# Patient Record
Sex: FEMALE | Race: WHITE | NOT HISPANIC OR LATINO | Employment: OTHER | URBAN - METROPOLITAN AREA
[De-identification: names, ages, dates, MRNs, and addresses within clinical notes are randomized per-mention and may not be internally consistent; named-entity substitution may affect disease eponyms.]

---

## 2017-01-02 ENCOUNTER — OFFICE VISIT (OUTPATIENT)
Dept: FAMILY MEDICINE | Facility: CLINIC | Age: 82
End: 2017-01-02
Payer: COMMERCIAL

## 2017-01-02 VITALS
HEIGHT: 64 IN | OXYGEN SATURATION: 97 % | DIASTOLIC BLOOD PRESSURE: 62 MMHG | WEIGHT: 183.8 LBS | HEART RATE: 85 BPM | TEMPERATURE: 98.1 F | SYSTOLIC BLOOD PRESSURE: 122 MMHG | RESPIRATION RATE: 16 BRPM | BODY MASS INDEX: 31.38 KG/M2

## 2017-01-02 DIAGNOSIS — J20.9 ACUTE BRONCHITIS, UNSPECIFIED ORGANISM: Primary | ICD-10-CM

## 2017-01-02 DIAGNOSIS — R01.1 HEART MURMUR: ICD-10-CM

## 2017-01-02 PROCEDURE — 99213 OFFICE O/P EST LOW 20 MIN: CPT | Performed by: FAMILY MEDICINE

## 2017-01-02 RX ORDER — AZITHROMYCIN 250 MG/1
TABLET, FILM COATED ORAL
Qty: 6 TABLET | Refills: 0 | Status: SHIPPED | OUTPATIENT
Start: 2017-01-02 | End: 2017-07-14

## 2017-01-02 NOTE — PATIENT INSTRUCTIONS
I do feel this is viral.   At this time, I do not think antibiotics would be helpful.    I would recommend if you take a turn for the worse after 7-10 days of illness or if you are not improving after 10-14 days, that you start the antibiotic        Bronchitis, Viral (Adult)    You have a viral bronchitis. Bronchitis is inflammation and swelling of the lining of the lungs. This is often caused by an infection. Symptoms include a dry, hacking cough that is worse at night. The cough may bring up yellow-green mucus. You may also feel short of breath or wheeze. Other symptoms may include tiredness, chest discomfort, and chills.  Bronchitis that is caused by a virus is not treated with antibiotics. Instead, medicines may be given to help relieve symptoms. Symptoms can last up to 2 weeks, although the cough may last much longer.  This illness is contagious during the first few days and is spread through the air by coughing and sneezing, or by direct contact (touching the sick person and then touching your own eyes, nose, or mouth).  Most viral illnesses resolve within 10 to 14 days with rest and simple home remedies, although they may sometimes last for several weeks.  Home care    If symptoms are severe, rest at home for the first 2 to 3 days. When you go back to your usual activities, don't let yourself get too tired.    Do not smoke. Also avoid being exposed to secondhand smoke.    You may use over-the-counter medicine to control fever or pain, unless another pain medicine was prescribed. (Note: If you have chronic liver or kidney disease or have ever had a stomach ulcer or gastrointestinal bleeding, talk with your healthcare provider before using these medicines. Also talk to your provider if you are taking medicine to prevent blood clots.) Aspirin should never be given to anyone younger than 18 years of age who is ill with a viral infection or fever. It may cause severe liver or brain damage.    Your appetite may  be poor, so a light diet is fine. Avoid dehydration by drinking 6 to 8 glasses of fluids per day (such as water, soft drinks, sports drinks, juices, tea, or soup). Extra fluids will help loosen secretions in the nose and lungs.    Over-the-counter cough, cold, and sore-throat medicines will not shorten the length of the illness, but they may help to reduce symptoms. (Note: Do not use decongestants if you have high blood pressure.)  Follow-up care  Follow up with your healthcare provider, or as advised.  If you had an X-ray or ECG (electrocardiogram), a specialist will review it. You will be notified of any new findings that may affect your care.  Note: If you are age 65 or older, or if you have a chronic lung disease or condition that affects your immune system, or you smoke, talk to your healthcare provider about having pneumococcal vaccinations and a yearly influenza vaccination (flu shot).  When to seek medical advice   Call your healthcare provider right away if any of these occur:    Fever of 100.4 F (38 C) or higher    Coughing up increased amounts of colored sputum    Weakness, drowsiness, headache, facial pain, ear pain, or a stiff neck  Call 911, or get immediate medical care  Contact emergency services right away if any of these occur:    Coughing up blood    Worsening weakness, drowsiness, headache, or stiff neck    Trouble breathing, wheezing, or pain with breathing    4389-6583 [employer]. [address] [place] [address], [place] [address]. All rights reserved. This information is not intended as a substitute for professional medical care. Always follow your healthcare professional's instructions.

## 2017-01-02 NOTE — NURSING NOTE
"Chief Complaint   Patient presents with     Cough       Initial /62 mmHg  Pulse 85  Temp(Src) 98.1  F (36.7  C) (Oral)  Resp 16  Ht 5' 4\" (1.626 m)  Wt 183 lb 12.8 oz (83.371 kg)  BMI 31.53 kg/m2  SpO2 97% Estimated body mass index is 31.53 kg/(m^2) as calculated from the following:    Height as of this encounter: 5' 4\" (1.626 m).    Weight as of this encounter: 183 lb 12.8 oz (83.371 kg).  BP completed using cuff size: brittny Cr CMA      "

## 2017-01-02 NOTE — MR AVS SNAPSHOT
After Visit Summary   1/2/2017    Ирина Yanez    MRN: [id number]           Patient Information     Date Of Birth          1935        Visit Information        Provider Department      1/2/2017 11:10 AM Yuridia Villarreal MD [facility]        Today's Diagnoses     Acute bronchitis, unspecified organism    -  1       Care Instructions      I do feel this is viral.   At this time, I do not think antibiotics would be helpful.    I would recommend if you take a turn for the worse after 7-10 days of illness or if you are not improving after 10-14 days, that you start the antibiotic        Bronchitis, Viral (Adult)    You have a viral bronchitis. Bronchitis is inflammation and swelling of the lining of the lungs. This is often caused by an infection. Symptoms include a dry, hacking cough that is worse at night. The cough may bring up yellow-green mucus. You may also feel short of breath or wheeze. Other symptoms may include tiredness, chest discomfort, and chills.  Bronchitis that is caused by a virus is not treated with antibiotics. Instead, medicines may be given to help relieve symptoms. Symptoms can last up to 2 weeks, although the cough may last much longer.  This illness is contagious during the first few days and is spread through the air by coughing and sneezing, or by direct contact (touching the sick person and then touching your own eyes, nose, or mouth).  Most viral illnesses resolve within 10 to 14 days with rest and simple home remedies, although they may sometimes last for several weeks.  Home care    If symptoms are severe, rest at home for the first 2 to 3 days. When you go back to your usual activities, don't let yourself get too tired.    Do not smoke. Also avoid being exposed to secondhand smoke.    You may use over-the-counter medicine to control fever or pain, unless another pain medicine was prescribed. (Note: If you have chronic liver or kidney disease or have ever  had a stomach ulcer or gastrointestinal bleeding, talk with your healthcare provider before using these medicines. Also talk to your provider if you are taking medicine to prevent blood clots.) Aspirin should never be given to anyone younger than 18 years of age who is ill with a viral infection or fever. It may cause severe liver or brain damage.    Your appetite may be poor, so a light diet is fine. Avoid dehydration by drinking 6 to 8 glasses of fluids per day (such as water, soft drinks, sports drinks, juices, tea, or soup). Extra fluids will help loosen secretions in the nose and lungs.    Over-the-counter cough, cold, and sore-throat medicines will not shorten the length of the illness, but they may help to reduce symptoms. (Note: Do not use decongestants if you have high blood pressure.)  Follow-up care  Follow up with your healthcare provider, or as advised.  If you had an X-ray or ECG (electrocardiogram), a specialist will review it. You will be notified of any new findings that may affect your care.  Note: If you are age 65 or older, or if you have a chronic lung disease or condition that affects your immune system, or you smoke, talk to your healthcare provider about having pneumococcal vaccinations and a yearly influenza vaccination (flu shot).  When to seek medical advice   Call your healthcare provider right away if any of these occur:    Fever of 100.4 F (38 C) or higher    Coughing up increased amounts of colored sputum    Weakness, drowsiness, headache, facial pain, ear pain, or a stiff neck  Call 911, or get immediate medical care  Contact emergency services right away if any of these occur:    Coughing up blood    Worsening weakness, drowsiness, headache, or stiff neck    Trouble breathing, wheezing, or pain with breathing    2248-3210 [employer]. [address] [place] [address], [place] [address]. All rights reserved. This information is not intended as a substitute for professional medical  "care. Always follow your healthcare professional's instructions.              Follow-ups after your visit        Who to contact     If you have questions or need follow up information about today's clinic visit or your schedule please contact [facility] directly at [phone].  Normal or non-critical lab and imaging results will be communicated to you by [employer], letter or phone within 4 business days after the clinic has received the results. If you do not hear from us within 7 days, please contact the clinic through [employer] or phone. If you have a critical or abnormal lab result, we will notify you by phone as soon as possible.  Submit refill requests through [employer] or call your pharmacy and they will forward the refill request to us. Please allow 3 business days for your refill to be completed.          Additional Information About Your Visit        [employer] Information     [employer] gives you secure access to your electronic health record. If you see a primary care provider, you can also send messages to your care team and make appointments. If you have questions, please call your primary care clinic.  If you do not have a primary care provider, please call [phone] and they will assist you.        Your Vitals Were     Pulse Temperature Respirations Height BMI (Body Mass Index) Pulse Oximetry    85 98.1  F (36.7  C) (Oral) 16 5' 4\" (1.626 m) 31.53 kg/m2 97%       Blood Pressure from Last 3 Encounters:   01/02/17 122/62   12/07/16 126/60   10/26/16 159/70    Weight from Last 3 Encounters:   01/02/17 183 lb 12.8 oz (83.371 kg)   12/07/16 196 lb (88.905 kg)   10/26/16 185 lb 12.8 oz (84.278 kg)              Today, you had the following     No orders found for display         Today's Medication Changes          These changes are accurate as of: 1/2/17 11:37 AM.  If you have any questions, ask your nurse or doctor.               Start taking these medicines.        Dose/Directions    azithromycin " 250 MG tablet   Commonly known as:  ZITHROMAX   Used for:  Acute bronchitis, unspecified organism   Started by:  Yuridia Villarreal MD        2 day one followed by 1 daily days 2-5   Quantity:  6 tablet   Refills:  0            Where to get your medicines      Some of these will need a paper prescription and others can be bought over the counter.  Ask your nurse if you have questions.     Bring a paper prescription for each of these medications    - azithromycin 250 MG tablet             Primary Care Provider Office Phone # Fax #    Yuridia Villarreal [phone]       [facility] [address]  [facility] [address]        Thank you!     Thank you for choosing [facility]  for your care. Our goal is always to provide you with excellent care. Hearing back from our patients is one way we can continue to improve our services. Please take a few minutes to complete the written survey that you may receive in the mail after your visit with us. Thank you!             Your Updated Medication List - Protect others around you: Learn how to safely use, store and throw away your medicines at www.disposemymeds.org.          This list is accurate as of: 1/2/17 11:37 AM.  Always use your most recent med list.                   Brand Name Dispense Instructions for use    AMARYL 4 MG tablet   Generic drug:  glimepiride     30 tablet    Take 2 tablets (8 mg) by mouth every morning (before breakfast)       aspirin 325 MG EC tablet      1 tablet daily       azithromycin 250 MG tablet    ZITHROMAX    6 tablet    2 day one followed by 1 daily days 2-5       cholecalciferol 2000 UNITS Caps      2 daily       cyclobenzaprine 5 MG tablet    FLEXERIL    90 tablet    Take 1 tablet (5 mg) by mouth 3 times daily as needed for muscle spasms       docusate sodium 100 MG tablet    COLACE    60 tablet    Take 100 mg by mouth daily       furosemide 20 MG tablet    LASIX    90 tablet    Take 1 tablet (20 mg) by  mouth every morning       lisinopril 20 MG tablet    PRINIVIL/ZESTRIL    90 tablet    Take 1 tablet (20 mg) by mouth daily       Multi-vitamin Tabs tablet   Generic drug:  multivitamin, therapeutic with minerals      1 TABLET DAILY       nystatin 845770 UNIT/GM Powd    MYCOSTATIN    60 g    Apply a small amount to affected area three times daily.       potassium chloride SA 10 MEQ CR tablet    K-DUR/KLOR-CON M    90 tablet    Take 1 tablet (10 mEq) by mouth daily       sertraline 50 MG tablet    ZOLOFT    90 tablet    Take 1 tablet (50 mg) by mouth daily       simvastatin 10 MG tablet    ZOCOR    90 tablet    Take 1 tablet (10 mg) by mouth At Bedtime       traMADol 50 MG tablet    ULTRAM    180 tablet    Take 1-2 tablets ( mg) by mouth every 8 hours as needed for pain .  Maximum 180 per month

## 2017-01-02 NOTE — PROGRESS NOTES
SUBJECTIVE:                                                    Ирина Yanez is a 81 year old female who presents to clinic today for the following health issues:      Acute Illness   Acute illness concerns: cough  Onset: x 5 days     Fever: YES    Chills/Sweats: no     Headache (location?): no     Sinus Pressure:YES- post-nasal drainage    Conjunctivitis:  no    Ear Pain: YES: both    Rhinorrhea: YES    Congestion: YES chest    Sore Throat: no     Cough: YES-non-productive    Wheeze: YES    Decreased Appetite: YES    Nausea: no     Vomiting: no     Diarrhea:  YES    Dysuria/Freq.: no     Fatigue/Achiness: YES    Sick/Strep Exposure: YES-      Therapies Tried and outcome: Nyquil, airborne, - nyquil effective          Patient Active Problem List   Diagnosis     Diabetic polyneuropathy (H)     Hyperlipidemia with target LDL less than 100     Hypertension goal BP (blood pressure) < 140/90     Arthritis     Anxiety     Type 2 diabetes mellitus with diabetic nephropathy (H)     Health Care Home     Malignant melanoma of skin     Vulvar dystrophy     Lichen sclerosus et atrophicus     Vitamin B12 deficiency (non anemic)     Controlled substance agreement signed 2/5/15     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 quadriceps     Lumbar stenosis     Right bundle branch block     S/P lumbar fusion     ACP (advance care planning)       Current Outpatient Prescriptions   Medication Sig Dispense Refill     traMADol (ULTRAM) 50 MG tablet Take 1-2 tablets ( mg) by mouth every 8 hours as needed for pain .  Maximum 180 per month 180 tablet 0     furosemide (LASIX) 20 MG tablet Take 1 tablet (20 mg) by mouth every morning 90 tablet 0     lisinopril (PRINIVIL,ZESTRIL) 20 MG tablet Take 1 tablet (20 mg) by mouth daily 90  "tablet 0     potassium chloride SA (K-DUR,KLOR-CON M) 10 MEQ tablet Take 1 tablet (10 mEq) by mouth daily 90 tablet 3     sertraline (ZOLOFT) 50 MG tablet Take 1 tablet (50 mg) by mouth daily 90 tablet 1     cyclobenzaprine (FLEXERIL) 5 MG tablet Take 1 tablet (5 mg) by mouth 3 times daily as needed for muscle spasms 90 tablet 1     docusate sodium (COLACE) 100 MG tablet Take 100 mg by mouth daily 60 tablet 1     simvastatin (ZOCOR) 10 MG tablet Take 1 tablet (10 mg) by mouth At Bedtime 90 tablet 2     glimepiride (AMARYL) 4 MG tablet Take 2 tablets (8 mg) by mouth every morning (before breakfast) 30 tablet 1     nystatin (MYCOSTATIN) 629218 UNIT/GM POWD Apply a small amount to affected area three times daily. 60 g 1     cholecalciferol 2000 UNITS CAPS 2 daily       ASPIRIN 325 MG OR TBEC 1 tablet daily       MULTI-VITAMIN OR TABS 1 TABLET DAILY             ROS:  CONSTITUTIONAL:fever up to 101 yesterday. Not today. Slept well all last night; did sleep in recliner  ENT/MOUTH: runny nose; no sinus pain  RESP:nonproductive cough. Believes some wheezing.  CHEST: pain with coughing.   PSYCHIATRIC:     Starting to improve today.     getting similar symptoms; starting today.      OBJECTIVE:                                                    /62 mmHg  Pulse 85  Temp(Src) 98.1  F (36.7  C) (Oral)  Resp 16  Ht 5' 4\" (1.626 m)  Wt 183 lb 12.8 oz (83.371 kg)  BMI 31.53 kg/m2  SpO2 97%  Body mass index is 31.53 kg/(m^2).        General appearance: alert and has no apparent distress  Skin color is pink  Hydration status appears adequate with normal skin turgor and moist mucous membranes.  Sinuses are nontender to palpation.   Left TM is normal: no effusions, no erythema, and normal landmarks.  Right TM is normal: no effusions, no erythema, and normal landmarks.  Nasal mucosa has some clear mucus.  Oropharyngeal exam is normal: no lesions, erythema, adenopathy or exudate.  Neck is supple without " adenopathy.  RESP: Normal - CTA without rales, rhonchi, or wheezing.  COR: Regular rate and rhythm with II/VI systolic murmur  MS: no ankle edema.    ECHO 9/15:  Interpretation Summary     Left ventricular systolic function is normal.  The visual ejection fraction is estimated at 55-60%.  There is severe aortic sclerosis of the non-coronary cusp.  No hemodynamically significant valvular aortic stenosis.  Consider fu echo in 2-3 years or earlier if clinically appropriate. The study  was technically difficult. Compared to the prior study dated 5/10, there have  been no changes.       ASSESSMENT/PLAN:                                                        Acute bronchitis, unspecified organism  Suspect viral at this time. Discussed symptomatic treatment.   Antibiotics if takes turn for the worse; Also see patient instructions.   - azithromycin (ZITHROMAX) 250 MG tablet; 2 day one followed by 1 daily days 2-5    Heart murmur  See below.    Aortic sclerosis (H)  Reviewed ECHO report; suggested repeat as noted.       Yuridia Villarreal MD, MD  [facility]

## 2017-01-13 DIAGNOSIS — E78.5 HYPERLIPIDEMIA WITH TARGET LDL LESS THAN 100: Primary | ICD-10-CM

## 2017-01-13 RX ORDER — SIMVASTATIN 10 MG
10 TABLET ORAL AT BEDTIME
Qty: 90 TABLET | Refills: 1 | Status: SHIPPED | OUTPATIENT
Start: 2017-01-13 | End: 2017-07-09

## 2017-01-13 NOTE — TELEPHONE ENCOUNTER
Simvastatin 10 mg  Prescription approved per [facility] Refill Protocol.    Thank You   Bony Vidal [facility]

## 2017-01-13 NOTE — TELEPHONE ENCOUNTER
Simvastatin 10mg     Last Written Prescription Date: 03/30/2016  Last Fill Quantity: 90, # refills: 2  Last Office Visit with G, [facility] or [facility] prescribing provider: 01/02/2017       CHOL      183   12/18/2015  HDL       43   12/18/2015  LDL       92   12/18/2015  TRIG      238   12/18/2015  CHOLHDLRATIO      3.4   12/17/2013    Thank you  Elidia Stacy Technician

## 2017-01-16 ENCOUNTER — OFFICE VISIT (OUTPATIENT)
Dept: SLEEP MEDICINE | Facility: CLINIC | Age: 82
End: 2017-01-16
Payer: COMMERCIAL

## 2017-01-16 ENCOUNTER — DOCUMENTATION ONLY (OUTPATIENT)
Dept: SLEEP MEDICINE | Facility: CLINIC | Age: 82
End: 2017-01-16

## 2017-01-16 VITALS
TEMPERATURE: 97.9 F | OXYGEN SATURATION: 95 % | BODY MASS INDEX: 31.89 KG/M2 | DIASTOLIC BLOOD PRESSURE: 57 MMHG | SYSTOLIC BLOOD PRESSURE: 125 MMHG | HEIGHT: 64 IN | HEART RATE: 80 BPM | WEIGHT: 186.8 LBS

## 2017-01-16 DIAGNOSIS — G47.39 COMPLEX SLEEP APNEA SYNDROME: Primary | ICD-10-CM

## 2017-01-16 PROCEDURE — 99213 OFFICE O/P EST LOW 20 MIN: CPT | Performed by: INTERNAL MEDICINE

## 2017-01-16 NOTE — NURSING NOTE
"Chief Complaint   Patient presents with     Sleep Problem     follow up       Initial /57 mmHg  Pulse 80  Temp(Src) 97.9  F (36.6  C) (Oral)  Ht 1.626 m (5' 4\")  Wt 84.732 kg (186 lb 12.8 oz)  BMI 32.05 kg/m2  SpO2 95% Estimated body mass index is 32.05 kg/(m^2) as calculated from the following:    Height as of this encounter: 1.626 m (5' 4\").    Weight as of this encounter: 84.732 kg (186 lb 12.8 oz).  BP completed using cuff size: regular left arm  Summer Lamb MA  [facility]      "

## 2017-01-16 NOTE — PROGRESS NOTES
Pressure changes made to PAP device remotely via [employer]. Left message with Pt to notify her of changes made.

## 2017-01-16 NOTE — PATIENT INSTRUCTIONS

## 2017-01-16 NOTE — PROGRESS NOTES
Name: Ирина Yanez MRN# [id number]   Age: 81 year old YOB: 1935     Date of visit: January 16, 2017  Primary care provider: Yuridia Villarreal           Chief Complaint:    Sleep apnea            History of Present Illness:     Ирина Yanez is a 81 year old female with severe obstructive sleep apnea.  Her sleep study from 08/23/2001 had shown an apnea-hypopnea index of 88 per hour. She was noted to have complex sleep apnea with high residual AHI and central apneas on her CPAP download. A titration polysomnogram on 01/03/2016 showed good response to adaptive servo ventilator therapy. She is currently on ASV therapy with min EPAP of 4 cm H2O, max EPAP of 10 cm H2O, min PS 0, max PS 15 and max pressure of 25 cm H2O.     Patient has been doing well on therapy. He had back surgery last year and she struggled with using her device post surgery. However in the last one month, she has meli able to use her device fairly regularly. She has resolution of snoring when using her device. She has adequate energy level during the day.    Download from her device shows regular compliance with 77% of last 30 days with use greater than 4 hours. Residual AHI is 17.1 per hour. Residual events break down to average OA index of 8.8 and central apnea index of 5.1 per hour. Average % of Night in PB: 8.2%           Medications:     Current Outpatient Prescriptions   Medication Sig     simvastatin (ZOCOR) 10 MG tablet Take 1 tablet (10 mg) by mouth At Bedtime     azithromycin (ZITHROMAX) 250 MG tablet 2 day one followed by 1 daily days 2-5     traMADol (ULTRAM) 50 MG tablet Take 1-2 tablets ( mg) by mouth every 8 hours as needed for pain .  Maximum 180 per month     furosemide (LASIX) 20 MG tablet Take 1 tablet (20 mg) by mouth every morning     lisinopril (PRINIVIL,ZESTRIL) 20 MG tablet Take 1 tablet (20 mg) by mouth daily     potassium chloride SA (K-DUR,KLOR-CON M) 10 MEQ tablet Take 1 tablet (10 mEq) by mouth daily      sertraline (ZOLOFT) 50 MG tablet Take 1 tablet (50 mg) by mouth daily     cyclobenzaprine (FLEXERIL) 5 MG tablet Take 1 tablet (5 mg) by mouth 3 times daily as needed for muscle spasms     docusate sodium (COLACE) 100 MG tablet Take 100 mg by mouth daily     glimepiride (AMARYL) 4 MG tablet Take 2 tablets (8 mg) by mouth every morning (before breakfast)     nystatin (MYCOSTATIN) 838002 UNIT/GM POWD Apply a small amount to affected area three times daily.     cholecalciferol 2000 UNITS CAPS 2 daily     ASPIRIN 325 MG OR TBEC 1 tablet daily     MULTI-VITAMIN OR TABS 1 TABLET DAILY     No current facility-administered medications for this visit.        Allergies   Allergen Reactions     Augmentin Diarrhea     Vomiting       Cleocin      Hives     Tegretol [Carbamazepine]      Irregular heart beat            Past Medical History:     Does not need 02 supplement at night   Past Medical History   Diagnosis Date     Diabetes mellitus (H)      sees Dr. Verde     HTN      Hyperlipidemia LDL goal < 100      Dr. Verde     Peripheral Neuropathy      hands, feet; used to see Dr. Tl Das     Skin cancer      face; basal and other. Melanoma. 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     Melanoma (H)      Lichen sclerosus et atrophicus 2/15/2013     Arthritis      Depression      Anxiety              Past Surgical History:    No h/o  upper airway surgery  Past Surgical History   Procedure Laterality Date     Surgical history of -   1997     bilateral knee replacement     Surgical history of -   1997     right knee revised; patella tendon ruptured     Surgical history of -        surgery for spinal stenosis     Surgical history of -        breast reduction surgery     Surgical history of -        D and C      Colonoscopy  early 2009     repeat 4-5 years (Had one prior to this with polyps)     Cholecystectomy  Nov. 1975     Repair hammer toe bilateral   "8/23/2012     Procedure: REPAIR HAMMER TOE BILATERAL;  Flexor Tenotomy 2,3,4,5 Toes both feet, Partial 2nd toe amputation left foot;  Surgeon: Mil Jolly DPM;  Location: [facility] OR     Amputate toe(s)  8/23/2012     left second toe Procedure: AMPUTATE TOE(S);;  Surgeon: Mil Jolly DPM;  Location: [facility] OR     Colonoscopy  Sept 2014     incomplete; recommend colography     Repair tendon quadriceps Right 10/27/2015     Procedure: REPAIR TENDON QUADRICEPS;  Surgeon: Antonio Yi MD;  Location: [facility] OR     Pet, rec of melanoma/met ca Left      arm     Optical tracking system fusion posterior lumbar one level N/A 9/16/2016     Procedure: OPTICAL TRACKING SYSTEM FUSION SPINE POSTERIOR LUMBAR ONE LEVEL;  Surgeon: Lennox Blue MD;  Location: [facility] OR              Physical Examination:   /57 mmHg  Pulse 80  Temp(Src) 97.9  F (36.6  C) (Oral)  Ht 1.626 m (5' 4\")  Wt 84.732 kg (186 lb 12.8 oz)  BMI 32.05 kg/m2  SpO2 95%            Assessment and Plan:        Complex sleep apnea syndrome    - Patient is currently on ASV therapy. Last echocardiogram is from 9/25/2015 with LV EF of 55-60%. There are residual obstructive events reported on device download. Increase in EPAP settings is recoomended to address obstructive events. Pressure settings on her device were changed to min EPAP of 6 cm h2O and max EPAP of 12 cm H2O.     - patient was counseled regarding download findings and benefits of treating her severe sleep apnea. She will continue to obtain supplies through her DME provider and will return for a clinic follow up in a year.      I spent a total of 15 minutes with this patient with more than 50% counseling regarding sleep apnea.     Mic Quijano MD, MD 1/16/2017         "

## 2017-01-18 DIAGNOSIS — G89.29 OTHER CHRONIC PAIN: ICD-10-CM

## 2017-01-18 DIAGNOSIS — E11.42 DIABETIC POLYNEUROPATHY ASSOCIATED WITH TYPE 2 DIABETES MELLITUS (H): Primary | ICD-10-CM

## 2017-01-18 RX ORDER — TRAMADOL HYDROCHLORIDE 50 MG/1
50-100 TABLET ORAL EVERY 8 HOURS PRN
Qty: 180 TABLET | Refills: 0 | Status: SHIPPED | OUTPATIENT
Start: 2017-01-18 | End: 2017-02-21

## 2017-01-18 NOTE — TELEPHONE ENCOUNTER
Tramadol 50mg      Last Written Prescription Date:  12/19/2016  Last Fill Quantity: 180,   # refills: zero  Last Office Visit with [facility], P or [facility] prescribing provider: [phone]  Future Office visit:    Routing refill request to provider for review/approval because    Thank you  Elidia Stacy Tech

## 2017-02-21 DIAGNOSIS — G89.29 OTHER CHRONIC PAIN: ICD-10-CM

## 2017-02-21 DIAGNOSIS — R60.9 EDEMA, UNSPECIFIED TYPE: ICD-10-CM

## 2017-02-21 DIAGNOSIS — E11.42 DIABETIC POLYNEUROPATHY ASSOCIATED WITH TYPE 2 DIABETES MELLITUS (H): ICD-10-CM

## 2017-02-21 DIAGNOSIS — I10 HYPERTENSION GOAL BP (BLOOD PRESSURE) < 130/80: ICD-10-CM

## 2017-02-21 RX ORDER — TRAMADOL HYDROCHLORIDE 50 MG/1
50-100 TABLET ORAL EVERY 8 HOURS PRN
Qty: 180 TABLET | Refills: 0 | Status: SHIPPED | OUTPATIENT
Start: 2017-02-21 | End: 2017-02-24

## 2017-02-21 RX ORDER — FUROSEMIDE 20 MG
20 TABLET ORAL EVERY MORNING
Qty: 90 TABLET | Refills: 1 | Status: SHIPPED | OUTPATIENT
Start: 2017-02-21 | End: 2017-08-18

## 2017-02-21 RX ORDER — LISINOPRIL 20 MG/1
20 TABLET ORAL DAILY
Qty: 90 TABLET | Refills: 1 | Status: SHIPPED | OUTPATIENT
Start: 2017-02-21 | End: 2017-08-18

## 2017-02-21 NOTE — TELEPHONE ENCOUNTER
Tramadol 50mg      Last Written Prescription Date: 1/18/17  Last Fill Quantity: 180,  # refills: 0   Last Office Visit with [facility], [facility] or [facility] prescribing provider: 01/02/2017                                             Shelby Sy CPhT  [facility]  [phone]

## 2017-02-21 NOTE — TELEPHONE ENCOUNTER
Lasix 20mg      Last Written Prescription Date: 11/16/16  Last Fill Quantity: 90, # refills: 0  Last Office Visit with [facility] or [facility] prescribing provider: 01/20/17       Potassium   Date Value Ref Range Status   12/22/2016 4.3 3.5 - 5.3 mmol/L Final     Creatinine   Date Value Ref Range Status   12/22/2016 46 (H) 0.60 - 0.93 mg/dL Final     BP Readings from Last 3 Encounters:   01/16/17 125/57   01/02/17 122/62   12/07/16 126/60     Lisinopril 20mg       Last Written Prescription Date: 11/16/2016  Last Fill Quantity: 90, # refills: 0  Last Office Visit with [facility], [facility] or [facility] prescribing provider: 01/20/17       Potassium   Date Value Ref Range Status   12/22/2016 4.3 3.5 - 5.3 mmol/L Final     Creatinine   Date Value Ref Range Status   12/22/2016 46 (H) 0.60 - 0.93 mg/dL Final     BP Readings from Last 3 Encounters:   01/16/17 125/57   01/02/17 122/62   12/07/16 126/60     Shelby Sy CPhT  [facility]  [phone]

## 2017-02-22 ENCOUNTER — TRANSFERRED RECORDS (OUTPATIENT)
Dept: HEALTH INFORMATION MANAGEMENT | Facility: CLINIC | Age: 82
End: 2017-02-22

## 2017-02-24 DIAGNOSIS — G89.29 OTHER CHRONIC PAIN: ICD-10-CM

## 2017-02-24 DIAGNOSIS — E11.42 DIABETIC POLYNEUROPATHY ASSOCIATED WITH TYPE 2 DIABETES MELLITUS (H): ICD-10-CM

## 2017-02-24 RX ORDER — TRAMADOL HYDROCHLORIDE 50 MG/1
50-100 TABLET ORAL EVERY 8 HOURS PRN
Qty: 180 TABLET | Refills: 0 | Status: SHIPPED | OUTPATIENT
Start: 2017-02-24 | End: 2017-03-23

## 2017-03-03 ENCOUNTER — TRANSFERRED RECORDS (OUTPATIENT)
Dept: HEALTH INFORMATION MANAGEMENT | Facility: CLINIC | Age: 82
End: 2017-03-03

## 2017-03-13 ENCOUNTER — OFFICE VISIT (OUTPATIENT)
Dept: NEUROSURGERY | Facility: CLINIC | Age: 82
End: 2017-03-13
Attending: NURSE PRACTITIONER
Payer: COMMERCIAL

## 2017-03-13 ENCOUNTER — RADIANT APPOINTMENT (OUTPATIENT)
Dept: GENERAL RADIOLOGY | Facility: CLINIC | Age: 82
End: 2017-03-13
Attending: NURSE PRACTITIONER
Payer: COMMERCIAL

## 2017-03-13 VITALS
BODY MASS INDEX: 31.76 KG/M2 | DIASTOLIC BLOOD PRESSURE: 61 MMHG | HEIGHT: 64 IN | SYSTOLIC BLOOD PRESSURE: 129 MMHG | WEIGHT: 186 LBS | HEART RATE: 73 BPM | OXYGEN SATURATION: 96 % | TEMPERATURE: 97 F

## 2017-03-13 DIAGNOSIS — Z98.1 STATUS POST LUMBAR SPINAL FUSION: ICD-10-CM

## 2017-03-13 DIAGNOSIS — Z98.1 STATUS POST LUMBAR SPINAL FUSION: Primary | ICD-10-CM

## 2017-03-13 PROCEDURE — 99213 OFFICE O/P EST LOW 20 MIN: CPT | Performed by: NURSE PRACTITIONER

## 2017-03-13 PROCEDURE — 72100 X-RAY EXAM L-S SPINE 2/3 VWS: CPT

## 2017-03-13 PROCEDURE — 99211 OFF/OP EST MAY X REQ PHY/QHP: CPT | Performed by: NURSE PRACTITIONER

## 2017-03-13 ASSESSMENT — PAIN SCALES - GENERAL: PAINLEVEL: MODERATE PAIN (4)

## 2017-03-13 NOTE — PROGRESS NOTES
[facility]  Neurosurgery followup:    HPI: Ms. Yanez is a 81 year old female that returns 6 months post L3-4 interbody fusion with Dr. Lennox Blue.  Pt reports that he is doing very well today.  She reports that she tries not to walk to straight due to pain in her back. It was explained that she needs to stand up straight and stretch her back muscles.         Exam:  Constitutional:  Alert, well nourished, NAD.  HEENT: Normocephalic, atraumatic.   Pulm:  Without shortness of breath   CV:  No pitting edema of BLE.      Neurological:  Awake  Alert  Oriented x 3  Motor exam:        IP Q DF PF EHL  R   5  5   5   5    5  L   5  5   5   5    5     Able to spontaneously move L/E bilaterally  Sensation intact throughout all L/E dermatomes       Imaging: lumbar xray shows fusion intact    A/P: Ms. Yanez is a 81 year old female that returns 6 months post L3-4 interbody fusion with Dr. Lennox Blue.  Pt reports that he is doing very well today.  She reports that she tries not to walk to straight due to pain in her back. It was explained that she needs to stand up straight and stretch her back muscles. The pt is open to trying physical therapy. The importance of proper body mechanics was discussed.     Plan:   - followup in 6 months with xray prior   - Call the clinic at [phone] for increased pain or any other questions and concerns.    - Schedule physical therapy    Elidia Aragon [facility]  [facility]  [address]  [address] [place] [address]    Tel [phone]  Pager [phone]

## 2017-03-13 NOTE — PATIENT INSTRUCTIONS
Plan:   - followup in 6 months with xray prior   - Call the clinic at [phone] for increased pain or any other questions and concerns.    - Schedule physical therapy

## 2017-03-13 NOTE — MR AVS SNAPSHOT
After Visit Summary   3/13/2017    Ирина Yanez    MRN: [id number]           Patient Information     Date Of Birth          1935        Visit Information        Provider Department      3/13/2017 9:40 AM Elidia Aragon APRN CNP [facility]        Today's Diagnoses     Status post lumbar spinal fusion    -  1      Care Instructions    Plan:   - followup in 6 months with xray prior   - Call the clinic at [phone] for increased pain or any other questions and concerns.    - Schedule physical therapy            Follow-ups after your visit        Additional Services     [facility] PT, HAND, AND CHIROPRACTIC REFERRAL       **This order will print in the [facility] Scheduling Office**    Physical Therapy, Hand Therapy and Chiropractic Care are available through:    *[facility]  *[facility]  *[facility]    Call one number to schedule at any of the above locations: [phone].    Your provider has referred you to: Physical Therapy at [facility] or [facility]    Indication/Reason for Referral:low back and posture  Onset of Illness: chronic  Therapy Orders: Evaluate and Treat  Special Programs: None  Special Request: None    Kristen Peterson      Additional Comments for the Therapist or Chiropractor:         Please be aware that coverage of these services is subject to the terms and limitations of your health insurance plan.  Call member services at your health plan with any benefit or coverage questions.      Please bring the following to your appointment:    *Your personal calendar for scheduling future appointments  *Comfortable clothing                  Your next 10 appointments already scheduled     Mar 13, 2017  9:40 AM CDT   Return Visit with SAMY Calloway CNP   [facility] ([facility])    [address] [address]   [phone]            Jan 16, 2018  "10:00 AM CST   Return Sleep Patient with Mic Quijano MD   [facility] ([facility])    [address] [address] [phone]              Future tests that were ordered for you today     Open Future Orders        Priority Expected Expires Ordered    XR Lumbar Spine 2/3 Views Routine 9/13/2017 3/13/2018 3/13/2017            Who to contact     If you have questions or need follow up information about today's clinic visit or your schedule please contact [facility] directly at [phone].  Normal or non-critical lab and imaging results will be communicated to you by I-Markethart, letter or phone within 4 business days after the clinic has received the results. If you do not hear from us within 7 days, please contact the clinic through [employer] or phone. If you have a critical or abnormal lab result, we will notify you by phone as soon as possible.  Submit refill requests through [email or web address] or call your pharmacy and they will forward the refill request to us. Please allow 3 business days for your refill to be completed.          Additional Information About Your Visit        I-Markethartriptap Information     [email or web address] gives you secure access to your electronic health record. If you see a primary care provider, you can also send messages to your care team and make appointments. If you have questions, please call your primary care clinic.  If you do not have a primary care provider, please call [phone] and they will assist you.        Care EveryWhere ID     This is your Care EveryWhere ID. This could be used by other organizations to access your [place] medical records  [id number]        Your Vitals Were     Pulse Temperature Height Pulse Oximetry BMI (Body Mass Index)       73 97  F (36.1  C) (Oral) 5' 4\" (1.626 m) 96% 31.93 kg/m2        Blood Pressure from Last 3 Encounters:   03/13/17 129/61   01/16/17 125/57   01/02/17 122/62    Weight " from Last 3 Encounters:   03/13/17 186 lb (84.4 kg)   01/16/17 186 lb 12.8 oz (84.7 kg)   01/02/17 183 lb 12.8 oz (83.4 kg)              We Performed the Following     [facility] PT, HAND, AND CHIROPRACTIC REFERRAL        Primary Care Provider Office Phone # Fax #    Yuridia Villarreal [phone]       [facility] [address] [address] [facility] [address]        Thank you!     Thank you for choosing [facility]  for your care. Our goal is always to provide you with excellent care. Hearing back from our patients is one way we can continue to improve our services. Please take a few minutes to complete the written survey that you may receive in the mail after your visit with us. Thank you!             Your Updated Medication List - Protect others around you: Learn how to safely use, store and throw away your medicines at www.disposemymeds.org.          This list is accurate as of: 3/13/17  9:32 AM.  Always use your most recent med list.                   Brand Name Dispense Instructions for use    AMARYL 4 MG tablet   Generic drug:  glimepiride     30 tablet    Take 2 tablets (8 mg) by mouth every morning (before breakfast)       aspirin 325 MG EC tablet      1 tablet daily       azithromycin 250 MG tablet    ZITHROMAX    6 tablet    2 day one followed by 1 daily days 2-5       cholecalciferol 2000 UNITS Caps      2 daily       cyclobenzaprine 5 MG tablet    FLEXERIL    90 tablet    Take 1 tablet (5 mg) by mouth 3 times daily as needed for muscle spasms       docusate sodium 100 MG tablet    COLACE    60 tablet    Take 100 mg by mouth daily       furosemide 20 MG tablet    LASIX    90 tablet    Take 1 tablet (20 mg) by mouth every morning       lisinopril 20 MG tablet    PRINIVIL/ZESTRIL    90 tablet    Take 1 tablet (20 mg) by mouth daily       Multi-vitamin Tabs tablet   Generic drug:  multivitamin, therapeutic with minerals      1 TABLET DAILY       nystatin 719345 UNIT/GM Powd     MYCOSTATIN    60 g    Apply a small amount to affected area three times daily.       potassium chloride SA 10 MEQ CR tablet    K-DUR/KLOR-CON M    90 tablet    Take 1 tablet (10 mEq) by mouth daily       sertraline 50 MG tablet    ZOLOFT    90 tablet    Take 1 tablet (50 mg) by mouth daily       simvastatin 10 MG tablet    ZOCOR    90 tablet    Take 1 tablet (10 mg) by mouth At Bedtime       traMADol 50 MG tablet    ULTRAM    180 tablet    Take 1-2 tablets ( mg) by mouth every 8 hours as needed for pain .  Maximum 180 per month

## 2017-03-13 NOTE — NURSING NOTE
"Ирина Yanez is a 82 year old female who presents for:  Chief Complaint   Patient presents with     Neurologic Problem     6 month follow up, status post lumbar fusion DOS 09/16/16, continues to have low back pain after walking for a while or long distance         Initial Vitals:  There were no vitals taken for this visit. Estimated body mass index is 32.06 kg/(m^2) as calculated from the following:    Height as of 1/16/17: 5' 4\" (1.626 m).    Weight as of 1/16/17: 186 lb 12.8 oz (84.7 kg).. There is no height or weight on file to calculate BSA. BP completed using cuff size: regular  Data Unavailable    Do you feel safe in your environment?  Yes  Do you need any refills today? No    Nursing Comments: 6 month follow up, status post lumbar fusion DOS 09/16/16, continues to have low back pain after walking for a while or long distance.  Patient rates her pain today as 4      5 min. nursing intake time  Stacey Ramírez MA       Discharge plan: - followup in 6 months with xray prior   - Call the clinic at [phone] for increased pain or any other questions and concerns.    - Schedule physical therapy  2 min. nursing discharge time  Stacey Ramírez MA        "

## 2017-03-15 ENCOUNTER — THERAPY VISIT (OUTPATIENT)
Dept: PHYSICAL THERAPY | Facility: CLINIC | Age: 82
End: 2017-03-15
Payer: COMMERCIAL

## 2017-03-15 DIAGNOSIS — M54.50 CHRONIC MIDLINE LOW BACK PAIN WITHOUT SCIATICA: ICD-10-CM

## 2017-03-15 DIAGNOSIS — Z47.89 AFTERCARE FOLLOWING SURGERY OF THE MUSCULOSKELETAL SYSTEM: ICD-10-CM

## 2017-03-15 DIAGNOSIS — G89.29 CHRONIC MIDLINE LOW BACK PAIN WITHOUT SCIATICA: ICD-10-CM

## 2017-03-15 DIAGNOSIS — M48.061 LUMBAR STENOSIS: Primary | ICD-10-CM

## 2017-03-15 PROCEDURE — 97162 PT EVAL MOD COMPLEX 30 MIN: CPT | Mod: GP | Performed by: PHYSICAL THERAPIST

## 2017-03-15 PROCEDURE — 97110 THERAPEUTIC EXERCISES: CPT | Mod: GP | Performed by: PHYSICAL THERAPIST

## 2017-03-15 NOTE — PROGRESS NOTES
Subjective:    Ирина Yanez is a 82 year old female with a lumbar condition.  Condition occurred with:  Degenerative joint disease.  Condition occurred: other.  This is a chronic condition  History of lumbar pain for years secondary to OA of the spine that did not respond to conservative treatment. 9-16-16 L3-L4  lumbar fusion surgery. Pt wore brace after surgery but did not have formal physical therapy. Pt has noted continued central lumbar pain. Pt referred by MD for therapy on 3-13-17.    Patient reports pain:  Central lumbar spine.    Pain is described as burning and is constant and reported as 3/10.  Associated symptoms:  Tingling and numbness. Pain is the same all the time.  Symptoms are exacerbated by twisting, lifting, carrying, walking and standing and relieved by rest.  Since onset symptoms are gradually improving.  Special tests:  X-ray (see report).  Previous treatment includes surgery (lumbar fusion 9-16).    General health as reported by patient is good.  Pertinent medical history includes:  High blood pressure, overweight, sleep disorder/apnea and diabetes.  Medical allergies: no.  Other surgeries include:  Cancer surgery and orthopedic surgery (skin cancer removal, bilateral knee and 2nd toe removal).  Current medications:  High blood pressure medication and anti-depressants.  Current occupation is retired.            Red flags:  None as reported by the patient.                      Objective:    Standing Alignment:        Lumbar deviations alignment: decreased lumbar lordosis.                Flexibility/Screens:       Lower Extremity:  Decreased left lower extremity flexibility:Hamstrings    Decreased right lower extremity flexibility:  Hamstrings               Lumbar/SI Evaluation  ROM:    AROM Lumbar:   Flexion:          60%  Ext:                    30%   Side Bend:        Left:  50%    Right:  50%  Rotation:           Left:     Right:   Side Glide:        Left:     Right:         Strength: weak  lower abdominals and pelvic stabilizers  Lumbar Myotomes:  normal            Lumbar DTR's:  normal        Lumbar Dermtomes:  normal                Neural Tension/Mobility:      Left side:SLR  negative.     Right side:   SLR  negative.   Lumbar Palpation:  Palpation (lumbar): point tenderness L3-L5 spinous processes.                                                         General     ROS    Assessment/Plan:      Patient is a 82 year old female with lumbar complaints.    Patient has the following significant findings with corresponding treatment plan.                Diagnosis 1:  Post op lumbar fusion  Pain -  hot/cold therapy, manual therapy, self management, education and home program  Decreased ROM/flexibility - manual therapy, therapeutic exercise, therapeutic activity and home program  Decreased strength - therapeutic exercise, therapeutic activities and home program    Therapy Evaluation Codes:   1) History comprised of:   Personal factors that impact the plan of care:      Age, Past/current experiences and Time since onset of symptoms.    Comorbidity factors that impact the plan of care are:      Diabetes, High blood pressure, Overweight, Sleep disorder/apnea and Weakness.     Medications impacting care: Anti-depressant and High blood pressure.  2) Examination of Body Systems comprised of:   Body structures and functions that impact the plan of care:      Hip, Lumbar spine and Pelvis.   Activity limitations that impact the plan of care are:      Bending, Dressing, Lifting and Squatting/kneeling.  3) Clinical presentation characteristics are:   Evolving/Changing.  4) Decision-Making    Moderate complexity using standardized patient assessment instrument and/or measureable assessment of functional outcome.  Cumulative Therapy Evaluation is: Moderate complexity.    Previous and current functional limitations:  (See Goal Flow Sheet for this information)    Short term and Long term goals: (See Goal Flow Sheet for  this information)     Communication ability:  Patient appears to be able to clearly communicate and understand verbal and written communication and follow directions correctly.  Treatment Explanation - The following has been discussed with the patient:   RX ordered/plan of care  Anticipated outcomes  Possible risks and side effects  This patient would benefit from PT intervention to resume normal activities.   Rehab potential is good.    Frequency:  1 X week, once daily  Duration:  for 8 weeks  Discharge Plan:  Achieve all LTG.  Independent in home treatment program.  Reach maximal therapeutic benefit.    Please refer to the daily flowsheet for treatment today, total treatment time and time spent performing 1:1 timed codes.

## 2017-03-15 NOTE — MR AVS SNAPSHOT
After Visit Summary   3/15/2017    Ирина Yanez    MRN: [id number]           Patient Information     Date Of Birth          1935        Visit Information        Provider Department      3/15/2017 10:50 AM Joe Mahoney, PT [facility]        Today's Diagnoses     Lumbar stenosis    -  1    Chronic midline low back pain without sciatica        Aftercare following surgery of the musculoskeletal system           Follow-ups after your visit        Your next 10 appointments already scheduled     Mar 22, 2017 11:00 AM CDT   [facility] Sp[facility] with Macy Hernandez, PTA   [facility] ([facility]  )    [address] [place] [address]   [phone]            Mar 29, 2017 10:50 AM CDT   [facility] Sp[facility] with Joe Mahoney, PT   [facility] ([facility]  )    [address] [place] [address]   [phone]            Apr 05, 2017 10:50 AM CDT   [facility] with Joe Mahoney, PT   [facility] ([facility]  )    [address] [place] [address]   [phone]            Apr 12, 2017 10:50 AM CDT   [facility] Sp[facility] with Joe Mahoney PT   [facility] ([facility] [place]  )    [address] [place] [address]   [phone]            Apr 19, 2017 10:50 AM CDT   [facility] Sp[facility] with Joe Mahoney, PT   [facility] ([facility]  )    [address] [place] [address]   [phone]            Apr 26, 2017 10:50 AM CDT   [facility] Sp[facility] with Joe Mahoney, PT   [facility] ([facility]  )    [address] [place] [address]   [phone]            Sep 13, 2017 10:00 AM CDT   Return Visit with SAMY Calloway [facility] ([facility])    [address] [place] [address]   [phone]            Jan 16, 2018 10:00 AM CST   Return Sleep Patient with Mic Quijano MD   [place]  [facility] [place] ([facility])    [address]  [place] [address] [phone]              Who to contact     If you have questions or need follow up information about today's clinic visit or your schedule please contact [facility] directly at [phone].  Normal or non-critical lab and imaging results will be communicated to you by MyChart, letter or phone within 4 business days after the clinic has received the results. If you do not hear from us within 7 days, please contact the clinic through MyChart or phone. If you have a critical or abnormal lab result, we will notify you by phone as soon as possible.  Submit refill requests through [employer] or call your pharmacy and they will forward the refill request to us. Please allow 3 business days for your refill to be completed.          Additional Information About Your Visit        [employer] Information     [employer] gives you secure access to your electronic health record. If you see a primary care provider, you can also send messages to your care team and make appointments. If you have questions, please call your primary care clinic.  If you do not have a primary care provider, please call [phone] and they will assist you.        Care EveryWhere ID     This is your Care EveryWhere ID. This could be used by other organizations to access your [place] medical records  [id number]         Blood Pressure from Last 3 Encounters:   03/13/17 129/61   01/16/17 125/57   01/02/17 122/62    Weight from Last 3 Encounters:   03/13/17 84.4 kg (186 lb)   01/16/17 84.7 kg (186 lb 12.8 oz)   01/02/17 83.4 kg (183 lb 12.8 oz)              We Performed the Following     [facility] Inital Eval Report     PT Eval, Moderate Complexity (19169)     Therapeutic Exercises        Primary Care Provider Office Phone # Fax #    Yuridia Villarreal [phone]       [facility] [address] [address]  [facility]  [address]        Thank you!     Thank you for choosing [facility]  for your care. Our goal is always to provide you with excellent care. Hearing back from our patients is one way we can continue to improve our services. Please take a few minutes to complete the written survey that you may receive in the mail after your visit with us. Thank you!             Your Updated Medication List - Protect others around you: Learn how to safely use, store and throw away your medicines at www.disposemymeds.org.          This list is accurate as of: 3/15/17 12:48 PM.  Always use your most recent med list.                   Brand Name Dispense Instructions for use    AMARYL 4 MG tablet   Generic drug:  glimepiride     30 tablet    Take 2 tablets (8 mg) by mouth every morning (before breakfast)       aspirin 325 MG EC tablet      1 tablet daily       azithromycin 250 MG tablet    ZITHROMAX    6 tablet    2 day one followed by 1 daily days 2-5       cholecalciferol 2000 UNITS Caps      2 daily       cyclobenzaprine 5 MG tablet    FLEXERIL    90 tablet    Take 1 tablet (5 mg) by mouth 3 times daily as needed for muscle spasms       docusate sodium 100 MG tablet    COLACE    60 tablet    Take 100 mg by mouth daily       furosemide 20 MG tablet    LASIX    90 tablet    Take 1 tablet (20 mg) by mouth every morning       lisinopril 20 MG tablet    PRINIVIL/ZESTRIL    90 tablet    Take 1 tablet (20 mg) by mouth daily       Multi-vitamin Tabs tablet   Generic drug:  multivitamin, therapeutic with minerals      1 TABLET DAILY       nystatin 857516 UNIT/GM Powd    MYCOSTATIN    60 g    Apply a small amount to affected area three times daily.       potassium chloride SA 10 MEQ CR tablet    K-DUR/KLOR-CON M    90 tablet    Take 1 tablet (10 mEq) by mouth daily       sertraline 50 MG tablet    ZOLOFT    90 tablet    Take 1 tablet (50 mg) by mouth daily       simvastatin 10 MG tablet    ZOCOR    90 tablet    Take 1 tablet (10 mg) by mouth  At Bedtime       traMADol 50 MG tablet    ULTRAM    180 tablet    Take 1-2 tablets ( mg) by mouth every 8 hours as needed for pain .  Maximum 180 per month

## 2017-03-22 ENCOUNTER — THERAPY VISIT (OUTPATIENT)
Dept: PHYSICAL THERAPY | Facility: CLINIC | Age: 82
End: 2017-03-22
Payer: COMMERCIAL

## 2017-03-22 DIAGNOSIS — Z47.89 AFTERCARE FOLLOWING SURGERY OF THE MUSCULOSKELETAL SYSTEM: ICD-10-CM

## 2017-03-22 DIAGNOSIS — G89.29 CHRONIC MIDLINE LOW BACK PAIN WITHOUT SCIATICA: ICD-10-CM

## 2017-03-22 DIAGNOSIS — M54.50 CHRONIC MIDLINE LOW BACK PAIN WITHOUT SCIATICA: ICD-10-CM

## 2017-03-22 DIAGNOSIS — M48.061 LUMBAR STENOSIS: ICD-10-CM

## 2017-03-22 PROCEDURE — 97110 THERAPEUTIC EXERCISES: CPT | Mod: GP | Performed by: PHYSICAL THERAPY ASSISTANT

## 2017-03-22 PROCEDURE — 97112 NEUROMUSCULAR REEDUCATION: CPT | Mod: GP | Performed by: PHYSICAL THERAPY ASSISTANT

## 2017-03-22 PROCEDURE — 97530 THERAPEUTIC ACTIVITIES: CPT | Mod: GP | Performed by: PHYSICAL THERAPY ASSISTANT

## 2017-03-23 DIAGNOSIS — E11.42 DIABETIC POLYNEUROPATHY ASSOCIATED WITH TYPE 2 DIABETES MELLITUS (H): ICD-10-CM

## 2017-03-23 DIAGNOSIS — G89.29 OTHER CHRONIC PAIN: ICD-10-CM

## 2017-03-23 RX ORDER — TRAMADOL HYDROCHLORIDE 50 MG/1
50-100 TABLET ORAL EVERY 8 HOURS PRN
Qty: 180 TABLET | Refills: 0 | Status: SHIPPED | OUTPATIENT
Start: 2017-03-23 | End: 2017-04-20

## 2017-03-23 NOTE — TELEPHONE ENCOUNTER
tramadol             Last Written Prescription Date: 02/24/17  Last Fill Quantity: 180, # refills: 0    Last Office Visit with [facility] or [facility] prescribing provider:  01/2/17   Future Office Visit:        BP Readings from Last 3 Encounters:   03/13/17 129/61   01/16/17 125/57   01/02/17 122/62              [facility]  [phone]

## 2017-03-29 ENCOUNTER — THERAPY VISIT (OUTPATIENT)
Dept: PHYSICAL THERAPY | Facility: CLINIC | Age: 82
End: 2017-03-29
Payer: COMMERCIAL

## 2017-03-29 DIAGNOSIS — G89.29 CHRONIC MIDLINE LOW BACK PAIN WITHOUT SCIATICA: ICD-10-CM

## 2017-03-29 DIAGNOSIS — M48.061 LUMBAR STENOSIS: ICD-10-CM

## 2017-03-29 DIAGNOSIS — M54.50 CHRONIC MIDLINE LOW BACK PAIN WITHOUT SCIATICA: ICD-10-CM

## 2017-03-29 DIAGNOSIS — Z47.89 AFTERCARE FOLLOWING SURGERY OF THE MUSCULOSKELETAL SYSTEM: ICD-10-CM

## 2017-03-29 PROCEDURE — 97112 NEUROMUSCULAR REEDUCATION: CPT | Mod: GP | Performed by: PHYSICAL THERAPY ASSISTANT

## 2017-03-29 PROCEDURE — 97530 THERAPEUTIC ACTIVITIES: CPT | Mod: GP | Performed by: PHYSICAL THERAPY ASSISTANT

## 2017-04-05 ENCOUNTER — THERAPY VISIT (OUTPATIENT)
Dept: PHYSICAL THERAPY | Facility: CLINIC | Age: 82
End: 2017-04-05
Payer: COMMERCIAL

## 2017-04-05 DIAGNOSIS — M48.061 LUMBAR STENOSIS: ICD-10-CM

## 2017-04-05 DIAGNOSIS — G89.29 CHRONIC MIDLINE LOW BACK PAIN WITHOUT SCIATICA: ICD-10-CM

## 2017-04-05 DIAGNOSIS — M54.50 CHRONIC MIDLINE LOW BACK PAIN WITHOUT SCIATICA: ICD-10-CM

## 2017-04-05 DIAGNOSIS — Z47.89 AFTERCARE FOLLOWING SURGERY OF THE MUSCULOSKELETAL SYSTEM: ICD-10-CM

## 2017-04-05 PROCEDURE — 97110 THERAPEUTIC EXERCISES: CPT | Mod: GP | Performed by: PHYSICAL THERAPY ASSISTANT

## 2017-04-05 PROCEDURE — 97530 THERAPEUTIC ACTIVITIES: CPT | Mod: GP | Performed by: PHYSICAL THERAPY ASSISTANT

## 2017-04-10 DIAGNOSIS — F41.9 ANXIETY: ICD-10-CM

## 2017-04-10 NOTE — TELEPHONE ENCOUNTER
sertraline (ZOLOFT) 50 MG     Last Written Prescription Date: 10/7/16  Last Fill Quantity: 90, # refills: 1  Last Office Visit with [facility] primary care provider:  1/2/17        Last PHQ-9 score on record=   PHQ-9 SCORE 9/13/2016   Total Score -   Total Score 1

## 2017-04-11 NOTE — TELEPHONE ENCOUNTER
Prescription approved per [facility] Refill Protocol.  Patient takes this for anxiety.  Unique Low, RN  Triage Nurse

## 2017-04-12 ENCOUNTER — THERAPY VISIT (OUTPATIENT)
Dept: PHYSICAL THERAPY | Facility: CLINIC | Age: 82
End: 2017-04-12
Payer: COMMERCIAL

## 2017-04-12 DIAGNOSIS — M48.061 LUMBAR STENOSIS: ICD-10-CM

## 2017-04-12 DIAGNOSIS — Z47.89 AFTERCARE FOLLOWING SURGERY OF THE MUSCULOSKELETAL SYSTEM: ICD-10-CM

## 2017-04-12 DIAGNOSIS — G89.29 CHRONIC MIDLINE LOW BACK PAIN WITHOUT SCIATICA: ICD-10-CM

## 2017-04-12 DIAGNOSIS — M54.50 CHRONIC MIDLINE LOW BACK PAIN WITHOUT SCIATICA: ICD-10-CM

## 2017-04-12 PROCEDURE — 97530 THERAPEUTIC ACTIVITIES: CPT | Mod: GP | Performed by: PHYSICAL THERAPIST

## 2017-04-12 PROCEDURE — 97110 THERAPEUTIC EXERCISES: CPT | Mod: GP | Performed by: PHYSICAL THERAPIST

## 2017-04-12 NOTE — MR AVS SNAPSHOT
After Visit Summary   4/12/2017    Ирина Yanez    MRN: [id number]           Patient Information     Date Of Birth          1935        Visit Information        Provider Department      4/12/2017 10:50 AM Joe Mahoney, PT [facility]        Today's Diagnoses     Chronic midline low back pain without sciatica        Aftercare following surgery of the musculoskeletal system        Lumbar stenosis           Follow-ups after your visit        Your next 10 appointments already scheduled     Apr 19, 2017 10:50 AM CDT   [facility] Spine with WILD [facility] ([facility]  )    [address] [address] [address]   [phone]            Apr 26, 2017 10:50 AM CDT   [facility] with Joe Mahoney PT   [facility] ([facility]  )    [address] [address] [address]   [phone]            Sep 13, 2017 10:00 AM CDT   Return Visit with SAMY Calloway [facility] ([facility])    [address] [place] [id number] [phone]            Jan 16, 2018 10:00 AM CST   Return Sleep Patient with Mic Quijano MD   [facility] ([facility])    [address] [place] [id number] [phone]              Who to contact     If you have questions or need follow up information about today's clinic visit or your schedule please contact [facility] directly at [phone].  Normal or non-critical lab and imaging results will be communicated to you by MyChart, letter or phone within 4 business days after the clinic has received the results. If you do not hear from us within 7 days, please contact the clinic through MyChart or phone. If you have a critical or abnormal lab result, we will notify you by phone as soon as possible.  Submit refill requests through [employer] or call your pharmacy and  they will forward the refill request to us. Please allow 3 business days for your refill to be completed.          Additional Information About Your Visit        Room 77hart Information     [employer] gives you secure access to your electronic health record. If you see a primary care provider, you can also send messages to your care team and make appointments. If you have questions, please call your primary care clinic.  If you do not have a primary care provider, please call [phone] and they will assist you.        Care EveryWhere ID     This is your Care EveryWhere ID. This could be used by other organizations to access your [place] medical records  [id number]         Blood Pressure from Last 3 Encounters:   03/13/17 129/61   01/16/17 125/57   01/02/17 122/62    Weight from Last 3 Encounters:   03/13/17 84.4 kg (186 lb)   01/16/17 84.7 kg (186 lb 12.8 oz)   01/02/17 83.4 kg (183 lb 12.8 oz)              We Performed the Following     Therapeutic Activities     Therapeutic Exercises        Primary Care Provider Office Phone # Fax #    Yuridia Villarreal [phone]       [facility] [address] [facility] [address]        Thank you!     Thank you for choosing [facility]  for your care. Our goal is always to provide you with excellent care. Hearing back from our patients is one way we can continue to improve our services. Please take a few minutes to complete the written survey that you may receive in the mail after your visit with us. Thank you!             Your Updated Medication List - Protect others around you: Learn how to safely use, store and throw away your medicines at www.disposemymeds.org.          This list is accurate as of: 4/12/17 12:30 PM.  Always use your most recent med list.                   Brand Name Dispense Instructions for use    AMARYL 4 MG tablet   Generic drug:  glimepiride     30 tablet    Take 2 tablets (8 mg) by mouth every morning (before  breakfast)       aspirin 325 MG EC tablet      1 tablet daily       azithromycin 250 MG tablet    ZITHROMAX    6 tablet    2 day one followed by 1 daily days 2-5       cholecalciferol 2000 UNITS Caps      2 daily       cyclobenzaprine 5 MG tablet    FLEXERIL    90 tablet    Take 1 tablet (5 mg) by mouth 3 times daily as needed for muscle spasms       docusate sodium 100 MG tablet    COLACE    60 tablet    Take 100 mg by mouth daily       furosemide 20 MG tablet    LASIX    90 tablet    Take 1 tablet (20 mg) by mouth every morning       lisinopril 20 MG tablet    PRINIVIL/ZESTRIL    90 tablet    Take 1 tablet (20 mg) by mouth daily       Multi-vitamin Tabs tablet   Generic drug:  multivitamin, therapeutic with minerals      1 TABLET DAILY       nystatin 207479 UNIT/GM Powd    MYCOSTATIN    60 g    Apply a small amount to affected area three times daily.       potassium chloride SA 10 MEQ CR tablet    K-DUR/KLOR-CON M    90 tablet    Take 1 tablet (10 mEq) by mouth daily       sertraline 50 MG tablet    ZOLOFT    90 tablet    TAKE ONE TABLET BY MOUTH EVERY DAY       simvastatin 10 MG tablet    ZOCOR    90 tablet    Take 1 tablet (10 mg) by mouth At Bedtime       traMADol 50 MG tablet    ULTRAM    180 tablet    Take 1-2 tablets ( mg) by mouth every 8 hours as needed for pain .  Maximum 180 per month

## 2017-04-12 NOTE — PROGRESS NOTES
Subjective:    HPI                    Objective:    System    Physical Exam    General     ROS    Assessment/Plan:      SUBJECTIVE  Subjective changes as noted by pt:  Pt notes decrease in frequency of pain and increased strength     Current pain level: 2/10     Changes in function:  Pt notes improvement in walking and standing endurance     Adverse reaction to treatment or activity:  None    OBJECTIVE  Changes in objective findings:  Pt demonstrates improved pelvic stability with ambulation        ASSESSMENT  Ирина continues to require intervention to meet STG and LTG's: PT  Patient's symptoms are resolving.  Response to therapy has shown an improvement in  strength and function  Progress made towards STG/LTG?  Yes,     PLAN  Current treatment program is being advanced to more complex exercises.    PTA/ATC plan:  N/A    Please refer to the daily flowsheet for treatment today, total treatment time and time spent performing 1:1 timed codes.

## 2017-04-19 ENCOUNTER — THERAPY VISIT (OUTPATIENT)
Dept: PHYSICAL THERAPY | Facility: CLINIC | Age: 82
End: 2017-04-19
Payer: COMMERCIAL

## 2017-04-19 DIAGNOSIS — Z47.89 AFTERCARE FOLLOWING SURGERY OF THE MUSCULOSKELETAL SYSTEM: ICD-10-CM

## 2017-04-19 DIAGNOSIS — M54.50 CHRONIC MIDLINE LOW BACK PAIN WITHOUT SCIATICA: ICD-10-CM

## 2017-04-19 DIAGNOSIS — G89.29 CHRONIC MIDLINE LOW BACK PAIN WITHOUT SCIATICA: ICD-10-CM

## 2017-04-19 DIAGNOSIS — M48.061 LUMBAR STENOSIS: ICD-10-CM

## 2017-04-19 PROCEDURE — 97110 THERAPEUTIC EXERCISES: CPT | Mod: GP | Performed by: PHYSICAL THERAPIST

## 2017-04-19 PROCEDURE — 97530 THERAPEUTIC ACTIVITIES: CPT | Mod: GP | Performed by: PHYSICAL THERAPIST

## 2017-04-19 NOTE — MR AVS SNAPSHOT
After Visit Summary   4/19/2017    Ирина Yanez    MRN: [id number]           Patient Information     Date Of Birth          1935        Visit Information        Provider Department      4/19/2017 10:50 AM Joe Mahoney, PT [facility]        Today's Diagnoses     Chronic midline low back pain without sciatica        Aftercare following surgery of the musculoskeletal system        Lumbar stenosis           Follow-ups after your visit        Your next 10 appointments already scheduled     Apr 26, 2017 10:50 AM CDT   [facility] Sp[facility] with Joe Mahoney PT   [facility] ([facility]  )    [address] [facility] [address]  [facility] [address]   [phone]            Sep 13, 2017 10:00 AM CDT   Return Visit with SAMY Calloway [facility] ([facility])    [address] [address] [address]   [phone]            Jan 16, 2018 10:00 AM CST   Return Sleep Patient with Mic Quijano MD   [facility] ([facility])    [address] [place] [id number] [phone]              Who to contact     If you have questions or need follow up information about today's clinic visit or your schedule please contact [facility] directly at [phone].  Normal or non-critical lab and imaging results will be communicated to you by MyChart, letter or phone within 4 business days after the clinic has received the results. If you do not hear from us within 7 days, please contact the clinic through MyChart or phone. If you have a critical or abnormal lab result, we will notify you by phone as soon as possible.  Submit refill requests through [employer] or call your pharmacy and they will forward the refill request to us. Please allow 3 business days for your refill to be completed.          Additional Information About Your Visit        MyChart Information      [employer] gives you secure access to your electronic health record. If you see a primary care provider, you can also send messages to your care team and make appointments. If you have questions, please call your primary care clinic.  If you do not have a primary care provider, please call [phone] and they will assist you.        Care EveryWhere ID     This is your Care EveryWhere ID. This could be used by other organizations to access your [place] medical records  [id number]         Blood Pressure from Last 3 Encounters:   03/13/17 129/61   01/16/17 125/57   01/02/17 122/62    Weight from Last 3 Encounters:   03/13/17 84.4 kg (186 lb)   01/16/17 84.7 kg (186 lb 12.8 oz)   01/02/17 83.4 kg (183 lb 12.8 oz)              We Performed the Following     Therapeutic Activities     Therapeutic Exercises        Primary Care Provider Office Phone # Fax #    Yuridia Villarreal [phone]       [facility] [address] [facility] [address]        Thank you!     Thank you for choosing [facility]  for your care. Our goal is always to provide you with excellent care. Hearing back from our patients is one way we can continue to improve our services. Please take a few minutes to complete the written survey that you may receive in the mail after your visit with us. Thank you!             Your Updated Medication List - Protect others around you: Learn how to safely use, store and throw away your medicines at www.disposemymeds.org.          This list is accurate as of: 4/19/17 12:38 PM.  Always use your most recent med list.                   Brand Name Dispense Instructions for use    AMARYL 4 MG tablet   Generic drug:  glimepiride     30 tablet    Take 2 tablets (8 mg) by mouth every morning (before breakfast)       aspirin 325 MG EC tablet      1 tablet daily       azithromycin 250 MG tablet    ZITHROMAX    6 tablet    2 day one followed by 1 daily days 2-5       cholecalciferol  2000 UNITS Caps      2 daily       cyclobenzaprine 5 MG tablet    FLEXERIL    90 tablet    Take 1 tablet (5 mg) by mouth 3 times daily as needed for muscle spasms       docusate sodium 100 MG tablet    COLACE    60 tablet    Take 100 mg by mouth daily       furosemide 20 MG tablet    LASIX    90 tablet    Take 1 tablet (20 mg) by mouth every morning       lisinopril 20 MG tablet    PRINIVIL/ZESTRIL    90 tablet    Take 1 tablet (20 mg) by mouth daily       Multi-vitamin Tabs tablet   Generic drug:  multivitamin, therapeutic with minerals      1 TABLET DAILY       nystatin 184995 UNIT/GM Powd    MYCOSTATIN    60 g    Apply a small amount to affected area three times daily.       potassium chloride SA 10 MEQ CR tablet    K-DUR/KLOR-CON M    90 tablet    Take 1 tablet (10 mEq) by mouth daily       sertraline 50 MG tablet    ZOLOFT    90 tablet    TAKE ONE TABLET BY MOUTH EVERY DAY       simvastatin 10 MG tablet    ZOCOR    90 tablet    Take 1 tablet (10 mg) by mouth At Bedtime       traMADol 50 MG tablet    ULTRAM    180 tablet    Take 1-2 tablets ( mg) by mouth every 8 hours as needed for pain .  Maximum 180 per month

## 2017-04-19 NOTE — PROGRESS NOTES
Subjective:    HPI                    Objective:    System    Physical Exam    General     ROS    Assessment/Plan:      SUBJECTIVE  Subjective changes as noted by pt:  Pt reports increased soreness secondary to hosting Easter celebration. Pt was also sore after last visit which she attributes to changing the weight bearing status in the Alter G     Current pain level: 3/10 Current Pain level: 3/10   Changes in function:  Pt notes increased ease with ambulation at home and in the community     Adverse reaction to treatment or activity:  None    OBJECTIVE  Changes in objective findings:  Changed weightbearing status in the Alter G to 65% which helped decrease pain with ambulation. Hamstring flexibility and abdominal strength continues to improve        ASSESSMENT  Ирина continues to require intervention to meet STG and LTG's: PT  Patient's symptoms are resolving.  Response to therapy has shown an improvement in  function  Progress made towards STG/LTG?  Yes,     PLAN  Current treatment program is being advanced to more complex exercises.    PTA/ATC plan:  N/A    Please refer to the daily flowsheet for treatment today, total treatment time and time spent performing 1:1 timed codes.

## 2017-04-20 DIAGNOSIS — G89.29 OTHER CHRONIC PAIN: ICD-10-CM

## 2017-04-20 DIAGNOSIS — E11.42 DIABETIC POLYNEUROPATHY ASSOCIATED WITH TYPE 2 DIABETES MELLITUS (H): ICD-10-CM

## 2017-04-20 RX ORDER — TRAMADOL HYDROCHLORIDE 50 MG/1
50-100 TABLET ORAL EVERY 8 HOURS PRN
Qty: 180 TABLET | Refills: 0 | Status: SHIPPED | OUTPATIENT
Start: 2017-04-20 | End: 2017-05-22

## 2017-04-20 NOTE — TELEPHONE ENCOUNTER
tramadol             Last Written Prescription Date: 03/23/17  Last Fill Quantity: 180, # refills: 0    Last Office Visit with [facility] or [facility] prescribing provider:  01/02/17   Future Office Visit:        BP Readings from Last 3 Encounters:   03/13/17 129/61   01/16/17 125/57   01/02/17 122/62         [facility]  [phone]

## 2017-04-26 ENCOUNTER — THERAPY VISIT (OUTPATIENT)
Dept: PHYSICAL THERAPY | Facility: CLINIC | Age: 82
End: 2017-04-26
Payer: COMMERCIAL

## 2017-04-26 DIAGNOSIS — M48.061 LUMBAR STENOSIS: ICD-10-CM

## 2017-04-26 DIAGNOSIS — G89.29 CHRONIC MIDLINE LOW BACK PAIN WITHOUT SCIATICA: ICD-10-CM

## 2017-04-26 DIAGNOSIS — M54.50 CHRONIC MIDLINE LOW BACK PAIN WITHOUT SCIATICA: ICD-10-CM

## 2017-04-26 DIAGNOSIS — Z47.89 AFTERCARE FOLLOWING SURGERY OF THE MUSCULOSKELETAL SYSTEM: ICD-10-CM

## 2017-04-26 PROCEDURE — 97110 THERAPEUTIC EXERCISES: CPT | Mod: GP | Performed by: PHYSICAL THERAPIST

## 2017-04-26 PROCEDURE — 97530 THERAPEUTIC ACTIVITIES: CPT | Mod: GP | Performed by: PHYSICAL THERAPIST

## 2017-04-26 NOTE — MR AVS SNAPSHOT
After Visit Summary   4/26/2017    Ирина Yanez    MRN: [id number]           Patient Information     Date Of Birth          1935        Visit Information        Provider Department      4/26/2017 10:50 AM Joe Mahoney, PT [facility]        Today's Diagnoses     Chronic midline low back pain without sciatica        Aftercare following surgery of the musculoskeletal system        Lumbar stenosis           Follow-ups after your visit        Your next 10 appointments already scheduled     Sep 13, 2017 10:00 AM CDT   Return Visit with SAMY Calloway CNP   [facility] ([facility])    [address]  [facility] [phone] [phone]            Jan 16, 2018 10:00 AM CST   Return Sleep Patient with Mic Quijano MD   [facility] ([facility])    [address]  [facility] [id number] [phone]              Who to contact     If you have questions or need follow up information about today's clinic visit or your schedule please contact [facility] directly at [phone].  Normal or non-critical lab and imaging results will be communicated to you by MyChart, letter or phone within 4 business days after the clinic has received the results. If you do not hear from us within 7 days, please contact the clinic through Constructhart or phone. If you have a critical or abnormal lab result, we will notify you by phone as soon as possible.  Submit refill requests through Gozent or call your pharmacy and they will forward the refill request to us. Please allow 3 business days for your refill to be completed.          Additional Information About Your Visit        MyChart Information     Gozent gives you secure access to your electronic health record. If you see a primary care provider, you can also send messages to your care team and make appointments. If you have  questions, please call your primary care clinic.  If you do not have a primary care provider, please call [phone] and they will assist you.        Care EveryWhere ID     This is your Care EveryWhere ID. This could be used by other organizations to access your [place] medical records  [id number]         Blood Pressure from Last 3 Encounters:   03/13/17 129/61   01/16/17 125/57   01/02/17 122/62    Weight from Last 3 Encounters:   03/13/17 84.4 kg (186 lb)   01/16/17 84.7 kg (186 lb 12.8 oz)   01/02/17 83.4 kg (183 lb 12.8 oz)              We Performed the Following     Therapeutic Activities     Therapeutic Exercises        Primary Care Provider Office Phone # Fax #    Yuridia Villarreal [phone]       [facility] [address]  [facility] [address]        Thank you!     Thank you for choosing [facility]  for your care. Our goal is always to provide you with excellent care. Hearing back from our patients is one way we can continue to improve our services. Please take a few minutes to complete the written survey that you may receive in the mail after your visit with us. Thank you!             Your Updated Medication List - Protect others around you: Learn how to safely use, store and throw away your medicines at www.disposemymeds.org.          This list is accurate as of: 4/26/17 11:24 AM.  Always use your most recent med list.                   Brand Name Dispense Instructions for use    AMARYL 4 MG tablet   Generic drug:  glimepiride     30 tablet    Take 2 tablets (8 mg) by mouth every morning (before breakfast)       aspirin 325 MG EC tablet      1 tablet daily       azithromycin 250 MG tablet    ZITHROMAX    6 tablet    2 day one followed by 1 daily days 2-5       cholecalciferol 2000 UNITS Caps      2 daily       cyclobenzaprine 5 MG tablet    FLEXERIL    90 tablet    Take 1 tablet (5 mg) by mouth 3 times daily as needed for muscle spasms       docusate sodium  100 MG tablet    COLACE    60 tablet    Take 100 mg by mouth daily       furosemide 20 MG tablet    LASIX    90 tablet    Take 1 tablet (20 mg) by mouth every morning       lisinopril 20 MG tablet    PRINIVIL/ZESTRIL    90 tablet    Take 1 tablet (20 mg) by mouth daily       Multi-vitamin Tabs tablet   Generic drug:  multivitamin, therapeutic with minerals      1 TABLET DAILY       nystatin 131222 UNIT/GM Powd    MYCOSTATIN    60 g    Apply a small amount to affected area three times daily.       potassium chloride SA 10 MEQ CR tablet    K-DUR/KLOR-CON M    90 tablet    Take 1 tablet (10 mEq) by mouth daily       sertraline 50 MG tablet    ZOLOFT    90 tablet    TAKE ONE TABLET BY MOUTH EVERY DAY       simvastatin 10 MG tablet    ZOCOR    90 tablet    Take 1 tablet (10 mg) by mouth At Bedtime       traMADol 50 MG tablet    ULTRAM    180 tablet    Take 1-2 tablets ( mg) by mouth every 8 hours as needed for pain .  Maximum 180 per month

## 2017-04-26 NOTE — PROGRESS NOTES
Subjective:    HPI                    Objective:    System    Physical Exam    General     ROS    Assessment/Plan:      SUBJECTIVE  Subjective changes as noted by pt:  Pt notes decreased pain and increased energy levels     Current pain level: 2/10 Current Pain level: 2/10   Changes in function:  Pt notes increased ease walking outdoors     Adverse reaction to treatment or activity:  None    OBJECTIVE  Changes in objective findings:  Pt demonstrates improved gait pattern in Alter G. Equal stance time, good heel/toe gait pattern. Pt also demonstrates improved strength pelvic stabilizers and hip abductors.         ASSESSMENT  Ирина continues to require intervention to meet STG and LTG's: PT  Patient's symptoms are resolving.  Response to therapy has shown an improvement in  strength and function  Progress made towards STG/LTG?  Yes,     PLAN  Current treatment program is being advanced to more complex exercises.    PTA/ATC plan:  N/A    Please refer to the daily flowsheet for treatment today, total treatment time and time spent performing 1:1 timed codes.

## 2017-05-10 ENCOUNTER — THERAPY VISIT (OUTPATIENT)
Dept: PHYSICAL THERAPY | Facility: CLINIC | Age: 82
End: 2017-05-10
Payer: COMMERCIAL

## 2017-05-10 DIAGNOSIS — G89.29 CHRONIC MIDLINE LOW BACK PAIN WITHOUT SCIATICA: ICD-10-CM

## 2017-05-10 DIAGNOSIS — Z47.89 AFTERCARE FOLLOWING SURGERY OF THE MUSCULOSKELETAL SYSTEM: ICD-10-CM

## 2017-05-10 DIAGNOSIS — M48.061 LUMBAR STENOSIS: ICD-10-CM

## 2017-05-10 DIAGNOSIS — M54.50 CHRONIC MIDLINE LOW BACK PAIN WITHOUT SCIATICA: ICD-10-CM

## 2017-05-10 PROCEDURE — 97530 THERAPEUTIC ACTIVITIES: CPT | Mod: GP | Performed by: PHYSICAL THERAPIST

## 2017-05-10 PROCEDURE — 97110 THERAPEUTIC EXERCISES: CPT | Mod: GP | Performed by: PHYSICAL THERAPIST

## 2017-05-10 NOTE — MR AVS SNAPSHOT
After Visit Summary   5/10/2017    Ирина Yanez    MRN: [id number]           Patient Information     Date Of Birth          1935        Visit Information        Provider Department      5/10/2017 10:10 AM Joe Mahoney, PT [facility]        Today's Diagnoses     Chronic midline low back pain without sciatica        Aftercare following surgery of the musculoskeletal system        Lumbar stenosis           Follow-ups after your visit        Your next 10 appointments already scheduled     Sep 13, 2017 10:00 AM CDT   Return Visit with SAMY Calloway CNP   [facility] ([facility])    [address]  [facility] [phone] [phone]            Jan 16, 2018 10:00 AM CST   Return Sleep Patient with Mic Quijano MD   [facility] ([facility])    [address]  [facility] [id number] [phone]              Who to contact     If you have questions or need follow up information about today's clinic visit or your schedule please contact [facility] directly at [phone].  Normal or non-critical lab and imaging results will be communicated to you by MyChart, letter or phone within 4 business days after the clinic has received the results. If you do not hear from us within 7 days, please contact the clinic through Convergent.io Technologieshart or phone. If you have a critical or abnormal lab result, we will notify you by phone as soon as possible.  Submit refill requests through ContinuumRx or call your pharmacy and they will forward the refill request to us. Please allow 3 business days for your refill to be completed.          Additional Information About Your Visit        MyChart Information     ContinuumRx gives you secure access to your electronic health record. If you see a primary care provider, you can also send messages to your care team and make appointments. If you have  questions, please call your primary care clinic.  If you do not have a primary care provider, please call [phone] and they will assist you.        Care EveryWhere ID     This is your Care EveryWhere ID. This could be used by other organizations to access your [place] medical records  [id number]         Blood Pressure from Last 3 Encounters:   03/13/17 129/61   01/16/17 125/57   01/02/17 122/62    Weight from Last 3 Encounters:   03/13/17 84.4 kg (186 lb)   01/16/17 84.7 kg (186 lb 12.8 oz)   01/02/17 83.4 kg (183 lb 12.8 oz)              We Performed the Following     Therapeutic Activities     Therapeutic Exercises        Primary Care Provider Office Phone # Fax #    Yuridia Villarreal [phone]       [facility] [address]  [facility] [address]        Thank you!     Thank you for choosing [facility]  for your care. Our goal is always to provide you with excellent care. Hearing back from our patients is one way we can continue to improve our services. Please take a few minutes to complete the written survey that you may receive in the mail after your visit with us. Thank you!             Your Updated Medication List - Protect others around you: Learn how to safely use, store and throw away your medicines at www.disposemymeds.org.          This list is accurate as of: 5/10/17 10:48 AM.  Always use your most recent med list.                   Brand Name Dispense Instructions for use    AMARYL 4 MG tablet   Generic drug:  glimepiride     30 tablet    Take 2 tablets (8 mg) by mouth every morning (before breakfast)       aspirin 325 MG EC tablet      1 tablet daily       azithromycin 250 MG tablet    ZITHROMAX    6 tablet    2 day one followed by 1 daily days 2-5       cholecalciferol 2000 UNITS Caps      2 daily       cyclobenzaprine 5 MG tablet    FLEXERIL    90 tablet    Take 1 tablet (5 mg) by mouth 3 times daily as needed for muscle spasms       docusate sodium  100 MG tablet    COLACE    60 tablet    Take 100 mg by mouth daily       furosemide 20 MG tablet    LASIX    90 tablet    Take 1 tablet (20 mg) by mouth every morning       lisinopril 20 MG tablet    PRINIVIL/ZESTRIL    90 tablet    Take 1 tablet (20 mg) by mouth daily       Multi-vitamin Tabs tablet   Generic drug:  multivitamin, therapeutic with minerals      1 TABLET DAILY       nystatin 418221 UNIT/GM Powd    MYCOSTATIN    60 g    Apply a small amount to affected area three times daily.       potassium chloride SA 10 MEQ CR tablet    K-DUR/KLOR-CON M    90 tablet    Take 1 tablet (10 mEq) by mouth daily       sertraline 50 MG tablet    ZOLOFT    90 tablet    TAKE ONE TABLET BY MOUTH EVERY DAY       simvastatin 10 MG tablet    ZOCOR    90 tablet    Take 1 tablet (10 mg) by mouth At Bedtime       traMADol 50 MG tablet    ULTRAM    180 tablet    Take 1-2 tablets ( mg) by mouth every 8 hours as needed for pain .  Maximum 180 per month

## 2017-05-10 NOTE — PROGRESS NOTES
Subjective:    HPI                    Objective:    System    Physical Exam    General     ROS    Assessment/Plan:      DISCHARGE REPORT    Progress reporting period is from 3-15-17 to 5-10-17.       SUBJECTIVE  Subjective changes noted by patient:  Pt notes decreased pain and increased strength/ROM.       Current pain level is 2/10  .     Previous pain level was  3/10  .   Changes in function:  Pt notes increased ease with ambulation and lifting.   Adverse reaction to treatment or activity: None    OBJECTIVE  Changes noted in objective findings:  Lumbar AROM flexion 80%, extension 50% lateral flexion 40% bilaterally. Pt demonstrates improved core strength and hamstring flexibility.         ASSESSMENT/PLAN  Updated problem list and treatment plan: Diagnosis 1:  Post op lumbar fusion   Pain -  hot/cold therapy, self management, education and home program  Decreased ROM/flexibility - therapeutic exercise, therapeutic activity and home program  Decreased strength - therapeutic exercise, therapeutic activities and home program  STG/LTGs have been met or progress has been made towards goals:  Yes,   Assessment of Progress: The patient's condition is improving.  Self Management Plans:  Patient has been instructed in a home treatment program.  I have re-evaluated this patient and find that the nature, scope, duration and intensity of the therapy is appropriate for the medical condition of the patient.  Ирина continues to require the following intervention to meet STG and LTG's:  PT intervention is no longer required to meet STG/LTG.    Recommendations:  This patient is ready to be discharged from therapy and continue their home treatment program.    Please refer to the daily flowsheet for treatment today, total treatment time and time spent performing 1:1 timed codes.

## 2017-05-22 ENCOUNTER — TELEPHONE (OUTPATIENT)
Dept: FAMILY MEDICINE | Facility: CLINIC | Age: 82
End: 2017-05-22

## 2017-05-22 DIAGNOSIS — E11.42 DIABETIC POLYNEUROPATHY ASSOCIATED WITH TYPE 2 DIABETES MELLITUS (H): ICD-10-CM

## 2017-05-22 DIAGNOSIS — G89.29 OTHER CHRONIC PAIN: ICD-10-CM

## 2017-05-22 RX ORDER — TRAMADOL HYDROCHLORIDE 50 MG/1
50-100 TABLET ORAL EVERY 8 HOURS PRN
Qty: 180 TABLET | Refills: 0 | Status: SHIPPED | OUTPATIENT
Start: 2017-05-22 | End: 2017-06-21

## 2017-05-22 NOTE — TELEPHONE ENCOUNTER
traMADol (ULTRAM) 50 MG       Last Written Prescription Date:  4/20/17  Last Fill Quantity: 180,   # refills: 0  Last Office Visit with [facility], P or  Health prescribing provider: 1/2/17  Future Office visit:       Routing refill request to provider for review/approval because:  Drug not on the [facility], [facility] or  Health refill protocol or controlled substance

## 2017-06-20 PROBLEM — M54.50 CHRONIC MIDLINE LOW BACK PAIN WITHOUT SCIATICA: Status: RESOLVED | Noted: 2017-03-15 | Resolved: 2017-06-20

## 2017-06-20 PROBLEM — Z47.89 AFTERCARE FOLLOWING SURGERY OF THE MUSCULOSKELETAL SYSTEM: Status: RESOLVED | Noted: 2017-03-15 | Resolved: 2017-06-20

## 2017-06-20 PROBLEM — G89.29 CHRONIC MIDLINE LOW BACK PAIN WITHOUT SCIATICA: Status: RESOLVED | Noted: 2017-03-15 | Resolved: 2017-06-20

## 2017-06-21 ENCOUNTER — TRANSFERRED RECORDS (OUTPATIENT)
Dept: HEALTH INFORMATION MANAGEMENT | Facility: CLINIC | Age: 82
End: 2017-06-21

## 2017-06-21 DIAGNOSIS — E11.42 DIABETIC POLYNEUROPATHY ASSOCIATED WITH TYPE 2 DIABETES MELLITUS (H): ICD-10-CM

## 2017-06-21 DIAGNOSIS — G89.29 OTHER CHRONIC PAIN: ICD-10-CM

## 2017-06-21 LAB
ALT SERPL-CCNC: 15 U/L (ref 6–29)
CREAT SERPL-MCNC: 1.9 MG/DL (ref 0.6–0.88)
GFR SERPL CREATININE-BSD FRML MDRD: 24 ML/MIN/1.73M2
GLUCOSE SERPL-MCNC: 227 MG/DL (ref 65–99)
HBA1C MFR BLD: 8 % (ref 4–6)
POTASSIUM SERPL-SCNC: 4.1 MMOL/L (ref 3.5–5.3)
TSH SERPL-ACNC: 1.67 UIU/ML (ref 0.3–5)

## 2017-06-21 NOTE — TELEPHONE ENCOUNTER
Tramadol 50mg      Last Written Prescription Date: 5/22/17  Last Fill Quantity: 180,  # refills: 0   Last Office Visit with G, P or [facility] prescribing provider: 01/02/2017                                         Next 5 appointments (look out 90 days)     Sep 13, 2017 10:00 AM CDT   Return Visit with SAMY Calloway CNP   [facility] ([facility])    [address] [address] [address] [phone]                    Shelby Sy CPhT  [facility]  [phone]

## 2017-06-22 RX ORDER — TRAMADOL HYDROCHLORIDE 50 MG/1
50-100 TABLET ORAL EVERY 8 HOURS PRN
Qty: 180 TABLET | Refills: 0 | Status: SHIPPED | OUTPATIENT
Start: 2017-06-22 | End: 2017-07-14

## 2017-06-27 ENCOUNTER — TRANSFERRED RECORDS (OUTPATIENT)
Dept: HEALTH INFORMATION MANAGEMENT | Facility: CLINIC | Age: 82
End: 2017-06-27

## 2017-07-09 DIAGNOSIS — E78.5 HYPERLIPIDEMIA WITH TARGET LDL LESS THAN 100: ICD-10-CM

## 2017-07-10 NOTE — TELEPHONE ENCOUNTER
simvastatin (ZOCOR) 10 MG     Last Written Prescription Date: 1/13/17  Last Fill Quantity: 90, # refills: 1  Last Office Visit with G, P or [facility] prescribing provider: 1/2/17  Next 5 appointments (look out 90 days)     Jul 14, 2017 11:50 AM CDT   SHORT with Yuridia Villarreal MD   [facility] ([facility])    [address] [place] [address]   [phone]            Sep 13, 2017 10:00 AM CDT   Return Visit with SAMY Calloway CNP   [facility] ([facility])    [address] [address] [phone]                   Lab Results   Component Value Date    CHOL 183 12/18/2015     Lab Results   Component Value Date    HDL 43 12/18/2015     Lab Results   Component Value Date    LDL 92 12/18/2015     Lab Results   Component Value Date    TRIG 238 12/18/2015     Lab Results   Component Value Date    CHOLHDLRATIO 3.4 12/17/2013

## 2017-07-12 RX ORDER — SIMVASTATIN 10 MG
10 TABLET ORAL AT BEDTIME
Qty: 90 TABLET | Refills: 0 | Status: SHIPPED | OUTPATIENT
Start: 2017-07-12 | End: 2017-10-01

## 2017-07-12 NOTE — TELEPHONE ENCOUNTER
Due for fasting cholesterol check. Appointment this week.   Will refill x 1 note sent to pharmacy to notify her.   Unique Low, NICOLE  Triage Nurse

## 2017-07-14 ENCOUNTER — OFFICE VISIT (OUTPATIENT)
Dept: FAMILY MEDICINE | Facility: CLINIC | Age: 82
End: 2017-07-14
Payer: COMMERCIAL

## 2017-07-14 VITALS
WEIGHT: 185.8 LBS | DIASTOLIC BLOOD PRESSURE: 58 MMHG | SYSTOLIC BLOOD PRESSURE: 118 MMHG | OXYGEN SATURATION: 98 % | BODY MASS INDEX: 31.72 KG/M2 | RESPIRATION RATE: 16 BRPM | TEMPERATURE: 97.8 F | HEART RATE: 70 BPM | HEIGHT: 64 IN

## 2017-07-14 DIAGNOSIS — N18.4 CKD (CHRONIC KIDNEY DISEASE) STAGE 4, GFR 15-29 ML/MIN (H): ICD-10-CM

## 2017-07-14 DIAGNOSIS — M54.2 NECK PAIN: ICD-10-CM

## 2017-07-14 DIAGNOSIS — E11.42 DIABETIC POLYNEUROPATHY ASSOCIATED WITH TYPE 2 DIABETES MELLITUS (H): Primary | ICD-10-CM

## 2017-07-14 DIAGNOSIS — F11.20 OPIOID DEPENDENCE, DAILY USE (H): ICD-10-CM

## 2017-07-14 DIAGNOSIS — E11.21 TYPE 2 DIABETES MELLITUS WITH DIABETIC NEPHROPATHY, WITHOUT LONG-TERM CURRENT USE OF INSULIN (H): ICD-10-CM

## 2017-07-14 DIAGNOSIS — I10 HYPERTENSION GOAL BP (BLOOD PRESSURE) < 140/90: ICD-10-CM

## 2017-07-14 DIAGNOSIS — M54.9 UPPER BACK PAIN ON LEFT SIDE: ICD-10-CM

## 2017-07-14 DIAGNOSIS — E78.5 HYPERLIPIDEMIA WITH TARGET LDL LESS THAN 100: ICD-10-CM

## 2017-07-14 DIAGNOSIS — G89.29 OTHER CHRONIC PAIN: ICD-10-CM

## 2017-07-14 PROCEDURE — 99214 OFFICE O/P EST MOD 30 MIN: CPT | Performed by: FAMILY MEDICINE

## 2017-07-14 PROCEDURE — 99207 C FOOT EXAM  NO CHARGE: CPT | Performed by: FAMILY MEDICINE

## 2017-07-14 RX ORDER — TRAMADOL HYDROCHLORIDE 50 MG/1
50-100 TABLET ORAL EVERY 8 HOURS PRN
Qty: 180 TABLET | Refills: 0 | Status: SHIPPED | OUTPATIENT
Start: 2017-07-14 | End: 2017-08-21

## 2017-07-14 RX ORDER — GLIMEPIRIDE 4 MG/1
TABLET ORAL
Refills: 0 | COMMUNITY
Start: 2017-07-14 | End: 2018-06-19

## 2017-07-14 ASSESSMENT — ANXIETY QUESTIONNAIRES
6. BECOMING EASILY ANNOYED OR IRRITABLE: SEVERAL DAYS
2. NOT BEING ABLE TO STOP OR CONTROL WORRYING: NOT AT ALL
3. WORRYING TOO MUCH ABOUT DIFFERENT THINGS: NOT AT ALL
IF YOU CHECKED OFF ANY PROBLEMS ON THIS QUESTIONNAIRE, HOW DIFFICULT HAVE THESE PROBLEMS MADE IT FOR YOU TO DO YOUR WORK, TAKE CARE OF THINGS AT HOME, OR GET ALONG WITH OTHER PEOPLE: NOT DIFFICULT AT ALL
7. FEELING AFRAID AS IF SOMETHING AWFUL MIGHT HAPPEN: NOT AT ALL
GAD7 TOTAL SCORE: 2
5. BEING SO RESTLESS THAT IT IS HARD TO SIT STILL: NOT AT ALL
1. FEELING NERVOUS, ANXIOUS, OR ON EDGE: NOT AT ALL

## 2017-07-14 ASSESSMENT — PATIENT HEALTH QUESTIONNAIRE - PHQ9: 5. POOR APPETITE OR OVEREATING: SEVERAL DAYS

## 2017-07-14 NOTE — PATIENT INSTRUCTIONS
If all is well, I would like to see you again in 6 months.    I do recommend not taking more than 2 tramadol at a time. It would be great if you could gradually wean to 2 twice per day; but otherwise work on taking three times daily.

## 2017-07-14 NOTE — MR AVS SNAPSHOT
After Visit Summary   7/14/2017    Ирина Yanez    MRN: [id number]           Patient Information     Date Of Birth          1935        Visit Information        Provider Department      7/14/2017 11:50 AM Yuridia Villarreal MD [facility]        Today's Diagnoses     Hyperlipidemia with target LDL less than 100    -  1    Diabetic polyneuropathy associated with type 2 diabetes mellitus (H)        Chronic pain - diabetic neuropathy        Hypertension goal BP (blood pressure) < 140/90        Type 2 diabetes mellitus with diabetic nephropathy, without long-term current use of insulin (H)        CKD (chronic kidney disease) stage 4, GFR 15-29 ml/min (H)          Care Instructions    If all is well, I would like to see you again in 6 months.    I do recommend not taking more than 2 tramadol at a time. It would be great if you could gradually wean to 2 twice per day; but otherwise work on taking three times daily.              Follow-ups after your visit        Your next 10 appointments already scheduled     Sep 13, 2017 10:00 AM CDT   Return Visit with SAMY Calloway CNP   [facility] ([facility])    [address] [address]   [phone]            Jan 16, 2018 10:00 AM CST   Return Sleep Patient with Mic Quijano MD   [facility] ([facility])    [address] [place] [id number] [phone]              Future tests that were ordered for you today     Open Future Orders        Priority Expected Expires Ordered    **Lipid panel reflex to direct LDL FUTURE anytime Routine 7/14/2017 7/14/2018 7/14/2017    **Albumin Random Urine Quant FUTURE anytime Routine 7/14/2017 7/14/2018 7/14/2017            Who to contact     If you have questions or need follow up information about today's clinic visit or your schedule please contact [facility]  "JAMES directly at [phone].  Normal or non-critical lab and imaging results will be communicated to you by MyChart, letter or phone within 4 business days after the clinic has received the results. If you do not hear from us within 7 days, please contact the clinic through Creator Uphart or phone. If you have a critical or abnormal lab result, we will notify you by phone as soon as possible.  Submit refill requests through BoomTown or call your pharmacy and they will forward the refill request to us. Please allow 3 business days for your refill to be completed.          Additional Information About Your Visit        Creator UpharScanNano Information     BoomTown gives you secure access to your electronic health record. If you see a primary care provider, you can also send messages to your care team and make appointments. If you have questions, please call your primary care clinic.  If you do not have a primary care provider, please call [phone] and they will assist you.        Care EveryWhere ID     This is your Care EveryWhere ID. This could be used by other organizations to access your [place] medical records  [id number]        Your Vitals Were     Pulse Temperature Respirations Height Pulse Oximetry BMI (Body Mass Index)    70 97.8  F (36.6  C) (Oral) 16 5' 4\" (1.626 m) 98% 31.89 kg/m2       Blood Pressure from Last 3 Encounters:   07/14/17 118/58   03/13/17 129/61   01/16/17 125/57    Weight from Last 3 Encounters:   07/14/17 185 lb 12.8 oz (84.3 kg)   03/13/17 186 lb (84.4 kg)   01/16/17 186 lb 12.8 oz (84.7 kg)              We Performed the Following     FOOT EXAM          Today's Medication Changes          These changes are accurate as of: 7/14/17 12:53 PM.  If you have any questions, ask your nurse or doctor.               These medicines have changed or have updated prescriptions.        Dose/Directions    AMARYL 4 MG tablet   This may have changed:    - how much to take  - how to take this  - when to take " this  - additional instructions   Used for:  Diabetic polyneuropathy associated with type 2 diabetes mellitus (H)   Generic drug:  glimepiride   Changed by:  Yuridia Villarreal MD        1.5 tabs daily   Refills:  0            Where to get your medicines      Some of these will need a paper prescription and others can be bought over the counter.  Ask your nurse if you have questions.     Bring a paper prescription for each of these medications     traMADol 50 MG tablet                Primary Care Provider Office Phone # Fax #    Yuridia Villarreal [phone]       [facility] [address] [facility] [address]        Equal Access to Services     [facility]: Hadii aad ku hadasho Soomaali, waaxda luqadaha, qaybta kaalmada adeegyada, waxay idiin hayaan adeeg devin mcdaniel . So [facility] [phone].    ATENCIÓN: Si habla español, tiene a guevara disposición servicios gratuitos de asistencia lingüística. Llame al [phone].    We comply with applicable federal civil rights laws and [place] laws. We do not discriminate on the basis of race, color, national origin, age, disability sex, sexual orientation or gender identity.            Thank you!     Thank you for choosing [facility]  for your care. Our goal is always to provide you with excellent care. Hearing back from our patients is one way we can continue to improve our services. Please take a few minutes to complete the written survey that you may receive in the mail after your visit with us. Thank you!             Your Updated Medication List - Protect others around you: Learn how to safely use, store and throw away your medicines at www.disposemymeds.org.          This list is accurate as of: 7/14/17 12:53 PM.  Always use your most recent med list.                   Brand Name Dispense Instructions for use Diagnosis    AMARYL 4 MG tablet   Generic drug:  glimepiride      1.5 tabs daily    Diabetic polyneuropathy  associated with type 2 diabetes mellitus (H)       aspirin 325 MG EC tablet      1 tablet daily        cholecalciferol 2000 UNITS Caps      2 daily        docusate sodium 100 MG tablet    COLACE    60 tablet    Take 100 mg by mouth daily        furosemide 20 MG tablet    LASIX    90 tablet    Take 1 tablet (20 mg) by mouth every morning    Edema, unspecified type       lisinopril 20 MG tablet    PRINIVIL/ZESTRIL    90 tablet    Take 1 tablet (20 mg) by mouth daily    Hypertension goal BP (blood pressure) < 130/80       Multi-vitamin Tabs tablet   Generic drug:  multivitamin, therapeutic with minerals      1 TABLET DAILY        nystatin 458850 UNIT/GM Powd    MYCOSTATIN    60 g    Apply a small amount to affected area three times daily.    Intertrigo       potassium chloride SA 10 MEQ CR tablet    K-DUR/KLOR-CON M    90 tablet    Take 1 tablet (10 mEq) by mouth daily    Edema       sertraline 50 MG tablet    ZOLOFT    90 tablet    TAKE ONE TABLET BY MOUTH EVERY DAY    Anxiety       simvastatin 10 MG tablet    ZOCOR    90 tablet    Take 1 tablet (10 mg) by mouth At Bedtime Due for fasting labs for further refills.    Hyperlipidemia with target LDL less than 100       traMADol 50 MG tablet    ULTRAM    180 tablet    Take 1-2 tablets ( mg) by mouth every 8 hours as needed for pain .  Maximum 180 per month    Diabetic polyneuropathy associated with type 2 diabetes mellitus (H), Other chronic pain

## 2017-07-14 NOTE — PROGRESS NOTES
SUBJECTIVE:                                                    Ирина Yanez is a 82 year old female who presents to clinic today for the following health issues:      Diabetes Follow-up      Patient is checking blood sugars: rarely.  Results range from 185     Diabetic concerns: None     Symptoms of hypoglycemia (low blood sugar): none     Paresthesias (numbness or burning in feet) or sores: Yes neuropathy     Date of last diabetic eye exam: has an appointment in January     Hyperlipidemia Follow-Up      Rate your low fat/cholesterol diet?: good    Taking statin?  Yes, leg cramping    Other lipid medications/supplements?:  none    Hypertension Follow-up      Outpatient blood pressures normal range    Low Salt Diet: seldom      Amount of exercise or physical activity: lots of daily activities- gardening    Problems taking medications regularly: No    Medication side effects: possible muscle cramping    Diet: regular (no restrictions)          Problem list and histories reviewed & adjusted, as indicated.  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 quadriceps     Right bundle branch block     S/P lumbar fusion     ACP (advance care planning)     Heart murmur     Aortic sclerosis       Current Outpatient Prescriptions    Medication Sig Dispense Refill     simvastatin (ZOCOR) 10 MG tablet Take 1 tablet (10 mg) by mouth At Bedtime Due for fasting labs for further refills. 90 tablet 0     traMADol (ULTRAM) 50 MG tablet Take 1-2 tablets ( mg) by mouth every 8 hours as needed for pain .  Maximum 180 per month 180 tablet 0     sertraline (ZOLOFT) 50 MG tablet TAKE ONE TABLET BY MOUTH EVERY DAY 90 tablet 1     furosemide (LASIX) 20 MG tablet Take 1 tablet (20 mg) by mouth every morning 90 tablet 1     lisinopril (PRINIVIL/ZESTRIL) 20 MG tablet Take 1 tablet (20 mg) by mouth daily 90 tablet 1     potassium chloride SA (K-DUR,KLOR-CON M) 10 MEQ tablet Take 1 tablet (10 mEq) by mouth daily 90 tablet 3     docusate sodium (COLACE) 100 MG tablet Take 100 mg by mouth daily 60 tablet 1     glimepiride (AMARYL) 4 MG tablet Take 2 tablets (8 mg) by mouth every morning (before breakfast) 30 tablet 1     nystatin (MYCOSTATIN) 972091 UNIT/GM POWD Apply a small amount to affected area three times daily. 60 g 1     cholecalciferol 2000 UNITS CAPS 2 daily       ASPIRIN 325 MG OR TBEC 1 tablet daily       MULTI-VITAMIN OR TABS 1 TABLET DAILY           Reviewed and updated as needed this visit by clinical staff  Tobacco  Allergies  Med Hx  Surg Hx  Fam Hx  Soc Hx      Reviewed and updated as needed this visit by Provider         ROS:  CONSTITUTIONAL:NEGATIVE for fever, chills, change in weight  RESP:NEGATIVE for significant cough or SOB  CV: NEGATIVE for chest pain, palpitations or peripheral edema  MUSCULOSKELETAL: ongoing discomfort related to her Neuropathy. See below regarding neck/shoulder.  NEURO: neuropathy. She notes it has been attributed to DM usually, but it was there prior to DM being diagnosed.  PSYCHIATRIC: NEGATIVE for changes in mood or affect    Notes she was told to discuss concurrent use of sertraline and tramadol.    Takes 3 tramadol twice per day.  Notes forgets to take tid.   Neuropathy.   Gets withdrawal otherwise.  "    Was recently in to see her Endocrinologist. Brings in labs.  Cut down on glimepride  Piherman(litazone) (?)    Had eye exam in January. No diabetic changes.    Will see Dr. Verde again in mid December.    Does see Dr. Whatley for renal follow up.    Pain left shoulder x 2 months.   Hurt neck when looking up. Dull pain.   Did see chiropracter.       OBJECTIVE:     /58 (BP Location: Right arm, Cuff Size: Adult Large)  Pulse 70  Temp 97.8  F (36.6  C) (Oral)  Resp 16  Ht 5' 4\" (1.626 m)  Wt 185 lb 12.8 oz (84.3 kg)  SpO2 98%  BMI 31.89 kg/m2  Body mass index is 31.89 kg/(m^2).  GENERAL APPEARANCE: alert and no distress  NECK: no adenopathy. Does feel tight; has pain left side of neck.   RESP: lungs clear to auscultation - no rales, rhonchi or wheezes  CV: regular rates and rhythm  MS: she feels very tight trapezeii area bilaterally.   PSYCH: mentation appears normal and affect normal/bright    Foot exam:  DP pulse present bilaterally.  No sores or ulcerations.  Little callous.  Monofilament exam abnormal; could not feel things midway up left leg.  Could feel at ankle on right leg.  Left second toe is a short stub. She notes staph infection to bone and had removed perhaps 4 years ago      Recent Labs   Lab Test 12/18/15 12/17/14  12/17/13   0859  06/20/12   1500   CHOL  183  160  165  153   HDL  43  47  49*  40*   LDL  92  75  76  61   TRIG  238  191  204*  263*   CHOLHDLRATIO   --    --   3.4  3.9     Reviewed labs from Endocrinology.  HgbA1C was 8.0        ASSESSMENT/PLAN:     Diabetic polyneuropathy associated with type 2 diabetes mellitus (H)  She has been stable with 6 daily tramadol. However, I would not recommend more than 100 mg per dose. (see below)  She does not want to consider lyrica due to cost. Mentioned gabapentin as an alternative as well.  - FOOT EXAM  - traMADol (ULTRAM) 50 MG tablet; Take 1-2 tablets ( mg) by mouth every 8 hours as needed for pain .  Maximum 180 per month  - " glimepiride (AMARYL) 4 MG tablet; 1.5 tabs daily  - **Lipid panel reflex to direct LDL FUTURE anytime; Future  - **Albumin Random Urine Quant FUTURE anytime; Future    Opioid dependence, daily use (H)  She has been getting 6 per day tramadol for a long time; though I thought she was taking 2 tid. She seemed to be tolerating and has remained effective by her report.  She notes taking it differently due to frequently missing the middle of the day dose. She described more discomfort related to possible withdrawal. Discussed she might be able to gradually wean to 2 bid by decreasing by 1/2 pill daily on either end of the day. Otherwise encourage and discussed some possible things to help remember tid.     Type 2 diabetes mellitus with diabetic nephropathy, without long-term current use of insulin (H)  She sees Endocrinology. She is uncertain of her new medication; I suspect pioglitazone. Did have her sign SAI for getting a copy of her report.  She anticipates follow up in another 6 months.   - FOOT EXAM  - **Albumin Random Urine Quant FUTURE anytime; Future    Toe amputation:  Important for good foot care. She is currently doing well.    CKD (chronic kidney disease) stage 4, GFR 15-29 ml/min (H)  She does follow with Renal.     Chronic pain - diabetic neuropathy  As above.   - traMADol (ULTRAM) 50 MG tablet; Take 1-2 tablets ( mg) by mouth every 8 hours as needed for pain .  Maximum 180 per month    Hyperlipidemia with target LDL less than 100  She is not fasting; will return when she is and do lab only. Reviewed other labs.  - **Lipid panel reflex to direct LDL FUTURE anytime; Future    Hypertension goal BP (blood pressure) < 140/90  Satisfactory control.    Neck pain  Suspect muscular; possible underlying arthritis. Offered physical therapy. Discussed heat.    Upper back pain on left side  As above.         Patient Instructions   If all is well, I would like to see you again in 6 months.    I do recommend not  taking more than 2 tramadol at a time. It would be great if you could gradually wean to 2 twice per day; but otherwise work on taking three times daily.          Yuridia Villarreal MD, MD  [facility]

## 2017-07-14 NOTE — NURSING NOTE
"No chief complaint on file.      Initial /58 (BP Location: Right arm, Cuff Size: Adult Large)  Pulse 70  Temp 97.8  F (36.6  C) (Oral)  Resp 16  Ht 5' 4\" (1.626 m)  Wt 185 lb 12.8 oz (84.3 kg)  SpO2 98%  BMI 31.89 kg/m2 Estimated body mass index is 31.89 kg/(m^2) as calculated from the following:    Height as of this encounter: 5' 4\" (1.626 m).    Weight as of this encounter: 185 lb 12.8 oz (84.3 kg).  Medication Reconciliation: complete   Zeny Cr, MAMI      "

## 2017-07-15 PROBLEM — F11.20 OPIOID DEPENDENCE, DAILY USE (H): Status: ACTIVE | Noted: 2017-07-15

## 2017-07-15 ASSESSMENT — PATIENT HEALTH QUESTIONNAIRE - PHQ9: SUM OF ALL RESPONSES TO PHQ QUESTIONS 1-9: 0

## 2017-07-15 ASSESSMENT — ANXIETY QUESTIONNAIRES: GAD7 TOTAL SCORE: 2

## 2017-07-17 ENCOUNTER — DOCUMENTATION ONLY (OUTPATIENT)
Dept: LAB | Facility: CLINIC | Age: 82
End: 2017-07-17

## 2017-07-17 DIAGNOSIS — E11.42 DIABETIC POLYNEUROPATHY ASSOCIATED WITH TYPE 2 DIABETES MELLITUS (H): ICD-10-CM

## 2017-07-17 DIAGNOSIS — E78.5 HYPERLIPIDEMIA WITH TARGET LDL LESS THAN 100: ICD-10-CM

## 2017-07-17 DIAGNOSIS — E11.21 TYPE 2 DIABETES MELLITUS WITH DIABETIC NEPHROPATHY, WITHOUT LONG-TERM CURRENT USE OF INSULIN (H): ICD-10-CM

## 2017-07-17 LAB
CHOLEST SERPL-MCNC: 175 MG/DL
CREAT UR-MCNC: 68 MG/DL
HDLC SERPL-MCNC: 44 MG/DL
LDLC SERPL CALC-MCNC: 88 MG/DL
MICROALBUMIN UR-MCNC: 16 MG/L
MICROALBUMIN/CREAT UR: 23.86 MG/G CR (ref 0–25)
NONHDLC SERPL-MCNC: 131 MG/DL
TRIGL SERPL-MCNC: 213 MG/DL

## 2017-07-17 PROCEDURE — 80061 LIPID PANEL: CPT | Performed by: FAMILY MEDICINE

## 2017-07-17 PROCEDURE — 82043 UR ALBUMIN QUANTITATIVE: CPT | Performed by: FAMILY MEDICINE

## 2017-07-17 PROCEDURE — 36415 COLL VENOUS BLD VENIPUNCTURE: CPT | Performed by: FAMILY MEDICINE

## 2017-07-17 NOTE — PROGRESS NOTES
Ирина had a lab only appointment today, 7/17/17. There were tests in this patient's health maintenance that are due.  I have ordered them as future and pended them.  Please associate a diagnosis and sign these orders if you want them.  We have obtained the sample and are holding it in the lab.  If you do not want these tests then please cancel them.  Thanks, Jeannette

## 2017-07-17 NOTE — PROGRESS NOTES
Thanks!  I cancelled them. She had them at her Endocrinologist office. I did see them. Hopefully they will be abstracted in.    Again, thanks for checking!!

## 2017-07-21 DIAGNOSIS — E11.42 DIABETIC POLYNEUROPATHY ASSOCIATED WITH TYPE 2 DIABETES MELLITUS (H): ICD-10-CM

## 2017-07-21 DIAGNOSIS — G89.29 OTHER CHRONIC PAIN: ICD-10-CM

## 2017-08-18 DIAGNOSIS — R60.9 EDEMA, UNSPECIFIED TYPE: ICD-10-CM

## 2017-08-18 DIAGNOSIS — I10 HYPERTENSION GOAL BP (BLOOD PRESSURE) < 130/80: ICD-10-CM

## 2017-08-18 NOTE — TELEPHONE ENCOUNTER
furosemide (LASIX) 20 MG      Last Written Prescription Date: 2/21/17  Last Fill Quantity: 90, # refills: 1  Last Office Visit with [facility] or  Health prescribing provider: 7/4/17  Next 5 appointments (look out 90 days)     Sep 13, 2017 10:00 AM CDT   Return Visit with SAMY Calloway CNP   [facility] ([facility])    [address]  [facility] [address] [phone]                   Potassium   Date Value Ref Range Status   06/21/2017 4.1 3.5 - 5.3 mmol/L Final     Creatinine   Date Value Ref Range Status   06/21/2017 1.90 (H) 0.60 - 0.88 mg/dL Final     BP Readings from Last 3 Encounters:   07/14/17 118/58   03/13/17 129/61   01/16/17 125/57       lisinopril (PRINIVIL/ZESTRIL) 20 MG tablet      Last Written Prescription Date: 2/21/17  Last Fill Quantity: 90, # refills: 1  Last Office Visit with [facility] or [facility] prescribing provider: 7/14/17  Next 5 appointments (look out 90 days)     Sep 13, 2017 10:00 AM CDT   Return Visit with SAMY Calloway CNP   [facility] ([facility])    [address]  [facility] [address] [phone]                   Potassium   Date Value Ref Range Status   06/21/2017 4.1 3.5 - 5.3 mmol/L Final     Creatinine   Date Value Ref Range Status   06/21/2017 1.90 (H) 0.60 - 0.88 mg/dL Final     BP Readings from Last 3 Encounters:   07/14/17 118/58   03/13/17 129/61   01/16/17 125/57

## 2017-08-21 DIAGNOSIS — E11.42 DIABETIC POLYNEUROPATHY ASSOCIATED WITH TYPE 2 DIABETES MELLITUS (H): ICD-10-CM

## 2017-08-21 DIAGNOSIS — G89.29 OTHER CHRONIC PAIN: ICD-10-CM

## 2017-08-21 RX ORDER — TRAMADOL HYDROCHLORIDE 50 MG/1
50-100 TABLET ORAL EVERY 8 HOURS PRN
Qty: 180 TABLET | Refills: 0 | Status: SHIPPED | OUTPATIENT
Start: 2017-08-21 | End: 2017-09-19

## 2017-08-21 NOTE — TELEPHONE ENCOUNTER
Tramadol 50mg      Last Written Prescription Date: 07/14/2017  Last Fill Quantity: 180,  # refills: 0   Last Office Visit with [facility], P or [facility] prescribing provider: 07/14/2017                                         Next 5 appointments (look out 90 days)     Sep 13, 2017 10:00 AM CDT   Return Visit with SAMY Calloway CNP   [facility] ([facility])    [address] [address] [address] [phone]                    Shelby Sy CPhT  [facility]  [phone]

## 2017-08-22 RX ORDER — LISINOPRIL 20 MG/1
TABLET ORAL
Qty: 90 TABLET | Refills: 1 | Status: SHIPPED | OUTPATIENT
Start: 2017-08-22 | End: 2018-01-05

## 2017-08-22 RX ORDER — FUROSEMIDE 20 MG
TABLET ORAL
Qty: 90 TABLET | Refills: 1 | Status: SHIPPED | OUTPATIENT
Start: 2017-08-22 | End: 2018-01-05

## 2017-08-22 NOTE — TELEPHONE ENCOUNTER
Prescription approved per [facility] Refill Protocol.  Advised to continue current medications in July.  Unique Low, NICOLE  Triage Nurse

## 2017-08-22 NOTE — TELEPHONE ENCOUNTER
Please see if someone will sign in my absence; I did approve it.  Otherwise, I can sign on Thursday.  Thanks!

## 2017-09-13 ENCOUNTER — RADIANT APPOINTMENT (OUTPATIENT)
Dept: GENERAL RADIOLOGY | Facility: CLINIC | Age: 82
End: 2017-09-13
Attending: NURSE PRACTITIONER
Payer: COMMERCIAL

## 2017-09-13 ENCOUNTER — OFFICE VISIT (OUTPATIENT)
Dept: NEUROSURGERY | Facility: CLINIC | Age: 82
End: 2017-09-13
Attending: NURSE PRACTITIONER
Payer: COMMERCIAL

## 2017-09-13 VITALS
WEIGHT: 190 LBS | BODY MASS INDEX: 32.61 KG/M2 | OXYGEN SATURATION: 98 % | HEART RATE: 81 BPM | DIASTOLIC BLOOD PRESSURE: 62 MMHG | SYSTOLIC BLOOD PRESSURE: 129 MMHG | TEMPERATURE: 97.9 F

## 2017-09-13 DIAGNOSIS — Z98.1 STATUS POST LUMBAR SPINAL FUSION: Primary | ICD-10-CM

## 2017-09-13 DIAGNOSIS — Z98.1 STATUS POST LUMBAR SPINAL FUSION: ICD-10-CM

## 2017-09-13 PROCEDURE — 72100 X-RAY EXAM L-S SPINE 2/3 VWS: CPT

## 2017-09-13 PROCEDURE — 99211 OFF/OP EST MAY X REQ PHY/QHP: CPT | Performed by: NURSE PRACTITIONER

## 2017-09-13 PROCEDURE — 99214 OFFICE O/P EST MOD 30 MIN: CPT | Performed by: NURSE PRACTITIONER

## 2017-09-13 NOTE — PATIENT INSTRUCTIONS
- followup in as needed.    - Recommend neurology for neuropathy   - Call the clinic at [phone] for increased pain or any other questions and concerns.

## 2017-09-13 NOTE — PROGRESS NOTES
[facility]  Neurosurgery followup:    HPI: Ms. Yanez is a 81 year old female that returns 12 months post L3-4 interbody fusion with Dr. Lennox Blue. Pt is doing well but having issues with neuropathy pain to her legs.  She is very happy with the results of her surgery.       Exam:  Constitutional:  Alert, well nourished, NAD.  HEENT: Normocephalic, atraumatic.   Pulm:  Without shortness of breath   CV:  No pitting edema of BLE.      Neurological:  Awake  Alert  Oriented x 3  Motor exam:        IP Q DF PF EHL  R   5  5   5   5    5  L   5  5   5   5    5     Able to spontaneously move L/E bilaterally  Sensation intact throughout all L/E dermatomes       Imaging: lumbar xray shows fusion intact    A/P: Ms. Yanez is a 81 year old female that returns 12 months post L3-4 interbody fusion with Dr. Lennox Blue. Pt is doing well but having issues with neuropathy pain to her legs.  She is very happy with the results of her surgery. We will have her return as needed.  She was recommended to see a neurologist for her neuropathy.       Patient Instructions   - followup in as needed.    - Recommend neurology for neuropathy   - Call the clinic at [phone] for increased pain or any other questions and concerns.           Elidia Aragon [facility]  [facility]  [address] [address]    Tel [phone]  Pager [phone]

## 2017-09-13 NOTE — MR AVS SNAPSHOT
After Visit Summary   9/13/2017    Ирина Yanez    MRN: [id number]           Patient Information     Date Of Birth          1935        Visit Information        Provider Department      9/13/2017 10:00 AM Elidia Aragon APRN CNP [facility]        Care Instructions    - followup in as needed.    - Recommend neurology for neuropathy   - Call the clinic at [phone] for increased pain or any other questions and concerns.          Follow-ups after your visit        Your next 10 appointments already scheduled     Jan 16, 2018 10:00 AM CST   Return Sleep Patient with Mic Quijano MD   [facility] ([facility])    [address] [address] [phone]              Who to contact     If you have questions or need follow up information about today's clinic visit or your schedule please contact [facility] directly at [phone].  Normal or non-critical lab and imaging results will be communicated to you by [employer], letter or phone within 4 business days after the clinic has received the results. If you do not hear from us within 7 days, please contact the clinic through [employer] or phone. If you have a critical or abnormal lab result, we will notify you by phone as soon as possible.  Submit refill requests through [employer] or call your pharmacy and they will forward the refill request to us. Please allow 3 business days for your refill to be completed.          Additional Information About Your Visit        MyChart Information     [employer] gives you secure access to your electronic health record. If you see a primary care provider, you can also send messages to your care team and make appointments. If you have questions, please call your primary care clinic.  If you do not have a primary care provider, please call [phone] and they will assist you.        Care EveryWhere ID     This  is your Care EveryWhere ID. This could be used by other organizations to access your [place] medical records  [id number]        Your Vitals Were     Pulse Temperature Pulse Oximetry BMI (Body Mass Index)          81 97.9  F (36.6  C) (Oral) 98% 32.61 kg/m2         Blood Pressure from Last 3 Encounters:   09/13/17 129/62   07/14/17 118/58   03/13/17 129/61    Weight from Last 3 Encounters:   09/13/17 190 lb (86.2 kg)   07/14/17 185 lb 12.8 oz (84.3 kg)   03/13/17 186 lb (84.4 kg)              Today, you had the following     No orders found for display       Primary Care Provider Office Phone # Fax #    Yuridia Villarreal [phone] [address]  [facility] [address]        Equal Access to Services     [facility]: Hadii rosalee armendariz hadasho Soberny, waaxda luqadaha, qaybta kaalmada adeegyada, flo castanon haytamiko mcdaniel . So [facility] [phone].    ATENCIÓN: Si habla español, tiene a guevara disposición servicios gratuitos de asistencia lingüística. Llame al [phone].    We comply with applicable federal civil rights laws and [place] laws. We do not discriminate on the basis of race, color, national origin, age, disability sex, sexual orientation or gender identity.            Thank you!     Thank you for choosing [facility]  for your care. Our goal is always to provide you with excellent care. Hearing back from our patients is one way we can continue to improve our services. Please take a few minutes to complete the written survey that you may receive in the mail after your visit with us. Thank you!             Your Updated Medication List - Protect others around you: Learn how to safely use, store and throw away your medicines at www.disposemymeds.org.          This list is accurate as of: 9/13/17 10:08 AM.  Always use your most recent med list.                   Brand Name Dispense Instructions for use Diagnosis    AMARYL 4 MG tablet   Generic drug:   glimepiride      1.5 tabs daily    Diabetic polyneuropathy associated with type 2 diabetes mellitus (H)       aspirin 325 MG EC tablet      1 tablet daily        cholecalciferol 2000 UNITS Caps      2 daily        docusate sodium 100 MG tablet    COLACE    60 tablet    Take 100 mg by mouth daily        furosemide 20 MG tablet    LASIX    90 tablet    TAKE ONE TABLET BY MOUTH EVERY MORNING.    Edema, unspecified type       lisinopril 20 MG tablet    PRINIVIL/ZESTRIL    90 tablet    TAKE ONE TABLET BY MOUTH EVERY DAY    Hypertension goal BP (blood pressure) < 130/80       Multi-vitamin Tabs tablet   Generic drug:  multivitamin, therapeutic with minerals      1 TABLET DAILY        nystatin 855239 UNIT/GM Powd    MYCOSTATIN    60 g    Apply a small amount to affected area three times daily.    Intertrigo       potassium chloride SA 10 MEQ CR tablet    K-DUR/KLOR-CON M    90 tablet    Take 1 tablet (10 mEq) by mouth daily    Edema       sertraline 50 MG tablet    ZOLOFT    90 tablet    TAKE ONE TABLET BY MOUTH EVERY DAY    Anxiety       simvastatin 10 MG tablet    ZOCOR    90 tablet    Take 1 tablet (10 mg) by mouth At Bedtime Due for fasting labs for further refills.    Hyperlipidemia with target LDL less than 100       traMADol 50 MG tablet    ULTRAM    180 tablet    Take 1-2 tablets ( mg) by mouth every 8 hours as needed for pain .  Maximum 180 per month    Diabetic polyneuropathy associated with type 2 diabetes mellitus (H), Other chronic pain

## 2017-09-13 NOTE — NURSING NOTE
"Ирина Ynaez is a 82 year old female who presents for:  Chief Complaint   Patient presents with     Neurologic Problem     1 year fu for spinal fusion DOS 9/16 w/x-rays prior        Initial Vitals:  /62 (BP Location: Right arm, Patient Position: Chair, Cuff Size: Adult Large)  Pulse 81  Temp 97.9  F (36.6  C) (Oral)  Wt 190 lb (86.2 kg)  SpO2 98%  BMI 32.61 kg/m2 Estimated body mass index is 32.61 kg/(m^2) as calculated from the following:    Height as of 7/14/17: 5' 4\" (1.626 m).    Weight as of this encounter: 190 lb (86.2 kg).. Body surface area is 1.97 meters squared. BP completed using cuff size: large  Data Unavailable    Do you feel safe in your environment?  Yes  Do you need any refills today? No    Nursing Comments: .1 year fu for spinal fusion DOS 9/16 w/x-rays prior  Patient rates 2 pain today as 9/13/17      5 min. nursing intake time  Shannon Cobb CMA      Discharge plan: See NP dictation  2 min. nursing discharge time  Shannon Cobb CMA         "

## 2017-09-19 DIAGNOSIS — G89.29 OTHER CHRONIC PAIN: ICD-10-CM

## 2017-09-19 DIAGNOSIS — E11.42 DIABETIC POLYNEUROPATHY ASSOCIATED WITH TYPE 2 DIABETES MELLITUS (H): ICD-10-CM

## 2017-09-19 RX ORDER — TRAMADOL HYDROCHLORIDE 50 MG/1
50-100 TABLET ORAL EVERY 8 HOURS PRN
Qty: 180 TABLET | Refills: 0 | Status: SHIPPED | OUTPATIENT
Start: 2017-09-19 | End: 2017-10-25

## 2017-09-19 NOTE — TELEPHONE ENCOUNTER
traMADol (ULTRAM) 50 MG      Last Written Prescription Date:  8/21/17  Last Fill Quantity: 180,   # refills: 0  Last Office Visit with [facility] or  Health prescribing provider: 7/14/17  Future Office visit:       Routing refill request to provider for review/approval because:  Drug not on the [facility], [facility] or  Health refill protocol or controlled substance

## 2017-10-01 DIAGNOSIS — E78.5 HYPERLIPIDEMIA WITH TARGET LDL LESS THAN 100: ICD-10-CM

## 2017-10-02 NOTE — TELEPHONE ENCOUNTER
simvastatin (ZOCOR) 10 MG     Last Written Prescription Date: 7/12/17  Last Fill Quantity: 90, # refills: 0  Last Office Visit with [facility] or [facility] prescribing provider: 7/14/17       Lab Results   Component Value Date    CHOL 175 07/17/2017     Lab Results   Component Value Date    HDL 44 07/17/2017     Lab Results   Component Value Date    LDL 88 07/17/2017     Lab Results   Component Value Date    TRIG 213 07/17/2017     Lab Results   Component Value Date    CHOLHDLRATIO 3.4 12/17/2013

## 2017-10-03 RX ORDER — SIMVASTATIN 10 MG
10 TABLET ORAL AT BEDTIME
Qty: 90 TABLET | Refills: 2 | Status: SHIPPED | OUTPATIENT
Start: 2017-10-03 | End: 2018-03-27

## 2017-10-03 NOTE — TELEPHONE ENCOUNTER
Prescription approved per [facility] Refill Protocol.  Unique Low, RN  Triage Nurse

## 2017-10-06 ENCOUNTER — TRANSFERRED RECORDS (OUTPATIENT)
Dept: HEALTH INFORMATION MANAGEMENT | Facility: CLINIC | Age: 82
End: 2017-10-06

## 2017-10-06 DIAGNOSIS — E11.42 DIABETIC POLYNEUROPATHY ASSOCIATED WITH TYPE 2 DIABETES MELLITUS (H): ICD-10-CM

## 2017-10-06 RX ORDER — GLIMEPIRIDE 4 MG/1
TABLET ORAL
Qty: 30 TABLET | Refills: 0
Start: 2017-10-06

## 2017-10-06 NOTE — TELEPHONE ENCOUNTER
Glimepiride 4mg (new directions since last time we filled medication) wondering if we could get a new script for the 1 and 1/2 tabs daily. Thank you.                                              Shelby Sy, [facility]  [address] [place] [address] [phone]

## 2017-10-11 DIAGNOSIS — E11.21 TYPE 2 DIABETES MELLITUS WITH DIABETIC NEPHROPATHY, UNSPECIFIED LONG TERM INSULIN USE STATUS: Primary | ICD-10-CM

## 2017-10-12 NOTE — TELEPHONE ENCOUNTER
blood glucose monitoring (ONE TOUCH DELICA) lancets      Last Written Prescription Date: na  Last Fill Quantity: na,  # refills: na   Last Office Visit with [facility], [facility] or [facility] prescribing provider: 7/14/17

## 2017-10-25 DIAGNOSIS — I10 HTN (HYPERTENSION): Primary | ICD-10-CM

## 2017-10-25 DIAGNOSIS — G89.29 OTHER CHRONIC PAIN: ICD-10-CM

## 2017-10-25 DIAGNOSIS — E11.42 DIABETIC POLYNEUROPATHY ASSOCIATED WITH TYPE 2 DIABETES MELLITUS (H): ICD-10-CM

## 2017-10-25 RX ORDER — TRAMADOL HYDROCHLORIDE 50 MG/1
50-100 TABLET ORAL EVERY 8 HOURS PRN
Qty: 180 TABLET | Refills: 0 | Status: SHIPPED | OUTPATIENT
Start: 2017-10-25 | End: 2017-11-22

## 2017-10-27 RX ORDER — POTASSIUM CHLORIDE 750 MG/1
TABLET, EXTENDED RELEASE ORAL
Qty: 90 TABLET | Refills: 0 | Status: SHIPPED | OUTPATIENT
Start: 2017-10-27 | End: 2018-01-05

## 2017-11-14 DIAGNOSIS — F41.9 ANXIETY: ICD-10-CM

## 2017-11-16 NOTE — TELEPHONE ENCOUNTER
Requested Prescriptions   Pending Prescriptions Disp Refills     sertraline (ZOLOFT) 50 MG tablet [Pharmacy Med Name: SERTRALINE HCL 50MG TABS] 90 tablet 1     Sig: TAKE ONE TABLET BY MOUTH EVERY DAY    SSRIs Protocol Failed    11/14/2017 11:18 AM       Failed - PHQ-9 score less than 5 in past 6 months    Please review PHQ-9 score.          Passed - Medication is NOT Bupropion    If the medication is Bupropion (Wellbutrin), and the patient is taking for smoking cessation; OK to refill.         Passed - Patient is age 18 or older       Passed - No active pregnancy on record       Passed - No positive pregnancy test in last 12 months       Passed - Recent (6 mo) or future visit with authorizing provider's specialty    Patient had office visit in the last 6 months or has a visit in the next 30 days with authorizing provider.  See chart review.             Patient takes this for anxiety. Sent PHQ 9/HUBERT 7 to her through my chart.  Depression is on her problem list.    Unique Low RN  Triage Nurse

## 2017-11-21 NOTE — TELEPHONE ENCOUNTER
Patient has not yet reviewed my chart message.  PHQ 9 is 0 in July.  This is refilled.  Unique Low, RN  Triage Nurse

## 2017-11-22 ENCOUNTER — TELEPHONE (OUTPATIENT)
Dept: FAMILY MEDICINE | Facility: CLINIC | Age: 82
End: 2017-11-22

## 2017-11-22 DIAGNOSIS — G89.29 OTHER CHRONIC PAIN: ICD-10-CM

## 2017-11-22 DIAGNOSIS — E11.42 DIABETIC POLYNEUROPATHY ASSOCIATED WITH TYPE 2 DIABETES MELLITUS (H): ICD-10-CM

## 2017-11-22 RX ORDER — TRAMADOL HYDROCHLORIDE 50 MG/1
50-100 TABLET ORAL EVERY 8 HOURS PRN
Qty: 180 TABLET | Refills: 0 | Status: SHIPPED | OUTPATIENT
Start: 2017-11-22 | End: 2017-12-19

## 2017-12-18 ENCOUNTER — TELEPHONE (OUTPATIENT)
Dept: FAMILY MEDICINE | Facility: CLINIC | Age: 82
End: 2017-12-18

## 2017-12-18 DIAGNOSIS — G89.29 OTHER CHRONIC PAIN: ICD-10-CM

## 2017-12-18 DIAGNOSIS — E11.42 DIABETIC POLYNEUROPATHY ASSOCIATED WITH TYPE 2 DIABETES MELLITUS (H): ICD-10-CM

## 2017-12-19 ENCOUNTER — TRANSFERRED RECORDS (OUTPATIENT)
Dept: HEALTH INFORMATION MANAGEMENT | Facility: CLINIC | Age: 82
End: 2017-12-19

## 2017-12-19 LAB
ALT SERPL-CCNC: 14 U/L (ref 6–29)
CREAT SERPL-MCNC: 2.08 MG/DL (ref 0.6–0.88)
GFR SERPL CREATININE-BSD FRML MDRD: 22 ML/MIN/1.73M2
GLUCOSE SERPL-MCNC: 125 MG/DL (ref 65–99)
HBA1C MFR BLD: 6.3 % (ref 4–6)
POTASSIUM SERPL-SCNC: 4.9 MMOL/L (ref 3.5–5.3)
TSH SERPL-ACNC: 2.06 UIU/ML (ref 0.3–5)

## 2017-12-19 RX ORDER — TRAMADOL HYDROCHLORIDE 50 MG/1
50-100 TABLET ORAL EVERY 8 HOURS PRN
Qty: 180 TABLET | Refills: 0 | Status: SHIPPED | OUTPATIENT
Start: 2017-12-19 | End: 2018-01-05

## 2017-12-19 NOTE — TELEPHONE ENCOUNTER
traMADol (ULTRAM) 50 MG tablet      Last Written Prescription Date:  11/22/17  Last Fill Quantity: 180,   # refills: 0  Last Office Visit: 7/14/17  Future Office visit:       Routing refill request to provider for review/approval because:  Drug not on the [facility], [facility] or [facility] refill protocol or controlled substance

## 2017-12-23 ENCOUNTER — TRANSFERRED RECORDS (OUTPATIENT)
Dept: HEALTH INFORMATION MANAGEMENT | Facility: CLINIC | Age: 82
End: 2017-12-23

## 2018-01-04 DIAGNOSIS — F41.9 ANXIETY: ICD-10-CM

## 2018-01-04 DIAGNOSIS — G89.29 OTHER CHRONIC PAIN: ICD-10-CM

## 2018-01-04 DIAGNOSIS — I10 HYPERTENSION GOAL BP (BLOOD PRESSURE) < 130/80: ICD-10-CM

## 2018-01-04 DIAGNOSIS — E11.42 DIABETIC POLYNEUROPATHY ASSOCIATED WITH TYPE 2 DIABETES MELLITUS (H): ICD-10-CM

## 2018-01-04 DIAGNOSIS — R60.9 EDEMA, UNSPECIFIED TYPE: ICD-10-CM

## 2018-01-05 NOTE — TELEPHONE ENCOUNTER
New mail order pharmacy requesting    traMADol (ULTRAM) 50 MG tablet      Last Written Prescription Date:  12/19/17  Last Fill Quantity: 180,   # refills: 0  Last Office Visit: 7/14/2017  Future Office visit:       Routing refill request to provider for review/approval because:  Drug not on the [facility], [facility] or [facility] refill protocol or controlled substance        Requested Prescriptions   Pending Prescriptions Disp Refills     sertraline (ZOLOFT) 50 MG tablet  Last Written Prescription Date:  11/21/17  Last Fill Quantity: 90,  # refills: 0   Last Office Visit with [facility], [facility] or [facility] prescribing provider:  7/14/2017   Future Office Visit:      90 tablet 0     Sig: Take 1 tablet (50 mg) by mouth daily    SSRIs Protocol Passed    1/5/2018 11:39 AM       Passed - PHQ-9 score less than 5 in past 6 months    The PHQ-9 criteria is meant to fail. It requires a PHQ-9 score review  PHQ-9 SCORE 3/30/2016 9/13/2016 7/14/2017   Total Score - - -   Total Score 1 1 0     HUBERT-7 SCORE 3/30/2016 9/13/2016 7/14/2017   Total Score - - -   Total Score 1 0 2          Passed - Recent or future visit with authorizing provider    Patient had office visit in the last year or has a visit in the next 30 days with authorizing provider.  See chart review.        Passed - Patient is age 18 or older       Passed - No active pregnancy on record       Passed - No positive pregnancy test in last 12 months       Passed - Recent (6 mo) or future visit with authorizing provider's specialty    Patient had office visit in the last 6 months or has a visit in the next 30 days with authorizing provider.  See chart review.               furosemide (LASIX) 20 MG tablet  Last Written Prescription Date:  8/22/17  Last Fill Quantity: 90,  # refills: 1   Last Office Visit with [facility], [facility] or [facility] prescribing provider:  7/14/2017     Future Office Visit:      90 tablet 1    Diuretics (Including Combos) Protocol Failed    1/5/2018 11:39 AM       Failed - Normal  serum creatinine on file in past 12 months    Recent Labs   Lab Test 06/21/17   CR  1.90*           Failed - Normal serum sodium on file in past 12 months    Recent Labs   Lab Test  09/18/16   0557   NA  140           Passed - Blood pressure under 140/90    BP Readings from Last 3 Encounters:   09/13/17 129/62   07/14/17 118/58   03/13/17 129/61          Passed - Recent or future visit with authorizing provider's specialty    Patient had office visit in the last year or has a visit in the next 30 days with authorizing provider.  See chart review.        Passed - Patient is age 18 or older       Passed - No active pregancy on record       Passed - Normal serum potassium on file in past 12 months    Recent Labs   Lab Test 06/21/17   POTASSIUM  4.1           Passed - No positive pregnancy test in past 12 months              lisinopril (PRINIVIL/ZESTRIL) 20 MG tablet  Last Written Prescription Date:  8/22/17  Last Fill Quantity: 90,  # refills: 1   Last Office Visit with [facility], [facility] or [facility] prescribing provider:  7/14/2017     Future Office Visit:      90 tablet 1     Sig: Take 1 tablet (20 mg) by mouth daily    ACE Inhibitors (Including Combos) Protocol Failed    1/5/2018 11:39 AM       Failed - Normal serum creatinine on file in past 12 months    Recent Labs   Lab Test 06/21/17   CR  1.90*          Passed - Blood pressure under 140/90    BP Readings from Last 3 Encounters:   09/13/17 129/62   07/14/17 118/58   03/13/17 129/61          Passed - Recent or future visit with authorizing provider's specialty    Patient had office visit in the last year or has a visit in the next 30 days with authorizing provider.  See chart review.        Passed - Patient is age 18 or older       Passed - No active pregnancy on record       Passed - Normal serum potassium on file in past 12 months    Recent Labs   Lab Test 06/21/17   POTASSIUM  4.1          Passed - No positive pregnancy test in past 12 months              potassium  chloride (K-TAB,KLOR-CON) 10 MEQ tablet  Last Written Prescription Date:  10/27/17  Last Fill Quantity: 90,  # refills: 0   Last Office Visit with [facility] or [facility] prescribing provider:  7/14/2017     Future Office Visit:      90 tablet 0    Potassium Supplements Protocol Passed    1/5/2018 11:39 AM       Passed - Recent or future visit with authorizing provider's specialty    Patient had office visit in the last year or has a visit in the next 30 days with authorizing provider.  See chart review.        Passed - Patient is age 18 or older       Passed - Normal serum potassium in past 12 months    Recent Labs   Lab Test 06/21/17   POTASSIUM  4.1

## 2018-01-08 RX ORDER — POTASSIUM CHLORIDE 750 MG/1
TABLET, EXTENDED RELEASE ORAL
Qty: 90 TABLET | Refills: 0 | Status: SHIPPED | OUTPATIENT
Start: 2018-01-08 | End: 2018-01-16

## 2018-01-08 RX ORDER — FUROSEMIDE 20 MG
TABLET ORAL
Qty: 90 TABLET | Refills: 0 | Status: SHIPPED | OUTPATIENT
Start: 2018-01-08 | End: 2018-03-20

## 2018-01-08 RX ORDER — LISINOPRIL 20 MG/1
20 TABLET ORAL DAILY
Qty: 90 TABLET | Refills: 0 | Status: SHIPPED | OUTPATIENT
Start: 2018-01-08 | End: 2018-03-20

## 2018-01-08 RX ORDER — TRAMADOL HYDROCHLORIDE 50 MG/1
50-100 TABLET ORAL EVERY 8 HOURS PRN
Qty: 180 TABLET | Refills: 0 | Status: SHIPPED | OUTPATIENT
Start: 2018-01-08 | End: 2018-01-18

## 2018-01-08 NOTE — TELEPHONE ENCOUNTER
Please help her schedule a visit.    The tramadol script will need a signature; please put on my desk to sign in am.

## 2018-01-08 NOTE — TELEPHONE ENCOUNTER
She is now using "[employer]  She is requesting these be filled asap.      Medication is being filled for 1 time refill only due to:  Patient needs to be seen because due for an appt - needs updated phq9 - giving 1 refill on zoloft and potassium.     Routing refill request to provider for review/approval because:  Tramadol is not on the protocol and creatinine elevated for lisinopril and lasix    Will forward to the station, pt due for a f/u visit per 7/14/17 ov

## 2018-01-16 ENCOUNTER — OFFICE VISIT (OUTPATIENT)
Dept: SLEEP MEDICINE | Facility: CLINIC | Age: 83
End: 2018-01-16
Payer: COMMERCIAL

## 2018-01-16 VITALS
HEART RATE: 79 BPM | OXYGEN SATURATION: 99 % | WEIGHT: 201 LBS | BODY MASS INDEX: 34.31 KG/M2 | SYSTOLIC BLOOD PRESSURE: 135 MMHG | DIASTOLIC BLOOD PRESSURE: 72 MMHG | RESPIRATION RATE: 16 BRPM | HEIGHT: 64 IN

## 2018-01-16 DIAGNOSIS — G47.33 OSA (OBSTRUCTIVE SLEEP APNEA): Primary | ICD-10-CM

## 2018-01-16 PROCEDURE — 99213 OFFICE O/P EST LOW 20 MIN: CPT | Performed by: INTERNAL MEDICINE

## 2018-01-16 RX ORDER — PIOGLITAZONEHYDROCHLORIDE 15 MG/1
TABLET ORAL DAILY
COMMUNITY
End: 2018-06-12

## 2018-01-16 NOTE — PATIENT INSTRUCTIONS
1. Severe sleep apnea  2. Insomnia     - Continue to use your machine every night     You have symptoms of insomnia and would likely benefit from non-medication approaches to treatment.  Cognitive behavioral treatment (CBT) for insomnia is a very effective technique that involves changing your behaviors and thoughts associated with sleep.  People that are motivated to make changes in their sleep pattern are likely to benefit from self-help strategies for treatment of insomnia.  Several treatment books for insomnia are listed on our patient treatment resources.      Suggested Resources  Insomnia Treatment Books     Overcoming Insomnia by Merlin Pool and Christianne Vigil (2008)    No More Sleepless Nights by Dino Dudley and Bridget Suárez (1996)    Say Jose Luis to Insomnia by Elfego Kennedy (2009)    The Insomnia Workbook by Yadira Zavala and Dion Randle (2009)    The Insomnia Answer by Joe March and James Vaca (2006)    Your BMI is Body mass index is 34.5 kg/(m^2).  Weight management is a personal decision.  If you are interested in exploring weight loss strategies, the following discussion covers the approaches that may be successful. Body mass index (BMI) is one way to tell whether you are at a healthy weight, overweight, or obese. It measures your weight in relation to your height.  A BMI of 18.5 to 24.9 is in the healthy range. A person with a BMI of 25 to 29.9 is considered overweight, and someone with a BMI of 30 or greater is considered obese. More than two-thirds of [place] adults are considered overweight or obese.  Being overweight or obese increases the risk for further weight gain. Excess weight may lead to heart disease and diabetes.  Creating and following plans for healthy eating and physical activity may help you improve your health.  Weight control is part of healthy lifestyle and includes exercise, emotional health, and healthy eating habits. Careful eating habits lifelong are  the mainstay of weight control. Though there are significant health benefits from weight loss, long-term weight loss with diet alone may be very difficult to achieve- studies show long-term success with dietary management in less than 10% of people. Attaining a healthy weight may be especially difficult to achieve in those with severe obesity. In some cases, medications, devices and surgical management might be considered.  What can you do?  If you are overweight or obese and are interested in methods for weight loss, you should discuss this with your provider.     Consider reducing daily calorie intake by 500 calories.     Keep a food journal.     Avoiding skipping meals, consider cutting portions instead.    Diet combined with exercise helps maintain muscle while optimizing fat loss. Strength training is particularly important for building and maintaining muscle mass. Exercise helps reduce stress, increase energy, and improves fitness. Increasing exercise without diet control, however, may not burn enough calories to loose weight.       Start walking three days a week 10-20 minutes at a time    Work towards walking thirty minutes five days a week     Eventually, increase the speed of your walking for 1-2 minutes at time    In addition, we recommend that you review healthy lifestyles and methods for weight loss available through the National Institutes of Health patient information sites:  http://win.niddk.nih.gov/publications/index.htm    And look into health and wellness programs that may be available through your health insurance provider, employer, local community center, or [employer].    Weight management plan: Patient was referred to their PCP to discuss a diet and exercise plan.        Your Body mass index is 34.5 kg/(m^2).  Weight management is a personal decision.  If you are interested in exploring weight loss strategies, the following discussion covers the approaches that may be successful. Body mass  index (BMI) is one way to tell whether you are at a healthy weight, overweight, or obese. It measures your weight in relation to your height.  A BMI of 18.5 to 24.9 is in the healthy range. A person with a BMI of 25 to 29.9 is considered overweight, and someone with a BMI of 30 or greater is considered obese. More than two-thirds of [place] adults are considered overweight or obese.  Being overweight or obese increases the risk for further weight gain. Excess weight may lead to heart disease and diabetes.  Creating and following plans for healthy eating and physical activity may help you improve your health.  Weight control is part of healthy lifestyle and includes exercise, emotional health, and healthy eating habits. Careful eating habits lifelong are the mainstay of weight control. Though there are significant health benefits from weight loss, long-term weight loss with diet alone may be very difficult to achieve- studies show long-term success with dietary management in less than 10% of people. Attaining a healthy weight may be especially difficult to achieve in those with severe obesity. In some cases, medications, devices and surgical management might be considered.  What can you do?  If you are overweight or obese and are interested in methods for weight loss, you should discuss this with your provider.     Consider reducing daily calorie intake by 500 calories.     Keep a food journal.     Avoiding skipping meals, consider cutting portions instead.    Diet combined with exercise helps maintain muscle while optimizing fat loss. Strength training is particularly important for building and maintaining muscle mass. Exercise helps reduce stress, increase energy, and improves fitness. Increasing exercise without diet control, however, may not burn enough calories to loose weight.       Start walking three days a week 10-20 minutes at a time    Work towards walking thirty minutes five days a week     Eventually,  increase the speed of your walking for 1-2 minutes at time    In addition, we recommend that you review healthy lifestyles and methods for weight loss available through the National Institutes of Health patient information sites:  http://win.niddk.nih.gov/publications/index.htm    And look into health and wellness programs that may be available through your health insurance provider, employer, local community center, or [employer].    Weight management plan: Patient was referred to their PCP to discuss a diet and exercise plan.

## 2018-01-16 NOTE — NURSING NOTE
"Chief Complaint   Patient presents with     CPAP Follow Up     Follow up verna       Initial /72  Pulse 79  Resp 16  Ht 1.626 m (5' 4\")  Wt 91.2 kg (201 lb)  SpO2 99%  BMI 34.5 kg/m2 Estimated body mass index is 34.5 kg/(m^2) as calculated from the following:    Height as of this encounter: 1.626 m (5' 4\").    Weight as of this encounter: 91.2 kg (201 lb).  Medication Reconciliation: complete   ESS 8  Shelley Jama MA      "

## 2018-01-16 NOTE — MR AVS SNAPSHOT
After Visit Summary   1/16/2018    Ирина Yanez    MRN: [id number]           Patient Information     Date Of Birth          1935        Visit Information        Provider Department      1/16/2018 10:00 AM Mic Quijano MD [facility] [place]        Today's Diagnoses     TERESSA (obstructive sleep apnea)    -  1      Care Instructions    1. Severe sleep apnea  2. Insomnia     - Continue to use your machine every night     You have symptoms of insomnia and would likely benefit from non-medication approaches to treatment.  Cognitive behavioral treatment (CBT) for insomnia is a very effective technique that involves changing your behaviors and thoughts associated with sleep.  People that are motivated to make changes in their sleep pattern are likely to benefit from self-help strategies for treatment of insomnia.  Several treatment books for insomnia are listed on our patient treatment resources.      Suggested Resources  Insomnia Treatment Books     Overcoming Insomnia by Merlin Pool and Christianne Vigil (2008)    No More Sleepless Nights by Dino Dudley and Bridget Suárez (1996)    Say Jose Luis to Insomnia by Elfego Kennedy (2009)    The Insomnia Workbook by Yadira Zavala and Dion Randle (2009)    The Insomnia Answer by Joe March and James Vaca (2006)    Your BMI is Body mass index is 34.5 kg/(m^2).  Weight management is a personal decision.  If you are interested in exploring weight loss strategies, the following discussion covers the approaches that may be successful. Body mass index (BMI) is one way to tell whether you are at a healthy weight, overweight, or obese. It measures your weight in relation to your height.  A BMI of 18.5 to 24.9 is in the healthy range. A person with a BMI of 25 to 29.9 is considered overweight, and someone with a BMI of 30 or greater is considered obese. More than two-thirds of American adults are considered overweight or obese.  Being  overweight or obese increases the risk for further weight gain. Excess weight may lead to heart disease and diabetes.  Creating and following plans for healthy eating and physical activity may help you improve your health.  Weight control is part of healthy lifestyle and includes exercise, emotional health, and healthy eating habits. Careful eating habits lifelong are the mainstay of weight control. Though there are significant health benefits from weight loss, long-term weight loss with diet alone may be very difficult to achieve- studies show long-term success with dietary management in less than 10% of people. Attaining a healthy weight may be especially difficult to achieve in those with severe obesity. In some cases, medications, devices and surgical management might be considered.  What can you do?  If you are overweight or obese and are interested in methods for weight loss, you should discuss this with your provider.     Consider reducing daily calorie intake by 500 calories.     Keep a food journal.     Avoiding skipping meals, consider cutting portions instead.    Diet combined with exercise helps maintain muscle while optimizing fat loss. Strength training is particularly important for building and maintaining muscle mass. Exercise helps reduce stress, increase energy, and improves fitness. Increasing exercise without diet control, however, may not burn enough calories to loose weight.       Start walking three days a week 10-20 minutes at a time    Work towards walking thirty minutes five days a week     Eventually, increase the speed of your walking for 1-2 minutes at time    In addition, we recommend that you review healthy lifestyles and methods for weight loss available through the National Institutes of Health patient information sites:  http://win.niddk.nih.gov/publications/index.htm    And look into health and wellness programs that may be available through your health insurance provider,  employer, local community center, or [employer].    Weight management plan: Patient was referred to their PCP to discuss a diet and exercise plan.        Your Body mass index is 34.5 kg/(m^2).  Weight management is a personal decision.  If you are interested in exploring weight loss strategies, the following discussion covers the approaches that may be successful. Body mass index (BMI) is one way to tell whether you are at a healthy weight, overweight, or obese. It measures your weight in relation to your height.  A BMI of 18.5 to 24.9 is in the healthy range. A person with a BMI of 25 to 29.9 is considered overweight, and someone with a BMI of 30 or greater is considered obese. More than two-thirds of American adults are considered overweight or obese.  Being overweight or obese increases the risk for further weight gain. Excess weight may lead to heart disease and diabetes.  Creating and following plans for healthy eating and physical activity may help you improve your health.  Weight control is part of healthy lifestyle and includes exercise, emotional health, and healthy eating habits. Careful eating habits lifelong are the mainstay of weight control. Though there are significant health benefits from weight loss, long-term weight loss with diet alone may be very difficult to achieve- studies show long-term success with dietary management in less than 10% of people. Attaining a healthy weight may be especially difficult to achieve in those with severe obesity. In some cases, medications, devices and surgical management might be considered.  What can you do?  If you are overweight or obese and are interested in methods for weight loss, you should discuss this with your provider.     Consider reducing daily calorie intake by 500 calories.     Keep a food journal.     Avoiding skipping meals, consider cutting portions instead.    Diet combined with exercise helps maintain muscle while optimizing fat loss. Strength  training is particularly important for building and maintaining muscle mass. Exercise helps reduce stress, increase energy, and improves fitness. Increasing exercise without diet control, however, may not burn enough calories to loose weight.       Start walking three days a week 10-20 minutes at a time    Work towards walking thirty minutes five days a week     Eventually, increase the speed of your walking for 1-2 minutes at time    In addition, we recommend that you review healthy lifestyles and methods for weight loss available through the National Institutes of Health patient information sites:  http://win.niddk.nih.gov/publications/index.htm    And look into health and wellness programs that may be available through your health insurance provider, employer, local community center, or [employer].    Weight management plan: Patient was referred to their PCP to discuss a diet and exercise plan.            Follow-ups after your visit        Your next 10 appointments already scheduled     Jan 18, 2018 11:30 AM CST   Office Visit [facility]   [facility]    [address] [address] [phone]           Bring a current list of meds and any records pertaining to this visit. For Physicals, please bring immunization records and any forms needing to be filled out. Please arrive 10 minutes early to complete paperwork.            Tushar 15, 2019 11:30 AM CST   Return Sleep Patient with Mic Quijano MD   [facility] ([facility])    [address] [place] [address] [phone]              Who to contact     If you have questions or need follow up information about today's clinic visit or your schedule please contact [facility] directly at [phone].  Normal or non-critical lab and imaging results will be communicated to you by MyChart, letter or phone within 4  "business days after the clinic has received the results. If you do not hear from us within 7 days, please contact the clinic through [employer] or phone. If you have a critical or abnormal lab result, we will notify you by phone as soon as possible.  Submit refill requests through [employer] or call your pharmacy and they will forward the refill request to us. Please allow 3 business days for your refill to be completed.          Additional Information About Your Visit        [employer] Information     [employer] gives you secure access to your electronic health record. If you see a primary care provider, you can also send messages to your care team and make appointments. If you have questions, please call your primary care clinic.  If you do not have a primary care provider, please call [phone] and they will assist you.        Care EveryWhere ID     This is your Care EveryWhere ID. This could be used by other organizations to access your [facility] medical records  [id number]        Your Vitals Were     Pulse Respirations Height Pulse Oximetry BMI (Body Mass Index)       79 16 1.626 m (5' 4\") 99% 34.5 kg/m2        Blood Pressure from Last 3 Encounters:   01/16/18 135/72   09/13/17 129/62   07/14/17 118/58    Weight from Last 3 Encounters:   01/16/18 91.2 kg (201 lb)   09/13/17 86.2 kg (190 lb)   07/14/17 84.3 kg (185 lb 12.8 oz)              We Performed the Following     Comprehensive DME          Today's Medication Changes          These changes are accurate as of: 1/16/18 10:27 AM.  If you have any questions, ask your nurse or doctor.               Stop taking these medicines if you haven't already. Please contact your care team if you have questions.     potassium chloride 10 MEQ tablet   Commonly known as:  K-TAB,KLOR-CON                    Primary Care Provider Office Phone # Fax #    Yuridia Villarreal [phone] [address] [place] [address]        Equal Access to Services     [facility] " GAAR : Hadii aad ku hadcandida [place], waaxda luqadaha, qaybta kaalmada adetheresa, flo kina thienmirtha fitzgerald ramuulcille mcdaniel . So [facility] [phone].    ATENCIÓN: Si habla español, tiene a guevara disposición servicios gratuitos de asistencia lingüística. Llame al [phone].    We comply with applicable federal civil rights laws and [place] laws. We do not discriminate on the basis of race, color, national origin, age, disability, sex, sexual orientation, or gender identity.            Thank you!     Thank you for choosing [facility]  for your care. Our goal is always to provide you with excellent care. Hearing back from our patients is one way we can continue to improve our services. Please take a few minutes to complete the written survey that you may receive in the mail after your visit with us. Thank you!             Your Updated Medication List - Protect others around you: Learn how to safely use, store and throw away your medicines at www.disposemymeds.org.          This list is accurate as of: 1/16/18 10:27 AM.  Always use your most recent med list.                   Brand Name Dispense Instructions for use Diagnosis    AMARYL 4 MG tablet   Generic drug:  glimepiride      1.5 tabs daily    Diabetic polyneuropathy associated with type 2 diabetes mellitus (H)       aspirin 325 MG EC tablet      1 tablet daily        blood glucose monitoring lancets     100 each    USE TO TEST ONCE DAILY    Type 2 diabetes mellitus with diabetic nephropathy, unspecified long term insulin use status (H)       cholecalciferol 2000 UNITS Caps      2 daily        docusate sodium 100 MG tablet    COLACE    60 tablet    Take 100 mg by mouth as needed        furosemide 20 MG tablet    LASIX    90 tablet    TAKE ONE TABLET BY MOUTH EVERY MORNING.    Edema, unspecified type       lisinopril 20 MG tablet    PRINIVIL/ZESTRIL    90 tablet    Take 1 tablet (20 mg) by mouth daily    Hypertension goal BP (blood pressure) <  130/80       Multi-vitamin Tabs tablet   Generic drug:  multivitamin, therapeutic with minerals      1 TABLET DAILY        nystatin 267416 UNIT/GM Powd    MYCOSTATIN    60 g    Apply a small amount to affected area three times daily.    Intertrigo       pioglitazone 15 MG tablet    ACTOS     Take by mouth daily        potassium chloride SA 10 MEQ CR tablet    K-DUR/KLOR-CON M    90 tablet    Take 1 tablet (10 mEq) by mouth daily    Edema       sertraline 50 MG tablet    ZOLOFT    90 tablet    Take 1 tablet (50 mg) by mouth daily    Anxiety       simvastatin 10 MG tablet    ZOCOR    90 tablet    Take 1 tablet (10 mg) by mouth At Bedtime    Hyperlipidemia with target LDL less than 100       traMADol 50 MG tablet    ULTRAM    180 tablet    Take 1-2 tablets ( mg) by mouth every 8 hours as needed for pain .  Maximum 180 per month    Diabetic polyneuropathy associated with type 2 diabetes mellitus (H), Other chronic pain

## 2018-01-16 NOTE — PROGRESS NOTES
Obstructive Sleep Apnea - PAP Follow-Up Visit:    Chief Complaint   Patient presents with     CPAP Follow Up     Follow up verna       Ирина Yanez comes in today for follow-up of their severe complex sleep apnea, managed with ASV.     Overall, she rates the experience with PAP as 9 (0 poor, 10 great). The mask is comfortable. The mask is not leaking.  She is not snoring with the mask on. She is not having gasp arousals.  She is not having significant oral/nasal dryness. The pressure settings are comfortable.     She uses full-face mask.     Bedtime is typically 10:30 pm. Usually it takes about 15 minutes to fall asleep with the mask on. Wake time is typically 7 am.  Patient is using PAP therapy 5-6 hours per night. The patient is usually getting 7 hours of sleep per night.    Patient tends to wake up by 2 am. She wakes up for uncertain reasons and has an active mind at this time. She will shift to the couch and watch TV and sometimes fall asleep on the couch.     She does feel rested in the morning.    Total score - [place]: 8 (1/16/2018 10:01 AM)      Respironics  ASV; min EPAP 6, max EPAP 12, min PS 0, max PS 13, max pressure 25 cmH2O download:  30/30 total days of use. 0 nonuse days.  Average use 5 hours 22 minutes per day.  73% days with >4 hours use.  EPAP 90% pressure 10.4 cm H2O, pressure support 90% 6.8 cm, Large leak 1 mins per day average.  AHI 10.2.        Reviewed by team: Allergies  Meds       Reviewed by provider:        Problem List:  Patient Active Problem List    Diagnosis Date Noted     Health Care Home 07/27/2011     Priority: High       Date Noted Care Team Member TO DO/Alerts to be completed   7/27/2011   MD Dr Mehreen Mills rechecking kidney function and prescribes lisinopril                Diagnosis Specialist Due to be seen Following   BR[facility] [place] June 2012    Melanoma, skin cancer [facility]; [facility] July 2012                    DX V65.8 REPLACED WITH [id number]ALTH CARE HOME  (04/08/2013)       Opioid dependence, daily use (H) 07/15/2017     Priority: Medium     Toe amputation status, left (H) 07/15/2017     Priority: Medium     Heart murmur 01/02/2017     Priority: Medium     Aortic sclerosis 01/02/2017     Priority: Medium     ACP (advance care planning) 11/21/2016     Priority: Medium     Advance Care Planning 11/21/2016: Receipt of ACP document:  Received: Health Care Directive which was witnessed or notarized on 9-16-16.  Document previously scanned on 9-20-16.  Validation form completed and sent to be scanned.  Code Status reflects choices in most recent ACP document.  Confirmed/documented designated decision maker(s).  Added by Yelitza Richter RN Advance Care Planning Liaison with [employer]             S/P lumbar fusion 09/16/2016     Priority: Medium     Right bundle branch block 09/13/2016     Priority: Medium     Tendon rupture, traumatic. quadriceps 10/27/2015     Priority: Medium     ETRESSA (obstructive sleep apnea) 10/12/2015     Priority: Medium      Chronic pain - diabetic neuropathy 07/05/2015     Priority: Medium     Patient is followed by [facility] for ongoing prescription of pain medication.  All refills should be approved by this provider, or covering partner.    Medication(s): tramadol.   Maximum quantity per month: 180  Clinic visit frequency required: Q 6  months     Controlled substance agreement on file: Yes       Date(s): 2/5/15    Pain Clinic evaluation in the past: No    DIRE Total Score(s):  No flowsheet data found.    Last [facility] website verification:  Checked on 02/23/16 and pt is compliant.Federica Singleton RN.     [email or web address]         Basal cell carcinoma of skin 05/11/2015     Priority: Medium     CKD (chronic kidney disease) stage 4, GFR 15-29 ml/min (H) 05/07/2015     Priority: Medium     Controlled substance agreement signed 2/5/15 02/05/2015     Priority: Medium     Patient is followed by RAOUL MCCOY for ongoing prescription  "of narcotic pain medicine.  Med: tramadol.   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 Please review for accuracy       Type 2 diabetes mellitus with diabetic nephropathy (H) 10/31/2010     Priority: Medium     Hypertension goal BP (blood pressure) < 140/90 06/30/2010     Priority: Medium     Arthritis 06/30/2010     Priority: Medium     Anxiety 06/30/2010     Priority: Medium     Hyperlipidemia with target LDL less than 100      Priority: Medium     Diagnosis updated by automated process. Provider to review and confirm.       Diabetic polyneuropathy (H) 12/20/2007     Priority: Medium     Problem list name updated by automated process. Provider to review            /72  Pulse 79  Resp 16  Ht 1.626 m (5' 4\")  Wt 91.2 kg (201 lb)  SpO2 99%  BMI 34.5 kg/m2     Impression/Plan:     Severe complex sleep apnea.     -  Tolerating ASV therapy well. Daytime symptoms are improved. Echocardiogram from 2015 showed EF 55-60%.     -  Patient has middle insomnia. We discussed optimal sleep hygiene and steps to optimize sleep schedules. She was given resources on books to help with insomnia.      Plan:     1. Continue ASV therapy     Ирина Yanez will follow up in about 1 year(s).     Fifteen minutes spent with patient, all of which were spent face-to-face counseling, consulting, coordinating plan of care.      Mic Quijano MD, MD    CC:  Yuridia Villarreal,   "

## 2018-01-18 ENCOUNTER — OFFICE VISIT (OUTPATIENT)
Dept: FAMILY MEDICINE | Facility: CLINIC | Age: 83
End: 2018-01-18
Payer: COMMERCIAL

## 2018-01-18 VITALS
WEIGHT: 201 LBS | HEART RATE: 72 BPM | TEMPERATURE: 98 F | RESPIRATION RATE: 16 BRPM | DIASTOLIC BLOOD PRESSURE: 60 MMHG | OXYGEN SATURATION: 98 % | BODY MASS INDEX: 34.31 KG/M2 | HEIGHT: 64 IN | SYSTOLIC BLOOD PRESSURE: 134 MMHG

## 2018-01-18 DIAGNOSIS — G89.29 OTHER CHRONIC PAIN: ICD-10-CM

## 2018-01-18 DIAGNOSIS — M25.571 CHRONIC PAIN OF BOTH ANKLES: ICD-10-CM

## 2018-01-18 DIAGNOSIS — T14.8XXA BRUISE: ICD-10-CM

## 2018-01-18 DIAGNOSIS — E11.21 TYPE 2 DIABETES MELLITUS WITH DIABETIC NEPHROPATHY, WITHOUT LONG-TERM CURRENT USE OF INSULIN (H): ICD-10-CM

## 2018-01-18 DIAGNOSIS — M25.572 CHRONIC PAIN OF BOTH ANKLES: ICD-10-CM

## 2018-01-18 DIAGNOSIS — G89.29 CHRONIC PAIN OF BOTH ANKLES: ICD-10-CM

## 2018-01-18 DIAGNOSIS — F41.9 ANXIETY: Primary | ICD-10-CM

## 2018-01-18 DIAGNOSIS — E11.42 DIABETIC POLYNEUROPATHY ASSOCIATED WITH TYPE 2 DIABETES MELLITUS (H): ICD-10-CM

## 2018-01-18 PROCEDURE — 99214 OFFICE O/P EST MOD 30 MIN: CPT | Performed by: FAMILY MEDICINE

## 2018-01-18 RX ORDER — TRAMADOL HYDROCHLORIDE 50 MG/1
50-100 TABLET ORAL EVERY 8 HOURS PRN
Qty: 180 TABLET | Refills: 2 | Status: SHIPPED | OUTPATIENT
Start: 2018-01-18 | End: 2018-01-30

## 2018-01-18 ASSESSMENT — PATIENT HEALTH QUESTIONNAIRE - PHQ9
SUM OF ALL RESPONSES TO PHQ QUESTIONS 1-9: 2
5. POOR APPETITE OR OVEREATING: NOT AT ALL

## 2018-01-18 ASSESSMENT — ANXIETY QUESTIONNAIRES
3. WORRYING TOO MUCH ABOUT DIFFERENT THINGS: NOT AT ALL
5. BEING SO RESTLESS THAT IT IS HARD TO SIT STILL: NOT AT ALL
GAD7 TOTAL SCORE: 0
7. FEELING AFRAID AS IF SOMETHING AWFUL MIGHT HAPPEN: NOT AT ALL
1. FEELING NERVOUS, ANXIOUS, OR ON EDGE: NOT AT ALL
6. BECOMING EASILY ANNOYED OR IRRITABLE: NOT AT ALL
2. NOT BEING ABLE TO STOP OR CONTROL WORRYING: NOT AT ALL

## 2018-01-18 NOTE — MR AVS SNAPSHOT
After Visit Summary   1/18/2018    Ирина Yanez    MRN: [id number]           Patient Information     Date Of Birth          1935        Visit Information        Provider Department      1/18/2018 11:30 AM Yuridia Villarreal MD [facility]        Today's Diagnoses     Diabetic polyneuropathy associated with type 2 diabetes mellitus (H)        Chronic pain - diabetic neuropathy          Care Instructions    If all is well, I would like to see you again in 6 months.    Let me know earlier if you do want to try to switch to gabapentin (neurontin).      What is Peripheral Neuropathy?     Peripheral neuropathy symptoms often start in the toes and move up the foot.   Peripheral neuropathy is a disease of the nerves. It most often starts in your feet and may also eventually affect the arms. Sensory, motor, or both functions may be affected.  It may cause pain or make you unable to sense pain. Sometimes, weakness occurs as well. Lack of pain and weakness makes you more likely to injure yourself without knowing it. But you can learn ways to protect your feet from injury.  When nerves are diseased  Nerves in your feet carry signals to your brain. Your brain reads those signals and interprets them as sensations. When nerves in your feet are diseased, signals may be disrupted or changed. The result may be a lack of feeling (numbness) in your feet or other symptoms (tingling or pain) of peripheral neuropathy.  Symptoms mask pain  Symptoms of peripheral neuropathy usually begin in your toes. The symptoms slowly spread up your feet and legs as more nerve is affected. These symptoms may decrease sensation in your feet or mask pain. Without pain, you may not notice a cut or even a bone fracture. Cuts may become infected. Fractures may heal poorly and lead to foot deformity.  Common causes of peripheral neuropathy  Some common causes of peripheral neuropathy include:    Diabetes or other endocrine  disorders    Toxins (such as alcohol)    Nutritional deficiencies (such as Vitamin B-12)    Kidney disease    Injury    Repetitive stress (such as carpal tunnel syndrome)    Autoimmune disease    Cancer and tumors    Infection  Diagnosis and treatment  Diagnosis of peripheral neuropathy includes a complete history and physical exam.  Lab tests including blood work and imaging often help determine the cause.  Special nerve tests are often helpful including nerve conduction velocity studies (NCV), and electromyelography (EMG).  Treatment focuses on teating the underlying disorder and treating the symptoms using medications, injections, TENS (transcutaneous electrical nerve stimulation), acupuncture, massage, and others.  Date Last Reviewed: 10/19/2015    3893-6131 [employer]. [address] [place] [address] 87268. All rights reserved. This information is not intended as a substitute for professional medical care. Always follow your healthcare professional's instructions.        Treating Peripheral Neuropathy  Peripheral neuropathy is a disease of the nerves. It most often starts in your feet and may also eventually affect the arms. It may cause pain or may make you unable to sense pain. Sometimes, weakness occurs as well. Lack of pain and weakness makes you more likely to injure yourself without knowing it.  Learn ways to protect your feet. Check your feet daily for wounds you may not have felt. Avoid burns by testing bath water with your elbow before stepping in. Also, always wear shoes to prevent injury.    Regular foot care  If you have foot numbness, you may not notice cutting yourself while trimming your nails. To prevent problems, your doctor may ask you to visit for nail and callus trimming. See your doctor for foot care as often as suggested.  Check your feet daily  Catch problems early by checking your feet every day for changes. Look at the top and bottom of your feet, your heels, and  between your toes. It may help to use a mirror. If this is hard, ask someone to check for you. Call your doctor if you notice a wound, ulceration, ingrown nail, or any changes in your feet. This includes increased heat, swelling, and redness.  Wear proper footwear  Always wear shoes and socks, even indoors. Ask your doctor how to choose the right shoe. After buying shoes, bring them to your doctor to be checked for fit. Take new shoes off every hour or so to check for red pressure areas on your feet. Each time you put on your shoes, use your fingers first to feel inside for foreign objects.  Common causes of peripheral neuropathy  Some common causes of peripheral neuropathy include:    Diabetes or other endocrine disorders    Toxins (such as alcohol)    Nutritional deficiencies (such as Vitamin B-12)    Kidney disease    Injury    Repetitive stress (such as carpal tunnel syndrome)    Autoimmune disease    Cancer and tumors    Infection  Diagnosis and treatment  Diagnosis of peripheral neuropathy includes a complete history and physical exam.  Lab tests including blood work and imaging often help determine the cause.  Special nerve tests are often helpful including nerve conduction velocity studies (NCV), and electromyelography (EMG).  Treatment focuses on treating the underlying disorder and treating symptoms through the use of medicines, injections, TENS (transcutaneous electrical nerve stimulation), acupuncture, massage, and other methods.  Date Last Reviewed: 10/19/2015    [id number] [employer]. [address] [place] [address] 86248. All rights reserved. This information is not intended as a substitute for professional medical care. Always follow your healthcare professional's instructions.                Follow-ups after your visit        Follow-up notes from your care team     Return in about 6 months (around 7/18/2018).      Your next 10 appointments already scheduled     Tushar 15, 2019 11:30  "AM CST   Return Sleep Patient with Mic Quijano MD   [facility] ([facility])    [address] [address] [phone]              Who to contact     If you have questions or need follow up information about today's clinic visit or your schedule please contact [facility] directly at [phone].  Normal or non-critical lab and imaging results will be communicated to you by [employer]user[employer], letter or phone within 4 business days after the clinic has received the results. If you do not hear from us within 7 days, please contact the clinic through [employer] or phone. If you have a critical or abnormal lab result, we will notify you by phone as soon as possible.  Submit refill requests through [employer] or call your pharmacy and they will forward the refill request to us. Please allow 3 business days for your refill to be completed.          Additional Information About Your Visit        [employer] Information     [employer] gives you secure access to your electronic health record. If you see a primary care provider, you can also send messages to your care team and make appointments. If you have questions, please call your primary care clinic.  If you do not have a primary care provider, please call [phone] and they will assist you.        Care EveryWhere ID     This is your Care EveryWhere ID. This could be used by other organizations to access your [facility] medical records  [id number]        Your Vitals Were     Pulse Temperature Respirations Height Pulse Oximetry BMI (Body Mass Index)    72 98  F (36.7  C) (Oral) 16 5' 4\" (1.626 m) 98% 34.5 kg/m2       Blood Pressure from Last 3 Encounters:   01/18/18 134/60   01/16/18 135/72   09/13/17 129/62    Weight from Last 3 Encounters:   01/18/18 201 lb (91.2 kg)   01/16/18 201 lb (91.2 kg)   09/13/17 190 lb (86.2 kg)              We Performed the Following     DEPRESSION ACTION PLAN (DAP)        "   Where to get your medicines      Some of these will need a paper prescription and others can be bought over the counter.  Ask your nurse if you have questions.     Bring a paper prescription for each of these medications     traMADol 50 MG tablet          Primary Care Provider Office Phone # Fax #    Yuridia Villarreal [phone] [address]  [facility] [address]        Equal Access to Services     [facility]: Hadii aad ku hadasho Soomaali, waaxda luqadaha, qaybta kaalmada adeegyada, waxay idiin hayaan adeeg kharash la'aan ah. So [facility] [phone].    ATENCIÓN: Si habla español, tiene a guevara disposición servicios gratuitos de asistencia lingüística. Llame al [phone].    We comply with applicable federal civil rights laws and [place] laws. We do not discriminate on the basis of race, color, national origin, age, disability, sex, sexual orientation, or gender identity.            Thank you!     Thank you for choosing [facility]  for your care. Our goal is always to provide you with excellent care. Hearing back from our patients is one way we can continue to improve our services. Please take a few minutes to complete the written survey that you may receive in the mail after your visit with us. Thank you!             Your Updated Medication List - Protect others around you: Learn how to safely use, store and throw away your medicines at www.disposemymeds.org.          This list is accurate as of: 1/18/18 12:36 PM.  Always use your most recent med list.                   Brand Name Dispense Instructions for use Diagnosis    AMARYL 4 MG tablet   Generic drug:  glimepiride      1.5 tabs daily    Diabetic polyneuropathy associated with type 2 diabetes mellitus (H)       aspirin 325 MG EC tablet      1 tablet daily        blood glucose monitoring lancets     100 each    USE TO TEST ONCE DAILY    Type 2 diabetes mellitus with diabetic nephropathy, unspecified long term insulin  use status (H)       cholecalciferol 2000 UNITS Caps      2 daily        docusate sodium 100 MG tablet    COLACE    60 tablet    Take 100 mg by mouth as needed        furosemide 20 MG tablet    LASIX    90 tablet    TAKE ONE TABLET BY MOUTH EVERY MORNING.    Edema, unspecified type       lisinopril 20 MG tablet    PRINIVIL/ZESTRIL    90 tablet    Take 1 tablet (20 mg) by mouth daily    Hypertension goal BP (blood pressure) < 130/80       Multi-vitamin Tabs tablet   Generic drug:  multivitamin, therapeutic with minerals      1 TABLET DAILY        nystatin 868672 UNIT/GM Powd    MYCOSTATIN    60 g    Apply a small amount to affected area three times daily.    Intertrigo       pioglitazone 15 MG tablet    ACTOS     Take by mouth daily        potassium chloride SA 10 MEQ CR tablet    K-DUR/KLOR-CON M    90 tablet    Take 1 tablet (10 mEq) by mouth daily    Edema       sertraline 50 MG tablet    ZOLOFT    90 tablet    Take 1 tablet (50 mg) by mouth daily    Anxiety       simvastatin 10 MG tablet    ZOCOR    90 tablet    Take 1 tablet (10 mg) by mouth At Bedtime    Hyperlipidemia with target LDL less than 100       traMADol 50 MG tablet    ULTRAM    180 tablet    Take 1-2 tablets ( mg) by mouth every 8 hours as needed for pain .  Maximum 180 per month    Diabetic polyneuropathy associated with type 2 diabetes mellitus (H), Other chronic pain

## 2018-01-18 NOTE — NURSING NOTE
"Chief Complaint   Patient presents with     Depression     medication recheck       Initial /60 (BP Location: Right arm, Cuff Size: Adult Large)  Pulse 72  Temp 98  F (36.7  C) (Oral)  Resp 16  Ht 5' 4\" (1.626 m)  Wt 201 lb (91.2 kg)  SpO2 98%  BMI 34.5 kg/m2 Estimated body mass index is 34.5 kg/(m^2) as calculated from the following:    Height as of this encounter: 5' 4\" (1.626 m).    Weight as of this encounter: 201 lb (91.2 kg).  Medication Reconciliation: complete   Zeny Cr, MAMI    "

## 2018-01-18 NOTE — LETTER
My Depression Action Plan  Name: Ирина Yanez   Date of Birth 1935  Date: 1/18/2018    My doctor: Yuridia Villarreal   My clinic: [facility]  [address] [facility] [address] [phone]          GREEN    ZONE   Good Control    What it looks like:     Things are going generally well. You have normal up s and down s. You may even feel depressed from time to time, but bad moods usually last less than a day.   What you need to do:  1. Continue to care for yourself (see self care plan)  2. Check your depression survival kit and update it as needed  3. Follow your physician s recommendations including any medication.  4. Do not stop taking medication unless you consult with your physician first.           YELLOW         ZONE Getting Worse    What it looks like:     Depression is starting to interfere with your life.     It may be hard to get out of bed; you may be starting to isolate yourself from others.    Symptoms of depression are starting to last most all day and this has happened for several days.     You may have suicidal thoughts but they are not constant.   What you need to do:     1. Call your care team, your response to treatment will improve if you keep your care team informed of your progress. Yellow periods are signs an adjustment may need to be made.     2. Continue your self-care, even if you have to fake it!    3. Talk to someone in your support network    4. Open up your depression survival kit           RED    ZONE Medical Alert - Get Help    What it looks like:     Depression is seriously interfering with your life.     You may experience these or other symptoms: You can t get out of bed most days, can t work or engage in other necessary activities, you have trouble taking care of basic hygiene, or basic responsibilities, thoughts of suicide or death that will not go away, self-injurious behavior.     What you need to do:  1. Call your care team and  request a same-day appointment. If they are not available (weekends or after hours) call your local crisis line, emergency room or 911.      Electronically signed by: Zeny rC, January 18, 2018    Depression Self Care Plan / Survival Kit    Self-Care for Depression  Here s the deal. Your body and mind are really not as separate as most people think.  What you do and think affects how you feel and how you feel influences what you do and think. This means if you do things that people who feel good do, it will help you feel better.  Sometimes this is all it takes.  There is also a place for medication and therapy depending on how severe your depression is, so be sure to consult with your medical provider and/ or Behavioral Health Consultant if your symptoms are worsening or not improving.     In order to better manage my stress, I will:    Exercise  Get some form of exercise, every day. This will help reduce pain and release endorphins, the  feel good  chemicals in your brain. This is almost as good as taking antidepressants!  This is not the same as joining a gym and then never going! (they count on that by the way ) It can be as simple as just going for a walk or doing some gardening, anything that will get you moving.      Hygiene   Maintain good hygiene (Get out of bed in the morning, Make your bed, Brush your teeth, Take a shower, and Get dressed like you were going to work, even if you are unemployed).  If your clothes don't fit try to get ones that do.    Diet  I will strive to eat foods that are good for me, drink plenty of water, and avoid excessive sugar, caffeine, alcohol, and other mood-altering substances.  Some foods that are helpful in depression are: complex carbohydrates, B vitamins, flaxseed, fish or fish oil, fresh fruits and vegetables.    Psychotherapy  I agree to participate in Individual Therapy (if recommended).    Medication  If prescribed medications, I agree to take them.  Missing  doses can result in serious side effects.  I understand that drinking alcohol, or other illicit drug use, may cause potential side effects.  I will not stop my medication abruptly without first discussing it with my provider.    Staying Connected With Others  I will stay in touch with my friends, family members, and my primary care provider/team.    Use your imagination  Be creative.  We all have a creative side; it doesn t matter if it s oil painting, sand castles, or mud pies! This will also kick up the endorphins.    Witness Beauty  (AKA stop and smell the roses) Take a look outside, even in mid-winter. Notice colors, textures. Watch the squirrels and birds.     Service to others  Be of service to others.  There is always someone else in need.  By helping others we can  get out of ourselves  and remember the really important things.  This also provides opportunities for practicing all the other parts of the program.    Humor  Laugh and be silly!  Adjust your TV habits for less news and crime-drama and more comedy.    Control your stress  Try breathing deep, massage therapy, biofeedback, and meditation. Find time to relax each day.     My support system    Clinic Contact:  Phone number:    Contact 1:  Phone number:    Contact 2:  Phone number:    Sikhism/:  Phone number:    Therapist:  Phone number:    Local crisis center:    Phone number:    Other community support:  Phone number:

## 2018-01-18 NOTE — PROGRESS NOTES
SUBJECTIVE:   Ирина Yanez is a 82 year old female who presents to clinic today for the following health issues:       Depression Followup    Status since last visit: Stable     See PHQ-9 for current symptoms.  Other associated symptoms: None    Complicating factors:   Significant life event:  No   Current substance abuse:  None  Anxiety or Panic symptoms:  No    PHQ-9 Score and MyChart F/U Questions 3/30/2016 9/13/2016 7/14/2017   Total Score 1 1 0   Q9: Suicide Ideation Not at all Not at all Not at all       PHQ-9  English  PHQ-9   Any Language  Suicide Assessment Five-step Evaluation and Treatment (SAFE-T)      Amount of exercise or physical activity: None    Problems taking medications regularly: No    Medication side effects: none    Diet: regular (no restrictions)            Problem list and histories reviewed & adjusted, as indicated.  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 quadriceps     Right bundle branch block     S/P lumbar fusion     ACP (advance care planning)     Heart murmur     Aortic sclerosis     Opioid dependence, daily use (H)     Toe amputation status, left (H)       Current Outpatient Prescriptions   Medication Sig Dispense Refill     pioglitazone (ACTOS) 15 MG tablet Take by mouth daily        sertraline (ZOLOFT) 50 MG tablet Take 1 tablet (50 mg) by mouth daily 90 tablet 0     furosemide (LASIX) 20 MG tablet TAKE ONE TABLET BY MOUTH EVERY MORNING. 90 tablet 0     lisinopril (PRINIVIL/ZESTRIL) 20 MG tablet Take 1 tablet (20 mg) by mouth daily 90 tablet 0     traMADol (ULTRAM) 50 MG tablet Take 1-2 tablets ( mg) by mouth every 8 hours as needed for pain .  Maximum 180 per month 180 tablet 0     blood glucose monitoring (ONE TOUCH DELICA) lancets USE TO TEST ONCE DAILY 100 each 1     simvastatin (ZOCOR) 10 MG tablet Take 1 tablet (10 mg) by mouth At Bedtime 90 tablet 2     glimepiride (AMARYL) 4 MG tablet 1.5 tabs daily  0     potassium chloride SA (K-DUR,KLOR-CON M) 10 MEQ tablet Take 1 tablet (10 mEq) by mouth daily 90 tablet 3     docusate sodium (COLACE) 100 MG tablet Take 100 mg by mouth as needed  60 tablet 1     nystatin (MYCOSTATIN) 237648 UNIT/GM POWD Apply a small amount to affected area three times daily. 60 g 1     cholecalciferol 2000 UNITS CAPS 2 daily       ASPIRIN 325 MG OR TBEC 1 tablet daily       MULTI-VITAMIN OR TABS 1 TABLET DAILY           Reviewed and updated as needed this visit by clinical staffTobacco  Allergies  Med Hx  Surg Hx  Fam Hx  Soc Hx      Reviewed and updated as needed this visit by Provider         ROS:  CONSTITUTIONAL:NEGATIVE for fever, chills, change in weight  RESP:NEGATIVE for significant cough or SOB  CV: NEGATIVE for chest pain, palpitations or peripheral edema  PSYCHIATRIC: NEGATIVE for changes in mood or affect    Doing well mood wise.    Wondering about alternative to tramadol.  This is primarily due to needing to come in monthly to get the prescription.  Has tried to cut back; does get pain.  A couple nights ago, forgot the night medication and was cold all night.   Wondering about something to replace it with.  She is using this for peripheral neuropathy.  Dr. Das is the one who started her on tramadol.  She recalls that she is tried other  "medications; gabapentin and Lyrica because sound familiar.    Notes ankles are starting to hurt.   She has noted some tenderness over some veins.    Getting marks/bleeding on arms. Wonders about going with a lower dose asprin.  She does see Dr. Verde for her diabetes.    OBJECTIVE:     /60 (BP Location: Right arm, Cuff Size: Adult Large)  Pulse 72  Temp 98  F (36.7  C) (Oral)  Resp 16  Ht 5' 4\" (1.626 m)  Wt 201 lb (91.2 kg)  SpO2 98%  BMI 34.5 kg/m2  Body mass index is 34.5 kg/(m^2).  GENERAL APPEARANCE: alert and no distress  CV: regular rates and rhythm  MS: She does have a few prominent varicose veins near her ankle.  There is some mild tenderness with palpation over this area.  SKIN: no suspicious lesions or rashes  PSYCH: mentation appears normal and affect normal/bright    PHQ-9 SCORE 9/13/2016 7/14/2017 1/18/2018   Total Score - - -   Total Score 1 0 2       HUBERT-7 SCORE 9/13/2016 7/14/2017 1/18/2018   Total Score - - -   Total Score 0 2 0       Lab Results   Component Value Date    A1C 8.0 06/21/2017    A1C 7.4 12/22/2016    A1C 7.3 09/17/2016    A1C 7.2 06/27/2016    A1C 7.2 06/22/2016         ASSESSMENT/PLAN:     Anxiety  Currently doing well.  Continue current medication.  - sertraline (ZOLOFT) 50 MG tablet; Take 1 tablet (50 mg) by mouth daily    Diabetic polyneuropathy associated with type 2 diabetes mellitus (H)  Discussed options.  Discussed gabapentin in some detail.  We would probably need to be in communication as we build up the dose if she were to go with this.  She is on a relatively high dose of tramadol.  However, she has been on this for years.  She does feel that it continues to be effective.  She has not felt the need to increase the dose.  She does tolerate this.  Discussed the current opioid crisis.  Reviewed some of the side effects with her again.  Reviewed formation of dependence.  Indeed, I wonder if some of that feeling cold might have even been some withdrawal.  If we " were to switch to another medication, we would need to do that gradually and wean her off the opioid as well.  At this time, she prefers to remain on the tramadol as is.  I can put a refill on this.  She does have a monthly limit noted.  Given patient education materials from computer database.  Also mentioned cymbalta.  - traMADol (ULTRAM) 50 MG tablet; Take 1-2 tablets ( mg) by mouth every 8 hours as needed for pain .  Maximum 180 per month    Type 2 diabetes mellitus with diabetic nephropathy, without long-term current use of insulin (H)  She sees endocrinology.  Of note, her endocrinologist will soon be on epic.  I do believe that she has been seen more recently than what we have in labs.  At this time, will have her decrease her aspirin to 81 mg.    Chronic pain - diabetic neuropathy  As above.  - traMADol (ULTRAM) 50 MG tablet; Take 1-2 tablets ( mg) by mouth every 8 hours as needed for pain .  Maximum 180 per month    Chronic pain of both ankles  Possibly related to veins.  At this time, recommend heat.  Continue to follow.    Bruise  We will have her decrease her aspirin dose as noted.      Follow-up in 6 months.    Yuridia Villarreal MD, MD  [facility]

## 2018-01-18 NOTE — PATIENT INSTRUCTIONS
If all is well, I would like to see you again in 6 months.    Let me know earlier if you do want to try to switch to gabapentin (neurontin).      What is Peripheral Neuropathy?     Peripheral neuropathy symptoms often start in the toes and move up the foot.   Peripheral neuropathy is a disease of the nerves. It most often starts in your feet and may also eventually affect the arms. Sensory, motor, or both functions may be affected.  It may cause pain or make you unable to sense pain. Sometimes, weakness occurs as well. Lack of pain and weakness makes you more likely to injure yourself without knowing it. But you can learn ways to protect your feet from injury.  When nerves are diseased  Nerves in your feet carry signals to your brain. Your brain reads those signals and interprets them as sensations. When nerves in your feet are diseased, signals may be disrupted or changed. The result may be a lack of feeling (numbness) in your feet or other symptoms (tingling or pain) of peripheral neuropathy.  Symptoms mask pain  Symptoms of peripheral neuropathy usually begin in your toes. The symptoms slowly spread up your feet and legs as more nerve is affected. These symptoms may decrease sensation in your feet or mask pain. Without pain, you may not notice a cut or even a bone fracture. Cuts may become infected. Fractures may heal poorly and lead to foot deformity.  Common causes of peripheral neuropathy  Some common causes of peripheral neuropathy include:    Diabetes or other endocrine disorders    Toxins (such as alcohol)    Nutritional deficiencies (such as Vitamin B-12)    Kidney disease    Injury    Repetitive stress (such as carpal tunnel syndrome)    Autoimmune disease    Cancer and tumors    Infection  Diagnosis and treatment  Diagnosis of peripheral neuropathy includes a complete history and physical exam.  Lab tests including blood work and imaging often help determine the cause.  Special nerve tests are often  helpful including nerve conduction velocity studies (NCV), and electromyelography (EMG).  Treatment focuses on teating the underlying disorder and treating the symptoms using medications, injections, TENS (transcutaneous electrical nerve stimulation), acupuncture, massage, and others.  Date Last Reviewed: 10/19/2015    [id number] [employer]. [address] [place] [address], [place] [address]. All rights reserved. This information is not intended as a substitute for professional medical care. Always follow your healthcare professional's instructions.        Treating Peripheral Neuropathy  Peripheral neuropathy is a disease of the nerves. It most often starts in your feet and may also eventually affect the arms. It may cause pain or may make you unable to sense pain. Sometimes, weakness occurs as well. Lack of pain and weakness makes you more likely to injure yourself without knowing it.  Learn ways to protect your feet. Check your feet daily for wounds you may not have felt. Avoid burns by testing bath water with your elbow before stepping in. Also, always wear shoes to prevent injury.    Regular foot care  If you have foot numbness, you may not notice cutting yourself while trimming your nails. To prevent problems, your doctor may ask you to visit for nail and callus trimming. See your doctor for foot care as often as suggested.  Check your feet daily  Catch problems early by checking your feet every day for changes. Look at the top and bottom of your feet, your heels, and between your toes. It may help to use a mirror. If this is hard, ask someone to check for you. Call your doctor if you notice a wound, ulceration, ingrown nail, or any changes in your feet. This includes increased heat, swelling, and redness.  Wear proper footwear  Always wear shoes and socks, even indoors. Ask your doctor how to choose the right shoe. After buying shoes, bring them to your doctor to be checked for fit. Take new shoes off  every hour or so to check for red pressure areas on your feet. Each time you put on your shoes, use your fingers first to feel inside for foreign objects.  Common causes of peripheral neuropathy  Some common causes of peripheral neuropathy include:    Diabetes or other endocrine disorders    Toxins (such as alcohol)    Nutritional deficiencies (such as Vitamin B-12)    Kidney disease    Injury    Repetitive stress (such as carpal tunnel syndrome)    Autoimmune disease    Cancer and tumors    Infection  Diagnosis and treatment  Diagnosis of peripheral neuropathy includes a complete history and physical exam.  Lab tests including blood work and imaging often help determine the cause.  Special nerve tests are often helpful including nerve conduction velocity studies (NCV), and electromyelography (EMG).  Treatment focuses on treating the underlying disorder and treating symptoms through the use of medicines, injections, TENS (transcutaneous electrical nerve stimulation), acupuncture, massage, and other methods.  Date Last Reviewed: 10/19/2015    [id number] [employer]. [address] [place] [address], [place] [address]. All rights reserved. This information is not intended as a substitute for professional medical care. Always follow your healthcare professional's instructions.

## 2018-01-19 ASSESSMENT — ANXIETY QUESTIONNAIRES: GAD7 TOTAL SCORE: 0

## 2018-01-30 DIAGNOSIS — G89.29 OTHER CHRONIC PAIN: ICD-10-CM

## 2018-01-30 DIAGNOSIS — E11.42 DIABETIC POLYNEUROPATHY ASSOCIATED WITH TYPE 2 DIABETES MELLITUS (H): ICD-10-CM

## 2018-01-30 NOTE — TELEPHONE ENCOUNTER
Pharmacy is requesting joao.    Disp Refills Start End BINDU    traMADol (ULTRAM) 50 MG tablet 180 tablet 2 1/18/2018  No   Sig: Take 1-2 tablets ( mg) by mouth every 8 hours as needed for pain .  Maximum 180 per month   Class: Local Print   Notes to Pharmacy: Not needing yet; please keep on file. She will call.

## 2018-02-01 ENCOUNTER — TELEPHONE (OUTPATIENT)
Dept: FAMILY MEDICINE | Facility: CLINIC | Age: 83
End: 2018-02-01

## 2018-02-01 RX ORDER — TRAMADOL HYDROCHLORIDE 50 MG/1
50-100 TABLET ORAL EVERY 8 HOURS PRN
Qty: 180 TABLET | Refills: 2 | Status: SHIPPED | OUTPATIENT
Start: 2018-02-01 | End: 2018-03-27

## 2018-02-01 NOTE — TELEPHONE ENCOUNTER
In my outbox.       I am hoping they are OK with doing it month by month; as t'd up.  I would rather not have a higher quantity go all at one time.

## 2018-02-01 NOTE — TELEPHONE ENCOUNTER
Patient called stating she is waiting for prescription to be sent to replace potassium. She stated potasium was discontinued at OV 1/18 and that a new prescription would be sent for a different med to replace this.    Unable to find in visit notes and potassium still on med list. Patient reports has been taking potassium until new prescription is sent.    Please advise.    Frieda Granado RN

## 2018-02-01 NOTE — TELEPHONE ENCOUNTER
Patient informed of provider message. Is not interested in lab at this time.    Frieda Granado RN

## 2018-02-01 NOTE — TELEPHONE ENCOUNTER
Last rx for tramadol went to [facility] in [place], but we have received the refill request from [facility]. Called pt at [phone] & LM to call us back with clarification. Need to know whether pt is switching to mail order pharmacy. Will await for her call back.    Kalyn, RN  Triage Nurse

## 2018-02-01 NOTE — TELEPHONE ENCOUNTER
Patient calls back and she did receive one refill on the Tramadol from [facility].  She is asking for future refills to be sent to [facility] today so they will mail out to her.  Please resign prescription and fax.    Unique Low, RN  Triage Nurse

## 2018-02-09 ENCOUNTER — TRANSFERRED RECORDS (OUTPATIENT)
Dept: HEALTH INFORMATION MANAGEMENT | Facility: CLINIC | Age: 83
End: 2018-02-09

## 2018-03-20 DIAGNOSIS — I10 HYPERTENSION GOAL BP (BLOOD PRESSURE) < 130/80: ICD-10-CM

## 2018-03-20 DIAGNOSIS — R60.9 EDEMA, UNSPECIFIED TYPE: ICD-10-CM

## 2018-03-20 NOTE — TELEPHONE ENCOUNTER
"Requested Prescriptions   Pending Prescriptions Disp Refills     furosemide (LASIX) 20 MG tablet [Pharmacy Med Name: FUROSEMIDE 20MG TABLETS]  Last Written Prescription Date:  1/8/18  Last Fill Quantity: 90,  # refills: 0   Last office visit: 1/18/2018 with prescribing provider:  1/18/2018     Future Office Visit:     90 tablet 0     Sig: TAKE 1 TABLET BY MOUTH EVERY MORNING    Diuretics (Including Combos) Protocol Failed    3/20/2018  2:27 PM       Failed - Normal serum creatinine on file in past 12 months    Recent Labs   Lab Test 12/19/17   CR  2.08*             Failed - Normal serum sodium on file in past 12 months    Recent Labs   Lab Test  09/18/16   0557   NA  140             Passed - Blood pressure under 140/90 in past 12 months    BP Readings from Last 3 Encounters:   01/18/18 134/60   01/16/18 135/72   09/13/17 129/62                Passed - Recent (12 mo) or future (30 days) visit within the authorizing provider's specialty    Patient had office visit in the last 12 months or has a visit in the next 30 days with authorizing provider or within the authorizing provider's specialty.  See \"Patient Info\" tab in inbasket, or \"Choose Columns\" in Meds & Orders section of the refill encounter.           Passed - Patient is age 18 or older       Passed - No active pregancy on record       Passed - Normal serum potassium on file in past 12 months    Recent Labs   Lab Test 12/19/17   POTASSIUM  4.9                   Passed - No positive pregnancy test in past 12 months            lisinopril (PRINIVIL/ZESTRIL) 20 MG tablet [Pharmacy Med Name: LISINOPRIL 20MG TABLETS]  Last Written Prescription Date:  1/8/18  Last Fill Quantity: 90,  # refills: 0   Last office visit: 1/18/2018 with prescribing provider:  1/18/2018     Future Office Visit:     90 tablet 0     Sig: TAKE 1 TABLET BY MOUTH DAILY    ACE Inhibitors (Including Combos) Protocol Failed    3/20/2018  2:27 PM       Failed - Normal serum creatinine on file in past " "12 months    Recent Labs   Lab Test 12/19/17   CR  2.08*            Passed - Blood pressure under 140/90 in past 12 months    BP Readings from Last 3 Encounters:   01/18/18 134/60   01/16/18 135/72   09/13/17 129/62                Passed - Recent (12 mo) or future (30 days) visit within the authorizing provider's specialty    Patient had office visit in the last 12 months or has a visit in the next 30 days with authorizing provider or within the authorizing provider's specialty.  See \"Patient Info\" tab in inbasket, or \"Choose Columns\" in Meds & Orders section of the refill encounter.           Passed - Patient is age 18 or older       Passed - No active pregnancy on record       Passed - Normal serum potassium on file in past 12 months    Recent Labs   Lab Test 12/19/17   POTASSIUM  4.9            Passed - No positive pregnancy test in past 12 months        potassium chloride (K-TAB,KLOR-CON) 10 MEQ tablet [Pharmacy Med Name: POTASSIUM CL 10MEQ ER TABLETS]  Last Written Prescription Date:  10/7/16  Last Fill Quantity: 90,  # refills: 3   Last office visit: 1/18/2018 with prescribing provider:  1/18/2018     Future Office Visit:     90 tablet 0     Sig: TAKE 1 TABLET BY MOUTH EVERY DAY. GENERIC EQUIVALENT FOR K-TAB. DUE FOR AN APPOINTMENT    Potassium Supplements Protocol Passed    3/20/2018  2:27 PM       Passed - Recent (12 mo) or future (30 days) visit within the authorizing provider's specialty    Patient had office visit in the last 12 months or has a visit in the next 30 days with authorizing provider or within the authorizing provider's specialty.  See \"Patient Info\" tab in inbasket, or \"Choose Columns\" in Meds & Orders section of the refill encounter.           Passed - Patient is age 18 or older       Passed - Normal serum potassium in past 12 months    Recent Labs   Lab Test 12/19/17   POTASSIUM  4.9                      "

## 2018-03-21 NOTE — TELEPHONE ENCOUNTER
Routing refill request to provider for review/approval because:  Labs out of range:  CR. Would you like to repeat? Please sign if ok.  Unique Low, RN  Triage Nurse

## 2018-03-22 RX ORDER — LISINOPRIL 20 MG/1
TABLET ORAL
Qty: 90 TABLET | Refills: 0 | Status: SHIPPED | OUTPATIENT
Start: 2018-03-22 | End: 2018-03-27

## 2018-03-22 RX ORDER — FUROSEMIDE 20 MG
TABLET ORAL
Qty: 90 TABLET | Refills: 0 | Status: SHIPPED | OUTPATIENT
Start: 2018-03-22 | End: 2018-03-27

## 2018-03-22 RX ORDER — POTASSIUM CHLORIDE 750 MG/1
TABLET, EXTENDED RELEASE ORAL
Qty: 90 TABLET | Refills: 0 | Status: SHIPPED | OUTPATIENT
Start: 2018-03-22 | End: 2018-04-09

## 2018-03-22 NOTE — TELEPHONE ENCOUNTER
Patient is notified, she has not seen Nephrology again, so she will call them and  Schedule an appointment. Advised also to have them send us the visit note.  Also let her know about the refill.  Unique Low RN  Triage Nurse

## 2018-03-22 NOTE — TELEPHONE ENCOUNTER
GFR has been near this number.  She has seen Nephrology...    The last note I am seeing is from March 2017.  They wanted to follow up with her in 6 months.    Can you check to see if she has gone?   If so, see if we can get the record?    I did OK the 90 day refills at this time.    Thanks!

## 2018-03-26 ENCOUNTER — TELEPHONE (OUTPATIENT)
Dept: FAMILY MEDICINE | Facility: CLINIC | Age: 83
End: 2018-03-26

## 2018-03-26 DIAGNOSIS — E11.42 DIABETIC POLYNEUROPATHY ASSOCIATED WITH TYPE 2 DIABETES MELLITUS (H): ICD-10-CM

## 2018-03-26 DIAGNOSIS — I10 HYPERTENSION GOAL BP (BLOOD PRESSURE) < 130/80: ICD-10-CM

## 2018-03-26 DIAGNOSIS — F41.9 ANXIETY: ICD-10-CM

## 2018-03-26 DIAGNOSIS — G89.29 OTHER CHRONIC PAIN: ICD-10-CM

## 2018-03-26 DIAGNOSIS — E78.5 HYPERLIPIDEMIA WITH TARGET LDL LESS THAN 100: ICD-10-CM

## 2018-03-26 DIAGNOSIS — R60.9 EDEMA: ICD-10-CM

## 2018-03-26 DIAGNOSIS — R60.9 EDEMA, UNSPECIFIED TYPE: ICD-10-CM

## 2018-03-26 NOTE — TELEPHONE ENCOUNTER
Reason for call:  Form   Our goal is to have forms completed within 72 hours, however some forms may require a visit or additional information.     Who is the form from? Patient  Where did the form come from? Patient or family brought in     What clinic location was the form placed at? [place]  Where was the form placed? [facility]  What number is listed as a contact on the form? [phone]    Phone call message - patient request for a letter, form or note:     Date needed: as soon as possible  Please fax to [phone]  Has the patient signed a consent form for release of information? Not Applicable    Additional comments:     Type of letter, form or note: medical    Phone number to reach patient:  Home number on file [phone] (home)    Best Time:  Anytime    Can we leave a detailed message on this number?  YES

## 2018-03-27 RX ORDER — FUROSEMIDE 20 MG
20 TABLET ORAL EVERY MORNING
Qty: 90 TABLET | Refills: 1 | Status: SHIPPED | OUTPATIENT
Start: 2018-03-27 | End: 2018-06-12

## 2018-03-27 RX ORDER — TRAMADOL HYDROCHLORIDE 50 MG/1
50-100 TABLET ORAL EVERY 8 HOURS PRN
Qty: 180 TABLET | Refills: 1 | Status: SHIPPED | OUTPATIENT
Start: 2018-03-27 | End: 2018-05-11

## 2018-03-27 RX ORDER — LISINOPRIL 20 MG/1
20 TABLET ORAL DAILY
Qty: 90 TABLET | Refills: 1 | Status: SHIPPED | OUTPATIENT
Start: 2018-03-27 | End: 2018-08-06

## 2018-03-27 RX ORDER — POTASSIUM CHLORIDE 750 MG/1
10 TABLET, EXTENDED RELEASE ORAL DAILY
Qty: 90 TABLET | Refills: 1 | Status: SHIPPED | OUTPATIENT
Start: 2018-03-27 | End: 2018-04-09

## 2018-03-27 RX ORDER — SIMVASTATIN 10 MG
10 TABLET ORAL AT BEDTIME
Qty: 90 TABLET | Refills: 1 | Status: SHIPPED | OUTPATIENT
Start: 2018-03-27 | End: 2018-11-13

## 2018-03-27 NOTE — TELEPHONE ENCOUNTER
Refilled medications, except Tramadol, please sign if ok.  Next visit/labs due in July.   Unique Low, RN  Triage Nurse

## 2018-03-27 NOTE — TELEPHONE ENCOUNTER
Called patient, left message for patient to call back regarding of they wanted to pick the Rx up or would like it mailed.    Sherri Richter, CMA

## 2018-03-30 ENCOUNTER — TELEPHONE (OUTPATIENT)
Dept: FAMILY MEDICINE | Facility: CLINIC | Age: 83
End: 2018-03-30

## 2018-03-30 DIAGNOSIS — E11.42 DIABETIC POLYNEUROPATHY ASSOCIATED WITH TYPE 2 DIABETES MELLITUS (H): ICD-10-CM

## 2018-03-30 RX ORDER — GLIMEPIRIDE 4 MG/1
TABLET ORAL
Qty: 135 TABLET | Refills: 0 | Status: CANCELLED | OUTPATIENT
Start: 2018-03-30

## 2018-03-30 NOTE — TELEPHONE ENCOUNTER
Dr Villarreal  Refill request received at [facility] for     Glimepiride 4mg, SIG take 1.5 tabs (6mg) daily    Patient used to see Dr Reji Verde at [facility].  Dr ROSEN now works at [facility] but patient has not been seen here and does not have future appointment set up.    Patient needs to either set up future OV with Dr ROSEN at [facility] (can call [phone] to schedule) or needs to establish care with new provider back at [facility].    90 days pended to allow patient time to get appointment set up.  Lab Results   Component Value Date    A1C 6.3 12/19/2017    A1C 8.0 06/21/2017    A1C 7.4 12/22/2016    A1C 7.3 09/17/2016    A1C 7.2 06/27/2016     Irene Joshua, RT (R)  [facility]

## 2018-03-30 NOTE — TELEPHONE ENCOUNTER
Please call the patient!    I believe she is planning to continue with Dr. Verde.   If she needs to have an appointment made prior to them refilling, please have her schedule an appointment.  She can call them to clarify.    Thanks!

## 2018-03-30 NOTE — TELEPHONE ENCOUNTER
Patient informed of provider message. Patient intends to see Dr. Verde in June. Will call clinic to schedule appt and discuss refill.    Frieda Granado RN

## 2018-04-09 ENCOUNTER — OFFICE VISIT (OUTPATIENT)
Dept: FAMILY MEDICINE | Facility: CLINIC | Age: 83
End: 2018-04-09
Payer: COMMERCIAL

## 2018-04-09 VITALS
DIASTOLIC BLOOD PRESSURE: 60 MMHG | RESPIRATION RATE: 16 BRPM | WEIGHT: 203.7 LBS | SYSTOLIC BLOOD PRESSURE: 126 MMHG | BODY MASS INDEX: 34.78 KG/M2 | HEART RATE: 74 BPM | OXYGEN SATURATION: 96 % | HEIGHT: 64 IN | TEMPERATURE: 97.7 F

## 2018-04-09 DIAGNOSIS — E11.42 DIABETIC POLYNEUROPATHY ASSOCIATED WITH TYPE 2 DIABETES MELLITUS (H): Primary | ICD-10-CM

## 2018-04-09 DIAGNOSIS — R60.9 EDEMA, UNSPECIFIED TYPE: ICD-10-CM

## 2018-04-09 PROCEDURE — 99214 OFFICE O/P EST MOD 30 MIN: CPT | Performed by: FAMILY MEDICINE

## 2018-04-09 RX ORDER — GABAPENTIN 100 MG/1
CAPSULE ORAL
Qty: 540 CAPSULE | Refills: 0 | Status: SHIPPED | OUTPATIENT
Start: 2018-04-09 | End: 2018-05-11

## 2018-04-09 RX ORDER — POTASSIUM CHLORIDE 750 MG/1
TABLET, EXTENDED RELEASE ORAL
Qty: 90 TABLET | Refills: 1 | Status: SHIPPED | OUTPATIENT
Start: 2018-04-09 | End: 2018-05-10

## 2018-04-09 RX ORDER — GABAPENTIN 100 MG/1
CAPSULE ORAL
Qty: 540 CAPSULE | Refills: 0 | Status: SHIPPED | OUTPATIENT
Start: 2018-04-09 | End: 2018-04-09

## 2018-04-09 RX ORDER — POTASSIUM CHLORIDE 750 MG/1
TABLET, EXTENDED RELEASE ORAL
Qty: 90 TABLET | Refills: 1 | Status: SHIPPED | OUTPATIENT
Start: 2018-04-09 | End: 2018-04-09

## 2018-04-09 NOTE — PROGRESS NOTES
SUBJECTIVE:   Ирина Yanez is a 83 year old female who presents to clinic today for the following health issues:      Musculoskeletal problem/pain      Duration: x 2 months    Description  Location: bilateral ankles and radiates to the mid shin    Intensity:  8/10    Accompanying signs and symptoms: numbness    History  Previous similar problem: no   Previous evaluation:  none    Precipitating or alleviating factors:  Trauma or overuse: no   Aggravating factors include: after getting out of chair and bed    Therapies tried and outcome: nothing          Problem list and histories reviewed & adjusted, as indicated.  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 quadriceps     Right bundle branch block     S/P lumbar fusion     ACP (advance care planning)     Heart murmur     Aortic sclerosis     Opioid dependence, daily use (H)     Toe amputation status, left (H)       Current Outpatient Prescriptions   Medication Sig Dispense Refill     potassium chloride SA (K-DUR/KLOR-CON M) 10 MEQ CR tablet Take 1 tablet (10 mEq) by mouth daily 90 tablet 1     simvastatin (ZOCOR) 10 MG tablet Take 1 tablet (10 mg) by mouth At Bedtime 90 tablet 1     lisinopril (PRINIVIL/ZESTRIL) 20 MG tablet Take 1 tablet (20 mg) by mouth daily 90 tablet 1      furosemide (LASIX) 20 MG tablet Take 1 tablet (20 mg) by mouth every morning 90 tablet 1     sertraline (ZOLOFT) 50 MG tablet Take 1 tablet (50 mg) by mouth daily 90 tablet 0     traMADol (ULTRAM) 50 MG tablet Take 1-2 tablets ( mg) by mouth every 8 hours as needed for pain .  Maximum 180 per month 180 tablet 1     potassium chloride (K-TAB,KLOR-CON) 10 MEQ tablet TAKE 1 TABLET BY MOUTH EVERY DAY. GENERIC EQUIVALENT FOR K-TAB. DUE FOR AN APPOINTMENT 90 tablet 0     aspirin 81 MG tablet Take by mouth daily 30 tablet      pioglitazone (ACTOS) 15 MG tablet Take by mouth daily       blood glucose monitoring (ONE TOUCH DELICA) lancets USE TO TEST ONCE DAILY 100 each 1     glimepiride (AMARYL) 4 MG tablet 1.5 tabs daily  0     docusate sodium (COLACE) 100 MG tablet Take 100 mg by mouth as needed  60 tablet 1     nystatin (MYCOSTATIN) 434557 UNIT/GM POWD Apply a small amount to affected area three times daily. 60 g 1     cholecalciferol 2000 UNITS CAPS 2 daily       MULTI-VITAMIN OR TABS 1 TABLET DAILY           Reviewed and updated as needed this visit by clinical staff  Tobacco  Allergies  Meds  Med Hx  Surg Hx  Fam Hx  Soc Hx      Reviewed and updated as needed this visit by Provider         ROS:  Here with her .    CONSTITUTIONAL:NEGATIVE for fever, chills, change in weight  CV: NEGATIVE for chest pain, palpitations or peripheral edema  MUSCULOSKELETAL: see below  PSYCHIATRIC: NEGATIVE for changes in mood or affect    Both sides of legs hurt, lateral to shins.  After getting up after a couple hours sitting in recliner.  Will get a little better, but not a lot better as she gets moving.  Will loosen up after about 10 steps.    New shoes did not help. They put in orthotics; some relief but not a miracle.     On tramadol; notes has been on for a long time. Wearing off earlier; does not work as well.    ?lyrica.  Or other alternative.   She has known peripheral neuropathy.  She was put on tramadol  "through pain provider years ago.        OBJECTIVE:     /60 (BP Location: Right arm, Cuff Size: Adult Large)  Pulse 74  Temp 97.7  F (36.5  C) (Oral)  Resp 16  Ht 5' 4\" (1.626 m)  Wt 203 lb 11.2 oz (92.4 kg)  SpO2 96%  BMI 34.97 kg/m2  Body mass index is 34.97 kg/(m^2).  GENERAL APPEARANCE: alert and no distress  CV: regular rates and rhythm  MS: trace swelling. No calf tenderness. Her pain is lateral aspect lower legs.   PSYCH: mentation appears normal and affect normal/bright        ASSESSMENT/PLAN:     Diabetic polyneuropathy associated with type 2 diabetes mellitus (H)  Discussed some options. I have not used as much Lyrica. Cost may be an issue for her as well. At this time, agreed to start gabapentin. Will start low as she believes she may have tried in the past. Will build up as follows, as tolerated. She should stop if either side effects, or if satisfactory beneficial effect. Can build beyond this as well; but would like to connect at that time.   Discussed potential side effects.   Discussed some heat as well.  - gabapentin (NEURONTIN) 100 MG capsule; 1 po qhs x 3 nights, then 1 po bid x 3 days, then 1 po tid x 3 days. Then 1 po in am, noon and 2 po qhs x 3 days. Then 2 po am and bedtime and 1 at noon x 3 days, then 2 po tid x three days.  Then 2 po in am, noon and 3 po qhs x 3 days. Then 3 po am and bedtime and 2 at noon x 3 days, then 3 po tid x three days.    Edema, unspecified type  Refilling. She notes the one she has been getting has been more expensive than previously; note written to pharmacist   - potassium chloride (K-TAB,KLOR-CON) 10 MEQ tablet; TAKE 1 TABLET BY MOUTH EVERY DAY. GENERIC EQUIVALENT FOR K-TAB.  If there is one at lower cost, let us know. Does not need to be slow release    Given phone number as requested to Dr. Verde's former office; she will be planning on seeing him at his new location.     Patient Instructions         This is the phone number where Dr. Verde " used to practice:    [facility] [phone]       ---------------------------------    With gabapentin, start with 1 at night.  You can increase as often as every three days.    If the above is not working, add 1 in the am.  Next I would add one at noon.  From there, I would recommend adding an additional one in the evening.   And so on.   Stop at 300 mg (or three pills) three times per day. Unless you are either having problems with side effects or it is working great.    Then let me know how things are going. We could continue to increase from there as well.  Many folks are up to 900 mg three times daily and some higher.        Yuridia Villarreal MD, MD  [facility]

## 2018-04-09 NOTE — MR AVS SNAPSHOT
After Visit Summary   4/9/2018    Ирина Yanez    MRN: [id number]           Patient Information     Date Of Birth          1935        Visit Information        Provider Department      4/9/2018 3:10 PM Yuridia Villarreal MD [facility]        Today's Diagnoses     Diabetic polyneuropathy associated with type 2 diabetes mellitus (H)    -  1    Edema, unspecified type          Care Instructions          This is the phone number where Dr. Verde used to practice:    [facility] [phone]       ---------------------------------    With gabapentin, start with 1 at night.  You can increase as often as every three days.    If the above is not working, add 1 in the am.  Next I would add one at noon.  From there, I would recommend adding an additional one in the evening.   And so on.   Stop at 300 mg (or three pills) three times per day. Unless you are either having problems with side effects or it is working great.    Then let me know how things are going. We could continue to increase from there as well.  Many folks are up to 900 mg three times daily and some higher.            Follow-ups after your visit        Your next 10 appointments already scheduled     Jun 12, 2018 11:00 AM CDT   [facility]    [address]  [facility] [id number] [phone]            Tushar 15, 2019 11:30 AM CST   Return Sleep Patient with Mic Quijano MD   [facility])    [address]  [facility] [id number] [phone]              Who to contact     If you have questions or need follow up information about today's clinic visit or your schedule please contact [facility] directly at [phone].  Normal or non-critical lab and imaging results will be communicated to you by MyChart, letter or phone  "within 4 business days after the clinic has received the results. If you do not hear from us within 7 days, please contact the clinic through [employer] or phone. If you have a critical or abnormal lab result, we will notify you by phone as soon as possible.  Submit refill requests through [employer] or call your pharmacy and they will forward the refill request to us. Please allow 3 business days for your refill to be completed.          Additional Information About Your Visit        [employer] Information     [employer] gives you secure access to your electronic health record. If you see a primary care provider, you can also send messages to your care team and make appointments. If you have questions, please call your primary care clinic.  If you do not have a primary care provider, please call [phone] and they will assist you.        Care EveryWhere ID     This is your Care EveryWhere ID. This could be used by other organizations to access your [facility] medical records  [id number]        Your Vitals Were     Pulse Temperature Respirations Height Pulse Oximetry BMI (Body Mass Index)    74 97.7  F (36.5  C) (Oral) 16 5' 4\" (1.626 m) 96% 34.97 kg/m2       Blood Pressure from Last 3 Encounters:   04/09/18 126/60   01/18/18 134/60   01/16/18 135/72    Weight from Last 3 Encounters:   04/09/18 203 lb 11.2 oz (92.4 kg)   01/18/18 201 lb (91.2 kg)   01/16/18 201 lb (91.2 kg)              Today, you had the following     No orders found for display         Today's Medication Changes          These changes are accurate as of 4/9/18  4:27 PM.  If you have any questions, ask your nurse or doctor.               Start taking these medicines.        Dose/Directions    gabapentin 100 MG capsule   Commonly known as:  NEURONTIN   Used for:  Diabetic polyneuropathy associated with type 2 diabetes mellitus (H)   Started by:  Yuridia Villarreal MD        1 po qhs x 3 nights, then 1 po bid x 3 days, then 1 po tid x 3 days. Then 1 po in am, " noon and 2 po qhs x 3 days. Then 2 po am and bedtime and 1 at noon x 3 days, then 2 po tid x three days.  Then 2 po in am, noon and 3 po qhs x 3 days. Then 3 po am and bedtime and 2 at noon x 3 days, then 3 po tid x three days.   Quantity:  540 capsule   Refills:  0         These medicines have changed or have updated prescriptions.        Dose/Directions    potassium chloride 10 MEQ tablet   Commonly known as:  K-TAB,KLOR-CON   This may have changed:  See the new instructions.   Used for:  Edema, unspecified type   Changed by:  Yuridia Villarreal MD        TAKE 1 TABLET BY MOUTH EVERY DAY. GENERIC EQUIVALENT FOR K-TAB. If there is one at lower cost, let us know. Does not need to be slow release   Quantity:  90 tablet   Refills:  1         Stop taking these medicines if you haven't already. Please contact your care team if you have questions.     potassium chloride SA 10 MEQ CR tablet   Commonly known as:  K-DUR/KLOR-CON M   Stopped by:  Yuridia Villarreal MD                Where to get your medicines      Some of these will need a paper prescription and others can be bought over the counter.  Ask your nurse if you have questions.     Bring a paper prescription for each of these medications     gabapentin 100 MG capsule    potassium chloride 10 MEQ tablet                Primary Care Provider Office Phone # Fax #    Yuridia Villarreal [phone] [address] [facility] [address]        Equal Access to Services     [facility]: Hadii aad ku hadasho Soomaali, waaxda luqadaha, qaybta kaalmada adeegyada, waxay kina mcdaniel . So [facility] [phone].    ATENCIÓN: Si habla español, tiene a guevara disposición servicios gratuitos de asistencia lingüística. Llame al [phone].    We comply with applicable federal civil rights laws and [place] laws. We do not discriminate on the basis of race, color, national origin, age, disability, sex, sexual orientation, or gender identity.             Thank you!     Thank you for choosing [facility]  for your [facility]. Our goal is always to provide you with excellent care. Hearing back from our patients is one way we can continue to improve our services. Please take a few minutes to complete the written survey that you may receive in the mail after your visit with us. Thank you!             Your Updated Medication List - Protect others around you: Learn how to safely use, store and throw away your medicines at www.disposemymeds.org.          This list is accurate as of 4/9/18  4:27 PM.  Always use your most recent med list.                   Brand Name Dispense Instructions for use Diagnosis    AMARYL 4 MG tablet   Generic drug:  glimepiride      1.5 tabs daily    Diabetic polyneuropathy associated with type 2 diabetes mellitus (H)       aspirin 81 MG tablet     30 tablet    Take by mouth daily        blood glucose monitoring lancets     100 each    USE TO TEST ONCE DAILY    Type 2 diabetes mellitus with diabetic nephropathy, unspecified long term insulin use status (H)       cholecalciferol 2000 UNITS Caps      2 daily        docusate sodium 100 MG tablet    COLACE    60 tablet    Take 100 mg by mouth as needed        furosemide 20 MG tablet    LASIX    90 tablet    Take 1 tablet (20 mg) by mouth every morning    Edema, unspecified type       gabapentin 100 MG capsule    NEURONTIN    540 capsule    1 po qhs x 3 nights, then 1 po bid x 3 days, then 1 po tid x 3 days. Then 1 po in am, noon and 2 po qhs x 3 days. Then 2 po am and bedtime and 1 at noon x 3 days, then 2 po tid x three days.  Then 2 po in am, noon and 3 po qhs x 3 days. Then 3 po am and bedtime and 2 at noon x 3 days, then 3 po tid x three days.    Diabetic polyneuropathy associated with type 2 diabetes mellitus (H)       lisinopril 20 MG tablet    PRINIVIL/ZESTRIL    90 tablet    Take 1 tablet (20 mg) by mouth daily    Hypertension goal BP (blood pressure) < 130/80       Multi-vitamin  Tabs tablet   Generic drug:  multivitamin, therapeutic with minerals      1 TABLET DAILY        nystatin 450666 UNIT/GM Powd    MYCOSTATIN    60 g    Apply a small amount to affected area three times daily.    Intertrigo       pioglitazone 15 MG tablet    ACTOS     Take by mouth daily        potassium chloride 10 MEQ tablet    K-TAB,KLOR-CON    90 tablet    TAKE 1 TABLET BY MOUTH EVERY DAY. GENERIC EQUIVALENT FOR K-TAB. If there is one at lower cost, let us know. Does not need to be slow release    Edema, unspecified type       sertraline 50 MG tablet    ZOLOFT    90 tablet    Take 1 tablet (50 mg) by mouth daily    Anxiety       simvastatin 10 MG tablet    ZOCOR    90 tablet    Take 1 tablet (10 mg) by mouth At Bedtime    Hyperlipidemia with target LDL less than 100       traMADol 50 MG tablet    ULTRAM    180 tablet    Take 1-2 tablets ( mg) by mouth every 8 hours as needed for pain .  Maximum 180 per month    Diabetic polyneuropathy associated with type 2 diabetes mellitus (H), Other chronic pain

## 2018-04-09 NOTE — PATIENT INSTRUCTIONS
This is the phone number where Dr. Verde used to practice:    [facility] [phone]       ---------------------------------    With gabapentin, start with 1 at night.  You can increase as often as every three days.    If the above is not working, add 1 in the am.  Next I would add one at noon.  From there, I would recommend adding an additional one in the evening.   And so on.   Stop at 300 mg (or three pills) three times per day. Unless you are either having problems with side effects or it is working great.    Then let me know how things are going. We could continue to increase from there as well.  Many folks are up to 900 mg three times daily and some higher.

## 2018-04-16 ENCOUNTER — TELEPHONE (OUTPATIENT)
Dept: FAMILY MEDICINE | Facility: CLINIC | Age: 83
End: 2018-04-16

## 2018-04-16 NOTE — TELEPHONE ENCOUNTER
Please read them the instructions...     Essentially, starting at 100 mg.    Increasing by 100 mg every three days until we get to 300 mg tid.     Starting with bedtime, then twice daily, then adding the noon with each incremental dose increase.     Thanks!

## 2018-04-16 NOTE — TELEPHONE ENCOUNTER
Received a call from Jhoan Robles in regards to the pts gabapentin.  They need the prescription verified.  They said it is a long sig and got smushed and they can't read it.      Spoke to the pharmacist.  Please clarify.  They are not sure what they are supposed to be doing as far as directions.      Please call pharmacy line - [phone] or refax.

## 2018-05-02 ENCOUNTER — TRANSFERRED RECORDS (OUTPATIENT)
Dept: HEALTH INFORMATION MANAGEMENT | Facility: CLINIC | Age: 83
End: 2018-05-02

## 2018-05-29 DIAGNOSIS — N18.4 CHRONIC KIDNEY DISEASE, STAGE IV (SEVERE) (H): Primary | ICD-10-CM

## 2018-06-01 DIAGNOSIS — N18.4 CHRONIC KIDNEY DISEASE, STAGE IV (SEVERE) (H): ICD-10-CM

## 2018-06-01 PROCEDURE — 36415 COLL VENOUS BLD VENIPUNCTURE: CPT | Performed by: INTERNAL MEDICINE

## 2018-06-01 PROCEDURE — 80048 BASIC METABOLIC PNL TOTAL CA: CPT | Performed by: INTERNAL MEDICINE

## 2018-06-02 LAB
ANION GAP SERPL CALCULATED.3IONS-SCNC: 10 MMOL/L (ref 3–14)
BUN SERPL-MCNC: 41 MG/DL (ref 7–30)
CALCIUM SERPL-MCNC: 8.8 MG/DL (ref 8.5–10.1)
CHLORIDE SERPL-SCNC: 113 MMOL/L (ref 94–109)
CO2 SERPL-SCNC: 21 MMOL/L (ref 20–32)
CREAT SERPL-MCNC: 1.75 MG/DL (ref 0.52–1.04)
GFR SERPL CREATININE-BSD FRML MDRD: 28 ML/MIN/1.7M2
GLUCOSE SERPL-MCNC: 161 MG/DL (ref 70–99)
POTASSIUM SERPL-SCNC: 5 MMOL/L (ref 3.4–5.3)
SODIUM SERPL-SCNC: 144 MMOL/L (ref 133–144)

## 2018-06-08 DIAGNOSIS — R60.9 EDEMA, UNSPECIFIED TYPE: ICD-10-CM

## 2018-06-12 ENCOUNTER — OFFICE VISIT (OUTPATIENT)
Dept: ENDOCRINOLOGY | Facility: CLINIC | Age: 83
End: 2018-06-12
Payer: COMMERCIAL

## 2018-06-12 VITALS
DIASTOLIC BLOOD PRESSURE: 70 MMHG | HEIGHT: 64 IN | TEMPERATURE: 73 F | BODY MASS INDEX: 36.19 KG/M2 | SYSTOLIC BLOOD PRESSURE: 152 MMHG | WEIGHT: 212 LBS

## 2018-06-12 DIAGNOSIS — E11.21 TYPE 2 DIABETES MELLITUS WITH DIABETIC NEPHROPATHY, UNSPECIFIED LONG TERM INSULIN USE STATUS: ICD-10-CM

## 2018-06-12 LAB
HBA1C MFR BLD: 6.2 % (ref 0–5.6)
VIT B12 SERPL-MCNC: 432 PG/ML (ref 193–986)

## 2018-06-12 PROCEDURE — 84443 ASSAY THYROID STIM HORMONE: CPT | Performed by: INTERNAL MEDICINE

## 2018-06-12 PROCEDURE — 80048 BASIC METABOLIC PNL TOTAL CA: CPT | Performed by: INTERNAL MEDICINE

## 2018-06-12 PROCEDURE — 83036 HEMOGLOBIN GLYCOSYLATED A1C: CPT | Performed by: INTERNAL MEDICINE

## 2018-06-12 PROCEDURE — 82607 VITAMIN B-12: CPT | Performed by: INTERNAL MEDICINE

## 2018-06-12 PROCEDURE — 99214 OFFICE O/P EST MOD 30 MIN: CPT | Performed by: INTERNAL MEDICINE

## 2018-06-12 PROCEDURE — 36415 COLL VENOUS BLD VENIPUNCTURE: CPT | Performed by: INTERNAL MEDICINE

## 2018-06-12 RX ORDER — FUROSEMIDE 20 MG
20 TABLET ORAL DAILY
Qty: 90 TABLET | Refills: 0 | OUTPATIENT
Start: 2018-06-12

## 2018-06-12 RX ORDER — FUROSEMIDE 20 MG
TABLET ORAL
Qty: 90 TABLET | Refills: 0 | OUTPATIENT
Start: 2018-06-12

## 2018-06-12 RX ORDER — PIOGLITAZONEHYDROCHLORIDE 15 MG/1
15 TABLET ORAL DAILY
Qty: 90 TABLET | Refills: 3 | Status: SHIPPED | OUTPATIENT
Start: 2018-06-12 | End: 2019-04-10

## 2018-06-12 RX ORDER — FUROSEMIDE 20 MG
20 TABLET ORAL EVERY MORNING
Qty: 90 TABLET | Refills: 0 | Status: SHIPPED | OUTPATIENT
Start: 2018-06-12 | End: 2018-10-22

## 2018-06-12 NOTE — PROGRESS NOTES
Name: Ирина Yanez is a 83 year old woman, self-referred for evaluation of diabetes mellitus.  Previously seen by me at my former clinic ([facility]), here to establish care with [facility].    HPI:  Recent issues:  Here for diabetes evaluation  No new health issues reported        Diagnosis of diabetes mellitus age 64  Has taken metformin, then discontinue 2/2015 due to rise in creatinine  12/2017. Dose reduction of glimeparide  Current DM meds:   Glimeparide 4 mg 1/2-tab po QAM   Pioglitazone 15 mg 1-tab po QAM  Using One Touch BG meter, tests QAM  Previous [facility] labs include:  Lab Results   Component Value Date    A1C 6.3 (H) 12/19/2017     06/01/2018    POTASSIUM 5.0 06/01/2018    CHLORIDE 113 (H) 06/01/2018    CO2 21 06/01/2018    ANIONGAP 10 06/01/2018     (H) 06/01/2018    BUN 41 (H) 06/01/2018    CR 1.75 (H) 06/01/2018    GFRESTIMATED 28 (L) 06/01/2018    GFRESTBLACK 34 (L) 06/01/2018    EDNA 8.8 06/01/2018    CHOL 175 07/17/2017    TRIG 213 (H) 07/17/2017    HDL 44 (L) 07/17/2017    LDL 88 07/17/2017    NHDL 131 (H) 07/17/2017    UCRR 68 07/17/2017    MICROL 16 07/17/2017    UMALCR 23.86 07/17/2017     Other health issues with previous melanoma left arm, hyperlipidemia, left 2nd toe infection then amputation, spinal stenosis, CKD  Hyperlipidemia:   Has taken fenofibrate, discontinued 2011   Current dose simvastatin 10 mg po QHS and fish oil supplement 1000 mg QD  DM Complications:   Neuropathy    Intermittent feet pains    Has tried Tramadol also gabapentin    Current gabapentin 100 mg 2-capsules BID   Nephropathy    CKD    Sees Dr. KEMI Whatley/[facility]    Last eye exam with Dr. DIPAK Gupta 2/2017, no DR  No prior history of vascular disease      ,  Gus, cat Flossy  Sees Dr. Yuridia Villarreal/[facility] for general medicine evaluations.    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     Depression      Diabetes mellitus (H)     sees Dr. Verde     HTN      Hyperlipidemia LDL goal < 100     Dr. Verde     Lichen sclerosus et atrophicus 2/15/2013     Melanoma (H)      Peripheral Neuropathy     hands, feet; used to see Dr. Tl Das     Skin cancer     face; basal and other. Melanoma. Dolan     Sleep apnea     CPAP     Past Surgical History:   Procedure Laterality Date     AMPUTATE TOE(S)  8/23/2012    left second toe Procedure: AMPUTATE TOE(S);;  Surgeon: Mil Jolly DPM;  Location: [facility] OR     CHOLECYSTECTOMY  Nov. 1975     COLONOSCOPY  early 2009    repeat 4-5 years (Had one prior to this with polyps)     COLONOSCOPY  Sept 2014    incomplete; recommend colography     OPTICAL TRACKING SYSTEM FUSION POSTERIOR SPINE LUMBAR N/A 9/16/2016    Procedure: OPTICAL TRACKING SYSTEM FUSION SPINE POSTERIOR LUMBAR ONE LEVEL;  Surgeon: Lennox Blue MD;  Location: [facility] OR     PET, REC OF MELANOMA/MET CA Left     arm     REPAIR HAMMER TOE BILATERAL  8/23/2012    Procedure: REPAIR HAMMER TOE BILATERAL;  Flexor Tenotomy 2,3,4,5 Toes both feet, Partial 2nd toe amputation left foot;  Surgeon: Mil Jolly DPM;  Location: [facility] OR     REPAIR TENDON QUADRICEPS Right 10/27/2015    Procedure: REPAIR TENDON QUADRICEPS;  Surgeon: Antonio Yi MD;  Location: [facility] OR     SURGICAL HISTORY OF -   1997    bilateral knee replacement     SURGICAL HISTORY OF -   1997    right knee revised; patella tendon ruptured     SURGICAL HISTORY OF -       surgery for spinal stenosis     SURGICAL HISTORY OF -       breast reduction surgery     SURGICAL HISTORY OF -       D and C        Family Hx:  Family History   Problem Relation Age of Onset     CEREBROVASCULAR DISEASE Mother      HEART DISEASE Father      Neurologic Disorder Sister      ALS     C.A.D. Sister      DIABETES Sister      C.A.D. Brother      Psychotic Disorder Brother   "    [place] war      GASTROINTESTINAL DISEASE Brother      diverticulitis         Social Hx:  Social History     Social History     Marital status:      Spouse name: N/A     Number of children: N/A     Years of education: N/A     Occupational History      from home Retired     Social History Main Topics     Smoking status: Never Smoker     Smokeless tobacco: Never Used     Alcohol use No     Drug use: No     Sexual activity: Yes     Partners: Male     Other Topics Concern     Special Diet No     getting fruits and veggies.      Exercise No     gets little; limited with back and feet/leg pain.     Parent/Sibling W/ Cabg, Mi Or Angioplasty Before 65f 55m? Yes     Social History Narrative    2 adopted children          MEDICATIONS:  has a current medication list which includes the following prescription(s): aspirin, blood glucose monitoring, blood glucose monitoring, cholecalciferol, docusate sodium, furosemide, gabapentin, glimepiride, lisinopril, multi-vitamin, nystatin, pioglitazone, sertraline, simvastatin, and tramadol.    ROS:     ROS: 10 point ROS neg other than the symptoms noted above in the HPI.    GENERAL: some fatigue, wt stable; denies fevers, chills, night sweats.   HEENT: no dysphagia, odonophagia, diplopia, neck pain  THYROID:  no apparent hyper or hypothyroid symptoms  CV: no chest pain, pressure, palpitations  LUNGS: no SOB, GARAY, cough, wheezing   ABDOMEN: no diarrhea, constipation, abdominal pain  EXTREMITIES: no rashes, ulcers, edema  NEUROLOGY: leg/foot pains, decreased feet sensation; no headaches, denies changes in vision, tingling  MSK: no muscle aches or pains, weakness  SKIN: no rashes or lesions  : no menses  PSYCH:  stable mood, no significant anxiety or depression  ENDOCRINE: no heat or cold intolerance    Physical Exam   VS: /70 (BP Location: Right arm, Cuff Size: Adult Regular)  Temp (!) 73  F (22.8  C)  Ht 1.626 m (5' 4\")  Wt 96.2 kg (212 lb)  BMI 36.39 " kg/m2  GENERAL: AXOX3, NAD, well dressed, answering questions appropriately, appears stated age.  THYROID:  normal gland, no apparent nodules or goiter  HEENT: neck non-tender, no exopthalmous, no proptosis, EOMI  CV: RRR, 3/6 systolic murmur LSB  LUNGS: CTAB, no wheezes, rales, or ronchi  ABDOMEN: soft, nontender, nondistended  EXTREMITIES: no edema, +pedal pulses, no lesions  NEUROLOGY: walks with cane; CN grossly intact, no tremors  MSK: absent left 2nd toe s/p ampuation, lateral deviation left great toe; grossly intact  SKIN: elongated right great toenail, bilateral knee scars; no rashes, no lesions    LABS:    All pertinent notes, labs, and images personally reviewed by me.     A/P:  Encounter Diagnosis   Name Primary?     Type 2 diabetes mellitus with diabetic nephropathy, unspecified long term insulin use status (H)        Comments:  Reviewed health history and diabetes issues. Recent BG trend and last hgbA1c levels good.    Plan:  Discussed general issues with the diabetes diagnosis and management  We discussed the hgbA1c test which reflects previous overall glucose levels or control  Discussed the importance of blood glucose (BG) testing to assess glucose trends  Provided general overview of the diabetes medication options and medication treatment plan.    Recommend:  Although several other diabetes med treatment options, I favor continuing the current med plan  Continue current pioglitazone and low dose glimeparide    Consider dose reducation of glimeparide if BG or hgbA1c lower than target range   Will update her med Rx's soon  Goal target BG  mg/dl  Discussed her treatment of neuropathy:   Would not take the NerveRenu supplement   Check vitamin B12   Continue gabapentin medication  Keep focus on diet and weight management.  Advise having fasting lipid panel testing and dilated eye examination, at least annually  Needs to restart the diuretic BG med ASAP.  Patient to follow up with Primary Care  provider regarding elevated blood pressure.    Addressed patient questions today    Labs ordered today:   Orders Placed This Encounter   Procedures     Basic metabolic panel     Hemoglobin A1c     TSH     Vitamin B12     Radiology/Consults ordered today: None    More than 50% of the time spent with Ms. Yanez on counseling / coordinating her care.  Total appointment time was 30 minutes.    Follow-up:  6 mo.    Reji Verde MD  Endocrinology  [facility]  CC: Yuridia Villarreal

## 2018-06-12 NOTE — TELEPHONE ENCOUNTER
Change of pharmacy.  Sent remaining refills to the  pharmacy..    Prescription approved per [facility] Refill Protocol.  Padmaja Devine RN  Message handled by Nurse Triage.

## 2018-06-12 NOTE — TELEPHONE ENCOUNTER
Patient received a notice from her mail order pharmacy that there are no refills left.  Can someone check on this and call her.  She is out and she would like them filled here at the [facility].  She can be reached at [phone], ok to leave a message.

## 2018-06-12 NOTE — MR AVS SNAPSHOT
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      Your next 10 appointments already scheduled     Tushar 15, 2019 11:30 AM CST   Return Sleep Patient with Mic Quijano MD   [facility]    [address] [address] [phone]              Who to contact     If you have questions or need follow up information about today's clinic visit or your schedule please contact [facility] directly at [phone].  Normal or non-critical lab and imaging results will be communicated to you by PeriGenhart, letter or phone within 4 business days after the clinic has received the results. If you do not hear from us within 7 days, please contact the clinic through Credit Karmat or phone. If you have a critical or abnormal lab result, we will notify you by phone as soon as possible.  Submit refill requests through SkyKick or call your pharmacy and they will forward the refill request to us. Please allow 3 business days for your refill to be completed.          Additional Information About Your Visit        MyChart Information     SkyKick gives you secure access to your electronic health record. If you see a primary care provider, you can also send messages to your care team and make appointments. If you have questions, please call your primary care clinic.  If you do not have a primary care provider, please call [phone] and they will assist you.        Care EveryWhere ID   "   This is your Care EveryWhere ID. This could be used by other organizations to access your [place] medical records  [id number]        Your Vitals Were     Temperature Height BMI (Body Mass Index)             73  F (22.8  C) 1.626 m (5' 4\") 36.39 kg/m2          Blood Pressure from Last 3 Encounters:   06/12/18 152/70   04/09/18 126/60   01/18/18 134/60    Weight from Last 3 Encounters:   06/12/18 96.2 kg (212 lb)   04/09/18 92.4 kg (203 lb 11.2 oz)   01/18/18 91.2 kg (201 lb)              We Performed the Following     Basic metabolic panel     Hemoglobin A1c     TSH     Vitamin B12          Today's Medication Changes          These changes are accurate as of 6/12/18 12:12 PM.  If you have any questions, ask your nurse or doctor.               Start taking these medicines.        Dose/Directions    blood glucose monitoring test strip   Commonly known as:  no brand specified   Used for:  Type 2 diabetes mellitus with diabetic nephropathy, unspecified long term insulin use status (H)   Started by:  Reji Verde MD        Use to test blood sugar 1 times daily or as directed, One Touch strips, E11.9   Quantity:  100 strip   Refills:  3         These medicines have changed or have updated prescriptions.        Dose/Directions    blood glucose monitoring lancets   This may have changed:  See the new instructions.   Used for:  Type 2 diabetes mellitus with diabetic nephropathy, unspecified long term insulin use status (H)   Changed by:  Reji Verde MD        Use to test blood sugar 1 times daily or as directed.   Quantity:  100 each   Refills:  3       pioglitazone 15 MG tablet   Commonly known as:  ACTOS   This may have changed:  how much to take   Used for:  Type 2 diabetes mellitus with diabetic nephropathy, unspecified long term insulin use status (H)   Changed by:  Reji Verde MD        Dose:  15 mg   Take 1 tablet (15 mg) by mouth daily   Quantity:  90 tablet   Refills:  3            Where to " get your medicines      These medications were sent to [facility] - [place], [place] - [address]  [address], [place] [address]     Phone:  [phone]     blood glucose monitoring lancets    blood glucose monitoring test strip    pioglitazone 15 MG tablet                Primary Care Provider Office Phone # Fax #    Yuridia Villarreal [phone] [address]  [facility] [address]        Equal Access to Services     : Hadii aad ku hadasho Soomaali, waaxda luqadaha, qaybta kaalmada adeegyada, waxay idiin hayaan adeeg kharash lako . So [facility] [phone].    ATENCIÓN: Si habla español, tiene a guevara disposición servicios gratuitos de asistencia lingüística. [facility] [phone].    We comply with applicable federal civil rights laws and [place] laws. We do not discriminate on the basis of race, color, national origin, age, disability, sex, sexual orientation, or gender identity.            Thank you!     Thank you for choosing [facility]  for your care. Our goal is always to provide you with excellent care. Hearing back from our patients is one way we can continue to improve our services. Please take a few minutes to complete the written survey that you may receive in the mail after your visit with us. Thank you!             Your Updated Medication List - Protect others around you: Learn how to safely use, store and throw away your medicines at www.disposemymeds.org.          This list is accurate as of 6/12/18 12:12 PM.  Always use your most recent med list.                   Brand Name Dispense Instructions for use Diagnosis    AMARYL 4 MG tablet   Generic drug:  glimepiride      1.5 tabs daily    Diabetic polyneuropathy associated with type 2 diabetes mellitus (H)       aspirin 81 MG tablet     30 tablet    Take by mouth daily        blood glucose monitoring lancets     100 each    Use to test blood sugar 1 times daily or  as directed.    Type 2 diabetes mellitus with diabetic nephropathy, unspecified long term insulin use status (H)       blood glucose monitoring test strip    no brand specified    100 strip    Use to test blood sugar 1 times daily or as directed, One Touch strips, E11.9    Type 2 diabetes mellitus with diabetic nephropathy, unspecified long term insulin use status (H)       cholecalciferol 2000 units Caps      2 daily        docusate sodium 100 MG tablet    COLACE    60 tablet    Take 100 mg by mouth as needed        furosemide 20 MG tablet    LASIX    90 tablet    Take 1 tablet (20 mg) by mouth every morning    Edema, unspecified type       gabapentin 100 MG capsule    NEURONTIN    540 capsule    Take 2 capsules (200 mg) by mouth 3 times daily    Diabetic polyneuropathy associated with type 2 diabetes mellitus (H)       lisinopril 20 MG tablet    PRINIVIL/ZESTRIL    90 tablet    Take 1 tablet (20 mg) by mouth daily    Hypertension goal BP (blood pressure) < 130/80       Multi-vitamin Tabs tablet   Generic drug:  multivitamin, therapeutic with minerals      1 TABLET DAILY        nystatin 519395 UNIT/GM Powd    MYCOSTATIN    60 g    Apply a small amount to affected area three times daily.    Intertrigo       pioglitazone 15 MG tablet    ACTOS    90 tablet    Take 1 tablet (15 mg) by mouth daily    Type 2 diabetes mellitus with diabetic nephropathy, unspecified long term insulin use status (H)       sertraline 50 MG tablet    ZOLOFT    90 tablet    Take 1 tablet (50 mg) by mouth daily    Anxiety       simvastatin 10 MG tablet    ZOCOR    90 tablet    Take 1 tablet (10 mg) by mouth At Bedtime    Hyperlipidemia with target LDL less than 100       traMADol 50 MG tablet    ULTRAM    180 tablet    Take 1-2 tablets ( mg) by mouth every 8 hours as needed for pain .  Maximum 180 per month. See note from May; weaning on this. Down to 2 daily. 5/11/2018    Diabetic polyneuropathy associated with type 2 diabetes mellitus  (H), Other chronic pain

## 2018-06-13 ENCOUNTER — TELEPHONE (OUTPATIENT)
Dept: ENDOCRINOLOGY | Facility: CLINIC | Age: 83
End: 2018-06-13

## 2018-06-13 LAB
ANION GAP SERPL CALCULATED.3IONS-SCNC: 10 MMOL/L (ref 3–14)
BUN SERPL-MCNC: 40 MG/DL (ref 7–30)
CALCIUM SERPL-MCNC: 9.1 MG/DL (ref 8.5–10.1)
CHLORIDE SERPL-SCNC: 110 MMOL/L (ref 94–109)
CO2 SERPL-SCNC: 23 MMOL/L (ref 20–32)
CREAT SERPL-MCNC: 1.64 MG/DL (ref 0.52–1.04)
GFR SERPL CREATININE-BSD FRML MDRD: 30 ML/MIN/1.7M2
GLUCOSE SERPL-MCNC: 124 MG/DL (ref 70–99)
POTASSIUM SERPL-SCNC: 5 MMOL/L (ref 3.4–5.3)
SODIUM SERPL-SCNC: 143 MMOL/L (ref 133–144)
TSH SERPL DL<=0.005 MIU/L-ACNC: 1.59 MU/L (ref 0.4–4)

## 2018-06-19 ENCOUNTER — TELEPHONE (OUTPATIENT)
Dept: ENDOCRINOLOGY | Facility: CLINIC | Age: 83
End: 2018-06-19

## 2018-06-19 DIAGNOSIS — E11.42 DIABETIC POLYNEUROPATHY ASSOCIATED WITH TYPE 2 DIABETES MELLITUS (H): ICD-10-CM

## 2018-06-19 RX ORDER — GLIMEPIRIDE 1 MG/1
TABLET ORAL
Qty: 90 TABLET | Refills: 3 | Status: SHIPPED | OUTPATIENT
Start: 2018-06-19 | End: 2019-04-18

## 2018-06-19 NOTE — TELEPHONE ENCOUNTER
Reason for Call:  Other prescription    Detailed comments: pt is unaware of the medications prescribed to her by dr. Verde.  She informed the pharmacy that she has never taken these medications.  Also the pharmacy says she reported a different address than is on file    Phone Number Patient can be reached at: please call the pharmacy [phone]     Best Time: any    Can we leave a detailed message on this number? YES    Call taken on 6/19/2018 at 12:44 PM by Flakita Min

## 2018-06-19 NOTE — TELEPHONE ENCOUNTER
Message noted, called patient.  She says she need the One Touch test strips and the Delica lancets... Which we have ordered for her.  Her  Gus (also my patient) needs the One Touch test strips and the UltraSoft lancets... So I will make sure he has these Rx's sent to his pharmacy.  She thanked me for my assistance.    ARSENIO Verde MD  Endocrinology  [facility]

## 2018-06-27 DIAGNOSIS — E11.42 DIABETIC POLYNEUROPATHY ASSOCIATED WITH TYPE 2 DIABETES MELLITUS (H): ICD-10-CM

## 2018-06-27 DIAGNOSIS — G89.29 OTHER CHRONIC PAIN: ICD-10-CM

## 2018-06-27 RX ORDER — TRAMADOL HYDROCHLORIDE 50 MG/1
50-100 TABLET ORAL EVERY 8 HOURS PRN
Qty: 180 TABLET | Refills: 1 | Status: CANCELLED | OUTPATIENT
Start: 2018-06-27

## 2018-06-27 NOTE — TELEPHONE ENCOUNTER
Not due for a refill.     traMADol (ULTRAM) 50 MG tablet was filled on 5/11/2018, qty 180 with 1 refills.

## 2018-06-28 NOTE — TELEPHONE ENCOUNTER
See what is up...    I have a notation that she was weaning down on it... But looks like she may be staying steady?    Was also obtained last on 6/5; this may be a little early (but also holiday coming up)

## 2018-06-28 NOTE — TELEPHONE ENCOUNTER
Looks like pt used up the extra refill from 5/11/18.    : Last 6 refills were 6/5, 5/18, 4/23, 3/21, 2/2 & 1/10 for 180# each.    Kalyn, RN  Triage Nurse

## 2018-06-29 RX ORDER — TRAMADOL HYDROCHLORIDE 50 MG/1
50-100 TABLET ORAL EVERY 8 HOURS PRN
Qty: 180 TABLET | Refills: 1 | Status: SHIPPED | OUTPATIENT
Start: 2018-06-29 | End: 2018-09-13

## 2018-06-29 NOTE — TELEPHONE ENCOUNTER
Patient states is weaning down. Currently taking 4 daily. Does ask for prescription early as it takes up to 2 weeks to get prescription from mail order.    Is taking gabapentin 200 mg TID. Sometimes forgets last dose.    Frieda Granado RN

## 2018-07-16 DIAGNOSIS — E11.42 DIABETIC POLYNEUROPATHY ASSOCIATED WITH TYPE 2 DIABETES MELLITUS (H): ICD-10-CM

## 2018-07-16 NOTE — TELEPHONE ENCOUNTER
Requested Prescriptions   Pending Prescriptions Disp Refills     gabapentin (NEURONTIN) 100 MG capsule 540 capsule 0     Sig: Take 2 capsules (200 mg) by mouth 3 times daily    There is no refill protocol information for this order        Last Written Prescription Date:  6/19/18  Last Fill Quantity: 90,  # refills: 3   Last office visit: 4/9/2018 with prescribing provider:  Yuridia Villarreal MD   Future Office Visit:

## 2018-07-19 RX ORDER — GABAPENTIN 100 MG/1
200 CAPSULE ORAL 3 TIMES DAILY
Qty: 540 CAPSULE | Refills: 2 | Status: SHIPPED | OUTPATIENT
Start: 2018-07-19 | End: 2018-09-13 | Stop reason: DRUGHIGH

## 2018-07-19 NOTE — TELEPHONE ENCOUNTER
Last refill listed as historical 5/11/2018. Spoke with patient. Takes 2 caps (200 mg) TID.     Frieda Granado RN

## 2018-07-25 ENCOUNTER — DOCUMENTATION ONLY (OUTPATIENT)
Dept: SLEEP MEDICINE | Facility: CLINIC | Age: 83
End: 2018-07-25
Payer: COMMERCIAL

## 2018-07-25 NOTE — PROGRESS NOTES
Patient came to [facility] mask fitting appointment on July 25, 2018. Patient requested to switch masks to see if  large size works better.  Patient tried on the followings masks, a large cushion for an Airfit F20 but the medium was a better fit   Patient selected  Resmed, type Airfit F20 Full Face mask For Her medium

## 2018-08-06 DIAGNOSIS — I10 HYPERTENSION GOAL BP (BLOOD PRESSURE) < 130/80: ICD-10-CM

## 2018-08-06 DIAGNOSIS — F41.9 ANXIETY: ICD-10-CM

## 2018-08-06 NOTE — TELEPHONE ENCOUNTER
"Requested Prescriptions   Pending Prescriptions Disp Refills     lisinopril (PRINIVIL/ZESTRIL) 20 MG tablet  Last Written Prescription Date:  3/27/18  Last Fill Quantity: 90,  # refills: 1   Last office visit: 4/9/2018 with prescribing provider:  Yuridia Villarreal MD    Future Office Visit:     90 tablet 1     Sig: Take 1 tablet (20 mg) by mouth daily    ACE Inhibitors (Including Combos) Protocol Failed    8/6/2018  1:43 PM       Failed - Blood pressure under 140/90 in past 12 months    BP Readings from Last 3 Encounters:   06/12/18 152/70   04/09/18 126/60   01/18/18 134/60                Failed - Normal serum creatinine on file in past 12 months    Recent Labs   Lab Test  06/12/18   1204   CR  1.64*            Passed - Recent (12 mo) or future (30 days) visit within the authorizing provider's specialty    Patient had office visit in the last 12 months or has a visit in the next 30 days with authorizing provider or within the authorizing provider's specialty.  See \"Patient Info\" tab in inbasket, or \"Choose Columns\" in Meds & Orders section of the refill encounter.           Passed - Patient is age 18 or older       Passed - No active pregnancy on record       Passed - Normal serum potassium on file in past 12 months    Recent Labs   Lab Test  06/12/18   1204   POTASSIUM  5.0            Passed - No positive pregnancy test in past 12 months        sertraline (ZOLOFT) 50 MG tablet  Last Written Prescription Date:  3/27/18  Last Fill Quantity: 90,  # refills: 0   Last office visit: 4/9/2018 with prescribing provider:  Yuridia Villarreal MD    Future Office Visit:     90 tablet 0     Sig: Take 1 tablet (50 mg) by mouth daily    SSRIs Protocol Passed    8/6/2018  1:43 PM       Passed - Recent (12 mo) or future (30 days) visit within the authorizing provider's specialty    Patient had office visit in the last 12 months or has a visit in the next 30 days with authorizing provider or within the authorizing provider's specialty.  " "See \"Patient Info\" tab in inbasket, or \"Choose Columns\" in Meds & Orders section of the refill encounter.           Passed - Patient is age 18 or older       Passed - No active pregnancy on record       Passed - No positive pregnancy test in last 12 months          "

## 2018-08-08 NOTE — TELEPHONE ENCOUNTER
Routing refill request to provider for review/approval because:  bp out of range  Roseann Sequeira,RN BSN  [facility]  [phone]

## 2018-08-09 RX ORDER — LISINOPRIL 20 MG/1
20 TABLET ORAL DAILY
Qty: 90 TABLET | Refills: 0 | Status: SHIPPED | OUTPATIENT
Start: 2018-08-09 | End: 2018-09-13 | Stop reason: DRUGHIGH

## 2018-08-09 ASSESSMENT — ANXIETY QUESTIONNAIRES
7. FEELING AFRAID AS IF SOMETHING AWFUL MIGHT HAPPEN: NOT AT ALL
3. WORRYING TOO MUCH ABOUT DIFFERENT THINGS: NOT AT ALL
2. NOT BEING ABLE TO STOP OR CONTROL WORRYING: NOT AT ALL
5. BEING SO RESTLESS THAT IT IS HARD TO SIT STILL: NOT AT ALL
1. FEELING NERVOUS, ANXIOUS, OR ON EDGE: NOT AT ALL
GAD7 TOTAL SCORE: 0
6. BECOMING EASILY ANNOYED OR IRRITABLE: NOT AT ALL

## 2018-08-09 ASSESSMENT — PATIENT HEALTH QUESTIONNAIRE - PHQ9: 5. POOR APPETITE OR OVEREATING: NOT AT ALL

## 2018-08-09 NOTE — TELEPHONE ENCOUNTER
Please have her come in for bp check; can be nurse only or at our pharmacy. (or any [facility] clinic or pharmacy that does this).    Also, please do PHQ9 and HUBERT 7.    Thanks!    PHQ-9 SCORE 9/13/2016 7/14/2017 1/18/2018   Total Score - - -   Total Score 1 0 2       HUBERT-7 SCORE 9/13/2016 7/14/2017 1/18/2018   Total Score - - -   Total Score 0 2 0

## 2018-08-09 NOTE — TELEPHONE ENCOUNTER
Questionnaires done over phone. Will stop by pharmacy next week and have BP done.    PHQ-9 SCORE 7/14/2017 1/18/2018 8/9/2018   Total Score - - -   Total Score 0 2 1     HUBERT-7 SCORE 7/14/2017 1/18/2018 8/9/2018   Total Score - - -   Total Score 2 0 0     Frieda Granado RN

## 2018-08-10 ASSESSMENT — PATIENT HEALTH QUESTIONNAIRE - PHQ9: SUM OF ALL RESPONSES TO PHQ QUESTIONS 1-9: 1

## 2018-08-10 ASSESSMENT — ANXIETY QUESTIONNAIRES: GAD7 TOTAL SCORE: 0

## 2018-08-21 ENCOUNTER — TELEPHONE (OUTPATIENT)
Dept: FAMILY MEDICINE | Facility: CLINIC | Age: 83
End: 2018-08-21

## 2018-08-21 ENCOUNTER — ALLIED HEALTH/NURSE VISIT (OUTPATIENT)
Dept: FAMILY MEDICINE | Facility: CLINIC | Age: 83
End: 2018-08-21
Payer: COMMERCIAL

## 2018-08-21 VITALS — DIASTOLIC BLOOD PRESSURE: 68 MMHG | SYSTOLIC BLOOD PRESSURE: 158 MMHG

## 2018-08-21 DIAGNOSIS — I10 HYPERTENSION GOAL BP (BLOOD PRESSURE) < 140/90: Primary | ICD-10-CM

## 2018-08-21 PROCEDURE — 99207 ZZC NO CHARGE NURSE ONLY: CPT | Performed by: FAMILY MEDICINE

## 2018-08-21 NOTE — Clinical Note
Note copied here:  It looks like her last 2 blood pressures have been elevated. I do think we should adjust medication.  Help her set up visit for this to discuss options. Or we can go ahead and increase lisinopril to 30 mg with a follow up visit in early October.  Thanks!

## 2018-08-21 NOTE — PROGRESS NOTES
Ирина Yanez is enrolled/participating in the retail pharmacy Blood Pressure Goals Achievement Program (BPGAP).  Ирина Yanez was evaluated at [facility] on August 21, 2018 at which time her blood pressure was:    BP Readings from Last 3 Encounters:   08/21/18 158/68   06/12/18 152/70   04/09/18 126/60     Reviewed lifestyle modifications for blood pressure control and reduction: including making healthy food choices, managing weight, getting regular exercise, smoking cessation, reducing alcohol consumption, monitoring blood pressure regularly.     Ирина Yanez is not experiencing symptoms.    Follow-Up: BP is not at goal of < 140/90mmHg (patient 18+ years of age with or without diabetes), Recommended follow-up with PCP.  Routing to PCP for further review.    Recommendation to Provider: adjust medications    Ирина Yanez was evaluated for enrollment into the PGEN study today.    Patient eligible for enrollment:  No  Patient interested in enrollment:  No    Completed by: Philomena Pino, PharmD  [facility]

## 2018-08-21 NOTE — PROGRESS NOTES
It looks like her last 2 blood pressures have been elevated.  I do think we should adjust medication.    Help her set up visit for this to discuss options.  Or we can go ahead and increase lisinopril to 30 mg with a follow up visit in early October.    Thanks!

## 2018-08-21 NOTE — MR AVS SNAPSHOT
After Visit Summary   8/21/2018    Ирина Yanez    MRN: [id number]           Patient Information     Date Of Birth          1935        Visit Information        Provider Department      8/21/2018 11:32 AM Yuridia Villarreal MD [facility]        Today's Diagnoses     Hypertension goal BP (blood pressure) < 140/90    -  1       Follow-ups after your visit        Your next 10 appointments already scheduled     Tushar 15, 2019 11:30 AM CST   Return Sleep Patient with Mic Quijano MD   [facility] ([facility])    [address] [place] [id number] [phone]              Who to contact     If you have questions or need follow up information about today's clinic visit or your schedule please contact [facility] directly at [phone].  Normal or non-critical lab and imaging results will be communicated to you by MyChart, letter or phone within 4 business days after the clinic has received the results. If you do not hear from us within 7 days, please contact the clinic through MyChart or phone. If you have a critical or abnormal lab result, we will notify you by phone as soon as possible.  Submit refill requests through [employer] or call your pharmacy and they will forward the refill request to us. Please allow 3 business days for your refill to be completed.          Additional Information About Your Visit        MyChart Information     [employer] gives you secure access to your electronic health record. If you see a primary care provider, you can also send messages to your care team and make appointments. If you have questions, please call your primary care clinic.  If you do not have a primary care provider, please call [phone] and they will assist you.        Care EveryWhere ID     This is your Care EveryWhere ID. This could be used by other organizations to access your [place] medical records  [id number]          Blood Pressure from Last 3 Encounters:   08/21/18 158/68   06/12/18 152/70   04/09/18 126/60    Weight from Last 3 Encounters:   06/12/18 212 lb (96.2 kg)   04/09/18 203 lb 11.2 oz (92.4 kg)   01/18/18 201 lb (91.2 kg)              Today, you had the following     No orders found for display       Primary Care Provider Office Phone # Fax #    Yuridia Villarreal [phone]       [address] [address] [facility] [address]        Equal Access to Services     [facility]: Hadii aad ku hadasho Soomaali, waaxda luqadaha, qaybta kaalmada adeegyada, flo mcdaniel . So [facility] [phone].    ATENCIÓN: Si habla español, tiene a guevara disposición servicios gratuitos de asistencia lingüística. Llame al [phone].    We comply with applicable federal civil rights laws and [place] laws. We do not discriminate on the basis of race, color, national origin, age, disability, sex, sexual orientation, or gender identity.            Thank you!     Thank you for choosing [facility]  for your care. Our goal is always to provide you with excellent care. Hearing back from our patients is one way we can continue to improve our services. Please take a few minutes to complete the written survey that you may receive in the mail after your visit with us. Thank you!             Your Updated Medication List - Protect others around you: Learn how to safely use, store and throw away your medicines at www.disposemymeds.org.          This list is accurate as of 8/21/18 11:59 PM.  Always use your most recent med list.                   Brand Name Dispense Instructions for use Diagnosis    aspirin 81 MG tablet     30 tablet    Take by mouth daily        blood glucose monitoring lancets     100 each    Use to test blood sugar 1 times daily or as directed.    Type 2 diabetes mellitus with diabetic nephropathy, unspecified long term insulin use status (H)       blood glucose monitoring test strip     no brand specified    100 strip    Use to test blood sugar 1 times daily or as directed, One Touch strips, E11.9    Type 2 diabetes mellitus with diabetic nephropathy, unspecified long term insulin use status (H)       cholecalciferol 2000 units Caps      2 daily        docusate sodium 100 MG tablet    COLACE    60 tablet    Take 100 mg by mouth as needed        furosemide 20 MG tablet    LASIX    90 tablet    Take 1 tablet (20 mg) by mouth every morning    Edema, unspecified type       gabapentin 100 MG capsule    NEURONTIN    540 capsule    Take 2 capsules (200 mg) by mouth 3 times daily    Diabetic polyneuropathy associated with type 2 diabetes mellitus (H)       glimepiride 1 MG tablet    AMARYL    90 tablet    Take 1-tablet by mouth daily    Diabetic polyneuropathy associated with type 2 diabetes mellitus (H)       lisinopril 20 MG tablet    PRINIVIL/ZESTRIL    90 tablet    Take 1 tablet (20 mg) by mouth daily    Hypertension goal BP (blood pressure) < 130/80       Multi-vitamin Tabs tablet   Generic drug:  multivitamin, therapeutic with minerals      1 TABLET DAILY        nystatin 032503 UNIT/GM Powd    MYCOSTATIN    60 g    Apply a small amount to affected area three times daily.    Intertrigo       pioglitazone 15 MG tablet    ACTOS    90 tablet    Take 1 tablet (15 mg) by mouth daily    Type 2 diabetes mellitus with diabetic nephropathy, unspecified long term insulin use status (H)       sertraline 50 MG tablet    ZOLOFT    90 tablet    Take 1 tablet (50 mg) by mouth daily    Anxiety       simvastatin 10 MG tablet    ZOCOR    90 tablet    Take 1 tablet (10 mg) by mouth At Bedtime    Hyperlipidemia with target LDL less than 100       traMADol 50 MG tablet    ULTRAM    180 tablet    Take 1-2 tablets ( mg) by mouth every 8 hours as needed for pain .  Maximum 180 per month. Weaning on this    Diabetic polyneuropathy associated with type 2 diabetes mellitus (H), Other chronic pain

## 2018-08-21 NOTE — TELEPHONE ENCOUNTER
[facility] - Dr. Villarreal -     Pt calls back.  Dr. Hernandez said not to increase it.  The last 2 weeks systolic bp has been at home 117-124, so he does not want it increased any more.    He wants her to keep it at the same dose.

## 2018-08-21 NOTE — TELEPHONE ENCOUNTER
Note below was in cc'd charts.    Note copied here:     It looks like her last 2 blood pressures have been elevated.   I do think we should adjust medication.     Help her set up visit for this to discuss options.   Or we can go ahead and increase lisinopril to 30 mg with a follow up visit in early October.     Thanks!     Called the pt.  She would like to increase to lisinopril 30 mg, but Dr. Hernandez's office is watching it also.  So, she is going to call his office and will let us know.

## 2018-09-11 ENCOUNTER — MYC MEDICAL ADVICE (OUTPATIENT)
Dept: FAMILY MEDICINE | Facility: CLINIC | Age: 83
End: 2018-09-11

## 2018-09-11 NOTE — TELEPHONE ENCOUNTER
Patient calling back. Soonest appt with Dr Villarreal is on 10/18/18. Patient does not want to do an e-visit and prefers to see her in an office visit. What do you advise? Should patient see a different provider sooner or can she get in with Dr Villarreal before she is out of the office?

## 2018-09-13 ENCOUNTER — OFFICE VISIT (OUTPATIENT)
Dept: FAMILY MEDICINE | Facility: CLINIC | Age: 83
End: 2018-09-13
Payer: COMMERCIAL

## 2018-09-13 VITALS
BODY MASS INDEX: 36.5 KG/M2 | RESPIRATION RATE: 16 BRPM | SYSTOLIC BLOOD PRESSURE: 162 MMHG | HEART RATE: 75 BPM | DIASTOLIC BLOOD PRESSURE: 64 MMHG | OXYGEN SATURATION: 96 % | TEMPERATURE: 98.1 F | HEIGHT: 64 IN | WEIGHT: 213.8 LBS

## 2018-09-13 DIAGNOSIS — E11.42 DIABETIC POLYNEUROPATHY ASSOCIATED WITH TYPE 2 DIABETES MELLITUS (H): Primary | ICD-10-CM

## 2018-09-13 DIAGNOSIS — I10 HYPERTENSION GOAL BP (BLOOD PRESSURE) < 140/90: ICD-10-CM

## 2018-09-13 DIAGNOSIS — E66.01 MORBID OBESITY (H): ICD-10-CM

## 2018-09-13 PROCEDURE — 99214 OFFICE O/P EST MOD 30 MIN: CPT | Performed by: FAMILY MEDICINE

## 2018-09-13 RX ORDER — GABAPENTIN 300 MG/1
300 CAPSULE ORAL 3 TIMES DAILY
Qty: 270 CAPSULE | Refills: 0 | Status: SHIPPED | OUTPATIENT
Start: 2018-09-13 | End: 2018-10-25

## 2018-09-13 RX ORDER — ASPIRIN 325 MG
325 TABLET ORAL DAILY
COMMUNITY
End: 2020-01-16

## 2018-09-13 RX ORDER — LISINOPRIL 30 MG/1
30 TABLET ORAL DAILY
Qty: 90 TABLET | Refills: 0 | Status: SHIPPED | OUTPATIENT
Start: 2018-09-13 | End: 2018-11-13

## 2018-09-13 NOTE — PATIENT INSTRUCTIONS
Increase the gabapentin to 300 mg at night (continuing with 200 mg morning and noon) for 3 days.    Then increase the gabapentin to 300 mg in the am and night (200 mg at noon) for the next 3 days.    Then increase the gabapentin to 300 mg three times per day.    We can go higher in the future if needed.    When you get the new pills, they will be 300 mg each.

## 2018-09-13 NOTE — MR AVS SNAPSHOT
After Visit Summary   9/13/2018    Ирина Yanez    MRN: [id number]           Patient Information     Date Of Birth          1935        Visit Information        Provider Department      9/13/2018 2:50 PM Yuridia Villarreal MD [facility]        Today's Diagnoses     Hypertension goal BP (blood pressure) < 140/90    -  1    Diabetic polyneuropathy associated with type 2 diabetes mellitus (H)          Care Instructions        Increase the gabapentin to 300 mg at night (continuing with 200 mg morning and noon) for 3 days.    Then increase the gabapentin to 300 mg in the am and night (200 mg at noon) for the next 3 days.    Then increase the gabapentin to 300 mg three times per day.    We can go higher in the future if needed.    When you get the new pills, they will be 300 mg each.                  Follow-ups after your visit        Follow-up notes from your care team     Return in about 2 months (around 11/13/2018) for Medication recheck.      Your next 10 appointments already scheduled     Tushar 15, 2019 11:30 AM CST   Return Sleep Patient with Mic Quijano MD   [facility] ([facility] [place])    [address] [place] [id number]   [phone]              Future tests that were ordered for you today     Open Future Orders        Priority Expected Expires Ordered    Basic metabolic panel Routine  9/13/2019 9/13/2018            Who to contact     If you have questions or need follow up information about today's clinic visit or your schedule please contact [facility] directly at [phone].  Normal or non-critical lab and imaging results will be communicated to you by MyChart, letter or phone within 4 business days after the clinic has received the results. If you do not hear from us within 7 days, please contact the clinic through MyChart or phone. If you have a critical or abnormal lab result, we will notify you by  "phone as soon as possible.  Submit refill requests through Tastemaker or call your pharmacy and they will forward the refill request to us. Please allow 3 business days for your refill to be completed.          Additional Information About Your Visit        Tastemaker Information     [employer] gives you secure access to your electronic health record. If you see a primary care provider, you can also send messages to your care team and make appointments. If you have questions, please call your primary care clinic.  If you do not have a primary care provider, please call [phone] and they will assist you.        Care EveryWhere ID     This is your Care EveryWhere ID. This could be used by other organizations to access your [place] medical records  [id number]        Your Vitals Were     Pulse Temperature Respirations Height Pulse Oximetry BMI (Body Mass Index)    75 98.1  F (36.7  C) (Oral) 16 5' 4\" (1.626 m) 96% 36.7 kg/m2       Blood Pressure from Last 3 Encounters:   09/13/18 162/64   08/21/18 158/68   06/12/18 152/70    Weight from Last 3 Encounters:   09/13/18 213 lb 12.8 oz (97 kg)   06/12/18 212 lb (96.2 kg)   04/09/18 203 lb 11.2 oz (92.4 kg)                 Today's Medication Changes          These changes are accurate as of 9/13/18  3:21 PM.  If you have any questions, ask your nurse or doctor.               These medicines have changed or have updated prescriptions.        Dose/Directions    aspirin 325 MG tablet   This may have changed:  Another medication with the same name was removed. Continue taking this medication, and follow the directions you see here.   Changed by:  Yuridia Villarreal MD        Dose:  325 mg   Take 325 mg by mouth daily   Refills:  0       gabapentin 300 MG capsule   Commonly known as:  NEURONTIN   This may have changed:    - medication strength  - how much to take   Used for:  Diabetic polyneuropathy associated with type 2 diabetes mellitus (H)   Changed by:  Yuridia Villarreal MD        " Dose:  300 mg   Take 1 capsule (300 mg) by mouth 3 times daily   Quantity:  270 capsule   Refills:  0       lisinopril 30 MG tablet   Commonly known as:  PRINIVIL,ZESTRIL   This may have changed:    - medication strength  - how much to take   Used for:  Hypertension goal BP (blood pressure) < 140/90   Changed by:  Yuridia Villarreal MD        Dose:  30 mg   Take 1 tablet (30 mg) by mouth daily   Quantity:  90 tablet   Refills:  0       nystatin 825092 UNIT/GM Powd   Commonly known as:  MYCOSTATIN   This may have changed:    - when to take this  - reasons to take this  - additional instructions   Used for:  Intertrigo        Apply a small amount to affected area three times daily.   Quantity:  60 g   Refills:  1            Where to get your medicines      These medications were sent to [facility] - [place], [place] - [address] AT [facility]  [address], [place] [address]     Phone:  [phone]     gabapentin 300 MG capsule    lisinopril 30 MG tablet                Primary Care Provider Office Phone # Fax #    Yuridia Villarreal -[phone]       [address] [facility] [address]        Equal Access to Services     [facility]: Hadii rosalee ku hadasho Soomaali, waaxda luqadaha, qaybta kaalmada adeegyada, flo maldonadoin hayjose rn amilcar patten. So [facility] [phone].    ATENCIÓN: Si habla español, tiene a guevara disposición servicios gratuitos de asistencia lingüística. Llame al [phone].    We comply with applicable federal civil rights laws and [place] laws. We do not discriminate on the basis of race, color, national origin, age, disability, sex, sexual orientation, or gender identity.            Thank you!     Thank you for choosing [facility]  for your care. Our goal is always to provide you with excellent care. Hearing back from our patients is one way we can continue to improve our services. Please take a few minutes to  complete the written survey that you may receive in the mail after your visit with us. Thank you!             Your Updated Medication List - Protect others around you: Learn how to safely use, store and throw away your medicines at www.disposemymeds.org.          This list is accurate as of 9/13/18  3:21 PM.  Always use your most recent med list.                   Brand Name Dispense Instructions for use Diagnosis    aspirin 325 MG tablet      Take 325 mg by mouth daily        blood glucose monitoring lancets     100 each    Use to test blood sugar 1 times daily or as directed.    Type 2 diabetes mellitus with diabetic nephropathy, unspecified long term insulin use status (H)       blood glucose monitoring test strip    no brand specified    100 strip    Use to test blood sugar 1 times daily or as directed, One Touch strips, E11.9    Type 2 diabetes mellitus with diabetic nephropathy, unspecified long term insulin use status (H)       cholecalciferol 2000 units Caps      2 daily        furosemide 20 MG tablet    LASIX    90 tablet    Take 1 tablet (20 mg) by mouth every morning    Edema, unspecified type       gabapentin 300 MG capsule    NEURONTIN    270 capsule    Take 1 capsule (300 mg) by mouth 3 times daily    Diabetic polyneuropathy associated with type 2 diabetes mellitus (H)       glimepiride 1 MG tablet    AMARYL    90 tablet    Take 1-tablet by mouth daily    Diabetic polyneuropathy associated with type 2 diabetes mellitus (H)       lisinopril 30 MG tablet    PRINIVIL,ZESTRIL    90 tablet    Take 1 tablet (30 mg) by mouth daily    Hypertension goal BP (blood pressure) < 140/90       Multi-vitamin Tabs tablet   Generic drug:  multivitamin, therapeutic with minerals      1 TABLET DAILY        nystatin 855497 UNIT/GM Powd    MYCOSTATIN    60 g    Apply a small amount to affected area three times daily.    Intertrigo       pioglitazone 15 MG tablet    ACTOS    90 tablet    Take 1 tablet (15 mg) by mouth daily     Type 2 diabetes mellitus with diabetic nephropathy, unspecified long term insulin use status (H)       sertraline 50 MG tablet    ZOLOFT    90 tablet    Take 1 tablet (50 mg) by mouth daily    Anxiety       simvastatin 10 MG tablet    ZOCOR    90 tablet    Take 1 tablet (10 mg) by mouth At Bedtime    Hyperlipidemia with target LDL less than 100

## 2018-09-13 NOTE — PROGRESS NOTES
SUBJECTIVE:   Ирина Yanez is a 83 year old female who presents to clinic today for the following health issues:      Chronic Pain Follow-Up       Type / Location of Pain: feet, back , fingers, ankle   Analgesia/pain control:       Recent changes:  worse      Overall control: Inadequate pain control  Activity level/function:      Daily activities:  Able to do moderate activities    Work:  not applicable  Adverse effects:  No  Adherance    Taking medication as directed?  No: not on tramadol any longer -gabapentin    Participating in other treatments: no -   Risk Factors:    Sleep:  Poor    Mood/anxiety:  controlled    Recent family or social stressors:  none noted    Other aggravating factors: walking, daily activities   PHQ-9 SCORE 7/14/2017 1/18/2018 8/9/2018   Total Score - - -   Total Score 0 2 1     HUBERT-7 SCORE 7/14/2017 1/18/2018 8/9/2018   Total Score - - -   Total Score 2 0 0     Encounter-Level CSA - 02/05/2015:          Controlled Substance Agreement - Scan on 2/11/2015 11:18 AM : [facility] Controlled Meds Agreement 2/5/2015 (below)                Amount of exercise or physical activity: none due to pain    Problems taking medications regularly: No    Medication side effects: none    Diet: regular (no restrictions)            Problem list and histories reviewed & adjusted, as indicated.  Additional history:     See under ROS     Patient Active Problem List   Diagnosis     Diabetic polyneuropathy (H)     Hyperlipidemia with target LDL less than 100     Hypertension goal BP (blood pressure) < 140/90     Arthritis     Anxiety     Type 2 diabetes mellitus with diabetic nephropathy (H)     Health Care Home     Malignant melanoma of skin     Vulvar dystrophy     Lichen sclerosus et atrophicus     Vitamin B12 deficiency (non anemic)     Controlled substance agreement signed 2/5/15     Major depression in partial remission (H)     CKD (chronic kidney disease) stage 4, GFR 15-29 ml/min (H)     Basal cell  carcinoma of skin      Chronic pain - diabetic neuropathy     TERESSA (obstructive sleep apnea)     Tendon rupture, traumatic. quadriceps     Right bundle branch block     S/P lumbar fusion     ACP (advance care planning)     Heart murmur     Aortic sclerosis     Opioid dependence, daily use (H)     Toe amputation status, left (H)       Current Outpatient Prescriptions   Medication Sig Dispense Refill     aspirin 325 MG tablet Take 325 mg by mouth daily       blood glucose monitoring (NO BRAND SPECIFIED) test strip Use to test blood sugar 1 times daily or as directed, One Touch strips, E11.9 100 strip 3     blood glucose monitoring (ONE TOUCH DELICA) lancets Use to test blood sugar 1 times daily or as directed. 100 each 3     cholecalciferol 2000 UNITS CAPS 2 daily       furosemide (LASIX) 20 MG tablet Take 1 tablet (20 mg) by mouth every morning 90 tablet 0     gabapentin (NEURONTIN) 100 MG capsule Take 2 capsules (200 mg) by mouth 3 times daily 540 capsule 2     glimepiride (AMARYL) 1 MG tablet Take 1-tablet by mouth daily 90 tablet 3     lisinopril (PRINIVIL/ZESTRIL) 20 MG tablet Take 1 tablet (20 mg) by mouth daily 90 tablet 0     MULTI-VITAMIN OR TABS 1 TABLET DAILY       pioglitazone (ACTOS) 15 MG tablet Take 1 tablet (15 mg) by mouth daily 90 tablet 3     sertraline (ZOLOFT) 50 MG tablet Take 1 tablet (50 mg) by mouth daily 90 tablet 0     simvastatin (ZOCOR) 10 MG tablet Take 1 tablet (10 mg) by mouth At Bedtime 90 tablet 1     nystatin (MYCOSTATIN) 553737 UNIT/GM POWD Apply a small amount to affected area three times daily. (Patient taking differently: as needed Apply a small amount to affected area three times daily.) 60 g 1       Reviewed and updated as needed this visit by clinical staff       Reviewed and updated as needed this visit by Provider         ROS:  CONSTITUTIONAL:NEGATIVE for fever, chills, change in weight  RESP:NEGATIVE for significant cough or SOB  CV: NEGATIVE for chest pain, palpitations or  "peripheral edema  MUSCULOSKELETAL: see below  PSYCHIATRIC: NEGATIVE for changes in mood or affect    bp OK in am.  Higher later in the day.    Foot pain.  Off tramadol now. Starting this past Monday.   Fingers are numb. Feet and left ankle painful with pressure.  Toes hurt.     Not constipated anymore since off tramadol    OBJECTIVE:     /64 (BP Location: Right arm, Cuff Size: Adult Large)  Pulse 75  Temp 98.1  F (36.7  C) (Oral)  Resp 16  Ht 5' 4\" (1.626 m)  Wt 213 lb 12.8 oz (97 kg)  SpO2 96%  BMI 36.7 kg/m2  Body mass index is 36.7 kg/(m^2).  GENERAL APPEARANCE: alert and no distress  RESP: lungs clear to auscultation - no rales, rhonchi or wheezes  CV: regular rates and rhythm with II/VI systolic murmur.  MS: no edema  PSYCH: mentation appears normal and affect normal/bright        ASSESSMENT/PLAN:     Diabetic polyneuropathy associated with type 2 diabetes mellitus (H)  At this time, will have her increase the gabapentin as outlined in the patient instructions.  Discussed potential side effects. Can increase more if needing and tolerating.  - gabapentin (NEURONTIN) 300 MG capsule; Take 1 capsule (300 mg) by mouth 3 times daily    Hypertension goal BP (blood pressure) < 140/90  Not optimally controlled. Increasing lisinopril.  - lisinopril (PRINIVIL,ZESTRIL) 30 MG tablet; Take 1 tablet (30 mg) by mouth daily  - Basic metabolic panel; Future    Elevated BMI with comorbidity (H)  Walking is somewhat limited with her neuropathy.  Encourage healthy nutrition.    Toe amputation status, left (H)  Partial second toe. Continue with good foot care.      Patient Instructions       Increase the gabapentin to 300 mg at night (continuing with 200 mg morning and noon) for 3 days.    Then increase the gabapentin to 300 mg in the am and night (200 mg at noon) for the next 3 days.    Then increase the gabapentin to 300 mg three times per day.    We can go higher in the future if needed.    When you get the new " pills, they will be 300 mg each.              Yuridia Villarreal MD, MD  [facility]

## 2018-09-17 ENCOUNTER — MEDICAL CORRESPONDENCE (OUTPATIENT)
Dept: HEALTH INFORMATION MANAGEMENT | Facility: CLINIC | Age: 83
End: 2018-09-17

## 2018-09-18 ENCOUNTER — DOCUMENTATION ONLY (OUTPATIENT)
Dept: FAMILY MEDICINE | Facility: CLINIC | Age: 83
End: 2018-09-18

## 2018-09-18 PROBLEM — F11.20 OPIOID DEPENDENCE, DAILY USE (H): Status: RESOLVED | Noted: 2017-07-15 | Resolved: 2018-09-18

## 2018-09-18 NOTE — PROGRESS NOTES
Rec'd note from [facility].  1.Will be increasing lisinopril to 30mg; Needs update BMP in 2 weeks; plan to complete through primary care provider   2. Gabapentin dose should NOT exceed 600mg daily with current renal status  Scanned document in.

## 2018-10-21 DIAGNOSIS — R60.9 EDEMA, UNSPECIFIED TYPE: ICD-10-CM

## 2018-10-22 NOTE — TELEPHONE ENCOUNTER
"Requested Prescriptions   Pending Prescriptions Disp Refills     furosemide (LASIX) 20 MG tablet  Last Written Prescription Date:  6/12/18  Last Fill Quantity: 90,  # refills: 0   Last office visit: 9/13/2018 with prescribing provider:  Yuridia Villarreal MD   Future Office Visit:     90 tablet 0     Sig: Take 1 tablet (20 mg) by mouth every morning    Diuretics (Including Combos) Protocol Failed    10/22/2018  9:40 AM       Failed - Blood pressure under 140/90 in past 12 months    BP Readings from Last 3 Encounters:   09/13/18 162/64   08/21/18 158/68   06/12/18 152/70                Failed - Normal serum creatinine on file in past 12 months    Recent Labs   Lab Test  06/12/18   1204   CR  1.64*             Passed - Recent (12 mo) or future (30 days) visit within the authorizing provider's specialty    Patient had office visit in the last 12 months or has a visit in the next 30 days with authorizing provider or within the authorizing provider's specialty.  See \"Patient Info\" tab in inbasket, or \"Choose Columns\" in Meds & Orders section of the refill encounter.             Passed - Patient is age 18 or older       Passed - No active pregancy on record       Passed - Normal serum potassium on file in past 12 months    Recent Labs   Lab Test  06/12/18   1204   POTASSIUM  5.0                   Passed - Normal serum sodium on file in past 12 months    Recent Labs   Lab Test  06/12/18   1204   NA  143             Passed - No positive pregnancy test in past 12 months          "

## 2018-10-24 ENCOUNTER — TELEPHONE (OUTPATIENT)
Dept: FAMILY MEDICINE | Facility: CLINIC | Age: 83
End: 2018-10-24

## 2018-10-24 RX ORDER — FUROSEMIDE 20 MG
20 TABLET ORAL EVERY MORNING
Qty: 90 TABLET | Refills: 0 | Status: SHIPPED | OUTPATIENT
Start: 2018-10-24 | End: 2018-11-27

## 2018-10-24 NOTE — TELEPHONE ENCOUNTER
Routing refill request to provider for review/approval because:  Labs out of range:  Creatinine, blood pressure

## 2018-10-24 NOTE — TELEPHONE ENCOUNTER
Patient calling: States switching from Tramadol to gabapentin and it is giving her diarrhea - has had it for the last month.  This morning she had a cup of tea and within 30 minutes she was in the bathroom - has had multiple instances of incontinence with this as well. When she was on Tramadol she was constipated, this is a new symptom with the increased gabapentin use.     Next 5 appointments (look out 90 days)     Oct 25, 2018  1:50 PM CDT   [facility]    [address] [facility] [address] [phone]                Claudia MENSAH, Triage RN

## 2018-10-24 NOTE — TELEPHONE ENCOUNTER
GFR Estimate   Date Value Ref Range Status   06/12/2018 30 (L) >60 mL/min/1.7m2 Final     Comment:     Non  GFR Calc   06/01/2018 28 (L) >60 mL/min/1.7m2 Final     Comment:     Non  GFR Calc   12/19/2017 22 >=60 ml/min/1.73m2 Final     GFR Estimate If Black   Date Value Ref Range Status   06/12/2018 36 (L) >60 mL/min/1.7m2 Final     Comment:      GFR Calc   06/01/2018 34 (L) >60 mL/min/1.7m2 Final     Comment:      GFR Calc   12/19/2017 25 >=60 ml/min/1.73m2 Final

## 2018-10-25 ENCOUNTER — OFFICE VISIT (OUTPATIENT)
Dept: FAMILY MEDICINE | Facility: CLINIC | Age: 83
End: 2018-10-25
Payer: COMMERCIAL

## 2018-10-25 VITALS
WEIGHT: 215.4 LBS | OXYGEN SATURATION: 98 % | BODY MASS INDEX: 36.77 KG/M2 | HEIGHT: 64 IN | TEMPERATURE: 97.6 F | DIASTOLIC BLOOD PRESSURE: 78 MMHG | RESPIRATION RATE: 16 BRPM | SYSTOLIC BLOOD PRESSURE: 158 MMHG | HEART RATE: 60 BPM

## 2018-10-25 DIAGNOSIS — E11.21 TYPE 2 DIABETES MELLITUS WITH DIABETIC NEPHROPATHY, WITHOUT LONG-TERM CURRENT USE OF INSULIN (H): ICD-10-CM

## 2018-10-25 DIAGNOSIS — R19.5 LOOSE STOOLS: ICD-10-CM

## 2018-10-25 DIAGNOSIS — M48.061 SPINAL STENOSIS OF LUMBAR REGION WITHOUT NEUROGENIC CLAUDICATION: ICD-10-CM

## 2018-10-25 DIAGNOSIS — N18.4 CKD (CHRONIC KIDNEY DISEASE) STAGE 4, GFR 15-29 ML/MIN (H): ICD-10-CM

## 2018-10-25 DIAGNOSIS — G47.00 INSOMNIA, UNSPECIFIED TYPE: ICD-10-CM

## 2018-10-25 DIAGNOSIS — F32.4 MAJOR DEPRESSIVE DISORDER IN PARTIAL REMISSION, UNSPECIFIED WHETHER RECURRENT (H): ICD-10-CM

## 2018-10-25 DIAGNOSIS — E11.42 DIABETIC POLYNEUROPATHY ASSOCIATED WITH TYPE 2 DIABETES MELLITUS (H): Primary | ICD-10-CM

## 2018-10-25 PROCEDURE — 99214 OFFICE O/P EST MOD 30 MIN: CPT | Performed by: FAMILY MEDICINE

## 2018-10-25 RX ORDER — GABAPENTIN 100 MG/1
CAPSULE ORAL
Qty: 90 CAPSULE | COMMUNITY
Start: 2018-10-25 | End: 2018-12-13 | Stop reason: DRUGHIGH

## 2018-10-25 NOTE — PROGRESS NOTES
SUBJECTIVE:   Ирина Yanez is a 83 year old female who presents to clinic today for the following health issues:      Diarrhea      Duration: x 1 month     Description:       Consistency of stool: watery and loose       Blood in stool: no        Number of loose stools past 24 hours: 2    Intensity:  Mild to severe    Accompanying signs and symptoms:       Fever: no        Nausea/vomitting: no        Abdominal pain: YES- cramping        Weight loss: no     History (recent antibiotics or travel/ill contacts/med changes/testing done): Gabapentin     Precipitating or alleviating factors: Had been on Tramadol which caused constipation and now gabapentin is causing diarrhea - is having incontinence due to the urge of diarrhea     Therapies tried and outcome: none          Problem list and histories reviewed & adjusted, as indicated.  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 quadriceps     Right bundle branch block     S/P lumbar fusion     ACP (advance care planning)     Heart murmur     Aortic sclerosis     Toe amputation status, left (H)     Elevated BMI with comorbidity (H)       Current Outpatient Prescriptions   Medication Sig Dispense Refill     aspirin 325 MG tablet Take 325 mg by mouth daily       blood glucose monitoring  (NO BRAND SPECIFIED) test strip Use to test blood sugar 1 times daily or as directed, One Touch strips, E11.9 100 strip 3     blood glucose monitoring (ONE TOUCH DELICA) lancets Use to test blood sugar 1 times daily or as directed. 100 each 3     cholecalciferol 2000 UNITS CAPS 2 daily       gabapentin (NEURONTIN) 100 MG capsule 2 po am and bedtime; 1 po noon 90 capsule      glimepiride (AMARYL) 1 MG tablet Take 1-tablet by mouth daily 90 tablet 3     lisinopril (PRINIVIL,ZESTRIL) 30 MG tablet Take 1 tablet (30 mg) by mouth daily 90 tablet 0     MULTI-VITAMIN OR TABS 1 TABLET DAILY       furosemide (LASIX) 20 MG tablet Take 1 tablet (20 mg) by mouth every morning (Patient not taking: Reported on 10/25/2018) 90 tablet 0     nystatin (MYCOSTATIN) 278614 UNIT/GM POWD Apply a small amount to affected area three times daily. (Patient taking differently: as needed Apply a small amount to affected area three times daily.) 60 g 1     pioglitazone (ACTOS) 15 MG tablet Take 1 tablet (15 mg) by mouth daily (Patient not taking: Reported on 10/25/2018) 90 tablet 3     sertraline (ZOLOFT) 50 MG tablet Take 1 tablet (50 mg) by mouth daily 90 tablet 0     simvastatin (ZOCOR) 10 MG tablet Take 1 tablet (10 mg) by mouth At Bedtime 90 tablet 1     [DISCONTINUED] gabapentin (NEURONTIN) 300 MG capsule Take 1 capsule (300 mg) by mouth 3 times daily 270 capsule 0         Reviewed and updated as needed this visit by clinical staff       Reviewed and updated as needed this visit by Provider         ROS:  CONSTITUTIONAL:NEGATIVE for fever, chills, change in weight  RESP:NEGATIVE for significant cough or SOB  CV: NEGATIVE for chest pain, palpitations or peripheral edema  GI: see below  Neuro: see below  MUSCULOSKELETAL: see below  PSYCHIATRIC: NEGATIVE for changes in mood or affect    After being off tramadol x 2 weeks, having diarrhea.  Thinking that it may be from gabapentin.    Taking 200, 100, 200 mg gabapentin at this time.   Had been on  "200 tid. No change when dropeped down.    Having a bm usually twice per day.  (none yet today).  Just like water.  No blood.    Gabapentin has helped the pain; maybe not as well.   Pain usually comes in ~ 4-5 o clock. Feet.   Once falls asleep does not feel pain.   Hands are numb. Not painful.  Neither medication seemed to do much for this.    Has a CPAP.  Sees sleep provider.     Lost some medications/  Has not had lasix this week.   Took an extra glimepiride today as glucose higher. actos was also ? Lost.    Seeing Nephrology; believes appointment soon.  Will see mid November.      Having some back pain.  Did have fusion.  Walks bent over; can't straighten up.  Walks with a walker better; if going any distance.   Would like her own walker.   Does not seem to be related to her legs.  Worse in the am and then loosens up.    physical therapy helped; but does not do at home.    Can't get up so difficult to lay on the floor to do the exercises.  Feels needs 4 wheels with brakes. So can really walk.   Would want a seat.  History of lumbar stenosis. No leg pain.    OBJECTIVE:     /78 (BP Location: Right arm, Cuff Size: Adult Large)  Pulse 60  Temp 97.6  F (36.4  C) (Oral)  Resp 16  Ht 5' 4\" (1.626 m)  Wt 215 lb 6.4 oz (97.7 kg)  SpO2 98%  BMI 36.97 kg/m2  Body mass index is 36.97 kg/(m^2).  GENERAL APPEARANCE: alert and no distress  RESP: lungs clear to auscultation - no rales, rhonchi or wheezes  CV: regular rates and rhythm  ABDOMEN: soft, nontender, without hepatosplenomegaly or masses  MS: no edema. She does move stiffly. No walker with her now.   PSYCH: mentation appears normal and affect normal/bright    GFR Estimate   Date Value Ref Range Status   06/12/2018 30 (L) >60 mL/min/1.7m2 Final     Comment:     Non  GFR Calc   06/01/2018 28 (L) >60 mL/min/1.7m2 Final     Comment:     Non  GFR Calc   12/19/2017 22 >=60 ml/min/1.73m2 Final     PHQ-9 SCORE 7/14/2017 1/18/2018 " 8/9/2018   Total Score - - -   Total Score 0 2 1       HUBERT-7 SCORE 7/14/2017 1/18/2018 8/9/2018   Total Score - - -   Total Score 2 0 0                 ASSESSMENT/PLAN:       Diabetic polyneuropathy associated with type 2 diabetes mellitus (H)  She feels stools looser with gabapentin.  Might also be lack of opioid.  She mostly needs at night; will have her try to take the gabapentin at night.     Loose stools  As above.   Lowering overall dose gabapentin.  Also discussed fiber as a bulking agent. She is not going frequently, but describes as loose.     Spinal stenosis of lumbar region without neurogenic claudication  Will start the process of ordering a walker for her.   Also discussed she may wish to see physical therapy. They may be able to adapt exercises so she is better able to do these at home.   - order for DME; Equipment being ordered: walker. 4 wheeled with brakes and a seat.    CKD (chronic kidney disease) stage 4, GFR 15-29 ml/min (H)  She will be seeing Nephrology.  Reviewed renal dosing of gabapentin as well.     Major depressive disorder in partial remission, unspecified whether recurrent (H)  Stable.     Type 2 diabetes mellitus with diabetic nephropathy, without long-term current use of insulin (H)      Insomnia, unspecified type  Reviewed some sleep hygiene measures.       Patient Instructions       Try using gabapentin at night only; 300 mg.    You may want to add some fiber in to bulk up your stool.        General recommendations for sleep problems (Insomnia)   (Allow 2-4 weeks to see results)   Establish a regular sleep schedule   Go to bed at same time each night   Get up at same time each day   Avoid sleeping-in on Sunday morning   Cut down time in bed (if not asleep, get up)   Avoid trying to force yourself to sleep   Use your bed only for sleep and sex   Do not read or watch Television in bed   Make the bedroom comfortable   Keep temperature in your bedroom comfortable   Keep bedroom quiet  when sleeping   Consider ear plugs (silicon)   Keep bedroom dark enough   Use dark blinds or wear an eye mask if needed   Relax at bedtime   Relax each muscle group individually   Begin with your feet   Work toward your head   Deal with your worries before bedtime   Set aside a worry time for 30 minutes earlier   Listen to relaxation tapes   Classical Music or Nature sounds   Imagine a tranquil scene (e.g. waterfall or beach)   Back Massage   Gentle 5-minute back rub prior to bedtime   Perform measures to make you tired at bedtime   Get regular Exercise each day (6 hours before bedtime)   Take medications only as directed   Eat a light bedtime snack or warm drink   Warm milk   Warm herbal tea (non-caffeinated)   Special Therapy Measures   Sleep Restriction Therapy   Limit time in bed for 15 minutes more than sleep   Do not set limit under 4 hours   Increase time by 15 minutes per effective sleep trial   Effective sleep suggests >90% of bedtime asleep   Stimulus Control   Do not lie awake for more than 30 minutes   Get out of bed and perform a quiet activity   Return to bed when sleepy   Repeat as many times per night as needed   No napping during day   Things to avoid   Do not Exercise just before bedtime   No overstimulating activities just before bed   No competitive games before bedtime   No exciting Television programs before bedtime   Avoid caffeine after lunchtime   Avoid chocolate   Avoid coffee, tea, or soda   Do not use alcohol to induce sleep (worsens Insomnia)   Do not take someone else's sleeping pills   Do not look at the clock when awakening   Do not turn on light when getting up to use bathroom   Read the book Say Good Night To Insomnia            Yuridia Villarreal MD, MD  [facility]

## 2018-10-25 NOTE — PATIENT INSTRUCTIONS
Try using gabapentin at night only; 300 mg.    You may want to add some fiber in to bulk up your stool.        General recommendations for sleep problems (Insomnia)   (Allow 2-4 weeks to see results)   Establish a regular sleep schedule   Go to bed at same time each night   Get up at same time each day   Avoid sleeping-in on Sunday morning   Cut down time in bed (if not asleep, get up)   Avoid trying to force yourself to sleep   Use your bed only for sleep and sex   Do not read or watch Television in bed   Make the bedroom comfortable   Keep temperature in your bedroom comfortable   Keep bedroom quiet when sleeping   Consider ear plugs (silicon)   Keep bedroom dark enough   Use dark blinds or wear an eye mask if needed   Relax at bedtime   Relax each muscle group individually   Begin with your feet   Work toward your head   Deal with your worries before bedtime   Set aside a worry time for 30 minutes earlier   Listen to relaxation tapes   Classical Music or Nature sounds   Imagine a tranquil scene (e.g. waterfall or beach)   Back Massage   Gentle 5-minute back rub prior to bedtime   Perform measures to make you tired at bedtime   Get regular Exercise each day (6 hours before bedtime)   Take medications only as directed   Eat a light bedtime snack or warm drink   Warm milk   Warm herbal tea (non-caffeinated)   Special Therapy Measures   Sleep Restriction Therapy   Limit time in bed for 15 minutes more than sleep   Do not set limit under 4 hours   Increase time by 15 minutes per effective sleep trial   Effective sleep suggests >90% of bedtime asleep   Stimulus Control   Do not lie awake for more than 30 minutes   Get out of bed and perform a quiet activity   Return to bed when sleepy   Repeat as many times per night as needed   No napping during day   Things to avoid   Do not Exercise just before bedtime   No overstimulating activities just before bed   No competitive games before bedtime   No exciting Television  programs before bedtime   Avoid caffeine after lunchtime   Avoid chocolate   Avoid coffee, tea, or soda   Do not use alcohol to induce sleep (worsens Insomnia)   Do not take someone else's sleeping pills   Do not look at the clock when awakening   Do not turn on light when getting up to use bathroom   Read the book Say Good Night To Insomnia

## 2018-10-25 NOTE — MR AVS SNAPSHOT
After Visit Summary   10/25/2018    Ирина Yanez    MRN: [id number]           Patient Information     Date Of Birth          1935        Visit Information        Provider Department      10/25/2018 1:50 PM Yuridia Villarreal MD [facility]        Today's Diagnoses     Type 2 diabetes mellitus with diabetic nephropathy, without long-term current use of insulin (H)    -  1    Major depressive disorder in partial remission, unspecified whether recurrent (H)        CKD (chronic kidney disease) stage 4, GFR 15-29 ml/min (H)        Diabetic polyneuropathy associated with type 2 diabetes mellitus (H)          Care Instructions        Try using gabapentin at night only; 300 mg.    You may want to add some fiber in to bulk up your stool.        General recommendations for sleep problems (Insomnia)   (Allow 2-4 weeks to see results)   Establish a regular sleep schedule   Go to bed at same time each night   Get up at same time each day   Avoid sleeping-in on Sunday morning   Cut down time in bed (if not asleep, get up)   Avoid trying to force yourself to sleep   Use your bed only for sleep and sex   Do not read or watch Television in bed   Make the bedroom comfortable   Keep temperature in your bedroom comfortable   Keep bedroom quiet when sleeping   Consider ear plugs (silicon)   Keep bedroom dark enough   Use dark blinds or wear an eye mask if needed   Relax at bedtime   Relax each muscle group individually   Begin with your feet   Work toward your head   Deal with your worries before bedtime   Set aside a worry time for 30 minutes earlier   Listen to relaxation tapes   Classical Music or Nature sounds   Imagine a tranquil scene (e.g. waterfall or beach)   Back Massage   Gentle 5-minute back rub prior to bedtime   Perform measures to make you tired at bedtime   Get regular Exercise each day (6 hours before bedtime)   Take medications only as directed   Eat a light bedtime snack or warm drink    Warm milk   Warm herbal tea (non-caffeinated)   Special Therapy Measures   Sleep Restriction Therapy   Limit time in bed for 15 minutes more than sleep   Do not set limit under 4 hours   Increase time by 15 minutes per effective sleep trial   Effective sleep suggests >90% of bedtime asleep   Stimulus Control   Do not lie awake for more than 30 minutes   Get out of bed and perform a quiet activity   Return to bed when sleepy   Repeat as many times per night as needed   No napping during day   Things to avoid   Do not Exercise just before bedtime   No overstimulating activities just before bed   No competitive games before bedtime   No exciting Television programs before bedtime   Avoid caffeine after lunchtime   Avoid chocolate   Avoid coffee, tea, or soda   Do not use alcohol to induce sleep (worsens Insomnia)   Do not take someone else's sleeping pills   Do not look at the clock when awakening   Do not turn on light when getting up to use bathroom   Read the book Say Good Night To Insomnia                Follow-ups after your visit        Your next 10 appointments already scheduled     Tushar 15, 2019 11:30 AM CST   Return Sleep Patient with Mic Quijano MD   [facility] ([facility])    [address] [place] [address] [phone]              Who to contact     If you have questions or need follow up information about today's clinic visit or your schedule please contact [facility] directly at [phone].  Normal or non-critical lab and imaging results will be communicated to you by MyChart, letter or phone within 4 business days after the clinic has received the results. If you do not hear from us within 7 days, please contact the clinic through MyChart or phone. If you have a critical or abnormal lab result, we will notify you by phone as soon as possible.  Submit refill requests through [employer] or call your pharmacy and they will  "forward the refill request to us. Please allow 3 business days for your refill to be completed.          Additional Information About Your Visit        [employer] Information     [employer] gives you secure access to your electronic health record. If you see a primary care provider, you can also send messages to your care team and make appointments. If you have questions, please call your primary care clinic.  If you do not have a primary care provider, please call [phone] and they will assist you.        Care EveryWhere ID     This is your Care EveryWhere ID. This could be used by other organizations to access your [facility] medical records  [id number]        Your Vitals Were     Pulse Temperature Respirations Height Pulse Oximetry BMI (Body Mass Index)    60 97.6  F (36.4  C) (Oral) 16 5' 4\" (1.626 m) 98% 36.97 kg/m2       Blood Pressure from Last 3 Encounters:   10/25/18 158/78   09/13/18 162/64   08/21/18 158/68    Weight from Last 3 Encounters:   10/25/18 215 lb 6.4 oz (97.7 kg)   09/13/18 213 lb 12.8 oz (97 kg)   06/12/18 212 lb (96.2 kg)              Today, you had the following     No orders found for display         Today's Medication Changes          These changes are accurate as of 10/25/18  2:31 PM.  If you have any questions, ask your nurse or doctor.               These medicines have changed or have updated prescriptions.        Dose/Directions    gabapentin 100 MG capsule   Commonly known as:  NEURONTIN   This may have changed:  Another medication with the same name was removed. Continue taking this medication, and follow the directions you see here.   Changed by:  Yuridia Villarreal MD        2 po am and bedtime; 1 po noon   Quantity:  90 capsule   Refills:  0       nystatin 290578 UNIT/GM Powd   Commonly known as:  MYCOSTATIN   This may have changed:    - when to take this  - reasons to take this  - additional instructions   Used for:  Intertrigo        Apply a small amount to affected area three times " daily.   Quantity:  60 g   Refills:  1                Primary Care Provider Office Phone # Fax #    Yuridia Villarreal [phone]       [id number]  [facility] [id number]        Equal Access to Services     FREDI DODGE : Hadii aad ku hadmonicao Soomaali, waaxda luqadaha, qaybta kaalmada adeegyada, waxmarilyn neumannn adesusy beltran lanayelimirtha patten. So [facility] [phone].    ATENCIÓN: Si habla español, tiene a guevara disposición servicios gratuitos de asistencia lingüística. Llame al [phone].    We comply with applicable federal civil rights laws and [place] laws. We do not discriminate on the basis of race, color, national origin, age, disability, sex, sexual orientation, or gender identity.            Thank you!     Thank you for choosing [facility]  for your care. Our goal is always to provide you with excellent care. Hearing back from our patients is one way we can continue to improve our services. Please take a few minutes to complete the written survey that you may receive in the mail after your visit with us. Thank you!             Your Updated Medication List - Protect others around you: Learn how to safely use, store and throw away your medicines at www.disposemymeds.org.          This list is accurate as of 10/25/18  2:31 PM.  Always use your most recent med list.                   Brand Name Dispense Instructions for use Diagnosis    aspirin 325 MG tablet      Take 325 mg by mouth daily        blood glucose monitoring lancets     100 each    Use to test blood sugar 1 times daily or as directed.    Type 2 diabetes mellitus with diabetic nephropathy, unspecified long term insulin use status       blood glucose monitoring test strip    no brand specified    100 strip    Use to test blood sugar 1 times daily or as directed, One Touch strips, E11.9    Type 2 diabetes mellitus with diabetic nephropathy, unspecified long term insulin use status       cholecalciferol 2000 units Caps      2  daily        furosemide 20 MG tablet    LASIX    90 tablet    Take 1 tablet (20 mg) by mouth every morning    Edema, unspecified type       gabapentin 100 MG capsule    NEURONTIN    90 capsule    2 po am and bedtime; 1 po noon        glimepiride 1 MG tablet    AMARYL    90 tablet    Take 1-tablet by mouth daily    Diabetic polyneuropathy associated with type 2 diabetes mellitus (H)       lisinopril 30 MG tablet    PRINIVIL,ZESTRIL    90 tablet    Take 1 tablet (30 mg) by mouth daily    Hypertension goal BP (blood pressure) < 140/90       Multi-vitamin Tabs tablet   Generic drug:  multivitamin, therapeutic with minerals      1 TABLET DAILY        nystatin [id number] UNIT/GM Powd    MYCOSTATIN    60 g    Apply a small amount to affected area three times daily.    Intertrigo       pioglitazone 15 MG tablet    ACTOS    90 tablet    Take 1 tablet (15 mg) by mouth daily    Type 2 diabetes mellitus with diabetic nephropathy, unspecified long term insulin use status       sertraline 50 MG tablet    ZOLOFT    90 tablet    Take 1 tablet (50 mg) by mouth daily    Anxiety       simvastatin 10 MG tablet    ZOCOR    90 tablet    Take 1 tablet (10 mg) by mouth At Bedtime    Hyperlipidemia with target LDL less than 100

## 2018-11-05 DIAGNOSIS — F41.9 ANXIETY: ICD-10-CM

## 2018-11-05 NOTE — TELEPHONE ENCOUNTER
Requested Prescriptions   Pending Prescriptions Disp Refills     sertraline (ZOLOFT) 50 MG tablet  Last Written Prescription Date:  08/9/2018  Last Fill Quantity: 90 tablet,  # refills: 0   Last Office Visit: 10/25/2018   Future Office Visit:      90 tablet 0     Sig: Take 1 tablet (50 mg) by mouth daily    There is no refill protocol information for this order

## 2018-11-13 DIAGNOSIS — E78.5 HYPERLIPIDEMIA WITH TARGET LDL LESS THAN 100: ICD-10-CM

## 2018-11-13 DIAGNOSIS — I10 HYPERTENSION GOAL BP (BLOOD PRESSURE) < 140/90: ICD-10-CM

## 2018-11-13 NOTE — TELEPHONE ENCOUNTER
"Requested Prescriptions   Pending Prescriptions Disp Refills     simvastatin (ZOCOR) 10 MG tablet  Last Written Prescription Date:  3/27/18  Last Fill Quantity: 90,  # refills: 1   Last office visit: 10/25/2018 with prescribing provider:  Yuridia Villarreal MD    Future Office Visit:     90 tablet 1     Sig: Take 1 tablet (10 mg) by mouth At Bedtime    Statins Protocol Failed    11/13/2018  7:43 AM       Failed - LDL on file in past 12 months    Recent Labs   Lab Test  07/17/17   0749   LDL  88            Passed - No abnormal creatine kinase in past 12 months    No lab results found.            Passed - Recent (12 mo) or future (30 days) visit within the authorizing provider's specialty    Patient had office visit in the last 12 months or has a visit in the next 30 days with authorizing provider or within the authorizing provider's specialty.  See \"Patient Info\" tab in inbasket, or \"Choose Columns\" in Meds & Orders section of the refill encounter.             Passed - Patient is age 18 or older       Passed - No active pregnancy on record       Passed - No positive pregnancy test in past 12 months        lisinopril (PRINIVIL,ZESTRIL) 30 MG tablet  Last Written Prescription Date:  9/13/18  Last Fill Quantity: 90,  # refills: 0   Last office visit: 10/25/2018 with prescribing provider:  Yuridia Villarreal MD    Future Office Visit:     90 tablet 0     Sig: Take 1 tablet (30 mg) by mouth daily    ACE Inhibitors (Including Combos) Protocol Failed    11/13/2018  7:43 AM       Failed - Blood pressure under 140/90 in past 12 months    BP Readings from Last 3 Encounters:   10/25/18 158/78   09/13/18 162/64   08/21/18 158/68                Failed - Normal serum creatinine on file in past 12 months    Recent Labs   Lab Test  06/12/18   1204   08/02/12   1354   CR  1.64*   < >   --    CREAT   --    --   1.6*    < > = values in this interval not displayed.            Passed - Recent (12 mo) or future (30 days) visit within the " "authorizing provider's specialty    Patient had office visit in the last 12 months or has a visit in the next 30 days with authorizing provider or within the authorizing provider's specialty.  See \"Patient Info\" tab in inbasket, or \"Choose Columns\" in Meds & Orders section of the refill encounter.             Passed - Patient is age 18 or older       Passed - No active pregnancy on record       Passed - Normal serum potassium on file in past 12 months    Recent Labs   Lab Test  06/12/18   1204   POTASSIUM  5.0            Passed - No positive pregnancy test in past 12 months          "

## 2018-11-14 RX ORDER — LISINOPRIL 30 MG/1
30 TABLET ORAL DAILY
Qty: 90 TABLET | Refills: 0 | Status: SHIPPED | OUTPATIENT
Start: 2018-11-14 | End: 2018-11-27

## 2018-11-14 RX ORDER — SIMVASTATIN 10 MG
10 TABLET ORAL AT BEDTIME
Qty: 90 TABLET | Refills: 0 | Status: SHIPPED | OUTPATIENT
Start: 2018-11-14 | End: 2019-02-27

## 2018-11-14 NOTE — TELEPHONE ENCOUNTER
Routing refill request to provider for review/approval because:  Due for fasting labs, bp elevated and creatinine elevated    Will forward to the station, please help the pt schedule an appt for fasting labs.  Thanks!

## 2018-11-14 NOTE — TELEPHONE ENCOUNTER
"Requested Prescriptions   Pending Prescriptions Disp Refills     sertraline (ZOLOFT) 50 MG tablet 90 tablet 0     Sig: Take 1 tablet (50 mg) by mouth daily    SSRIs Protocol Passed    11/6/2018  2:21 PM       Passed - Recent (12 mo) or future (30 days) visit within the authorizing provider's specialty    Patient had office visit in the last 12 months or has a visit in the next 30 days with authorizing provider or within the authorizing provider's specialty.  See \"Patient Info\" tab in inbasket, or \"Choose Columns\" in Meds & Orders section of the refill encounter.             Passed - Patient is age 18 or older       Passed - No active pregnancy on record       Passed - No positive pregnancy test in last 12 months        PHQ-9 SCORE 7/14/2017 1/18/2018 8/9/2018   Total Score - - -   Total Score 0 2 1   Prescription approved per [facility] Refill Protocol.  Ginna La RN       "

## 2018-11-15 ENCOUNTER — TRANSFERRED RECORDS (OUTPATIENT)
Dept: HEALTH INFORMATION MANAGEMENT | Facility: CLINIC | Age: 83
End: 2018-11-15

## 2018-11-15 ENCOUNTER — DOCUMENTATION ONLY (OUTPATIENT)
Dept: LAB | Facility: CLINIC | Age: 83
End: 2018-11-15

## 2018-11-15 NOTE — PROGRESS NOTES
This patient is coming to lab TOMORROW.  orders have been placed for you to associate and sign. Please future any further labs you may want, thanks [facility] lab staff.

## 2018-11-15 NOTE — PROGRESS NOTES
She sees Endocrinology for her DM; so I removed the HgbA1C  If she wants this, can do, but I suspect she will follow with Dr. Verde.

## 2018-11-16 DIAGNOSIS — I10 HYPERTENSION GOAL BP (BLOOD PRESSURE) < 140/90: ICD-10-CM

## 2018-11-16 DIAGNOSIS — E78.5 HYPERLIPIDEMIA WITH TARGET LDL LESS THAN 100: ICD-10-CM

## 2018-11-16 LAB
ALBUMIN SERPL-MCNC: 3.5 G/DL (ref 3.4–5)
ALP SERPL-CCNC: 78 U/L (ref 40–150)
ALT SERPL W P-5'-P-CCNC: 21 U/L (ref 0–50)
ANION GAP SERPL CALCULATED.3IONS-SCNC: 7 MMOL/L (ref 3–14)
AST SERPL W P-5'-P-CCNC: 18 U/L (ref 0–45)
BILIRUB SERPL-MCNC: 0.4 MG/DL (ref 0.2–1.3)
BUN SERPL-MCNC: 41 MG/DL (ref 7–30)
CALCIUM SERPL-MCNC: 9.4 MG/DL (ref 8.5–10.1)
CHLORIDE SERPL-SCNC: 105 MMOL/L (ref 94–109)
CHOLEST SERPL-MCNC: 196 MG/DL
CO2 SERPL-SCNC: 26 MMOL/L (ref 20–32)
CREAT SERPL-MCNC: 1.66 MG/DL (ref 0.52–1.04)
CREAT UR-MCNC: 147 MG/DL
GFR SERPL CREATININE-BSD FRML MDRD: 30 ML/MIN/1.7M2
GLUCOSE SERPL-MCNC: 167 MG/DL (ref 70–99)
HDLC SERPL-MCNC: 41 MG/DL
LDLC SERPL CALC-MCNC: 92 MG/DL
MICROALBUMIN UR-MCNC: 745 MG/L
MICROALBUMIN/CREAT UR: 506.8 MG/G CR (ref 0–25)
NONHDLC SERPL-MCNC: 155 MG/DL
POTASSIUM SERPL-SCNC: 4.3 MMOL/L (ref 3.4–5.3)
PROT SERPL-MCNC: 7.5 G/DL (ref 6.8–8.8)
SODIUM SERPL-SCNC: 138 MMOL/L (ref 133–144)
TRIGL SERPL-MCNC: 315 MG/DL

## 2018-11-16 PROCEDURE — 36415 COLL VENOUS BLD VENIPUNCTURE: CPT | Performed by: FAMILY MEDICINE

## 2018-11-16 PROCEDURE — 82043 UR ALBUMIN QUANTITATIVE: CPT | Performed by: FAMILY MEDICINE

## 2018-11-16 PROCEDURE — 80061 LIPID PANEL: CPT | Performed by: FAMILY MEDICINE

## 2018-11-16 PROCEDURE — 80053 COMPREHEN METABOLIC PANEL: CPT | Performed by: FAMILY MEDICINE

## 2018-11-27 DIAGNOSIS — N18.4 CKD (CHRONIC KIDNEY DISEASE) STAGE 4, GFR 15-29 ML/MIN (H): ICD-10-CM

## 2018-11-27 DIAGNOSIS — I10 HYPERTENSION GOAL BP (BLOOD PRESSURE) < 140/90: Primary | ICD-10-CM

## 2018-11-27 RX ORDER — GLUCOSAMINE HCL 500 MG
1 TABLET ORAL DAILY
COMMUNITY
Start: 2018-11-27 | End: 2019-09-27

## 2018-11-27 RX ORDER — TORSEMIDE 10 MG/1
10 TABLET ORAL DAILY
Qty: 30 TABLET | Refills: 1 | Status: ON HOLD | COMMUNITY
Start: 2018-11-27 | End: 2020-12-10

## 2018-11-27 RX ORDER — IRBESARTAN 150 MG/1
150 TABLET ORAL DAILY
Qty: 30 TABLET | Refills: 1 | COMMUNITY
Start: 2018-11-27 | End: 2019-09-27

## 2018-11-28 DIAGNOSIS — N18.4 CKD (CHRONIC KIDNEY DISEASE) STAGE 4, GFR 15-29 ML/MIN (H): Primary | ICD-10-CM

## 2018-12-13 ENCOUNTER — OFFICE VISIT (OUTPATIENT)
Dept: FAMILY MEDICINE | Facility: CLINIC | Age: 83
End: 2018-12-13
Payer: COMMERCIAL

## 2018-12-13 VITALS
OXYGEN SATURATION: 99 % | WEIGHT: 207.8 LBS | HEIGHT: 64 IN | DIASTOLIC BLOOD PRESSURE: 68 MMHG | BODY MASS INDEX: 35.48 KG/M2 | HEART RATE: 79 BPM | TEMPERATURE: 98.6 F | SYSTOLIC BLOOD PRESSURE: 152 MMHG | RESPIRATION RATE: 16 BRPM

## 2018-12-13 DIAGNOSIS — R19.5 LOOSE STOOLS: ICD-10-CM

## 2018-12-13 DIAGNOSIS — R25.2 LEG CRAMPS: ICD-10-CM

## 2018-12-13 DIAGNOSIS — I10 HYPERTENSION GOAL BP (BLOOD PRESSURE) < 140/90: ICD-10-CM

## 2018-12-13 DIAGNOSIS — R05.9 COUGH: Primary | ICD-10-CM

## 2018-12-13 DIAGNOSIS — J06.9 VIRAL UPPER RESPIRATORY TRACT INFECTION: ICD-10-CM

## 2018-12-13 DIAGNOSIS — N18.4 CKD (CHRONIC KIDNEY DISEASE) STAGE 4, GFR 15-29 ML/MIN (H): ICD-10-CM

## 2018-12-13 PROCEDURE — 83735 ASSAY OF MAGNESIUM: CPT | Performed by: INTERNAL MEDICINE

## 2018-12-13 PROCEDURE — 36415 COLL VENOUS BLD VENIPUNCTURE: CPT | Performed by: INTERNAL MEDICINE

## 2018-12-13 PROCEDURE — 80048 BASIC METABOLIC PNL TOTAL CA: CPT | Performed by: INTERNAL MEDICINE

## 2018-12-13 PROCEDURE — 99214 OFFICE O/P EST MOD 30 MIN: CPT | Performed by: FAMILY MEDICINE

## 2018-12-13 RX ORDER — AZITHROMYCIN 250 MG/1
TABLET, FILM COATED ORAL
Qty: 6 TABLET | Refills: 0 | Status: SHIPPED | OUTPATIENT
Start: 2018-12-13 | End: 2018-12-17

## 2018-12-13 RX ORDER — GABAPENTIN 300 MG/1
300 CAPSULE ORAL AT BEDTIME
COMMUNITY
End: 2019-12-03

## 2018-12-13 RX ORDER — ALBUTEROL SULFATE 90 UG/1
2 AEROSOL, METERED RESPIRATORY (INHALATION) EVERY 6 HOURS PRN
Qty: 1 INHALER | Refills: 0 | Status: SHIPPED | OUTPATIENT
Start: 2018-12-13 | End: 2019-03-22

## 2018-12-13 ASSESSMENT — MIFFLIN-ST. JEOR: SCORE: 1392.57

## 2018-12-13 NOTE — PROGRESS NOTES
SUBJECTIVE:   Ирина Yanez is a 83 year old female who presents to clinic today for the following health issues:      Acute Illness   Acute illness concerns: cough  Onset: x 2 months -sore and now the last 3 days more symptoms persisted    Fever: YES- low grade     Chills/Sweats: no     Headache (location?): no     Sinus Pressure:YES- post-nasal drainage    Conjunctivitis:  no    Ear Pain: YES: bilateral    Rhinorrhea: YES    Congestion: YES- chest    Sore Throat: YES     Cough: YES-productive of clear sputum    Wheeze: YES    Decreased Appetite: YES    Nausea: no     Vomiting: no     Diarrhea:  no     Dysuria/Freq.: no     Fatigue/Achiness: no     Sick/Strep Exposure: YES- sister-in-law had pneumonia about a month ago     Therapies Tried and outcome: mucinex- effectiveness, airborne           Problem list and histories reviewed & adjusted, as indicated.  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 quadriceps     Right bundle branch block     S/P lumbar fusion     ACP (advance care planning)     Heart murmur     Aortic sclerosis     Toe amputation status, left (H)     Elevated BMI with comorbidity (H)       Current Outpatient Medications   Medication Sig Dispense Refill     gabapentin (NEURONTIN) 300 MG capsule Take 300 mg by  mouth At Bedtime       glimepiride (AMARYL) 1 MG tablet Take 1-tablet by mouth daily 90 tablet 3     irbesartan (AVAPRO) 150 MG tablet Take 1 tablet (150 mg) by mouth daily . Through Nephrology 30 tablet 1     MULTI-VITAMIN OR TABS 1 TABLET DAILY       nystatin (MYCOSTATIN) 458383 UNIT/GM POWD Apply a small amount to affected area three times daily. (Patient taking differently: as needed Apply a small amount to affected area three times daily.) 60 g 1     order for DME Equipment being ordered: walker. 4 wheeled with brakes and a seat. 1 Device 0     pioglitazone (ACTOS) 15 MG tablet Take 1 tablet (15 mg) by mouth daily 90 tablet 3     sertraline (ZOLOFT) 50 MG tablet Take 1 tablet (50 mg) by mouth daily 90 tablet 0     simvastatin (ZOCOR) 10 MG tablet Take 1 tablet (10 mg) by mouth At Bedtime 90 tablet 0     torsemide (DEMADEX) 10 MG tablet Take 1 tablet (10 mg) by mouth daily . Through Nephrology 30 tablet 1     aspirin 325 MG tablet Take 325 mg by mouth daily       blood glucose monitoring (NO BRAND SPECIFIED) test strip Use to test blood sugar 1 times daily or as directed, One Touch strips, E11.9 100 strip 3     blood glucose monitoring (ONE TOUCH DELICA) lancets Use to test blood sugar 1 times daily or as directed. 100 each 3     Cholecalciferol (VITAMIN D3) 3000 units TABS Take 1 tablet by mouth daily . Through Nephrology           Reviewed and updated as needed this visit by clinical staff       Reviewed and updated as needed this visit by Provider         ROS:  CONSTITUTIONAL:NEGATIVE for fever, chills, change in weight  ENT/MOUTH: see below  RESP:see below  CV: NEGATIVE for chest pain, palpitations or peripheral edema  GI: improved diarrhea.  PSYCHIATRIC: NEGATIVE for changes in mood or affect    Mild sore throat about 2 months ago.  Gradually worsened.  Now has congestion, runny nose, sneezing, ear pain. Cough.  Better today than yesterday.   No facial pain.  No fever. Felt like might though.  Cough has  "clear phlegm.  Occasional wheeze.  Feels difficult to expand the lungs to get things out.   Cough three to four days.   Runny nose same.     Thinking might have allergy to wheat flour or celiac.   Diarrhea did improve when stopped eating it.     Dr. Hernandez wanted her to get a blood test.   At home, running low.   113-115/  Has not had her cuff checked.    Does ask if vitamin B would help with leg cramps.       OBJECTIVE:     /68 (BP Location: Right arm, Cuff Size: Adult Large)   Pulse 79   Temp 98.6  F (37  C) (Oral)   Resp 16   Ht 1.626 m (5' 4\")   Wt 94.3 kg (207 lb 12.8 oz)   SpO2 99%   BMI 35.67 kg/m    Body mass index is 35.67 kg/m .  GENERAL APPEARANCE: alert and no distress  HENT: ear canals and TM's normal and nose and mouth without ulcers or lesions  NECK: no adenopathy  RESP: lungs clear to auscultation - no rales, rhonchi or wheezes  CV: regular rates and rhythm  MS: no edema  PSYCH: mentation appears normal and affect normal/bright    Reviewed Nephrology note.     ASSESSMENT/PLAN:       1. Cough  At this time, suspect viral.   She has noted some improvement.   Suspect a reactive component; did give albuterol. Discussed potential side effects.   Hand written prescription for zithromax given in the event she takes a turn for the worse; Also see patient instructions.   - azithromycin (ZITHROMAX) 250 MG tablet; 2 day one followed by 1 daily days 2-5  Dispense: 6 tablet; Refill: 0  - albuterol (PROAIR HFA/PROVENTIL HFA/VENTOLIN HFA) 108 (90 Base) MCG/ACT inhaler; Inhale 2 puffs into the lungs every 6 hours as needed for shortness of breath / dyspnea or wheezing  Dispense: 1 Inhaler; Refill: 0    2. Viral upper respiratory tract infection  Also with URI.   Discussed eustacian tube disfunction; given education materials from our database and from UP TO DATE.    3. Hypertension goal BP (blood pressure) < 140/90  Not controlled here.  Recent med change as per renal.  She is to call him with her " values. Do recommend she check her cuff.     4. CKD (chronic kidney disease) stage 4, GFR 15-29 ml/min (H)  Through Renal.   - Basic metabolic panel    5. Leg cramps  Will add magnsesium. Bmp being obtained. Encourage stretching. hydration  - Magnesium    6. Loose stools  She notes better with avoiding gluten.  Discussed testing (blood, biopsy); but would want to be on higher gluten diet to do testing.         Yuridia Villarreal MD, MD  [facility]

## 2018-12-13 NOTE — PATIENT INSTRUCTIONS
Patient Education     Earache, No Infection (Adult)  Earaches can happen without an infection. This occurs when air and fluid build up behind the eardrum causing a feeling of fullness and discomfort and reduced hearing. This is called otitis media with effusion (OME) or serous otitis media. It means there is fluid in the middle ear. It is not the same as acute otitis media, which is typically from infection.  OME can happen when you have a cold if congestion blocks the passage that drains the middle ear. This passage is called the eustachian tube. OME may also occur with nasal allergies or after a bacterial middle ear infection.    The pain or discomfort may come and go. You may hear clicking or popping sounds when you chew or swallow. You may feel that your balance is off. Or you may hear ringing in the ear.  It often takes from several weeks up to 3 months for the fluid to clear on its own. Oral pain relievers and ear drops help if there is pain. Decongestants and antihistamines sometimes help. Antibiotics don't help since there is no infection. Your doctor may prescribe a nasal spray to help reduce swelling in the nose and eustachian tube. This can allow the ear to drain.  If your OME doesn't improve after 3 months, surgery may be used to drain the fluid and insert a small tube in the eardrum to allow continued drainage.  Because the middle ear fluid can become infected, it is important to watch for signs of an ear infection which may develop later. These signs include increased ear pain, fever, or drainage from the ear.  Home care  The following guidelines will help you care for yourself at home:    You may use over-the-counter medicine as directed to control pain, unless another medicine was prescribed. If you have chronic liver or kidney disease or ever had a stomach ulcer or GI bleeding, talk with your doctor before using these medicines. Aspirin should never be used in anyone under 18 years of age who is  ill with a fever. It may cause severe liver damage.    You may use over-the-counter decongestants such as phenylephrine or pseudoephedrine. But they are not always helpful. Don't use nasal spray decongestants more than 3 days. Longer use can make congestion worse. Prescription nasal sprays from your doctor don't typically have those restrictions.    Antihistamines may help if you are also having allergy symptoms.    You may use medicines such as guaifenesin to thin mucus and promote drainage.  Follow-up care  Follow up with your healthcare provider or as advised if you are not feeling better after 3 days.  When to seek medical advice  Call your healthcare provider right away if any of the following occur:    Your ear pain gets worse or does not start to improve     Fever of 100.4 F (38 C) or higher, or as directed by your healthcare provider    Fluid or blood draining from the ear    Headache or sinus pain    Stiff neck    Unusual drowsiness or confusion  Date Last Reviewed: 10/1/2016    7877-0566 [employer]. [address] [place] [address]. All rights reserved. This information is not intended as a substitute for professional medical care. Always follow your healthcare professional's instructions.             OK to use Mucinex or Mucinex DM.  Do not use the one with sudafed.  Fluids and humidity can help.    If things are not opening up in your head, flonase could help.    For the cough, try albuterol inhaler (prescription). Use as needed.  You can take cough medication with it.      I do feel this is viral.   At this time, I do not think antibiotics would be helpful.  I would recommend if you take a turn for the worse after 7-10 days of illness or if you are not improving after 10-14 days, that you start the antibiotic.  If you do not need this, you can rip up the prescription.

## 2018-12-14 ENCOUNTER — TELEPHONE (OUTPATIENT)
Dept: FAMILY MEDICINE | Facility: CLINIC | Age: 83
End: 2018-12-14

## 2018-12-14 DIAGNOSIS — R05.9 COUGH: Primary | ICD-10-CM

## 2018-12-14 LAB
ANION GAP SERPL CALCULATED.3IONS-SCNC: 6 MMOL/L (ref 3–14)
BUN SERPL-MCNC: 40 MG/DL (ref 7–30)
CALCIUM SERPL-MCNC: 9.8 MG/DL (ref 8.5–10.1)
CHLORIDE SERPL-SCNC: 105 MMOL/L (ref 94–109)
CO2 SERPL-SCNC: 29 MMOL/L (ref 20–32)
CREAT SERPL-MCNC: 1.99 MG/DL (ref 0.52–1.04)
GFR SERPL CREATININE-BSD FRML MDRD: 24 ML/MIN/1.7M2
GLUCOSE SERPL-MCNC: 130 MG/DL (ref 70–99)
MAGNESIUM SERPL-MCNC: 2.4 MG/DL (ref 1.6–2.3)
POTASSIUM SERPL-SCNC: 4.8 MMOL/L (ref 3.4–5.3)
SODIUM SERPL-SCNC: 140 MMOL/L (ref 133–144)

## 2018-12-14 NOTE — TELEPHONE ENCOUNTER
Patient called and stated the Zpack is too pricey. She is requesting another medication be sent to the pharmacy.

## 2018-12-17 RX ORDER — AZITHROMYCIN 250 MG/1
TABLET, FILM COATED ORAL
Qty: 6 TABLET | Refills: 0 | COMMUNITY
Start: 2018-12-17 | End: 2019-03-22

## 2018-12-17 RX ORDER — AMOXICILLIN 500 MG/1
500 CAPSULE ORAL 2 TIMES DAILY
Qty: 20 CAPSULE | Refills: 0 | Status: CANCELLED | OUTPATIENT
Start: 2018-12-17 | End: 2018-12-27

## 2018-12-17 NOTE — TELEPHONE ENCOUNTER
Let's go with amoxicillin.    I see allergies to augmentin; but the reaction was diarrhea (not a true allergy).  This is less likely with amox.    This is t'd up; see what pharmacy she would like it sent to and send.    Thanks!

## 2018-12-17 NOTE — TELEPHONE ENCOUNTER
Called patient to notify: Patient states there was a misunderstanding. She is no in need of a different medication. She just could not wait for the z-marylu to come from her mail order pharmacy. She ended up being able to take it to a local pharmacy. She is taking the z-marylu and feeling much better. She states her cough is still lingering however but she is using her albuterol inhaler (although a little dismayed by the $55 price tag on that). She would like Dr. Villarreal to know the abx are really making a difference.      Claudia MENSAH, Triage RN

## 2019-01-15 ENCOUNTER — OFFICE VISIT (OUTPATIENT)
Dept: SLEEP MEDICINE | Facility: CLINIC | Age: 84
End: 2019-01-15
Payer: COMMERCIAL

## 2019-01-15 VITALS
OXYGEN SATURATION: 98 % | BODY MASS INDEX: 35.68 KG/M2 | HEIGHT: 64 IN | DIASTOLIC BLOOD PRESSURE: 69 MMHG | SYSTOLIC BLOOD PRESSURE: 123 MMHG | HEART RATE: 81 BPM | WEIGHT: 209 LBS

## 2019-01-15 DIAGNOSIS — G47.33 OSA (OBSTRUCTIVE SLEEP APNEA): Primary | ICD-10-CM

## 2019-01-15 PROCEDURE — 99213 OFFICE O/P EST LOW 20 MIN: CPT | Performed by: INTERNAL MEDICINE

## 2019-01-15 ASSESSMENT — MIFFLIN-ST. JEOR: SCORE: 1398.27

## 2019-01-15 NOTE — PROGRESS NOTES
Obstructive Sleep Apnea - PAP Follow-Up Visit:    Chief Complaint   Patient presents with     CPAP Follow Up     Follow up verna     Sleep Problem     Difficulty sleeping       Ирина Yanez comes in today for follow-up of their severe complex sleep apnea, managed with ASV.     PSG from 8/23/2001 showed an AHI of 88 per hour. Treatment emergent central apneas were noted with CPAP. Titration PSG on 01/03/2016 showed good response to ASV.     Medical history is significant for HTN, DM-2, and chronic kidney disease.     Echocardiogram from 2015 showed ejection fraction 55-60%.     Overall, she rates the experience with PAP as 9 (0 poor, 10 great). The mask is comfortable. The mask is not leaking.  She is not snoring with the mask on. She is not having gasp arousals.  She is not having significant oral/nasal dryness. The pressure settings are comfortable.     She uses full-face mask.     Bedtime is typically 10- 10:30 pm. Usually it takes about 10 minutes to fall asleep with the mask on. Wake time is typically 6:30 am.  Patient is using PAP therapy 7 hours per night. The patient is usually getting 7-8 hours of sleep per night.    She does feel rested in the morning.    Total score - [place]: 8 (1/16/2018 10:01 AM)    Respironics    ASV min EPAP 6 cmH2O, max EPAP 12 cm, min PS 0, max PS 13 and max pressure 25 cm H2O download:  30/30 total days of use. 0 nonuse days.  Average use 7 hours 2 minutes per day.  90% days with >4 hours use.  Large leak 0 secs per day average. EPAP 90% pressure 11.6cm. AHI 8.8 (CA index: 3.2, OA index: 4.5, hypopnea index: 1.1) % PB 2.4%, average PS 4.6        Reviewed by team:      Reviewed by provider:        Problem List:  Patient Active Problem List    Diagnosis Date Noted     Health Care Home 07/27/2011     Priority: High       Date Noted Care Team Member TO DO/Alerts to be completed   7/27/2011   MD Dr Mehreen Mills rechecking kidney function and prescribes lisinopril                 Diagnosis Specialist Due to be seen Following   DM Mehreen June 2012    Melanoma, skin cancer [facility]; [facility] July 2012                    DX V65.8 REPLACED WITH 32270 [facility]ALTH CARE HOME (04/08/2013)       Elevated BMI with comorbidity (H) 09/13/2018     Priority: Medium     Toe amputation status, left (H) 07/15/2017     Priority: Medium     Heart murmur 01/02/2017     Priority: Medium     Aortic sclerosis 01/02/2017     Priority: Medium     ACP (advance care planning) 11/21/2016     Priority: Medium     Advance Care Planning 11/21/2016: Receipt of ACP document:  Received: Health Care Directive which was witnessed or notarized on 9-16-16.  Document previously scanned on 9-20-16.  Validation form completed and sent to be scanned.  Code Status reflects choices in most recent ACP document.  Confirmed/documented designated decision maker(s).  Added by Yelitza Richter RN Advance Care Planning Liaison with [employer]             S/P lumbar fusion 09/16/2016     Priority: Medium     Right bundle branch block 09/13/2016     Priority: Medium     Tendon rupture, traumatic. quadriceps 10/27/2015     Priority: Medium     TERESSA (obstructive sleep apnea) 10/12/2015     Priority: Medium      Chronic pain - diabetic neuropathy 07/05/2015     Priority: Medium     Patient is followed by Data Unavailable for ongoing prescription of pain medication.  All refills should be approved by this provider, or covering partner.    Medication(s): tramadol.   Maximum quantity per month: 180  Clinic visit frequency required: Q 6  months     Controlled substance agreement on file: Yes       Date(s): 2/5/15    Pain Clinic evaluation in the past: No    DIRE Total Score(s):  No flowsheet data found.    Last [facility] website verification:  Checked on 02/23/16 and pt is compliant.Federica Singleton RN.     [email or web address]         Basal cell carcinoma of skin 05/11/2015     Priority: Medium     CKD (chronic kidney disease) stage  "4, GFR 15-29 ml/min (H) 05/07/2015     Priority: Medium     Controlled substance agreement signed 2/5/15 02/05/2015     Priority: Medium     Patient is followed by RAOUL MCCOY for ongoing prescription of narcotic pain medicine.  Med: tramadol.   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 Please review for accuracy       Type 2 diabetes mellitus with diabetic nephropathy (H) 10/31/2010     Priority: Medium     Hypertension goal BP (blood pressure) < 140/90 06/30/2010     Priority: Medium     Arthritis 06/30/2010     Priority: Medium     Anxiety 06/30/2010     Priority: Medium     Hyperlipidemia with target LDL less than 100      Priority: Medium     Diagnosis updated by automated process. Provider to review and confirm.       Diabetic polyneuropathy (H) 12/20/2007     Priority: Medium     Problem list name updated by automated process. Provider to review            /68   Pulse 81   Ht 1.626 m (5' 4.02\")   Wt 94.8 kg (209 lb)   SpO2 98%   BMI 35.86 kg/m      Impression/Plan:     Severe complex sleep apnea.     - Tolerating ASV well. Daytime symptoms are stable. Regular compliance and satisfactory AHI is demonstrated on ASV download. AHI is slightly high at 8.8.     Plan:     1. Continue ASV therapy     Insomnia, intermittent     - There is elements of psychophysiologic hyperatopsiasl and inadequate sleep hygeine with TV in bed and daytime naps contributing to insomnia. Nights with sleep initiation insomnia average one night per week. She was counseled on " better sleep hygeine and stimulus control.       Ирина JOHNS Renata will follow up in about 1 year(s).     Fifteen minutes spent with patient, all of which were spent face-to-face counseling, consulting, coordinating plan of care.      Mic Quijano MD, MD    CC:  Yuridia Villarreal,

## 2019-01-15 NOTE — NURSING NOTE
"Chief Complaint   Patient presents with     CPAP Follow Up     Follow up verna     Sleep Problem     Difficulty sleeping       Initial /68   Pulse 81   Ht 1.626 m (5' 4.02\")   Wt 94.8 kg (209 lb)   SpO2 98%   BMI 35.86 kg/m   Estimated body mass index is 35.86 kg/m  as calculated from the following:    Height as of this encounter: 1.626 m (5' 4.02\").    Weight as of this encounter: 94.8 kg (209 lb).    Medication Reconciliation: complete  Shelley Jama MA      "

## 2019-01-15 NOTE — PATIENT INSTRUCTIONS
Your Body mass index is 35.86 kg/m .  Weight management is a personal decision.  If you are interested in exploring weight loss strategies, the following discussion covers the approaches that may be successful. Body mass index (BMI) is one way to tell whether you are at a healthy weight, overweight, or obese. It measures your weight in relation to your height.  A BMI of 18.5 to 24.9 is in the healthy range. A person with a BMI of 25 to 29.9 is considered overweight, and someone with a BMI of 30 or greater is considered obese. More than two-thirds of American adults are considered overweight or obese.  Being overweight or obese increases the risk for further weight gain. Excess weight may lead to heart disease and diabetes.  Creating and following plans for healthy eating and physical activity may help you improve your health.  Weight control is part of healthy lifestyle and includes exercise, emotional health, and healthy eating habits. Careful eating habits lifelong are the mainstay of weight control. Though there are significant health benefits from weight loss, long-term weight loss with diet alone may be very difficult to achieve- studies show long-term success with dietary management in less than 10% of people. Attaining a healthy weight may be especially difficult to achieve in those with severe obesity. In some cases, medications, devices and surgical management might be considered.  What can you do?  If you are overweight or obese and are interested in methods for weight loss, you should discuss this with your provider.     Consider reducing daily calorie intake by 500 calories.     Keep a food journal.     Avoiding skipping meals, consider cutting portions instead.    Diet combined with exercise helps maintain muscle while optimizing fat loss. Strength training is particularly important for building and maintaining muscle mass. Exercise helps reduce stress, increase energy, and improves fitness.  Increasing exercise without diet control, however, may not burn enough calories to loose weight.       Start walking three days a week 10-20 minutes at a time    Work towards walking thirty minutes five days a week     Eventually, increase the speed of your walking for 1-2 minutes at time    In addition, we recommend that you review healthy lifestyles and methods for weight loss available through the National Institutes of Health patient information sites:  http://win.niddk.nih.gov/publications/index.htm    And look into health and wellness programs that may be available through your health insurance provider, employer, local community center, or [employer]olph club.    Weight management plan: Patient was referred to their PCP to discuss a diet and exercise plan.

## 2019-02-22 DIAGNOSIS — E78.5 HYPERLIPIDEMIA WITH TARGET LDL LESS THAN 100: ICD-10-CM

## 2019-02-22 DIAGNOSIS — F41.9 ANXIETY: ICD-10-CM

## 2019-02-25 NOTE — TELEPHONE ENCOUNTER
"Requested Prescriptions   Pending Prescriptions Disp Refills     simvastatin (ZOCOR) 10 MG tablet  Last Written Prescription Date:  11/14/18  Last Fill Quantity: 90,  # refills: 0   Last office visit: 12/13/2018 with prescribing provider:  Yuridia Villarreal MD    Future Office Visit:   Next 5 appointments (look out 90 days)    Mar 22, 2019 10:30 AM CDT  Return Visit with Reji Verde MD  [address] [id number] [phone]          90 tablet 0     Sig: Take 1 tablet (10 mg) by mouth At Bedtime    Statins Protocol Passed - 2/25/2019  9:26 AM       Passed - LDL on file in past 12 months    Recent Labs   Lab Test 11/16/18  0830   LDL 92            Passed - No abnormal creatine kinase in past 12 months    No lab results found.            Passed - Recent (12 mo) or future (30 days) visit within the authorizing provider's specialty    Patient had office visit in the last 12 months or has a visit in the next 30 days with authorizing provider or within the authorizing provider's specialty.  See \"Patient Info\" tab in inbasket, or \"Choose Columns\" in Meds & Orders section of the refill encounter.             Passed - Medication is active on med list       Passed - Patient is age 18 or older       Passed - No active pregnancy on record       Passed - No positive pregnancy test in past 12 months        sertraline (ZOLOFT) 50 MG tablet  Last Written Prescription Date:  11/14/18  Last Fill Quantity: 90, # refills:0    Last office visit: 12/13/2018 with prescribing provider:  Yuridia Villarreal MD    Future Office Visit:   Next 5 appointments (look out 90 days)    Mar 22, 2019 10:30 AM CDT  [facility]  [facility] [address] [id number] [phone]          90 tablet 0     Sig: Take 1 tablet (50 mg) by mouth daily    SSRIs Protocol Passed - 2/25/2019  " "9:26 AM  PHQ-9 SCORE 7/14/2017 1/18/2018 8/9/2018   PHQ-9 Total Score - - -   PHQ-9 Total Score 0 2 1     HUBERT-7 SCORE 7/14/2017 1/18/2018 8/9/2018   Total Score - - -   Total Score 2 0 0              Passed - Recent (12 mo) or future (30 days) visit within the authorizing provider's specialty    Patient had office visit in the last 12 months or has a visit in the next 30 days with authorizing provider or within the authorizing provider's specialty.  See \"Patient Info\" tab in inbasket, or \"Choose Columns\" in Meds & Orders section of the refill encounter.             Passed - Medication is active on med list       Passed - Patient is age 18 or older       Passed - No active pregnancy on record       Passed - No positive pregnancy test in last 12 months          "

## 2019-02-26 ENCOUNTER — TRANSFERRED RECORDS (OUTPATIENT)
Dept: HEALTH INFORMATION MANAGEMENT | Facility: CLINIC | Age: 84
End: 2019-02-26

## 2019-02-26 LAB — RETINOPATHY: NEGATIVE

## 2019-02-27 RX ORDER — SIMVASTATIN 10 MG
10 TABLET ORAL AT BEDTIME
Qty: 90 TABLET | Refills: 1 | Status: SHIPPED | OUTPATIENT
Start: 2019-02-27 | End: 2019-08-11

## 2019-02-27 ASSESSMENT — PATIENT HEALTH QUESTIONNAIRE - PHQ9: SUM OF ALL RESPONSES TO PHQ QUESTIONS 1-9: 0

## 2019-02-27 NOTE — TELEPHONE ENCOUNTER
Prescription approved per [facility] Refill Protocol - need update phq9 for the zoloft    Will forward to the station, please call and update the pts phq9. Thanks!

## 2019-03-22 ENCOUNTER — OFFICE VISIT (OUTPATIENT)
Dept: ENDOCRINOLOGY | Facility: CLINIC | Age: 84
End: 2019-03-22
Payer: COMMERCIAL

## 2019-03-22 ENCOUNTER — TELEPHONE (OUTPATIENT)
Dept: ENDOCRINOLOGY | Facility: CLINIC | Age: 84
End: 2019-03-22

## 2019-03-22 VITALS
WEIGHT: 215 LBS | BODY MASS INDEX: 36.7 KG/M2 | HEART RATE: 62 BPM | SYSTOLIC BLOOD PRESSURE: 144 MMHG | DIASTOLIC BLOOD PRESSURE: 61 MMHG | HEIGHT: 64 IN

## 2019-03-22 DIAGNOSIS — E11.21 TYPE 2 DIABETES MELLITUS WITH DIABETIC NEPHROPATHY, WITHOUT LONG-TERM CURRENT USE OF INSULIN (H): Primary | ICD-10-CM

## 2019-03-22 LAB
ANION GAP SERPL CALCULATED.3IONS-SCNC: 8 MMOL/L (ref 3–14)
BUN SERPL-MCNC: 61 MG/DL (ref 7–30)
CALCIUM SERPL-MCNC: 9.5 MG/DL (ref 8.5–10.1)
CHLORIDE SERPL-SCNC: 109 MMOL/L (ref 94–109)
CO2 SERPL-SCNC: 27 MMOL/L (ref 20–32)
CREAT SERPL-MCNC: 1.97 MG/DL (ref 0.52–1.04)
CREAT UR-MCNC: 20 MG/DL
GFR SERPL CREATININE-BSD FRML MDRD: 23 ML/MIN/{1.73_M2}
GLUCOSE SERPL-MCNC: 128 MG/DL (ref 70–99)
HBA1C MFR BLD: 6.9 % (ref 0–5.6)
MICROALBUMIN UR-MCNC: 17 MG/L
MICROALBUMIN/CREAT UR: 85.22 MG/G CR (ref 0–25)
POTASSIUM SERPL-SCNC: 4.7 MMOL/L (ref 3.4–5.3)
SODIUM SERPL-SCNC: 144 MMOL/L (ref 133–144)

## 2019-03-22 PROCEDURE — 82043 UR ALBUMIN QUANTITATIVE: CPT | Performed by: INTERNAL MEDICINE

## 2019-03-22 PROCEDURE — 36415 COLL VENOUS BLD VENIPUNCTURE: CPT | Performed by: INTERNAL MEDICINE

## 2019-03-22 PROCEDURE — 80048 BASIC METABOLIC PNL TOTAL CA: CPT | Performed by: INTERNAL MEDICINE

## 2019-03-22 PROCEDURE — 83036 HEMOGLOBIN GLYCOSYLATED A1C: CPT | Performed by: INTERNAL MEDICINE

## 2019-03-22 PROCEDURE — 99214 OFFICE O/P EST MOD 30 MIN: CPT | Performed by: INTERNAL MEDICINE

## 2019-03-22 PROCEDURE — 99207 C FOOT EXAM  NO CHARGE: CPT | Performed by: INTERNAL MEDICINE

## 2019-03-22 ASSESSMENT — MIFFLIN-ST. JEOR: SCORE: 1420.23

## 2019-03-22 NOTE — PROGRESS NOTES
Name: Ирина Yanez      HPI:  Recent issues:  Here for diabetes evaluation  No new health issues reported        Diagnosis of diabetes mellitus age 64  Has taken metformin, then discontinue 2/2015 due to rise in creatinine  12/2017. Dose reduction of glimeparide  Current DM meds:   Glimeparide 1 mg 1-tab po QAM   Pioglitazone 15 mg 1-tab po QAM  Using One Touch BG meter   Tests infrequently, 197 mg/dl this morning  Meals BID typically  Previous [facility] labs include:  Lab Results   Component Value Date    A1C 6.9 (H) 03/22/2019     12/13/2018    POTASSIUM 4.8 12/13/2018    CHLORIDE 105 12/13/2018    CO2 29 12/13/2018    ANIONGAP 6 12/13/2018     (H) 12/13/2018    BUN 40 (H) 12/13/2018    CR 1.99 (H) 12/13/2018    GFRESTIMATED 24 (L) 12/13/2018    GFRESTBLACK 29 (L) 12/13/2018    EDNA 9.8 12/13/2018    CHOL 196 11/16/2018    TRIG 315 (H) 11/16/2018    HDL 41 (L) 11/16/2018    LDL 92 11/16/2018    NHDL 155 (H) 11/16/2018    UCRR 147 11/16/2018    MICROL 745 11/16/2018    UMALCR 506.80 (H) 11/16/2018     Hyperlipidemia:   Has taken fenofibrate, discontinued 2011   Current dose simvastatin 10 mg po QHS and fish oil supplement 1000 mg every day  Last eye exam with Dr. DIPAK Gupta 2/2017, no DR  DM Complications:   Neuropathy    Intermittent feet pains    Has tried Tramadol also gabapentin    Current gabapentin 300 mg 1-capsule at bedtime    Weares Docsox feet open-toe compression socks    Nephropathy    CKD    Sees Dr. KEMI Whatley/[facility]      ,  Gus, cat Flossy  Sees Dr. Yuridia Villarreal/[facility] for general medicine evaluations.    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     Depression      Diabetes mellitus (H)     sees Dr. Verde     Heart murmur      HTN      Hyperlipidemia LDL goal < 100     Dr. Verde     Lichen sclerosus et atrophicus 2/15/2013     Melanoma (H)       Peripheral Neuropathy     hands, feet; used to see Dr. Tl Das     Skin cancer     face; basal and other. Melanoma. Dolan     Sleep apnea     CPAP     Spinal stenosis      Past Surgical History:   Procedure Laterality Date     AMPUTATE TOE(S)  8/23/2012    left second toe Procedure: AMPUTATE TOE(S);;  Surgeon: Mil Jolly DPM;  Location: [facility] OR     CHOLECYSTECTOMY  Nov. 1975     COLONOSCOPY  early 2009    repeat 4-5 years (Had one prior to this with polyps)     COLONOSCOPY  Sept 2014    incomplete; recommend colography     OPTICAL TRACKING SYSTEM FUSION POSTERIOR SPINE LUMBAR N/A 9/16/2016    Procedure: OPTICAL TRACKING SYSTEM FUSION SPINE POSTERIOR LUMBAR ONE LEVEL;  Surgeon: Lennox Blue MD;  Location: [facility] OR     PET, REC OF MELANOMA/MET CA Left     arm     REPAIR HAMMER TOE BILATERAL  8/23/2012    Procedure: REPAIR HAMMER TOE BILATERAL;  Flexor Tenotomy 2,3,4,5 Toes both feet, Partial 2nd toe amputation left foot;  Surgeon: Mil Jolly DPM;  Location: [facility] OR     REPAIR TENDON QUADRICEPS Right 10/27/2015    Procedure: REPAIR TENDON QUADRICEPS;  Surgeon: Antonio Yi MD;  Location: [facility] OR     SURGICAL HISTORY OF -   1997    bilateral knee replacement     SURGICAL HISTORY OF -   1997    right knee revised; patella tendon ruptured     SURGICAL HISTORY OF -       surgery for spinal stenosis     SURGICAL HISTORY OF -       breast reduction surgery     SURGICAL HISTORY OF -       D and C        Family Hx:  Family History   Problem Relation Age of Onset     Cerebrovascular Disease Mother      Heart Disease Father      Neurologic Disorder Sister         ALS     C.A.D. Sister      Diabetes Sister      C.A.D. Brother      Psychotic Disorder Brother         [place] war      Gastrointestinal Disease Brother         diverticulitis         Social Hx:  Social History     Socioeconomic History     Marital status:      Spouse name: Not on file     Number of children: Not on file      Years of education: Not on file     Highest education level: Not on file   Occupational History     Occupation:  from home     Employer: RETIRED   Social Needs     Financial resource strain: Not on file     Food insecurity:     Worry: Not on file     Inability: Not on file     Transportation needs:     Medical: Not on file     Non-medical: Not on file   Tobacco Use     Smoking status: Never Smoker     Smokeless tobacco: Never Used   Substance and Sexual Activity     Alcohol use: No     Drug use: No     Sexual activity: Yes     Partners: Male   Lifestyle     Physical activity:     Days per week: Not on file     Minutes per session: Not on file     Stress: Not on file   Relationships     Social connections:     Talks on phone: Not on file     Gets together: Not on file     Attends Hindu service: Not on file     Active member of club or organization: Not on file     Attends meetings of clubs or organizations: Not on file     Relationship status: Not on file     Intimate partner violence:     Fear of current or ex partner: Not on file     Emotionally abused: Not on file     Physically abused: Not on file     Forced sexual activity: Not on file   Other Topics Concern      Service Not Asked     Blood Transfusions Not Asked     Caffeine Concern Not Asked     Occupational Exposure Not Asked     Hobby Hazards Not Asked     Sleep Concern Not Asked     Stress Concern Not Asked     Weight Concern Not Asked     Special Diet No     Comment: getting fruits and veggies.      Back Care Not Asked     Exercise No     Comment: gets little; limited with back and feet/leg pain.     Bike Helmet Not Asked     Seat Belt Not Asked     Self-Exams Not Asked     Parent/sibling w/ CABG, MI or angioplasty before 65F 55M? Yes   Social History Narrative    2 adopted children          MEDICATIONS:  has a current medication list which includes the following prescription(s): aspirin, blood glucose, blood glucose monitoring,  "vitamin d3, gabapentin, glimepiride, irbesartan, multi-vitamin, nystatin, pioglitazone, sertraline, simvastatin, torsemide, and order for dme.    ROS:     ROS: 10 point ROS neg other than the symptoms noted above in the HPI.    GENERAL: some fatigue, wt stable; denies fevers, chills, night sweats.   HEENT: no dysphagia, odonophagia, diplopia, neck pain  THYROID:  no apparent hyper or hypothyroid symptoms  CV: no chest pain, pressure, palpitations  LUNGS: no SOB, GARAY, cough, wheezing   ABDOMEN: no diarrhea, constipation, abdominal pain  EXTREMITIES: no rashes, ulcers, edema  NEUROLOGY: leg/foot pains, decreased feet sensation; no headaches, denies changes in vision, tingling  MSK: no muscle aches or pains, weakness  SKIN: no rashes or lesions  : no menses, denies hot flashes  PSYCH:  stable mood, no significant anxiety or depression  ENDOCRINE: no heat or cold intolerance    Physical Exam   VS: /61   Pulse 62   Ht 1.626 m (5' 4\")   Wt 97.5 kg (215 lb)   BMI 36.90 kg/m    GENERAL: AXOX3, NAD, well dressed, answering questions appropriately, appears stated age.  THYROID:  normal gland, no apparent nodules or goiter  ABDOMEN: soft, nontender, nondistended  EXTREMITIES: no edema, absent left 2nd toe s/p amputation; +pedal pulses, no lesions   Weares Docsox feet compression socks   NEUROLOGY: walks with cane; CN grossly intact, no tremors  SKIN: thickened right great toenail, bilateral knee scars; no rashes, no lesions    LABS:    All pertinent notes, labs, and images personally reviewed by me.     A/P:  Encounter Diagnosis   Name Primary?     Type 2 diabetes mellitus with diabetic nephropathy, without long-term current use of insulin (H) Yes       Comments:  Reviewed health history and diabetes issues.  Difficult to assess glycemic control without more frequent BG trend data.    Plan:  Discussed general issues with the diabetes diagnosis and management  We discussed the hgbA1c test which reflects previous " overall glucose levels or control  Discussed the importance of blood glucose (BG) testing to assess glucose trends  Provided general overview of the diabetes medication options and medication treatment plan.    Recommend:  Although several other diabetes med treatment options, I favor continuing the current med plan  Continue current pioglitazone and low dose glimeparide    Consider dose reducation of glimeparide if BG or hgbA1c lower than target range  Future treatment options would include GLP1RA or low dose basal insulin medication use  Goal target BG  mg/dl  Discussed her treatment plan for the p. neuropathy:  Keep focus on diet and weight management.  Advise having fasting lipid panel testing and dilated eye examination, at least annually    Ирина to follow up with Primary Care provider regarding elevated blood pressure.  Addressed patient questions today    Labs ordered today:   Orders Placed This Encounter   Procedures     C FOOT EXAM  NO CHARGE     Hemoglobin A1c     Basic metabolic panel     Albumin Random Urine Quantitative with Creat Ratio     Radiology/Consults ordered today: C FOOT EXAM  NO CHARGE    More than 50% of the time spent with Mrs. Yanez on counseling / coordinating her care.  Total appointment time was 30 minutes.    Follow-up:  6 mo.    Reji Verde MD  Endocrinology  [place] [facility]  CC: Yuridia Villarreal

## 2019-03-22 NOTE — TELEPHONE ENCOUNTER
Reason for Call:  Medication or medication refill:    Do you use a [facility]?  Name of the pharmacy and phone number for the current request:     [facility] [place] [place]  [id number] - [place], [place] - [address]    Name of the medication requested: glimepiride (AMARYL) 1 MG tablet  pioglitazone (ACTOS) 15 MG tablet    Other request: Pt returning call from Dr. Verde, prescription bottles at home confirm dosing at 1 mg for glimepiride and 15 mg for the pioglitazone    Can we leave a detailed message on this number? YES    Phone number patient can be reached at: Home number on file [phone] (home)    Best Time: any    Call taken on 3/22/2019 at 1:17 PM by Niurka Voss

## 2019-03-22 NOTE — TELEPHONE ENCOUNTER
Dr. Verde,    Pt calling you back. Please see message below. Please let us know if we can help.    Thank you,  Bernardo JOHNS RN

## 2019-03-24 NOTE — TELEPHONE ENCOUNTER
Medication dose clarification noted, appreciated.  She has been taking the glimeparide 1 mg tablet daily... Not the 4 mg tablet daily.    ARSENIO Verde MD  Endocrinology   [facility]

## 2019-03-27 DIAGNOSIS — R80.9 TYPE 2 DIABETES MELLITUS WITH MICROALBUMINURIA, WITHOUT LONG-TERM CURRENT USE OF INSULIN (H): Primary | ICD-10-CM

## 2019-03-27 DIAGNOSIS — E11.21 TYPE 2 DIABETES MELLITUS WITH DIABETIC NEPHROPATHY (H): ICD-10-CM

## 2019-03-27 DIAGNOSIS — E11.29 TYPE 2 DIABETES MELLITUS WITH MICROALBUMINURIA, WITHOUT LONG-TERM CURRENT USE OF INSULIN (H): Primary | ICD-10-CM

## 2019-03-27 DIAGNOSIS — Z79.4 TYPE 2 DIABETES MELLITUS WITH COMPLICATION, WITH LONG-TERM CURRENT USE OF INSULIN (H): ICD-10-CM

## 2019-03-27 DIAGNOSIS — E11.8 TYPE 2 DIABETES MELLITUS WITH COMPLICATION, WITH LONG-TERM CURRENT USE OF INSULIN (H): ICD-10-CM

## 2019-03-27 NOTE — TELEPHONE ENCOUNTER
Reason for Call:  Other prescription    Detailed comments: [facility] called and needs refill for PT.     pioglitazone (ACTOS) 15 MG tablet       [facility] [place] - [address] AT [facility]      Phone Number Patient can be reached at: Cell number on file:    Telephone Information:   Mobile [phone]       Best Time:any    Can we leave a detailed message on this number? YES    Call taken on 3/27/2019 at 1:33 PM by Dora Roberts

## 2019-03-27 NOTE — TELEPHONE ENCOUNTER
Pending Prescriptions:                       Disp   Refills    pioglitazone (ACTOS) 15 MG tablet         90 tab*3            Sig: Take 1 tablet (15 mg) by mouth daily    Last Written Prescription Date:  06/12/2018  Last Fill Quantity: 90,  # refills: 3   Last office visit: 3/22/2019 with prescribing provider:     Future Office Visit:    Requested Prescriptions   Pending Prescriptions Disp Refills     pioglitazone (ACTOS) 15 MG tablet 90 tablet 3     Sig: Take 1 tablet (15 mg) by mouth daily    Thiazolidinedione Agents (TZDs)  Failed - 3/27/2019  1:37 PM       Failed - Blood pressure less than 140/90 in past 6 months    BP Readings from Last 3 Encounters:   03/22/19 144/61   01/15/19 123/69   12/13/18 152/68                Failed - Patient has a normal serum Creatinine in the past 12 months    Recent Labs   Lab Test 03/22/19  1115  08/02/12  1354   CR 1.97*   < >  --    CREAT  --   --  1.6*    < > = values in this interval not displayed.            Passed - Patient has documented LDL within the past 12 mos.    Recent Labs   Lab Test 11/16/18  0830   LDL 92            Passed - Patient has a normal ALT within the past 12 mos.    Recent Labs   Lab Test 11/16/18  0830   ALT 21            Passed - Patient has a normal AST within the past 12 mos.     Recent Labs   Lab Test 11/16/18  0830   AST 18            Passed - Patient has had a Microalbumin in the past 12 mos.    Recent Labs   Lab Test 03/22/19  1114  12/21/11   MICROCR  --   --  7   MICROL 17   < >  --    UMALCR 85.22*   < >  --     < > = values in this interval not displayed.            Passed - Patient has documented A1c within the specified period of time.    If HgbA1C is 8 or greater, it needs to be on file within the past 3 months.  If less than 8, must be on file within the past 6 months.     Recent Labs   Lab Test 03/22/19  1115   A1C 6.9*            Passed - Diagnosis not CHF       Passed - Medication is active on med list       Passed - Patient is  "age 18 or older       Passed - Patient is not pregnant       Passed - Patient has not had a positive pregnancy test within the past 12 mos.       Passed - Recent (6 mo) or future (30 days) visit within the authorizing provider's specialty    Patient had office visit in the last 6 months or has a visit in the next 30 days with authorizing provider or within the authorizing provider's specialty.  See \"Patient Info\" tab in inbasket, or \"Choose Columns\" in Meds & Orders section of the refill encounter.              "

## 2019-03-28 RX ORDER — PIOGLITAZONEHYDROCHLORIDE 15 MG/1
15 TABLET ORAL DAILY
Refills: 0
Start: 2019-03-28

## 2019-04-02 ENCOUNTER — TELEPHONE (OUTPATIENT)
Dept: FAMILY MEDICINE | Facility: CLINIC | Age: 84
End: 2019-04-02

## 2019-04-02 DIAGNOSIS — Z86.0100 HISTORY OF COLONIC POLYPS: Primary | ICD-10-CM

## 2019-04-02 DIAGNOSIS — R19.5 LOOSE STOOLS: ICD-10-CM

## 2019-04-02 NOTE — TELEPHONE ENCOUNTER
Please call to clarify what she is requesting...    Received following as a [employer] referral request...    Tried to review the chart. She was in in December with loose stools; is this what she is talking about and would she like to see a GI specialist?    Is she asking for a colonoscopy? I did not see one from a year ago and can't find records from anything recent... Looking for something from [facility] as they are in [place].                  ----- Message from [employer] sent at 4/2/2019  2:50 PM CDT -----      Ирина Yanez would like to request a referral.   Reason: bowel problems still precist   Requested provider: Dr Villarreal   Comment:   Its almost a year since last colonoscopy. Please refer a gastroenterologist. Don't remember name of last clinic in [place].

## 2019-04-03 NOTE — TELEPHONE ENCOUNTER
Informed patient of provider message. Given contact info.    Patient stated did have CT colography the same day of the colonoscopy.    Frieda Granado RN

## 2019-04-03 NOTE — TELEPHONE ENCOUNTER
Called the pt to discuss.    She is still having loose stools.  It is very soft and small stools and every time she goes to the bathroom some comes out.      Yes, she would like to see a GI Specialist - she would like one from  or one that we refer to.  She would like a colonoscopy.  She said her last one was 9/2014.  She had some polyps they took out at that time.      Call pt on cell phone.  Ok to lm.

## 2019-04-03 NOTE — TELEPHONE ENCOUNTER
At this time, making referral to [facility] for the loose stools; they can order colonoscopy.    What I am seeing from 2014 was an incomplete colonoscopy with recommendation for CT colography... I am not finding that report...     [facility] would have the records of her last colonoscopy etc; this is where it was done.  Please give her the phone number to schedule.

## 2019-04-08 ENCOUNTER — TRANSFERRED RECORDS (OUTPATIENT)
Dept: HEALTH INFORMATION MANAGEMENT | Facility: CLINIC | Age: 84
End: 2019-04-08

## 2019-04-10 DIAGNOSIS — R80.9 TYPE 2 DIABETES MELLITUS WITH MICROALBUMINURIA, WITHOUT LONG-TERM CURRENT USE OF INSULIN (H): ICD-10-CM

## 2019-04-10 DIAGNOSIS — E11.29 TYPE 2 DIABETES MELLITUS WITH MICROALBUMINURIA, WITHOUT LONG-TERM CURRENT USE OF INSULIN (H): ICD-10-CM

## 2019-04-10 RX ORDER — PIOGLITAZONEHYDROCHLORIDE 15 MG/1
15 TABLET ORAL DAILY
Qty: 90 TABLET | Refills: 3 | OUTPATIENT
Start: 2019-04-10

## 2019-04-10 RX ORDER — PIOGLITAZONEHYDROCHLORIDE 15 MG/1
15 TABLET ORAL DAILY
Qty: 90 TABLET | Refills: 3 | Status: SHIPPED | OUTPATIENT
Start: 2019-04-10 | End: 2020-04-14

## 2019-04-10 NOTE — TELEPHONE ENCOUNTER
Routing refill request to provider for review/approval because:  Last BP high     Tracy BRENNER RN

## 2019-04-10 NOTE — TELEPHONE ENCOUNTER
[id number] - [place], [place] - [address]      Pharmacy called back regarding this request.  Patient is going to be out of medication by the end of April.  She will need a new prescription before June.    Pharmacy can be reached at:  [phone]  Fax #: [phone]

## 2019-04-18 DIAGNOSIS — E11.42 DIABETIC POLYNEUROPATHY ASSOCIATED WITH TYPE 2 DIABETES MELLITUS (H): ICD-10-CM

## 2019-04-18 RX ORDER — GLIMEPIRIDE 1 MG/1
TABLET ORAL
Qty: 90 TABLET | Refills: 3 | Status: SHIPPED | OUTPATIENT
Start: 2019-04-18 | End: 2020-02-27

## 2019-04-18 NOTE — TELEPHONE ENCOUNTER
Routing refill request to provider for review/approval because:  Last BP and creatinine high     Tracy BRENNER RN

## 2019-04-18 NOTE — TELEPHONE ENCOUNTER
"Last Written Prescription Date:  6/19/18  Last Fill Quantity: 90 tablet,  # refills: 3   Last office visit: 3/22/2019 with prescribing provider:  Mehreen   Future Office Visit:      Requested Prescriptions   Pending Prescriptions Disp Refills     glimepiride (AMARYL) 1 MG tablet 90 tablet 3     Sig: Take 1-tablet by mouth daily       Sulfonylurea Agents Failed - 4/18/2019  8:58 AM        Failed - Blood pressure less than 140/90 in past 6 months     BP Readings from Last 3 Encounters:   03/22/19 144/61   01/15/19 123/69   12/13/18 152/68                 Failed - Patient has a recent creatinine (normal) within the past 12 mos.     Recent Labs   Lab Test 03/22/19  1115  08/02/12  1354   CR 1.97*   < >  --    CREAT  --   --  1.6*    < > = values in this interval not displayed.             Passed - Patient has documented LDL within the past 12 mos.     Recent Labs   Lab Test 11/16/18  0830   LDL 92             Passed - Patient has had a Microalbumin in the past 12 mos.     Recent Labs   Lab Test 03/22/19  1114  12/21/11   MICROCR  --   --  7   MICROL 17   < >  --    UMALCR 85.22*   < >  --     < > = values in this interval not displayed.             Passed - Patient has documented A1c within the specified period of time.     If HgbA1C is 8 or greater, it needs to be on file within the past 3 months.  If less than 8, must be on file within the past 6 months.     Recent Labs   Lab Test 03/22/19  1115   A1C 6.9*             Passed - Medication is active on med list        Passed - Patient is age 18 or older        Passed - No active pregnancy on record        Passed - Patient has not had a positive pregnancy test within the past 12 mos.        Passed - Recent (6 mo) or future (30 days) visit within the authorizing provider's specialty     Patient had office visit in the last 6 months or has a visit in the next 30 days with authorizing provider or within the authorizing provider's specialty.  See \"Patient Info\" tab in " "inbasket, or \"Choose Columns\" in Meds & Orders section of the refill encounter.              "

## 2019-05-01 ENCOUNTER — TRANSFERRED RECORDS (OUTPATIENT)
Dept: HEALTH INFORMATION MANAGEMENT | Facility: CLINIC | Age: 84
End: 2019-05-01

## 2019-05-16 DIAGNOSIS — F41.9 ANXIETY: ICD-10-CM

## 2019-05-16 NOTE — TELEPHONE ENCOUNTER
"Requested Prescriptions   Pending Prescriptions Disp Refills     sertraline (ZOLOFT) 50 MG tablet  Last Written Prescription Date:  2/27/19  Last Fill Quantity: 90,  # refills: 0    Last office visit: 12/13/2018 with prescribing provider:  Yuridia Villarreal MD       Future Office Visit:     90 tablet 0     Sig: Take 1 tablet (50 mg) by mouth daily       SSRIs Protocol Passed - 5/16/2019 11:16 AM  PHQ-9 SCORE 1/18/2018 8/9/2018 2/27/2019   PHQ-9 Total Score - - -   PHQ-9 Total Score 2 1 0     HUBERT-7 SCORE 7/14/2017 1/18/2018 8/9/2018   Total Score - - -   Total Score 2 0 0               Passed - Recent (12 mo) or future (30 days) visit within the authorizing provider's specialty     Patient had office visit in the last 12 months or has a visit in the next 30 days with authorizing provider or within the authorizing provider's specialty.  See \"Patient Info\" tab in inbasket, or \"Choose Columns\" in Meds & Orders section of the refill encounter.              Passed - Medication is active on med list        Passed - Patient is age 18 or older        Passed - No active pregnancy on record        Passed - No positive pregnancy test in last 12 months          "

## 2019-05-17 NOTE — TELEPHONE ENCOUNTER
Pres[place] per [facility] [place].    Enedina Couch RN

## 2019-06-01 ENCOUNTER — TRANSFERRED RECORDS (OUTPATIENT)
Dept: MULTI SPECIALTY CLINIC | Facility: CLINIC | Age: 84
End: 2019-06-01

## 2019-06-01 LAB — RETINOPATHY: NEGATIVE

## 2019-06-11 DIAGNOSIS — N18.4 CHRONIC KIDNEY DISEASE, STAGE IV (SEVERE) (H): Primary | ICD-10-CM

## 2019-06-11 PROCEDURE — 80048 BASIC METABOLIC PNL TOTAL CA: CPT | Performed by: INTERNAL MEDICINE

## 2019-06-11 PROCEDURE — 36415 COLL VENOUS BLD VENIPUNCTURE: CPT | Performed by: INTERNAL MEDICINE

## 2019-06-12 LAB
ANION GAP SERPL CALCULATED.3IONS-SCNC: 9 MMOL/L (ref 3–14)
BUN SERPL-MCNC: 62 MG/DL (ref 7–30)
CALCIUM SERPL-MCNC: 9.3 MG/DL (ref 8.5–10.1)
CHLORIDE SERPL-SCNC: 110 MMOL/L (ref 94–109)
CO2 SERPL-SCNC: 26 MMOL/L (ref 20–32)
CREAT SERPL-MCNC: 2.04 MG/DL (ref 0.52–1.04)
GFR SERPL CREATININE-BSD FRML MDRD: 22 ML/MIN/{1.73_M2}
GLUCOSE SERPL-MCNC: 110 MG/DL (ref 70–99)
POTASSIUM SERPL-SCNC: 4.9 MMOL/L (ref 3.4–5.3)
SODIUM SERPL-SCNC: 145 MMOL/L (ref 133–144)

## 2019-07-09 ENCOUNTER — OFFICE VISIT (OUTPATIENT)
Dept: FAMILY MEDICINE | Facility: CLINIC | Age: 84
End: 2019-07-09
Payer: COMMERCIAL

## 2019-07-09 VITALS
HEART RATE: 83 BPM | OXYGEN SATURATION: 96 % | DIASTOLIC BLOOD PRESSURE: 82 MMHG | TEMPERATURE: 98.1 F | SYSTOLIC BLOOD PRESSURE: 130 MMHG | BODY MASS INDEX: 37.08 KG/M2 | WEIGHT: 216 LBS

## 2019-07-09 DIAGNOSIS — E11.42 DIABETIC POLYNEUROPATHY ASSOCIATED WITH TYPE 2 DIABETES MELLITUS (H): Primary | ICD-10-CM

## 2019-07-09 DIAGNOSIS — R29.6 FALLS FREQUENTLY: ICD-10-CM

## 2019-07-09 DIAGNOSIS — S40.811A ABRASION OF ARM, RIGHT, INITIAL ENCOUNTER: ICD-10-CM

## 2019-07-09 PROCEDURE — 99214 OFFICE O/P EST MOD 30 MIN: CPT | Performed by: FAMILY MEDICINE

## 2019-07-09 NOTE — PROGRESS NOTES
Subjective     Fell around 12pm sore and ache all over. Able to move arm. Bleeding on R arm hasn't stopped.  A neighbor wrapped it.     Diabetic polyneuropathy associated with type 2 diabetes mellitus (H)  (primary encounter diagnosis)  Falls frequently she reports that she falls frequently because of her neuropathy.  She cannot feel her feet.  She does use a walker regularly and was not doing so at this time.      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     Depression      Diabetes mellitus (H)     sees Dr. Verde     Falls frequently 7/10/2019     Heart murmur      HTN      Hyperlipidemia LDL goal < 100     Dr. Verde     Lichen sclerosus et atrophicus 2/15/2013     Melanoma (H)      Peripheral Neuropathy     hands, feet; used to see Dr. Tl Das     Skin cancer     face; basal and other. Melanoma. Dolan     Sleep apnea     CPAP     Spinal stenosis        Past Surgical History:   Procedure Laterality Date     AMPUTATE TOE(S)  8/23/2012    left second toe Procedure: AMPUTATE TOE(S);;  Surgeon: Mil Jolly DPM;  Location: [facility] OR     CHOLECYSTECTOMY  Nov. 1975     COLONOSCOPY  early 2009    repeat 4-5 years (Had one prior to this with polyps)     COLONOSCOPY  Sept 2014    incomplete; recommend colography     OPTICAL TRACKING SYSTEM FUSION POSTERIOR SPINE LUMBAR N/A 9/16/2016    Procedure: OPTICAL TRACKING SYSTEM FUSION SPINE POSTERIOR LUMBAR ONE LEVEL;  Surgeon: Lennox Blue MD;   Location: [facility] OR     PET, REC OF MELANOMA/MET CA Left     arm     REPAIR HAMMER TOE BILATERAL  8/23/2012    Procedure: REPAIR HAMMER TOE BILATERAL;  Flexor Tenotomy 2,3,4,5 Toes both feet, Partial 2nd toe amputation left foot;  Surgeon: Mil Jolly DPM;  Location: [facility] OR     REPAIR TENDON QUADRICEPS Right 10/27/2015    Procedure: REPAIR TENDON QUADRICEPS;  Surgeon: Antonio Yi MD;  Location: [facility] OR     SURGICAL HISTORY OF -   1997    bilateral knee replacement     SURGICAL HISTORY OF -   1997    right knee revised; patella tendon ruptured     SURGICAL HISTORY OF -       surgery for spinal stenosis     SURGICAL HISTORY OF -       breast reduction surgery     SURGICAL HISTORY OF -       D and C        Family History   Problem Relation Age of Onset     Cerebrovascular Disease Mother      Heart Disease Father      Neurologic Disorder Sister         ALS     C.A.D. Sister      Diabetes Sister      C.A.D. Brother      Psychotic Disorder Brother         [place] war      Gastrointestinal Disease Brother         diverticulitis       Social History     Tobacco Use     Smoking status: Never Smoker     Smokeless tobacco: Never Used   Substance Use Topics     Alcohol use: No       Review of Systems   No palpitations chest pain head trauma      Objective    There were no vitals taken for this visit.  There is no height or weight on file to calculate BMI.  Physical Exam   /82 (BP Location: Left arm, Patient Position: Sitting, Cuff Size: Adult Large)   Pulse 83   Temp 98.1  F (36.7  C) (Oral)   Wt 98 kg (216 lb)   SpO2 96%   BMI 37.08 kg/m    Right forearm with superficial abrasions slowly bleeding across the dorsal surface.  Arm proximal unremarkable hand unremarkable no bony deformities full range of motion no signs of infection no proximal streaking no purulence    Dressed with nonadherent dressing    ASSESSMENT / PLAN:  (E11.42) Diabetic polyneuropathy associated with type 2 diabetes mellitus (H)   (primary encounter diagnosis)  Comment: Discussed garden safety  Plan:     (R29.6) Falls frequently  Comment: Discussed walker use  Plan:     (S40.811A) Abrasion of arm, right, initial encounter  Comment: Discussed wound care, signs and symptoms to prompt recheck  Plan: Supplies provided          Jeremías Villafana MD

## 2019-07-10 PROBLEM — S40.811A: Status: ACTIVE | Noted: 2019-07-10

## 2019-07-10 PROBLEM — R29.6 FALLS FREQUENTLY: Status: ACTIVE | Noted: 2019-07-10

## 2019-08-07 ENCOUNTER — TRANSFERRED RECORDS (OUTPATIENT)
Dept: HEALTH INFORMATION MANAGEMENT | Facility: CLINIC | Age: 84
End: 2019-08-07

## 2019-08-11 DIAGNOSIS — E78.5 HYPERLIPIDEMIA WITH TARGET LDL LESS THAN 100: ICD-10-CM

## 2019-08-11 DIAGNOSIS — F41.9 ANXIETY: ICD-10-CM

## 2019-08-12 NOTE — TELEPHONE ENCOUNTER
"Requested Prescriptions   Pending Prescriptions Disp Refills     simvastatin (ZOCOR) 10 MG tablet [Pharmacy Med Name: SIMVASTATIN 10MG TABLETS] 90 tablet 1     Sig: TAKE 1 TABLET BY MOUTH AT BEDTIME GENERIC EQUIVALENT FOR ZOCOR   Last Written Prescription Date:  2/27/19  Last Fill Quantity: 90,  # refills: 1   Last office visit: 7/9/2019 with prescribing provider:  Jeremías Villafana MD   Future Office Visit:   Next 5 appointments (look out 90 days)    Sep 27, 2019 10:30 AM CDT  [facility] [address] [address]  [phone]             Statins Protocol Passed - 8/11/2019  5:19 PM        Passed - LDL on file in past 12 months     Recent Labs   Lab Test 11/16/18  0830   LDL 92             Passed - No abnormal creatine kinase in past 12 months     No lab results found.             Passed - Recent (12 mo) or future (30 days) visit within the authorizing provider's specialty     Patient had office visit in the last 12 months or has a visit in the next 30 days with authorizing provider or within the authorizing provider's specialty.  See \"Patient Info\" tab in inbasket, or \"Choose Columns\" in Meds & Orders section of the refill encounter.              Passed - Medication is active on med list        Passed - Patient is age 18 or older        Passed - No active pregnancy on record        Passed - No positive pregnancy test in past 12 months        sertraline (ZOLOFT) 50 MG tablet [Pharmacy Med Name: SERTRALINE 50MG TABLETS] 90 tablet 0     Sig: TAKE 1 TABLET BY MOUTH DAILY. PATIENT NEEDS OFFICE VISIT   Last Written Prescription Date:  5/17/19  Last Fill Quantity: 90,  # refills: 0   Last office visit: 7/9/2019 with prescribing provider:  Jeremías Villafana MD   Future Office Visit:   Next 5 appointments (look out 90 days)    Sep 27, 2019 10:30 AM CDT  Return Visit with Reji Verde MD  [facility] " "([facility]) [address] [facility]  [facility] [address]  [phone]             SSRIs Protocol Passed - 8/11/2019  5:19 PM   PHQ-9 SCORE 1/18/2018 8/9/2018 2/27/2019   PHQ-9 Total Score - - -   PHQ-9 Total Score 2 1 0     HUBERT-7 SCORE 7/14/2017 1/18/2018 8/9/2018   Total Score - - -   Total Score 2 0 0              Passed - Recent (12 mo) or future (30 days) visit within the authorizing provider's specialty     Patient had office visit in the last 12 months or has a visit in the next 30 days with authorizing provider or within the authorizing provider's specialty.  See \"Patient Info\" tab in inbasket, or \"Choose Columns\" in Meds & Orders section of the refill encounter.              Passed - Medication is active on med list        Passed - Patient is age 18 or older        Passed - No active pregnancy on record        Passed - No positive pregnancy test in last 12 months        gabapentin (NEURONTIN) 100 MG capsule [Pharmacy Med Name: GABAPENTIN 100MG CAPSULES]  Last Written Prescription Date:  na  Last Fill Quantity: 540,  # refills: 0   Last office visit: 7/9/2019 with prescribing provider:  Jeremías Villafana MD   Future Office Visit:   Next 5 appointments (look out 90 days)    Sep 27, 2019 10:30 AM CDT  Return Visit with Reji Verde MD  [facility] [address]  [place] [address]  [phone]          540 capsule      Sig: TAKE 2 CAPSULES BY MOUTH THREE TIMES DAILY, GENERIC EQUIVALENT FOR NEURONTIN       There is no refill protocol information for this order            "

## 2019-08-13 RX ORDER — SIMVASTATIN 10 MG
TABLET ORAL
Qty: 90 TABLET | Refills: 0 | Status: SHIPPED | OUTPATIENT
Start: 2019-08-13 | End: 2020-01-21

## 2019-08-13 RX ORDER — GABAPENTIN 100 MG/1
CAPSULE ORAL
Qty: 540 CAPSULE | OUTPATIENT
Start: 2019-08-13

## 2019-08-13 ASSESSMENT — PATIENT HEALTH QUESTIONNAIRE - PHQ9: SUM OF ALL RESPONSES TO PHQ QUESTIONS 1-9: 2

## 2019-08-13 NOTE — TELEPHONE ENCOUNTER
LOV 12/13/18 with Dr. Villarreal -     [facility] gabapentin 12/13/18    Medication is being filled for 1 time refill only due to:  will be due for fasting labs in November, due for an updated phq9 - filled sertaline and simvastatin    Tried to look up on  - system currently not working    Called the pt to see about the gabapentin dose - she says she is taking 4 - 100 mg a day.  Dr. Hernandez told her to cut it down as she was originally on 600 mg.  She said she was taking 1 - 100 mg capsule and a 300 mg tab.  She says she has plenty for now.  She says she has some for 3 weeks or so.   She will call when she is getting closer to running out.      Updated phq9.

## 2019-09-20 DIAGNOSIS — N18.4 CHRONIC KIDNEY DISEASE, STAGE IV (SEVERE) (H): Primary | ICD-10-CM

## 2019-09-27 ENCOUNTER — OFFICE VISIT (OUTPATIENT)
Dept: ENDOCRINOLOGY | Facility: CLINIC | Age: 84
End: 2019-09-27
Payer: COMMERCIAL

## 2019-09-27 VITALS
WEIGHT: 215.8 LBS | SYSTOLIC BLOOD PRESSURE: 165 MMHG | HEART RATE: 55 BPM | HEIGHT: 64 IN | DIASTOLIC BLOOD PRESSURE: 67 MMHG | BODY MASS INDEX: 36.84 KG/M2

## 2019-09-27 DIAGNOSIS — E11.21 TYPE 2 DIABETES MELLITUS WITH DIABETIC NEPHROPATHY, WITHOUT LONG-TERM CURRENT USE OF INSULIN (H): ICD-10-CM

## 2019-09-27 DIAGNOSIS — E11.40 TYPE 2 DIABETES MELLITUS WITH DIABETIC NEUROPATHY, WITHOUT LONG-TERM CURRENT USE OF INSULIN (H): Primary | ICD-10-CM

## 2019-09-27 LAB — HBA1C MFR BLD: 6.3 % (ref 0–5.6)

## 2019-09-27 PROCEDURE — 80048 BASIC METABOLIC PNL TOTAL CA: CPT | Performed by: INTERNAL MEDICINE

## 2019-09-27 PROCEDURE — 99214 OFFICE O/P EST MOD 30 MIN: CPT | Performed by: INTERNAL MEDICINE

## 2019-09-27 PROCEDURE — 83036 HEMOGLOBIN GLYCOSYLATED A1C: CPT | Performed by: INTERNAL MEDICINE

## 2019-09-27 PROCEDURE — 99207 C FOOT EXAM  NO CHARGE: CPT | Performed by: INTERNAL MEDICINE

## 2019-09-27 PROCEDURE — 36415 COLL VENOUS BLD VENIPUNCTURE: CPT | Performed by: INTERNAL MEDICINE

## 2019-09-27 PROCEDURE — 84443 ASSAY THYROID STIM HORMONE: CPT | Performed by: INTERNAL MEDICINE

## 2019-09-27 PROCEDURE — 84460 ALANINE AMINO (ALT) (SGPT): CPT | Performed by: INTERNAL MEDICINE

## 2019-09-27 RX ORDER — CANDESARTAN 32 MG/1
TABLET ORAL
Refills: 3 | COMMUNITY
Start: 2019-08-16 | End: 2020-06-25 | Stop reason: DRUGHIGH

## 2019-09-27 ASSESSMENT — MIFFLIN-ST. JEOR: SCORE: 1423.86

## 2019-09-27 NOTE — PROGRESS NOTES
Name: Ирина Yanez    HPI:  Recent issues:  Here for diabetes evaluation  Still having leg/feet problems with decreased sensation        Diagnosis of diabetes mellitus age 64  Has taken metformin, then discontinue 2/2015 due to rise in creatinine  12/2017. Dose reduction of glimeparide  Current DM meds:   Glimeparide 1 mg 1-tab po QAM   Pioglitazone 15 mg 1-tab po QAM    Using One Touch BG meter   Tests infrequently, previous trends 172-198 mg/dl   Meals BID typically  Previous [facility] labs include:  Lab Results   Component Value Date    A1C 6.9 (H) 03/22/2019     (H) 06/11/2019    POTASSIUM 4.9 06/11/2019    CHLORIDE 110 (H) 06/11/2019    CO2 26 06/11/2019    ANIONGAP 9 06/11/2019     (H) 06/11/2019    BUN 62 (H) 06/11/2019    CR 2.04 (H) 06/11/2019    GFRESTIMATED 22 (L) 06/11/2019    GFRESTBLACK 26 (L) 06/11/2019    EDNA 9.3 06/11/2019    CHOL 196 11/16/2018    TRIG 315 (H) 11/16/2018    HDL 41 (L) 11/16/2018    LDL 92 11/16/2018    NHDL 155 (H) 11/16/2018    UCRR 20 03/22/2019    MICROL 17 03/22/2019    UMALCR 85.22 (H) 03/22/2019     Hyperlipidemia:   Has taken fenofibrate, discontinued 2011   Current dose simvastatin 10 mg po QHS and fish oil supplement 1000 mg every day  Last eye exam with Dr. DIPAK Gupta 2/2017, no DR  DM Complications:   Neuropathy    Intermittent feet pains    Has tried Tramadol also gabapentin    Current gabapentin 300 mg 1-capsule at bedtime    Weares Docsox feet open-toe compression socks    Nephropathy    CKD and microalbuminuria    Sees Dr. KEMI Whatley/[facility]      , lives in [place] with  nicko Weber  Sees Dr. Yuridia Villarreal/[facility] for general medicine evaluations.    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     Depression      Diabetes mellitus (H)     sees Dr. Verde      Falls frequently 7/10/2019     Heart murmur      HTN      Hyperlipidemia LDL goal < 100     Dr. Verde     Lichen sclerosus et atrophicus 2/15/2013     Melanoma (H)      Peripheral Neuropathy     hands, feet; used to see Dr. Tl Das     Skin cancer     face; basal and other. Melanoma. Dolan     Sleep apnea     CPAP     Spinal stenosis      Past Surgical History:   Procedure Laterality Date     AMPUTATE TOE(S)  8/23/2012    left second toe Procedure: AMPUTATE TOE(S);;  Surgeon: Mil Jolly DPM;  Location: [facility] OR     CHOLECYSTECTOMY  Nov. 1975     COLONOSCOPY  early 2009    repeat 4-5 years (Had one prior to this with polyps)     COLONOSCOPY  Sept 2014    incomplete; recommend colography     OPTICAL TRACKING SYSTEM FUSION POSTERIOR SPINE LUMBAR N/A 9/16/2016    Procedure: OPTICAL TRACKING SYSTEM FUSION SPINE POSTERIOR LUMBAR ONE LEVEL;  Surgeon: Lennox Blue MD;  Location: [facility] OR     PET, REC OF MELANOMA/MET CA Left     arm     REPAIR HAMMER TOE BILATERAL  8/23/2012    Procedure: REPAIR HAMMER TOE BILATERAL;  Flexor Tenotomy 2,3,4,5 Toes both feet, Partial 2nd toe amputation left foot;  Surgeon: Mil Jolly DPM;  Location: [facility] OR     REPAIR TENDON QUADRICEPS Right 10/27/2015    Procedure: REPAIR TENDON QUADRICEPS;  Surgeon: Antonio Yi MD;  Location: [facility] OR     SURGICAL HISTORY OF -   1997    bilateral knee replacement     SURGICAL HISTORY OF -   1997    right knee revised; patella tendon ruptured     SURGICAL HISTORY OF -       surgery for spinal stenosis     SURGICAL HISTORY OF -       breast reduction surgery     SURGICAL HISTORY OF -       D and C        Family Hx:  Family History   Problem Relation Age of Onset     Cerebrovascular Disease Mother      Heart Disease Father      Neurologic Disorder Sister         ALS     C.A.D. Sister      Diabetes Sister      C.A.D. Brother      Psychotic Disorder Brother         [place] war      Gastrointestinal Disease Brother          diverticulitis         Social Hx:  Social History     Socioeconomic History     Marital status:      Spouse name: Not on file     Number of children: Not on file     Years of education: Not on file     Highest education level: Not on file   Occupational History     Occupation:  from home     Employer: RETIRED   Social Needs     Financial resource strain: Not on file     Food insecurity:     Worry: Not on file     Inability: Not on file     Transportation needs:     Medical: Not on file     Non-medical: Not on file   Tobacco Use     Smoking status: Never Smoker     Smokeless tobacco: Never Used   Substance and Sexual Activity     Alcohol use: No     Drug use: No     Sexual activity: Yes     Partners: Male   Lifestyle     Physical activity:     Days per week: Not on file     Minutes per session: Not on file     Stress: Not on file   Relationships     Social connections:     Talks on phone: Not on file     Gets together: Not on file     Attends Jewish service: Not on file     Active member of club or organization: Not on file     Attends meetings of clubs or organizations: Not on file     Relationship status: Not on file     Intimate partner violence:     Fear of current or ex partner: Not on file     Emotionally abused: Not on file     Physically abused: Not on file     Forced sexual activity: Not on file   Other Topics Concern      Service Not Asked     Blood Transfusions Not Asked     Caffeine Concern Not Asked     Occupational Exposure Not Asked     Hobby Hazards Not Asked     Sleep Concern Not Asked     Stress Concern Not Asked     Weight Concern Not Asked     Special Diet No     Comment: getting fruits and veggies.      Back Care Not Asked     Exercise No     Comment: gets little; limited with back and feet/leg pain.     Bike Helmet Not Asked     Seat Belt Not Asked     Self-Exams Not Asked     Parent/sibling w/ CABG, MI or angioplasty before 65F 55M? Yes   Social History Narrative  "   2 adopted children          MEDICATIONS:  has a current medication list which includes the following prescription(s): aspirin, blood glucose, blood glucose monitoring, candesartan, diltiazem er coated beads, gabapentin, glimepiride, multi-vitamin, nystatin, order for dme, pioglitazone, sertraline, simvastatin, and torsemide.    ROS:     ROS: 10 point ROS neg other than the symptoms noted above in the HPI.    GENERAL: some fatigue, wt stable; denies fevers, chills, night sweats.   HEENT: no dysphagia, odonophagia, diplopia, neck pain  THYROID:  no apparent hyper or hypothyroid symptoms  CV: no chest pain, pressure, palpitations  LUNGS: no SOB, GARAY, cough, wheezing   ABDOMEN: no diarrhea, constipation, abdominal pain  EXTREMITIES: no rashes, ulcers, edema  NEUROLOGY: decreased feet sensation, some leg/foot pains; no headaches, denies changes in vision, tingling  MSK: no muscle aches or pains, weakness  SKIN: no rashes or lesions  : no menses, denies hot flashes  PSYCH:  stable mood, no significant anxiety or depression  ENDOCRINE: no heat or cold intolerance    Physical Exam   VS: BP (!) 165/67   Pulse 55   Ht 1.626 m (5' 4\")   Wt 97.9 kg (215 lb 12.8 oz)   BMI 37.04 kg/m    GENERAL: AXOX3, NAD, well dressed, answering questions appropriately, appears stated age.  THYROID:  normal gland, no apparent nodules or goiter  ABDOMEN: soft, nontender, nondistended  EXTREMITIES: no edema, decreased sensation, absent left 2nd toe s/p amputation; +pedal pulses,    mild superficial v.veins, great toe bunion deformities  NEUROLOGY: walks with rolling walker gait aid; CN grossly intact, no tremors  SKIN: thickened right great toenail, bilateral knee scars; no rashes, no lesions    LABS:    All pertinent notes, labs, and images personally reviewed by me.     A/P:  Encounter Diagnoses   Name Primary?     Type 2 diabetes mellitus with diabetic neuropathy, without long-term current use of insulin (H) Yes     Type 2 diabetes " mellitus with diabetic nephropathy, without long-term current use of insulin (H)        Comments:  Reviewed health history and diabetes issues.  Difficult to assess glycemic control without more frequent BG trend data.    Plan:  Discussed general issues with the diabetes diagnosis and management  We discussed the hgbA1c test which reflects previous overall glucose levels or control  Discussed the importance of blood glucose (BG) testing to assess glucose trends  Provided general overview of the diabetes medication options and medication treatment plan.    Recommend:  Although several other diabetes med treatment options, I favor continuing the current med plan  Continue current pioglitazone and low dose glimeparide    Consider dose increase of glimeparide if BG or hgbA1c higher than target range  Future treatment options would include GLP1RA or low dose basal insulin medication use  Goal target BG  mg/dl, test more frequently  We have discussed her treatment plan for the p. neuropathy:  Keep focus on diet and weight management.  Continue current simvastatin daily dose at this time  Advise having fasting lipid panel testing and dilated eye examination, at least annually    Ирина to follow up with Primary Care provider regarding elevated blood pressure.  Addressed patient questions today    Labs ordered today:   Orders Placed This Encounter   Procedures     C FOOT EXAM  NO CHARGE     Hemoglobin A1c     Basic metabolic panel     ALT     TSH     Radiology/Consults ordered today: C FOOT EXAM  NO CHARGE    More than 50% of the time spent with Mrs. Yanez on counseling / coordinating her care.  Total appointment time was 30 minutes.    Follow-up:  6 mo.    ROMINA Verde MD, MS  [facility]

## 2019-09-28 LAB
ALT SERPL W P-5'-P-CCNC: 24 U/L (ref 0–50)
ANION GAP SERPL CALCULATED.3IONS-SCNC: 4 MMOL/L (ref 3–14)
BUN SERPL-MCNC: 75 MG/DL (ref 7–30)
CALCIUM SERPL-MCNC: 9.6 MG/DL (ref 8.5–10.1)
CHLORIDE SERPL-SCNC: 105 MMOL/L (ref 94–109)
CO2 SERPL-SCNC: 33 MMOL/L (ref 20–32)
CREAT SERPL-MCNC: 2.16 MG/DL (ref 0.52–1.04)
GFR SERPL CREATININE-BSD FRML MDRD: 21 ML/MIN/{1.73_M2}
GLUCOSE SERPL-MCNC: 77 MG/DL (ref 70–99)
POTASSIUM SERPL-SCNC: 4.4 MMOL/L (ref 3.4–5.3)
SODIUM SERPL-SCNC: 142 MMOL/L (ref 133–144)
TSH SERPL DL<=0.005 MIU/L-ACNC: 1.43 MU/L (ref 0.4–4)

## 2019-10-01 ENCOUNTER — HEALTH MAINTENANCE LETTER (OUTPATIENT)
Age: 84
End: 2019-10-01

## 2019-11-01 ENCOUNTER — TRANSFERRED RECORDS (OUTPATIENT)
Dept: HEALTH INFORMATION MANAGEMENT | Facility: CLINIC | Age: 84
End: 2019-11-01

## 2019-11-01 LAB — RETINOPATHY: NEGATIVE

## 2019-11-18 DIAGNOSIS — N18.4 CKD (CHRONIC KIDNEY DISEASE) STAGE 4, GFR 15-29 ML/MIN (H): Primary | ICD-10-CM

## 2019-11-22 ENCOUNTER — TELEPHONE (OUTPATIENT)
Dept: FAMILY MEDICINE | Facility: CLINIC | Age: 84
End: 2019-11-22

## 2019-11-22 DIAGNOSIS — E11.42 DIABETIC POLYNEUROPATHY ASSOCIATED WITH TYPE 2 DIABETES MELLITUS (H): Primary | ICD-10-CM

## 2019-11-22 RX ORDER — GABAPENTIN 300 MG/1
300 CAPSULE ORAL AT BEDTIME
Status: CANCELLED | OUTPATIENT
Start: 2019-11-22

## 2019-11-22 NOTE — TELEPHONE ENCOUNTER
Pending Prescriptions:                       Disp   Refills    gabapentin (NEURONTIN) 300 MG capsule                         Sig: Take 1 capsule (300 mg) by mouth At Bedtime    Please contact pt if there are further questions/concerns.    Olivia Cline on 11/22/2019 at 10:34 AM

## 2019-11-22 NOTE — TELEPHONE ENCOUNTER
Requested Prescriptions   Pending Prescriptions Disp Refills     gabapentin (NEURONTIN) 300 MG capsule       Sig: Take 1 capsule (300 mg) by mouth At Bedtime   Last Written Prescription Date:  (historical)  Last Fill Quantity: na,  # refills: na   Last office visit: 7/9/2019 with prescribing provider:  Jeremías Villafana MD    Future Office Visit:   Next 5 appointments (look out 90 days)    Jan 16, 2020  2:10 PM CST  PHYSICAL with Yuridia Villarreal MD  [facility] ([address] [address] [phone]             There is no refill protocol information for this order

## 2019-11-26 DIAGNOSIS — N18.4 CKD (CHRONIC KIDNEY DISEASE) STAGE 4, GFR 15-29 ML/MIN (H): ICD-10-CM

## 2019-11-26 LAB
ANION GAP SERPL CALCULATED.3IONS-SCNC: 7 MMOL/L (ref 3–14)
BUN SERPL-MCNC: 52 MG/DL (ref 7–30)
CALCIUM SERPL-MCNC: 9.1 MG/DL (ref 8.5–10.1)
CHLORIDE SERPL-SCNC: 110 MMOL/L (ref 94–109)
CO2 SERPL-SCNC: 25 MMOL/L (ref 20–32)
CREAT SERPL-MCNC: 1.95 MG/DL (ref 0.52–1.04)
GFR SERPL CREATININE-BSD FRML MDRD: 23 ML/MIN/{1.73_M2}
GLUCOSE SERPL-MCNC: 139 MG/DL (ref 70–99)
POTASSIUM SERPL-SCNC: 4.6 MMOL/L (ref 3.4–5.3)
SODIUM SERPL-SCNC: 142 MMOL/L (ref 133–144)

## 2019-11-26 PROCEDURE — 80048 BASIC METABOLIC PNL TOTAL CA: CPT | Performed by: PHYSICIAN ASSISTANT

## 2019-11-26 PROCEDURE — 36415 COLL VENOUS BLD VENIPUNCTURE: CPT | Performed by: PHYSICIAN ASSISTANT

## 2019-12-03 RX ORDER — GABAPENTIN 300 MG/1
300 CAPSULE ORAL AT BEDTIME
Qty: 90 CAPSULE | Refills: 0 | Status: SHIPPED | OUTPATIENT
Start: 2019-12-03 | End: 2019-12-04

## 2019-12-03 NOTE — TELEPHONE ENCOUNTER
Gabapentin 100 mg      Last Written Prescription Date:  10/25/18  Last Fill Quantity: 90,   # refills: 0  Last Office Visit: 12/18/18  Future Office visit:    Next 5 appointments (look out 90 days)    Jan 16, 2020  2:10 PM CST  PHYSICAL with Yuridia Villarreal MD  [facility] ([place] [id number] [facility] [address]  [phone]           Routing refill request to provider for review/approval because:  Drug not on the [facility], [facility] or [facility] refill protocol or controlled substance  Drug not active on patient's medication list    Olga SPARKS RN

## 2019-12-04 RX ORDER — GABAPENTIN 300 MG/1
300 CAPSULE ORAL 2 TIMES DAILY
Qty: 180 CAPSULE | Refills: 0 | Status: SHIPPED | OUTPATIENT
Start: 2019-12-04 | End: 2019-12-05

## 2019-12-04 NOTE — TELEPHONE ENCOUNTER
Pt calling in saying she takes     Gabapentin 200 mg BID     Pt does NOT take 300 mg.     Olga SPARKS RN       Next 5 appointments (look out 90 days)    Jan 16, 2020  2:10 PM CST  PHYSICAL with Yuridia Villarreal MD  [facility] [address] [place] [address] [phone]

## 2019-12-05 RX ORDER — GABAPENTIN 100 MG/1
200 CAPSULE ORAL 2 TIMES DAILY
Qty: 360 CAPSULE | Refills: 0 | Status: SHIPPED | OUTPATIENT
Start: 2019-12-05 | End: 2020-02-25

## 2019-12-05 NOTE — TELEPHONE ENCOUNTER
Patient stopped by clinic and states that the gabapentin 300 mg was sent to the pharmacy and she takes 200, as she indicated below. Please send 200 mg gabapentin to [facility] in [place].     Per Nephrology visit on 11/13/19:   Neuropathy:  - I see varying doses of gabapentin listed. With her level of kidney function, max dose is probably ~300-600 mg daily, total. We'll verify the dosing with her.

## 2019-12-15 ENCOUNTER — HEALTH MAINTENANCE LETTER (OUTPATIENT)
Age: 84
End: 2019-12-15

## 2020-01-16 ENCOUNTER — OFFICE VISIT (OUTPATIENT)
Dept: FAMILY MEDICINE | Facility: CLINIC | Age: 85
End: 2020-01-16
Payer: COMMERCIAL

## 2020-01-16 ENCOUNTER — OFFICE VISIT (OUTPATIENT)
Dept: SLEEP MEDICINE | Facility: CLINIC | Age: 85
End: 2020-01-16
Payer: COMMERCIAL

## 2020-01-16 VITALS
HEART RATE: 68 BPM | DIASTOLIC BLOOD PRESSURE: 64 MMHG | SYSTOLIC BLOOD PRESSURE: 148 MMHG | BODY MASS INDEX: 37.56 KG/M2 | HEIGHT: 64 IN | OXYGEN SATURATION: 96 % | WEIGHT: 220 LBS

## 2020-01-16 VITALS
RESPIRATION RATE: 16 BRPM | WEIGHT: 221.1 LBS | SYSTOLIC BLOOD PRESSURE: 142 MMHG | OXYGEN SATURATION: 97 % | DIASTOLIC BLOOD PRESSURE: 68 MMHG | TEMPERATURE: 97.6 F | HEART RATE: 66 BPM | BODY MASS INDEX: 37.75 KG/M2 | HEIGHT: 64 IN

## 2020-01-16 DIAGNOSIS — Z00.00 ENCOUNTER FOR MEDICARE ANNUAL WELLNESS EXAM: Primary | ICD-10-CM

## 2020-01-16 DIAGNOSIS — I35.8 AORTIC VALVE SCLEROSIS: ICD-10-CM

## 2020-01-16 DIAGNOSIS — F32.4 MAJOR DEPRESSIVE DISORDER IN PARTIAL REMISSION, UNSPECIFIED WHETHER RECURRENT (H): ICD-10-CM

## 2020-01-16 DIAGNOSIS — D64.9 ANEMIA, UNSPECIFIED TYPE: ICD-10-CM

## 2020-01-16 DIAGNOSIS — R15.9 INCONTINENCE OF FECES, UNSPECIFIED FECAL INCONTINENCE TYPE: ICD-10-CM

## 2020-01-16 DIAGNOSIS — S98.132A AMPUTATION OF TOE OF LEFT FOOT (H): ICD-10-CM

## 2020-01-16 DIAGNOSIS — G47.00 INSOMNIA, UNSPECIFIED TYPE: ICD-10-CM

## 2020-01-16 DIAGNOSIS — G47.39 COMPLEX SLEEP APNEA SYNDROME: Primary | ICD-10-CM

## 2020-01-16 DIAGNOSIS — E66.01 MORBID OBESITY (H): ICD-10-CM

## 2020-01-16 DIAGNOSIS — E11.21 TYPE 2 DIABETES MELLITUS WITH DIABETIC NEPHROPATHY, WITHOUT LONG-TERM CURRENT USE OF INSULIN (H): ICD-10-CM

## 2020-01-16 DIAGNOSIS — N18.4 CKD (CHRONIC KIDNEY DISEASE) STAGE 4, GFR 15-29 ML/MIN (H): ICD-10-CM

## 2020-01-16 DIAGNOSIS — R19.5 LOOSE STOOLS: ICD-10-CM

## 2020-01-16 DIAGNOSIS — E78.5 HYPERLIPIDEMIA WITH TARGET LDL LESS THAN 100: ICD-10-CM

## 2020-01-16 PROCEDURE — 99397 PER PM REEVAL EST PAT 65+ YR: CPT | Performed by: FAMILY MEDICINE

## 2020-01-16 PROCEDURE — 99214 OFFICE O/P EST MOD 30 MIN: CPT | Performed by: PHYSICIAN ASSISTANT

## 2020-01-16 PROCEDURE — 96127 BRIEF EMOTIONAL/BEHAV ASSMT: CPT | Performed by: FAMILY MEDICINE

## 2020-01-16 PROCEDURE — 99213 OFFICE O/P EST LOW 20 MIN: CPT | Mod: 25 | Performed by: FAMILY MEDICINE

## 2020-01-16 ASSESSMENT — ENCOUNTER SYMPTOMS
DIARRHEA: 1
DIZZINESS: 0
CHILLS: 0
FREQUENCY: 0
HEMATURIA: 0
CONSTIPATION: 0
FEVER: 0
COUGH: 0
HEMATOCHEZIA: 0
ABDOMINAL PAIN: 0
NERVOUS/ANXIOUS: 0
EYE PAIN: 0

## 2020-01-16 ASSESSMENT — PATIENT HEALTH QUESTIONNAIRE - PHQ9
SUM OF ALL RESPONSES TO PHQ QUESTIONS 1-9: 1
5. POOR APPETITE OR OVEREATING: SEVERAL DAYS

## 2020-01-16 ASSESSMENT — ANXIETY QUESTIONNAIRES
1. FEELING NERVOUS, ANXIOUS, OR ON EDGE: NOT AT ALL
GAD7 TOTAL SCORE: 1
3. WORRYING TOO MUCH ABOUT DIFFERENT THINGS: NOT AT ALL
IF YOU CHECKED OFF ANY PROBLEMS ON THIS QUESTIONNAIRE, HOW DIFFICULT HAVE THESE PROBLEMS MADE IT FOR YOU TO DO YOUR WORK, TAKE CARE OF THINGS AT HOME, OR GET ALONG WITH OTHER PEOPLE: NOT DIFFICULT AT ALL
7. FEELING AFRAID AS IF SOMETHING AWFUL MIGHT HAPPEN: NOT AT ALL
6. BECOMING EASILY ANNOYED OR IRRITABLE: NOT AT ALL
2. NOT BEING ABLE TO STOP OR CONTROL WORRYING: NOT AT ALL
5. BEING SO RESTLESS THAT IT IS HARD TO SIT STILL: NOT AT ALL

## 2020-01-16 ASSESSMENT — MIFFLIN-ST. JEOR: SCORE: 1447.9

## 2020-01-16 ASSESSMENT — ACTIVITIES OF DAILY LIVING (ADL): CURRENT_FUNCTION: NO ASSISTANCE NEEDED

## 2020-01-16 NOTE — PROGRESS NOTES
"Sleep Follow-Up Visit:    Date on this visit: 1/16/2020    Ирина Yanez comes in today for follow-up of her ASV use for complex sleep apnea.   Medical history is significant for HTN, DM-2, and chronic kidney disease.    PSG from 8/23/2001 showed an AHI of 88 per hour. Treatment emergent central apneas were noted with CPAP. Titration PSG on 01/03/2016 showed good response to ASV.     She is on ASV with pressures: EPAP 6-12 cm, PS 0-13 cm, max pressure 25, breath rate off (which should be set to auto). She uses a full face mask. She feels the machine is working fine. She does not know if she snores with it. She does get a dry mouth even though she uses the humidifier. She does not notice much mask leak.     She does not sleep well. It takes her a couple of hours to fall asleep. She may get up after an hour. She may sleep in the recliner some. Her sister-in-law passed away a few weeks ago and that made things worse. She is quite sedentary in the day and has gained weight (11 pounds in the last year). She also dozes in the day. She does not know if she snores as her  turns his hearing aid off.  Bedtime is about 10:30 PM. She does not always feel tired at that time. She is often awake at 2-3 AM briefly. She goes back to sleep until 5-7 AM. She often dozes in the recliner around 8 AM and is \"good for the day.\"  She has not tried any over-the-counter medication. She feels she takes enough medication the way it is. She has neuropathy in her lower legs and hands. She also has back pain, which complicates her sleep.  The compliance data shows that the patient used the ASV for 30/30 nights, 90% of nights for >4 hours.  The 90th% EPAP is 11.8 cm, avg 9.3 cm. The 90th% PS is 10.7 cm, avg 6.8 cm.  The average time in large leak is 52 sec.  The average nightly usage is 6:32.  The average AHI is 22.3/hr (11.2/hr centrals, 9.9/hr OAs, 1.2/hr hypops). She spends 14.8% of the night in periodic breathing on avg. Last night, " her download showed an AHI of 53.2/hr, 32.2/hr obstructive and 19.8/hr central. She felt she was not laying there awake. Some nights she has clusters of predominantly central apneas. She had 36.8% of the night in periodic breathing. A few nights earlier in the week looked very good, minimal AHI and no periodic breathing. She denies congestion. She falls asleep on her left and rolls to her back. Her HOB is slightly elevated as well.     She denies shortness of breath. She does not have swelling in her legs.     Past medical/surgical history, family history, social history, medications and allergies were reviewed.      Problem List:  Patient Active Problem List    Diagnosis Date Noted     Health Care Home 07/27/2011     Priority: High       Date Noted Care Team Member TO DO/Alerts to be completed   7/27/2011   MD Dr Mehreen Mills rechecking kidney function and prescribes lisinopril                Diagnosis Specialist Due to be seen Following   DM Mehreen June 2012    Melanoma, skin cancer [facility]; [facility] July 2012                    DX V65.8 REPLACED WITH [facility] (04/08/2013)       Falls frequently 07/10/2019     Priority: Medium     Abrasion of arm, right, initial encounter 07/10/2019     Priority: Medium     Elevated BMI with comorbidity (H) 09/13/2018     Priority: Medium     Toe amputation status, left 07/15/2017     Priority: Medium     Heart murmur 01/02/2017     Priority: Medium     Aortic sclerosis 01/02/2017     Priority: Medium     ACP (advance care planning) 11/21/2016     Priority: Medium     Advance Care Planning 11/21/2016: Receipt of ACP document:  Received: Health Care Directive which was witnessed or notarized on 9-16-16.  Document previously scanned on 9-20-16.  Validation form completed and sent to be scanned.  Code Status reflects choices in most recent ACP document.  Confirmed/documented designated decision maker(s).  Added by Yelitza Richter RN Advance Care Planning  Liaison with [employer] [employer]             S/P lumbar fusion 09/16/2016     Priority: Medium     Right bundle branch block 09/13/2016     Priority: Medium     Tendon rupture, traumatic. quadriceps 10/27/2015     Priority: Medium     TERESSA (obstructive sleep apnea) 10/12/2015     Priority: Medium      Chronic pain - diabetic neuropathy 07/05/2015     Priority: Medium     Patient is followed by Data Unavailable for ongoing prescription of pain medication.  All refills should be approved by this provider, or covering partner.    Medication(s): tramadol.   Maximum quantity per month: 180  Clinic visit frequency required: Q 6  months     Controlled substance agreement on file: Yes       Date(s): 2/5/15    Pain Clinic evaluation in the past: No    DIRE Total Score(s):  No flowsheet data found.    Last [facility] website verification:  Checked on 02/23/16 and pt is compliant.Federica Singleton RN.     [email or web address]         Basal cell carcinoma of skin 05/11/2015     Priority: Medium     CKD (chronic kidney disease) stage 4, GFR 15-29 ml/min (H) 05/07/2015     Priority: Medium     Controlled substance agreement signed 2/5/15 02/05/2015     Priority: Medium     Patient is followed by RAOUL MCCOY for ongoing prescription of narcotic pain medicine.  Med: tramadol.   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 Please review for accuracy       Type 2 diabetes mellitus with diabetic nephropathy (H) 10/31/2010     Priority: Medium     Hypertension goal BP (blood pressure) <  "140/90 06/30/2010     Priority: Medium     Arthritis 06/30/2010     Priority: Medium     Anxiety 06/30/2010     Priority: Medium     Hyperlipidemia with target LDL less than 100      Priority: Medium     Diagnosis updated by automated process. Provider to review and confirm.       Diabetic polyneuropathy (H) 12/20/2007     Priority: Medium     Problem list name updated by automated process. Provider to review          Impression/Plan:    (G47.31) Complex sleep apnea syndrome  (primary encounter diagnosis)  Comment: Ирина feels her ASV is working fine. However, her download shows a high AHI on some nights (22/hr on avg, up to 50+ last night), sometimes obstructive, sometimes centrals. Her periodic breathing is elevated as well. I think laying in bed awake with the CPAP may be contributing to the increased central apnea, but why her obstructive events are so variable is unclear. Sleeping position is the most likely reason.  Plan: Changed pressures to: min EPAP 6 cm, max EPAP 14 cm, min PS 0 cm, max PS 13 cm, max pressure 25 cm, rate auto. A prescription was written for new supplies. I turned the backup rate to auto as it was off but shouldn't have been.      (G47.00) Insomnia, unspecified type  Comment: Ирина feels it takes a couple of hours to fall asleep. She is quite sedentary in the daytime and naps in the recliner about an hour after getting up for the day. Her difficulty falling asleep was exacerbated by the recent death of her sister-in-law  Plan: We reviewed concepts of circadian and homeostatic sleep drives. We also discussed stimulus control and sleep compression. She was advised to start with a sleep schedule of 10:30 PM to 6 AM. She was advised to avoid sleeping in or napping. We discussed dealing with stress by setting aside a designated \"worry time\" in the early evening and s/he was given resources for guided imagery/relaxation. We talked about keeping a siesta sleep schedule, but she did not think she " could sleep in bed in the day. She was encouraged to try to stay active to avoid dozing.         She will follow up with me in about 2 month(s).     Twenty-five minutes spent with patient, all of which were spent face-to-face counseling, consulting, coordinating plan of care.      Bennett Goltz, PA-C    CC: No ref. provider found

## 2020-01-16 NOTE — PROGRESS NOTES
"SUBJECTIVE:   Ирина Yanez is a 84 year old female who presents for Preventive Visit.      Are you in the first 12 months of your Medicare coverage?  No    Healthy Habits:     In general, how would you rate your overall health?  Good    Frequency of exercise:  1 day/week    Duration of exercise:  N/A    Do you usually eat at least 4 servings of fruit and vegetables a day, include whole grains    & fiber and avoid regularly eating high fat or \"junk\" foods?  No    Taking medications regularly:  Yes    Medication side effects:  Not applicable    Ability to successfully perform activities of daily living:  No assistance needed    Home Safety:  No safety concerns identified    Hearing Impairment:  No hearing concerns    In the past 6 months, have you been bothered by leaking of urine?  No    In general, how would you rate your overall mental or emotional health?  Good      PHQ-2 Total Score: 0    Additional concerns today:  Yes    Do you feel safe in your environment? Yes    Have you ever done Advance Care Planning? (For example, a Health Directive, POLST, or a discussion with a medical provider or your loved ones about your wishes): Yes, advance care planning is on file.      Fall risk       Cognitive Screening   1) Repeat 3 items (Leader, Season, Table)    2) Clock draw: NORMAL  3) 3 item recall: Recalls 3 objects  Results: 3 items recalled: COGNITIVE IMPAIRMENT LESS LIKELY    Mini-CogTM Copyright STACI Vyas. Licensed by the author for use in [facility]; reprinted with permission ([email or web address]). All rights reserved.      Do you have sleep apnea, excessive snoring or daytime drowsiness?: yes- sleep apnea- bipap    Reviewed and updated as needed this visit by clinical staff         Reviewed and updated as needed this visit by Provider        Social History     Tobacco Use     Smoking status: Never Smoker     Smokeless tobacco: Never Used   Substance Use Topics     Alcohol use: No         Alcohol Use 5/6/2015 "   Prescreen: >3 drinks/day or >7 drinks/week? The patient does not drink >3 drinks per day nor >7 drinks per week.               Current providers sharing in care for this patient include:   Patient Care Team:  Yuridia Villarreal MD as PCP - General (Family Practice)  Jeremías Villafana MD as Assigned PCP    The following health maintenance items are reviewed in Epic and correct as of today:  Health Maintenance   Topic Date Due     URINE DRUG SCREEN  1935     ZOSTER IMMUNIZATION (2 of 3) 10/12/2012     MEDICARE ANNUAL WELLNESS VISIT  05/06/2016     FALL RISK ASSESSMENT  01/18/2019     INFLUENZA VACCINE (1) 09/01/2019     LIPID  11/16/2019     PHQ-9  02/11/2020     EYE EXAM  02/26/2020     MICROALBUMIN  03/22/2020     A1C  03/27/2020     ALT  09/27/2020     TSH W/FREE T4 REFLEX  09/27/2020     DIABETIC FOOT EXAM  09/27/2020     BMP  11/26/2020     ADVANCE CARE PLANNING  11/21/2021     DTAP/TDAP/TD IMMUNIZATION (4 - Td) 05/06/2025     DEXA  Completed     DEPRESSION ACTION PLAN  Completed     PNEUMOCOCCAL IMMUNIZATION 65+ LOW/MEDIUM RISK  Completed     IPV IMMUNIZATION  Aged Out     MENINGITIS IMMUNIZATION  Aged Out       Patient Active Problem List   Diagnosis     Diabetic polyneuropathy (H)     Hyperlipidemia with target LDL less than 100     Hypertension goal BP (blood pressure) < 140/90     Arthritis     Anxiety     Type 2 diabetes mellitus with diabetic nephropathy (H)     Health Care Home     Malignant melanoma of skin     Vulvar dystrophy     Lichen sclerosus et atrophicus     Vitamin B12 deficiency (non anemic)     Controlled substance agreement signed 2/5/15     Major depression in partial remission (H)     CKD (chronic kidney disease) stage 4, GFR 15-29 ml/min (H)     Basal cell carcinoma of skin      Chronic pain - diabetic neuropathy     Complex sleep apnea syndrome     Tendon rupture, traumatic. 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     Depression      Diabetes mellitus (H)     sees Dr. Verde     Falls frequently 7/10/2019     Heart murmur      HTN      Hyperlipidemia LDL goal < 100     Dr. Verde     Lichen sclerosus et atrophicus 2/15/2013     Melanoma (H)      Peripheral Neuropathy     hands, feet; used to see Dr. Tl Das     Skin cancer     face; basal and other. Melanoma. Dolan     Sleep apnea     CPAP     Spinal stenosis        Past Surgical History:   Procedure Laterality Date     AMPUTATE TOE(S)  8/23/2012    left second toe Procedure: AMPUTATE TOE(S);;  Surgeon: Mil Jolly DPM;  Location: [facility] OR     CHOLECYSTECTOMY  Nov. 1975     COLONOSCOPY  early 2009    repeat 4-5 years (Had one prior to this with polyps)     COLONOSCOPY  Sept 2014    incomplete; recommend colography     OPTICAL TRACKING SYSTEM FUSION POSTERIOR SPINE LUMBAR N/A 9/16/2016    Procedure: OPTICAL TRACKING SYSTEM FUSION SPINE POSTERIOR LUMBAR ONE LEVEL;  Surgeon: Lennox Blue MD;  Location: [facility] OR     PET, REC OF MELANOMA/MET CA Left     arm     REPAIR HAMMER TOE BILATERAL  8/23/2012    Procedure: REPAIR HAMMER TOE BILATERAL;  Flexor Tenotomy 2,3,4,5 Toes both feet, Partial 2nd toe amputation left foot;  Surgeon: Mil Jolly DPM;  Location: [facility] OR     REPAIR TENDON QUADRICEPS Right 10/27/2015    Procedure: REPAIR TENDON QUADRICEPS;  Surgeon: Antonio Yi MD;  Location: [facility] OR     SURGICAL HISTORY OF -   1997    bilateral knee replacement     SURGICAL HISTORY OF -   1997    right knee revised; patella tendon ruptured     SURGICAL HISTORY OF -       surgery for spinal  stenosis     SURGICAL HISTORY OF -       breast reduction surgery     SURGICAL HISTORY OF -       D and C        OB History    Para Term  AB Living   0 0 0 0 0 0   SAB TAB Ectopic Multiple Live Births   0 0 0 0 0       Current Outpatient Medications   Medication Sig Dispense Refill     aspirin 325 MG tablet Take 325 mg by mouth daily       blood glucose monitoring (NO BRAND SPECIFIED) test strip Use to test blood sugar 1 times daily or as directed, One Touch strips, E11.9 100 strip 3     blood glucose monitoring (ONE TOUCH DELICA) lancets Use to test blood sugar 1 times daily or as directed. 100 each 3     candesartan (ATACAND) 32 MG tablet TAKE 1 TABLET BY MOUTH ONE TIME DAILY  3     diltiazem ER COATED BEADS (CARDIAZEM LA) 180 MG 24 hr tablet Take one tablet by mouth once daily at bedtime  3     gabapentin (NEURONTIN) 100 MG capsule Take 2 capsules (200 mg) by mouth 2 times daily 360 capsule 0     glimepiride (AMARYL) 1 MG tablet Take 1-tablet by mouth daily 90 tablet 3     MULTI-VITAMIN OR TABS 1 TABLET DAILY       nystatin (MYCOSTATIN) 695210 UNIT/GM POWD Apply a small amount to affected area three times daily. (Patient taking differently: as needed Apply a small amount to affected area three times daily.) 60 g 1     order for DME Equipment being ordered: walker. 4 wheeled with brakes and a seat. 1 Device 0     pioglitazone (ACTOS) 15 MG tablet Take 1 tablet (15 mg) by mouth daily 90 tablet 3     sertraline (ZOLOFT) 50 MG tablet Take 1 tablet (50 mg) by mouth daily 90 tablet 0     simvastatin (ZOCOR) 10 MG tablet TAKE 1 TABLET BY MOUTH AT BEDTIME GENERIC EQUIVALENT FOR ZOCOR 90 tablet 0     torsemide (DEMADEX) 10 MG tablet Take 1 tablet (10 mg) by mouth daily . Through Nephrology 30 tablet 1       Family History   Problem Relation Age of Onset     Cerebrovascular Disease Mother      Heart Disease Father      Neurologic Disorder Sister         ALS     C.A.D. Sister      Diabetes Sister      C.A.D.  "Brother      Psychotic Disorder Brother         [place] [employer] war: PTSD. suicide 2019     Gastrointestinal Disease Brother         diverticulitis       Social History     Tobacco Use     Smoking status: Never Smoker     Smokeless tobacco: Never Used   Substance Use Topics     Alcohol use: No       Immunization History   Administered Date(s) Administered     Influenza (High Dose) 3 valent vaccine 10/06/2010, 08/17/2012, 10/01/2013, 09/10/2014, 09/30/2015, 09/06/2016, 09/19/2017, 09/19/2018, 10/01/2019     Influenza (IIV3) PF 10/27/1998, 10/26/1999, 10/17/2001, 10/29/2002, 10/28/2003, 11/26/2004, 11/07/2006, 09/16/2009, 09/23/2011     LYMERIX 04/22/1999, 05/24/1999, 04/24/2000     Pneumo Conj 13-V (2010&after) 09/03/2015     Pneumococcal 23 valent 10/29/2002     TD (ADULT, 7+) 01/01/1994     TDAP Vaccine (Adacel) 03/08/2004, 05/06/2015     Zoster vaccine, live 08/17/2012       Review of Systems   Constitutional: Negative for chills and fever.   HENT: Negative for congestion and ear pain.    Eyes: Negative for pain.   Respiratory: Negative for cough.    Cardiovascular: Negative for chest pain.   Gastrointestinal: Positive for diarrhea. Negative for abdominal pain, constipation and hematochezia.   Genitourinary: Negative for frequency and hematuria.   Neurological: Negative for dizziness.   Psychiatric/Behavioral: The patient is not nervous/anxious.      In the last year, has had very loose stools after eating. Not going out.   Will leak.   Did see [facility] in the past.   Started shortly after getting off tramadol. She went on gabapentin.   Has tried some fiber; staying away from wheat.     Quit asprin a couple weeks ago; (bruising on arms).    Flu shot early October.     OBJECTIVE:   BP (!) 142/68 (BP Location: Right arm, Cuff Size: Adult Large)   Pulse 66   Temp 97.6  F (36.4  C) (Oral)   Resp 16   Ht 1.626 m (5' 4\")   Wt 100.3 kg (221 lb 1.6 oz)   SpO2 97%   BMI 37.95 kg/m   Estimated body mass index is 37.95 kg/m  as " "calculated from the following:    Height as of this encounter: 1.626 m (5' 4\").    Weight as of this encounter: 100.3 kg (221 lb 1.6 oz).  Physical Exam  GENERAL APPEARANCE: alert and no distress  EYES: Eyes grossly normal to inspection, PERRL and conjunctivae and sclerae normal  HENT: ear canals and TM's normal, nose and mouth without ulcers or lesions, oropharynx clear and oral mucous membranes moist  NECK: no adenopathy, no asymmetry, masses, or scars and thyroid normal to palpation  RESP: lungs clear to auscultation - no rales, rhonchi or wheezes  BREAST: normal without masses, tenderness or nipple discharge and no palpable axillary masses or adenopathy  CV: regular rates and rhythm and no peripheral edema  ABDOMEN: soft, nontender, no hepatosplenomegaly, no masses and bowel sounds normal  MS: no musculoskeletal defects are noted and gait is age appropriate without ataxia  SKIN: no suspicious lesions or rashes  NEURO: Normal strength and tone, sensory exam grossly normal, mentation intact and speech normal  PSYCH: mentation appears normal and affect normal/bright    PHQ-9 SCORE 2/27/2019 8/13/2019 1/16/2020   PHQ-9 Total Score - - -   PHQ-9 Total Score 0 2 1       HUBERT-7 SCORE 1/18/2018 8/9/2018 1/16/2020   Total Score - - -   Total Score 0 0 1           GFR Estimate   Date Value Ref Range Status   11/26/2019 23 (L) >60 mL/min/[1.73_m2] Final     Comment:     Non  GFR Calc  Starting 12/18/2018, serum creatinine based estimated GFR (eGFR) will be   calculated using the Chronic Kidney Disease Epidemiology Collaboration   (CKD-EPI) equation.     09/27/2019 21 (L) >60 mL/min/[1.73_m2] Final     Comment:     Non  GFR Calc  Starting 12/18/2018, serum creatinine based estimated GFR (eGFR) will be   calculated using the Chronic Kidney Disease Epidemiology Collaboration   (CKD-EPI) equation.     06/11/2019 22 (L) >60 mL/min/[1.73_m2] Final     Comment:     Non  GFR " Calc  Starting 12/18/2018, serum creatinine based estimated GFR (eGFR) will be   calculated using the Chronic Kidney Disease Epidemiology Collaboration   (CKD-EPI) equation.       Hemoglobin   Date Value Ref Range Status   09/17/2016 9.8 (L) 11.7 - 15.7 g/dL Final   09/13/2016 11.8 11.7 - 15.7 g/dL Final         from Nephrology note 11/19:  Plan, in summary:  - decrease torsemide to 10 mg daily  - monitor weights, blood pressure, respiratory status  - recheck BMP in 1-2 weeks  - verify gabapentin dosing.  - FUV with Dr. Whatley in 3 months, sooner prn.       Reviewed GI note from 4/19.     Lab Results   Component Value Date    A1C 6.3 09/27/2019    A1C 6.9 03/22/2019    A1C 6.2 06/12/2018    A1C 6.3 12/19/2017    A1C 8.0 06/21/2017       Recent Labs   Lab Test 11/16/18  0830 07/17/17  0749  12/17/13  0859 06/20/12  1500   CHOL 196 175   < > 165 153   HDL 41* 44*   < > 49* 40*   LDL 92 88   < > 76 61   TRIG 315* 213*   < > 204* 263*   CHOLHDLRATIO  --   --   --  3.4 3.9    < > = values in this interval not displayed.     Interpretation Summary (ECHO 2015)     Left ventricular systolic function is normal.  The visual ejection fraction is estimated at 55-60%.  There is severe aortic sclerosis of the non-coronary cusp.  No hemodynamically significant valvular aortic stenosis.  Consider fu echo in 2-3 years or earlier if clinically appropriate. The study  was technically difficult. Compared to the prior study dated 5/10, there have  been no changes.      ASSESSMENT / PLAN:   1. Encounter for Medicare annual wellness exam  Aware of shingrix.    2. Type 2 diabetes mellitus with diabetic nephropathy, without long-term current use of insulin (H)  She does follow up with Endocrinology.   She reports an eye exam in June 2019: no diabetic changes     3. 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 Morbid obesity (H)  Encourage healthy lifestyle.     5. CKD (chronic kidney disease) stage 4, GFR 15-29  "ml/min (H)  She does see Nephrology.     6. Major depressive disorder in partial remission, unspecified whether recurrent (H)  Stable; doing well. Her scores reflect this as well.     7. Aortic valve sclerosis  Recommend recheck ECHO.   - Echocardiogram Complete; Future    8. Loose stools  She notes since stopping tramadol. Recommend GI  - GASTROENTEROLOGY ADULT REF CONSULT ONLY    9. Incontinence of feces, unspecified fecal incontinence type  As above. She is missing things and not going out due to this. I do think she should follow up again at this time.   - GASTROENTEROLOGY ADULT REF CONSULT ONLY    10. Hyperlipidemia with target LDL less than 100    - Lipid panel reflex to direct LDL Fasting; Future    11. Anemia, unspecified type  Hgb is being followed by Renal.              COUNSELING:  Reviewed preventive health counseling, as reflected in patient instructions       Regular exercise       Healthy diet/nutrition       Vision screening       Dental care    Estimated body mass index is 37.95 kg/m  as calculated from the following:    Height as of this encounter: 1.626 m (5' 4\").    Weight as of this encounter: 100.3 kg (221 lb 1.6 oz).    Weight management plan: Discussed healthy diet and exercise guidelines     reports that she has never smoked. She has never used smokeless tobacco.      Appropriate preventive services were discussed with this patient, including applicable screening as appropriate for cardiovascular disease, diabetes, osteopenia/osteoporosis, and glaucoma.  As appropriate for age/gender, discussed screening for colorectal cancer, prostate cancer, breast cancer, and cervical cancer. Checklist reviewing preventive services available has been given to the patient.    Reviewed patients plan of care and provided an AVS. The Basic Care Plan (routine screening as documented in Health Maintenance) for Ирина meets the Care Plan requirement. This Care Plan has been established and reviewed with the " Patient.    Counseling Resources:  ATP IV Guidelines  Pooled Cohorts Equation Calculator  Breast Cancer Risk Calculator  FRAX Risk Assessment  ICSI Preventive Guidelines  Dietary Guidelines for Americans, 2010  USDA's MyPlate  ASA Prophylaxis  Lung CA Screening    Yuridia Villarreal MD, MD  [facility]    Identified Health Risks:    She is at risk for lack of exercise and has been provided with information to increase physical activity for the benefit of her well-being.  The patient was counseled and encouraged to consider modifying their diet and eating habits. She was provided with information on recommended healthy diet options.  She is at risk for falling and has been provided with information to reduce the risk of falling at home.

## 2020-01-16 NOTE — PATIENT INSTRUCTIONS
A common problem for people with insomnia is spending too much time in bed  trying  to sleep.  You really should only be in bed for no more than 7-8 hours per night.  Spending too much time in bed can lead to being awake and having an  overactive  mind.  One effective way to address this problem is reducing how much time you spend in bed each night.  Be careful with driving or other dangerous activities when trying these strategeis however.  We recommend that you follow these steps to improve your insomnia:    Set a new sleep schedule where you reduce the time you spend in bed by 1-2 hours, e.g. Go to bed at 10:30 PM and get up at 6 AM. Set an alarm in the morning to make sure you do not oversleep. Try to avoid sleeping outside of this designated schedule.    Keep this sleep schedule every day of the week including the weekends for two weeks.    After two weeks you can add 15-30 minutes more time in bed if you have been sleeping more soundly.    If you still aren t sleeping well, reduce the time you spend in bed by another 15-30 but not less than 6.5 hours per night.     General recommendations for sleep problems (Insomnia)  Allow 2-4 weeks to see results     Establish a regular sleep schedule    Most people only need 7-8 hours of sleep.  Don't be in bed longer than you need     to sleep or you will end up spending more time awake in bed. This trains your    brain to think of the bed as a place to not sleep.  Go to bed at same time each night   Get up at same time each day - Set an alarm everyday (even weekends). This is one of    the most important tips. It prevents you from relying on your insomnia to get you    up on time for your day. That actually reinforces insomnia. It also will help your    body get into a pattern where you start feeling tired at a consistent time each    night.  The body functions best when you keep a consistent routine.  Avoid sleeping-in and napping. Anytime you sleep during the day, you  will be less tired at    night. You may be tired enough to fall asleep, but you will wake more in the    middle of the night because you will have met your sleep need before the night is    done.   Cut down time in bed (if not asleep, get up)- Use your bed only for sleep and sex    Anytime you spend time in bed doing activities other than sleep (reading,    watching TV, working, playing on the computer or phone, or even just laying in    bed trying to sleep), you are training  your brain to think of the bed as a place to    do activities other than sleep. If you are not falling asleep within 20-30 minutes,    get out of bed. While out of bed, avoid bright lights. Avoid work or chores. Being    productive in the middle of the night reinforces waking up at night. Find relaxing,    not particularly entertaining activities like reading, listening to music, or relaxation    exercises. Go back to bed if you start feeling groggy, or after about 30 minutes,    even if not feeling very tired. Sometimes, just getting out of bed stops the pattern    of getting frustrated about laying in bed not sleeping, and that can help you fall    asleep.   Avoid trying to force yourself to sleep- sleep is not like everything else. The harder you    work at most things, the more you can accomplish. The harder you work at    sleep, the less you will sleep.     Make the bedroom comfortable - quiet, dark and cool are better. Consider ear plugs    (silicon). Use dark blinds or wear an eye mask if needed     Make a relaxing routine prior to bedtime  Relaxation exercises:   Progressive muscle relaxation: Relax each muscle group individually    Begin with your feet, flex, then relax. Try to imagine your feet feeling heavy and sinking into the bed. Move to your calves, do the same thing. Work through each muscle group toward your head.    Relaxing Mental Imagery: Try to imagine a trip that you took and found relaxing, or imagine a day at the beach.  Try to walk yourself through the day in your mind as if you were dreaming it. Try to imagine sensing the different experiences, such as feeling sand between your toes, the heat of the sun on your skin, seeing the waves crashing the shore, the smell of the salt water, etc.     Deal with your worries before bedtime    Set aside a worry time around dinner time for 10-15 minutes. Write down the    things that are on your mind. Plan time in the coming days to address those    issues. Brainstorm ideas on how you will deal with them. Try to identify issues    that are out of your control, and try to let those issues go.  Listen to relaxation tapes   Classical Music or Nature sounds   Back Massage   Get regular exercise each day (at least 1-2 hours before bedtime)   Take medications only as directed   Eat a light bedtime snack or warm drink   Warm milk   Warm herbal tea (non-caffeinated)       Things to avoid   No overstimulating activities just before bed   No competitive games before bedtime   No exciting television programs before bedtime   Avoid caffeine after lunchtime   Avoid chocolate   Do not use alcohol to induce sleep (worsens Insomnia)   Do not take someone else's sleeping pills   Do not look at the clock when awakening   Do not turn on light when getting up to use bathroom, use a nightlight     Online Programs     www.SHUTi.me (pronounced shut eye). There is a fee for this program. Enter the code  Plato  if you decide to enroll in this program.      www.sleepIO.com (pronounced sleep ee oh). There is a fee for this program. Enter the code  Plato  if you decide to enroll in this program.     Suggested Resources  Insomnia Treatment Books     Overcoming Insomnia by Merlin Pool and Christianne Vigil (2008)    No More Sleepless Nights by Dino Dudley and Bridget Suárez (1996)    Say Jose Luis to Insomnia by Elfego Kennedy (2009)    The Insomnia Workbook by Yadira Zavala and Dion Randle (2009)    The  Insomnia Answer by Joe March and James Vaca (2006)      Stress Management and Relaxation Books    The Relaxation and Stress Reduction Workbook by Ирина Cifuentes, Olga Servin and Faizan Mancera (2008)    Stress Management Workbook: Techniques and Self-Assessment Procedures by Milli Loyola and Joni Mills (1997)    A Mindfulness-Based Stress Reduction Workbook by Gus Arellano and Tomasa Murphy (2010)    The Complete Stress Management Workbook by Geo Gunn, Fred Goddard and Nick Argueta (1996)    Assert Yourself by Elisa Leal and Samm Leal (1977)    Relaxation Resources for Computer Download   These websites offer resources to help you relax. This list is for information only. [facility] is not responsible for the quality of services or the actions of any person or organization.  Phone apps: Calm or Headspace  Progressive Muscle Relaxation (PMR):     http://www.[place]/progressive-muscle-relaxation-exercise.html     http://studentsupport.[facility]/counseling/resources/self-help/relaxation-and-stress-management/   Deep Breathing Exercises:    http://www.[place]/breathing-awareness.html     Meditation:     wwwAREVS    www.[facility] Mediameditation-site.com You may have to pay for some of these resources.    Guided Imagery:    http://www.[place]/guided-imagery-scripts.html     http://[employer]/library/yvtqctzmnc-dylufy-pfktcui/     Counseling / Behavioral Health  [facility]  Visit [email or web address] or call [phone] to find a clinic close to you.  Or call [phone] for [facility].      Your blood pressure was checked while you were in clinic today.  Please read the guidelines below about what these numbers mean and what you should do about them.  Your systolic blood pressure is the top number.  This is the pressure when the heart is pumping.  Your  diastolic blood pressure is the bottom number.  This is the pressure in between beats.  If your systolic blood pressure is less than 120 and your diastolic blood pressure is less than 80, then your blood pressure is normal. There is nothing more that you need to do about it  If your systolic blood pressure is 120-139 or your diastolic blood pressure is 80-89, your blood pressure may be higher than it should be.  You should have your blood pressure re-checked within a year by a primary care provider.  If your systolic blood pressure is 140 or greater or your diastolic blood pressure is 90 or greater, you may have high blood pressure.  High blood pressure is treatable, but if left untreated over time it can put you at risk for heart attack, stroke, or kidney failure.  You should have your blood pressure re-checked by a primary care provider within the next four weeks,   Your BMI is There is no height or weight on file to calculate BMI.  Weight management is a personal decision.  If you are interested in exploring weight loss strategies, the following discussion covers the approaches that may be successful. Body mass index (BMI) is one way to tell whether you are at a healthy weight, overweight, or obese. It measures your weight in relation to your height.  A BMI of 18.5 to 24.9 is in the healthy range. A person with a BMI of 25 to 29.9 is considered overweight, and someone with a BMI of 30 or greater is considered obese. More than two-thirds of American adults are considered overweight or obese.  Being overweight or obese increases the risk for further weight gain. Excess weight may lead to heart disease and diabetes.  Creating and following plans for healthy eating and physical activity may help you improve your health.  Weight control is part of healthy lifestyle and includes exercise, emotional health, and healthy eating habits. Careful eating habits lifelong are the mainstay of weight control. Though there are  significant health benefits from weight loss, long-term weight loss with diet alone may be very difficult to achieve- studies show long-term success with dietary management in less than 10% of people. Attaining a healthy weight may be especially difficult to achieve in those with severe obesity. In some cases, medications, devices and surgical management might be considered.  What can you do?  If you are overweight or obese and are interested in methods for weight loss, you should discuss this with your provider.     Consider reducing daily calorie intake by 500 calories.     Keep a food journal.     Avoiding skipping meals, consider cutting portions instead.    Diet combined with exercise helps maintain muscle while optimizing fat loss. Strength training is particularly important for building and maintaining muscle mass. Exercise helps reduce stress, increase energy, and improves fitness. Increasing exercise without diet control, however, may not burn enough calories to loose weight.       Start walking three days a week 10-20 minutes at a time    Work towards walking thirty minutes five days a week     Eventually, increase the speed of your walking for 1-2 minutes at time    In addition, we recommend that you review healthy lifestyles and methods for weight loss available through the National Institutes of Health patient information sites:  http://win.niddk.nih.gov/publications/index.htm    And look into health and wellness programs that may be available through your health insurance provider, employer, local community center, or [employer].

## 2020-01-16 NOTE — NURSING NOTE
"Chief Complaint   Patient presents with     RECHECK       Initial BP (!) 148/64   Pulse 68   Ht 1.626 m (5' 4.02\")   Wt 99.8 kg (220 lb)   SpO2 96%   BMI 37.74 kg/m   Estimated body mass index is 37.74 kg/m  as calculated from the following:    Height as of this encounter: 1.626 m (5' 4.02\").    Weight as of this encounter: 99.8 kg (220 lb).    Medication Reconciliation: complete        DME: yes [facility]     ESS 4  "

## 2020-01-16 NOTE — PATIENT INSTRUCTIONS
Patient Education   Personalized Prevention Plan  You are due for the preventive services outlined below.  Your care team is available to assist you in scheduling these services.  If you have already completed any of these items, please share that information with your care team to update in your medical record.  Health Maintenance Due   Topic Date Due     URINE DRUG SCREEN  1935     Zoster (Shingles) Vaccine (2 of 3) 10/12/2012     Annual Wellness Visit  05/06/2016     FALL RISK ASSESSMENT  01/18/2019     Flu Vaccine (1) 09/01/2019     Cholesterol Lab  11/16/2019     Depression Assessment  02/11/2020       Exercise for a Healthier Heart     Exercise with a friend. When activity is fun, you're more likely to stick with it.   You may wonder how you can improve the health of your heart. If you re thinking about exercise, you re on the right track. You don t need to become an athlete, but you do need a certain amount of brisk exercise to help strengthen your heart. If you have been diagnosed with a heart condition, your doctor may recommend exercise to help stabilize your condition. To help make exercise a habit, choose safe, fun activities.  Be sure to check with your healthcare provider before starting an exercise program.  Why exercise?  Exercising regularly offers many healthy rewards. It can help you do all of the following:    Improve your blood cholesterol level to help prevent further heart trouble    Lower your blood pressure to help prevent a stroke or heart attack    Control diabetes, or reduce your risk of getting this disease    Improve your heart and lung function    Reach and maintain a healthy weight    Make your muscles stronger and more limber so you can stay active    Prevent falls and fractures by slowing the loss of bone mass (osteoporosis)    Manage stress better    Reduce your blood pressure    Improve your sense of self and your body image  Exercise tips  Ease into your routine. Set  small goals. Then build on them.  Exercise on most days. Aim for a total of 150 or more minutes of moderate to  vigorous intensity activity each week. Consider 40 minutes, 3 to 4 times a week. For best results, activity should last for 40 minutes on average. It is OK to work up to the 40 minute period over time. Examples of moderate-intensity activity is walking 1 mile in 15 minutes or 30 to 45 minutes of yard work.  Step up your daily activity level. Along with your exercise program, try being more active throughout the day. Walk instead of drive. Do more household tasks or yard work.  Choose one or more activities you enjoy. Walking is one of the easiest things you can do. You can also try swimming, riding a bike, dancing, or taking an exercise class.  Stop exercising and call your doctor if you:    Have chest pain or feel dizzy or lightheaded    Feel burning, tightness, pressure, or heaviness in your chest, neck, shoulders, back, or arms    Have unusual shortness of breath    Have increased joint or muscle pain    Have palpitations or an irregular heartbeat  Date Last Reviewed: 5/1/2016 2000-2019 [employer]. [address] [place] [address]. All rights reserved. This information is not intended as a substitute for professional medical care. Always follow your healthcare professional's instructions.          Understanding USDA MyPlate  The USDA (U.S. Department of Agriculture) has guidelines to help you make healthy food choices. These are called MyPlate. MyPlate shows the food groups that make up healthy meals using the image of a place setting. Before you eat, think about the healthiest choices for what to put onto your plate or into your cup or bowl. To learn more about building a healthy plate, visit www.choosemyplate.gov.    The food groups    Fruits. Any fruit or 100% fruit juice counts as part of the Fruit Group. Fruits may be fresh, canned, frozen, or dried, and may be whole,  cut-up, or pureed. Make half your plate fruits and vegetables.    Vegetables. Any vegetable or 100% vegetable juice counts as a member of the Vegetable Group. Vegetables may be fresh, frozen, canned, or dried. They can be served raw or cooked and may be whole, cut-up, or mashed. Make half your plate fruits and vegetables.    Grains. All foods made from grains are part of the Grains Group. These include wheat, rice, oats, cornmeal, and barley such as bread, pasta, oatmeal, cereal, tortillas, and grits. Grains should be no more than a quarter of your plate. At least half of your grains should be whole grains.    Protein. This group includes meat, poultry, seafood, beans and peas, eggs, processed soy products (like tofu), nuts (including nut butters), and seeds. Make protein choices no more than a quarter of your plate. Meat and poultry choices should be lean or low fat.    Dairy. All fluid milk products and foods made from milk that contain calcium, like yogurt and cheese, are part of the Dairy Group. (Foods that have little calcium, such as cream, butter, and cream cheese, are not part of the group.) Most dairy choices should be low-fat or fat-free.    Oils. These are fats that are liquid at room temperature. They include canola, corn, olive, soybean, and sunflower oil. Foods that are mainly oil include mayonnaise, certain salad dressings, and soft margarines. You should have only 5 to 7 teaspoons of oils a day. You probably already get this much from the food you eat.  Date Last Reviewed: 8/1/2017 2000-2019 [employer]. [address] [place] [address]. All rights reserved. This information is not intended as a substitute for professional medical care. Always follow your healthcare professional's instructions.          Preventing Falls in the Home  An adult or child can fall for many reasons. If you are an older adult, you may fall because your reaction time slows down. Your muscles and  joints may get stiff, weak, or less flexible because of illness, medicines, or a physical condition. These things can also make a child more likely to fall or be injured in a fall.  Other health problems that make falls more likely include:    Arthritis    Dizziness or lightheadedness when you get out of bed (orthostatic hypotension)    History of a stroke    Dizziness    Anemia    Certain medicines taken for mental illness or to control blood pressure.    Problems with balance or gait    Bladder or urinary problems    History of falls with or without an injury    Changes in vision (vision impairment)    Changes in thinking skills and memory (cognitive impairment)  Injuries from a fall can include broken bones, dislocated joints, internal bleeding and cuts. When these injuries are serious enough, they can make it impossible for you or a child who is injured in a fall to live on his or her ownhome.  Prevention tips  To help prevent falls and fall-related injuries, follow the tips below.   Floors  Make floors safer by doing the following:     Put nonskid pads under area rugs.    Remove throw rugs.    Replace worn floor coverings.    Tack carpets firmly to each step on carpeted stairs. Put nonskid strips on the edges of uncarpeted stairs.    Keep floors and stairs free of clutter and cords.    Arrange furniture so there are clear pathways.    Clean up any spills right away.    Wear shoes that fit.  Bathrooms    Make bathrooms safer by doing the following:     Install grab bars in the tub or shower.    Apply nonskid strips or put a nonskid rubber mat in the tub or shower.    Sit on a bath chair to bathe.    Use bathmats with nonskid backing.  Lighting and the environment  Improve lighting in your home by doing the following:     Keep a flashlight in each room. Or put a lamp next to the bed within easy reach.    Put nightlights in the bedrooms, hallways, kitchen, and bathrooms.    Make sure all stairways have good  lighting.    Take your time when going up and down stairs.    Put handrails on both sides of stairs and in walkways for more support. To prevent injury to your wrist or arm, don t use handrails to pull yourself up.    Install grab bars to pull yourself up.    Move or rearrange items that you use often. This will make them easier to find or reach.    Look at your home to find any safety hazards. Especially look at doorways, walkways, and the driveway. Remove or repair any safety problems that you find.  Date Last Reviewed: 8/1/2016 2000-2019 [employer]. [address] [place] [address], [place] [address]. All rights reserved. This information is not intended as a substitute for professional medical care. Always follow your healthcare professional's instructions.

## 2020-01-17 ASSESSMENT — ANXIETY QUESTIONNAIRES: GAD7 TOTAL SCORE: 1

## 2020-01-21 DIAGNOSIS — E78.5 HYPERLIPIDEMIA WITH TARGET LDL LESS THAN 100: ICD-10-CM

## 2020-01-21 LAB
CHOLEST SERPL-MCNC: 207 MG/DL
HDLC SERPL-MCNC: 47 MG/DL
LDLC SERPL CALC-MCNC: 115 MG/DL
NONHDLC SERPL-MCNC: 160 MG/DL
TRIGL SERPL-MCNC: 223 MG/DL

## 2020-01-21 PROCEDURE — 80061 LIPID PANEL: CPT | Performed by: FAMILY MEDICINE

## 2020-01-21 PROCEDURE — 36415 COLL VENOUS BLD VENIPUNCTURE: CPT | Performed by: FAMILY MEDICINE

## 2020-01-21 RX ORDER — SIMVASTATIN 10 MG
TABLET ORAL
Qty: 90 TABLET | Refills: 0 | Status: SHIPPED | OUTPATIENT
Start: 2020-01-21 | End: 2020-03-25

## 2020-01-21 NOTE — TELEPHONE ENCOUNTER
Called to pt adv of below. She is in agreement. Scheduled fasting lab in 3 months. Tried to order medication and MEDICATION WARNING came up so sending on to PCP. Simvastatin sarah'd up.    Olga SPARKS RN

## 2020-01-21 NOTE — TELEPHONE ENCOUNTER
Let's recheck fasting lipids in about three months.     See if she will need a refill for this and then put in her pharmacy.   It was last refilled with only 90 in August.    There are some interactions with this medication, so I don't want to go up on the dose.   But we could consider a different medication if lipids still up...    Thanks!

## 2020-01-21 NOTE — TELEPHONE ENCOUNTER
Please call her....    Her lipids are up.    Is she taking the simvastatin?   It looks like she should have run out.  If she is not taking it, is it intentional, or what is it an accident?     (did send a [employer] result note...)    Thanks!

## 2020-01-21 NOTE — TELEPHONE ENCOUNTER
Called the pt to discuss.      She says she is taking her simvastatin.  She says she has been taking it every day and she says she has plenty there.      Will forward to Dr. Villarreal.

## 2020-01-29 ENCOUNTER — HOSPITAL ENCOUNTER (OUTPATIENT)
Dept: CARDIOLOGY | Facility: CLINIC | Age: 85
Discharge: HOME | End: 2020-01-29
Attending: FAMILY MEDICINE | Admitting: FAMILY MEDICINE
Payer: COMMERCIAL

## 2020-01-29 DIAGNOSIS — I35.8 AORTIC VALVE SCLEROSIS: ICD-10-CM

## 2020-01-29 PROCEDURE — 93306 TTE W/DOPPLER COMPLETE: CPT | Mod: 26 | Performed by: INTERNAL MEDICINE

## 2020-01-29 PROCEDURE — 93306 TTE W/DOPPLER COMPLETE: CPT

## 2020-01-29 PROCEDURE — 25500064 ZZH RX 255 OP 636: Performed by: FAMILY MEDICINE

## 2020-01-29 RX ADMIN — HUMAN ALBUMIN MICROSPHERES AND PERFLUTREN 3 ML: 10; .22 INJECTION, SOLUTION INTRAVENOUS at 10:20

## 2020-02-04 ENCOUNTER — TELEPHONE (OUTPATIENT)
Dept: FAMILY MEDICINE | Facility: CLINIC | Age: 85
End: 2020-02-04

## 2020-02-04 DIAGNOSIS — I77.810 ASCENDING AORTA DILATATION (H): ICD-10-CM

## 2020-02-04 DIAGNOSIS — I35.0 AORTIC VALVE STENOSIS, ETIOLOGY OF CARDIAC VALVE DISEASE UNSPECIFIED: Primary | ICD-10-CM

## 2020-02-05 ENCOUNTER — HOSPITAL ENCOUNTER (OUTPATIENT)
Dept: GENERAL RADIOLOGY | Facility: CLINIC | Age: 85
Discharge: HOME | End: 2020-02-05
Attending: INTERNAL MEDICINE | Admitting: INTERNAL MEDICINE
Payer: COMMERCIAL

## 2020-02-05 ENCOUNTER — TRANSFERRED RECORDS (OUTPATIENT)
Dept: HEALTH INFORMATION MANAGEMENT | Facility: CLINIC | Age: 85
End: 2020-02-05

## 2020-02-05 DIAGNOSIS — R19.7 DIARRHEA, UNSPECIFIED TYPE: ICD-10-CM

## 2020-02-05 LAB — TSH SERPL-ACNC: 2.07 UIU/ML (ref 0.45–4.5)

## 2020-02-05 PROCEDURE — 74019 RADEX ABDOMEN 2 VIEWS: CPT

## 2020-02-14 DIAGNOSIS — F41.9 ANXIETY: ICD-10-CM

## 2020-02-14 NOTE — TELEPHONE ENCOUNTER
Pending Prescriptions:                       Disp   Refills    sertraline (ZOLOFT) 50 MG tablet          90 tab*0            Sig: Take 1 tablet (50 mg) by mouth daily    Pt states they have been trying to fill this for 2 weeks, and now only have 1 left. Pt needs refill as soon as possible. Please contact if there are further questions/concerns.    Olivia Cline on 2/14/2020 at 1:42 PM

## 2020-02-14 NOTE — TELEPHONE ENCOUNTER
"Zoloft 50 mg    Last Written Prescription Date:  8/13/2019  Last Fill Quantity: 90,  # refills: 0   Last office visit: 1/16/2020 with prescribing provider:     Future Office Visit:   Next 5 appointments (look out 90 days)    Mar 27, 2020 10:30 AM CDT  Return Visit with Reji Verde MD  [facility] ([facility]) [address] [place] [id number] [phone]         Requested Prescriptions   Pending Prescriptions Disp Refills     sertraline (ZOLOFT) 50 MG tablet 90 tablet 0     Sig: Take 1 tablet (50 mg) by mouth daily       [facility]RIs Protocol Passed - 2/14/2020  1:42 PM        Passed - Recent (12 mo) or future (30 days) visit within the authorizing provider's specialty     Patient has had an office visit with the authorizing provider or a provider within the authorizing providers department within the previous 12 mos or has a future within next 30 days. See \"Patient Info\" tab in inbasket, or \"Choose Columns\" in Meds & Orders section of the refill encounter.              Passed - Medication is active on med list        Passed - Patient is age 18 or older        Passed - No active pregnancy on record        Passed - No positive pregnancy test in last 12 months        PHQ-9 SCORE 2/27/2019 8/13/2019 1/16/2020   PHQ-9 Total Score - - -   PHQ-9 Total Score 0 2 1     HUBERT-7 SCORE 1/18/2018 8/9/2018 1/16/2020   Total Score - - -   Total Score 0 0 1     Prescription approved per [facility] Refill Protocol.    Frieda Granado RN    "

## 2020-02-24 DIAGNOSIS — E11.42 DIABETIC POLYNEUROPATHY ASSOCIATED WITH TYPE 2 DIABETES MELLITUS (H): ICD-10-CM

## 2020-02-24 NOTE — TELEPHONE ENCOUNTER
Requested Prescriptions   Pending Prescriptions Disp Refills     gabapentin (NEURONTIN) 100 MG capsule 360 capsule 0     Sig: Take 2 capsules (200 mg) by mouth 2 times daily   Last Written Prescription Date:  12/5/19  Last Fill Quantity: 360,  # refills: 0   Last office visit: 1/16/2020 with prescribing provider:  Yuridia Villarreal MD    Future Office Visit:   Next 5 appointments (look out 90 days)    Mar 27, 2020 10:30 AM CDT  [facility] [address] [place] [address] [id number] [phone]             There is no refill protocol information for this order

## 2020-02-25 ENCOUNTER — HOSPITAL ENCOUNTER (OUTPATIENT)
Facility: CLINIC | Age: 85
Discharge: HOME | End: 2020-02-25
Attending: INTERNAL MEDICINE | Admitting: INTERNAL MEDICINE
Payer: COMMERCIAL

## 2020-02-25 VITALS
BODY MASS INDEX: 37.56 KG/M2 | OXYGEN SATURATION: 92 % | WEIGHT: 220 LBS | HEIGHT: 64 IN | SYSTOLIC BLOOD PRESSURE: 140 MMHG | RESPIRATION RATE: 14 BRPM | DIASTOLIC BLOOD PRESSURE: 63 MMHG | HEART RATE: 64 BPM

## 2020-02-25 LAB
COLONOSCOPY: NORMAL
GLUCOSE BLDC GLUCOMTR-MCNC: 128 MG/DL (ref 70–99)

## 2020-02-25 PROCEDURE — 82962 GLUCOSE BLOOD TEST: CPT

## 2020-02-25 PROCEDURE — 45380 COLONOSCOPY AND BIOPSY: CPT | Performed by: INTERNAL MEDICINE

## 2020-02-25 PROCEDURE — 88305 TISSUE EXAM BY PATHOLOGIST: CPT | Mod: 26 | Performed by: INTERNAL MEDICINE

## 2020-02-25 PROCEDURE — 88305 TISSUE EXAM BY PATHOLOGIST: CPT | Performed by: INTERNAL MEDICINE

## 2020-02-25 PROCEDURE — G0500 MOD SEDAT ENDO SERVICE >5YRS: HCPCS | Performed by: INTERNAL MEDICINE

## 2020-02-25 PROCEDURE — 99153 MOD SED SAME PHYS/QHP EA: CPT | Performed by: INTERNAL MEDICINE

## 2020-02-25 PROCEDURE — 25000128 H RX IP 250 OP 636: Performed by: INTERNAL MEDICINE

## 2020-02-25 RX ORDER — GABAPENTIN 100 MG/1
200 CAPSULE ORAL 2 TIMES DAILY
Qty: 360 CAPSULE | Refills: 1 | Status: SHIPPED | OUTPATIENT
Start: 2020-02-25 | End: 2020-05-15

## 2020-02-25 RX ORDER — ONDANSETRON 4 MG/1
4 TABLET, ORALLY DISINTEGRATING ORAL EVERY 6 HOURS PRN
Status: DISCONTINUED | OUTPATIENT
Start: 2020-02-25 | End: 2020-02-25 | Stop reason: HOSPADM

## 2020-02-25 RX ORDER — ONDANSETRON 2 MG/ML
4 INJECTION INTRAMUSCULAR; INTRAVENOUS EVERY 6 HOURS PRN
Status: DISCONTINUED | OUTPATIENT
Start: 2020-02-25 | End: 2020-02-25 | Stop reason: HOSPADM

## 2020-02-25 RX ORDER — NALOXONE HYDROCHLORIDE 0.4 MG/ML
.1-.4 INJECTION, SOLUTION INTRAMUSCULAR; INTRAVENOUS; SUBCUTANEOUS
Status: DISCONTINUED | OUTPATIENT
Start: 2020-02-25 | End: 2020-02-25 | Stop reason: HOSPADM

## 2020-02-25 RX ORDER — FLUMAZENIL 0.1 MG/ML
0.2 INJECTION, SOLUTION INTRAVENOUS
Status: DISCONTINUED | OUTPATIENT
Start: 2020-02-25 | End: 2020-02-25 | Stop reason: HOSPADM

## 2020-02-25 RX ORDER — LIDOCAINE 40 MG/G
CREAM TOPICAL
Status: DISCONTINUED | OUTPATIENT
Start: 2020-02-25 | End: 2020-02-25 | Stop reason: HOSPADM

## 2020-02-25 RX ORDER — ONDANSETRON 2 MG/ML
4 INJECTION INTRAMUSCULAR; INTRAVENOUS
Status: DISCONTINUED | OUTPATIENT
Start: 2020-02-25 | End: 2020-02-25 | Stop reason: HOSPADM

## 2020-02-25 RX ORDER — FENTANYL CITRATE 50 UG/ML
INJECTION, SOLUTION INTRAMUSCULAR; INTRAVENOUS PRN
Status: DISCONTINUED | OUTPATIENT
Start: 2020-02-25 | End: 2020-02-25 | Stop reason: HOSPADM

## 2020-02-25 ASSESSMENT — MIFFLIN-ST. JEOR: SCORE: 1437.91

## 2020-02-25 NOTE — H&P
Pre-Endoscopy History and Physical     Ирина Yanez MRN# [id number]   YOB: 1935 Age: 85 year old     Date of Procedure: 2/25/2020  Primary care provider: Yuridia Villarreal  Type of Endoscopy: colonoscopy  Reason for Procedure: diarrhea  Type of Anesthesia Anticipated: Conscious Sedation    HPI:    Ирина is a 85 year old female who will be undergoing the above procedure.      A history and physical has been performed. The patient's medications and allergies have been reviewed. The risks and benefits of the procedure and the sedation options and risks were discussed with the patient.  All questions were answered and informed consent was obtained.      She denies a personal or family history of anesthesia complications or bleeding disorders.     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     Depression      Diabetes mellitus (H)     sees Dr. Verde- type II     Falls frequently 7/10/2019     Heart murmur      HTN      Hyperlipidemia LDL goal < 100     Dr. Verde     Lichen sclerosus et atrophicus 2/15/2013     Melanoma (H)      Peripheral Neuropathy     hands, feet; used to see Dr. Tl Das     Skin cancer     face; basal and other. Melanoma. Dolan     Sleep apnea     CPAP     Spinal stenosis         Past Surgical History:   Procedure Laterality Date     AMPUTATE TOE(S)  8/23/2012    left second toe Procedure: AMPUTATE TOE(S);;  Surgeon: Mil Jolly DPM;  Location: [facility] OR     CHOLECYSTECTOMY  Nov. 1975     COLONOSCOPY  early 2009    repeat 4-5 years (Had one prior to this with polyps)     COLONOSCOPY  Sept 2014    incomplete; recommend colography     COLONOSCOPY  02/25/2020    Dr. Pathak [facility]     OPTICAL TRACKING SYSTEM FUSION POSTERIOR SPINE LUMBAR N/A 9/16/2016    Procedure: OPTICAL TRACKING SYSTEM FUSION SPINE POSTERIOR LUMBAR ONE LEVEL;  Surgeon: Lennox Blue MD;  Location: [facility] OR     PET, REC OF MELANOMA/MET CA Left     arm     REPAIR HAMMER TOE BILATERAL  8/23/2012    Procedure: REPAIR HAMMER TOE BILATERAL;  Flexor Tenotomy 2,3,4,5 Toes both feet, Partial 2nd toe amputation left foot;  Surgeon: Mil Jolly DPM;  Location: [facility] OR     REPAIR TENDON QUADRICEPS Right 10/27/2015    Procedure: REPAIR TENDON QUADRICEPS;  Surgeon: Antonio Yi MD;  Location: [facility] OR     SURGICAL HISTORY OF -   1997    bilateral knee replacement     SURGICAL HISTORY OF -   1997    right knee revised; patella tendon ruptured     SURGICAL HISTORY OF -       surgery for spinal stenosis     SURGICAL HISTORY OF -       breast reduction surgery     SURGICAL HISTORY OF -       D and C        Relevant Family History: NONE    Relevant Social History: NONE     Prior to Admission medications     Medication Sig Start Date End Date Taking? Authorizing Provider   blood glucose monitoring (NO BRAND SPECIFIED) test strip Use to test blood sugar 1 times daily or as directed, One Touch strips, E11.9 6/12/18  Yes Reji Verde MD   blood glucose monitoring (ONE TOUCH DELICA) lancets Use to test blood sugar 1 times daily or as directed. 6/12/18  Yes Reji Verde MD   candesartan (ATACAND) 32 MG tablet TAKE 1 TABLET BY MOUTH ONE TIME DAILY 8/16/19  Yes Reported, Patient   diltiazem ER COATED BEADS (CARDIAZEM LA) 180 MG 24 hr tablet Take one tablet by mouth once daily at bedtime 8/7/19  Yes Reported, Patient   gabapentin (NEURONTIN) 100 MG capsule Take 2 capsules (200 mg) by mouth 2 times daily 12/5/19  Yes Yuridia Villarreal MD   glimepiride (AMARYL) 1 MG tablet Take 1-tablet by mouth daily 4/18/19  Yes Reji Verde MD   MULTI-VITAMIN OR TABS 1 TABLET DAILY   Yes Reported, Patient   order for DME Equipment being ordered: walker. 4 wheeled with brakes and a seat. 10/25/18  Yes Yuridia Villarreal MD   pioglitazone (ACTOS) 15 MG tablet Take 1 tablet (15 mg) by mouth daily 4/10/19  Yes Reji Verde MD   sertraline (ZOLOFT) 50 MG tablet Take 1 tablet (50 mg) by mouth daily 2/14/20  Yes Yuridia Villarreal MD   simvastatin (ZOCOR) 10 MG tablet TAKE 1 TABLET BY MOUTH AT BEDTIME GENERIC EQUIVALENT FOR ZOCOR 1/21/20  Yes Yuridia Villarreal MD   torsemide (DEMADEX) 10 MG tablet Take 1 tablet (10 mg) by mouth daily . Through Nephrology 11/27/18  Yes Yuridia Villarreal MD   nystatin (MYCOSTATIN) 840543 UNIT/GM POWD Apply a small amount to affected area three times daily.  Patient taking differently: as needed Apply a small amount to affected area three times daily. 5/10/18   Yuridia Villarreal MD       Allergies   Allergen Reactions     Augmentin Diarrhea     Vomiting       Cleocin      Hives     Tegretol [Carbamazepine]      Irregular heart beat        REVIEW OF SYSTEMS:   A relevant review of systems was performed and was  "negative    PHYSICAL EXAM:   BP (!) 174/70   Pulse 68   Resp 11   Ht 1.626 m (5' 4\")   Wt 99.8 kg (220 lb)   SpO2 97%   BMI 37.76 kg/m   Estimated body mass index is 37.76 kg/m  as calculated from the following:    Height as of this encounter: 1.626 m (5' 4\").    Weight as of this encounter: 99.8 kg (220 lb).   GENERAL APPEARANCE: alert, and oriented  MENTAL STATUS: alert  AIRWAY EXAM: Normal  RESP: lungs clear to auscultation - no rales, rhonchi or wheezes  CV: regular rates and rhythm  DIAGNOSTICS:    Not indicated    IMPRESSION   ASA Class 2 - Mild systemic disease    PLAN:   Plan for colonoscopy. We discussed the risks, benefits and alternatives and the patient wished to proceed.      Signed Electronically by: Adebayo Pathak MD  February 25, 2020            "

## 2020-02-25 NOTE — DISCHARGE INSTRUCTIONS

## 2020-02-25 NOTE — TELEPHONE ENCOUNTER
Routing refill request to provider for review/approval because:  Drug not on the [facility] refill protocol   Tara Alonso RN

## 2020-02-26 DIAGNOSIS — E11.42 DIABETIC POLYNEUROPATHY ASSOCIATED WITH TYPE 2 DIABETES MELLITUS (H): ICD-10-CM

## 2020-02-26 LAB — COPATH REPORT: NORMAL

## 2020-02-27 RX ORDER — GLIMEPIRIDE 1 MG/1
TABLET ORAL
Qty: 90 TABLET | Refills: 3 | Status: SHIPPED | OUTPATIENT
Start: 2020-02-27 | End: 2020-05-15

## 2020-02-27 NOTE — TELEPHONE ENCOUNTER
Dr. Verde     Last Written Prescription Date:  glimepiride  Last Fill Quantity: 90,  # refills: 3   Last office visit: 9/27/2019 with prescribing provider:  Mehreen  Future Office Visit:   Next 5 appointments (look out 90 days)    Mar 27, 2020 10:30 AM CDT  Return Visit with Reji Verde MD  [facility] ([address] [phone]          Routing refill request to provider for review/approval because:  Labs out of range:    Creatinine   Date Value Ref Range Status   11/26/2019 1.95 (H) 0.52 - 1.04 mg/dL Final     Please approve if appropriate  Natty Crooks RN

## 2020-03-04 ENCOUNTER — TRANSFERRED RECORDS (OUTPATIENT)
Dept: HEALTH INFORMATION MANAGEMENT | Facility: CLINIC | Age: 85
End: 2020-03-04

## 2020-03-24 DIAGNOSIS — E78.5 HYPERLIPIDEMIA WITH TARGET LDL LESS THAN 100: ICD-10-CM

## 2020-03-24 NOTE — TELEPHONE ENCOUNTER
"Requested Prescriptions   Pending Prescriptions Disp Refills     simvastatin (ZOCOR) 10 MG tablet [Pharmacy Med Name: SIMVASTATIN 10MG TABLETS] 90 tablet 0     Sig: TAKE 1 TABLET BY MOUTH AT BEDTIME   Last Written Prescription Date:  1/21/20  Last Fill Quantity: 90,  # refills: 0   Last office visit: 1/16/2020 with prescribing provider:  Yuridia Villarreal MD   Future Office Visit:   Next 5 appointments (look out 90 days)    Mar 27, 2020 10:30 AM CDT  Telephone Visit with Reji Verde MD  [facility] ([address] [address]  [phone]             Statins Protocol Passed - 3/24/2020  2:59 PM        Passed - LDL on file in past 12 months     Recent Labs   Lab Test 01/21/20  0911   *             Passed - No abnormal creatine kinase in past 12 months     No lab results found.             Passed - Recent (12 mo) or future (30 days) visit within the authorizing provider's specialty     Patient has had an office visit with the authorizing provider or a provider within the authorizing providers department within the previous 12 mos or has a future within next 30 days. See \"Patient Info\" tab in inbasket, or \"Choose Columns\" in Meds & Orders section of the refill encounter.              Passed - Medication is active on med list        Passed - Patient is age 18 or older        Passed - No active pregnancy on record        Passed - No positive pregnancy test in past 12 months            "

## 2020-03-25 RX ORDER — SIMVASTATIN 10 MG
TABLET ORAL
Qty: 90 TABLET | Refills: 0 | Status: SHIPPED | OUTPATIENT
Start: 2020-03-25 | End: 2020-05-15

## 2020-03-27 ENCOUNTER — VIRTUAL VISIT (OUTPATIENT)
Dept: ENDOCRINOLOGY | Facility: CLINIC | Age: 85
End: 2020-03-27
Payer: COMMERCIAL

## 2020-03-27 DIAGNOSIS — E11.40 TYPE 2 DIABETES MELLITUS WITH DIABETIC NEUROPATHY, WITHOUT LONG-TERM CURRENT USE OF INSULIN (H): Primary | ICD-10-CM

## 2020-03-27 DIAGNOSIS — E11.21 TYPE 2 DIABETES MELLITUS WITH DIABETIC NEPHROPATHY, WITHOUT LONG-TERM CURRENT USE OF INSULIN (H): ICD-10-CM

## 2020-03-27 PROCEDURE — 99213 OFFICE O/P EST LOW 20 MIN: CPT | Mod: TEL | Performed by: INTERNAL MEDICINE

## 2020-03-27 NOTE — PROGRESS NOTES
"Ирина Yanez is a 85 year old female who is being evaluated via a telephone visit.      The patient has been notified of following (by M.A) Marlena Solis MA     \"We have found that certain health care needs can be provided without the need for a physical exam.  This service lets us provide the care you need with a short phone conversation.  If a prescription is necessary we can send it directly to your pharmacy.  If lab work is needed we can place an order for that and you can then stop by our lab to have the test done at a later time.    Since this is like an office visit,  will bill your insurance company for this service.  Please check with your medical insurance if this type of telephone/virtual is covered . You may be responsible for the cost of this service if insurance coverage is denied.  The typical cost is $30 (10min), $59(11-20min) and $85 (21-30min)     If during the course of the call the physician/provider feels a telephone visit is not appropriate, you will not be charged for this service\"    Consent has been obtained for this service by care team member: yes.  See the scanned image in the medical record.    Pertinent parts of the the patient's medical history reviewed and confirmed by the provider included    ===================================================    I have reviewed the note as documented above.  This accurately captures the substance of my conversation with the patient,      Recent issues:  Feeling pretty well, following virus precautions at home... with her   Admits to less exercise in recent months  No BG testing, says her OneTouch2 meter doesn't work           Diagnosis of diabetes mellitus age 64  Has taken metformin, then discontinue 2/2015 due to rise in creatinine  12/2017. Dose reduction of glimeparide  Current DM meds:  Glimeparide 1 mg  1-tab each morning  Pioglitazone 15 mg  1-tab each morning     Using One Touch BG meter              No recent BG testing in recent " month    Previous  labs include:  Lab Results   Component Value Date    A1C 6.3 (H) 09/27/2019     11/26/2019    POTASSIUM 4.6 11/26/2019    CHLORIDE 110 (H) 11/26/2019    CO2 25 11/26/2019    ANIONGAP 7 11/26/2019     (H) 11/26/2019    BUN 52 (H) 11/26/2019    CR 1.95 (H) 11/26/2019    GFRESTIMATED 23 (L) 11/26/2019    GFRESTBLACK 27 (L) 11/26/2019    EDNA 9.1 11/26/2019    CHOL 207 (H) 01/21/2020    TRIG 223 (H) 01/21/2020    HDL 47 (L) 01/21/2020     (H) 01/21/2020    NHDL 160 (H) 01/21/2020    UCRR 20 03/22/2019    MICROL 17 03/22/2019    UMALCR 85.22 (H) 03/22/2019     Hyperlipidemia:              Has taken fenofibrate, discontinued 2011              Current dose simvastatin 10 mg po QHS and fish oil supplement 1000 mg every day  Last eye exam with Dr. DIPAK Gupta 2/2017, no DR  DM Complications:              Neuropathy                          Intermittent feet pains                          Has tried Tramadol also gabapentin                          Current gabapentin 300 mg 1-capsule at bedtime                          Weares Docsox feet open-toe compression socks               Nephropathy                          CKD and microalbuminuria                          Sees Dr. KEMI Whatley/[facility]        , lives in [place] with  nicko Weber  Sees Dr. Yuridia Villarreal/[facility] for general medicine evaluations.      Assessment/Plan:    1. Type 2 diabetes mellitus with neuropathy  2. Type 2 diabetes mellitus with nephropathy      Continue current pioglitazone and low dose glimeparide    Consider dose increase of glimeparide if BG or hgbA1c higher than target range   Future treatment options would include GLP1RA or low dose basal insulin medication use  Goal target BG  mg/dl, test more frequently... 2-3x/week  I have now sent a new OneTouch2 glucose meter and test strip Rx to her mail order pharmacy  We have discussed her treatment plan for the p.  neuropathy:  Continue current simvastatin daily dose at this time  Since she has planned lab appt for 4/21/20, I will add diabetes lab tests for that fasting lab appt, discussed.  Contact our office if questions about management plan.      Total time of call between patient and provider was 22 minutes     This telephone visit chart note is the equivalent of a 98931 office visit billing code      ROMINA Verde MD, MS  Endocrinology  [facility]

## 2020-04-13 DIAGNOSIS — E11.29 TYPE 2 DIABETES MELLITUS WITH MICROALBUMINURIA, WITHOUT LONG-TERM CURRENT USE OF INSULIN (H): ICD-10-CM

## 2020-04-13 DIAGNOSIS — R80.9 TYPE 2 DIABETES MELLITUS WITH MICROALBUMINURIA, WITHOUT LONG-TERM CURRENT USE OF INSULIN (H): ICD-10-CM

## 2020-04-13 NOTE — TELEPHONE ENCOUNTER
Reason for Call:  Medication or medication refill:    Do you use a [facility]?  Name of the pharmacy and phone number for the current request:       [id number] - [place], [place] - [address] AT [facility] [facility]        Name of the medication requested: pioglitazone (ACTOS) 15 MG tablet       Other request:     Can we leave a detailed message on this number? YES    Phone number patient can be reached at: Home number on file [phone] (home)    Best Time: any    Call taken on 4/13/2020 at 10:46 AM by Flakita Min

## 2020-04-13 NOTE — TELEPHONE ENCOUNTER
"Last Written Prescription Date:  4/10/2019  Last Fill Quantity: 90,  # refills: 3   Last office visit: 3/27/2020 with prescribing provider:  SILAS   Future Office Visit:    Requested Prescriptions   Pending Prescriptions Disp Refills     pioglitazone (ACTOS) 15 MG tablet 90 tablet 3     Sig: Take 1 tablet (15 mg) by mouth daily       Thiazolidinedione Agents (TZDs)  Failed - 4/13/2020 10:52 AM        Failed - Patient has a normal AST within the past 12 mos.      Recent Labs   Lab Test 11/16/18  0830   AST 18             Failed - Patient has documented A1c within the specified period of time.     If HgbA1C is 8 or greater, it needs to be on file within the past 3 months.  If less than 8, must be on file within the past 6 months.     Recent Labs   Lab Test 09/27/19  1114   A1C 6.3*             Failed - Patient has a normal serum Creatinine in the past 12 months     Recent Labs   Lab Test 11/26/19  0902  08/02/12  1354   CR 1.95*   < >  --    CREAT  --   --  1.6*    < > = values in this interval not displayed.       Ok to refill medication if creatinine is low          Passed - Patient has a normal ALT within the past 12 mos.     Recent Labs   Lab Test 09/27/19  1114   ALT 24             Passed - Diagnosis not CHF        Passed - Medication is active on med list        Passed - Patient is age 18 or older        Passed - Patient is not pregnant        Passed - Patient has not had a positive pregnancy test within the past 12 mos.        Passed - Recent (6 mo) or future (30 days) visit within the authorizing provider's specialty     Patient had office visit in the last 6 months or has a visit in the next 30 days with authorizing provider or within the authorizing provider's specialty.  See \"Patient Info\" tab in inbasket, or \"Choose Columns\" in Meds & Orders section of the refill encounter.             Future refills by PCP Dr. Yuridia Villarreal MD with phone number [phone].  "

## 2020-04-14 RX ORDER — PIOGLITAZONEHYDROCHLORIDE 15 MG/1
15 TABLET ORAL DAILY
Qty: 90 TABLET | Refills: 3 | Status: ON HOLD | OUTPATIENT
Start: 2020-04-14 | End: 2021-01-13

## 2020-04-14 NOTE — TELEPHONE ENCOUNTER
Per Dr. Verde's note, patient to have lab on 4/21/2020, he recently did virtual visit with recommended follow-up in 6 months.  Approved per [facility] CPA.

## 2020-04-17 DIAGNOSIS — E11.40 TYPE 2 DIABETES MELLITUS WITH DIABETIC NEUROPATHY, WITHOUT LONG-TERM CURRENT USE OF INSULIN (H): ICD-10-CM

## 2020-04-17 NOTE — TELEPHONE ENCOUNTER
Reason for Call:  Medication or medication refill:    Do you use a [facility]?  Name of the pharmacy and phone number for the current request:     [facility] - [place], [place] - [address] AT [address]    Name of the medication requested: Contour test strips    Other request: Pt got a new meter today and needs new test strips    Can we leave a detailed message on this number? YES    Phone number patient can be reached at: Home number on file [phone] (home)    Best Time: any    Call taken on 4/17/2020 at 10:38 AM by Lidia Denis

## 2020-04-20 DIAGNOSIS — E11.40 TYPE 2 DIABETES MELLITUS WITH DIABETIC NEUROPATHY, WITHOUT LONG-TERM CURRENT USE OF INSULIN (H): ICD-10-CM

## 2020-04-23 ENCOUNTER — TELEPHONE (OUTPATIENT)
Dept: ENDOCRINOLOGY | Facility: CLINIC | Age: 85
End: 2020-04-23

## 2020-04-23 DIAGNOSIS — E11.40 TYPE 2 DIABETES MELLITUS WITH DIABETIC NEUROPATHY, WITHOUT LONG-TERM CURRENT USE OF INSULIN (H): Primary | ICD-10-CM

## 2020-04-23 NOTE — TELEPHONE ENCOUNTER
Requesting new RX for lancets, unable to select to be refilled    blood glucose monitoring (ONE TOUCH DELICA) lancets   Last Written Prescription Date:  6/12/18  Last Fill Quantity: 100 each,  # refills: 3   Last office visit: 3/27/2020 (virtual visit) with prescribing provider:  Mehreen   Future Office Visit:

## 2020-04-24 NOTE — TELEPHONE ENCOUNTER
Message noted, Lancet Rx sent to pharmacy as requested.    ROMINA Verde MD, MS  Endocrinology  [facility]

## 2020-04-27 DIAGNOSIS — E11.40 TYPE 2 DIABETES MELLITUS WITH DIABETIC NEUROPATHY, WITHOUT LONG-TERM CURRENT USE OF INSULIN (H): ICD-10-CM

## 2020-04-27 DIAGNOSIS — E78.5 HYPERLIPIDEMIA WITH TARGET LDL LESS THAN 100: ICD-10-CM

## 2020-04-27 LAB
ANION GAP SERPL CALCULATED.3IONS-SCNC: 7 MMOL/L (ref 3–14)
BUN SERPL-MCNC: 66 MG/DL (ref 7–30)
CALCIUM SERPL-MCNC: 9.3 MG/DL (ref 8.5–10.1)
CHLORIDE SERPL-SCNC: 104 MMOL/L (ref 94–109)
CHOLEST SERPL-MCNC: 181 MG/DL
CO2 SERPL-SCNC: 28 MMOL/L (ref 20–32)
CREAT SERPL-MCNC: 2.52 MG/DL (ref 0.52–1.04)
GFR SERPL CREATININE-BSD FRML MDRD: 17 ML/MIN/{1.73_M2}
GLUCOSE SERPL-MCNC: 163 MG/DL (ref 70–99)
HBA1C MFR BLD: 6.8 % (ref 0–5.6)
HDLC SERPL-MCNC: 44 MG/DL
LDLC SERPL CALC-MCNC: 89 MG/DL
NONHDLC SERPL-MCNC: 137 MG/DL
POTASSIUM SERPL-SCNC: 4.5 MMOL/L (ref 3.4–5.3)
SODIUM SERPL-SCNC: 139 MMOL/L (ref 133–144)
TRIGL SERPL-MCNC: 240 MG/DL
TSH SERPL DL<=0.005 MIU/L-ACNC: 1.78 MU/L (ref 0.4–4)

## 2020-04-27 PROCEDURE — 83036 HEMOGLOBIN GLYCOSYLATED A1C: CPT | Performed by: PHYSICIAN ASSISTANT

## 2020-04-27 PROCEDURE — 36415 COLL VENOUS BLD VENIPUNCTURE: CPT | Performed by: PHYSICIAN ASSISTANT

## 2020-04-27 PROCEDURE — 84443 ASSAY THYROID STIM HORMONE: CPT | Performed by: PHYSICIAN ASSISTANT

## 2020-04-27 PROCEDURE — 80061 LIPID PANEL: CPT | Performed by: PHYSICIAN ASSISTANT

## 2020-04-27 PROCEDURE — 80048 BASIC METABOLIC PNL TOTAL CA: CPT | Performed by: PHYSICIAN ASSISTANT

## 2020-05-01 DIAGNOSIS — E11.21 TYPE 2 DIABETES MELLITUS WITH DIABETIC NEPHROPATHY, WITHOUT LONG-TERM CURRENT USE OF INSULIN (H): ICD-10-CM

## 2020-05-01 DIAGNOSIS — E11.40 TYPE 2 DIABETES MELLITUS WITH DIABETIC NEUROPATHY, WITHOUT LONG-TERM CURRENT USE OF INSULIN (H): ICD-10-CM

## 2020-05-06 ENCOUNTER — TRANSFERRED RECORDS (OUTPATIENT)
Dept: HEALTH INFORMATION MANAGEMENT | Facility: CLINIC | Age: 85
End: 2020-05-06

## 2020-05-06 LAB — RETINOPATHY: NEGATIVE

## 2020-05-08 DIAGNOSIS — N18.4 CKD (CHRONIC KIDNEY DISEASE) STAGE 4, GFR 15-29 ML/MIN (H): Primary | ICD-10-CM

## 2020-05-14 DIAGNOSIS — E78.5 HYPERLIPIDEMIA WITH TARGET LDL LESS THAN 100: ICD-10-CM

## 2020-05-14 DIAGNOSIS — E11.42 DIABETIC POLYNEUROPATHY ASSOCIATED WITH TYPE 2 DIABETES MELLITUS (H): ICD-10-CM

## 2020-05-14 DIAGNOSIS — F41.9 ANXIETY: ICD-10-CM

## 2020-05-15 ENCOUNTER — TELEPHONE (OUTPATIENT)
Dept: ENDOCRINOLOGY | Facility: CLINIC | Age: 85
End: 2020-05-15

## 2020-05-15 DIAGNOSIS — E11.42 DIABETIC POLYNEUROPATHY ASSOCIATED WITH TYPE 2 DIABETES MELLITUS (H): ICD-10-CM

## 2020-05-15 RX ORDER — SIMVASTATIN 10 MG
TABLET ORAL
Qty: 90 TABLET | Refills: 3 | Status: SHIPPED | OUTPATIENT
Start: 2020-05-15 | End: 2021-02-24 | Stop reason: ALTCHOICE

## 2020-05-15 RX ORDER — GABAPENTIN 100 MG/1
CAPSULE ORAL
Qty: 360 CAPSULE | Refills: 1 | Status: ON HOLD | OUTPATIENT
Start: 2020-05-15 | End: 2021-01-15

## 2020-05-15 RX ORDER — GLIMEPIRIDE 1 MG/1
TABLET ORAL
Qty: 90 TABLET | Refills: 2 | Status: ON HOLD | OUTPATIENT
Start: 2020-05-15 | End: 2021-01-15

## 2020-05-15 NOTE — TELEPHONE ENCOUNTER
glimepiride (AMARYL) 1 MG tablet  90 tablet  3  2020     Last Written Prescription Date:  2020  Last Fill Quantity: 90,  # refills: 3   Last office visit: 2019 with prescribing provider:  Mehreen   Future Office Visit:  Unknown      Per pharmacy, Ирина Yanez's prescription for Glimepiride 1MG Tablets has . If you would like this patient to continue this medication, please submit new RX. Macy [phone]

## 2020-05-15 NOTE — TELEPHONE ENCOUNTER
Gabapentin  LRF 2/25/2020, disp 360 with 1 refill - should still have some - will deny to the pharmacy with that note.      Routing refill request to provider for review/approval because:  Drug interaction warning for the simvastatin - was going to deny gabapentin as the pt should still have some, was going to give 1 refill on sertraline as pt due for phq9 in July 2020 - had to go out of the encounter and now the system will not let me approve or deny the prescriptions.

## 2020-06-18 DIAGNOSIS — N18.4 CKD (CHRONIC KIDNEY DISEASE) STAGE 4, GFR 15-29 ML/MIN (H): ICD-10-CM

## 2020-06-18 LAB
ANION GAP SERPL CALCULATED.3IONS-SCNC: 6 MMOL/L (ref 3–14)
BUN SERPL-MCNC: 94 MG/DL (ref 7–30)
CALCIUM SERPL-MCNC: 9.3 MG/DL (ref 8.5–10.1)
CHLORIDE SERPL-SCNC: 105 MMOL/L (ref 94–109)
CO2 SERPL-SCNC: 25 MMOL/L (ref 20–32)
CREAT SERPL-MCNC: 2.66 MG/DL (ref 0.52–1.04)
GFR SERPL CREATININE-BSD FRML MDRD: 16 ML/MIN/{1.73_M2}
GLUCOSE SERPL-MCNC: 169 MG/DL (ref 70–99)
POTASSIUM SERPL-SCNC: 4.8 MMOL/L (ref 3.4–5.3)
SODIUM SERPL-SCNC: 136 MMOL/L (ref 133–144)

## 2020-06-18 PROCEDURE — 80048 BASIC METABOLIC PNL TOTAL CA: CPT | Performed by: INTERNAL MEDICINE

## 2020-06-18 PROCEDURE — 36415 COLL VENOUS BLD VENIPUNCTURE: CPT | Performed by: INTERNAL MEDICINE

## 2020-06-24 DIAGNOSIS — D63.1 ANEMIA OF CHRONIC RENAL FAILURE, STAGE 4 (SEVERE) (H): ICD-10-CM

## 2020-06-24 DIAGNOSIS — N18.4 CKD (CHRONIC KIDNEY DISEASE) STAGE 4, GFR 15-29 ML/MIN (H): Primary | ICD-10-CM

## 2020-06-24 DIAGNOSIS — N18.4 ANEMIA OF CHRONIC RENAL FAILURE, STAGE 4 (SEVERE) (H): ICD-10-CM

## 2020-06-25 RX ORDER — CANDESARTAN 16 MG/1
16 TABLET ORAL DAILY
COMMUNITY
Start: 2020-06-25 | End: 2020-11-16

## 2020-07-13 DIAGNOSIS — N18.4 ANEMIA OF CHRONIC RENAL FAILURE, STAGE 4 (SEVERE) (H): ICD-10-CM

## 2020-07-13 DIAGNOSIS — N18.4 CKD (CHRONIC KIDNEY DISEASE) STAGE 4, GFR 15-29 ML/MIN (H): ICD-10-CM

## 2020-07-13 DIAGNOSIS — D63.1 ANEMIA OF CHRONIC RENAL FAILURE, STAGE 4 (SEVERE) (H): ICD-10-CM

## 2020-07-13 LAB
ALBUMIN SERPL-MCNC: 3.3 G/DL (ref 3.4–5)
ANION GAP SERPL CALCULATED.3IONS-SCNC: 8 MMOL/L (ref 3–14)
BUN SERPL-MCNC: 34 MG/DL (ref 7–30)
CALCIUM SERPL-MCNC: 8.7 MG/DL (ref 8.5–10.1)
CHLORIDE SERPL-SCNC: 108 MMOL/L (ref 94–109)
CO2 SERPL-SCNC: 26 MMOL/L (ref 20–32)
CREAT SERPL-MCNC: 2.19 MG/DL (ref 0.52–1.04)
GFR SERPL CREATININE-BSD FRML MDRD: 20 ML/MIN/{1.73_M2}
GLUCOSE SERPL-MCNC: 183 MG/DL (ref 70–99)
HGB BLD-MCNC: 11.1 G/DL (ref 11.7–15.7)
PHOSPHATE SERPL-MCNC: 3.7 MG/DL (ref 2.5–4.5)
POTASSIUM SERPL-SCNC: 4.4 MMOL/L (ref 3.4–5.3)
SODIUM SERPL-SCNC: 142 MMOL/L (ref 133–144)

## 2020-07-13 PROCEDURE — 36415 COLL VENOUS BLD VENIPUNCTURE: CPT | Performed by: PHYSICIAN ASSISTANT

## 2020-07-13 PROCEDURE — 85018 HEMOGLOBIN: CPT | Performed by: PHYSICIAN ASSISTANT

## 2020-07-13 PROCEDURE — 80069 RENAL FUNCTION PANEL: CPT | Performed by: PHYSICIAN ASSISTANT

## 2020-07-23 ENCOUNTER — TELEPHONE (OUTPATIENT)
Dept: FAMILY MEDICINE | Facility: CLINIC | Age: 85
End: 2020-07-23

## 2020-07-23 ENCOUNTER — NURSE TRIAGE (OUTPATIENT)
Dept: FAMILY MEDICINE | Facility: CLINIC | Age: 85
End: 2020-07-23

## 2020-07-23 NOTE — TELEPHONE ENCOUNTER
Neck, right shoulder and right arm pain started all of a sudden. She first noticed this 9 days ago. She went to chiropractor, had an adjustment but it didn't help, she saw another chiropractor, but no help. It is keeping her from a good night's sleep. From ear level down her back about 6 inches and to the right to her shoulder and down the right about 4 inches below the shoulder. The pain never goes away. If she sits and uses heat, it helps, until she gets up and walks and the pain is right back again. Seems to be centered with her vertebrae just below ear level. She states it's worse pain than when she's had surgeries. It is a constant dull pain. She occasionally takes Tylenol for the pain, but not much relief.   No associated headache, dizziness or vision changes. When she looks to the right it is painful, but she has normal range of motion.   Huddled with PCP: Ok to see tomorrow morning.  Next 5 appointments (look out 90 days)    Jul 24, 2020  8:10 AM CDT  Office Visit with Yuridia Villarreal MD  [facility] ([facility]) [address] [address] [phone]        Additional Information    Negative: Shock suspected (e.g., cold/pale/clammy skin, too weak to stand, low BP, rapid pulse)    Negative: Similar pain previously and it was from 'heart attack'    Negative: Similar pain previously from 'angina' and not relieved by nitroglycerin    Negative: Difficult to awaken or acting confused (e.g., disoriented, slurred speech)    Negative: Sounds like a life-threatening emergency to the triager    Negative: Followed an injury to neck (e.g., MVA, sports, impact or collision)    Negative: Chest pain    Negative: Lymph node in the neck is swollen or painful to the touch    Negative: Sore throat is the main symptom    Negative: Difficulty breathing or unusual sweating (e.g., sweating without exertion)    Negative: Chest pain lasting longer than 5 minutes    Negative: Stiff  "neck (can't touch chin to chest) and has headache    Negative: Stiff neck (can't touch chin to chest) and fever    Negative: Weakness of an arm or hand    Negative: Problems with bowel or bladder control    Negative: Patient sounds very sick or weak to the triager    Patient wants to be seen    Pain shoots (radiates) into arm or hand    Answer Assessment - Initial Assessment Questions  1. ONSET: \"When did the pain begin?\"       9 days ago upon waking  2. LOCATION: \"Where does it hurt?\"       Vertebrae at ear level, down back 6 inches and to the right shoulder, then down arm about 4 inches  3. PATTERN \"Does the pain come and go, or has it been constant since it started?\"       Constant - slightly better with rest and heat  4. SEVERITY: \"How bad is the pain?\"  (Scale 1-10; or mild, moderate, severe)    - MILD (1-3): doesn't interfere with normal activities     - MODERATE (4-7): interferes with normal activities or awakens from sleep     - SEVERE (8-10):  excruciating pain, unable to do any normal activities       7  5. RADIATION: \"Does the pain go anywhere else, shoot into your arms?\"      See above  6. CORD SYMPTOMS: \"Any weakness or numbness of the arms or legs?\"      no  7. CAUSE: \"What do you think is causing the neck pain?\"      Sleeping on it wrong?  8. NECK OVERUSE: \"Any recent activities that involved turning or twisting the neck?\"      no  9. OTHER SYMPTOMS: \"Do you have any other symptoms?\" (e.g., headache, fever, chest pain, difficulty breathing, neck swelling)      no  10. PREGNANCY: \"Is there any chance you are pregnant?\" \"When was your last menstrual period?\"        No    Protocols used: NECK PAIN OR [id number]      "

## 2020-07-24 ENCOUNTER — OFFICE VISIT (OUTPATIENT)
Dept: FAMILY MEDICINE | Facility: CLINIC | Age: 85
End: 2020-07-24
Payer: COMMERCIAL

## 2020-07-24 VITALS
HEIGHT: 64 IN | OXYGEN SATURATION: 97 % | SYSTOLIC BLOOD PRESSURE: 138 MMHG | WEIGHT: 222.4 LBS | DIASTOLIC BLOOD PRESSURE: 68 MMHG | BODY MASS INDEX: 37.97 KG/M2 | HEART RATE: 72 BPM | RESPIRATION RATE: 16 BRPM | TEMPERATURE: 97.9 F

## 2020-07-24 DIAGNOSIS — F33.41 RECURRENT MAJOR DEPRESSIVE DISORDER, IN PARTIAL REMISSION (H): ICD-10-CM

## 2020-07-24 DIAGNOSIS — M25.511 ACUTE PAIN OF RIGHT SHOULDER: ICD-10-CM

## 2020-07-24 DIAGNOSIS — M54.2 NECK PAIN: Primary | ICD-10-CM

## 2020-07-24 DIAGNOSIS — R26.89 IMBALANCE: ICD-10-CM

## 2020-07-24 DIAGNOSIS — N18.4 CKD (CHRONIC KIDNEY DISEASE) STAGE 4, GFR 15-29 ML/MIN (H): ICD-10-CM

## 2020-07-24 PROCEDURE — 99214 OFFICE O/P EST MOD 30 MIN: CPT | Performed by: FAMILY MEDICINE

## 2020-07-24 PROCEDURE — 96127 BRIEF EMOTIONAL/BEHAV ASSMT: CPT | Performed by: FAMILY MEDICINE

## 2020-07-24 RX ORDER — BACLOFEN 10 MG/1
10 TABLET ORAL 3 TIMES DAILY PRN
Qty: 30 TABLET | Refills: 0 | Status: ON HOLD | OUTPATIENT
Start: 2020-07-24 | End: 2020-12-10

## 2020-07-24 ASSESSMENT — PATIENT HEALTH QUESTIONNAIRE - PHQ9
5. POOR APPETITE OR OVEREATING: NOT AT ALL
SUM OF ALL RESPONSES TO PHQ QUESTIONS 1-9: 2

## 2020-07-24 ASSESSMENT — ANXIETY QUESTIONNAIRES
5. BEING SO RESTLESS THAT IT IS HARD TO SIT STILL: NOT AT ALL
7. FEELING AFRAID AS IF SOMETHING AWFUL MIGHT HAPPEN: NOT AT ALL
GAD7 TOTAL SCORE: 0
2. NOT BEING ABLE TO STOP OR CONTROL WORRYING: NOT AT ALL
3. WORRYING TOO MUCH ABOUT DIFFERENT THINGS: NOT AT ALL
6. BECOMING EASILY ANNOYED OR IRRITABLE: NOT AT ALL
1. FEELING NERVOUS, ANXIOUS, OR ON EDGE: NOT AT ALL

## 2020-07-24 ASSESSMENT — MIFFLIN-ST. JEOR: SCORE: 1448.8

## 2020-07-24 NOTE — PROGRESS NOTES
Subjective     Ирина Yanez is a 85 year old female who presents to clinic today for the following health issues:    HPI       Neck Pain      Duration: Last Tuesday    Description:  Location: right neck   Radiation: into the right shoulder and right collar bone    Intensity:  7-8/10    Accompanying signs and symptoms: none    History (similar episodes/previous evaluation): None    Precipitating or alleviating factors: None    Therapies tried and outcome: heat, chiropractor, using vibrator on it.  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 quadriceps     Right bundle branch block     S/P lumbar fusion     ACP (advance care planning)     Heart murmur     Aortic sclerosis     Toe amputation status, left     Elevated BMI with comorbidity (H)     Falls frequently     Abrasion of arm, right, initial encounter     Aortic valve stenosis, etiology of cardiac valve disease unspecified     Ascending aorta dilatation (H)       Current Outpatient Medications   Medication Sig Dispense Refill     blood glucose (NO BRAND SPECIFIED) lancets standard Use to test blood sugar 1 times daily or as directed, Contour Next lancets 100 each 3     blood glucose (NO BRAND SPECIFIED) test strip Use to test blood sugar 1 times daily or as directed,  Contour Next strips 100 strip 1     blood glucose monitoring (NO BRAND SPECIFIED) meter device kit Use to test blood sugar 1 times daily or as directed. Ultra 2 1 kit 1     candesartan (ATACAND) 16 MG tablet Take 1 tablet (16 mg) by mouth daily       diltiazem ER COATED BEADS (CARDIAZEM LA) 180 MG 24 hr tablet Take one tablet by mouth once daily at bedtime  3     gabapentin (NEURONTIN) 100 MG capsule TAKE 2 CAPSULES BY MOUTH 2 TIMES DAILY GENERIC EQUIVALENT FOR NEURONTIN 360 capsule 1     glimepiride (AMARYL) 1 MG tablet Take 1-tablet by mouth daily 90 tablet 2     MULTI-VITAMIN OR TABS 1 TABLET DAILY       nystatin (MYCOSTATIN) 037487 UNIT/GM POWD Apply a small amount to affected area three times daily. (Patient taking differently: as needed Apply a small amount to affected area three times daily.) 60 g 1     order for DME Equipment being ordered: walker. 4 wheeled with brakes and a seat. 1 Device 0     pioglitazone (ACTOS) 15 MG tablet Take 1 tablet (15 mg) by mouth daily 90 tablet 3     sertraline (ZOLOFT) 50 MG tablet TAKE 1 TABLET BY MOUTH EVERY DAY 90 tablet 0     simvastatin (ZOCOR) 10 MG tablet TAKE 1 TABLET BY MOUTH AT BEDTIME. 90 tablet 3     torsemide (DEMADEX) 10 MG tablet Take 1 tablet (10 mg) by mouth daily . Through Nephrology 30 tablet 1         Reviewed and updated as needed this visit by Provider         Review of Systems   CONSTITUTIONAL:NEGATIVE for fever, chills, change in weight  RESP:NEGATIVE for significant cough or SOB  MUSCULOSKELETAL: see below  NEURO: NEGATIVE for weakness, dizziness or paresthesias. Does note her balance is not as good as it used to be.  PSYCHIATRIC: NEGATIVE for changes in mood or affect x some stress related to her 's fall.     Right side of neck hurts.  Near top of shoulder.    Ear to the upper shoulder and into the back.  Has been a little over a week.    Has been to 2 chiropractors. No relief.  Had slept with neck elevated a little more. Thought that was it;  "or bra.    Heat feels good, but then still hurts to do much with right arm.   No further radiation.  No numbness or tingling.  Putting arm down or working in the kitchen seems to give pain.     Sitting in chair with heat pad.  Improves with sitting quietly, but then returns with activity.   Not worse, but not better.    Takes tylenol occasionally; did not help.      Objective    /68 (BP Location: Right arm, Cuff Size: Adult Large)   Pulse 72   Temp 97.9  F (36.6  C) (Oral)   Resp 16   Ht 1.626 m (5' 4\")   Wt 100.9 kg (222 lb 6.4 oz)   SpO2 97%   BMI 38.17 kg/m    Body mass index is 38.17 kg/m .  Physical Exam     Has a walker.   GENERAL APPEARANCE: alert and no distress  MS: she has tenderness base of right neck/upper shoulder area. Tenderness does extend into upper back near scapula.  UE reflexes symmetric.   PSYCH: mentation appears normal and affect normal/bright      PHQ-9 SCORE 8/13/2019 1/16/2020 7/24/2020   PHQ-9 Total Score - - -   PHQ-9 Total Score 2 1 2       HUBERT-7 SCORE 8/9/2018 1/16/2020 7/24/2020   Total Score - - -   Total Score 0 1 0             Assessment & Plan     1. Neck pain  Suspect muscular.  At this time, discussed trying ice to possibly decrease some inflammation.  Will try muscle relaxer. Consider physical therapy.   Relieved to hear not fibromyalgia.  Also do not think this is PMR  - baclofen (LIORESAL) 10 MG tablet; Take 1 tablet (10 mg) by mouth 3 times daily as needed for muscle spasms  Dispense: 30 tablet; Refill: 0    2. Acute pain of right shoulder  As above.   - baclofen (LIORESAL) 10 MG tablet; Take 1 tablet (10 mg) by mouth 3 times daily as needed for muscle spasms  Dispense: 30 tablet; Refill: 0    3. CKD (chronic kidney disease) stage 4, GFR 15-29 ml/min (H)  Do not want to use NSAID as anti-inflammatory; recommending to try ice.     4. Recurrent major depressive disorder, in partial remission (H)  Reviewed her scores. She does have some stressors, but appears to be " coping.     5. Imbalance  Discussed some balance exercises; but also stress safety.            Patient Instructions       I would recommend trying icing the area for ~ 15 minutes at a time.              Return in about 1 week (around 7/31/2020) for if not improving; earlier if worsening..    Yuridia Villarreal MD, MD  [facility]

## 2020-07-25 ASSESSMENT — ANXIETY QUESTIONNAIRES: GAD7 TOTAL SCORE: 0

## 2020-08-05 ENCOUNTER — TRANSFERRED RECORDS (OUTPATIENT)
Dept: HEALTH INFORMATION MANAGEMENT | Facility: CLINIC | Age: 85
End: 2020-08-05

## 2020-09-17 DIAGNOSIS — F41.9 ANXIETY: ICD-10-CM

## 2020-09-17 NOTE — TELEPHONE ENCOUNTER
Pharmacy: Rx written with 0 refills. Is it ok to dispense 90 days the 3 refills?    sertraline (ZOLOFT) 50 MG tablet

## 2020-09-18 DIAGNOSIS — E87.5 HYPERKALEMIA: Primary | ICD-10-CM

## 2020-09-24 DIAGNOSIS — E87.5 HYPERKALEMIA: ICD-10-CM

## 2020-09-24 LAB
ANION GAP SERPL CALCULATED.3IONS-SCNC: 9 MMOL/L (ref 3–14)
BUN SERPL-MCNC: 56 MG/DL (ref 7–30)
CALCIUM SERPL-MCNC: 9.7 MG/DL (ref 8.5–10.1)
CHLORIDE SERPL-SCNC: 110 MMOL/L (ref 94–109)
CO2 SERPL-SCNC: 22 MMOL/L (ref 20–32)
CREAT SERPL-MCNC: 2.21 MG/DL (ref 0.52–1.04)
GFR SERPL CREATININE-BSD FRML MDRD: 20 ML/MIN/{1.73_M2}
GLUCOSE SERPL-MCNC: 126 MG/DL (ref 70–99)
POTASSIUM SERPL-SCNC: 4.9 MMOL/L (ref 3.4–5.3)
SODIUM SERPL-SCNC: 141 MMOL/L (ref 133–144)

## 2020-09-24 PROCEDURE — 80048 BASIC METABOLIC PNL TOTAL CA: CPT | Performed by: FAMILY MEDICINE

## 2020-09-24 PROCEDURE — 36415 COLL VENOUS BLD VENIPUNCTURE: CPT | Performed by: FAMILY MEDICINE

## 2020-11-16 ENCOUNTER — OFFICE VISIT (OUTPATIENT)
Dept: CARDIOLOGY | Facility: CLINIC | Age: 85
End: 2020-11-16
Payer: COMMERCIAL

## 2020-11-16 VITALS
SYSTOLIC BLOOD PRESSURE: 130 MMHG | WEIGHT: 218.5 LBS | HEIGHT: 64 IN | DIASTOLIC BLOOD PRESSURE: 63 MMHG | BODY MASS INDEX: 37.3 KG/M2 | HEART RATE: 87 BPM

## 2020-11-16 DIAGNOSIS — Z13.6 SCREENING FOR CARDIOVASCULAR CONDITION: Primary | ICD-10-CM

## 2020-11-16 PROCEDURE — 93000 ELECTROCARDIOGRAM COMPLETE: CPT | Performed by: INTERNAL MEDICINE

## 2020-11-16 PROCEDURE — 99203 OFFICE O/P NEW LOW 30 MIN: CPT | Performed by: INTERNAL MEDICINE

## 2020-11-16 RX ORDER — DOXAZOSIN 1 MG/1
1 TABLET ORAL AT BEDTIME
Status: ON HOLD | COMMUNITY
Start: 2020-10-22 | End: 2021-01-15

## 2020-11-16 RX ORDER — HYDRALAZINE HYDROCHLORIDE 50 MG/1
100 TABLET, FILM COATED ORAL 3 TIMES DAILY
Status: ON HOLD | COMMUNITY
Start: 2020-09-23 | End: 2021-01-15

## 2020-11-16 ASSESSMENT — MIFFLIN-ST. JEOR: SCORE: 1431.11

## 2020-11-16 NOTE — PROGRESS NOTES
HISTORY OF PRESENT ILLNESS:  Family history of stroke ( mom) brother had a  Cardiac  Procedure. One sister with ALS,on sister with diabetes/coronary artery disease, brother with stroke x 2  Has  Lost 4 sibilings.    2 adopted children. One   of brain  Tumor NO grandkids  data  Entry.  maintain. Sold Immediately.     Orders this Visit:  Orders Placed This Encounter   Procedures     EKG 12-lead complete w/read - Clinics (performed today)     Orders Placed This Encounter   Medications     doxazosin (CARDURA) 1 MG tablet     hydrALAZINE (APRESOLINE) 50 MG tablet     Si mg 3 times daily      Medications Discontinued During This Encounter   Medication Reason     candesartan (ATACAND) 16 MG tablet Medication Reconciliation Clean Up       Encounter Diagnosis   Name Primary?     Screening for cardiovascular condition Yes       CURRENT MEDICATIONS:  Current Outpatient Medications   Medication Sig Dispense Refill     baclofen (LIORESAL) 10 MG tablet Take 1 tablet (10 mg) by mouth 3 times daily as needed for muscle spasms 30 tablet 0     blood glucose (NO BRAND SPECIFIED) lancets standard Use to test blood sugar 1 times daily or as directed, Contour Next lancets 100 each 3     blood glucose (NO BRAND SPECIFIED) test strip Use to test blood sugar 1 times daily or as directed, Contour Next strips 100 strip 1     blood glucose monitoring (NO BRAND SPECIFIED) meter device kit Use to test blood sugar 1 times daily or as directed. Ultra 2 1 kit 1     diltiazem ER COATED BEADS (CARDIAZEM LA) 180 MG 24 hr tablet Take one tablet by mouth once daily at bedtime  3     doxazosin (CARDURA) 1 MG tablet        gabapentin (NEURONTIN) 100 MG capsule TAKE 2 CAPSULES BY MOUTH 2 TIMES DAILY GENERIC EQUIVALENT FOR NEURONTIN 360 capsule 1     glimepiride (AMARYL) 1 MG tablet Take 1-tablet by mouth daily 90 tablet 2     hydrALAZINE (APRESOLINE) 50 MG tablet 100 mg 3 times daily        MULTI-VITAMIN OR TABS 1 TABLET DAILY        "nystatin (MYCOSTATIN) 087671 UNIT/GM POWD Apply a small amount to affected area three times daily. (Patient taking differently: as needed Apply a small amount to affected area three times daily.) 60 g 1     pioglitazone (ACTOS) 15 MG tablet Take 1 tablet (15 mg) by mouth daily 90 tablet 3     sertraline (ZOLOFT) 50 MG tablet Take 1 tablet (50 mg) by mouth daily 90 tablet 2     simvastatin (ZOCOR) 10 MG tablet TAKE 1 TABLET BY MOUTH AT BEDTIME. 90 tablet 3     torsemide (DEMADEX) 10 MG tablet Take 1 tablet (10 mg) by mouth daily . Through Nephrology 30 tablet 1     order for DME Equipment being ordered: walker. 4 wheeled with brakes and a seat. 1 Device 0       ALLERGIES     Allergies   Allergen Reactions     Augmentin Diarrhea     Vomiting       Cleocin      Hives     Tegretol [Carbamazepine]      Irregular heart beat       PAST MEDICAL, SURGICAL, FAMILY, SOCIAL HISTORY:  History was reviewed and updated as needed, see medical record.    Review of Systems:  A 12-point review of systems was completed, see medical record for detailed review of systems information.    Physical Exam:  Vitals: /63 (BP Location: Right arm, Patient Position: Sitting, Cuff Size: Adult Large)   Pulse 87   Ht 1.626 m (5' 4\")   Wt 99.1 kg (218 lb 8 oz)   LMP  (LMP Unknown)   Breastfeeding No   BMI 37.51 kg/m      Constitutional:           Skin:           Head:           Eyes:           ENT:           Neck:           Chest:           Cardiac:                    Abdomen:           Vascular:                                        Extremities and Back:           Neurological:           ASSESSMENT:  Receiving excellent care from primary. Follow the aortic valve and aorta.        RECOMMENDATIONS:   Follow up PRN repeat TTE  In 2years  Can see prn      Recent Lab Results:  LIPID RESULTS:  Lab Results   Component Value Date    CHOL 181 04/27/2020    HDL 44 (L) 04/27/2020    LDL 89 04/27/2020    TRIG 240 (H) 04/27/2020    CHOLHDLRATIO 3.4 " 12/17/2013       LIVER ENZYME RESULTS:  Lab Results   Component Value Date    AST 18 11/16/2018    ALT 24 09/27/2019       CBC RESULTS:  Lab Results   Component Value Date    WBC 10.2 09/17/2016    RBC 3.31 (L) 09/17/2016    HGB 11.1 (L) 07/13/2020    HCT 31.3 (L) 09/17/2016    MCV 95 09/17/2016    MCH 29.6 09/17/2016    MCHC 31.3 (L) 09/17/2016    RDW 12.5 09/17/2016     09/17/2016       BMP RESULTS:  Lab Results   Component Value Date     09/24/2020    POTASSIUM 4.9 09/24/2020    CHLORIDE 110 (H) 09/24/2020    CO2 22 09/24/2020    ANIONGAP 9 09/24/2020     (H) 09/24/2020    BUN 56 (H) 09/24/2020    CR 2.21 (H) 09/24/2020    GFRESTIMATED 20 (L) 09/24/2020    GFRESTBLACK 23 (L) 09/24/2020    EDNA 9.7 09/24/2020        A1C RESULTS:  Lab Results   Component Value Date    A1C 6.8 (H) 04/27/2020       INR RESULTS:  No results found for: INR    We greatly appreciate the opportunity to be involved in the care of your patient, Ирина Yanez.    Sincerely,  Henrik Beasley MD      CC  Yuridia Villarreal [place]  [id number] [place] [address]

## 2020-11-16 NOTE — LETTER
2020    [place]  [address] [place] [address]    RE: Ирина Yanez       Dear Colleague,    I had the pleasure of seeing Ирина Yanez in the [facility].    HISTORY OF PRESENT ILLNESS:  Family history of stroke ( mom) brother had a  Cardiac  Procedure. One sister with ALS,on sister with diabetes/coronary artery disease, brother with stroke x 2  Has  Lost 4 sibilings.    2 adopted children. One   of brain  Tumor NO grandkids  data  Entry.  maintain. Sold camper.     Orders this Visit:  Orders Placed This Encounter   Procedures     EKG 12-lead complete w/read - Clinics (performed today)     Orders Placed This Encounter   Medications     doxazosin (CARDURA) 1 MG tablet     hydrALAZINE (APRESOLINE) 50 MG tablet     Si mg 3 times daily      Medications Discontinued During This Encounter   Medication Reason     candesartan (ATACAND) 16 MG tablet Medication Reconciliation Clean Up       Encounter Diagnosis   Name Primary?     Screening for cardiovascular condition Yes       CURRENT MEDICATIONS:  Current Outpatient Medications   Medication Sig Dispense Refill     baclofen (LIORESAL) 10 MG tablet Take 1 tablet (10 mg) by mouth 3 times daily as needed for muscle spasms 30 tablet 0     blood glucose (NO BRAND SPECIFIED) lancets standard Use to test blood sugar 1 times daily or as directed, Contour Next lancets 100 each 3     blood glucose (NO BRAND SPECIFIED) test strip Use to test blood sugar 1 times daily or as directed, Contour Next strips 100 strip 1     blood glucose monitoring (NO BRAND SPECIFIED) meter device kit Use to test blood sugar 1 times daily or as directed. Ultra 2 1 kit 1     diltiazem ER COATED BEADS (CARDIAZEM LA) 180 MG 24 hr tablet Take one tablet by mouth once daily at bedtime  3     doxazosin (CARDURA) 1 MG tablet        gabapentin (NEURONTIN) 100 MG capsule TAKE 2 CAPSULES BY MOUTH 2 TIMES DAILY GENERIC EQUIVALENT FOR  "NEURONTIN 360 capsule 1     glimepiride (AMARYL) 1 MG tablet Take 1-tablet by mouth daily 90 tablet 2     hydrALAZINE (APRESOLINE) 50 MG tablet 100 mg 3 times daily        MULTI-VITAMIN OR TABS 1 TABLET DAILY       nystatin (MYCOSTATIN) 990249 UNIT/GM POWD Apply a small amount to affected area three times daily. (Patient taking differently: as needed Apply a small amount to affected area three times daily.) 60 g 1     pioglitazone (ACTOS) 15 MG tablet Take 1 tablet (15 mg) by mouth daily 90 tablet 3     sertraline (ZOLOFT) 50 MG tablet Take 1 tablet (50 mg) by mouth daily 90 tablet 2     simvastatin (ZOCOR) 10 MG tablet TAKE 1 TABLET BY MOUTH AT BEDTIME. 90 tablet 3     torsemide (DEMADEX) 10 MG tablet Take 1 tablet (10 mg) by mouth daily . Through Nephrology 30 tablet 1     order for DME Equipment being ordered: walker. 4 wheeled with brakes and a seat. 1 Device 0       ALLERGIES     Allergies   Allergen Reactions     Augmentin Diarrhea     Vomiting       Cleocin      Hives     Tegretol [Carbamazepine]      Irregular heart beat       PAST MEDICAL, SURGICAL, FAMILY, SOCIAL HISTORY:  History was reviewed and updated as needed, see medical record.    Review of Systems:  A 12-point review of systems was completed, see medical record for detailed review of systems information.    Physical Exam:  Vitals: /63 (BP Location: Right arm, Patient Position: Sitting, Cuff Size: Adult Large)   Pulse 87   Ht 1.626 m (5' 4\")   Wt 99.1 kg (218 lb 8 oz)   LMP  (LMP Unknown)   Breastfeeding No   BMI 37.51 kg/m      Constitutional:           Skin:           Head:           Eyes:           ENT:           Neck:           Chest:           Cardiac:                    Abdomen:           Vascular:                                        Extremities and Back:           Neurological:           ASSESSMENT:  Receiving excellent care from primary. Follow the aortic valve and aorta.        RECOMMENDATIONS:   Follow up PRN repeat TTE  In " 2years  Can see prn      Recent Lab Results:  LIPID RESULTS:  Lab Results   Component Value Date    CHOL 181 04/27/2020    HDL 44 (L) 04/27/2020    LDL 89 04/27/2020    TRIG 240 (H) 04/27/2020    CHOLHDLRATIO 3.4 12/17/2013       LIVER ENZYME RESULTS:  Lab Results   Component Value Date    AST 18 11/16/2018    ALT 24 09/27/2019       CBC RESULTS:  Lab Results   Component Value Date    WBC 10.2 09/17/2016    RBC 3.31 (L) 09/17/2016    HGB 11.1 (L) 07/13/2020    HCT 31.3 (L) 09/17/2016    MCV 95 09/17/2016    MCH 29.6 09/17/2016    MCHC 31.3 (L) 09/17/2016    RDW 12.5 09/17/2016     09/17/2016       BMP RESULTS:  Lab Results   Component Value Date     09/24/2020    POTASSIUM 4.9 09/24/2020    CHLORIDE 110 (H) 09/24/2020    CO2 22 09/24/2020    ANIONGAP 9 09/24/2020     (H) 09/24/2020    BUN 56 (H) 09/24/2020    CR 2.21 (H) 09/24/2020    GFRESTIMATED 20 (L) 09/24/2020    GFRESTBLACK 23 (L) 09/24/2020    EDNA 9.7 09/24/2020        A1C RESULTS:  Lab Results   Component Value Date    A1C 6.8 (H) 04/27/2020       INR RESULTS:  No results found for: INR    We greatly appreciate the opportunity to be involved in the care of your patient, Ирина Yanez.    Sincerely,  Henrik Beasley MD      CC  [place] Villarreal [place]  [id number] [address]                                                                         Thank you for allowing me to participate in the care of your patient.      Sincerely,     Henrik Beasley MD     [facility]    cc:   Yuridia Villarreal MD  [id number] [address]

## 2020-11-16 NOTE — LETTER
11/16/2020      [place]  [address]  [facility] [address]      RE: Ирина Yanez       Dear Colleague,    I had the pleasure of seeing Ирина Yanez in the [facility].    Service Date: 11/16/2020      CARDIOLOGY CONSULTATION       HISTORY OF PRESENT ILLNESS:  Ирина Yanez, an 85-year-old woman with type 2 diabetes mellitus, stage IV chronic kidney disease, hypertension, obesity, dyslipidemia, peripheral neuropathy and osteoarthritis, was evaluated in consultation at request of Dr. Villarreal for an abnormal echocardiogram.      During a physical examination in 01/2020, Dr. Villarreal noted a cardiac murmur and referred Ms. Yanez for an echocardiogram.  The echo showed moderate dilatation of the ascending aorta (4.4 cm) and mild aortic stenosis with a mean systolic gradient of 16 mmHg  and a calculated valve area of 2.0 cm2.  As Ms. Yanez has a family history of cardiac disease, you requested formal cardiac evaluation.  Dr. Villarreal had scheduled the evaluation earlier this year but because of the COVID pandemic, this evaluation was delayed until today.      Ms. Yanez is entirely free of chest, arm, neck, jaw or back discomfort with exertion, syncope or new exertional dyspnea.  Her ability to exercise is limited by peripheral neuropathy and she uses a walker to ambulate safely.      The patient has been followed very closely by Dr. Whatley of Nephrology for stage IV chronic kidney disease.  Her blood pressure medications have been adjusted throughout the year and her current systolic pressure is 130 mmHg.      PAST MEDICAL HISTORY:   1.  Diabetes mellitus.   a.  Nephropathy.  Creatinine of 2.21 with a GFR of 20.   b.  Neuropathy.  Stocking and glove distribution, severe neuropathy.  Currently, on gabapentin.   c.  No history of retinopathy.   2.  Hypertension.  Presently, well controlled on diltiazem, doxazosin, hydralazine, torsemide.   3.  Dyslipidemia.  On simvastatin.   4.   History of diabetic foot ulcer status post toe amputation.   5.  Osteoarthritis.  History of back surgery.   6.  Chronic right bundle branch block noted on previous EKGs.   7.  Moderate dilatation of ascending aorta, noted on most recent echo.  Ascending aorta measures 4.4 cm.   8.  Mild aortic stenosis.  Mean systolic gradient of 16.  Aortic valve area of 2.0 cm2.      ALLERGIES:  Augmentin, Cleocin, Tegretol.      HABITS:  The patient does not use alcohol.  She does not smoke cigarettes.      SOCIAL HISTORY:  The patient is  with 2 adopted children and lost one adopted child to brain cancer.  There are no grandchildren.  She and her  have not been able to be as active due to both physical problems and from the COVID pandemic.      REVIEW OF SYSTEMS:  A 12-point review of systems was performed.  Outside the issues mentioned in the HPI, there are no complaints.      MEDICATIONS:   1.  Baclofen (Lioresal) 10 mg t.i.d.   2.  Diltiazem  mg daily.   3.  Doxazosin 1 mg daily.   4.  Gabapentin 200 mg twice a day.   5.  Glimepiride (Amaryl 1 mg daily).    6.  Hydralazine  100 mg t.i.d.   7.  Pioglitazone (Actos) 15 mg daily.   8.  Sertraline 50 mg daily.   9.  Simvastatin 10 mg daily.   10.  Torsemide 10 mg daily.      FAMILY HISTORY:  There is a family history of  Heart disease  2 brothers.  Another brother had strokes.  She had a sister with diabetes and 1 sister with ALS.  Her mom had a stroke.      PHYSICAL EXAMINATION:   GENERAL:  Today, demonstrates a very pleasant, cooperative and intelligent 85-year-old woman who is overweight.   VITAL SIGNS:  Her blood pressure is 130/62, her heart rate is 87.  Her height is 1.63 meters, her weight is 99.1 kg for a BMI of 37.5.   LUNGS:  Clear to percussion and auscultation.   CARDIOVASCULAR:  Shows a normal S1 with a grade 3/6 to 4/6 systolic ejection murmur at the aortic region.  The pulses are full and symmetrical in the carotid, radial, brachial, femoral  areas.  Her carotid pulses are 2+.   NEUROLOGIC:  Cranial nerves II-XII are intact.  Her strength equal and symmetrical.  She displays normal insight and judgment.   ABDOMEN:  She has abdominal obesity.      DIAGNOSTIC STUDIES:  Her most recent lipid profile showed a cholesterol of 181, HDL of 44, LDL of 89, triglycerides of 240.  Her most recent BUN was 56 with a creatinine of 2.21.  Her ECG shows a sinus rhythm with a right bundle branch block with no acute changes.  Hemoglobin is 11.1.  Hemoglobin A1c is 6.8.      ASSESSMENT:  Ms. Yanez has mild aortic stenosis with moderate dilatation of the ascending aorta.  At present, there is no indication for mechanical intervention for either lesion.  A repeat echo in 2 years would be reasonable to make certain there has been no change in the aortic valve or in the aortic dimensions.  The patient's systolic blood pressure is optimal on her current regimen and she is being followed very carefully by a  hypertension specialist.  She is on statin therapy at appropriate doses.      At this time I have nothing to add to the patient's excellent medical  care that she is receiving from her nephrologist and from her primary care physician.        The patient would benefit from weight loss.  She understands the value of a regular exercise program.  I reviewed the features of a Mediterranean-style weight loss diet.      RECOMMENDATIONS:   1.  Mediterranean-style weight loss diet.   2.  Continue physical activity as tolerated.   3.  Continue present medical therapy.  The patient's systolic blood pressure is optimal.   4.  I would advise that you schedule a follow up  TTE in 2 years to reassess aortic valve area and aortic dimensions, earlier if there are any new symptoms. We are available to see her again at any time in the future should you or her so request.       We greatly appreciate the opportunity to be involved in the care for your patient, Ирина Yanez.       Henrik Beasley,  MD       cc:   Yuridia Villarreal MD    [facility]    [address] [place] [address]      Jef Whatley MD    [facility]   [address]   [place] [address]         MABEL RASHEED MD             D: 2020   T: 2020   MT: DANIELA      Name:     BEATA TRIPLETT   MRN:      [id number]        Account:      [id number]   :      1935           Service Date: 2020      Document: [id number]        Outpatient Encounter Medications as of 2020   Medication Sig Dispense Refill     baclofen (LIORESAL) 10 MG tablet Take 1 tablet (10 mg) by mouth 3 times daily as needed for muscle spasms 30 tablet 0     blood glucose (NO BRAND SPECIFIED) lancets standard Use to test blood sugar 1 times daily or as directed, Contour Next lancets 100 each 3     blood glucose (NO BRAND SPECIFIED) test strip Use to test blood sugar 1 times daily or as directed, Contour Next strips 100 strip 1     blood glucose monitoring (NO BRAND SPECIFIED) meter device kit Use to test blood sugar 1 times daily or as directed. Ultra 2 1 kit 1     diltiazem ER COATED BEADS (CARDIAZEM LA) 180 MG 24 hr tablet Take one tablet by mouth once daily at bedtime  3     doxazosin (CARDURA) 1 MG tablet        gabapentin (NEURONTIN) 100 MG capsule TAKE 2 CAPSULES BY MOUTH 2 TIMES DAILY GENERIC EQUIVALENT FOR NEURONTIN 360 capsule 1     glimepiride (AMARYL) 1 MG tablet Take 1-tablet by mouth daily 90 tablet 2     hydrALAZINE (APRESOLINE) 50 MG tablet 100 mg 3 times daily        MULTI-VITAMIN OR TABS 1 TABLET DAILY       nystatin (MYCOSTATIN) 395678 UNIT/GM POWD Apply a small amount to affected area three times daily. (Patient taking differently: as needed Apply a small amount to affected area three times daily.) 60 g 1     pioglitazone (ACTOS) 15 MG tablet Take 1 tablet (15 mg) by mouth daily 90 tablet 3     sertraline (ZOLOFT) 50 MG tablet Take 1 tablet (50 mg) by mouth daily 90 tablet 2     simvastatin  (ZOCOR) 10 MG tablet TAKE 1 TABLET BY MOUTH AT BEDTIME. 90 tablet 3     torsemide (DEMADEX) 10 MG tablet Take 1 tablet (10 mg) by mouth daily . Through Nephrology 30 tablet 1     order for DME Equipment being ordered: walker. 4 wheeled with brakes and a seat. 1 Device 0     [DISCONTINUED] candesartan (ATACAND) 16 MG tablet Take 1 tablet (16 mg) by mouth daily       No facility-administered encounter medications on file as of 11/16/2020.        Again, thank you for allowing me to participate in the care of your patient.      Sincerely,    Henrik Beasley MD     [facility]

## 2020-11-16 NOTE — PROGRESS NOTES
Service Date: 11/16/2020      CARDIOLOGY CONSULTATION       HISTORY OF PRESENT ILLNESS:  Ирина Yanez, an 85-year-old woman with type 2 diabetes mellitus, stage IV chronic kidney disease, hypertension, obesity, dyslipidemia, peripheral neuropathy and osteoarthritis, was evaluated in consultation at request of Dr. Villarreal for an abnormal echocardiogram.      During a physical examination in 01/2020, Dr. Villarreal noted a cardiac murmur and referred Ms. Yanez for an echocardiogram.  The echo showed moderate dilatation of the ascending aorta (4.4 cm) and mild aortic stenosis with a mean systolic gradient of 16 mmHg  and a calculated valve area of 2.0 cm2.  As Ms. Yanez has a family history of cardiac disease, you requested formal cardiac evaluation.  Dr. Villarreal had scheduled the evaluation earlier this year but because of the COVID pandemic, this evaluation was delayed until today.      Ms. Yanez is entirely free of chest, arm, neck, jaw or back discomfort with exertion, syncope or new exertional dyspnea.  Her ability to exercise is limited by peripheral neuropathy and she uses a walker to ambulate safely.      The patient has been followed very closely by Dr. Whatley of Nephrology for stage IV chronic kidney disease.  Her blood pressure medications have been adjusted throughout the year and her current systolic pressure is 130 mmHg.      PAST MEDICAL HISTORY:   1.  Diabetes mellitus.   a.  Nephropathy.  Creatinine of 2.21 with a GFR of 20.   b.  Neuropathy.  Stocking and glove distribution, severe neuropathy.  Currently, on gabapentin.   c.  No history of retinopathy.   2.  Hypertension.  Presently, well controlled on diltiazem, doxazosin, hydralazine, torsemide.   3.  Dyslipidemia.  On simvastatin.   4.  History of diabetic foot ulcer status post toe amputation.   5.  Osteoarthritis.  History of back surgery.   6.  Chronic right bundle branch block noted on previous EKGs.   7.  Moderate dilatation of ascending aorta,  noted on most recent echo.  Ascending aorta measures 4.4 cm.   8.  Mild aortic stenosis.  Mean systolic gradient of 16.  Aortic valve area of 2.0 cm2.      ALLERGIES:  Augmentin, Cleocin, Tegretol.      HABITS:  The patient does not use alcohol.  She does not smoke cigarettes.      SOCIAL HISTORY:  The patient is  with 2 adopted children and lost one adopted child to brain cancer.  There are no grandchildren.  She and her  have not been able to be as active due to both physical problems and from the COVID pandemic.      REVIEW OF SYSTEMS:  A 12-point review of systems was performed.  Outside the issues mentioned in the HPI, there are no complaints.      MEDICATIONS:   1.  Baclofen (Lioresal) 10 mg t.i.d.   2.  Diltiazem  mg daily.   3.  Doxazosin 1 mg daily.   4.  Gabapentin 200 mg twice a day.   5.  Glimepiride (Amaryl 1 mg daily).    6.  Hydralazine  100 mg t.i.d.   7.  Pioglitazone (Actos) 15 mg daily.   8.  Sertraline 50 mg daily.   9.  Simvastatin 10 mg daily.   10.  Torsemide 10 mg daily.      FAMILY HISTORY:  There is a family history of  Heart disease  2 brothers.  Another brother had strokes.  She had a sister with diabetes and 1 sister with ALS.  Her mom had a stroke.      PHYSICAL EXAMINATION:   GENERAL:  Today, demonstrates a very pleasant, cooperative and intelligent 85-year-old woman who is overweight.   VITAL SIGNS:  Her blood pressure is 130/62, her heart rate is 87.  Her height is 1.63 meters, her weight is 99.1 kg for a BMI of 37.5.   LUNGS:  Clear to percussion and auscultation.   CARDIOVASCULAR:  Shows a normal S1 with a grade 3/6 to 4/6 systolic ejection murmur at the aortic region.  The pulses are full and symmetrical in the carotid, radial, brachial, femoral areas.  Her carotid pulses are 2+.   NEUROLOGIC:  Cranial nerves II-XII are intact.  Her strength equal and symmetrical.  She displays normal insight and judgment.   ABDOMEN:  She has abdominal obesity.      DIAGNOSTIC  STUDIES:  Her most recent lipid profile showed a cholesterol of 181, HDL of 44, LDL of 89, triglycerides of 240.  Her most recent BUN was 56 with a creatinine of 2.21.  Her ECG shows a sinus rhythm with a right bundle branch block with no acute changes.  Hemoglobin is 11.1.  Hemoglobin A1c is 6.8.      ASSESSMENT:  Ms. Yanez has mild aortic stenosis with moderate dilatation of the ascending aorta.  At present, there is no indication for mechanical intervention for either lesion.  A repeat echo in 2 years would be reasonable to make certain there has been no change in the aortic valve or in the aortic dimensions.  The patient's systolic blood pressure is optimal on her current regimen and she is being followed very carefully by a  hypertension specialist.  She is on statin therapy at appropriate doses.      At this time I have nothing to add to the patient's excellent medical  care that she is receiving from her nephrologist and from her primary care physician.        The patient would benefit from weight loss.  She understands the value of a regular exercise program.  I reviewed the features of a Mediterranean-style weight loss diet.      RECOMMENDATIONS:   1.  Mediterranean-style weight loss diet.   2.  Continue physical activity as tolerated.   3.  Continue present medical therapy.  The patient's systolic blood pressure is optimal.   4.  I would advise that you schedule a follow up  TTE in 2 years to reassess aortic valve area and aortic dimensions, earlier if there are any new symptoms. We are available to see her again at any time in the future should you or her so request.       We greatly appreciate the opportunity to be involved in the care for your patient, Ирина Yanez.       Henrik Beasley MD       cc:   Yuridia Villarreal MD    [facility]    [address] [place] [address]      Jef Whatley MD    [facility]   [address], [address] [address]         HENRIK  JULIUS RASHEED MD             D: 2020   T: 2020   MT: DANIELA      Name:     BEATA TRIPLETT   MRN:      [id number]        Account:      [id number]   :      1935           Service Date: 2020      Document: [id number]

## 2020-12-09 DIAGNOSIS — N18.4 CHRONIC KIDNEY DISEASE, STAGE IV (SEVERE) (H): Primary | ICD-10-CM

## 2020-12-09 DIAGNOSIS — N25.81 SECONDARY HYPERPARATHYROIDISM OF RENAL ORIGIN (H): ICD-10-CM

## 2020-12-10 ENCOUNTER — APPOINTMENT (OUTPATIENT)
Dept: ULTRASOUND IMAGING | Facility: CLINIC | Age: 85
DRG: 177 | End: 2020-12-10
Attending: PHYSICIAN ASSISTANT
Payer: COMMERCIAL

## 2020-12-10 ENCOUNTER — APPOINTMENT (OUTPATIENT)
Dept: CT IMAGING | Facility: CLINIC | Age: 85
DRG: 177 | End: 2020-12-10
Attending: PHYSICIAN ASSISTANT
Payer: COMMERCIAL

## 2020-12-10 ENCOUNTER — VIRTUAL VISIT (OUTPATIENT)
Dept: URGENT CARE | Facility: CLINIC | Age: 85
End: 2020-12-10
Payer: COMMERCIAL

## 2020-12-10 ENCOUNTER — HOSPITAL ENCOUNTER (INPATIENT)
Facility: CLINIC | Age: 85
LOS: 4 days | Discharge: HOME | DRG: 177 | End: 2020-12-14
Attending: PHYSICIAN ASSISTANT | Admitting: INTERNAL MEDICINE
Payer: COMMERCIAL

## 2020-12-10 DIAGNOSIS — R07.89 CHEST TIGHTNESS: Primary | ICD-10-CM

## 2020-12-10 DIAGNOSIS — J18.9 PNEUMONIA OF RIGHT UPPER LOBE DUE TO INFECTIOUS ORGANISM: Primary | ICD-10-CM

## 2020-12-10 DIAGNOSIS — R06.09 DYSPNEA ON EXERTION: ICD-10-CM

## 2020-12-10 DIAGNOSIS — R07.89 CHEST TIGHTNESS: ICD-10-CM

## 2020-12-10 DIAGNOSIS — R91.8 LUNG MASS: ICD-10-CM

## 2020-12-10 LAB
ALBUMIN SERPL-MCNC: 2.9 G/DL (ref 3.4–5)
ALP SERPL-CCNC: 79 U/L (ref 40–150)
ALT SERPL W P-5'-P-CCNC: 13 U/L (ref 0–50)
ANION GAP SERPL CALCULATED.3IONS-SCNC: 5 MMOL/L (ref 3–14)
AST SERPL W P-5'-P-CCNC: 13 U/L (ref 0–45)
BASE EXCESS BLDV CALC-SCNC: 5.5 MMOL/L
BASOPHILS # BLD AUTO: 0 10E9/L (ref 0–0.2)
BASOPHILS NFR BLD AUTO: 0.4 %
BILIRUB SERPL-MCNC: 0.3 MG/DL (ref 0.2–1.3)
BUN SERPL-MCNC: 52 MG/DL (ref 7–30)
CALCIUM SERPL-MCNC: 9.3 MG/DL (ref 8.5–10.1)
CHLORIDE SERPL-SCNC: 105 MMOL/L (ref 94–109)
CO2 SERPL-SCNC: 31 MMOL/L (ref 20–32)
CREAT SERPL-MCNC: 2.27 MG/DL (ref 0.52–1.04)
CRP SERPL-MCNC: 35.9 MG/L (ref 0–8)
D DIMER PPP FEU-MCNC: 1.3 UG/ML FEU (ref 0–0.5)
DIFFERENTIAL METHOD BLD: ABNORMAL
EOSINOPHIL # BLD AUTO: 0 10E9/L (ref 0–0.7)
EOSINOPHIL NFR BLD AUTO: 0 %
ERYTHROCYTE [DISTWIDTH] IN BLOOD BY AUTOMATED COUNT: 13.7 % (ref 10–15)
GFR SERPL CREATININE-BSD FRML MDRD: 19 ML/MIN/{1.73_M2}
GLUCOSE BLDC GLUCOMTR-MCNC: 101 MG/DL (ref 70–99)
GLUCOSE SERPL-MCNC: 68 MG/DL (ref 70–99)
HCO3 BLDV-SCNC: 31 MMOL/L (ref 21–28)
HCT VFR BLD AUTO: 31.6 % (ref 35–47)
HGB BLD-MCNC: 9.5 G/DL (ref 11.7–15.7)
IMM GRANULOCYTES # BLD: 0 10E9/L (ref 0–0.4)
IMM GRANULOCYTES NFR BLD: 0.5 %
LACTATE BLD-SCNC: 0.8 MMOL/L (ref 0.7–2)
LYMPHOCYTES # BLD AUTO: 0.9 10E9/L (ref 0.8–5.3)
LYMPHOCYTES NFR BLD AUTO: 10.5 %
MCH RBC QN AUTO: 28.7 PG (ref 26.5–33)
MCHC RBC AUTO-ENTMCNC: 30.1 G/DL (ref 31.5–36.5)
MCV RBC AUTO: 96 FL (ref 78–100)
MONOCYTES # BLD AUTO: 0.8 10E9/L (ref 0–1.3)
MONOCYTES NFR BLD AUTO: 9.3 %
NEUTROPHILS # BLD AUTO: 6.6 10E9/L (ref 1.6–8.3)
NEUTROPHILS NFR BLD AUTO: 79.3 %
NRBC # BLD AUTO: 0 10*3/UL
NRBC BLD AUTO-RTO: 0 /100
NT-PROBNP SERPL-MCNC: 1553 PG/ML (ref 0–1800)
O2/TOTAL GAS SETTING VFR VENT: 0 %
PCO2 BLDV: 49 MM HG (ref 40–50)
PH BLDV: 7.41 PH (ref 7.32–7.43)
PLATELET # BLD AUTO: 229 10E9/L (ref 150–450)
PO2 BLDV: 40 MM HG (ref 25–47)
POTASSIUM SERPL-SCNC: 3.6 MMOL/L (ref 3.4–5.3)
PROT SERPL-MCNC: 7.5 G/DL (ref 6.8–8.8)
RBC # BLD AUTO: 3.31 10E12/L (ref 3.8–5.2)
SARS-COV-2 RNA SPEC QL NAA+PROBE: NORMAL
SODIUM SERPL-SCNC: 141 MMOL/L (ref 133–144)
SPECIMEN SOURCE: NORMAL
TROPONIN I SERPL-MCNC: 0.01 UG/L (ref 0–0.04)
TROPONIN I SERPL-MCNC: <0.015 UG/L (ref 0–0.04)
WBC # BLD AUTO: 8.3 10E9/L (ref 4–11)

## 2020-12-10 PROCEDURE — 80053 COMPREHEN METABOLIC PANEL: CPT | Performed by: PHYSICIAN ASSISTANT

## 2020-12-10 PROCEDURE — 83880 ASSAY OF NATRIURETIC PEPTIDE: CPT | Performed by: PHYSICIAN ASSISTANT

## 2020-12-10 PROCEDURE — 96372 THER/PROPH/DIAG INJ SC/IM: CPT | Performed by: PHYSICIAN ASSISTANT

## 2020-12-10 PROCEDURE — C9803 HOPD COVID-19 SPEC COLLECT: HCPCS

## 2020-12-10 PROCEDURE — 85025 COMPLETE CBC W/AUTO DIFF WBC: CPT | Performed by: PHYSICIAN ASSISTANT

## 2020-12-10 PROCEDURE — 93005 ELECTROCARDIOGRAM TRACING: CPT

## 2020-12-10 PROCEDURE — 84484 ASSAY OF TROPONIN QUANT: CPT | Performed by: PHYSICIAN ASSISTANT

## 2020-12-10 PROCEDURE — 93970 EXTREMITY STUDY: CPT

## 2020-12-10 PROCEDURE — 86140 C-REACTIVE PROTEIN: CPT | Performed by: PHYSICIAN ASSISTANT

## 2020-12-10 PROCEDURE — 85379 FIBRIN DEGRADATION QUANT: CPT | Performed by: PHYSICIAN ASSISTANT

## 2020-12-10 PROCEDURE — U0003 INFECTIOUS AGENT DETECTION BY NUCLEIC ACID (DNA OR RNA); SEVERE ACUTE RESPIRATORY SYNDROME CORONAVIRUS 2 (SARS-COV-2) (CORONAVIRUS DISEASE [COVID-19]), AMPLIFIED PROBE TECHNIQUE, MAKING USE OF HIGH THROUGHPUT TECHNOLOGIES AS DESCRIBED BY CMS-2020-01-R: HCPCS | Performed by: PHYSICIAN ASSISTANT

## 2020-12-10 PROCEDURE — 250N000013 HC RX MED GY IP 250 OP 250 PS 637: Performed by: INTERNAL MEDICINE

## 2020-12-10 PROCEDURE — 250N000011 HC RX IP 250 OP 636: Performed by: PHYSICIAN ASSISTANT

## 2020-12-10 PROCEDURE — 83605 ASSAY OF LACTIC ACID: CPT | Performed by: PHYSICIAN ASSISTANT

## 2020-12-10 PROCEDURE — 96365 THER/PROPH/DIAG IV INF INIT: CPT

## 2020-12-10 PROCEDURE — 250N000011 HC RX IP 250 OP 636: Performed by: INTERNAL MEDICINE

## 2020-12-10 PROCEDURE — 99207 PR NO CHARGE LOS: CPT | Performed by: FAMILY MEDICINE

## 2020-12-10 PROCEDURE — 120N000001 HC R&B MED SURG/OB

## 2020-12-10 PROCEDURE — 999N001017 HC STATISTIC GLUCOSE BY METER IP

## 2020-12-10 PROCEDURE — 82803 BLOOD GASES ANY COMBINATION: CPT | Performed by: PHYSICIAN ASSISTANT

## 2020-12-10 PROCEDURE — 96375 TX/PRO/DX INJ NEW DRUG ADDON: CPT

## 2020-12-10 PROCEDURE — 71250 CT THORAX DX C-: CPT

## 2020-12-10 PROCEDURE — 99223 1ST HOSP IP/OBS HIGH 75: CPT | Mod: AI | Performed by: INTERNAL MEDICINE

## 2020-12-10 PROCEDURE — 258N000003 HC RX IP 258 OP 636: Performed by: PHYSICIAN ASSISTANT

## 2020-12-10 PROCEDURE — 99285 EMERGENCY DEPT VISIT HI MDM: CPT | Mod: 25

## 2020-12-10 RX ORDER — ONDANSETRON 4 MG/1
4 TABLET, ORALLY DISINTEGRATING ORAL EVERY 6 HOURS PRN
Status: DISCONTINUED | OUTPATIENT
Start: 2020-12-10 | End: 2020-12-14 | Stop reason: HOSPADM

## 2020-12-10 RX ORDER — POLYETHYLENE GLYCOL 3350 17 G/17G
17 POWDER, FOR SOLUTION ORAL DAILY PRN
Status: DISCONTINUED | OUTPATIENT
Start: 2020-12-10 | End: 2020-12-14 | Stop reason: HOSPADM

## 2020-12-10 RX ORDER — SIMVASTATIN 10 MG
10 TABLET ORAL AT BEDTIME
Status: DISCONTINUED | OUTPATIENT
Start: 2020-12-10 | End: 2020-12-14 | Stop reason: HOSPADM

## 2020-12-10 RX ORDER — TORSEMIDE 10 MG/1
20 TABLET ORAL DAILY
Status: ON HOLD | COMMUNITY
End: 2021-02-02

## 2020-12-10 RX ORDER — BACLOFEN 10 MG/1
10 TABLET ORAL 3 TIMES DAILY PRN
Status: DISCONTINUED | OUTPATIENT
Start: 2020-12-10 | End: 2020-12-10

## 2020-12-10 RX ORDER — CEFTRIAXONE 1 G/1
1 INJECTION, POWDER, FOR SOLUTION INTRAMUSCULAR; INTRAVENOUS ONCE
Status: COMPLETED | OUTPATIENT
Start: 2020-12-10 | End: 2020-12-10

## 2020-12-10 RX ORDER — LIDOCAINE 40 MG/G
CREAM TOPICAL
Status: DISCONTINUED | OUTPATIENT
Start: 2020-12-10 | End: 2020-12-14 | Stop reason: HOSPADM

## 2020-12-10 RX ORDER — ACETAMINOPHEN 325 MG/1
650 TABLET ORAL EVERY 4 HOURS PRN
Status: DISCONTINUED | OUTPATIENT
Start: 2020-12-10 | End: 2020-12-14 | Stop reason: HOSPADM

## 2020-12-10 RX ORDER — PROCHLORPERAZINE 25 MG
12.5 SUPPOSITORY, RECTAL RECTAL EVERY 12 HOURS PRN
Status: DISCONTINUED | OUTPATIENT
Start: 2020-12-10 | End: 2020-12-14 | Stop reason: HOSPADM

## 2020-12-10 RX ORDER — AZITHROMYCIN 500 MG/5ML
500 INJECTION, POWDER, LYOPHILIZED, FOR SOLUTION INTRAVENOUS ONCE
Status: COMPLETED | OUTPATIENT
Start: 2020-12-10 | End: 2020-12-10

## 2020-12-10 RX ORDER — PROCHLORPERAZINE MALEATE 5 MG
5 TABLET ORAL EVERY 6 HOURS PRN
Status: DISCONTINUED | OUTPATIENT
Start: 2020-12-10 | End: 2020-12-14 | Stop reason: HOSPADM

## 2020-12-10 RX ORDER — GABAPENTIN 100 MG/1
200 CAPSULE ORAL 2 TIMES DAILY
Status: DISCONTINUED | OUTPATIENT
Start: 2020-12-10 | End: 2020-12-14 | Stop reason: HOSPADM

## 2020-12-10 RX ORDER — AMPICILLIN AND SULBACTAM 2; 1 G/1; G/1
3 INJECTION, POWDER, FOR SOLUTION INTRAMUSCULAR; INTRAVENOUS EVERY 12 HOURS
Status: DISCONTINUED | OUTPATIENT
Start: 2020-12-10 | End: 2020-12-14 | Stop reason: HOSPADM

## 2020-12-10 RX ORDER — HYDRALAZINE HYDROCHLORIDE 50 MG/1
100 TABLET, FILM COATED ORAL 3 TIMES DAILY
Status: DISCONTINUED | OUTPATIENT
Start: 2020-12-10 | End: 2020-12-14 | Stop reason: HOSPADM

## 2020-12-10 RX ORDER — NICOTINE POLACRILEX 4 MG
15-30 LOZENGE BUCCAL
Status: DISCONTINUED | OUTPATIENT
Start: 2020-12-10 | End: 2020-12-14 | Stop reason: HOSPADM

## 2020-12-10 RX ORDER — ONDANSETRON 2 MG/ML
4 INJECTION INTRAMUSCULAR; INTRAVENOUS EVERY 6 HOURS PRN
Status: DISCONTINUED | OUTPATIENT
Start: 2020-12-10 | End: 2020-12-14 | Stop reason: HOSPADM

## 2020-12-10 RX ORDER — TORSEMIDE 20 MG/1
20 TABLET ORAL DAILY
Status: DISCONTINUED | OUTPATIENT
Start: 2020-12-11 | End: 2020-12-11

## 2020-12-10 RX ORDER — DEXTROSE MONOHYDRATE 25 G/50ML
25-50 INJECTION, SOLUTION INTRAVENOUS
Status: DISCONTINUED | OUTPATIENT
Start: 2020-12-10 | End: 2020-12-14 | Stop reason: HOSPADM

## 2020-12-10 RX ADMIN — AMPICILLIN SODIUM AND SULBACTAM SODIUM 3 G: 2; 1 INJECTION, POWDER, FOR SOLUTION INTRAMUSCULAR; INTRAVENOUS at 22:31

## 2020-12-10 RX ADMIN — HYDRALAZINE HYDROCHLORIDE 100 MG: 50 TABLET, FILM COATED ORAL at 22:26

## 2020-12-10 RX ADMIN — GABAPENTIN 200 MG: 100 CAPSULE ORAL at 22:26

## 2020-12-10 RX ADMIN — CEFTRIAXONE 1 G: 1 INJECTION, POWDER, FOR SOLUTION INTRAMUSCULAR; INTRAVENOUS at 17:49

## 2020-12-10 RX ADMIN — SIMVASTATIN 10 MG: 10 TABLET, FILM COATED ORAL at 22:26

## 2020-12-10 RX ADMIN — AZITHROMYCIN MONOHYDRATE 500 MG: 500 INJECTION, POWDER, LYOPHILIZED, FOR SOLUTION INTRAVENOUS at 18:46

## 2020-12-10 RX ADMIN — SERTRALINE HYDROCHLORIDE 50 MG: 50 TABLET, FILM COATED ORAL at 22:26

## 2020-12-10 RX ADMIN — ENOXAPARIN SODIUM 100 MG: 100 INJECTION SUBCUTANEOUS at 18:47

## 2020-12-10 ASSESSMENT — ENCOUNTER SYMPTOMS
APPETITE CHANGE: 0
CHEST TIGHTNESS: 1
VOMITING: 0
ABDOMINAL PAIN: 0
FEVER: 0
FATIGUE: 1
DIARRHEA: 0
ACTIVITY CHANGE: 1
SHORTNESS OF BREATH: 1
WHEEZING: 0
COUGH: 1
PALPITATIONS: 1

## 2020-12-10 ASSESSMENT — ACTIVITIES OF DAILY LIVING (ADL): DEPENDENT_IADLS:: INDEPENDENT

## 2020-12-10 NOTE — ED TRIAGE NOTES
Pt presents with cough, shortness of breath, and generalized weakness. Pt denies any contact with COVID, denies hx of COPD or CHF. Pt alert, oriented x3 ABCs intact

## 2020-12-10 NOTE — PROGRESS NOTES
"The patient has been notified of following:     \"This telephone or video visit will be conducted via a call between you and your physician/provider. We have found that certain health care needs can be provided without the need for a physical exam.  This service lets us provide the care you need with a short phone conversation.  If a prescription is necessary we can send it directly to your pharmacy.  If lab work is needed we can place an order for that and you can then stop by our lab to have the test done at a later time.    Telephone or video visits are billed at different rates depending on your insurance coverage. During this emergency period, for some insurers they may be billed the same as an in-person visit.  Please reach out to your insurance provider with any questions.    Patient has given verbal consent for Telephone or video visit?  Yes  Subjective   HPI    Concern for covid    2 weeks ago had a lot of chest  ?tightness  Thought her bra was too tight and she took off her bra    Chest tightness is constant - worse with exertion.     Patient also having some shortness of breath     Also been having problems with her BP     Just now went to the bathroom and felt very exhausted  /57no fever      Patient Active Problem List   Diagnosis     Diabetic polyneuropathy (H)     Hyperlipidemia with target LDL less than 100     Hypertension goal BP (blood pressure) < 140/90     Arthritis     Anxiety     Type 2 diabetes mellitus with diabetic nephropathy (H)     Health Care Home     Malignant melanoma of skin     Vulvar dystrophy     Lichen sclerosus et atrophicus     Vitamin B12 deficiency (non anemic)     Controlled substance agreement signed 2/5/15     Major depression in partial remission (H)     CKD (chronic kidney disease) stage 4, GFR 15-29 ml/min (H)     Basal cell carcinoma of skin      Chronic pain - diabetic neuropathy     Complex sleep apnea syndrome     Tendon rupture, traumatic. quadriceps     Right " bundle branch block     S/P lumbar fusion     ACP (advance care planning)     Heart murmur     Aortic sclerosis     Toe amputation status, left     Elevated BMI with comorbidity (H)     Falls frequently     Abrasion of arm, right, initial encounter     Aortic valve stenosis, etiology of cardiac valve disease unspecified     Ascending aorta dilatation (H)     Past Surgical History:   Procedure Laterality Date     AMPUTATE TOE(S)  8/23/2012    left second toe Procedure: AMPUTATE TOE(S);;  Surgeon: Mil Jolly DPM;  Location: [facility] OR     CHOLECYSTECTOMY  Nov. 1975     COLONOSCOPY  early 2009    repeat 4-5 years (Had one prior to this with polyps)     COLONOSCOPY  Sept 2014    incomplete; recommend colography     COLONOSCOPY  02/25/2020    Dr. Pathak [facility]     COLONOSCOPY N/A 2/25/2020    Procedure: COLONOSCOPY, WITH biopsies using forceps;  Surgeon: Adebayo Pathak MD;  Location:  GI     OPTICAL TRACKING SYSTEM FUSION POSTERIOR SPINE LUMBAR N/A 9/16/2016    Procedure: OPTICAL TRACKING SYSTEM FUSION SPINE POSTERIOR LUMBAR ONE LEVEL;  Surgeon: Lennox Blue MD;  Location:  OR     PET, REC OF MELANOMA/MET CA Left     arm     REPAIR HAMMER TOE BILATERAL  8/23/2012    Procedure: REPAIR HAMMER TOE BILATERAL;  Flexor Tenotomy 2,3,4,5 Toes both feet, Partial 2nd toe amputation left foot;  Surgeon: Mil Jolly DPM;  Location: [facility] OR     REPAIR TENDON QUADRICEPS Right 10/27/2015    Procedure: REPAIR TENDON QUADRICEPS;  Surgeon: Antonio Yi MD;  Location:  OR     SURGICAL HISTORY OF -   1997    bilateral knee replacement     SURGICAL HISTORY OF -   1997    right knee revised; patella tendon ruptured     SURGICAL HISTORY OF -       surgery for spinal stenosis     SURGICAL HISTORY OF -       breast reduction surgery     SURGICAL HISTORY OF -       D and C        Social History     Tobacco Use     Smoking status: Never Smoker     Smokeless tobacco: Never Used   Substance Use Topics     Alcohol  use: No     Family History   Problem Relation Age of Onset     Cerebrovascular Disease Mother      Heart Disease Father      Neurologic Disorder Sister         ALS     C.A.D. Sister      Diabetes Sister      C.A.D. Brother      Psychotic Disorder Brother         [place] war: PTSD. suicide 2019     Gastrointestinal Disease Brother         diverticulitis     Colon Cancer No family hx of            Reviewed and updated as needed this visit by Provider                 Review of Systems   Constitutional, HEENT, cardiovascular, pulmonary, GI, , musculoskeletal, neuro, skin, endocrine and psych systems are negative, except as otherwise noted.       Objective   Reported vitals:  There were no vitals taken for this visit.   healthy, alert and no distress  PSYCH: Alert and oriented times 3; coherent speech, normal   rate and volume, able to articulate logical thoughts, able   to abstract reason, no tangential thoughts, no hallucinations   or delusions  Her affect is normal  RESP: No cough, no audible wheezing, able to talk in full sentences  Additional exam:  none  Remainder of exam unable to be completed due to telephone visits    Diagnostic Test Results:  Labs reviewed in Epic  none         Assessment/Plan:      ICD-10-CM    1. Chest tightness  R07.89      Constant chest tightness and dyspnea on exertion x 2 weeks  Today very fatigued and unable to do muc at home  Recommend ASAP ER evaluation for complete acute cardiorespiratory rule out   I recommended ambulance transfer patient declined. Risks of transport reviewed.   will drive       call duration:  6 minutes  Video: no    Amie Corea MD

## 2020-12-10 NOTE — ED PROVIDER NOTES
History     Chief Complaint:  Shortness of Breath and Generalized Weakness     HPI   Ирина Yanez is a 85 year old female with history of type II diabetes, hypertension, hyperlipidemia, RBBB, CKD, sleep apnea, who presents for evaluation of shortness of breath and generalized weakness. She notes approximately 3 weeks ago having the onset of generalized fatigue, difficulty performing tasks of daily living, episodic chest pain, palpitations, and leg swelling. She is unsure of when the last episode of chest pain/tightness occurred, episodes are of variable duration, unable to specifically locate the tightness, denies this as a true pain. She has difficulty walking even short distances without becoming short of breath. Denies sick contacts, travel, hospitalizations/surgery. Denies fever, chills, sweats, congestion, rhinorrhea, wheezing, vomiting, diarrhea, change in urination.      Allergies:  Augmentin  Cleocin  Tegretol     Medications:   Baclofen  diltiazem ER   doxazosin   gabapentin   glimepiride   hydralazine   nystatin   pioglitazone   sertraline  simvastatin  torsemide     Past Medical History:     Anxiety   Arthritis   Chronic pain   Depression   Diabetes mellitus, type II   Falls frequently   Heart murmur   Hypertension   Hyperlipidemia   Lichen sclerosus et atrophicus   Melanoma   Peripheral Neuropathy   Skin cancer   Sleep apnea   Spinal stenosis   Polyneuropathy   Vulvar dystrophy   Vitamin B12 deficiency  CKD  Basal cell carcinoma   Tendon rupture   Right bundle branch block   Aortic scoliosis     Past Surgical History:    Amputate toes   Choleystectomy   Colonoscopy  Optical tracking system fusion posterior spine lumbar  Repair hammer toe, bilateral   Bilateral knee replacement   Repair tendon quadriceps   rec of melanoma   Patella tendon repair   Spinal stenosis surgery  Breast reduction   D&C     Family History:    Mother: Cerebrovascular Disease  Father: heart disease  Sister: ALS, Coronary Artery  Disease, diabetes  Brother: Coronary Artery Disease, PTSD, suicide (2019), diverticulitis    Social History:  The patient was unaccompanied to the ED.  Smoking Status: Never Smoker  Smokeless Tobacco: Never Used  Alcohol Use: Negative   Drug Use: Negative  PCP: Yuridia Villarreal     Review of Systems   Constitutional: Positive for activity change and fatigue. Negative for appetite change and fever.   Respiratory: Positive for cough, chest tightness and shortness of breath. Negative for wheezing.    Cardiovascular: Positive for chest pain, palpitations and leg swelling.   Gastrointestinal: Negative for abdominal pain, diarrhea and vomiting.   All other systems reviewed and are negative.    Physical Exam     Patient Vitals for the past 24 hrs:   BP Temp Temp src Pulse Resp SpO2   12/10/20 1715 (!) 146/58 -- -- 75 17 96 %   12/10/20 1630 (!) 161/63 -- -- 67 21 96 %   12/10/20 1615 (!) 163/64 -- -- 67 21 95 %   12/10/20 1600 (!) 162/65 -- -- 69 22 95 %   12/10/20 1545 (!) 161/64 -- -- 70 15 96 %   12/10/20 1530 (!) 159/63 -- -- 69 14 96 %   12/10/20 1515 (!) 162/66 -- -- 72 15 97 %   12/10/20 1500 (!) 159/63 -- -- 70 18 96 %   12/10/20 1445 -- -- -- 70 13 97 %   12/10/20 1430 -- -- -- 73 16 91 %   12/10/20 1422 (!) 147/86 -- -- 72 -- 92 %   12/10/20 1358 (!) 179/65 98.3  F (36.8  C) Oral 81 20 91 %     Physical Exam  Vitals signs and nursing note reviewed.   Constitutional:       General: She is not in acute distress.     Appearance: She is not diaphoretic.   HENT:      Head: Normocephalic and atraumatic.   Eyes:      General: No scleral icterus.     Extraocular Movements: Extraocular movements intact.   Cardiovascular:      Rate and Rhythm: Normal rate and regular rhythm.      Pulses: Normal pulses.      Heart sounds: Murmur present. Systolic murmur present with a grade of 3/6.   Pulmonary:      Effort: Pulmonary effort is normal. No respiratory distress.      Breath sounds: Normal breath sounds.   Musculoskeletal:          General: No tenderness.      Right lower leg: Edema present.      Left lower leg: Edema present.   Skin:     General: Skin is warm.      Findings: No rash.   Neurological:      Mental Status: She is alert.       Emergency Department Course     ECG:  ECG taken at 1430, ECG read at 1437  Normal sinus rhythm  Right bundle branch block   Abnormal ECG  No significant change compared to EKG dated 11/16/2020  Rate 74 bpm. KY interval 192 ms. QRS duration 146 ms. QT/QTc 430/477 ms. P-R-T axes 99 99 27.    Imaging:  Radiology findings were communicated with the patient who voiced understanding of the findings.    Chest CT w/o contrast  1.  Masslike area of consolidation in the right upper lobe. Findings may represent pneumonia, however, some lung cancers can have this appearance. Clinical correlation, short-term follow-up, and pulmonary consultation recommended.  2.  Small bilateral pleural effusions.  Reading per radiology      Laboratory:  Laboratory findings were communicated with the patient who voiced understanding of the findings.    Symptomatic COVID-19 Virus (Coronavirus) by PCR Nasopharyngeal swab: Pending     CBC: WBC 8.3, HGB 9.5 (L),   CMP: Glucose 68 (L), BUN 52 (H), GFR 19 (L), Albumin 2.9 (L), o/w WNL (Creatinine 2.27 (H))    Troponin (Collected 1442): <0.015    Nt probnp inpatient: 1,553    D Dimer: 1.3 (H)    Lactic Acid (Resulted: 1455): 0.8     CPR inflammation: 35.9 (H)    Blood Gas 1442: pH 7.41, PCO2 49, PO2 40, Bicarbonate 31 (H), Base Excess Venous 5.5, FIO2 0      Procedures:    Interventions:  1749 Rocephin 1 g IV      Emergency Department Course:    1420 Nursing notes and vitals reviewed. I performed an exam of the patient as documented above.     1442 IV was inserted and blood was drawn for laboratory testing, results above.     1650 The patient was sent for a CT while in the emergency department, results above.     1832 I spoke with Dr. Lopez of the hospitalist service from [facility] regarding patient's presentation, findings, and plan of care.      Prior to admission, I personally reviewed the results with the patient and all related questions were answered. The patient verbalized understanding and is amenable to plan.     Impression & Plan      Covid-19  Ирина Yanez was evaluated during a global COVID-19 pandemic, which necessitated consideration that the patient might be at risk for infection with the SARS-CoV-2 virus that causes COVID-19.   Applicable protocols for evaluation were followed during the patient's care.   COVID-19 was considered as part of the patient's evaluation. The plan for testing is:  a test was obtained during this visit.    Medical Decision Making:  Ирина Yanez is a 85 year old female who presents to the emergency department today for evaluation of dyspnea on exertion. She is noted to have hypoxemia prompting nasal cannula oxygen. She has no adventitious lung sounds when examined. She is noted to demonstrate a holosystolic murmur with concern this may be playing a role in her symptoms. She does not appear to warrant an emergent ECHO at this time. Her EKG is without acute ischemic changes or dysrhythmia. Her troponin is without elevation ruling down NSTEMI or recent missed MI. She demonstrates chronic renal insufficiency preventing ability to perform CT pulmonary embolism study due to an elevated D-dimer. Lovenox was administered pro phylactically and after discussion with hospitalist team, plan for US of her BLE.   CT imaging plain was obtained and demonstrates a mass like consolidation and bilateral pleural effusions. There is the possibility of infectious vs malignant etiologies of her symptoms. CAP treatment initiated, admitted to medical floor in good condition. COVID sent but unlikely.     Critical Care time was 0 minutes for this patient excluding procedures.     Diagnosis:    ICD-10-CM    1. Dyspnea on exertion  R06.00    2. Chest tightness  R07.89     3. Lung mass  R91.8      Disposition:   The patient is admitted into the care of Dr. Lopez.     Scribe Disclosure:  I, Orla Severson, am serving as a scribe at 2:10 PM on 12/10/2020 to document services personally performed by Edwar Solorio PA-C based on my observations and the provider's statements to me.     [facility] EMERGENCY DEPT         Edwar Solorio PA-C  12/10/20 1853

## 2020-12-10 NOTE — ED NOTES
Care Management Initial Consult    General Information  Assessment completed with: Patient, I spoke with Ирина on the phone  Type of CM/SW Visit: Initial Assessment  Primary Care Provider verified and updated as needed: Yes   Readmission within the last 30 days:   YOHANA        Advance Care Planning: Advance Care Planning Reviewed: verified with patient   She has a health care directive in the chart.       Communication Assessment  Patient's communication style: spoken language (English or Bilingual)             Cognitive  Cognitive/Neuro/Behavioral:                     Alert and oriented.    Living Environment:   People in home: spouse     Current living Arrangements:      She lives with her  in a mobile home.  Able to return to prior arrangements: yes  Living Arrangement Comments: Her  drove her to the hospital.    Family/Social Support:  Care provided by:  YOHANA  Provides care for:  YOHANA  Marital Status:   ;Children          Description of Support System: Supportive         Current Resources:   Skilled Home Care Services:  None  Community Resources:  NA  Equipment currently used at home: grab bar, toilet;raised toilet seat;walker, rolling;walker, standard  Supplies currently used at home:      Employment/Financial:  Employment Status:      Retired.     Financial Concerns: No concerns identified           Lifestyle & Psychosocial Needs:        Socioeconomic History     Marital status:      Spouse name: Not on file     Number of children: Not on file     Years of education: Not on file     Highest education level: Not on file   Occupational History     Occupation:  from home     Employer: RETIRED     Tobacco Use     Smoking status: Never Smoker     Smokeless tobacco: Never Used   Substance and Sexual Activity     Alcohol use: No     Drug use: No     Sexual activity: Yes     Partners: Male       Functional Status:  Prior to admission patient needed assistance:   Dependent ADLs::  Independent  Dependent IADLs:: Independent  Assesssment of Functional Status: At functional baseline    Mental Health Status:      Patient denies concerns.    Chemical Dependency Status:            Not addressed.    Values/Beliefs:  Spiritual, Cultural Beliefs, Yarsanism Practices, Values that affect care:                 Additional Information:  Patient is very pleasant and she states she is independent at home. She lives with her  in a mobile home and she drives and manages her own cares.  She does use a walker in the home and a CPAP. Follow progress for any needs at discharge.      Gala Garcia RN Care Coordinator  Inpatient Care Coordination - Emergency Department  [facility]  [phone]

## 2020-12-11 ENCOUNTER — APPOINTMENT (OUTPATIENT)
Dept: SPEECH THERAPY | Facility: CLINIC | Age: 85
DRG: 177 | End: 2020-12-11
Attending: INTERNAL MEDICINE
Payer: COMMERCIAL

## 2020-12-11 ENCOUNTER — APPOINTMENT (OUTPATIENT)
Dept: SPEECH THERAPY | Facility: CLINIC | Age: 85
DRG: 177 | End: 2020-12-11
Payer: COMMERCIAL

## 2020-12-11 ENCOUNTER — APPOINTMENT (OUTPATIENT)
Dept: GENERAL RADIOLOGY | Facility: CLINIC | Age: 85
DRG: 177 | End: 2020-12-11
Attending: INTERNAL MEDICINE
Payer: COMMERCIAL

## 2020-12-11 ENCOUNTER — APPOINTMENT (OUTPATIENT)
Dept: PHYSICAL THERAPY | Facility: CLINIC | Age: 85
DRG: 177 | End: 2020-12-11
Attending: INTERNAL MEDICINE
Payer: COMMERCIAL

## 2020-12-11 DIAGNOSIS — R91.8 LUNG MASS: Primary | ICD-10-CM

## 2020-12-11 LAB
ANION GAP SERPL CALCULATED.3IONS-SCNC: 2 MMOL/L (ref 3–14)
BUN SERPL-MCNC: 51 MG/DL (ref 7–30)
CALCIUM SERPL-MCNC: 8.7 MG/DL (ref 8.5–10.1)
CHLORIDE SERPL-SCNC: 107 MMOL/L (ref 94–109)
CO2 SERPL-SCNC: 33 MMOL/L (ref 20–32)
CREAT SERPL-MCNC: 2.15 MG/DL (ref 0.52–1.04)
ERYTHROCYTE [DISTWIDTH] IN BLOOD BY AUTOMATED COUNT: 13.6 % (ref 10–15)
GFR SERPL CREATININE-BSD FRML MDRD: 21 ML/MIN/{1.73_M2}
GLUCOSE BLDC GLUCOMTR-MCNC: 120 MG/DL (ref 70–99)
GLUCOSE BLDC GLUCOMTR-MCNC: 166 MG/DL (ref 70–99)
GLUCOSE BLDC GLUCOMTR-MCNC: 177 MG/DL (ref 70–99)
GLUCOSE BLDC GLUCOMTR-MCNC: 214 MG/DL (ref 70–99)
GLUCOSE SERPL-MCNC: 123 MG/DL (ref 70–99)
HCT VFR BLD AUTO: 29.4 % (ref 35–47)
HGB BLD-MCNC: 8.8 G/DL (ref 11.7–15.7)
INTERPRETATION ECG - MUSE: NORMAL
LABORATORY COMMENT REPORT: NORMAL
MCH RBC QN AUTO: 28.5 PG (ref 26.5–33)
MCHC RBC AUTO-ENTMCNC: 29.9 G/DL (ref 31.5–36.5)
MCV RBC AUTO: 95 FL (ref 78–100)
PLATELET # BLD AUTO: 225 10E9/L (ref 150–450)
POTASSIUM SERPL-SCNC: 3.8 MMOL/L (ref 3.4–5.3)
RBC # BLD AUTO: 3.09 10E12/L (ref 3.8–5.2)
SARS-COV-2 RNA SPEC QL NAA+PROBE: NEGATIVE
SODIUM SERPL-SCNC: 142 MMOL/L (ref 133–144)
SPECIMEN SOURCE: NORMAL
WBC # BLD AUTO: 8.3 10E9/L (ref 4–11)

## 2020-12-11 PROCEDURE — 250N000013 HC RX MED GY IP 250 OP 250 PS 637: Performed by: INTERNAL MEDICINE

## 2020-12-11 PROCEDURE — 999N001017 HC STATISTIC GLUCOSE BY METER IP

## 2020-12-11 PROCEDURE — 92610 EVALUATE SWALLOWING FUNCTION: CPT | Mod: GN | Performed by: SPEECH-LANGUAGE PATHOLOGIST

## 2020-12-11 PROCEDURE — 36415 COLL VENOUS BLD VENIPUNCTURE: CPT | Performed by: INTERNAL MEDICINE

## 2020-12-11 PROCEDURE — 87449 NOS EACH ORGANISM AG IA: CPT | Performed by: INTERNAL MEDICINE

## 2020-12-11 PROCEDURE — 87103 BLOOD FUNGUS CULTURE: CPT | Performed by: INTERNAL MEDICINE

## 2020-12-11 PROCEDURE — 87385 HISTOPLASMA CAPSUL AG IA: CPT | Performed by: INTERNAL MEDICINE

## 2020-12-11 PROCEDURE — 97162 PT EVAL MOD COMPLEX 30 MIN: CPT | Mod: GP

## 2020-12-11 PROCEDURE — 120N000001 HC R&B MED SURG/OB

## 2020-12-11 PROCEDURE — 250N000011 HC RX IP 250 OP 636: Performed by: INTERNAL MEDICINE

## 2020-12-11 PROCEDURE — 99223 1ST HOSP IP/OBS HIGH 75: CPT | Performed by: INTERNAL MEDICINE

## 2020-12-11 PROCEDURE — 250N000012 HC RX MED GY IP 250 OP 636 PS 637: Performed by: INTERNAL MEDICINE

## 2020-12-11 PROCEDURE — 74230 X-RAY XM SWLNG FUNCJ C+: CPT

## 2020-12-11 PROCEDURE — 97530 THERAPEUTIC ACTIVITIES: CPT | Mod: GP

## 2020-12-11 PROCEDURE — 92611 MOTION FLUOROSCOPY/SWALLOW: CPT | Mod: GN

## 2020-12-11 PROCEDURE — 80048 BASIC METABOLIC PNL TOTAL CA: CPT | Performed by: INTERNAL MEDICINE

## 2020-12-11 PROCEDURE — 85027 COMPLETE CBC AUTOMATED: CPT | Performed by: INTERNAL MEDICINE

## 2020-12-11 PROCEDURE — 97116 GAIT TRAINING THERAPY: CPT | Mod: GP

## 2020-12-11 PROCEDURE — 99233 SBSQ HOSP IP/OBS HIGH 50: CPT | Performed by: INTERNAL MEDICINE

## 2020-12-11 RX ORDER — HEPARIN SODIUM 5000 [USP'U]/.5ML
5000 INJECTION, SOLUTION INTRAVENOUS; SUBCUTANEOUS EVERY 12 HOURS
Status: DISCONTINUED | OUTPATIENT
Start: 2020-12-11 | End: 2020-12-14 | Stop reason: HOSPADM

## 2020-12-11 RX ADMIN — HYDRALAZINE HYDROCHLORIDE 100 MG: 50 TABLET, FILM COATED ORAL at 21:39

## 2020-12-11 RX ADMIN — INSULIN ASPART 1 UNITS: 100 INJECTION, SOLUTION INTRAVENOUS; SUBCUTANEOUS at 12:04

## 2020-12-11 RX ADMIN — HEPARIN SODIUM 5000 UNITS: 10000 INJECTION, SOLUTION INTRAVENOUS; SUBCUTANEOUS at 09:09

## 2020-12-11 RX ADMIN — AMPICILLIN SODIUM AND SULBACTAM SODIUM 3 G: 2; 1 INJECTION, POWDER, FOR SOLUTION INTRAMUSCULAR; INTRAVENOUS at 10:16

## 2020-12-11 RX ADMIN — SIMVASTATIN 10 MG: 10 TABLET, FILM COATED ORAL at 21:39

## 2020-12-11 RX ADMIN — HYDRALAZINE HYDROCHLORIDE 100 MG: 50 TABLET, FILM COATED ORAL at 09:10

## 2020-12-11 RX ADMIN — GABAPENTIN 200 MG: 100 CAPSULE ORAL at 21:39

## 2020-12-11 RX ADMIN — AMPICILLIN SODIUM AND SULBACTAM SODIUM 3 G: 2; 1 INJECTION, POWDER, FOR SOLUTION INTRAMUSCULAR; INTRAVENOUS at 21:39

## 2020-12-11 RX ADMIN — HEPARIN SODIUM 5000 UNITS: 10000 INJECTION, SOLUTION INTRAVENOUS; SUBCUTANEOUS at 21:39

## 2020-12-11 RX ADMIN — SERTRALINE HYDROCHLORIDE 50 MG: 50 TABLET, FILM COATED ORAL at 21:39

## 2020-12-11 RX ADMIN — HYDRALAZINE HYDROCHLORIDE 100 MG: 50 TABLET, FILM COATED ORAL at 16:32

## 2020-12-11 RX ADMIN — GABAPENTIN 200 MG: 100 CAPSULE ORAL at 09:10

## 2020-12-11 RX ADMIN — TORSEMIDE 30 MG: 20 TABLET ORAL at 09:10

## 2020-12-11 ASSESSMENT — ACTIVITIES OF DAILY LIVING (ADL)
ADLS_ACUITY_SCORE: 17
ADLS_ACUITY_SCORE: 17
ADLS_ACUITY_SCORE: 18
ADLS_ACUITY_SCORE: 17

## 2020-12-11 ASSESSMENT — MIFFLIN-ST. JEOR: SCORE: 1430.66

## 2020-12-11 NOTE — PHARMACY-ADMISSION MEDICATION HISTORY
"Admission medication history interview status for this patient is complete. See [facility] admission navigator for allergy information, prior to admission medications and immunization status.     Medication history interview done via telephone during Covid-19 pandemic, indicate source(s): Patient  Medication history resources (including written lists, pill bottles, clinic record): Care Everywhere and SureScript  Pharmacy: [facility] in [place]    Changes made to PTA medication list:  Added: None  Deleted: Baclofen  Changed: Torsemide 10 mg every day --> 20 mg every day     Actions taken by pharmacist (provider contacted, etc):None     Additional medication history information: Note: Patient was just seen by her PCP two days ago where her dose of torsemide was increased. Patient is supposed to be checking blood sugar regularly at home but is \"not diligent with this at home\"    Medication reconciliation/reorder completed by provider prior to medication history?  Yes   (Y/N)       Prior to Admission medications    Medication Sig Last Dose Taking? Auth Provider   diltiazem ER COATED BEADS (CARDIAZEM LA) 180 MG 24 hr tablet Take one tablet by mouth once daily at bedtime 12/9/2020 at Unknown time Yes Reported, Patient   doxazosin (CARDURA) 1 MG tablet  12/9/2020 at Unknown time Yes Reported, Patient   gabapentin (NEURONTIN) 100 MG capsule TAKE 2 CAPSULES BY MOUTH 2 TIMES DAILY GENERIC EQUIVALENT FOR NEURONTIN 12/9/2020 at Unknown time Yes Yuridia Villarreal MD   glimepiride (AMARYL) 1 MG tablet Take 1-tablet by mouth daily 12/10/2020 at AM Yes Reji Verde MD   hydrALAZINE (APRESOLINE) 50 MG tablet 100 mg 3 times daily  12/10/2020 at AM Yes Reported, Patient   MULTI-VITAMIN OR TABS 1 TABLET DAILY 12/10/2020 at Unknown time Yes Reported, Patient   pioglitazone (ACTOS) 15 MG tablet Take 1 tablet (15 mg) by mouth daily 12/10/2020 at AM Yes Reji Verde MD   sertraline (ZOLOFT) 50 MG tablet Take 1 tablet (50 mg) by " mouth daily 12/9/2020 at PM Yes Yuridia Villarreal MD   simvastatin (ZOCOR) 10 MG tablet TAKE 1 TABLET BY MOUTH AT BEDTIME. 12/9/2020 at PM Yes Yuridia Villarreal MD   torsemide (DEMADEX) 10 MG tablet Take 20 mg by mouth daily 12/10/2020 at AM Yes Unknown, Entered By History   blood glucose (NO BRAND SPECIFIED) lancets standard Use to test blood sugar 1 times daily or as directed, Contour Next lancets   Reji Verde MD   blood glucose (NO BRAND SPECIFIED) test strip Use to test blood sugar 1 times daily or as directed, Contour Next strips   Reji Verde MD   blood glucose monitoring (NO BRAND SPECIFIED) meter device kit Use to test blood sugar 1 times daily or as directed. Ultra 2   Reji Verde MD   nystatin (MYCOSTATIN) [id number] UNIT/GM POWD Apply a small amount to affected area three times daily.  Patient taking differently: as needed Apply a small amount to affected area three times daily.  at PRN  Yuridia Villarreal MD   order for DME Equipment being ordered: walker. 4 wheeled with brakes and a seat.   Yuridia Villarreal MD

## 2020-12-11 NOTE — H&P
[facility]    History and Physical - Hospitalist Service       Date of Admission:  12/10/2020     Assessment & Plan   Ирина Yanez is a 85 year old female with a history of CKD stage IV, non-insulin-dependent diabetes mellitus, HTN, mild aortic stenosis, moderately dilated ascending aorta, presented to the ED for shortness of breath and is found to have right lung infiltrate/consolidation.    Dyspnea on exertion  Acute hypoxic respiratory failure  Right middle lobe consolidation, pneumonia versus malignancy  - As outlined in the HPI, patient presented with approximately 3 weeks of primarily shortness of breath being her main symptom with occasional cough.  Some mild lower leg swelling.  Also mention that she has been having increasing aspiration, which is a new problem for her.  In the ED, found to be mildly hypoxic in the upper 80s and placed on supplemental O2 via nasal cannula.  - Lab work unremarkable with normal white blood cell count, negative troponin, normal proBNP, though this could be falsely low in the setting of elevated BMI.  - CT the chest showed masslike consolidation in the right upper lobe, possibly concerning for pneumonia versus malignancy.  She was given ceftriaxone and azithromycin as well as enoxaparin empirically for possible PE.  Duplex ultrasounds of the bilateral legs were negative.  - Overall my suspicion is that she has aspiration pneumonia which has been ongoing for the past few weeks making her feel debilitated and weak.  Will get SLP evaluation to see the extent and severity of her aspiration.  However with the concern for malignancy based on the appearance of the chest CT scan will ask pulmonary to see her to consider bronch.  Seems unlikely malignancy would have developed in the past few weeks.  - History of aortic stenosis is noted, mild. Known to have normal EF. Has small bilateral pleural effusions on CT chest. Will continue home torsemide as it seems the pneumonia  "is more the issue here.   - Await COVID PCR. Symptoms and chest imaging not c/w that, however.  - Switch antibiotics to Unasyn to cover for anaerobes.  She has a history of reported \"allergy\" to Augmentin which is diarrhea, anticipate tolerate the Unasyn fine.  - When age-adjusted, her D-dimer appears to be normal, will not pursue further work-up for PE.  - Wean supplemental oxygen as tolerated, she is saturating in the mid 90s on just 2 L.    Chronic medical conditions  DM2: Hold oral medications, use medium dose sliding scale.  CKD stage IV: her creatinine is at baseline 2.3, GFR in the 20 range.  Continue to dose medications as appropriate.  HTN: Continue her home medications once verified by pharmacy.  Neuropathy: Continue gabapentin.     Diet: Regular, NPO at midnight  DVT Prophylaxis: Pneumatic Compression Devices  Aguilera Catheter: No  Code Status: Full Code    Disposition Plan   Expected discharge:   Admitted as inpatient. If can be weaned from O2 and OK w/ SLP and pulmonary to discharge tomorrow potentially.  PT consult ordered due to appearance of debility.    Hal Lopez MD  [facility]    ______________________________________________________________________    Chief Complaint   Dyspnea on Exertion    History of Present Illness   Ирина Yanez is a 85 year old female with a history of CKD stage IV, non-insulin-dependent diabetes mellitus, HTN, mild aortic stenosis, moderately dilated ascending aorta who presented to the ED complaining of shortness of breath.    Patient reports that she had been feeling well up until a few weeks ago. I note that she saw cardiology on 11/16/2020 and was reported to be feeling at her baseline at that time. She says that over the past few weeks, she has been having progressive dyspnea on exertion. She has noticed some mild leg swelling associated with that and an occasional cough. She has also been feeling very fatigued. She reports that she has " recently noticed that she seems to have trouble swallowing and has been aspirating her food. She presented here to the ED, she says mostly to get a Covid test.  In the ED, she was noted to be borderline hypoxic in the high 80s and was placed on 2 L.  Other vital signs unremarkable.  Labs notable for negative troponin, normal white count and creatinine at baseline 2.3.  proBNP was within normal range.  D-dimer was mildly elevated at 1.3.  CT of the chest without contrast showed a masslike consolidation in the right upper lobe, concerning positive for pneumonia versus malignancy.  Also noted were small bilateral pleural effusions.  Patient was given ceftriaxone and azithromycin.  She was also given a dose of enoxaparin empirically for possible PE.    She emphasizes that other than shortness of breath, she has not really had any major new symptoms.  She says that she has just been having trouble getting up and around the house.  She normally ambulates with a walker.  Denies any contact with COVID-19 positive people.    Review of Systems    The 10 point Review of Systems is negative other than noted in the HPI or here.     Physical Exam   /59   Pulse 79   Temp 98.3  F (36.8  C) (Oral)   Resp 24   LMP  (LMP Unknown)   SpO2 96%        General: Weak but not in acute distress appearing, laying flat in the bed.    HEENT: No scleral icterus. Oropharynx moist.     Neck: Supple.    Pulmonary: Slight crackles in the right side, mildly diminished at the bases.    Cardiovascular: Regular rate and rhythm without murmur or extra heart sounds.    Abdomen: Soft and non-tender.    Extremities: Trace to 1+ bilateral lower extremity edema, left slightly greater than right.    Neurologic: Awake, alert, appropriate.    Skin: Warm and dry.    Psychiatric: Normal affect and mood.     Data   Data reviewed today: I have reviewed all labs and imaging results.    Recent Labs   Lab 12/10/20  1850 12/10/20  1442   WBC  --  8.3   HGB  --   9.5*   MCV  --  96   PLT  --  229   NA  --  141   POTASSIUM  --  3.6   CHLORIDE  --  105   CO2  --  31   BUN  --  52*   CR  --  2.27*   ANIONGAP  --  5   EDNA  --  9.3   GLC  --  68*   ALBUMIN  --  2.9*   PROTTOTAL  --  7.5   BILITOTAL  --  0.3   ALKPHOS  --  79   ALT  --  13   AST  --  13   TROPI 0.015 <0.015     Recent Results (from the past 24 hour(s))   Chest CT w/o contrast    Narrative    EXAM: CT CHEST W/O CONTRAST  LOCATION: [facility]  DATE/TIME: 12/10/2020 4:50 PM    INDICATION:  dyspnea, chest pain, CKD unable to perform PE study;  fatigue, difficulty performing tasks of daily living, episodic chest pain, palpitations, and leg swelling. She is unsure of when the last episode of chest pain/tightness occurred,   episodes are of variable duration, unable to specifically locate the  tightness, denies this as a true pain.  COMPARISON: None.  TECHNIQUE: CT chest without IV contrast. Multiplanar reformats were obtained. Dose reduction techniques were used.  CONTRAST: None.    FINDINGS:   LUNGS AND PLEURA: Masslike area of consolidation centrally in the right upper lobe measuring approximately 7 cm. Additional smaller foci of similar-appearing consolidation are noted in the adjacent superior segment of the right lower lobe as well as the   central aspect of the right middle lobe. There are small bilateral pleural effusions. There is partial atelectasis of the lower lobes bilaterally, left greater than right.    MEDIASTINUM/AXILLAE: Great vessels normal in caliber. Moderate aortic valve calcification. Mild coronary arterial calcification. No pericardial effusion. No abnormally enlarged lymph nodes.    UPPER ABDOMEN: No significant finding.    MUSCULOSKELETAL: No suspicious bone lesion. Severe multilevel degenerative change in the thoracic spine.      Impression    IMPRESSION:   1.  Masslike area of consolidation in the right upper lobe. Findings may represent pneumonia, however, some lung cancers can  have this appearance. Clinical correlation, short-term follow-up, and pulmonary consultation recommended.  2.  Small bilateral pleural effusions.         Past Medical History    I have reviewed this patient's medical history and updated it with pertinent information if needed.   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     Depression      Diabetes mellitus (H)     sees Dr. Verde- type II     Falls frequently 7/10/2019     Heart murmur      HTN      Hyperlipidemia LDL goal < 100     Dr. Verde     Lichen sclerosus et atrophicus 2/15/2013     Melanoma (H)      Peripheral Neuropathy     hands, feet; used to see Dr. Tl Das     Skin cancer     face; basal and other. Melanoma. Dolan     Sleep apnea     CPAP     Spinal stenosis         Past Surgical History    I have reviewed this patient's surgical history and updated it with pertinent information if needed.  Past Surgical History:   Procedure Laterality Date     AMPUTATE TOE(S)  8/23/2012    left second toe Procedure: AMPUTATE TOE(S);;  Surgeon: Mil Jolly DPM;  Location:  OR     CHOLECYSTECTOMY  Nov. 1975     COLONOSCOPY  early 2009    repeat 4-5 years (Had one prior to this with polyps)     COLONOSCOPY  Sept 2014    incomplete; recommend colography     COLONOSCOPY  02/25/2020    Dr. Pathak [facility]     COLONOSCOPY N/A 2/25/2020    Procedure: COLONOSCOPY, WITH biopsies using forceps;  Surgeon: Adebayo Pathak MD;  Location:  GI     OPTICAL TRACKING SYSTEM FUSION POSTERIOR SPINE LUMBAR N/A 9/16/2016    Procedure: OPTICAL TRACKING SYSTEM FUSION SPINE POSTERIOR LUMBAR ONE LEVEL;  Surgeon: Lennox Blue MD;  Location:  OR     PET, REC OF MELANOMA/MET CA Left     arm     REPAIR HAMMER TOE BILATERAL  8/23/2012    Procedure: REPAIR HAMMER TOE BILATERAL;  Flexor Tenotomy 2,3,4,5 Toes both feet,  Partial 2nd toe amputation left foot;  Surgeon: Mil Jolly DPM;  Location: [facility] OR     REPAIR TENDON QUADRICEPS Right 10/27/2015    Procedure: REPAIR TENDON QUADRICEPS;  Surgeon: Antonio Yi MD;  Location: [facility] OR     SURGICAL HISTORY OF -       bilateral knee replacement     SURGICAL HISTORY OF -       right knee revised; patella tendon ruptured     SURGICAL HISTORY OF -       surgery for spinal stenosis     SURGICAL HISTORY OF -       breast reduction surgery     SURGICAL HISTORY OF -       D and C         Social History    I have reviewed this patient's social history and updated it with pertinent information if needed.  Social History     Tobacco Use     Smoking status: Never Smoker     Smokeless tobacco: Never Used   Substance Use Topics     Alcohol use: No     Drug use: No          Family History    I have reviewed this patient's family history and updated it with pertinent information if needed.   Family History   Problem Relation Age of Onset     Cerebrovascular Disease Mother      Heart Disease Father      Neurologic Disorder Sister         ALS     C.A.D. Sister      Diabetes Sister      C.A.D. Brother      Psychotic Disorder Brother         [place] war: PTSD. suicide 2019     Gastrointestinal Disease Brother         diverticulitis     Colon Cancer No family hx of           Prior to Admission Medications    Prior to Admission Medications   Prescriptions Last Dose Informant Patient Reported? Taking?   MULTI-VITAMIN OR TABS   Yes No   Si TABLET DAILY   baclofen (LIORESAL) 10 MG tablet   No No   Sig: Take 1 tablet (10 mg) by mouth 3 times daily as needed for muscle spasms   blood glucose (NO BRAND SPECIFIED) lancets standard   No No   Sig: Use to test blood sugar 1 times daily or as directed, Contour Next lancets   blood glucose (NO BRAND SPECIFIED) test strip   No No   Sig: Use to test blood sugar 1 times daily or as directed, Contour Next strips   blood glucose monitoring (NO  BRAND SPECIFIED) meter device kit   No No   Sig: Use to test blood sugar 1 times daily or as directed. Ultra 2   diltiazem ER COATED BEADS (CARDIAZEM LA) 180 MG 24 hr tablet   Yes No   Sig: Take one tablet by mouth once daily at bedtime   doxazosin (CARDURA) 1 MG tablet   Yes No   gabapentin (NEURONTIN) 100 MG capsule   No No   Sig: TAKE 2 CAPSULES BY MOUTH 2 TIMES DAILY GENERIC EQUIVALENT FOR NEURONTIN   glimepiride (AMARYL) 1 MG tablet   No No   Sig: Take 1-tablet by mouth daily   hydrALAZINE (APRESOLINE) 50 MG tablet   Yes No   Si mg 3 times daily    nystatin (MYCOSTATIN) 177830 UNIT/GM POWD   No No   Sig: Apply a small amount to affected area three times daily.   Patient taking differently: as needed Apply a small amount to affected area three times daily.   order for DME   No No   Sig: Equipment being ordered: walker. 4 wheeled with brakes and a seat.   pioglitazone (ACTOS) 15 MG tablet   No No   Sig: Take 1 tablet (15 mg) by mouth daily   sertraline (ZOLOFT) 50 MG tablet   No No   Sig: Take 1 tablet (50 mg) by mouth daily   simvastatin (ZOCOR) 10 MG tablet   No No   Sig: TAKE 1 TABLET BY MOUTH AT BEDTIME.   torsemide (DEMADEX) 10 MG tablet   Yes No   Sig: Take 1 tablet (10 mg) by mouth daily . Through Nephrology      Facility-Administered Medications: None        Allergies    Allergies   Allergen Reactions     Augmentin Diarrhea     Vomiting       Cleocin      Hives     Tegretol [Carbamazepine]      Irregular heart beat          (4) walks frequently

## 2020-12-11 NOTE — PROGRESS NOTES
[facility]    Medicine Progress Note - Hospitalist Service       Date of Admission:  12/10/2020  Assessment & Plan          Ирина Yanez is a 85 year old female with a history of CKD stage IV, non-insulin-dependent diabetes mellitus, HTN, mild aortic stenosis, moderately dilated ascending aorta who presented to the ED complaining of shortness of breath.     Patient reports that she had been feeling well up until a few weeks ago. I note that she saw cardiology on 11/16/2020 and was reported to be feeling at her baseline at that time. She says that over the past few weeks, she has been having progressive dyspnea on exertion. She has noticed some mild leg swelling associated with that and an occasional cough. She has also been feeling very fatigued. She reports that she has recently noticed that she seems to have trouble swallowing and has been aspirating her food. She presented here to the ED, she says mostly to get a Covid test.  In the ED, she was noted to be borderline hypoxic in the high 80s and was placed on 2 L.  Other vital signs unremarkable.  Labs notable for negative troponin, normal white count and creatinine at baseline 2.3.  proBNP was within normal range.  D-dimer was mildly elevated at 1.3.  CT of the chest without contrast showed a masslike consolidation in the right upper lobe, concerning positive for pneumonia versus malignancy.  Also noted were small bilateral pleural effusions.  Patient was given ceftriaxone and azithromycin.  She was also given a dose of enoxaparin empirically for possible PE.     She emphasizes that other than shortness of breath, she has not really had any major new symptoms.  She says that she has just been having trouble getting up and around the house.  She normally ambulates with a walker.  Denies any contact with COVID-19 positive people.      Dyspnea on exertion  Acute hypoxic respiratory failure  Right middle lobe consolidation, pneumonia versus  "malignancy  - As outlined in the HPI, patient presented with approximately 3 weeks of primarily shortness of breath being her main symptom with occasional cough.  Some mild lower leg swelling.  Also mention that she has been having increasing aspiration, which is a new problem for her.  In the ED, found to be mildly hypoxic in the upper 80s and placed on supplemental O2 via nasal cannula.  - Lab work unremarkable with normal white blood cell count, negative troponin, normal proBNP, though this could be falsely low in the setting of elevated BMI.  - CT the chest showed masslike consolidation in the right upper lobe, possibly concerning for pneumonia versus malignancy.  She was given ceftriaxone and azithromycin as well as enoxaparin empirically for possible PE.  Duplex ultrasounds of the bilateral legs were negative.  - Overall my suspicion is that she has aspiration pneumonia which has been ongoing for the past few weeks making her feel debilitated and weak.  Will get SLP evaluation to see the extent and severity of her aspiration.  However, other differential diagnosis are possible such as malignancy.  - The most reasonable option here is likely Abx treatment and repeat imaging in a month with either PET-CT or CT scan and if lesion still present may be amenable to TBBx.  - History of aortic stenosis is noted, mild. Known to have normal EF. Has small bilateral pleural effusions on CT chest. Will increase  torsemide to 30 mg.  - Await COVID PCR. Symptoms and chest imaging not c/w that, however.  - Switch antibiotics to Unasyn to cover for anaerobes.  She has a history of reported \"allergy\" to Augmentin which is diarrhea, anticipate tolerate the Unasyn fine.  - Wean supplemental oxygen as tolerated, she is saturating in the mid 90s on just 2 L.     Chronic medical conditions  DM2: Hold oral medications, use medium dose sliding scale.  CKD stage IV: her creatinine is at baseline 2.3, GFR in the 20 range.  Continue to " dose medications as appropriate.  HTN: Continue her home medications once verified by pharmacy.  Neuropathy: Continue gabapentin.       Diet: NPO for Medical/Clinical Reasons Except for: Meds, Ice Chips    DVT Prophylaxis: Heparin SQ  Aguilera Catheter: not present  Code Status: Full Code    Rule Out COVID-19 Handoff:  Ирина is a LOW SUSPICION PUI.  Follow these instructions:    If COVID test positive -> continue isolation precautions    If COVID test negative -> discontinue COVID-specific isolation precautions       Disposition Plan   Expected discharge: 2 - 3 days, recommended to prior living arrangement once antibiotic plan established and O2 use less than 0 liters/minute.  Entered: Steff Fermin MD 12/11/2020, 8:41 AM       The patient's care was discussed with the Patient.    Steff Fermin MD  Hospitalist Service  [facility]  Contact information available via [facility] Paging/Directory    ______________________________________________________________________    Interval History     No fevers/chills. Mild weight gain. + SOB. No productive cough.    Data reviewed today: I reviewed all medications, new labs and imaging results over the last 24 hours. I personally reviewed no images or EKG's today.    Physical Exam   Vital Signs: Temp: 98.1  F (36.7  C) Temp src: Oral BP: (!) 146/52 Pulse: 70   Resp: 20 SpO2: 94 % O2 Device: Nasal cannula Oxygen Delivery: 3 LPM  Weight: 218 lbs 6.4 oz    Gen - AAO x 3 in NAD.  Lungs - CTA B with diminished in bases.  Heart - RR,S1+S2 nml, 3/6 systolic murmur.  Abd - soft, NT, ND, + BS.  Ext - no edema.    Data   Recent Labs   Lab 12/11/20  0650 12/10/20  1850 12/10/20  1442   WBC 8.3  --  8.3   HGB 8.8*  --  9.5*   MCV 95  --  96     --  229     --  141   POTASSIUM 3.8  --  3.6   CHLORIDE 107  --  105   CO2 33*  --  31   BUN 51*  --  52*   CR 2.15*  --  2.27*   ANIONGAP 2*  --  5   EDNA 8.7  --  9.3   *  --  68*   ALBUMIN  --   --  2.9*    PROTTOTAL  --   --  7.5   BILITOTAL  --   --  0.3   ALKPHOS  --   --  79   ALT  --   --  13   AST  --   --  13   TROPI  --  0.015 <0.015     Recent Results (from the past 24 hour(s))   Chest CT w/o contrast    Narrative    EXAM: CT CHEST W/O CONTRAST  LOCATION: [facility]  DATE/TIME: 12/10/2020 4:50 PM    INDICATION:  dyspnea, chest pain, CKD unable to perform PE study;  fatigue, difficulty performing tasks of daily living, episodic chest pain, palpitations, and leg swelling. She is unsure of when the last episode of chest pain/tightness occurred,   episodes are of variable duration, unable to specifically locate the  tightness, denies this as a true pain.  COMPARISON: None.  TECHNIQUE: CT chest without IV contrast. Multiplanar reformats were obtained. Dose reduction techniques were used.  CONTRAST: None.    FINDINGS:   LUNGS AND PLEURA: Masslike area of consolidation centrally in the right upper lobe measuring approximately 7 cm. Additional smaller foci of similar-appearing consolidation are noted in the adjacent superior segment of the right lower lobe as well as the   central aspect of the right middle lobe. There are small bilateral pleural effusions. There is partial atelectasis of the lower lobes bilaterally, left greater than right.    MEDIASTINUM/AXILLAE: Great vessels normal in caliber. Moderate aortic valve calcification. Mild coronary arterial calcification. No pericardial effusion. No abnormally enlarged lymph nodes.    UPPER ABDOMEN: No significant finding.    MUSCULOSKELETAL: No suspicious bone lesion. Severe multilevel degenerative change in the thoracic spine.      Impression    IMPRESSION:   1.  Masslike area of consolidation in the right upper lobe. Findings may represent pneumonia, however, some lung cancers can have this appearance. Clinical correlation, short-term follow-up, and pulmonary consultation recommended.  2.  Small bilateral pleural effusions.     US Lower Extremity  Venous Duplex Bilateral    Narrative    EXAM: US LOWER EXTREMITY VENOUS DUPLEX BILATERAL  LOCATION: [facility]  DATE/TIME: 12/10/2020 7:26 PM    INDICATION: Bilateral lower extremity swelling, elevated d-dimer  COMPARISON: None.  TECHNIQUE: Venous Duplex ultrasound of bilateral lower extremities with and without compression, augmentation and duplex. Color flow and spectral Doppler with waveform analysis performed.    FINDINGS: Exam includes the common femoral, femoral, popliteal veins as well as segmentally visualized deep calf veins and greater saphenous vein.     RIGHT: No deep vein thrombosis. No superficial thrombophlebitis. No popliteal cyst.    LEFT: No deep vein thrombosis. No superficial thrombophlebitis. No popliteal cyst.      Impression    IMPRESSION:  1.  No deep venous thrombosis in the bilateral lower extremities.     Medications       ampicillin-sulbactam (UNASYN) IV  3 g Intravenous Q12H     gabapentin  200 mg Oral BID     heparin ANTICOAGULANT  5,000 Units Subcutaneous Q12H     hydrALAZINE  100 mg Oral TID     insulin aspart  1-7 Units Subcutaneous TID AC     insulin aspart  1-5 Units Subcutaneous At Bedtime     sertraline  50 mg Oral QPM     simvastatin  10 mg Oral At Bedtime     sodium chloride (PF)  3 mL Intracatheter Q8H     torsemide  30 mg Oral Daily

## 2020-12-11 NOTE — PLAN OF CARE
A&Ox4. VSS. Nasal cannula 3 L. Mild pain reported in lower back. Pt declines intervention. Speech placed pt on a regular diet. Swallow eval to be performed this afternoon. /177. Abx unasyn. Pulmonology consulted. Possible discharge 2-3 days.

## 2020-12-11 NOTE — DISCHARGE INSTRUCTIONS
Your primary provider, Yuridia Villarreal, did not have any appointment availability. An appointment was made for you with another provider at the [facility] and is listed elsewhere.

## 2020-12-11 NOTE — CONSULTS
[facility]    Pulmonary, Allergy, Critical Care and Sleep Medicine    Initial Consultation  12/11/2020    Ирина Yanze    Age: 85 year old    MRN# [id number]  YOB: 1935    Date of Admission: 12/10/2020  Reason for Consultation: Right upper lobe infiltrate/mass  Requesting Team/Provider: John    Primary care provider: Yuridia Villarreal     Assessment and Recommendations:    Ирина Yanez is a 85-year-old female with history of CKD stage IV, diabetes type 2 and aortic stenosis admitted on 12/10 for worsening shortness of breath and subsequently found to have a right upper lobe mass/infiltrate on chest CT.  Pulmonary consulted for assessment and management.    Right upper lobe masslike consolidation  Noted on admission CT chest in the setting of worsening shortness of breath over the past few weeks.  Findings on CT are highly suggestive of infectious process either bacterial or fungal.  There is high suspicion for potential aspiration pneumonia she will be undergoing a speech eval including swallow study.  It is encouraging that she feels significantly better after initiation of antibiotics although interestingly her procalcitonin is negative x2.  Does mention that her son had histoplasmosis pneumonia although she did not report respiratory symptoms at that time and by her report was many years ago.  Given the appearance of the infiltrates it seems reasonable to at least check fungal serologies as well as a Fungitell.  Would empirically treat her with antibiotics for 14 days with repeat CT in 8 weeks to ensure resolution.    -Continue Unasyn x14 days  -Follow-up fungal serologies and Fungitell  -She will have a repeat CT chest in roughly 8 weeks (I have scheduled this for her to be done in [place]) with a follow-up virtual visit with me in clinic to ensure resolution    Pulmonary to follow along peripherally, please call with questions.  We are in house Monday, Wednesday and  Friday.  If questions arise on off days please contact the on-call pulmonologist via [facility].     Dioni Dunn MD  Pulmonary & Critical Care Medicine  [facility]   Pager: [phone]    Chief Complaint and History of Present Illness:    CC: Shortness of breath    HPI: 85-year-old female with history of CKD stage IV, diabetes type 2 and aortic stenosis admitted on 12/10 for worsening shortness of breath and subsequently found to have a right upper lobe mass/infiltrate on chest CT.  Pulmonary consulted for assessment and management.    Patient reports roughly 3 weeks of worsening shortness of breath and occasional dry cough.  Given her ongoing concerns, she presents to the ER for COVID-19 test.  She was noted to be slightly hypoxic and placed on supplemental O2.  CT chest showed a masslike consolidation noted in the right upper lobe concerning for infection versus possible malignancy.    Initially she was given ceftriaxone and azithromycin and transitioned to Unasyn today.  On my evaluation, she notes feeling significantly better with less cough and shortness of breath.  She remains on supplemental O2 (2-3 L).  COVID-19 test is pending.  Denies sick contacts.  Denies recent travel.  Lives at home with her  in a small home without steps.  Very little contact with the public other than with her son.  No mold or water damage to her knowledge in her home.    Review of Systems:  C: negative for fever, chills, change in weight, change in appetite  INTEGUMENTARY/SKIN: no rash or obvious new lesions  ENT/MOUTH: no sore throat, no sinus pain, no nasal drainage  RESP: see interval history  CV: no chest pain, no palpitations, no peripheral edema, no orthopnea  GI: no nausea, no vomiting, no reflux symptoms, no change in stools  : no dysuria  MUSCULOSKELETAL: no myalgias, no arthralgias  ENDOCRINE: blood sugars with adequate control  NEURO: no headache, no numbness or tingling  PSYCHIATRIC: mood  stable    Histories, Prior to Admission Medications, Allergies:    Past Medical History:  Past Medical History:   Diagnosis Date     Abrasion of arm, right, initial encounter 7/10/2019     Anxiety      Arthritis      Chronic pain     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     Depression      Diabetes mellitus (H)     sees Dr. Verde- type II     Falls frequently 7/10/2019     Heart murmur      HTN      Hyperlipidemia LDL goal < 100     Dr. Verde     Lichen sclerosus et atrophicus 2/15/2013     Melanoma (H)      Peripheral Neuropathy     hands, feet; used to see Dr. Tl Das     Skin cancer     face; basal and other. Melanoma. Dolan     Sleep apnea     CPAP     Spinal stenosis        Past Surgical History:  Past Surgical History:   Procedure Laterality Date     AMPUTATE TOE(S)  8/23/2012    left second toe Procedure: AMPUTATE TOE(S);;  Surgeon: Mil Jolly DPM;  Location:  OR     CHOLECYSTECTOMY  Nov. 1975     COLONOSCOPY  early 2009    repeat 4-5 years (Had one prior to this with polyps)     COLONOSCOPY  Sept 2014    incomplete; recommend colography     COLONOSCOPY  02/25/2020    Dr. Pathak [facility]     COLONOSCOPY N/A 2/25/2020    Procedure: COLONOSCOPY, WITH biopsies using forceps;  Surgeon: Adebayo Pathak MD;  Location:  GI     OPTICAL TRACKING SYSTEM FUSION POSTERIOR SPINE LUMBAR N/A 9/16/2016    Procedure: OPTICAL TRACKING SYSTEM FUSION SPINE POSTERIOR LUMBAR ONE LEVEL;  Surgeon: Lennox Blue MD;  Location:  OR     PET, REC OF MELANOMA/MET CA Left     arm     REPAIR HAMMER TOE BILATERAL  8/23/2012    Procedure: REPAIR HAMMER TOE BILATERAL;  Flexor Tenotomy 2,3,4,5 Toes both feet, Partial 2nd toe amputation left foot;  Surgeon: Mil Jolly DPM;  Location:  OR     REPAIR TENDON QUADRICEPS Right 10/27/2015    Procedure: REPAIR TENDON QUADRICEPS;  Surgeon: Antonio Yi MD;  Location:  OR     SURGICAL HISTORY OF -   1997    bilateral knee replacement      SURGICAL HISTORY OF -   1997    right knee revised; patella tendon ruptured     SURGICAL HISTORY OF -       surgery for spinal stenosis     SURGICAL HISTORY OF -       breast reduction surgery     SURGICAL HISTORY OF -       D and C        Past Social History:  No alcohol or tobacco use  No recent travel  Previously worked a desk job doing , has been retired for multiple years  Lives at home with her     Social History     Socioeconomic History     Marital status:      Spouse name: Not on file     Number of children: Not on file     Years of education: Not on file     Highest education level: Not on file   Occupational History     Occupation:  from home     Employer: RETIRED   Social Needs     Financial resource strain: Not on file     Food insecurity     Worry: Not on file     Inability: Not on file     Transportation needs     Medical: Not on file     Non-medical: Not on file   Tobacco Use     Smoking status: Never Smoker     Smokeless tobacco: Never Used   Substance and Sexual Activity     Alcohol use: No     Drug use: No     Sexual activity: Yes     Partners: Male   Lifestyle     Physical activity     Days per week: Not on file     Minutes per session: Not on file     Stress: Not on file   Relationships     Social connections     Talks on phone: Not on file     Gets together: Not on file     Attends Latter day service: Not on file     Active member of club or organization: Not on file     Attends meetings of clubs or organizations: Not on file     Relationship status: Not on file     Intimate partner violence     Fear of current or ex partner: Not on file     Emotionally abused: Not on file     Physically abused: Not on file     Forced sexual activity: Not on file   Other Topics Concern      Service Not Asked     Blood Transfusions Not Asked     Caffeine Concern Not Asked     Occupational Exposure Not Asked     Hobby Hazards Not Asked     Sleep Concern Not Asked      Stress Concern Not Asked     Weight Concern Not Asked     Special Diet No     Comment: getting fruits and veggies.      Back Care Not Asked     Exercise No     Comment: gets little; limited with back and feet/leg pain.     Bike Helmet Not Asked     Seat Belt Not Asked     Self-Exams Not Asked     Parent/sibling w/ CABG, MI or angioplasty before 65F 55M? Yes   Social History Narrative    2 adopted children       Family History:  Family History   Problem Relation Age of Onset     Cerebrovascular Disease Mother      Heart Disease Father      Neurologic Disorder Sister         ALS     C.A.D. Sister      Diabetes Sister      C.A.D. Brother      Psychotic Disorder Brother         [place] war: PTSD. suicide 2019     Gastrointestinal Disease Brother         diverticulitis     Colon Cancer No family hx of        Medications:    ampicillin-sulbactam (UNASYN) IV  3 g Intravenous Q12H     gabapentin  200 mg Oral BID     heparin ANTICOAGULANT  5,000 Units Subcutaneous Q12H     hydrALAZINE  100 mg Oral TID     insulin aspart  1-7 Units Subcutaneous TID AC     insulin aspart  1-5 Units Subcutaneous At Bedtime     sertraline  50 mg Oral QPM     simvastatin  10 mg Oral At Bedtime     sodium chloride (PF)  3 mL Intracatheter Q8H     torsemide  30 mg Oral Daily     acetaminophen, glucose **OR** dextrose **OR** glucagon, lidocaine 4%, lidocaine (buffered or not buffered), ondansetron **OR** ondansetron, polyethylene glycol, prochlorperazine **OR** prochlorperazine **OR** prochlorperazine, sodium chloride (PF)    Allergies:     Allergies   Allergen Reactions     Augmentin Diarrhea     Vomiting       Cleocin      Hives     Tegretol [Carbamazepine]      Irregular heart beat       Physical Exam:    Temp:  [98.1  F (36.7  C)-98.6  F (37  C)] 98.2  F (36.8  C)  Pulse:  [67-84] 82  Resp:  [16-28] 20  BP: (125-165)/(40-63) 165/54  SpO2:  [92 %-99 %] 95 %    Intake/Output Summary (Last 24 hours) at 12/11/2020 6730  Last data filed at  12/11/2020 1319  Gross per 24 hour   Intake 340 ml   Output --   Net 340 ml     General: laying in bed, in no acute distress, on 3 L nasal cannula  HEENT: anicteric, moist mucosa  Neck: no palpable lymphadenopathy, no JVD noted  Chest: CTAB, no wheezing, rhonchi or rales  Cardiac: RRR no murmurs  Abdomen: Soft, flat, non tender, active BS  Extremities: Trace lower extremity edema  Neuro: awake and oriented x3, no focal deficits   Skin: no rash noted    Laboratory, imaging, and microbiologic data:    All laboratory and imaging data reviewed.    Procalcitonin: Negative x2  WBC: 8.3  Histoplasmosis antigen: Pending  Blastomyces antigen pending  Fungitell pending    CT chest: Masslike area of consolidation with some associated air bronchograms highly suggestive of infectious process as opposed to malignancy.  Small bilateral pleural effusions.

## 2020-12-11 NOTE — PROGRESS NOTES
12/11/20 1451   Quick Adds   Type of Visit Initial PT Evaluation   Living Environment   People in home spouse   Current Living Arrangements mobile home   Home Accessibility stairs to enter home   Number of Stairs, Main Entrance 3   Stair Railings, Main Entrance railing on right side (ascending)   Transportation Anticipated family or friend will provide   Self-Care   Usual Activity Tolerance good   Current Activity Tolerance fair   Regular Exercise No   Equipment Currently Used at Home walker, rolling   General Information   Onset of Illness/Injury or Date of Surgery 12/10/20   Referring Physician Hal Lopez MD   Patient/Family Therapy Goals Statement (PT) return to home   Pertinent History of Current Problem (include personal factors and/or comorbidities that impact the POC) Ирина Yanez is a 85 year old female with a history of CKD stage IV, non-insulin-dependent diabetes mellitus, HTN, mild aortic stenosis, moderately dilated ascending aorta who presented to the ED complaining of shortness of breath.   Existing Precautions/Restrictions fall;oxygen therapy device and L/min  (3L NC)   Cognition   Orientation Status (Cognition) oriented x 4   Posture    Posture Forward head position;Protracted shoulders   Range of Motion (ROM)   ROM Comment WFL   Strength   Strength Comments Mild strength deficit; reliance on UE support at walker/bed during transfers   Bed Mobility   Comment (Bed Mobility) independent with HOB raised, use of bed rail   Transfers   Transfer Safety Comments Patient performed sit<>stand transfers with 4WW and SBA   Gait/Stairs (Locomotion)   Henry Level (Gait) supervision   Assistive Device (Gait) walker, 4-wheeled   Distance in Feet (Required for LE Total Joints) 75   Comment (Gait/Stairs) Patient amb 75 feet within room with SBA, therapist provided tube management.     Balance   Balance Comments no loss of balance noted   Clinical Impression   Criteria for Skilled  Therapeutic Intervention yes, treatment indicated   PT Diagnosis (PT) decreased independence with mobility   Influenced by the following impairments decreased activity tolerance, decreased activity tolerance   Functional limitations due to impairments difficulties with amb functional distances   Clinical Presentation Stable/Uncomplicated   Clinical Presentation Rationale complex pmh, SOB with activity, fair social support   Clinical Decision Making (Complexity) low complexity   Therapy Frequency (PT) Daily   Predicted Duration of Therapy Intervention (days/wks) 3 days   Planned Therapy Interventions (PT) balance training;bed mobility training;gait training;home exercise program;patient/family education;stair training;strengthening;transfer training   Risk & Benefits of therapy have been explained evaluation/treatment results reviewed;care plan/treatment goals reviewed;risks/benefits reviewed;current/potential barriers reviewed;participants voiced agreement with care plan;participants included;patient   PT Discharge Planning    PT Discharge Recommendation (DC Rec) home with assist   PT Rationale for DC Rec Patient presents below baseline for functional mobility.  Patient limited by decreased activity tolerance.  Patient would benefit from ongoing actue care PT in order to increase strength, activity tolerance and independence with mobility   PT Brief overview of current status  SBA with bed mobility, transfers and ambulation.  Patient amb 75 feet with 4WW.  Patient with SOB with activity; maintains O2 over 92% on 3L NC during session   Total Evaluation Time   Total Evaluation Time (Minutes) 5

## 2020-12-11 NOTE — ED NOTES
[facility]  ED Nurse Handoff Report    Ирина Yanez is a 85 year old female   ED Chief complaint: Shortness of Breath and Generalized Weakness  . ED Diagnosis:   Final diagnoses:   Dyspnea on exertion   Chest tightness   Lung mass     Allergies:   Allergies   Allergen Reactions     Augmentin Diarrhea     Vomiting       Cleocin      Hives     Tegretol [Carbamazepine]      Irregular heart beat       Code Status: Full Code  Activity level - Baseline/Home:  Independent. Activity Level - Current:   Stand by Assist. Lift room needed: No. Bariatric: No   Needed: No   Isolation: Yes. Infection: COVID r/o    Vital Signs:   Vitals:    12/10/20 1830 12/10/20 1845 12/10/20 1900 12/10/20 1915   BP: (!) 146/55 (!) 145/61 127/59    Pulse: 76 70 73 79   Resp: 20 26 21 24   Temp:       TempSrc:       SpO2: 99% 96% 95% 96%       Cardiac Rhythm:  ,      Pain level:    Patient confused: No. Patient Falls Risk: No.   Elimination Status: Has voided   Patient Report - Initial Complaint: SOB. Focused Assessment: cardiac, resp  Tests Performed: labs, CT, US. Abnormal Results:   Labs Ordered and Resulted from Time of ED Arrival Up to the Time of Departure from the ED   CBC WITH PLATELETS DIFFERENTIAL - Abnormal; Notable for the following components:       Result Value    RBC Count 3.31 (*)     Hemoglobin 9.5 (*)     Hematocrit 31.6 (*)     MCHC 30.1 (*)     All other components within normal limits   D DIMER QUANTITATIVE - Abnormal; Notable for the following components:    D Dimer 1.3 (*)     All other components within normal limits   COMPREHENSIVE METABOLIC PANEL - Abnormal; Notable for the following components:    Glucose 68 (*)     Urea Nitrogen 52 (*)     Creatinine 2.27 (*)     GFR Estimate 19 (*)     GFR Estimate If Black 22 (*)     Albumin 2.9 (*)     All other components within normal limits   CRP INFLAMMATION - Abnormal; Notable for the following components:    CRP Inflammation 35.9 (*)     All other  components within normal limits   BLOOD GAS VENOUS - Abnormal; Notable for the following components:    Bicarbonate Venous 31 (*)     All other components within normal limits   TROPONIN I   LACTIC ACID WHOLE BLOOD   NT PROBNP INPATIENT   COVID-19 VIRUS (CORONAVIRUS) BY PCR   TROPONIN I   SARS-COV-2 (COVID-19) VIRUS RT-PCR     Chest CT w/o contrast   Final Result   IMPRESSION:    1.  Masslike area of consolidation in the right upper lobe. Findings may represent pneumonia, however, some lung cancers can have this appearance. Clinical correlation, short-term follow-up, and pulmonary consultation recommended.   2.  Small bilateral pleural effusions.         US Lower Extremity Venous Duplex Bilateral    (Results Pending)       Treatments provided: IV antibiotics, subcutaneous lovenox  Family Comments:  updated per this RN and per pt  OBS brochure/video discussed/provided to patient:  No  ED Medications:   Medications   cefTRIAXone (ROCEPHIN) 1 g vial to attach to  mL bag for ADULTS or NS 50 mL bag for PEDS (0 g Intravenous Stopped 12/10/20 1842)   azithromycin 500 mg (ZITHROMAX) in 0.9% NaCl 250 mL intermittent infusion 500 mg (500 mg Intravenous Given 12/10/20 1846)   enoxaparin ANTICOAGULANT (LOVENOX) injection 100 mg (100 mg Subcutaneous Given 12/10/20 1847)     Drips infusing:  No  For the majority of the shift, the patient's behavior Green. Interventions performed were n/a.    Sepsis treatment initiated: No     Patient tested for COVID 19 prior to admission: YES    ED Nurse Name/Phone Number: Sen Jones RN,   8:13 PM    RECEIVING UNIT ED HANDOFF REVIEW    Above ED Nurse Handoff Report was reviewed: Yes  Reviewed by: Migel Clark RN on December 10, 2020 at 8:42 PM

## 2020-12-11 NOTE — PROGRESS NOTES
"Video Fluoroscopic Swallow Study:      12/11/20 1420   General Information   Referring Physician Steff Fermin MD, MD   Pertinent History of Current Problem Clinical swallow evaluation completed this AM (see that report for further hx). They stated  \"Swallow appears WNL. Given patient's report of choking and her possible pneumonia, a video swallow study is warranted to r/o silent aspiraiton and further assess the pharyngeal phase of the swallow.\"   Type of Evaluation   Type of Evaluation Swallow Evaluation   General Swallowing Observations   Current Diet/Method of Nutritional Intake (General Swallowing Observations, NIS) regular diet;thin liquids   Respiratory Support (General Swallowing Observations) nasal cannula  (3L)   Swallowing Evaluation Videofluoroscopic swallow study (VFSS)   VFSS Evaluation   Radiologist Dr. Hernandez   Views Taken left lateral   Physical Location of Procedure [facility]   VFSS Textures Trialed Thin Liquids;Purees;Solid   VFSS Eval: Thin Liquid Texture Trial   Mode of Presentation, Thin Liquid cup;straw;self-fed   Order of Presentation 1, 2, 3, 7   Preparatory Phase Poor bolus control   Oral Phase, Thin Liquid Premature pharyngeal entry   Pharyngeal Phase, Thin Liquid Delayed swallow reflex   Rosenbek's Penetration Aspiration Scale: Thin Liquid Trial Results 2 - contrast enters airway, remains above the vocal cords, no residue remains (penetration)   Diagnostic Statement Consistent flash penetration (which is functional) as a result of premature bolus spillage to the pyriform sinuses and mild-mod reduced epiglottic inversion/laryngeal closure   VFSS Evaluation: Puree Solid Texture Trial   Mode of Presentation, Puree spoon;self-fed   Order of Presentation 4   Preparatory Phase WFL   Oral Phase, Puree WFL   Pharyngeal Phase, Puree WFL   Rosenbek's Penetration Aspiration Scale: Puree Food Trial Results 1 - no aspiration, contrast does not enter airway   Diagnostic " Statement Functional: timely, no orophayngeal residuals, no pen/asp   VFSS Evaluation: Solid Food Texture Trial   Mode of Presentation, Solid self-fed   Order of Presentation 5, 6   Preparatory Phase WFL   Oral Phase, Solid WFL   Pharyngeal Phase, Solid WFL   Rosenbek's Penetration Aspiration Scale: Solid Food Trial Results 1 - no aspiration, contrast does not enter airway   Diagnostic Statement Functional: timely, no orophayngeal residuals, no pen/asp   Swallowing Recommendations   Diet Consistency Recommendations regular diet;thin liquids   Supervision Level for Intake patient independent   Recommended Feeding/Eating Techniques (Swallow Eval) patient is independent, no specific recommendations   Medication Administration Recommendations, Swallowing (SLP) whole as tolerated   General Therapy Interventions   Planned Therapy Interventions Dysphagia Treatment   Dysphagia treatment Oropharyngeal exercise training   SLP Therapy Assessment/Plan   Criteria for Skilled Therapeutic Interventions Met (SLP Eval) yes;treatment indicated   SLP Diagnosis mild oropharyngeal dysphagia   Rehab Potential (SLP Eval) good, to achieve stated therapy goals   Therapy Frequency (SLP Eval) 3 times/wk   Predicted Duration of Therapy Intervention (SLP Eval) 1 week   Comment, Therapy Assessment/Plan (SLP)   Video Fluoroscopic Swallow Study completed. Pt currently presents with mild oropharyngeal dysphagia, notable for the following deficits: reduced oral control with thin liquids resulting in spillage to the pyriform sinuses (though no significant delay), mildly reduced hyolaryngeal elevation/excursion, short/curved epiglottis with mild-moderate reduced inversion/incomplete laryngeal closure; pt also with a prominent cricopharyngeus, which did not appear to impede bolus flow into esophagus during assessment. No pharyngeal residuals noted. Given spillage/ reduced airway closure consistent flash penetration noted with thin liquid (though this  is functional in adults). No aspiration across textures.      Therapy Plan Review/Discharge Plan (SLP)   Therapy Plan Review (SLP) evaluation/treatment results reviewed;participants included;patient   SLP Discharge Planning    SLP Discharge Recommendation (DC Rec) home with outpatient speech therapy   SLP Rationale for DC Rec Mild dysphaiga and would benefit from exercise program to improve function   SLP Brief overview of current status  Mild dysphagia via video fluoroscopic swallow assessment. OK to continue regular solids/thin liquids - SLP to follow 3x/week for oropharyngeal exercise program introduction/completion.    Total Evaluation Time   Total Evaluation Time (Minutes) 17

## 2020-12-11 NOTE — PLAN OF CARE
Pt admitted 12/10 for increasing SOB, weakness, and aspiration. Awaiting results for COVID. History or CKD, DMII managed with diet, HTN, and aortic stenosis. NPO @ 0000 for possible broncoscopy, SBA, previously independent at home besides the last week or so. Lives with  and generally runs errands and drives w/o assistance. Uses CPAP at home. On 3L NC. NP cough, intermittent. Diminished LS. CT revealed RUL mass, thought to be either pneumonia or cancer. BP elevated, otherwise VS. See flow sheets. Continent of bowel and bladder. Skin assessment revealed scarring on knees from previous knee surgeries and a scar on her spine from a spinal fusion. Small scab noted on R shin. PIV SL. On IV Unasyn. Blood sugar checks, overnight BS was 214. D-dimer elevated at 1.3. Plan is to consult with pulmonary and speech in AM for lung mass and increased aspiration recently. If the patient can be weaned off of O2 it is possible the patient may discharge 12/11. Continue POC.

## 2020-12-11 NOTE — CONSULTS
Care Management Initial Consult    General Information  Assessment completed with: Patient, I spoke with Ирина on the phone  Type of CM/SW Visit: Offer D/C Planning  Primary Care Provider verified and updated as needed: Yes   Readmission within the last 30 days:        Reason for Consult: discharge planning;care coordination/care conference  Advance Care Planning: Advance Care Planning Reviewed: verified with patient          Communication Assessment  Patient's communication style: spoken language (English or Bilingual)    Hearing Difficulty or Deaf: yes   Wear Glasses or Blind: yes    Cognitive  Cognitive/Neuro/Behavioral: WDL                      Living Environment:   People in home: spouse  Armin  Current living Arrangements: mobile home      Able to return to prior arrangements: yes  Living Arrangement Comments: Her  drove her to the hospital.    Family/Social Support:  Care provided by: self  Provides care for:    Marital Status:             Description of Support System: Supportive         Current Resources:   Equipment currently used at home: walker, rolling       Employment/Financial:  Employment Status: retired        Financial Concerns: No concerns identified           Lifestyle & Psychosocial Needs:        Socioeconomic History     Marital status:      Spouse name: Not on file     Number of children: Not on file     Years of education: Not on file     Highest education level: Not on file   Occupational History     Occupation:  from home     Employer: RETIRED     Tobacco Use     Smoking status: Never Smoker     Smokeless tobacco: Never Used   Substance and Sexual Activity     Alcohol use: No     Drug use: No     Sexual activity: Yes     Partners: Male       Functional Status:  Prior to admission patient needed assistance:   Dependent ADLs:: Independent  Dependent IADLs:: Independent  Assesssment of Functional Status: At functional baseline             Values/Beliefs:  Spiritual, Cultural Beliefs, [employer] Practices, Values that affect care: no               Additional Information:  Spoke with patient over the phone. RN CTS following as patient on the CMS HF list with BNP 1553. No documentation of heart failure noted on H & P. Therapy recommends home with assist. Patient states no concern with discharging home when medically ready. Patient states will have support from  as needed.   Patient agreeable to scheduling post hospitalization appointment. Primary provider Yuridia Villarreal did not have any availability. [facility]. [facility]ment updated to AVS.   Office Visit with SAMY Abbasi CNP  Wednesday Dec 30, 2020 7:40 AM   [facility]  [phone]       Anticipate that family will transport patient upon discharge.     Carey Lopez, RN      Carey Lopez RN Case Manager  Inpatient Care Coordination  [facility]   [phone]

## 2020-12-11 NOTE — PROGRESS NOTES
Clinical Swallow Evaluation  [facility]  12/11/20 1016   General Information   Onset of Illness/Injury or Date of Surgery 12/10/20   Referring Physician Dr. Lopez   Patient/Family Therapy Goal Statement (SLP) Figure things out.   Pertinent History of Current Problem Per MD: Ирина Yanez is a 85 year old female with a history of CKD stage IV, non-insulin-dependent diabetes mellitus, HTN, mild aortic stenosis, moderately dilated ascending aorta, presented to the ED for shortness of breath and is found to have right lung infiltrate/consolidation.   General Observations Alert, cooperative, asking questions.   Past History of Dysphagia No previous SLP notes; patient reports coughing/choking on liquids and saliva ~3x/week   Type of Evaluation   Type of Evaluation Swallow Evaluation   Oral Motor   Oral Musculature generally intact   Structural Abnormalities none present   Mucosal Quality good   Dentition (Oral Motor)   Dentition (Oral Motor) natural dentition   Comment, Dentition (Oral Motor) WNL   Facial Symmetry (Oral Motor)   Facial Symmetry (Oral Motor) left side impairment   Right Side Facial Asymmetry minimal impairment   Lip Function (Oral Motor)   Lip Range of Motion (Oral Motor) WNL  (noted some tremors in cheeks with labial protrusion)   Lip Strength (Oral Motor) WNL   Lip Coordination (Oral Motor) minimal impairment  (somewhat slow movement between pucker/purse)   Tongue Function (Oral Motor)   Tongue ROM (Oral Motor) protrusion is impaired   Depression, Tongue ROM Impairment (Oral Motor) left side;minimal impairment   Elevation, Tongue ROM Impairment (Oral Motor)   (mod cues needed to elevate tongue)   Tongue Strength (Oral Motor) WNL   Tongue Coordination/Speed (Oral Motor) reduced rate   Jaw Function (Oral Motor)   Jaw Function (Oral Motor) WNL   Cough/Swallow/Gag Reflex (Oral Motor)   Volitional Throat Clear/Cough (Oral Motor) WNL   Volitional Swallow (Oral Motor) WNL   Vocal Quality/Secretion Management  (Oral Motor)   Vocal Quality (Oral Motor) WNL   Secretion Management (Oral Motor) WNL   General Swallowing Observations   Current Diet/Method of Nutritional Intake (General Swallowing Observations, NIS) NPO   Respiratory Support (General Swallowing Observations) nasal cannula  (3L)   Swallowing Evaluation Clinical swallow evaluation   Clinical Swallow Evaluation   Feeding Assistance no assistance needed   Clinical Swallow Evaluation Textures Trialed Thin Liquids;Puree Textures;Solid Foods   Clinical Swallow Eval: Thin Liquid Texture Trial   Mode of Presentation, Thin Liquids spoon;fed by clinician;cup;self-fed   Volume of Liquid or Food Presented 4 ounces water; 4 ounces apple juice   Oral Phase of Swallow WFL   Pharyngeal Phase of Swallow intact   Diagnostic Statement WNL; no overt s/sx of aspiration or dysphagia   Clinical Swallow Evaluation: Puree Solid Texture Trial   Mode of Presentation, Puree spoon;self-fed   Volume of Puree Presented 4 ounces apple sauce   Oral Phase, Puree WFL   Pharyngeal Phase, Puree intact   Diagnostic Statement WNL; no overt s/sx of aspiration or dysphagia   Clinical Swallow Evaluation: Solid Food Texture Trial   Mode of Presentation, Solid self-fed   Volume of Solid Food Presented kush crackers   Oral Phase, Solid WFL   Pharyngeal Phase, Solid intact   Diagnostic Statement WNL; no overt s/sx of aspiration or dysphagia   Esophageal Phase of Swallow   Patient reports or presents with symptoms of esophageal dysphagia No   Swallowing Recommendations   Diet Consistency Recommendations regular diet;thin liquids   Supervision Level for Intake patient independent   Recommended Feeding/Eating Techniques (Swallow Eval) patient is independent, no specific recommendations   Medication Administration Recommendations, Swallowing (SLP) whole with water   Instrumental Assessment Recommendations VFSS (videofluroscopic swallowing study)  (to r/o silent aspiration (suspected aspiration pneumonia))    General Therapy Interventions   Intervention Comments Will determine after video swallow study   SLP Therapy Assessment/Plan   Criteria for Skilled Therapeutic Interventions Met (SLP Eval)   (Will determine after video swallow study)   SLP Diagnosis Swallow appears WNL   Rehab Potential (SLP Eval) good, to achieve stated therapy goals   Therapy Frequency (SLP Eval)   (will determine after VFSS)   Comment, Therapy Assessment/Plan (SLP) Clinical swallow evaluation completed. Patient with possible aspiration pneumonia and reports coughing/choking on liquids and her saliva ~3x/week. Oral motor exam WNL with a slight facial asymmetry and slight lingual deviation to the left.  Otherwise, unremarkable with no reported changes with her speech or voice. Thin, pureed, and solids consistencies all tolerated with no overt s/sx of aspiration or dysphagia. Given patient's report of choking and her possible pneumonia, a video swallow study is recommended to assess for silent aspiration and to further assess the pharyngeal phase of the swallow. MD paged and agrees with plan. Anticipate getting COVID results back today so will wait to do video until results received.   Therapy Plan Review/Discharge Plan (SLP)   Therapy Plan Review (SLP) evaluation/treatment results reviewed;care plan/treatment goals reviewed;risks/benefits reviewed;participants voiced agreement with care plan;participants included;patient   SLP Discharge Planning    SLP Discharge Recommendation (DC Rec) home   SLP Rationale for DC Rec Patient appears to have a functional swallow on her regular baseline diet   SLP Brief overview of current status  Clinical swallow evaluation completed. Swallow appears WNL. Given patient's report of choking and her possible pneumonia, a video swallow study is warranted to r/o silent aspiraiton and further assess the pharyngeal phase of the swallow. MD paged and video ordered.    Total Evaluation Time   Total Evaluation Time  (Minutes) 21   Tushar Reyes MS CCC-SLP

## 2020-12-11 NOTE — PLAN OF CARE
Slightly elevated BP, received scheduled BP meds, otherwise VSS on 3L O2 NC.  A/Ox4. Denies pain. LS dim, GARAY.  Infrequent nonproductive cough.  No tele, denies CP.  BLE +1 edema.  Reg diet, ate box lunch. NPO at midnight.  B. Unasyn currently infusing in L PIV.  Up SBA w/ personal wheeled walker. Covid results pending, precautions maintained. Speech and pulmonary consulted.  Discharge tbd.  Continue POC.

## 2020-12-12 ENCOUNTER — APPOINTMENT (OUTPATIENT)
Dept: PHYSICAL THERAPY | Facility: CLINIC | Age: 85
DRG: 177 | End: 2020-12-12
Payer: COMMERCIAL

## 2020-12-12 ENCOUNTER — APPOINTMENT (OUTPATIENT)
Dept: SPEECH THERAPY | Facility: CLINIC | Age: 85
DRG: 177 | End: 2020-12-12
Payer: COMMERCIAL

## 2020-12-12 LAB
ANION GAP SERPL CALCULATED.3IONS-SCNC: 2 MMOL/L (ref 3–14)
BASOPHILS # BLD AUTO: 0 10E9/L (ref 0–0.2)
BASOPHILS NFR BLD AUTO: 0.3 %
BUN SERPL-MCNC: 43 MG/DL (ref 7–30)
CALCIUM SERPL-MCNC: 8.9 MG/DL (ref 8.5–10.1)
CHLORIDE SERPL-SCNC: 106 MMOL/L (ref 94–109)
CO2 SERPL-SCNC: 35 MMOL/L (ref 20–32)
CREAT SERPL-MCNC: 1.96 MG/DL (ref 0.52–1.04)
DIFFERENTIAL METHOD BLD: ABNORMAL
EOSINOPHIL # BLD AUTO: 0 10E9/L (ref 0–0.7)
EOSINOPHIL NFR BLD AUTO: 0 %
ERYTHROCYTE [DISTWIDTH] IN BLOOD BY AUTOMATED COUNT: 13.5 % (ref 10–15)
GFR SERPL CREATININE-BSD FRML MDRD: 23 ML/MIN/{1.73_M2}
GLUCOSE BLDC GLUCOMTR-MCNC: 110 MG/DL (ref 70–99)
GLUCOSE BLDC GLUCOMTR-MCNC: 112 MG/DL (ref 70–99)
GLUCOSE BLDC GLUCOMTR-MCNC: 128 MG/DL (ref 70–99)
GLUCOSE BLDC GLUCOMTR-MCNC: 91 MG/DL (ref 70–99)
GLUCOSE BLDC GLUCOMTR-MCNC: 94 MG/DL (ref 70–99)
GLUCOSE SERPL-MCNC: 115 MG/DL (ref 70–99)
HCT VFR BLD AUTO: 29.9 % (ref 35–47)
HGB BLD-MCNC: 8.9 G/DL (ref 11.7–15.7)
IMM GRANULOCYTES # BLD: 0 10E9/L (ref 0–0.4)
IMM GRANULOCYTES NFR BLD: 0.4 %
LYMPHOCYTES # BLD AUTO: 1 10E9/L (ref 0.8–5.3)
LYMPHOCYTES NFR BLD AUTO: 14 %
MCH RBC QN AUTO: 28.5 PG (ref 26.5–33)
MCHC RBC AUTO-ENTMCNC: 29.8 G/DL (ref 31.5–36.5)
MCV RBC AUTO: 96 FL (ref 78–100)
MONOCYTES # BLD AUTO: 0.7 10E9/L (ref 0–1.3)
MONOCYTES NFR BLD AUTO: 9.4 %
NEUTROPHILS # BLD AUTO: 5.3 10E9/L (ref 1.6–8.3)
NEUTROPHILS NFR BLD AUTO: 75.9 %
NRBC # BLD AUTO: 0 10*3/UL
NRBC BLD AUTO-RTO: 0 /100
PLATELET # BLD AUTO: 225 10E9/L (ref 150–450)
POTASSIUM SERPL-SCNC: 3.7 MMOL/L (ref 3.4–5.3)
RBC # BLD AUTO: 3.12 10E12/L (ref 3.8–5.2)
SODIUM SERPL-SCNC: 143 MMOL/L (ref 133–144)
WBC # BLD AUTO: 7 10E9/L (ref 4–11)

## 2020-12-12 PROCEDURE — 92526 ORAL FUNCTION THERAPY: CPT | Mod: GN | Performed by: SPEECH-LANGUAGE PATHOLOGIST

## 2020-12-12 PROCEDURE — 250N000011 HC RX IP 250 OP 636: Performed by: INTERNAL MEDICINE

## 2020-12-12 PROCEDURE — 97116 GAIT TRAINING THERAPY: CPT | Mod: GP

## 2020-12-12 PROCEDURE — 999N001017 HC STATISTIC GLUCOSE BY METER IP

## 2020-12-12 PROCEDURE — 250N000013 HC RX MED GY IP 250 OP 250 PS 637: Performed by: INTERNAL MEDICINE

## 2020-12-12 PROCEDURE — 120N000001 HC R&B MED SURG/OB

## 2020-12-12 PROCEDURE — 85025 COMPLETE CBC W/AUTO DIFF WBC: CPT | Performed by: INTERNAL MEDICINE

## 2020-12-12 PROCEDURE — 99233 SBSQ HOSP IP/OBS HIGH 50: CPT | Performed by: INTERNAL MEDICINE

## 2020-12-12 PROCEDURE — 80048 BASIC METABOLIC PNL TOTAL CA: CPT | Performed by: INTERNAL MEDICINE

## 2020-12-12 PROCEDURE — 36415 COLL VENOUS BLD VENIPUNCTURE: CPT | Performed by: INTERNAL MEDICINE

## 2020-12-12 PROCEDURE — 94660 CPAP INITIATION&MGMT: CPT

## 2020-12-12 PROCEDURE — 999N000157 HC STATISTIC RCP TIME EA 10 MIN

## 2020-12-12 PROCEDURE — 97530 THERAPEUTIC ACTIVITIES: CPT | Mod: GP

## 2020-12-12 RX ORDER — DOXAZOSIN 1 MG/1
1 TABLET ORAL DAILY
Status: DISCONTINUED | OUTPATIENT
Start: 2020-12-12 | End: 2020-12-14 | Stop reason: HOSPADM

## 2020-12-12 RX ADMIN — GABAPENTIN 200 MG: 100 CAPSULE ORAL at 08:10

## 2020-12-12 RX ADMIN — DOXAZOSIN 1 MG: 1 TABLET ORAL at 11:28

## 2020-12-12 RX ADMIN — HYDRALAZINE HYDROCHLORIDE 100 MG: 50 TABLET, FILM COATED ORAL at 16:45

## 2020-12-12 RX ADMIN — SERTRALINE HYDROCHLORIDE 50 MG: 50 TABLET, FILM COATED ORAL at 20:47

## 2020-12-12 RX ADMIN — AMPICILLIN SODIUM AND SULBACTAM SODIUM 3 G: 2; 1 INJECTION, POWDER, FOR SOLUTION INTRAMUSCULAR; INTRAVENOUS at 22:50

## 2020-12-12 RX ADMIN — SIMVASTATIN 10 MG: 10 TABLET, FILM COATED ORAL at 22:50

## 2020-12-12 RX ADMIN — AMPICILLIN SODIUM AND SULBACTAM SODIUM 3 G: 2; 1 INJECTION, POWDER, FOR SOLUTION INTRAMUSCULAR; INTRAVENOUS at 11:28

## 2020-12-12 RX ADMIN — HEPARIN SODIUM 5000 UNITS: 10000 INJECTION, SOLUTION INTRAVENOUS; SUBCUTANEOUS at 20:47

## 2020-12-12 RX ADMIN — TORSEMIDE 30 MG: 20 TABLET ORAL at 08:10

## 2020-12-12 RX ADMIN — HYDRALAZINE HYDROCHLORIDE 100 MG: 50 TABLET, FILM COATED ORAL at 08:10

## 2020-12-12 RX ADMIN — HEPARIN SODIUM 5000 UNITS: 10000 INJECTION, SOLUTION INTRAVENOUS; SUBCUTANEOUS at 08:10

## 2020-12-12 RX ADMIN — GABAPENTIN 200 MG: 100 CAPSULE ORAL at 20:47

## 2020-12-12 RX ADMIN — HYDRALAZINE HYDROCHLORIDE 100 MG: 50 TABLET, FILM COATED ORAL at 22:50

## 2020-12-12 ASSESSMENT — ACTIVITIES OF DAILY LIVING (ADL)
ADLS_ACUITY_SCORE: 18
ADLS_ACUITY_SCORE: 18
ADLS_ACUITY_SCORE: 17

## 2020-12-12 ASSESSMENT — MIFFLIN-ST. JEOR: SCORE: 1422.49

## 2020-12-12 NOTE — PLAN OF CARE
A&Ox4. VSS. 3L O2 via NC. C/o mild feet pain d/t neuropathy, scheduled gabapentin helps. IV unasyn. Good appetite. SBA with walker, steady. , 166, no insulin given. Will continue with POC, discharge tbd    Reilly Osei RN on 12/11/2020 at 10:32 PM

## 2020-12-12 NOTE — PLAN OF CARE
Pt is alert and oriented, up with assist of 1 and the walker. Pt denies pain. Pt remains on IV Unasyn every 12 hours, will need antibiotics X14 days total per MD note. Pt passed speech, on a regular diet. Pt slept well between cares.

## 2020-12-12 NOTE — PROGRESS NOTES
[facility]    Medicine Progress Note - Hospitalist Service       Date of Admission:  12/10/2020  Assessment & Plan                Ирина Yanez is a 85 year old female with a history of CKD stage IV, non-insulin-dependent diabetes mellitus, HTN, mild aortic stenosis, moderately dilated ascending aorta who presented to the ED complaining of shortness of breath.     Patient reports that she had been feeling well up until a few weeks ago. I note that she saw cardiology on 11/16/2020 and was reported to be feeling at her baseline at that time. She says that over the past few weeks, she has been having progressive dyspnea on exertion. She has noticed some mild leg swelling associated with that and an occasional cough. She has also been feeling very fatigued. She reports that she has recently noticed that she seems to have trouble swallowing and has been aspirating her food. She presented here to the ED, she says mostly to get a Covid test.  In the ED, she was noted to be borderline hypoxic in the high 80s and was placed on 2 L.  Other vital signs unremarkable.  Labs notable for negative troponin, normal white count and creatinine at baseline 2.3.  proBNP was within normal range.  D-dimer was mildly elevated at 1.3.  CT of the chest without contrast showed a masslike consolidation in the right upper lobe, concerning positive for pneumonia versus malignancy.  Also noted were small bilateral pleural effusions.  Patient was given ceftriaxone and azithromycin.  She was also given a dose of enoxaparin empirically for possible PE.     She emphasizes that other than shortness of breath, she has not really had any major new symptoms.  She says that she has just been having trouble getting up and around the house.  She normally ambulates with a walker.  Denies any contact with COVID-19 positive people.      Dyspnea on exertion  Acute hypoxic respiratory failure  Right middle lobe consolidation, pneumonia  "versus malignancy  - As outlined in the HPI, patient presented with approximately 3 weeks of primarily shortness of breath being her main symptom with occasional cough.  Some mild lower leg swelling.  Also mention that she has been having increasing aspiration, which is a new problem for her.  In the ED, found to be mildly hypoxic in the upper 80s and placed on supplemental O2 via nasal cannula.  - Lab work unremarkable with normal white blood cell count, negative troponin, normal proBNP, though this could be falsely low in the setting of elevated BMI.  - CT the chest showed masslike consolidation in the right upper lobe, possibly concerning for pneumonia versus malignancy.  She was given ceftriaxone and azithromycin as well as enoxaparin empirically for possible PE.  Duplex ultrasounds of the bilateral legs were negative.  - Overall my suspicion is that she has  pneumonia which has been ongoing for the past few weeks making her feel debilitated and weak.  Will get SLP evaluation to see the extent and severity of her aspiration.  However, other differential diagnosis are possible such as malignancy.  - The most reasonable option here is likely Abx treatment and repeat imaging in 2 months. Pulmonary following.  - History of aortic stenosis is noted, mild. Known to have normal EF. Has small bilateral pleural effusions on CT chest. Will increase  torsemide to 30 mg.  - Switch antibiotics to Unasyn to cover for anaerobes.  She has a history of reported \"allergy\" to Augmentin which is diarrhea, anticipate tolerate the Unasyn fine.  - Wean supplemental oxygen as tolerated, she is saturating in the mid 90s on just 2 L.     Chronic medical conditions  DM2: Hold oral medications, use medium dose sliding scale.  CKD stage IV: her creatinine is at baseline 2.3, GFR in the 20 range.  Continue to dose medications as appropriate.  HTN: Continue her home medications once verified by pharmacy.  Neuropathy: Continue " gabapentin.         Diet: Combination Diet Regular Diet Adult; Thin Liquids (water, ice chips, juice, milk, gelatin, ice cream, etc)    DVT Prophylaxis: Heparin SQ  Aguilera Catheter: not present  Code Status: Full Code           Disposition Plan   Expected discharge: 2 - 3 days, recommended to prior living arrangement once antibiotic plan established and O2 use less than 0 liters/minute.  Entered: Stfef Fermin MD 12/12/2020, 8:02 AM       The patient's care was discussed with the Patient.    Steff Fermin MD  Hospitalist Service  [facility]  Contact information available via [facility] Paging/Directory    ______________________________________________________________________    Interval History     No fevers/chills/productive cough. Feels breathing is slightly better.    Data reviewed today: I reviewed all medications, new labs and imaging results over the last 24 hours. I personally reviewed no images or EKG's today.    Physical Exam   Vital Signs: Temp: 98.2  F (36.8  C) Temp src: Oral BP: 124/47 Pulse: 84   Resp: 20 SpO2: 92 % O2 Device: Nasal cannula Oxygen Delivery: 3 LPM  Weight: 218 lbs 6.4 oz    Gen - AAO x 3 in NAD.  Lungs - CTA B with diminished in bases.  Heart - RR,S1+S2 nml, 3/6 systolic murmur.  Abd - soft, NT, ND, + BS.  Ext - no edema.    Data   Recent Labs   Lab 12/12/20  0632 12/11/20  0650 12/10/20  1850 12/10/20  1442   WBC 7.0 8.3  --  8.3   HGB 8.9* 8.8*  --  9.5*   MCV 96 95  --  96    225  --  229    142  --  141   POTASSIUM 3.7 3.8  --  3.6   CHLORIDE 106 107  --  105   CO2 35* 33*  --  31   BUN 43* 51*  --  52*   CR 1.96* 2.15*  --  2.27*   ANIONGAP 2* 2*  --  5   EDNA 8.9 8.7  --  9.3   * 123*  --  68*   ALBUMIN  --   --   --  2.9*   PROTTOTAL  --   --   --  7.5   BILITOTAL  --   --   --  0.3   ALKPHOS  --   --   --  79   ALT  --   --   --  13   AST  --   --   --  13   TROPI  --   --  0.015 <0.015     Recent Results (from the past 24 hour(s))   XR  Video Swallow with SLP or OT    Narrative    VIDEO SPEECH EVALUATION WITH OR WITHOUT ESOPHAGRAM  12/11/2020 2:15 PM      HISTORY: Pneumonia, dysphagia.    COMPARISON: None.    FLUOROSCOPY TIME: 0.7 minutes.    SPOT FILMS: 7    TECHNIQUE: A modified barium swallow is performed with speech  pathology. Note that only the cervical esophagus was evaluated in  lateral view.      Impression    IMPRESSION: Three episodes of flash penetration noted with thin  liquids. No aspiration. No penetration or aspiration with pudding or  solid consistency.    Please see the speech pathology report for further details.    GISELE MUHAMMAD MD     Medications       ampicillin-sulbactam (UNASYN) IV  3 g Intravenous Q12H     gabapentin  200 mg Oral BID     heparin ANTICOAGULANT  5,000 Units Subcutaneous Q12H     hydrALAZINE  100 mg Oral TID     insulin aspart  1-7 Units Subcutaneous TID AC     insulin aspart  1-5 Units Subcutaneous At Bedtime     sertraline  50 mg Oral QPM     simvastatin  10 mg Oral At Bedtime     sodium chloride (PF)  3 mL Intracatheter Q8H     torsemide  30 mg Oral Daily

## 2020-12-12 NOTE — PROGRESS NOTES
X cover    covid test negative.  Precautions removed after reviewed progress note      Joe Montenegro MD

## 2020-12-12 NOTE — PLAN OF CARE
A&Ox4. VSS. Nasal cannula 3 L. GARAY. Denies pain. Abx Unasyn BID. /91. Up SBA with a walker. Possible discharge 2-3 days.

## 2020-12-13 ENCOUNTER — APPOINTMENT (OUTPATIENT)
Dept: PHYSICAL THERAPY | Facility: CLINIC | Age: 85
DRG: 177 | End: 2020-12-13
Payer: COMMERCIAL

## 2020-12-13 LAB
1,3 BETA GLUCAN SER-MCNC: <31 PG/ML
ANION GAP SERPL CALCULATED.3IONS-SCNC: 5 MMOL/L (ref 3–14)
B-D GLUCAN INTERPRETATION (1,3): NEGATIVE
BUN SERPL-MCNC: 44 MG/DL (ref 7–30)
CALCIUM SERPL-MCNC: 9.3 MG/DL (ref 8.5–10.1)
CHLORIDE SERPL-SCNC: 102 MMOL/L (ref 94–109)
CO2 SERPL-SCNC: 34 MMOL/L (ref 20–32)
CREAT SERPL-MCNC: 1.94 MG/DL (ref 0.52–1.04)
GFR SERPL CREATININE-BSD FRML MDRD: 23 ML/MIN/{1.73_M2}
GLUCOSE BLDC GLUCOMTR-MCNC: 109 MG/DL (ref 70–99)
GLUCOSE BLDC GLUCOMTR-MCNC: 120 MG/DL (ref 70–99)
GLUCOSE BLDC GLUCOMTR-MCNC: 149 MG/DL (ref 70–99)
GLUCOSE BLDC GLUCOMTR-MCNC: 164 MG/DL (ref 70–99)
GLUCOSE BLDC GLUCOMTR-MCNC: 176 MG/DL (ref 70–99)
GLUCOSE SERPL-MCNC: 132 MG/DL (ref 70–99)
POTASSIUM SERPL-SCNC: 3.6 MMOL/L (ref 3.4–5.3)
SODIUM SERPL-SCNC: 141 MMOL/L (ref 133–144)

## 2020-12-13 PROCEDURE — 97116 GAIT TRAINING THERAPY: CPT | Mod: GP | Performed by: PHYSICAL THERAPIST

## 2020-12-13 PROCEDURE — 250N000013 HC RX MED GY IP 250 OP 250 PS 637: Performed by: INTERNAL MEDICINE

## 2020-12-13 PROCEDURE — 99233 SBSQ HOSP IP/OBS HIGH 50: CPT | Performed by: INTERNAL MEDICINE

## 2020-12-13 PROCEDURE — 250N000011 HC RX IP 250 OP 636: Performed by: INTERNAL MEDICINE

## 2020-12-13 PROCEDURE — 80048 BASIC METABOLIC PNL TOTAL CA: CPT | Performed by: INTERNAL MEDICINE

## 2020-12-13 PROCEDURE — 36415 COLL VENOUS BLD VENIPUNCTURE: CPT | Performed by: INTERNAL MEDICINE

## 2020-12-13 PROCEDURE — 999N001017 HC STATISTIC GLUCOSE BY METER IP

## 2020-12-13 PROCEDURE — 94660 CPAP INITIATION&MGMT: CPT

## 2020-12-13 PROCEDURE — 97530 THERAPEUTIC ACTIVITIES: CPT | Mod: GP | Performed by: PHYSICAL THERAPIST

## 2020-12-13 PROCEDURE — 999N000157 HC STATISTIC RCP TIME EA 10 MIN

## 2020-12-13 PROCEDURE — 120N000001 HC R&B MED SURG/OB

## 2020-12-13 RX ORDER — SACCHAROMYCES BOULARDII 250 MG
250 CAPSULE ORAL 2 TIMES DAILY
Status: DISCONTINUED | OUTPATIENT
Start: 2020-12-13 | End: 2020-12-14 | Stop reason: HOSPADM

## 2020-12-13 RX ADMIN — HYDRALAZINE HYDROCHLORIDE 100 MG: 50 TABLET, FILM COATED ORAL at 08:21

## 2020-12-13 RX ADMIN — HEPARIN SODIUM 5000 UNITS: 10000 INJECTION, SOLUTION INTRAVENOUS; SUBCUTANEOUS at 21:01

## 2020-12-13 RX ADMIN — HEPARIN SODIUM 5000 UNITS: 10000 INJECTION, SOLUTION INTRAVENOUS; SUBCUTANEOUS at 08:21

## 2020-12-13 RX ADMIN — HYDRALAZINE HYDROCHLORIDE 100 MG: 50 TABLET, FILM COATED ORAL at 15:42

## 2020-12-13 RX ADMIN — SERTRALINE HYDROCHLORIDE 50 MG: 50 TABLET, FILM COATED ORAL at 21:01

## 2020-12-13 RX ADMIN — INSULIN ASPART 1 UNITS: 100 INJECTION, SOLUTION INTRAVENOUS; SUBCUTANEOUS at 16:14

## 2020-12-13 RX ADMIN — HYDRALAZINE HYDROCHLORIDE 100 MG: 50 TABLET, FILM COATED ORAL at 22:31

## 2020-12-13 RX ADMIN — GABAPENTIN 200 MG: 100 CAPSULE ORAL at 21:01

## 2020-12-13 RX ADMIN — GABAPENTIN 200 MG: 100 CAPSULE ORAL at 08:21

## 2020-12-13 RX ADMIN — SIMVASTATIN 10 MG: 10 TABLET, FILM COATED ORAL at 22:31

## 2020-12-13 RX ADMIN — DOXAZOSIN 1 MG: 1 TABLET ORAL at 08:21

## 2020-12-13 RX ADMIN — AMPICILLIN SODIUM AND SULBACTAM SODIUM 3 G: 2; 1 INJECTION, POWDER, FOR SOLUTION INTRAMUSCULAR; INTRAVENOUS at 22:30

## 2020-12-13 RX ADMIN — INSULIN ASPART 1 UNITS: 100 INJECTION, SOLUTION INTRAVENOUS; SUBCUTANEOUS at 13:34

## 2020-12-13 RX ADMIN — Medication 250 MG: at 13:34

## 2020-12-13 RX ADMIN — TORSEMIDE 30 MG: 20 TABLET ORAL at 08:21

## 2020-12-13 RX ADMIN — AMPICILLIN SODIUM AND SULBACTAM SODIUM 3 G: 2; 1 INJECTION, POWDER, FOR SOLUTION INTRAMUSCULAR; INTRAVENOUS at 10:48

## 2020-12-13 RX ADMIN — Medication 250 MG: at 21:01

## 2020-12-13 ASSESSMENT — MIFFLIN-ST. JEOR: SCORE: 1403.44

## 2020-12-13 ASSESSMENT — ACTIVITIES OF DAILY LIVING (ADL)
ADLS_ACUITY_SCORE: 17
ADLS_ACUITY_SCORE: 17
ADLS_ACUITY_SCORE: 18
ADLS_ACUITY_SCORE: 18
ADLS_ACUITY_SCORE: 17
ADLS_ACUITY_SCORE: 17

## 2020-12-13 NOTE — PROGRESS NOTES
An Auto Cpap with a 3LPM bleed in was applied to the pt via the mask for sleep apnea. Skin integrity is good. Pt is tolerating it well. Will continue to monitor and assess the pt's current respiratory status and needs.    RT Bruna on 12/13/2020 at 2:30 AM

## 2020-12-13 NOTE — PROGRESS NOTES
[facility]    Medicine Progress Note - Hospitalist Service       Date of Admission:  12/10/2020  Assessment & Plan                      Ирина Yanez is a 85 year old female with a history of CKD stage IV, non-insulin-dependent diabetes mellitus, HTN, mild aortic stenosis, moderately dilated ascending aorta who presented to the ED complaining of shortness of breath.     Patient reports that she had been feeling well up until a few weeks ago. I note that she saw cardiology on 11/16/2020 and was reported to be feeling at her baseline at that time. She says that over the past few weeks, she has been having progressive dyspnea on exertion. She has noticed some mild leg swelling associated with that and an occasional cough. She has also been feeling very fatigued. She reports that she has recently noticed that she seems to have trouble swallowing and has been aspirating her food. She presented here to the ED, she says mostly to get a Covid test.  In the ED, she was noted to be borderline hypoxic in the high 80s and was placed on 2 L.  Other vital signs unremarkable.  Labs notable for negative troponin, normal white count and creatinine at baseline 2.3.  proBNP was within normal range.  D-dimer was mildly elevated at 1.3.  CT of the chest without contrast showed a masslike consolidation in the right upper lobe, concerning positive for pneumonia versus malignancy.  Also noted were small bilateral pleural effusions.  Patient was given ceftriaxone and azithromycin.  She was also given a dose of enoxaparin empirically for possible PE.     She emphasizes that other than shortness of breath, she has not really had any major new symptoms.  She says that she has just been having trouble getting up and around the house.  She normally ambulates with a walker.  Denies any contact with COVID-19 positive people.      Dyspnea on exertion  Acute hypoxic respiratory failure  Right middle lobe consolidation,  "pneumonia versus malignancy  - As outlined in the HPI, patient presented with approximately 3 weeks of primarily shortness of breath being her main symptom with occasional cough.  Some mild lower leg swelling.  Also mention that she has been having increasing aspiration, which is a new problem for her.  In the ED, found to be mildly hypoxic in the upper 80s and placed on supplemental O2 via nasal cannula.  - Lab work unremarkable with normal white blood cell count, negative troponin, normal proBNP, though this could be falsely low in the setting of elevated BMI.  - CT the chest showed masslike consolidation in the right upper lobe, possibly concerning for pneumonia versus malignancy.  She was given ceftriaxone and azithromycin as well as enoxaparin empirically for possible PE.  Duplex ultrasounds of the bilateral legs were negative.  - Overall this could be pneumonia. Her   However, swallowing is not impaired markedly as per SLP and can f/u with SLP in 3 weeks. Other differential diagnosis are possible such as malignancy or atypical infection possible.  - The most reasonable option here is likely Abx treatment and repeat imaging in 2 months. Pulmonary following see note.  - History of aortic stenosis is noted, mild. Known to have normal EF. Has small bilateral pleural effusions on CT chest. Will increase  torsemide to 30 mg.  - Switch antibiotics to Unasyn to cover for anaerobes.  She has a history of reported \"allergy\" to Augmentin which is diarrhea, anticipate tolerate the Unasyn fine.  - Wean supplemental oxygen as tolerated, she is saturating in the mid 90s on just 2 L.     Chronic medical conditions  DM2: Hold oral medications, use medium dose sliding scale.  CKD stage IV: her creatinine is at baseline 2.3, GFR in the 20 range.  Continue to dose medications as appropriate.  HTN: Continue her home medications once verified by pharmacy.  Neuropathy: Continue gabapentin.  Chronic anemia: f/u with PCP.         "   Diet: Combination Diet Regular Diet Adult; Thin Liquids (water, ice chips, juice, milk, gelatin, ice cream, etc)    DVT Prophylaxis: Heparin SQ  Aguilera Catheter: not present  Code Status: Full Code           Disposition Plan   Expected discharge: Tomorrow, recommended to prior living arrangement once O2 use less than 0 liters/minute.  Entered: Steff Fermin MD 12/13/2020, 7:55 AM       The patient's care was discussed with the Patient.    Steff Fermin MD  Hospitalist Service  [facility]  Contact information available via [facility] Paging/Directory    ______________________________________________________________________    Interval History     No fevers/chills/productive cough. Feels breathing is slightly better.    Data reviewed today: I reviewed all medications, new labs and imaging results over the last 24 hours. I personally reviewed no images or EKG's today.    Physical Exam   Vital Signs: Temp: 98  F (36.7  C) Temp src: Oral BP: (!) 164/57 Pulse: 89   Resp: 18 SpO2: 94 % O2 Device: Nasal cannula Oxygen Delivery: 3 LPM  Weight: 212 lbs 6.4 oz    Gen - AAO x 3 in NAD.  Lungs - CTA B with diminished in bases.  Heart - RR,S1+S2 nml, 3/6 systolic murmur.  Abd - soft, NT, ND, + BS.  Ext - no edema.    Data   Recent Labs   Lab 12/12/20  0632 12/11/20  0650 12/10/20  1850 12/10/20  1442   WBC 7.0 8.3  --  8.3   HGB 8.9* 8.8*  --  9.5*   MCV 96 95  --  96    225  --  229    142  --  141   POTASSIUM 3.7 3.8  --  3.6   CHLORIDE 106 107  --  105   CO2 35* 33*  --  31   BUN 43* 51*  --  52*   CR 1.96* 2.15*  --  2.27*   ANIONGAP 2* 2*  --  5   EDNA 8.9 8.7  --  9.3   * 123*  --  68*   ALBUMIN  --   --   --  2.9*   PROTTOTAL  --   --   --  7.5   BILITOTAL  --   --   --  0.3   ALKPHOS  --   --   --  79   ALT  --   --   --  13   AST  --   --   --  13   TROPI  --   --  0.015 <0.015     No results found for this or any previous visit (from the past 24 hour(s)).  Medications        ampicillin-sulbactam (UNASYN) IV  3 g Intravenous Q12H     doxazosin  1 mg Oral Daily     gabapentin  200 mg Oral BID     heparin ANTICOAGULANT  5,000 Units Subcutaneous Q12H     hydrALAZINE  100 mg Oral TID     insulin aspart  1-7 Units Subcutaneous TID AC     insulin aspart  1-5 Units Subcutaneous At Bedtime     sertraline  50 mg Oral QPM     simvastatin  10 mg Oral At Bedtime     sodium chloride (PF)  3 mL Intracatheter Q8H     torsemide  30 mg Oral Daily

## 2020-12-13 NOTE — PLAN OF CARE
A&Ox4. SBA with walker. VSS ex htn, PO hydralazine. O2 stable at 3L NC. C/o neuropathy, gabapentin scheduled and pt says this helps. IV unasyn, then saline locked. BG stable, no insulin given. Will continue with POC    Reilly Osei RN on 12/12/2020 at 11:00 PM

## 2020-12-13 NOTE — PLAN OF CARE
A&Ox4. VSS on 3 LPM. Denies pain. CPAP worn @ HS. Up w/ SBA and walker. Continues on IV unasyn. LS diminished bilaterally. . Will continue with plan of care.

## 2020-12-13 NOTE — PLAN OF CARE
A&Ox4. VSS. Pt weaned off of oxygen. Oxygen sats 92%. Sats dropped down to 90% when ambulating in the cardenas. Three episodes of diarrhea today. Md updates and placed new order for Florastor. Abx Unisyn BID. Up SBA with a walker. /164. Possible discharge home tomorrow.

## 2020-12-14 VITALS
RESPIRATION RATE: 18 BRPM | HEIGHT: 64 IN | WEIGHT: 213.1 LBS | SYSTOLIC BLOOD PRESSURE: 122 MMHG | HEART RATE: 77 BPM | BODY MASS INDEX: 36.38 KG/M2 | TEMPERATURE: 98.1 F | DIASTOLIC BLOOD PRESSURE: 55 MMHG | OXYGEN SATURATION: 94 %

## 2020-12-14 LAB
GLUCOSE BLDC GLUCOMTR-MCNC: 114 MG/DL (ref 70–99)
GLUCOSE BLDC GLUCOMTR-MCNC: 120 MG/DL (ref 70–99)
GLUCOSE BLDC GLUCOMTR-MCNC: 138 MG/DL (ref 70–99)

## 2020-12-14 PROCEDURE — 99239 HOSP IP/OBS DSCHRG MGMT >30: CPT | Performed by: INTERNAL MEDICINE

## 2020-12-14 PROCEDURE — 250N000011 HC RX IP 250 OP 636: Performed by: INTERNAL MEDICINE

## 2020-12-14 PROCEDURE — 250N000013 HC RX MED GY IP 250 OP 250 PS 637: Performed by: INTERNAL MEDICINE

## 2020-12-14 PROCEDURE — 999N001017 HC STATISTIC GLUCOSE BY METER IP

## 2020-12-14 RX ORDER — SACCHAROMYCES BOULARDII 250 MG
250 CAPSULE ORAL 2 TIMES DAILY
Qty: 20 CAPSULE | Refills: 0 | Status: SHIPPED | OUTPATIENT
Start: 2020-12-14 | End: 2020-12-24

## 2020-12-14 RX ADMIN — AMPICILLIN SODIUM AND SULBACTAM SODIUM 3 G: 2; 1 INJECTION, POWDER, FOR SOLUTION INTRAMUSCULAR; INTRAVENOUS at 09:33

## 2020-12-14 RX ADMIN — GABAPENTIN 200 MG: 100 CAPSULE ORAL at 08:17

## 2020-12-14 RX ADMIN — TORSEMIDE 30 MG: 20 TABLET ORAL at 08:17

## 2020-12-14 RX ADMIN — HYDRALAZINE HYDROCHLORIDE 100 MG: 50 TABLET, FILM COATED ORAL at 08:17

## 2020-12-14 RX ADMIN — DOXAZOSIN 1 MG: 1 TABLET ORAL at 08:17

## 2020-12-14 RX ADMIN — Medication 250 MG: at 08:17

## 2020-12-14 RX ADMIN — HEPARIN SODIUM 5000 UNITS: 10000 INJECTION, SOLUTION INTRAVENOUS; SUBCUTANEOUS at 08:16

## 2020-12-14 ASSESSMENT — MIFFLIN-ST. JEOR: SCORE: 1406.62

## 2020-12-14 ASSESSMENT — ACTIVITIES OF DAILY LIVING (ADL)
ADLS_ACUITY_SCORE: 17

## 2020-12-14 NOTE — PROGRESS NOTES
I certify that this patient, рИина Yanez has been under my care (or a nurse practitioner or physican's assistant working with me). This is the face-to-face encounter for oxygen medical necessity.      Ирина Yanez is now in a chronic stable state and continues to require supplemental oxygen. Patient has continued oxygen desaturation due to Chronic Respiratory Failure with Hypoxia J93.11.    Alternative treatment(s) tried or considered and deemed clinically infective for treatment of Chronic Respiratory Failure with Hypoxia J93.11 include nebulizers, inhalers and pulmonary rehab.  If portability is ordered, is the patient mobile within the home? yes    **Patients who qualify for home O2 coverage under the CMS guidelines require ABG tests or O2 sat readings obtained closest to, but no earlier than 2 days prior to the discharge, as evidence of the need for home oxygen therapy. Testing must be performed while patient is in the chronic stable state. See notes for O2 sats.**    Hal Lopez MD

## 2020-12-14 NOTE — PLAN OF CARE
15:20 Pt discharged home at this time. (Preparations/education for discharge completed by day shift RN.)  Discharge instructions, medications sent along. Pt acknowledges receipt of all belongings.

## 2020-12-14 NOTE — PLAN OF CARE
A&Ox4. Up SBA. VSS ex htn, scheduled hydralazine. 1L of O2 due to de-sating and for pt comfort. Florastor for loose stools. Unasyn BID, then saline locked. Pleasant and cooperative. See results for BG and MAR for novolog administration. Will continue with POC, discharge probable tomorrow    Reilly Osei RN on 12/13/2020 at 10:57 PM

## 2020-12-14 NOTE — PLAN OF CARE
A&Ox4. VSS on 1 LPM. Denies pain. Up w/ SBA and walker. Continues on IV unasyn. LS diminished bilaterally. Denies SOB while in bed but reported some GARAY. . Possible discharge home today. Will continue with plan of care.

## 2020-12-14 NOTE — DISCHARGE SUMMARY
[facility]  Hospitalist Discharge Summary       Date of Admission:  12/10/2020  Date of Discharge:  12/14/2020  Discharging Provider: Hal Lopez MD      Discharge Diagnoses   Acute respiratory failure with hypoxia  Right upper lobe consolidation, pneumonia versus malignancy    Follow-ups Needed After Discharge   Follow-up Appointments     Follow-up and recommended labs and tests       Follow up with primary care provider, Yuridia Villarreal MD, within 7 days for   hospital follow up.  Follow up with pulmonologist as directed.             Unresulted Labs Ordered in the Past 30 Days of this Admission     Date and Time Order Name Status Description    12/11/2020 0958 Fungus culture blood Preliminary     12/11/2020 0958 Blastomyces Agn Quant EIA Blood In process     12/11/2020 0958 Histoplasma capsulatum antigen In process       These results will be followed up by [facility]    Discharge Disposition   Discharged to home  Condition at discharge: Stable    History of Present Illness   Ирина Yanez is a 85 year old female with a history of CKD stage IV, non-insulin-dependent diabetes mellitus, HTN, mild aortic stenosis, moderately dilated ascending aorta who presented to the ED complaining of shortness of breath.     Patient reports that she had been feeling well up until a few weeks ago. I note that she saw cardiology on 11/16/2020 and was reported to be feeling at her baseline at that time. She says that over the past few weeks, she has been having progressive dyspnea on exertion. She has noticed some mild leg swelling associated with that and an occasional cough. She has also been feeling very fatigued. She reports that she has recently noticed that she seems to have trouble swallowing and has been aspirating her food. She presented here to the ED, she says mostly to get a Covid test.  In the ED, she was noted to be borderline hypoxic in the high 80s and was placed on 2 L.  Other vital signs  unremarkable.  Labs notable for negative troponin, normal white count and creatinine at baseline 2.3.  proBNP was within normal range.  D-dimer was mildly elevated at 1.3.  CT of the chest without contrast showed a masslike consolidation in the right upper lobe, concerning positive for pneumonia versus malignancy.  Also noted were small bilateral pleural effusions.  Patient was given ceftriaxone and azithromycin.  She was also given a dose of enoxaparin empirically for possible PE.     She emphasizes that other than shortness of breath, she has not really had any major new symptoms.  She says that she has just been having trouble getting up and around the house.  She normally ambulates with a walker.  Denies any contact with COVID-19 positive people.    Hospital Course     Dyspnea on exertion  Acute hypoxic respiratory failure  Right middle lobe consolidation, pneumonia versus malignancy  As outlined in the HPI, patient presented with approximately 3 weeks of primarily shortness of breath being her main symptom with occasional cough.  Some mild lower leg swelling.  Also mention that she has been having increasing aspiration, which is a new problem for her. In the ED, she was found to be mildly hypoxic in the upper 80s and placed on supplemental O2 via nasal cannula. Initial lab work was unremarkable with normal white blood cell count, negative troponin, normal proBNP. CT the chest showed masslike consolidation in the right upper lobe, possibly concerning for pneumonia versus malignancy.  She was given ceftriaxone and azithromycin as well as enoxaparin empirically for possible PE.  Duplex ultrasounds of the bilateral legs were negative.    Given the patient's report of worsening aspiration, initial concern was that this represented aspiration pneumonia.  She was switched to Unasyn.  Her D-dimer was normal adjusted for age, so there was no concern for PE.  However, per SLP evaluation, her swelling was not actually  significantly impaired.  Pulmonary was consulted.  Also included in differential are malignancy, atypical infection or fungal infection.  Pulmonary medicine recommended to complete 14 days of antibiotic therapy.  Patient will be discharged on 10 days of Augmentin.  She has a reported intolerance to this medicine, but has been tolerating the Unasyn fine while hospitalized, so that should not be an issue.  She will have a follow-up appointment and chest CT with pulmonology in about 8 weeks to evaluate for resolution of the right upper lobe masslike consolidation.  Also, complete fungal studies were pending at the time of discharge but notably Fungitell was negative.    Patient was not able to be weaned from oxygen during the hospitalization.  She continued to need least 1 L with activity and at rest.  Will be discharged home with 1 L submental O2.  This can be weaned by her PCP or pulmonology.    On the day of discharge, patient was breathing comfortably on 1 L.  She said she was feeling much better than admission.  She understood the follow-up plan and was stable for discharge to home.    Consultations This Hospital Stay   CARE MANAGEMENT / SOCIAL WORK IP CONSULT  PULMONARY IP CONSULT  SPEECH LANGUAGE PATH ADULT IP CONSULT  PHYSICAL THERAPY ADULT IP CONSULT      Code Status   Full Code    Time Spent on this Encounter   I, Hal Lopez MD, personally saw the patient today and spent greater than 30 minutes discharging this patient.       Hal Lopez MD  [facility]  ______________________________________________________________________    Physical Exam   Vital Signs: Temp: 98.1  F (36.7  C) Temp src: Oral BP: 122/55 Pulse: 77   Resp: 18 SpO2: 94 % O2 Device: Nasal cannula Oxygen Delivery: 1 LPM  Weight: 213 lbs 1.6 oz      General: Sitting up in the bed, looks well.     HEENT: No scleral icterus. Oropharynx moist.     Neck: Supple. Normal range of motion.    Pulmonary: Normal  work of breathing. Clear to auscultation bilaterally.    Cardiovascular: Regular rate and rhythm without murmur or extra heart sounds.    Abdomen: Soft and non-tender.    Extremities: No peripheral edema. No clubbing.    Neurologic: Awake, alert, appropriate.    Skin: Warm and dry.    Psychiatric: Normal affect and mood.       Primary Care Physician   Yuridia Villarreal MD    Discharge Orders      Reason for your hospital stay    You were admitted for weakness and found to have pneumonia.  We initially thought the pneumonia was related to aspiration, but when your swallowing was tested, it did not seem to be severely impaired.  You were seen by the pulmonologist while in the hospital.  Plan will be to discharge you on antibiotics and follow-up with another CT scan once you are out of the hospital to make sure that the pneumonia is getting better.     Follow-up and recommended labs and tests     Follow up with primary care provider, Yuridia Villarreal MD, within 7 days for hospital follow up.  Follow up with pulmonologist as directed.     Activity    Your activity upon discharge: As tolerated     Oxygen Adult/Peds    Oxygen Documentation:   I certify that this patient, Ирина Yanez has been under my care (or a nurse practitioner or physican's assistant working with me). This is the face-to-face encounter for oxygen medical necessity.      Ирина Yanez is now in a chronic stable state and continues to require supplemental oxygen. Patient has continued oxygen desaturation due to Chronic Respiratory Failure with Hypoxia J93.11.    Alternative treatment(s) tried or considered and deemed clinically infective for treatment of Chronic Respiratory Failure with Hypoxia J93.11 include nebulizers, inhalers and pulmonary rehab.  If portability is ordered, is the patient mobile within the home? yes    **Patients who qualify for home O2 coverage under the CMS guidelines require ABG tests or O2 sat readings obtained closest to, but no  earlier than 2 days prior to the discharge, as evidence of the need for home oxygen therapy. Testing must be performed while patient is in the chronic stable state. See notes for O2 sats.**     Diet    Follow this diet upon discharge: Regular       Significant Results and Procedures   Most Recent 3 CBC's:  Recent Labs   Lab Test 12/12/20  0632 12/11/20  0650 12/10/20  1442   WBC 7.0 8.3 8.3   HGB 8.9* 8.8* 9.5*   MCV 96 95 96    225 229     Most Recent 3 BMP's:  Recent Labs   Lab Test 12/13/20  0803 12/12/20  0632 12/11/20  0650    143 142   POTASSIUM 3.6 3.7 3.8   CHLORIDE 102 106 107   CO2 34* 35* 33*   BUN 44* 43* 51*   CR 1.94* 1.96* 2.15*   ANIONGAP 5 2* 2*   EDNA 9.3 8.9 8.7   * 115* 123*     Most Recent 2 LFT's:  Recent Labs   Lab Test 12/10/20  1442 09/27/19  1114 11/16/18  0830   AST 13  --  18   ALT 13 24 21   ALKPHOS 79  --  78   BILITOTAL 0.3  --  0.4   ,   Results for orders placed or performed during the hospital encounter of 12/10/20   Chest CT w/o contrast    Narrative    EXAM: CT CHEST W/O CONTRAST  LOCATION: [facility]  DATE/TIME: 12/10/2020 4:50 PM    INDICATION:  dyspnea, chest pain, CKD unable to perform PE study;  fatigue, difficulty performing tasks of daily living, episodic chest pain, palpitations, and leg swelling. She is unsure of when the last episode of chest pain/tightness occurred,   episodes are of variable duration, unable to specifically locate the  tightness, denies this as a true pain.  COMPARISON: None.  TECHNIQUE: CT chest without IV contrast. Multiplanar reformats were obtained. Dose reduction techniques were used.  CONTRAST: None.    FINDINGS:   LUNGS AND PLEURA: Masslike area of consolidation centrally in the right upper lobe measuring approximately 7 cm. Additional smaller foci of similar-appearing consolidation are noted in the adjacent superior segment of the right lower lobe as well as the   central aspect of the right middle lobe. There  are small bilateral pleural effusions. There is partial atelectasis of the lower lobes bilaterally, left greater than right.    MEDIASTINUM/AXILLAE: Great vessels normal in caliber. Moderate aortic valve calcification. Mild coronary arterial calcification. No pericardial effusion. No abnormally enlarged lymph nodes.    UPPER ABDOMEN: No significant finding.    MUSCULOSKELETAL: No suspicious bone lesion. Severe multilevel degenerative change in the thoracic spine.      Impression    IMPRESSION:   1.  Masslike area of consolidation in the right upper lobe. Findings may represent pneumonia, however, some lung cancers can have this appearance. Clinical correlation, short-term follow-up, and pulmonary consultation recommended.  2.  Small bilateral pleural effusions.     US Lower Extremity Venous Duplex Bilateral    Narrative    EXAM: US LOWER EXTREMITY VENOUS DUPLEX BILATERAL  LOCATION: [facility]  DATE/TIME: 12/10/2020 7:26 PM    INDICATION: Bilateral lower extremity swelling, elevated d-dimer  COMPARISON: None.  TECHNIQUE: Venous Duplex ultrasound of bilateral lower extremities with and without compression, augmentation and duplex. Color flow and spectral Doppler with waveform analysis performed.    FINDINGS: Exam includes the common femoral, femoral, popliteal veins as well as segmentally visualized deep calf veins and greater saphenous vein.     RIGHT: No deep vein thrombosis. No superficial thrombophlebitis. No popliteal cyst.    LEFT: No deep vein thrombosis. No superficial thrombophlebitis. No popliteal cyst.      Impression    IMPRESSION:  1.  No deep venous thrombosis in the bilateral lower extremities.   XR Video Swallow with SLP or OT    Narrative    VIDEO SPEECH EVALUATION WITH OR WITHOUT ESOPHAGRAM  12/11/2020 2:15 PM      HISTORY: Pneumonia, dysphagia.    COMPARISON: None.    FLUOROSCOPY TIME: 0.7 minutes.    SPOT FILMS: 7    TECHNIQUE: A modified barium swallow is performed with  speech  pathology. Note that only the cervical esophagus was evaluated in  lateral view.      Impression    IMPRESSION: Three episodes of flash penetration noted with thin  liquids. No aspiration. No penetration or aspiration with pudding or  solid consistency.    Please see the speech pathology report for further details.    GISELE MUHAMMAD MD       Discharge Medications   Current Discharge Medication List      START taking these medications    Details   amoxicillin-clavulanate (AUGMENTIN) 875-125 MG tablet Take 1 tablet by mouth 2 times daily for 10 days  Qty: 20 tablet, Refills: 0    Associated Diagnoses: Pneumonia of right upper lobe due to infectious organism      saccharomyces boulardii (FLORASTOR) 250 MG capsule Take 1 capsule (250 mg) by mouth 2 times daily for 10 days  Qty: 20 capsule, Refills: 0    Associated Diagnoses: Pneumonia of right upper lobe due to infectious organism         CONTINUE these medications which have NOT CHANGED    Details   diltiazem ER COATED BEADS (CARDIAZEM LA) 180 MG 24 hr tablet Take one tablet by mouth once daily at bedtime  Refills: 3      doxazosin (CARDURA) 1 MG tablet       gabapentin (NEURONTIN) 100 MG capsule TAKE 2 CAPSULES BY MOUTH 2 TIMES DAILY GENERIC EQUIVALENT FOR NEURONTIN  Qty: 360 capsule, Refills: 1    Associated Diagnoses: Diabetic polyneuropathy associated with type 2 diabetes mellitus (H)      glimepiride (AMARYL) 1 MG tablet Take 1-tablet by mouth daily  Qty: 90 tablet, Refills: 2    Associated Diagnoses: Diabetic polyneuropathy associated with type 2 diabetes mellitus (H)      hydrALAZINE (APRESOLINE) 50 MG tablet 100 mg 3 times daily       MULTI-VITAMIN OR TABS 1 TABLET DAILY      pioglitazone (ACTOS) 15 MG tablet Take 1 tablet (15 mg) by mouth daily  Qty: 90 tablet, Refills: 3    Associated Diagnoses: Type 2 diabetes mellitus with microalbuminuria, without long-term current use of insulin (H)      sertraline (ZOLOFT) 50 MG tablet Take 1 tablet (50 mg) by  mouth daily  Qty: 90 tablet, Refills: 2    Comments: Prescription sent 9/16. Pharmacy request 3 refills. Can have 2 refills.  Associated Diagnoses: Anxiety      simvastatin (ZOCOR) 10 MG tablet TAKE 1 TABLET BY MOUTH AT BEDTIME.  Qty: 90 tablet, Refills: 3    Associated Diagnoses: Hyperlipidemia with target LDL less than 100      torsemide (DEMADEX) 10 MG tablet Take 20 mg by mouth daily      blood glucose (NO BRAND SPECIFIED) lancets standard Use to test blood sugar 1 times daily or as directed, Contour Next lancets  Qty: 100 each, Refills: 3    Associated Diagnoses: Type 2 diabetes mellitus with diabetic neuropathy, without long-term current use of insulin (H); Type 2 diabetes mellitus with diabetic nephropathy, without long-term current use of insulin (H)      blood glucose (NO BRAND SPECIFIED) test strip Use to test blood sugar 1 times daily or as directed, Contour Next strips  Qty: 100 strip, Refills: 1    Associated Diagnoses: Type 2 diabetes mellitus with diabetic nephropathy, without long-term current use of insulin (H); Type 2 diabetes mellitus with diabetic neuropathy, without long-term current use of insulin (H)      blood glucose monitoring (NO BRAND SPECIFIED) meter device kit Use to test blood sugar 1 times daily or as directed. Ultra 2  Qty: 1 kit, Refills: 1    Associated Diagnoses: Type 2 diabetes mellitus with diabetic neuropathy, without long-term current use of insulin (H)      nystatin (MYCOSTATIN) 113846 UNIT/GM POWD Apply a small amount to affected area three times daily.  Qty: 60 g, Refills: 1    Associated Diagnoses: Intertrigo      order for DME Equipment being ordered: walker. 4 wheeled with brakes and a seat.  Qty: 1 Device, Refills: 0    Associated Diagnoses: Spinal stenosis of lumbar region without neurogenic claudication           Allergies   Allergies   Allergen Reactions     Augmentin Diarrhea     Vomiting       Cleocin      Hives     Tegretol [Carbamazepine]      Irregular heart  beat

## 2020-12-14 NOTE — PROVIDER NOTIFICATION
Dr. Lopez notified pt in [facility] MP has a allergy/sensitivity to Augmentin, can we get something else prescribed in place of that?

## 2020-12-14 NOTE — PROGRESS NOTES
Patient has been assessed for Home Oxygen needs.  Oxygen readings:   *   RA - at rest  Pulse oximetry SPO2 89 %  *   RA - during activity/with exercise SPO2 87 %  *   O2 at  1 liters/minute (at rest) ...SPO2 95 %  *   O2 at  1 liters/minute (during activity/with exercise) ...SPO2 90 %

## 2020-12-14 NOTE — PLAN OF CARE
"Presentation/Diagnosis:Pneumonia, SOB   History:CKD, DM, HTN  Labs/Protocols:  Vitals:BP (!) 141/51 (BP Location: Right arm)   Pulse 80   Temp 98.1  F (36.7  C) (Oral)   Resp 18   Ht 1.626 m (5' 4\")   Wt 96.7 kg (213 lb 1.6 oz)   LMP  (LMP Unknown)   SpO2 93%   BMI 36.58 kg/m     Respiratory:1L NC sating 94%, Walk test done for home oxygen, see that note for details  Neuro:A&O  GI/:WNL  Skin:intact  LDAs:PIV SL  Diet:Regular   Activity:SBA walker GB  Teaching:Pt educated on plan of care   Plan:cont IV unasyn Q 12 hours. Possible discharge later today.     "

## 2020-12-15 LAB
LAB SCANNED RESULT: NORMAL
LAB SCANNED RESULT: NORMAL

## 2020-12-16 ENCOUNTER — TELEPHONE (OUTPATIENT)
Dept: FAMILY MEDICINE | Facility: CLINIC | Age: 85
End: 2020-12-16

## 2020-12-16 NOTE — TELEPHONE ENCOUNTER
Please contact patient for In-patient follow up.  [phone] (home)     Visit date: 12/14/2020  Diagnosis listed:Dyspnea on Exertion, Pneumonia of Right Upper Lobe due to Infectious Organism  Number of visits in past 12 months:0/1

## 2020-12-17 NOTE — TELEPHONE ENCOUNTER
"ED / Discharge Outreach Protocol    Patient Contact    Attempt # 1    Was call answered?  Yes.  \"May I please speak with <patient name>\"  Is patient available?   Yes    Hospital/TCU/ED for chronic condition Discharge Protocol    \"Hi, my name is Samia Huff RN, a registered nurse, and I am calling from [facility].  I am calling to follow up and see how things are going for you after your recent emergency visit/hospital/TCU stay.\"    Tell me how you are doing now that you are home?\" I am surving - I feel a little weak and I am breathing ok. Feel I am getting better       Discharge Instructions    \"Let's review your discharge instructions.  What is/are the follow-up recommendations?  Pt. Response: I am oxygen and use a Bi Pap machine. I am on 1 L of oxygen. Patient asking how long she is supposed to be on 02. Adv based on Hospital is supposed to be on continuous 02. Adv patient to wear 02 for now until scheduled f/u with Dr. Cherelle Bishop. Patient verbalized understanding.     \"Has an appointment with your primary care provider been scheduled?\"   No (schedule appointment)    \"When you see the provider, I would recommend that you bring your medications with you.\"    Medications    \"Tell me what changed about your medicines when you discharged?\"    Changes to chronic meds?    0-1    \"What questions do you have about your medications?\"    None     New diagnoses of heart failure, COPD, diabetes, or MI?    No              Post Discharge Medication Reconciliation Status: discharge medications reconciled, continue medications without change.    Was MTM referral placed (*Make sure to put transitions as reason for referral)?   No    Call Summary    \"What questions or concerns do you have about your recent visit and your follow-up care?\"     none    \"If you have questions or things don't continue to improve, we encourage you contact us through the main clinic number (give number).  Even if the clinic is not open, triage " "nurses are available 24/7 to help you.     We would like you to know that our clinic has extended hours (provide information).  We also have urgent care (provide details on closest location and hours/contact info)\"      \"Thank you for your time and take care!\"         Samia Huff RN on 12/17/2020 at 8:44 AM      "

## 2020-12-21 NOTE — PROGRESS NOTES
Subjective     Ирина Yanez is a 85 year old female who presents to clinic today for the following health issues:    HPI           Hospital Follow-up Visit:    Hospital/Nursing Home/IP Rehab Facility: [facility]  Date of Admission: 12/10/2020  Date of Discharge: 12/14/2020  Reason(s) for Admission: SOB      Was your hospitalization related to COVID-19? No   Problems taking medications regularly:  None  Medication changes since discharge: amoxicillin and probiotic   Problems adhering to non-medication therapy:  None    Summary of hospitalization:  [facility] discharge summary reviewed  Diagnostic Tests/Treatments reviewed.  Follow up needed: CT and Pulmonary follow up in 8 weeks   Other Healthcare Providers Involved in Patient s Care:         None  Update since discharge: stable. Post Discharge Medication Reconciliation: discharge medications reconciled, continue medications without change.  Plan of care communicated with patient          See under ROS    Patient Active Problem List   Diagnosis     Diabetic polyneuropathy (H)     Hyperlipidemia with target LDL less than 100     Hypertension goal BP (blood pressure) < 140/90     Arthritis     Anxiety     Type 2 diabetes mellitus with diabetic nephropathy (H)     Health Care Home     Malignant melanoma of skin     Vulvar dystrophy     Lichen sclerosus et atrophicus     Vitamin B12 deficiency (non anemic)     Controlled substance agreement signed 2/5/15     Major depression in partial remission (H)     CKD (chronic kidney disease) stage 4, GFR 15-29 ml/min (H)     Basal cell carcinoma of skin      Chronic pain - diabetic neuropathy     Complex sleep apnea syndrome     Tendon rupture, traumatic. quadriceps     Right bundle branch block     S/P lumbar fusion     ACP (advance care planning)     Heart murmur     Aortic sclerosis     Toe amputation status, left     Elevated BMI with comorbidity (H)     Falls frequently     Abrasion of arm, right,  initial encounter     Aortic valve stenosis, etiology of cardiac valve disease unspecified     Ascending aorta dilatation (H)     Lung mass     Chest tightness     Dyspnea on exertion     Iron deficiency anemia       Current Outpatient Medications   Medication Sig Dispense Refill     amoxicillin-clavulanate (AUGMENTIN) 875-125 MG tablet Take 1 tablet by mouth 2 times daily for 10 days 20 tablet 0     blood glucose (NO BRAND SPECIFIED) lancets standard Use to test blood sugar 1 times daily or as directed, Contour Next lancets 100 each 3     blood glucose (NO BRAND SPECIFIED) test strip Use to test blood sugar 1 times daily or as directed, Contour Next strips 100 strip 1     blood glucose monitoring (NO BRAND SPECIFIED) meter device kit Use to test blood sugar 1 times daily or as directed. Ultra 2 1 kit 1     diltiazem ER COATED BEADS (CARDIAZEM LA) 180 MG 24 hr tablet Take one tablet by mouth once daily at bedtime  3     doxazosin (CARDURA) 1 MG tablet        gabapentin (NEURONTIN) 100 MG capsule TAKE 2 CAPSULES BY MOUTH 2 TIMES DAILY GENERIC EQUIVALENT FOR NEURONTIN 360 capsule 1     glimepiride (AMARYL) 1 MG tablet Take 1-tablet by mouth daily 90 tablet 2     hydrALAZINE (APRESOLINE) 50 MG tablet 100 mg 3 times daily        MULTI-VITAMIN OR TABS 1 TABLET DAILY       nystatin (MYCOSTATIN) 455372 UNIT/GM POWD Apply a small amount to affected area three times daily. (Patient taking differently: as needed Apply a small amount to affected area three times daily.) 60 g 1     order for DME Equipment being ordered: walker. 4 wheeled with brakes and a seat. 1 Device 0     pioglitazone (ACTOS) 15 MG tablet Take 1 tablet (15 mg) by mouth daily 90 tablet 3     saccharomyces boulardii (FLORASTOR) 250 MG capsule Take 1 capsule (250 mg) by mouth 2 times daily for 10 days 20 capsule 0     sertraline (ZOLOFT) 50 MG tablet Take 1 tablet (50 mg) by mouth daily 90 tablet 2     simvastatin (ZOCOR) 10 MG tablet TAKE 1 TABLET BY MOUTH AT  "BEDTIME. 90 tablet 3     torsemide (DEMADEX) 10 MG tablet Take 20 mg by mouth daily            Review of Systems   CONSTITUTIONAL:NEGATIVE for fever, chills, change in weight  ENT/MOUTH: NEGATIVE for ear, mouth and throat problems  RESP:no cough; can get winded easlily.  CV: NEGATIVE for chest pain, palpitations or peripheral edema  PSYCHIATRIC: NEGATIVE for changes in mood or affect    Is to follow up with Pulmonary.   Was discharged with O2 and antibioics  (aumentin)  She has an appointment for a CT in February; she was unaware that she was to follow up with Pulmonary.  She notes that she did not know she was to be sent home with oxygen until it came and then notes she had no instruction on how to use it. Did read what it said and notes it was to be continuous.    Does get winded easily.   Did not bring her oxygen in with her today as she notes it is too heavy to carry along.  She has an oximeter and notes it is 90 at home; sometimes lower.    Her dyspnea on exertion feels similar to when she left the hospital.  This is an improvement from when she went in.    Still has a couple more days of antibiotic.     She is not hungry.  does not really do much in the way of cooking. They have brought some food in and otherwise have had some sandwiches. She is drinking fluids.    Not checking sugars at home. She notes they did check it very regularly in the hospital and she did get some insulin in the hospital.     drove her here; they do live close.      Objective    /58 (BP Location: Right arm, Cuff Size: Adult Large)   Pulse 74   Temp 97.9  F (36.6  C) (Oral)   Resp 18   Ht 1.626 m (5' 4\")   Wt 101.1 kg (222 lb 14.4 oz)   LMP  (LMP Unknown)   SpO2 93%   BMI 38.26 kg/m    Body mass index is 38.26 kg/m .   O2 sat checked again towards the end of her visit and was 93%     Physical Exam   GENERAL APPEARANCE: alert and no distress. She appears very slightly winded.  EYES: Eyes grossly normal to " inspection, PERRL and conjunctivae and sclerae normal  RESP: lungs clear to auscultation - no rales, rhonchi or wheezes  CV: regular rates and rhythm. Grade 3 systolic murmur heard along the sternal border.  MS: trace edema Left > right ankle.  PSYCH: mentation appears normal and affect normal/fatigued.     Recent CT:  IMPRESSION:   1.  Masslike area of consolidation in the right upper lobe. Findings may represent pneumonia, however, some lung cancers can have this appearance. Clinical correlation, short-term follow-up, and pulmonary consultation recommended.  2.  Small bilateral pleural effusions    Component      Latest Ref Rng & Units 12/12/2020 12/13/2020   WBC      4.0 - 11.0 10e9/L 7.0    RBC Count      3.8 - 5.2 10e12/L 3.12 (L)    Hemoglobin      11.7 - 15.7 g/dL 8.9 (L)    Hematocrit      35.0 - 47.0 % 29.9 (L)    MCV      78 - 100 fl 96    MCH      26.5 - 33.0 pg 28.5    MCHC      31.5 - 36.5 g/dL 29.8 (L)    RDW      10.0 - 15.0 % 13.5    Platelet Count      150 - 450 10e9/L 225    Diff Method       Automated Method    % Neutrophils      % 75.9    % Lymphocytes      % 14.0    % Monocytes      % 9.4    % Eosinophils      % 0.0    % Basophils      % 0.3    % Immature Granulocytes      % 0.4    Nucleated RBCs      0 /100 0    Absolute Neutrophil      1.6 - 8.3 10e9/L 5.3    Absolute Lymphocytes      0.8 - 5.3 10e9/L 1.0    Absolute Monocytes      0.0 - 1.3 10e9/L 0.7    Absolute Eosinophils      0.0 - 0.7 10e9/L 0.0    Absolute Basophils      0.0 - 0.2 10e9/L 0.0    Abs Immature Granulocytes      0 - 0.4 10e9/L 0.0    Absolute Nucleated RBC       0.0    Sodium      133 - 144 mmol/L  141   Potassium      3.4 - 5.3 mmol/L  3.6   Chloride      94 - 109 mmol/L  102   Carbon Dioxide      20 - 32 mmol/L  34 (H)   Anion Gap      3 - 14 mmol/L  5   Glucose      70 - 99 mg/dL  132 (H)   Urea Nitrogen      7 - 30 mg/dL  44 (H)   Creatinine      0.52 - 1.04 mg/dL  1.94 (H)   GFR Estimate      >60 mL/min/1.73:m2  23 (L)    GFR Estimate If Black      >60 mL/min/1.73:m2  27 (L)   Calcium      8.5 - 10.1 mg/dL  9.3     Component      Latest Ref Rng & Units 7/13/2020 12/10/2020 12/11/2020 12/12/2020   Hemoglobin      11.7 - 15.7 g/dL 11.1 (L) 9.5 (L) 8.8 (L) 8.9 (L)     Reviewed Cardiology note from November.             Assessment & Plan     Hospital discharge follow-up      Pneumonia of right upper lobe due to infectious organism  Stable to improving since home.   She is on oxygen. She notes her O2 sats to be lower at home than we see now.  She has no home care involved; would like to have someone involved.  - CARE COORDINATION REFERRAL  - HOME CARE NURSING REFERRAL    Lung mass  Has follow up CT scheduled. Will ask for help to schedule with Pulmonary.   - HOME CARE NURSING REFERRAL  - CARE COORDINATION REFERRAL    Chronic kidney disease, stage IV (severe) (H)  She does see Nephrology. Has other labs ordered as well.   - Renal panel    Ascending aorta dilatation (H)  This is described as moderate. Recommended a follow p TTE in 2 years    Aortic valve stenosis, etiology of cardiac valve disease unspecified  Described as mild.     Anemia, unspecified type  Uncertain etiology. This has decreased from the summer. She was unaware. Will do evaluation.  Suspect some is related to her CKD, but again noted is the drop. Perhaps this is related to acute illness vs other.   - Vitamin B12  - Folate  - Protein electrophoresis  - Ferritin  - Iron and iron binding capacity  - Blood Morphology Pathologist Review  - CBC with platelets differential  - Reticulocyte Count    Dyspnea on exertion  Suspect her oxygenation and anemia contribute.   - HOME CARE NURSING REFERRAL  - CARE COORDINATION REFERRAL    Weakness  She may benefit from physical therapy/OT. Will have home Care evaluate.  - HOME CARE NURSING REFERRAL  - CARE COORDINATION REFERRAL    Impaired oxygenation  On home oxygen.   Again, asking Home Care to be involved. She reports lower O2 sats at  home. May need assistance/home follow up when ready to wean.   - HOME CARE NURSING REFERRAL  - CARE COORDINATION REFERRAL    Type 2 diabetes mellitus with diabetic nephropathy, without long-term current use of insulin (H)  She has not been checking her sugars at home. She is not eating the same as she has previously. Encouraged her to be checking; sugars can also go high or low with illness as well.   - Vitamin B12    Hypertension goal BP (blood pressure) < 140/90  Satisfactory control.      Secondary hyperparathyroidism of renal origin (H)  As per Nephrology   - Vitamin D Deficiency  - Parathyroid Hormone Intact          Return in about 2 weeks (around 1/5/2021), or if symptoms worsen or fail to improve, for Medication recheck.    Yuridia Villarreal MD, MD  [facility]

## 2020-12-22 ENCOUNTER — OFFICE VISIT (OUTPATIENT)
Dept: FAMILY MEDICINE | Facility: CLINIC | Age: 85
End: 2020-12-22
Payer: COMMERCIAL

## 2020-12-22 VITALS
DIASTOLIC BLOOD PRESSURE: 58 MMHG | BODY MASS INDEX: 38.06 KG/M2 | HEIGHT: 64 IN | OXYGEN SATURATION: 93 % | TEMPERATURE: 97.9 F | RESPIRATION RATE: 18 BRPM | HEART RATE: 74 BPM | WEIGHT: 222.9 LBS | SYSTOLIC BLOOD PRESSURE: 128 MMHG

## 2020-12-22 DIAGNOSIS — I35.0 AORTIC VALVE STENOSIS, ETIOLOGY OF CARDIAC VALVE DISEASE UNSPECIFIED: ICD-10-CM

## 2020-12-22 DIAGNOSIS — I77.810 ASCENDING AORTA DILATATION (H): ICD-10-CM

## 2020-12-22 DIAGNOSIS — R06.89 IMPAIRED OXYGENATION: ICD-10-CM

## 2020-12-22 DIAGNOSIS — Z09 HOSPITAL DISCHARGE FOLLOW-UP: Primary | ICD-10-CM

## 2020-12-22 DIAGNOSIS — E11.21 TYPE 2 DIABETES MELLITUS WITH DIABETIC NEPHROPATHY, WITHOUT LONG-TERM CURRENT USE OF INSULIN (H): ICD-10-CM

## 2020-12-22 DIAGNOSIS — R91.8 LUNG MASS: ICD-10-CM

## 2020-12-22 DIAGNOSIS — D64.9 ANEMIA, UNSPECIFIED TYPE: ICD-10-CM

## 2020-12-22 DIAGNOSIS — N18.4 CHRONIC KIDNEY DISEASE, STAGE IV (SEVERE) (H): ICD-10-CM

## 2020-12-22 DIAGNOSIS — J18.9 PNEUMONIA OF RIGHT UPPER LOBE DUE TO INFECTIOUS ORGANISM: ICD-10-CM

## 2020-12-22 DIAGNOSIS — R06.09 DYSPNEA ON EXERTION: ICD-10-CM

## 2020-12-22 DIAGNOSIS — R53.1 WEAKNESS: ICD-10-CM

## 2020-12-22 DIAGNOSIS — N25.81 SECONDARY HYPERPARATHYROIDISM OF RENAL ORIGIN (H): ICD-10-CM

## 2020-12-22 DIAGNOSIS — I10 HYPERTENSION GOAL BP (BLOOD PRESSURE) < 140/90: ICD-10-CM

## 2020-12-22 LAB
BASOPHILS # BLD AUTO: 0 10E9/L (ref 0–0.2)
BASOPHILS NFR BLD AUTO: 0.2 %
DIFFERENTIAL METHOD BLD: ABNORMAL
EOSINOPHIL # BLD AUTO: 0 10E9/L (ref 0–0.7)
EOSINOPHIL NFR BLD AUTO: 0 %
ERYTHROCYTE [DISTWIDTH] IN BLOOD BY AUTOMATED COUNT: 14 % (ref 10–15)
FOLATE SERPL-MCNC: >20 NG/ML
HCT VFR BLD AUTO: 30.3 % (ref 35–47)
HGB BLD-MCNC: 8.9 G/DL (ref 11.7–15.7)
LYMPHOCYTES # BLD AUTO: 1 10E9/L (ref 0.8–5.3)
LYMPHOCYTES NFR BLD AUTO: 11.1 %
MCH RBC QN AUTO: 27.4 PG (ref 26.5–33)
MCHC RBC AUTO-ENTMCNC: 29.4 G/DL (ref 31.5–36.5)
MCV RBC AUTO: 93 FL (ref 78–100)
MONOCYTES # BLD AUTO: 0.5 10E9/L (ref 0–1.3)
MONOCYTES NFR BLD AUTO: 5.6 %
NEUTROPHILS # BLD AUTO: 7.7 10E9/L (ref 1.6–8.3)
NEUTROPHILS NFR BLD AUTO: 83.1 %
PLATELET # BLD AUTO: 257 10E9/L (ref 150–450)
PTH-INTACT SERPL-MCNC: 175 PG/ML (ref 18–80)
RBC # BLD AUTO: 3.25 10E12/L (ref 3.8–5.2)
RETICS # AUTO: 58.1 10E9/L (ref 25–95)
RETICS/RBC NFR AUTO: 1.8 % (ref 0.5–2)
VIT B12 SERPL-MCNC: 702 PG/ML (ref 193–986)
WBC # BLD AUTO: 9.3 10E9/L (ref 4–11)

## 2020-12-22 PROCEDURE — 83540 ASSAY OF IRON: CPT | Performed by: INTERNAL MEDICINE

## 2020-12-22 PROCEDURE — 82728 ASSAY OF FERRITIN: CPT | Performed by: INTERNAL MEDICINE

## 2020-12-22 PROCEDURE — 83550 IRON BINDING TEST: CPT | Performed by: INTERNAL MEDICINE

## 2020-12-22 PROCEDURE — 36415 COLL VENOUS BLD VENIPUNCTURE: CPT | Performed by: INTERNAL MEDICINE

## 2020-12-22 PROCEDURE — 85025 COMPLETE CBC W/AUTO DIFF WBC: CPT | Performed by: INTERNAL MEDICINE

## 2020-12-22 PROCEDURE — 99N1036 PR STATISTIC TOTAL PROTEIN: Performed by: INTERNAL MEDICINE

## 2020-12-22 PROCEDURE — 85045 AUTOMATED RETICULOCYTE COUNT: CPT | Performed by: INTERNAL MEDICINE

## 2020-12-22 PROCEDURE — 82607 VITAMIN B-12: CPT | Performed by: INTERNAL MEDICINE

## 2020-12-22 PROCEDURE — 99495 TRANSJ CARE MGMT MOD F2F 14D: CPT | Performed by: FAMILY MEDICINE

## 2020-12-22 PROCEDURE — 82306 VITAMIN D 25 HYDROXY: CPT | Performed by: INTERNAL MEDICINE

## 2020-12-22 PROCEDURE — 84165 PROTEIN E-PHORESIS SERUM: CPT | Performed by: PATHOLOGY

## 2020-12-22 PROCEDURE — 82746 ASSAY OF FOLIC ACID SERUM: CPT | Performed by: FAMILY MEDICINE

## 2020-12-22 PROCEDURE — 80069 RENAL FUNCTION PANEL: CPT | Performed by: INTERNAL MEDICINE

## 2020-12-22 PROCEDURE — 83970 ASSAY OF PARATHORMONE: CPT | Performed by: INTERNAL MEDICINE

## 2020-12-22 ASSESSMENT — MIFFLIN-ST. JEOR: SCORE: 1451.07

## 2020-12-23 ENCOUNTER — TELEPHONE (OUTPATIENT)
Dept: FAMILY MEDICINE | Facility: CLINIC | Age: 85
End: 2020-12-23

## 2020-12-23 DIAGNOSIS — N18.4 CHRONIC KIDNEY DISEASE, STAGE IV (SEVERE) (H): Primary | ICD-10-CM

## 2020-12-23 PROBLEM — D50.9 IRON DEFICIENCY ANEMIA: Status: ACTIVE | Noted: 2020-12-23

## 2020-12-23 LAB
ALBUMIN SERPL ELPH-MCNC: 3.3 G/DL (ref 3.7–5.1)
ALBUMIN SERPL-MCNC: 2.9 G/DL (ref 3.4–5)
ALPHA1 GLOB SERPL ELPH-MCNC: 0.5 G/DL (ref 0.2–0.4)
ALPHA2 GLOB SERPL ELPH-MCNC: 1.1 G/DL (ref 0.5–0.9)
ANION GAP SERPL CALCULATED.3IONS-SCNC: 8 MMOL/L (ref 3–14)
B-GLOBULIN SERPL ELPH-MCNC: 0.9 G/DL (ref 0.6–1)
BUN SERPL-MCNC: 70 MG/DL (ref 7–30)
CALCIUM SERPL-MCNC: 9.1 MG/DL (ref 8.5–10.1)
CHLORIDE SERPL-SCNC: 105 MMOL/L (ref 94–109)
CO2 SERPL-SCNC: 28 MMOL/L (ref 20–32)
COPATH REPORT: NORMAL
CREAT SERPL-MCNC: 4.3 MG/DL (ref 0.52–1.04)
DEPRECATED CALCIDIOL+CALCIFEROL SERPL-MC: 60 UG/L (ref 20–75)
FERRITIN SERPL-MCNC: 54 NG/ML (ref 8–252)
GAMMA GLOB SERPL ELPH-MCNC: 1 G/DL (ref 0.7–1.6)
GFR SERPL CREATININE-BSD FRML MDRD: 9 ML/MIN/{1.73_M2}
GLUCOSE SERPL-MCNC: 203 MG/DL (ref 70–99)
IRON SATN MFR SERPL: 10 % (ref 15–46)
IRON SERPL-MCNC: 32 UG/DL (ref 35–180)
M PROTEIN SERPL ELPH-MCNC: 0 G/DL
PHOSPHATE SERPL-MCNC: 5.3 MG/DL (ref 2.5–4.5)
POTASSIUM SERPL-SCNC: 4.1 MMOL/L (ref 3.4–5.3)
PROT PATTERN SERPL ELPH-IMP: ABNORMAL
SODIUM SERPL-SCNC: 141 MMOL/L (ref 133–144)
TIBC SERPL-MCNC: 324 UG/DL (ref 240–430)

## 2020-12-23 NOTE — TELEPHONE ENCOUNTER
Patient informed of provider message. Advised to take with vit C rich foods such as orange juice. May want to take with evening meal.    Frieda Granado RN

## 2020-12-23 NOTE — TELEPHONE ENCOUNTER
Please call her and recommend she take some iron.  This is from my result note:      Your hemoglobin is still low.  It does look like you may be low in iron.  For now, I do recommend that you take an iron supplement daily (such as ferrous sulfate 325 mg, which is over the counter).  When you come back, we can recheck and discuss further.     Thanks!

## 2020-12-24 ENCOUNTER — PATIENT OUTREACH (OUTPATIENT)
Dept: NURSING | Facility: CLINIC | Age: 85
End: 2020-12-24
Payer: COMMERCIAL

## 2020-12-24 ENCOUNTER — MYC MEDICAL ADVICE (OUTPATIENT)
Dept: CARE COORDINATION | Facility: CLINIC | Age: 85
End: 2020-12-24

## 2020-12-24 NOTE — PROGRESS NOTES
Clinic Care Coordination Contact  Community Health Worker Initial Outreach  Spoke with patient     Chart Review: Referral from PCP, Currently also asking for home care.  Please help her make a follow up appointment with Pulmonary; she has CT ordered. Was seen by Pulmonary in the hospital; not sure what group and where she should go.     CHW Initial Information Gathering:  Referral Source: PCP  Preferred Hospital: [facility]  [phone]  Current living arrangement:: I live in a private home with spouse  Type of residence:: Other(mobile home)  Community Resources: None  Supplies used at home:: Oxygen Tubing/Supplies  Equipment Currently Used at Home: grab bar, toilet, grab bar, tub/shower, walker, rolling  Informal Support system:: Children, Spouse  No PCP office visit in Past Year: No  Transportation means:: Accessible car  CHW Additional Questions  If ED/Hospital discharge, follow-up appointment scheduled as recommended?: No  Patient agreeable to assistance with scheduling?: Yes  CHW assisted patient to schedule (specify): RNCC will assist  Medication changes made following ED/Hospital discharge?: Yes, patient to be scheduled with CC RN/SW within approx 1 business day  Edwardt active?: Yes  Patient sent Social Determinants of Health questionnaire?: Yes    CHW introduced self and role with CC  Patient states she is feeling very weak and tired.   Patient states that home care called her yesterday and they are coming later this morning.   Patient is confused with pulmonary follow up, unsure if appointment on 2/8/21 is with them, has repeat CT scan on 2/5/21.   Patient states that she is interested in Meals on Wheels because her  does not cook and she is too weak to.   CHW provided contact number, may have to wait until Monday to call because of the holiday. [phone]  CHW offered follow up with RNCC to assist with understanding of follow up instructions and community resources.      Patient accepts CC: Yes. Patient scheduled for assessment with RNCC on 12/28 at 2:00pm. Patient noted desire to discuss coordination with follow up care.     IVETH Ingram, B.S. [facility] Care Coordination  [facility]:  [place], [place] and [place]  Phone: [phone]

## 2020-12-25 NOTE — TELEPHONE ENCOUNTER
Luis Villarreal,  We had the pleasure of seeing Ирина Yanez for an initial check in visit today, and we are requesting a second nurse visit to follow up with the comprehensive assessment for all homecare needs.  Please reply to this message for us to have a verbal order for the nurse to go back to see her again.  1 Skilled Nurse visit over 5 days to complete the Comprehensive Assessment to initiate the Plan of Care.  Thank you,  Carmella Delgadillo RN, BSN   [facility]\ [phone]

## 2020-12-28 ENCOUNTER — PATIENT OUTREACH (OUTPATIENT)
Dept: NURSING | Facility: CLINIC | Age: 85
End: 2020-12-28
Attending: FAMILY MEDICINE
Payer: COMMERCIAL

## 2020-12-28 DIAGNOSIS — R06.00 DYSPNEA, UNSPECIFIED TYPE: Primary | ICD-10-CM

## 2020-12-28 RX ORDER — ALBUTEROL SULFATE 90 UG/1
2 AEROSOL, METERED RESPIRATORY (INHALATION) 4 TIMES DAILY
Qty: 1 INHALER | Refills: 0 | Status: SHIPPED | OUTPATIENT
Start: 2020-12-28 | End: 2021-03-19

## 2020-12-28 SDOH — ECONOMIC STABILITY: INCOME INSECURITY: IN THE LAST 12 MONTHS, WAS THERE A TIME WHEN YOU WERE NOT ABLE TO PAY THE MORTGAGE OR RENT ON TIME?: NO

## 2020-12-28 ASSESSMENT — ACTIVITIES OF DAILY LIVING (ADL): DEPENDENT_IADLS:: TRANSPORTATION;SHOPPING

## 2020-12-28 NOTE — TELEPHONE ENCOUNTER
See recent care coordination note.    We can try an inhaler. It might be good if someone could get this to her before the nurse comes out tomorrow so they could help her with how to use it.     I am t'ing it up. Please send it once figuring out where to send to

## 2020-12-28 NOTE — PROGRESS NOTES
Clinic Care Coordination Contact  OUTREACH    Referral Information:  Referral Source: PCP    Primary Diagnosis: Pneumonia    Chief Complaint   Patient presents with     Clinic Care Coordination - Follow-up     initial RN assessment.         Universal Utilization: Recently admitted to [facility] from 12/10 to 12/14 with a diagnosis of pneumonia amongst others. Completed follow up appointment with Primary Care Provider on 12/22.   Clinic Utilization  Difficulty keeping appointments:: No  Compliance Concerns: No  No-Show Concerns: No  No PCP office visit in Past Year: No  Utilization    Last refreshed: 12/28/2020  2:45 PM: Hospital Admissions 2           Last refreshed: 12/28/2020  2:45 PM: ED Visits 1           Last refreshed: 12/28/2020  2:45 PM: No Show Count (past year) 0              Current as of: 12/28/2020  2:45 PM            Clinical Concerns:  Current Medical Concerns:    Patient Active Problem List   Diagnosis     Diabetic polyneuropathy (H)     Hyperlipidemia with target LDL less than 100     Hypertension goal BP (blood pressure) < 140/90     Arthritis     Anxiety     Type 2 diabetes mellitus with diabetic nephropathy (H)     Health Care Home     Malignant melanoma of skin     Vulvar dystrophy     Lichen sclerosus et atrophicus     Vitamin B12 deficiency (non anemic)     Controlled substance agreement signed 2/5/15     Major depression in partial remission (H)     CKD (chronic kidney disease) stage 4, GFR 15-29 ml/min (H)     Basal cell carcinoma of skin      Chronic pain - diabetic neuropathy     Complex sleep apnea syndrome     Tendon rupture, traumatic. quadriceps     Right bundle branch block     S/P lumbar fusion     ACP (advance care planning)     Heart murmur     Aortic sclerosis     Toe amputation status, left     Elevated BMI with comorbidity (H)     Falls frequently     Abrasion of arm, right, initial encounter     Aortic valve stenosis, etiology of cardiac valve disease unspecified  "    Ascending aorta dilatation (H)     Lung mass     Chest tightness     Dyspnea on exertion     Iron deficiency anemia     Patient reports that she is using oxygen which is supplied through [employer]. 1 L all of the time, oxygen saturation is reading 90%. Patient reports that she continues to feel short of breath all of the time. She denies any cough or fever. She reports that home care nurse visited her at home to complete start of care visit on 12/24, however she has not heard anything regarding when their next visit would be completed or plan for visits. RNCC provided patient with contact information for home care, and patient reports that this is the \"general number\" and she contacted them and they connected her to writer. She reports that she received a letter in the mail stating that 15 home care visits were approved until the middle of February. She reports that she is supposed to have lab work completed, but feels too weak to get up and go anywhere. Patient reports that she checks her blood sugars on occasion, and does not recall when she last checked this or the reading.     RNCC contacted [facility]. Confirmed patient is supposed to have a nurse visit tomorrow and they are still working on schedules and assignments. RNCC requested home care nurse to draw labs at next home care visit. Future, orders placed by Dr. Whatley.        Current Behavioral Concerns: none noted.   Education Provided to patient: CC role. RNCC provided patient with contact information for meals on wheels, mom's meals resources. RNCC encouraged patient to use clinic as a resource with 24/7 after hours services available.   Pain  Pain (GOAL):: No  Health Maintenance Reviewed: Due/Overdue   Health Maintenance Topics with due status: Overdue       Topic Date Due    URINE DRUG SCREEN 1935    ZOSTER IMMUNIZATION 10/12/2012    MICROALBUMIN 03/22/2020    DIABETIC FOOT EXAM 09/27/2020    A1C 10/27/2020     Health Maintenance " Topics with due status: Due Soon       Topic Date Due    MEDICARE ANNUAL WELLNESS VISIT 01/16/2021    FALL RISK ASSESSMENT 01/16/2021    PHQ-9 01/24/2021     Clinical Pathway: Clinic Care Coordination Pneumonia Assessment  Oxygen/DME    Are you currently on oxygen? Yes     Is this new for you? Yes     Is it set up at home? Yes     What liter flow were you instructed to use and how often do you use it? 1 L continuous.     Who is your supply company? [employer]    Review with patient how to use/maintain nebulizers and inhalers: NA  Activity:    Have you had to reduce your activities because of dyspnea or other symptoms? yes    Were you instructed on how to cough and deep breathe? Yes, RNCC reviewed this with patient.   Emotions/Lifestyle:      Do you smoke? reports that she has never smoked. She has never used smokeless tobacco.  - Would you like to try to quit smoking? NA    Medication Management:  Unable to reconcile medications as RNCC attempted to review these with patient who reports she does not recall Diltiazem being a familiar medication that she is taking. Patient reports that she has a medication list, but this is not currently near her as she was currently sitting in the chair and it is difficult to get up due to her feeling weak. She reports the home care nurse checked her medications during recent visit.     Functional Status:  Dependent ADLs:: Independent  Dependent IADLs:: Transportation, Shopping  Bed or wheelchair confined:: No  Mobility Status: Independent w/Device  Fallen 2 or more times in the past year?: No  Any fall with injury in the past year?: No    Living Situation:  Current living arrangement:: I live in a private home with spouse  Type of residence:: Other(mobile home)    Lifestyle & Psychosocial Needs:        Diet:: Regular  Inadequate nutrition (GOAL):: No  Tube Feeding: No  Inadequate activity/exercise (GOAL):: No  Significant changes in sleep pattern (GOAL): No  Transportation means::  Regular car, Family  Confucianism or spiritual beliefs that impact treatment:: No  Mental health DX:: Yes  Mental health DX how managed:: Medication  Mental health management concern (GOAL):: No  Chemical Dependency Status: No Current Concerns  Informal Support system:: Children, Spouse   Socioeconomic History     Marital status:      Spouse name: Not on file     Number of children: Not on file     Years of education: Not on file     Highest education level: Not on file   Occupational History     Occupation:  from home     Employer: RETIRED     Tobacco Use     Smoking status: Never Smoker     Smokeless tobacco: Never Used   Substance and Sexual Activity     Alcohol use: No     Drug use: No     Sexual activity: Yes     Partners: Male      Resources and Interventions:  Current Resources:   List of home care services:: Skilled Nursing  Community Resources: DME, Home Care  Supplies used at home:: Oxygen Tubing/Supplies, Diabetic Supplies(has compression stockings and hearing aides, but does not use these)  Equipment Currently Used at Home: grab bar, toilet, grab bar, tub/shower, walker, rolling, glucometer  Employment Status: retired)  Advance Care Plan/Directive  Advanced Care Plans/Directives on file:: Yes  Type Advanced Care Plans/Directives: Advanced Directive - On File  Advanced Care Plan/Directive Status: Not Applicable     Goals:   Goals        General    1. Improve chronic symptoms (pt-stated)     Notes - Note created  12/28/2020  2:58 PM by Abeba Christensen RN    Goal Statement: I would like support in managing my chronic health conditions to maintain my health and wellness and prevent readmission to the hospital.   Date Goal set: 12/28/2020  Barriers: weak due to chronic health conditions.   Strengths: motivated, engaged in care coordination, home care therapies.   Date to Achieve By: 06/2021  Patient expressed understanding of goal: yes  Action steps to achieve this goal:  1. I will follow up  with my providers as scheduled/recommended. (Primary Care Provider 01/05, CT 02/05, Pulmonology 02/08, etc.)  2. I will take my medications as prescribed.   3. I will contact my care team to report questions, concerns, support needs. 24/7 after hours services available.   4. Care Coordinator will collaborate with care team as needed.             Patient/Caregiver understanding: Patient verbalized understanding and denies any additional questions or concerns at this time. RNCC engaged in [employer] communications during encounter.     Outreach Frequency: 2 weeks  Future Appointments              In 1 week Yuridia Villarreal MD [facility]    In 1 month [facility], [facility]    In 1 month Dioni Dunn MD [facility]        Plan: RNCC to send introduction letter, complex care plan to patient via [employer].  Patient was provided with writers contact information and encouraged to call with questions, concerns, support needs. RNCC will remain available as needed. RNCC will follow up with patient again in 2 weeks.    Abeba Christensen RN Care Coordinator  [facility], [place], [place]  Email: [email or web address]  Phone: [phone]

## 2020-12-28 NOTE — LETTER
[place] CARE COORDINATION  [id number]  [facility] [address]      December 28, 2020    Ирина Yanez  [address] [facility] [id number]      Dear Ирина,    I am a clinic care coordinator who works with Yuridia Villarreal MD, at [facility]. I wanted to thank you for spending the time to talk with me.  Below is a description of clinic care coordination and how I can further assist you.      The clinic care coordination team is made up of a registered nurse,  and community health worker who understand the health care system. The goal of clinic care coordination is to help you manage your health and improve access to the health care system in the most efficient manner. The team can assist you in meeting your health care goals by providing education, coordinating services, strengthening the communication among your providers and supporting you with any resource needs.    Please feel free to contact me at [phone] with any questions or concerns. We are focused on providing you with the highest-quality healthcare experience possible and that all starts with you.     Sincerely,     Abeba Christensen RN Care Coordinator  [facility]  Email: [email or web address]  Phone: [phone]      Enclosed: I have enclosed a copy of the Complex Care Plan. This has helpful information and goals that we have talked about. Please keep this in an easy to access place to use as needed.

## 2020-12-29 ENCOUNTER — HOSPITAL ENCOUNTER (INPATIENT)
Facility: CLINIC | Age: 85
LOS: 17 days | Discharge: NURSING, REHAB OR LONG-TERM CARE | DRG: 291 | End: 2021-01-15
Attending: PHYSICIAN ASSISTANT | Admitting: INTERNAL MEDICINE
Payer: COMMERCIAL

## 2020-12-29 ENCOUNTER — APPOINTMENT (OUTPATIENT)
Dept: CT IMAGING | Facility: CLINIC | Age: 85
DRG: 291 | End: 2020-12-29
Attending: PHYSICIAN ASSISTANT
Payer: COMMERCIAL

## 2020-12-29 ENCOUNTER — APPOINTMENT (OUTPATIENT)
Dept: GENERAL RADIOLOGY | Facility: CLINIC | Age: 85
DRG: 291 | End: 2020-12-29
Attending: PHYSICIAN ASSISTANT
Payer: COMMERCIAL

## 2020-12-29 DIAGNOSIS — I10 HYPERTENSION GOAL BP (BLOOD PRESSURE) < 140/90: ICD-10-CM

## 2020-12-29 DIAGNOSIS — I50.33 ACUTE ON CHRONIC DIASTOLIC CONGESTIVE HEART FAILURE (H): ICD-10-CM

## 2020-12-29 DIAGNOSIS — I48.91 ATRIAL FIBRILLATION, UNSPECIFIED TYPE (H): ICD-10-CM

## 2020-12-29 DIAGNOSIS — E11.42 DIABETIC POLYNEUROPATHY ASSOCIATED WITH TYPE 2 DIABETES MELLITUS (H): ICD-10-CM

## 2020-12-29 DIAGNOSIS — N18.4 CKD (CHRONIC KIDNEY DISEASE) STAGE 4, GFR 15-29 ML/MIN (H): ICD-10-CM

## 2020-12-29 DIAGNOSIS — R06.00 DYSPNEA: ICD-10-CM

## 2020-12-29 DIAGNOSIS — R52 GENERALIZED PAIN: ICD-10-CM

## 2020-12-29 DIAGNOSIS — N18.9 ACUTE ON CHRONIC KIDNEY FAILURE (H): ICD-10-CM

## 2020-12-29 DIAGNOSIS — R09.02 HYPOXEMIA: ICD-10-CM

## 2020-12-29 DIAGNOSIS — N17.9 ACUTE ON CHRONIC KIDNEY FAILURE (H): ICD-10-CM

## 2020-12-29 DIAGNOSIS — I50.9 CHF (CONGESTIVE HEART FAILURE) (H): ICD-10-CM

## 2020-12-29 DIAGNOSIS — K59.00 CONSTIPATION, UNSPECIFIED CONSTIPATION TYPE: Primary | ICD-10-CM

## 2020-12-29 DIAGNOSIS — E11.21 TYPE 2 DIABETES MELLITUS WITH DIABETIC NEPHROPATHY, UNSPECIFIED WHETHER LONG TERM INSULIN USE (H): ICD-10-CM

## 2020-12-29 LAB
ALBUMIN SERPL-MCNC: 3 G/DL (ref 3.4–5)
ALBUMIN UR-MCNC: 20 MG/DL
ALP SERPL-CCNC: 73 U/L (ref 40–150)
ALT SERPL W P-5'-P-CCNC: 22 U/L (ref 0–50)
ANION GAP SERPL CALCULATED.3IONS-SCNC: 5 MMOL/L (ref 3–14)
APPEARANCE UR: ABNORMAL
AST SERPL W P-5'-P-CCNC: 10 U/L (ref 0–45)
BACTERIA #/AREA URNS HPF: ABNORMAL /HPF
BASE EXCESS BLDV CALC-SCNC: 0.3 MMOL/L
BASOPHILS # BLD AUTO: 0 10E9/L (ref 0–0.2)
BASOPHILS NFR BLD AUTO: 0.2 %
BILIRUB SERPL-MCNC: 0.3 MG/DL (ref 0.2–1.3)
BILIRUB UR QL STRIP: NEGATIVE
BUN SERPL-MCNC: 84 MG/DL (ref 7–30)
CALCIUM SERPL-MCNC: 8.9 MG/DL (ref 8.5–10.1)
CHLORIDE SERPL-SCNC: 108 MMOL/L (ref 94–109)
CK SERPL-CCNC: 33 U/L (ref 30–225)
CO2 SERPL-SCNC: 28 MMOL/L (ref 20–32)
COLOR UR AUTO: YELLOW
CREAT SERPL-MCNC: 4.23 MG/DL (ref 0.52–1.04)
CREAT UR-MCNC: 107 MG/DL
D DIMER PPP FEU-MCNC: 1.1 UG/ML FEU (ref 0–0.5)
DIFFERENTIAL METHOD BLD: ABNORMAL
EOSINOPHIL # BLD AUTO: 0 10E9/L (ref 0–0.7)
EOSINOPHIL NFR BLD AUTO: 0 %
ERYTHROCYTE [DISTWIDTH] IN BLOOD BY AUTOMATED COUNT: 14.5 % (ref 10–15)
FLUABV+SARS-COV-2+RSV PNL RESP NAA+PROBE: NEGATIVE
FLUABV+SARS-COV-2+RSV PNL RESP NAA+PROBE: NEGATIVE
FRACT EXCRET NA UR+SERPL-RTO: 0.2 %
GFR SERPL CREATININE-BSD FRML MDRD: 9 ML/MIN/{1.73_M2}
GLUCOSE BLDC GLUCOMTR-MCNC: 153 MG/DL (ref 70–99)
GLUCOSE BLDC GLUCOMTR-MCNC: 218 MG/DL (ref 70–99)
GLUCOSE SERPL-MCNC: 136 MG/DL (ref 70–99)
GLUCOSE UR STRIP-MCNC: NEGATIVE MG/DL
HCO3 BLDV-SCNC: 27 MMOL/L (ref 21–28)
HCT VFR BLD AUTO: 29.8 % (ref 35–47)
HGB BLD-MCNC: 8.9 G/DL (ref 11.7–15.7)
HGB UR QL STRIP: NEGATIVE
IMM GRANULOCYTES # BLD: 0.1 10E9/L (ref 0–0.4)
IMM GRANULOCYTES NFR BLD: 0.8 %
KETONES UR STRIP-MCNC: NEGATIVE MG/DL
LABORATORY COMMENT REPORT: NORMAL
LACTATE BLD-SCNC: 0.7 MMOL/L (ref 0.7–2)
LEUKOCYTE ESTERASE UR QL STRIP: NEGATIVE
LYMPHOCYTES # BLD AUTO: 0.5 10E9/L (ref 0.8–5.3)
LYMPHOCYTES NFR BLD AUTO: 5.2 %
MCH RBC QN AUTO: 28.3 PG (ref 26.5–33)
MCHC RBC AUTO-ENTMCNC: 29.9 G/DL (ref 31.5–36.5)
MCV RBC AUTO: 95 FL (ref 78–100)
MONOCYTES # BLD AUTO: 0.6 10E9/L (ref 0–1.3)
MONOCYTES NFR BLD AUTO: 6.2 %
MUCOUS THREADS #/AREA URNS LPF: PRESENT /LPF
NEUTROPHILS # BLD AUTO: 8.7 10E9/L (ref 1.6–8.3)
NEUTROPHILS NFR BLD AUTO: 87.6 %
NITRATE UR QL: NEGATIVE
NRBC # BLD AUTO: 0 10*3/UL
NRBC BLD AUTO-RTO: 0 /100
NT-PROBNP SERPL-MCNC: 2319 PG/ML (ref 0–1800)
O2/TOTAL GAS SETTING VFR VENT: 2 %
PCO2 BLDV: 53 MM HG (ref 40–50)
PH BLDV: 7.31 PH (ref 7.32–7.43)
PH UR STRIP: 5 PH (ref 5–7)
PLATELET # BLD AUTO: 221 10E9/L (ref 150–450)
PO2 BLDV: 43 MM HG (ref 25–47)
POTASSIUM SERPL-SCNC: 4.2 MMOL/L (ref 3.4–5.3)
PROCALCITONIN SERPL-MCNC: 0.14 NG/ML
PROT SERPL-MCNC: 7.5 G/DL (ref 6.8–8.8)
RBC # BLD AUTO: 3.14 10E12/L (ref 3.8–5.2)
RBC #/AREA URNS AUTO: 8 /HPF (ref 0–2)
RSV RNA SPEC QL NAA+PROBE: NORMAL
SARS-COV-2 RNA SPEC QL NAA+PROBE: NEGATIVE
SODIUM SERPL-SCNC: 141 MMOL/L (ref 133–144)
SODIUM UR-SCNC: 8 MMOL/L
SOURCE: ABNORMAL
SP GR UR STRIP: 1.01 (ref 1–1.03)
SPECIMEN SOURCE: NORMAL
SQUAMOUS #/AREA URNS AUTO: <1 /HPF (ref 0–1)
TROPONIN I SERPL-MCNC: <0.015 UG/L (ref 0–0.04)
UROBILINOGEN UR STRIP-MCNC: NORMAL MG/DL (ref 0–2)
WBC # BLD AUTO: 10 10E9/L (ref 4–11)
WBC #/AREA URNS AUTO: 8 /HPF (ref 0–5)

## 2020-12-29 PROCEDURE — 999N001014 HC STATISTIC CYTO WRIGHT STAIN TC: Performed by: INTERNAL MEDICINE

## 2020-12-29 PROCEDURE — 250N000013 HC RX MED GY IP 250 OP 250 PS 637: Performed by: INTERNAL MEDICINE

## 2020-12-29 PROCEDURE — 85379 FIBRIN DEGRADATION QUANT: CPT | Performed by: PHYSICIAN ASSISTANT

## 2020-12-29 PROCEDURE — 250N000013 HC RX MED GY IP 250 OP 250 PS 637: Performed by: PHYSICIAN ASSISTANT

## 2020-12-29 PROCEDURE — 99223 1ST HOSP IP/OBS HIGH 75: CPT | Mod: AI | Performed by: INTERNAL MEDICINE

## 2020-12-29 PROCEDURE — 84484 ASSAY OF TROPONIN QUANT: CPT | Performed by: PHYSICIAN ASSISTANT

## 2020-12-29 PROCEDURE — 88112 CYTOPATH CELL ENHANCE TECH: CPT | Mod: TC | Performed by: INTERNAL MEDICINE

## 2020-12-29 PROCEDURE — 71045 X-RAY EXAM CHEST 1 VIEW: CPT

## 2020-12-29 PROCEDURE — 82803 BLOOD GASES ANY COMBINATION: CPT | Performed by: PHYSICIAN ASSISTANT

## 2020-12-29 PROCEDURE — 80053 COMPREHEN METABOLIC PANEL: CPT | Performed by: PHYSICIAN ASSISTANT

## 2020-12-29 PROCEDURE — 999N001018 HC STATISTIC H-CELL BLOCK W/STAIN: Performed by: INTERNAL MEDICINE

## 2020-12-29 PROCEDURE — 83880 ASSAY OF NATRIURETIC PEPTIDE: CPT | Performed by: PHYSICIAN ASSISTANT

## 2020-12-29 PROCEDURE — 250N000011 HC RX IP 250 OP 636: Performed by: INTERNAL MEDICINE

## 2020-12-29 PROCEDURE — 88305 TISSUE EXAM BY PATHOLOGIST: CPT | Mod: 26 | Performed by: PATHOLOGY

## 2020-12-29 PROCEDURE — 99285 EMERGENCY DEPT VISIT HI MDM: CPT | Mod: 25

## 2020-12-29 PROCEDURE — 71250 CT THORAX DX C-: CPT

## 2020-12-29 PROCEDURE — 82550 ASSAY OF CK (CPK): CPT | Performed by: PHYSICIAN ASSISTANT

## 2020-12-29 PROCEDURE — 84145 PROCALCITONIN (PCT): CPT | Performed by: INTERNAL MEDICINE

## 2020-12-29 PROCEDURE — 999N001017 HC STATISTIC GLUCOSE BY METER IP

## 2020-12-29 PROCEDURE — 83605 ASSAY OF LACTIC ACID: CPT | Performed by: PHYSICIAN ASSISTANT

## 2020-12-29 PROCEDURE — 94640 AIRWAY INHALATION TREATMENT: CPT

## 2020-12-29 PROCEDURE — 82570 ASSAY OF URINE CREATININE: CPT | Performed by: INTERNAL MEDICINE

## 2020-12-29 PROCEDURE — C9803 HOPD COVID-19 SPEC COLLECT: HCPCS

## 2020-12-29 PROCEDURE — 88305 TISSUE EXAM BY PATHOLOGIST: CPT | Mod: TC | Performed by: INTERNAL MEDICINE

## 2020-12-29 PROCEDURE — 88112 CYTOPATH CELL ENHANCE TECH: CPT | Mod: 26 | Performed by: PATHOLOGY

## 2020-12-29 PROCEDURE — 81001 URINALYSIS AUTO W/SCOPE: CPT | Performed by: PHYSICIAN ASSISTANT

## 2020-12-29 PROCEDURE — 84300 ASSAY OF URINE SODIUM: CPT | Performed by: INTERNAL MEDICINE

## 2020-12-29 PROCEDURE — 87636 SARSCOV2 & INF A&B AMP PRB: CPT | Performed by: PHYSICIAN ASSISTANT

## 2020-12-29 PROCEDURE — 250N000012 HC RX MED GY IP 250 OP 636 PS 637: Performed by: INTERNAL MEDICINE

## 2020-12-29 PROCEDURE — 85025 COMPLETE CBC W/AUTO DIFF WBC: CPT | Performed by: PHYSICIAN ASSISTANT

## 2020-12-29 PROCEDURE — 120N000001 HC R&B MED SURG/OB

## 2020-12-29 RX ORDER — DILTIAZEM HYDROCHLORIDE 180 MG/1
180 CAPSULE, COATED, EXTENDED RELEASE ORAL AT BEDTIME
Status: DISCONTINUED | OUTPATIENT
Start: 2020-12-29 | End: 2020-12-29

## 2020-12-29 RX ORDER — DEXTROSE MONOHYDRATE 25 G/50ML
25-50 INJECTION, SOLUTION INTRAVENOUS
Status: DISCONTINUED | OUTPATIENT
Start: 2020-12-29 | End: 2021-01-15 | Stop reason: HOSPADM

## 2020-12-29 RX ORDER — POLYETHYLENE GLYCOL 3350 17 G/17G
17 POWDER, FOR SOLUTION ORAL DAILY PRN
Status: DISCONTINUED | OUTPATIENT
Start: 2020-12-29 | End: 2021-01-15 | Stop reason: HOSPADM

## 2020-12-29 RX ORDER — NYSTATIN 100000 [USP'U]/G
POWDER TOPICAL 3 TIMES DAILY PRN
Status: ON HOLD | COMMUNITY
End: 2021-01-15

## 2020-12-29 RX ORDER — FUROSEMIDE 10 MG/ML
40 INJECTION INTRAMUSCULAR; INTRAVENOUS ONCE
Status: COMPLETED | OUTPATIENT
Start: 2020-12-29 | End: 2020-12-29

## 2020-12-29 RX ORDER — ALBUTEROL SULFATE 0.83 MG/ML
2.5 SOLUTION RESPIRATORY (INHALATION) EVERY 6 HOURS PRN
Status: DISCONTINUED | OUTPATIENT
Start: 2020-12-29 | End: 2021-01-15 | Stop reason: HOSPADM

## 2020-12-29 RX ORDER — HYDRALAZINE HYDROCHLORIDE 50 MG/1
100 TABLET, FILM COATED ORAL 3 TIMES DAILY
Status: DISCONTINUED | OUTPATIENT
Start: 2020-12-29 | End: 2021-01-04

## 2020-12-29 RX ORDER — AMOXICILLIN 250 MG
1 CAPSULE ORAL 2 TIMES DAILY PRN
Status: DISCONTINUED | OUTPATIENT
Start: 2020-12-29 | End: 2021-01-03

## 2020-12-29 RX ORDER — ONDANSETRON 2 MG/ML
4 INJECTION INTRAMUSCULAR; INTRAVENOUS EVERY 6 HOURS PRN
Status: DISCONTINUED | OUTPATIENT
Start: 2020-12-29 | End: 2021-01-15 | Stop reason: HOSPADM

## 2020-12-29 RX ORDER — DILTIAZEM HYDROCHLORIDE 180 MG/1
180 CAPSULE, COATED, EXTENDED RELEASE ORAL 2 TIMES DAILY
Status: DISCONTINUED | OUTPATIENT
Start: 2020-12-30 | End: 2021-01-07

## 2020-12-29 RX ORDER — ALBUTEROL SULFATE 90 UG/1
6 AEROSOL, METERED RESPIRATORY (INHALATION) ONCE
Status: COMPLETED | OUTPATIENT
Start: 2020-12-29 | End: 2020-12-29

## 2020-12-29 RX ORDER — NICOTINE POLACRILEX 4 MG
15-30 LOZENGE BUCCAL
Status: DISCONTINUED | OUTPATIENT
Start: 2020-12-29 | End: 2021-01-15 | Stop reason: HOSPADM

## 2020-12-29 RX ORDER — DOXAZOSIN 1 MG/1
1 TABLET ORAL AT BEDTIME
Status: DISCONTINUED | OUTPATIENT
Start: 2020-12-29 | End: 2021-01-08

## 2020-12-29 RX ORDER — LIDOCAINE 40 MG/G
CREAM TOPICAL
Status: DISCONTINUED | OUTPATIENT
Start: 2020-12-29 | End: 2021-01-15 | Stop reason: HOSPADM

## 2020-12-29 RX ORDER — SIMVASTATIN 10 MG
10 TABLET ORAL AT BEDTIME
Status: DISCONTINUED | OUTPATIENT
Start: 2020-12-29 | End: 2021-01-15 | Stop reason: HOSPADM

## 2020-12-29 RX ORDER — GABAPENTIN 100 MG/1
200 CAPSULE ORAL 2 TIMES DAILY
Status: DISCONTINUED | OUTPATIENT
Start: 2020-12-29 | End: 2021-01-04

## 2020-12-29 RX ORDER — ACETAMINOPHEN 325 MG/1
650 TABLET ORAL EVERY 4 HOURS PRN
Status: DISCONTINUED | OUTPATIENT
Start: 2020-12-29 | End: 2021-01-15 | Stop reason: HOSPADM

## 2020-12-29 RX ORDER — CHLORAL HYDRATE 500 MG
1 CAPSULE ORAL DAILY
COMMUNITY
End: 2021-02-09

## 2020-12-29 RX ORDER — AMOXICILLIN 250 MG
2 CAPSULE ORAL 2 TIMES DAILY PRN
Status: DISCONTINUED | OUTPATIENT
Start: 2020-12-29 | End: 2021-01-03

## 2020-12-29 RX ORDER — TORSEMIDE 20 MG/1
20 TABLET ORAL DAILY
Status: CANCELLED | OUTPATIENT
Start: 2020-12-29

## 2020-12-29 RX ORDER — ONDANSETRON 4 MG/1
4 TABLET, ORALLY DISINTEGRATING ORAL EVERY 6 HOURS PRN
Status: DISCONTINUED | OUTPATIENT
Start: 2020-12-29 | End: 2021-01-15 | Stop reason: HOSPADM

## 2020-12-29 RX ADMIN — HYDRALAZINE HYDROCHLORIDE 100 MG: 50 TABLET, FILM COATED ORAL at 21:01

## 2020-12-29 RX ADMIN — DILTIAZEM HYDROCHLORIDE 180 MG: 180 CAPSULE, COATED, EXTENDED RELEASE ORAL at 21:01

## 2020-12-29 RX ADMIN — DOXAZOSIN 1 MG: 1 TABLET ORAL at 22:15

## 2020-12-29 RX ADMIN — INSULIN ASPART 1 UNITS: 100 INJECTION, SOLUTION INTRAVENOUS; SUBCUTANEOUS at 21:58

## 2020-12-29 RX ADMIN — SIMVASTATIN 10 MG: 10 TABLET, FILM COATED ORAL at 21:57

## 2020-12-29 RX ADMIN — GABAPENTIN 200 MG: 100 CAPSULE ORAL at 21:01

## 2020-12-29 RX ADMIN — ALBUTEROL SULFATE 6 PUFF: 90 AEROSOL, METERED RESPIRATORY (INHALATION) at 16:11

## 2020-12-29 RX ADMIN — FUROSEMIDE 40 MG: 10 INJECTION, SOLUTION INTRAMUSCULAR; INTRAVENOUS at 21:01

## 2020-12-29 ASSESSMENT — ENCOUNTER SYMPTOMS
NAUSEA: 0
DIARRHEA: 1
ABDOMINAL PAIN: 0
VOMITING: 0
SHORTNESS OF BREATH: 1
CHILLS: 0
COUGH: 0
FEVER: 0
RHINORRHEA: 0

## 2020-12-29 ASSESSMENT — MIFFLIN-ST. JEOR: SCORE: 1485.09

## 2020-12-29 ASSESSMENT — ACTIVITIES OF DAILY LIVING (ADL): ADLS_ACUITY_SCORE: 17

## 2020-12-29 NOTE — ED TRIAGE NOTES
Patient presents with complaints of SOB for the past week. Patient was seen here and started on home oxygen but SOB has not improved, . ABC intact without need for intervention at this time.

## 2020-12-29 NOTE — ED PROVIDER NOTES
History   Chief Complaint:  Shortness of Breath       HPI   Ирина Yanez is a 85 year old female with history of hypertension, hyperlipidemia, and aortic sclerosis who presents with shortness of breath. Of note, the patient was admitted to the hospital from 12/10/2020 to 12/14/2020 after a few weeks of worsening dyspnea on exertion. Patient was found to have acute respiratory failure with hypoxia and right upper lobe consolidation (pneumonia versus malignancy), was not able to completely wean off oxygen, and was discharged to home with 1L of O2 via nasal canula and pulmonologist and PCP follow up. Patient was also started on Augmentin and Florastor. Today, the patient reports that for the past 2-3 days she has been experiencing worsening shortness of breath, prompting her return to the ED. She states that her shortness of breath did not improve much after discharge from the hospital, but then worsened greatly 2-3 days ago. She describes that her shortness of breath is worsened with exertion and that no position improves it. Patient does endorse loose stool with Augmentin use. The patient denies fever, chills, chest pain, nausea, vomiting, abdominal pain, worsened lower extremity swelling, cough, congestion, rhinorrhea, and other issues.      Imaging Results from 12/10/2020:  CT Chest w/o Contrast:   IMPRESSION:   1.  Masslike area of consolidation in the right upper lobe. Findings may represent pneumonia, however, some lung cancers can have this appearance. Clinical correlation, short-term follow-up, and pulmonary consultation recommended.  2.  Small bilateral pleural effusions.    Hospital Course (12/10 - 12/4):  Dyspnea on exertion, acute hypoxic respiratory failure, and right middle lobe consolidation, pneumonia versus malignancy.    As outlined in the HPI, patient presented with approximately 3 weeks of primarily shortness of breath being her main symptom with occasional cough.  Some mild lower leg swelling.   Also mention that she has been having increasing aspiration, which is a new problem for her. In the ED, she was found to be mildly hypoxic in the upper 80s and placed on supplemental O2 via nasal cannula. Initial lab work was unremarkable with normal white blood cell count, negative troponin, normal proBNP. CT the chest showed masslike consolidation in the right upper lobe, possibly concerning for pneumonia versus malignancy.  She was given ceftriaxone and azithromycin as well as enoxaparin empirically for possible PE.  Duplex ultrasounds of the bilateral legs were negative.     Given the patient's report of worsening aspiration, initial concern was that this represented aspiration pneumonia.  She was switched to Unasyn.  Her D-dimer was normal adjusted for age, so there was no concern for PE.  However, per SLP evaluation, her swelling was not actually significantly impaired.  Pulmonary was consulted.  Also included in differential are malignancy, atypical infection or fungal infection.  Pulmonary medicine recommended to complete 14 days of antibiotic therapy.  Patient will be discharged on 10 days of Augmentin.  She has a reported intolerance to this medicine, but has been tolerating the Unasyn fine while hospitalized, so that should not be an issue.  She will have a follow-up appointment and chest CT with pulmonology in about 8 weeks to evaluate for resolution of the right upper lobe masslike consolidation.  Also, complete fungal studies were pending at the time of discharge but notably Fungitell was negative.     Patient was not able to be weaned from oxygen during the hospitalization.  She continued to need least 1 L with activity and at rest.  Will be discharged home with 1 L submental O2.  This can be weaned by her PCP or pulmonology.     On the day of discharge, patient was breathing comfortably on 1 L.  She said she was feeling much better than admission. She understood the follow-up plan and was stable for  discharge to home.    ECHO 1/29/20  Interpretation Summary  Borderline aortic stenosis.  The visual ejection fraction is estimated at 60-65%.  Left ventricular systolic function is normal.  The ascending aorta is Moderately dilated.  Mild aortic root dilatation.  The study was technically difficult.    Review of Systems   Constitutional: Negative for chills and fever.   HENT: Negative for congestion and rhinorrhea.    Respiratory: Positive for shortness of breath. Negative for cough.    Cardiovascular: Negative for chest pain and leg swelling.   Gastrointestinal: Positive for diarrhea. Negative for abdominal pain, nausea and vomiting.   All other systems reviewed and are negative.      Allergies:  Augmentin  Cleocin  Tegretol     Medications:  Cardura  Neurontin  Amaryl  Apresoline  Actos  Zoloft  Zocor  Demadex     Past Medical History:    Anxiety  Arthritis  Depression  Diabetes  Heart murmur  Hypertension  Hyperlipidemia  Lichen sclerosus et atrophicus  Melanoma  Peripheral neuropathy  Spinal stenosis   Iron deficiency anemia  Aortic sclerosis  CKD stage IV   Patient denies history of COPD and heart failure.     Past Surgical History:    Amputate toe - 2012  Cholecystectomy   Surgery for spinal stenosis     Family History:    Cerebrovascular disease - mother  Heart disease - father  CAD - sister   Diverticulitis - brother    Social History:  Presents to the ED alone.   Denies smoking history.     Physical Exam     Patient Vitals for the past 24 hrs:   BP Temp Pulse Resp SpO2   12/29/20 1630 (!) 143/57 -- 75 -- (!) 89 %   12/29/20 1615 128/51 -- 75 -- 91 %   12/29/20 1600 -- -- 68 (!) 34 92 %   12/29/20 1545 -- -- 71 (!) 37 92 %   12/29/20 1530 (!) 145/60 -- -- (!) 51 94 %   12/29/20 1515 -- -- -- -- 94 %   12/29/20 1428 (!) 148/61 98  F (36.7  C) 76 20 94 %       Physical Exam  Constitutional: Pleasant. Cooperative.   Eyes: Pupils equally round and reactive  HENT: Head is normal in appearance. Oropharynx is  normal with moist mucus membranes.  Cardiovascular: Regular rate and rhythm and without murmurs.  Respiratory: Decreased breath sounds bilaterally. No wheezes.  GI: Abdomen is soft, non-tender, non-distended. No guarding, rebound, or rigidity.  Musculoskeletal: No asymmetry of the lower extremities. 1+ pitting edema to bilateral lower extremities.  Skin: Normal, without rash.  Neurologic: Cranial nerves grossly intact, normal cognition, no focal deficits. Alert and oriented x 3.   Psychiatric: Normal affect.  Nursing notes and vital signs reviewed.      Emergency Department Course     Imaging:  Chest CT w/o contrast   Final Result   IMPRESSION:    1.  Near complete resolution of previously seen masslike consolidation in the right upper lobe.   2.  Large bilateral pleural effusions and partial lower lobe atelectasis, slightly increased.         XR Chest Port 1 View   Final Result   FINDINGS: Persistent small bilateral pleural effusions with adjacent  atelectasis/infiltrates, left greater than right. Interval mild  improved in aeration within the right upper lobe favoring improving  pneumonia. Continued follow-up is recommended. Stable mild  cardiomegaly.       Laboratory:  Symptomatic Influenza A/B and SARS-CoV2 (COVID-19) Virus PCR Multiplex: Negative     CBC: HGB 8.9 (L), WNL (WBC 10.0, )  CMP: Creatinine 4.23 (H)  D dimer quantitative (1523): 1.1 (H)  Lactic acid whole blood (1538): WNL 0.7  Troponin (1523): <0.015   Blood gas venous: pH 7.31 (L), pCO2 53 (H)   Nt probnp inpatient (BNP): 2,319 (H)     UA: Protein Albumin 20, WBC 8 (H), RBC 8 (H), Bacteria Few, Mucous Present, o/w Negative  Urine culture: Pending       Emergency Department Course:    Reviewed:  I reviewed nursing notes, vitals and past medical history    Assessments:  1517: I initially evaluated the patient in ED room 34.    1630: I reassessed the patient.    1800: I reassessed the patient, discussed results.    Consults:   1750: I spoke  with Dr. Julio of the hospitalist service who agrees to accept the patient for admission.       Interventions:   1611, Proair, 6 Puffs, Inhalation     Disposition:  The patient was admitted to the hospital under the care of Dr. Julio.       Impression & Plan   Covid-19  Ирина Yanez was evaluated during a global COVID-19 pandemic, which necessitated consideration that the patient might be at risk for infection with the SARS-CoV-2 virus that causes COVID-19.   Applicable protocols for evaluation were followed during the patient's care. COVID-19 was considered as part of the patient's evaluation. The plan for testing is: a test was obtained during this visit.     Medical Decision Making:  Ирина Yanez is a 85 year old female who presents to the ED for evaluation of dyspnea.  Patient was recently hospitalized for acute respiratory failure with hypoxemia and right upper lobe consolidation 3 weeks ago.  Consolidation was concerning for pneumonia versus malignancy.  She was discharged on Augmentin and has finished this.  She is additionally discharged home on 1 L of O2 and has had to continue to use the O2.  Despite these interventions, patient notes that her dyspnea has worsened, significantly over the past 2-3 days.  This is in the setting of nephrology follow-up where she was found to have acute on chronic kidney dysfunction, with a creatinine elevating from 1.9 at discharge from the hospital up to 4.2 a few days ago.  She was asked to discontinue her torsemide at that time, as this was thought to be contributing to her worsening kidney function. She has not taken torsemide for the past 5 days. See HPI as above for additional details. Vitals and physical exam as above. DDx for dyspnea was broad and included CHF, ACS, COPD, pneumonia, PE, PTX, among others. Work up as above. In setting of recent discontinuation of diuretic, BNP elevation at 2319, and CT remarkable for large bilateral pleural effusions, findings  suggestive for likely volume overload as etiology for patient's dyspnea. Discussed with patient need for admission for diuresis. Patient is in agreement with plan for admission. Discussed the case with the hospitalist, who agreed to admit the patient. All questions answered. Patient admitted in stable condition.    Diagnosis:    ICD-10-CM    1. Dyspnea  R06.00 UA with Microscopic reflex to Culture   2. Hypoxemia  R09.02    3. CHF (congestive heart failure) (H)  I50.9    4. Acute on chronic kidney failure (H)  N17.9     N18.9        Scribe Disclosure:  I, Reji Mohan, am serving as a scribe at 2:56 PM on 12/29/2020 to document services personally performed by Mt Adkins PA-C based on my observations and the provider's statements to me.     [facility]  December 29, 2020     This record was created at least in part using electronic voice recognition software, so please excuse any typographical errors.       Mt Adkins PA-C  12/29/20 1849

## 2020-12-29 NOTE — ED NOTES
.  [facility]  ED Nurse Handoff Report    Ирина Yanez is a 85 year old female   ED Chief complaint: Shortness of Breath  . ED Diagnosis:   Final diagnoses:   None     Allergies:   Allergies   Allergen Reactions     Augmentin Diarrhea     Vomiting       Cleocin      Hives     Tegretol [Carbamazepine]      Irregular heart beat       Code Status: Full Code  Activity level - Baseline/Home:  Independent. Activity Level - Current:   Assist X 1. Lift room needed: Yes. Bariatric: No   Needed: Yes   Isolation: No. Infection: Not Applicable.     Vital Signs:   Vitals:    12/29/20 1545 12/29/20 1600 12/29/20 1615 12/29/20 1630   BP:   128/51 (!) 143/57   Pulse: 71 68 75 75   Resp: (!) 37 (!) 34     Temp:       SpO2: 92% 92% 91% (!) 89%       Cardiac Rhythm:  ,      Pain level:    Patient confused: No. Patient Falls Risk: Yes.   Elimination Status: Has voided   Patient Report - Initial Complaint:Ирина Yanez is a 85 year old female with history of hypertension, hyperlipidemia, and aortic sclerosis who presents with shortness of breath. Of note, the patient was admitted to the hospital from 12/10/2020 to 12/14/2020 after a few weeks of worsening dyspnea on exertion. Patient was found to have acute respiratory failure with hypoxia and right upper lobe consolidation (pneumonia versus malignancy), was not able to completely wean off oxygen, and was discharged to home with 1L of O2 via nasal canula and pulmonologist and PCP follow up. Patient was also started on Augmentin and Florastor. Today, the patient reports that for the past 2-3 days she has been experiencing worsening shortness of breath, prompting her return to the ED. She states that her shortness of breath did not improve much after discharge from the hospital, but then worsened greatly 2-3 days ago. She describes that her shortness of breath is worsened with exertion and that no position improves it. Patient does endorse loose stool with  Augmentin use. The patient denies fever, chills, chest pain, nausea, vomiting, abdominal pain, worsened lower extremity swelling, cough, congestion, rhinorrhea, and other issues.       Imaging Results from 12/10/2020:  CT Chest w/o Contrast:   IMPRESSION:   1.  Masslike area of consolidation in the right upper lobe. Findings may represent pneumonia, however, some lung cancers can have this appearance. Clinical correlation, short-term follow-up, and pulmonary consultation recommended.  2.  Small bilateral pleural effusions. . Focused Assessment: Pt is alert and oriented times four. Pleasant and cooperative. Increased work of breathing with oxygen saturations of 93% on two liters. Gets winded with talking and with any activities so careful consideration if out of bed.    Tests Performed: .  Labs Ordered and Resulted from Time of ED Arrival Up to the Time of Departure from the ED   CBC WITH PLATELETS DIFFERENTIAL - Abnormal; Notable for the following components:       Result Value    RBC Count 3.14 (*)     Hemoglobin 8.9 (*)     Hematocrit 29.8 (*)     MCHC 29.9 (*)     Absolute Neutrophil 8.7 (*)     Absolute Lymphocytes 0.5 (*)     All other components within normal limits   D DIMER QUANTITATIVE - Abnormal; Notable for the following components:    D Dimer 1.1 (*)     All other components within normal limits   COMPREHENSIVE METABOLIC PANEL - Abnormal; Notable for the following components:    Glucose 136 (*)     Urea Nitrogen 84 (*)     Creatinine 4.23 (*)     GFR Estimate 9 (*)     GFR Estimate If Black 10 (*)     Albumin 3.0 (*)     All other components within normal limits   BLOOD GAS VENOUS - Abnormal; Notable for the following components:    Ph Venous 7.31 (*)     PCO2 Venous 53 (*)     All other components within normal limits   NT PROBNP INPATIENT - Abnormal; Notable for the following components:    N-Terminal Pro BNP Inpatient 2,319 (*)     All other components within normal limits   LACTIC ACID WHOLE BLOOD    TROPONIN I   INFLUENZA A/B & SARS-COV2 PCR MULTIPLEX   ROUTINE UA WITH MICROSCOPIC REFLEX TO CULTURE   CARDIAC CONTINUOUS MONITORING   . Abnormal Results: .  XR Chest Port 1 View   Final Result      Chest CT w/o contrast    (Results Pending)   .   Treatments provided: Albuterol inhaler, Cath urine, vital sign monitoring, oxygen by nasal canula, Radiology  Family Comments:   OBS brochure/video discussed/provided to patient:  No  ED Medications:   Medications   albuterol (PROAIR HFA/PROVENTIL HFA/VENTOLIN HFA) 108 (90 Base) MCG/ACT inhaler 6 puff (6 puffs Inhalation Given 12/29/20 1611)     Drips infusing:  No  For the majority of the shift, the patient's behavior Green. Interventions performed were none.    Sepsis treatment initiated: No     Patient tested for COVID 19 prior to admission: YES    ED Nurse Name/Phone Number: Dorothea Arguelles RN,   4:43 PM      RECEIVING UNIT ED HANDOFF REVIEW    Above ED Nurse Handoff Report was reviewed: Yes  Reviewed by: BUSHRA SMITH, NICOLE on December 29, 2020 at 6:53 PM

## 2020-12-30 ENCOUNTER — PATIENT OUTREACH (OUTPATIENT)
Dept: CARE COORDINATION | Facility: CLINIC | Age: 85
End: 2020-12-30

## 2020-12-30 ENCOUNTER — APPOINTMENT (OUTPATIENT)
Dept: ULTRASOUND IMAGING | Facility: CLINIC | Age: 85
DRG: 291 | End: 2020-12-30
Attending: INTERNAL MEDICINE
Payer: COMMERCIAL

## 2020-12-30 ENCOUNTER — APPOINTMENT (OUTPATIENT)
Dept: GENERAL RADIOLOGY | Facility: CLINIC | Age: 85
DRG: 291 | End: 2020-12-30
Attending: RADIOLOGY
Payer: COMMERCIAL

## 2020-12-30 ENCOUNTER — APPOINTMENT (OUTPATIENT)
Dept: CARDIOLOGY | Facility: CLINIC | Age: 85
DRG: 291 | End: 2020-12-30
Attending: INTERNAL MEDICINE
Payer: COMMERCIAL

## 2020-12-30 LAB
ANION GAP SERPL CALCULATED.3IONS-SCNC: 3 MMOL/L (ref 3–14)
APPEARANCE FLD: NORMAL
BUN SERPL-MCNC: 82 MG/DL (ref 7–30)
CALCIUM SERPL-MCNC: 8.7 MG/DL (ref 8.5–10.1)
CHLORIDE SERPL-SCNC: 107 MMOL/L (ref 94–109)
CO2 SERPL-SCNC: 30 MMOL/L (ref 20–32)
COLOR FLD: YELLOW
CREAT SERPL-MCNC: 4.18 MG/DL (ref 0.52–1.04)
ERYTHROCYTE [DISTWIDTH] IN BLOOD BY AUTOMATED COUNT: 14.4 % (ref 10–15)
GFR SERPL CREATININE-BSD FRML MDRD: 9 ML/MIN/{1.73_M2}
GLUCOSE BLDC GLUCOMTR-MCNC: 166 MG/DL (ref 70–99)
GLUCOSE BLDC GLUCOMTR-MCNC: 166 MG/DL (ref 70–99)
GLUCOSE BLDC GLUCOMTR-MCNC: 196 MG/DL (ref 70–99)
GLUCOSE BLDC GLUCOMTR-MCNC: 209 MG/DL (ref 70–99)
GLUCOSE FLD-MCNC: 178 MG/DL
GLUCOSE SERPL-MCNC: 132 MG/DL (ref 70–99)
GRAM STN SPEC: NORMAL
HCT VFR BLD AUTO: 27 % (ref 35–47)
HGB BLD-MCNC: 8.1 G/DL (ref 11.7–15.7)
INR PPP: 1.23 (ref 0.86–1.14)
INTERPRETATION ECG - MUSE: NORMAL
LACTATE BLD-SCNC: 0.4 MMOL/L (ref 0.7–2)
LDH FLD L TO P-CCNC: 70 U/L
LDH SERPL L TO P-CCNC: 181 U/L (ref 81–234)
LYMPHOCYTES NFR FLD MANUAL: 46 %
MAGNESIUM SERPL-MCNC: 3.1 MG/DL (ref 1.6–2.3)
MCH RBC QN AUTO: 28.8 PG (ref 26.5–33)
MCHC RBC AUTO-ENTMCNC: 30 G/DL (ref 31.5–36.5)
MCV RBC AUTO: 96 FL (ref 78–100)
MONOS+MACROS NFR FLD MANUAL: 40 %
NEUTS BAND NFR FLD MANUAL: 9 %
OTHER CELLS FLD MANUAL: 5 %
PHOSPHATE SERPL-MCNC: 7 MG/DL (ref 2.5–4.5)
PLATELET # BLD AUTO: 192 10E9/L (ref 150–450)
POTASSIUM SERPL-SCNC: 4.4 MMOL/L (ref 3.4–5.3)
PROT FLD-MCNC: 2 G/DL
PROT SERPL-MCNC: 6.7 G/DL (ref 6.8–8.8)
RBC # BLD AUTO: 2.81 10E12/L (ref 3.8–5.2)
SODIUM SERPL-SCNC: 140 MMOL/L (ref 133–144)
SPECIMEN SOURCE FLD: NORMAL
SPECIMEN SOURCE: NORMAL
WBC # BLD AUTO: 7.9 10E9/L (ref 4–11)
WBC # FLD AUTO: 253 /UL

## 2020-12-30 PROCEDURE — 71045 X-RAY EXAM CHEST 1 VIEW: CPT

## 2020-12-30 PROCEDURE — 87205 SMEAR GRAM STAIN: CPT | Performed by: INTERNAL MEDICINE

## 2020-12-30 PROCEDURE — 250N000011 HC RX IP 250 OP 636: Performed by: INTERNAL MEDICINE

## 2020-12-30 PROCEDURE — 93306 TTE W/DOPPLER COMPLETE: CPT | Mod: 26 | Performed by: INTERNAL MEDICINE

## 2020-12-30 PROCEDURE — 0W993ZZ DRAINAGE OF RIGHT PLEURAL CAVITY, PERCUTANEOUS APPROACH: ICD-10-PCS | Performed by: RADIOLOGY

## 2020-12-30 PROCEDURE — 85027 COMPLETE CBC AUTOMATED: CPT | Performed by: INTERNAL MEDICINE

## 2020-12-30 PROCEDURE — 36415 COLL VENOUS BLD VENIPUNCTURE: CPT | Performed by: INTERNAL MEDICINE

## 2020-12-30 PROCEDURE — 250N000013 HC RX MED GY IP 250 OP 250 PS 637: Performed by: INTERNAL MEDICINE

## 2020-12-30 PROCEDURE — 84100 ASSAY OF PHOSPHORUS: CPT | Performed by: INTERNAL MEDICINE

## 2020-12-30 PROCEDURE — 94660 CPAP INITIATION&MGMT: CPT

## 2020-12-30 PROCEDURE — 83615 LACTATE (LD) (LDH) ENZYME: CPT | Performed by: INTERNAL MEDICINE

## 2020-12-30 PROCEDURE — 999N000157 HC STATISTIC RCP TIME EA 10 MIN

## 2020-12-30 PROCEDURE — 258N000003 HC RX IP 258 OP 636: Performed by: INTERNAL MEDICINE

## 2020-12-30 PROCEDURE — 84155 ASSAY OF PROTEIN SERUM: CPT | Performed by: INTERNAL MEDICINE

## 2020-12-30 PROCEDURE — 0W9B3ZZ DRAINAGE OF LEFT PLEURAL CAVITY, PERCUTANEOUS APPROACH: ICD-10-PCS | Performed by: RADIOLOGY

## 2020-12-30 PROCEDURE — 82945 GLUCOSE OTHER FLUID: CPT | Performed by: INTERNAL MEDICINE

## 2020-12-30 PROCEDURE — 99233 SBSQ HOSP IP/OBS HIGH 50: CPT | Performed by: INTERNAL MEDICINE

## 2020-12-30 PROCEDURE — 272N000042 US THORACENTESIS BILATERAL

## 2020-12-30 PROCEDURE — 83605 ASSAY OF LACTIC ACID: CPT | Performed by: INTERNAL MEDICINE

## 2020-12-30 PROCEDURE — 80048 BASIC METABOLIC PNL TOTAL CA: CPT | Performed by: INTERNAL MEDICINE

## 2020-12-30 PROCEDURE — 85610 PROTHROMBIN TIME: CPT | Performed by: INTERNAL MEDICINE

## 2020-12-30 PROCEDURE — 999N001017 HC STATISTIC GLUCOSE BY METER IP

## 2020-12-30 PROCEDURE — 89051 BODY FLUID CELL COUNT: CPT | Performed by: INTERNAL MEDICINE

## 2020-12-30 PROCEDURE — 93306 TTE W/DOPPLER COMPLETE: CPT

## 2020-12-30 PROCEDURE — 120N000001 HC R&B MED SURG/OB

## 2020-12-30 PROCEDURE — 87070 CULTURE OTHR SPECIMN AEROBIC: CPT | Performed by: INTERNAL MEDICINE

## 2020-12-30 PROCEDURE — 87015 SPECIMEN INFECT AGNT CONCNTJ: CPT | Performed by: INTERNAL MEDICINE

## 2020-12-30 PROCEDURE — 83735 ASSAY OF MAGNESIUM: CPT | Performed by: INTERNAL MEDICINE

## 2020-12-30 PROCEDURE — 250N000009 HC RX 250: Performed by: INTERNAL MEDICINE

## 2020-12-30 PROCEDURE — 84157 ASSAY OF PROTEIN OTHER: CPT | Performed by: INTERNAL MEDICINE

## 2020-12-30 RX ORDER — NICOTINE POLACRILEX 4 MG
15-30 LOZENGE BUCCAL
Status: DISCONTINUED | OUTPATIENT
Start: 2020-12-30 | End: 2020-12-30

## 2020-12-30 RX ORDER — CALCIUM ACETATE 667 MG/1
667 CAPSULE ORAL
Status: DISCONTINUED | OUTPATIENT
Start: 2020-12-30 | End: 2021-01-02

## 2020-12-30 RX ORDER — FUROSEMIDE 10 MG/ML
100 INJECTION INTRAMUSCULAR; INTRAVENOUS ONCE
Status: DISCONTINUED | OUTPATIENT
Start: 2020-12-30 | End: 2020-12-30 | Stop reason: CLARIF

## 2020-12-30 RX ORDER — FUROSEMIDE 10 MG/ML
60 INJECTION INTRAMUSCULAR; INTRAVENOUS EVERY 8 HOURS
Status: DISCONTINUED | OUTPATIENT
Start: 2020-12-30 | End: 2021-01-04

## 2020-12-30 RX ORDER — DEXTROSE MONOHYDRATE 25 G/50ML
25-50 INJECTION, SOLUTION INTRAVENOUS
Status: DISCONTINUED | OUTPATIENT
Start: 2020-12-30 | End: 2020-12-30

## 2020-12-30 RX ADMIN — LIDOCAINE HYDROCHLORIDE 20 ML: 10 INJECTION, SOLUTION EPIDURAL; INFILTRATION; INTRACAUDAL; PERINEURAL at 14:38

## 2020-12-30 RX ADMIN — HYDRALAZINE HYDROCHLORIDE 100 MG: 50 TABLET, FILM COATED ORAL at 22:39

## 2020-12-30 RX ADMIN — DILTIAZEM HYDROCHLORIDE 180 MG: 180 CAPSULE, COATED, EXTENDED RELEASE ORAL at 22:39

## 2020-12-30 RX ADMIN — FUROSEMIDE 100 MG: 10 INJECTION, SOLUTION INTRAMUSCULAR; INTRAVENOUS at 10:20

## 2020-12-30 RX ADMIN — DOXAZOSIN 1 MG: 1 TABLET ORAL at 22:39

## 2020-12-30 RX ADMIN — CALCIUM ACETATE 667 MG: 667 CAPSULE ORAL at 18:38

## 2020-12-30 RX ADMIN — FUROSEMIDE 60 MG: 10 INJECTION, SOLUTION INTRAMUSCULAR; INTRAVENOUS at 22:42

## 2020-12-30 RX ADMIN — GABAPENTIN 200 MG: 100 CAPSULE ORAL at 10:17

## 2020-12-30 RX ADMIN — HYDRALAZINE HYDROCHLORIDE 100 MG: 50 TABLET, FILM COATED ORAL at 18:38

## 2020-12-30 RX ADMIN — SIMVASTATIN 10 MG: 10 TABLET, FILM COATED ORAL at 22:39

## 2020-12-30 RX ADMIN — SERTRALINE HYDROCHLORIDE 50 MG: 50 TABLET ORAL at 10:18

## 2020-12-30 RX ADMIN — GABAPENTIN 200 MG: 100 CAPSULE ORAL at 22:39

## 2020-12-30 RX ADMIN — DILTIAZEM HYDROCHLORIDE 180 MG: 180 CAPSULE, COATED, EXTENDED RELEASE ORAL at 10:17

## 2020-12-30 RX ADMIN — FUROSEMIDE 60 MG: 10 INJECTION, SOLUTION INTRAMUSCULAR; INTRAVENOUS at 15:11

## 2020-12-30 RX ADMIN — HYDRALAZINE HYDROCHLORIDE 100 MG: 50 TABLET, FILM COATED ORAL at 10:17

## 2020-12-30 ASSESSMENT — ACTIVITIES OF DAILY LIVING (ADL)
ADLS_ACUITY_SCORE: 17
DEPENDENT_IADLS:: TRANSPORTATION

## 2020-12-30 ASSESSMENT — MIFFLIN-ST. JEOR: SCORE: 1483.73

## 2020-12-30 NOTE — PLAN OF CARE
To Do:  End of Shift Summary  For vital signs and complete assessments, please see documentation flowsheets.     Pertinent assessments: Patient is Aox4 this shift but very tired. Slept for most of shift. Denies having any pain. Complains of SOB with activity. On 10 liters O2 via Oxymask. +2 pitting edema. Poor appetite. 100 mg of Lasix given via IV infusion. Urinary retention, straight cathed x1.     Major Shift Events: Straight cath x1 this shift. Started on Phoslo for Phos of 7.0. Lasix 60 mg Q 8 hours. Thoracentesis today, 810 mls removed from right side and 800 from left side.     Treatment Plan: Thoracentesis today. Strict I/Os. Waiting for nephrology consult. IV Lasix. Aguilera cath needs to be placed when patient is back from \[facility] [place] [facility]\""     Bedside Nurse: Ariane العراقي RN

## 2020-12-30 NOTE — PLAN OF CARE
PT: Nursing requesting to hold therapy at this time for medical management, will continue to follow.

## 2020-12-30 NOTE — PROCEDURES
[facility]    Procedure: US biltaeral thoracentesis    Date/Time: 12/30/2020 2:34 PM  Performed by: Jeremías Yeung MD  Authorized by: Jeremías Yeung MD     UNIVERSAL PROTOCOL   Site Marked: NA  Prior Images Obtained and Reviewed:  Yes  Required items: Required blood products, implants, devices and special equipment available    Patient identity confirmed:  Verbally with patient, arm band, provided demographic data and hospital-assigned identification number  Patient was reevaluated immediately before administering moderate or deep sedation or anesthesia  Confirmation Checklist:  Patient's identity using two indicators, relevant allergies, procedure was appropriate and matched the consent or emergent situation and correct equipment/implants were available  Time out: Immediately prior to the procedure a time out was called    Universal Protocol: the Joint Commission Universal Protocol was followed    Preparation: Patient was prepped and draped in usual sterile fashion    ESBL (mL):  5         ANESTHESIA    Anesthesia: Local infiltration  Local Anesthetic:  Lidocaine 1% without epinephrine      SEDATION    Patient Sedated: No    See dictated procedure note for full details.  Findings: Bilateral US thoracentesis.  About 800 ml removed from each pleural space.  Sample sent from the right pleural space.  No complications.  Post CXR.  No residual.    Specimens: fluid and/or tissue for gram stain and culture, fluid and/or tissue for laboratory analysis and fluid and/or tissue for laboratory analysis and culture    Complications: None    Condition: Stable    PROCEDURE   Patient Tolerance:  Patient tolerated the procedure well with no immediate complications    Length of time physician/provider present for 1:1 monitoring during sedation: 0

## 2020-12-30 NOTE — PROGRESS NOTES
Clinic Care Coordination Contact  Ambulatory Care Coordination to Inpatient Care Management   Hand-In Communication    Date:  December 30, 2020  Name: Ирина Yanez is enrolled in Ambulatory Care Coordination program and I am the Lead Care Coordinator.  CC Contact Information: Epic InPolyhealsket + phone  Payor Source: Payor: [facility] / Plan: [facility] MEDICARE ADVANTAGE / Product Type: Medicare /   Current services in place:     Please see the CC Snaphot and Care Management Flowsheets for specific details of this Ирина Yanez care plan.   Additional details/specific concerns r/t this admission:    Home Safety Patient reports she has spouse, however unclear what level of support/assistance he provides or how involved he is in assisting with her cares/needs and Discharge Disposition patient reported feeling weak and appeared somewhat confused upon speaking with her recently. Did not have inhaler or nebulizer for dyspnea, did not recall medications she was taking.     I will follow this admission in Epic. Please feel free to contact me with questions or for further collaboration in discharge planning.    Abeba Christensen RN Care Coordinator  [facility] [place]  Email: [email or web address]  Phone: [phone]

## 2020-12-30 NOTE — PLAN OF CARE
Admitting Diagnosis: acute on subacute hypoxemic respiratory failure, large bilat. Pleural effusion  Pertinent PMH: chronic diastolic CHF, FABY on CKD Stage IV, HTN, T2DM, TERESSA, HLD, Anxiety, peripheral neuropathy  Mobility: SBA, walker at home  Diet: Orders Placed This Encounter   Renal Diet (non-dialysis)    Consults: PT, Social Work or Care Coordination, and Nephrology  LDAs: Peripheral  Alarms/Safety: Bed Alarm  Pertinent Labs/Imagin.9, CR 4.23, BNP 2,319, Procalcitonin 0.14  Isolation Status: No active isolations   Telemetry: Yes    Pertinent assessments: Arrived to floor at 1945, A&Ox4, denies pain. Lungs clear but diminished, cardiac WNL, tele on, small BM this shift, Up to bedside commode with SBA,  and 218.   Major Shift Events: Up to unit  Treatment Plan: Thoracentesis , renal diet and diabetic/renal education, monitor I/Os, monitor weight

## 2020-12-30 NOTE — CONSULTS
Care Management Initial Consult    General Information  Assessment completed with: Patient,    Type of CM/SW Visit: Initial Assessment    Primary Care Provider verified and updated as needed: Yes   Readmission within the last 30 days: current reason for admission unrelated to previous admission   Return Category: New Diagnosis  Reason for Consult: care coordination/care conference, discharge planning  Advance Care Planning:     No concerns       Communication Assessment  Patient's communication style: spoken language (English or Bilingual)    Hearing Difficulty or Deaf: yes   Wear Glasses or Blind: yes    Cognitive  Cognitive/Neuro/Behavioral: WDL                      Living Environment:   People in home: spouse     Current living Arrangements: mobile home      Able to return to prior arrangements: yes       Family/Social Support:  Care provided by:  self  Provides care for: no one  Marital Status:   , Children          Description of Support System: Supportive, Involved    Support Assessment: Adequate family and caregiver support    Current Resources:   Skilled Home Care Services: Skilled Nursing  Community Resources: Home Care  Equipment currently used at home: walker, rolling  Supplies currently used at home: Oxygen Tubing/Supplies    Employment/Financial:  Employment Status: retired        Financial Concerns: No concerns identified           Lifestyle & Psychosocial Needs:  Lifestyle     Physical activity     Days per week: 0 days     Minutes per session: 0 min     Stress: Only a little     Social Needs     Financial resource strain: Not hard at all     Food insecurity     Worry: Never true     Inability: Never true     Transportation needs     Medical: No     Non-medical: No     Socioeconomic History     Marital status:      Spouse name: Not on file     Number of children: Not on file     Years of education: Not on file     Highest education level: 12th grade   Occupational History      Occupation:  from home     Employer: RETIRED   Relationships     Social connections     Talks on phone: Three times a week     Gets together: Never     Attends Worship service: Never     Active member of club or organization: No     Attends meetings of clubs or organizations: Never     Relationship status:      Intimate partner violence     Fear of current or ex partner: Not on file     Emotionally abused: Not on file     Physically abused: Not on file     Forced sexual activity: Not on file     Tobacco Use     Smoking status: Never Smoker     Smokeless tobacco: Never Used   Substance and Sexual Activity     Alcohol use: No     Frequency: Never     Binge frequency: Never     Drug use: No     Sexual activity: Yes     Partners: Male       Functional Status:  Prior to admission patient needed assistance:   Dependent ADLs:: Independent  Dependent IADLs:: Transportation       Mental Health Status:  Mental Health Status: No Current Concerns       Chemical Dependency Status:      NA          Values/Beliefs:  Spiritual, Cultural Beliefs, Gnosticism Practices, Values that affect care:       No concerns identified          Additional Information:  Pt identified as high risk and on HF list.  BNP 2,319. CTS met with patient at bedside. Provided and educated pt on CHF action plan. Verified understanding using teach back techqnuie. Pt said she has a scale but is too lazy to weigh herself daily.  Reinforced importance of daily weights. Pt does follow a low sodium diet. She set up her own medication, she said she misses her afternoon dose about twice a week.  Brainstormed ways for her to remember afternoon dose.  She uses 1L O2 at baseline.  O2 provided the FVDME.  At home, she uses BiPAP at night. She said she is receiving RN HC through [facility].Family will transport home.  CC will continue to follow for addition discharge needs. CTS made doctor apt for January 11, 2021 at 9AM in [facility],  information on AVS  Peyton Iverson RN, BSN, PHN, CTS  Care Coordinator  [facility]  [phone]        Addendum:  [facility] had NOT yet began services.  New referral will need to be made if needed at discharge.

## 2020-12-30 NOTE — ED NOTES
Emergency Department Attending Supervision Note        I evaluated this patient with Jed.       Briefly, the patient presented with worsening shortness of breath and worsening oxygen requirement after recent hospitalization for presumed pneumonia with possible pulmonary mass.  Based on history and review of the record, the patient has had worsening renal failure since discharge, not improved after holding torsemide for several days, and exam shows dyspnea on exertion with trace lower extremity edema.  She is breathing comfortably at rest but requires 2.5 L nasal cannula versus 1 at home.  Covid negative.  Acute renal failure as noted without significant electrolyte disturbance.  Imaging shows large pleural effusions, overall consistent with volume overload status.  Plan admit for nephrology consultation and thoracentesis.  Hemodynamically stable and safe for admission at this time.    In summary, my impression is       ICD-10-CM   1. Dyspnea  R06.00   2. Hypoxemia  R09.02   3. CHF (congestive heart failure) (H)  I50.9   4. Acute on chronic kidney failure (H)  N17.9    N18.9       Samm Fink MD  [facility]  [facility] Emergency Department           Samm Fink MD  12/29/20 [id number]

## 2020-12-30 NOTE — H&P
[facility]  Hospitalist Admission Note  Name: Ирина Yanez    MRN: [id number]  YOB: 1935    Age: 85 year old  Date of admission: 12/29/2020  Primary care provider: Yuridia Villarreal    Chief Complaint: Shortness of breath    Assessment and Plan:   Acute on subacute hypoxemic respiratory failure, large bilateral pleural effusion: Multifactorial in origin, but primarily likely from her large bilateral pleural effusions.  Suspect diastolic CHF contributing to volume overload.  Recent hospitalization for pneumonia 2 weeks ago and discharged on 2 weeks of Augmentin with near resolution of the right upper lobe masslike consolidation on the repeat noncontrast chest CT.  No further cough or fevers and doubt acute infection at this time.  D-dimer slightly elevated at 1.1, however cannot get CTPA to rule out PE although has no chest pain and my suspicion at this point is low.  Discharged on 1 L O2 which she has been using continuous and frequently has saturations in the 80s now.  Symptomatic COVID-19 PCR and influenza A/B PCR are negative.  -Recommend bilateral diagnostic and therapeutic ultrasound guided thoracentesis by IR tomorrow which I have ordered, patient agrees.  Labs ordered and will check for cell count, culture, and cytology from the right pleural fluid given the right upper lobe masslike consolidation last month  -Diuresis trial as below  -Continuous pulse ox and supplemental oxygen as needed  -Check procalcitonin    Suspect acute on chronic diastolic CHF: Progressive shortness of breath with hypoxia as above with additional findings of large bilateral pleural effusions and 1-2+ bilateral lower extremity pitting edema.  Previous TTE from 1/2020 showed EF 60 to 65% and grade 1 diastolic dysfunction.  Torsemide held 4 days ago due to doubling of creatinine up to 4.  Her weight in clinic 1 week ago was 222 lb, up from 212-215 earlier in the month.  Urine sodium low which would be  consistent with CHF.  -Bilateral thoracentesis as above  -Give 40 mg IV Lasix now and reassess tomorrow  -Strict intake and output, daily weights - need to check on admission prior to diuretics  -If no urine output with Lasix either intravascularly dry or needs larger doses of Lasix due to significantly impaired renal function  -Trend renal function as below  -Obtain TTE  -Telemetry    FABY on CKD stage IV: Creatinine baseline of 2.  FABY now with creatinine of 4.2 similar to 4.3 checked 7 days ago in clinic.  Her torsemide was held at that point roughly for the last 4 days and she has not noted any significant change in urine output.  Denies any GI losses or decreased fluid intake.  FENA 0.2 and urine sodium is low at 8 , however she is hypervolemic on exam and I am suspicious for cardiorenal syndrome.  Urinalysis is not cellular or suspicious for glomerulonephritis.  No obstructive symptoms.  -Consult nephrology  -Give 40 mg IV Lasix and repeat BMP tomorrow  -Need to track urine output amount closely    HTN: PTA on diltiazem  mg twice daily doxazosin 1 mg at bedtime, hydralazine 100 mg 3 times daily, and torsemide 20 mg daily which is above was held for the past 4 days.  Blood pressure 145/100.  -Resume diltiazem, hydralazine, and doxazosin  -Diuretics as above    Type II DM: PTA on glimepiride 1 mg daily and Actos 15 mg daily.  A1c earlier this year 6.8.  -Hold glimepiride and Actos  -Medium dose sliding scale insulin    Obesity: BMI 39, some of this is fluid weight.    TERESSA: Continue CPAP at bedtime.    MDD, anxiety:   Continue sertraline 50 mg daily.    HLD: Resume simvastatin 10 mg at bedtime.  CK level checked and not elevated.    Chronic normocytic anemia: Hemoglobin at baseline 8 range.    Peripheral neuropathy: Resume PTA gabapentin 200 mg twice daily.    RBBB: Not new.    DVT Prophylaxis: Pneumatic Compression Devices  Code Status: Full Code -discussed with patient  FEN: Renal diet, no IV  fluids  Discharge Dispo: TBD.  Lives in a mobile home with her spouse who is unsteady on his feet.  She uses a walker at baseline and is fatigued.  PT and social work consult.  Estimated Disch Date / # of Days until Disch: Admit inpatient for large bilateral effusion, hypoxic respiratory failure, FABY, and suspected CHF.  Anticipate minimal 3-4 night hospitalization      History of Present Illness:  Ирина Yanez is a 85 year old female with PMH including TERESSA on CPAP, type II DM, CKD stage IV with baseline creatinine 2, neuropathy, MDD, anxiety, HTN, RBBB, anemia, mild aortic stenosis, obesity, and grade 1 diastolic dysfunction on TTE who presents with shortness of breath and hypoxia.  She was hospitalized here from 12/10 through 12/14 for pneumonia along with some bilateral pleural effusions.  She had a right upper lobe consolidative mass concerning for malignancy versus infection.  She received ceftriaxone and azithromycin then Unasyn and discharged on Augmentin to complete 14-day course.  Had plan to follow-up in 8 weeks with pulmonology with repeat chest CT.  She did require 1 L oxygen continuous on discharge which was new.  Since then she has never had improvement in her shortness of breath.  Infectious had progressive worsening of her shortness of breath and has been hypoxic to the 80s frequently despite 1 L continuous oxygen.  She is short of breath at rest, with any movement, and also endorses orthopnea.  She denies any cough, fevers, or chills.  Has not had any chest pain or pressure sensation.  Has not noticed any palpitations.  She had follow-up in clinic with labs checked on 12/22.  Her creatinine was above 4, double from baseline.  Dr. Whatley from nephrology called her to hold her torsemide which she has done for approximately past 4 days.  She had not noticed any change in urine output.  She has not had any decrease in oral fluid intake.  Denies any nausea, vomiting, or diarrhea.  Has not had any  dysuria or hematuria.  She is not sure if her leg swelling is worse than baseline or not.  She has not been checking her weights daily due to feeling unsteady on her feet.  She normally ambulates with a walker.    History obtained from patient, medical record, and from LUCIEN Kelly in the emergency department.  Blood pressure 145/60, heart rate 71, temperature 98.0  F, respiratory rate in the mid 30s, oxygen 89% on 2 L improved on 3 L.  CBC shows WBC 10.0, hemoglobin 8.9, platelet count 221.  Creatinine elevated at 4.2, BUN 84, sodium 141, potassium 4.2, chloride 108, bicarb 28.  Albumin is 3.0.  Rest of LFTs normal.  BNP elevated at 2319.  Troponin is undetectable.  Lactic acid 0.7.  VBG shows pH 7.31, PCO2 53, PO2 43, bicarbonate 27.  Blood glucose is 136.  FENA was checked and it is 0.2 with a low urine sodium at 8.  Urinalysis shows 8 WBCs and 8 RBCs.  COVID-19 PCR and influenza a and B PCR are negative.  D-dimer elevated at 1.1, but cannot get contrast due to FABY.  Noncontrast chest CT shows resolution of right upper lobe mass, but very large bilateral pleural effusions.  EKG shows NSR with RBBB and no ST elevation or depression.  Suspect CHF and possible cardiorenal syndrome.  Admitting for trial of IV Lasix, nephrology consultation, and bilateral thoracentesis tomorrow.     Past Medical History reviewed:  Past Medical History:   Diagnosis Date     Abrasion of arm, right, initial encounter 7/10/2019     Anxiety      Arthritis      Chronic pain     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     Depression      Diabetes mellitus (H)     sees Dr. Verde- type II     Falls frequently 7/10/2019     Heart murmur      HTN      Hyperlipidemia LDL goal < 100     Dr. Verde     Lichen sclerosus et atrophicus 2/15/2013     Melanoma (H)      Peripheral Neuropathy     hands, feet; used to see Dr. Tl Das     Skin cancer     face; basal and other. Melanoma. Dolan     Sleep apnea     CPAP     Spinal  stenosis      Past Surgical History reviewed:  Past Surgical History:   Procedure Laterality Date     AMPUTATE TOE(S)  8/23/2012    left second toe Procedure: AMPUTATE TOE(S);;  Surgeon: Mil Jolly DPM;  Location: [facility] OR     CHOLECYSTECTOMY  Nov. 1975     COLONOSCOPY  early 2009    repeat 4-5 years (Had one prior to this with polyps)     COLONOSCOPY  Sept 2014    incomplete; recommend colography     COLONOSCOPY  02/25/2020    Dr. Pathak Pending sale to [facility]     COLONOSCOPY N/A 2/25/2020    Procedure: COLONOSCOPY, WITH biopsies using forceps;  Surgeon: Adebayo Pathak MD;  Location:  GI     OPTICAL TRACKING SYSTEM FUSION POSTERIOR SPINE LUMBAR N/A 9/16/2016    Procedure: OPTICAL TRACKING SYSTEM FUSION SPINE POSTERIOR LUMBAR ONE LEVEL;  Surgeon: Lennox Blue MD;  Location: [facility] OR     PET, REC OF MELANOMA/MET CA Left     arm     REPAIR HAMMER TOE BILATERAL  8/23/2012    Procedure: REPAIR HAMMER TOE BILATERAL;  Flexor Tenotomy 2,3,4,5 Toes both feet, Partial 2nd toe amputation left foot;  Surgeon: Mil Jolly DPM;  Location:  OR     REPAIR TENDON QUADRICEPS Right 10/27/2015    Procedure: REPAIR TENDON QUADRICEPS;  Surgeon: Antonio Yi MD;  Location:  OR     SURGICAL HISTORY OF -   1997    bilateral knee replacement     SURGICAL HISTORY OF -   1997    right knee revised; patella tendon ruptured     SURGICAL HISTORY OF -       surgery for spinal stenosis     SURGICAL HISTORY OF -       breast reduction surgery     SURGICAL HISTORY OF -       D and C      Social History reviewed:  Social History     Tobacco Use     Smoking status: Never Smoker     Smokeless tobacco: Never Used   Substance Use Topics     Alcohol use: No     Frequency: Never     Binge frequency: Never     Social History     Social History Narrative    2 adopted children     Family History reviewed:  Family History   Problem Relation Age of Onset     Cerebrovascular Disease Mother      Heart Disease Father      Neurologic Disorder  Sister         ALS     C.A.D. Sister      Diabetes Sister      C.A.D. Brother      Psychotic Disorder Brother         [place] war: PTSD. suicide 2019     Gastrointestinal Disease Brother         diverticulitis     Colon Cancer No family hx of      Allergies:  Allergies   Allergen Reactions     Augmentin Diarrhea     Vomiting       Cleocin      Hives     Tegretol [Carbamazepine]      Irregular heart beat     Medications:  Prior to Admission medications    Medication Sig Last Dose Taking? Auth Provider   albuterol (PROAIR HFA/PROVENTIL HFA/VENTOLIN HFA) 108 (90 Base) MCG/ACT inhaler Inhale 2 puffs into the lungs 4 times daily  Yes Yuridia Villarreal MD   diltiazem ER COATED BEADS (CARDIAZEM LA) 180 MG 24 hr tablet Take 180 mg by mouth 2 times daily  12/29/2020 at x1 Yes Reported, Patient   doxazosin (CARDURA) 1 MG tablet Take 1 mg by mouth At Bedtime  12/28/2020 at Unknown time Yes Reported, Patient   fish oil-omega-3 fatty acids 1000 MG capsule Take 1 g by mouth daily 12/28/2020 at hs Yes Unknown, Entered By History   gabapentin (NEURONTIN) 100 MG capsule TAKE 2 CAPSULES BY MOUTH 2 TIMES DAILY GENERIC EQUIVALENT FOR NEURONTIN 12/29/2020 at x1 Yes Yuridia Villarreal MD   glimepiride (AMARYL) 1 MG tablet Take 1-tablet by mouth daily 12/29/2020 at Unknown time Yes Reji Verde MD   hydrALAZINE (APRESOLINE) 50 MG tablet Take 100 mg by mouth 3 times daily  12/29/2020 at x1 Yes Reported, Patient   MULTI-VITAMIN OR TABS Take 2 tablets by mouth daily  12/29/2020 at Unknown time Yes Reported, Patient   nystatin (MYCOSTATIN) 840679 UNIT/GM external powder Apply topically 3 times daily as needed  Yes Unknown, Entered By History   pioglitazone (ACTOS) 15 MG tablet Take 1 tablet (15 mg) by mouth daily 12/29/2020 at Unknown time Yes Reji Verde MD   sertraline (ZOLOFT) 50 MG tablet Take 1 tablet (50 mg) by mouth daily 12/28/2020 at hs t Yes Yuridia Villarreal MD   simvastatin (ZOCOR) 10 MG tablet TAKE 1 TABLET BY MOUTH AT  "BEDTIME. 12/28/2020 at Unknown time Yes Yuridia Villarreal MD   blood glucose (NO BRAND SPECIFIED) lancets standard Use to test blood sugar 1 times daily or as directed, Contour Next lancets   Reji Verde MD   blood glucose (NO BRAND SPECIFIED) test strip Use to test blood sugar 1 times daily or as directed, Contour Next strips   Reji Verde MD   blood glucose monitoring (NO BRAND SPECIFIED) meter device kit Use to test blood sugar 1 times daily or as directed. Ultra 2   Reji Verde MD   order for DME Equipment being ordered: walker. 4 wheeled with brakes and a seat.   Yuridia Villarreal MD   torsemide (DEMADEX) 10 MG tablet Take 20 mg by mouth daily ON HOLD  Unknown, Entered By History     Review of Systems:  A Comprehensive greater than 10 system review of systems was carried out.  Pertinent positives and negatives are noted above.  Otherwise negative.     Physical Exam:  Blood pressure (!) 148/46, pulse 82, temperature 97.8  F (36.6  C), temperature source Oral, resp. rate 18, height 1.626 m (5' 4\"), weight 104.5 kg (230 lb 6.4 oz), SpO2 92 %, not currently breastfeeding.  Wt Readings from Last 1 Encounters:   12/29/20 104.5 kg (230 lb 6.4 oz)     Exam:  Constitutional: Awake, appears short of breath  Eyes: sclera white  HEENT: atraumatic, MMM  Respiratory: Mild to moderate tachypnea, decreased breath sounds mid lung zone down bilaterally, few scattered crackles, no wheeze  Cardiovascular: RRR, no murmur  GI: non-tender, not distended, bowel sounds present  Skin: no rash or lesions, acyanotic  Musculoskeletal/extremities: atraumatic, no major deformities.  1-2+ bilateral lower extremity pitting edema  Neurologic: A&),, speech clear, follows commands, moves extremities equally  Psychiatric: calm, cooperative     Lab and imaging data personally reviewed:  Labs:  Recent Labs   Lab 12/29/20  1523   PHV 7.31*   PO2V 43   PCO2V 53*   HCO3V 27     Recent Labs   Lab 12/29/20  1523   WBC 10.0   HGB 8.9*   HCT " 29.8*   MCV 95        Recent Labs   Lab 12/29/20  1523      POTASSIUM 4.2   CHLORIDE 108   CO2 28   ANIONGAP 5   *   BUN 84*   CR 4.23*   GFRESTIMATED 9*   GFRESTBLACK 10*   EDNA 8.9   PROTTOTAL 7.5   ALBUMIN 3.0*   BILITOTAL 0.3   ALKPHOS 73   AST 10   ALT 22     Recent Labs   Lab 12/29/20  1523   NTBNPI 2,319*     Recent Labs   Lab 12/29/20  1523   DD 1.1*     Recent Labs   Lab 12/29/20  1523   LACT 0.7     Recent Labs   Lab 12/29/20  1523   TROPI <0.015     Recent Labs   Lab 12/29/20  1711   COLOR Yellow   APPEARANCE Slightly Cloudy   URINEGLC Negative   URINEBILI Negative   URINEKETONE Negative   SG 1.015   UBLD Negative   URINEPH 5.0   PROTEIN 20*   NITRITE Negative   LEUKEST Negative   RBCU 8*   WBCU 8*     COVID-19 and influenza A/B PCR negative  FENA 0.2, urine sodium 8    EKG: NSR, RBBB, no ST elevation or depression    Imaging:  Recent Results (from the past 24 hour(s))   XR Chest Port 1 View    Narrative    XR CHEST PORT 1 VW 12/29/2020 4:27 PM    HISTORY: dyspnea    COMPARISON: CT 12/10/2020    FINDINGS: Persistent small bilateral pleural effusions with adjacent  atelectasis/infiltrates, left greater than right. Interval mild  improved in aeration within the right upper lobe favoring improving  pneumonia. Continued follow-up is recommended. Stable mild  cardiomegaly.    TONY PÉREZ MD   Chest CT w/o contrast    Narrative    EXAM: CT CHEST W/O CONTRAST  LOC[facility]  DATE/TIME: 12/29/2020 5:19 PM    INDICATION: Dyspnea, recent pneumonia versus mass, 3 weeks ago  COMPARISON: 12/10/2020  TECHNIQUE: CT chest without IV contrast. Multiplanar reformats were obtained. Dose reduction techniques were used.  CONTRAST: None.    FINDINGS:   LUNGS AND PLEURA: Masslike consolidation in the right upper lobe has nearly completely resolved. Mild groundglass density and bandlike atelectasis or scarring are now seen. Large bilateral pleural effusions layer dependently which  have increased. No   pneumothorax.    MEDIASTINUM/AXILLAE: Great vessels normal in caliber. Moderate aortic valve calcification. Moderate coronary arterial calcification no pericardial effusion    UPPER ABDOMEN: No significant finding.    MUSCULOSKELETAL: No suspicious bone lesion. Diffuse osteopenia. Severe multilevel degenerative change in the thoracic spine.      Impression    IMPRESSION:   1.  Near complete resolution of previously seen masslike consolidation in the right upper lobe.  2.  Large bilateral pleural effusions and partial lower lobe atelectasis, slightly increased.           Frandy Julio MD  Hospitalist  [facility]

## 2020-12-30 NOTE — PROGRESS NOTES
[facility]    Medicine Progress Note - Hospitalist Service       Date of Admission:  12/29/2020  Length of stay: 1 days    Assessment & Plan   Ирина Yanez is an 85 year old female with a history of TERESSA on CPAP, type II DM, CKD stage IV with baseline creatinine 2, neuropathy, MDD, anxiety, HTN, RBBB, anemia, mild aortic stenosis, obesity, and grade 1 diastolic dysfunction on TTE who presented to the ED with shortness of breath and hypoxia.     Acute on subacute hypoxemic respiratory failure, large bilateral pleural effusions  - Multifactorial in origin, but primarily likely from her large bilateral pleural effusions.  Suspect diastolic CHF contributing to volume overload.  Recent hospitalization for pneumonia 2 weeks ago and discharged on 2 weeks of Augmentin with near resolution of the right upper lobe masslike consolidation on the repeat noncontrast chest CT.  No further cough or fevers. Discharged on 1 L O2 which she has been using continuous and frequently had sats in the 80s on admission.  - Symptomatic COVID-19 PCR and influenza A/B PCR are negative.  - Bilateral diagnostic and therapeutic ultrasound guided thoracentesis by IR.  Labs ordered and will check for cell count, culture, and cytology from the right pleural fluid given the right upper lobe masslike consolidation last month  - Diuresis trial as below  - Continuous pulse ox and supplemental oxygen as needed  - O2 needs increased on 12/30 up to 10L but patient seems to be breathing relatively comfortably     Suspect acute on chronic diastolic CHF   - Progressive shortness of breath with hypoxia as above with additional findings of large bilateral pleural effusions and 1-2+ bilateral lower extremity pitting edema.  Previous TTE from 1/2020 showed EF 60 to 65% and grade 1 diastolic dysfunction.  Torsemide held 4 days PTA due to doubling of creatinine up to 4.  Her weight in clinic 1 week ago was 222 lb, up from 212-215 earlier in the month.   Urine sodium low which would be consistent with CHF.  - Bilateral thoracentesis as above  - Strict intake and output, daily weights - need to check on admission prior to diuretics  - Patient had minimal response to 40 mg IV Lasix.  Redose with 100 mg IV on 12/30.  Probably needs a lot of diuretic given her renal function.  - Check TTE to see if there has been a change in her EF.     FABY on CKD stage IV  - Creatinine baseline of 2.  FABY on admission with creatinine of 4.2 similar to 4.3 checked 7 days ago in clinic.  Her torsemide was held roughly for the last 4 days and she denied any significant change in urine output.  Urinalysis is not cellular or suspicious for glomerulonephritis.  No obstructive symptoms.  - Consult nephrology  - Need to track urine output amount closely  - Overall consistent with cardiorenal syndrome, trend with diuresis     HTN  - PTA on diltiazem  mg twice daily doxazosin 1 mg at bedtime, hydralazine 100 mg 3 times daily, and torsemide 20 mg daily   - Resume diltiazem, hydralazine, and doxazosin  - Diuretics as above     Type II DM  - PTA on glimepiride 1 mg daily and Actos 15 mg daily.  A1c earlier this year 6.8.  - Hold glimepiride and Actos.  Glimepiride may need to be permanent discontinued if her kidney function does not improve.  Actos may be resumed on discharge.  - Medium dose sliding scale insulin     Obesity  - BMI 39, some of this is fluid weight.     TERESSA   - Continue CPAP at bedtime.     MDD, anxiety  - Continue sertraline 50 mg daily.     HLD  - Resume simvastatin 10 mg at bedtime.  CK level checked and not elevated.     Chronic normocytic anemia:  - Hemoglobin at baseline 8 range.     Peripheral neuropathy:  - Resume PTA gabapentin 200 mg twice daily.     Diet: Renal  DVT Prophylaxis: Pneumatic Compression Devices  Malnutrition: No  Aguilera Catheter: No  Code Status: Full Code     Disposition Plan   Expected discharge:   Anticipate 3-5 more days as inpatient to monitor  "renal function and respiratory status.    Hal Lopez MD  Hospitalist Service  [facility]  ______________________________________________________________________    Interval History   Oxygen needs increased this morning.  Patient says she feels okay though and is laying flat in the bed.  Denies being in any pain right now no cough, fever or chills.    Data reviewed today: I reviewed all medications, new labs and imaging results over the last 24 hours.     Physical Exam   BP (!) 141/45   Pulse 67   Temp 96.3  F (35.7  C) (Axillary)   Resp 20   Ht 1.626 m (5' 4\")   Wt 104.4 kg (230 lb 1.6 oz)   LMP  (LMP Unknown)   SpO2 93%   BMI 39.50 kg/m    230 lbs 1.6 oz       General: Laying flat in the bed, no acute distress.    HEENT: No scleral icterus. Oropharynx moist.     Neck: Supple. Normal range of motion.     Pulmonary: Normal work of breathing.  Diminished at the bases.    Cardiovascular: Regular rate and rhythm without murmur or extra heart sounds.    Abdomen: Soft and non-tender.    Extremities: Trace bilateral peripheral edema.    Neurologic: Awake, alert, appropriate.    Skin: Warm and dry.    Psychiatric: Normal affect and mood.     Data    Recent Labs   Lab 12/30/20  0823 12/29/20  1523   WBC 7.9 10.0   HGB 8.1* 8.9*   MCV 96 95    221   INR 1.23*  --     141   POTASSIUM 4.4 4.2   CHLORIDE 107 108   CO2 30 28   BUN 82* 84*   CR 4.18* 4.23*   ANIONGAP 3 5   EDNA 8.7 8.9   * 136*   ALBUMIN  --  3.0*   PROTTOTAL 6.7* 7.5   BILITOTAL  --  0.3   ALKPHOS  --  73   ALT  --  22   AST  --  10   TROPI  --  <0.015     Recent Results (from the past 24 hour(s))   XR Chest Port 1 View    Narrative    XR CHEST PORT 1 VW 12/29/2020 4:27 PM    HISTORY: dyspnea    COMPARISON: CT 12/10/2020    FINDINGS: Persistent small bilateral pleural effusions with adjacent  atelectasis/infiltrates, left greater than right. Interval mild  improved in aeration within the right upper lobe " favoring improving  pneumonia. Continued follow-up is recommended. Stable mild  cardiomegaly.    TONY PÉREZ MD   Chest CT w/o contrast    Narrative    EXAM: CT CHEST W/O CONTRAST  LOCATION: [facility]  DATE/TIME: 2020 5:19 PM    INDICATION: Dyspnea, recent pneumonia versus mass, 3 weeks ago  COMPARISON: 12/10/2020  TECHNIQUE: CT chest without IV contrast. Multiplanar reformats were obtained. Dose reduction techniques were used.  CONTRAST: None.    FINDINGS:   LUNGS AND PLEURA: Masslike consolidation in the right upper lobe has nearly completely resolved. Mild groundglass density and bandlike atelectasis or scarring are now seen. Large bilateral pleural effusions layer dependently which have increased. No   pneumothorax.    MEDIASTINUM/AXILLAE: Great vessels normal in caliber. Moderate aortic valve calcification. Moderate coronary arterial calcification no pericardial effusion    UPPER ABDOMEN: No significant finding.    MUSCULOSKELETAL: No suspicious bone lesion. Diffuse osteopenia. Severe multilevel degenerative change in the thoracic spine.      Impression    IMPRESSION:   1.  Near complete resolution of previously seen masslike consolidation in the right upper lobe.  2.  Large bilateral pleural effusions and partial lower lobe atelectasis, slightly increased.     Echocardiogram Complete    Narrative    [id number]  [id number]  [id number]  [id number]^CHRISTOPHER^EULALIA^[facility]  Echocardiography Laboratory  [address] [place] [address]        Name: BEATA TRIPLETT  MRN: [id number]  : 1935  Study Date: 2020 09:03 AM  Age: 85 yrs  Gender: Female  Patient Location: [facility]  Reason For Study: CHF, Dyspnea  Ordering Physician: EULALIA WHITE  Performed By: Mila Moser     BSA: 2.1 m2  Height: 64 in  Weight: 230 lb  HR: 71  BP: 130/54 mmHg  _____________________________________________________________________________  __         Procedure  Complete Portable Echo Adult.  _____________________________________________________________________________  __        Interpretation Summary     The ascending aorta is Moderately dilated.  Mild valvular aortic stenosis, BRAYDON 1.6 cm2  The visual ejection fraction is estimated at 65-70%.  The left atrium is mildly dilated.  Dilation of the inferior vena cava is present with abnormal respiratory  variation in diameter.  Right ventricular systolic pressure is elevated, consistent with mild  pulmonary hypertension.  Grade I or early diastolic dysfunction.  The study was technically difficult. Compared to prior study, there is no  significant change.  _____________________________________________________________________________  __        Left Ventricle  The left ventricle is normal in size. The visual ejection fraction is  estimated at 65-70%. Grade I or early diastolic dysfunction.     Right Ventricle  The right ventricle is normal in structure, function and size.     Atria  The left atrium is mildly dilated. Right atrial size is normal.     Mitral Valve  There is mild to moderate mitral annular calcification.        Tricuspid Valve  There is tricuspid annular calcification. The tricuspid valve is not well  visualized. Right ventricular systolic pressure is elevated, consistent with  mild pulmonary hypertension.     Aortic Valve  Mild valvular aortic stenosis.     Pulmonic Valve  The pulmonic valve is not well seen, but is grossly normal. There is trace  pulmonic valvular regurgitation.     Vessels  Borderline aortic root dilatation. The ascending aorta is Moderately dilated.  Dilation of the inferior vena cava is present with abnormal respiratory  variation in diameter.     Pericardium  There is no pericardial effusion.        Rhythm  Sinus rhythm was noted.  _____________________________________________________________________________  __  MMode/2D Measurements & Calculations     Ao root diam: 3.8  cm  asc Aorta Diam: 4.4 cm  LVOT diam: 2.1 cm  LVOT area: 3.6 cm2  LA Volume (BP): 82.0 ml  LA Volume Index (BP): 39.4 ml/m2        Doppler Measurements & Calculations  MV E max ferdinand: 118.5 cm/sec  MV A max ferdinand: 103.0 cm/sec  MV E/A: 1.1  MV max P.4 mmHg  MV mean PG: 3.6 mmHg  MV V2 VTI: 43.1 cm  MVA(VTI): 2.7 cm2  MV dec time: 0.22 sec     Ao V2 max: 254.1 cm/sec  Ao max P.0 mmHg  Ao V2 mean: 183.2 cm/sec  Ao mean PG: 15.0 mmHg  Ao V2 VTI: 67.1 cm  BRAYDON(I,D): 1.8 cm2  BRAYDON(V,D): 1.9 cm2  LV V1 max P.4 mmHg  LV V1 max: 135.7 cm/sec  LV V1 VTI: 32.8 cm  SV(LVOT): 117.9 ml  SI(LVOT): 56.8 ml/m2  PA acc time: 0.11 sec  TR max ferdinand: 245.4 cm/sec  TR max P.1 mmHg  AV Ferdinand Ratio (DI): 0.53  BRAYDON Index (cm2/m2): 0.85  E/E' av.0  Lateral E/e': 14.3  Medial E/e': 19.7              _____________________________________________________________________________  __        Report approved by: Balta Reardon 2020 10:31 AM          Medications      Current Facility-Administered Medications   Medication Dose Route Frequency     diltiazem ER COATED BEADS  180 mg Oral BID     doxazosin  1 mg Oral At Bedtime     gabapentin  200 mg Oral BID     hydrALAZINE  100 mg Oral TID     insulin aspart  1-7 Units Subcutaneous TID AC     insulin aspart  1-5 Units Subcutaneous At Bedtime     sertraline  50 mg Oral Daily     simvastatin  10 mg Oral At Bedtime     sodium chloride (PF)  3 mL Intracatheter Q8H

## 2020-12-30 NOTE — PROGRESS NOTES
An autoset CPAP of 5-10 with 9LPM bled in was applied to the pt via the mask for sleep apnea. The bridge of the nose skin integrity looks good with skin barrier in place. Pt is tolerating it well. Will continue to monitor and assess the pt's current respiratory status and needs.      Benton Malone, RT

## 2020-12-30 NOTE — PHARMACY-ADMISSION MEDICATION HISTORY
Admission medication history interview status for this patient is complete. See [facility] admission navigator for allergy information, prior to admission medications and immunization status.     Medication history interview done via telephone during Covid-19 pandemic, indicate source(s): Patient  Medication history resources (including written lists, pill bottles, clinic record):None  Pharmacy: -    Changes made to PTA medication list:  Added: fish oil  Deleted: -  Changed: proair, nystatin to prn, diltiazem to BID    Actions taken by pharmacist (provider contacted, etc):called pt for mr. Called spouse Armin to check diltiazem dosing instructions (180 mg BID_     Additional medication history information:None    Medication reconciliation/reorder completed by provider prior to medication history?  yes   (Y/N)     For patients on insulin therapy:   Do you use sliding scale insulin based on blood sugars?   What is your pre-meal insulin coverage?    Do you typically eat three meals a day?   How many times do you check your blood glucose per day?   How many episodes of hypoglycemia do you typically have per month?   Do you have a Continuous Glucose Monitor (CGM)?      Prior to Admission medications    Medication Sig Last Dose Taking? Auth Provider   albuterol (PROAIR HFA/PROVENTIL HFA/VENTOLIN HFA) 108 (90 Base) MCG/ACT inhaler Inhale 2 puffs into the lungs 4 times daily  Yes Yuridia Villarreal MD   diltiazem ER COATED BEADS (CARDIAZEM LA) 180 MG 24 hr tablet Take 180 mg by mouth 2 times daily  12/29/2020 at x1 Yes Reported, Patient   doxazosin (CARDURA) 1 MG tablet Take 1 mg by mouth At Bedtime  12/28/2020 at Unknown time Yes Reported, Patient   fish oil-omega-3 fatty acids 1000 MG capsule Take 1 g by mouth daily 12/28/2020 at hs Yes Unknown, Entered By History   gabapentin (NEURONTIN) 100 MG capsule TAKE 2 CAPSULES BY MOUTH 2 TIMES DAILY GENERIC EQUIVALENT FOR NEURONTIN 12/29/2020 at x1 Yes Yuridia Villarreal MD   glimepiride  (AMARYL) 1 MG tablet Take 1-tablet by mouth daily 12/29/2020 at Unknown time Yes Reji Verde MD   hydrALAZINE (APRESOLINE) 50 MG tablet Take 100 mg by mouth 3 times daily  12/29/2020 at x1 Yes Reported, Patient   MULTI-VITAMIN OR TABS Take 2 tablets by mouth daily  12/29/2020 at Unknown time Yes Reported, Patient   nystatin (MYCOSTATIN) 469373 UNIT/GM external powder Apply topically 3 times daily as needed  Yes Unknown, Entered By History   pioglitazone (ACTOS) 15 MG tablet Take 1 tablet (15 mg) by mouth daily 12/29/2020 at Unknown time Yes Reji Verde MD   sertraline (ZOLOFT) 50 MG tablet Take 1 tablet (50 mg) by mouth daily 12/28/2020 at hs t Yes Yuridia Villarreal MD   simvastatin (ZOCOR) 10 MG tablet TAKE 1 TABLET BY MOUTH AT BEDTIME. 12/28/2020 at Unknown time Yes Yuridia Villarreal MD   blood glucose (NO BRAND SPECIFIED) lancets standard Use to test blood sugar 1 times daily or as directed, Contour Next lancets   Reji Verde MD   blood glucose (NO BRAND SPECIFIED) test strip Use to test blood sugar 1 times daily or as directed, Contour Next strips   Reji Verde MD   blood glucose monitoring (NO BRAND SPECIFIED) meter device kit Use to test blood sugar 1 times daily or as directed. Ultra 2   Reji Verde MD   order for DME Equipment being ordered: walker. 4 wheeled with brakes and a seat.   Yuridia Villarreal MD   torsemide (DEMADEX) 10 MG tablet Take 20 mg by mouth daily ON HOLD  Unknown, Entered By History

## 2020-12-30 NOTE — PROGRESS NOTES
Bilateral thoracentesis completed per Dr. Yeung without difficulty, patient tolerated well. Right side removed 810 ml clear yellow fluid, left side removed 800 ml clear yellow fluid. See also post procedure orders. Patient to room via cart in stable condition.

## 2020-12-31 LAB
ALBUMIN SERPL-MCNC: 2.6 G/DL (ref 3.4–5)
ALP SERPL-CCNC: 68 U/L (ref 40–150)
ALT SERPL W P-5'-P-CCNC: 18 U/L (ref 0–50)
ANION GAP SERPL CALCULATED.3IONS-SCNC: 4 MMOL/L (ref 3–14)
AST SERPL W P-5'-P-CCNC: 18 U/L (ref 0–45)
BILIRUB DIRECT SERPL-MCNC: 0.1 MG/DL (ref 0–0.2)
BILIRUB SERPL-MCNC: 0.4 MG/DL (ref 0.2–1.3)
BUN SERPL-MCNC: 83 MG/DL (ref 7–30)
CALCIUM SERPL-MCNC: 8.7 MG/DL (ref 8.5–10.1)
CHLORIDE SERPL-SCNC: 105 MMOL/L (ref 94–109)
CO2 SERPL-SCNC: 29 MMOL/L (ref 20–32)
CREAT SERPL-MCNC: 4.11 MG/DL (ref 0.52–1.04)
GFR SERPL CREATININE-BSD FRML MDRD: 9 ML/MIN/{1.73_M2}
GLUCOSE BLDC GLUCOMTR-MCNC: 133 MG/DL (ref 70–99)
GLUCOSE BLDC GLUCOMTR-MCNC: 137 MG/DL (ref 70–99)
GLUCOSE BLDC GLUCOMTR-MCNC: 137 MG/DL (ref 70–99)
GLUCOSE BLDC GLUCOMTR-MCNC: 147 MG/DL (ref 70–99)
GLUCOSE BLDC GLUCOMTR-MCNC: 216 MG/DL (ref 70–99)
GLUCOSE SERPL-MCNC: 142 MG/DL (ref 70–99)
LDH SERPL L TO P-CCNC: 295 U/L (ref 81–234)
MAGNESIUM SERPL-MCNC: 2.9 MG/DL (ref 1.6–2.3)
PHOSPHATE SERPL-MCNC: 6.3 MG/DL (ref 2.5–4.5)
POTASSIUM SERPL-SCNC: 4 MMOL/L (ref 3.4–5.3)
PROT SERPL-MCNC: 7 G/DL (ref 6.8–8.8)
SODIUM SERPL-SCNC: 138 MMOL/L (ref 133–144)

## 2020-12-31 PROCEDURE — 36415 COLL VENOUS BLD VENIPUNCTURE: CPT | Performed by: INTERNAL MEDICINE

## 2020-12-31 PROCEDURE — 99233 SBSQ HOSP IP/OBS HIGH 50: CPT | Performed by: INTERNAL MEDICINE

## 2020-12-31 PROCEDURE — 84155 ASSAY OF PROTEIN SERUM: CPT | Performed by: INTERNAL MEDICINE

## 2020-12-31 PROCEDURE — 82247 BILIRUBIN TOTAL: CPT | Performed by: INTERNAL MEDICINE

## 2020-12-31 PROCEDURE — 999N001017 HC STATISTIC GLUCOSE BY METER IP

## 2020-12-31 PROCEDURE — 83735 ASSAY OF MAGNESIUM: CPT | Performed by: INTERNAL MEDICINE

## 2020-12-31 PROCEDURE — 250N000013 HC RX MED GY IP 250 OP 250 PS 637: Performed by: INTERNAL MEDICINE

## 2020-12-31 PROCEDURE — 120N000001 HC R&B MED SURG/OB

## 2020-12-31 PROCEDURE — 94660 CPAP INITIATION&MGMT: CPT

## 2020-12-31 PROCEDURE — 250N000011 HC RX IP 250 OP 636: Performed by: INTERNAL MEDICINE

## 2020-12-31 PROCEDURE — 83615 LACTATE (LD) (LDH) ENZYME: CPT | Performed by: INTERNAL MEDICINE

## 2020-12-31 PROCEDURE — 84450 TRANSFERASE (AST) (SGOT): CPT | Performed by: INTERNAL MEDICINE

## 2020-12-31 PROCEDURE — 84075 ASSAY ALKALINE PHOSPHATASE: CPT | Performed by: INTERNAL MEDICINE

## 2020-12-31 PROCEDURE — 84460 ALANINE AMINO (ALT) (SGPT): CPT | Performed by: INTERNAL MEDICINE

## 2020-12-31 PROCEDURE — 999N000157 HC STATISTIC RCP TIME EA 10 MIN

## 2020-12-31 PROCEDURE — 82248 BILIRUBIN DIRECT: CPT | Performed by: INTERNAL MEDICINE

## 2020-12-31 PROCEDURE — 80069 RENAL FUNCTION PANEL: CPT | Performed by: INTERNAL MEDICINE

## 2020-12-31 PROCEDURE — 250N000009 HC RX 250: Performed by: INTERNAL MEDICINE

## 2020-12-31 RX ADMIN — SERTRALINE HYDROCHLORIDE 50 MG: 50 TABLET ORAL at 08:05

## 2020-12-31 RX ADMIN — CALCIUM ACETATE 667 MG: 667 CAPSULE ORAL at 18:28

## 2020-12-31 RX ADMIN — DILTIAZEM HYDROCHLORIDE 180 MG: 180 CAPSULE, COATED, EXTENDED RELEASE ORAL at 08:06

## 2020-12-31 RX ADMIN — HYDRALAZINE HYDROCHLORIDE 100 MG: 50 TABLET, FILM COATED ORAL at 21:10

## 2020-12-31 RX ADMIN — SIMVASTATIN 10 MG: 10 TABLET, FILM COATED ORAL at 21:11

## 2020-12-31 RX ADMIN — GABAPENTIN 200 MG: 100 CAPSULE ORAL at 08:05

## 2020-12-31 RX ADMIN — CALCIUM ACETATE 667 MG: 667 CAPSULE ORAL at 08:06

## 2020-12-31 RX ADMIN — GABAPENTIN 200 MG: 100 CAPSULE ORAL at 21:10

## 2020-12-31 RX ADMIN — FUROSEMIDE 60 MG: 10 INJECTION, SOLUTION INTRAMUSCULAR; INTRAVENOUS at 14:20

## 2020-12-31 RX ADMIN — DILTIAZEM HYDROCHLORIDE 180 MG: 180 CAPSULE, COATED, EXTENDED RELEASE ORAL at 21:10

## 2020-12-31 RX ADMIN — ALBUTEROL SULFATE 2.5 MG: 2.5 SOLUTION RESPIRATORY (INHALATION) at 15:51

## 2020-12-31 RX ADMIN — FUROSEMIDE 60 MG: 10 INJECTION, SOLUTION INTRAMUSCULAR; INTRAVENOUS at 22:46

## 2020-12-31 RX ADMIN — DOXAZOSIN 1 MG: 1 TABLET ORAL at 21:11

## 2020-12-31 RX ADMIN — CALCIUM ACETATE 667 MG: 667 CAPSULE ORAL at 13:22

## 2020-12-31 RX ADMIN — FUROSEMIDE 60 MG: 10 INJECTION, SOLUTION INTRAMUSCULAR; INTRAVENOUS at 06:45

## 2020-12-31 RX ADMIN — HYDRALAZINE HYDROCHLORIDE 100 MG: 50 TABLET, FILM COATED ORAL at 08:05

## 2020-12-31 RX ADMIN — HYDRALAZINE HYDROCHLORIDE 100 MG: 50 TABLET, FILM COATED ORAL at 15:56

## 2020-12-31 ASSESSMENT — MIFFLIN-ST. JEOR: SCORE: 1459.69

## 2020-12-31 ASSESSMENT — ACTIVITIES OF DAILY LIVING (ADL)
ADLS_ACUITY_SCORE: 17

## 2020-12-31 NOTE — PLAN OF CARE
Pertinent assessments: VSS on 6L via oxymask. Afebrile. Thoracentesis site bandages are clean, dry, and intact. Lung sounds diminished but clear. Tolerating a renal diet. Up with 1 to bedside commode. Dyspnea on exertion.     Major Shift Events: none  Treatment Plan: Lasix, renal diet, O2, phillips, and tele.

## 2020-12-31 NOTE — PROGRESS NOTES
An Auto Cpap with 6LPM bleed in  was applied to the pt via the mask for an increase in WOB and/or SOB. Skin integrity is good. Pt is tolerating it well. Will continue to monitor and assess the pt's current respiratory status and needs.    Jason Macias RT on 12/31/2020 at 4:31 AM

## 2020-12-31 NOTE — PROGRESS NOTES
Heart Failure Care Map  GOALS TO BE MET BEFORE DISCHARGE:    1. Decrease congestion and/or edema with diuretic therapy to achieve near optimal volume status.     Dyspnea improved: Yes, satisfactory for discharge.   Edema improved: Yes, satisfactory for discharge.        Net I/O and Weights since admission:   12/01 1500 - 12/31 1459  In: 1430 [P.O.:1420; I.V.:10]  Out: 3370 [Urine:3370]  Net: -1940     Vitals:    12/29/20 1923 12/30/20 0627 12/31/20 0652   Weight: 104.5 kg (230 lb 6.4 oz) 104.4 kg (230 lb 1.6 oz) 102 kg (224 lb 12.8 oz)       2.  O2 sats > 90% on room air, or at prior home O2 therapy level.      Able to wean O2 this shift to keep sats above 90%?: No, further care required to meet this goal. Please explain On 4L O2 nasal cannula   Does patient use Home O2? No          Current oxygenation status:   SpO2: 93 %     O2 Device: Nasal cannula, Oxygen Delivery: 4 LPM    3.  Tolerates ambulation and mobility near baseline.     Ambulation: No, further care required to meet this goal. Please explain PT/OT consulted   Times patient ambulated with staff this shift: 0    Please review the Heart Failure Care Map for additional HF goal outcomes.    Tammy Mendoza RN  12/31/2020     VSS, weaned to 4L O2 nasal cannula. Denies pain. Breathing better today per patient, still some SOB. On IV lasix. Tolerating renal diet. +BM. Aguilera in place. Up to commode and chair with A1 walker. Tele - SR BBB. Nephrology, PT/OT consulted. Will continue to monitor.

## 2020-12-31 NOTE — PROGRESS NOTES
Nephrology Progress Note  12/31/2020       ASSESSMENT AND RECOMMENDATIONS:   1 CKD 4-baseline creatinine of 2.2-2.6, minimally proteinuric.  Presumed secondary to hypertension/diabetes mellitus/advanced age.     2 dyspnea-recent pneumonia with large bilateral pleural effusion.  Last echo from January with preserved ejection fraction and some suggestion of diastolic dysfunction.  -Improving with diuresis and thoracentesis.    Recommendations-  -continue Lasix 60 mg IV 3 times daily today.  -Low-salt diet.  1500 cc fluid restriction.  -Daily renal function panel.     3 acute kidney injury-creatinine was 1.9 on discharge on 12/14 and up to 4.3 on 12/22.  It has not improved despite stopping torsemide.  Possible that she may have sustained ischemic tubular injury out-of-hospital and creatinine has plateaued now.  Low urine sodium and fractional excretion of sodium suggest possible cardiorenal physiology.  Echocardiogram shows preserved ejection fraction, grade 1 diastolic dysfunction and pulmonary hypertension.    4 bilateral pleural effusions-transudate A.  Status post thoracentesis.     5 hypertension-continue PTA antihypertensives.     6 type 2 diabetes mellitus-management per primary team.     7 anemia chronic kidney disease-iron sat  Only 10% on 12/22.  Ferritin low at 54.  SPEP negative     8 hyperphosphatemia-with renal failure.  Start PhosLo.       Douglas Hinson MD  [facility] - Nephrology   [phone]      Interval History :   Seen / examined.   Status post bilateral thoracentesis with 800 cc removed from each lung.  Initial evaluation suggestive of transudate of effusion.  Tolerated IV diuretics.  Got total of 220 mg of IV Lasix.  2.5 L urine output.  Weight is down by 6 pounds.  Kidney function stable.  Reports significant improvement in shortness of breath.  Oxygen requirement is down.  Still quite lethargic.  Did not sleep well.    Review of Systems:   A 4 point review of systems was negative  except as noted above.  Notably: fair appetite. no nausea or vomiting or diarrhea.  no confusion,  no fever or chills    Physical Exam:   I/O last 3 completed shifts:  In: 540 [P.O.:540]  Out: 2470 [Urine:2470]    GENERAL APPEARANCE: Mild respiratory distress,  awake  Pulmonary:diminshed at bases   CV: regular rhythm, normal rate, no rub, ESM at AA   - JVP +   - Edema +   GI: soft, nontender,   MS: no evidence of inflammation in joints  : no phillips  SKIN: no rash, warm, dry, no cyanosis  NEURO: face symmetric, no asterixis        Labs:   All labs reviewed by me  Electrolytes/Renal -   Recent Labs   Lab Test 12/31/20  0726 12/30/20  0823 12/29/20  1523 12/22/20  1041 12/13/18  1538 12/13/18  1538    140 141 141   < > 140   POTASSIUM 4.0 4.4 4.2 4.1   < > 4.8   CHLORIDE 105 107 108 105   < > 105   CO2 29 30 28 28   < > 29   BUN 83* 82* 84* 70*   < > 40*   CR 4.11* 4.18* 4.23* 4.30*   < > 1.99*   * 132* 136* 203*   < > 130*   EDNA 8.7 8.7 8.9 9.1   < > 9.8   MAG 2.9* 3.1*  --   --   --  2.4*   PHOS 6.3* 7.0*  --  5.3*   < >  --     < > = values in this interval not displayed.       CBC -   Recent Labs   Lab Test 12/30/20  0823 12/29/20  1523 12/22/20  1041   WBC 7.9 10.0 9.3   HGB 8.1* 8.9* 8.9*    221 257       LFTs -   Recent Labs   Lab Test 12/31/20  0726 12/30/20  0823 12/29/20  1523 12/22/20  1041 12/10/20  1442   ALKPHOS 68  --  73  --  79   BILITOTAL 0.4  --  0.3  --  0.3   ALT 18  --  22  --  13   AST 18  --  10  --  13   PROTTOTAL 7.0 6.7* 7.5  --  7.5   ALBUMIN 2.6*  --  3.0* 2.9* 2.9*       Iron Panel -   Recent Labs   Lab Test 12/22/20  1041   IRON 32*   IRONSAT 10*   ELOISE 54           Current Medications:    calcium acetate  667 mg Oral TID w/meals     diltiazem ER COATED BEADS  180 mg Oral BID     doxazosin  1 mg Oral At Bedtime     furosemide  60 mg Intravenous Q8H     gabapentin  200 mg Oral BID     hydrALAZINE  100 mg Oral TID     insulin aspart  1-7 Units Subcutaneous TID AC      insulin aspart  1-5 Units Subcutaneous At Bedtime     sertraline  50 mg Oral Daily     simvastatin  10 mg Oral At Bedtime     sodium chloride (PF)  3 mL Intracatheter Q8H       Douglas Hinson MD

## 2020-12-31 NOTE — PROGRESS NOTES
End of Shift Summary  For vital signs and complete assessments, please see documentation flowsheets.     Pertinent assessments: Pt A&O, elevated BP, currently on cpap with 6L. Denies any pain. LS diminished, clear. +2 edema LE. Aguilera in place. BG check 137    Major Shift Events: none  Treatment Plan: IV Lasix, strict I&O, monitor electrolytes, standing wt  Bedside Nurse: [place] RN

## 2020-12-31 NOTE — PROGRESS NOTES
[facility]    Medicine Progress Note - Hospitalist Service       Date of Admission:  12/29/2020  Length of stay: 2 days    Assessment & Plan   Ирина Yanez is an 85 year old female with a history of TERESSA on CPAP, type II DM, CKD stage IV with baseline creatinine 2, neuropathy, MDD, anxiety, HTN, RBBB, anemia, mild aortic stenosis, obesity, and grade 1 diastolic dysfunction on TTE who presented to the ED with shortness of breath and hypoxia.     She was found to have bilateral large pleural effusions.  Previously seen right upper lobe lesion had resolved, making that likely to have been pneumonia.  Patient was requiring significant oxygen up to 10 L/min.  She underwent bilateral thoracentesis on 12/30 with about 800 mL of fluid removed from each lung.  She was also found to have FABY on CKD with creatinine above 4 from baseline 2. Nephrology was consulted.    Today, patient has a lower oxygen requirement but appears quite fatigued.  Still on 5 L.  She made 3 L of urine yesterday with a total of 220 IV mg Lasix but her creatinine remains about the same today.  Continuing diuretics as per nephrology and monitoring renal function.    Acute on subacute hypoxemic respiratory failure, large bilateral pleural effusions  - Multifactorial in origin, but primarily likely from her large bilateral pleural effusions.  Suspect diastolic CHF contributing to volume overload.  Recent hospitalization for pneumonia 2 weeks ago and discharged on 2 weeks of Augmentin with near resolution of the right upper lobe masslike consolidation on the repeat noncontrast chest CT.  No further cough or fevers. Discharged on 1 L O2 which she was using continuous and frequently had sats in the 80s on admission.  - Symptomatic COVID-19 PCR and influenza A/B PCR are negative.  - Bilateral diagnostic and therapeutic ultrasound guided thoracentesis by IR done on 12/30.  800 cc of fluid removed from each lung.  Per lights criteria, the fluid  appears to be transudate, therefore more likely from heart failure/volume overload.  Cytology done on the right effusion is pending (the lung that the patient had that pneumonia in)  - Continue supplemental O2 and wean as tolerated.  Still on 5 L, anticipate she will not be able to wean completely off of O2.     Suspect acute on chronic diastolic CHF   - Progressive shortness of breath with hypoxia as above with additional findings of large bilateral pleural effusions and 1-2+ bilateral lower extremity pitting edema.  Previous TTE from 1/2020 showed EF 60 to 65% and grade 1 diastolic dysfunction.  Torsemide held 4 days PTA due to doubling of creatinine up to 4.  Her weight in clinic 1 week ago was 222 lb, up from 212-215 earlier in the month.  Urine sodium low which would be consistent with CHF.  - Bilateral thoracentesis as above  - Strict intake and output, daily weights   - Diuresis per nephrology with 60 mg of Lasix IV 3 times daily   - Echocardiogram shows mild pulmonary hypertension and grade 1 dysfunction     FABY on CKD stage IV  - Creatinine baseline of 2.  FABY on admission with creatinine of 4.2 similar to 4.3 checked 7 days ago in clinic.  Her torsemide was held roughly for the last 4 days and she denied any significant change in urine output.  Urinalysis is not cellular or suspicious for glomerulonephritis.  No obstructive symptoms.  - Consult nephrology  - UOP improving with IV lasix but creatinine remaining elevated  - If still having signs of volume overload despite diuresis, patient may need dialysis during this hospitalization.     HTN  - PTA on diltiazem  mg twice daily doxazosin 1 mg at bedtime, hydralazine 100 mg 3 times daily, and torsemide 20 mg daily   - Resume diltiazem, hydralazine, and doxazosin  - Diuretics as above     Type II DM  - PTA on glimepiride 1 mg daily and Actos 15 mg daily.  A1c earlier this year 6.8.  - Hold glimepiride and Actos.  Glimepiride may need to be permanently  "discontinued if her kidney function does not improve.  Actos may be resumed on discharge.  - Medium dose sliding scale insulin     Obesity  - BMI 39     TERESSA   - Continue CPAP at bedtime.     MDD, anxiety  - Continue sertraline 50 mg daily.     HLD  - Resume simvastatin 10 mg at bedtime.  CK level checked and not elevated.     Chronic normocytic anemia:  - Hemoglobin at baseline 8 range. IV Venofer 200 mg x 5 doses per nephrology.     Peripheral neuropathy:  - Resume PTA gabapentin 200 mg twice daily.     Diet: Renal  DVT Prophylaxis: Pneumatic Compression Devices  Malnutrition: No  [place] Catheter: No  Code Status: Full Code     Disposition Plan   Expected discharge:   Anticipate 3-5 more days as inpatient to monitor renal function and respiratory status.  Patient appears weak and debilitated, PT/OT consulted.    Hal Lopez MD  Hospitalist Service  [facility]  ______________________________________________________________________    Interval History   Patient says she tolerated the thoracentesis well yesterday.  Russell that her dyspnea improved.  Still feels short of breath today, however.  Seems quite fatigued.  Not complaining of any pain.  No fevers or chills.    Data reviewed today: I reviewed all medications, new labs and imaging results over the last 24 hours.     Physical Exam   /48 (BP Location: Right arm)   Pulse 78   Temp 97.9  F (36.6  C) (Oral)   Resp 20   Ht 1.626 m (5' 4\")   Wt 102 kg (224 lb 12.8 oz)   LMP  (LMP Unknown)   SpO2 95%   BMI 38.59 kg/m    224 lbs 12.8 oz       General: Laying flat in the bed, no acute distress but appears fatigued    HEENT: No scleral icterus. Oropharynx moist.     Neck: Supple. Normal range of motion.     Pulmonary: Normal work of breathing.  Diminished at the bases.    Cardiovascular: Regular rate and rhythm without murmur or extra heart sounds.    Abdomen: Soft and non-tender.    Extremities: Trace bilateral peripheral " edema.    Neurologic: Awake, alert, appropriate.    Skin: Warm and dry.    Psychiatric: Normal affect and mood.     Data    Recent Labs   Lab 12/31/20  0726 12/30/20  0823 12/29/20  1523   WBC  --  7.9 10.0   HGB  --  8.1* 8.9*   MCV  --  96 95   PLT  --  192 221   INR  --  1.23*  --     140 141   POTASSIUM 4.0 4.4 4.2   CHLORIDE 105 107 108   CO2 29 30 28   BUN 83* 82* 84*   CR 4.11* 4.18* 4.23*   ANIONGAP 4 3 5   EDNA 8.7 8.7 8.9   * 132* 136*   ALBUMIN 2.6*  --  3.0*   PROTTOTAL 7.0 6.7* 7.5   BILITOTAL 0.4  --  0.3   ALKPHOS 68  --  73   ALT 18  --  22   AST 18  --  10   TROPI  --   --  <0.015     Recent Results (from the past 24 hour(s))   US Thoracentesis Bilateral    Narrative    EXAM: US THORACENTESIS BILATERAL       12/30/2020 2:39 PM       HISTORY: Bilateral pleural effusions, request made for diagnostic and  therapeutic thoracentesis.     PROCEDURE: Written informed consent was obtained from the patient  prior to the procedure. The risks and benefits including bleeding,  infection and pneumothorax were discussed and the patient wished to  continue. Initial ultrasound images demonstrated a mild bilateral  pleural fluid collections. A permanent image was saved. The skin  overlying this collection was marked, prepped, draped and anesthetized  in usual sterile fashion utilizing 5 mL of lidocaine 1%. Thoracentesis  catheter was then placed into the pleural fluid collection with return  of 800 mL of pleural fluid from each hemithorax. Estimated blood loss  during the procedure was less than 5 mL. No specimens collected.  Patient tolerated the procedure well. Followup chest x-ray was  ordered.      With continuous ultrasound guidance, a 5 Marshallese needle and catheter  advanced into each pleural space and ultrasound image stored for  documentation. No residual fluid at completion.      Impression    IMPRESSION: Ultrasound-guided bilateral thoracentesis. 800 mL of fluid  removed from each hemithorax.  Sample sent from the right hemithorax to  the lab.    HANK ELLIOTT MD   XR Chest 1 View    Narrative    CHEST ONE VIEW    12/30/2020 2:40 PM     HISTORY: Bilateral thoracentesis.    COMPARISON: Chest x-ray 12/29/2020.      Impression    IMPRESSION: Single view of the chest. No evidence of pneumothorax.  Lower lungs obscured by patient's pannus. Increased opacity right  upper lung is concerning for pulmonary edema. Correlate with patient's  clinical picture.    SHERRI REYES MD       Medications      Current Facility-Administered Medications   Medication Dose Route Frequency     calcium acetate  667 mg Oral TID w/meals     diltiazem ER COATED BEADS  180 mg Oral BID     doxazosin  1 mg Oral At Bedtime     furosemide  60 mg Intravenous Q8H     gabapentin  200 mg Oral BID     hydrALAZINE  100 mg Oral TID     insulin aspart  1-7 Units Subcutaneous TID AC     insulin aspart  1-5 Units Subcutaneous At Bedtime     sertraline  50 mg Oral Daily     simvastatin  10 mg Oral At Bedtime     sodium chloride (PF)  3 mL Intracatheter Q8H

## 2021-01-01 ENCOUNTER — APPOINTMENT (OUTPATIENT)
Dept: OCCUPATIONAL THERAPY | Facility: CLINIC | Age: 86
DRG: 291 | End: 2021-01-01
Attending: INTERNAL MEDICINE
Payer: COMMERCIAL

## 2021-01-01 LAB
ALBUMIN SERPL-MCNC: 2.4 G/DL (ref 3.4–5)
ANION GAP SERPL CALCULATED.3IONS-SCNC: 8 MMOL/L (ref 3–14)
BUN SERPL-MCNC: 84 MG/DL (ref 7–30)
CALCIUM SERPL-MCNC: 9 MG/DL (ref 8.5–10.1)
CHLORIDE SERPL-SCNC: 102 MMOL/L (ref 94–109)
CO2 SERPL-SCNC: 28 MMOL/L (ref 20–32)
CREAT SERPL-MCNC: 4.08 MG/DL (ref 0.52–1.04)
GFR SERPL CREATININE-BSD FRML MDRD: 9 ML/MIN/{1.73_M2}
GLUCOSE BLDC GLUCOMTR-MCNC: 146 MG/DL (ref 70–99)
GLUCOSE BLDC GLUCOMTR-MCNC: 158 MG/DL (ref 70–99)
GLUCOSE BLDC GLUCOMTR-MCNC: 159 MG/DL (ref 70–99)
GLUCOSE BLDC GLUCOMTR-MCNC: 203 MG/DL (ref 70–99)
GLUCOSE BLDC GLUCOMTR-MCNC: 248 MG/DL (ref 70–99)
GLUCOSE SERPL-MCNC: 167 MG/DL (ref 70–99)
MAGNESIUM SERPL-MCNC: 2.6 MG/DL (ref 1.6–2.3)
PHOSPHATE SERPL-MCNC: 6 MG/DL (ref 2.5–4.5)
POTASSIUM SERPL-SCNC: 3.4 MMOL/L (ref 3.4–5.3)
SODIUM SERPL-SCNC: 138 MMOL/L (ref 133–144)

## 2021-01-01 PROCEDURE — 250N000013 HC RX MED GY IP 250 OP 250 PS 637: Performed by: INTERNAL MEDICINE

## 2021-01-01 PROCEDURE — 258N000003 HC RX IP 258 OP 636: Performed by: INTERNAL MEDICINE

## 2021-01-01 PROCEDURE — 999N000157 HC STATISTIC RCP TIME EA 10 MIN

## 2021-01-01 PROCEDURE — 94660 CPAP INITIATION&MGMT: CPT

## 2021-01-01 PROCEDURE — 94640 AIRWAY INHALATION TREATMENT: CPT

## 2021-01-01 PROCEDURE — 120N000001 HC R&B MED SURG/OB

## 2021-01-01 PROCEDURE — 99207 PR CDG-MDM COMPONENT: MEETS MODERATE - UP CODED: CPT | Performed by: INTERNAL MEDICINE

## 2021-01-01 PROCEDURE — 99233 SBSQ HOSP IP/OBS HIGH 50: CPT | Performed by: INTERNAL MEDICINE

## 2021-01-01 PROCEDURE — 36415 COLL VENOUS BLD VENIPUNCTURE: CPT | Performed by: INTERNAL MEDICINE

## 2021-01-01 PROCEDURE — 80069 RENAL FUNCTION PANEL: CPT | Performed by: INTERNAL MEDICINE

## 2021-01-01 PROCEDURE — 250N000011 HC RX IP 250 OP 636: Performed by: INTERNAL MEDICINE

## 2021-01-01 PROCEDURE — 97165 OT EVAL LOW COMPLEX 30 MIN: CPT | Mod: GO

## 2021-01-01 PROCEDURE — 83735 ASSAY OF MAGNESIUM: CPT | Performed by: INTERNAL MEDICINE

## 2021-01-01 PROCEDURE — 999N001017 HC STATISTIC GLUCOSE BY METER IP

## 2021-01-01 PROCEDURE — 97535 SELF CARE MNGMENT TRAINING: CPT | Mod: GO

## 2021-01-01 PROCEDURE — 250N000009 HC RX 250: Performed by: INTERNAL MEDICINE

## 2021-01-01 RX ORDER — METHYLPREDNISOLONE SODIUM SUCCINATE 125 MG/2ML
125 INJECTION, POWDER, LYOPHILIZED, FOR SOLUTION INTRAMUSCULAR; INTRAVENOUS
Status: DISCONTINUED | OUTPATIENT
Start: 2021-01-01 | End: 2021-01-14 | Stop reason: CLARIF

## 2021-01-01 RX ORDER — DIPHENHYDRAMINE HYDROCHLORIDE 50 MG/ML
50 INJECTION INTRAMUSCULAR; INTRAVENOUS
Status: DISCONTINUED | OUTPATIENT
Start: 2021-01-01 | End: 2021-01-14 | Stop reason: CLARIF

## 2021-01-01 RX ADMIN — FUROSEMIDE 60 MG: 10 INJECTION, SOLUTION INTRAMUSCULAR; INTRAVENOUS at 23:43

## 2021-01-01 RX ADMIN — GABAPENTIN 200 MG: 100 CAPSULE ORAL at 08:20

## 2021-01-01 RX ADMIN — DILTIAZEM HYDROCHLORIDE 180 MG: 180 CAPSULE, COATED, EXTENDED RELEASE ORAL at 21:15

## 2021-01-01 RX ADMIN — HYDRALAZINE HYDROCHLORIDE 100 MG: 50 TABLET, FILM COATED ORAL at 08:20

## 2021-01-01 RX ADMIN — ALBUTEROL SULFATE 2.5 MG: 2.5 SOLUTION RESPIRATORY (INHALATION) at 04:45

## 2021-01-01 RX ADMIN — FUROSEMIDE 60 MG: 10 INJECTION, SOLUTION INTRAMUSCULAR; INTRAVENOUS at 15:34

## 2021-01-01 RX ADMIN — CALCIUM ACETATE 667 MG: 667 CAPSULE ORAL at 17:23

## 2021-01-01 RX ADMIN — DILTIAZEM HYDROCHLORIDE 180 MG: 180 CAPSULE, COATED, EXTENDED RELEASE ORAL at 08:20

## 2021-01-01 RX ADMIN — SERTRALINE HYDROCHLORIDE 50 MG: 50 TABLET ORAL at 08:20

## 2021-01-01 RX ADMIN — FUROSEMIDE 60 MG: 10 INJECTION, SOLUTION INTRAMUSCULAR; INTRAVENOUS at 06:41

## 2021-01-01 RX ADMIN — CALCIUM ACETATE 667 MG: 667 CAPSULE ORAL at 08:20

## 2021-01-01 RX ADMIN — SIMVASTATIN 10 MG: 10 TABLET, FILM COATED ORAL at 21:15

## 2021-01-01 RX ADMIN — CALCIUM ACETATE 667 MG: 667 CAPSULE ORAL at 12:57

## 2021-01-01 RX ADMIN — HYDRALAZINE HYDROCHLORIDE 100 MG: 50 TABLET, FILM COATED ORAL at 21:14

## 2021-01-01 RX ADMIN — INSULIN ASPART 1 UNITS: 100 INJECTION, SOLUTION INTRAVENOUS; SUBCUTANEOUS at 21:17

## 2021-01-01 RX ADMIN — HYDRALAZINE HYDROCHLORIDE 100 MG: 50 TABLET, FILM COATED ORAL at 16:09

## 2021-01-01 RX ADMIN — DOXAZOSIN 1 MG: 1 TABLET ORAL at 21:15

## 2021-01-01 RX ADMIN — IRON SUCROSE 200 MG: 20 INJECTION, SOLUTION INTRAVENOUS at 17:14

## 2021-01-01 RX ADMIN — GABAPENTIN 200 MG: 100 CAPSULE ORAL at 21:15

## 2021-01-01 ASSESSMENT — ACTIVITIES OF DAILY LIVING (ADL)
ADLS_ACUITY_SCORE: 17
PREVIOUS_RESPONSIBILITIES: MEAL PREP;LAUNDRY;HOUSEKEEPING;SHOPPING;MEDICATION MANAGEMENT;FINANCES
ADLS_ACUITY_SCORE: 17
ADLS_ACUITY_SCORE: 17

## 2021-01-01 ASSESSMENT — MIFFLIN-ST. JEOR: SCORE: 1439.27

## 2021-01-01 NOTE — PROGRESS NOTES
01/01/21 0744   Quick Adds   Type of Visit Initial Occupational Therapy Evaluation   Living Environment   People in home spouse   Current Living Arrangements mobile home   Home Accessibility stairs to enter home   Number of Stairs, Main Entrance 3   Stair Railings, Main Entrance railing on right side (ascending)   Transportation Anticipated family or friend will provide   Living Environment Comments Pt lives with spouse in mobile home, 3 stairs to enter w/ rail, all needs met on main level, walk in shower with shower chair and grab bars, comfort height toilet w/ grab bar.    Self-Care   Usual Activity Tolerance good   Current Activity Tolerance fair   Regular Exercise No   Equipment Currently Used at Home walker, rolling   Disability/Function   Hearing Difficulty or Deaf yes   Wear Glasses or Blind yes   Concentrating, Remembering or Making Decisions Difficulty no   Difficulty Communicating no   Difficulty Eating/Swallowing no   Walking or Climbing Stairs Difficulty yes   Walking or Climbing Stairs stair climbing difficulty, assistance 1 person   Dressing/Bathing Difficulty no   Toileting issues no   Doing Errands Independently Difficulty (such as shopping) no   Fall history within last six months no   Change in Functional Status Since Onset of Current Illness/Injury yes   General Information   Onset of Illness/Injury or Date of Surgery 12/29/20   Referring Physician Hal Lopez MD   Patient/Family Therapy Goal Statement (OT) Pt's goal is to get stronger and d/c home   Additional Occupational Profile Info/Pertinent History of Current Problem Per chart: Pt is an 85 year old female admitted with SOB, hypoxia, found to have Acute on subacute hypoxemic respiratory failure, large bilateral pleural effusions   Performance Patterns (Routines, Roles, Habits) Pt reports mod I in all ADLs, IADLs and mobility tasks with use of walker at baseline.    Existing Precautions/Restrictions fall;oxygen therapy  device and L/min   Sensory   Sensory Comments Baseline neuropathy   Pain Assessment   Patient Currently in Pain No   Strength Comprehensive (MMT)   Comment, General Manual Muscle Testing (MMT) Assessment Generalized weakness BUEs   Bed Mobility   Supine-Sit Ferry (Bed Mobility) contact guard   Sit-Supine [place]erry (Bed Mobility) contact guard   Transfers   Transfers sit-stand transfer   Sit-Stand Transfer   Sit-Stand [place]erry (Transfers) contact guard   Assistive Device (Sit-Stand Transfers) walker, front-wheeled   Balance   Balance Comments CGA/SBA provided while in stance FWW level    Activities of Daily Living   BADL Assessment/Intervention lower body dressing;grooming;toileting   Lower Body Dressing Assessment/Training   Ferry Level (Lower Body Dressing) minimum assist (75% patient effort)   Instrumental Activities of Daily Living (IADL)   Previous Responsibilities meal prep;laundry;housekeeping;shopping;medication management;finances   IADL Comments Has support from spouse for IADLs in home environment   Clinical Impression   Criteria for Skilled Therapeutic Interventions Met (OT) yes;meets criteria;skilled treatment is necessary   OT Diagnosis Impaired ADls, IADLs and mobility tasks   OT Problem List-Impairments impacting ADL problems related to;activity tolerance impaired;strength   ADL comments/analysis Pt presents to OT below baseline level of functioning with regards to ADLs   Assessment of Occupational Performance 5 or more Performance Deficits   Identified Performance Deficits Bathing, dressing, grooming, toileting, homemaking, transfers   Planned Therapy Interventions (OT) ADL retraining;IADL retraining;strengthening;transfer training   Clinical Decision Making Complexity (OT) low complexity   Therapy Frequency (OT) Daily   Predicted Duration of Therapy 5 days   Risks and Benefits of Treatment have been explained. Yes   Patient, Family & other staff in agreement with plan of care  Yes   OT Discharge Planning    OT Discharge Recommendation (DC Rec) Home with assist;home with home care occupational therapy   OT Rationale for DC Rec Pt is below baseline level of functioning with regards to ADLs, IADls and mobility tasks. Limited by SOB, O2 needs and fatigue. Recommend ongoing skilled OT while IP and in HH setting to improve strength, functional activity tolerance and safety needed for daily tasks. HH services indicated as leaving the home environment would require significant effort at this time.    Total Evaluation Time (Minutes)   Total Evaluation Time (Minutes) 8

## 2021-01-01 NOTE — PROGRESS NOTES
[facility]    Nephrology Progress Note     Assessment & Plan       1 CKD 4-baseline creatinine of 2.2-2.6, minimally proteinuric.  Presumed secondary to hypertension/diabetes mellitus/advanced age.     2 dyspnea-recent pneumonia with large bilateral pleural effusion.  Last echo from January with preserved ejection fraction and some suggestion of diastolic dysfunction.  -Improving with diuresis and thoracentesis.     3 acute kidney injury-creatinine was 1.9 on discharge on 12/14 and up to 4.3 on 12/22.  It did not improve despite stopping torsemide.  Possible that she may have sustained ischemic tubular injury out-of-hospital and creatinine has plateaued now.  Low urine sodium and fractional excretion of sodium suggest possible cardiorenal physiology.  Echocardiogram shows preserved ejection fraction, grade 1 diastolic dysfunction and pulmonary hypertension.    Now cr very slowly better.       4 bilateral pleural effusions-transudate A.  Status post thoracentesis.     5 hypertension-continue PTA antihypertensives.     6 type 2 diabetes mellitus-management per primary team.     7 anemia chronic kidney disease-iron sat  Only 10% on 12/22.  Ferritin low at 54.  SPEP negative.      8 hyperphosphatemia-with renal failure.  Start PhosLo.    Plan:    Continue furosemide 60 mg IV Q8H  1200 ml FR  Low sodium diet  IV iron    Jef Whatley MD  [facility] - Nephrology  [phone]    Interval History     Still feeling weak.  Breathing still feels short at times.  LE edema better.  Still on 6 LPM by NC    Physical Exam   Temp: (P) 98.1  F (36.7  C) Temp src: (P) Oral BP: (P) 138/48 Pulse: (P) 73   Resp: 20 SpO2: (P) 92 % O2 Device: (P) Nasal cannula Oxygen Delivery: (P) 6 LPM  Vitals:    12/30/20 0627 12/31/20 0652 01/01/21 0451   Weight: 104.4 kg (230 lb 1.6 oz) 102 kg (224 lb 12.8 oz) 99.9 kg (220 lb 4.8 oz)     Vital Signs with Ranges  Temp:  [98  F (36.7  C)-98.9  F (37.2  C)] (P) 98.1  F  (36.7  C)  Pulse:  [73-88] (P) 73  Resp:  [14-20] 20  BP: (116-150)/(34-50) (P) 138/48  SpO2:  [92 %-96 %] (P) 92 %  I/O last 3 completed shifts:  In: 1060 [P.O.:1060]  Out: 1900 [Urine:1900]    GENERAL APPEARANCE: pleasant, NAD, a & o  HEENT:  Still on oxygen 6 LPM by NC.    RESP: lungs fine insp crackles bases, mild exp wheezes  CV: RRR, nl S1/S2, no m/r/g   ABDOMEN: o/s/nt/nd, bs present  EXTREMITIES/SKIN: no rashes/lesions; no edema    Medications       calcium acetate  667 mg Oral TID w/meals     diltiazem ER COATED BEADS  180 mg Oral BID     doxazosin  1 mg Oral At Bedtime     furosemide  60 mg Intravenous Q8H     gabapentin  200 mg Oral BID     hydrALAZINE  100 mg Oral TID     insulin aspart  1-7 Units Subcutaneous TID AC     insulin aspart  1-5 Units Subcutaneous At Bedtime     iron sucrose (VENOFER) intermittent infusion (200 mg)  200 mg Intravenous Q24H     sertraline  50 mg Oral Daily     simvastatin  10 mg Oral At Bedtime     sodium chloride (PF)  3 mL Intracatheter Q8H       Data   BMP  Recent Labs   Lab 01/01/21  0805 12/31/20  0726 12/30/20  0823 12/29/20  1523    138 140 141   POTASSIUM 3.4 4.0 4.4 4.2   CHLORIDE 102 105 107 108   EDNA 9.0 8.7 8.7 8.9   CO2 28 29 30 28   BUN 84* 83* 82* 84*   CR 4.08* 4.11* 4.18* 4.23*   * 142* 132* 136*     Phos@[id number](phos:4)  CBC)  Recent Labs   Lab 12/30/20  0823 12/29/20  1523   WBC 7.9 10.0   HGB 8.1* 8.9*   HCT 27.0* 29.8*   MCV 96 95    221     Recent Labs   Lab 12/31/20  0726   AST 18   ALT 18   ALKPHOS 68   BILITOTAL 0.4     Recent Labs   Lab 12/30/20  0823   INR 1.23*       I have reviewed today's relevant vital signs, notes, medications, labs and imaging.

## 2021-01-01 NOTE — PROGRESS NOTES
[facility]    Medicine Progress Note - Hospitalist Service       Date of Admission:  12/29/2020  Length of stay: 3 days    Assessment & Plan   Ирина Yanez is an 85 year old female with a history of TERESSA on CPAP, type II DM, CKD stage IV with baseline creatinine 2, neuropathy, MDD, anxiety, HTN, RBBB, anemia, mild aortic stenosis, obesity, and grade 1 diastolic dysfunction on TTE who presented to the ED with shortness of breath and hypoxia.     She was found to have bilateral large pleural effusions.  Previously seen right upper lobe lesion had resolved, making that likely to have been pneumonia.  Patient was requiring significant oxygen up to 10 L/min.  She underwent bilateral thoracentesis on 12/30 with about 800 mL of fluid removed from each lung.  She was also found to have FABY on CKD with creatinine above 4 from baseline 2. Nephrology was consulted.    Today, patient remains on significant O2 @ 4lpm. Seen by OT and HH recommended. Continues on diuresis per nephrology.     Acute on subacute hypoxemic respiratory failure, large bilateral pleural effusions  - Multifactorial in origin, but primarily likely from her large bilateral pleural effusions.  Suspect diastolic CHF contributing to volume overload.  Recent hospitalization for pneumonia 2 weeks ago and discharged on 2 weeks of Augmentin with near resolution of the right upper lobe masslike consolidation on the repeat noncontrast chest CT.  No further cough or fevers. Discharged on 1 L O2 which she was using continuous and frequently had sats in the 80s on admission.  - Symptomatic COVID-19 PCR and influenza A/B PCR are negative.  - Bilateral diagnostic and therapeutic ultrasound guided thoracentesis by IR done on 12/30.  800 cc of fluid removed from each lung.  Per Lights criteria, the fluid appears to be transudate, therefore more likely from heart failure/volume overload.  Cytology done on the right effusion is pending (the lung that the  patient had that pneumonia in)  - Continue supplemental O2 and wean as tolerated.  Still on 4L, anticipate she will not be able to wean completely off of O2.     Acute on chronic diastolic CHF   - Progressive shortness of breath with hypoxia as above with additional findings of large bilateral pleural effusions and 1-2+ bilateral lower extremity pitting edema.  Previous TTE from 1/2020 showed EF 60 to 65% and grade 1 diastolic dysfunction.  Torsemide held 4 days PTA due to doubling of creatinine up to 4.  Her weight in clinic 1 week ago was 222 lb, up from 212-215 earlier in the month.  Urine sodium low which would be consistent with CHF.  - Bilateral thoracentesis as above  - Strict intake and output, daily weights   - Diuresis per nephrology with 60 mg of Lasix IV 3 times daily   - Echocardiogram shows mild pulmonary hypertension and grade 1 dysfunction     FABY on CKD stage IV  - Creatinine baseline of 2.  FABY on admission with creatinine of 4.2 similar to 4.3 checked 7 days ago in clinic.  Her torsemide was held roughly for the last 4 days and she denied any significant change in urine output.  Urinalysis is not cellular or suspicious for glomerulonephritis.  No obstructive symptoms.  - Consult nephrology.   - UOP improving with IV lasix but creatinine remaining elevated  - ATN out of hospital vs progression of intrinsic disease  - Low urine Na suggests cardiorenal but her Cr has not improved with diuresis  - creatinine has held steady at about 4.1 during the hospitalization  - Continue phoslo, fluid restriction and diuretics per nephrology     HTN  - PTA on diltiazem  mg twice daily doxazosin 1 mg at bedtime, hydralazine 100 mg 3 times daily, and torsemide 20 mg daily   - Resume diltiazem, hydralazine, and doxazosin  - Diuretics as above     Type II DM  - PTA on glimepiride 1 mg daily and Actos 15 mg daily.  A1c earlier this year 6.8.  - Hold glimepiride and Actos.  Glimepiride may need to be permanently  "discontinued if her kidney function does not improve.  Actos may be resumed on discharge.  - Medium dose sliding scale insulin     Obesity  - BMI 39     TERESSA   - Continue CPAP at bedtime.     MDD, anxiety  - Continue sertraline 50 mg daily.     HLD  - Resume simvastatin 10 mg at bedtime.  CK level checked and not elevated.     Chronic normocytic anemia:  - Hemoglobin at baseline 8 range. IV Venofer 200 mg x 5 doses per nephrology.     Peripheral neuropathy:  - Resume PTA gabapentin 200 mg twice daily.     Goals of care  - Patient not ready to \"give up\" and wants to try everything that we can do to get her better.    Diet: Renal  DVT Prophylaxis: Pneumatic Compression Devices  Malnutrition: No  Aguilera Catheter: No  Code Status: Full Code     Disposition Plan   Expected discharge:   Anticipate 2-4 more days as inpatient to monitor renal function and respiratory status.  Patient appears weak and debilitated, PT/OT consulted. HH recommended.     aHl Lopez MD  Hospitalist Service  [facility]  ______________________________________________________________________    Interval History   Feeling kind of weak and slightly short of breath today. Has not been walking much.     Data reviewed today: I reviewed all medications, new labs and imaging results over the last 24 hours.     Physical Exam   BP (!) 125/34 (BP Location: Right arm)   Pulse 76   Temp 98  F (36.7  C) (Oral)   Resp 20   Ht 1.626 m (5' 4\")   Wt 99.9 kg (220 lb 4.8 oz)   LMP  (LMP Unknown)   SpO2 92%   BMI 37.81 kg/m    220 lbs 4.8 oz       General: Laying flat in the bed, no acute distress but appears fatigued    HEENT: No scleral icterus. Oropharynx moist.     Neck: Supple. Normal range of motion.     Pulmonary: Normal work of breathing.  Diminished at the bases.    Cardiovascular: Regular rate and rhythm without murmur or extra heart sounds.    Abdomen: Soft and non-tender.    Extremities: Trace bilateral peripheral " edema.    Neurologic: Awake, alert, appropriate.    Skin: Warm and dry.    Psychiatric: Normal affect and mood.     Data    Recent Labs   Lab 01/01/21  0805 12/31/20  0726 12/30/20  0823 12/29/20  1523   WBC  --   --  7.9 10.0   HGB  --   --  8.1* 8.9*   MCV  --   --  96 95   PLT  --   --  192 221   INR  --   --  1.23*  --     138 140 141   POTASSIUM 3.4 4.0 4.4 4.2   CHLORIDE 102 105 107 108   CO2 28 29 30 28   BUN 84* 83* 82* 84*   CR 4.08* 4.11* 4.18* 4.23*   ANIONGAP 8 4 3 5   EDNA 9.0 8.7 8.7 8.9   * 142* 132* 136*   ALBUMIN 2.4* 2.6*  --  3.0*   PROTTOTAL  --  7.0 6.7* 7.5   BILITOTAL  --  0.4  --  0.3   ALKPHOS  --  68  --  73   ALT  --  18  --  22   AST  --  18  --  10   TROPI  --   --   --  <0.015     No results found for this or any previous visit (from the past 24 hour(s)).    Medications      Current Facility-Administered Medications   Medication Dose Route Frequency     calcium acetate  667 mg Oral TID w/meals     diltiazem ER COATED BEADS  180 mg Oral BID     doxazosin  1 mg Oral At Bedtime     furosemide  60 mg Intravenous Q8H     gabapentin  200 mg Oral BID     hydrALAZINE  100 mg Oral TID     insulin aspart  1-7 Units Subcutaneous TID AC     insulin aspart  1-5 Units Subcutaneous At Bedtime     sertraline  50 mg Oral Daily     simvastatin  10 mg Oral At Bedtime     sodium chloride (PF)  3 mL Intracatheter Q8H

## 2021-01-01 NOTE — PLAN OF CARE
End of Shift Summary  For vital signs and complete assessments, please see documentation flowsheets.     Pertinent assessments: VSS, O2 at 5 LPM at night, sats 91-93%. Started night using Cpap, but reported having dry mouth so switched back to nasal cannula. Denies any pain. LS diminished with exp wheezes. Trace edema BLE. Aguilera in place clear pale yellow urine. BG check 146 overnight. Bandaids intact to back at [facility] sites.     Major Shift Events: increased SOB and exp wheezes this am, improved after neb    Treatment Plan: IV Lasix, phoslo, daily weight, fluid restriction

## 2021-01-01 NOTE — PLAN OF CARE
End of Shift Summary  For vital signs and complete assessments, please see documentation flowsheets.     Pertinent assessments: Pt A&O, elevated BP, NC 4LPM. Denies any pain. LS diminished, clear. Scant edema LE. Aguilera in place clear pale yellow urine. BG check 147    Major Shift Events: lungs wheezy- as needed  albuterol neb with some relief    Treatment Plan: IV Lasix, phoslo    Bedside Nurse: Mary Ngo RN

## 2021-01-02 ENCOUNTER — APPOINTMENT (OUTPATIENT)
Dept: OCCUPATIONAL THERAPY | Facility: CLINIC | Age: 86
DRG: 291 | End: 2021-01-02
Payer: COMMERCIAL

## 2021-01-02 ENCOUNTER — APPOINTMENT (OUTPATIENT)
Dept: PHYSICAL THERAPY | Facility: CLINIC | Age: 86
DRG: 291 | End: 2021-01-02
Payer: COMMERCIAL

## 2021-01-02 LAB
ALBUMIN SERPL-MCNC: 2.4 G/DL (ref 3.4–5)
ANION GAP SERPL CALCULATED.3IONS-SCNC: 6 MMOL/L (ref 3–14)
BUN SERPL-MCNC: 88 MG/DL (ref 7–30)
CALCIUM SERPL-MCNC: 8.7 MG/DL (ref 8.5–10.1)
CHLORIDE SERPL-SCNC: 102 MMOL/L (ref 94–109)
CO2 SERPL-SCNC: 30 MMOL/L (ref 20–32)
CREAT SERPL-MCNC: 3.96 MG/DL (ref 0.52–1.04)
GFR SERPL CREATININE-BSD FRML MDRD: 10 ML/MIN/{1.73_M2}
GLUCOSE BLDC GLUCOMTR-MCNC: 153 MG/DL (ref 70–99)
GLUCOSE BLDC GLUCOMTR-MCNC: 159 MG/DL (ref 70–99)
GLUCOSE BLDC GLUCOMTR-MCNC: 170 MG/DL (ref 70–99)
GLUCOSE BLDC GLUCOMTR-MCNC: 179 MG/DL (ref 70–99)
GLUCOSE BLDC GLUCOMTR-MCNC: 209 MG/DL (ref 70–99)
GLUCOSE SERPL-MCNC: 161 MG/DL (ref 70–99)
HGB BLD-MCNC: 8.4 G/DL (ref 11.7–15.7)
MAGNESIUM SERPL-MCNC: 2.6 MG/DL (ref 1.6–2.3)
PHOSPHATE SERPL-MCNC: 6.4 MG/DL (ref 2.5–4.5)
POTASSIUM SERPL-SCNC: 3.5 MMOL/L (ref 3.4–5.3)
SODIUM SERPL-SCNC: 138 MMOL/L (ref 133–144)

## 2021-01-02 PROCEDURE — 36415 COLL VENOUS BLD VENIPUNCTURE: CPT | Performed by: INTERNAL MEDICINE

## 2021-01-02 PROCEDURE — 94660 CPAP INITIATION&MGMT: CPT

## 2021-01-02 PROCEDURE — 250N000011 HC RX IP 250 OP 636: Performed by: INTERNAL MEDICINE

## 2021-01-02 PROCEDURE — 97535 SELF CARE MNGMENT TRAINING: CPT | Mod: GO

## 2021-01-02 PROCEDURE — 258N000003 HC RX IP 258 OP 636: Performed by: INTERNAL MEDICINE

## 2021-01-02 PROCEDURE — 999N001017 HC STATISTIC GLUCOSE BY METER IP

## 2021-01-02 PROCEDURE — 83735 ASSAY OF MAGNESIUM: CPT | Performed by: INTERNAL MEDICINE

## 2021-01-02 PROCEDURE — 97116 GAIT TRAINING THERAPY: CPT | Mod: GP

## 2021-01-02 PROCEDURE — 85018 HEMOGLOBIN: CPT | Performed by: INTERNAL MEDICINE

## 2021-01-02 PROCEDURE — 250N000013 HC RX MED GY IP 250 OP 250 PS 637: Performed by: INTERNAL MEDICINE

## 2021-01-02 PROCEDURE — 97110 THERAPEUTIC EXERCISES: CPT | Mod: GP

## 2021-01-02 PROCEDURE — 99233 SBSQ HOSP IP/OBS HIGH 50: CPT | Performed by: INTERNAL MEDICINE

## 2021-01-02 PROCEDURE — 120N000001 HC R&B MED SURG/OB

## 2021-01-02 PROCEDURE — 999N000157 HC STATISTIC RCP TIME EA 10 MIN

## 2021-01-02 PROCEDURE — 80069 RENAL FUNCTION PANEL: CPT | Performed by: INTERNAL MEDICINE

## 2021-01-02 PROCEDURE — 97161 PT EVAL LOW COMPLEX 20 MIN: CPT | Mod: GP

## 2021-01-02 RX ORDER — CALCIUM ACETATE 667 MG/1
1334 CAPSULE ORAL
Status: DISCONTINUED | OUTPATIENT
Start: 2021-01-02 | End: 2021-01-15 | Stop reason: HOSPADM

## 2021-01-02 RX ADMIN — HYDRALAZINE HYDROCHLORIDE 100 MG: 50 TABLET, FILM COATED ORAL at 08:33

## 2021-01-02 RX ADMIN — CALCIUM ACETATE 1334 MG: 667 CAPSULE ORAL at 18:23

## 2021-01-02 RX ADMIN — DOXAZOSIN 1 MG: 1 TABLET ORAL at 21:00

## 2021-01-02 RX ADMIN — GABAPENTIN 200 MG: 100 CAPSULE ORAL at 08:32

## 2021-01-02 RX ADMIN — SERTRALINE HYDROCHLORIDE 50 MG: 50 TABLET ORAL at 08:33

## 2021-01-02 RX ADMIN — DILTIAZEM HYDROCHLORIDE 180 MG: 180 CAPSULE, COATED, EXTENDED RELEASE ORAL at 08:33

## 2021-01-02 RX ADMIN — CALCIUM ACETATE 667 MG: 667 CAPSULE ORAL at 08:32

## 2021-01-02 RX ADMIN — CALCIUM ACETATE 667 MG: 667 CAPSULE ORAL at 12:59

## 2021-01-02 RX ADMIN — GABAPENTIN 200 MG: 100 CAPSULE ORAL at 20:57

## 2021-01-02 RX ADMIN — FUROSEMIDE 60 MG: 10 INJECTION, SOLUTION INTRAMUSCULAR; INTRAVENOUS at 18:31

## 2021-01-02 RX ADMIN — DILTIAZEM HYDROCHLORIDE 180 MG: 180 CAPSULE, COATED, EXTENDED RELEASE ORAL at 20:57

## 2021-01-02 RX ADMIN — SIMVASTATIN 10 MG: 10 TABLET, FILM COATED ORAL at 21:00

## 2021-01-02 RX ADMIN — FUROSEMIDE 60 MG: 10 INJECTION, SOLUTION INTRAMUSCULAR; INTRAVENOUS at 06:21

## 2021-01-02 RX ADMIN — IRON SUCROSE 200 MG: 20 INJECTION, SOLUTION INTRAVENOUS at 17:12

## 2021-01-02 RX ADMIN — INSULIN ASPART 1 UNITS: 100 INJECTION, SOLUTION INTRAVENOUS; SUBCUTANEOUS at 22:10

## 2021-01-02 ASSESSMENT — MIFFLIN-ST. JEOR: SCORE: 1417.96

## 2021-01-02 ASSESSMENT — ACTIVITIES OF DAILY LIVING (ADL)
ADLS_ACUITY_SCORE: 17

## 2021-01-02 NOTE — PROGRESS NOTES
On 6 L O2 via nasal cannula. Denies pain. Lungs diminished with GARAY. On IV lasix 3x/day, phillips remains in place for strict I&O. Tolerating renal diet with 1200 mL fluid restriction. Ambulating in room with A1 walker. Tele - SR BBB PAC. Nephrology following. Will continue to monitor.

## 2021-01-02 NOTE — PROGRESS NOTES
[facility]  Hospitalist Progress Note  Timo Jones MD, MD 01/02/2021  (Text Page)  Reason for Stay (Diagnosis): FABY on top of CKD, bilateral pleural effusion         Assessment and Plan:      Summary of Stay: Ирина Yanez is an 85 year old female with a history of TERESSA on CPAP, type II DM, CKD stage IV with baseline creatinine 2, neuropathy, MDD, anxiety, HTN, RBBB, anemia, mild aortic stenosis, obesity, and grade 1 diastolic dysfunction on TTE who presented to the ED with shortness of breath and hypoxia.      She was found to have bilateral large pleural effusions.  Previously seen right upper lobe lesion had resolved, making that likely to have been pneumonia.  Patient was requiring significant oxygen up to 10 L/min.  She underwent bilateral thoracentesis on 12/30 with about 800 mL of fluid removed from each lung.  She was also found to have FABY on CKD with creatinine above 4 from baseline 2. Nephrology was consulted.     Today, still diuresing well, creatinine stable.  Feels little better with earlier shortness of breath..      Acute on subacute hypoxemic respiratory failure, large bilateral pleural effusions  - Multifactorial in origin, but primarily likely from her large bilateral pleural effusions.  Suspect diastolic CHF contributing to volume overload.  Recent hospitalization for pneumonia 2 weeks ago and discharged on 2 weeks of Augmentin with near resolution of the right upper lobe masslike consolidation on the repeat noncontrast chest CT.  No further cough or fevers. Discharged on 1 L O2 which she was using continuous and frequently had sats in the 80s on admission.  - Symptomatic COVID-19 PCR and influenza A/B PCR are negative.  - Bilateral diagnostic and therapeutic ultrasound guided thoracentesis by IR done on 12/30.  800 cc of fluid removed from each lung.  Per Lights criteria, the fluid appears to be transudate, therefore more likely from heart failure/volume overload.   Cytology done on the right effusion is pending (the lung that the patient had that pneumonia in)  - Continue supplemental O2 and wean as tolerated.  Still on oxygen support, anticipate she will not be able to wean completely off of O2.     Acute on chronic diastolic CHF   - Progressive shortness of breath with hypoxia as above with additional findings of large bilateral pleural effusions and 1-2+ bilateral lower extremity pitting edema.  Previous TTE from 1/2020 showed EF 60 to 65% and grade 1 diastolic dysfunction.  Torsemide held 4 days PTA due to doubling of creatinine up to 4.  Her weight in clinic 1 week ago was 222 lb, up from 212-215 earlier in the month.  Urine sodium low which would be consistent with CHF.  - Bilateral thoracentesis as above  - Strict intake and output, daily weights   - Diuresis per nephrology with 60 mg of Lasix IV 3 times daily   - Echocardiogram shows mild pulmonary hypertension and grade 1 dysfunction  -Weight trend decreasing now at 215 pounds with peak of 230 pounds last week     FABY on CKD stage IV  - Creatinine baseline of 2.  FABY on admission with creatinine of 4.2 similar to 4.3 checked 7 days ago in clinic.  Her torsemide was held roughly for the last 4 days and she denied any significant change in urine output.  Urinalysis is not cellular or suspicious for glomerulonephritis.  No obstructive symptoms.    - UOP improving with IV lasix but creatinine remaining elevated  - ATN out of hospital vs progression of intrinsic disease  - Low urine Na suggests cardiorenal but her Cr has not improved with diuresis  -Creatinine slowly improving, appreciate continuous input from nephrology service  -We will defer to nephrology regarding diuresis  -Continue with Aguilera catheter while on IV diuresis  - Continue phoslo, fluid restriction and diuretics per nephrology     HTN  - PTA on diltiazem  mg twice daily doxazosin 1 mg at bedtime, hydralazine 100 mg 3 times daily, and torsemide 20 mg  "daily   - Resume diltiazem, hydralazine, and doxazosin  - Diuretics as above     Type II DM  - PTA on glimepiride 1 mg daily and Actos 15 mg daily.  A1c earlier this year 6.8.  - Hold glimepiride and Actos.  Glimepiride may need to be permanently discontinued if her kidney function does not improve.  Actos may be resumed on discharge.  - Medium dose sliding scale insulin     Obesity  - BMI 39     TERESSA   - Continue CPAP at bedtime.     MDD, anxiety  - Continue sertraline 50 mg daily.     HLD  - Resume simvastatin 10 mg at bedtime.  CK level checked and not elevated.     Chronic normocytic anemia:  - Hemoglobin at baseline 8 range. IV Venofer 200 mg x 5 doses per nephrology.     Peripheral neuropathy:  - Resume PTA gabapentin 200 mg twice daily.          Diet: Renal  DVT Prophylaxis: Pneumatic Compression Devices  Malnutrition: No  Aguilera Catheter: No  Code Status: Full Code      Disposition Plan   Expected discharge:   Anticipating needing at least 2-3 more inpatient hospitalization days          Interval History (Subjective):      I assume service care today.  Seen and examined.  Chart reviewed.  Case discussed with nursing service.  I found Ms. Gifford sitting comfortably in the chair and appears to be in better spirits as she thinks she is improving with earlier leg swelling, shortness of breath and generalized weakness.  She thinks her appetite is also picking up.  She was able to tolerate her oral diet with no reported nausea or vomiting.  -No reported mental status changes  -remain on oxygen support      # Pain Assessment:  Current Pain Score 1/1/2021   Patient currently in pain? denies   Pain score (0-10) -   Pain location -   Pain descriptors -   Ирина s pain level was assessed and she currently denies pain.                  Physical Exam:      Last Vital Signs:  /42 (BP Location: Right arm)   Pulse 70   Temp 98  F (36.7  C) (Oral)   Resp 20   Ht 1.626 m (5' 4\")   Wt 97.8 kg (215 lb 9.6 oz)   LMP  " (LMP Unknown)   SpO2 94%   BMI 37.01 kg/m      I/O last 3 completed shifts:  In: 705 [P.O.:705]  Out: 1725 [Urine:1725]  Wt Readings from Last 1 Encounters:   01/02/21 97.8 kg (215 lb 9.6 oz)     Vitals:    12/29/20 1923 12/30/20 0627 12/31/20 0652 01/01/21 0451   Weight: 104.5 kg (230 lb 6.4 oz) 104.4 kg (230 lb 1.6 oz) 102 kg (224 lb 12.8 oz) 99.9 kg (220 lb 4.8 oz)    01/02/21 0435   Weight: 97.8 kg (215 lb 9.6 oz)       Constitutional: Awake, alert, cooperative, no apparent distress   Respiratory:  Fair air entry, minimal basal crackles, no wheezes   Cardiovascular: Regular rate and rhythm, normal S1 and S2, and no murmur noted   Abdomen: Normal bowel sounds, soft, non-distended, non-tender   Skin: No rashes, no cyanosis, dry to touch   Neuro: Alert and oriented x3, no weakness, spontaneous and coherent speech   Extremities: No edema, normal range of motion   Other(s): Euthymic mood, not agitated       All other systems: Negative          Medications:      All current medications were reviewed with changes reflected in problem list.         Data:      All new lab and imaging data was reviewed.   Labs:  Recent Labs   Lab 12/30/20  1430   CULT Culture negative monitoring continues     No results for input(s): PH, PHARTERIAL, PO2, [id number], SAT, PCO2, HCO3, BASEEXCESS, MAXIMINO, BEB in the last 168 hours.    Invalid input(s): [id number]  Recent Labs   Lab 01/02/21  0714 12/30/20  0823 12/29/20  1523   WBC  --  7.9 10.0   HGB 8.4* 8.1* 8.9*   HCT  --  27.0* 29.8*   MCV  --  96 95   PLT  --  192 221     Recent Labs   Lab 01/02/21  0714 01/01/21  0805 12/31/20  0726 12/30/20  0823 12/29/20  1523 12/29/20  1523    138 138 140  --  141   POTASSIUM 3.5 3.4 4.0 4.4  --  4.2   CHLORIDE 102 102 105 107  --  108   CO2 30 28 29 30  --  28   ANIONGAP 6 8 4 3  --  5   * 167* 142* 132*  --  136*   BUN 88* 84* 83* 82*  --  84*   CR 3.96* 4.08* 4.11* 4.18*  --  4.23*   GFRESTIMATED 10* 9* 9* 9*  --  9*   GFRESTBLACK  11* 11* 11* 11*  --  10*   EDNA 8.7 9.0 8.7 8.7  --  8.9   MAG 2.6* 2.6* 2.9* 3.1*   < >  --    PHOS 6.4* 6.0* 6.3* 7.0*   < >  --    PROTTOTAL  --   --  7.0 6.7*  --  7.5   ALBUMIN 2.4* 2.4* 2.6*  --   --  3.0*   BILITOTAL  --   --  0.4  --   --  0.3   ALKPHOS  --   --  68  --   --  73   AST  --   --  18  --   --  10   ALT  --   --  18  --   --  22    < > = values in this interval not displayed.     No results for input(s): SED, CRP in the last 168 hours.  Recent Labs   Lab 01/02/21  0818 01/02/21  0714 01/02/21  0209 01/01/21  2115 01/01/21  1903 01/01/21  1251 01/01/21  0805 01/01/21  0805 12/31/20  0726 12/31/20  0726 12/30/20  0823 12/30/20  0823 12/29/20  1523 12/29/20  1523   GLC  --  161*  --   --   --   --   --  167*  --  142*  --  132*  --  136*   *  --  170* 203* 158* 248*   < >  --    < >  --    < >  --    < >  --     < > = values in this interval not displayed.     Recent Labs   Lab 12/30/20  0823   INR 1.23*     Recent Labs   Lab 12/29/20  1523   TROPI <0.015     Recent Labs   Lab 12/29/20  1711   COLOR Yellow   APPEARANCE Slightly Cloudy   URINEGLC Negative   URINEBILI Negative   URINEKETONE Negative   SG 1.015   UBLD Negative   URINEPH 5.0   PROTEIN 20*   NITRITE Negative   LEUKEST Negative   RBCU 8*   WBCU 8*      Imaging:   Results for orders placed or performed during the hospital encounter of 12/29/20   XR Chest Port 1 View    Narrative    XR CHEST PORT 1 VW 12/29/2020 4:27 PM    HISTORY: dyspnea    COMPARISON: CT 12/10/2020    FINDINGS: Persistent small bilateral pleural effusions with adjacent  atelectasis/infiltrates, left greater than right. Interval mild  improved in aeration within the right upper lobe favoring improving  pneumonia. Continued follow-up is recommended. Stable mild  cardiomegaly.    TONY PÉREZ MD   Chest CT w/o contrast    Narrative    EXAM: CT CHEST W/O CONTRAST  LOCATION: [facility]  DATE/TIME: 12/29/2020 5:19 PM    INDICATION: Dyspnea, recent  pneumonia versus mass, 3 weeks ago  COMPARISON: 12/10/2020  TECHNIQUE: CT chest without IV contrast. Multiplanar reformats were obtained. Dose reduction techniques were used.  CONTRAST: None.    FINDINGS:   LUNGS AND PLEURA: Masslike consolidation in the right upper lobe has nearly completely resolved. Mild groundglass density and bandlike atelectasis or scarring are now seen. Large bilateral pleural effusions layer dependently which have increased. No   pneumothorax.    MEDIASTINUM/AXILLAE: Great vessels normal in caliber. Moderate aortic valve calcification. Moderate coronary arterial calcification no pericardial effusion    UPPER ABDOMEN: No significant finding.    MUSCULOSKELETAL: No suspicious bone lesion. Diffuse osteopenia. Severe multilevel degenerative change in the thoracic spine.      Impression    IMPRESSION:   1.  Near complete resolution of previously seen masslike consolidation in the right upper lobe.  2.  Large bilateral pleural effusions and partial lower lobe atelectasis, slightly increased.     XR Chest 1 View    Narrative    CHEST ONE VIEW    12/30/2020 2:40 PM     HISTORY: Bilateral thoracentesis.    COMPARISON: Chest x-ray 12/29/2020.      Impression    IMPRESSION: Single view of the chest. No evidence of pneumothorax.  Lower lungs obscured by patient's pannus. Increased opacity right  upper lung is concerning for pulmonary edema. Correlate with patient's  clinical picture.    SHERRI REYES MD   US Thoracentesis Bilateral    Narrative    EXAM: US THORACENTESIS BILATERAL       12/30/2020 2:39 PM       HISTORY: Bilateral pleural effusions, request made for diagnostic and  therapeutic thoracentesis.     PROCEDURE: Written informed consent was obtained from the patient  prior to the procedure. The risks and benefits including bleeding,  infection and pneumothorax were discussed and the patient wished to  continue. Initial ultrasound images demonstrated a mild bilateral  pleural fluid collections. A  permanent image was saved. The skin  overlying this collection was marked, prepped, draped and anesthetized  in usual sterile fashion utilizing 5 mL of lidocaine 1%. Thoracentesis  catheter was then placed into the pleural fluid collection with return  of 800 mL of pleural fluid from each hemithorax. Estimated blood loss  during the procedure was less than 5 mL. No specimens collected.  Patient tolerated the procedure well. Followup chest x-ray was  ordered.      With continuous ultrasound guidance, a 5 Telugu needle and catheter  advanced into each pleural space and ultrasound image stored for  documentation. No residual fluid at completion.      Impression    IMPRESSION: Ultrasound-guided bilateral thoracentesis. 800 mL of fluid  removed from each hemithorax. Sample sent from the right hemithorax to  the lab.    HANK ELLIOTT MD   Echocardiogram Complete    Narrative    [id number]  [id number]  [id number]  [id number]^CHRISTOPHER^EULALIA^[facility]  Echocardiography Laboratory  [address] [place] [address]        Name: BEATA TRIPLETT  MRN: [id number]  : 1935  Study Date: 2020 09:03 AM  Age: 85 yrs  Gender: Female  Patient Location: [facility]  Reason For Study: CHF, Dyspnea  Ordering Physician: EULALIA WHITE  Performed By: Mila Moser     BSA: 2.1 m2  Height: 64 in  Weight: 230 lb  HR: 71  BP: 130/54 mmHg  _____________________________________________________________________________  __        Procedure  Complete Portable Echo Adult.  _____________________________________________________________________________  __        Interpretation Summary     The ascending aorta is Moderately dilated.  Mild valvular aortic stenosis, BRAYDON 1.6 cm2  The visual ejection fraction is estimated at 65-70%.  The left atrium is mildly dilated.  Dilation of the inferior vena cava is present with abnormal respiratory  variation in diameter.  Right ventricular systolic pressure is elevated,  consistent with mild  pulmonary hypertension.  Grade I or early diastolic dysfunction.  The study was technically difficult. Compared to prior study, there is no  significant change.  _____________________________________________________________________________  __        Left Ventricle  The left ventricle is normal in size. The visual ejection fraction is  estimated at 65-70%. Grade I or early diastolic dysfunction.     Right Ventricle  The right ventricle is normal in structure, function and size.     Atria  The left atrium is mildly dilated. Right atrial size is normal.     Mitral Valve  There is mild to moderate mitral annular calcification.        Tricuspid Valve  There is tricuspid annular calcification. The tricuspid valve is not well  visualized. Right ventricular systolic pressure is elevated, consistent with  mild pulmonary hypertension.     Aortic Valve  Mild valvular aortic stenosis.     Pulmonic Valve  The pulmonic valve is not well seen, but is grossly normal. There is trace  pulmonic valvular regurgitation.     Vessels  Borderline aortic root dilatation. The ascending aorta is Moderately dilated.  Dilation of the inferior vena cava is present with abnormal respiratory  variation in diameter.     Pericardium  There is no pericardial effusion.        Rhythm  Sinus rhythm was noted.  _____________________________________________________________________________  __  MMode/2D Measurements & Calculations     Ao root diam: 3.8 cm  asc Aorta Diam: 4.4 cm  LVOT diam: 2.1 cm  LVOT area: 3.6 cm2  LA Volume (BP): 82.0 ml  LA Volume Index (BP): 39.4 ml/m2        Doppler Measurements & Calculations  MV E max ángel: 118.5 cm/sec  MV A max ángel: 103.0 cm/sec  MV E/A: 1.1  MV max P.4 mmHg  MV mean PG: 3.6 mmHg  MV V2 VTI: 43.1 cm  MVA(VTI): 2.7 cm2  MV dec time: 0.22 sec     Ao V2 max: 254.1 cm/sec  Ao max P.0 mmHg  Ao V2 mean: 183.2 cm/sec  Ao mean PG: 15.0 mmHg  Ao V2 VTI: 67.1 cm  BRAYDON(I,D): 1.8 cm2  BRAYDON(V,D):  1.9 cm2  LV V1 max P.4 mmHg  LV V1 max: 135.7 cm/sec  LV V1 VTI: 32.8 cm  SV(LVOT): 117.9 ml  SI(LVOT): 56.8 ml/m2  PA acc time: 0.11 sec  TR max ferdinand: 245.4 cm/sec  TR max P.1 mmHg  AV Ferdinand Ratio (DI): 0.53  BRAYDON Index (cm2/m2): 0.85  E/E' av.0  Lateral E/e': 14.3  Medial E/e': 19.7              _____________________________________________________________________________  __        Report approved by: Balta Reardon 2020 10:31 AM

## 2021-01-02 NOTE — PROGRESS NOTES
01/02/21 1548   Quick Adds   Type of Visit Initial PT Evaluation   Living Environment   People in home spouse   Current Living Arrangements mobile home   Home Accessibility stairs to enter home   Number of Stairs, Main Entrance 3   Stair Railings, Main Entrance railing on right side (ascending)   Transportation Anticipated family or friend will provide   Living Environment Comments Pt lives with spouse in mobile home, 3 stairs to enter w/ rail, all needs met on main level, walk in shower with shower chair and grab bars, comfort height toilet w/ grab bar. Reports  has a balance problem and isn't able to help much around the home. Patient reports getting groceries delivered otherwise she is doing all household chores.    Self-Care   Usual Activity Tolerance good   Current Activity Tolerance fair   Equipment Currently Used at Home walker, rolling   Activity/Exercise/Self-Care Comment Patient is MARILEE with functional mobility at baseline.   Disability/Function   Hearing Difficulty or Deaf yes   Wear Glasses or Blind yes   Concentrating, Remembering or Making Decisions Difficulty no   Difficulty Communicating no   Difficulty Eating/Swallowing no   Walking or Climbing Stairs Difficulty yes   Walking or Climbing Stairs stair climbing difficulty, assistance 1 person   Dressing/Bathing Difficulty no   Toileting issues no   Doing Errands Independently Difficulty (such as shopping) no   Fall history within last six months no   Change in Functional Status Since Onset of Current Illness/Injury yes   General Information   Onset of Illness/Injury or Date of Surgery 12/29/20   Referring Physician Frandy Julio MD   Patient/Family Therapy Goals Statement (PT) To return to home   Pertinent History of Current Problem (include personal factors and/or comorbidities that impact the POC) per chart: 85 year old female with a history of TERESSA on CPAP, type II DM, CKD stage IV with baseline creatinine 2, neuropathy, MDD,  anxiety, HTN, RBBB, anemia, mild aortic stenosis, obesity, and grade 1 diastolic dysfunction on TTE who presented to the ED with shortness of breath and hypoxia.    Existing Precautions/Restrictions fall;oxygen therapy device and L/min  (6L)   General Observations Greeted patient resting in recliner.   Cognition   Orientation Status (Cognition) oriented x 4   Affect/Mental Status (Cognition) WFL   Follows Commands (Cognition) WFL   Pain Assessment   Patient Currently in Pain No   Integumentary/Edema   Integumentary/Edema no deficits were identifed   Posture    Posture Forward head position;Protracted shoulders;Kyphosis   Range of Motion (ROM)   ROM Comment B LE WFL   Strength Comprehensive (MMT)   Comment, General Manual Muscle Testing (MMT) Assessment B LE functionally weak - requires UE support to stand   Bed Mobility   Comment (Bed Mobility) did not observe patient already out of bed   Transfers   Transfer Safety Comments sit <> stand with FWW at C[facility]   Gait/Stairs (Locomotion)   Comment (Gait/Stairs) 10ft with FWW at [facility], anterior trunk lean with kyphosis, decreased teri, stride    Balance   Balance Comments requires use of FWW for ambulation   Clinical Impression   Criteria for Skilled Therapeutic Intervention yes, treatment indicated   PT Diagnosis (PT) impaired functional mobility   Influenced by the following impairments LE weakness, decreased activity tolerance, fatigue, SOB & significant supplemental O2 needs, desating with activity   Functional limitations due to impairments tranfers, ambulation, stairs   Clinical Presentation Evolving/Changing   Clinical Presentation Rationale complex pmh, SOB with activity, fair social support   Clinical Decision Making (Complexity) low complexity   Therapy Frequency (PT) Daily   Planned Therapy Interventions (PT) balance training;bed mobility training;gait training;home exercise program;neuromuscular re-education;patient/family education;stair  training;strengthening;transfer training   Risk & Benefits of therapy have been explained evaluation/treatment results reviewed;care plan/treatment goals reviewed;patient   PT Discharge Planning    PT Discharge Recommendation (DC Rec) Transitional Care Facility;home with assist;home with home care physical therapy   PT Rationale for DC Rec PT - Currently patient is most limited by SOB and sats dropping with functional activity while on 6L of supplemental O2. Patient has decreased activity tolerance and reports  is minimally able to help at home with patient typically performing all household chores. Therapist is concerned that patient is currently unable to ambulate household distances & would have poor tolerance for household tasks. TCU is recommended to improve LE strength, progress activity tolerance & balance. If patient is refusing TCU - would requiring HH-PT & potentially increased home services to assist with household tasks.    PT Brief overview of current status  Patient is recommended to ambulation 3x/day with FWW & Ax1 during hospitalization.   Total Evaluation Time   Total Evaluation Time (Minutes) 8

## 2021-01-02 NOTE — PLAN OF CARE
End of Shift Summary  For vital signs and complete assessments, please see documentation flowsheets.     Pertinent assessments: continues to be SOB, LS dim. Desat last evening, increased to 8 LPM oxymask, Cpap overnight with 10 L bled in. A&O, but lethargic. Patient's face has a little swelling this am, especially around the eyes, but it is improving since sitting upright.     Major Shift Events: Increased O2 from 8L oxymask  to 10 L CPap when sleeping to maintain sats.

## 2021-01-02 NOTE — PROGRESS NOTES
[facility]    Nephrology Progress Note     Assessment & Plan     1 CKD 4-baseline creatinine of 2.2-2.6, minimally proteinuric.  Presumed secondary to hypertension/diabetes mellitus/advanced age.     2 dyspnea-recent pneumonia with large bilateral pleural effusion.  Last echo from January with preserved ejection fraction and some suggestion of diastolic dysfunction.  -Improving with diuresis and thoracentesis.     3 acute kidney injury-creatinine was 1.9 on discharge on 12/14 and up to 4.3 on 12/22.  It did not improve despite stopping torsemide.  Possible that she may have sustained ischemic tubular injury out-of-hospital and creatinine has plateaued now.  Low urine sodium and fractional excretion of sodium suggest possible cardiorenal physiology.  Echocardiogram shows preserved ejection fraction, grade 1 diastolic dysfunction and pulmonary hypertension.     Now cr very slowly better.  4.23 to 3.96.       4 bilateral pleural effusions-transudate A.  Status post thoracentesis.     5 hypertension-continue PTA antihypertensives as BP is controlled.       6 type 2 diabetes mellitus-management per primary team.     7 anemia chronic kidney disease-iron sat  Only 10% on 12/22.  Ferritin low at 54.  SPEP negative. IV iron ordered.       8 hyperphosphatemia-with renal failure.  Phos still > 6.  I will increase phoslo to 2 TID.       Plan:     Continue furosemide 60 mg IV Q8H  1200 ml FR  Low sodium diet  Increase phoslo.       Jef Whatley MD  [facility] - Nephrology  [phone]    Interval History     Still feeling weak and SOB.  On FIO2 of 6 LPM  Appetite is fair.    Makes note of small protein portions in renal diet.    Physical Exam   Temp: 98  F (36.7  C) Temp src: Oral BP: 128/42 Pulse: 70   Resp: 20 SpO2: 94 % O2 Device: Nasal cannula Oxygen Delivery: 6 LPM  Vitals:    12/31/20 0652 01/01/21 0451 01/02/21 0435   Weight: 102 kg (224 lb 12.8 oz) 99.9 kg (220 lb 4.8 oz) 97.8 kg  (215 lb 9.6 oz)     Vital Signs with Ranges  Temp:  [97.8  F (36.6  C)-98.6  F (37  C)] 98  F (36.7  C)  Pulse:  [70-79] 70  Resp:  [10-20] 20  BP: (128-146)/(41-54) 128/42  SpO2:  [88 %-94 %] 94 %  I/O last 3 completed shifts:  In: 705 [P.O.:705]  Out: 1725 [Urine:1725]    GENERAL APPEARANCE: pleasant, NAD, a & o  HEENT:  Eyes/ears/nose/neck grossly normal  RESP: lungs inspiratory crackles bilaterally  CV: RRR, nl S1/S2, no m/r/g   ABDOMEN: o/s/nt/nd, bs present  EXTREMITIES/SKIN: no rashes/lesions; no edema    Medications       calcium acetate  667 mg Oral TID w/meals     diltiazem ER COATED BEADS  180 mg Oral BID     doxazosin  1 mg Oral At Bedtime     furosemide  60 mg Intravenous Q8H     gabapentin  200 mg Oral BID     hydrALAZINE  100 mg Oral TID     insulin aspart  1-7 Units Subcutaneous TID AC     insulin aspart  1-5 Units Subcutaneous At Bedtime     iron sucrose (VENOFER) intermittent infusion (200 mg)  200 mg Intravenous Q24H     sertraline  50 mg Oral Daily     simvastatin  10 mg Oral At Bedtime     sodium chloride (PF)  3 mL Intracatheter Q8H       Data   BMP  Recent Labs   Lab 01/02/21  0714 01/01/21  0805 12/31/20  0726 12/30/20  0823    138 138 140   POTASSIUM 3.5 3.4 4.0 4.4   CHLORIDE 102 102 105 107   EDNA 8.7 9.0 8.7 8.7   CO2 30 28 29 30   BUN 88* 84* 83* 82*   CR 3.96* 4.08* 4.11* 4.18*   * 167* 142* 132*     Phos@LABRCNTIPR(phos:4)  CBC)  Recent Labs   Lab 01/02/21  0714 12/30/20  0823 12/29/20  1523   WBC  --  7.9 10.0   HGB 8.4* 8.1* 8.9*   HCT  --  27.0* 29.8*   MCV  --  96 95   PLT  --  192 221     Recent Labs   Lab 12/31/20  0726   AST 18   ALT 18   ALKPHOS 68   BILITOTAL 0.4     Recent Labs   Lab 12/30/20  0823   INR 1.23*     I have reviewed today's relevant vital signs, notes, medications, labs and imaging.

## 2021-01-02 NOTE — PLAN OF CARE
End of Shift Summary  For vital signs and complete assessments, please see documentation flowsheets.     Pertinent assessments: Lungs with intermittent wheezy, on 4 LPM - 6 LPM O2 via NC    Major Shift Events: Weaned O2 from 6 LPM to 4 LPM back up to 6 LPM with napping, worked with therapy, very sleepy, started IV iron therapy, restarted fluid restriction 1200 ml daily    Treatment Plan: IV Lasix, phoslo, daily weight    Bedside Nurse: Mary Ngo RN

## 2021-01-03 ENCOUNTER — APPOINTMENT (OUTPATIENT)
Dept: OCCUPATIONAL THERAPY | Facility: CLINIC | Age: 86
DRG: 291 | End: 2021-01-03
Payer: COMMERCIAL

## 2021-01-03 ENCOUNTER — APPOINTMENT (OUTPATIENT)
Dept: PHYSICAL THERAPY | Facility: CLINIC | Age: 86
DRG: 291 | End: 2021-01-03
Attending: INTERNAL MEDICINE
Payer: COMMERCIAL

## 2021-01-03 LAB
ALBUMIN SERPL-MCNC: 2.3 G/DL (ref 3.4–5)
ANION GAP SERPL CALCULATED.3IONS-SCNC: 2 MMOL/L (ref 3–14)
BUN SERPL-MCNC: 95 MG/DL (ref 7–30)
CALCIUM SERPL-MCNC: 8.9 MG/DL (ref 8.5–10.1)
CHLORIDE SERPL-SCNC: 102 MMOL/L (ref 94–109)
CO2 SERPL-SCNC: 34 MMOL/L (ref 20–32)
CREAT SERPL-MCNC: 3.9 MG/DL (ref 0.52–1.04)
GFR SERPL CREATININE-BSD FRML MDRD: 10 ML/MIN/{1.73_M2}
GLUCOSE BLDC GLUCOMTR-MCNC: 130 MG/DL (ref 70–99)
GLUCOSE BLDC GLUCOMTR-MCNC: 135 MG/DL (ref 70–99)
GLUCOSE BLDC GLUCOMTR-MCNC: 136 MG/DL (ref 70–99)
GLUCOSE BLDC GLUCOMTR-MCNC: 186 MG/DL (ref 70–99)
GLUCOSE BLDC GLUCOMTR-MCNC: 191 MG/DL (ref 70–99)
GLUCOSE SERPL-MCNC: 134 MG/DL (ref 70–99)
MAGNESIUM SERPL-MCNC: 2.6 MG/DL (ref 1.6–2.3)
PHOSPHATE SERPL-MCNC: 5.8 MG/DL (ref 2.5–4.5)
POTASSIUM SERPL-SCNC: 3.6 MMOL/L (ref 3.4–5.3)
SODIUM SERPL-SCNC: 138 MMOL/L (ref 133–144)

## 2021-01-03 PROCEDURE — 97530 THERAPEUTIC ACTIVITIES: CPT | Mod: GO

## 2021-01-03 PROCEDURE — 80048 BASIC METABOLIC PNL TOTAL CA: CPT | Performed by: INTERNAL MEDICINE

## 2021-01-03 PROCEDURE — 250N000013 HC RX MED GY IP 250 OP 250 PS 637: Performed by: INTERNAL MEDICINE

## 2021-01-03 PROCEDURE — 97116 GAIT TRAINING THERAPY: CPT | Mod: GP | Performed by: PHYSICAL THERAPIST

## 2021-01-03 PROCEDURE — 36415 COLL VENOUS BLD VENIPUNCTURE: CPT | Performed by: INTERNAL MEDICINE

## 2021-01-03 PROCEDURE — 94660 CPAP INITIATION&MGMT: CPT

## 2021-01-03 PROCEDURE — 97110 THERAPEUTIC EXERCISES: CPT | Mod: GP | Performed by: PHYSICAL THERAPIST

## 2021-01-03 PROCEDURE — 80069 RENAL FUNCTION PANEL: CPT | Performed by: INTERNAL MEDICINE

## 2021-01-03 PROCEDURE — 120N000001 HC R&B MED SURG/OB

## 2021-01-03 PROCEDURE — 250N000011 HC RX IP 250 OP 636: Performed by: INTERNAL MEDICINE

## 2021-01-03 PROCEDURE — 999N000157 HC STATISTIC RCP TIME EA 10 MIN

## 2021-01-03 PROCEDURE — 97530 THERAPEUTIC ACTIVITIES: CPT | Mod: GP | Performed by: PHYSICAL THERAPIST

## 2021-01-03 PROCEDURE — 99232 SBSQ HOSP IP/OBS MODERATE 35: CPT | Performed by: INTERNAL MEDICINE

## 2021-01-03 PROCEDURE — 83735 ASSAY OF MAGNESIUM: CPT | Performed by: INTERNAL MEDICINE

## 2021-01-03 PROCEDURE — 999N001017 HC STATISTIC GLUCOSE BY METER IP

## 2021-01-03 PROCEDURE — 258N000003 HC RX IP 258 OP 636: Performed by: INTERNAL MEDICINE

## 2021-01-03 PROCEDURE — 99233 SBSQ HOSP IP/OBS HIGH 50: CPT | Performed by: INTERNAL MEDICINE

## 2021-01-03 RX ORDER — AMOXICILLIN 250 MG
2 CAPSULE ORAL 2 TIMES DAILY
Status: DISCONTINUED | OUTPATIENT
Start: 2021-01-03 | End: 2021-01-15 | Stop reason: HOSPADM

## 2021-01-03 RX ORDER — AMOXICILLIN 250 MG
1 CAPSULE ORAL 2 TIMES DAILY
Status: DISCONTINUED | OUTPATIENT
Start: 2021-01-03 | End: 2021-01-15 | Stop reason: HOSPADM

## 2021-01-03 RX ORDER — POLYETHYLENE GLYCOL 3350 17 G/17G
17 POWDER, FOR SOLUTION ORAL ONCE
Status: COMPLETED | OUTPATIENT
Start: 2021-01-03 | End: 2021-01-03

## 2021-01-03 RX ADMIN — CALCIUM ACETATE 1334 MG: 667 CAPSULE ORAL at 08:47

## 2021-01-03 RX ADMIN — GABAPENTIN 200 MG: 100 CAPSULE ORAL at 21:28

## 2021-01-03 RX ADMIN — DILTIAZEM HYDROCHLORIDE 180 MG: 180 CAPSULE, COATED, EXTENDED RELEASE ORAL at 08:46

## 2021-01-03 RX ADMIN — FUROSEMIDE 60 MG: 10 INJECTION, SOLUTION INTRAMUSCULAR; INTRAVENOUS at 02:18

## 2021-01-03 RX ADMIN — POLYETHYLENE GLYCOL 3350 17 G: 17 POWDER, FOR SOLUTION ORAL at 11:51

## 2021-01-03 RX ADMIN — HYDRALAZINE HYDROCHLORIDE 100 MG: 50 TABLET, FILM COATED ORAL at 15:28

## 2021-01-03 RX ADMIN — IRON SUCROSE 200 MG: 20 INJECTION, SOLUTION INTRAVENOUS at 16:05

## 2021-01-03 RX ADMIN — CALCIUM ACETATE 1334 MG: 667 CAPSULE ORAL at 17:15

## 2021-01-03 RX ADMIN — SERTRALINE HYDROCHLORIDE 50 MG: 50 TABLET ORAL at 08:46

## 2021-01-03 RX ADMIN — CALCIUM ACETATE 1334 MG: 667 CAPSULE ORAL at 11:51

## 2021-01-03 RX ADMIN — HYDRALAZINE HYDROCHLORIDE 100 MG: 50 TABLET, FILM COATED ORAL at 21:28

## 2021-01-03 RX ADMIN — DOCUSATE SODIUM AND SENNOSIDES 1 TABLET: 8.6; 5 TABLET ORAL at 11:51

## 2021-01-03 RX ADMIN — GABAPENTIN 200 MG: 100 CAPSULE ORAL at 08:47

## 2021-01-03 RX ADMIN — FUROSEMIDE 60 MG: 10 INJECTION, SOLUTION INTRAMUSCULAR; INTRAVENOUS at 10:33

## 2021-01-03 RX ADMIN — DOXAZOSIN 1 MG: 1 TABLET ORAL at 21:28

## 2021-01-03 RX ADMIN — SIMVASTATIN 10 MG: 10 TABLET, FILM COATED ORAL at 21:29

## 2021-01-03 RX ADMIN — DILTIAZEM HYDROCHLORIDE 180 MG: 180 CAPSULE, COATED, EXTENDED RELEASE ORAL at 21:29

## 2021-01-03 RX ADMIN — FUROSEMIDE 60 MG: 10 INJECTION, SOLUTION INTRAMUSCULAR; INTRAVENOUS at 19:23

## 2021-01-03 RX ADMIN — HYDRALAZINE HYDROCHLORIDE 100 MG: 50 TABLET, FILM COATED ORAL at 08:46

## 2021-01-03 ASSESSMENT — ACTIVITIES OF DAILY LIVING (ADL)
ADLS_ACUITY_SCORE: 17

## 2021-01-03 ASSESSMENT — MIFFLIN-ST. JEOR: SCORE: 1433.83

## 2021-01-03 NOTE — PROGRESS NOTES
[facility]    Nephrology Progress Note     Assessment & Plan         1 CKD 4-baseline creatinine of 2.2-2.6, minimally proteinuric.  Presumed secondary to hypertension/diabetes mellitus/advanced age.     2 dyspnea-recent pneumonia with large bilateral pleural effusion.  Last echo from January with preserved ejection fraction and some suggestion of diastolic dysfunction.  -Improving with diuresis and thoracentesis.     3 acute kidney injury-creatinine was 1.9 on discharge on 12/14 and up to 4.3 on 12/22.  It did not improve despite stopping torsemide.  Possible that she may have sustained ischemic tubular injury out-of-hospital and creatinine has plateaued now.  Low urine sodium and fractional excretion of sodium suggest possible cardiorenal physiology.  Echocardiogram shows preserved ejection fraction, grade 1 diastolic dysfunction and pulmonary hypertension.     Now cr very slowly better.  4.23 to 3.90      4 bilateral pleural effusions-transudate A.  Status post thoracentesis.     5 hypertension-continue PTA antihypertensives as BP is controlled.       6 type 2 diabetes mellitus-management per primary team.     7 anemia chronic kidney disease-iron sat  Only 10% on 12/22.  Ferritin low at 54.  SPEP negative. IV iron ordered.       8 hyperphosphatemia-with renal failure.  Phos still > 6.  I increased phoslo to 2 TID.       Plan:     Continue furosemide 60 mg IV Q8H  1200 ml FR  Low sodium diet          Jef Whatley MD  [facility] Nephrology  [phone]    Interval History      She feels a little stronger.  She has walked across the room several times.  Now resting in her chair.  Cr down again a small amount.  O > I by a liter.        Physical Exam   Temp: 98.5  F (36.9  C) Temp src: Oral BP: 133/59 Pulse: 69   Resp: 18 SpO2: 93 % O2 Device: Nasal cannula Oxygen Delivery: 6 LPM  Vitals:    01/01/21 0451 01/02/21 0435 01/03/21 0600   Weight: 99.9 kg (220 lb 4.8 oz) 97.8 kg (215  lb 9.6 oz) 99.4 kg (219 lb 1.6 oz)     Vital Signs with Ranges  Temp:  [97.5  F (36.4  C)-98.6  F (37  C)] 98.5  F (36.9  C)  Pulse:  [59-79] 69  Resp:  [18-22] 18  BP: (113-148)/(35-59) 133/59  FiO2 (%):  [60 %] 60 %  SpO2:  [90 %-97 %] 93 %  I/O last 3 completed shifts:  In: 1195 [P.O.:1195]  Out: 2175 [Urine:2175]    GENERAL APPEARANCE: pleasant, NAD, a & o  HEENT:  Eyes/ears/nose/neck grossly normal  RESP: lungs fine crackles bases  CV: RRR, nl S1/S2, no m/r/g   ABDOMEN: o/s/nt/nd, bs present  EXTREMITIES/SKIN: no rashes/lesions; no edema    Medications       calcium acetate  1,334 mg Oral TID w/meals     diltiazem ER COATED BEADS  180 mg Oral BID     doxazosin  1 mg Oral At Bedtime     furosemide  60 mg Intravenous Q8H     gabapentin  200 mg Oral BID     hydrALAZINE  100 mg Oral TID     insulin aspart  1-7 Units Subcutaneous TID AC     insulin aspart  1-5 Units Subcutaneous At Bedtime     iron sucrose (VENOFER) intermittent infusion (200 mg)  200 mg Intravenous Q24H     senna-docusate  1 tablet Oral BID    Or     senna-docusate  2 tablet Oral BID     sertraline  50 mg Oral Daily     simvastatin  10 mg Oral At Bedtime     sodium chloride (PF)  3 mL Intracatheter Q8H       Data   BMP  Recent Labs   Lab 01/03/21  0719 01/02/21  0714 01/01/21  0805 12/31/20  0726    138 138 138   POTASSIUM 3.6 3.5 3.4 4.0   CHLORIDE 102 102 102 105   EDNA 8.9 8.7 9.0 8.7   CO2 34* 30 28 29   BUN 95* 88* 84* 83*   CR 3.90* 3.96* 4.08* 4.11*   * 161* 167* 142*     Phos@LABRCNTIPR(phos:4)  CBC)  Recent Labs   Lab 01/02/21  0714 12/30/20  0823 12/29/20  1523   WBC  --  7.9 10.0   HGB 8.4* 8.1* 8.9*   HCT  --  27.0* 29.8*   MCV  --  96 95   PLT  --  192 221     Recent Labs   Lab 12/31/20  0726   AST 18   ALT 18   ALKPHOS 68   BILITOTAL 0.4     Recent Labs   Lab 12/30/20  0823   INR 1.23*       Attestation:   I have reviewed today's relevant vital signs, notes, medications, labs and imaging.

## 2021-01-03 NOTE — PLAN OF CARE
To Do:  End of Shift Summary  For vital signs and complete assessments, please see documentation flowsheets.     Pertinent assessments: CPAP 8L sats 90-91%. LS diminished. SOB on exertion. Denies pain. Aguilera patent. Tele- A fib HR 56.   Major Shift Events: Paged RT. Desats 82-83%. CPAP machined can only use upto 6L of O2. CPAP machine changed   Treatment Plan: Lasix IV, stict I/O, daily wt and phoslo.   Bedside Nurse: Rae Matta RN

## 2021-01-03 NOTE — PROGRESS NOTES
Care Management Follow Up    Length of Stay (days): 5    Expected Discharge Date: 01/03/21     Concerns to be Addressed:     PT/OT recommendation for TCU   Patient plan of care discussed at interdisciplinary rounds: Yes    Anticipated Discharge Disposition:  TCU        Patient/family educated on Medicare website which has current facility and service quality ratings:yes    Education Provided on the Discharge Plan:  yes  Patient/Family in Agreement with the Plan:  yes    Referrals Placed by CM/SW:    Spoke with pt about TCU options  Referrals sent to [facility] and  [facility]   Private pay costs discussed: private room/amenity fees    Additional Information:  Pt agreeable to TCU at this time, although expressed concerns about exposure to COVID if she does not return home. SW spoke with pt about how facilities are trying to manage exposure . Pt will need Prior auth with her health plan for TCU. Will complete referral and sent to [employer]    Per note possible discontinue Mon/Tue       Corinne C. White, LSW     Addendumn    [employer] auth approved 10 days for TCU [id number]  Still waiting to hear where pt may have TCU bed at

## 2021-01-03 NOTE — PLAN OF CARE
End of Shift Summary  For vital signs and complete assessments, please see documentation flowsheets.     Pertinent assessments: Patient has remained between 5-6L during the day. Has sat up in the chair for all meals. IV lasix continued. Aguilera remains in place. Creatinine improving to 3.90.     Major Shift Events: None    Treatment Plan: Continue IV Lasix and 1200 ml fluid restriction. Monitor I&Os and weight. Move to oral Lasix per nephrology.     Bedside Nurse: Emmy Pearson RN

## 2021-01-03 NOTE — PROGRESS NOTES
[facility]  Hospitalist Progress Note  Timo Jones MD, MD 01/03/2021  (Text Page)  Reason for Stay (Diagnosis): FABY on top of CKD, bilateral pleural effusion         Assessment and Plan:      Summary of Stay: Ирина Yanez is an 85 year old female with a history of TERESSA on CPAP, type II DM, CKD stage IV with baseline creatinine 2, neuropathy, MDD, anxiety, HTN, RBBB, anemia, mild aortic stenosis, obesity, and grade 1 diastolic dysfunction on TTE who presented to the ED with shortness of breath and hypoxia.      She was found to have bilateral large pleural effusions.  Previously seen right upper lobe lesion had resolved, making that likely to have been pneumonia.  Patient was requiring significant oxygen up to 10 L/min.  She underwent bilateral thoracentesis on 12/30 with about 800 mL of fluid removed from each lung.  She was also found to have FABY on CKD with creatinine above 4 from baseline 2. Nephrology was consulted.     Today, still diuresing well, creatinine stable.  Feels little better with earlier shortness of breath..      Acute on subacute hypoxemic respiratory failure, large bilateral pleural effusions  - Multifactorial in origin, but primarily likely from her large bilateral pleural effusions.  Suspect diastolic CHF contributing to volume overload.  Recent hospitalization for pneumonia 2 weeks ago and discharged on 2 weeks of Augmentin with near resolution of the right upper lobe masslike consolidation on the repeat noncontrast chest CT.  No further cough or fevers. Discharged on 1 L O2 which she was using continuous and frequently had sats in the 80s on admission.  - Symptomatic COVID-19 PCR and influenza A/B PCR are negative.  - Bilateral diagnostic and therapeutic ultrasound guided thoracentesis by IR done on 12/30.  800 cc of fluid removed from each lung.  Per Lights criteria, the fluid appears to be transudate, therefore more likely from heart failure/volume overload.   Cytology done on the right effusion is pending (the lung that the patient had that pneumonia in)  - Continue supplemental O2 and wean as tolerated.  Still on oxygen support, anticipate she will not be able to wean completely off of O2.     Acute on chronic diastolic CHF   - Progressive shortness of breath with hypoxia as above with additional findings of large bilateral pleural effusions and 1-2+ bilateral lower extremity pitting edema.  Previous TTE from 1/2020 showed EF 60 to 65% and grade 1 diastolic dysfunction.  Torsemide held 4 days PTA due to doubling of creatinine up to 4.  Her weight in clinic 1 week ago was 222 lb, up from 212-215 earlier in the month.  Urine sodium low which would be consistent with CHF.  - Bilateral thoracentesis as above  - Strict intake and output, daily weights   - Diuresis per nephrology with 60 mg of Lasix IV 3 times daily   - Echocardiogram shows mild pulmonary hypertension and grade 1 dysfunction  -Weight trend monitoring,  No significant edema seen on extremities       FABY on CKD stage IV  - Creatinine baseline of 2.  FABY on admission with creatinine of 4.2 similar to 4.3 checked 7 days ago in clinic.  Her torsemide was held roughly for the last 4 days and she denied any significant change in urine output.  Urinalysis is not cellular or suspicious for glomerulonephritis.  No obstructive symptoms.    - UOP improving with IV lasix but creatinine remaining elevated  - ATN out of hospital vs progression of intrinsic disease  - Low urine Na suggests cardiorenal but her Cr has not improved with diuresis  -Creatinine slowly improving, appreciate continuous input from nephrology service  -We will defer to nephrology regarding diuresis currently receiving IV Lasix every 8 hours, creatinine is permitting.  -Continue with Aguilera catheter while on IV diuresis  - Continue phoslo, fluid restriction and diuretics per nephrology     HTN  - PTA on diltiazem  mg twice daily doxazosin 1 mg at  bedtime, hydralazine 100 mg 3 times daily, and torsemide 20 mg daily   - Resume diltiazem, hydralazine, and doxazosin  - Diuretics as above     Type II DM  - PTA on glimepiride 1 mg daily and Actos 15 mg daily.  A1c earlier this year 6.8.  - Hold glimepiride and Actos.  Glimepiride may need to be permanently discontinued if her kidney function does not improve.  Actos may be resumed on discharge.  - Medium dose sliding scale insulin     Obesity  - BMI 39     TERESSA   - Continue CPAP at bedtime.     MDD, anxiety  - Continue sertraline 50 mg daily.     HLD  - Resume simvastatin 10 mg at bedtime.  CK level checked and not elevated.     Chronic normocytic anemia:  - Hemoglobin at baseline 8 range. IV Venofer 200 mg x 5 doses per nephrology.     Peripheral neuropathy:  - Resume PTA gabapentin 200 mg twice daily.     Please provide MiraLAX and stool softeners as patient has no BM yet at least for the past 2 to 3 days     Diet: Renal  DVT Prophylaxis: Pneumatic Compression Devices  Malnutrition: No  Aguilera Catheter: No  Code Status: Full Code      Disposition Plan   Expected discharge:   Anticipating needing at least 2-3 more inpatient hospitalization days  Anticipating TCU placement upon discharge.  I updated patient's  over the phone          Interval History (Subjective):      Continuing service care today.  Seen and examined.  Chart reviewed.  Case discussed with nursing service.  I found Ms. Gifford sitting comfortably in the chair.  Still showing decent urine output.   Remains on oxygen support but feels better with her shortness of breath.  Tolerating oral diet.  Mental state remained at baseline as she is pleasant, interactive and following instructions.      # Pain Assessment:  Current Pain Score 1/3/2021   Patient currently in pain? denies   Pain score (0-10) -   Pain location -   Pain descriptors -   Ирина s pain level was assessed and she currently denies pain.                  Physical Exam:      Last Vital  "Signs:  /41 (BP Location: Right arm)   Pulse 79   Temp 98.6  F (37  C) (Oral)   Resp 20   Ht 1.626 m (5' 4\")   Wt 99.4 kg (219 lb 1.6 oz)   LMP  (LMP Unknown)   SpO2 94%   BMI 37.61 kg/m      I/O last 3 completed shifts:  In: 1195 [P.O.:1195]  Out: 2175 [Urine:2175]  Wt Readings from Last 1 Encounters:   01/03/21 99.4 kg (219 lb 1.6 oz)     Vitals:    12/30/20 0627 12/31/20 0652 01/01/21 0451 01/02/21 0435   Weight: 104.4 kg (230 lb 1.6 oz) 102 kg (224 lb 12.8 oz) 99.9 kg (220 lb 4.8 oz) 97.8 kg (215 lb 9.6 oz)    01/03/21 0600   Weight: 99.4 kg (219 lb 1.6 oz)       Constitutional: Awake, alert, cooperative, no apparent distress   Respiratory:  Fair air entry, minimal basal crackles, no wheezes   Cardiovascular: Regular rate and rhythm, normal S1 and S2, and no murmur noted   Abdomen: Normal bowel sounds, soft, non-distended, non-tender   Skin: No rashes, no cyanosis, dry to touch   Neuro: Alert and oriented x3, no weakness, spontaneous and coherent speech   Extremities: No edema, normal range of motion   Other(s): Euthymic mood, not agitated       All other systems: Negative          Medications:      All current medications were reviewed with changes reflected in problem list.         Data:      All new lab and imaging data was reviewed.   Labs:  Recent Labs   Lab 12/30/20  1430   CULT Culture negative monitoring continues     No results for input(s): PH, PHARTERIAL, PO2, [id number], SAT, PCO2, HCO3, BASEEXCESS, MAXIMINO, BEB in the last 168 hours.    Invalid input(s): [id number]  Recent Labs   Lab 01/02/21  0714 12/30/20  0823 12/29/20  1523   WBC  --  7.9 10.0   HGB 8.4* 8.1* 8.9*   HCT  --  27.0* 29.8*   MCV  --  96 95   PLT  --  192 221     Recent Labs   Lab 01/03/21  0719 01/02/21  0714 01/01/21  0805 12/31/20  0726 12/30/20  0823 12/29/20  1523 12/29/20  1523    138 138 138 140  --  141   POTASSIUM 3.6 3.5 3.4 4.0 4.4  --  4.2   CHLORIDE 102 102 102 105 107  --  108   CO2 34* 30 28 29 30  " --  28   ANIONGAP 2* 6 8 4 3  --  5   * 161* 167* 142* 132*  --  136*   BUN 95* 88* 84* 83* 82*  --  84*   CR 3.90* 3.96* 4.08* 4.11* 4.18*  --  4.23*   GFRESTIMATED 10* 10* 9* 9* 9*  --  9*   GFRESTBLACK 12* 11* 11* 11* 11*  --  10*   EDNA 8.9 8.7 9.0 8.7 8.7  --  8.9   MAG 2.6* 2.6* 2.6* 2.9* 3.1*   < >  --    PHOS 5.8* 6.4* 6.0* 6.3* 7.0*   < >  --    PROTTOTAL  --   --   --  7.0 6.7*  --  7.5   ALBUMIN 2.3* 2.4* 2.4* 2.6*  --   --  3.0*   BILITOTAL  --   --   --  0.4  --   --  0.3   ALKPHOS  --   --   --  68  --   --  73   AST  --   --   --  18  --   --  10   ALT  --   --   --  18  --   --  22    < > = values in this interval not displayed.     No results for input(s): SED, CRP in the last 168 hours.  Recent Labs   Lab 01/03/21  0821 01/03/21  0719 01/03/21  0202 01/02/21  2144 01/02/21  1824 01/02/21  1251 01/02/21  0714 01/02/21  0714 01/01/21  0805 01/01/21  0805 12/31/20  0726 12/31/20  0726 12/30/20  0823 12/30/20  0823   GLC  --  134*  --   --   --   --   --  161*  --  167*  --  142*  --  132*   *  --  135* 209* 159* 179*   < >  --    < >  --    < >  --    < >  --     < > = values in this interval not displayed.     Recent Labs   Lab 12/30/20  0823   INR 1.23*     Recent Labs   Lab 12/29/20  1523   TROPI <0.015     Recent Labs   Lab 12/29/20  1711   COLOR Yellow   APPEARANCE Slightly Cloudy   URINEGLC Negative   URINEBILI Negative   URINEKETONE Negative   SG 1.015   UBLD Negative   URINEPH 5.0   PROTEIN 20*   NITRITE Negative   LEUKEST Negative   RBCU 8*   WBCU 8*      Imaging:   Results for orders placed or performed during the hospital encounter of 12/29/20   XR Chest Port 1 View    Narrative    XR CHEST PORT 1 VW 12/29/2020 4:27 PM    HISTORY: dyspnea    COMPARISON: CT 12/10/2020    FINDINGS: Persistent small bilateral pleural effusions with adjacent  atelectasis/infiltrates, left greater than right. Interval mild  improved in aeration within the right upper lobe favoring  improving  pneumonia. Continued follow-up is recommended. Stable mild  cardiomegaly.    TONY PÉREZ MD   Chest CT w/o contrast    Narrative    EXAM: CT CHEST W/O CONTRAST  LOCATION: [facility]  DATE/TIME: 12/29/2020 5:19 PM    INDICATION: Dyspnea, recent pneumonia versus mass, 3 weeks ago  COMPARISON: 12/10/2020  TECHNIQUE: CT chest without IV contrast. Multiplanar reformats were obtained. Dose reduction techniques were used.  CONTRAST: None.    FINDINGS:   LUNGS AND PLEURA: Masslike consolidation in the right upper lobe has nearly completely resolved. Mild groundglass density and bandlike atelectasis or scarring are now seen. Large bilateral pleural effusions layer dependently which have increased. No   pneumothorax.    MEDIASTINUM/AXILLAE: Great vessels normal in caliber. Moderate aortic valve calcification. Moderate coronary arterial calcification no pericardial effusion    UPPER ABDOMEN: No significant finding.    MUSCULOSKELETAL: No suspicious bone lesion. Diffuse osteopenia. Severe multilevel degenerative change in the thoracic spine.      Impression    IMPRESSION:   1.  Near complete resolution of previously seen masslike consolidation in the right upper lobe.  2.  Large bilateral pleural effusions and partial lower lobe atelectasis, slightly increased.     XR Chest 1 View    Narrative    CHEST ONE VIEW    12/30/2020 2:40 PM     HISTORY: Bilateral thoracentesis.    COMPARISON: Chest x-ray 12/29/2020.      Impression    IMPRESSION: Single view of the chest. No evidence of pneumothorax.  Lower lungs obscured by patient's pannus. Increased opacity right  upper lung is concerning for pulmonary edema. Correlate with patient's  clinical picture.    SHERRI REYES MD   US Thoracentesis Bilateral    Narrative    EXAM: US THORACENTESIS BILATERAL       12/30/2020 2:39 PM       HISTORY: Bilateral pleural effusions, request made for diagnostic and  therapeutic thoracentesis.     PROCEDURE: Written  informed consent was obtained from the patient  prior to the procedure. The risks and benefits including bleeding,  infection and pneumothorax were discussed and the patient wished to  continue. Initial ultrasound images demonstrated a mild bilateral  pleural fluid collections. A permanent image was saved. The skin  overlying this collection was marked, prepped, draped and anesthetized  in usual sterile fashion utilizing 5 mL of lidocaine 1%. Thoracentesis  catheter was then placed into the pleural fluid collection with return  of 800 mL of pleural fluid from each hemithorax. Estimated blood loss  during the procedure was less than 5 mL. No specimens collected.  Patient tolerated the procedure well. Followup chest x-ray was  ordered.      With continuous ultrasound guidance, a 5 Tajik needle and catheter  advanced into each pleural space and ultrasound image stored for  documentation. No residual fluid at completion.      Impression    IMPRESSION: Ultrasound-guided bilateral thoracentesis. 800 mL of fluid  removed from each hemithorax. Sample sent from the right hemithorax to  the lab.    HANK ELLIOTT MD   Echocardiogram Complete    Narrative    [id number]  [id number]  [id number]  [id number]^CHRISTOPHER^EULALIA^[facility]  Echocardiography Laboratory  [address] [place] [address]        Name: BEATA TRIPLETT  MRN: [id number]  : 1935  Study Date: 2020 09:03 AM  Age: 85 yrs  Gender: Female  Patient Location: [facility]  Reason For Study: CHF, Dyspnea  Ordering Physician: EULALIA WHITE  Performed By: Mila Moser     BSA: 2.1 m2  Height: 64 in  Weight: 230 lb  HR: 71  BP: 130/54 mmHg  _____________________________________________________________________________  __        Procedure  Complete Portable Echo Adult.  _____________________________________________________________________________  __        Interpretation Summary     The ascending aorta is Moderately  dilated.  Mild valvular aortic stenosis, BRAYDON 1.6 cm2  The visual ejection fraction is estimated at 65-70%.  The left atrium is mildly dilated.  Dilation of the inferior vena cava is present with abnormal respiratory  variation in diameter.  Right ventricular systolic pressure is elevated, consistent with mild  pulmonary hypertension.  Grade I or early diastolic dysfunction.  The study was technically difficult. Compared to prior study, there is no  significant change.  _____________________________________________________________________________  __        Left Ventricle  The left ventricle is normal in size. The visual ejection fraction is  estimated at 65-70%. Grade I or early diastolic dysfunction.     Right Ventricle  The right ventricle is normal in structure, function and size.     Atria  The left atrium is mildly dilated. Right atrial size is normal.     Mitral Valve  There is mild to moderate mitral annular calcification.        Tricuspid Valve  There is tricuspid annular calcification. The tricuspid valve is not well  visualized. Right ventricular systolic pressure is elevated, consistent with  mild pulmonary hypertension.     Aortic Valve  Mild valvular aortic stenosis.     Pulmonic Valve  The pulmonic valve is not well seen, but is grossly normal. There is trace  pulmonic valvular regurgitation.     Vessels  Borderline aortic root dilatation. The ascending aorta is Moderately dilated.  Dilation of the inferior vena cava is present with abnormal respiratory  variation in diameter.     Pericardium  There is no pericardial effusion.        Rhythm  Sinus rhythm was noted.  _____________________________________________________________________________  __  MMode/2D Measurements & Calculations     Ao root diam: 3.8 cm  asc Aorta Diam: 4.4 cm  LVOT diam: 2.1 cm  LVOT area: 3.6 cm2  LA Volume (BP): 82.0 ml  LA Volume Index (BP): 39.4 ml/m2        Doppler Measurements & Calculations  MV E max ángel: 118.5 cm/sec  MV  A max ferdinand: 103.0 cm/sec  MV E/A: 1.1  MV max P.4 mmHg  MV mean PG: 3.6 mmHg  MV V2 VTI: 43.1 cm  MVA(VTI): 2.7 cm2  MV dec time: 0.22 sec     Ao V2 max: 254.1 cm/sec  Ao max P.0 mmHg  Ao V2 mean: 183.2 cm/sec  Ao mean PG: 15.0 mmHg  Ao V2 VTI: 67.1 cm  BRAYDON(I,D): 1.8 cm2  BRAYDON(V,D): 1.9 cm2  LV V1 max P.4 mmHg  LV V1 max: 135.7 cm/sec  LV V1 VTI: 32.8 cm  SV(LVOT): 117.9 ml  SI(LVOT): 56.8 ml/m2  PA acc time: 0.11 sec  TR max ferdinand: 245.4 cm/sec  TR max P.1 mmHg  AV Ferdinand Ratio (DI): 0.53  BRAYDON Index (cm2/m2): 0.85  E/E' av.0  Lateral E/e': 14.3  Medial E/e': 19.7              _____________________________________________________________________________  __        Report approved by: Balta Reardon 2020 10:31 AM

## 2021-01-03 NOTE — PROGRESS NOTES
Respiratory Therapy  Patient remains on a CPAP of +12 at 60 % via mask for increased WOB/TERESSA. The bridge of the nose is clean and dry. Pt tolerating well. BS diminished.     Temp: 98.5  F (36.9  C) Temp src: Oral BP: 118/46 Pulse: 64   Resp: 20 SpO2: 95 % O2 Device: BiPAP/CPAP Oxygen Delivery: 8 LPM    Will continue to monitor and assess the pt respiratory status and needs.    Jacobo Mclean, RT on 1/3/2021 at 3:13 AM

## 2021-01-04 LAB
ALBUMIN SERPL-MCNC: 2.2 G/DL (ref 3.4–5)
ANION GAP SERPL CALCULATED.3IONS-SCNC: 5 MMOL/L (ref 3–14)
BACTERIA SPEC CULT: NO GROWTH
BUN SERPL-MCNC: 101 MG/DL (ref 7–30)
CALCIUM SERPL-MCNC: 9.1 MG/DL (ref 8.5–10.1)
CHLORIDE SERPL-SCNC: 101 MMOL/L (ref 94–109)
CO2 SERPL-SCNC: 33 MMOL/L (ref 20–32)
COPATH REPORT: NORMAL
CREAT SERPL-MCNC: 4 MG/DL (ref 0.52–1.04)
GFR SERPL CREATININE-BSD FRML MDRD: 10 ML/MIN/{1.73_M2}
GLUCOSE BLDC GLUCOMTR-MCNC: 141 MG/DL (ref 70–99)
GLUCOSE BLDC GLUCOMTR-MCNC: 149 MG/DL (ref 70–99)
GLUCOSE BLDC GLUCOMTR-MCNC: 151 MG/DL (ref 70–99)
GLUCOSE BLDC GLUCOMTR-MCNC: 166 MG/DL (ref 70–99)
GLUCOSE BLDC GLUCOMTR-MCNC: 197 MG/DL (ref 70–99)
GLUCOSE SERPL-MCNC: 163 MG/DL (ref 70–99)
MAGNESIUM SERPL-MCNC: 2.5 MG/DL (ref 1.6–2.3)
PHOSPHATE SERPL-MCNC: 5.1 MG/DL (ref 2.5–4.5)
POTASSIUM SERPL-SCNC: 3.4 MMOL/L (ref 3.4–5.3)
SODIUM SERPL-SCNC: 139 MMOL/L (ref 133–144)
SPECIMEN SOURCE: NORMAL

## 2021-01-04 PROCEDURE — 258N000003 HC RX IP 258 OP 636: Performed by: INTERNAL MEDICINE

## 2021-01-04 PROCEDURE — 94660 CPAP INITIATION&MGMT: CPT

## 2021-01-04 PROCEDURE — 99232 SBSQ HOSP IP/OBS MODERATE 35: CPT | Performed by: INTERNAL MEDICINE

## 2021-01-04 PROCEDURE — 80069 RENAL FUNCTION PANEL: CPT | Performed by: INTERNAL MEDICINE

## 2021-01-04 PROCEDURE — 36415 COLL VENOUS BLD VENIPUNCTURE: CPT | Performed by: INTERNAL MEDICINE

## 2021-01-04 PROCEDURE — 999N000157 HC STATISTIC RCP TIME EA 10 MIN

## 2021-01-04 PROCEDURE — 999N001017 HC STATISTIC GLUCOSE BY METER IP

## 2021-01-04 PROCEDURE — 99233 SBSQ HOSP IP/OBS HIGH 50: CPT | Performed by: INTERNAL MEDICINE

## 2021-01-04 PROCEDURE — 250N000013 HC RX MED GY IP 250 OP 250 PS 637: Performed by: INTERNAL MEDICINE

## 2021-01-04 PROCEDURE — 250N000011 HC RX IP 250 OP 636: Performed by: INTERNAL MEDICINE

## 2021-01-04 PROCEDURE — 120N000001 HC R&B MED SURG/OB

## 2021-01-04 PROCEDURE — 83735 ASSAY OF MAGNESIUM: CPT | Performed by: INTERNAL MEDICINE

## 2021-01-04 RX ORDER — HYDRALAZINE HYDROCHLORIDE 50 MG/1
50 TABLET, FILM COATED ORAL 3 TIMES DAILY
Status: DISCONTINUED | OUTPATIENT
Start: 2021-01-04 | End: 2021-01-05

## 2021-01-04 RX ORDER — GABAPENTIN 100 MG/1
100 CAPSULE ORAL 2 TIMES DAILY
Status: DISCONTINUED | OUTPATIENT
Start: 2021-01-04 | End: 2021-01-05

## 2021-01-04 RX ADMIN — SERTRALINE HYDROCHLORIDE 50 MG: 50 TABLET ORAL at 08:19

## 2021-01-04 RX ADMIN — CALCIUM ACETATE 1334 MG: 667 CAPSULE ORAL at 18:36

## 2021-01-04 RX ADMIN — IRON SUCROSE 200 MG: 20 INJECTION, SOLUTION INTRAVENOUS at 16:14

## 2021-01-04 RX ADMIN — CALCIUM ACETATE 1334 MG: 667 CAPSULE ORAL at 08:19

## 2021-01-04 RX ADMIN — CALCIUM ACETATE 1334 MG: 667 CAPSULE ORAL at 13:19

## 2021-01-04 RX ADMIN — HYDRALAZINE HYDROCHLORIDE 50 MG: 50 TABLET, FILM COATED ORAL at 21:40

## 2021-01-04 RX ADMIN — FUROSEMIDE 60 MG: 10 INJECTION, SOLUTION INTRAMUSCULAR; INTRAVENOUS at 01:56

## 2021-01-04 RX ADMIN — GABAPENTIN 200 MG: 100 CAPSULE ORAL at 08:18

## 2021-01-04 RX ADMIN — DILTIAZEM HYDROCHLORIDE 180 MG: 180 CAPSULE, COATED, EXTENDED RELEASE ORAL at 21:39

## 2021-01-04 RX ADMIN — GABAPENTIN 100 MG: 100 CAPSULE ORAL at 21:39

## 2021-01-04 RX ADMIN — DOXAZOSIN 1 MG: 1 TABLET ORAL at 21:40

## 2021-01-04 RX ADMIN — HYDRALAZINE HYDROCHLORIDE 50 MG: 50 TABLET, FILM COATED ORAL at 16:07

## 2021-01-04 RX ADMIN — HYDRALAZINE HYDROCHLORIDE 100 MG: 50 TABLET, FILM COATED ORAL at 08:19

## 2021-01-04 RX ADMIN — DILTIAZEM HYDROCHLORIDE 180 MG: 180 CAPSULE, COATED, EXTENDED RELEASE ORAL at 08:18

## 2021-01-04 RX ADMIN — SIMVASTATIN 10 MG: 10 TABLET, FILM COATED ORAL at 21:40

## 2021-01-04 ASSESSMENT — ACTIVITIES OF DAILY LIVING (ADL)
ADLS_ACUITY_SCORE: 17

## 2021-01-04 ASSESSMENT — MIFFLIN-ST. JEOR: SCORE: 1441.54

## 2021-01-04 NOTE — PROGRESS NOTES
Respiratory Therapy  Patient remains on a CPAP of +12 at 60 % via mask for TERESSA. The bridge of the nose is clean and dry. Pt tolerating well. BS diminished.     Temp: 98.9  F (37.2  C) Temp src: Oral BP: 127/40 Pulse: 72   Resp: 20 SpO2: (P) 98 % O2 Device: (P) BiPAP/CPAP Oxygen Delivery: 6 LPM    Will continue to monitor and assess the pt respiratory status and needs.    Jacobo Mclean, RT on 1/4/2021 at 3:59 AM

## 2021-01-04 NOTE — PLAN OF CARE
End of Shift Summary  For vital signs and complete assessments, please see documentation flowsheets.     Pertinent assessments: LS diminished, dyspnea with exertion. Requiring 6L O2. Tele- 1st degree AV block.

## 2021-01-04 NOTE — PROGRESS NOTES
[facility]  Hospitalist Progress Note  Timo Jones MD, MD 01/04/2021  (Text Page)  Reason for Stay (Diagnosis): FABY on top of CKD, bilateral pleural effusion         Assessment and Plan:      Summary of Stay: Ирина aYnez is an 85 year old female with a history of TERESSA on CPAP, type II DM, CKD stage IV with baseline creatinine 2, neuropathy, MDD, anxiety, HTN, RBBB, anemia, mild aortic stenosis, obesity, and grade 1 diastolic dysfunction on TTE who presented to the ED with shortness of breath and hypoxia.      She was found to have bilateral large pleural effusions.  Previously seen right upper lobe lesion had resolved, making that likely to have been pneumonia.  Patient was requiring significant oxygen up to 10 L/min.  She underwent bilateral thoracentesis on 12/30 with about 800 mL of fluid removed from each lung.  She was also found to have FABY on CKD with creatinine above 4 from baseline 2. Nephrology was consulted.     Today, still diuresing well, creatinine stable.  Feels little better with earlier shortness of breath..      Acute on subacute hypoxemic respiratory failure, large bilateral pleural effusions  - Multifactorial in origin, but primarily likely from her large bilateral pleural effusions.  Suspect diastolic CHF contributing to volume overload.  Recent hospitalization for pneumonia 2 weeks ago and discharged on 2 weeks of Augmentin with near resolution of the right upper lobe masslike consolidation on the repeat noncontrast chest CT.  No further cough or fevers. Discharged on 1 L O2 which she was using continuous and frequently had sats in the 80s on admission.  - Symptomatic COVID-19 PCR and influenza A/B PCR are negative.  - Bilateral diagnostic and therapeutic ultrasound guided thoracentesis by IR done on 12/30.  800 cc of fluid removed from each lung.  Per Lights criteria, the fluid appears to be transudate, therefore more likely from heart failure/volume overload.   Cytology done on the right effusion is pending (the lung that the patient had that pneumonia in)  - Continue supplemental O2 and wean as tolerated.  Still on oxygen support, anticipate she will not be able to wean completely off of O2.     Acute on chronic diastolic CHF   - Progressive shortness of breath with hypoxia as above with additional findings of large bilateral pleural effusions and 1-2+ bilateral lower extremity pitting edema.  Previous TTE from 1/2020 showed EF 60 to 65% and grade 1 diastolic dysfunction.  Torsemide held 4 days PTA due to doubling of creatinine up to 4.  Her weight in clinic 1 week ago was 222 lb, up from 212-215 earlier in the month.  Urine sodium low which would be consistent with CHF.  - Bilateral thoracentesis as above  - Strict intake and output, daily weights   - Diuresis per nephrology with 60 mg of Lasix IV 3 times daily   - Echocardiogram shows mild pulmonary hypertension and grade 1 dysfunction  -Weight trend monitoring,  No significant edema seen on extremities       FABY on CKD stage IV  - Creatinine baseline of 2.  FABY on admission with creatinine of 4.2 similar to 4.3 checked 7 days ago in clinic.  Her torsemide was held roughly for the last 4 days and she denied any significant change in urine output.  Urinalysis is not cellular or suspicious for glomerulonephritis.  No obstructive symptoms.    - UOP improving with IV lasix but creatinine remaining elevated  - ATN out of hospital vs progression of intrinsic disease  - Low urine Na suggests cardiorenal but her Cr has not improved with diuresis  -Creatinine slowly improving, appreciate continuous input from nephrology service  -We will defer to nephrology regarding diuresis currently receiving IV Lasix every 8 hours, creatinine is permitting.  -Continue with Aguilera catheter while on IV diuresis  - Continue phoslo, fluid restriction and diuretics per nephrology  -Most recent metabolic panel showing increasing azotemia,  creatinine creeping up  -Weight not significantly trending down  -Suspecting she might be reaching her dry weight, will await further input from nephrology service if IV diuresis still needs to be continued or can be switched to oral route now.       HTN  - PTA on diltiazem  mg twice daily doxazosin 1 mg at bedtime, hydralazine 100 mg 3 times daily, and torsemide 20 mg daily   - Resume diltiazem, hydralazine, and doxazosin  - Diuretics as above     Type II DM  - PTA on glimepiride 1 mg daily and Actos 15 mg daily.  A1c earlier this year 6.8.  - Hold glimepiride and Actos.  Glimepiride may need to be permanently discontinued if her kidney function does not improve.  Actos may be resumed on discharge.  - Medium dose sliding scale insulin     Obesity  - BMI 39     TERESSA   - Continue CPAP at bedtime.     MDD, anxiety  - Continue sertraline 50 mg daily.     HLD  - Resume simvastatin 10 mg at bedtime.  CK level checked and not elevated.     Chronic normocytic anemia:  - Hemoglobin at baseline 8 range. IV Venofer 200 mg x 5 doses per nephrology.     Peripheral neuropathy:  - Resume PTA gabapentin 200 mg twice daily.     Please provide MiraLAX and stool softeners as patient has no BM yet at least for the past 2 to 3 days  -Had some decent bowel movement on the night of January 3, 2021     Diet: Renal  DVT Prophylaxis: Pneumatic Compression Devices  Malnutrition: No  Aguilera Catheter: No  Code Status: Full Code      Disposition Plan   Expected discharge:   Anticipating needing at least 2-3 more inpatient hospitalization days  Anticipating TCU placement upon discharge.            Interval History (Subjective):      Continuing service care today.  Seen and examined.  Chart reviewed.  Case discussed with nursing service.  I found Ms. Gifford sitting comfortably in the chair.  Tolerating oral diet.  Had some decent bowel movement overnight.  She still requiring oxygen supplementation but endorsing no worsening of shortness of  "breath, no reported nausea or vomiting.  No bleeding tendencies.  No mental status changes.     # Pain Assessment:  Current Pain Score 1/4/2021   Patient currently in pain? denies   Pain score (0-10) -   Pain location -   Pain descriptors -   Ирина hayes pain level was assessed and she currently denies pain.                  Physical Exam:      Last Vital Signs:  /49 (BP Location: Right arm)   Pulse 75   Temp 98.4  F (36.9  C) (Oral)   Resp 20   Ht 1.626 m (5' 4\")   Wt 100.2 kg (220 lb 12.8 oz)   LMP  (LMP Unknown)   SpO2 94%   BMI 37.90 kg/m      I/O last 3 completed shifts:  In: 870 [P.O.:870]  Out: 1150 [Urine:1150]  Wt Readings from Last 1 Encounters:   01/04/21 100.2 kg (220 lb 12.8 oz)     Vitals:    12/31/20 0652 01/01/21 0451 01/02/21 0435 01/03/21 0600   Weight: 102 kg (224 lb 12.8 oz) 99.9 kg (220 lb 4.8 oz) 97.8 kg (215 lb 9.6 oz) 99.4 kg (219 lb 1.6 oz)    01/04/21 0403   Weight: 100.2 kg (220 lb 12.8 oz)       Constitutional: Awake, alert, cooperative, no apparent distress   Respiratory:  Fair air entry, minimal basal crackles, no wheezes   Cardiovascular: Regular rate and rhythm, normal S1 and S2, and no murmur noted   Abdomen: Normal bowel sounds, soft, non-distended, non-tender   Skin: No rashes, no cyanosis, dry to touch   Neuro: Alert and oriented x3, no weakness, spontaneous and coherent speech   Extremities: No edema, normal range of motion   Other(s): Euthymic mood, not agitated       All other systems: Negative          Medications:      All current medications were reviewed with changes reflected in problem list.         Data:      All new lab and imaging data was reviewed.   Labs:  Recent Labs   Lab 12/30/20  1430   CULT Culture negative monitoring continues     No results for input(s): PH, PHARTERIAL, PO2, [id number], SAT, PCO2, HCO3, BASEEXCESS, MAXIMINO, BEB in the last 168 hours.    Invalid input(s): [id number]  Recent Labs   Lab 01/02/21  0714 12/30/20  0823 12/29/20  1523   WBC "  --  7.9 10.0   HGB 8.4* 8.1* 8.9*   HCT  --  27.0* 29.8*   MCV  --  96 95   PLT  --  192 221     Recent Labs   Lab 01/04/21  0657 01/03/21  0719 01/02/21  0714 12/31/20  0726 12/31/20  0726 12/30/20  0823 12/29/20  1523 12/29/20  1523    138 138   < > 138 140  --  141   POTASSIUM 3.4 3.6 3.5   < > 4.0 4.4  --  4.2   CHLORIDE 101 102 102   < > 105 107  --  108   CO2 33* 34* 30   < > 29 30  --  28   ANIONGAP 5 2* 6   < > 4 3  --  5   * 134* 161*   < > 142* 132*  --  136*   * 95* 88*   < > 83* 82*  --  84*   CR 4.00* 3.90* 3.96*   < > 4.11* 4.18*  --  4.23*   GFRESTIMATED 10* 10* 10*   < > 9* 9*  --  9*   GFRESTBLACK 11* 12* 11*   < > 11* 11*  --  10*   EDNA 9.1 8.9 8.7   < > 8.7 8.7  --  8.9   MAG 2.5* 2.6* 2.6*   < > 2.9* 3.1*   < >  --    PHOS 5.1* 5.8* 6.4*   < > 6.3* 7.0*   < >  --    PROTTOTAL  --   --   --   --  7.0 6.7*  --  7.5   ALBUMIN 2.2* 2.3* 2.4*   < > 2.6*  --   --  3.0*   BILITOTAL  --   --   --   --  0.4  --   --  0.3   ALKPHOS  --   --   --   --  68  --   --  73   AST  --   --   --   --  18  --   --  10   ALT  --   --   --   --  18  --   --  22    < > = values in this interval not displayed.     No results for input(s): SED, CRP in the last 168 hours.  Recent Labs   Lab 01/04/21  0823 01/04/21  0657 01/04/21  0156 01/03/21  2113 01/03/21  1819 01/03/21  1251 01/03/21  0719 01/03/21  0719 01/02/21  0714 01/02/21  0714 01/01/21  0805 01/01/21  0805 12/31/20  0726 12/31/20  0726   GLC  --  163*  --   --   --   --   --  134*  --  161*  --  167*  --  142*   *  --  149* 186* 136* 191*   < >  --    < >  --    < >  --    < >  --     < > = values in this interval not displayed.     Recent Labs   Lab 12/30/20  0823   INR 1.23*     Recent Labs   Lab 12/29/20  1523   TROPI <0.015     Recent Labs   Lab 12/29/20  1711   COLOR Yellow   APPEARANCE Slightly Cloudy   URINEGLC Negative   URINEBILI Negative   URINEKETONE Negative   SG 1.015   UBLD Negative   URINEPH 5.0   PROTEIN 20*   NITRITE  Negative   LEUKEST Negative   RBCU 8*   WBCU 8*      Imaging:   Results for orders placed or performed during the hospital encounter of 12/29/20   XR Chest Port 1 View    Narrative    XR CHEST PORT 1 VW 12/29/2020 4:27 PM    HISTORY: dyspnea    COMPARISON: CT 12/10/2020    FINDINGS: Persistent small bilateral pleural effusions with adjacent  atelectasis/infiltrates, left greater than right. Interval mild  improved in aeration within the right upper lobe favoring improving  pneumonia. Continued follow-up is recommended. Stable mild  cardiomegaly.    TONY PÉREZ MD   Chest CT w/o contrast    Narrative    EXAM: CT CHEST W/O CONTRAST  LOCATION: [facility]  DATE/TIME: 12/29/2020 5:19 PM    INDICATION: Dyspnea, recent pneumonia versus mass, 3 weeks ago  COMPARISON: 12/10/2020  TECHNIQUE: CT chest without IV contrast. Multiplanar reformats were obtained. Dose reduction techniques were used.  CONTRAST: None.    FINDINGS:   LUNGS AND PLEURA: Masslike consolidation in the right upper lobe has nearly completely resolved. Mild groundglass density and bandlike atelectasis or scarring are now seen. Large bilateral pleural effusions layer dependently which have increased. No   pneumothorax.    MEDIASTINUM/AXILLAE: Great vessels normal in caliber. Moderate aortic valve calcification. Moderate coronary arterial calcification no pericardial effusion    UPPER ABDOMEN: No significant finding.    MUSCULOSKELETAL: No suspicious bone lesion. Diffuse osteopenia. Severe multilevel degenerative change in the thoracic spine.      Impression    IMPRESSION:   1.  Near complete resolution of previously seen masslike consolidation in the right upper lobe.  2.  Large bilateral pleural effusions and partial lower lobe atelectasis, slightly increased.     XR Chest 1 View    Narrative    CHEST ONE VIEW    12/30/2020 2:40 PM     HISTORY: Bilateral thoracentesis.    COMPARISON: Chest x-ray 12/29/2020.      Impression    IMPRESSION:  Single view of the chest. No evidence of pneumothorax.  Lower lungs obscured by patient's pannus. Increased opacity right  upper lung is concerning for pulmonary edema. Correlate with patient's  clinical picture.    SHERRI REYES MD   US Thoracentesis Bilateral    Narrative    EXAM: US THORACENTESIS BILATERAL       2020 2:39 PM       HISTORY: Bilateral pleural effusions, request made for diagnostic and  therapeutic thoracentesis.     PROCEDURE: Written informed consent was obtained from the patient  prior to the procedure. The risks and benefits including bleeding,  infection and pneumothorax were discussed and the patient wished to  continue. Initial ultrasound images demonstrated a mild bilateral  pleural fluid collections. A permanent image was saved. The skin  overlying this collection was marked, prepped, draped and anesthetized  in usual sterile fashion utilizing 5 mL of lidocaine 1%. Thoracentesis  catheter was then placed into the pleural fluid collection with return  of 800 mL of pleural fluid from each hemithorax. Estimated blood loss  during the procedure was less than 5 mL. No specimens collected.  Patient tolerated the procedure well. Followup chest x-ray was  ordered.      With continuous ultrasound guidance, a 5 Mongolian needle and catheter  advanced into each pleural space and ultrasound image stored for  documentation. No residual fluid at completion.      Impression    IMPRESSION: Ultrasound-guided bilateral thoracentesis. 800 mL of fluid  removed from each hemithorax. Sample sent from the right hemithorax to  the lab.    HANK ELLIOTT MD   Echocardiogram Complete    Narrative    [id number]  [id number]  [id number]  [id number]^CHRISTOPHER^EULALIA^[facility]  Echocardiography Laboratory  [address] [place] [address]        Name: BEATA TRIPLETT  MRN: [id number]  : 1935  Study Date: 2020 09:03 AM  Age: 85 yrs  Gender: Female  Patient Location:  [facility]  Reason For Study: CHF, Dyspnea  Ordering Physician: EULALIA WHITE  Performed By: Mila Moser     BSA: 2.1 m2  Height: 64 in  Weight: 230 lb  HR: 71  BP: 130/54 mmHg  _____________________________________________________________________________  __        Procedure  Complete Portable Echo Adult.  _____________________________________________________________________________  __        Interpretation Summary     The ascending aorta is Moderately dilated.  Mild valvular aortic stenosis, BRAYDON 1.6 cm2  The visual ejection fraction is estimated at 65-70%.  The left atrium is mildly dilated.  Dilation of the inferior vena cava is present with abnormal respiratory  variation in diameter.  Right ventricular systolic pressure is elevated, consistent with mild  pulmonary hypertension.  Grade I or early diastolic dysfunction.  The study was technically difficult. Compared to prior study, there is no  significant change.  _____________________________________________________________________________  __        Left Ventricle  The left ventricle is normal in size. The visual ejection fraction is  estimated at 65-70%. Grade I or early diastolic dysfunction.     Right Ventricle  The right ventricle is normal in structure, function and size.     Atria  The left atrium is mildly dilated. Right atrial size is normal.     Mitral Valve  There is mild to moderate mitral annular calcification.        Tricuspid Valve  There is tricuspid annular calcification. The tricuspid valve is not well  visualized. Right ventricular systolic pressure is elevated, consistent with  mild pulmonary hypertension.     Aortic Valve  Mild valvular aortic stenosis.     Pulmonic Valve  The pulmonic valve is not well seen, but is grossly normal. There is trace  pulmonic valvular regurgitation.     Vessels  Borderline aortic root dilatation. The ascending aorta is Moderately dilated.  Dilation of the inferior vena cava is present with abnormal  respiratory  variation in diameter.     Pericardium  There is no pericardial effusion.        Rhythm  Sinus rhythm was noted.  _____________________________________________________________________________  __  MMode/2D Measurements & Calculations     Ao root diam: 3.8 cm  asc Aorta Diam: 4.4 cm  LVOT diam: 2.1 cm  LVOT area: 3.6 cm2  LA Volume (BP): 82.0 ml  LA Volume Index (BP): 39.4 ml/m2        Doppler Measurements & Calculations  MV E max ferdinand: 118.5 cm/sec  MV A max ferdinand: 103.0 cm/sec  MV E/A: 1.1  MV max P.4 mmHg  MV mean PG: 3.6 mmHg  MV V2 VTI: 43.1 cm  MVA(VTI): 2.7 cm2  MV dec time: 0.22 sec     Ao V2 max: 254.1 cm/sec  Ao max P.0 mmHg  Ao V2 mean: 183.2 cm/sec  Ao mean PG: 15.0 mmHg  Ao V2 VTI: 67.1 cm  BRAYDON(I,D): 1.8 cm2  BRAYDON(V,D): 1.9 cm2  LV V1 max P.4 mmHg  LV V1 max: 135.7 cm/sec  LV V1 VTI: 32.8 cm  SV(LVOT): 117.9 ml  SI(LVOT): 56.8 ml/m2  PA acc time: 0.11 sec  TR max ferdinand: 245.4 cm/sec  TR max P.1 mmHg  AV Ferdinand Ratio (DI): 0.53  BRAYDON Index (cm2/m2): 0.85  E/E' av.0  Lateral E/e': 14.3  Medial E/e': 19.7              _____________________________________________________________________________  __        Report approved by: Balta Reardon 2020 10:31 AM

## 2021-01-04 NOTE — PROGRESS NOTES
" Renal Medicine Progress Note            Assessment/Plan:     # CKD 4-baseline creatinine of 2.2-2.6, minimally proteinuric.  Presumed secondary to hypertension/diabetes mellitus/advanced age.     # Dyspnea-recent pneumonia with large bilateral pleural effusion.  Last echo from January with preserved ejection fraction and some suggestion of diastolic dysfunction.   -improved with diuresis and thoracentesis     # acute kidney injury-creatinine was 1.9 on discharge on 12/14 and up to 4.3 on 12/22.  It did not improve despite stopping torsemide.  Possible that she may have sustained ischemic tubular injury out-of-hospital and creatinine has plateaued now.  Low urine sodium and fractional excretion of sodium suggest possible cardiorenal physiology.  Echocardiogram shows preserved ejection fraction, grade 1 diastolic dysfunction and pulmonary hypertension.   -she seems to be on the dry side      # Bilateral pleural effusions-transudate A.  Status post thoracentesis.     # Hypertension: DBP is quite low.      # Type 2 diabetes mellitus-management per primary team.     # Anemia chronic kidney disease-iron sat  Only 10% on 12/22.  Ferritin low at 54.  SPEP negative. IV iron ordered.       # Hyperphosphatemia-with renal failure.  Phos still > 6.  Phoslo to 2 TID.       Plan:  # Agree with hold diuretics. She is 5 liters net negative and down 4 kg since admission. She seems to be on the dry side.  # Decrease hydralazine to 50 mg tid  # Decrease Neurontin  # Daily renal panel        Interval History:     Afebrile. Afebrile. SBP is okay. DBP is quite low. \"Why I am so tired,\" she says. Her breathing is about the same, and it is not getting worse. She uses 1 liter O2 at home.           Medications and Allergies:       calcium acetate  1,334 mg Oral TID w/meals     diltiazem ER COATED BEADS  180 mg Oral BID     doxazosin  1 mg Oral At Bedtime     [Held by provider] furosemide  60 mg Intravenous Q8H     gabapentin  200 mg Oral BID " "    hydrALAZINE  100 mg Oral TID     insulin aspart  1-7 Units Subcutaneous TID AC     insulin aspart  1-5 Units Subcutaneous At Bedtime     iron sucrose (VENOFER) intermittent infusion (200 mg)  200 mg Intravenous Q24H     senna-docusate  1 tablet Oral BID    Or     senna-docusate  2 tablet Oral BID     sertraline  50 mg Oral Daily     simvastatin  10 mg Oral At Bedtime     sodium chloride (PF)  3 mL Intracatheter Q8H        Allergies   Allergen Reactions     Augmentin Diarrhea     Vomiting       Cleocin      Hives     Tegretol [Carbamazepine]      Irregular heart beat            Physical Exam:   Vitals were reviewed   , Blood pressure 131/45, pulse 72, temperature 99  F (37.2  C), temperature source Oral, resp. rate 20, height 1.626 m (5' 4\"), weight 100.2 kg (220 lb 12.8 oz), SpO2 95 %, not currently breastfeeding.    Wt Readings from Last 3 Encounters:   01/04/21 100.2 kg (220 lb 12.8 oz)   12/22/20 101.1 kg (222 lb 14.4 oz)   12/14/20 96.7 kg (213 lb 1.6 oz)       Intake/Output Summary (Last 24 hours) at 1/4/2021 1410  Last data filed at 1/4/2021 1028  Gross per 24 hour   Intake 870 ml   Output 1150 ml   Net -280 ml       GENERAL APPEARANCE: pleasant, NAD, alert  HEENT:  Eyes/ears/nose/neck grossly normal  RESP: Diminish BS. No wheezes.   CV: RRR, nl S1/S2  ABDOMEN: obese, soft, NT  EXTREMITIES/SKIN: no rashes/lesions on observed skin; No edema  NEURO: a/o x 3. Grossly intact.          Data:     CBC RESULTS:     Recent Labs   Lab 01/02/21  0714 12/30/20  0823 12/29/20  1523   WBC  --  7.9 10.0   RBC  --  2.81* 3.14*   HGB 8.4* 8.1* 8.9*   HCT  --  27.0* 29.8*   PLT  --  192 221       Basic Metabolic Panel:  Recent Labs   Lab 01/04/21  0657 01/03/21  0719 01/02/21  0714 01/01/21  0805 12/31/20  0726 12/30/20  0823    138 138 138 138 140   POTASSIUM 3.4 3.6 3.5 3.4 4.0 4.4   CHLORIDE 101 102 102 102 105 107   CO2 33* 34* 30 28 29 30   * 95* 88* 84* 83* 82*   CR 4.00* 3.90* 3.96* 4.08* 4.11* 4.18* "   * 134* 161* 167* 142* 132*   EDNA 9.1 8.9 8.7 9.0 8.7 8.7       INR  Recent Labs   Lab 12/30/20  0823   INR 1.23*      Attestation:   I have reviewed today's relevant vital signs, notes, medications, labs and imaging.    Olvin Escoto MD  [facility] - Nephrology  Office phone :[phone]  Pager: [phone]

## 2021-01-04 NOTE — PLAN OF CARE
End of Shift Summary  For vital signs and complete assessments, please see documentation flowsheets.     Pertinent assessments: O2 at 6L via NC, some SoB with exertion, Lasix given, Intake 400ml, Output 400ml via Aguilera, had BM, per Tele - SR 72 with BBB    Major Shift Events: uneventful    Treatment Plan: Continue IV Lasix and 1200 ml fluid restriction. Monitor I&Os and weight.     Bedside Nurse: Arslan Baca RN

## 2021-01-04 NOTE — PLAN OF CARE
End of Shift Summary  For vital signs and complete assessments, please see documentation flowsheets.     Pertinent assessments: O2 high 90s on CPAP 8L. No c/o SOB. Endorses GARAY. No c/o pain or nausea. Lasix given, Intake 100 ml, Output 200 ml via Aguilera. 0200 B. Tele shows SR- 63 per tele tech.    Major Shift Events: uneventful    Treatment Plan: Continue IV Lasix and 1200 ml fluid restriction. Monitor I&Os and weight.

## 2021-01-05 ENCOUNTER — APPOINTMENT (OUTPATIENT)
Dept: OCCUPATIONAL THERAPY | Facility: CLINIC | Age: 86
DRG: 291 | End: 2021-01-05
Attending: INTERNAL MEDICINE
Payer: COMMERCIAL

## 2021-01-05 LAB
ALBUMIN SERPL-MCNC: 2.3 G/DL (ref 3.4–5)
ANION GAP SERPL CALCULATED.3IONS-SCNC: 6 MMOL/L (ref 3–14)
BUN SERPL-MCNC: 114 MG/DL (ref 7–30)
CALCIUM SERPL-MCNC: 9.1 MG/DL (ref 8.5–10.1)
CHLORIDE SERPL-SCNC: 100 MMOL/L (ref 94–109)
CO2 SERPL-SCNC: 33 MMOL/L (ref 20–32)
CREAT SERPL-MCNC: 4.2 MG/DL (ref 0.52–1.04)
GFR SERPL CREATININE-BSD FRML MDRD: 9 ML/MIN/{1.73_M2}
GLUCOSE BLDC GLUCOMTR-MCNC: 154 MG/DL (ref 70–99)
GLUCOSE BLDC GLUCOMTR-MCNC: 154 MG/DL (ref 70–99)
GLUCOSE BLDC GLUCOMTR-MCNC: 167 MG/DL (ref 70–99)
GLUCOSE BLDC GLUCOMTR-MCNC: 175 MG/DL (ref 70–99)
GLUCOSE BLDC GLUCOMTR-MCNC: 179 MG/DL (ref 70–99)
GLUCOSE SERPL-MCNC: 163 MG/DL (ref 70–99)
PHOSPHATE SERPL-MCNC: 5.1 MG/DL (ref 2.5–4.5)
POTASSIUM SERPL-SCNC: 3.8 MMOL/L (ref 3.4–5.3)
SODIUM SERPL-SCNC: 139 MMOL/L (ref 133–144)

## 2021-01-05 PROCEDURE — 250N000013 HC RX MED GY IP 250 OP 250 PS 637: Performed by: INTERNAL MEDICINE

## 2021-01-05 PROCEDURE — 99233 SBSQ HOSP IP/OBS HIGH 50: CPT | Performed by: INTERNAL MEDICINE

## 2021-01-05 PROCEDURE — 120N000001 HC R&B MED SURG/OB

## 2021-01-05 PROCEDURE — 80069 RENAL FUNCTION PANEL: CPT | Performed by: INTERNAL MEDICINE

## 2021-01-05 PROCEDURE — 97530 THERAPEUTIC ACTIVITIES: CPT | Mod: GO | Performed by: REHABILITATION PRACTITIONER

## 2021-01-05 PROCEDURE — 99232 SBSQ HOSP IP/OBS MODERATE 35: CPT | Performed by: INTERNAL MEDICINE

## 2021-01-05 PROCEDURE — 258N000003 HC RX IP 258 OP 636: Performed by: INTERNAL MEDICINE

## 2021-01-05 PROCEDURE — 250N000011 HC RX IP 250 OP 636: Performed by: INTERNAL MEDICINE

## 2021-01-05 PROCEDURE — 999N001017 HC STATISTIC GLUCOSE BY METER IP

## 2021-01-05 PROCEDURE — 999N000157 HC STATISTIC RCP TIME EA 10 MIN

## 2021-01-05 PROCEDURE — 94660 CPAP INITIATION&MGMT: CPT

## 2021-01-05 PROCEDURE — 36415 COLL VENOUS BLD VENIPUNCTURE: CPT | Performed by: INTERNAL MEDICINE

## 2021-01-05 RX ORDER — HYDRALAZINE HYDROCHLORIDE 25 MG/1
25 TABLET, FILM COATED ORAL 3 TIMES DAILY
Status: DISCONTINUED | OUTPATIENT
Start: 2021-01-05 | End: 2021-01-08

## 2021-01-05 RX ORDER — GABAPENTIN 100 MG/1
100 CAPSULE ORAL DAILY
Status: DISCONTINUED | OUTPATIENT
Start: 2021-01-06 | End: 2021-01-15 | Stop reason: HOSPADM

## 2021-01-05 RX ADMIN — CALCIUM ACETATE 1334 MG: 667 CAPSULE ORAL at 18:01

## 2021-01-05 RX ADMIN — DILTIAZEM HYDROCHLORIDE 180 MG: 180 CAPSULE, COATED, EXTENDED RELEASE ORAL at 22:04

## 2021-01-05 RX ADMIN — HYDRALAZINE HYDROCHLORIDE 25 MG: 25 TABLET, FILM COATED ORAL at 22:04

## 2021-01-05 RX ADMIN — GABAPENTIN 100 MG: 100 CAPSULE ORAL at 09:13

## 2021-01-05 RX ADMIN — HYDRALAZINE HYDROCHLORIDE 25 MG: 25 TABLET, FILM COATED ORAL at 16:00

## 2021-01-05 RX ADMIN — IRON SUCROSE 200 MG: 20 INJECTION, SOLUTION INTRAVENOUS at 16:02

## 2021-01-05 RX ADMIN — SERTRALINE HYDROCHLORIDE 50 MG: 50 TABLET ORAL at 09:13

## 2021-01-05 RX ADMIN — CALCIUM ACETATE 1334 MG: 667 CAPSULE ORAL at 09:13

## 2021-01-05 RX ADMIN — CALCIUM ACETATE 1334 MG: 667 CAPSULE ORAL at 12:28

## 2021-01-05 RX ADMIN — DILTIAZEM HYDROCHLORIDE 180 MG: 180 CAPSULE, COATED, EXTENDED RELEASE ORAL at 09:12

## 2021-01-05 RX ADMIN — HYDRALAZINE HYDROCHLORIDE 50 MG: 50 TABLET, FILM COATED ORAL at 09:12

## 2021-01-05 RX ADMIN — DOCUSATE SODIUM AND SENNOSIDES 2 TABLET: 8.6; 5 TABLET ORAL at 22:04

## 2021-01-05 RX ADMIN — SIMVASTATIN 10 MG: 10 TABLET, FILM COATED ORAL at 22:04

## 2021-01-05 RX ADMIN — DOXAZOSIN 1 MG: 1 TABLET ORAL at 22:04

## 2021-01-05 ASSESSMENT — MIFFLIN-ST. JEOR: SCORE: 1439.27

## 2021-01-05 ASSESSMENT — ACTIVITIES OF DAILY LIVING (ADL)
ADLS_ACUITY_SCORE: 17

## 2021-01-05 NOTE — PROGRESS NOTES
[facility]  Hospitalist Progress Note  Timo Jones MD, MD 01/05/2021  (Text Page)  Reason for Stay (Diagnosis): FABY on top of CKD, bilateral pleural effusion         Assessment and Plan:      Summary of Stay: Ирина Yanez is an 85 year old female with a history of TERESSA on CPAP, type II DM, CKD stage IV with baseline creatinine 2, neuropathy, MDD, anxiety, HTN, RBBB, anemia, mild aortic stenosis, obesity, and grade 1 diastolic dysfunction on TTE who presented to the ED with shortness of breath and hypoxia.      She was found to have bilateral large pleural effusions.  Previously seen right upper lobe lesion had resolved, making that likely to have been pneumonia.  Patient was requiring significant oxygen up to 10 L/min.  She underwent bilateral thoracentesis on 12/30 with about 800 mL of fluid removed from each lung.  She was also found to have FABY on CKD with creatinine above 4 from baseline 2. Nephrology was consulted.     -Her diuresis was discontinued last 1/4/2021 due to rising creatinine and azotemia  -Aguilera catheter remove  -Currently voiding freely  -Tolerating oral diet     Acute on subacute hypoxemic respiratory failure, large bilateral pleural effusions  - Multifactorial in origin, but primarily likely from her large bilateral pleural effusions.  Suspect diastolic CHF contributing to volume overload.  Recent hospitalization for pneumonia 2 weeks ago and discharged on 2 weeks of Augmentin with near resolution of the right upper lobe masslike consolidation on the repeat noncontrast chest CT.  No further cough or fevers. Discharged on 1 L O2 which she was using continuous and frequently had sats in the 80s on admission.  - Symptomatic COVID-19 PCR and influenza A/B PCR are negative.  - Bilateral diagnostic and therapeutic ultrasound guided thoracentesis by IR done on 12/30.  800 cc of fluid removed from each lung.  Per Lights criteria, the fluid appears to be transudate, therefore more  likely from heart failure/volume overload.  Cytology done on the right effusion is pending (the lung that the patient had that pneumonia in)  - Continue supplemental O2 and wean as tolerated.  Still on oxygen support, anticipate she will not be able to wean completely off of O2.     Acute on chronic diastolic CHF   - Progressive shortness of breath with hypoxia as above with additional findings of large bilateral pleural effusions and 1-2+ bilateral lower extremity pitting edema.  Previous TTE from 1/2020 showed EF 60 to 65% and grade 1 diastolic dysfunction.  Torsemide held 4 days PTA due to doubling of creatinine up to 4.  Her weight in clinic 1 week ago was 222 lb, up from 212-215 earlier in the month.  Urine sodium low which would be consistent with CHF.  - Bilateral thoracentesis as above  - Strict intake and output, daily weights   - Diuresis per nephrology with 60 mg of Lasix IV 3 times daily   - Echocardiogram shows mild pulmonary hypertension and grade 1 dysfunction  -Weight trend monitoring,  No significant edema seen on extremities       FABY on CKD stage IV  - Creatinine baseline of 2.  FABY on admission with creatinine of 4.2 similar to 4.3 checked 7 days ago in clinic.  Her torsemide was held roughly for the last 4 days and she denied any significant change in urine output.  Urinalysis is not cellular or suspicious for glomerulonephritis.  No obstructive symptoms.    - UOP improving with IV lasix but creatinine remaining elevated  - ATN out of hospital vs progression of intrinsic disease  - Low urine Na suggests cardiorenal but her Cr has not improved with diuresis  -Creatinine slowly improving, appreciate continuous input from nephrology service  -We will defer to nephrology regarding diuresis currently receiving IV Lasix every 8 hours, creatinine is permitting.  -Continue with Aguilera catheter while on IV diuresis  - Continue phoslo, fluid restriction and diuretics per nephrology  -Most recent metabolic  panel showing increasing azotemia, creatinine creeping up  -Weight not significantly trending down  -Suspecting she might be reaching her dry weight, will await further input from nephrology service if IV diuresis still needs to be continued or can be switched to oral route now.  -Worsening azotemia, increasing creatinine levels  -I change her fluid restriction from 1200 ml to 2 L/day  -We will await further instructions and recommendations from nephrology service  -Further decrease Neurontin dose 200 mg daily     HTN  - PTA on diltiazem  mg twice daily doxazosin 1 mg at bedtime, hydralazine 100 mg 3 times daily, and torsemide 20 mg daily   - Resume diltiazem, hydralazine, and doxazosin  - Diuretics as above     Type II DM  - PTA on glimepiride 1 mg daily and Actos 15 mg daily.  A1c earlier this year 6.8.  - Hold glimepiride and Actos.  Glimepiride may need to be permanently discontinued if her kidney function does not improve.  Actos may be resumed on discharge.  - Medium dose sliding scale insulin     Obesity  - BMI 39     TERESSA   - Continue CPAP at bedtime.     MDD, anxiety  - Continue sertraline 50 mg daily.     HLD  - Resume simvastatin 10 mg at bedtime.  CK level checked and not elevated.     Chronic normocytic anemia:  - Hemoglobin at baseline 8 range. IV Venofer 200 mg x 5 doses per nephrology.     Peripheral neuropathy:  - Resume PTA gabapentin 200 mg twice daily.     Please provide MiraLAX and stool softeners as patient has no BM yet at least for the past 2 to 3 days  -Had some decent bowel movement on the night of January 3, 2021     Diet: Renal  DVT Prophylaxis: Pneumatic Compression Devices  Malnutrition: No  Aguilera Catheter: No  Code Status: Full Code      Disposition Plan   Expected discharge:   Anticipating needing at least 2-3 more inpatient hospitalization days  Anticipating TCU placement upon discharge.     updated over the phone this morning          Interval History (Subjective):     "  Continuing service care today.  Seen and examined.  Chart reviewed.  Case discussed with nursing service.  Ms. Gifford feels okay and has no significant reported events.  She stated that she was able to void freely with no pain nor hematuria seen.  No nausea, no vomiting no diarrhea  Mental state remained at baseline     # Pain Assessment:  Current Pain Score 1/5/2021   Patient currently in pain? denies   Pain score (0-10) -   Pain location -   Pain descriptors -   Ирина s pain level was assessed and she currently denies pain.                  Physical Exam:      Last Vital Signs:  /43   Pulse 63   Temp 98.4  F (36.9  C) (Axillary)   Resp 20   Ht 1.626 m (5' 4\")   Wt 99.9 kg (220 lb 4.8 oz)   LMP  (LMP Unknown)   SpO2 97%   BMI 37.81 kg/m      I/O last 3 completed shifts:  In: 940 [P.O.:940]  Out: 425 [Urine:425]  Wt Readings from Last 1 Encounters:   01/05/21 99.9 kg (220 lb 4.8 oz)     Vitals:    01/01/21 0451 01/02/21 0435 01/03/21 0600 01/04/21 0403   Weight: 99.9 kg (220 lb 4.8 oz) 97.8 kg (215 lb 9.6 oz) 99.4 kg (219 lb 1.6 oz) 100.2 kg (220 lb 12.8 oz)    01/05/21 0536   Weight: 99.9 kg (220 lb 4.8 oz)       Constitutional: Awake, alert, cooperative, no apparent distress   Respiratory:  Fair air entry, minimal basal crackles, no wheezes   Cardiovascular: Regular rate and rhythm, normal S1 and S2, and no murmur noted   Abdomen: Normal bowel sounds, soft, non-distended, non-tender   Skin: No rashes, no cyanosis, dry to touch   Neuro: Alert and oriented x3, no weakness, spontaneous and coherent speech   Extremities: No edema, normal range of motion   Other(s): Euthymic mood, not agitated       All other systems: Negative          Medications:      All current medications were reviewed with changes reflected in problem list.         Data:      All new lab and imaging data was reviewed.   Labs:  Recent Labs   Lab 12/30/20  1430   CULT No growth     No results for input(s): PH, PHARTERIAL, PO2, " [id number], SAT, PCO2, HCO3, BASEEXCESS, MAXIMINO, BEB in the last 168 hours.    Invalid input(s): [id number]  Recent Labs   Lab 01/02/21  0714 12/30/20  0823 12/29/20  1523   WBC  --  7.9 10.0   HGB 8.4* 8.1* 8.9*   HCT  --  27.0* 29.8*   MCV  --  96 95   PLT  --  192 221     Recent Labs   Lab 01/05/21  0652 01/04/21  0657 01/03/21  0719 01/02/21  0714 12/31/20  0726 12/31/20  0726 12/30/20  0823 12/29/20  1523 12/29/20  1523    139 138 138   < > 138 140  --  141   POTASSIUM 3.8 3.4 3.6 3.5   < > 4.0 4.4  --  4.2   CHLORIDE 100 101 102 102   < > 105 107  --  108   CO2 33* 33* 34* 30   < > 29 30  --  28   ANIONGAP 6 5 2* 6   < > 4 3  --  5   * 163* 134* 161*   < > 142* 132*  --  136*   * 101* 95* 88*   < > 83* 82*  --  84*   CR 4.20* 4.00* 3.90* 3.96*   < > 4.11* 4.18*  --  4.23*   GFRESTIMATED 9* 10* 10* 10*   < > 9* 9*  --  9*   GFRESTBLACK 11* 11* 12* 11*   < > 11* 11*  --  10*   EDNA 9.1 9.1 8.9 8.7   < > 8.7 8.7  --  8.9   MAG  --  2.5* 2.6* 2.6*   < > 2.9* 3.1*   < >  --    PHOS 5.1* 5.1* 5.8* 6.4*   < > 6.3* 7.0*   < >  --    PROTTOTAL  --   --   --   --   --  7.0 6.7*  --  7.5   ALBUMIN 2.3* 2.2* 2.3* 2.4*   < > 2.6*  --   --  3.0*   BILITOTAL  --   --   --   --   --  0.4  --   --  0.3   ALKPHOS  --   --   --   --   --  68  --   --  73   AST  --   --   --   --   --  18  --   --  10   ALT  --   --   --   --   --  18  --   --  22    < > = values in this interval not displayed.     No results for input(s): SED, CRP in the last 168 hours.  Recent Labs   Lab 01/05/21  0834 01/05/21  0652 01/05/21  0117 01/04/21  2223 01/04/21  1752 01/04/21  1244 01/04/21  0657 01/04/21  0657 01/03/21  0719 01/03/21  0719 01/02/21  0714 01/02/21  0714 01/01/21  0805 01/01/21  0805   GLC  --  163*  --   --   --   --   --  163*  --  134*  --  161*  --  167*   *  --  167* 197* 141* 166*   < >  --    < >  --    < >  --    < >  --     < > = values in this interval not displayed.     Recent Labs   Lab  12/30/20  0823   INR 1.23*     Recent Labs   Lab 12/29/20  1523   TROPI <0.015     Recent Labs   Lab 12/29/20  1711   COLOR Yellow   APPEARANCE Slightly Cloudy   URINEGLC Negative   URINEBILI Negative   URINEKETONE Negative   SG 1.015   UBLD Negative   URINEPH 5.0   PROTEIN 20*   NITRITE Negative   LEUKEST Negative   RBCU 8*   WBCU 8*      Imaging:   Results for orders placed or performed during the hospital encounter of 12/29/20   XR Chest Port 1 View    Narrative    XR CHEST PORT 1 VW 12/29/2020 4:27 PM    HISTORY: dyspnea    COMPARISON: CT 12/10/2020    FINDINGS: Persistent small bilateral pleural effusions with adjacent  atelectasis/infiltrates, left greater than right. Interval mild  improved in aeration within the right upper lobe favoring improving  pneumonia. Continued follow-up is recommended. Stable mild  cardiomegaly.    TONY PÉREZ MD   Chest CT w/o contrast    Narrative    EXAM: CT CHEST W/O CONTRAST  LOCATION: [facility]  DATE/TIME: 12/29/2020 5:19 PM    INDICATION: Dyspnea, recent pneumonia versus mass, 3 weeks ago  COMPARISON: 12/10/2020  TECHNIQUE: CT chest without IV contrast. Multiplanar reformats were obtained. Dose reduction techniques were used.  CONTRAST: None.    FINDINGS:   LUNGS AND PLEURA: Masslike consolidation in the right upper lobe has nearly completely resolved. Mild groundglass density and bandlike atelectasis or scarring are now seen. Large bilateral pleural effusions layer dependently which have increased. No   pneumothorax.    MEDIASTINUM/AXILLAE: Great vessels normal in caliber. Moderate aortic valve calcification. Moderate coronary arterial calcification no pericardial effusion    UPPER ABDOMEN: No significant finding.    MUSCULOSKELETAL: No suspicious bone lesion. Diffuse osteopenia. Severe multilevel degenerative change in the thoracic spine.      Impression    IMPRESSION:   1.  Near complete resolution of previously seen masslike consolidation in the  right upper lobe.  2.  Large bilateral pleural effusions and partial lower lobe atelectasis, slightly increased.     XR Chest 1 View    Narrative    CHEST ONE VIEW    12/30/2020 2:40 PM     HISTORY: Bilateral thoracentesis.    COMPARISON: Chest x-ray 12/29/2020.      Impression    IMPRESSION: Single view of the chest. No evidence of pneumothorax.  Lower lungs obscured by patient's pannus. Increased opacity right  upper lung is concerning for pulmonary edema. Correlate with patient's  clinical picture.    SHERRI REYES MD   US Thoracentesis Bilateral    Narrative    EXAM: US THORACENTESIS BILATERAL       12/30/2020 2:39 PM       HISTORY: Bilateral pleural effusions, request made for diagnostic and  therapeutic thoracentesis.     PROCEDURE: Written informed consent was obtained from the patient  prior to the procedure. The risks and benefits including bleeding,  infection and pneumothorax were discussed and the patient wished to  continue. Initial ultrasound images demonstrated a mild bilateral  pleural fluid collections. A permanent image was saved. The skin  overlying this collection was marked, prepped, draped and anesthetized  in usual sterile fashion utilizing 5 mL of lidocaine 1%. Thoracentesis  catheter was then placed into the pleural fluid collection with return  of 800 mL of pleural fluid from each hemithorax. Estimated blood loss  during the procedure was less than 5 mL. No specimens collected.  Patient tolerated the procedure well. Followup chest x-ray was  ordered.      With continuous ultrasound guidance, a 5 Nigerian needle and catheter  advanced into each pleural space and ultrasound image stored for  documentation. No residual fluid at completion.      Impression    IMPRESSION: Ultrasound-guided bilateral thoracentesis. 800 mL of fluid  removed from each hemithorax. Sample sent from the right hemithorax to  the lab.    HANK ELLIOTT MD   Echocardiogram Complete    Narrative     [id number]  [id number]  [id number]  [id number]^CHRISTOPHER^EULALIA^PHILIPP           [facility]  Echocardiography Laboratory  [address]  [place] [address]        Name: BEATA TRIPLETT  MRN: [id number]  : 1935  Study Date: 2020 09:03 AM  Age: 85 yrs  Gender: Female  Patient Location: [facility]  Reason For Study: CHF, Dyspnea  Ordering Physician: EULALIA WHITE  Performed By: Mila Moser     BSA: 2.1 m2  Height: 64 in  Weight: 230 lb  HR: 71  BP: 130/54 mmHg  _____________________________________________________________________________  __        Procedure  Complete Portable Echo Adult.  _____________________________________________________________________________  __        Interpretation Summary     The ascending aorta is Moderately dilated.  Mild valvular aortic stenosis, BRAYDON 1.6 cm2  The visual ejection fraction is estimated at 65-70%.  The left atrium is mildly dilated.  Dilation of the inferior vena cava is present with abnormal respiratory  variation in diameter.  Right ventricular systolic pressure is elevated, consistent with mild  pulmonary hypertension.  Grade I or early diastolic dysfunction.  The study was technically difficult. Compared to prior study, there is no  significant change.  _____________________________________________________________________________  __        Left Ventricle  The left ventricle is normal in size. The visual ejection fraction is  estimated at 65-70%. Grade I or early diastolic dysfunction.     Right Ventricle  The right ventricle is normal in structure, function and size.     Atria  The left atrium is mildly dilated. Right atrial size is normal.     Mitral Valve  There is mild to moderate mitral annular calcification.        Tricuspid Valve  There is tricuspid annular calcification. The tricuspid valve is not well  visualized. Right ventricular systolic pressure is elevated, consistent with  mild pulmonary hypertension.     Aortic Valve  Mild  valvular aortic stenosis.     Pulmonic Valve  The pulmonic valve is not well seen, but is grossly normal. There is trace  pulmonic valvular regurgitation.     Vessels  Borderline aortic root dilatation. The ascending aorta is Moderately dilated.  Dilation of the inferior vena cava is present with abnormal respiratory  variation in diameter.     Pericardium  There is no pericardial effusion.        Rhythm  Sinus rhythm was noted.  _____________________________________________________________________________  __  MMode/2D Measurements & Calculations     Ao root diam: 3.8 cm  asc Aorta Diam: 4.4 cm  LVOT diam: 2.1 cm  LVOT area: 3.6 cm2  LA Volume (BP): 82.0 ml  LA Volume Index (BP): 39.4 ml/m2        Doppler Measurements & Calculations  MV E max ferdinand: 118.5 cm/sec  MV A max ferdinand: 103.0 cm/sec  MV E/A: 1.1  MV max P.4 mmHg  MV mean PG: 3.6 mmHg  MV V2 VTI: 43.1 cm  MVA(VTI): 2.7 cm2  MV dec time: 0.22 sec     Ao V2 max: 254.1 cm/sec  Ao max P.0 mmHg  Ao V2 mean: 183.2 cm/sec  Ao mean PG: 15.0 mmHg  Ao V2 VTI: 67.1 cm  BRAYDON(I,D): 1.8 cm2  BRAYDON(V,D): 1.9 cm2  LV V1 max P.4 mmHg  LV V1 max: 135.7 cm/sec  LV V1 VTI: 32.8 cm  SV(LVOT): 117.9 ml  SI(LVOT): 56.8 ml/m2  PA acc time: 0.11 sec  TR max ferdinand: 245.4 cm/sec  TR max P.1 mmHg  AV Ferdinand Ratio (DI): 0.53  BRAYDON Index (cm2/m2): 0.85  E/E' av.0  Lateral E/e': 14.3  Medial E/e': 19.7              _____________________________________________________________________________  __        Report approved by: Balta Reardon 2020 10:31 AM

## 2021-01-05 NOTE — PLAN OF CARE
End of Shift Summary  For vital signs and complete assessments, please see documentation flowsheets.     Pertinent assessments: LS diminished. Dyspnea with exertion. Requiring 6L O2. Tele- SR with 1st degree AV block.

## 2021-01-05 NOTE — PROGRESS NOTES
Renal Medicine Progress Note            Assessment/Plan:     # CKD 4-baseline creatinine of 2.2-2.6, minimally proteinuric.  Presumed secondary to hypertension/diabetes mellitus/advanced age.   -she is agreeable to dialysis if needed     # Dyspnea-recent pneumonia with large bilateral pleural effusion.  Last echo from January with preserved ejection fraction and some suggestion of diastolic dysfunction.               -improved with diuresis and thoracentesis     # Acute kidney injury-creatinine was 1.9 on discharge on 12/14 and up to 4.3 on 12/22.  It did not improve despite stopping torsemide.  Possible that she may have sustained ischemic tubular injury out-of-hospital and creatinine has plateaued now.  Low urine sodium and fractional excretion of sodium suggest possible cardiorenal physiology.  Echocardiogram shows preserved ejection fraction, grade 1 diastolic dysfunction and pulmonary hypertension.               -she seems to be on the dry side      # Bilateral pleural effusions-transudate A.  Status post thoracentesis.     # Hypertension: DBP is quite low.      # Type 2 diabetes mellitus-management per primary team.     # Anemia chronic kidney disease-iron sat  Only 10% on 12/22.  Ferritin low at 54.  SPEP negative. IV iron ordered.       # Hyperphosphatemia-with renal failure.  Phos still > 6.  Phoslo to 2 TID.       Plan:  # Cont to hold diuretics  # DBP is quite low. Hydralazine was decreased further to 25 mg tid  # Hold doxazosin if SBP is <120 and or DBP is <60  # Renal panel in AM        Interval History:     Afebrile. SBP and DBP are low. Still on 6 liters O2. Feels tired but denies worsening shortness of breath. No N/V.           Medications and Allergies:       calcium acetate  1,334 mg Oral TID w/meals     diltiazem ER COATED BEADS  180 mg Oral BID     doxazosin  1 mg Oral At Bedtime     [START ON 1/6/2021] gabapentin  100 mg Oral Daily     hydrALAZINE  25 mg Oral TID     insulin aspart  1-7 Units  "Subcutaneous TID AC     insulin aspart  1-5 Units Subcutaneous At Bedtime     iron sucrose (VENOFER) intermittent infusion (200 mg)  200 mg Intravenous Q24H     senna-docusate  1 tablet Oral BID    Or     senna-docusate  2 tablet Oral BID     sertraline  50 mg Oral Daily     simvastatin  10 mg Oral At Bedtime     sodium chloride (PF)  3 mL Intracatheter Q8H        Allergies   Allergen Reactions     Augmentin Diarrhea     Vomiting       Cleocin      Hives     Tegretol [Carbamazepine]      Irregular heart beat            Physical Exam:   Vitals were reviewed   , Blood pressure 132/43, pulse 63, temperature 98.4  F (36.9  C), temperature source Axillary, resp. rate 20, height 1.626 m (5' 4\"), weight 99.9 kg (220 lb 4.8 oz), SpO2 97 %, not currently breastfeeding.    Wt Readings from Last 3 Encounters:   01/05/21 99.9 kg (220 lb 4.8 oz)   12/22/20 101.1 kg (222 lb 14.4 oz)   12/14/20 96.7 kg (213 lb 1.6 oz)       Intake/Output Summary (Last 24 hours) at 1/5/2021 1402  Last data filed at 1/5/2021 1320  Gross per 24 hour   Intake 900 ml   Output --   Net 900 ml     GENERAL APPEARANCE: pleasant, NAD, alert  HEENT:  Eyes/ears/nose/neck grossly normal  RESP: Diminish BS. No wheezes.   CV: RRR, nl S1/S2  ABDOMEN: obese, soft, NT  EXTREMITIES/SKIN: no rashes/lesions on observed skin; No edema  NEURO: a/o x 3. Grossly intact.             Data:     CBC RESULTS:     Recent Labs   Lab 01/02/21  0714 12/30/20  0823 12/29/20  1523   WBC  --  7.9 10.0   RBC  --  2.81* 3.14*   HGB 8.4* 8.1* 8.9*   HCT  --  27.0* 29.8*   PLT  --  192 221       Basic Metabolic Panel:  Recent Labs   Lab 01/05/21  0652 01/04/21  0657 01/03/21  0719 01/02/21  0714 01/01/21  0805 12/31/20  0726    139 138 138 138 138   POTASSIUM 3.8 3.4 3.6 3.5 3.4 4.0   CHLORIDE 100 101 102 102 102 105   CO2 33* 33* 34* 30 28 29   * 101* 95* 88* 84* 83*   CR 4.20* 4.00* 3.90* 3.96* 4.08* 4.11*   * 163* 134* 161* 167* 142*   EDNA 9.1 9.1 8.9 8.7 9.0 8.7 "       INR  Recent Labs   Lab 12/30/20  0823   INR 1.23*      Attestation:   I have reviewed today's relevant vital signs, notes, medications, labs and imaging.    Olvin Escoto MD  [facility] - Nephrology  Office phone :[phone]  Pager: [phone]

## 2021-01-05 NOTE — PLAN OF CARE
OT- Patient on sleeping and on BiPAP, unable to find staff to A with removal it. OT will continue to follow.

## 2021-01-05 NOTE — PLAN OF CARE
End of Shift Summary  For vital signs and complete assessments, please see documentation flowsheets.     Pertinent assessments: A&Ox4. VSS on CPAP mask. Afebrile. No c/o pain, SOB, or nausea. Up Ax1. Did not void overnight, bladder scan showed 0ml, discussed with morning shift RN and will reassess. Pt slept well overnight.     Major Shift Events: none    Treatment Plan: daily renal panel, hold diuretics, decrease hydralazine

## 2021-01-05 NOTE — PROGRESS NOTES
Patient placed on CPAP +12, 60% via V60 with no complications noted. BS diminished, SpO2 mid 90's. Will continue to monitor pt's respiratory status closely.    Alyssa Danielle, RT, RT  1/5/2021 4:34 AM

## 2021-01-05 NOTE — PROGRESS NOTES
"NUTRITION ASSESSMENT      REASON FOR ASSESSMENT:  Ирина Yanez is a 85 year old female seen by Registered Dietitian for LOS    NUTRITION HISTORY:  Patient reports following a regular diet at baseline w/ a good appetite, \"I cook whatever I feel like\"  Likes tomato soup, ketchup and ice cream although states that she now knows she can't have those things   PMH includes TERESSA on CPAP, type 2 diabetes w/ neuropathy, CKD stage IV, HTN found to have acute on subacute hypoxemic respiratory failure w/ large bilateral pleural effusions (COVID-19 and influenza PCR negative)   No known food allergies      CURRENT DIET AND INTAKE:  Diet:  Renal, 1200 mL fluid restriction               Appetite has been adequate and patient has been ordering meals TID, consuming %. She feels she is getting plenty to eat and denies concerns at this time. She has questions on her renal diet and requests handouts/menus today     ANTHROPOMETRICS:  Height: 5' 4\"  Weight:  220 lbs 4.8 oz  Body mass index is 37.81 kg/m .   Weight Status: Obesity Grade II BMI 35-39.9  IBW:  54.5 kg  %IBW: 183%  Weight History: no true body wt lost within the past year   Wt Readings from Last 10 Encounters:   01/05/21 99.9 kg (220 lb 4.8 oz)   12/22/20 101.1 kg (222 lb 14.4 oz)   12/14/20 96.7 kg (213 lb 1.6 oz)   11/16/20 99.1 kg (218 lb 8 oz)   07/24/20 100.9 kg (222 lb 6.4 oz)   02/25/20 99.8 kg (220 lb)   01/16/20 100.3 kg (221 lb 1.6 oz)   01/16/20 99.8 kg (220 lb)   09/27/19 97.9 kg (215 lb 12.8 oz)   07/09/19 98 kg (216 lb)     Vitals:    01/01/21 0451 01/02/21 0435 01/03/21 0600 01/04/21 0403   Weight: 99.9 kg (220 lb 4.8 oz) 97.8 kg (215 lb 9.6 oz) 99.4 kg (219 lb 1.6 oz) 100.2 kg (220 lb 12.8 oz)    01/05/21 0536   Weight: 99.9 kg (220 lb 4.8 oz)       LABS:  Labs noted  Phos 5.1 (H)  Mg 2.5 (H)   (H), Cr 4.20 (H)    Lab Results   Component Value Date    A1C 6.8 04/27/2020    A1C 6.3 09/27/2019    A1C 6.9 03/22/2019    A1C 6.2 06/12/2018    A1C 6.3 " 12/19/2017     Recent Labs   Lab 01/05/21  0834 01/05/21  0652 01/05/21  0117 01/04/21  2223 01/04/21  1752 01/04/21  1244 01/04/21  0823 01/04/21  0657 01/03/21  0719 01/03/21  0719 01/02/21  0714 01/02/21  0714 01/01/21  0805 01/01/21  0805 12/31/20  0726 12/31/20  0726   GLC  --  163*  --   --   --   --   --  163*  --  134*  --  161*  --  167*  --  142*   *  --  167* 197* 141* 166* 151*  --    < >  --    < >  --    < >  --    < >  --     < > = values in this interval not displayed.       MALNUTRITION:  Visual Nutrition Focused Physical Assessment (NFPA) completed. Do not suspect acute muscle/fat losses.  Patient does not meet two of the following criteria necessary for diagnosing malnutrition.     % Weight Loss:  None noted  % Intake:  No decreased intake noted  Subcutaneous Fat Loss:  None observed (limited exam)  Muscle Loss:  None observed (limited exam)   Fluid Retention:  None noted    NUTRITION INTERVENTION:  Nutrition Diagnosis:  No nutrition diagnosis at this time.    Implementation:  Nutrition Education: Provided education on renal diet guidelines. Menus/handouts provided to patient today and all questions were answered     FOLLOW UP/MONITORING:   Will re-evaluate in 7 - 10 days, or sooner, if re-consulted.      Sarah Marvin RD, LD  Clinical Dietitian

## 2021-01-05 NOTE — PLAN OF CARE
End of Shift Summary  For vital signs and complete assessments, please see documentation flowsheets.     Pertinent assessments: Improved BP - last /52mmHg. On 6L of O2 via NC, SoB with activity(toileting). Voided once, PVR showed 0ml, no bladder distention/discomfort.     Major Shift Events: voided within shift.     Treatment Plan: daily renal panel, hold diuretics, decrease hydralazine    Bedside Nurse: Arslan Baca

## 2021-01-06 ENCOUNTER — APPOINTMENT (OUTPATIENT)
Dept: PHYSICAL THERAPY | Facility: CLINIC | Age: 86
DRG: 291 | End: 2021-01-06
Payer: COMMERCIAL

## 2021-01-06 LAB
ALBUMIN SERPL-MCNC: 2.3 G/DL (ref 3.4–5)
ANION GAP SERPL CALCULATED.3IONS-SCNC: 8 MMOL/L (ref 3–14)
BUN SERPL-MCNC: 120 MG/DL (ref 7–30)
CALCIUM SERPL-MCNC: 8.9 MG/DL (ref 8.5–10.1)
CHLORIDE SERPL-SCNC: 100 MMOL/L (ref 94–109)
CO2 SERPL-SCNC: 30 MMOL/L (ref 20–32)
CREAT SERPL-MCNC: 4.15 MG/DL (ref 0.52–1.04)
GFR SERPL CREATININE-BSD FRML MDRD: 9 ML/MIN/{1.73_M2}
GLUCOSE BLDC GLUCOMTR-MCNC: 126 MG/DL (ref 70–99)
GLUCOSE BLDC GLUCOMTR-MCNC: 141 MG/DL (ref 70–99)
GLUCOSE BLDC GLUCOMTR-MCNC: 162 MG/DL (ref 70–99)
GLUCOSE BLDC GLUCOMTR-MCNC: 216 MG/DL (ref 70–99)
GLUCOSE BLDC GLUCOMTR-MCNC: 221 MG/DL (ref 70–99)
GLUCOSE SERPL-MCNC: 165 MG/DL (ref 70–99)
NT-PROBNP SERPL-MCNC: 2770 PG/ML (ref 0–1800)
PHOSPHATE SERPL-MCNC: 5 MG/DL (ref 2.5–4.5)
POTASSIUM SERPL-SCNC: 3.4 MMOL/L (ref 3.4–5.3)
SODIUM SERPL-SCNC: 138 MMOL/L (ref 133–144)

## 2021-01-06 PROCEDURE — 97530 THERAPEUTIC ACTIVITIES: CPT | Mod: GP | Performed by: PHYSICAL THERAPIST

## 2021-01-06 PROCEDURE — 120N000001 HC R&B MED SURG/OB

## 2021-01-06 PROCEDURE — 250N000013 HC RX MED GY IP 250 OP 250 PS 637: Performed by: INTERNAL MEDICINE

## 2021-01-06 PROCEDURE — 999N000157 HC STATISTIC RCP TIME EA 10 MIN

## 2021-01-06 PROCEDURE — 80069 RENAL FUNCTION PANEL: CPT | Performed by: INTERNAL MEDICINE

## 2021-01-06 PROCEDURE — 99233 SBSQ HOSP IP/OBS HIGH 50: CPT | Performed by: INTERNAL MEDICINE

## 2021-01-06 PROCEDURE — 250N000011 HC RX IP 250 OP 636: Performed by: INTERNAL MEDICINE

## 2021-01-06 PROCEDURE — 94660 CPAP INITIATION&MGMT: CPT

## 2021-01-06 PROCEDURE — 99232 SBSQ HOSP IP/OBS MODERATE 35: CPT | Performed by: INTERNAL MEDICINE

## 2021-01-06 PROCEDURE — 36415 COLL VENOUS BLD VENIPUNCTURE: CPT | Performed by: INTERNAL MEDICINE

## 2021-01-06 PROCEDURE — P9041 ALBUMIN (HUMAN),5%, 50ML: HCPCS | Performed by: INTERNAL MEDICINE

## 2021-01-06 PROCEDURE — 999N001017 HC STATISTIC GLUCOSE BY METER IP

## 2021-01-06 PROCEDURE — 97110 THERAPEUTIC EXERCISES: CPT | Mod: GP | Performed by: PHYSICAL THERAPIST

## 2021-01-06 PROCEDURE — 83880 ASSAY OF NATRIURETIC PEPTIDE: CPT | Performed by: INTERNAL MEDICINE

## 2021-01-06 RX ORDER — ALBUMIN, HUMAN INJ 5% 5 %
250 SOLUTION INTRAVENOUS ONCE
Status: COMPLETED | OUTPATIENT
Start: 2021-01-06 | End: 2021-01-06

## 2021-01-06 RX ADMIN — CALCIUM ACETATE 1334 MG: 667 CAPSULE ORAL at 09:53

## 2021-01-06 RX ADMIN — DILTIAZEM HYDROCHLORIDE 180 MG: 180 CAPSULE, COATED, EXTENDED RELEASE ORAL at 21:11

## 2021-01-06 RX ADMIN — SIMVASTATIN 10 MG: 10 TABLET, FILM COATED ORAL at 21:11

## 2021-01-06 RX ADMIN — HYDRALAZINE HYDROCHLORIDE 25 MG: 25 TABLET, FILM COATED ORAL at 09:53

## 2021-01-06 RX ADMIN — INSULIN ASPART 1 UNITS: 100 INJECTION, SOLUTION INTRAVENOUS; SUBCUTANEOUS at 21:13

## 2021-01-06 RX ADMIN — ALBUMIN HUMAN 250 ML: 0.05 INJECTION, SOLUTION INTRAVENOUS at 15:19

## 2021-01-06 RX ADMIN — HYDRALAZINE HYDROCHLORIDE 25 MG: 25 TABLET, FILM COATED ORAL at 21:12

## 2021-01-06 RX ADMIN — GABAPENTIN 100 MG: 100 CAPSULE ORAL at 09:53

## 2021-01-06 RX ADMIN — HYDRALAZINE HYDROCHLORIDE 25 MG: 25 TABLET, FILM COATED ORAL at 15:20

## 2021-01-06 RX ADMIN — DILTIAZEM HYDROCHLORIDE 180 MG: 180 CAPSULE, COATED, EXTENDED RELEASE ORAL at 09:52

## 2021-01-06 RX ADMIN — CALCIUM ACETATE 1334 MG: 667 CAPSULE ORAL at 13:39

## 2021-01-06 RX ADMIN — SERTRALINE HYDROCHLORIDE 50 MG: 50 TABLET ORAL at 09:52

## 2021-01-06 RX ADMIN — DOCUSATE SODIUM AND SENNOSIDES 2 TABLET: 8.6; 5 TABLET ORAL at 09:52

## 2021-01-06 RX ADMIN — CALCIUM ACETATE 1334 MG: 667 CAPSULE ORAL at 18:17

## 2021-01-06 ASSESSMENT — MIFFLIN-ST. JEOR: SCORE: 1454.7

## 2021-01-06 ASSESSMENT — ACTIVITIES OF DAILY LIVING (ADL)
ADLS_ACUITY_SCORE: 17

## 2021-01-06 NOTE — PROGRESS NOTES
[facility]  Hospitalist Progress Note  Timo Jones MD, MD 01/06/2021  (Text Page)  Reason for Stay (Diagnosis): FABY on top of CKD, bilateral pleural effusion         Assessment and Plan:      Summary of Stay: Ирина Yanez is an 85 year old female with a history of TERESSA on CPAP, type II DM, CKD stage IV with baseline creatinine 2, neuropathy, MDD, anxiety, HTN, RBBB, anemia, mild aortic stenosis, obesity, and grade 1 diastolic dysfunction on TTE who presented to the ED with shortness of breath and hypoxia.      She was found to have bilateral large pleural effusions.  Previously seen right upper lobe lesion had resolved, making that likely to have been pneumonia.  Patient was requiring significant oxygen up to 10 L/min.  She underwent bilateral thoracentesis on 12/30 with about 800 mL of fluid removed from each lung.  She was also found to have FABY on CKD with creatinine above 4 from baseline 2. Nephrology was consulted.     -Her diuresis was discontinued last 1/4/2021 due to rising creatinine and azotemia  -Aguilera catheter remove  -Currently voiding freely  -Tolerating oral diet, food restrictions increased to 2 L/day from earlier 1200 mL  -Currently creatinine remains same but azotemia still worsening.  No worsening of her respiratory state and remained on oxygen supplementation     Acute on subacute hypoxemic respiratory failure, large bilateral pleural effusions  - Multifactorial in origin, but primarily likely from her large bilateral pleural effusions.  Suspect diastolic CHF contributing to volume overload.  Recent hospitalization for pneumonia 2 weeks ago and discharged on 2 weeks of Augmentin with near resolution of the right upper lobe masslike consolidation on the repeat noncontrast chest CT.  No further cough or fevers. Discharged on 1 L O2 which she was using continuous and frequently had sats in the 80s on admission.  - Symptomatic COVID-19 PCR and influenza A/B PCR are  negative.  - Bilateral diagnostic and therapeutic ultrasound guided thoracentesis by IR done on 12/30.  800 cc of fluid removed from each lung.  Per Lights criteria, the fluid appears to be transudate, therefore more likely from heart failure/volume overload.  Cytology done on the right effusion is pending (the lung that the patient had that pneumonia in)  - Continue supplemental O2 and wean as tolerated.  Still on oxygen support, anticipate she will not be able to wean completely off of O2.     Acute on chronic diastolic CHF   - Progressive shortness of breath with hypoxia as above with additional findings of large bilateral pleural effusions and 1-2+ bilateral lower extremity pitting edema.  Previous TTE from 1/2020 showed EF 60 to 65% and grade 1 diastolic dysfunction.  Torsemide held 4 days PTA due to doubling of creatinine up to 4.  Her weight in clinic 1 week ago was 222 lb, up from 212-215 earlier in the month.  Urine sodium low which would be consistent with CHF.  - Bilateral thoracentesis as above  - Strict intake and output, daily weights   - Diuresis per nephrology with 60 mg of Lasix IV 3 times daily -this was discontinued earlier  - Echocardiogram shows mild pulmonary hypertension and grade 1 dysfunction  -Weight trend monitoring, now trending up  No significant edema seen on extremities       FABY on CKD stage IV  - Creatinine baseline of 2.  FABY on admission with creatinine of 4.2 similar to 4.3 checked 7 days ago in clinic.  Her torsemide was held roughly for the last 4 days and she denied any significant change in urine output.  Urinalysis is not cellular or suspicious for glomerulonephritis.  No obstructive symptoms.    - UOP improving with IV lasix but creatinine remaining elevated  - ATN out of hospital vs progression of intrinsic disease  - Low urine Na suggests cardiorenal but her Cr has not improved with diuresis  -Creatinine slowly improving, appreciate continuous input from nephrology  service  -We will defer to nephrology regarding diuresis currently receiving IV Lasix every 8 hours, creatinine is permitting.  -Continue with Aguilera catheter while on IV diuresis  - Continue phoslo, fluid restriction and diuretics per nephrology  -Most recent metabolic panel showing increasing azotemia, creatinine creeping up  -Weight not significantly trending down  -Suspecting she might be reaching her dry weight, will await further input from nephrology service if IV diuresis still needs to be continued or can be switched to oral route now.  -Worsening azotemia, increasing creatinine levels  -I change her fluid restriction from 1200 ml to 2 L/day  -We will await further instructions and recommendations from nephrology service  -Further decrease Neurontin dose 100 mg daily     HTN  - PTA on diltiazem  mg twice daily doxazosin 1 mg at bedtime, hydralazine 100 mg 3 times daily, and torsemide 20 mg daily   - Resume diltiazem, hydralazine, and doxazosin  - Diuretics as above     Type II DM  - PTA on glimepiride 1 mg daily and Actos 15 mg daily.  A1c earlier this year 6.8.  - Hold glimepiride and Actos.  Glimepiride may need to be permanently discontinued if her kidney function does not improve.  Discontinue Actos upon discharge as well given history of CHF  - Medium dose sliding scale insulin     Obesity  - BMI 39     TERESSA   - Continue CPAP at bedtime.     MDD, anxiety  - Continue sertraline 50 mg daily.     HLD  - Resume simvastatin 10 mg at bedtime.  CK level checked and not elevated.     Chronic normocytic anemia:  - Hemoglobin at baseline 8 range. IV Venofer 200 mg x 5 doses per nephrology.     Peripheral neuropathy:  -Gabapentin decreased          Diet: Renal  DVT Prophylaxis: Pneumatic Compression Devices  Malnutrition: No  Aguilera Catheter: No  Code Status: Full Code      Disposition Plan   Expected discharge:   Anticipating needing at least 2-3 more inpatient hospitalization days  Anticipating TCU placement  "upon discharge.     updated over the phone this morning          Interval History (Subjective):      Continuing service care today.  Seen and examined.  Chart reviewed.  Case discussed with nursing service.  I found Ирина sitting comfortably in chair and in no significant reported events overnight.  She thinks that she is tolerating more of oral intake.  Voiding freely.  Endorsing no concerns for abdominal pain, nausea or vomiting.  Remain afebrile.  Still requiring oxygen supplementation currently at 6 L by nasal cannula no significant mental status changes reported.     # Pain Assessment:  Current Pain Score 1/6/2021   Patient currently in pain? denies   Pain score (0-10) -   Pain location -   Pain descriptors -   Ирина s pain level was assessed and she currently denies pain.                  Physical Exam:      Last Vital Signs:  BP (!) 127/32 (BP Location: Right arm)   Pulse 66   Temp 97.6  F (36.4  C) (Axillary)   Resp 20   Ht 1.626 m (5' 4\")   Wt 101.5 kg (223 lb 11.2 oz)   LMP  (LMP Unknown)   SpO2 100%   BMI 38.40 kg/m      I/O last 3 completed shifts:  In: 720 [P.O.:720]  Out: 350 [Urine:350]  Wt Readings from Last 1 Encounters:   01/06/21 101.5 kg (223 lb 11.2 oz)     Vitals:    01/02/21 0435 01/03/21 0600 01/04/21 0403 01/05/21 0536   Weight: 97.8 kg (215 lb 9.6 oz) 99.4 kg (219 lb 1.6 oz) 100.2 kg (220 lb 12.8 oz) 99.9 kg (220 lb 4.8 oz)    01/06/21 0703   Weight: 101.5 kg (223 lb 11.2 oz)       Constitutional: Awake, alert, cooperative, no apparent distress   Respiratory:  Fair air entry, minimal basal crackles, no wheezes   Cardiovascular: Regular rate and rhythm, normal S1 and S2, and no murmur noted   Abdomen: Normal bowel sounds, soft, non-distended, non-tender   Skin: No rashes, no cyanosis, dry to touch   Neuro: Alert and oriented x3, no weakness, spontaneous and coherent speech   Extremities: Non pitting bilateral lower extremity edema, normal range of motion   Other(s): Euthymic " mood, not agitated       All other systems: Negative          Medications:      All current medications were reviewed with changes reflected in problem list.         Data:      All new lab and imaging data was reviewed.   Labs:  Recent Labs   Lab 12/30/20  1430   CULT No growth     No results for input(s): PH, PHARTERIAL, PO2, [id number], SAT, PCO2, HCO3, BASEEXCESS, MAXIMINO, BEB in the last 168 hours.    Invalid input(s): [id number]  Recent Labs   Lab 01/02/21  0714   HGB 8.4*     Recent Labs   Lab 01/06/21  0658 01/05/21  0652 01/04/21  0657 01/03/21  0719 01/02/21  0714 12/31/20  0726 12/31/20  0726    139 139 138 138   < > 138   POTASSIUM 3.4 3.8 3.4 3.6 3.5   < > 4.0   CHLORIDE 100 100 101 102 102   < > 105   CO2 30 33* 33* 34* 30   < > 29   ANIONGAP 8 6 5 2* 6   < > 4   * 163* 163* 134* 161*   < > 142*   * 114* 101* 95* 88*   < > 83*   CR 4.15* 4.20* 4.00* 3.90* 3.96*   < > 4.11*   GFRESTIMATED 9* 9* 10* 10* 10*   < > 9*   GFRESTBLACK 11* 11* 11* 12* 11*   < > 11*   EDNA 8.9 9.1 9.1 8.9 8.7   < > 8.7   MAG  --   --  2.5* 2.6* 2.6*   < > 2.9*   PHOS 5.0* 5.1* 5.1* 5.8* 6.4*   < > 6.3*   PROTTOTAL  --   --   --   --   --   --  7.0   ALBUMIN 2.3* 2.3* 2.2* 2.3* 2.4*   < > 2.6*   BILITOTAL  --   --   --   --   --   --  0.4   ALKPHOS  --   --   --   --   --   --  68   AST  --   --   --   --   --   --  18   ALT  --   --   --   --   --   --  18    < > = values in this interval not displayed.     No results for input(s): SED, CRP in the last 168 hours.  Recent Labs   Lab 01/06/21  0850 01/06/21  0658 01/06/21  0215 01/05/21  2126 01/05/21  1739 01/05/21  1228 01/05/21  0652 01/05/21  0652 01/04/21  0657 01/04/21  0657 01/03/21  0719 01/03/21  0719 01/02/21  0714 01/02/21  0714   GLC  --  165*  --   --   --   --   --  163*  --  163*  --  134*  --  161*   *  --  141* 179* 154* 175*   < >  --    < >  --    < >  --    < >  --     < > = values in this interval not displayed.     No results for  input(s): INR in the last 168 hours.  No results for input(s): TROPONIN, TROPI, TROPR in the last 168 hours.    Invalid input(s): TROP, TROPONINIES  No results for input(s): COLOR, APPEARANCE, URINEGLC, URINEBILI, URINEKETONE, SG, UBLD, URINEPH, PROTEIN, UROBILINOGEN, NITRITE, LEUKEST, RBCU, WBCU in the last 168 hours.   Imaging:   Results for orders placed or performed during the hospital encounter of 12/29/20   XR Chest Port 1 View    Narrative    XR CHEST PORT 1 VW 12/29/2020 4:27 PM    HISTORY: dyspnea    COMPARISON: CT 12/10/2020    FINDINGS: Persistent small bilateral pleural effusions with adjacent  atelectasis/infiltrates, left greater than right. Interval mild  improved in aeration within the right upper lobe favoring improving  pneumonia. Continued follow-up is recommended. Stable mild  cardiomegaly.    TONY PÉREZ MD   Chest CT w/o contrast    Narrative    EXAM: CT CHEST W/O CONTRAST  LOCATION: [facility]  DATE/TIME: 12/29/2020 5:19 PM    INDICATION: Dyspnea, recent pneumonia versus mass, 3 weeks ago  COMPARISON: 12/10/2020  TECHNIQUE: CT chest without IV contrast. Multiplanar reformats were obtained. Dose reduction techniques were used.  CONTRAST: None.    FINDINGS:   LUNGS AND PLEURA: Masslike consolidation in the right upper lobe has nearly completely resolved. Mild groundglass density and bandlike atelectasis or scarring are now seen. Large bilateral pleural effusions layer dependently which have increased. No   pneumothorax.    MEDIASTINUM/AXILLAE: Great vessels normal in caliber. Moderate aortic valve calcification. Moderate coronary arterial calcification no pericardial effusion    UPPER ABDOMEN: No significant finding.    MUSCULOSKELETAL: No suspicious bone lesion. Diffuse osteopenia. Severe multilevel degenerative change in the thoracic spine.      Impression    IMPRESSION:   1.  Near complete resolution of previously seen masslike consolidation in the right upper lobe.  2.   Large bilateral pleural effusions and partial lower lobe atelectasis, slightly increased.     XR Chest 1 View    Narrative    CHEST ONE VIEW    12/30/2020 2:40 PM     HISTORY: Bilateral thoracentesis.    COMPARISON: Chest x-ray 12/29/2020.      Impression    IMPRESSION: Single view of the chest. No evidence of pneumothorax.  Lower lungs obscured by patient's pannus. Increased opacity right  upper lung is concerning for pulmonary edema. Correlate with patient's  clinical picture.    SHERRI REYES MD   US Thoracentesis Bilateral    Narrative    EXAM: US THORACENTESIS BILATERAL       12/30/2020 2:39 PM       HISTORY: Bilateral pleural effusions, request made for diagnostic and  therapeutic thoracentesis.     PROCEDURE: Written informed consent was obtained from the patient  prior to the procedure. The risks and benefits including bleeding,  infection and pneumothorax were discussed and the patient wished to  continue. Initial ultrasound images demonstrated a mild bilateral  pleural fluid collections. A permanent image was saved. The skin  overlying this collection was marked, prepped, draped and anesthetized  in usual sterile fashion utilizing 5 mL of lidocaine 1%. Thoracentesis  catheter was then placed into the pleural fluid collection with return  of 800 mL of pleural fluid from each hemithorax. Estimated blood loss  during the procedure was less than 5 mL. No specimens collected.  Patient tolerated the procedure well. Followup chest x-ray was  ordered.      With continuous ultrasound guidance, a 5 Albanian needle and catheter  advanced into each pleural space and ultrasound image stored for  documentation. No residual fluid at completion.      Impression    IMPRESSION: Ultrasound-guided bilateral thoracentesis. 800 mL of fluid  removed from each hemithorax. Sample sent from the right hemithorax to  the lab.    HANK ELLIOTT MD   Echocardiogram Complete    Narrative     [id number]  [id number]  [id number]  [id number]^CHRISTOPHER^EULALIA^PHILIPP           [facility]  Echocardiography Laboratory  [address]  [place] [address]        Name: BEATA TRIPLETT  MRN: [id number]  : 1935  Study Date: 2020 09:03 AM  Age: 85 yrs  Gender: Female  Patient Location: [facility]  Reason For Study: CHF, Dyspnea  Ordering Physician: EULALIA WHITE  Performed By: Mila Moser     BSA: 2.1 m2  Height: 64 in  Weight: 230 lb  HR: 71  BP: 130/54 mmHg  _____________________________________________________________________________  __        Procedure  Complete Portable Echo Adult.  _____________________________________________________________________________  __        Interpretation Summary     The ascending aorta is Moderately dilated.  Mild valvular aortic stenosis, BRAYDON 1.6 cm2  The visual ejection fraction is estimated at 65-70%.  The left atrium is mildly dilated.  Dilation of the inferior vena cava is present with abnormal respiratory  variation in diameter.  Right ventricular systolic pressure is elevated, consistent with mild  pulmonary hypertension.  Grade I or early diastolic dysfunction.  The study was technically difficult. Compared to prior study, there is no  significant change.  _____________________________________________________________________________  __        Left Ventricle  The left ventricle is normal in size. The visual ejection fraction is  estimated at 65-70%. Grade I or early diastolic dysfunction.     Right Ventricle  The right ventricle is normal in structure, function and size.     Atria  The left atrium is mildly dilated. Right atrial size is normal.     Mitral Valve  There is mild to moderate mitral annular calcification.        Tricuspid Valve  There is tricuspid annular calcification. The tricuspid valve is not well  visualized. Right ventricular systolic pressure is elevated, consistent with  mild pulmonary hypertension.     Aortic Valve  Mild  valvular aortic stenosis.     Pulmonic Valve  The pulmonic valve is not well seen, but is grossly normal. There is trace  pulmonic valvular regurgitation.     Vessels  Borderline aortic root dilatation. The ascending aorta is Moderately dilated.  Dilation of the inferior vena cava is present with abnormal respiratory  variation in diameter.     Pericardium  There is no pericardial effusion.        Rhythm  Sinus rhythm was noted.  _____________________________________________________________________________  __  MMode/2D Measurements & Calculations     Ao root diam: 3.8 cm  asc Aorta Diam: 4.4 cm  LVOT diam: 2.1 cm  LVOT area: 3.6 cm2  LA Volume (BP): 82.0 ml  LA Volume Index (BP): 39.4 ml/m2        Doppler Measurements & Calculations  MV E max ferdinand: 118.5 cm/sec  MV A max ferdinand: 103.0 cm/sec  MV E/A: 1.1  MV max P.4 mmHg  MV mean PG: 3.6 mmHg  MV V2 VTI: 43.1 cm  MVA(VTI): 2.7 cm2  MV dec time: 0.22 sec     Ao V2 max: 254.1 cm/sec  Ao max P.0 mmHg  Ao V2 mean: 183.2 cm/sec  Ao mean PG: 15.0 mmHg  Ao V2 VTI: 67.1 cm  BRAYDON(I,D): 1.8 cm2  BRAYDON(V,D): 1.9 cm2  LV V1 max P.4 mmHg  LV V1 max: 135.7 cm/sec  LV V1 VTI: 32.8 cm  SV(LVOT): 117.9 ml  SI(LVOT): 56.8 ml/m2  PA acc time: 0.11 sec  TR max ferdinand: 245.4 cm/sec  TR max P.1 mmHg  AV Ferdinand Ratio (DI): 0.53  BRAYDON Index (cm2/m2): 0.85  E/E' av.0  Lateral E/e': 14.3  Medial E/e': 19.7              _____________________________________________________________________________  __        Report approved by: Balta Reardon 2020 10:31 AM

## 2021-01-06 NOTE — PROGRESS NOTES
CM: Called [facility] to update that pt has not been able to discharge to TCU at this time due to oxygen needs and Medical stability.     SW will most likely need to obtain new AUth

## 2021-01-06 NOTE — PLAN OF CARE
End of Shift Summary  For vital signs and complete assessments, please see documentation flowsheets.     Pertinent assessments: VSS A&O.  Bipap for sleep.  Lung sounds diminished.  Denies nausea and pain.  Tolerating a renal diet. 2L fluid restrict. Alarms on for safety.     Major Shift Events: none  Treatment Plan: daily renal panel, hold diuretics, monitor O2 levels. BG monitoring. Venofer infusions q24h. Tele.Nephrology following.    Bedside Nurse:  Summer Masterson RN

## 2021-01-06 NOTE — PLAN OF CARE
Pertinent assessments: VSS on 6L oxymask. Tolerating a renal diet. 2L fluid restrict. Up to chair most of the early evening. CPAP in bed at HS. New piv given. Venofer infusion completed. Voiding spontaneously. Creat: 4.2.    Major Shift Events: none  Treatment Plan: daily renal panel, hold diuretics, and decreased hydralazine. Monitor O2 levels. BG monitoring. Venofer infusions q24h. Tele.Nephrology following.

## 2021-01-07 ENCOUNTER — APPOINTMENT (OUTPATIENT)
Dept: OCCUPATIONAL THERAPY | Facility: CLINIC | Age: 86
DRG: 291 | End: 2021-01-07
Payer: COMMERCIAL

## 2021-01-07 ENCOUNTER — APPOINTMENT (OUTPATIENT)
Dept: PHYSICAL THERAPY | Facility: CLINIC | Age: 86
DRG: 291 | End: 2021-01-07
Payer: COMMERCIAL

## 2021-01-07 ENCOUNTER — APPOINTMENT (OUTPATIENT)
Dept: GENERAL RADIOLOGY | Facility: CLINIC | Age: 86
DRG: 291 | End: 2021-01-07
Attending: INTERNAL MEDICINE
Payer: COMMERCIAL

## 2021-01-07 LAB
ANION GAP SERPL CALCULATED.3IONS-SCNC: 6 MMOL/L (ref 3–14)
BASE EXCESS BLDV CALC-SCNC: 2.7 MMOL/L
BUN SERPL-MCNC: 118 MG/DL (ref 7–30)
CALCIUM SERPL-MCNC: 9.4 MG/DL (ref 8.5–10.1)
CHLORIDE SERPL-SCNC: 100 MMOL/L (ref 94–109)
CO2 SERPL-SCNC: 32 MMOL/L (ref 20–32)
CREAT SERPL-MCNC: 4.1 MG/DL (ref 0.52–1.04)
GFR SERPL CREATININE-BSD FRML MDRD: 9 ML/MIN/{1.73_M2}
GLUCOSE BLDC GLUCOMTR-MCNC: 149 MG/DL (ref 70–99)
GLUCOSE BLDC GLUCOMTR-MCNC: 156 MG/DL (ref 70–99)
GLUCOSE BLDC GLUCOMTR-MCNC: 176 MG/DL (ref 70–99)
GLUCOSE BLDC GLUCOMTR-MCNC: 197 MG/DL (ref 70–99)
GLUCOSE BLDC GLUCOMTR-MCNC: 225 MG/DL (ref 70–99)
GLUCOSE SERPL-MCNC: 147 MG/DL (ref 70–99)
HCO3 BLDV-SCNC: 29 MMOL/L (ref 21–28)
NT-PROBNP SERPL-MCNC: 2282 PG/ML (ref 0–1800)
O2/TOTAL GAS SETTING VFR VENT: ABNORMAL %
OXYHGB MFR BLDV: 98 %
PCO2 BLDV: 55 MM HG (ref 40–50)
PH BLDV: 7.33 PH (ref 7.32–7.43)
PO2 BLDV: 106 MM HG (ref 25–47)
POTASSIUM SERPL-SCNC: 3.4 MMOL/L (ref 3.4–5.3)
SODIUM SERPL-SCNC: 138 MMOL/L (ref 133–144)
TROPONIN I SERPL-MCNC: <0.015 UG/L (ref 0–0.04)
TROPONIN I SERPL-MCNC: <0.015 UG/L (ref 0–0.04)

## 2021-01-07 PROCEDURE — 97535 SELF CARE MNGMENT TRAINING: CPT | Mod: GO | Performed by: OCCUPATIONAL THERAPIST

## 2021-01-07 PROCEDURE — 250N000013 HC RX MED GY IP 250 OP 250 PS 637: Performed by: INTERNAL MEDICINE

## 2021-01-07 PROCEDURE — P9041 ALBUMIN (HUMAN),5%, 50ML: HCPCS | Performed by: INTERNAL MEDICINE

## 2021-01-07 PROCEDURE — 120N000001 HC R&B MED SURG/OB

## 2021-01-07 PROCEDURE — 250N000011 HC RX IP 250 OP 636: Performed by: INTERNAL MEDICINE

## 2021-01-07 PROCEDURE — 84484 ASSAY OF TROPONIN QUANT: CPT | Performed by: INTERNAL MEDICINE

## 2021-01-07 PROCEDURE — C9113 INJ PANTOPRAZOLE SODIUM, VIA: HCPCS | Performed by: INTERNAL MEDICINE

## 2021-01-07 PROCEDURE — 999N001017 HC STATISTIC GLUCOSE BY METER IP

## 2021-01-07 PROCEDURE — 999N000157 HC STATISTIC RCP TIME EA 10 MIN

## 2021-01-07 PROCEDURE — 80048 BASIC METABOLIC PNL TOTAL CA: CPT | Performed by: INTERNAL MEDICINE

## 2021-01-07 PROCEDURE — 94660 CPAP INITIATION&MGMT: CPT

## 2021-01-07 PROCEDURE — 99233 SBSQ HOSP IP/OBS HIGH 50: CPT | Performed by: INTERNAL MEDICINE

## 2021-01-07 PROCEDURE — 97110 THERAPEUTIC EXERCISES: CPT | Mod: GP | Performed by: PHYSICAL THERAPIST

## 2021-01-07 PROCEDURE — 71045 X-RAY EXAM CHEST 1 VIEW: CPT

## 2021-01-07 PROCEDURE — 83880 ASSAY OF NATRIURETIC PEPTIDE: CPT | Performed by: INTERNAL MEDICINE

## 2021-01-07 PROCEDURE — 93005 ELECTROCARDIOGRAM TRACING: CPT

## 2021-01-07 PROCEDURE — 82805 BLOOD GASES W/O2 SATURATION: CPT | Performed by: INTERNAL MEDICINE

## 2021-01-07 PROCEDURE — 36415 COLL VENOUS BLD VENIPUNCTURE: CPT | Performed by: INTERNAL MEDICINE

## 2021-01-07 RX ORDER — ASPIRIN 81 MG/1
324 TABLET, CHEWABLE ORAL ONCE
Status: COMPLETED | OUTPATIENT
Start: 2021-01-07 | End: 2021-01-07

## 2021-01-07 RX ORDER — DILTIAZEM HYDROCHLORIDE 120 MG/1
120 CAPSULE, COATED, EXTENDED RELEASE ORAL DAILY
Status: DISCONTINUED | OUTPATIENT
Start: 2021-01-08 | End: 2021-01-11

## 2021-01-07 RX ORDER — ALBUMIN, HUMAN INJ 5% 5 %
500 SOLUTION INTRAVENOUS ONCE
Status: COMPLETED | OUTPATIENT
Start: 2021-01-07 | End: 2021-01-07

## 2021-01-07 RX ORDER — HYDROMORPHONE HYDROCHLORIDE 1 MG/ML
0.3 INJECTION, SOLUTION INTRAMUSCULAR; INTRAVENOUS; SUBCUTANEOUS ONCE
Status: COMPLETED | OUTPATIENT
Start: 2021-01-07 | End: 2021-01-07

## 2021-01-07 RX ORDER — NITROGLYCERIN 0.4 MG/1
0.4 TABLET SUBLINGUAL EVERY 5 MIN PRN
Status: DISCONTINUED | OUTPATIENT
Start: 2021-01-07 | End: 2021-01-15 | Stop reason: HOSPADM

## 2021-01-07 RX ORDER — FUROSEMIDE 10 MG/ML
60 INJECTION INTRAMUSCULAR; INTRAVENOUS ONCE
Status: COMPLETED | OUTPATIENT
Start: 2021-01-07 | End: 2021-01-07

## 2021-01-07 RX ORDER — DILTIAZEM HYDROCHLORIDE 120 MG/1
120 CAPSULE, COATED, EXTENDED RELEASE ORAL 2 TIMES DAILY
Status: DISCONTINUED | OUTPATIENT
Start: 2021-01-07 | End: 2021-01-07

## 2021-01-07 RX ADMIN — NITROGLYCERIN 0.4 MG: 0.4 TABLET SUBLINGUAL at 22:08

## 2021-01-07 RX ADMIN — ASPIRIN 324 MG: 81 TABLET, CHEWABLE ORAL at 16:44

## 2021-01-07 RX ADMIN — SIMVASTATIN 10 MG: 10 TABLET, FILM COATED ORAL at 22:09

## 2021-01-07 RX ADMIN — SERTRALINE HYDROCHLORIDE 50 MG: 50 TABLET ORAL at 08:37

## 2021-01-07 RX ADMIN — PANTOPRAZOLE SODIUM 40 MG: 40 INJECTION, POWDER, FOR SOLUTION INTRAVENOUS at 18:08

## 2021-01-07 RX ADMIN — ALBUMIN HUMAN 500 ML: 0.05 INJECTION, SOLUTION INTRAVENOUS at 14:18

## 2021-01-07 RX ADMIN — GABAPENTIN 100 MG: 100 CAPSULE ORAL at 08:37

## 2021-01-07 RX ADMIN — Medication 2.5 MG: at 22:09

## 2021-01-07 RX ADMIN — ONDANSETRON 4 MG: 2 INJECTION INTRAMUSCULAR; INTRAVENOUS at 16:52

## 2021-01-07 RX ADMIN — CALCIUM ACETATE 1334 MG: 667 CAPSULE ORAL at 14:19

## 2021-01-07 RX ADMIN — CALCIUM ACETATE 1334 MG: 667 CAPSULE ORAL at 08:37

## 2021-01-07 RX ADMIN — HYDRALAZINE HYDROCHLORIDE 25 MG: 25 TABLET, FILM COATED ORAL at 22:09

## 2021-01-07 RX ADMIN — HYDRALAZINE HYDROCHLORIDE 25 MG: 25 TABLET, FILM COATED ORAL at 08:37

## 2021-01-07 RX ADMIN — HYDROMORPHONE HYDROCHLORIDE 0.3 MG: 1 INJECTION, SOLUTION INTRAMUSCULAR; INTRAVENOUS; SUBCUTANEOUS at 16:43

## 2021-01-07 RX ADMIN — DILTIAZEM HYDROCHLORIDE 180 MG: 180 CAPSULE, COATED, EXTENDED RELEASE ORAL at 08:37

## 2021-01-07 RX ADMIN — FUROSEMIDE 60 MG: 10 INJECTION, SOLUTION INTRAMUSCULAR; INTRAVENOUS at 18:06

## 2021-01-07 RX ADMIN — HYDRALAZINE HYDROCHLORIDE 25 MG: 25 TABLET, FILM COATED ORAL at 16:44

## 2021-01-07 ASSESSMENT — ACTIVITIES OF DAILY LIVING (ADL)
ADLS_ACUITY_SCORE: 17

## 2021-01-07 ASSESSMENT — MIFFLIN-ST. JEOR: SCORE: 1456.51

## 2021-01-07 NOTE — PROGRESS NOTES
Care Management Follow Up    Length of Stay (days): 9    Expected Discharge Date: 01/11/21     Concerns to be Addressed: needs TCU facility that accepts CPAP       Patient plan of care discussed at interdisciplinary rounds: Yes    Anticipated Discharge Disposition:  TCU      Additional Information:  SW continues to follow for support. Called [facility] to assess again for possible weekend admisson.. Pt was able to stay on 4L o2 yesterday rather then her 6L  Will need to obtain new Prior auth from [facility]            Corinne C. White, LSW     addendum    [facility] has assessed. They have offered only bed out for the weekend and would not have bed til Tue. AVANI called around to find a facility that will accept CPAP as sw does not believe pt would manage well only on o2    [facility] is willing to assess for CPAP    SW will update pt

## 2021-01-07 NOTE — PLAN OF CARE
End of Shift Summary  For vital signs and complete assessments, please see documentation flowsheets.     Pertinent assessments: Patient is maintaining oxygen saturations above 93% on 4L. She has been experiencing diarrhea throughout the shift.     Major Shift Events: Albumin ordered and being administered to patient.    Treatment Plan: Discharge to TCU, continue to monitor creatinine, and wean down oxygen if possible    Bedside Nurse: Emmy Pearson RN

## 2021-01-07 NOTE — PROVIDER NOTIFICATION
Dr. Ulloa notified at 1627 of chest pain, 8/10, worse with deep breathing, and feels like a 'squeezing pain.' It has been going on all day but is worse this evening after working with PT.  /58, HR 67, per tele tech, SR with BBB.    Dr. Ulloa returned call, placing orders. RN to give hydralazine and hold off on 2nd bag of albumin.

## 2021-01-07 NOTE — PROGRESS NOTES
[facility]    Medicine Progress Note - Hospitalist Service       Date of Admission:  12/29/2020  Date of Service: 01/07/2021    Assessment & Plan     This is a pleasant 84-year-old female the past medical history significant for CKD stage IV, diabetes mellitus, hypertension, mild aortic stenosis, admitted to the hospital with concerns for shortness of breath and renal failure.    Patient was recently hospitalized about 3 weeks ago with pneumonia and found to have right lobe consolidation at the time.  She was seen by pulmonology at the time.  She was discharged home with oral Augmentin and required about 1 L of oxygen.  She returned to the ER about 2 weeks after discharge.  She had a CT scan done which showed resolution of the masslike consolidation in the right lobe, however, she was found to have large pleural effusions.  She underwent thoracentesis bilaterally.  Currently ongoing issues with shortness of breath requiring 4 to 6 L of oxygen and with renal failure.    #Acute hypoxemic respiratory failure.   #Acute on chronic HFpEF.  Previous echo shows preserved EF, grade 1 diastolic dysfunction. Patient is on chronic torsemide 20 mg daily.   Suspect secondary to volume overload and large bilateral pleural effusions.  Recent hospitalization for pneumonia about 2 weeks ago.  No cough, fevers or evidence of new infection.  COVID-19 testing was negative.  Underwent bilateral thoracentesis by IR on 12/30 with 800 cc of fluid removed from both sides.  Transudative effusions.  Suspect secondary to volume overload.    --Patient received diuretics although currently on hold given her significant renal failure.  Oxygen, improving and down to 4 L today.  -Fluid restriction of 2 L    Update; notified about pt having some chest discomfort while receiving albumin, has received half of it, concern for volume overload? Check EKG, troponin, CXR, hold off on further albumin    Acute renal failure on top of  CKD stage IV.  Baseline creatinine around 2.  It was 4.3 on admission this time in the hospital.  She had low urine sodium suggesting possible cardiorenal syndrome.  Received some IV diuretics initially.  Creatinine did not improve despite stopping her diuretics.  Possible that she might have suffered from ischemic tubular injury with a recent pneumonia and this is a new baseline  -Nephrology following  -Creatinine very slightly improved at 4.1.  Receiving some albumin today    #Hypertension.  On diltiazem, hydralazine, Cardura.    #Diabetes mellitus.  Glimepiride and Actos on hold.    #Anemia of chronic disease. received IV iron doses    #TERESSA.  Continue CPAP    #Depression, anxiety    #Peripheral neuropathy.  Gabapentin renal dosing    Diet: Renal Diet (non-dialysis)  Fluid restriction 2000 ML FLUID    DVT Prophylaxis: Pneumatic Compression Devices  Aguilera Catheter: not present  Code Status: Full Code      Disposition Plan   Expected discharge: 4 - 7 days, recommended to transitional care unit once O2 use less than 2-3 liters/minute and renal function improved.  Entered: Haylee Ulloa MD 01/07/2021, 4:09 PM       The patient's care was discussed with the Bedside Nurse and Patient.    Haylee Ulloa MD  Hospitalist Service  [facility]    ______________________________________________________________________    Interval History   Chart reviewed and patient seen.      Patient feels better today, denies any chest pain.  Feels like her breathing is better.  Oxygen is down to 4 L.  She was on 6 L previously.  Denies any abdominal pain.  Feels a little bit stronger.  Work with some therapies today.    Data reviewed today: I reviewed all medications, new labs and imaging results over the last 24 hours. I personally reviewed    Physical Exam   Vital Signs: Temp: 98.2  F (36.8  C) Temp src: Oral BP: (!) 165/55(following mobility) Pulse: 69   Resp: 20 SpO2: 93 % O2 Device: Nasal cannula Oxygen  Delivery: 4 LPM  Weight: 224 lbs 1.6 oz    GENERAL:  Awake and alert, No acute distress.  PSYCH: appropriate affect, no acute agitation   HEENT:  Neck is Supple, trachea is midline, EOMI, conjunctiva clear  CARDIOVASCULAR: Regular rate and rhythm, Normal S1, S2, early systolic murmur  PULMONARY:  Clear to auscultation bilaterally. Good air entry on both sides  GI: Abdomen is soft, non tender, non-distended, bowel sounds present. No rebound or guarding   SKIN:  No cyanosis or clubbing, no obvious exanthems on exposed areas   MSK: Extremities are warm and well perfused. No pitting edema   Neuro: Awake and oriented x 3. Moving all extremities with good strength     Data   Recent Labs   Lab 01/07/21  0601 01/06/21  0658 01/05/21  0652 01/02/21  0714 01/02/21  0714   HGB  --   --   --   --  8.4*    138 139   < > 138   POTASSIUM 3.4 3.4 3.8   < > 3.5   CHLORIDE 100 100 100   < > 102   CO2 32 30 33*   < > 30   * 120* 114*   < > 88*   CR 4.10* 4.15* 4.20*   < > 3.96*   ANIONGAP 6 8 6   < > 6   EDNA 9.4 8.9 9.1   < > 8.7   * 165* 163*   < > 161*   ALBUMIN  --  2.3* 2.3*   < > 2.4*    < > = values in this interval not displayed.       No results found for this or any previous visit (from the past 24 hour(s)).  Medications       calcium acetate  1,334 mg Oral TID w/meals     [START ON 1/8/2021] diltiazem ER COATED BEADS  120 mg Oral Daily     doxazosin  1 mg Oral At Bedtime     gabapentin  100 mg Oral Daily     hydrALAZINE  25 mg Oral TID     insulin aspart  1-7 Units Subcutaneous TID AC     insulin aspart  1-5 Units Subcutaneous At Bedtime     senna-docusate  1 tablet Oral BID    Or     senna-docusate  2 tablet Oral BID     sertraline  50 mg Oral Daily     simvastatin  10 mg Oral At Bedtime     sodium chloride (PF)  3 mL Intracatheter Q8H

## 2021-01-07 NOTE — PLAN OF CARE
Pertinent assessments: VSS. BiPap 60% at HS. 4L when awake. Aox4. LS diminished. GARAY, Labored breathing at times. Denies pain. Multiple stools today. , 156.      Major Shift Events: Cardura held per DBP parameters.    Treatment Plan: Daily renal panel, hold diuretics, monitor O2 levels. BG monitoring. Tele. Nephrology following.

## 2021-01-07 NOTE — PROGRESS NOTES
" Renal Medicine Progress Note            Assessment/Plan:     # CKD 4-baseline creatinine of 2.2-2.6, minimally proteinuric.  Presumed secondary to hypertension/diabetes mellitus/advanced age.               -she is agreeable to dialysis if needed     # Dyspnea-recent pneumonia with large bilateral pleural effusion.  Last echo from January with preserved ejection fraction and some suggestion of diastolic dysfunction.               -improved with diuresis and thoracentesis     # Acute kidney injury-creatinine was 1.9 on discharge on 12/14 and up to 4.3 on 12/22.  It did not improve despite stopping torsemide.  Possible that she may have sustained ischemic tubular injury out-of-hospital and creatinine has plateaued now.  Low urine sodium and fractional excretion of sodium suggest possible cardiorenal physiology.  Echocardiogram shows preserved ejection fraction, grade 1 diastolic dysfunction and pulmonary hypertension.               -she seems to be on the dry side                -not getting better yet.      # Bilateral pleural effusions-transudate A.  Status post thoracentesis.     # Hypertension: DBP is quite low?     # Type 2 diabetes mellitus-management per primary team.     # Anemia chronic kidney disease-iron sat  Only 10% on 12/22.  Ferritin low at 54. SPEP negative.                -had 5 doses of IV FE                 # Hyperphosphatemia-with renal failure.  Phos still > 6.  Phoslo to 2 TID.       Plan:  # 500 mg of 5% albumin x 1  # Decrease Ditl ER to 120 mg daily  # Renal panel in AM         Interval History:     Afebrile. VSS. She is sitting in the chair and playing with her phone. \"I feel beter,\" she says. Her breathing is better. She did physical therapy in her room this morning.           Medications and Allergies:       albumin human  500 mL Intravenous Once     calcium acetate  1,334 mg Oral TID w/meals     diltiazem ER COATED BEADS  180 mg Oral BID     doxazosin  1 mg Oral At Bedtime     gabapentin  " "100 mg Oral Daily     hydrALAZINE  25 mg Oral TID     insulin aspart  1-7 Units Subcutaneous TID AC     insulin aspart  1-5 Units Subcutaneous At Bedtime     senna-docusate  1 tablet Oral BID    Or     senna-docusate  2 tablet Oral BID     sertraline  50 mg Oral Daily     simvastatin  10 mg Oral At Bedtime     sodium chloride (PF)  3 mL Intracatheter Q8H        Allergies   Allergen Reactions     Augmentin Diarrhea     Vomiting       Cleocin      Hives     Tegretol [Carbamazepine]      Irregular heart beat            Physical Exam:   Vitals were reviewed   , Blood pressure 136/50, pulse 61, temperature 98.2  F (36.8  C), temperature source Axillary, resp. rate 20, height 1.626 m (5' 4\"), weight 101.7 kg (224 lb 1.6 oz), SpO2 93 %, not currently breastfeeding.    Wt Readings from Last 3 Encounters:   01/07/21 101.7 kg (224 lb 1.6 oz)   12/22/20 101.1 kg (222 lb 14.4 oz)   12/14/20 96.7 kg (213 lb 1.6 oz)       Intake/Output Summary (Last 24 hours) at 1/7/2021 1333  Last data filed at 1/7/2021 1054  Gross per 24 hour   Intake 490 ml   Output 500 ml   Net -10 ml     GENERAL APPEARANCE: pleasant, NAD, alert  HEENT:  Eyes/ears/nose/neck grossly normal  RESP: Diminish BS. No wheezes.   CV: RRR, nl S1/S2  ABDOMEN: obese, soft, NT  EXTREMITIES/SKIN: no rashes/lesions on observed skin; No edema  NEURO: a/o x 3. Grossly intact.          Data:     CBC RESULTS:     Recent Labs   Lab 01/02/21  0714   HGB 8.4*       Basic Metabolic Panel:  Recent Labs   Lab 01/07/21  0601 01/06/21  0658 01/05/21  0652 01/04/21  0657 01/03/21  0719 01/02/21  0714    138 139 139 138 138   POTASSIUM 3.4 3.4 3.8 3.4 3.6 3.5   CHLORIDE 100 100 100 101 102 102   CO2 32 30 33* 33* 34* 30   * 120* 114* 101* 95* 88*   CR 4.10* 4.15* 4.20* 4.00* 3.90* 3.96*   * 165* 163* 163* 134* 161*   EDNA 9.4 8.9 9.1 9.1 8.9 8.7       INRNo lab results found in last 7 days.   Attestation:   I have reviewed today's relevant vital signs, notes, " medications, labs and imaging.    Olvin Escoto MD  [facility] - Nephrology  Office phone :[phone]  Pager: [phone]

## 2021-01-07 NOTE — PROGRESS NOTES
Pt A&O, able to make needs known. VSS. Uses BiPAP at night and weaned from 6L to 4L NC today. Lung sounds diminished/clear. PIV saline locked recived Albumin x1 this afternoon. Pt on 2L fluid restriction Renal diet. Per Nephrology note- pt ok with receiving dialysis if that is needed.

## 2021-01-07 NOTE — PROGRESS NOTES
Pt placed on CPAP for TERESSA per home routine.    Pt placed on CPAP +12 on 60%.    Pt's BS have been diminished with sat's in the mid 90's.    Will continue to follow and assess and make adjustments as needed.    Renay Lindo, RT on 1/7/2021 at 5:36 AM

## 2021-01-08 LAB
ANION GAP SERPL CALCULATED.3IONS-SCNC: 3 MMOL/L (ref 3–14)
BACTERIA SPEC CULT: NORMAL
BUN SERPL-MCNC: 111 MG/DL (ref 7–30)
CALCIUM SERPL-MCNC: 8.9 MG/DL (ref 8.5–10.1)
CHLORIDE SERPL-SCNC: 102 MMOL/L (ref 94–109)
CO2 SERPL-SCNC: 33 MMOL/L (ref 20–32)
CREAT SERPL-MCNC: 4.01 MG/DL (ref 0.52–1.04)
GFR SERPL CREATININE-BSD FRML MDRD: 10 ML/MIN/{1.73_M2}
GLUCOSE BLDC GLUCOMTR-MCNC: 167 MG/DL (ref 70–99)
GLUCOSE BLDC GLUCOMTR-MCNC: 175 MG/DL (ref 70–99)
GLUCOSE BLDC GLUCOMTR-MCNC: 185 MG/DL (ref 70–99)
GLUCOSE BLDC GLUCOMTR-MCNC: 204 MG/DL (ref 70–99)
GLUCOSE BLDC GLUCOMTR-MCNC: 234 MG/DL (ref 70–99)
GLUCOSE SERPL-MCNC: 187 MG/DL (ref 70–99)
INTERPRETATION ECG - MUSE: NORMAL
INTERPRETATION ECG - MUSE: NORMAL
Lab: NORMAL
PLATELET # BLD AUTO: 212 10E9/L (ref 150–450)
POTASSIUM SERPL-SCNC: 3.8 MMOL/L (ref 3.4–5.3)
PROCALCITONIN SERPL-MCNC: 0.28 NG/ML
SODIUM SERPL-SCNC: 138 MMOL/L (ref 133–144)
SPECIMEN SOURCE: NORMAL
TROPONIN I SERPL-MCNC: <0.015 UG/L (ref 0–0.04)
TROPONIN I SERPL-MCNC: <0.015 UG/L (ref 0–0.04)

## 2021-01-08 PROCEDURE — 84145 PROCALCITONIN (PCT): CPT | Performed by: INTERNAL MEDICINE

## 2021-01-08 PROCEDURE — 250N000013 HC RX MED GY IP 250 OP 250 PS 637: Performed by: INTERNAL MEDICINE

## 2021-01-08 PROCEDURE — 84484 ASSAY OF TROPONIN QUANT: CPT | Performed by: INTERNAL MEDICINE

## 2021-01-08 PROCEDURE — 120N000001 HC R&B MED SURG/OB

## 2021-01-08 PROCEDURE — 999N001017 HC STATISTIC GLUCOSE BY METER IP

## 2021-01-08 PROCEDURE — 250N000011 HC RX IP 250 OP 636: Performed by: INTERNAL MEDICINE

## 2021-01-08 PROCEDURE — 94660 CPAP INITIATION&MGMT: CPT

## 2021-01-08 PROCEDURE — 99233 SBSQ HOSP IP/OBS HIGH 50: CPT | Performed by: INTERNAL MEDICINE

## 2021-01-08 PROCEDURE — 80048 BASIC METABOLIC PNL TOTAL CA: CPT | Performed by: INTERNAL MEDICINE

## 2021-01-08 PROCEDURE — 250N000009 HC RX 250: Performed by: INTERNAL MEDICINE

## 2021-01-08 PROCEDURE — 85049 AUTOMATED PLATELET COUNT: CPT | Performed by: INTERNAL MEDICINE

## 2021-01-08 PROCEDURE — 999N000157 HC STATISTIC RCP TIME EA 10 MIN

## 2021-01-08 PROCEDURE — 36415 COLL VENOUS BLD VENIPUNCTURE: CPT | Performed by: INTERNAL MEDICINE

## 2021-01-08 RX ORDER — FUROSEMIDE 10 MG/ML
80 INJECTION INTRAMUSCULAR; INTRAVENOUS EVERY 8 HOURS
Status: DISCONTINUED | OUTPATIENT
Start: 2021-01-08 | End: 2021-01-09

## 2021-01-08 RX ORDER — HEPARIN SODIUM 5000 [USP'U]/.5ML
5000 INJECTION, SOLUTION INTRAVENOUS; SUBCUTANEOUS EVERY 12 HOURS
Status: DISCONTINUED | OUTPATIENT
Start: 2021-01-08 | End: 2021-01-08

## 2021-01-08 RX ORDER — FUROSEMIDE 10 MG/ML
60 INJECTION INTRAMUSCULAR; INTRAVENOUS ONCE
Status: COMPLETED | OUTPATIENT
Start: 2021-01-08 | End: 2021-01-08

## 2021-01-08 RX ORDER — HEPARIN SODIUM 5000 [USP'U]/.5ML
5000 INJECTION, SOLUTION INTRAVENOUS; SUBCUTANEOUS EVERY 12 HOURS
Status: DISCONTINUED | OUTPATIENT
Start: 2021-01-08 | End: 2021-01-11

## 2021-01-08 RX ORDER — FUROSEMIDE 10 MG/ML
20 INJECTION INTRAMUSCULAR; INTRAVENOUS ONCE
Status: COMPLETED | OUTPATIENT
Start: 2021-01-08 | End: 2021-01-08

## 2021-01-08 RX ORDER — DOXYCYCLINE 100 MG/10ML
100 INJECTION, POWDER, LYOPHILIZED, FOR SOLUTION INTRAVENOUS EVERY 12 HOURS
Status: DISCONTINUED | OUTPATIENT
Start: 2021-01-08 | End: 2021-01-11

## 2021-01-08 RX ORDER — CEFTRIAXONE 1 G/1
1 INJECTION, POWDER, FOR SOLUTION INTRAMUSCULAR; INTRAVENOUS EVERY 24 HOURS
Status: DISCONTINUED | OUTPATIENT
Start: 2021-01-08 | End: 2021-01-11

## 2021-01-08 RX ADMIN — DOCUSATE SODIUM AND SENNOSIDES 1 TABLET: 8.6; 5 TABLET ORAL at 21:47

## 2021-01-08 RX ADMIN — HEPARIN SODIUM 5000 UNITS: 5000 INJECTION INTRAVENOUS; SUBCUTANEOUS at 21:47

## 2021-01-08 RX ADMIN — FUROSEMIDE 20 MG: 10 INJECTION, SOLUTION INTRAMUSCULAR; INTRAVENOUS at 14:05

## 2021-01-08 RX ADMIN — GABAPENTIN 100 MG: 100 CAPSULE ORAL at 09:32

## 2021-01-08 RX ADMIN — DOXYCYCLINE 100 MG: 100 INJECTION, POWDER, LYOPHILIZED, FOR SOLUTION INTRAVENOUS at 18:23

## 2021-01-08 RX ADMIN — DOCUSATE SODIUM AND SENNOSIDES 1 TABLET: 8.6; 5 TABLET ORAL at 09:31

## 2021-01-08 RX ADMIN — SERTRALINE HYDROCHLORIDE 50 MG: 50 TABLET ORAL at 09:32

## 2021-01-08 RX ADMIN — CEFTRIAXONE 1 G: 1 INJECTION, POWDER, FOR SOLUTION INTRAMUSCULAR; INTRAVENOUS at 17:40

## 2021-01-08 RX ADMIN — SIMVASTATIN 10 MG: 10 TABLET, FILM COATED ORAL at 21:47

## 2021-01-08 RX ADMIN — CHLOROTHIAZIDE SODIUM 250 MG: 500 INJECTION, POWDER, LYOPHILIZED, FOR SOLUTION INTRAVENOUS at 16:43

## 2021-01-08 RX ADMIN — NITROGLYCERIN 0.4 MG: 0.4 TABLET SUBLINGUAL at 05:20

## 2021-01-08 RX ADMIN — CALCIUM ACETATE 1334 MG: 667 CAPSULE ORAL at 11:10

## 2021-01-08 RX ADMIN — FUROSEMIDE 60 MG: 10 INJECTION, SOLUTION INTRAMUSCULAR; INTRAVENOUS at 12:15

## 2021-01-08 RX ADMIN — INSULIN ASPART 1 UNITS: 100 INJECTION, SOLUTION INTRAVENOUS; SUBCUTANEOUS at 21:48

## 2021-01-08 RX ADMIN — CALCIUM ACETATE 1334 MG: 667 CAPSULE ORAL at 18:24

## 2021-01-08 RX ADMIN — FUROSEMIDE 80 MG: 10 INJECTION, SOLUTION INTRAMUSCULAR; INTRAVENOUS at 21:45

## 2021-01-08 ASSESSMENT — MIFFLIN-ST. JEOR
SCORE: 1432.92
SCORE: 1458.33

## 2021-01-08 ASSESSMENT — ACTIVITIES OF DAILY LIVING (ADL)
ADLS_ACUITY_SCORE: 17
ADLS_ACUITY_SCORE: 19
ADLS_ACUITY_SCORE: 18

## 2021-01-08 NOTE — PROGRESS NOTES
Renal Medicine Progress Note            Assessment/Plan:     # CKD 4-baseline creatinine of 2.2-2.6, minimally proteinuric.  Presumed secondary to hypertension/diabetes mellitus/advanced age.               -she is agreeable to dialysis if needed     # Dyspnea-recent pneumonia with large bilateral pleural effusion.  Last echo from January with preserved ejection fraction and grade I diastolic dysfunction. Started to feel more short of breath yesterday. She jamil not have peripheral edema, but CXR continues to show vascular congestion.      # Acute kidney injury-creatinine was 1.9 on discharge on 12/14 and up to 4.3 on 12/22.  SCr has been around 4s since admission.      # Bilateral pleural effusions-transudate A.  Status post thoracentesis.      # Hypertension: DBP is quite low?     # Type 2 diabetes mellitus-management per primary team.     # Anemia chronic kidney disease-iron sat  Only 10% on 12/22.  Ferritin low at 54. SPEP negative.                -had 5 doses of IV FE                 # Hyperphosphatemia-with renal failure.  Phos still > 6.  Phoslo to 2 TID.       Plan:  # Will stop hydralazine and Cardura to let SBP be higher  # Start Lasix 80 mg q8hrs  # Diuril 250 mg x 1  # Lets get to get negative 1 liter fluid balance by tomorrow and see        Interval History:     She says she vomited after she ate dinner yesterday. Also more short of breath and did not sleep well. She had two doses of Lasix. Overall, she is net negative 5 liters since admission and wt is down from 104.5 to 99.3 kg. CXR shows vascular congestion with worse LLE consolidation.           Medications and Allergies:       calcium acetate  1,334 mg Oral TID w/meals     chlorothiazide  250 mg Intravenous Once     diltiazem ER COATED BEADS  120 mg Oral Daily     doxazosin  1 mg Oral At Bedtime     furosemide  80 mg Intravenous Q8H     gabapentin  100 mg Oral Daily     hydrALAZINE  25 mg Oral TID     insulin aspart  1-7 Units Subcutaneous TID AC      "insulin aspart  1-5 Units Subcutaneous At Bedtime     senna-docusate  1 tablet Oral BID    Or     senna-docusate  2 tablet Oral BID     sertraline  50 mg Oral Daily     simvastatin  10 mg Oral At Bedtime     sodium chloride (PF)  3 mL Intracatheter Q8H        Allergies   Allergen Reactions     Augmentin Diarrhea     Vomiting       Cleocin      Hives     Tegretol [Carbamazepine]      Irregular heart beat            Physical Exam:   Vitals were reviewed   , Blood pressure (!) 117/38, pulse 55, temperature 98.5  F (36.9  C), temperature source Axillary, resp. rate 22, height 1.626 m (5' 4\"), weight 99.3 kg (218 lb 14.4 oz), SpO2 93 %, not currently breastfeeding.    Wt Readings from Last 3 Encounters:   01/08/21 99.3 kg (218 lb 14.4 oz)   12/22/20 101.1 kg (222 lb 14.4 oz)   12/14/20 96.7 kg (213 lb 1.6 oz)       Intake/Output Summary (Last 24 hours) at 1/8/2021 1226  Last data filed at 1/8/2021 1055  Gross per 24 hour   Intake 470 ml   Output 1100 ml   Net -630 ml       GENERAL APPEARANCE: NAD. Looks tired today.  HEENT:  Eyes/ears/nose/neck grossly normal  RESP: Diminish BS. No wheezes.   CV: RRR, nl S1/S2  ABDOMEN: obese, soft, NT  EXTREMITIES/SKIN: no rashes/lesions on observed skin; No edema  NEURO: a/o x 3. Grossly intact.          Data:     CBC RESULTS:     Recent Labs   Lab 01/02/21  0714   HGB 8.4*       Basic Metabolic Panel:  Recent Labs   Lab 01/08/21  0748 01/07/21  0601 01/06/21  0658 01/05/21  0652 01/04/21  0657 01/03/21  0719    138 138 139 139 138   POTASSIUM 3.8 3.4 3.4 3.8 3.4 3.6   CHLORIDE 102 100 100 100 101 102   CO2 33* 32 30 33* 33* 34*   * 118* 120* 114* 101* 95*   CR 4.01* 4.10* 4.15* 4.20* 4.00* 3.90*   * 147* 165* 163* 163* 134*   EDNA 8.9 9.4 8.9 9.1 9.1 8.9       INRNo lab results found in last 7 days.   Attestation:   I have reviewed today's relevant vital signs, notes, medications, labs and imaging.    Olvin Escoto MD  [facility] - Nephrology  Office " phone :[phone]  Pager: [phone]

## 2021-01-08 NOTE — PROGRESS NOTES
Pt placed on CPAP for TERESSA per home routine.     Pt placed on CPAP +12 on 60%.     Pt's BS have been diminished with sat's in the low 90's.    No skin issues were noted.     Will continue to follow and assess and make adjustments as needed.    Renay Lindo, RT on 1/8/2021 at 12:38 AM

## 2021-01-08 NOTE — PROVIDER NOTIFICATION
Dr. Jones paged for continued chest pain despite interventions.     placed orders, RN will continue to monitor.

## 2021-01-08 NOTE — PROGRESS NOTES
CM: [facility] called. They are following for possible bed Monday     Referral was also sent to [facility]

## 2021-01-08 NOTE — PLAN OF CARE
End of Shift Summary  For vital signs and complete assessments, please see documentation flowsheets.     Pertinent assessments: HTN at times. With nausea, refused dinner.  and 197.   Major Shift Events: Chest pain and vomiting; stopped albumin, given lasix, pepcid, and dilaudid. Later in the evening, pt reported no improvement in chest pain, given nitroglycerine x1 and oxycodone. Pt now on CPAP, sleeping in the room. Will continue to monitor.  Treatment Plan: Monitoring chest pain, kidney function.

## 2021-01-08 NOTE — PROGRESS NOTES
Notified by nursing service regarding chest discomfort  No reported nausea, vomiting.  Stable hemodynamics.  O2 sats remained stable at 4 L of oxygen by nasal cannula  Chest x-ray earlier showing worsening CHF picture  Overall not improving with her kidney dysfunction  -Provided with IV Lasix  -Given with IV Protonix  -Monitor with telemetry  -Serial troponins  -Repeat EKG  -NTG sublingual  -Afebrile, not tachycardic  -VBG  -Trial of oral oxycodone  -

## 2021-01-08 NOTE — PROGRESS NOTES
[facility]    Medicine Progress Note - Hospitalist Service       Date of Admission:  12/29/2020  Date of Service: 01/08/2021    Assessment & Plan     This is a pleasant 84-year-old female the past medical history significant for CKD stage IV, diabetes mellitus, hypertension, mild aortic stenosis, admitted to the hospital with concerns for shortness of breath and renal failure.    Patient was recently hospitalized about 3 weeks ago with pneumonia and found to have right lobe consolidation at the time.  She was seen by pulmonology at the time.  She was discharged home with oral Augmentin and required about 1 L of oxygen.  She returned to the ER about 2 weeks after discharge.  She had a CT scan done which showed resolution of the masslike consolidation in the right lobe, however, she was found to have large pleural effusions.  She underwent thoracentesis bilaterally.  Currently ongoing issues with shortness of breath requiring high amount of oxygen and ongoing renal failure.    #Acute hypoxemic respiratory failure.   #Acute on chronic HFpEF.  Previous echo shows preserved EF, grade 1 diastolic dysfunction. Patient is on chronic torsemide 20 mg daily.   Suspect secondary to volume overload and large bilateral pleural effusions.  Recent hospitalization for pneumonia about 2 weeks ago.  No cough, fevers or evidence of new infection.  COVID-19 testing was negative.  Underwent bilateral thoracentesis by IR on 12/30 with 800 cc of fluid removed from both sides.  Transudative effusions.  Suspect secondary to volume overload.    --Patient received IV diuretics initially although they were held given her significant renal failure.  She had worsening shortness of breath on 1/7 requiring up to 8 to 10 L of oxygen.  Chest x-ray showed persistent vascular congestion, elevated BNP.  Started back on IV diuretics.  Nephrology assisting with diuretic dosage  -Fluid restriction of 2 L  -Chest x-ray showed  increased consolidation in the left lower lobe.  Procalcitonin is only 0.28.  No fevers.  However, given her tenuous status will start on IV ceftriaxone and doxycycline.      Acute renal failure on top of CKD stage IV.  Baseline creatinine around 2.  It was 4.3 on admission this time in the hospital.  She had low urine sodium suggesting possible cardiorenal syndrome.  Received some IV diuretics initially.  Creatinine did not improve despite stopping her diuretics.  Possible that she might have suffered from ischemic tubular injury with a recent pneumonia and this is a new baseline  -Nephrology following  -Creatinine very slightly improved at 4.0.  Diuresis as above     #Hypertension.  On diltiazem, hydralazine, Cardura.  -- Stop hydralazine and Cardura for now to avoid hypotension with diuretics     #Diabetes mellitus.  Glimepiride and Actos on hold.    #Anemia of chronic disease. received IV iron doses    #TERESSA.  Continue CPAP    #Depression, anxiety    #Peripheral neuropathy.  Gabapentin renal dosing    Diet: Renal Diet (non-dialysis)  Fluid restriction 2000 ML FLUID    DVT Prophylaxis: Pneumatic Compression Devices.  Start heparin subcu  Aguilera Catheter: not present  Code Status: Full Code      Disposition Plan   Expected discharge: 4 - 7 days, recommended to transitional care unit once O2 use less than 2-3 liters/minute and renal function improved.  Entered: Haylee Ulloa MD 01/08/2021, 4:03 PM       The patient's care was discussed with the Bedside Nurse and Patient.    Updated  Armin per pt permission     Haylee Ulloa MD  Hospitalist Service  [facility]    ______________________________________________________________________    Interval History   Chart reviewed and patient seen.      Patient feels okay this morning, she had trouble with chest pain yesterday afternoon which resolved  after receiving nitro and diuretics.  Currently she denies any chest pain.  Her serial  troponins were negative.  Requiring up to 8 L of oxygen, was on BiPAP/CPAP, overnight, required also straight cath, low urine output.  No other complaints    Data reviewed today: I reviewed all medications, new labs and imaging results over the last 24 hours. I personally reviewed    Physical Exam   Vital Signs: Temp: 98.8  F (37.1  C) Temp src: Axillary BP: 124/42 Pulse: 65   Resp: 20 SpO2: 93 % O2 Device: BiPAP/CPAP Oxygen Delivery: 10 LPM  Weight: 218 lbs 14.4 oz    GENERAL:  Awake and alert, in mild resp distress.  PSYCH: appropriate affect, no acute agitation   HEENT:  Neck is Supple, trachea is midline, EOMI, conjunctiva clear  CARDIOVASCULAR: Regular rate and rhythm, Normal S1, S2, early systolic murmur  PULMONARY:  Clear to auscultation bilaterally. Good air entry on both sides  GI: Abdomen is soft, non tender, non-distended, bowel sounds present. No rebound or guarding   SKIN:  No cyanosis or clubbing, no obvious exanthems on exposed areas   MSK: Extremities are warm and well perfused. No pitting edema   Neuro: Awake and oriented x 3. Moving all extremities with good strength     Data   Recent Labs   Lab 01/08/21  0748 01/07/21  2340 01/07/21  2223 01/07/21  0601 01/07/21  0601 01/06/21  0658 01/05/21  0652 01/02/21  0714 01/02/21  0714   HGB  --   --   --   --   --   --   --   --  8.4*     --   --   --  138 138 139   < > 138   POTASSIUM 3.8  --   --   --  3.4 3.4 3.8   < > 3.5   CHLORIDE 102  --   --   --  100 100 100   < > 102   CO2 33*  --   --   --  32 30 33*   < > 30   *  --   --   --  118* 120* 114*   < > 88*   CR 4.01*  --   --   --  4.10* 4.15* 4.20*   < > 3.96*   ANIONGAP 3  --   --   --  6 8 6   < > 6   EDNA 8.9  --   --   --  9.4 8.9 9.1   < > 8.7   *  --   --   --  147* 165* 163*   < > 161*   ALBUMIN  --   --   --   --   --  2.3* 2.3*   < > 2.4*   TROPI <0.015 <0.015 <0.015   < >  --   --   --   --   --     < > = values in this interval not displayed.       Recent Results  (from the past 24 hour(s))   XR Chest Port 1 View    Narrative    EXAM: XR CHEST PORT 1 VW  LOCATION: [facility]  DATE/TIME: 1/7/2021 4:50 PM    INDICATION: Chest pain.  COMPARISON: 12/30/2020      Impression    IMPRESSION: Cardiac enlargement with pulmonary venous congestion. Bilateral pulmonary infiltrates compatible with edema again seen slightly improved, although there is worsening consolidation in the left lower lobe. Pleural effusions.     Medications       calcium acetate  1,334 mg Oral TID w/meals     chlorothiazide  250 mg Intravenous Once     diltiazem ER COATED BEADS  120 mg Oral Daily     furosemide  80 mg Intravenous Q8H     gabapentin  100 mg Oral Daily     heparin ANTICOAGULANT  5,000 Units Subcutaneous Q12H     insulin aspart  1-7 Units Subcutaneous TID AC     insulin aspart  1-5 Units Subcutaneous At Bedtime     senna-docusate  1 tablet Oral BID    Or     senna-docusate  2 tablet Oral BID     sertraline  50 mg Oral Daily     simvastatin  10 mg Oral At Bedtime     sodium chloride (PF)  3 mL Intracatheter Q8H

## 2021-01-08 NOTE — PROVIDER NOTIFICATION
Paged MD in regards to Patient is c/o chest pain, which isn't new this hospital stay. Just received Nitro x1 with relief, however, Patient is now requiring 11L oxygen mask not going above 86%. Pt feels a little SOB. Did not void during the night. Please advise. Thanks

## 2021-01-08 NOTE — PLAN OF CARE
To Do:  End of Shift Summary  For vital signs and complete assessments, please see documentation flowsheets.     Pertinent assessments: Patient rested most of shift, on CPAP per orders. Tele present. Fasting blood sugar 204. Denied chest pain at start of shift, then c/o chest pain with activity. PRN nitroglycerin given 1x, pt stated chest  Pain relieved. Patient on cpap all night maintaining above 90%, when switched to oxygen ask requested by pt, pt could not maintain above 90% on 15 L via oxymask. Pt had to be switched back to cpap.    Treatment Plan: Monitoring chest pain, kidney function.   Bedside Nurse: Lizzy King RN

## 2021-01-08 NOTE — PLAN OF CARE
[id number] Text page to MD: Would you like to add parameters for diltiazem? /38, HR 55-60. Please advise. FYI No UO overnight, straight cath'd for 400ml just now. Pt has not eaten, on cpap sats 93-97%, , cover or wait for pt to eat? this morning was on oxymask on 15 LPM sats 86%, any new orders for this? Thank you

## 2021-01-09 LAB
ANION GAP SERPL CALCULATED.3IONS-SCNC: 5 MMOL/L (ref 3–14)
BUN SERPL-MCNC: 116 MG/DL (ref 7–30)
CALCIUM SERPL-MCNC: 9.7 MG/DL (ref 8.5–10.1)
CHLORIDE SERPL-SCNC: 100 MMOL/L (ref 94–109)
CO2 SERPL-SCNC: 32 MMOL/L (ref 20–32)
CREAT SERPL-MCNC: 4 MG/DL (ref 0.52–1.04)
ERYTHROCYTE [DISTWIDTH] IN BLOOD BY AUTOMATED COUNT: 14.2 % (ref 10–15)
GFR SERPL CREATININE-BSD FRML MDRD: 10 ML/MIN/{1.73_M2}
GLUCOSE BLDC GLUCOMTR-MCNC: 135 MG/DL (ref 70–99)
GLUCOSE BLDC GLUCOMTR-MCNC: 142 MG/DL (ref 70–99)
GLUCOSE BLDC GLUCOMTR-MCNC: 146 MG/DL (ref 70–99)
GLUCOSE BLDC GLUCOMTR-MCNC: 153 MG/DL (ref 70–99)
GLUCOSE BLDC GLUCOMTR-MCNC: 169 MG/DL (ref 70–99)
GLUCOSE SERPL-MCNC: 145 MG/DL (ref 70–99)
HCT VFR BLD AUTO: 27.2 % (ref 35–47)
HGB BLD-MCNC: 8.2 G/DL (ref 11.7–15.7)
MAGNESIUM SERPL-MCNC: 2.6 MG/DL (ref 1.6–2.3)
MCH RBC QN AUTO: 28.4 PG (ref 26.5–33)
MCHC RBC AUTO-ENTMCNC: 30.1 G/DL (ref 31.5–36.5)
MCV RBC AUTO: 94 FL (ref 78–100)
PHOSPHATE SERPL-MCNC: 4.9 MG/DL (ref 2.5–4.5)
PLATELET # BLD AUTO: 206 10E9/L (ref 150–450)
POTASSIUM SERPL-SCNC: 3.2 MMOL/L (ref 3.4–5.3)
RBC # BLD AUTO: 2.89 10E12/L (ref 3.8–5.2)
SODIUM SERPL-SCNC: 137 MMOL/L (ref 133–144)
WBC # BLD AUTO: 11.4 10E9/L (ref 4–11)

## 2021-01-09 PROCEDURE — 83735 ASSAY OF MAGNESIUM: CPT | Performed by: INTERNAL MEDICINE

## 2021-01-09 PROCEDURE — 99233 SBSQ HOSP IP/OBS HIGH 50: CPT | Performed by: INTERNAL MEDICINE

## 2021-01-09 PROCEDURE — 94660 CPAP INITIATION&MGMT: CPT

## 2021-01-09 PROCEDURE — 999N000157 HC STATISTIC RCP TIME EA 10 MIN

## 2021-01-09 PROCEDURE — 120N000001 HC R&B MED SURG/OB

## 2021-01-09 PROCEDURE — 250N000013 HC RX MED GY IP 250 OP 250 PS 637: Performed by: INTERNAL MEDICINE

## 2021-01-09 PROCEDURE — 36415 COLL VENOUS BLD VENIPUNCTURE: CPT | Performed by: INTERNAL MEDICINE

## 2021-01-09 PROCEDURE — 80048 BASIC METABOLIC PNL TOTAL CA: CPT | Performed by: INTERNAL MEDICINE

## 2021-01-09 PROCEDURE — 999N001017 HC STATISTIC GLUCOSE BY METER IP

## 2021-01-09 PROCEDURE — 84100 ASSAY OF PHOSPHORUS: CPT | Performed by: INTERNAL MEDICINE

## 2021-01-09 PROCEDURE — 250N000011 HC RX IP 250 OP 636: Performed by: INTERNAL MEDICINE

## 2021-01-09 PROCEDURE — 250N000009 HC RX 250: Performed by: INTERNAL MEDICINE

## 2021-01-09 PROCEDURE — 85027 COMPLETE CBC AUTOMATED: CPT | Performed by: INTERNAL MEDICINE

## 2021-01-09 PROCEDURE — 999N000105 HC STATISTIC NO DOCUMENTATION TO SUPPORT CHARGE

## 2021-01-09 RX ORDER — METOPROLOL TARTRATE 1 MG/ML
2.5 INJECTION, SOLUTION INTRAVENOUS EVERY 4 HOURS PRN
Status: DISCONTINUED | OUTPATIENT
Start: 2021-01-09 | End: 2021-01-10

## 2021-01-09 RX ORDER — POTASSIUM CHLORIDE 1500 MG/1
20 TABLET, EXTENDED RELEASE ORAL ONCE
Status: COMPLETED | OUTPATIENT
Start: 2021-01-09 | End: 2021-01-09

## 2021-01-09 RX ORDER — FUROSEMIDE 10 MG/ML
80 INJECTION INTRAMUSCULAR; INTRAVENOUS EVERY 6 HOURS
Status: DISCONTINUED | OUTPATIENT
Start: 2021-01-09 | End: 2021-01-11

## 2021-01-09 RX ADMIN — FUROSEMIDE 80 MG: 10 INJECTION, SOLUTION INTRAMUSCULAR; INTRAVENOUS at 06:06

## 2021-01-09 RX ADMIN — GABAPENTIN 100 MG: 100 CAPSULE ORAL at 08:30

## 2021-01-09 RX ADMIN — FUROSEMIDE 80 MG: 10 INJECTION, SOLUTION INTRAMUSCULAR; INTRAVENOUS at 18:53

## 2021-01-09 RX ADMIN — SIMVASTATIN 10 MG: 10 TABLET, FILM COATED ORAL at 22:08

## 2021-01-09 RX ADMIN — DOXYCYCLINE 100 MG: 100 INJECTION, POWDER, LYOPHILIZED, FOR SOLUTION INTRAVENOUS at 06:10

## 2021-01-09 RX ADMIN — HEPARIN SODIUM 5000 UNITS: 5000 INJECTION INTRAVENOUS; SUBCUTANEOUS at 22:06

## 2021-01-09 RX ADMIN — DOCUSATE SODIUM AND SENNOSIDES 2 TABLET: 8.6; 5 TABLET ORAL at 22:08

## 2021-01-09 RX ADMIN — DOCUSATE SODIUM AND SENNOSIDES 2 TABLET: 8.6; 5 TABLET ORAL at 08:30

## 2021-01-09 RX ADMIN — CEFTRIAXONE 1 G: 1 INJECTION, POWDER, FOR SOLUTION INTRAMUSCULAR; INTRAVENOUS at 17:51

## 2021-01-09 RX ADMIN — HEPARIN SODIUM 5000 UNITS: 5000 INJECTION INTRAVENOUS; SUBCUTANEOUS at 08:30

## 2021-01-09 RX ADMIN — CALCIUM ACETATE 1334 MG: 667 CAPSULE ORAL at 10:05

## 2021-01-09 RX ADMIN — POTASSIUM CHLORIDE 20 MEQ: 1500 TABLET, EXTENDED RELEASE ORAL at 10:06

## 2021-01-09 RX ADMIN — CHLOROTHIAZIDE SODIUM 250 MG: 500 INJECTION, POWDER, LYOPHILIZED, FOR SOLUTION INTRAVENOUS at 10:06

## 2021-01-09 RX ADMIN — FUROSEMIDE 80 MG: 10 INJECTION, SOLUTION INTRAMUSCULAR; INTRAVENOUS at 12:57

## 2021-01-09 RX ADMIN — CALCIUM ACETATE 1334 MG: 667 CAPSULE ORAL at 18:54

## 2021-01-09 RX ADMIN — SERTRALINE HYDROCHLORIDE 50 MG: 50 TABLET ORAL at 08:30

## 2021-01-09 RX ADMIN — DILTIAZEM HYDROCHLORIDE 120 MG: 120 CAPSULE, COATED, EXTENDED RELEASE ORAL at 08:31

## 2021-01-09 RX ADMIN — DOXYCYCLINE 100 MG: 100 INJECTION, POWDER, LYOPHILIZED, FOR SOLUTION INTRAVENOUS at 20:23

## 2021-01-09 ASSESSMENT — ACTIVITIES OF DAILY LIVING (ADL)
ADLS_ACUITY_SCORE: 20
ADLS_ACUITY_SCORE: 19

## 2021-01-09 NOTE — PROGRESS NOTES
[facility]    Medicine Progress Note - Hospitalist Service       Date of Admission:  12/29/2020  Date of Service: 01/09/2021    Assessment & Plan     This is a pleasant 84-year-old female the past medical history significant for CKD stage IV, diabetes mellitus, hypertension, mild aortic stenosis, admitted to the hospital with concerns for shortness of breath and renal failure.    Patient was recently hospitalized about 3 weeks ago with pneumonia and found to have right lobe consolidation at the time.  She was seen by pulmonology at the time.  She was discharged home with oral Augmentin and required about 1 L of oxygen.  She returned to the ER about 2 weeks after discharge.  She had a CT scan done which showed resolution of the masslike consolidation in the right lobe, however, she was found to have large pleural effusions.  She underwent thoracentesis bilaterally.  Currently ongoing issues with shortness of breath requiring high amount of oxygen and ongoing renal failure.    #Acute hypoxemic respiratory failure.   #Acute on chronic HFpEF.  Previous echo shows preserved EF, grade 1 diastolic dysfunction. Patient is on chronic torsemide 20 mg daily.   Suspect secondary to volume overload and large bilateral pleural effusions.  Recent hospitalization for pneumonia about 2 weeks ago.  No cough, fevers or evidence of new infection.  COVID-19 testing was negative.  Underwent bilateral thoracentesis by IR on 12/30 with 800 cc of fluid removed from both sides.  Transudative effusions.  Suspect secondary to volume overload.    --Patient received IV diuretics initially although they were held given her significant renal failure.  She had worsening shortness of breath on 1/7 requiring up to 8 to 10 L of oxygen.  Chest x-ray showed persistent vascular congestion, elevated BNP.  Started back on IV diuretics.  Nephrology assisting with diuretic dosage  -Fluid restriction of 2 L  -Chest x-ray showed  increased consolidation in the left lower lobe.  Procalcitonin is only 0.28.  No fevers.  However, given her tenuous status will start on IV ceftriaxone and doxycycline.   --Patient now requiring BiPAP for her respiratory distress, she is responding to diuretics and making good urine, creatinine stable.  However, if she continues to worsen then might need dialysis.  She might need another thoracentesis, although repeat chest x-ray does not show a large effusion, will request pulmonology to evaluate      Acute renal failure on top of CKD stage IV.  Baseline creatinine around 2.  It was 4.3 on admission this time in the hospital.  She had low urine sodium suggesting possible cardiorenal syndrome.  Received some IV diuretics initially.  Creatinine did not improve despite stopping her diuretics.  Possible that she might have suffered from ischemic tubular injury with a recent pneumonia and this is a new baseline  -Nephrology following  -Creatinine very slightly improved at 4.0.  On large dose of IV Lasix currently, tolerating that.  Follow creatinine closely, nephrology managing    #Hypertension.  On diltiazem, hydralazine, Cardura.  -- Stop hydralazine and Cardura for now to avoid hypotension with diuretics     #Diabetes mellitus.  Glimepiride and Actos on hold.    #Anemia of chronic disease. received IV iron doses    #TERESSA.  Continue CPAP    #Depression, anxiety    #Peripheral neuropathy.  Gabapentin renal dosing    Diet: Renal Diet (non-dialysis)  Fluid restriction 2000 ML FLUID    DVT Prophylaxis: Pneumatic Compression Devices.  Start heparin subcu  Aguilera Catheter: not present  Code Status: Full Code      Disposition Plan   Expected discharge: TBD recommended to transitional care unit once O2 use less than 2-3 liters/minute and renal function improved.  Entered: Haylee Ulloa MD 01/09/2021, 4:57 PM       The patient's care was discussed with the Bedside Nurse and Patient.    Haylee Ulloa MD  Hospitalist  [facility]    ______________________________________________________________________    Interval History   Chart reviewed and patient seen.      Patient feels very tired today, has been requiring BiPAP for most of the morning, previously she was on 8 to 10 L of oxygen, denies any recurrence of the chest pain this morning, mentating well and interacts appropriately,     Data reviewed today: I reviewed all medications, new labs and imaging results over the last 24 hours. I personally reviewed    Physical Exam   Vital Signs: Temp: 97.6  F (36.4  C) Temp src: Axillary BP: (!) 158/51 Pulse: 61   Resp: 22 SpO2: 97 % O2 Device: BiPAP/CPAP Oxygen Delivery: 10 LPM  Weight: 218 lbs 14.4 oz    GENERAL:  Awake and alert, in mild resp distress.  PSYCH: appropriate affect, no acute agitation   HEENT:  Neck is Supple, trachea is midline, EOMI, conjunctiva clear  CARDIOVASCULAR: Regular rate and rhythm, Normal S1, S2, early systolic murmur  PULMONARY:  Crackles at bases bilaterally. Good air entry on both sides  GI: Abdomen is soft, non tender, non-distended, bowel sounds present. No rebound or guarding   SKIN:  No cyanosis or clubbing, no obvious exanthems on exposed areas   MSK: Extremities are warm and well perfused. No pitting edema   Neuro: Awake and oriented x 3. Moving all extremities with good strength     Data   Recent Labs   Lab 01/09/21  0814 01/08/21  0748 01/07/21  2340 01/07/21  2223 01/07/21  0601 01/07/21  0601 01/06/21  0658 01/05/21  0652   WBC 11.4*  --   --   --   --   --   --   --    HGB 8.2*  --   --   --   --   --   --   --    MCV 94  --   --   --   --   --   --   --     212  --   --   --   --   --   --     138  --   --   --  138 138 139   POTASSIUM 3.2* 3.8  --   --   --  3.4 3.4 3.8   CHLORIDE 100 102  --   --   --  100 100 100   CO2 32 33*  --   --   --  32 30 33*   * 111*  --   --   --  118* 120* 114*   CR 4.00* 4.01*  --   --   --  4.10* 4.15* 4.20*    ANIONGAP 5 3  --   --   --  6 8 6   EDNA 9.7 8.9  --   --   --  9.4 8.9 9.1   * 187*  --   --   --  147* 165* 163*   ALBUMIN  --   --   --   --   --   --  2.3* 2.3*   TROPI  --  <0.015 <0.015 <0.015   < >  --   --   --     < > = values in this interval not displayed.       No results found for this or any previous visit (from the past 24 hour(s)).  Medications       calcium acetate  1,334 mg Oral TID w/meals     cefTRIAXone  1 g Intravenous Q24H     diltiazem ER COATED BEADS  120 mg Oral Daily     doxycycline (VIBRAMYCIN) IV  100 mg Intravenous Q12H     furosemide  80 mg Intravenous Q6H     gabapentin  100 mg Oral Daily     heparin ANTICOAGULANT  5,000 Units Subcutaneous Q12H     insulin aspart  1-7 Units Subcutaneous TID AC     insulin aspart  1-5 Units Subcutaneous At Bedtime     senna-docusate  1 tablet Oral BID    Or     senna-docusate  2 tablet Oral BID     sertraline  50 mg Oral Daily     simvastatin  10 mg Oral At Bedtime     sodium chloride (PF)  3 mL Intracatheter Q8H

## 2021-01-09 NOTE — PLAN OF CARE
OT: per PT pt. Requesting to continue resting, on CPAP, and feeling weak today.  Will follow per POC.

## 2021-01-09 NOTE — PLAN OF CARE
PT: Attempted to see pt for PT. Pt sleeping with CPAP on, roused to gentle touch but requesting time to rest.

## 2021-01-09 NOTE — PLAN OF CARE
End of Shift Summary  For vital signs and complete assessments, please see documentation flowsheets.     Pertinent assessments: Disoriented to time only. Denies pain. Able to transfer from chair back to bed with assist of 1, bi. Oxymask on while awake, CPAP on overnight. Desats quick without supplemental oxygen. Lung sounds clear/dim. Purewick in place. BS overnight were 237 and 162.    Treatment Plan: Diurese, possibly dialysis pending kidney function.

## 2021-01-09 NOTE — PLAN OF CARE
VSS, bp elevated at times. A/O. LS-dim, crackles in bases, pt on oxymask 8-10 LPM, requested  CPAP today, stating it's hard to breathe.  Pulmonology consulted due to persistent SOB.  Pt on Renal diet, 2L fluid restriction.  Nephrology following, increased  IV lasix 80mg  to q6h and  another 1x  dose of  chlorothiazide. CXR ordered for tomorrow.  Potssium x1,  K 3.2 today. Pt continues on IV rocephin and  doxycycline.  Purewick in place.  Discharge 4-7  Days likely to TCU once O2 needs  Decrease and Renal fx  Improved.

## 2021-01-09 NOTE — PROGRESS NOTES
" Renal Medicine Progress Note            Assessment/Plan:     # CKD 4-baseline creatinine of 2.2-2.6, minimally proteinuric.  Presumed secondary to hypertension/diabetes mellitus/advanced age.               -she is agreeable to dialysis if needed     # Dyspnea-recent pneumonia with large bilateral pleural effusion.  Last echo from January with preserved ejection fraction and grade I diastolic dysfunction. CXR continues to show vascular congestion. No peripheral edema. Net >5 liters since admission.      # Acute kidney injury-creatinine was 1.9 on discharge on 12/14 and up to 4.3 on 12/22.  SCr has been around 4s since admission. Not getting better. Decent urine output with diuretics.      # Bilateral pleural effusions-transudate A.  Status post thoracentesis. Persistent moderate left pleural effusion.      # Hypertension: DBP is quite low?     # Type 2 diabetes mellitus-management per primary team.     # Anemia chronic kidney disease-iron sat  Only 10% on 12/22.  Ferritin low at 54. SPEP negative.                -had 5 doses of IV FE                 # Hyperphosphatemia-with renal failure.  Phos still > 6.  Phoslo to 2 TID.       Plan:  # Increase Lasix to q6hrs  # Diuril 250 mg x 1  # Potassium 20 carine x 1   #No urgent indication for RRT now but may need to initiate it next week if she does not get better          Interval History:     Afebrile. She is still on BIPAP. She says she is a little more short of breath today. \"I am tired,\" She says.          Medications and Allergies:       calcium acetate  1,334 mg Oral TID w/meals     cefTRIAXone  1 g Intravenous Q24H     diltiazem ER COATED BEADS  120 mg Oral Daily     doxycycline (VIBRAMYCIN) IV  100 mg Intravenous Q12H     furosemide  80 mg Intravenous Q6H     gabapentin  100 mg Oral Daily     heparin ANTICOAGULANT  5,000 Units Subcutaneous Q12H     insulin aspart  1-7 Units Subcutaneous TID AC     insulin aspart  1-5 Units Subcutaneous At Bedtime     senna-docusate  " "1 tablet Oral BID    Or     senna-docusate  2 tablet Oral BID     sertraline  50 mg Oral Daily     simvastatin  10 mg Oral At Bedtime     sodium chloride (PF)  3 mL Intracatheter Q8H        Allergies   Allergen Reactions     Augmentin Diarrhea     Vomiting       Cleocin      Hives     Tegretol [Carbamazepine]      Irregular heart beat            Physical Exam:   Vitals were reviewed   , Blood pressure (!) 154/52, pulse 74, temperature 98.2  F (36.8  C), temperature source Axillary, resp. rate 20, height 1.626 m (5' 4\"), weight 99.3 kg (218 lb 14.4 oz), SpO2 93 %, not currently breastfeeding.    Wt Readings from Last 3 Encounters:   01/08/21 99.3 kg (218 lb 14.4 oz)   12/22/20 101.1 kg (222 lb 14.4 oz)   12/14/20 96.7 kg (213 lb 1.6 oz)       Intake/Output Summary (Last 24 hours) at 1/9/2021 1225  Last data filed at 1/9/2021 1000  Gross per 24 hour   Intake 550 ml   Output 900 ml   Net -350 ml     GENERAL APPEARANCE: NAD. Looks tired today.  HEENT:  Eyes/ears/nose/neck grossly normal. + BIPAP  RESP: Diminish BS. No wheezes.   CV: RRR, nl S1/S2  ABDOMEN: obese, soft, NT  EXTREMITIES/SKIN: no rashes/lesions on observed skin; No edema  NEURO: a/o x 3. Grossly intact.          Data:     CBC RESULTS:     Recent Labs   Lab 01/09/21  0814 01/08/21  0748   WBC 11.4*  --    RBC 2.89*  --    HGB 8.2*  --    HCT 27.2*  --     212       Basic Metabolic Panel:  Recent Labs   Lab 01/09/21  0814 01/08/21  0748 01/07/21  0601 01/06/21  0658 01/05/21  0652 01/04/21  0657    138 138 138 139 139   POTASSIUM 3.2* 3.8 3.4 3.4 3.8 3.4   CHLORIDE 100 102 100 100 100 101   CO2 32 33* 32 30 33* 33*   * 111* 118* 120* 114* 101*   CR 4.00* 4.01* 4.10* 4.15* 4.20* 4.00*   * 187* 147* 165* 163* 163*   EDNA 9.7 8.9 9.4 8.9 9.1 9.1       INRNo lab results found in last 7 days.   Attestation:   I have reviewed today's relevant vital signs, notes, medications, labs and imaging.    Olvin Escoto MD  [facility] - " Nephrology  Office phone :[phone]  Pager: [phone]

## 2021-01-10 ENCOUNTER — APPOINTMENT (OUTPATIENT)
Dept: GENERAL RADIOLOGY | Facility: CLINIC | Age: 86
DRG: 291 | End: 2021-01-10
Attending: INTERNAL MEDICINE
Payer: COMMERCIAL

## 2021-01-10 ENCOUNTER — APPOINTMENT (OUTPATIENT)
Dept: ULTRASOUND IMAGING | Facility: CLINIC | Age: 86
DRG: 291 | End: 2021-01-10
Attending: INTERNAL MEDICINE
Payer: COMMERCIAL

## 2021-01-10 LAB
ANION GAP SERPL CALCULATED.3IONS-SCNC: 7 MMOL/L (ref 3–14)
APPEARANCE FLD: NORMAL
BUN SERPL-MCNC: 118 MG/DL (ref 7–30)
CALCIUM SERPL-MCNC: 9.5 MG/DL (ref 8.5–10.1)
CHLORIDE SERPL-SCNC: 99 MMOL/L (ref 94–109)
CO2 SERPL-SCNC: 35 MMOL/L (ref 20–32)
COLOR FLD: YELLOW
CREAT SERPL-MCNC: 3.85 MG/DL (ref 0.52–1.04)
ERYTHROCYTE [DISTWIDTH] IN BLOOD BY AUTOMATED COUNT: 14.2 % (ref 10–15)
GFR SERPL CREATININE-BSD FRML MDRD: 10 ML/MIN/{1.73_M2}
GLUCOSE BLDC GLUCOMTR-MCNC: 150 MG/DL (ref 70–99)
GLUCOSE BLDC GLUCOMTR-MCNC: 152 MG/DL (ref 70–99)
GLUCOSE BLDC GLUCOMTR-MCNC: 154 MG/DL (ref 70–99)
GLUCOSE BLDC GLUCOMTR-MCNC: 156 MG/DL (ref 70–99)
GLUCOSE BLDC GLUCOMTR-MCNC: 158 MG/DL (ref 70–99)
GLUCOSE BLDC GLUCOMTR-MCNC: 223 MG/DL (ref 70–99)
GLUCOSE SERPL-MCNC: 150 MG/DL (ref 70–99)
GRAM STN SPEC: NORMAL
GRAM STN SPEC: NORMAL
HCT VFR BLD AUTO: 27.6 % (ref 35–47)
HGB BLD-MCNC: 8.2 G/DL (ref 11.7–15.7)
LYMPHOCYTES NFR FLD MANUAL: 6 %
Lab: NORMAL
MAGNESIUM SERPL-MCNC: 2.3 MG/DL (ref 1.6–2.3)
MCH RBC QN AUTO: 28.2 PG (ref 26.5–33)
MCHC RBC AUTO-ENTMCNC: 29.7 G/DL (ref 31.5–36.5)
MCV RBC AUTO: 95 FL (ref 78–100)
MONOS+MACROS NFR FLD MANUAL: 65 %
NEUTS BAND NFR FLD MANUAL: 26 %
OTHER CELLS FLD MANUAL: 3 %
PHOSPHATE SERPL-MCNC: 4.7 MG/DL (ref 2.5–4.5)
PLATELET # BLD AUTO: 205 10E9/L (ref 150–450)
POTASSIUM SERPL-SCNC: 3.1 MMOL/L (ref 3.4–5.3)
POTASSIUM SERPL-SCNC: 3.2 MMOL/L (ref 3.4–5.3)
POTASSIUM SERPL-SCNC: 3.3 MMOL/L (ref 3.4–5.3)
PROCALCITONIN SERPL-MCNC: 0.3 NG/ML
RBC # BLD AUTO: 2.91 10E12/L (ref 3.8–5.2)
SODIUM SERPL-SCNC: 141 MMOL/L (ref 133–144)
SPECIMEN SOURCE FLD: NORMAL
SPECIMEN SOURCE: NORMAL
WBC # BLD AUTO: 9.5 10E9/L (ref 4–11)
WBC # FLD AUTO: 751 /UL

## 2021-01-10 PROCEDURE — 32555 ASPIRATE PLEURA W/ IMAGING: CPT

## 2021-01-10 PROCEDURE — 71045 X-RAY EXAM CHEST 1 VIEW: CPT

## 2021-01-10 PROCEDURE — 80048 BASIC METABOLIC PNL TOTAL CA: CPT | Performed by: INTERNAL MEDICINE

## 2021-01-10 PROCEDURE — 36569 INSJ PICC 5 YR+ W/O IMAGING: CPT

## 2021-01-10 PROCEDURE — 250N000009 HC RX 250: Performed by: INTERNAL MEDICINE

## 2021-01-10 PROCEDURE — 120N000001 HC R&B MED SURG/OB

## 2021-01-10 PROCEDURE — 84100 ASSAY OF PHOSPHORUS: CPT | Performed by: INTERNAL MEDICINE

## 2021-01-10 PROCEDURE — 250N000011 HC RX IP 250 OP 636: Performed by: INTERNAL MEDICINE

## 2021-01-10 PROCEDURE — 84132 ASSAY OF SERUM POTASSIUM: CPT | Performed by: INTERNAL MEDICINE

## 2021-01-10 PROCEDURE — 36415 COLL VENOUS BLD VENIPUNCTURE: CPT | Performed by: INTERNAL MEDICINE

## 2021-01-10 PROCEDURE — 84145 PROCALCITONIN (PCT): CPT | Performed by: INTERNAL MEDICINE

## 2021-01-10 PROCEDURE — 87205 SMEAR GRAM STAIN: CPT | Performed by: INTERNAL MEDICINE

## 2021-01-10 PROCEDURE — 89051 BODY FLUID CELL COUNT: CPT | Performed by: INTERNAL MEDICINE

## 2021-01-10 PROCEDURE — 83735 ASSAY OF MAGNESIUM: CPT | Performed by: INTERNAL MEDICINE

## 2021-01-10 PROCEDURE — 250N000013 HC RX MED GY IP 250 OP 250 PS 637: Performed by: INTERNAL MEDICINE

## 2021-01-10 PROCEDURE — 94660 CPAP INITIATION&MGMT: CPT

## 2021-01-10 PROCEDURE — 999N000157 HC STATISTIC RCP TIME EA 10 MIN

## 2021-01-10 PROCEDURE — 272N000433 HC KIT CATH IV 18 OR 20G CM, POWERGLIDE W MAX BARRIER

## 2021-01-10 PROCEDURE — 99233 SBSQ HOSP IP/OBS HIGH 50: CPT | Performed by: INTERNAL MEDICINE

## 2021-01-10 PROCEDURE — 84157 ASSAY OF PROTEIN OTHER: CPT | Performed by: INTERNAL MEDICINE

## 2021-01-10 PROCEDURE — 999N000105 HC STATISTIC NO DOCUMENTATION TO SUPPORT CHARGE

## 2021-01-10 PROCEDURE — 87070 CULTURE OTHR SPECIMN AEROBIC: CPT | Performed by: INTERNAL MEDICINE

## 2021-01-10 PROCEDURE — 85027 COMPLETE CBC AUTOMATED: CPT | Performed by: INTERNAL MEDICINE

## 2021-01-10 PROCEDURE — 999N001017 HC STATISTIC GLUCOSE BY METER IP

## 2021-01-10 RX ORDER — HYDRALAZINE HYDROCHLORIDE 50 MG/1
50 TABLET, FILM COATED ORAL EVERY 6 HOURS PRN
Status: DISCONTINUED | OUTPATIENT
Start: 2021-01-10 | End: 2021-01-15 | Stop reason: HOSPADM

## 2021-01-10 RX ORDER — POTASSIUM CHLORIDE 1.5 G/1.58G
40 POWDER, FOR SOLUTION ORAL ONCE
Status: COMPLETED | OUTPATIENT
Start: 2021-01-10 | End: 2021-01-10

## 2021-01-10 RX ORDER — LIDOCAINE HYDROCHLORIDE 10 MG/ML
10 INJECTION, SOLUTION EPIDURAL; INFILTRATION; INTRACAUDAL; PERINEURAL ONCE
Status: COMPLETED | OUTPATIENT
Start: 2021-01-10 | End: 2021-01-10

## 2021-01-10 RX ADMIN — SERTRALINE HYDROCHLORIDE 50 MG: 50 TABLET ORAL at 09:18

## 2021-01-10 RX ADMIN — SIMVASTATIN 10 MG: 10 TABLET, FILM COATED ORAL at 21:02

## 2021-01-10 RX ADMIN — FUROSEMIDE 80 MG: 10 INJECTION, SOLUTION INTRAMUSCULAR; INTRAVENOUS at 18:53

## 2021-01-10 RX ADMIN — FUROSEMIDE 80 MG: 10 INJECTION, SOLUTION INTRAMUSCULAR; INTRAVENOUS at 01:01

## 2021-01-10 RX ADMIN — DOXYCYCLINE 100 MG: 100 INJECTION, POWDER, LYOPHILIZED, FOR SOLUTION INTRAVENOUS at 09:19

## 2021-01-10 RX ADMIN — DILTIAZEM HYDROCHLORIDE 120 MG: 120 CAPSULE, COATED, EXTENDED RELEASE ORAL at 09:18

## 2021-01-10 RX ADMIN — GABAPENTIN 100 MG: 100 CAPSULE ORAL at 09:18

## 2021-01-10 RX ADMIN — POTASSIUM CHLORIDE 40 MEQ: 1.5 POWDER, FOR SOLUTION ORAL at 09:16

## 2021-01-10 RX ADMIN — CHLOROTHIAZIDE SODIUM 250 MG: 500 INJECTION, POWDER, LYOPHILIZED, FOR SOLUTION INTRAVENOUS at 10:57

## 2021-01-10 RX ADMIN — CALCIUM ACETATE 1334 MG: 667 CAPSULE ORAL at 09:18

## 2021-01-10 RX ADMIN — CALCIUM ACETATE 1334 MG: 667 CAPSULE ORAL at 12:53

## 2021-01-10 RX ADMIN — DOCUSATE SODIUM AND SENNOSIDES 2 TABLET: 8.6; 5 TABLET ORAL at 09:18

## 2021-01-10 RX ADMIN — CALCIUM ACETATE 1334 MG: 667 CAPSULE ORAL at 18:10

## 2021-01-10 RX ADMIN — FUROSEMIDE 80 MG: 10 INJECTION, SOLUTION INTRAMUSCULAR; INTRAVENOUS at 13:57

## 2021-01-10 RX ADMIN — LIDOCAINE HYDROCHLORIDE 10 ML: 10 INJECTION, SOLUTION EPIDURAL; INFILTRATION; INTRACAUDAL; PERINEURAL at 12:18

## 2021-01-10 RX ADMIN — POTASSIUM CHLORIDE 40 MEQ: 1.5 POWDER, FOR SOLUTION ORAL at 18:10

## 2021-01-10 RX ADMIN — HYDRALAZINE HYDROCHLORIDE 50 MG: 50 TABLET, FILM COATED ORAL at 16:27

## 2021-01-10 RX ADMIN — HEPARIN SODIUM 5000 UNITS: 5000 INJECTION INTRAVENOUS; SUBCUTANEOUS at 20:56

## 2021-01-10 RX ADMIN — FUROSEMIDE 80 MG: 10 INJECTION, SOLUTION INTRAMUSCULAR; INTRAVENOUS at 06:33

## 2021-01-10 RX ADMIN — CHLOROTHIAZIDE SODIUM 250 MG: 500 INJECTION, POWDER, LYOPHILIZED, FOR SOLUTION INTRAVENOUS at 20:57

## 2021-01-10 RX ADMIN — CEFTRIAXONE 1 G: 1 INJECTION, POWDER, FOR SOLUTION INTRAMUSCULAR; INTRAVENOUS at 16:26

## 2021-01-10 RX ADMIN — DOXYCYCLINE 100 MG: 100 INJECTION, POWDER, LYOPHILIZED, FOR SOLUTION INTRAVENOUS at 19:40

## 2021-01-10 ASSESSMENT — MIFFLIN-ST. JEOR: SCORE: 1420.22

## 2021-01-10 ASSESSMENT — ACTIVITIES OF DAILY LIVING (ADL)
ADLS_ACUITY_SCORE: 17
ADLS_ACUITY_SCORE: 19
ADLS_ACUITY_SCORE: 17

## 2021-01-10 NOTE — PROGRESS NOTES
[facility]    Medicine Progress Note - Hospitalist Service       Date of Admission:  12/29/2020  Date of Service: 01/10/2021    Assessment & Plan     This is a pleasant 84-year-old female the past medical history significant for CKD stage IV, diabetes mellitus, hypertension, mild aortic stenosis, admitted to the hospital with concerns for shortness of breath and renal failure.    Patient was recently hospitalized about 3 weeks ago with pneumonia and found to have right lobe consolidation at the time.  She was seen by pulmonology at the time.  She was discharged home with oral Augmentin and required about 1 L of oxygen.  She returned to the ER about 2 weeks after discharge.  She had a CT scan done which showed resolution of the masslike consolidation in the right lobe, however, she was found to have large pleural effusions.  She underwent thoracentesis bilaterally.  Currently ongoing issues with shortness of breath requiring high amount of oxygen and ongoing renal failure.    #Acute hypoxemic respiratory failure.   #Acute on chronic HFpEF.  Previous echo shows preserved EF, grade 1 diastolic dysfunction. Patient is on chronic torsemide 20 mg daily.   Suspect secondary to volume overload and large bilateral pleural effusions.  Recent hospitalization for pneumonia about 2 weeks ago.  No cough, fevers or evidence of new infection.  COVID-19 testing was negative.  Underwent bilateral thoracentesis by IR on 12/30 with 800 cc of fluid removed from both sides.  Transudative effusions.  Suspect secondary to volume overload.    --> Patient received IV diuretics initially although they were held given her significant renal failure.  She had worsening shortness of breath on 1/7 requiring up to 8 to 10 L of oxygen along with intermittent BiPAP. Chest x-ray showed persistent vascular congestion, elevated BNP.  Started back on IV diuretics.  Nephrology assisting with diuretic dosage.  --> Repeat CXR shows  significant left sided pleural effusion. Repeat thoracentesis performed today   -> Chest x-ray showed increased consolidation in the left lower lobe.  Procalcitonin is only 0.28.  No fevers.  However, given her tenuous status will start on IV ceftriaxone and doxycycline. Day 3/5  --Patient requiring BiPAP for her respiratory distress, she is responding to diuretics and making good urine, creatinine stable.  However, if she continues to worsen then might need dialysis.  I suspect most of this is volume related, although given ongoing symptoms, will request pulmonology consult     # Acute renal failure on top of CKD stage IV.  Baseline creatinine around 2.  It was 4.3 on admission this time in the hospital.  She had low urine sodium suggesting possible cardiorenal syndrome.  Received some IV diuretics initially.  Creatinine did not improve despite stopping her diuretics.  Possible that she might have suffered from ischemic tubular injury with a recent pneumonia and this is a new baseline.  -Nephrology following  -Patient was started back on aggressive large doses of IV diuretics when her breathing worsened, good response with Cr actually improving to 3.8, I suspect certainly some component of cardio-renal syndrome. Follow creatinine closely, nephrology managing  -Fluid restriction of 2 L    #Hypertension.  On diltiazem, hydralazine, Cardura.  -- Stop hydralazine and Cardura for now to avoid hypotension with diuretics     #Diabetes mellitus.  Glimepiride and Actos on hold.    #Anemia of chronic disease. received IV iron doses    #TERESSA.  Continue CPAP    #Depression, anxiety    #Peripheral neuropathy.  Gabapentin renal dosing    Diet: Renal Diet (non-dialysis)  Fluid restriction 2000 ML FLUID    DVT Prophylaxis: Pneumatic Compression Devices.  Start heparin subcu  Aguilera Catheter: not present  Code Status: Full Code      Disposition Plan   Expected discharge: TBD recommended to transitional care unit once O2 use less than  2-3 liters/minute and renal function improved.  Entered: Haylee Ulloa MD 01/10/2021, 3:27 PM       The patient's care was discussed with the Bedside Nurse and Patient.    Haylee Ulloa MD  Hospitalist Service  [facility]    ______________________________________________________________________    Interval History   Chart reviewed and patient seen.      Patient feels better today, has been requiring BiPAP for most of the day yesterday, tried to wean off BiPAP this morning, no recurrence of chest pain, mentating well and interacts appropriately,     Data reviewed today: I reviewed all medications, new labs and imaging results over the last 24 hours. I personally reviewed    Physical Exam   Vital Signs: Temp: 97.5  F (36.4  C) Temp src: Axillary BP: (!) 165/67 Pulse: 79   Resp: 18 SpO2: 95 % O2 Device: Oxymizer cannula Oxygen Delivery: 5 LPM  Weight: 216 lbs 1.6 oz    GENERAL:  Awake and alert, in mild resp distress.  PSYCH: appropriate affect, no acute agitation   HEENT:  Neck is Supple, trachea is midline, EOMI, conjunctiva clear  CARDIOVASCULAR: Regular rate and rhythm, Normal S1, S2, early systolic murmur  PULMONARY:  Crackles at bases bilaterally. Good air entry on both sides  GI: Abdomen is soft, non tender, non-distended, bowel sounds present. No rebound or guarding   SKIN:  No cyanosis or clubbing, no obvious exanthems on exposed areas   MSK: Extremities are warm and well perfused. No pitting edema   Neuro: Awake and oriented x 3. Moving all extremities with good strength     Data   Recent Labs   Lab 01/10/21  0735 01/10/21  0008 01/09/21  0814 01/08/21  0748 01/07/21  2340 01/07/21  2223 01/06/21  0658 01/06/21  0658 01/05/21  0652   WBC 9.5  --  11.4*  --   --   --   --   --   --    HGB 8.2*  --  8.2*  --   --   --   --   --   --    MCV 95  --  94  --   --   --   --   --   --      --  206 212  --   --   --   --   --      --  137 138  --   --    < > 138 139    POTASSIUM 3.1* 3.3* 3.2* 3.8  --   --    < > 3.4 3.8   CHLORIDE 99  --  100 102  --   --    < > 100 100   CO2 35*  --  32 33*  --   --    < > 30 33*   *  --  116* 111*  --   --    < > 120* 114*   CR 3.85*  --  4.00* 4.01*  --   --    < > 4.15* 4.20*   ANIONGAP 7  --  5 3  --   --    < > 8 6   EDNA 9.5  --  9.7 8.9  --   --    < > 8.9 9.1   *  --  145* 187*  --   --    < > 165* 163*   ALBUMIN  --   --   --   --   --   --   --  2.3* 2.3*   TROPI  --   --   --  <0.015 <0.015 <0.015   < >  --   --     < > = values in this interval not displayed.       Recent Results (from the past 24 hour(s))   XR Chest Port 1 View    Narrative    CHEST ONE VIEW  1/10/2021 7:58 AM     HISTORY: Follow-up effusion    COMPARISON: January 7, 2021      Impression    IMPRESSION: Increased left pleural effusion and associated atelectasis  and/or infiltrate. Slight increase in groundglass infiltrates on the  right.    KAROL DUCKWORTH MD     Medications       calcium acetate  1,334 mg Oral TID w/meals     cefTRIAXone  1 g Intravenous Q24H     chlorothiazide  250 mg Intravenous BID     diltiazem ER COATED BEADS  120 mg Oral Daily     doxycycline (VIBRAMYCIN) IV  100 mg Intravenous Q12H     furosemide  80 mg Intravenous Q6H     gabapentin  100 mg Oral Daily     heparin ANTICOAGULANT  5,000 Units Subcutaneous Q12H     insulin aspart  1-7 Units Subcutaneous TID AC     insulin aspart  1-5 Units Subcutaneous At Bedtime     senna-docusate  1 tablet Oral BID    Or     senna-docusate  2 tablet Oral BID     sertraline  50 mg Oral Daily     simvastatin  10 mg Oral At Bedtime     sodium chloride (PF)  10 mL Intracatheter Q8H     sodium chloride (PF)  3 mL Intracatheter Q8H

## 2021-01-10 NOTE — PROGRESS NOTES
"Pt on Oxymask at 10lpm and placed on Cpap +12 60% for night per order. BS are decreased with fine crackles throughout.   Cpap for TERESSA.    Pt tolerates Cpap well with increased oxygen saturations.     /40   Pulse 64   Temp 98.2  F (36.8  C) (Axillary)   Resp 21   Ht 1.626 m (5' 4\")   Wt 99.3 kg (218 lb 14.4 oz)   LMP  (LMP Unknown)   SpO2 95%   BMI 37.57 kg/m      DANIELLETAN    "

## 2021-01-10 NOTE — PROGRESS NOTES
Renal Medicine Progress Note            Assessment/Plan:     # CKD 4-baseline creatinine of 2.2-2.6, minimally proteinuric.  Presumed secondary to hypertension/diabetes mellitus/advanced age.               -she is agreeable to dialysis if needed     # Dyspnea-recent pneumonia with large bilateral pleural effusion.  Last echo from January with preserved ejection fraction and grade I diastolic dysfunction. CXR continues to show vascular congestion. No peripheral edema. Net  Negative 7.5 liters since admission.      # Acute kidney injury-creatinine was 1.9 on discharge on 12/14 and up to 4.3 on 12/22.  SCr has been around 4s since admission. Not getting much better.      # Bilateral pleural effusions-transudate A.  Status post bilateral thoracentesis 12/30. Persistent moderate left pleural effusion s/l left thoracentesis on 1/10/21.      # Hypertension: DBP is better with adjustment of BP medications.      # Type 2 diabetes mellitus-management per primary team.     # Anemia chronic kidney disease-iron sat  Only 10% on 12/22.  Ferritin low at 54. SPEP negative.                -had 5 doses of IV FE                 # Hyperphosphatemia-with renal failure.  Phoslo to 2 TID.       Plan:  # Lasix to q6hrs  # Diuril 250 mg x 2 doses  # Potassium repletion   #No urgent indication for RRT this time. She agreed to dialysis if needed to.         Interval History:     Afebrile. BP is okay. Had left thoracentesis this morning. Excellent urine output with diuretics. Scr is slightly better. She vomited yesterday after dinner. She has dry heave today. Overall, she says she feels better. Breathing is better.           Medications and Allergies:       calcium acetate  1,334 mg Oral TID w/meals     cefTRIAXone  1 g Intravenous Q24H     chlorothiazide  250 mg Intravenous BID     diltiazem ER COATED BEADS  120 mg Oral Daily     doxycycline (VIBRAMYCIN) IV  100 mg Intravenous Q12H     furosemide  80 mg Intravenous Q6H     gabapentin  100 mg  "Oral Daily     heparin ANTICOAGULANT  5,000 Units Subcutaneous Q12H     insulin aspart  1-7 Units Subcutaneous TID AC     insulin aspart  1-5 Units Subcutaneous At Bedtime     senna-docusate  1 tablet Oral BID    Or     senna-docusate  2 tablet Oral BID     sertraline  50 mg Oral Daily     simvastatin  10 mg Oral At Bedtime     sodium chloride (PF)  3 mL Intracatheter Q8H        Allergies   Allergen Reactions     Augmentin Diarrhea     Vomiting       Cleocin      Hives     Tegretol [Carbamazepine]      Irregular heart beat            Physical Exam:   Vitals were reviewed   , Blood pressure (!) 165/67, pulse 79, temperature 97.5  F (36.4  C), temperature source Axillary, resp. rate 18, height 1.626 m (5' 4\"), weight 98 kg (216 lb 1.6 oz), SpO2 100 %, not currently breastfeeding.    Wt Readings from Last 3 Encounters:   01/10/21 98 kg (216 lb 1.6 oz)   12/22/20 101.1 kg (222 lb 14.4 oz)   12/14/20 96.7 kg (213 lb 1.6 oz)       Intake/Output Summary (Last 24 hours) at 1/10/2021 1333  Last data filed at 1/10/2021 1146  Gross per 24 hour   Intake 120 ml   Output 2325 ml   Net -2205 ml     GENERAL APPEARANCE: NAD. Looks tired today.  HEENT:  Eyes/ears/nose/neck grossly normal. + BIPAP  RESP: Diminish BS. No wheezes.   CV: RRR, nl S1/S2  ABDOMEN: obese, soft, NT  EXTREMITIES/SKIN: no rashes/lesions on observed skin; No peripheral edema.   NEURO: a/o x 3. Grossly intact.          Data:     CBC RESULTS:     Recent Labs   Lab 01/10/21  0735 01/09/21  0814 01/08/21  0748   WBC 9.5 11.4*  --    RBC 2.91* 2.89*  --    HGB 8.2* 8.2*  --    HCT 27.6* 27.2*  --     206 212       Basic Metabolic Panel:  Recent Labs   Lab 01/10/21  0735 01/10/21  0008 01/09/21  0814 01/08/21  0748 01/07/21  0601 01/06/21  0658 01/05/21  0652     --  137 138 138 138 139   POTASSIUM 3.1* 3.3* 3.2* 3.8 3.4 3.4 3.8   CHLORIDE 99  --  100 102 100 100 100   CO2 35*  --  32 33* 32 30 33*   *  --  116* 111* 118* 120* 114*   CR 3.85*  --  " 4.00* 4.01* 4.10* 4.15* 4.20*   *  --  145* 187* 147* 165* 163*   EDNA 9.5  --  9.7 8.9 9.4 8.9 9.1       INRNo lab results found in last 7 days.   Attestation:   I have reviewed today's relevant vital signs, notes, medications, labs and imaging.    Olvin Escoto MD  [facility] - Nephrology  Office phone :[phone]  Pager: [phone]

## 2021-01-10 NOTE — PROCEDURES
[facility]    Procedure: Left thoracentesis     Date/Time: 1/10/2021 12:00 PM  Performed by: Zarina Mccarty DO  Authorized by: Zarina Mccarty DO     UNIVERSAL PROTOCOL   Site Marked: Yes  Prior Images Obtained and Reviewed:  Yes  Required items: Required blood products, implants, devices and special equipment available    Patient identity confirmed:  Verbally with patient, arm band, provided demographic data and hospital-assigned identification number  Patient was reevaluated immediately before administering moderate or deep sedation or anesthesia  Confirmation Checklist:  Patient's identity using two indicators, relevant allergies, procedure was appropriate and matched the consent or emergent situation and correct equipment/implants were available  Time out: Immediately prior to the procedure a time out was called    Universal Protocol: the Joint Commission Universal Protocol was followed    Preparation: Patient was prepped and draped in usual sterile fashion           ANESTHESIA    Anesthesia: Local infiltration  Local Anesthetic:  Lidocaine 1% without epinephrine      SEDATION    Patient Sedated: No    See dictated procedure note for full details.  Findings: Left thoracentesis.     Specimens: none and fluid and/or tissue for laboratory analysis    Complications: None    Condition: Stable    PROCEDURE   Patient Tolerance:  Patient tolerated the procedure well with no immediate complications    Length of time physician/provider present for 1:1 monitoring during sedation: 0

## 2021-01-10 NOTE — PLAN OF CARE
End of Shift Summary  For vital signs and complete assessments, please see documentation flowsheets.     Pertinent assessments: LS diminished. Dyspnea with exertion. Weaned to 5L O2 via oxymask after thoracentesis today. Tele sinus rhythm/BBB. Patient having diarrhea.

## 2021-01-10 NOTE — PROCEDURES
[facility]    Double Lumen Midline Placement    Date/Time: 1/10/2021 1:59 PM  Performed by: Rosana Riley RN  Authorized by: Haylee Ulloa MD   Indications: vascular access    UNIVERSAL PROTOCOL   Site Marked: NA  Prior Images Obtained and Reviewed:  Yes  Required items: Required blood products, implants, devices and special equipment available    Patient identity confirmed:  Verbally with patient, arm band and hospital-assigned identification number  Patient was reevaluated immediately before administering moderate or deep sedation or anesthesia  Confirmation Checklist:  Patient's identity using two indicators, relevant allergies and correct equipment/implants were available  Universal Protocol: the Joint Commission Universal Protocol was followed    Preparation: Patient was prepped and draped in usual sterile fashion    ESBL (mL):  2         ANESTHESIA    Anesthesia: Local infiltration  Local Anesthetic:  Lidocaine 1% without epinephrine  Anesthetic Total (mL):  1      SEDATION    Patient Sedated: No        Preparation: skin prepped with ChloraPrep  Skin prep agent: skin prep agent completely dried prior to procedure  Sterile barriers: maximum sterile barriers were used: cap, mask, sterile gown, sterile gloves, and large sterile sheet  Hand hygiene: hand hygiene performed prior to central venous catheter insertion  Type of line used: Midline  Catheter type: double lumen  Lumen type: non-valved  Catheter size: 4 Fr  Brand: Bard (powermidline)  Lot number: [id number]  Placement method: venipuncture, MST and ultrasound  Number of attempts: 1  Successful placement: yes  Orientation: left  Location: basilic vein (vein diameter 0.41cm)  Arm circumference: adults 10 cm  Extremity circumference: 37  Visible catheter length: 0  Internal length: 14 cm  Total catheter length: 14  Dressing and securement: blood cleaned with CHG, site cleaned, statlock, sterile dressing applied and chlorhexidine  patch applied  Post procedure assessment: free fluid flow and blood return through all ports  PROCEDURE   Patient Tolerance:  Patient tolerated the procedure well with no immediate complications  Describe Procedure: Ok to use MIDLINE.     Discussed with Dr Escoto prior to placement, ok with nephrology for midline.

## 2021-01-10 NOTE — PROVIDER NOTIFICATION
Paged regarding report of afib, heart rate 69 per tele tech.     Paged again around 2105 stating that patient is going in and out of afib 60-120s, per tele tech.

## 2021-01-10 NOTE — PLAN OF CARE
End of Shift Summary  For vital signs and complete assessments, please see documentation flowsheets.     Pertinent assessments: Oxymask while awake, CPAP overnight. Lung sounds diminished. Purewick in place. BS overnight was 154. Last BM was 1/7, senokot given.   Major Shift Events: Pt was having a-fib, heart rate 60-120s per tele, MD notified. PRN metoprolol ordered, but parameters not met. Currently rhythm is SR, heart 62.   Treatment Plan: IV Lasix, possibly dialysis pending kidney function.

## 2021-01-11 LAB
ANION GAP SERPL CALCULATED.3IONS-SCNC: 3 MMOL/L (ref 3–14)
BUN SERPL-MCNC: 111 MG/DL (ref 7–30)
CALCIUM SERPL-MCNC: 10.2 MG/DL (ref 8.5–10.1)
CHLORIDE SERPL-SCNC: 97 MMOL/L (ref 94–109)
CO2 SERPL-SCNC: 42 MMOL/L (ref 20–32)
CREAT SERPL-MCNC: 3.62 MG/DL (ref 0.52–1.04)
ERYTHROCYTE [DISTWIDTH] IN BLOOD BY AUTOMATED COUNT: 14.4 % (ref 10–15)
GFR SERPL CREATININE-BSD FRML MDRD: 11 ML/MIN/{1.73_M2}
GLUCOSE BLDC GLUCOMTR-MCNC: 137 MG/DL (ref 70–99)
GLUCOSE BLDC GLUCOMTR-MCNC: 149 MG/DL (ref 70–99)
GLUCOSE BLDC GLUCOMTR-MCNC: 158 MG/DL (ref 70–99)
GLUCOSE BLDC GLUCOMTR-MCNC: 163 MG/DL (ref 70–99)
GLUCOSE BLDC GLUCOMTR-MCNC: 227 MG/DL (ref 70–99)
GLUCOSE SERPL-MCNC: 162 MG/DL (ref 70–99)
HCT VFR BLD AUTO: 27.9 % (ref 35–47)
HGB BLD-MCNC: 8.4 G/DL (ref 11.7–15.7)
LDH SERPL L TO P-CCNC: 189 U/L (ref 81–234)
MCH RBC QN AUTO: 28.3 PG (ref 26.5–33)
MCHC RBC AUTO-ENTMCNC: 30.1 G/DL (ref 31.5–36.5)
MCV RBC AUTO: 94 FL (ref 78–100)
PLATELET # BLD AUTO: 210 10E9/L (ref 150–450)
POTASSIUM SERPL-SCNC: 3.2 MMOL/L (ref 3.4–5.3)
POTASSIUM SERPL-SCNC: 4.1 MMOL/L (ref 3.4–5.3)
PROT FLD-MCNC: 3 G/DL
PROT SERPL-MCNC: 6.9 G/DL (ref 6.8–8.8)
RBC # BLD AUTO: 2.97 10E12/L (ref 3.8–5.2)
SODIUM SERPL-SCNC: 142 MMOL/L (ref 133–144)
SPECIMEN SOURCE FLD: NORMAL
WBC # BLD AUTO: 9.1 10E9/L (ref 4–11)

## 2021-01-11 PROCEDURE — 999N001017 HC STATISTIC GLUCOSE BY METER IP

## 2021-01-11 PROCEDURE — 93005 ELECTROCARDIOGRAM TRACING: CPT

## 2021-01-11 PROCEDURE — 83615 LACTATE (LD) (LDH) ENZYME: CPT | Performed by: INTERNAL MEDICINE

## 2021-01-11 PROCEDURE — 80048 BASIC METABOLIC PNL TOTAL CA: CPT | Performed by: INTERNAL MEDICINE

## 2021-01-11 PROCEDURE — 999N000040 HC STATISTIC CONSULT NO CHARGE VASC ACCESS

## 2021-01-11 PROCEDURE — 258N000003 HC RX IP 258 OP 636: Performed by: INTERNAL MEDICINE

## 2021-01-11 PROCEDURE — 250N000013 HC RX MED GY IP 250 OP 250 PS 637: Performed by: INTERNAL MEDICINE

## 2021-01-11 PROCEDURE — 99222 1ST HOSP IP/OBS MODERATE 55: CPT | Performed by: INTERNAL MEDICINE

## 2021-01-11 PROCEDURE — 999N000157 HC STATISTIC RCP TIME EA 10 MIN

## 2021-01-11 PROCEDURE — 250N000011 HC RX IP 250 OP 636: Performed by: INTERNAL MEDICINE

## 2021-01-11 PROCEDURE — 84155 ASSAY OF PROTEIN SERUM: CPT | Performed by: INTERNAL MEDICINE

## 2021-01-11 PROCEDURE — 120N000001 HC R&B MED SURG/OB

## 2021-01-11 PROCEDURE — 0W9B3ZZ DRAINAGE OF LEFT PLEURAL CAVITY, PERCUTANEOUS APPROACH: ICD-10-PCS | Performed by: RADIOLOGY

## 2021-01-11 PROCEDURE — 85027 COMPLETE CBC AUTOMATED: CPT | Performed by: INTERNAL MEDICINE

## 2021-01-11 PROCEDURE — 99233 SBSQ HOSP IP/OBS HIGH 50: CPT | Performed by: INTERNAL MEDICINE

## 2021-01-11 PROCEDURE — 94660 CPAP INITIATION&MGMT: CPT

## 2021-01-11 PROCEDURE — 36415 COLL VENOUS BLD VENIPUNCTURE: CPT | Performed by: INTERNAL MEDICINE

## 2021-01-11 PROCEDURE — 84132 ASSAY OF SERUM POTASSIUM: CPT | Performed by: INTERNAL MEDICINE

## 2021-01-11 PROCEDURE — 250N000009 HC RX 250: Performed by: INTERNAL MEDICINE

## 2021-01-11 RX ORDER — POTASSIUM CHLORIDE 1.5 G/1.58G
40 POWDER, FOR SOLUTION ORAL EVERY 8 HOURS
Status: COMPLETED | OUTPATIENT
Start: 2021-01-11 | End: 2021-01-11

## 2021-01-11 RX ORDER — MAGNESIUM SULFATE 1 G/100ML
1 INJECTION INTRAVENOUS EVERY 6 HOURS
Status: COMPLETED | OUTPATIENT
Start: 2021-01-11 | End: 2021-01-11

## 2021-01-11 RX ORDER — DILTIAZEM HYDROCHLORIDE 180 MG/1
180 CAPSULE, COATED, EXTENDED RELEASE ORAL 2 TIMES DAILY
Status: DISCONTINUED | OUTPATIENT
Start: 2021-01-11 | End: 2021-01-15 | Stop reason: HOSPADM

## 2021-01-11 RX ORDER — METOPROLOL TARTRATE 1 MG/ML
5 INJECTION, SOLUTION INTRAVENOUS ONCE
Status: DISCONTINUED | OUTPATIENT
Start: 2021-01-11 | End: 2021-01-13

## 2021-01-11 RX ORDER — POTASSIUM CHLORIDE 1.5 G/1.58G
20 POWDER, FOR SOLUTION ORAL 2 TIMES DAILY
Status: DISCONTINUED | OUTPATIENT
Start: 2021-01-11 | End: 2021-01-11

## 2021-01-11 RX ADMIN — SIMVASTATIN 10 MG: 10 TABLET, FILM COATED ORAL at 22:18

## 2021-01-11 RX ADMIN — FUROSEMIDE 80 MG: 10 INJECTION, SOLUTION INTRAMUSCULAR; INTRAVENOUS at 06:40

## 2021-01-11 RX ADMIN — POTASSIUM CHLORIDE 40 MEQ: 1.5 POWDER, FOR SOLUTION ORAL at 16:39

## 2021-01-11 RX ADMIN — APIXABAN 2.5 MG: 2.5 TABLET, FILM COATED ORAL at 20:28

## 2021-01-11 RX ADMIN — HEPARIN SODIUM 5000 UNITS: 5000 INJECTION INTRAVENOUS; SUBCUTANEOUS at 09:57

## 2021-01-11 RX ADMIN — DILTIAZEM HYDROCHLORIDE 180 MG: 180 CAPSULE, COATED, EXTENDED RELEASE ORAL at 20:28

## 2021-01-11 RX ADMIN — SERTRALINE HYDROCHLORIDE 50 MG: 50 TABLET ORAL at 09:47

## 2021-01-11 RX ADMIN — ACETAMINOPHEN 650 MG: 325 TABLET, FILM COATED ORAL at 08:58

## 2021-01-11 RX ADMIN — GABAPENTIN 100 MG: 100 CAPSULE ORAL at 09:47

## 2021-01-11 RX ADMIN — MAGNESIUM SULFATE HEPTAHYDRATE 1 G: 1 INJECTION, SOLUTION INTRAVENOUS at 14:35

## 2021-01-11 RX ADMIN — MAGNESIUM SULFATE HEPTAHYDRATE 1 G: 1 INJECTION, SOLUTION INTRAVENOUS at 20:28

## 2021-01-11 RX ADMIN — CALCIUM ACETATE 1334 MG: 667 CAPSULE ORAL at 09:02

## 2021-01-11 RX ADMIN — DOCUSATE SODIUM AND SENNOSIDES 2 TABLET: 8.6; 5 TABLET ORAL at 09:47

## 2021-01-11 RX ADMIN — POTASSIUM CHLORIDE 40 MEQ: 1.5 POWDER, FOR SOLUTION ORAL at 09:02

## 2021-01-11 RX ADMIN — CALCIUM ACETATE 1334 MG: 667 CAPSULE ORAL at 19:00

## 2021-01-11 RX ADMIN — DOXYCYCLINE 100 MG: 100 INJECTION, POWDER, LYOPHILIZED, FOR SOLUTION INTRAVENOUS at 09:02

## 2021-01-11 RX ADMIN — CALCIUM ACETATE 1334 MG: 667 CAPSULE ORAL at 13:10

## 2021-01-11 RX ADMIN — FUROSEMIDE 80 MG: 10 INJECTION, SOLUTION INTRAMUSCULAR; INTRAVENOUS at 01:13

## 2021-01-11 RX ADMIN — DILTIAZEM HYDROCHLORIDE 120 MG: 120 CAPSULE, COATED, EXTENDED RELEASE ORAL at 09:45

## 2021-01-11 RX ADMIN — SODIUM CHLORIDE 1000 ML: 9 INJECTION, SOLUTION INTRAVENOUS at 13:15

## 2021-01-11 ASSESSMENT — ACTIVITIES OF DAILY LIVING (ADL)
ADLS_ACUITY_SCORE: 19
ADLS_ACUITY_SCORE: 17
ADLS_ACUITY_SCORE: 19
ADLS_ACUITY_SCORE: 17
ADLS_ACUITY_SCORE: 17
ADLS_ACUITY_SCORE: 19

## 2021-01-11 NOTE — PROVIDER NOTIFICATION
MD paged regarding pt Tele at 0855 now showing A-fib with RVR. MD ordered her meds given.Pt was given her morning meds.   Tele tech called again at 0935,  A--fib sustaining 120's-140's. MD in pt room and will wait for med to take effect, 1 hour, then wants to be paged if HR does not improve or still in (A-fib RVR).

## 2021-01-11 NOTE — PLAN OF CARE
End of Shift Summary  For vital signs and complete assessments, please see documentation flowsheets.     Pertinent assessments: A&Ox4.  VSS,  Lungs diminished on cpap for sleep.  Denies pain, nausea and SOB.  Lasix given, purewick in place.   Tele in place, sinus mark.  Midline saline locked.    Major Shift Events: uneventful    Treatment Plan: IV Lasix, encourage out of bed activity, monitor respiratory status    Bedside Nurse: Summer Masterson RN

## 2021-01-11 NOTE — PROGRESS NOTES
Care Management Follow Up    Length of Stay (days): 13    Expected Discharge Date: 01/15/21     Concerns to be Addressed:    Needs TCU   Patient plan of care discussed at interdisciplinary rounds: Yes    Anticipated Discharge Disposition: needs Rehab with C-pap         Patient/family educated on Medicare website which has current facility and service quality ratings:  yes  Education Provided on the Discharge Plan:yes     Patient/Family in Agreement with the Plan: Yes      Referrals Placed by CM/SW:  Still looking for placement     Additional Information:  Called [facility] as they had referral but declined referral.. Pt was needing Bi-pap and facility can not accept.. they feel pt right now needs more care then they can manage  [facility] has been following but they now have a COVID case on TCU.. WIll speak with pt about her comfort level for this support    Pt has been denied at [facility] and [facility] as they do not accept Cpap at this time.       Corinne C. White, LSW

## 2021-01-11 NOTE — PROGRESS NOTES
"Pt on Cpap this shift +12 60% , adequate volume returns.   BS are decreased with fine crackles.   Maintaining adequate oxygen saturations. Wean fio2 as tolerated.     /43 (BP Location: Right arm)   Pulse 72   Temp 98.4  F (36.9  C) (Oral)   Resp 19   Ht 1.626 m (5' 4\")   Wt 98 kg (216 lb 1.6 oz)   LMP  (LMP Unknown)   SpO2 99%   BMI 37.09 kg/m        DANIELLETAN    "

## 2021-01-11 NOTE — PROGRESS NOTES
[facility]    Medicine Progress Note - Hospitalist Service       Date of Admission:  12/29/2020  Date of Service: 01/11/2021    Assessment & Plan     This is a pleasant 84-year-old female the past medical history significant for CKD stage IV, diabetes mellitus, hypertension, mild aortic stenosis, admitted to the hospital with concerns for shortness of breath and renal failure.    Patient was recently hospitalized about 3 weeks ago with pneumonia and found to have right lobe consolidation at the time.  She was seen by pulmonology at the time.  She was discharged home with oral Augmentin and required about 1 L of oxygen.  She returned to the ER about 2 weeks after discharge.  She had a CT scan done which showed resolution of the masslike consolidation in the right lobe, however, she was found to have large pleural effusions.  She underwent thoracentesis bilaterally.  Currently ongoing issues with shortness of breath requiring high amount of oxygen and ongoing renal failure.    #Acute hypoxemic respiratory failure.   #Acute on chronic HFpEF.  Previous echo shows preserved EF, grade 1 diastolic dysfunction. Patient is on chronic torsemide 20 mg daily.   Suspect secondary to volume overload and large bilateral pleural effusions.  Recent hospitalization for pneumonia about 2 weeks ago.  No cough, fevers or evidence of new infection.  COVID-19 testing was negative.  Underwent bilateral thoracentesis by IR on 12/30 with 800 cc of fluid removed from both sides.  Transudative effusions.  Suspect secondary to volume overload.    --> Patient received IV diuretics initially although they were held given her significant renal failure.  She had worsening shortness of breath on 1/7 requiring up to 8 to 10 L of oxygen along with intermittent BiPAP. Chest x-ray showed persistent vascular congestion, elevated BNP.  Started back on IV diuretics.  Nephrology assisting with diuretic dosage.  --> Repeat CXR shows  significant left sided pleural effusion. Repeat thoracentesis 1/10 with removal of 1L of fluid    -> Chest x-ray showed increased consolidation in the left lower lobe.  Procalcitonin is only 0.28.  No fevers.  Started on IV ceftriaxone and doxycycline. This was likely due to the pleural effusion, does not appear infected. Stop ABX  --> Appreciate pulmonary consultation    --> Patient had significant hypoxia and was on BiPAP, close to needing intubation/dialysis, however started on large dose of IV lasix and diuril on 1/8, she has responded and shown some sign of improvement.  Interestingly her creatinine is also improved with diuresis, suggesting cardiorenal syndrome.  She had further improvement after the second thoracentesis and weaned from BiPAP to 5 L of oxygen.  Now she is noted to having issues with atrial fibrillation, and suspect this is all contributing to her shortness of breath.    # Acute renal failure on top of CKD stage IV.  Baseline creatinine around 2.  It was 4.3 on admission this time in the hospital.  She had low urine sodium suggesting possible cardiorenal syndrome.  Received some IV diuretics initially.  Creatinine did not improve despite stopping her diuretics.  Possible that she might have suffered from ischemic tubular injury with a recent pneumonia and this is a new baseline.  -Nephrology following  -Patient was started back on aggressive large doses of IV diuretics when her breathing worsened around 1/8, good response with Cr actually improving to 3.6, I suspect certainly some component of cardio-renal syndrome. Follow creatinine closely, nephrology managing diuretics  -Fluid restriction of 2 L    #Atrial fibrillation with RVR.  This is a new diagnosis for the patient.  No known history of A. fib.  Unclear if this is all triggered by the stress of current illness versus more longer standing.  Echo already completed earlier this admission.  -Patient is on diltiazem at baseline.  Increase the  dose back to 180 mg twice daily which is her home dose  -Discussed risk and benefit of anticoagulation with the patient.  She has an elevated [id number] score with her age, gender, hypertension, CHF.  She is in agreement with using Eliquis.  We will start her on Eliquis 2.5 mg twice daily.    #Hypertension.  On diltiazem, hydralazine, Cardura.  -- Stop hydralazine and Cardura for now to avoid hypotension with diuretics   --Increase diltiazem, hydralazine p.o. available as needed    #Diabetes mellitus.  Glimepiride and Actos on hold.    #Anemia of chronic disease. received IV iron doses    #TERESSA.  Continue CPAP, appreciate pulmonary help with adjusting the settings     #Depression, anxiety    #Peripheral neuropathy.  Gabapentin renal dosing    Diet: Renal Diet (non-dialysis)  Fluid restriction 2000 ML FLUID    DVT Prophylaxis: Pneumatic Compression Devices.  Stop heparin subcu as she is now on apixaban   [place] Catheter: not present  Code Status: Full Code      Disposition Plan   Expected discharge: TBD recommended to transitional care unit once O2 use less than 2-3 liters/minute and renal function improved.  Entered: Haylee Ulloa MD 01/11/2021, 4:29 PM       The patient's care was discussed with the Bedside Nurse and Patient.    Haylee Ulloa MD  Hospitalist Service  [facility]    ______________________________________________________________________    Interval History   Chart reviewed and patient seen.      Patient feels gradual ongoing improvement, discussed the new dx of a.fib, no recurrence of chest pain, mentating well and interacts appropriately,     Data reviewed today: I reviewed all medications, new labs and imaging results over the last 24 hours. I personally reviewed    Physical Exam   Vital Signs: Temp: 98.3  F (36.8  C) Temp src: Oral BP: (!) 153/53 Pulse: 63   Resp: 20 SpO2: 98 % O2 Device: Nasal cannula Oxygen Delivery: 5 LPM  Weight: 216 lbs 1.6 oz    GENERAL:   Awake and alert, looks comfortable   PSYCH: appropriate affect, no acute agitation   HEENT:  Neck is Supple, trachea is midline, EOMI, conjunctiva clear  CARDIOVASCULAR: Regular rate and rhythm, Normal S1, S2, early systolic murmur  PULMONARY:  Crackles at bases bilaterally. Good air entry on both sides  GI: Abdomen is soft, non tender, non-distended, bowel sounds present. No rebound or guarding   SKIN:  No cyanosis or clubbing, no obvious exanthems on exposed areas   MSK: Extremities are warm and well perfused. No pitting edema   Neuro: Awake and oriented x 3. Moving all extremities with good strength     Data   Recent Labs   Lab 01/11/21  0645 01/10/21  1708 01/10/21  0735 01/09/21  0814 01/09/21  0814 01/08/21  0748 01/07/21  2340 01/07/21  2340 01/07/21  2223 01/06/21  0658 01/06/21  0658 01/05/21  0652   WBC 9.1  --  9.5  --  11.4*  --   --   --   --   --   --   --    HGB 8.4*  --  8.2*  --  8.2*  --   --   --   --   --   --   --    MCV 94  --  95  --  94  --   --   --   --   --   --   --      --  205  --  206 212   < >  --   --   --   --   --      --  141  --  137 138  --   --   --    < > 138 139   POTASSIUM 3.2* 3.2* 3.1*   < > 3.2* 3.8  --   --   --    < > 3.4 3.8   CHLORIDE 97  --  99  --  100 102  --   --   --    < > 100 100   CO2 42*  --  35*  --  32 33*  --   --   --    < > 30 33*   *  --  118*  --  116* 111*  --   --   --    < > 120* 114*   CR 3.62*  --  3.85*  --  4.00* 4.01*  --   --   --    < > 4.15* 4.20*   ANIONGAP 3  --  7  --  5 3  --   --   --    < > 8 6   EDNA 10.2*  --  9.5  --  9.7 8.9  --   --   --    < > 8.9 9.1   *  --  150*  --  145* 187*  --   --   --    < > 165* 163*   ALBUMIN  --   --   --   --   --   --   --   --   --   --  2.3* 2.3*   PROTTOTAL 6.9  --   --   --   --   --   --   --   --   --   --   --    TROPI  --   --   --   --   --  <0.015  --  <0.015 <0.015   < >  --   --     < > = values in this interval not displayed.       No results found for this or  any previous visit (from the past 24 hour(s)).  Medications     - MEDICATION INSTRUCTIONS -         sodium chloride 0.9%  1,000 mL Intravenous Once     apixaban ANTICOAGULANT  2.5 mg Oral BID     calcium acetate  1,334 mg Oral TID w/meals     diltiazem ER COATED BEADS  180 mg Oral BID     gabapentin  100 mg Oral Daily     insulin aspart  1-7 Units Subcutaneous TID AC     insulin aspart  1-5 Units Subcutaneous At Bedtime     magnesium sulfate  1 g Intravenous Q6H     metoprolol  5 mg Intravenous Once     potassium chloride  40 mEq Oral Q8H     senna-docusate  1 tablet Oral BID    Or     senna-docusate  2 tablet Oral BID     sertraline  50 mg Oral Daily     simvastatin  10 mg Oral At Bedtime     sodium chloride (PF)  10 mL Intracatheter Q8H     sodium chloride (PF)  3 mL Intracatheter Q8H

## 2021-01-11 NOTE — CONSULTS
[facility]    Pulmonary, Allergy, Critical Care and Sleep Medicine    Initial Consultation  01/11/2021    Ирина Yanez MRN# [id number]   Age: 85 year old YOB: 1935     Date of Admission: 12/29/2020  Reason for Consultation: persistent shortness of breath  Requesting Team:  medicine    Primary care provider: Yuridia Villarreal     Assessment and Recommendations:    Ирина Yanez is a 85 year old female with a history of chronic pain, anxiety, depression, TERESSA on CPAP, spinal stenosis, peripheral neuropathy, HTN, HLD, DMII, and CKD IV who presented on 12/29 with shortness of breath, found to have probable diastolic CHF exacerbation, but with course complicated by recurrent effusions requiring thoracentesis, in the setting of recent admission for pneumonia, now found to have paroxysmal atrial fibrillation.     Acute hypoxic respiratory failure, diastolic CHF exacerbation, fluid overload, recurrent pleural effusions, new paroxysmal atrial fibrillation: effusions are most likely secondary to fluid overload. Initial drainage on 12/30 was transudative. 1/10 pleural fluid was not sent for LDH or protein, unfortunately. She is afebrile and without signs of infection, and does not feel that empiric antibiotics have helped her. She went into a fib this morning with RVR, which may be the causative factor behind her fluid overload/CHF. It is unclear what her baseline pulmonary function was, as she endorsed significant dyspnea even before she ever was ill with pneumonia last month (could only walk across a room). She likely has baseline deconditioning contributing as well. Her poor nutrition is likely contributing to development of effusions.   -continue diuresis, appreciate nephrology assistance.   -incentive spirometry   -Needs control of atrial fibrillation  -will try to add on LDH and total protein to fluid studies from yesterday and check serum studies as well  -encourage good nutrition,  may need supplements   -may benefit from cardiology consultation.   -consider repeat CT if cannot be weaned off oxygen   -may benefit from acetazolamide 250 mg BID for 2 doses given elevating bicarbonate.     Recent RUL pneumonia: s/p course of Unasyn and Augmentin. Improved and nearly resolved on admission CT.   -No evidence of recurrance  -monitor    TERESSA, with complex sleep apnea: sleep study in 2016 reviewed. She was not on her home settings, and this may be contributing to her daytime hypoxia as well  -home settings seem to be ASV mode, EPAP 4-6, PS 0-15, max pressure 25  -I have ordered correct settings to be used nightly.      Pulmonary will continue to follow. We are in house at [facility] Monday, Wednesday, and Friday. For assistance on other days, please page the on-call pulmonologist through [facility] or the .      Vinayak Joshua MD  Pulmonary and Critical Care     Chief Complaint and History of Present Illness:    CC:  Shortness of breath  HPI:   Ms. Yanez is an 85 year old woman with a history of chronic pain, anxiety, depression, TERESSA on CPAP, spinal stenosis, peripheral neuropathy, HTN, HLD, DMII, and CKD IV who presented on 12/29 with shortness of breath.     She had recently been admitted for pneumonia, with right lung consolidation, and was treated with Augmentin. This presentation was about 2 weeks after discharge, and her right lung consolidation had improved, but she was found to have pleural effusions. She underwent thoracentesis. She was found to have acute on chronic heart failure with preserved EF causing hypoxic respiratory failure, and has been given diuresis. She has not had evidence of new infection. On 1/7, her oxygen requirements worsened, and she also required intermittent bipap. IV diuretics were restarted. A CXR showed recurrent effusion, and pulmonary was consulted. She did undergo repeat thoracentesis on the left on 1/10/21. She was also started on antibiotics for possible  infection on 1/8 as well, though was afebrile with normal WBC count.     Today, she is back on 5 LPM oxygen, but did use bipap last night.     Ms. Yanez states that her shortness of breath has been slowly improving since coming to the hospital, but she does not yet feel at baseline. Prior to her first admission, she denies significant respiratory problems, but notes she could only really walk across the room of her home before becoming short of breath (this has worsened since then). At baseline, she does avoid stairs. She notes that with her first admission for RUL pneumonia, she had shortness of breath, but denies cough, fever, or chills. This admission, she felt similar, but had profound weakness as well. She denies fevers or chills. Currently, she does have a dry cough, and sneezes, but denies sore throat, rhinorrhea, chest pain.     She feels her legs are wrinkly, and denies LE edema or fluid overload. She has no chest pain. She does get palpitations from time to time. No lightheadedness or dizziness. She continues to struggle with nausea, and had dry heaves this morning. No abdominal pain. She had diarrhea today as well. No trouble swallowing or pain with swallowing. Her appetite is poor, but she doesn't think she has lost weight. She denies lymphadenopathy, skin change, arthralgias, or myalgias.     She does use Bipap at baseline at home, but is unsure of her settings. She was on 1 LPM o2 in between hospitalizations, but had not been on oxygen prior to that. She is not on inhalers at baseline. She has noted no change in her symptoms with antibiotics, and still feels very weak.     Of note, this morning she went into atrial fibrillation with RVR, and spontaneously converted into sinus rhythm later. This seems to be a new problem for her.     Review of Systems:  Complete 12 point ROS negative unless mentioned in HPI  Histories, Prior to Admission Medications, Allergies:    Past Medical History:  Past Medical  History:   Diagnosis Date     Abrasion of arm, right, initial encounter 7/10/2019     Anxiety      Arthritis      Chronic pain     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     Depression      Diabetes mellitus (H)     sees Dr. Verde- type II     Falls frequently 7/10/2019     Heart murmur      HTN      Hyperlipidemia LDL goal < 100     Dr. Verde     Lichen sclerosus et atrophicus 2/15/2013     Melanoma (H)      Peripheral Neuropathy     hands, feet; used to see Dr. Tl Das     Skin cancer     face; basal and other. Melanoma. Dolan     Sleep apnea     CPAP     Spinal stenosis        Past Surgical History:  Past Surgical History:   Procedure Laterality Date     AMPUTATE TOE(S)  8/23/2012    left second toe Procedure: AMPUTATE TOE(S);;  Surgeon: Mil Jolly DPM;  Location:  OR     CHOLECYSTECTOMY  Nov. 1975     COLONOSCOPY  early 2009    repeat 4-5 years (Had one prior to this with polyps)     COLONOSCOPY  Sept 2014    incomplete; recommend colography     COLONOSCOPY  02/25/2020    Dr. Pathak [facility]     COLONOSCOPY N/A 2/25/2020    Procedure: COLONOSCOPY, WITH biopsies using forceps;  Surgeon: Adebayo Pathak MD;  Location:  GI     OPTICAL TRACKING SYSTEM FUSION POSTERIOR SPINE LUMBAR N/A 9/16/2016    Procedure: OPTICAL TRACKING SYSTEM FUSION SPINE POSTERIOR LUMBAR ONE LEVEL;  Surgeon: Lennox Blue MD;  Location:  OR     PET, REC OF MELANOMA/MET CA Left     arm     REPAIR HAMMER TOE BILATERAL  8/23/2012    Procedure: REPAIR HAMMER TOE BILATERAL;  Flexor Tenotomy 2,3,4,5 Toes both feet, Partial 2nd toe amputation left foot;  Surgeon: Mil Jolly DPM;  Location:  OR     REPAIR TENDON QUADRICEPS Right 10/27/2015    Procedure: REPAIR TENDON QUADRICEPS;  Surgeon: Antonio Yi MD;  Location:  OR     SURGICAL HISTORY OF -   1997    bilateral knee replacement     SURGICAL HISTORY OF -   1997    right knee revised; patella tendon ruptured     SURGICAL HISTORY OF -        surgery for spinal stenosis     SURGICAL HISTORY OF -       breast reduction surgery     SURGICAL HISTORY OF -       D and C        Past Social History:  Social History     Socioeconomic History     Marital status:      Spouse name: Not on file     Number of children: Not on file     Years of education: Not on file     Highest education level: 12th grade   Occupational History     Occupation:  from home     Employer: RETIRED   Social Needs     Financial resource strain: Not hard at all     Food insecurity     Worry: Never true     Inability: Never true     Transportation needs     Medical: No     Non-medical: No   Tobacco Use     Smoking status: Never Smoker     Smokeless tobacco: Never Used   Substance and Sexual Activity     Alcohol use: No     Frequency: Never     Binge frequency: Never     Drug use: No     Sexual activity: Yes     Partners: Male   Lifestyle     Physical activity     Days per week: 0 days     Minutes per session: 0 min     Stress: Only a little   Relationships     Social connections     Talks on phone: Three times a week     Gets together: Never     Attends Caodaism service: Never     Active member of club or organization: No     Attends meetings of clubs or organizations: Never     Relationship status:      Intimate partner violence     Fear of current or ex partner: Not on file     Emotionally abused: Not on file     Physically abused: Not on file     Forced sexual activity: Not on file   Other Topics Concern      Service Not Asked     Blood Transfusions Not Asked     Caffeine Concern Not Asked     Occupational Exposure Not Asked     Hobby Hazards Not Asked     Sleep Concern Not Asked     Stress Concern Not Asked     Weight Concern Not Asked     Special Diet No     Comment: getting fruits and veggies.      Back Care Not Asked     Exercise No     Comment: gets little; limited with back and feet/leg pain.     Bike Helmet Not Asked     Seat Belt Not Asked      Self-Exams Not Asked     Parent/sibling w/ CABG, MI or angioplasty before 65F 55M? Yes   Social History Narrative    2 adopted children   Lives at home with , previously worked a desk job with , retired. No recent travel.     ETOH:  No use  Tobacco: none  Significant inhalational exposures:  none  PPD/TB exposure status: none known    Family History:  Family History   Problem Relation Age of Onset     Cerebrovascular Disease Mother      Heart Disease Father      Neurologic Disorder Sister         ALS     C.A.D. Sister      Diabetes Sister      C.A.D. Brother      Psychotic Disorder Brother         [place] war: PTSD. suicide 2019     Gastrointestinal Disease Brother         diverticulitis     Colon Cancer No family hx of      Medications:    calcium acetate  1,334 mg Oral TID w/meals     cefTRIAXone  1 g Intravenous Q24H     diltiazem ER COATED BEADS  120 mg Oral Daily     doxycycline (VIBRAMYCIN) IV  100 mg Intravenous Q12H     furosemide  80 mg Intravenous Q6H     gabapentin  100 mg Oral Daily     heparin ANTICOAGULANT  5,000 Units Subcutaneous Q12H     insulin aspart  1-7 Units Subcutaneous TID AC     insulin aspart  1-5 Units Subcutaneous At Bedtime     potassium chloride  40 mEq Oral Q8H     senna-docusate  1 tablet Oral BID    Or     senna-docusate  2 tablet Oral BID     sertraline  50 mg Oral Daily     simvastatin  10 mg Oral At Bedtime     sodium chloride (PF)  10 mL Intracatheter Q8H     sodium chloride (PF)  3 mL Intracatheter Q8H     acetaminophen, albuterol, glucose **OR** dextrose **OR** glucagon, diphenhydrAMINE, EPINEPHrine, famotidine, hydrALAZINE, lidocaine 4%, lidocaine (buffered or not buffered), melatonin, methylPREDNISolone, nitroGLYcerin, ondansetron **OR** ondansetron, polyethylene glycol, sodium chloride (PF), sodium chloride (PF)    Allergies:     Allergies   Allergen Reactions     Augmentin Diarrhea     Vomiting       Cleocin      Hives     Tegretol [Carbamazepine]       Irregular heart beat       Physical Exam:    Temp:  [97.8  F (36.6  C)-98.7  F (37.1  C)] 98.3  F (36.8  C)  Pulse:  [] 122  Resp:  [16-20] 18  BP: (120-175)/(43-79) 120/79  FiO2 (%):  [60 %] 60 %  SpO2:  [92 %-100 %] 92 %    Intake/Output Summary (Last 24 hours) at 1/11/2021 1014  Last data filed at 1/11/2021 0647  Gross per 24 hour   Intake 120 ml   Output 1750 ml   Net -1630 ml      General: Sitting up in chair, fatigued appearing.   HEENT: anicteric, moist mucosa  Neck: no palpable lymphadenopathy  Chest: bibasilar crackles. Normal WOB, on 5 LPM. No wheeze.   Cardiac: RRR, 3/6 systolic murmur.   Abdomen: Soft, flat, non tender, active BS  Extremities: No LE Edema, WWP  Neuro: A&Ox3, no focal deficits   Skin: no rash noted    Laboratory, imaging, and microbiologic data:    All laboratory and imaging data reviewed.    Notable for:   K 3.2 Cr 3.63 (baseline 2.2 or so, but recently has been higher as well). Bicarb 42 (has been increasing).   WBC 9.1 Hgb 8.4 Plts 210.     Procal (most recent) 0.3.     1/10 Pleural fluid studies: 1000 mL fluid removed.   WBC count 751, 65% monocytes, 6% lymphs, 26% neutrophils.   Gram stain negative for organisms  Cultuer NGTD    12/30/20 Right pleural fluid studies:   WBC count 253, 46% lymphs, 30% mono, 9% neutrophils  LDH 70, T protein 2.0.     1/10/21 CXR: Increased left pleural effusion and associated atelectasis  and/or infiltrate. Slight increase in groundglass infiltrates on the right.  1/7/21 CXR: Cardiac enlargement with pulmonary venous congestion. Bilateral pulmonary infiltrates compatible with edema again seen slightly improved, although there is worsening consolidation in the left lower lobe. Pleural effusions.  12/29/20 CT chest: 1.  Near complete resolution of previously seen masslike consolidation in the right upper lobe. 2.  Large bilateral pleural effusions and partial lower lobe atelectasis, slightly increased  12/10/21 CT chest: 1.  Masslike area of  consolidation in the right upper lobe. Findings may represent pneumonia, however, some lung cancers can have this appearance. Clinical correlation, short-term follow-up, and pulmonary consultation recommended. 2.  Small bilateral pleural effusions.    1/3/2016 sleep study:

## 2021-01-11 NOTE — PLAN OF CARE
To Do:  End of Shift Summary  For vital signs and complete assessments, please see documentation flowsheets.     Pertinent assessments: A&Ox4.  VSS,  Lungs diminished on cpap for sleep.  Denies pain, nausea and SOB.  Lasix held, 1000 ml bolus over 10 hrs started, purewick in place.  Magnesium replaced per Nephro. Tele in place, sinus mark then flipped into A-fib with 's-160's. EKG completed. IV Metoprolol ordered but pt flipped into SR before given. Midline, double lumen, infusing.  & 227.    Major Shift Events: A-fib with RVR episode, bolus and electrolyte replacements.    Treatment Plan: IV Lasix, encourage out of bed activity, monitor respiratory status    Bedside Nurse: Kerry Su RN

## 2021-01-11 NOTE — PLAN OF CARE
Pertinent assessments: A&Ox4, lethargic post thoracentesis. VSS, BP elevated so PO Hydralazine given. Pt reports she doesn't have much energy. Lungs diminished, cardiac sinus rhythm. Has some intermittent shoulder pain. Abdomen soft, purewick draining with adequate output. Tele in place, K+ replaced with recheck replaced as well. Midline saline locked.  Major Shift Events: uneventful, pt lethargic.  Treatment Plan: IV Lasix, encourage out of bed activity, monitor respiratory status

## 2021-01-11 NOTE — PROVIDER NOTIFICATION
MD paged at [phone] regarding pt K+ level of 3.2.    Would you like to order protocol? Pt is on aggressive high dose Lasix regimen. Awaiting reply/orders

## 2021-01-11 NOTE — PROVIDER NOTIFICATION
MD paged regarding pt's A-fib at 1039 : Pt now sustaining 140-160's RVR.  Awaiting orders/reply. MD ordered Metoprolol 5mg IV.  At 10:53 Pt converted to SR 75 without the Metoprolol  Will continue to monitor pt

## 2021-01-11 NOTE — PROGRESS NOTES
Assessment and Plan:   CKD-4: assoc with HT and DM. Now FABY.   She is on phoslo. Treated with IV chlorothiazide last 3 days. On IV lasix q 6 h. Getting K replacement. She is on a renal diet and 2000 ml fluid restriction.  Labs show K 3.2, HCO3 42. Markedly elevated BUN at 111 and Cr 3.62. Cr has been improving for the last 7 days. Negative fluid balance. Wt adm 104.4, wt today 98.0.     Cr stable to improved despite high dose diuretics and thoracentesis. However labs show marked met alk and hypokalemia as side effect of diuresis.   Will stop IV diuretics and replace IVF, Mg and K. 1-2 doses of diamox to reduce bicarb.   Follow labs.               Interval History:   Resp distress: pneumonia, bilat pl eff.   S/P L thoracentesis. On rocephin and doxy.       HT: diltiazem, diuretics,   DM  Anemia: S/P IV venofer. Hgb 8.4.              Review of Systems:   Notes GARAY, no chest pain or SOB with rest. Taking po well. Not ambulating other than bed to chair.           Medications:       apixaban ANTICOAGULANT  2.5 mg Oral BID     calcium acetate  1,334 mg Oral TID w/meals     cefTRIAXone  1 g Intravenous Q24H     diltiazem ER COATED BEADS  180 mg Oral BID     doxycycline (VIBRAMYCIN) IV  100 mg Intravenous Q12H     furosemide  80 mg Intravenous Q6H     gabapentin  100 mg Oral Daily     insulin aspart  1-7 Units Subcutaneous TID AC     insulin aspart  1-5 Units Subcutaneous At Bedtime     metoprolol  5 mg Intravenous Once     potassium chloride  40 mEq Oral Q8H     senna-docusate  1 tablet Oral BID    Or     senna-docusate  2 tablet Oral BID     sertraline  50 mg Oral Daily     simvastatin  10 mg Oral At Bedtime     sodium chloride (PF)  10 mL Intracatheter Q8H     sodium chloride (PF)  3 mL Intracatheter Q8H       - MEDICATION INSTRUCTIONS -       Current active medications and PTA medications reviewed, see medication list for details.            Physical Exam:   Vitals were reviewed  Patient Vitals for the past 24  hrs:   BP Temp Temp src Pulse Resp SpO2   21 1148 121/54 98.2  F (36.8  C) Oral 72 20 93 %   21 0800 120/79 98.3  F (36.8  C) Oral 122 18 92 %   21 0735 (!) 162/56 98  F (36.7  C) Axillary 70 16 98 %   21 0452 (!) 145/49 97.8  F (36.6  C) Axillary 70 18 98 %   21 0300 -- -- -- 72 19 99 %   01/10/21 2353 -- -- -- -- -- 99 %   01/10/21 2209 138/43 98.4  F (36.9  C) Oral 71 18 98 %   01/10/21 1813 (!) 155/52 -- -- -- -- --   01/10/21 1627 (!) 172/58 -- -- -- -- --   01/10/21 1529 (!) 175/66 98.7  F (37.1  C) Oral 76 20 94 %   01/10/21 1450 -- -- -- -- -- 95 %       Temp:  [97.8  F (36.6  C)-98.7  F (37.1  C)] 98.2  F (36.8  C)  Pulse:  [] 72  Resp:  [16-20] 20  BP: (120-175)/(43-79) 121/54  FiO2 (%):  [60 %] 60 %  SpO2:  [92 %-99 %] 93 %    Temperatures:  Current - Temp: 98.2  F (36.8  C); Max - Temp  Av.2  F (36.8  C)  Min: 97.8  F (36.6  C)  Max: 98.7  F (37.1  C)  Respiration range: Resp  Av.4  Min: 16  Max: 20  Pulse range: Pulse  Av  Min: 70  Max: 122  Blood pressure range: Systolic (24hrs), Av , Min:120 , Max:175   ; Diastolic (24hrs), Av, Min:43, Max:79    Pulse oximetry range: SpO2  Av.2 %  Min: 92 %  Max: 99 %    I/O last 3 completed shifts:  In: 120 [P.O.:120]  Out: 1750 [Urine:1750]      Intake/Output Summary (Last 24 hours) at 2021 1214  Last data filed at 2021 1030  Gross per 24 hour   Intake --   Output 1900 ml   Net -1900 ml       Elderly and frail  Lungs with diminished BS in bases  Cor no JVD, nl S1 S2  sys M  LE no edema       Wt Readings from Last 4 Encounters:   01/10/21 98 kg (216 lb 1.6 oz)   20 101.1 kg (222 lb 14.4 oz)   20 96.7 kg (213 lb 1.6 oz)   20 99.1 kg (218 lb 8 oz)          Data:          Lab Results   Component Value Date     2021     01/10/2021     2021      Lab Results   Component Value Date    CHLORIDE 97 2021    CHLORIDE 99 01/10/2021    CHLORIDE 100  01/09/2021    Lab Results   Component Value Date     01/11/2021     01/10/2021     01/09/2021      Lab Results   Component Value Date    POTASSIUM 3.2 01/11/2021    POTASSIUM 3.2 01/10/2021    POTASSIUM 3.1 01/10/2021    Lab Results   Component Value Date    CO2 42 01/11/2021    CO2 35 01/10/2021    CO2 32 01/09/2021    Lab Results   Component Value Date    CR 3.62 01/11/2021    CR 3.85 01/10/2021    CR 4.00 01/09/2021        Recent Labs   Lab Test 01/11/21  0645 01/10/21  0735 01/09/21  0814   WBC 9.1 9.5 11.4*   HGB 8.4* 8.2* 8.2*   HCT 27.9* 27.6* 27.2*   MCV 94 95 94    205 206     Recent Labs   Lab Test 12/31/20  0726 12/29/20  1523 12/10/20  1442   AST 18 10 13   ALT 18 22 13   ALKPHOS 68 73 79   BILITOTAL 0.4 0.3 0.3       Recent Labs   Lab Test 01/10/21  0735 01/09/21  0814 01/04/21  0657   MAG 2.3 2.6* 2.5*     Recent Labs   Lab Test 01/10/21  0735 01/09/21  0814 01/06/21  0658   PHOS 4.7* 4.9* 5.0*     Recent Labs   Lab Test 01/11/21  0645 01/10/21  0735 01/09/21  0814   EDNA 10.2* 9.5 9.7     Lab Results   Component Value Date    EDNA 10.2 (H) 01/11/2021     Lab Results   Component Value Date    WBC 9.1 01/11/2021    HGB 8.4 (L) 01/11/2021    HCT 27.9 (L) 01/11/2021    MCV 94 01/11/2021     01/11/2021     Lab Results   Component Value Date     01/11/2021    POTASSIUM 3.2 (L) 01/11/2021    CHLORIDE 97 01/11/2021    CO2 42 (H) 01/11/2021     (H) 01/11/2021     Lab Results   Component Value Date     (H) 01/11/2021    CR 3.62 (H) 01/11/2021     Lab Results   Component Value Date    MAG 2.3 01/10/2021     Lab Results   Component Value Date    PHOS 4.7 (H) 01/10/2021       Creatinine   Date Value Ref Range Status   01/11/2021 3.62 (H) 0.52 - 1.04 mg/dL Final   01/10/2021 3.85 (H) 0.52 - 1.04 mg/dL Final   01/09/2021 4.00 (H) 0.52 - 1.04 mg/dL Final   01/08/2021 4.01 (H) 0.52 - 1.04 mg/dL Final   01/07/2021 4.10 (H) 0.52 - 1.04 mg/dL Final   01/06/2021 4.15  (H) 0.52 - 1.04 mg/dL Final       Attestation:  I have reviewed today's vital signs, notes, medications, labs and imaging.     Julián Perdomo MD

## 2021-01-11 NOTE — PHARMACY-RX INSURANCE COVERAGE
Anticoagulation coverage check.  Patient has Medicare D through [facility] with $300 (of $300) unmet deductible.    Xarelto/Eliquis   January: Upon receipt of RX, [facility] can dispense 1 month free.   February: $336 (fulfills $300 deductible)   Mar-Oct: $37/mo   Nov-Dec: $117/mo (coverage gap)     Jantoven (warfarin): $0/mo.      -JAVY Elizalde, Pharmacy Technician/Liaison, [facility] [phone]

## 2021-01-12 ENCOUNTER — APPOINTMENT (OUTPATIENT)
Dept: PHYSICAL THERAPY | Facility: CLINIC | Age: 86
DRG: 291 | End: 2021-01-12
Payer: COMMERCIAL

## 2021-01-12 ENCOUNTER — APPOINTMENT (OUTPATIENT)
Dept: OCCUPATIONAL THERAPY | Facility: CLINIC | Age: 86
DRG: 291 | End: 2021-01-12
Payer: COMMERCIAL

## 2021-01-12 LAB
ALBUMIN SERPL-MCNC: 2.5 G/DL (ref 3.4–5)
ALP SERPL-CCNC: 61 U/L (ref 40–150)
ALT SERPL W P-5'-P-CCNC: 21 U/L (ref 0–50)
ANION GAP SERPL CALCULATED.3IONS-SCNC: 3 MMOL/L (ref 3–14)
AST SERPL W P-5'-P-CCNC: 15 U/L (ref 0–45)
BILIRUB SERPL-MCNC: 0.3 MG/DL (ref 0.2–1.3)
BUN SERPL-MCNC: 100 MG/DL (ref 7–30)
CALCIUM SERPL-MCNC: 9.7 MG/DL (ref 8.5–10.1)
CHLORIDE SERPL-SCNC: 100 MMOL/L (ref 94–109)
CO2 SERPL-SCNC: 38 MMOL/L (ref 20–32)
CREAT SERPL-MCNC: 3.24 MG/DL (ref 0.52–1.04)
ERYTHROCYTE [DISTWIDTH] IN BLOOD BY AUTOMATED COUNT: 14.4 % (ref 10–15)
GFR SERPL CREATININE-BSD FRML MDRD: 12 ML/MIN/{1.73_M2}
GLUCOSE BLDC GLUCOMTR-MCNC: 141 MG/DL (ref 70–99)
GLUCOSE BLDC GLUCOMTR-MCNC: 155 MG/DL (ref 70–99)
GLUCOSE BLDC GLUCOMTR-MCNC: 158 MG/DL (ref 70–99)
GLUCOSE BLDC GLUCOMTR-MCNC: 176 MG/DL (ref 70–99)
GLUCOSE BLDC GLUCOMTR-MCNC: 208 MG/DL (ref 70–99)
GLUCOSE SERPL-MCNC: 152 MG/DL (ref 70–99)
HCT VFR BLD AUTO: 27.7 % (ref 35–47)
HGB BLD-MCNC: 8.3 G/DL (ref 11.7–15.7)
INTERPRETATION ECG - MUSE: NORMAL
MAGNESIUM SERPL-MCNC: 2.8 MG/DL (ref 1.6–2.3)
MCH RBC QN AUTO: 28.3 PG (ref 26.5–33)
MCHC RBC AUTO-ENTMCNC: 30 G/DL (ref 31.5–36.5)
MCV RBC AUTO: 95 FL (ref 78–100)
NT-PROBNP SERPL-MCNC: 5724 PG/ML (ref 0–1800)
PHOSPHATE SERPL-MCNC: 3.4 MG/DL (ref 2.5–4.5)
PLATELET # BLD AUTO: 211 10E9/L (ref 150–450)
POTASSIUM SERPL-SCNC: 3.8 MMOL/L (ref 3.4–5.3)
PROT SERPL-MCNC: 6.6 G/DL (ref 6.8–8.8)
RBC # BLD AUTO: 2.93 10E12/L (ref 3.8–5.2)
SODIUM SERPL-SCNC: 141 MMOL/L (ref 133–144)
WBC # BLD AUTO: 8.2 10E9/L (ref 4–11)

## 2021-01-12 PROCEDURE — 250N000011 HC RX IP 250 OP 636: Performed by: INTERNAL MEDICINE

## 2021-01-12 PROCEDURE — 36415 COLL VENOUS BLD VENIPUNCTURE: CPT | Performed by: INTERNAL MEDICINE

## 2021-01-12 PROCEDURE — 94660 CPAP INITIATION&MGMT: CPT

## 2021-01-12 PROCEDURE — 99233 SBSQ HOSP IP/OBS HIGH 50: CPT | Performed by: INTERNAL MEDICINE

## 2021-01-12 PROCEDURE — 80053 COMPREHEN METABOLIC PANEL: CPT | Performed by: INTERNAL MEDICINE

## 2021-01-12 PROCEDURE — 99232 SBSQ HOSP IP/OBS MODERATE 35: CPT | Performed by: INTERNAL MEDICINE

## 2021-01-12 PROCEDURE — 999N000157 HC STATISTIC RCP TIME EA 10 MIN

## 2021-01-12 PROCEDURE — 83880 ASSAY OF NATRIURETIC PEPTIDE: CPT | Performed by: INTERNAL MEDICINE

## 2021-01-12 PROCEDURE — 120N000001 HC R&B MED SURG/OB

## 2021-01-12 PROCEDURE — 85027 COMPLETE CBC AUTOMATED: CPT | Performed by: INTERNAL MEDICINE

## 2021-01-12 PROCEDURE — 97116 GAIT TRAINING THERAPY: CPT | Mod: GP | Performed by: PHYSICAL THERAPIST

## 2021-01-12 PROCEDURE — 999N001017 HC STATISTIC GLUCOSE BY METER IP

## 2021-01-12 PROCEDURE — 83735 ASSAY OF MAGNESIUM: CPT | Performed by: INTERNAL MEDICINE

## 2021-01-12 PROCEDURE — 250N000013 HC RX MED GY IP 250 OP 250 PS 637: Performed by: INTERNAL MEDICINE

## 2021-01-12 PROCEDURE — 97535 SELF CARE MNGMENT TRAINING: CPT | Mod: GO

## 2021-01-12 PROCEDURE — 84100 ASSAY OF PHOSPHORUS: CPT | Performed by: INTERNAL MEDICINE

## 2021-01-12 PROCEDURE — 97530 THERAPEUTIC ACTIVITIES: CPT | Mod: GP | Performed by: PHYSICAL THERAPIST

## 2021-01-12 RX ADMIN — APIXABAN 2.5 MG: 2.5 TABLET, FILM COATED ORAL at 21:01

## 2021-01-12 RX ADMIN — SIMVASTATIN 10 MG: 10 TABLET, FILM COATED ORAL at 21:01

## 2021-01-12 RX ADMIN — DILTIAZEM HYDROCHLORIDE 180 MG: 180 CAPSULE, COATED, EXTENDED RELEASE ORAL at 21:01

## 2021-01-12 RX ADMIN — CALCIUM ACETATE 1334 MG: 667 CAPSULE ORAL at 18:18

## 2021-01-12 RX ADMIN — APIXABAN 2.5 MG: 2.5 TABLET, FILM COATED ORAL at 09:01

## 2021-01-12 RX ADMIN — GABAPENTIN 100 MG: 100 CAPSULE ORAL at 09:00

## 2021-01-12 RX ADMIN — SERTRALINE HYDROCHLORIDE 50 MG: 50 TABLET ORAL at 09:00

## 2021-01-12 RX ADMIN — CALCIUM ACETATE 1334 MG: 667 CAPSULE ORAL at 12:20

## 2021-01-12 RX ADMIN — DILTIAZEM HYDROCHLORIDE 180 MG: 180 CAPSULE, COATED, EXTENDED RELEASE ORAL at 09:00

## 2021-01-12 RX ADMIN — DARBEPOETIN ALFA 100 MCG: 100 INJECTION, SOLUTION INTRAVENOUS; SUBCUTANEOUS at 14:42

## 2021-01-12 RX ADMIN — CALCIUM ACETATE 1334 MG: 667 CAPSULE ORAL at 09:00

## 2021-01-12 ASSESSMENT — ACTIVITIES OF DAILY LIVING (ADL)
ADLS_ACUITY_SCORE: 23
ADLS_ACUITY_SCORE: 19
ADLS_ACUITY_SCORE: 23
ADLS_ACUITY_SCORE: 19

## 2021-01-12 ASSESSMENT — MIFFLIN-ST. JEOR: SCORE: 1346.74

## 2021-01-12 NOTE — PROGRESS NOTES
Assessment and Plan:   CKD-4: FABY. She has been undergoing aggressive diuresis and also thoracentesis. Yesterday we stopped her diuretics and replaced electrolytes. We gave her back 1L NS.   UO yest 2475 ml. Wt cont to decrease. Cr improving: 3.85 > 3.62 > 3.24. Lytes show K 3.8, HCO3 38 and , all improved.     On renal diet and 2L FR. On phoslo.   Baseline Cr 2.2 - 2.6.     Ok for discharge per IM. She will need Nephrology F/U in 2-3 weeks. We can follow her in our office , Nurse practitioner, Trini, [phone].                Interval History:   Resp distress: pneumonia, bilat pl eff.   S/P L thoracentesis. On rocephin and doxy.       HT: diltiazem.  DM  Anemia: S/P IV venofer. Will dose with aranesp.                    Review of Systems:   Feels better. No SOB at rest. Ambulating more and maintaining sats. Taking po well.           Medications:       apixaban ANTICOAGULANT  2.5 mg Oral BID     calcium acetate  1,334 mg Oral TID w/meals     diltiazem ER COATED BEADS  180 mg Oral BID     gabapentin  100 mg Oral Daily     insulin aspart  1-7 Units Subcutaneous TID AC     insulin aspart  1-5 Units Subcutaneous At Bedtime     metoprolol  5 mg Intravenous Once     senna-docusate  1 tablet Oral BID    Or     senna-docusate  2 tablet Oral BID     sertraline  50 mg Oral Daily     simvastatin  10 mg Oral At Bedtime     sodium chloride (PF)  10 mL Intracatheter Q8H     sodium chloride (PF)  3 mL Intracatheter Q8H       - MEDICATION INSTRUCTIONS -       Current active medications and PTA medications reviewed, see medication list for details.            Physical Exam:   Vitals were reviewed  Patient Vitals for the past 24 hrs:   BP Temp Temp src Pulse Resp SpO2 Weight   01/12/21 0745 130/45 98.7  F (37.1  C) Oral 67 18 95 % --   01/12/21 0645 -- -- -- 69 -- 95 % --   01/12/21 0443 (!) 111/30 98.8  F (37.1  C) Axillary 65 16 98 % --   01/12/21 0254 -- -- -- -- -- 94 % --   01/12/21 0140 -- -- -- -- -- --  90.7 kg (199 lb 14.4 oz)   21 (!) 120/37 99  F (37.2  C) Oral 62 23 94 % --   21 -- -- -- -- -- 94 % --   21 192 (!) 142/53 98.4  F (36.9  C) Oral 70 20 95 % --   21 1519 (!) 153/53 98.3  F (36.8  C) Oral 63 20 98 % --       Temp:  [98.3  F (36.8  C)-99  F (37.2  C)] 98.7  F (37.1  C)  Pulse:  [62-70] 67  Resp:  [16-23] 18  BP: (111-153)/(30-53) 130/45  FiO2 (%):  [45 %-60 %] 45 %  SpO2:  [94 %-98 %] 95 %    Temperatures:  Current - Temp: 98.7  F (37.1  C); Max - Temp  Av.6  F (37  C)  Min: 98.3  F (36.8  C)  Max: 99  F (37.2  C)  Respiration range: Resp  Av.4  Min: 16  Max: 23  Pulse range: Pulse  Av  Min: 62  Max: 70  Blood pressure range: Systolic (24hrs), Av , Min:111 , Max:153   ; Diastolic (24hrs), Av, Min:30, Max:53    Pulse oximetry range: SpO2  Av.4 %  Min: 94 %  Max: 98 %    I/O last 3 completed shifts:  In: 400 [P.O.:400]  Out: 1885 [Urine:1885]      Intake/Output Summary (Last 24 hours) at 2021 1212  Last data filed at 2021 1000  Gross per 24 hour   Intake 640 ml   Output 1785 ml   Net -1145 ml       Alert and responsive  Lungs with clear ant BS  Cor RRR nl s1 s2 2/6 sys M no JVD  LE no edema       Wt Readings from Last 4 Encounters:   21 90.7 kg (199 lb 14.4 oz)   20 101.1 kg (222 lb 14.4 oz)   20 96.7 kg (213 lb 1.6 oz)   20 99.1 kg (218 lb 8 oz)          Data:          Lab Results   Component Value Date     2021     2021     01/10/2021        Lab Results   Component Value Date    CHLORIDE 100 2021    CHLORIDE 97 2021    CHLORIDE 99 01/10/2021      Lab Results   Component Value Date     2021     2021     01/10/2021      Lab Results   Component Value Date    POTASSIUM 3.8 2021    POTASSIUM 4.1 2021    POTASSIUM 3.2 2021    Lab Results   Component Value Date    CO2 38 2021    CO2 42 2021    CO2 35  01/10/2021    Lab Results   Component Value Date    CR 3.24 01/12/2021    CR 3.62 01/11/2021    CR 3.85 01/10/2021        Recent Labs   Lab Test 01/12/21  0806 01/11/21  0645 01/10/21  0735   WBC 8.2 9.1 9.5   HGB 8.3* 8.4* 8.2*   HCT 27.7* 27.9* 27.6*   MCV 95 94 95    210 205     Recent Labs   Lab Test 01/12/21  0806 12/31/20  0726 12/29/20  1523   AST 15 18 10   ALT 21 18 22   ALKPHOS 61 68 73   BILITOTAL 0.3 0.4 0.3       Recent Labs   Lab Test 01/12/21  0806 01/10/21  0735 01/09/21  0814   MAG 2.8* 2.3 2.6*     Recent Labs   Lab Test 01/12/21  0806 01/10/21  0735 01/09/21  0814   PHOS 3.4 4.7* 4.9*     Recent Labs   Lab Test 01/12/21  0806 01/11/21  0645 01/10/21  0735   EDNA 9.7 10.2* 9.5       Lab Results   Component Value Date    EDNA 9.7 01/12/2021     Lab Results   Component Value Date    WBC 8.2 01/12/2021    HGB 8.3 (L) 01/12/2021    HCT 27.7 (L) 01/12/2021    MCV 95 01/12/2021     01/12/2021     Lab Results   Component Value Date     01/12/2021    POTASSIUM 3.8 01/12/2021    CHLORIDE 100 01/12/2021    CO2 38 (H) 01/12/2021     (H) 01/12/2021     Lab Results   Component Value Date     (H) 01/12/2021    CR 3.24 (H) 01/12/2021     Lab Results   Component Value Date    MAG 2.8 (H) 01/12/2021     Lab Results   Component Value Date    PHOS 3.4 01/12/2021       Creatinine   Date Value Ref Range Status   01/12/2021 3.24 (H) 0.52 - 1.04 mg/dL Final   01/11/2021 3.62 (H) 0.52 - 1.04 mg/dL Final   01/10/2021 3.85 (H) 0.52 - 1.04 mg/dL Final   01/09/2021 4.00 (H) 0.52 - 1.04 mg/dL Final   01/08/2021 4.01 (H) 0.52 - 1.04 mg/dL Final   01/07/2021 4.10 (H) 0.52 - 1.04 mg/dL Final       Attestation:  I have reviewed today's vital signs, notes, medications, labs and imaging.     Julián Perdomo MD

## 2021-01-12 NOTE — PLAN OF CARE
To Do:  End of Shift Summary  For vital signs and complete assessments, please see documentation flowsheets.     Pertinent assessments: Patient started on new  orders for overnight BiPap instead of previous Cpap orders. Tele present. External cath in place. Denies chest pain. Fasting blood sugar 154. No blood return noted on double lumen midline. Patient has had two low b/p's.  DR notified.    Major Shift Events: Low blood pressure    Treatment Plan: Monitor intake and output, kidney function, and electrolytes. Attempt to wean down oxygen when able.    Bedside Nurse: Lizzy King RN

## 2021-01-12 NOTE — PLAN OF CARE
End of Shift Summary  For vital signs and complete assessments, please see documentation flowsheets.     Pertinent assessments: Patient has denied chest discomfort throughout the shift. Remains on 5L O2. Dyspnea with exertion. Sinus rhythm.    Major Shift Events: Antibiotics discontinued    Treatment Plan: Monitor intake and output, kidney function, and electrolytes. Attempt to wean down oxygen when able.    Bedside Nurse: Emmy Pearson RN

## 2021-01-12 NOTE — PROGRESS NOTES
CLINICAL NUTRITION SERVICES - REASSESSMENT NOTE      Recommendations Ordered by Registered Dietitian (RD):   - Nepro oral supplement (4oz) BID between meals for supplementation    Malnutrition:   % Weight Loss:  Weight loss does not meet criteria for malnutrition (diuresing)   % Intake:  Decreased intake does not meet criteria for malnutrition at this time   Subcutaneous Fat Loss:  None observed (limited exam)  Muscle Loss:  None observed (limited exam)   Fluid Retention:  None noted    Malnutrition Diagnosis: Patient does not meet two of the above criteria necessary for diagnosing malnutrition at this time, remains at risk pending further decline        EVALUATION OF PROGRESS TOWARD GOALS   Diet:  Renal + 2000 mL fluid restriction     Intake/Tolerance:  Pt continues to eat between % meals 2-3 times per day. Appetite had fidel slightly worse over the past couple days and she has been choosing smaller meals. She desires to eat willson, chips and ice cream but understands she is restricted on the renal diet. We discussed renal friendly protein supplements to utilize while appetite is slightly decreased and she is agreeable to try a 4oz supplements BID     Pt has received some lasix, resulting in recent fluctuating wt   Vitals:    01/07/21 0644 01/08/21 0514 01/08/21 0515 01/10/21 0600   Weight: 101.7 kg (224 lb 1.6 oz) 101.8 kg (224 lb 8 oz) 99.3 kg (218 lb 14.4 oz) 98 kg (216 lb 1.6 oz)    01/12/21 0140   Weight: 90.7 kg (199 lb 14.4 oz)       ASSESSED NUTRITION NEEDS:  Dosing Weight: 64 kg (adjusted)  Estimated Energy Needs: 8464-2338 kcal (25-30 kcal/kg)  Justification: Obese  Estimated Protein Needs: ~96 grams (~1.5 grams/kg)  Justification: Obese, preservation of lean body mass, CKD      NEW FINDINGS:   Discharge to TCU pending     Electrolytes  Potassium (mmol/L)   Date Value   01/12/2021 3.8   01/11/2021 4.1   01/11/2021 3.2 (L)     Phosphorus (mg/dL)   Date Value   01/12/2021 3.4   01/10/2021 4.7 (H)    01/09/2021 4.9 (H)   01/06/2021 5.0 (H)   01/05/2021 5.1 (H)    Blood Glucose  Glucose (mg/dL)   Date Value   01/12/2021 152 (H)   01/11/2021 162 (H)   01/10/2021 150 (H)   01/09/2021 145 (H)   01/08/2021 187 (H)     Hemoglobin A1C (%)   Date Value   04/27/2020 6.8 (H)   09/27/2019 6.3 (H)   03/22/2019 6.9 (H)   06/12/2018 6.2 (H)   12/19/2017 6.3 (H)    Inflammatory Markers  CRP Inflammation (mg/L)   Date Value   12/10/2020 35.9 (H)   08/12/2015 20.0 (H)   06/25/2012 24.4 (H)     WBC (10e9/L)   Date Value   01/12/2021 8.2   01/11/2021 9.1   01/10/2021 9.5     Albumin (g/dL)   Date Value   01/12/2021 2.5 (L)   01/06/2021 2.3 (L)   01/05/2021 2.3 (L)      Magnesium (mg/dL)   Date Value   01/12/2021 2.8 (H)   01/10/2021 2.3   01/09/2021 2.6 (H)     Sodium (mmol/L)   Date Value   01/12/2021 141   01/11/2021 142   01/10/2021 141    Renal  Urea Nitrogen (mg/dL)   Date Value   01/12/2021 100 (H)   01/11/2021 111 (H)   01/10/2021 118 (H)     Creatinine (mg/dL)   Date Value   01/12/2021 3.24 (H)   01/11/2021 3.62 (H)   01/10/2021 3.85 (H)     Additional  Triglycerides (mg/dL)   Date Value   04/27/2020 240 (H)   01/21/2020 223 (H)   11/16/2018 315 (H)     Ketones Urine (mg/dL)   Date Value   12/29/2020 Negative           Previous Goals:   None  Evaluation: Unable to evaluate    Previous Nutrition Diagnosis:   No nutrition diagnosis at this time.  Evaluation: Completed      MALNUTRITION  % Weight Loss:  Weight loss does not meet criteria for malnutrition (diuresing)   % Intake:  Decreased intake does not meet criteria for malnutrition at this time   Subcutaneous Fat Loss:  None observed (limited exam)  Muscle Loss:  None observed (limited exam)   Fluid Retention:  None noted    Malnutrition Diagnosis: Patient does not meet two of the above criteria necessary for diagnosing malnutrition at this time, remains at risk pending further decline     CURRENT NUTRITION DIAGNOSIS  Predicted inadequate nutrient intake  (protein/calorie) related to variable appetite with prolonged hospital admission     INTERVENTIONS  Recommendations / Nutrition Prescription  Continue renal diet  Nepro supplements BID (4oz)     Implementation  Medical Food Supplement: as above  Collaboration and Referral of Nutrition care: patient was discussed during interdisciplinary team rounds     Goals  Pt will consume >/=75% meals TID and at least 1 supplement/day       MONITORING AND EVALUATION:  Progress towards goals will be monitored and evaluated per protocol and Practice Guidelines      Sarah Marvin RD, LD  Clinical Dietitian

## 2021-01-12 NOTE — PROGRESS NOTES
A BiPAP on AVAPS settings Max P 20/ Min P 10/ EPAP 4/ 440 VT on 45% was applied to the pt via the full face mask for overnight oxygenation needs.  The bridge of the nose is intact with no signs of skin breakdown. Pt is tolerating it well. Will continue to monitor and assess the pt's current respiratory status and needs.

## 2021-01-12 NOTE — PROGRESS NOTES
[facility]    Medicine Progress Note - Hospitalist Service       Date of Admission:  12/29/2020  Date of Service: 01/12/2021    Assessment & Plan     This is a pleasant 84-year-old female the past medical history significant for CKD stage IV, diabetes mellitus, hypertension, mild aortic stenosis, admitted to the hospital with concerns for shortness of breath and renal failure.    Patient was recently hospitalized about 3 weeks ago with pneumonia and found to have right lobe consolidation at the time.  She was seen by pulmonology at the time.  She was discharged home with oral Augmentin and required about 1 L of oxygen.  She returned to the ER about 2 weeks after discharge.  She had a CT scan done which showed resolution of the masslike consolidation in the right lobe, however, she was found to have large pleural effusions.  She underwent thoracentesis bilaterally.  Currently ongoing issues with shortness of breath requiring high amount of oxygen and ongoing renal failure.    #Acute hypoxemic respiratory failure.   #Acute on chronic HFpEF.  Previous echo shows preserved EF, grade 1 diastolic dysfunction. Patient is on chronic torsemide 20 mg daily.   Suspect secondary to volume overload and large bilateral pleural effusions.  Recent hospitalization for pneumonia about 2 weeks ago.  No cough, fevers or evidence of new infection.  COVID-19 testing was negative.  Underwent bilateral thoracentesis by IR on 12/30 with 800 cc of fluid removed from both sides.  Transudative effusions.  Suspect secondary to volume overload.    --> Patient received IV diuretics initially although they were held given her significant renal failure.  She had worsening shortness of breath on 1/7 requiring up to 8 to 10 L of oxygen along with intermittent BiPAP. Chest x-ray showed persistent vascular congestion, elevated BNP.  Started back on IV diuretics.  Nephrology assisting with diuretic dosage.  --> Repeat CXR shows  significant left sided pleural effusion. Repeat thoracentesis 1/10 with removal of 1L of fluid    -> Chest x-ray showed increased consolidation in the left lower lobe.  Procalcitonin is only 0.28.  No fevers.  Started on IV ceftriaxone and doxycycline. This was likely due to the pleural effusion, does not appear infected. Stop ABX  --> Appreciate pulmonary consultation    --> Patient had significant hypoxia and was on BiPAP, close to needing intubation/dialysis, however started on large dose of IV lasix and diuril on 1/8, she has responded and shown some sign of improvement.  Interestingly her creatinine is also improved with diuresis, suggesting cardiorenal syndrome.  She had further improvement after the second thoracentesis and weaned from BiPAP to 5 L of oxygen.  Diuretics currently being held per nephrology     # Acute renal failure on top of CKD stage IV.  Baseline creatinine around 2-2.2.  It was 4.3 on admission this time in the hospital.  She had low urine sodium. Received some IV diuretics initially.  Creatinine did not improve despite stopping her diuretics.  Possible that she might have suffered from ischemic tubular injury with a recent pneumonia and this is a new baseline.  -Nephrology following  -Patient was started back on aggressive large doses of IV diuretics when her breathing worsened around 1/8, good response with Cr actually improving to 3.24, I suspect certainly some component of cardio-renal syndrome. Follow creatinine closely, nephrology managing diuretics  -Fluid restriction of 2 L    #Atrial fibrillation with RVR.  This is a new diagnosis for the patient.  Discovered during this stay. No known history of A. fib.  Unclear if this is all triggered by the stress of current illness versus more longer standing.  Echo already completed earlier this admission.   -Patient is on diltiazem at baseline.  Increase the dose back to 180 mg twice daily which is her home dose. HR better controlled  now.  -Discussed risk and benefit of anticoagulation with the patient.  She has an elevated [id number] score with her age, gender, hypertension, CHF.  She is in agreement with using Eliquis.  We will start her on Eliquis 2.5 mg twice daily.    #Hypertension.  On diltiazem, hydralazine, Cardura.  -- Stop hydralazine and Cardura for now to avoid hypotension with diuretics   --Increase diltiazem, hydralazine p.o. available as needed    #Diabetes mellitus.  Glimepiride and Actos on hold.    #Anemia of chronic disease. received IV iron doses    #TERESSA.  Continue CPAP, appreciate pulmonary help with adjusting the settings     #Depression, anxiety    #Peripheral neuropathy.  Gabapentin renal dosing    Diet: Renal Diet (non-dialysis)  Fluid restriction 2000 ML FLUID    DVT Prophylaxis: Pneumatic Compression Devices.  Stop heparin subcu as she is now on apixaban   [place] Catheter: not present  Code Status: Full Code      Disposition Plan   Expected discharge: 3 days - recommended to transitional care unit once O2 use less than 2-3 liters/minute and renal function improved.  Entered: Haylee Ulloa MD 01/12/2021, 12:36 PM       The patient's care was discussed with the Bedside Nurse and Patient.    Haylee Ulloa MD  Hospitalist Service  [facility]    ______________________________________________________________________    Interval History   Chart reviewed and patient seen.      Patient feels gradual ongoing improvement,  Breathing is better, no recurrence of chest pain, mentating well and interacts appropriately,     Data reviewed today: I reviewed all medications, new labs and imaging results over the last 24 hours. I personally reviewed    Physical Exam   Vital Signs: Temp: 98.7  F (37.1  C) Temp src: Oral BP: 130/45 Pulse: 67   Resp: 18 SpO2: 95 % O2 Device: Oxymizer cannula Oxygen Delivery: 5 LPM  Weight: 199 lbs 14.4 oz    GENERAL:  Awake and alert, looks comfortable   PSYCH: appropriate  affect, no acute agitation   HEENT:  Neck is Supple, trachea is midline, EOMI, conjunctiva clear  CARDIOVASCULAR: Regular rate and rhythm, Normal S1, S2, early systolic murmur  PULMONARY:  Occasional crackles at bases bilaterally. Good air entry on both sides  GI: Abdomen is soft, non tender, non-distended, bowel sounds present. No rebound or guarding   SKIN:  No cyanosis or clubbing, no obvious exanthems on exposed areas   MSK: Extremities are warm and well perfused. No pitting edema   Neuro: Awake and oriented x 3. Moving all extremities with good strength     Data   Recent Labs   Lab 01/12/21  0806 01/11/21  2035 01/11/21  0645 01/10/21  0735 01/10/21  0735 01/08/21  0748 01/08/21  0748 01/07/21  2340 01/07/21  2223 01/06/21  0658 01/06/21  0658   WBC 8.2  --  9.1  --  9.5   < >  --   --   --   --   --    HGB 8.3*  --  8.4*  --  8.2*   < >  --   --   --   --   --    MCV 95  --  94  --  95   < >  --   --   --   --   --      --  210  --  205   < > 212  --   --   --   --      --  142  --  141   < > 138  --   --    < > 138   POTASSIUM 3.8 4.1 3.2*   < > 3.1*   < > 3.8  --   --    < > 3.4   CHLORIDE 100  --  97  --  99   < > 102  --   --    < > 100   CO2 38*  --  42*  --  35*   < > 33*  --   --    < > 30   *  --  111*  --  118*   < > 111*  --   --    < > 120*   CR 3.24*  --  3.62*  --  3.85*   < > 4.01*  --   --    < > 4.15*   ANIONGAP 3  --  3  --  7   < > 3  --   --    < > 8   EDNA 9.7  --  10.2*  --  9.5   < > 8.9  --   --    < > 8.9   *  --  162*  --  150*   < > 187*  --   --    < > 165*   ALBUMIN 2.5*  --   --   --   --   --   --   --   --   --  2.3*   PROTTOTAL 6.6*  --  6.9  --   --   --   --   --   --   --   --    BILITOTAL 0.3  --   --   --   --   --   --   --   --   --   --    ALKPHOS 61  --   --   --   --   --   --   --   --   --   --    ALT 21  --   --   --   --   --   --   --   --   --   --    AST 15  --   --   --   --   --   --   --   --   --   --    TROPI  --   --   --   --    --   --  <0.015 <0.015 <0.015   < >  --     < > = values in this interval not displayed.       No results found for this or any previous visit (from the past 24 hour(s)).  Medications     - MEDICATION INSTRUCTIONS -         apixaban ANTICOAGULANT  2.5 mg Oral BID     calcium acetate  1,334 mg Oral TID w/meals     diltiazem ER COATED BEADS  180 mg Oral BID     gabapentin  100 mg Oral Daily     insulin aspart  1-7 Units Subcutaneous TID AC     insulin aspart  1-5 Units Subcutaneous At Bedtime     metoprolol  5 mg Intravenous Once     senna-docusate  1 tablet Oral BID    Or     senna-docusate  2 tablet Oral BID     sertraline  50 mg Oral Daily     simvastatin  10 mg Oral At Bedtime     sodium chloride (PF)  10 mL Intracatheter Q8H     sodium chloride (PF)  3 mL Intracatheter Q8H

## 2021-01-12 NOTE — PLAN OF CARE
To Do:  End of Shift Summary  For vital signs and complete assessments, please see documentation flowsheets.     Pertinent assessments: VSS.  Pt weaned to 4 L oxygen.  SOB with activity. Lungs diminished. Ambulated in cardenas assist of one.  Denies pain.  Incontinent of urine.  Continue FR  of 2000 ml.   Major Shift Events: Dropped oxygen to 4 L    Treatment Plan: Monitor intake and output, kidney function, and electrolytes. Attempt to wean down oxygen when able.    Bedside Nurse: Robert Moffett RN

## 2021-01-13 ENCOUNTER — APPOINTMENT (OUTPATIENT)
Dept: PHYSICAL THERAPY | Facility: CLINIC | Age: 86
DRG: 291 | End: 2021-01-13
Payer: COMMERCIAL

## 2021-01-13 LAB
ANION GAP SERPL CALCULATED.3IONS-SCNC: <1 MMOL/L (ref 3–14)
BUN SERPL-MCNC: 99 MG/DL (ref 7–30)
CALCIUM SERPL-MCNC: 9.1 MG/DL (ref 8.5–10.1)
CHLORIDE SERPL-SCNC: 101 MMOL/L (ref 94–109)
CO2 SERPL-SCNC: 40 MMOL/L (ref 20–32)
CREAT SERPL-MCNC: 2.93 MG/DL (ref 0.52–1.04)
ERYTHROCYTE [DISTWIDTH] IN BLOOD BY AUTOMATED COUNT: 14.5 % (ref 10–15)
GFR SERPL CREATININE-BSD FRML MDRD: 14 ML/MIN/{1.73_M2}
GLUCOSE BLDC GLUCOMTR-MCNC: 152 MG/DL (ref 70–99)
GLUCOSE BLDC GLUCOMTR-MCNC: 157 MG/DL (ref 70–99)
GLUCOSE BLDC GLUCOMTR-MCNC: 164 MG/DL (ref 70–99)
GLUCOSE BLDC GLUCOMTR-MCNC: 198 MG/DL (ref 70–99)
GLUCOSE BLDC GLUCOMTR-MCNC: 242 MG/DL (ref 70–99)
GLUCOSE SERPL-MCNC: 180 MG/DL (ref 70–99)
HCT VFR BLD AUTO: 28.6 % (ref 35–47)
HGB BLD-MCNC: 8.4 G/DL (ref 11.7–15.7)
MAGNESIUM SERPL-MCNC: 2.4 MG/DL (ref 1.6–2.3)
MCH RBC QN AUTO: 28 PG (ref 26.5–33)
MCHC RBC AUTO-ENTMCNC: 29.4 G/DL (ref 31.5–36.5)
MCV RBC AUTO: 95 FL (ref 78–100)
PLATELET # BLD AUTO: 211 10E9/L (ref 150–450)
POTASSIUM SERPL-SCNC: 3.4 MMOL/L (ref 3.4–5.3)
RBC # BLD AUTO: 3 10E12/L (ref 3.8–5.2)
SODIUM SERPL-SCNC: 141 MMOL/L (ref 133–144)
WBC # BLD AUTO: 8.7 10E9/L (ref 4–11)

## 2021-01-13 PROCEDURE — 250N000013 HC RX MED GY IP 250 OP 250 PS 637: Performed by: INTERNAL MEDICINE

## 2021-01-13 PROCEDURE — 120N000001 HC R&B MED SURG/OB

## 2021-01-13 PROCEDURE — 80048 BASIC METABOLIC PNL TOTAL CA: CPT | Performed by: INTERNAL MEDICINE

## 2021-01-13 PROCEDURE — 999N001017 HC STATISTIC GLUCOSE BY METER IP

## 2021-01-13 PROCEDURE — 36415 COLL VENOUS BLD VENIPUNCTURE: CPT | Performed by: INTERNAL MEDICINE

## 2021-01-13 PROCEDURE — 97530 THERAPEUTIC ACTIVITIES: CPT | Mod: GP | Performed by: PHYSICAL THERAPIST

## 2021-01-13 PROCEDURE — 97110 THERAPEUTIC EXERCISES: CPT | Mod: GP | Performed by: PHYSICAL THERAPIST

## 2021-01-13 PROCEDURE — 83735 ASSAY OF MAGNESIUM: CPT | Performed by: INTERNAL MEDICINE

## 2021-01-13 PROCEDURE — 99233 SBSQ HOSP IP/OBS HIGH 50: CPT | Performed by: INTERNAL MEDICINE

## 2021-01-13 PROCEDURE — 94660 CPAP INITIATION&MGMT: CPT

## 2021-01-13 PROCEDURE — 85027 COMPLETE CBC AUTOMATED: CPT | Performed by: INTERNAL MEDICINE

## 2021-01-13 PROCEDURE — 99232 SBSQ HOSP IP/OBS MODERATE 35: CPT | Performed by: INTERNAL MEDICINE

## 2021-01-13 PROCEDURE — 999N000105 HC STATISTIC NO DOCUMENTATION TO SUPPORT CHARGE

## 2021-01-13 RX ORDER — TORSEMIDE 10 MG/1
10 TABLET ORAL DAILY
Status: DISCONTINUED | OUTPATIENT
Start: 2021-01-13 | End: 2021-01-14

## 2021-01-13 RX ORDER — ACETAZOLAMIDE 125 MG/1
250 TABLET ORAL
Status: COMPLETED | OUTPATIENT
Start: 2021-01-13 | End: 2021-01-14

## 2021-01-13 RX ADMIN — CALCIUM ACETATE 1334 MG: 667 CAPSULE ORAL at 13:26

## 2021-01-13 RX ADMIN — CALCIUM ACETATE 1334 MG: 667 CAPSULE ORAL at 18:09

## 2021-01-13 RX ADMIN — SERTRALINE HYDROCHLORIDE 50 MG: 50 TABLET ORAL at 09:43

## 2021-01-13 RX ADMIN — DILTIAZEM HYDROCHLORIDE 180 MG: 180 CAPSULE, COATED, EXTENDED RELEASE ORAL at 09:42

## 2021-01-13 RX ADMIN — ACETAZOLAMIDE 250 MG: 125 TABLET ORAL at 13:26

## 2021-01-13 RX ADMIN — TORSEMIDE 10 MG: 10 TABLET ORAL at 13:26

## 2021-01-13 RX ADMIN — SIMVASTATIN 10 MG: 10 TABLET, FILM COATED ORAL at 21:51

## 2021-01-13 RX ADMIN — CALCIUM ACETATE 1334 MG: 667 CAPSULE ORAL at 09:43

## 2021-01-13 RX ADMIN — ACETAZOLAMIDE 250 MG: 125 TABLET ORAL at 21:51

## 2021-01-13 RX ADMIN — DILTIAZEM HYDROCHLORIDE 180 MG: 180 CAPSULE, COATED, EXTENDED RELEASE ORAL at 21:51

## 2021-01-13 RX ADMIN — APIXABAN 2.5 MG: 2.5 TABLET, FILM COATED ORAL at 21:51

## 2021-01-13 RX ADMIN — GABAPENTIN 100 MG: 100 CAPSULE ORAL at 09:43

## 2021-01-13 RX ADMIN — Medication 1 MG: at 01:21

## 2021-01-13 RX ADMIN — APIXABAN 2.5 MG: 2.5 TABLET, FILM COATED ORAL at 09:42

## 2021-01-13 ASSESSMENT — ACTIVITIES OF DAILY LIVING (ADL)
ADLS_ACUITY_SCORE: 19
ADLS_ACUITY_SCORE: 19
ADLS_ACUITY_SCORE: 17
ADLS_ACUITY_SCORE: 19

## 2021-01-13 ASSESSMENT — MIFFLIN-ST. JEOR: SCORE: 1356.72

## 2021-01-13 NOTE — PLAN OF CARE
Pertinent assessments: A&Ox4, VSS on 5L NC Oxymizer. Denies pain, Lungs diminished, Tele: A fib, HR 90's Tolerating diet, appetite good.  Major Shift Events: Tele: A fib  Treatment Plan: respiratory management, wean as able, fluid restriction.  Bedside Nurse: Geno Payan RN

## 2021-01-13 NOTE — PLAN OF CARE
Pertinent assessments: A&Ox4, VSS on 4L Oxymizer. Denies pain, Lungs diminished, tele in place, abdomen soft, up with Ax1/walker/gaitbelt. Tolerating diet, appetite good.  Major Shift Events: uneventful  Treatment Plan: respiratory management, wean as able, fluid restriction.

## 2021-01-13 NOTE — DISCHARGE INSTRUCTIONS
You need to call [facility] and schedule a post hospitalization visit with their NP:    Nephrology F/U in 2-3 weeks.  [address] [facility] [address], [place] [address] [phone].

## 2021-01-13 NOTE — PROGRESS NOTES
Care Management Follow Up    Length of Stay (days): 15    Expected Discharge Date: 01/15/21     Concerns to be Addressed:   TCU that will accept CPAP     Patient plan of care discussed at interdisciplinary rounds: Yes    Anticipated Discharge Disposition:  Rehab        Patient/family educated on Medicare website which has current facility and service quality ratings:  yes  Education Provided on the Discharge Plan:  Pt  Patient/Family in Agreement with the Plan:Yes    Referrals Placed by CM/SW:  yes  Private pay costs discussed: transportation costs    Additional Information:  Met with pt. Rehab still recommending TCU at this time. Pt is agreeable . Spoke with pt about TCU options that will accept CPAP again.. She did agree to allow referral to be sent to [facility] ( aware there is COVID on the unit) and [facility]          Corinne C. White, LSW     Addendum    SW started Prior auth again with [employer] for TCU placement    [facility] called and did offer bed. Hopes to have bed for Friday

## 2021-01-13 NOTE — PROGRESS NOTES
Assessment and Plan:   CKD-4  FABY: Cr continues to improve. Lytes today with Na 3.4, HCO3 40. Cr down to 2.93.   Agree with a few doses of diamox to reduce HCO3. Add low dose torsemide.     Ok for discharge per IM. She will need Nephrology F/U in 2-3 weeks. We can follow her in our office , Nurse practitioner, Trini, [phone].               Interval History:   Resp distress: pneumonia, bilat pl eff.   S/P L thoracentesis. On rocephin and doxy.       HT: diltiazem.  DM  Anemia: S/P IV venofer. s/p aranesp. Hgb 8.4.                    Review of Systems:   C/O fatigue, mild GARAY. No pain. Taking po well           Medications:       apixaban ANTICOAGULANT  2.5 mg Oral BID     calcium acetate  1,334 mg Oral TID w/meals     diltiazem ER COATED BEADS  180 mg Oral BID     gabapentin  100 mg Oral Daily     insulin aspart  1-7 Units Subcutaneous TID AC     insulin aspart  1-5 Units Subcutaneous At Bedtime     metoprolol  5 mg Intravenous Once     senna-docusate  1 tablet Oral BID    Or     senna-docusate  2 tablet Oral BID     sertraline  50 mg Oral Daily     simvastatin  10 mg Oral At Bedtime     sodium chloride (PF)  10 mL Intracatheter Q8H     sodium chloride (PF)  3 mL Intracatheter Q8H       - MEDICATION INSTRUCTIONS -       Current active medications and PTA medications reviewed, see medication list for details.            Physical Exam:   Vitals were reviewed  Patient Vitals for the past 24 hrs:   BP Temp Temp src Pulse Resp SpO2 Weight   01/13/21 0738 (!) 142/43 98.4  F (36.9  C) Oral 66 20 96 % --   01/13/21 0500 -- -- -- 95 -- 94 % --   01/13/21 0429 (!) 143/50 98  F (36.7  C) Oral 63 20 93 % --   01/13/21 0249 -- -- -- -- -- -- 91.7 kg (202 lb 1.6 oz)   01/13/21 0125 -- -- -- 62 16 95 % --   01/12/21 2323 -- -- -- -- -- 92 % --   01/12/21 2239 (!) 118/30 99.5  F (37.5  C) Axillary 55 15 95 % --   01/12/21 1541 -- -- -- -- -- 94 % --   01/12/21 1533 115/42 98.1  F (36.7  C) Oral 69 18 94 % --    21 1445 -- -- -- -- -- 98 % --       Temp:  [98  F (36.7  C)-99.5  F (37.5  C)] 98.4  F (36.9  C)  Pulse:  [55-95] 66  Resp:  [15-20] 20  BP: (115-143)/(30-50) 142/43  FiO2 (%):  [45 %-55 %] 55 %  SpO2:  [92 %-98 %] 96 %    Temperatures:  Current - Temp: 98.4  F (36.9  C); Max - Temp  Av.5  F (36.9  C)  Min: 98  F (36.7  C)  Max: 99.5  F (37.5  C)  Respiration range: Resp  Av.8  Min: 15  Max: 20  Pulse range: Pulse  Av.3  Min: 55  Max: 95  Blood pressure range: Systolic (24hrs), Av , Min:115 , Max:143   ; Diastolic (24hrs), Av, Min:30, Max:50    Pulse oximetry range: SpO2  Av.6 %  Min: 92 %  Max: 98 %    I/O last 3 completed shifts:  In: 1200 [P.O.:1200]  Out: 625 [Urine:625]      Intake/Output Summary (Last 24 hours) at 2021 1150  Last data filed at 2021 1100  Gross per 24 hour   Intake 1440 ml   Output 475 ml   Net 965 ml       Alert, up in chair, on NC O2  Lungs with clear ant BS  Cor RRR nl S1 S2 2/6 sys M no JVD  LE no edema       Wt Readings from Last 4 Encounters:   21 91.7 kg (202 lb 1.6 oz)   20 101.1 kg (222 lb 14.4 oz)   20 96.7 kg (213 lb 1.6 oz)   20 99.1 kg (218 lb 8 oz)          Data:          Lab Results   Component Value Date     2021     2021     2021        Lab Results   Component Value Date    CHLORIDE 101 2021    CHLORIDE 100 2021    CHLORIDE 97 2021      Lab Results   Component Value Date    BUN 99 2021     2021     2021      Lab Results   Component Value Date    POTASSIUM 3.4 2021    POTASSIUM 3.8 2021    POTASSIUM 4.1 2021    Lab Results   Component Value Date    CO2 40 2021    CO2 38 2021    CO2 42 2021    Lab Results   Component Value Date    CR 2.93 2021    CR 3.24 2021    CR 3.62 2021        Recent Labs   Lab Test 21  0738 21  0806 21  0645   WBC 8.7 8.2 9.1   HGB  8.4* 8.3* 8.4*   HCT 28.6* 27.7* 27.9*   MCV 95 95 94    211 210     Recent Labs   Lab Test 01/12/21  0806 12/31/20  0726 12/29/20  1523   AST 15 18 10   ALT 21 18 22   ALKPHOS 61 68 73   BILITOTAL 0.3 0.4 0.3       Recent Labs   Lab Test 01/13/21  0738 01/12/21  0806 01/10/21  0735   MAG 2.4* 2.8* 2.3     Recent Labs   Lab Test 01/12/21  0806 01/10/21  0735 01/09/21  0814   PHOS 3.4 4.7* 4.9*     Recent Labs   Lab Test 01/13/21  0738 01/12/21  0806 01/11/21  0645   EDNA 9.1 9.7 10.2*       Lab Results   Component Value Date    EDNA 9.1 01/13/2021     Lab Results   Component Value Date    WBC 8.7 01/13/2021    HGB 8.4 (L) 01/13/2021    HCT 28.6 (L) 01/13/2021    MCV 95 01/13/2021     01/13/2021     Lab Results   Component Value Date     01/13/2021    POTASSIUM 3.4 01/13/2021    CHLORIDE 101 01/13/2021    CO2 40 (H) 01/13/2021     (H) 01/13/2021     Lab Results   Component Value Date    BUN 99 (H) 01/13/2021    CR 2.93 (H) 01/13/2021     Lab Results   Component Value Date    MAG 2.4 (H) 01/13/2021     Lab Results   Component Value Date    PHOS 3.4 01/12/2021       Creatinine   Date Value Ref Range Status   01/13/2021 2.93 (H) 0.52 - 1.04 mg/dL Final   01/12/2021 3.24 (H) 0.52 - 1.04 mg/dL Final   01/11/2021 3.62 (H) 0.52 - 1.04 mg/dL Final   01/10/2021 3.85 (H) 0.52 - 1.04 mg/dL Final   01/09/2021 4.00 (H) 0.52 - 1.04 mg/dL Final   01/08/2021 4.01 (H) 0.52 - 1.04 mg/dL Final       Attestation:  I have reviewed today's vital signs, notes, medications, labs and imaging.     Julián Perdomo MD

## 2021-01-13 NOTE — PROVIDER NOTIFICATION
"MD notified of pt's vital signs per order: \"Just notifying you per order, pt's bp 118/30, temp 99.5 axillary, HR 54, 92% on BiPap, RR 20s.\"  "

## 2021-01-13 NOTE — PROGRESS NOTES
[facility]    Pulmonary, Allergy, Critical Care and Sleep Medicine    Follow-up Note  01/13/2021    Assessment and Recommendations:    Ирина Yanez is a 85 year old female with a history of chronic pain, anxiety, depression, TERESSA on CPAP, spinal stenosis, peripheral neuropathy, HTN, HLD, DMII, and CKD IV who presented on 12/29 with shortness of breath, found to have probable diastolic CHF exacerbation, but with course complicated by recurrent effusions requiring thoracentesis, in the setting of recent admission for pneumonia, with new paroxysmal atrial fibrillation. Now improving.      Acute hypoxic respiratory failure, diastolic CHF exacerbation, fluid overload, recurrent pleural effusions, new paroxysmal atrial fibrillation: effusions are most likely secondary to fluid overload, continue to appear to be a transudate based on protein and no growth on cultures. BNP yesterday markedly elevated, higher than earlier in admission. Remains afebrile without signs of infection. Antibiotics were stopped on 1/11. A fib with RVR may be contributing to CHF. It is unclear what her baseline pulmonary function was, as she endorsed significant dyspnea even before she ever was ill with pneumonia last month (could only walk across a room). She likely has baseline deconditioning contributing as well. However, she is improving overall, and oxygen use is decreasing.   -Would resume diuresis, appreciate nephrology assistance, can be low dose as not clinically overloaded on exam.   -incentive spirometry   -continue to control atrial fibrillation  -encourage good nutrition, may need supplements   -may benefit from cardiology consultation.   -Continue to wean oxygen as able, slowly improving, now on 4 LPM  -may benefit from acetazolamide 250 mg BID for 2 doses given elevating bicarbonate.      Recent RUL pneumonia: s/p course of Unasyn and Augmentin. Improved and nearly resolved on admission CT.   -No evidence of  recurrance  -monitor     TERESSA, with complex sleep apnea: sleep study in 2016 reviewed.   -home settings seem to be ASV mode, EPAP 4-6, PS 0-15, max pressure 25, continue nightly  -She can use her home machine (she now has at bedside) which should not need any setting adjustment.     Pulmonary will sign off, please call if additional questions arise. We are in house at [facility] on M/W/F. For assistance on other days, please page the on-call pulmonologist through [facility] or the .      Vinayak Joshua MD  Pulmonary and Critical Care       Subjective, Interval history:   Slowly improving oxygen. Cr improving. Feels she is doing better. No shortness of breath. No cough, congestion, chest pain, or palpitations. No fevers. Mild chills at times. No LE edema. Eating well. No nausea or vomiting, but still having some diarrhea. Walking around the halls yesterday got senior care down the cardenas before feeling fatigued and having to stop- unclear if this was just fatigue, or fatigue plus dyspnea. Brought in home CPAP machine.    Objective:   Medications:    apixaban ANTICOAGULANT  2.5 mg Oral BID     calcium acetate  1,334 mg Oral TID w/meals     diltiazem ER COATED BEADS  180 mg Oral BID     gabapentin  100 mg Oral Daily     insulin aspart  1-7 Units Subcutaneous TID AC     insulin aspart  1-5 Units Subcutaneous At Bedtime     metoprolol  5 mg Intravenous Once     senna-docusate  1 tablet Oral BID    Or     senna-docusate  2 tablet Oral BID     sertraline  50 mg Oral Daily     simvastatin  10 mg Oral At Bedtime     sodium chloride (PF)  10 mL Intracatheter Q8H     sodium chloride (PF)  3 mL Intracatheter Q8H     acetaminophen, albuterol, glucose **OR** dextrose **OR** glucagon, diphenhydrAMINE, EPINEPHrine, famotidine, hydrALAZINE, lidocaine 4%, lidocaine (buffered or not buffered), melatonin, methylPREDNISolone, nitroGLYcerin, ondansetron **OR** ondansetron, - MEDICATION INSTRUCTIONS -, polyethylene glycol, sodium chloride  (PF), sodium chloride (PF)    Physical Exam:  Temp:  [98  F (36.7  C)-99.5  F (37.5  C)] 98.4  F (36.9  C)  Pulse:  [55-95] 66  Resp:  [15-20] 20  BP: (115-143)/(30-50) 142/43  FiO2 (%):  [45 %-55 %] 55 %  SpO2:  [92 %-98 %] 96 %    Intake/Output Summary (Last 24 hours) at 1/13/2021 0950  Last data filed at 1/13/2021 0852  Gross per 24 hour   Intake 1200 ml   Output 475 ml   Net 725 ml     General: Sitting up in chair, NAD, appears improved.   HEENT: anicteric, moist mucosa  Neck: no palpable lymphadenopathy  Chest: bibasilar crackles continue. Normal WOB, on 4 LPM. No wheeze.   Cardiac: RRR, soft systolic murmur.   Abdomen: Soft, flat, non tender, active BS  Extremities: No LE Edema, WWP  Neuro: A&Ox3, no focal deficits   Skin: no rash noted    Labs and imaging: All laboratory and imaging data personally reviewed.    Notable for:    Serum , t protein 6.9 on 1/11  Pleural fluid protein 3.0    1/12 BNP 5724 up from 2282 on 1/7 1/13: K 3.4 Cr 2.93 (from 3.24, from 3.62). Mag 2.4.   WBC 8.7, Hgb 8.4, plts 211.

## 2021-01-13 NOTE — PLAN OF CARE
To Do:  End of Shift Summary  For vital signs and complete assessments, please see documentation flowsheets.     Pertinent assessments: A&Ox4, VSS on 4L NC Oxymizer. SOB with activity.  Denies pain, Lungs diminished, Tele: A.fib.  Started on some diuretics. Using IS.    Major Shift Events: Started  diuretics   Treatment Plan: respiratory management, wean as able, fluid restriction.    Bedside Nurse: Robert Moffett RN

## 2021-01-13 NOTE — PROGRESS NOTES
[facility]  Hospitalist Progress Note  Frandy Julio MD 01/13/21    Reason for Stay (Diagnosis): Pleural effusions, CHF exacerbation, FABY         Assessment and Plan:      Summary of Stay: Ирина Yanez is a 85 year old female with PMH including TERESSA on CPAP, type II DM, CKD stage IV with baseline creatinine 2, neuropathy, MDD, anxiety, HTN, RBBB, anemia, mild aortic stenosis, obesity, and grade 1 diastolic dysfunction on TTE who presented with shortness of breath and hypoxia.  She was hospitalized here from 12/10 through 12/14 for pneumonia along with some bilateral pleural effusions.  She had a right upper lobe consolidative mass concerning for malignancy versus infection that was treated with antibiotics and repeat CT chest here that showed significant improvement of the consolidation, but ongoing large bilateral pleural effusions.  Diuretics have been held for 5 days prior to admission due to rising creatinine.  She was discharged on 1 L of oxygen.  She was admitted on 12/29/2020 for CHF exacerbation.  Initial labs showed elevated BNP and FABY.  She underwent bilateral thoracentesis of 800 mL removed on 12/30.  She was started on IV diuretics.  Initial labs showed FABY.  Initial improvement, then worsening shortness of breath and she requiring BiPAP.  Received 4 days antibiotics in case of pneumonia based on repeat chest x-ray, however this seems unlikely and they were stopped.  Diuretics then held due to worsening FABY.  Nephrology and pulmonology consulted.  Underwent repeat left-sided thoracentesis on 1/10 with improvement in shortness of breath.  Creatinine then improved with high-dose diuretics so likely component of cardiorenal syndrome.  Overall slowly improving, but still requiring oxygen.  She did develop A. fib with RVR while here and was started on Eliquis for this, rates better controlled on diltiazem.  PT and OT consulted and recommend TCU.    Problem List/Assessment and Plan:   Acute  hypoxemic respiratory failure, acute on chronic diastolic CHF:  Previous echo shows preserved EF, grade 1 diastolic dysfunction. Patient is on chronic torsemide 20 mg daily.   Suspect secondary to volume overload and large bilateral pleural effusions.  Recent hospitalization for pneumonia about 2 weeks ago.  Previous masslike consolidation substantially better on CT here, but had bilateral large pleural effusions.  No cough, fevers or evidence of new infection.  COVID-19 testing was negative.  Underwent bilateral thoracentesis by IR on 12/30 with 800 cc of fluid removed from both sides.  Transudative effusions.  Suspect secondary to volume overload.  She received IV diuretics initially although they were held given her significant renal failure.  She had worsening shortness of breath on 1/7 requiring up to 8 to 10 L of oxygen along with intermittent BiPAP. Chest x-ray showed persistent vascular congestion, elevated BNP.  Started back on IV diuretics.    -Nephrology assisting with diuretic dosage.  -Repeat CXR shows significant left sided pleural effusion. Repeat thoracentesis 1/10 with removal of 1L of fluid    -Chest x-ray showed increased consolidation in the left lower lobe.  Procalcitonin is only 0.28.  No fevers.  Started on IV ceftriaxone and doxycycline. This was likely due to the pleural effusion, does not appear infected.  Antibiotics subsequently stopped after 4 days  -Pulmonology consulted  -Restart torsemide at 10 mg daily  -Twice daily acetazolamide for 2 days due to significant elevated bicarb    FABY on CKD stage IV: Baseline creatinine around 2-2.2.  It was 4.3 on admission this time in the hospital.  She had low urine sodium. Received some IV diuretics initially.  Creatinine did not improve despite stopping her diuretics.  Possible that she might have suffered from ischemic tubular injury with a recent pneumonia and this is a new baseline.  -Nephrology following  -Patient was started back on  aggressive large doses of IV diuretics when her breathing worsened around 1/8, good response with Cr actually improving to 3.24, suspect certainly some component of cardio-renal syndrome. Follow creatinine closely, nephrology managing diuretics  -Creatinine improved, BMP tomorrow     A. fib with RVR:  This is a new diagnosis for the patient.  Discovered during this stay. No known history of A. fib.  Unclear if this is all triggered by the stress of current illness versus more longer standing.  Echo already completed earlier this admission.   -Patient is on diltiazem at baseline which has been resumed.  HR better controlled now.  -Discussed risk and benefit of anticoagulation with the patient.  She has an elevated [id number] score with her age, gender, hypertension, CHF.  She is in agreement with using Eliquis.   -Eliquis 2.5 mg twice daily.     HTN:  PTA on diltiazem 180 mg twice daily, hydralazine 100 mg 3 times daily, and Cardura 1 mg at bedtime.  -Stopped hydralazine and Cardura for now to avoid hypotension with diuretics   -Resumed diltiazem     Type II DM: PTA on glimepiride 1 mg daily and Actos.  Actos discontinued given CHF.    -Continue medium dose sliding scale insulin, likely back on glimepiride and discharge     Anemia of chronic disease:  received IV iron here along with Aranesp.     TERESSA: Home CPAP now brought in, she will use this at night.      MDD, anxiety: Sertraline resumed.     Peripheral neuropathy: Gabapentin resumed, but dose lowered from 200 mg twice daily 200 mg daily based on renal dysfunction.    Physical deconditioning: PT and OT recommending TCU.    Obesity: BMI 39.  Some of this was fluid weight.    HLD: Resume simvastatin.    RBBB: Not new.    DVT Prophylaxis: Eliquis  Code Status: Full Code  FEN: Renal diet, 2 L fluid restriction  Discharge Dispo: TCU, referral sent.  PT/OT/SW consulted  Estimated Disch Date / # of Days until Disch: Likely okay for discharge by Friday        Interval  "History (Subjective):      Assumed care today.  Known to me from admission.  No acute events overnight.  Still on 4-5 L of oxygen via nasal cannula.  Slight shortness of breath with movement from the bed to the chair.  Occasional cough in the morning that is nonproductive.  Afebrile overnight.  Denies any chest pain.  No lower extremity swelling.                  Physical Exam:      Last Vital Signs:  BP (!) 142/43 (BP Location: Right arm)   Pulse 66   Temp 98.4  F (36.9  C) (Oral)   Resp 20   Ht 1.626 m (5' 4\")   Wt 91.7 kg (202 lb 1.6 oz)   LMP  (LMP Unknown)   SpO2 96%   BMI 34.69 kg/m        Intake/Output Summary (Last 24 hours) at 1/13/2021 1338  Last data filed at 1/13/2021 1327  Gross per 24 hour   Intake 1440 ml   Output 475 ml   Net 965 ml       Constitutional: Awake, NAD   Eyes: sclera white   HEENT:  MMM  Respiratory: Crackles throughout the left lung field, few right basilar crackles, no wheeze, decreased basilar breath sounds at the bases    Cardiovascular: RRR.  1/6 systolic murmur  GI: non-tender, not distended, bowel sounds present  Skin: no rash   Musculoskeletal/extremities: Only trace lower extremity edema with wrinkling of the skin  Neurologic: A&O   Psychiatric: calm, cooperative          Medications:      All current medications were reviewed with changes reflected in problem list.         Data:      All new lab and imaging data was reviewed.   Labs:  Recent Labs   Lab 01/13/21  0738 01/12/21  0806 01/11/21 2035 01/11/21  0645    141  --  142   POTASSIUM 3.4 3.8 4.1 3.2*   CHLORIDE 101 100  --  97   CO2 40* 38*  --  42*   ANIONGAP <1* 3  --  3   * 152*  --  162*   BUN 99* 100*  --  111*   CR 2.93* 3.24*  --  3.62*   GFRESTIMATED 14* 12*  --  11*   GFRESTBLACK 16* 14*  --  13*   EDNA 9.1 9.7  --  10.2*     Recent Labs   Lab 01/13/21  0738 01/12/21  0806 01/11/21  0645   WBC 8.7 8.2 9.1   HGB 8.4* 8.3* 8.4*   HCT 28.6* 27.7* 27.9*   MCV 95 95 94    211 210     Recent " Labs   Lab 01/13/21  0941 01/13/21  0738 01/13/21  0248 01/12/21  2130 01/12/21  1759 01/12/21  1150 01/12/21  0806 01/12/21  0806 01/11/21  0645 01/11/21  0645 01/10/21  0735 01/10/21  0735 01/09/21  0814 01/09/21  0814   GLC  --  180*  --   --   --   --   --  152*  --  162*  --  150*  --  145*   *  --  152* 141* 208* 176*   < >  --    < >  --    < >  --    < >  --     < > = values in this interval not displayed.      Imaging:   None today      Frandy Julio MD

## 2021-01-14 ENCOUNTER — APPOINTMENT (OUTPATIENT)
Dept: PHYSICAL THERAPY | Facility: CLINIC | Age: 86
DRG: 291 | End: 2021-01-14
Payer: COMMERCIAL

## 2021-01-14 ENCOUNTER — APPOINTMENT (OUTPATIENT)
Dept: OCCUPATIONAL THERAPY | Facility: CLINIC | Age: 86
DRG: 291 | End: 2021-01-14
Payer: COMMERCIAL

## 2021-01-14 LAB
ANION GAP SERPL CALCULATED.3IONS-SCNC: 1 MMOL/L (ref 3–14)
BUN SERPL-MCNC: 88 MG/DL (ref 7–30)
CALCIUM SERPL-MCNC: 9.2 MG/DL (ref 8.5–10.1)
CHLORIDE SERPL-SCNC: 102 MMOL/L (ref 94–109)
CO2 SERPL-SCNC: 40 MMOL/L (ref 20–32)
CREAT SERPL-MCNC: 2.8 MG/DL (ref 0.52–1.04)
GFR SERPL CREATININE-BSD FRML MDRD: 15 ML/MIN/{1.73_M2}
GLUCOSE BLDC GLUCOMTR-MCNC: 154 MG/DL (ref 70–99)
GLUCOSE BLDC GLUCOMTR-MCNC: 165 MG/DL (ref 70–99)
GLUCOSE BLDC GLUCOMTR-MCNC: 179 MG/DL (ref 70–99)
GLUCOSE BLDC GLUCOMTR-MCNC: 199 MG/DL (ref 70–99)
GLUCOSE BLDC GLUCOMTR-MCNC: 204 MG/DL (ref 70–99)
GLUCOSE BLDC GLUCOMTR-MCNC: 257 MG/DL (ref 70–99)
GLUCOSE SERPL-MCNC: 169 MG/DL (ref 70–99)
PLATELET # BLD AUTO: 204 10E9/L (ref 150–450)
POTASSIUM SERPL-SCNC: 3.5 MMOL/L (ref 3.4–5.3)
SODIUM SERPL-SCNC: 143 MMOL/L (ref 133–144)

## 2021-01-14 PROCEDURE — 250N000013 HC RX MED GY IP 250 OP 250 PS 637: Performed by: INTERNAL MEDICINE

## 2021-01-14 PROCEDURE — 999N001017 HC STATISTIC GLUCOSE BY METER IP

## 2021-01-14 PROCEDURE — 85049 AUTOMATED PLATELET COUNT: CPT | Performed by: INTERNAL MEDICINE

## 2021-01-14 PROCEDURE — 99233 SBSQ HOSP IP/OBS HIGH 50: CPT | Performed by: INTERNAL MEDICINE

## 2021-01-14 PROCEDURE — 250N000012 HC RX MED GY IP 250 OP 636 PS 637: Performed by: INTERNAL MEDICINE

## 2021-01-14 PROCEDURE — 97530 THERAPEUTIC ACTIVITIES: CPT | Mod: GP

## 2021-01-14 PROCEDURE — 120N000001 HC R&B MED SURG/OB

## 2021-01-14 PROCEDURE — 80048 BASIC METABOLIC PNL TOTAL CA: CPT | Performed by: INTERNAL MEDICINE

## 2021-01-14 PROCEDURE — 97535 SELF CARE MNGMENT TRAINING: CPT | Mod: GO | Performed by: OCCUPATIONAL THERAPIST

## 2021-01-14 PROCEDURE — 36415 COLL VENOUS BLD VENIPUNCTURE: CPT | Performed by: INTERNAL MEDICINE

## 2021-01-14 PROCEDURE — 97110 THERAPEUTIC EXERCISES: CPT | Mod: GP

## 2021-01-14 RX ORDER — TORSEMIDE 20 MG/1
20 TABLET ORAL DAILY
Status: DISCONTINUED | OUTPATIENT
Start: 2021-01-15 | End: 2021-01-15 | Stop reason: HOSPADM

## 2021-01-14 RX ORDER — HYDRALAZINE HYDROCHLORIDE 25 MG/1
25 TABLET, FILM COATED ORAL 3 TIMES DAILY
Status: DISCONTINUED | OUTPATIENT
Start: 2021-01-14 | End: 2021-01-15 | Stop reason: HOSPADM

## 2021-01-14 RX ADMIN — CALCIUM ACETATE 1334 MG: 667 CAPSULE ORAL at 13:37

## 2021-01-14 RX ADMIN — DILTIAZEM HYDROCHLORIDE 180 MG: 180 CAPSULE, COATED, EXTENDED RELEASE ORAL at 09:21

## 2021-01-14 RX ADMIN — SERTRALINE HYDROCHLORIDE 50 MG: 50 TABLET ORAL at 09:21

## 2021-01-14 RX ADMIN — ACETAZOLAMIDE 250 MG: 125 TABLET ORAL at 09:21

## 2021-01-14 RX ADMIN — CALCIUM ACETATE 1334 MG: 667 CAPSULE ORAL at 17:23

## 2021-01-14 RX ADMIN — APIXABAN 2.5 MG: 2.5 TABLET, FILM COATED ORAL at 09:21

## 2021-01-14 RX ADMIN — SIMVASTATIN 10 MG: 10 TABLET, FILM COATED ORAL at 22:12

## 2021-01-14 RX ADMIN — ACETAZOLAMIDE 250 MG: 125 TABLET ORAL at 15:45

## 2021-01-14 RX ADMIN — HYDRALAZINE HYDROCHLORIDE 25 MG: 25 TABLET, FILM COATED ORAL at 09:21

## 2021-01-14 RX ADMIN — INSULIN GLARGINE 5 UNITS: 100 INJECTION, SOLUTION SUBCUTANEOUS at 22:12

## 2021-01-14 RX ADMIN — TORSEMIDE 10 MG: 10 TABLET ORAL at 09:21

## 2021-01-14 RX ADMIN — APIXABAN 2.5 MG: 2.5 TABLET, FILM COATED ORAL at 22:12

## 2021-01-14 RX ADMIN — HYDRALAZINE HYDROCHLORIDE 25 MG: 25 TABLET, FILM COATED ORAL at 15:45

## 2021-01-14 RX ADMIN — HYDRALAZINE HYDROCHLORIDE 25 MG: 25 TABLET, FILM COATED ORAL at 22:12

## 2021-01-14 RX ADMIN — GABAPENTIN 100 MG: 100 CAPSULE ORAL at 09:21

## 2021-01-14 RX ADMIN — CALCIUM ACETATE 1334 MG: 667 CAPSULE ORAL at 09:21

## 2021-01-14 ASSESSMENT — ACTIVITIES OF DAILY LIVING (ADL)
ADLS_ACUITY_SCORE: 17
ADLS_ACUITY_SCORE: 19
ADLS_ACUITY_SCORE: 17
ADLS_ACUITY_SCORE: 19

## 2021-01-14 ASSESSMENT — MIFFLIN-ST. JEOR: SCORE: 1370.78

## 2021-01-14 NOTE — PLAN OF CARE
To Do:  End of Shift Summary  For vital signs and complete assessments, please see documentation flowsheets.     Pertinent assessments: VSS.  Denies pain, weaned down to 3 L oxygen.  Has some SOB with activity. Ambulates assist of one with walker. Using home CPAP at night.  Will discharge tomorrow.   Major Shift Events: uneventful.  Treatment Plan: respiratory management, I/Os, discharge to TCU  1/15  Bedside Nurse: Robert Moffett RN

## 2021-01-14 NOTE — PROGRESS NOTES
"SPIRITUAL [facility] SERVICES Progress Note  RH  Med. Surg. 5    Saw pt Ирина per her length of stay.  Offered reflective conversation to facilitate the processing of thoughts and feelings.  Ирина reported that she is discharging tomorrow to a TCU and expressed some concern about amisha COVID there but then addedd she will be quarantined.    She named her son and spouse as being core to her support network.  Ирина is Mu-ism but not affiliated with a Yazdanism.  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  Ирина reflected that she agapito by \"taking it one day at a time.\"      Ирина shared that her daughter  of complications related to a brain tumor about ten years ago and reflected that her \"strong constitution\" and family support helped her cope through the difficult years after her daughter's death.  She shared that [place] helped her face difficult situations.    Ирина shared aspects of her work history in .  At home, retired, she enjoys sewing and \"playing on the computer.\"    Provided emotional support through reflective listening and validation of feelings and experience.     Oriented her to [facility].  This author and other chaplains remain available per pt/family request.    Wilton Abreu M.Div., [facility]  Staff   Phone [phone]    "

## 2021-01-14 NOTE — PROGRESS NOTES
[facility]  Hospitalist Progress Note  Frandy Julio MD 01/14/21    Reason for Stay (Diagnosis): Pleural effusions, CHF exacerbation, FAYB         Assessment and Plan:      Summary of Stay: Ирина Yanez is a 85 year old female with PMH including TERESSA on CPAP, type II DM, CKD stage IV with baseline creatinine 2, neuropathy, MDD, anxiety, HTN, RBBB, anemia, mild aortic stenosis, obesity, and grade 1 diastolic dysfunction on TTE who presented with shortness of breath and hypoxia.  She was hospitalized here from 12/10 through 12/14 for pneumonia along with some bilateral pleural effusions.  She had a right upper lobe consolidative mass concerning for malignancy versus infection that was treated with antibiotics and repeat CT chest here that showed significant improvement of the consolidation, but ongoing large bilateral pleural effusions.  Diuretics have been held for 5 days prior to admission due to rising creatinine.  She was discharged on 1 L of oxygen.  She was admitted on 12/29/2020 for CHF exacerbation.  Initial labs showed elevated BNP and FABY.  She underwent bilateral thoracentesis of 800 mL removed on 12/30.  She was started on IV diuretics.  Initial labs showed FABY.  Initial improvement, then worsening shortness of breath and she requiring BiPAP.  Received 4 days antibiotics in case of pneumonia based on repeat chest x-ray, however this seems unlikely and they were stopped.  Diuretics then held due to worsening FABY.  Nephrology and pulmonology consulted.  Underwent repeat left-sided thoracentesis on 1/10 with improvement in shortness of breath.  Creatinine then improved with high-dose diuretics so likely component of cardiorenal syndrome.  Overall slowly improving, but still requiring oxygen.  She did develop A. fib with RVR while here and was started on Eliquis for this, rates better controlled on diltiazem.  PT and OT consulted and recommend TCU.    Problem List/Assessment and Plan:   Acute  hypoxemic respiratory failure, acute on chronic diastolic CHF:  Previous echo shows preserved EF, grade 1 diastolic dysfunction. Patient is on chronic torsemide 20 mg daily.   Suspect secondary to volume overload and large bilateral pleural effusions.  Recent hospitalization for pneumonia about 2 weeks ago.  Previous masslike consolidation substantially better on CT here, but had bilateral large pleural effusions.  No cough, fevers or evidence of new infection.  COVID-19 testing was negative.  Underwent bilateral thoracentesis by IR on 12/30 with 800 cc of fluid removed from both sides.  Transudative effusions.  Suspect secondary to volume overload.  She received IV diuretics initially although they were held given her significant renal failure.  She had worsening shortness of breath on 1/7 requiring up to 8 to 10 L of oxygen along with intermittent BiPAP. Chest x-ray showed persistent vascular congestion, elevated BNP.  Started back on IV diuretics.    -Nephrology assisting with diuretic dosage.  -Repeat CXR shows significant left sided pleural effusion. Repeat thoracentesis 1/10 with removal of 1L of fluid    -Chest x-ray showed increased consolidation in the left lower lobe.  Procalcitonin is only 0.28.  No fevers.  Started on IV ceftriaxone and doxycycline. This was likely due to the pleural effusion, does not appear infected.  Antibiotics subsequently stopped after 4 days  -Pulmonology consulted  -Continue torsemide at 10 mg daily, will consider increasing to previous 20 mg daily tomorrow pending labs  -Twice daily acetazolamide again today due to significant elevated bicarb    FABY on CKD stage IV: Baseline creatinine around 2-2.2.  It was 4.3 on admission this time in the hospital.  She had low urine sodium. Received some IV diuretics initially.  Creatinine did not improve despite stopping her diuretics.  Possible that she might have suffered from ischemic tubular injury with a recent pneumonia and this is a  new baseline.  -Nephrology following  -Patient was started back on aggressive large doses of IV diuretics when her breathing worsened around 1/8, good response with Cr actually improving to 2.8, suspect certainly some component of cardio-renal syndrome. Follow creatinine closely, nephrology managing diuretics  -Creatinine improved, BMP tomorrow     A. fib with RVR:  This is a new diagnosis for the patient.  Discovered during this stay. No known history of A. fib.  Unclear if this is all triggered by the stress of current illness versus more longer standing.  Echo already completed earlier this admission.   -Patient is on diltiazem at baseline which has been resumed.  HR better controlled now.  -Discussed risk and benefit of anticoagulation with the patient.  She has an elevated [id number] score with her age, gender, hypertension, CHF.  She is in agreement with using Eliquis.   -Eliquis 2.5 mg twice daily.     HTN:  PTA on diltiazem 180 mg twice daily, hydralazine 100 mg 3 times daily, and Cardura 1 mg at bedtime.  -Hold Cardura for now to avoid hypotension with diuretics   -Resumed diltiazem  -Restart hydralazine at decreased dose 25 mg 3 times daily today     Type II DM: PTA on glimepiride 1 mg daily and Actos.  Actos discontinued given CHF.    -Continue medium dose sliding scale insulin  -Given significantly impaired renal function add Lantus 5 units at bedtime instead of restarting glimepiride     Anemia of chronic disease:  received IV iron here along with Aranesp.     TERESSA: Home CPAP now brought in, she will use this at night.      MDD, anxiety: Sertraline resumed.     Peripheral neuropathy: Gabapentin resumed, but dose lowered from 200 mg twice daily 200 mg daily based on renal dysfunction.    Physical deconditioning: PT and OT recommending TCU.    Obesity: BMI 39.  Some of this was fluid weight.    HLD: Resume simvastatin.    RBBB: Not new.    DVT Prophylaxis: Eliquis  Code Status: Full Code  FEN: Renal  "diet, 2 L fluid restriction  Discharge Dispo: TCU, referrals sent.  PT/OT/SW consulted  Estimated Disch Date / # of Days until Disch: Discharged to TCU tomorrow    Patient requesting I update son Chaim, but she does not have his number and is now the demographics tab.  She will let the nurses know when she gets Chaim's number.        Interval History (Subjective):      Patient used her home CPAP last night and she slept much better.  Has continued on 4 L.  Denies shortness of breath this morning.  No cough.                  Physical Exam:      Last Vital Signs:  BP (!) (P) 113/31 (BP Location: Right arm)   Pulse (P) 62   Temp (P) 97.5  F (36.4  C) (Oral)   Resp (P) 20   Ht 1.626 m (5' 4\")   Wt 93.1 kg (205 lb 3.2 oz)   LMP  (LMP Unknown)   SpO2 (P) 97%   BMI 35.22 kg/m      Intake/Output Summary (Last 24 hours) at 1/14/2021 1245  Last data filed at 1/14/2021 0939  Gross per 24 hour   Intake 720 ml   Output 375 ml   Net 345 ml       Constitutional: Awake, NAD   Eyes: sclera white   HEENT:  MMM  Respiratory: Crackles left base, no wheeze,decreased basilar breath sounds at the bases    Cardiovascular: RRR.  1/6 systolic murmur  GI: non-tender, not distended, bowel sounds present  Skin: no rash   Musculoskeletal/extremities: Only trace lower extremity edema with wrinkling of the skin  Neurologic: A&O   Psychiatric: calm, cooperative          Medications:      All current medications were reviewed with changes reflected in problem list.         Data:      All new lab and imaging data was reviewed.   Labs:  Recent Labs   Lab 01/14/21  0548 01/13/21  0738 01/12/21  0806    141 141   POTASSIUM 3.5 3.4 3.8   CHLORIDE 102 101 100   CO2 40* 40* 38*   ANIONGAP 1* <1* 3   * 180* 152*   BUN 88* 99* 100*   CR 2.80* 2.93* 3.24*   GFRESTIMATED 15* 14* 12*   GFRESTBLACK 17* 16* 14*   EDNA 9.2 9.1 9.7       Recent Labs   Lab 01/14/21  1230 01/14/21  0851 01/14/21  0548 01/14/21  0157 01/13/21  2121 01/13/21  1802 " 01/13/21  0738 01/13/21  0738 01/12/21  0806 01/12/21  0806 01/11/21  0645 01/11/21  0645 01/10/21  0735 01/10/21  0735   GLC  --   --  169*  --   --   --   --  180*  --  152*  --  162*  --  150*   * 179*  --  154* 198* 157*   < >  --    < >  --    < >  --    < >  --     < > = values in this interval not displayed.      Imaging:   None today      Frandy Julio MD

## 2021-01-14 NOTE — PLAN OF CARE
End of Shift Summary  For vital signs and complete assessments, please see documentation flowsheets.     Pertinent assessments: A&Ox4, VSS.  Denies pain and nausea.  GARAY, CPap for sleep with 4L O2.   Lung sounds dimmished. Tolerating renal diet and 2L fluid restriction. Up with SBA and a walker.    Major Shift Events: uneventful.  Treatment Plan: respiratory management, I/Os, discharge to TCU 1/14 or 1/15  Bedside Nurse: Summer Masterson RN

## 2021-01-14 NOTE — PROGRESS NOTES
Your information has been submitted on January 14th, 2021 at 03:20:43 PM CST. The confirmation number is [id number]    Amanda Langley  Care Management Coordinator  [facility]  [phone]

## 2021-01-14 NOTE — PLAN OF CARE
Pertinent assessments: A&Ox4, denies pain during shift, VSS but diastolic pressures soft. MDs aware. Lung sounds dimished with some crackles. Cardiac sinus rhythm, bundle branch block per tele tech. Abdomen soft. Tolerating renal diet and 2L fluid restriction. Up with SBA and a walker.  Major Shift Events: per pulmonology, able to use home BiPap machine this evening.  Treatment Plan: respiratory management, I/Os, discharge to TCU 1/14 or 1/15

## 2021-01-14 NOTE — PROGRESS NOTES
Care Management Discharge Note    Discharge Date: 01/15/21       Discharge Disposition: Skilled Nursing Facilty    Discharge Services:  Rehab     Discharge DME:  Pt will bring CPAP     Discharge Transportation: family or friend will provide    Private pay costs discussed: transportation costs    Education Provided on the Discharge Plan:  Yes   Persons Notified of Discharge Plans: pt  Patient/Family in Agreement with the Plan:yes          Additional Information:  Pt aware of bed at [facility] for tomorrow. Sw did obtain new [employer] auth. Sent to [facility] for their admissoin for tomorrow  Pt will call spouse to ask him to provide transport for tomorrow @ 1600    PT will need o2 tank for transport            Corinne C. White, LSW

## 2021-01-15 ENCOUNTER — HEALTH MAINTENANCE LETTER (OUTPATIENT)
Age: 86
End: 2021-01-15

## 2021-01-15 VITALS
DIASTOLIC BLOOD PRESSURE: 46 MMHG | RESPIRATION RATE: 20 BRPM | HEIGHT: 64 IN | HEART RATE: 63 BPM | BODY MASS INDEX: 35.22 KG/M2 | SYSTOLIC BLOOD PRESSURE: 131 MMHG | OXYGEN SATURATION: 94 % | TEMPERATURE: 98.4 F | WEIGHT: 206.3 LBS

## 2021-01-15 PROBLEM — I48.0 PAROXYSMAL ATRIAL FIBRILLATION (H): Status: ACTIVE | Noted: 2021-01-15

## 2021-01-15 LAB
ANION GAP SERPL CALCULATED.3IONS-SCNC: 2 MMOL/L (ref 3–14)
BACTERIA SPEC CULT: NO GROWTH
BUN SERPL-MCNC: 85 MG/DL (ref 7–30)
CALCIUM SERPL-MCNC: 9 MG/DL (ref 8.5–10.1)
CHLORIDE SERPL-SCNC: 103 MMOL/L (ref 94–109)
CO2 SERPL-SCNC: 37 MMOL/L (ref 20–32)
CREAT SERPL-MCNC: 2.85 MG/DL (ref 0.52–1.04)
GFR SERPL CREATININE-BSD FRML MDRD: 15 ML/MIN/{1.73_M2}
GLUCOSE BLDC GLUCOMTR-MCNC: 170 MG/DL (ref 70–99)
GLUCOSE BLDC GLUCOMTR-MCNC: 178 MG/DL (ref 70–99)
GLUCOSE BLDC GLUCOMTR-MCNC: 281 MG/DL (ref 70–99)
GLUCOSE SERPL-MCNC: 171 MG/DL (ref 70–99)
Lab: NORMAL
POTASSIUM SERPL-SCNC: 3.4 MMOL/L (ref 3.4–5.3)
SODIUM SERPL-SCNC: 141 MMOL/L (ref 133–144)
SPECIMEN SOURCE: NORMAL

## 2021-01-15 PROCEDURE — 80048 BASIC METABOLIC PNL TOTAL CA: CPT | Performed by: INTERNAL MEDICINE

## 2021-01-15 PROCEDURE — 250N000013 HC RX MED GY IP 250 OP 250 PS 637: Performed by: INTERNAL MEDICINE

## 2021-01-15 PROCEDURE — 99239 HOSP IP/OBS DSCHRG MGMT >30: CPT | Performed by: INTERNAL MEDICINE

## 2021-01-15 PROCEDURE — 36415 COLL VENOUS BLD VENIPUNCTURE: CPT | Performed by: INTERNAL MEDICINE

## 2021-01-15 PROCEDURE — 999N001017 HC STATISTIC GLUCOSE BY METER IP

## 2021-01-15 RX ORDER — ACETAMINOPHEN 325 MG/1
650 TABLET ORAL EVERY 4 HOURS PRN
DISCHARGE
Start: 2021-01-15 | End: 2023-02-22

## 2021-01-15 RX ORDER — POLYETHYLENE GLYCOL 3350 17 G/17G
17 POWDER, FOR SOLUTION ORAL DAILY PRN
Qty: 510 G | DISCHARGE
Start: 2021-01-15 | End: 2021-10-29

## 2021-01-15 RX ORDER — HYDRALAZINE HYDROCHLORIDE 25 MG/1
25 TABLET, FILM COATED ORAL 3 TIMES DAILY PRN
Status: ON HOLD | DISCHARGE
Start: 2021-01-15 | End: 2021-02-02

## 2021-01-15 RX ORDER — GABAPENTIN 100 MG/1
100 CAPSULE ORAL 2 TIMES DAILY
Qty: 360 CAPSULE | Refills: 1 | DISCHARGE
Start: 2021-01-15 | End: 2021-05-14

## 2021-01-15 RX ORDER — CALCIUM ACETATE 667 MG/1
667 CAPSULE ORAL
DISCHARGE
Start: 2021-01-15 | End: 2021-02-03

## 2021-01-15 RX ADMIN — HYDRALAZINE HYDROCHLORIDE 25 MG: 25 TABLET, FILM COATED ORAL at 09:12

## 2021-01-15 RX ADMIN — CALCIUM ACETATE 1334 MG: 667 CAPSULE ORAL at 09:12

## 2021-01-15 RX ADMIN — GABAPENTIN 100 MG: 100 CAPSULE ORAL at 09:13

## 2021-01-15 RX ADMIN — SERTRALINE HYDROCHLORIDE 50 MG: 50 TABLET ORAL at 09:12

## 2021-01-15 RX ADMIN — CALCIUM ACETATE 1334 MG: 667 CAPSULE ORAL at 13:22

## 2021-01-15 RX ADMIN — TORSEMIDE 20 MG: 20 TABLET ORAL at 09:13

## 2021-01-15 RX ADMIN — DILTIAZEM HYDROCHLORIDE 180 MG: 180 CAPSULE, COATED, EXTENDED RELEASE ORAL at 09:12

## 2021-01-15 RX ADMIN — APIXABAN 2.5 MG: 2.5 TABLET, FILM COATED ORAL at 09:12

## 2021-01-15 ASSESSMENT — ACTIVITIES OF DAILY LIVING (ADL)
ADLS_ACUITY_SCORE: 19

## 2021-01-15 ASSESSMENT — MIFFLIN-ST. JEOR: SCORE: 1375.77

## 2021-01-15 NOTE — DISCHARGE SUMMARY
[facility]  Discharge Summary  Name: Ирина Yanez    MRN: [id number]  YOB: 1935    Age: 85 year old  Date of Discharge:  1/15/2021  Date of Admission: 12/29/2020  Primary Care Provider: Yuridia Villarreal  Discharge Physician:  Frandy Julio MD  Discharging Service:  Hospitalist      Discharge Diagnoses:  Acute hypoxemic respiratory failure  Acute on chronic diastolic CHF  Moderate bilateral pleural effusions  FABY on CKD stage IV  New A. fib with RVR  HTN  Type II DM  Anemia of chronic disease  TERESSA  MDD  Anxiety  Peripheral neuropathy  Physical deconditioning  Obesity  HLD  RBBB     Hospital Course:  Summary of Stay: Ирина Yanez is a 85 year old female with PMH including TERESSA on CPAP, type II DM, CKD stage IV with baseline creatinine 2, neuropathy, MDD, anxiety, HTN, RBBB, anemia, mild aortic stenosis, obesity, and grade 1 diastolic dysfunction on TTE who presented with shortness of breath and hypoxia.  She was hospitalized here from 12/10 through 12/14 for pneumonia along with some bilateral pleural effusions.  She had a right upper lobe consolidative mass concerning for malignancy versus infection that was treated with antibiotics and repeat CT chest here that showed significant improvement of the consolidation, but ongoing large bilateral pleural effusions.  Diuretics have been held for 5 days prior to admission due to rising creatinine.  She was discharged on 1 L of oxygen.  She was admitted on 12/29/2020 for CHF exacerbation.  Initial labs showed elevated BNP and FABY.  She underwent bilateral thoracentesis of 800 mL removed on 12/30.  She was started on IV diuretics.  Initial labs showed FABY.  Initial improvement, then worsening shortness of breath and she requiring BiPAP.  Received 4 days antibiotics in case of pneumonia based on repeat chest x-ray, however this seems unlikely and they were stopped.  Diuretics then held due to worsening FABY.  Nephrology and pulmonology consulted.   Underwent repeat left-sided thoracentesis on 1/10 with improvement in shortness of breath.  Creatinine then improved with high-dose diuretics so likely component of cardiorenal syndrome.  Overall slowly improving, but still requiring oxygen roughly 3-4 L via nasal cannula.  She did develop A. fib with RVR while here and was started on Eliquis for this, rates better controlled on diltiazem.  PT and OT consulted and recommend TCU.  Follow-up labs including hemoglobin and BMP in 3 days at TCU.  Follow-up with nephrology nurse practitioner in 2 to 3 weeks through [facility].     Problem List/Assessment and Plan:   Acute hypoxemic respiratory failure, acute on chronic diastolic CHF:  Previous echo shows preserved EF, grade 1 diastolic dysfunction. Patient is on chronic torsemide 20 mg daily.  Shortness of breath and hypoxia is suspect secondary to volume overload and large bilateral pleural effusions.  Recent hospitalization for pneumonia about 2 weeks ago.  Previous masslike consolidation substantially better on CT here, but had bilateral large pleural effusions.  No cough, fevers or evidence of new infection.  COVID-19 testing was negative.  Underwent bilateral thoracentesis by IR on 12/30 with 800 cc of fluid removed from both sides.  Transudative effusions.  Suspect secondary to volume overload.  She received IV diuretics initially although they were held given her significant renal failure.  She had worsening shortness of breath on 1/7 requiring up to 8 to 10 L of oxygen along with intermittent BiPAP. Chest x-ray showed persistent vascular congestion, elevated BNP.  Repeat CXR shows significant left sided pleural effusion. Repeat thoracentesis 1/10 with removal of 1L of fluid.  There was also increased consolidation in the left lower lobe so she received IV ceftriaxone and doxycycline for roughly 4 days, but procalcitonin low and no fevers so this was discontinued.  Pulmonology was consulted while here.   Agreed with diuresis.  Received 4 doses of acetazolamide for elevated bicarb in the setting of diuresis.  -Continue torsemide at 20 mg daily on discharge  -Needs daily weights at TCU  -Continue oxygen on discharge to TCU.  Currently needing 3-4 L, wean as able while there as she is not on oxygen prior to admission.  May need long-term oxygen.     FABY on CKD stage IV: Baseline creatinine around 2-2.2.  It was 4.3 on admission this time in the hospital.  She had low urine sodium. Received some IV diuretics initially.  Creatinine did not improve despite stopping her diuretics.  Possible that she might have suffered from ischemic tubular injury with a recent pneumonia and this is a new baseline.  Nephrology consulted while here.  -Patient was started back on aggressive large doses of IV diuretics when her breathing worsened around 1/8, good response with Cr actually improved to 2.8 but remains relatively stable at this perhaps new baseline, suspect certainly some component of cardio-renal syndrome.   -Recommend BMP check in 3 days at TCU  -Continue PhosLo 3 times daily  -Follow-up with nurse practitioner through [facility] nephrology clinic in 2 to 3 weeks     A. fib with RVR:  This is a new diagnosis for the patient.  Discovered during this stay. No known history of A. fib.  Unclear if this is all triggered by the stress of current illness versus more longer standing.  Echo already completed earlier this admission.   -Patient is on diltiazem at baseline which has been resumed.  HR better controlled now on home dose.  -Discussed risk and benefit of anticoagulation with the patient.  She has an elevated [id number] score with her age, gender, hypertension, CHF.  She is in agreement with using Eliquis.   -Eliquis 2.5 mg twice daily prescribed on discharge     HTN:  PTA on diltiazem 180 mg twice daily, hydralazine 100 mg 3 times daily, and Cardura 1 mg at bedtime.  -Resumed diltiazem  -Blood pressure  significantly softer while here especially diastolic down to the 30s at times.  Cardura has been discontinued.  Ordered hydralazine at 25 mg 3 times daily as needed for SBP above 140.  May need further titration while at TCU if blood pressures rising     Type II DM: PTA on glimepiride 1 mg daily and Actos.  Actos discontinued given CHF.  Renal function still significantly impaired and therefore have not restarted glimepiride.  Recommend starting with Lantus 8 units at bedtime along with a medium dose sliding scale insulin using aspart to begin with at TCU.  If renal function improved could consider restarting glimepiride, likely need higher dose given Actos has been discontinued.  Blood glucose has ranged mostly under 200 with current insulin regimen.     Anemia of chronic disease:  received IV iron here along with Aranesp.  Hemoglobin stable in the low to mid 8 range.     TERESSA: Home CPAP now brought in, she will use this at night at TCU with O2 bleed in at 3 L initially     MDD, anxiety: Sertraline resumed.     Peripheral neuropathy: Gabapentin resumed, but dose lowered from 200 mg twice daily to 100 mg twice daily based on renal dysfunction.     Physical deconditioning: PT and OT recommending TCU.     Obesity: BMI 39.  Some of this was fluid weight.     HLD: Resume simvastatin.     RBBB: Not new.    FEN: Renal diet, 2 L fluid restriction     Discharge Disposition:  Discharged to rehabilitation facility     Allergies:  Allergies   Allergen Reactions     Augmentin Diarrhea     Vomiting       Cleocin      Hives     Tegretol [Carbamazepine]      Irregular heart beat        Discharge Medications:   Current Discharge Medication List      START taking these medications    Details   acetaminophen (TYLENOL) 325 MG tablet Take 2 tablets (650 mg) by mouth every 4 hours as needed for mild pain  Qty:      Associated Diagnoses: Generalized pain      apixaban ANTICOAGULANT (ELIQUIS) 2.5 MG tablet Take 1 tablet (2.5 mg) by mouth 2  times daily  Qty:      Associated Diagnoses: Atrial fibrillation, unspecified type (H)      calcium acetate (PHOSLO) 667 MG CAPS capsule Take 1 capsule (667 mg) by mouth 3 times daily (with meals)    Associated Diagnoses: CKD (chronic kidney disease) stage 4, GFR 15-29 ml/min (H)      insulin aspart (NOVOLOG PEN) 100 UNIT/ML pen For Pre-Meal  - 189 give 1 unit. For Pre-Meal  - 239 give 2 units. For Pre-Meal  - 289 give 3 units. For Pre-Meal  - 339 give 4 units. For Pre-Meal - 399 give 5 units. For Pre-Meal -449 give 6 units For Pre-Meal BG greater than or equal to 450 give 7 units.    Associated Diagnoses: Type 2 diabetes mellitus with diabetic nephropathy, unspecified whether long term insulin use (H)      insulin glargine (LANTUS PEN) 100 UNIT/ML pen Inject 8 Units Subcutaneous At Bedtime    Comments: If Lantus is not covered by insurance, may substitute Basaglar at same dose and frequency.    Associated Diagnoses: Type 2 diabetes mellitus with diabetic nephropathy, unspecified whether long term insulin use (H)      polyethylene glycol (MIRALAX) 17 GM/Dose powder Take 17 g by mouth daily as needed for constipation  Qty: 510 g    Associated Diagnoses: Constipation, unspecified constipation type         CONTINUE these medications which have CHANGED    Details   gabapentin (NEURONTIN) 100 MG capsule Take 1 capsule (100 mg) by mouth 2 times daily  Qty: 360 capsule, Refills: 1    Associated Diagnoses: Diabetic polyneuropathy associated with type 2 diabetes mellitus (H)      hydrALAZINE (APRESOLINE) 25 MG tablet Take 1 tablet (25 mg) by mouth 3 times daily as needed (for SBP > 140)    Associated Diagnoses: Hypertension goal BP (blood pressure) < 140/90         CONTINUE these medications which have NOT CHANGED    Details   albuterol (PROAIR HFA/PROVENTIL HFA/VENTOLIN HFA) 108 (90 Base) MCG/ACT inhaler Inhale 2 puffs into the lungs 4 times daily  Qty: 1 Inhaler, Refills: 0    Comments:  Pharmacy may dispense brand covered by insurance (Proair, or proventil or ventolin or generic albuterol inhaler)  Associated Diagnoses: Dyspnea, unspecified type      diltiazem ER COATED BEADS (CARDIAZEM LA) 180 MG 24 hr tablet Take 180 mg by mouth 2 times daily   Refills: 3      fish oil-omega-3 fatty acids 1000 MG capsule Take 1 g by mouth daily      MULTI-VITAMIN OR TABS Take 2 tablets by mouth daily       sertraline (ZOLOFT) 50 MG tablet Take 1 tablet (50 mg) by mouth daily  Qty: 90 tablet, Refills: 2    Comments: Prescription sent 9/16. Pharmacy request 3 refills. Can have 2 refills.  Associated Diagnoses: Anxiety      simvastatin (ZOCOR) 10 MG tablet TAKE 1 TABLET BY MOUTH AT BEDTIME.  Qty: 90 tablet, Refills: 3    Associated Diagnoses: Hyperlipidemia with target LDL less than 100      blood glucose (NO BRAND SPECIFIED) lancets standard Use to test blood sugar 1 times daily or as directed, Contour Next lancets  Qty: 100 each, Refills: 3    Associated Diagnoses: Type 2 diabetes mellitus with diabetic neuropathy, without long-term current use of insulin (H); Type 2 diabetes mellitus with diabetic nephropathy, without long-term current use of insulin (H)      blood glucose (NO BRAND SPECIFIED) test strip Use to test blood sugar 1 times daily or as directed, Contour Next strips  Qty: 100 strip, Refills: 1    Associated Diagnoses: Type 2 diabetes mellitus with diabetic nephropathy, without long-term current use of insulin (H); Type 2 diabetes mellitus with diabetic neuropathy, without long-term current use of insulin (H)      blood glucose monitoring (NO BRAND SPECIFIED) meter device kit Use to test blood sugar 1 times daily or as directed. Ultra 2  Qty: 1 kit, Refills: 1    Associated Diagnoses: Type 2 diabetes mellitus with diabetic neuropathy, without long-term current use of insulin (H)      order for DME Equipment being ordered: walker. 4 wheeled with brakes and a seat.  Qty: 1 Device, Refills: 0    Associated  "Diagnoses: Spinal stenosis of lumbar region without neurogenic claudication      torsemide (DEMADEX) 10 MG tablet Take 20 mg by mouth daily         STOP taking these medications       doxazosin (CARDURA) 1 MG tablet Comments:   Reason for Stopping:         glimepiride (AMARYL) 1 MG tablet Comments:   Reason for Stopping:         nystatin (MYCOSTATIN) 680049 UNIT/GM external powder Comments:   Reason for Stopping:         pioglitazone (ACTOS) 15 MG tablet Comments:   Reason for Stopping:                Condition on Discharge:  Discharge condition: Stable   Discharge vitals: Blood pressure (!) 145/50, pulse 62, temperature 98.4  F (36.9  C), temperature source Oral, resp. rate 20, height 1.626 m (5' 4\"), weight 93.6 kg (206 lb 4.8 oz), SpO2 95 %, not currently breastfeeding.   Code status on discharge: Full Code     History of Illness:  See detailed admission note for full details.    Physical Exam:  Blood pressure (!) 145/50, pulse 62, temperature 98.4  F (36.9  C), temperature source Oral, resp. rate 20, height 1.626 m (5' 4\"), weight 93.6 kg (206 lb 4.8 oz), SpO2 95 %, not currently breastfeeding.  Wt Readings from Last 1 Encounters:   01/15/21 93.6 kg (206 lb 4.8 oz)     Constitutional: Awake, NAD   Eyes: sclera white   HEENT: MMM  Respiratory: Some crackles left base, no wheeze  Cardiovascular: RRR.  1/6 systolic murmur  GI: non-tender, not distended, bowel sounds present  Skin: no rash    Musculoskeletal/extremities: Trace bilateral lower extremity edema with some wrinkling of the skin  Neurologic: A&O, speech clear   Psychiatric: calm, cooperative     Procedures other than Imaging:  Bilateral thoracentesis  Left-sided thoracentesis     Imaging:  Results for orders placed or performed during the hospital encounter of 12/29/20   XR Chest Port 1 View    Narrative    XR CHEST PORT 1 VW 12/29/2020 4:27 PM    HISTORY: dyspnea    COMPARISON: CT 12/10/2020    FINDINGS: Persistent small bilateral pleural effusions with " adjacent  atelectasis/infiltrates, left greater than right. Interval mild  improved in aeration within the right upper lobe favoring improving  pneumonia. Continued follow-up is recommended. Stable mild  cardiomegaly.    TONY PÉREZ MD   Chest CT w/o contrast    Narrative    EXAM: CT CHEST W/O CONTRAST  LOCATION: [facility]  DATE/TIME: 12/29/2020 5:19 PM    INDICATION: Dyspnea, recent pneumonia versus mass, 3 weeks ago  COMPARISON: 12/10/2020  TECHNIQUE: CT chest without IV contrast. Multiplanar reformats were obtained. Dose reduction techniques were used.  CONTRAST: None.    FINDINGS:   LUNGS AND PLEURA: Masslike consolidation in the right upper lobe has nearly completely resolved. Mild groundglass density and bandlike atelectasis or scarring are now seen. Large bilateral pleural effusions layer dependently which have increased. No   pneumothorax.    MEDIASTINUM/AXILLAE: Great vessels normal in caliber. Moderate aortic valve calcification. Moderate coronary arterial calcification no pericardial effusion    UPPER ABDOMEN: No significant finding.    MUSCULOSKELETAL: No suspicious bone lesion. Diffuse osteopenia. Severe multilevel degenerative change in the thoracic spine.      Impression    IMPRESSION:   1.  Near complete resolution of previously seen masslike consolidation in the right upper lobe.  2.  Large bilateral pleural effusions and partial lower lobe atelectasis, slightly increased.     XR Chest 1 View    Narrative    CHEST ONE VIEW    12/30/2020 2:40 PM     HISTORY: Bilateral thoracentesis.    COMPARISON: Chest x-ray 12/29/2020.      Impression    IMPRESSION: Single view of the chest. No evidence of pneumothorax.  Lower lungs obscured by patient's pannus. Increased opacity right  upper lung is concerning for pulmonary edema. Correlate with patient's  clinical picture.    SHERRI REYES MD   US Thoracentesis Bilateral    Narrative    EXAM: US THORACENTESIS BILATERAL       12/30/2020 2:39 PM        HISTORY: Bilateral pleural effusions, request made for diagnostic and  therapeutic thoracentesis.     PROCEDURE: Written informed consent was obtained from the patient  prior to the procedure. The risks and benefits including bleeding,  infection and pneumothorax were discussed and the patient wished to  continue. Initial ultrasound images demonstrated a mild bilateral  pleural fluid collections. A permanent image was saved. The skin  overlying this collection was marked, prepped, draped and anesthetized  in usual sterile fashion utilizing 5 mL of lidocaine 1%. Thoracentesis  catheter was then placed into the pleural fluid collection with return  of 800 mL of pleural fluid from each hemithorax. Estimated blood loss  during the procedure was less than 5 mL. No specimens collected.  Patient tolerated the procedure well. Followup chest x-ray was  ordered.      With continuous ultrasound guidance, a 5 Czech needle and catheter  advanced into each pleural space and ultrasound image stored for  documentation. No residual fluid at completion.      Impression    IMPRESSION: Ultrasound-guided bilateral thoracentesis. 800 mL of fluid  removed from each hemithorax. Sample sent from the right hemithorax to  the lab.    HANK ELLIOTT MD   XR Chest Port 1 View    Narrative    EXAM: XR CHEST PORT 1 VW  LOCATION: [facility]  DATE/TIME: 1/7/2021 4:50 PM    INDICATION: Chest pain.  COMPARISON: 12/30/2020      Impression    IMPRESSION: Cardiac enlargement with pulmonary venous congestion. Bilateral pulmonary infiltrates compatible with edema again seen slightly improved, although there is worsening consolidation in the left lower lobe. Pleural effusions.   XR Chest Port 1 View    Narrative    CHEST ONE VIEW  1/10/2021 7:58 AM     HISTORY: Follow-up effusion    COMPARISON: January 7, 2021      Impression    IMPRESSION: Increased left pleural effusion and associated atelectasis  and/or infiltrate. Slight increase  in groundglass infiltrates on the  right.    KAROL DUCKWORTH MD   US Thoracentesis    Narrative    ULTRASOUND GUIDED THORACENTESIS  1/10/2021 12:18 PM     HISTORY: Large left pleural effusion    FINDINGS: Limited ultrasound was performed to evaluate for the  presence and best approach for drainage of a pleural effusion. An  image is archived. Written and oral informed consent was obtained. A  pause for the cause procedure to verify the correct patient and  correct procedure.     The skin overlying the left chest posteriorly was prepped and draped  in the usual sterile fashion. The subcutaneous tissues were  anesthetized with 10 mL 1% Lidocaine. Under direct ultrasound guidance  a catheter was advanced into the pleural space and 1000 mL of  miguelito  colored fluid was drained. The catheter was removed and a sterile  dressing was applied.     Patient was monitored by nurse under my direct supervision throughout  the exam. Ultrasound images were permanently stored.  There were no  immediate complications. Patient left the ultrasound suite in  satisfactory condition.      Impression    IMPRESSION: Technically successful thoracentesis without immediate  complications.    ANAYELI AVENDAÑO DO   Echocardiogram Complete    Narrative    [id number]  [id number]  [id number]  [id number]^CHRISTOPHER^EULALIA^[facility]  Echocardiography Laboratory  [address] [place] [address]        Name: BEATA TRIPLETT  MRN: [id number]  : 1935  Study Date: 2020 09:03 AM  Age: 85 yrs  Gender: Female  Patient Location: [facility]  Reason For Study: CHF, Dyspnea  Ordering Physician: EULALIA WHITE  Performed By: Mila Moser     BSA: 2.1 m2  Height: 64 in  Weight: 230 lb  HR: 71  BP: 130/54 mmHg  _____________________________________________________________________________  __        Procedure  Complete Portable Echo Adult.  _____________________________________________________________________________  __         Interpretation Summary     The ascending aorta is Moderately dilated.  Mild valvular aortic stenosis, BRAYDON 1.6 cm2  The visual ejection fraction is estimated at 65-70%.  The left atrium is mildly dilated.  Dilation of the inferior vena cava is present with abnormal respiratory  variation in diameter.  Right ventricular systolic pressure is elevated, consistent with mild  pulmonary hypertension.  Grade I or early diastolic dysfunction.  The study was technically difficult. Compared to prior study, there is no  significant change.  _____________________________________________________________________________  __        Left Ventricle  The left ventricle is normal in size. The visual ejection fraction is  estimated at 65-70%. Grade I or early diastolic dysfunction.     Right Ventricle  The right ventricle is normal in structure, function and size.     Atria  The left atrium is mildly dilated. Right atrial size is normal.     Mitral Valve  There is mild to moderate mitral annular calcification.        Tricuspid Valve  There is tricuspid annular calcification. The tricuspid valve is not well  visualized. Right ventricular systolic pressure is elevated, consistent with  mild pulmonary hypertension.     Aortic Valve  Mild valvular aortic stenosis.     Pulmonic Valve  The pulmonic valve is not well seen, but is grossly normal. There is trace  pulmonic valvular regurgitation.     Vessels  Borderline aortic root dilatation. The ascending aorta is Moderately dilated.  Dilation of the inferior vena cava is present with abnormal respiratory  variation in diameter.     Pericardium  There is no pericardial effusion.        Rhythm  Sinus rhythm was noted.  _____________________________________________________________________________  __  MMode/2D Measurements & Calculations     Ao root diam: 3.8 cm  asc Aorta Diam: 4.4 cm  LVOT diam: 2.1 cm  LVOT area: 3.6 cm2  LA Volume (BP): 82.0 ml  LA Volume Index (BP): 39.4 ml/m2         Doppler Measurements & Calculations  MV E max ferdinand: 118.5 cm/sec  MV A max ferdinand: 103.0 cm/sec  MV E/A: 1.1  MV max P.4 mmHg  MV mean PG: 3.6 mmHg  MV V2 VTI: 43.1 cm  MVA(VTI): 2.7 cm2  MV dec time: 0.22 sec     Ao V2 max: 254.1 cm/sec  Ao max P.0 mmHg  Ao V2 mean: 183.2 cm/sec  Ao mean PG: 15.0 mmHg  Ao V2 VTI: 67.1 cm  BRAYDON(I,D): 1.8 cm2  BRAYDON(V,D): 1.9 cm2  LV V1 max P.4 mmHg  LV V1 max: 135.7 cm/sec  LV V1 VTI: 32.8 cm  SV(LVOT): 117.9 ml  SI(LVOT): 56.8 ml/m2  PA acc time: 0.11 sec  TR max ferdinand: 245.4 cm/sec  TR max P.1 mmHg  AV Ferdinand Ratio (DI): 0.53  BRAYDON Index (cm2/m2): 0.85  E/E' av.0  Lateral E/e': 14.3  Medial E/e': 19.7              _____________________________________________________________________________  __        Report approved by: Balta Reardon 2020 10:31 AM             Consultations:  Consultation during this admission received from nephrology and pulmonary medicine.       Recent Lab Results:  Recent Labs   Lab 21  0548 21  0738 21  0806 21  0645   WBC  --  8.7 8.2 9.1   HGB  --  8.4* 8.3* 8.4*   HCT  --  28.6* 27.7* 27.9*   MCV  --  95 95 94    211 211 210     Recent Labs   Lab 01/15/21  0655 21  0548 21  0738    143 141   POTASSIUM 3.4 3.5 3.4   CHLORIDE 103 102 101   CO2 37* 40* 40*   ANIONGAP 2* 1* <1*   * 169* 180*   BUN 85* 88* 99*   CR 2.85* 2.80* 2.93*   GFRESTIMATED 15* 15* 14*   GFRESTBLACK 17* 17* 16*   EDNA 9.0 9.2 9.1     Recent Labs   Lab 01/10/21  1145   CULT No growth     Recent Labs   Lab 21  0806   NTBNPI 5,724*      pleural fluid:  253 WBCs, LDH 70, total protein 2.0    1/10 pleural fluid:  751 WBCs 26% neutrophils, protein 3.0    Parathyroid hormone 175       Pending Results:    Unresulted Labs Ordered in the Past 30 Days of this Admission     No orders found from 2020 to 2020.         These results will be followed up by patient's primary care provider.    Discharge  Instructions and Follow-Up:   Discharge Procedure Orders   General info for SNF   Order Comments: Length of Stay Estimate: Short Term Care: Estimated # of Days 31-90  Condition at Discharge: Stable  Level of care:skilled   Rehabilitation Potential: Fair  Admission H&P remains valid and up-to-date: Yes  Recent Chemotherapy: N/A  Use Nursing Home Standing Orders: Yes     Mantoux instructions   Order Comments: Give two-step Mantoux (PPD) Per Facility Policy Yes     Reason for your hospital stay   Order Comments: You were hospitalized for shortness of breath which was due to multiple issues, but primarily from congestive heart failure and pleural effusions.  You underwent thoracentesis to remove fluid around the lungs.  You also received IV diuretics.  Your kidney function remains worse than baseline although has improved some while here.  You will need close follow-up with this at the TCU and with nephrology in clinic.  Because of your decreased kidney function you have been started on insulin for your diabetes as the oral medications were taking cannot be taken with your current kidney function.  Actos has been discontinued as this is not recommended in CHF.  You were started on Eliquis a blood thinner to prevent stroke due to new diagnosis of A. fib.  You will need ongoing PT and OT at the rehab facility.  You require ongoing oxygen supplementation around 3-4 L for now, hopefully you can wean from the oxygen although  at this time it is unclear if you will be able to completely come off of oxygen.     Glucose monitor nursing POCT   Order Comments: Before meals and at bedtime     Daily weights   Order Comments: Call Provider for weight gain of more than 2 pounds per day or 5 pounds per week.     Follow Up and recommended labs and tests   Order Comments: Follow up with correction physician.  The following labs/tests are recommended: Hemoglobin and BMP in 3 days.    Follow-up with nephrology nurse practitioner through  [facility] clinic in 2-3 weeks. [facility], [phone].     Activity - Up with assistive device     Order Specific Question Answer Comments   Is discharge order? Yes      Patient care order   Order Comments: Patient will use her home CPAP device at TCU when sleeping.  Please connect oxygen bleed in at 3-4 liters initially, wean as able while at TCU.     Full Code     Order Specific Question Answer Comments   Code status determined by: Discussion with patient/ legal decision maker      Physical Therapy Adult Consult   Order Comments: Evaluate and treat as clinically indicated.    Reason: Physical deconditioning secondary to CHF and multiple hospitalizations     Occupational Therapy Adult Consult   Order Comments: Evaluate and treat as clinically indicated.    Reason: Physical deconditioning secondary to CHF and multiple hospitalizations     Oxygen Adult/Peds   Order Comments: Oxygen Documentation:   I certify that this patient, Ирина Yanez has been under my care (or a nurse practitioner or physican's assistant working with me). This is the face-to-face encounter for oxygen medical necessity.      Ирина Yanez is now in a chronic stable state and continues to require supplemental oxygen. Patient has continued oxygen desaturation due to Chronic Heart Failure I50.    Alternative treatment(s) tried or considered and deemed clinically infective for treatment of Chronic Heart Failure I50 include inhalers, pulmonary toileting and diuretics.  If portability is ordered, is the patient mobile within the home? yes    **Patients who qualify for home O2 coverage under the CMS guidelines require ABG tests or O2 sat readings obtained closest to, but no earlier than 2 days prior to the discharge, as evidence of the need for home oxygen therapy. Testing must be performed while patient is in the chronic stable state. See notes for O2 sats.**     Order Specific Question Answer Comments   DME Provider: [facility]    Type:  New/Recertification    Oxygen Consult Reqt: Call [facility] before patient leaves at [phone] (to ensure SATS testing is completed).    Did the patient have SpO2 (sat) testing (only needed for new oxgyen or liter flow changes)? Yes    Length of Need: Lifetime    Frequency of Use: Continuous    Mode of Delivery - Continuous Nasal Cannula    Liter Flow - Continuous (LPM): 3-4 liters, wean as able    Need for Portability: Yes    Evaluate for Conserving Device: Yes    Maintain Sats >= 90%    The face to face evaluation was performed on: 1/15/2021      Advance Diet as Tolerated   Order Comments: Follow this diet upon discharge: Orders Placed This Encounter      Fluid restriction 2000 ML FLUID      Snacks/Supplements Adult: Nepro Oral Supplement; Between Meals      Renal Diet (non-dialysis)     Order Specific Question Answer Comments   Is discharge order? Yes        I, Frandy Julio MD, personally saw the patient today and spent greater than 30 minutes discharging this patient.    Frandy Julio MD

## 2021-01-15 NOTE — PLAN OF CARE
End of Shift Summary  For vital signs and complete assessments, please see documentation flowsheets.     Pertinent assessments:  A&O.  VSS.  Denies nausea and pain.  Some GARAY.  LS diminished on 3L through her cpap tonight.  .   Midline IV saline locked.       Major Shift Events: uneventful     Treatment Plan: discharge to [facility] at 1600 1/15    Bedside Nurse: Summer Masterson RN

## 2021-01-15 NOTE — PLAN OF CARE
To Do:  End of Shift Summary  For vital signs and complete assessments, please see documentation flowsheets.     Pertinent assessments:  A&O.  VSS.  Denies nausea and pain.  Ambulated in cardenas, SOB with activity.  ON 3 L nasal cannula.  Lungs diminished. Will discharge this evening.      Major Shift Events: uneventful     Treatment Plan: discharge to [facility] at 1600 1/15    Bedside Nurse: Robert Moffett RN

## 2021-01-15 NOTE — PLAN OF CARE
Pertinent assessments: A&Ox4, denies pain, VSS, diastolic pressure soft but MD notified and unconcerned. Lung sounds diminished, cardiac sinus mark at end of shift which is consistent for her. Diltiazem held due to pt's HR. No BM this shift. Voiding appropriately.  Major Shift Events: Diltiazem held.  Treatment Plan: discharge to [facility] at 1600 1/15

## 2021-01-16 NOTE — PLAN OF CARE
Occupational Therapy Discharge Summary    Reason for therapy discharge:    Discharged to long term care facility.    Progress towards therapy goal(s). See goals on Care Plan in [facility] electronic health record for goal details.  Goals not met.  Barriers to achieving goals:   discharge from facility.    Therapy recommendation(s):    Continued therapy is recommended.  Rationale/Recommendations:  Pt would benefit from continued OT to maximize safety/independence in ADLs and functional transfers with improved strength/endurance..

## 2021-01-18 ENCOUNTER — NURSING HOME VISIT (OUTPATIENT)
Dept: GERIATRICS | Facility: CLINIC | Age: 86
End: 2021-01-18
Payer: COMMERCIAL

## 2021-01-18 ENCOUNTER — PATIENT OUTREACH (OUTPATIENT)
Dept: CARE COORDINATION | Facility: CLINIC | Age: 86
End: 2021-01-18

## 2021-01-18 VITALS
HEIGHT: 64 IN | HEART RATE: 76 BPM | BODY MASS INDEX: 35.55 KG/M2 | TEMPERATURE: 98 F | DIASTOLIC BLOOD PRESSURE: 78 MMHG | OXYGEN SATURATION: 93 % | RESPIRATION RATE: 16 BRPM | SYSTOLIC BLOOD PRESSURE: 125 MMHG | WEIGHT: 208.2 LBS

## 2021-01-18 DIAGNOSIS — J96.01 ACUTE HYPOXEMIC RESPIRATORY FAILURE (H): Primary | ICD-10-CM

## 2021-01-18 DIAGNOSIS — F41.9 ANXIETY: ICD-10-CM

## 2021-01-18 DIAGNOSIS — I10 ESSENTIAL HYPERTENSION: ICD-10-CM

## 2021-01-18 DIAGNOSIS — E11.51 TYPE II DIABETES MELLITUS WITH PERIPHERAL CIRCULATORY DISORDER (H): ICD-10-CM

## 2021-01-18 DIAGNOSIS — E66.812 CLASS 2 SEVERE OBESITY WITH SERIOUS COMORBIDITY AND BODY MASS INDEX (BMI) OF 35.0 TO 35.9 IN ADULT, UNSPECIFIED OBESITY TYPE (H): ICD-10-CM

## 2021-01-18 DIAGNOSIS — G47.33 OSA (OBSTRUCTIVE SLEEP APNEA): ICD-10-CM

## 2021-01-18 DIAGNOSIS — F32.9 MAJOR DEPRESSIVE DISORDER, REMISSION STATUS UNSPECIFIED, UNSPECIFIED WHETHER RECURRENT: ICD-10-CM

## 2021-01-18 DIAGNOSIS — R53.81 PHYSICAL DECONDITIONING: ICD-10-CM

## 2021-01-18 DIAGNOSIS — I50.33 ACUTE ON CHRONIC DIASTOLIC CONGESTIVE HEART FAILURE (H): ICD-10-CM

## 2021-01-18 DIAGNOSIS — E78.5 HYPERLIPIDEMIA, UNSPECIFIED HYPERLIPIDEMIA TYPE: ICD-10-CM

## 2021-01-18 DIAGNOSIS — J90 BILATERAL PLEURAL EFFUSION: ICD-10-CM

## 2021-01-18 DIAGNOSIS — N17.9 AKI (ACUTE KIDNEY INJURY) (H): ICD-10-CM

## 2021-01-18 DIAGNOSIS — N18.4 CHRONIC KIDNEY DISEASE (CKD), STAGE IV (SEVERE) (H): ICD-10-CM

## 2021-01-18 DIAGNOSIS — D63.8 ANEMIA OF CHRONIC DISEASE: ICD-10-CM

## 2021-01-18 DIAGNOSIS — I48.91 ATRIAL FIBRILLATION WITH RVR (H): ICD-10-CM

## 2021-01-18 DIAGNOSIS — E66.01 CLASS 2 SEVERE OBESITY WITH SERIOUS COMORBIDITY AND BODY MASS INDEX (BMI) OF 35.0 TO 35.9 IN ADULT, UNSPECIFIED OBESITY TYPE (H): ICD-10-CM

## 2021-01-18 DIAGNOSIS — G62.9 PERIPHERAL POLYNEUROPATHY: ICD-10-CM

## 2021-01-18 PROCEDURE — 99309 SBSQ NF CARE MODERATE MDM 30: CPT | Performed by: NURSE PRACTITIONER

## 2021-01-18 ASSESSMENT — MIFFLIN-ST. JEOR: SCORE: 1384.39

## 2021-01-18 NOTE — LETTER
[facility]   To:             Please give to facility    From:   Abeba Christensen RN  Care Coordinator   [facility]   P: [phone]  [email or web address]   Patient Name:  Ирина Yanez  YOB: 1935   Admit date: 01/15/2021      *Information Needed:  Please contact me when the patient will discharge (or if they will move to long term care)- include the discharge date, disposition, and main diagnosis   - If the patient is discharged with home care services, please provide the name of the agency    Also- Please inform me if a care conference is being held.   Phone, Fax or Email with information                   Thank you

## 2021-01-18 NOTE — PROGRESS NOTES
[facility]  PRIMARY CARE PROVIDER AND CLINIC:  [place], [address] / [place] [address]  Chief Complaint   Patient presents with     [facility] F/U     [place] Medical Record Number:  [id number]  Place of Service where encounter took place:  [facility]  ([facility]) [[id number]]    Ирина Yanez  is a 85 year old  (1935), admitted to the above facility from  [facility]. Hospital stay 12/29/20 through 1/15/21..  Admitted to this facility for  rehab, medical management and nursing care.    HPI:    HPI information obtained from: facility chart records, facility staff, patient report and [facility] chart review.   Brief Summary of Hospital Course:   85-year-old female with PMH hypertension, hyperlipidemia, grade 1 diastolic dysfunction, TERESSA, diabetes type 2, CKD stage IV, peripheral neuropathy, depression and anxiety, admitted to the hospital as above with shortness of breath and hypoxia.  Of note patient had been recently hospitalized from 12/10 through 12/14 for pneumonia and bilateral pleural effusions.  In addition patient had a right upper lobe consolidative mass concerning for malignancy versus infection that had been treated with antibiotics.  Repeat CT during this hospitalization showed improvement of consolidation but ongoing large bilateral pleural effusions.  Diuretics have been held 5 days prior to this hospital admission due to a rising creatinine.  Work-up during this hospitalization revealed CHF exacerbation with elevated BNP and associated FABY.  Patient underwent bilateral thoracentesis with 800 ml (from both sides) being removed 12/30 and an additional 1 L (from left side) on 1/8.  Patient initially required BiPAP and then later supplemental O2 up to 8-10 L and did receive 4-day course of antibiotics for concern of ongoing pneumonia. Breathing improved following second thoracentesis. Nephrology and pulmonology  consulted.  Patient was treated with high-dose diuretics and creatinine improved suggestive of likely cardiorenal syndrome.  While inpatient patient developed atrial fibrillation with RVR and started on Eliquis and continued on  Diltiazem.  Blood pressures were soft while inpatient so Cardura was stopped.  Patient's Actos was stopped second toes the CHF exacerbation and glimepiride was also held due to kidney function.  Patient started on Lantus and sliding scale insulin while inpatient.  Patient's hemoglobin found to be in the low eights so did receive IV iron and Aranesp.  Patient is admitted to TCU for acute rehab and medical management.    Updates on Status Since Skilled nursing Admission:     Today patient is found in her room sitting up in recliner she is alert oriented calm and in no acute distress.  Patient reports breathing is much better since was admitted to the hospital and most so after the second thoracentesis.  Does report some dyspnea on exertion that does resolve with rest Reports is not on oxygen at home. Discussed attempting to wean oxygen off while patient is in the transitional care unit.  Patient denies chest pain or dizziness.  Reports some intermittent low back pain secondary history of effusion and chronic problems there.  Reports it comes and goes quickly so does not feel at this time she needs to add any further medication.  Patient reports appetite has been poor states she eats but only because she knows she has to.  Denies nausea or vomiting.  Denies constipation or abdominal pain.  Denies issues with voiding.  Reports lower extremity swelling is resolved after diuretics inpatient.  Discussed using incentive spirometer which patient has on her bedside table patient verbalized understanding.  Discussed recommendations for following up with cardiology and nephrology.  Discussed rehab routines and goals and answered patient's questions.    CODE STATUS/ADVANCE DIRECTIVES DISCUSSION:    CPR/Full code   Patient's living condition: lives with spouse  ALLERGIES: Augmentin, Cleocin, and Tegretol [carbamazepine]  PAST MEDICAL HISTORY:  has a past medical history of Abrasion of arm, right, initial encounter (7/10/2019), Anxiety, Arthritis, Chronic pain, Depression, Diabetes mellitus (H), Falls frequently (7/10/2019), Heart murmur, HTN, Hyperlipidemia LDL goal < 100, Lichen sclerosus et atrophicus (2/15/2013), Melanoma (H), Peripheral Neuropathy, Skin cancer, Sleep apnea, and Spinal stenosis. She also has no past medical history of Chronic infection or Malignant hyperthermia.  PAST SURGICAL HISTORY:   has a past surgical history that includes surgical history of -  (1997); surgical history of -  (1997); surgical history of - ; surgical history of - ; surgical history of - ; Cholecystectomy (Nov. 1975); Repair hammer toe bilateral (8/23/2012); Amputate toe(s) (8/23/2012); Repair tendon quadriceps (Right, 10/27/2015); pet, rec of melanoma/met ca (Left); Optical tracking system fusion spine posterior lumbar one level (N/A, 9/16/2016); Colonoscopy (early 2009); colonoscopy (Sept 2014); colonoscopy (02/25/2020); and Colonoscopy (N/A, 2/25/2020).  FAMILY HISTORY: family history includes C.A.D. in her brother and sister; Cerebrovascular Disease in her mother; Diabetes in her sister; Gastrointestinal Disease in her brother; Heart Disease in her father; Neurologic Disorder in her sister; Psychotic Disorder in her brother.  SOCIAL HISTORY:   reports that she has never smoked. She has never used smokeless tobacco. She reports that she does not drink alcohol or use drugs.    Post Discharge Medication Reconciliation Status: discharge medications reconciled and changed, per note/orders    Current Outpatient Medications   Medication Sig Dispense Refill     acetaminophen (TYLENOL) 325 MG tablet Take 2 tablets (650 mg) by mouth every 4 hours as needed for mild pain       albuterol (PROAIR HFA/PROVENTIL HFA/VENTOLIN HFA)  108 (90 Base) MCG/ACT inhaler Inhale 2 puffs into the lungs 4 times daily 1 Inhaler 0     apixaban ANTICOAGULANT (ELIQUIS) 2.5 MG tablet Take 1 tablet (2.5 mg) by mouth 2 times daily       blood glucose (NO BRAND SPECIFIED) lancets standard Use to test blood sugar 1 times daily or as directed, Contour Next lancets 100 each 3     blood glucose (NO BRAND SPECIFIED) test strip Use to test blood sugar 1 times daily or as directed, Contour Next strips 100 strip 1     blood glucose monitoring (NO BRAND SPECIFIED) meter device kit Use to test blood sugar 1 times daily or as directed. Ultra 2 1 kit 1     calcium acetate (PHOSLO) 667 MG CAPS capsule Take 1 capsule (667 mg) by mouth 3 times daily (with meals)       diltiazem ER COATED BEADS (CARDIAZEM LA) 180 MG 24 hr tablet Take 180 mg by mouth 2 times daily   3     fish oil-omega-3 fatty acids 1000 MG capsule Take 1 g by mouth daily       gabapentin (NEURONTIN) 100 MG capsule Take 1 capsule (100 mg) by mouth 2 times daily 360 capsule 1     hydrALAZINE (APRESOLINE) 25 MG tablet Take 1 tablet (25 mg) by mouth 3 times daily as needed (for SBP > 140)       insulin aspart (NOVOLOG PEN) 100 UNIT/ML pen For Pre-Meal  - 189 give 1 unit. For Pre-Meal  - 239 give 2 units. For Pre-Meal  - 289 give 3 units. For Pre-Meal  - 339 give 4 units. For Pre-Meal - 399 give 5 units. For Pre-Meal -449 give 6 units For Pre-Meal BG greater than or equal to 450 give 7 units.       insulin glargine (LANTUS PEN) 100 UNIT/ML pen Inject 8 Units Subcutaneous At Bedtime       MULTI-VITAMIN OR TABS Take 2 tablets by mouth daily        order for DME Equipment being ordered: walker. 4 wheeled with brakes and a seat. 1 Device 0     polyethylene glycol (MIRALAX) 17 GM/Dose powder Take 17 g by mouth daily as needed for constipation 510 g      sertraline (ZOLOFT) 50 MG tablet Take 1 tablet (50 mg) by mouth daily 90 tablet 2     simvastatin (ZOCOR) 10 MG tablet TAKE 1 TABLET BY  "MOUTH AT BEDTIME. 90 tablet 3     torsemide (DEMADEX) 10 MG tablet Take 20 mg by mouth daily             ROS:  10 point ROS of systems including Constitutional, Eyes, Respiratory, Cardiovascular, Gastroenterology, Genitourinary, Integumentary, Musculoskeletal, Psychiatric were all negative except for pertinent positives noted in my HPI.    Vitals:  /78   Pulse 76   Temp 98  F (36.7  C)   Resp 16   Ht 1.626 m (5' 4\")   Wt 94.4 kg (208 lb 3.2 oz)   LMP  (LMP Unknown)   SpO2 93%   BMI 35.74 kg/m    Exam:  GENERAL APPEARANCE: Alert, in no distress   ENT: Mouth and posterior oropharynx normal, moist mucous membranes   EYES: EOM, conjunctivae, lids, pupils and irises normal   NECK: No adenopathy,masses or thyromegaly   RESP: respiratory effort and palpation of chest normal, Lung sounds decreased in left base- O2 3LNC  CV: Palpation and auscultation of heart done , regular rate and rhythm, no murmur, rub, or gallop, peripheral edema - none noted  ABDOMEN: normal bowel sounds, soft, nontender, no hepatosplenomegaly or other masses   : palpation of bladder WNL   M/S: Gait and station normal   Digits and nails normal - TORRES  SKIN: Inspection of skin and subcutaneous tissue baseline, Palpation of skin and subcutaneous tissue baseline,   NEURO: Cranial nerves 2-12 are normal tested and grossly at patient's baseline, Examination of sensation by touch normal   PSYCH: oriented X 3, affect and mood normal             Lab/Diagnostic data:  Recent labs in [facility] reviewed by me today.     ASSESSMENT/PLAN:  Acute hypoxemic respiratory failure (H)  Acute on chronic diastolic congestive heart failure (H)  12/30 Echo EF 65-70%, mild pulmonary hypertension, grade 1 diastolic dysfunction  - responded to IV diuretics  Bilateral pleural effusion  S/p thoracentesis bilaterally 800 ml 12/30 and then 1L from left 1/8.  - back on Torsemide 20 mg daily  - wean O2- to keep sats > 90%  - pulmonary toilet/IS  - PT/OT-to optimize " endurance  Daily weights with notification parameters  2 L fluid restriction  Follow with cards as directed      FABY (acute kidney injury) (H)  Chronic kidney disease (CKD), stage IV (severe) (H)  - Cr peak 4.3, baseline 2 - 2.2. Nephrology consulted inpatient.   -Avoid nephrotoxins  -Continue PhosLo  -BMP on 1/ 20  -Follow with nephrology in 2 to 3 weeks as directed    Atrial fibrillation with RVR (H)  New inpatient.  Echo as above.  Elevated [id number] score so was started on Eliquis while inpatient.  Rate is currently controlled  Continue on diltiazem  Vital signs per unit protocol    Essential hypertension  Chronic, limited data but currently controlled  Continues on prior to arrival diltiazem  Hydralazine changed to as needed versus scheduled  Cardura was stopped and patient  Follow blood pressures in TCU    Hyperlipidemia, unspecified hyperlipidemia type  By history  Continue on statin    Type II diabetes mellitus with peripheral circulatory disorder (H)  Lab Results   Component Value Date    A1C 6.8 04/27/2020    A1C 6.3 09/27/2019    A1C 6.9 03/22/2019    A1C 6.2 06/12/2018    A1C 6.3 12/19/2017     Glimepiride discontinued second to FABY  Actos discontinued second to CHF  Patient is new on Lantus and sliding scale insulin will require education on administration  Blood glucose levels before meals and at bedtime    Anemia of chronic disease  Hemoglobin in the 8s inpatient.  Patient received IV iron and Aranesp well in the hospital.  Observe for signs and symptoms of bleeding  Recheck CBC on 1/20    Obesity  Body mass index is 35.74 kg/m .  Aggravating factor comorbid conditions  Dietitian referral  Ongoing encouragement of healthy dietary choices and portion sizes  Follow weights and intake    TERESSA (obstructive sleep apnea)  Chronic  Continue CPAP nightly- home settings    MDD (major depressive disorder)  Anxiety  Chronic, situational stressors and play  Continue on prior to arrival Zoloft  Monitor mood and  behaviors  After ACP as needed      Peripheral neuropathy  -Chronic, stable  Continue on gabapentin    Physical deconditioning  Associated with above  Patient lives in a mobile home with her  they do not have stairs, 's health is fair, plans to return there  PT OT to optimize endurance, strength, function and safety  ongoing discharge planning, SW follow and care conferences per unit protocol          Electronically signed by:  SAMY Cormier CNP

## 2021-01-18 NOTE — LETTER
1/18/2021        RE: Ирина Yanez  [address]  [place] [address]        [facility]  PRIMARY CARE PROVIDER AND CLINIC:  [place], [id number] [address] [facility] [id number]  Chief Complaint   Patient presents with     [facility] F/U     [facility] Record Number:  [id number]  Place of Service where encounter took place:  [facility]  ([facility]) [[id number]]    Ирина Yanez  is a 85 year old  (1935), admitted to the above facility from  [facility]. Hospital stay 12/29/20 through 1/15/21..  Admitted to this facility for  rehab, medical management and nursing care.    HPI:    HPI information obtained from: facility chart records, facility staff, patient report and [facility] chart review.   Brief Summary of Hospital Course:   85-year-old female with PMH hypertension, hyperlipidemia, grade 1 diastolic dysfunction, TERESSA, diabetes type 2, CKD stage IV, peripheral neuropathy, depression and anxiety, admitted to the hospital as above with shortness of breath and hypoxia.  Of note patient had been recently hospitalized from 12/10 through 12/14 for pneumonia and bilateral pleural effusions.  In addition patient had a right upper lobe consolidative mass concerning for malignancy versus infection that had been treated with antibiotics.  Repeat CT during this hospitalization showed improvement of consolidation but ongoing large bilateral pleural effusions.  Diuretics have been held 5 days prior to this hospital admission due to a rising creatinine.  Work-up during this hospitalization revealed CHF exacerbation with elevated BNP and associated FABY.  Patient underwent bilateral thoracentesis with 800 ml (from both sides) being removed 12/30 and an additional 1 L (from left side) on 1/8.  Patient initially required BiPAP and then later supplemental O2 up to 8-10 L and did receive 4-day course of antibiotics for concern of ongoing  pneumonia. Breathing improved following second thoracentesis. Nephrology and pulmonology consulted.  Patient was treated with high-dose diuretics and creatinine improved suggestive of likely cardiorenal syndrome.  While inpatient patient developed atrial fibrillation with RVR and started on Eliquis and continued on  Diltiazem.  Blood pressures were soft while inpatient so Cardura was stopped.  Patient's Actos was stopped second toes the CHF exacerbation and glimepiride was also held due to kidney function.  Patient started on Lantus and sliding scale insulin while inpatient.  Patient's hemoglobin found to be in the low eights so did receive IV iron and Aranesp.  Patient is admitted to TCU for acute rehab and medical management.    Updates on Status Since Skilled nursing Admission:     Today patient is found in her room sitting up in recliner she is alert oriented calm and in no acute distress.  Patient reports breathing is much better since was admitted to the hospital and most so after the second thoracentesis.  Does report some dyspnea on exertion that does resolve with rest Reports is not on oxygen at home. Discussed attempting to wean oxygen off while patient is in the transitional care unit.  Patient denies chest pain or dizziness.  Reports some intermittent low back pain secondary history of effusion and chronic problems there.  Reports it comes and goes quickly so does not feel at this time she needs to add any further medication.  Patient reports appetite has been poor states she eats but only because she knows she has to.  Denies nausea or vomiting.  Denies constipation or abdominal pain.  Denies issues with voiding.  Reports lower extremity swelling is resolved after diuretics inpatient.  Discussed using incentive spirometer which patient has on her bedside table patient verbalized understanding.  Discussed recommendations for following up with cardiology and nephrology.  Discussed rehab routines and goals  and answered patient's questions.    CODE STATUS/ADVANCE DIRECTIVES DISCUSSION:   CPR/Full code   Patient's living condition: lives with spouse  ALLERGIES: Augmentin, Cleocin, and Tegretol [carbamazepine]  PAST MEDICAL HISTORY:  has a past medical history of Abrasion of arm, right, initial encounter (7/10/2019), Anxiety, Arthritis, Chronic pain, Depression, Diabetes mellitus (H), Falls frequently (7/10/2019), Heart murmur, HTN, Hyperlipidemia LDL goal < 100, Lichen sclerosus et atrophicus (2/15/2013), Melanoma (H), Peripheral Neuropathy, Skin cancer, Sleep apnea, and Spinal stenosis. She also has no past medical history of Chronic infection or Malignant hyperthermia.  PAST SURGICAL HISTORY:   has a past surgical history that includes surgical history of -  (1997); surgical history of -  (1997); surgical history of - ; surgical history of - ; surgical history of - ; Cholecystectomy (Nov. 1975); Repair hammer toe bilateral (8/23/2012); Amputate toe(s) (8/23/2012); Repair tendon quadriceps (Right, 10/27/2015); pet, rec of melanoma/met ca (Left); Optical tracking system fusion spine posterior lumbar one level (N/A, 9/16/2016); Colonoscopy (early 2009); colonoscopy (Sept 2014); colonoscopy (02/25/2020); and Colonoscopy (N/A, 2/25/2020).  FAMILY HISTORY: family history includes C.A.D. in her brother and sister; Cerebrovascular Disease in her mother; Diabetes in her sister; Gastrointestinal Disease in her brother; Heart Disease in her father; Neurologic Disorder in her sister; Psychotic Disorder in her brother.  SOCIAL HISTORY:   reports that she has never smoked. She has never used smokeless tobacco. She reports that she does not drink alcohol or use drugs.    Post Discharge Medication Reconciliation Status: discharge medications reconciled and changed, per note/orders    Current Outpatient Medications   Medication Sig Dispense Refill     acetaminophen (TYLENOL) 325 MG tablet Take 2 tablets (650 mg) by mouth every 4 hours  as needed for mild pain       albuterol (PROAIR HFA/PROVENTIL HFA/VENTOLIN HFA) 108 (90 Base) MCG/ACT inhaler Inhale 2 puffs into the lungs 4 times daily 1 Inhaler 0     apixaban ANTICOAGULANT (ELIQUIS) 2.5 MG tablet Take 1 tablet (2.5 mg) by mouth 2 times daily       blood glucose (NO BRAND SPECIFIED) lancets standard Use to test blood sugar 1 times daily or as directed, Contour Next lancets 100 each 3     blood glucose (NO BRAND SPECIFIED) test strip Use to test blood sugar 1 times daily or as directed, Contour Next strips 100 strip 1     blood glucose monitoring (NO BRAND SPECIFIED) meter device kit Use to test blood sugar 1 times daily or as directed. Ultra 2 1 kit 1     calcium acetate (PHOSLO) 667 MG CAPS capsule Take 1 capsule (667 mg) by mouth 3 times daily (with meals)       diltiazem ER COATED BEADS (CARDIAZEM LA) 180 MG 24 hr tablet Take 180 mg by mouth 2 times daily   3     fish oil-omega-3 fatty acids 1000 MG capsule Take 1 g by mouth daily       gabapentin (NEURONTIN) 100 MG capsule Take 1 capsule (100 mg) by mouth 2 times daily 360 capsule 1     hydrALAZINE (APRESOLINE) 25 MG tablet Take 1 tablet (25 mg) by mouth 3 times daily as needed (for SBP > 140)       insulin aspart (NOVOLOG PEN) 100 UNIT/ML pen For Pre-Meal  - 189 give 1 unit. For Pre-Meal  - 239 give 2 units. For Pre-Meal  - 289 give 3 units. For Pre-Meal  - 339 give 4 units. For Pre-Meal - 399 give 5 units. For Pre-Meal -449 give 6 units For Pre-Meal BG greater than or equal to 450 give 7 units.       insulin glargine (LANTUS PEN) 100 UNIT/ML pen Inject 8 Units Subcutaneous At Bedtime       MULTI-VITAMIN OR TABS Take 2 tablets by mouth daily        order for DME Equipment being ordered: walker. 4 wheeled with brakes and a seat. 1 Device 0     polyethylene glycol (MIRALAX) 17 GM/Dose powder Take 17 g by mouth daily as needed for constipation 510 g      sertraline (ZOLOFT) 50 MG tablet Take 1 tablet (50 mg)  "by mouth daily 90 tablet 2     simvastatin (ZOCOR) 10 MG tablet TAKE 1 TABLET BY MOUTH AT BEDTIME. 90 tablet 3     torsemide (DEMADEX) 10 MG tablet Take 20 mg by mouth daily             ROS:  10 point ROS of systems including Constitutional, Eyes, Respiratory, Cardiovascular, Gastroenterology, Genitourinary, Integumentary, Musculoskeletal, Psychiatric were all negative except for pertinent positives noted in my HPI.    Vitals:  /78   Pulse 76   Temp 98  F (36.7  C)   Resp 16   Ht 1.626 m (5' 4\")   Wt 94.4 kg (208 lb 3.2 oz)   LMP  (LMP Unknown)   SpO2 93%   BMI 35.74 kg/m    Exam:  GENERAL APPEARANCE: Alert, in no distress   ENT: Mouth and posterior oropharynx normal, moist mucous membranes   EYES: EOM, conjunctivae, lids, pupils and irises normal   NECK: No adenopathy,masses or thyromegaly   RESP: respiratory effort and palpation of chest normal, Lung sounds decreased in left base- O2 3LNC  CV: Palpation and auscultation of heart done , regular rate and rhythm, no murmur, rub, or gallop, peripheral edema - none noted  ABDOMEN: normal bowel sounds, soft, nontender, no hepatosplenomegaly or other masses   : palpation of bladder WNL   M/S: Gait and station normal   Digits and nails normal - TORRES  SKIN: Inspection of skin and subcutaneous tissue baseline, Palpation of skin and subcutaneous tissue baseline,   NEURO: Cranial nerves 2-12 are normal tested and grossly at patient's baseline, Examination of sensation by touch normal   PSYCH: oriented X 3, affect and mood normal             Lab/Diagnostic data:  Recent labs in [facility] reviewed by me today.     ASSESSMENT/PLAN:  Acute hypoxemic respiratory failure (H)  Acute on chronic diastolic congestive heart failure (H)  12/30 Echo EF 65-70%, mild pulmonary hypertension, grade 1 diastolic dysfunction  - responded to IV diuretics  Bilateral pleural effusion  S/p thoracentesis bilaterally 800 ml 12/30 and then 1L from left 1/8.  - back on Torsemide 20 mg daily  - " wean O2- to keep sats > 90%  - pulmonary toilet/IS  - PT/OT-to optimize endurance  Daily weights with notification parameters  2 L fluid restriction  Follow with cards as directed      FABY (acute kidney injury) (H)  Chronic kidney disease (CKD), stage IV (severe) (H)  - Cr peak 4.3, baseline 2 - 2.2. Nephrology consulted inpatient.   -Avoid nephrotoxins  -Continue PhosLo  -BMP on 1/ 20  -Follow with nephrology in 2 to 3 weeks as directed    Atrial fibrillation with RVR (H)  New inpatient.  Echo as above.  Elevated [id number] score so was started on Eliquis while inpatient.  Rate is currently controlled  Continue on diltiazem  Vital signs per unit protocol    Essential hypertension  Chronic, limited data but currently controlled  Continues on prior to arrival diltiazem  Hydralazine changed to as needed versus scheduled  Cardura was stopped and patient  Follow blood pressures in TCU    Hyperlipidemia, unspecified hyperlipidemia type  By history  Continue on statin    Type II diabetes mellitus with peripheral circulatory disorder (H)  Lab Results   Component Value Date    A1C 6.8 04/27/2020    A1C 6.3 09/27/2019    A1C 6.9 03/22/2019    A1C 6.2 06/12/2018    A1C 6.3 12/19/2017     Glimepiride discontinued second to FABY  Actos discontinued second to CHF  Patient is new on Lantus and sliding scale insulin will require education on administration  Blood glucose levels before meals and at bedtime    Anemia of chronic disease  Hemoglobin in the 8s inpatient.  Patient received IV iron and Aranesp well in the hospital.  Observe for signs and symptoms of bleeding  Recheck CBC on 1/20    Obesity  Body mass index is 35.74 kg/m .  Aggravating factor comorbid conditions  Dietitian referral  Ongoing encouragement of healthy dietary choices and portion sizes  Follow weights and intake    TERESSA (obstructive sleep apnea)  Chronic  Continue CPAP nightly- home settings    MDD (major depressive disorder)  Anxiety  Chronic, situational  stressors and play  Continue on prior to arrival Zoloft  Monitor mood and behaviors  After ACP as needed      Peripheral neuropathy  -Chronic, stable  Continue on gabapentin    Physical deconditioning  Associated with above  Patient lives in a mobile home with her  they do not have stairs, 's health is fair, plans to return there  PT OT to optimize endurance, strength, function and safety  ongoing discharge planning, SW follow and care conferences per unit protocol          Electronically signed by:  SAMY Cormier CNP                             Sincerely,        SAMY Cormier CNP

## 2021-01-18 NOTE — PROGRESS NOTES
Clinic Care Coordination Contact  Care Coordination Transition Communication    Clinical Data: Patient was hospitalized at [facility] from 12/29/2020 to 01/15/2021 with diagnosis of CHF, amongst others.     Transition to Facility:              Facility Name: [facility]              Contact name and phone number/fax: RNCC sent secure PHI fax via communication management tab. Requested notification of patients discharge and any outstanding care coordination needs.     Plan: RN/SW Care Coordinator will await notification from facility staff informing RN/SW Care Coordinator of patient's discharge plans/needs. RN/SW Care Coordinator will review chart and outreach to facility staff every 4 weeks and as needed.     Abeba Christensen RN Care Coordinator  [facility] [place], [place], [place]  Email: [email or web address]  Phone: [phone]

## 2021-01-19 ENCOUNTER — NURSING HOME VISIT (OUTPATIENT)
Dept: GERIATRICS | Facility: CLINIC | Age: 86
End: 2021-01-19
Payer: COMMERCIAL

## 2021-01-19 VITALS
HEART RATE: 86 BPM | DIASTOLIC BLOOD PRESSURE: 61 MMHG | SYSTOLIC BLOOD PRESSURE: 125 MMHG | RESPIRATION RATE: 18 BRPM | BODY MASS INDEX: 35.55 KG/M2 | HEIGHT: 64 IN | WEIGHT: 208.2 LBS | OXYGEN SATURATION: 96 % | TEMPERATURE: 98 F

## 2021-01-19 DIAGNOSIS — I48.91 ATRIAL FIBRILLATION WITH RVR (H): ICD-10-CM

## 2021-01-19 DIAGNOSIS — N18.4 CHRONIC KIDNEY DISEASE (CKD), STAGE IV (SEVERE) (H): ICD-10-CM

## 2021-01-19 DIAGNOSIS — J96.01 ACUTE HYPOXEMIC RESPIRATORY FAILURE (H): ICD-10-CM

## 2021-01-19 DIAGNOSIS — E66.812 CLASS 2 SEVERE OBESITY WITH SERIOUS COMORBIDITY AND BODY MASS INDEX (BMI) OF 35.0 TO 35.9 IN ADULT, UNSPECIFIED OBESITY TYPE (H): ICD-10-CM

## 2021-01-19 DIAGNOSIS — G89.29 CHRONIC BACK PAIN, UNSPECIFIED BACK LOCATION, UNSPECIFIED BACK PAIN LATERALITY: ICD-10-CM

## 2021-01-19 DIAGNOSIS — J90 BILATERAL PLEURAL EFFUSION: Primary | ICD-10-CM

## 2021-01-19 DIAGNOSIS — R53.81 PHYSICAL DECONDITIONING: ICD-10-CM

## 2021-01-19 DIAGNOSIS — N17.9 AKI (ACUTE KIDNEY INJURY) (H): ICD-10-CM

## 2021-01-19 DIAGNOSIS — M54.9 CHRONIC BACK PAIN, UNSPECIFIED BACK LOCATION, UNSPECIFIED BACK PAIN LATERALITY: ICD-10-CM

## 2021-01-19 DIAGNOSIS — E66.01 CLASS 2 SEVERE OBESITY WITH SERIOUS COMORBIDITY AND BODY MASS INDEX (BMI) OF 35.0 TO 35.9 IN ADULT, UNSPECIFIED OBESITY TYPE (H): ICD-10-CM

## 2021-01-19 DIAGNOSIS — I10 ESSENTIAL HYPERTENSION: ICD-10-CM

## 2021-01-19 DIAGNOSIS — I50.33 ACUTE ON CHRONIC DIASTOLIC CONGESTIVE HEART FAILURE (H): ICD-10-CM

## 2021-01-19 DIAGNOSIS — E11.51 TYPE II DIABETES MELLITUS WITH PERIPHERAL CIRCULATORY DISORDER (H): ICD-10-CM

## 2021-01-19 DIAGNOSIS — G47.33 OSA (OBSTRUCTIVE SLEEP APNEA): ICD-10-CM

## 2021-01-19 DIAGNOSIS — D63.8 ANEMIA OF CHRONIC DISEASE: ICD-10-CM

## 2021-01-19 DIAGNOSIS — G62.9 PERIPHERAL POLYNEUROPATHY: ICD-10-CM

## 2021-01-19 PROCEDURE — 99305 1ST NF CARE MODERATE MDM 35: CPT | Mod: AI | Performed by: INTERNAL MEDICINE

## 2021-01-19 ASSESSMENT — MIFFLIN-ST. JEOR: SCORE: 1384.39

## 2021-01-19 NOTE — LETTER
"    1/19/2021        RE: Ирина Yanez  [address]  [place] [address]        Ирина Yanez is a 85 year old female seen January 19, 2021 at [facility] TCU where she was admitted after [facility] hospitalization 12/29-1/15 for acute on chronic diastolic heart failure with bilateral pleural effusions.   Pt had also been hospitalized 12/10-14 for pneumonia and bilateral pleural effusions and RUL consolidative mass.  Improved on repeat CT in second hospitalization.   She had bilateral thoracentesis on 12/30 and again left side on 1/8    Pt initially required BiPAP, then high flow O2, followed by Pulmonology.   Also seen by Nephrology for Stage 4 CKD, improved after high-dose diuretics, suggestive of cardiorenal syndrome.   She received IV Fe and Aranesp for an associated anemia.      She developed atrial fib with RVR, started on apixaban and diltiazem.   Diabetic regimen changed to Lantus and sliding scale insulin   Today patient is seen in her room lying flat in bed without difficulty, tired after working with therapy.   She is currently on oxygen by NC at 3 L/min.    No palpitations, cough or orthopnea.     States \"my back is killing me\"   Has had prior surgeries, and now reports her back is \"riled up\" with prolonged bedrest and sitting.    Also some return of edema in her left foot.  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     Depression      Diabetes mellitus (H)     sees Dr. Verde- type II     Falls frequently 7/10/2019     Heart murmur      HTN      Hyperlipidemia LDL goal < 100     Dr. Verde     Lichen sclerosus et atrophicus 2/15/2013     Melanoma (H)      Peripheral Neuropathy     hands, feet; used to see Dr. Tl Das     Skin cancer     face; basal and other. Melanoma. Dolan     " Sleep apnea     CPAP     Spinal stenosis        Past Surgical History:   Procedure Laterality Date     AMPUTATE TOE(S)  8/23/2012    left second toe Procedure: AMPUTATE TOE(S);;  Surgeon: Mil Jolly DPM;  Location: [facility] OR     CHOLECYSTECTOMY  Nov. 1975     COLONOSCOPY  early 2009    repeat 4-5 years (Had one prior to this with polyps)     COLONOSCOPY  Sept 2014    incomplete; recommend colography     COLONOSCOPY  02/25/2020    Dr. Pathak [facility]     COLONOSCOPY N/A 2/25/2020    Procedure: COLONOSCOPY, WITH biopsies using forceps;  Surgeon: Adebayo Pathak MD;  Location:  GI     OPTICAL TRACKING SYSTEM FUSION POSTERIOR SPINE LUMBAR N/A 9/16/2016    Procedure: OPTICAL TRACKING SYSTEM FUSION SPINE POSTERIOR LUMBAR ONE LEVEL;  Surgeon: Lennox Blue MD;  Location: [facility] OR     PET, REC OF MELANOMA/MET CA Left     arm     REPAIR HAMMER TOE BILATERAL  8/23/2012    Procedure: REPAIR HAMMER TOE BILATERAL;  Flexor Tenotomy 2,3,4,5 Toes both feet, Partial 2nd toe amputation left foot;  Surgeon: Mil Jolly DPM;  Location:  OR     REPAIR TENDON QUADRICEPS Right 10/27/2015    Procedure: REPAIR TENDON QUADRICEPS;  Surgeon: Antonio Yi MD;  Location:  OR     SURGICAL HISTORY OF -   1997    bilateral knee replacement     SURGICAL HISTORY OF -   1997    right knee revised; patella tendon ruptured     SURGICAL HISTORY OF -       surgery for spinal stenosis     SURGICAL HISTORY OF -       breast reduction surgery     SURGICAL HISTORY OF -       D and C        Family History   Problem Relation Age of Onset     Cerebrovascular Disease Mother      Heart Disease Father      Neurologic Disorder Sister         ALS     C.A.D. Sister      Diabetes Sister      C.A.D. Brother      Psychotic Disorder Brother         [place] war: PTSD. suicide 2019     Gastrointestinal Disease Brother         diverticulitis     Colon Cancer No family hx of        Social History     Tobacco Use     Smoking status: Never Smoker  "    Smokeless tobacco: Never Used   Substance Use Topics     Alcohol use: No     Frequency: Never     Binge frequency: Never      SH: Lives with her , mobile home in [place].  3 steops to enter.     Review Of Systems  Skin: negative   Eyes: impaired vision, glasses  Ears/Nose/Throat: hearing loss  Respiratory: as above  Cardiovascular: irregular heart beat, dyspnea on exertion, lower extremity edema and exercise intolerance  Gastrointestinal: poor appetite  Genitourinary: negative  Musculoskeletal: ambulatory with FWW at baseline  Currently assist of one for all transfers and ADLs.      Neurologic: negative  Psychiatric: negative  Hematologic/Lymphatic/Immunologic: negative  Endocrine: diabetes      GENERAL APPEARANCE: alert and no distress  /61   Pulse 86   Temp 98  F (36.7  C)   Resp 18   Ht 1.626 m (5' 4\")   Wt 94.4 kg (208 lb 3.2 oz)   LMP  (LMP Unknown)   SpO2 96%   BMI 35.74 kg/m     HEENT: normocephalic, no lesion or abnormalities  NECK: no adenopathy, no asymmetry, masses, or scars and thyroid normal to palpation  RESP: lungs clear to auscultation - no rales, rhonchi or wheezes  CV: regular rate and rhythm, normal S1 S2, III/VI HSM across precordium  ABDOMEN:  soft, nontender, no HSM or masses and bowel sounds normal  MS: extremities with trace pedal edema L>R  SKIN: no suspicious lesions or rashes  NEURO: Normal strength and tone, sensory exam grossly normal, and speech normal  PSYCH: affect okay  LYMPHATICS: No cervical,  or supraclavicular nodes     Last Comprehensive Metabolic Panel:  Sodium   Date Value Ref Range Status   01/15/2021 141 133 - 144 mmol/L Final     Potassium   Date Value Ref Range Status   01/15/2021 3.4 3.4 - 5.3 mmol/L Final     Chloride   Date Value Ref Range Status   01/15/2021 103 94 - 109 mmol/L Final     Carbon Dioxide   Date Value Ref Range Status   01/15/2021 37 (H) 20 - 32 mmol/L Final     Anion Gap   Date Value Ref Range Status   01/15/2021 2 (L) 3 - 14 " mmol/L Final     Glucose   Date Value Ref Range Status   01/15/2021 171 (H) 70 - 99 mg/dL Final     Urea Nitrogen   Date Value Ref Range Status   01/15/2021 85 (H) 7 - 30 mg/dL Final     Creatinine   Date Value Ref Range Status   01/15/2021 2.85 (H) 0.52 - 1.04 mg/dL Final     GFR Estimate   Date Value Ref Range Status   01/15/2021 15 (L) >60 mL/min/[1.73_m2] Final     Comment:     Non  GFR Calc  Starting 12/18/2018, serum creatinine based estimated GFR (eGFR) will be   calculated using the Chronic Kidney Disease Epidemiology Collaboration   (CKD-EPI) equation.       Calcium   Date Value Ref Range Status   01/15/2021 9.0 8.5 - 10.1 mg/dL Final     Lab Results   Component Value Date    AST 15 01/12/2021     Lab Results   Component Value Date    ALT 21 01/12/2021      ALBUMIN 2.5 01/12/2021     Lab Results   Component Value Date    PROTTOTAL 6.6 01/12/2021      Lab Results   Component Value Date    ALKPHOS 61 01/12/2021     Lab Results   Component Value Date    WBC 8.7 01/13/2021      HGB 8.4 01/13/2021      MCV 95 01/13/2021       01/14/2021        TSH   Date Value Ref Range Status   04/27/2020 1.78 0.40 - 4.00 mU/L Final      ECHO 12/30/2020  Interpretation Summary  The ascending aorta is Moderately dilated.   Mild valvular aortic stenosis, BRAYDON 1.6 cm2  The visual ejection fraction is estimated at 65-70%.     The left atrium is mildly dilated.  Dilation of the inferior vena cava is present with abnormal respiratory variation in diameter.  Right ventricular systolic pressure is elevated, consistent with mild pulmonary hypertension.  Grade I or early diastolic dysfunction.       IMP/PLAN:   (J90) Bilateral pleural effusion  (primary encounter diagnosis)  (I50.33) Acute on chronic diastolic congestive heart failure (H)  Comment: s/p thoracentesis and aggressive diuresis  Plan: torsemide 20 mg/day    (J96.01) Acute hypoxemic respiratory failure (H)  Comment: prior consolidative pneumonia, high  oxygen requirements  Plan: now on 3 L/min, continue to wean of as tolerated     Albuterol MDI prn    (N17.9) FABY (acute kidney injury) (H)  (N18.4) Chronic kidney disease (CKD), stage IV (severe) (H)  Comment: cardiorenal as above, improved but still very low GFR  Plan: Phoslo tid    (I48.91) Atrial fibrillation with RVR (H)  Comment: CHADS-VASc 6  Pulse Readings from Last 4 Encounters:   01/19/21 86   01/18/21 76   01/15/21 63   12/22/20 74      Plan: diltiazem 180 mg bid for VR control  Apixaban 2.5 mg bid for stroke prophylaxis    (I10) Essential hypertension  Comment: Cardura discontinued secondary to lower bps  BP Readings from Last 3 Encounters:   01/19/21 125/61   01/18/21 125/78   01/15/21 131/46      Plan: hydralazine 25 mg tid, diltiazem 180 mg bid; follow bps    (E11.51) Type II diabetes mellitus with peripheral circulatory disorder (H)  Comment: previously on oral agents  Lab Results   Component Value Date    A1C 6.8 04/27/2020      Plan:  BGM   Lantus 8 units/day with aspart sliding scale insulin at meals.  This is a new regimen for patient, will need instruction for insulin administration.   Simvastatin 10 mg/day.  Not on ASA secondary to anticoagulation and anemia, not on ACEI secondary to RF.       (G62.9) Peripheral polyneuropathy  Comment: diabetic   Plan: gabapentin 100 mg bid    (G47.33) TERESSA (obstructive sleep apnea)  Comment: on CPAP  Plan: home settings    (D63.8) Anemia of chronic disease  Comment: stable, low hgb    Plan: received IV Fe and Aranesp in hospital.   Follow hgb     (E66.01,  Z68.35) Class 2 severe obesity with serious comorbidity and body mass index (BMI) of 35.0 to 35.9 in adult, unspecified obesity type (H)  Comment: multiple sequelae as above   Plan: dietician to follow, increase activity as tolerated     (M54.9,  G89.29) Chronic back pain, unspecified back location, unspecified back pain laterality  Comment: history lumbar surgery    Plan: consider scheduling acetaminophen vs  continuing pr.     Consider local measures with Voltaren gel or lidocaine patch     (R53.81) Physical deconditioning  Comment: after prolonged illness  Plan: PHYSICAL THERAPY / OCCUPATIONAL THERAPY for strengthening, endurance, transfers, ADLs.   Discharge goal is return home with her , prior level of independence.          Iris Butler MD         Sincerely,        Iris Butler MD

## 2021-01-20 ENCOUNTER — HOSPITAL LABORATORY (OUTPATIENT)
Dept: OTHER | Facility: CLINIC | Age: 86
End: 2021-01-20

## 2021-01-20 LAB
ANION GAP SERPL CALCULATED.3IONS-SCNC: 6 MMOL/L (ref 3–14)
BUN SERPL-MCNC: 86 MG/DL (ref 7–30)
CALCIUM SERPL-MCNC: 9.4 MG/DL (ref 8.5–10.1)
CHLORIDE SERPL-SCNC: 100 MMOL/L (ref 94–109)
CO2 SERPL-SCNC: 31 MMOL/L (ref 20–32)
CREAT SERPL-MCNC: 3.2 MG/DL (ref 0.52–1.04)
ERYTHROCYTE [DISTWIDTH] IN BLOOD BY AUTOMATED COUNT: 16.5 % (ref 10–15)
GFR SERPL CREATININE-BSD FRML MDRD: 13 ML/MIN/{1.73_M2}
GLUCOSE SERPL-MCNC: 300 MG/DL (ref 70–99)
HCT VFR BLD AUTO: 27.4 % (ref 35–47)
HGB BLD-MCNC: 8.2 G/DL (ref 11.7–15.7)
MCH RBC QN AUTO: 28.7 PG (ref 26.5–33)
MCHC RBC AUTO-ENTMCNC: 29.9 G/DL (ref 31.5–36.5)
MCV RBC AUTO: 96 FL (ref 78–100)
PLATELET # BLD AUTO: 269 10E9/L (ref 150–450)
POTASSIUM SERPL-SCNC: 3.5 MMOL/L (ref 3.4–5.3)
RBC # BLD AUTO: 2.86 10E12/L (ref 3.8–5.2)
SODIUM SERPL-SCNC: 137 MMOL/L (ref 133–144)
WBC # BLD AUTO: 11 10E9/L (ref 4–11)

## 2021-01-20 NOTE — PROGRESS NOTES
"Ирина Yanez is a 85 year old female seen January 19, 2021 at [facility] where she was admitted after [facility] hospitalization 12/29-1/15 for acute on chronic diastolic heart failure with bilateral pleural effusions.   Pt had also been hospitalized 12/10-14 for pneumonia and bilateral pleural effusions and RUL consolidative mass.  Improved on repeat CT in second hospitalization.   She had bilateral thoracentesis on 12/30 and again left side on 1/8    Pt initially required BiPAP, then high flow O2, followed by Pulmonology.   Also seen by Nephrology for Stage 4 CKD, improved after high-dose diuretics, suggestive of cardiorenal syndrome.   She received IV Fe and Aranesp for an associated anemia.      She developed atrial fib with RVR, started on apixaban and diltiazem.   Diabetic regimen changed to Lantus and sliding scale insulin   Today patient is seen in her room lying flat in bed without difficulty, tired after working with therapy.   She is currently on oxygen by NC at 3 L/min.    No palpitations, cough or orthopnea.     States \"my back is killing me\"   Has had prior surgeries, and now reports her back is \"riled up\" with prolonged bedrest and sitting.    Also some return of edema in her left foot.  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     Depression      Diabetes mellitus (H)     sees Dr. Verde- type II     Falls frequently 7/10/2019     Heart murmur      HTN      Hyperlipidemia LDL goal < 100     Dr. Verde     Lichen sclerosus et atrophicus 2/15/2013     Melanoma (H)      Peripheral Neuropathy     hands, feet; used to see Dr. Tl Das     Skin cancer     face; basal and other. Melanoma. Dolan     Sleep apnea     CPAP     Spinal stenosis        Past Surgical History:   Procedure Laterality " Date     AMPUTATE TOE(S)  8/23/2012    left second toe Procedure: AMPUTATE TOE(S);;  Surgeon: Mil Jolly DPM;  Location: [facility] OR     CHOLECYSTECTOMY  Nov. 1975     COLONOSCOPY  early 2009    repeat 4-5 years (Had one prior to this with polyps)     COLONOSCOPY  Sept 2014    incomplete; recommend colography     COLONOSCOPY  02/25/2020    Dr. Pathak [facility]     COLONOSCOPY N/A 2/25/2020    Procedure: COLONOSCOPY, WITH biopsies using forceps;  Surgeon: Adebayo Pathak MD;  Location:  GI     OPTICAL TRACKING SYSTEM FUSION POSTERIOR SPINE LUMBAR N/A 9/16/2016    Procedure: OPTICAL TRACKING SYSTEM FUSION SPINE POSTERIOR LUMBAR ONE LEVEL;  Surgeon: Lennox Blue MD;  Location: [facility] OR     PET, REC OF MELANOMA/MET CA Left     arm     REPAIR HAMMER TOE BILATERAL  8/23/2012    Procedure: REPAIR HAMMER TOE BILATERAL;  Flexor Tenotomy 2,3,4,5 Toes both feet, Partial 2nd toe amputation left foot;  Surgeon: Mil Jolly DPM;  Location: [facility] OR     REPAIR TENDON QUADRICEPS Right 10/27/2015    Procedure: REPAIR TENDON QUADRICEPS;  Surgeon: Antonio Yi MD;  Location: [facility] OR     SURGICAL HISTORY OF -   1997    bilateral knee replacement     SURGICAL HISTORY OF -   1997    right knee revised; patella tendon ruptured     SURGICAL HISTORY OF -       surgery for spinal stenosis     SURGICAL HISTORY OF -       breast reduction surgery     SURGICAL HISTORY OF -       D and C        Family History   Problem Relation Age of Onset     Cerebrovascular Disease Mother      Heart Disease Father      Neurologic Disorder Sister         ALS     C.A.D. Sister      Diabetes Sister      C.A.D. Brother      Psychotic Disorder Brother         [place] war: PTSD. suicide 2019     Gastrointestinal Disease Brother         diverticulitis     Colon Cancer No family hx of        Social History     Tobacco Use     Smoking status: Never Smoker     Smokeless tobacco: Never Used   Substance Use Topics     Alcohol use: No     Frequency:  "Never     Binge frequency: Never      SH: Lives with her , mobile home in [place].  3 steops to enter.     Review Of Systems  Skin: negative   Eyes: impaired vision, glasses  Ears/Nose/Throat: hearing loss  Respiratory: as above  Cardiovascular: irregular heart beat, dyspnea on exertion, lower extremity edema and exercise intolerance  Gastrointestinal: poor appetite  Genitourinary: negative  Musculoskeletal: ambulatory with FWW at baseline  Currently assist of one for all transfers and ADLs.      Neurologic: negative  Psychiatric: negative  Hematologic/Lymphatic/Immunologic: negative  Endocrine: diabetes      GENERAL APPEARANCE: alert and no distress  /61   Pulse 86   Temp 98  F (36.7  C)   Resp 18   Ht 1.626 m (5' 4\")   Wt 94.4 kg (208 lb 3.2 oz)   LMP  (LMP Unknown)   SpO2 96%   BMI 35.74 kg/m     HEENT: normocephalic, no lesion or abnormalities  NECK: no adenopathy, no asymmetry, masses, or scars and thyroid normal to palpation  RESP: lungs clear to auscultation - no rales, rhonchi or wheezes  CV: regular rate and rhythm, normal S1 S2, III/VI HSM across precordium  ABDOMEN:  soft, nontender, no HSM or masses and bowel sounds normal  MS: extremities with trace pedal edema L>R  SKIN: no suspicious lesions or rashes  NEURO: Normal strength and tone, sensory exam grossly normal, and speech normal  PSYCH: affect okay  LYMPHATICS: No cervical,  or supraclavicular nodes     Last Comprehensive Metabolic Panel:  Sodium   Date Value Ref Range Status   01/15/2021 141 133 - 144 mmol/L Final     Potassium   Date Value Ref Range Status   01/15/2021 3.4 3.4 - 5.3 mmol/L Final     Chloride   Date Value Ref Range Status   01/15/2021 103 94 - 109 mmol/L Final     Carbon Dioxide   Date Value Ref Range Status   01/15/2021 37 (H) 20 - 32 mmol/L Final     Anion Gap   Date Value Ref Range Status   01/15/2021 2 (L) 3 - 14 mmol/L Final     Glucose   Date Value Ref Range Status   01/15/2021 171 (H) 70 - 99 mg/dL " Final     Urea Nitrogen   Date Value Ref Range Status   01/15/2021 85 (H) 7 - 30 mg/dL Final     Creatinine   Date Value Ref Range Status   01/15/2021 2.85 (H) 0.52 - 1.04 mg/dL Final     GFR Estimate   Date Value Ref Range Status   01/15/2021 15 (L) >60 mL/min/[1.73_m2] Final     Comment:     Non  GFR Calc  Starting 12/18/2018, serum creatinine based estimated GFR (eGFR) will be   calculated using the Chronic Kidney Disease Epidemiology Collaboration   (CKD-EPI) equation.       Calcium   Date Value Ref Range Status   01/15/2021 9.0 8.5 - 10.1 mg/dL Final     Lab Results   Component Value Date    AST 15 01/12/2021     Lab Results   Component Value Date    ALT 21 01/12/2021      ALBUMIN 2.5 01/12/2021     Lab Results   Component Value Date    PROTTOTAL 6.6 01/12/2021      Lab Results   Component Value Date    ALKPHOS 61 01/12/2021     Lab Results   Component Value Date    WBC 8.7 01/13/2021      HGB 8.4 01/13/2021      MCV 95 01/13/2021       01/14/2021        TSH   Date Value Ref Range Status   04/27/2020 1.78 0.40 - 4.00 mU/L Final      ECHO 12/30/2020  Interpretation Summary  The ascending aorta is Moderately dilated.   Mild valvular aortic stenosis, BRAYDON 1.6 cm2  The visual ejection fraction is estimated at 65-70%.     The left atrium is mildly dilated.  Dilation of the inferior vena cava is present with abnormal respiratory variation in diameter.  Right ventricular systolic pressure is elevated, consistent with mild pulmonary hypertension.  Grade I or early diastolic dysfunction.       IMP/PLAN:   (J90) Bilateral pleural effusion  (primary encounter diagnosis)  (I50.33) Acute on chronic diastolic congestive heart failure (H)  Comment: s/p thoracentesis and aggressive diuresis  Plan: torsemide 20 mg/day    (J96.01) Acute hypoxemic respiratory failure (H)  Comment: prior consolidative pneumonia, high oxygen requirements  Plan: now on 3 L/min, continue to wean of as tolerated     Albuterol MDI  prn    (N17.9) FABY (acute kidney injury) (H)  (N18.4) Chronic kidney disease (CKD), stage IV (severe) (H)  Comment: cardiorenal as above, improved but still very low GFR  Plan: Phoslo tid    (I48.91) Atrial fibrillation with RVR (H)  Comment: CHADS-VASc 6  Pulse Readings from Last 4 Encounters:   01/19/21 86   01/18/21 76   01/15/21 63   12/22/20 74      Plan: diltiazem 180 mg bid for VR control  Apixaban 2.5 mg bid for stroke prophylaxis    (I10) Essential hypertension  Comment: Cardura discontinued secondary to lower bps  BP Readings from Last 3 Encounters:   01/19/21 125/61   01/18/21 125/78   01/15/21 131/46      Plan: hydralazine 25 mg tid, diltiazem 180 mg bid; follow bps    (E11.51) Type II diabetes mellitus with peripheral circulatory disorder (H)  Comment: previously on oral agents  Lab Results   Component Value Date    A1C 6.8 04/27/2020      Plan:  BGM   Lantus 8 units/day with aspart sliding scale insulin at meals.  This is a new regimen for patient, will need instruction for insulin administration.   Simvastatin 10 mg/day.  Not on ASA secondary to anticoagulation and anemia, not on ACEI secondary to RF.       (G62.9) Peripheral polyneuropathy  Comment: diabetic   Plan: gabapentin 100 mg bid    (G47.33) TERESSA (obstructive sleep apnea)  Comment: on CPAP  Plan: home settings    (D63.8) Anemia of chronic disease  Comment: stable, low hgb    Plan: received IV Fe and Aranesp in hospital.   Follow hgb     (E66.01,  Z68.35) Class 2 severe obesity with serious comorbidity and body mass index (BMI) of 35.0 to 35.9 in adult, unspecified obesity type (H)  Comment: multiple sequelae as above   Plan: dietician to follow, increase activity as tolerated     (M54.9,  G89.29) Chronic back pain, unspecified back location, unspecified back pain laterality  Comment: history lumbar surgery    Plan: consider scheduling acetaminophen vs continuing pr.     Consider local measures with Voltaren gel or lidocaine patch     (R53.81)  Physical deconditioning  Comment: after prolonged illness  Plan: PHYSICAL THERAPY / OCCUPATIONAL THERAPY for strengthening, endurance, transfers, ADLs.   Discharge goal is return home with her , prior level of independence.          Iris Butler MD

## 2021-01-22 ENCOUNTER — HOSPITAL LABORATORY (OUTPATIENT)
Dept: OTHER | Facility: CLINIC | Age: 86
End: 2021-01-22

## 2021-01-23 LAB
SARS-COV-2 RNA RESP QL NAA+PROBE: NOT DETECTED
SPECIMEN SOURCE: NORMAL

## 2021-01-25 ENCOUNTER — DISCHARGE SUMMARY NURSING HOME (OUTPATIENT)
Dept: GERIATRICS | Facility: CLINIC | Age: 86
End: 2021-01-25
Payer: COMMERCIAL

## 2021-01-25 VITALS
RESPIRATION RATE: 18 BRPM | HEART RATE: 74 BPM | HEIGHT: 64 IN | SYSTOLIC BLOOD PRESSURE: 133 MMHG | DIASTOLIC BLOOD PRESSURE: 69 MMHG | BODY MASS INDEX: 35.51 KG/M2 | WEIGHT: 208 LBS | OXYGEN SATURATION: 91 % | TEMPERATURE: 98.2 F

## 2021-01-25 DIAGNOSIS — E66.9 OBESITY: ICD-10-CM

## 2021-01-25 DIAGNOSIS — I50.33 ACUTE ON CHRONIC DIASTOLIC CONGESTIVE HEART FAILURE (H): ICD-10-CM

## 2021-01-25 DIAGNOSIS — D63.8 ANEMIA OF CHRONIC DISEASE: ICD-10-CM

## 2021-01-25 DIAGNOSIS — F41.9 ANXIETY: ICD-10-CM

## 2021-01-25 DIAGNOSIS — I10 ESSENTIAL HYPERTENSION: ICD-10-CM

## 2021-01-25 DIAGNOSIS — F32.9 MAJOR DEPRESSIVE DISORDER, REMISSION STATUS UNSPECIFIED, UNSPECIFIED WHETHER RECURRENT: ICD-10-CM

## 2021-01-25 DIAGNOSIS — R53.81 PHYSICAL DECONDITIONING: ICD-10-CM

## 2021-01-25 DIAGNOSIS — N17.9 AKI (ACUTE KIDNEY INJURY) (H): ICD-10-CM

## 2021-01-25 DIAGNOSIS — E11.51 TYPE II DIABETES MELLITUS WITH PERIPHERAL CIRCULATORY DISORDER (H): ICD-10-CM

## 2021-01-25 DIAGNOSIS — I48.91 ATRIAL FIBRILLATION WITH RVR (H): ICD-10-CM

## 2021-01-25 DIAGNOSIS — J90 BILATERAL PLEURAL EFFUSION: ICD-10-CM

## 2021-01-25 DIAGNOSIS — N18.4 CHRONIC KIDNEY DISEASE (CKD), STAGE IV (SEVERE) (H): ICD-10-CM

## 2021-01-25 DIAGNOSIS — G47.33 OSA (OBSTRUCTIVE SLEEP APNEA): ICD-10-CM

## 2021-01-25 DIAGNOSIS — G62.9 PERIPHERAL POLYNEUROPATHY: ICD-10-CM

## 2021-01-25 DIAGNOSIS — J96.01 ACUTE HYPOXEMIC RESPIRATORY FAILURE (H): Primary | ICD-10-CM

## 2021-01-25 DIAGNOSIS — E78.5 HYPERLIPIDEMIA, UNSPECIFIED HYPERLIPIDEMIA TYPE: ICD-10-CM

## 2021-01-25 PROCEDURE — 99316 NF DSCHRG MGMT 30 MIN+: CPT | Performed by: NURSE PRACTITIONER

## 2021-01-25 ASSESSMENT — MIFFLIN-ST. JEOR: SCORE: 1383.48

## 2021-01-25 NOTE — LETTER
1/25/2021        RE: Ирина Yanez  [address]  [place] [address]        [facility] DISCHARGE SUMMARY  PATIENT'S NAME: Ирина Yanez  YOB: 1935  MEDICAL RECORD NUMBER:  [id number]  Place of Service where encounter took place:  [facility]  (S) [[id number]]    PRIMARY CARE PROVIDER AND CLINIC RESPONSIBLE AFTER TRANSFER:   [place], [address] [facility] / [place] [address]    [facility]     Transferring providers: SAMY Cormier CNP, Iris Butler MD  Recent Hospitalization/ED:  Hospital  [facility] stay 12/29/20 to 1/15/21.  Date of SNF Admission: January / 15 / 2021  Date of SNF (anticipated) Discharge: January / 27 / 2021  Discharged to: previous independent home  Cognitive Scores: SLUMS 22/30  Physical Function: Independent with feeding, grooming and hygiene. Independent with upper body dressing. Supervision with lower body dressing. Supervision with transfers. Ambulating 180 feet with RW with supervision.       CODE STATUS/ADVANCE DIRECTIVES DISCUSSION:  Full Code     ALLERGIES: Augmentin, Cleocin, and Tegretol [carbamazepine]    DISCHARGE DIAGNOSIS/NURSING FACILITY COURSE:     85-year-old female with PMH hypertension, hyperlipidemia, grade 1 diastolic dysfunction, TERESSA, diabetes type 2, CKD stage IV, peripheral neuropathy, depression and anxiety, admitted to the hospital as above with shortness of breath and hypoxia.  Of note patient had been recently hospitalized from 12/10 through 12/14 for pneumonia and bilateral pleural effusions.  In addition patient had a right upper lobe consolidative mass concerning for malignancy versus infection that had been treated with antibiotics.  Repeat CT during this hospitalization showed improvement of consolidation but ongoing large bilateral pleural effusions.  Diuretics have been held 5 days prior to this hospital admission due to a rising creatinine.  Work-up during this  hospitalization revealed CHF exacerbation with elevated BNP and associated FABY.  Patient underwent bilateral thoracentesis with 800 ml (from both sides) being removed 12/30 and an additional 1 L (from left side) on 1/8.  Patient initially required BiPAP and then later supplemental O2 up to 8-10 L and did receive 4-day course of antibiotics for concern of ongoing pneumonia. Breathing improved following second thoracentesis. Nephrology and pulmonology consulted.  Patient was treated with high-dose diuretics and creatinine improved suggestive of likely cardiorenal syndrome.  While inpatient patient developed atrial fibrillation with RVR and started on Eliquis and continued on  Diltiazem.  Blood pressures were soft while inpatient so Cardura was stopped.  Patient's Actos was stopped second toes the CHF exacerbation and glimepiride was also held due to kidney function.  Patient started on Lantus and sliding scale insulin while inpatient.  Patient's hemoglobin found to be in the low eights so did receive IV iron and Aranesp.  Patient is admitted to TCU for acute rehab and medical management. Has been participating in therapy and making functional gains. Discharge functional status is as above.        Acute hypoxemic respiratory failure (H)  Acute on chronic diastolic congestive heart failure (H)  12/30 Echo EF 65-70%, mild pulmonary hypertension, grade 1 diastolic dysfunction  - responded to IV diuretics  Bilateral pleural effusion  S/p thoracentesis bilaterally 800 ml 12/30 and then 1L from left 1/8.  - back on Torsemide 20 mg daily  - wean O2- to keep sats > 90%  - pulmonary toilet/IS  - PT/OT-to optimize endurance  Daily weights with notification parameters  2 L fluid restriction  Follow with cards as directed        FABY (acute kidney injury) (H)  Chronic kidney disease (CKD), stage IV (severe) (H)  - Cr peak 4.3, baseline 2 - 2.2. Nephrology consulted inpatient.   -Avoid nephrotoxins  -Continue PhosLo  - periodic  BMP  -Follow with nephrology in 2 to 3 weeks as directed     Atrial fibrillation with RVR (H)  New inpatient.  Echo as above.  Elevated [id number] score so was started on Eliquis while inpatient.  Rate is currently controlled  Continue on diltiazem       Essential hypertension  Chronic, controlled  Continues on prior to arrival diltiazem  Hydralazine changed to as needed versus scheduled  Cardura was stopped and patient       Hyperlipidemia, unspecified hyperlipidemia type  By history  Continue on statin     Type II diabetes mellitus with peripheral circulatory disorder (H)        Lab Results   Component Value Date     A1C 6.8 04/27/2020     A1C 6.3 09/27/2019     A1C 6.9 03/22/2019     A1C 6.2 06/12/2018     A1C 6.3 12/19/2017      Glimepiride discontinued second to FABY  Actos discontinued second to CHF  Patient is new on Lantus and sliding scale and has been educated on administration  Blood glucose levels before meals and at bedtime     Anemia of chronic disease  Hemoglobin in the 8s inpatient.  Patient received IV iron and Aranesp while in the hospital.  Observe for signs and symptoms of bleeding     Obesity  Body mass index is 35.74 kg/m .  Aggravating factor comorbid conditions  Dietitian referral while in TCU  Ongoing encouragement of healthy dietary choices and portion sizes  Follow weights and intake     TERESSA (obstructive sleep apnea)  Chronic  Continue CPAP nightly- home settings     MDD (major depressive disorder)  Anxiety  Chronic, situational stressors in play- mood has been stable  Continues on prior to arrival Zoloft       Peripheral neuropathy  -Chronic, stable  Continue on gabapentin     Physical deconditioning  Associated with above  Patient lives in a mobile home with her  they do not have stairs, 's health is fair, is returning there  Home PT OT to optimize endurance, strength, function and safety                    Past Medical History:  has a past medical history of Abrasion of  arm, right, initial encounter (7/10/2019), Anxiety, Arthritis, Chronic pain, Depression, Diabetes mellitus (H), Falls frequently (7/10/2019), Heart murmur, HTN, Hyperlipidemia LDL goal < 100, Lichen sclerosus et atrophicus (2/15/2013), Melanoma (H), Peripheral Neuropathy, Skin cancer, Sleep apnea, and Spinal stenosis. She also has no past medical history of Chronic infection or Malignant hyperthermia.    Discharge Medications:      Current Outpatient Medications   Medication Sig Dispense Refill     acetaminophen (TYLENOL) 325 MG tablet Take 2 tablets (650 mg) by mouth every 4 hours as needed for mild pain       albuterol (PROAIR HFA/PROVENTIL HFA/VENTOLIN HFA) 108 (90 Base) MCG/ACT inhaler Inhale 2 puffs into the lungs 4 times daily 1 Inhaler 0     apixaban ANTICOAGULANT (ELIQUIS) 2.5 MG tablet Take 1 tablet (2.5 mg) by mouth 2 times daily       blood glucose (NO BRAND SPECIFIED) lancets standard Use to test blood sugar 1 times daily or as directed, Contour Next lancets 100 each 3     blood glucose (NO BRAND SPECIFIED) test strip Use to test blood sugar 1 times daily or as directed, Contour Next strips 100 strip 1     blood glucose monitoring (NO BRAND SPECIFIED) meter device kit Use to test blood sugar 1 times daily or as directed. Ultra 2 1 kit 1     calcium acetate (PHOSLO) 667 MG CAPS capsule Take 1 capsule (667 mg) by mouth 3 times daily (with meals)       diltiazem ER COATED BEADS (CARDIAZEM LA) 180 MG 24 hr tablet Take 180 mg by mouth 2 times daily   3     fish oil-omega-3 fatty acids 1000 MG capsule Take 1 g by mouth daily       gabapentin (NEURONTIN) 100 MG capsule Take 1 capsule (100 mg) by mouth 2 times daily 360 capsule 1     hydrALAZINE (APRESOLINE) 25 MG tablet Take 1 tablet (25 mg) by mouth 3 times daily as needed (for SBP > 140)       insulin aspart (NOVOLOG PEN) 100 UNIT/ML pen For Pre-Meal  - 189 give 1 unit. For Pre-Meal  - 239 give 2 units. For Pre-Meal  - 289 give 3 units. For  "Pre-Meal  - 339 give 4 units. For Pre-Meal - 399 give 5 units. For Pre-Meal -449 give 6 units For Pre-Meal BG greater than or equal to 450 give 7 units.       insulin glargine (LANTUS PEN) 100 UNIT/ML pen Inject 8 Units Subcutaneous At Bedtime       MULTI-VITAMIN OR TABS Take 2 tablets by mouth daily        order for DME Equipment being ordered: walker. 4 wheeled with brakes and a seat. 1 Device 0     polyethylene glycol (MIRALAX) 17 GM/Dose powder Take 17 g by mouth daily as needed for constipation 510 g      sertraline (ZOLOFT) 50 MG tablet Take 1 tablet (50 mg) by mouth daily 90 tablet 2     simvastatin (ZOCOR) 10 MG tablet TAKE 1 TABLET BY MOUTH AT BEDTIME. 90 tablet 3     torsemide (DEMADEX) 10 MG tablet Take 20 mg by mouth daily         Medication Changes/Rationale:     As above    Controlled medications sent with patient:   not applicable/none     ROS:   4 point ROS including Respiratory, CV, GI and , other than that noted in the HPI,  is negative    Physical Exam:   Vitals: /69   Pulse 74   Temp 98.2  F (36.8  C)   Resp 18   Ht 1.626 m (5' 4\")   Wt 94.3 kg (208 lb)   LMP  (LMP Unknown)   SpO2 91%   BMI 35.70 kg/m    BMI= Body mass index is 35.7 kg/m .    Resp: Effort WNL, LS decreased on the left  CV: VS as above, trace LE edema  Abd- soft, nontender, BS +  Musc- TORRES  Psych- alert, calm, pleasant      SNF labs: Recent labs in [facility] reviewed by me today.       DISCHARGE PLAN:    Follow up labs: No labs orders/due    Medical Follow Up:      Follow up with primary care provider in 1 week  Follow up with specialist cardiology     MTM referral needed and placed by this provider: No    Current [place] scheduled appointments:       Discharge Services: Home Care:  Occupational Therapy, Physical Therapy, Registered Nurse, Home Health Aide and From:  [facility]    Discharge Instructions Verbalized to Patient at Discharge:     As above      TOTAL DISCHARGE TIME:   Greater than " 30 minutes  Electronically signed by:  SAMY Cormier CNP     Home care Face to Face documentation done in EPIC attached to Home care orders for [facility].                         Sincerely,        SAMY Cormier CNP

## 2021-01-25 NOTE — PROGRESS NOTES
[place] [facility] DISCHARGE SUMMARY  PATIENT'S NAME: Ирина Yanez  YOB: 1935  MEDICAL RECORD NUMBER:  [id number]  Place of Service where encounter took place:  [facility]  (S) [[id number]]    PRIMARY CARE PROVIDER AND CLINIC RESPONSIBLE AFTER TRANSFER:   [place]ar[place], [place], [id number] / [place] [address]    [facility] [place]     Transferring providers: Marylu Yañez, SAMY OLEARY, Iris Butler MD  Recent Hospitalization/ED:  [facility] Hospital stay 12/29/20 to 1/15/21.  Date of SNF Admission: January / 15 / 2021  Date of SNF (anticipated) Discharge: January / 27 / 2021  Discharged to: previous independent home  Cognitive Scores: SLUMS 22/30  Physical Function: Independent with feeding, grooming and hygiene. Independent with upper body dressing. Supervision with lower body dressing. Supervision with transfers. Ambulating 180 feet with RW with supervision.       CODE STATUS/ADVANCE DIRECTIVES DISCUSSION:  Full Code     ALLERGIES: Augmentin, Cleocin, and Tegretol [carbamazepine]    DISCHARGE DIAGNOSIS/NURSING FACILITY COURSE:     85-year-old female with PMH hypertension, hyperlipidemia, grade 1 diastolic dysfunction, TERESSA, diabetes type 2, CKD stage IV, peripheral neuropathy, depression and anxiety, admitted to the hospital as above with shortness of breath and hypoxia.  Of note patient had been recently hospitalized from 12/10 through 12/14 for pneumonia and bilateral pleural effusions.  In addition patient had a right upper lobe consolidative mass concerning for malignancy versus infection that had been treated with antibiotics.  Repeat CT during this hospitalization showed improvement of consolidation but ongoing large bilateral pleural effusions.  Diuretics have been held 5 days prior to this hospital admission due to a rising creatinine.  Work-up during this hospitalization revealed CHF exacerbation with elevated BNP and associated FABY.  Patient  underwent bilateral thoracentesis with 800 ml (from both sides) being removed 12/30 and an additional 1 L (from left side) on 1/8.  Patient initially required BiPAP and then later supplemental O2 up to 8-10 L and did receive 4-day course of antibiotics for concern of ongoing pneumonia. Breathing improved following second thoracentesis. Nephrology and pulmonology consulted.  Patient was treated with high-dose diuretics and creatinine improved suggestive of likely cardiorenal syndrome.  While inpatient patient developed atrial fibrillation with RVR and started on Eliquis and continued on  Diltiazem.  Blood pressures were soft while inpatient so Cardura was stopped.  Patient's Actos was stopped second toes the CHF exacerbation and glimepiride was also held due to kidney function.  Patient started on Lantus and sliding scale insulin while inpatient.  Patient's hemoglobin found to be in the low eights so did receive IV iron and Aranesp.  Patient is admitted to TCU for acute rehab and medical management. Has been participating in therapy and making functional gains. Discharge functional status is as above.        Acute hypoxemic respiratory failure (H)  Acute on chronic diastolic congestive heart failure (H)  12/30 Echo EF 65-70%, mild pulmonary hypertension, grade 1 diastolic dysfunction  - responded to IV diuretics  Bilateral pleural effusion  S/p thoracentesis bilaterally 800 ml 12/30 and then 1L from left 1/8.  - back on Torsemide 20 mg daily  - wean O2- to keep sats > 90%  - pulmonary toilet/IS  - PT/OT-to optimize endurance  Daily weights with notification parameters  2 L fluid restriction  Follow with cards as directed        FABY (acute kidney injury) (H)  Chronic kidney disease (CKD), stage IV (severe) (H)  - Cr peak 4.3, baseline 2 - 2.2. Nephrology consulted inpatient.   -Avoid nephrotoxins  -Continue PhosLo  - periodic BMP  -Follow with nephrology in 2 to 3 weeks as directed     Atrial fibrillation with RVR  (H)  New inpatient.  Echo as above.  Elevated [id number] score so was started on Eliquis while inpatient.  Rate is currently controlled  Continue on diltiazem       Essential hypertension  Chronic, controlled  Continues on prior to arrival diltiazem  Hydralazine changed to as needed versus scheduled  Cardura was stopped and patient       Hyperlipidemia, unspecified hyperlipidemia type  By history  Continue on statin     Type II diabetes mellitus with peripheral circulatory disorder (H)        Lab Results   Component Value Date     A1C 6.8 04/27/2020     A1C 6.3 09/27/2019     A1C 6.9 03/22/2019     A1C 6.2 06/12/2018     A1C 6.3 12/19/2017      Glimepiride discontinued second to FABY  Actos discontinued second to CHF  Patient is new on Lantus and sliding scale and has been educated on administration  Blood glucose levels before meals and at bedtime     Anemia of chronic disease  Hemoglobin in the 8s inpatient.  Patient received IV iron and Aranesp while in the hospital.  Observe for signs and symptoms of bleeding     Obesity  Body mass index is 35.74 kg/m .  Aggravating factor comorbid conditions  Dietitian referral while in TCU  Ongoing encouragement of healthy dietary choices and portion sizes  Follow weights and intake     TERESSA (obstructive sleep apnea)  Chronic  Continue CPAP nightly- home settings     MDD (major depressive disorder)  Anxiety  Chronic, situational stressors in play- mood has been stable  Continues on prior to arrival Zoloft       Peripheral neuropathy  -Chronic, stable  Continue on gabapentin     Physical deconditioning  Associated with above  Patient lives in a mobile home with her  they do not have stairs, 's health is fair, is returning there  Home PT OT to optimize endurance, strength, function and safety                    Past Medical History:  has a past medical history of Abrasion of arm, right, initial encounter (7/10/2019), Anxiety, Arthritis, Chronic pain, Depression,  Diabetes mellitus (H), Falls frequently (7/10/2019), Heart murmur, HTN, Hyperlipidemia LDL goal < 100, Lichen sclerosus et atrophicus (2/15/2013), Melanoma (H), Peripheral Neuropathy, Skin cancer, Sleep apnea, and Spinal stenosis. She also has no past medical history of Chronic infection or Malignant hyperthermia.    Discharge Medications:      Current Outpatient Medications   Medication Sig Dispense Refill     acetaminophen (TYLENOL) 325 MG tablet Take 2 tablets (650 mg) by mouth every 4 hours as needed for mild pain       albuterol (PROAIR HFA/PROVENTIL HFA/VENTOLIN HFA) 108 (90 Base) MCG/ACT inhaler Inhale 2 puffs into the lungs 4 times daily 1 Inhaler 0     apixaban ANTICOAGULANT (ELIQUIS) 2.5 MG tablet Take 1 tablet (2.5 mg) by mouth 2 times daily       blood glucose (NO BRAND SPECIFIED) lancets standard Use to test blood sugar 1 times daily or as directed, Contour Next lancets 100 each 3     blood glucose (NO BRAND SPECIFIED) test strip Use to test blood sugar 1 times daily or as directed, Contour Next strips 100 strip 1     blood glucose monitoring (NO BRAND SPECIFIED) meter device kit Use to test blood sugar 1 times daily or as directed. Ultra 2 1 kit 1     calcium acetate (PHOSLO) 667 MG CAPS capsule Take 1 capsule (667 mg) by mouth 3 times daily (with meals)       diltiazem ER COATED BEADS (CARDIAZEM LA) 180 MG 24 hr tablet Take 180 mg by mouth 2 times daily   3     fish oil-omega-3 fatty acids 1000 MG capsule Take 1 g by mouth daily       gabapentin (NEURONTIN) 100 MG capsule Take 1 capsule (100 mg) by mouth 2 times daily 360 capsule 1     hydrALAZINE (APRESOLINE) 25 MG tablet Take 1 tablet (25 mg) by mouth 3 times daily as needed (for SBP > 140)       insulin aspart (NOVOLOG PEN) 100 UNIT/ML pen For Pre-Meal  - 189 give 1 unit. For Pre-Meal  - 239 give 2 units. For Pre-Meal  - 289 give 3 units. For Pre-Meal  - 339 give 4 units. For Pre-Meal - 399 give 5 units. For Pre-Meal  "-449 give 6 units For Pre-Meal BG greater than or equal to 450 give 7 units.       insulin glargine (LANTUS PEN) 100 UNIT/ML pen Inject 8 Units Subcutaneous At Bedtime       MULTI-VITAMIN OR TABS Take 2 tablets by mouth daily        order for DME Equipment being ordered: walker. 4 wheeled with brakes and a seat. 1 Device 0     polyethylene glycol (MIRALAX) 17 GM/Dose powder Take 17 g by mouth daily as needed for constipation 510 g      sertraline (ZOLOFT) 50 MG tablet Take 1 tablet (50 mg) by mouth daily 90 tablet 2     simvastatin (ZOCOR) 10 MG tablet TAKE 1 TABLET BY MOUTH AT BEDTIME. 90 tablet 3     torsemide (DEMADEX) 10 MG tablet Take 20 mg by mouth daily         Medication Changes/Rationale:     As above    Controlled medications sent with patient:   not applicable/none     ROS:   4 point ROS including Respiratory, CV, GI and , other than that noted in the HPI,  is negative    Physical Exam:   Vitals: /69   Pulse 74   Temp 98.2  F (36.8  C)   Resp 18   Ht 1.626 m (5' 4\")   Wt 94.3 kg (208 lb)   LMP  (LMP Unknown)   SpO2 91%   BMI 35.70 kg/m    BMI= Body mass index is 35.7 kg/m .    Resp: Effort WNL, LS decreased on the left  CV: VS as above, trace LE edema  Abd- soft, nontender, BS +  Musc- TORRES  Psych- alert, calm, pleasant      SNF labs: Recent labs in [facility] reviewed by me today.       DISCHARGE PLAN:    Follow up labs: No labs orders/due    Medical Follow Up:      Follow up with primary care provider in 1 week  Follow up with specialist cardiology     MTM referral needed and placed by this provider: No    Current [facility] scheduled appointments:       Discharge Services: Home Care:  Occupational Therapy, Physical Therapy, Registered Nurse, Home Health Aide and From:  [facility]    Discharge Instructions Verbalized to Patient at Discharge:     As above      TOTAL DISCHARGE TIME:   Greater than 30 minutes  Electronically signed by:  SAMY Cormier CNP     Home care " Face to Face documentation done in [facility] attached to Home care orders for [facility].

## 2021-01-26 ENCOUNTER — HOSPITAL ENCOUNTER (INPATIENT)
Facility: CLINIC | Age: 86
LOS: 7 days | Discharge: HOME HEALTH | DRG: 291 | End: 2021-02-02
Attending: EMERGENCY MEDICINE | Admitting: INTERNAL MEDICINE
Payer: COMMERCIAL

## 2021-01-26 ENCOUNTER — TELEPHONE (OUTPATIENT)
Dept: GERIATRICS | Facility: CLINIC | Age: 86
End: 2021-01-26

## 2021-01-26 ENCOUNTER — APPOINTMENT (OUTPATIENT)
Dept: GENERAL RADIOLOGY | Facility: CLINIC | Age: 86
DRG: 291 | End: 2021-01-26
Attending: EMERGENCY MEDICINE
Payer: COMMERCIAL

## 2021-01-26 ENCOUNTER — APPOINTMENT (OUTPATIENT)
Dept: ULTRASOUND IMAGING | Facility: CLINIC | Age: 86
DRG: 291 | End: 2021-01-26
Attending: EMERGENCY MEDICINE
Payer: COMMERCIAL

## 2021-01-26 DIAGNOSIS — I10 HYPERTENSION GOAL BP (BLOOD PRESSURE) < 140/90: ICD-10-CM

## 2021-01-26 DIAGNOSIS — Z79.01 CHRONIC ANTICOAGULATION: ICD-10-CM

## 2021-01-26 DIAGNOSIS — J90 PLEURAL EFFUSION ON LEFT: ICD-10-CM

## 2021-01-26 DIAGNOSIS — J96.01 ACUTE RESPIRATORY FAILURE WITH HYPOXIA (H): ICD-10-CM

## 2021-01-26 DIAGNOSIS — G47.39 COMPLEX SLEEP APNEA SYNDROME: ICD-10-CM

## 2021-01-26 DIAGNOSIS — N18.4 CKD (CHRONIC KIDNEY DISEASE) STAGE 4, GFR 15-29 ML/MIN (H): ICD-10-CM

## 2021-01-26 DIAGNOSIS — I48.91 ATRIAL FIBRILLATION, UNSPECIFIED TYPE (H): ICD-10-CM

## 2021-01-26 DIAGNOSIS — R06.02 SHORTNESS OF BREATH: ICD-10-CM

## 2021-01-26 DIAGNOSIS — M62.81 GENERALIZED MUSCLE WEAKNESS: ICD-10-CM

## 2021-01-26 DIAGNOSIS — G47.33 OSA (OBSTRUCTIVE SLEEP APNEA): Primary | ICD-10-CM

## 2021-01-26 DIAGNOSIS — E11.21 TYPE 2 DIABETES MELLITUS WITH DIABETIC NEPHROPATHY, UNSPECIFIED WHETHER LONG TERM INSULIN USE (H): ICD-10-CM

## 2021-01-26 DIAGNOSIS — N18.9 CHRONIC KIDNEY DISEASE, UNSPECIFIED CKD STAGE: ICD-10-CM

## 2021-01-26 LAB
ALBUMIN SERPL-MCNC: 2.5 G/DL (ref 3.4–5)
ALP SERPL-CCNC: 81 U/L (ref 40–150)
ALT SERPL W P-5'-P-CCNC: 40 U/L (ref 0–50)
ANION GAP SERPL CALCULATED.3IONS-SCNC: 4 MMOL/L (ref 3–14)
APPEARANCE FLD: NORMAL
AST SERPL W P-5'-P-CCNC: 27 U/L (ref 0–45)
BASE EXCESS BLDV CALC-SCNC: 4.9 MMOL/L
BASOPHILS # BLD AUTO: 0 10E9/L (ref 0–0.2)
BASOPHILS NFR BLD AUTO: 0.3 %
BILIRUB SERPL-MCNC: 0.5 MG/DL (ref 0.2–1.3)
BUN SERPL-MCNC: 77 MG/DL (ref 7–30)
CALCIUM SERPL-MCNC: 9.2 MG/DL (ref 8.5–10.1)
CHLORIDE SERPL-SCNC: 103 MMOL/L (ref 94–109)
CO2 SERPL-SCNC: 32 MMOL/L (ref 20–32)
COLOR FLD: NORMAL
CREAT SERPL-MCNC: 2.7 MG/DL (ref 0.52–1.04)
D DIMER PPP FEU-MCNC: 3.3 UG/ML FEU (ref 0–0.5)
DIFFERENTIAL METHOD BLD: ABNORMAL
EOSINOPHIL # BLD AUTO: 0 10E9/L (ref 0–0.7)
EOSINOPHIL NFR BLD AUTO: 0 %
ERYTHROCYTE [DISTWIDTH] IN BLOOD BY AUTOMATED COUNT: 17.3 % (ref 10–15)
FLUAV RNA RESP QL NAA+PROBE: NEGATIVE
FLUBV RNA RESP QL NAA+PROBE: NEGATIVE
GFR SERPL CREATININE-BSD FRML MDRD: 16 ML/MIN/{1.73_M2}
GLUCOSE BLDC GLUCOMTR-MCNC: 276 MG/DL (ref 70–99)
GLUCOSE FLD-MCNC: 300 MG/DL
GLUCOSE SERPL-MCNC: 310 MG/DL (ref 70–99)
GRAM STN SPEC: NORMAL
GRAM STN SPEC: NORMAL
HBA1C MFR BLD: 7.1 % (ref 0–5.6)
HCO3 BLDV-SCNC: 33 MMOL/L (ref 21–28)
HCT VFR BLD AUTO: 32.5 % (ref 35–47)
HGB BLD-MCNC: 9.4 G/DL (ref 11.7–15.7)
IMM GRANULOCYTES # BLD: 0.1 10E9/L (ref 0–0.4)
IMM GRANULOCYTES NFR BLD: 0.7 %
INR PPP: 1.64 (ref 0.86–1.14)
INTERPRETATION ECG - MUSE: NORMAL
LABORATORY COMMENT REPORT: NORMAL
LDH FLD L TO P-CCNC: 349 U/L
LDH SERPL L TO P-CCNC: 388 U/L (ref 81–234)
LYMPHOCYTES # BLD AUTO: 0.6 10E9/L (ref 0.8–5.3)
LYMPHOCYTES NFR BLD AUTO: 5.4 %
LYMPHOCYTES NFR FLD MANUAL: 22 %
MCH RBC QN AUTO: 28.4 PG (ref 26.5–33)
MCHC RBC AUTO-ENTMCNC: 28.9 G/DL (ref 31.5–36.5)
MCV RBC AUTO: 98 FL (ref 78–100)
MONOCYTES # BLD AUTO: 0.9 10E9/L (ref 0–1.3)
MONOCYTES NFR BLD AUTO: 7.4 %
MONOS+MACROS NFR FLD MANUAL: 37 %
NEUTROPHILS # BLD AUTO: 10 10E9/L (ref 1.6–8.3)
NEUTROPHILS NFR BLD AUTO: 86.2 %
NEUTS BAND NFR FLD MANUAL: 26 %
NRBC # BLD AUTO: 0 10*3/UL
NRBC BLD AUTO-RTO: 0 /100
NT-PROBNP SERPL-MCNC: 2466 PG/ML (ref 0–1800)
O2/TOTAL GAS SETTING VFR VENT: ABNORMAL %
OTHER CELLS FLD MANUAL: 15 %
OXYHGB MFR BLDV: 70 %
PCO2 BLDV: 66 MM HG (ref 40–50)
PH BLDV: 7.3 PH (ref 7.32–7.43)
PLATELET # BLD AUTO: 355 10E9/L (ref 150–450)
PO2 BLDV: 41 MM HG (ref 25–47)
POTASSIUM SERPL-SCNC: 4.3 MMOL/L (ref 3.4–5.3)
POTASSIUM SERPL-SCNC: 4.4 MMOL/L (ref 3.4–5.3)
PROCALCITONIN SERPL-MCNC: 0.09 NG/ML
PROT FLD-MCNC: 3.7 G/DL
PROT SERPL-MCNC: 7.3 G/DL (ref 6.8–8.8)
RBC # BLD AUTO: 3.31 10E12/L (ref 3.8–5.2)
RSV RNA SPEC QL NAA+PROBE: NORMAL
SARS-COV-2 RNA RESP QL NAA+PROBE: NEGATIVE
SODIUM SERPL-SCNC: 139 MMOL/L (ref 133–144)
SPECIMEN SOURCE FLD: NORMAL
SPECIMEN SOURCE: NORMAL
SPECIMEN SOURCE: NORMAL
TROPONIN I SERPL-MCNC: <0.015 UG/L (ref 0–0.04)
WBC # BLD AUTO: 11.6 10E9/L (ref 4–11)
WBC # FLD AUTO: 449 /UL

## 2021-01-26 PROCEDURE — 88342 IMHCHEM/IMCYTCHM 1ST ANTB: CPT | Mod: 26

## 2021-01-26 PROCEDURE — 82664 ELECTROPHORETIC TEST: CPT | Performed by: INTERNAL MEDICINE

## 2021-01-26 PROCEDURE — 80053 COMPREHEN METABOLIC PANEL: CPT | Performed by: EMERGENCY MEDICINE

## 2021-01-26 PROCEDURE — 84484 ASSAY OF TROPONIN QUANT: CPT | Performed by: EMERGENCY MEDICINE

## 2021-01-26 PROCEDURE — C9803 HOPD COVID-19 SPEC COLLECT: HCPCS

## 2021-01-26 PROCEDURE — 93005 ELECTROCARDIOGRAM TRACING: CPT

## 2021-01-26 PROCEDURE — 120N000001 HC R&B MED SURG/OB

## 2021-01-26 PROCEDURE — 87205 SMEAR GRAM STAIN: CPT | Performed by: INTERNAL MEDICINE

## 2021-01-26 PROCEDURE — 0W9B3ZZ DRAINAGE OF LEFT PLEURAL CAVITY, PERCUTANEOUS APPROACH: ICD-10-PCS | Performed by: RADIOLOGY

## 2021-01-26 PROCEDURE — 250N000012 HC RX MED GY IP 250 OP 636 PS 637: Performed by: INTERNAL MEDICINE

## 2021-01-26 PROCEDURE — 85379 FIBRIN DEGRADATION QUANT: CPT | Performed by: EMERGENCY MEDICINE

## 2021-01-26 PROCEDURE — 82945 GLUCOSE OTHER FLUID: CPT | Performed by: INTERNAL MEDICINE

## 2021-01-26 PROCEDURE — 82150 ASSAY OF AMYLASE: CPT | Performed by: INTERNAL MEDICINE

## 2021-01-26 PROCEDURE — 99291 CRITICAL CARE FIRST HOUR: CPT | Mod: 25

## 2021-01-26 PROCEDURE — 36415 COLL VENOUS BLD VENIPUNCTURE: CPT | Performed by: INTERNAL MEDICINE

## 2021-01-26 PROCEDURE — 250N000011 HC RX IP 250 OP 636: Performed by: EMERGENCY MEDICINE

## 2021-01-26 PROCEDURE — 82805 BLOOD GASES W/O2 SATURATION: CPT | Performed by: EMERGENCY MEDICINE

## 2021-01-26 PROCEDURE — 36415 COLL VENOUS BLD VENIPUNCTURE: CPT | Performed by: EMERGENCY MEDICINE

## 2021-01-26 PROCEDURE — 84157 ASSAY OF PROTEIN OTHER: CPT | Performed by: INTERNAL MEDICINE

## 2021-01-26 PROCEDURE — 32555 ASPIRATE PLEURA W/ IMAGING: CPT | Mod: 50

## 2021-01-26 PROCEDURE — 87070 CULTURE OTHR SPECIMN AEROBIC: CPT | Performed by: INTERNAL MEDICINE

## 2021-01-26 PROCEDURE — 99223 1ST HOSP IP/OBS HIGH 75: CPT | Mod: AI | Performed by: INTERNAL MEDICINE

## 2021-01-26 PROCEDURE — 83615 LACTATE (LD) (LDH) ENZYME: CPT | Performed by: INTERNAL MEDICINE

## 2021-01-26 PROCEDURE — 84132 ASSAY OF SERUM POTASSIUM: CPT | Performed by: INTERNAL MEDICINE

## 2021-01-26 PROCEDURE — 250N000009 HC RX 250: Performed by: RADIOLOGY

## 2021-01-26 PROCEDURE — 71045 X-RAY EXAM CHEST 1 VIEW: CPT

## 2021-01-26 PROCEDURE — 88342 IMHCHEM/IMCYTCHM 1ST ANTB: CPT | Mod: TC | Performed by: INTERNAL MEDICINE

## 2021-01-26 PROCEDURE — 82465 ASSAY BLD/SERUM CHOLESTEROL: CPT | Performed by: INTERNAL MEDICINE

## 2021-01-26 PROCEDURE — 87636 SARSCOV2 & INF A&B AMP PRB: CPT | Performed by: EMERGENCY MEDICINE

## 2021-01-26 PROCEDURE — 96374 THER/PROPH/DIAG INJ IV PUSH: CPT

## 2021-01-26 PROCEDURE — 999N001018 HC STATISTIC H-CELL BLOCK W/STAIN: Performed by: INTERNAL MEDICINE

## 2021-01-26 PROCEDURE — 88305 TISSUE EXAM BY PATHOLOGIST: CPT | Mod: 26

## 2021-01-26 PROCEDURE — 88341 IMHCHEM/IMCYTCHM EA ADD ANTB: CPT | Mod: 26

## 2021-01-26 PROCEDURE — 88112 CYTOPATH CELL ENHANCE TECH: CPT | Mod: TC | Performed by: INTERNAL MEDICINE

## 2021-01-26 PROCEDURE — 85025 COMPLETE CBC W/AUTO DIFF WBC: CPT | Performed by: EMERGENCY MEDICINE

## 2021-01-26 PROCEDURE — 99292 CRITICAL CARE ADDL 30 MIN: CPT

## 2021-01-26 PROCEDURE — 999N001014 HC STATISTIC CYTO WRIGHT STAIN TC: Performed by: INTERNAL MEDICINE

## 2021-01-26 PROCEDURE — 88341 IMHCHEM/IMCYTCHM EA ADD ANTB: CPT | Mod: TC | Performed by: INTERNAL MEDICINE

## 2021-01-26 PROCEDURE — 84478 ASSAY OF TRIGLYCERIDES: CPT | Performed by: INTERNAL MEDICINE

## 2021-01-26 PROCEDURE — 250N000013 HC RX MED GY IP 250 OP 250 PS 637: Performed by: INTERNAL MEDICINE

## 2021-01-26 PROCEDURE — 88112 CYTOPATH CELL ENHANCE TECH: CPT | Mod: 26

## 2021-01-26 PROCEDURE — 88305 TISSUE EXAM BY PATHOLOGIST: CPT | Mod: TC | Performed by: INTERNAL MEDICINE

## 2021-01-26 PROCEDURE — 999N001017 HC STATISTIC GLUCOSE BY METER IP

## 2021-01-26 PROCEDURE — 89051 BODY FLUID CELL COUNT: CPT | Performed by: INTERNAL MEDICINE

## 2021-01-26 PROCEDURE — 83880 ASSAY OF NATRIURETIC PEPTIDE: CPT | Performed by: EMERGENCY MEDICINE

## 2021-01-26 PROCEDURE — 84145 PROCALCITONIN (PCT): CPT | Performed by: EMERGENCY MEDICINE

## 2021-01-26 PROCEDURE — 83036 HEMOGLOBIN GLYCOSYLATED A1C: CPT | Performed by: EMERGENCY MEDICINE

## 2021-01-26 PROCEDURE — 5A09357 ASSISTANCE WITH RESPIRATORY VENTILATION, LESS THAN 24 CONSECUTIVE HOURS, CONTINUOUS POSITIVE AIRWAY PRESSURE: ICD-10-PCS | Performed by: INTERNAL MEDICINE

## 2021-01-26 PROCEDURE — 83615 LACTATE (LD) (LDH) ENZYME: CPT | Performed by: EMERGENCY MEDICINE

## 2021-01-26 PROCEDURE — 96375 TX/PRO/DX INJ NEW DRUG ADDON: CPT

## 2021-01-26 PROCEDURE — 0W993ZZ DRAINAGE OF RIGHT PLEURAL CAVITY, PERCUTANEOUS APPROACH: ICD-10-PCS | Performed by: RADIOLOGY

## 2021-01-26 PROCEDURE — 85610 PROTHROMBIN TIME: CPT | Performed by: EMERGENCY MEDICINE

## 2021-01-26 RX ORDER — POLYETHYLENE GLYCOL 3350 17 G/17G
17 POWDER, FOR SOLUTION ORAL DAILY PRN
Status: DISCONTINUED | OUTPATIENT
Start: 2021-01-26 | End: 2021-02-02 | Stop reason: HOSPADM

## 2021-01-26 RX ORDER — FUROSEMIDE 10 MG/ML
40 INJECTION INTRAMUSCULAR; INTRAVENOUS DAILY
Status: DISCONTINUED | OUTPATIENT
Start: 2021-01-27 | End: 2021-01-27

## 2021-01-26 RX ORDER — ONDANSETRON 4 MG/1
4 TABLET, ORALLY DISINTEGRATING ORAL EVERY 6 HOURS PRN
Status: DISCONTINUED | OUTPATIENT
Start: 2021-01-26 | End: 2021-02-02 | Stop reason: HOSPADM

## 2021-01-26 RX ORDER — CALCIUM ACETATE 667 MG/1
667 CAPSULE ORAL
Status: DISCONTINUED | OUTPATIENT
Start: 2021-01-26 | End: 2021-02-02 | Stop reason: HOSPADM

## 2021-01-26 RX ORDER — LABETALOL HYDROCHLORIDE 5 MG/ML
20 INJECTION, SOLUTION INTRAVENOUS ONCE
Status: COMPLETED | OUTPATIENT
Start: 2021-01-26 | End: 2021-01-26

## 2021-01-26 RX ORDER — LIDOCAINE HYDROCHLORIDE 10 MG/ML
10 INJECTION, SOLUTION EPIDURAL; INFILTRATION; INTRACAUDAL; PERINEURAL ONCE
Status: COMPLETED | OUTPATIENT
Start: 2021-01-26 | End: 2021-01-26

## 2021-01-26 RX ORDER — LIDOCAINE 40 MG/G
CREAM TOPICAL
Status: DISCONTINUED | OUTPATIENT
Start: 2021-01-26 | End: 2021-02-02 | Stop reason: HOSPADM

## 2021-01-26 RX ORDER — DILTIAZEM HYDROCHLORIDE 180 MG/1
180 CAPSULE, COATED, EXTENDED RELEASE ORAL 2 TIMES DAILY
Status: DISCONTINUED | OUTPATIENT
Start: 2021-01-26 | End: 2021-02-02 | Stop reason: HOSPADM

## 2021-01-26 RX ORDER — GABAPENTIN 100 MG/1
100 CAPSULE ORAL 2 TIMES DAILY
Status: DISCONTINUED | OUTPATIENT
Start: 2021-01-26 | End: 2021-02-02 | Stop reason: HOSPADM

## 2021-01-26 RX ORDER — SIMVASTATIN 10 MG
10 TABLET ORAL AT BEDTIME
Status: DISCONTINUED | OUTPATIENT
Start: 2021-01-26 | End: 2021-02-02 | Stop reason: HOSPADM

## 2021-01-26 RX ORDER — ONDANSETRON 2 MG/ML
4 INJECTION INTRAMUSCULAR; INTRAVENOUS EVERY 6 HOURS PRN
Status: DISCONTINUED | OUTPATIENT
Start: 2021-01-26 | End: 2021-02-02 | Stop reason: HOSPADM

## 2021-01-26 RX ORDER — FUROSEMIDE 10 MG/ML
60 INJECTION INTRAMUSCULAR; INTRAVENOUS ONCE
Status: COMPLETED | OUTPATIENT
Start: 2021-01-26 | End: 2021-01-26

## 2021-01-26 RX ORDER — NICOTINE POLACRILEX 4 MG
15-30 LOZENGE BUCCAL
Status: DISCONTINUED | OUTPATIENT
Start: 2021-01-26 | End: 2021-02-02 | Stop reason: HOSPADM

## 2021-01-26 RX ORDER — ACETAMINOPHEN 325 MG/1
650 TABLET ORAL EVERY 4 HOURS PRN
Status: DISCONTINUED | OUTPATIENT
Start: 2021-01-26 | End: 2021-02-02 | Stop reason: HOSPADM

## 2021-01-26 RX ORDER — DEXTROSE MONOHYDRATE 25 G/50ML
25-50 INJECTION, SOLUTION INTRAVENOUS
Status: DISCONTINUED | OUTPATIENT
Start: 2021-01-26 | End: 2021-02-02 | Stop reason: HOSPADM

## 2021-01-26 RX ADMIN — DILTIAZEM HYDROCHLORIDE 180 MG: 180 CAPSULE, COATED, EXTENDED RELEASE ORAL at 21:20

## 2021-01-26 RX ADMIN — APIXABAN 2.5 MG: 2.5 TABLET, FILM COATED ORAL at 21:20

## 2021-01-26 RX ADMIN — SIMVASTATIN 10 MG: 10 TABLET, FILM COATED ORAL at 21:20

## 2021-01-26 RX ADMIN — CALCIUM ACETATE 667 MG: 667 CAPSULE ORAL at 18:27

## 2021-01-26 RX ADMIN — INSULIN GLARGINE 8 UNITS: 100 INJECTION, SOLUTION SUBCUTANEOUS at 21:20

## 2021-01-26 RX ADMIN — INSULIN ASPART 2 UNITS: 100 INJECTION, SOLUTION INTRAVENOUS; SUBCUTANEOUS at 21:49

## 2021-01-26 RX ADMIN — ACETAMINOPHEN 650 MG: 325 TABLET, FILM COATED ORAL at 18:53

## 2021-01-26 RX ADMIN — LABETALOL HYDROCHLORIDE 20 MG: 5 INJECTION, SOLUTION INTRAVENOUS at 12:27

## 2021-01-26 RX ADMIN — LIDOCAINE HYDROCHLORIDE 20 ML: 10 INJECTION, SOLUTION EPIDURAL; INFILTRATION; INTRACAUDAL; PERINEURAL at 15:26

## 2021-01-26 RX ADMIN — GABAPENTIN 100 MG: 100 CAPSULE ORAL at 21:20

## 2021-01-26 RX ADMIN — FUROSEMIDE 60 MG: 10 INJECTION, SOLUTION INTRAMUSCULAR; INTRAVENOUS at 12:02

## 2021-01-26 ASSESSMENT — ENCOUNTER SYMPTOMS
SHORTNESS OF BREATH: 1
COUGH: 1
DIARRHEA: 0
MYALGIAS: 0
FEVER: 0

## 2021-01-26 ASSESSMENT — MIFFLIN-ST. JEOR: SCORE: 1364.43

## 2021-01-26 ASSESSMENT — ACTIVITIES OF DAILY LIVING (ADL)
ADLS_ACUITY_SCORE: 18
ADLS_ACUITY_SCORE: 18

## 2021-01-26 NOTE — ED PROVIDER NOTES
History   Chief Complaint:  Shortness of Breath     HPI   Ирина Yanez is a 85 year old female with history of CHF, type 2 DM, CKD, hypertension, lung mass, and anticoagulation on Eliquis for Afib who presents via EMS from [facility] with shortness of breath. The patient was admitted 12/29-1/15 for CHF and CKD, and was started on Eliquis for Afib.  She has also been recently treated for pneumonia in December.  Today she reports porgressively worsening shortness of breath since discharge 1/15. She is normally on 3L PNC but is currently on 4.5 L PNC. She additionally notes cough (although this has been present since her pneumonia diagnosis in December, no new changes), bilateral LE swelling and increased orthopnea. She denies any fever, chest pain, myalgias, or diarrhea.   Most recent imaging below.    1/10 Chest XR:  Increased left pleural effusion and associated atelectasis  and/or infiltrate. Slight increase in groundglass infiltrates on the  Right.    12/29 CT Chest w/o Contrast:  1.  Near complete resolution of previously seen masslike consolidation in the right upper lobe.  2.  Large bilateral pleural effusions and partial lower lobe atelectasis, slightly increased.    Review of Systems   Constitutional: Negative for fever.   Respiratory: Positive for cough and shortness of breath.    Cardiovascular: Positive for leg swelling. Negative for chest pain.   Gastrointestinal: Negative for diarrhea.   Musculoskeletal: Negative for myalgias.   All other systems reviewed and are negative.      Allergies:  Augmentin  Cleocin  Tegretol     Medications:  Albuterol  Eliquis  Phoslo  Cardiazem   Neurontin  Apresoline  Novolog and lantus insulin  Zoloft  Zocor  Demadex    Past Medical History:    Anxiety  Arthritis  Depression  Type 2 DM  Frequent falls  Heart murmur  Hypertension  Hyperlipidemia  Lichen sclerosis et atrophicus  Peripheral neuropathy  BCC of skin  Sleep apnea  Spinal stenosis  Lung mass  Afib  CHF  CKD  "stage 4  Aortic valve stenosis  Ascending aortic dilation  Right BBB  Hyperparathyroidism     Past Surgical History:    Amputate toes  Cholecystectomy  Colonoscopy x4  Fusion lumbar spine  Melanoma resection  Repair hammer toes bilateral  Repair tendon quadricepts   Bilateral knee replacement  Breast reduction surgery  D & C     Family History:    CVD  Heart disease    Social History:  Patient presents alone.  Lives at [facility].    Physical Exam     Patient Vitals for the past 24 hrs:   BP Temp Temp src Pulse Resp SpO2 Height Weight   01/26/21 1614 -- -- -- -- -- -- 1.626 m (5' 4\") 92.4 kg (203 lb 12.8 oz)   01/26/21 1554 (!) 165/67 98  F (36.7  C) Oral 80 22 100 % -- --   01/26/21 1539 (!) 146/55 -- -- 86 20 100 % -- --   01/26/21 1524 (!) 170/84 -- -- 87 22 96 % -- --   01/26/21 1445 (!) 174/83 -- -- 86 -- 94 % -- --   01/26/21 1430 (!) 159/74 -- -- 84 -- 92 % -- --   01/26/21 1415 (!) 175/81 -- -- 84 -- 90 % -- --   01/26/21 1400 (!) 157/83 -- -- 80 -- 95 % -- --   01/26/21 1345 (!) 156/75 -- -- 75 -- 94 % -- --   01/26/21 1330 127/68 -- -- 75 -- 93 % -- --   01/26/21 1315 (!) 155/77 -- -- 75 -- 91 % -- --   01/26/21 1300 -- -- -- 75 -- -- -- --   01/26/21 1245 (!) 143/72 -- -- 71 -- 94 % -- --   01/26/21 1230 (!) 148/67 -- -- 76 -- 94 % -- --   01/26/21 1215 (!) 211/105 -- -- 92 -- 95 % -- --   01/26/21 1200 (!) 197/86 -- -- 90 -- 98 % -- --   01/26/21 1145 (!) 184/81 -- -- 87 -- -- -- --   01/26/21 1130 (!) 177/69 -- -- 94 -- -- -- --   01/26/21 1126 -- -- -- -- -- -- -- 95.4 kg (210 lb 6.4 oz)   01/26/21 1115 (!) 174/79 -- -- 89 -- 92 % -- --   01/26/21 1100 (!) 160/75 -- -- 87 -- 94 % -- --   01/26/21 1045 (!) 165/72 -- -- 85 -- (!) 88 % -- --   01/26/21 1030 (!) 151/109 -- -- 85 -- (!) 87 % -- --   01/26/21 1020 (!) 155/69 97.6  F (36.4  C) -- 88 24 94 % -- --       Physical Exam  General: Alert, sitting upright on gurney on 4.5 L PNC; speaking 4-5 word sentences  Neuro:  PERRL.  EOMI.  No focal " deficits  HEENT:  Moist mucous membranes.  Conjunctiva normal.   CV:  Irregular, 2/6 systolic murmur noted, skin warm and well perfused  Pulm:  Tachypneic, shallow breaths.  Decreased breath sounds left lung base with faint bilateral upper anterior rales.  No apparent accessory muscle use or abdominal breathing.  GI:  Soft, nontender, nondistended.  No rebound or guarding.  Normal bowel sounds  MSK:  Moving all extremities.  No focal areas of edema, erythema, or tenderness  Skin:  WWP, no rashes, 1+ bilateral lower extremity edema, skin color normal, no diaphoresis    Emergency Department Course     ECG:  Indication: Shortness of breath  Time: 1035  Vent. Rate 92 bpm. MS interval *. QRS duration 134. QT/QTc 376/464. P-R-T axis * 108 14.   Atrial Fibrillation, Right BBB  No significant change from 1/11/21   Read time: 1044    Imaging:    XR Chest Port 1 View:  Continued at least moderate left pleural effusion and left   base compressive atelectasis and/or infiltrate. Slight improvement in   atelectasis and/or infiltrate at the right base, as per radiology.     US Thoracentesis:  Ordered and pending.    Laboratory:    CBC: WBC: 11.6 (H), HGB: 9.4 (L), PLT: 355  CMP: Glucose 310 (H), Urea Nitrogen: 77 (H), GFR: 16 (L), Albumin: 2.5 (L), o/w WNL (Creatinine: 2.70 (H))    Troponin (Collected 1017): <0.015  D-dimer: 3.3 (H)    BNP: 2,466 (H)  INR: 1.64 (H)  Procalcitonin: Pending   VBG and oxyhgb: pH 7.30 (L) / PCO2 66 (H) / Bicarb 33 (H)    Symptomatic Influenza A/B & COVID PCR: Negative    Emergency Department Course:    Reviewed:  I reviewed the patient's nursing notes, vitals, past medical records, Care Everywhere.     Assessments:  1050    I evaluated the patient and performed an exam as above.    1400  I updated the patient on results and discussed plan of care.    Consults:   1300    I spoke with LUCIEN Peters for Dr. Moore of the Hospitalist service regarding patient's presentation, findings, and plan of  care.    1340  I spoke again with LUCIEN Peters of the Hospitalist service regarding plan of care.    Interventions:  1202 Lasix, 60 mg, IV  1227 Normodyne, 20 mg, IV    Disposition:  The patient was admitted to the hospital under the care of Dr. Moore.    Impression & Plan     Covid-19  Ирина Yanez was evaluated during a global COVID-19 pandemic, which necessitated consideration that the patient might be at risk for infection with the SARS-CoV-2 virus that causes COVID-19.   Applicable protocols for evaluation were followed during the patient's care.   COVID-19 was considered as part of the patient's evaluation. The plan for testing is:  a test was obtained during this visit.    Medical Decision Making:  Ирина Yanez is a 85 year old female with history significant for CKD, CHF, atrial fibrillation on Eliquis who presents to the ER for evaluation of progressively worsening shortness of breath since 1/15.  Please see above for details of HPI and exam.  She was recently treated for pneumonia last December and has had bilateral thoracentesis for pleural effusions.  She is noted to be on initially 4.5 L PNC with O2 saturations fluctuating from 88% to 95%.  She was placed on 7 L oxymask.  She is otherwise speaking 4-5 word sentences although does not appear to have significantly increased work of breathing.  She is noted to be hypertensive, afebrile.  Broad differentials considered including ACS, pneumonia, pleural effusion, CHF exacerbation, COVID-19 amongst other things.  She denies any chest pain or pain with breathing.  EKG shows atrial fibrillation with known RBBB, otherwise no significant change from her previous EKG or acute ST changes concerning for ischemia or infarct.  Troponin is within normal limits.  Laboratory evaluation has been ordered prior to my evaluation of the patient which included D-dimer and is elevated.  I doubt this represents PE at this time given no pleuritic pain and she is  anticoagulated on Eliquis.  Her BNP is improved from previous and she is only 4 pounds up from her discharge weight.  The rest of her laboratory evaluation is remarkable for leukocytosis, CKD close to patient's baseline, hyperglycemia without evidence of acidosis, mild respiratory acidosis.  Chest x-ray shows continued moderate left pleural effusion.  At this time I doubt bacterial pneumonia and will hold off on antibiotics.  I added on a procalcitonin.  I suspect overall her shortness of breath is from CHF and left pleural effusion.  She is stable on 7 L oxymask in the ER.  Patient will require admission for hypoxic respiratory failure.  She was sent for ultrasound-guided thoracentesis while in the ER and remained stable during my care.  Discussed case with hospitalist team who accepted the patient for admission.      Diagnosis:    ICD-10-CM    1. Acute respiratory failure with hypoxia (H)  J96.01 Blood gas venous and oxyhgb     Symptomatic Influenza A/B & SARS-CoV2 (COVID-19) Virus PCR Multiplex     Procalcitonin     Procalcitonin     INR     INR     Cell count with differential fluid     Fluid Culture Aerobic Bacterial     Glucose fluid     Gram stain     Lactate dehydrogenase fluid     Protein fluid     Lactate Dehydrogenase     Lactate Dehydrogenase     Hemoglobin A1c     Hemoglobin A1c     Potassium     CANCELED: Procalcitonin     CANCELED: INR     CANCELED: Lactate Dehydrogenase     CANCELED: Protein total     CANCELED: Hemoglobin A1c   2. Pleural effusion on left  J90    3. Shortness of breath  R06.02    4. Chronic kidney disease, unspecified CKD stage  N18.9    5. Chronic anticoagulation  Z79.01        Scribe Disclosure:  Zulema DIRVER, am serving as a scribe at 10:35 AM on 1/26/2021 to document services personally performed by Kendall Fish MD based on my observations and the provider's statements to me.     Scribe Disclosure:  Olga DRIVER, am serving as a scribe at 2:34 PM on  1/26/2021 to document services personally performed by Kendall Fish MD based on my observations and the provider's statements to me.       Kendall Fish MD  01/26/21 [id number]

## 2021-01-26 NOTE — TELEPHONE ENCOUNTER
[place] [facility]IATRIC SERVICES TELEPHONE ENCOUNTER        Ирина Yanez is a 85 year old  (1935),Nurse called today to report: patient with SOB, and worsening hypoxia with O2 sats 80s on 3L and barely 90% 4L, and tachycardic with HR 130s. Improved some with IS and increase in O2.     CXR performed yesterday for some mild SOB and diminished LS left.     The mediastinal and hilar structures are within normal limits. The heart is enlarged. There is left lung consolidation with  possible pleural effusion.. The soft tissue structures and osseous structures are unremarkable.  Impression:  1. left lung consolidation consistent with pneumonia with possible cardiomegaly    Patient recently hospitalized for CHF exacerbation and bilateral pleural effusions -s/p bilateral thoracentesis    ASSESSMENT/PLAN  SOB, hypoxia and tachycardia  - concern for recurrent left pleural effusion vs PNA    Send to [facility] ED for eval/tx        Electronically signed by:   SAMY Cormier CNP

## 2021-01-26 NOTE — H&P (VIEW-ONLY)
[facility]    History and Physical  Hospitalist       Date of Admission:  1/26/2021    Assessment & Plan   Ирина Yanez is a 85 year old female who presents with shortness of breath and low oxygen saturation.    Ирина Yanez is a 85 year old female with PMH including TERESSA on CPAP, type II DM, CKD stage IV with baseline creatinine 2, neuropathy, MDD, anxiety, HTN, RBBB, anemia, mild aortic stenosis, obesity, and grade 1 diastolic dysfunction on TTE who presents with shortness of breath and hypoxia.    She was recently admitted to our facility from 12/29/2020 -1/5/2021 for this similar issue of hypoxia, decompensated diastolic CHF, bilateral pleural effusions, acute kidney injury in the setting of CKD, new onset A. fib with RVR.  During that admission she underwent thoracentesis which appeared to be transudative in nature with negative cytology.  It was thought to be related to her CHF.  She was seen by pulmonary and nephrology teams during that admission.  She was discharged to a nursing facility.    She was sent to the emergency room for shortness of breath and decreased oxygen saturation.  She is not sure about orthopnea or PND.  She denies any fevers/chest pain/cough.    Chest x-ray in the emergency room showed reaccumulation of left-sided pleural effusion.  The emergency room physician spoke to interventional radiology about getting an urgent thoracentesis done because of her increased oxygen requirement and tachypnea.  In the emergency room, she appears to be tachypneic.  Requiring between 8 to 10 L of oxygen by simple mask.    Problem List:    Acute on chronic hypoxic respiratory failure.  Secondary to pleural effusion.  -Admission to medicine service.  -Agree with ultrasound-guided thoracentesis as patient has significant shortness of breath and increased oxygen requirement.  She appears tachypneic in the emergency room.  -Send pleural fluid studies for further analysis again  including cytology.  -Oxygen support to keep oxygen saturation greater than 90%.    Recurrent pleural effusion  -Reaccumulation of left-sided pleural effusion.  -Thought to be related to her underlying CHF.  -Send pleural fluid studies again, including cytology.  -Repeat CT chest without contrast tomorrow.  She had a CT chest done at the  end of December.  This was done as a follow-up for a CT done on 12/10/2020 which showed a right upper lobe masslike consolidation.  The repeat CT scan Done on 12/29/2020 showed improvement on diet consolidation/mass.    Diastolic dysfunction  -Given the reaccumulation of pleural effusion, possibility of decompensation of diastolic heart failure.  -At baseline on torsemide 20 mg daily.  Increase diuretic, start on Lasix 40 mg daily.  Patient received 60 mg of IV Lasix in the ER.  -Last echo on 12/30/2020:  The ascending aorta is Moderately dilated.  Mild valvular aortic stenosis, BRAYDON 1.6 cm2  The visual ejection fraction is estimated at 65-70%.  The left atrium is mildly dilated.  Dilation of the inferior vena cava is present with abnormal respiratory  variation in diameter.  Right ventricular systolic pressure is elevated, consistent with mild  pulmonary hypertension.  Grade I or early diastolic dysfunction.  The study was technically difficult. Compared to prior study, there is no  significant change.    CKD  -Creatinine appears to be at baseline today at 2.70  -Monitor.    Diabetes  -Continue Lantus and sliding scale insulin as before.  Check hemoglobin A1c with a.m. labs.    Hypertension  -Continue diltiazem as before.  Also on hydralazine on as-needed basis.    Obstructive sleep apnea  -On CPAP    Dyslipidemia  -Continue statin    Atrial fibrillation  -On diltiazem for rate control.  EKG showed right bundle branch block with atrial fibrillation.  -On Eliquis 2.5 mg twice daily for stroke prevention.    Chronic anemia    DVT Prophylaxis: On Eliquis.  Code Status: Full  Code    Ignacio Moore MD    Primary Care Physician   Yuridia Villarreal MD    Chief Complaint   Shortness of breath      History of Present Illness   Ирина Yanez is a 85 year old female presented with shortness of breath      Past Medical History    I have reviewed this patient's medical history and updated it with pertinent information if needed.   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     Depression      Diabetes mellitus (H)     sees Dr. Verde- type II     Falls frequently 7/10/2019     Heart murmur      HTN      Hyperlipidemia LDL goal < 100     Dr. Verde     Lichen sclerosus et atrophicus 2/15/2013     Melanoma (H)      Peripheral Neuropathy     hands, feet; used to see Dr. Tl Das     Skin cancer     face; basal and other. Melanoma. Dolan     Sleep apnea     CPAP     Spinal stenosis        Past Surgical History   I have reviewed this patient's surgical history and updated it with pertinent information if needed.  Past Surgical History:   Procedure Laterality Date     AMPUTATE TOE(S)  8/23/2012    left second toe Procedure: AMPUTATE TOE(S);;  Surgeon: Mil Jolly DPM;  Location: [facility] OR     CHOLECYSTECTOMY  Nov. 1975     COLONOSCOPY  early 2009    repeat 4-5 years (Had one prior to this with polyps)     COLONOSCOPY  Sept 2014    incomplete; recommend colography     COLONOSCOPY  02/25/2020    Dr. Pathak [facility]     COLONOSCOPY N/A 2/25/2020    Procedure: COLONOSCOPY, WITH biopsies using forceps;  Surgeon: Adebayo Pathak MD;  Location:  GI     OPTICAL TRACKING SYSTEM FUSION POSTERIOR SPINE LUMBAR N/A 9/16/2016    Procedure: OPTICAL TRACKING SYSTEM FUSION SPINE POSTERIOR LUMBAR ONE LEVEL;  Surgeon: Lennox Blue MD;  Location:  OR     PET, REC OF MELANOMA/MET CA Left     arm     REPAIR HAMMER TOE BILATERAL  8/23/2012    Procedure:  REPAIR HAMMER TOE BILATERAL;  Flexor Tenotomy 2,3,4,5 Toes both feet, Partial 2nd toe amputation left foot;  Surgeon: Mil Jolly DPM;  Location: [facility] OR     REPAIR TENDON QUADRICEPS Right 10/27/2015    Procedure: REPAIR TENDON QUADRICEPS;  Surgeon: Antonio Yi MD;  Location: [facility] OR     SURGICAL HISTORY OF -   1997    bilateral knee replacement     SURGICAL HISTORY OF -   1997    right knee revised; patella tendon ruptured     SURGICAL HISTORY OF -       surgery for spinal stenosis     SURGICAL HISTORY OF -       breast reduction surgery     SURGICAL HISTORY OF -       D and C        Prior to Admission Medications   Prior to Admission Medications   Prescriptions Last Dose Informant Patient Reported? Taking?   MULTI-VITAMIN OR TABS 1/26/2021 at am  Yes Yes   Sig: Take 2 tablets by mouth daily    acetaminophen (TYLENOL) 325 MG tablet prn  No Yes   Sig: Take 2 tablets (650 mg) by mouth every 4 hours as needed for mild pain   albuterol (PROAIR HFA/PROVENTIL HFA/VENTOLIN HFA) 108 (90 Base) MCG/ACT inhaler unknown  No Yes   Sig: Inhale 2 puffs into the lungs 4 times daily   apixaban ANTICOAGULANT (ELIQUIS) 2.5 MG tablet 1/26/2021 at am  No Yes   Sig: Take 1 tablet (2.5 mg) by mouth 2 times daily   blood glucose (NO BRAND SPECIFIED) lancets standard   No No   Sig: Use to test blood sugar 1 times daily or as directed, Contour Next lancets   blood glucose (NO BRAND SPECIFIED) test strip   No No   Sig: Use to test blood sugar 1 times daily or as directed, Contour Next strips   blood glucose monitoring (NO BRAND SPECIFIED) meter device kit   No No   Sig: Use to test blood sugar 1 times daily or as directed. Ultra 2   calcium acetate (PHOSLO) 667 MG CAPS capsule 1/26/2021 at am  No Yes   Sig: Take 1 capsule (667 mg) by mouth 3 times daily (with meals)   diltiazem ER COATED BEADS (CARDIAZEM LA) 180 MG 24 hr tablet 1/26/2021 at am  Yes Yes   Sig: Take 180 mg by mouth 2 times daily    fish oil-omega-3 fatty acids  1000 MG capsule 1/26/2021 at am  Yes Yes   Sig: Take 1 g by mouth daily   gabapentin (NEURONTIN) 100 MG capsule 1/26/2021 at am  No Yes   Sig: Take 1 capsule (100 mg) by mouth 2 times daily   hydrALAZINE (APRESOLINE) 25 MG tablet prn  No Yes   Sig: Take 1 tablet (25 mg) by mouth 3 times daily as needed (for SBP > 140)   insulin aspart (NOVOLOG PEN) 100 UNIT/ML pen pt does not remember if took today  No Yes   Sig: For Pre-Meal  - 189 give 1 unit. For Pre-Meal  - 239 give 2 units. For Pre-Meal  - 289 give 3 units. For Pre-Meal  - 339 give 4 units. For Pre-Meal - 399 give 5 units. For Pre-Meal -449 give 6 units For Pre-Meal BG greater than or equal to 450 give 7 units.   insulin glargine (LANTUS PEN) 100 UNIT/ML pen unknown  No Yes   Sig: Inject 8 Units Subcutaneous At Bedtime   order for DME   No No   Sig: Equipment being ordered: walker. 4 wheeled with brakes and a seat.   polyethylene glycol (MIRALAX) 17 GM/Dose powder prn  No Yes   Sig: Take 17 g by mouth daily as needed for constipation   sertraline (ZOLOFT) 50 MG tablet 1/26/2021 at am  No Yes   Sig: Take 1 tablet (50 mg) by mouth daily   simvastatin (ZOCOR) 10 MG tablet 1/25/2021 at Unknown time  No Yes   Sig: TAKE 1 TABLET BY MOUTH AT BEDTIME.   torsemide (DEMADEX) 10 MG tablet 1/26/2021 at am  Yes Yes   Sig: Take 20 mg by mouth daily      Facility-Administered Medications: None     Allergies   Allergies   Allergen Reactions     Augmentin Diarrhea     Vomiting       Cleocin      Hives     Tegretol [Carbamazepine]      Irregular heart beat       Social History   I have reviewed this patient's social history and updated it with pertinent information if needed. Ирина JOHNS Renata  reports that she has never smoked. She has never used smokeless tobacco. She reports that she does not drink alcohol or use drugs.    Family History   I have reviewed this patient's family history and updated it with pertinent information if needed.    Family History   Problem Relation Age of Onset     Cerebrovascular Disease Mother      Heart Disease Father      Neurologic Disorder Sister         ALS     C.A.D. Sister      Diabetes Sister      C.A.D. Brother      Psychotic Disorder Brother         [place] war: PTSD. suicide 2019     Gastrointestinal Disease Brother         diverticulitis     Colon Cancer No family hx of        Review of Systems   The 10 point Review of Systems is negative other than noted in the HPI or here.     Physical Exam   Temp: 97.6  F (36.4  C)   BP: (!) 170/84 Pulse: 87   Resp: 22 SpO2: 96 % O2 Device: Non-rebreather mask Oxygen Delivery: 8 LPM  Vital Signs with Ranges  Temp:  [97.6  F (36.4  C)] 97.6  F (36.4  C)  Pulse:  [71-94] 87  Resp:  [22-24] 22  BP: (127-211)/() 170/84  SpO2:  [87 %-98 %] 96 %  210 lbs 6.4 oz    Constitutional: Awake, alert, cooperative, appears tachypneic.  Eyes: Conjunctiva and pupils examined and normal.  HEENT: Moist mucous membranes, normal dentition.  Respiratory: Decreased air entry on the left side.  No wheezing.  Tachypneic.  Cardiovascular: Irregular rate and rhythm, normal S1 and S2, and no murmur noted.  Peripheral edema present  GI: Soft, non-distended, non-tender, normal bowel sounds.  Skin: No rashes, no cyanosis, peripheral edema present  Musculoskeletal: No joint swelling, erythema or tenderness.  Neurologic: Cranial nerves 2-12 intact, normal strength and sensation.  Psychiatric: Alert, oriented to person, place and time, no obvious anxiety or depression.    Data     Recent Labs   Lab 01/26/21  1017 01/20/21  1120   WBC 11.6* 11.0   HGB 9.4* 8.2*   MCV 98 96    269   INR 1.64*  --     137   POTASSIUM 4.3 3.5   CHLORIDE 103 100   CO2 32 31   BUN 77* 86*   CR 2.70* 3.20*   ANIONGAP 4 6   EDNA 9.2 9.4   * 300*   ALBUMIN 2.5*  --    PROTTOTAL 7.3  --    BILITOTAL 0.5  --    ALKPHOS 81  --    ALT 40  --    AST 27  --    TROPI <0.015  --        Recent Results (from the past  24 hour(s))   XR Chest Port 1 View    Narrative    CHEST ONE VIEW  1/26/2021 12:08 PM     HISTORY: Shortness of breath.    COMPARISON: January 10, 2021      Impression    IMPRESSION: Continued at least moderate left pleural effusion and left  base compressive atelectasis and/or infiltrate. Slight improvement in  atelectasis and/or infiltrate at the right base.    KAROL DUCKWORTH MD

## 2021-01-26 NOTE — ED NOTES
[facility]  ED Nurse Handoff Report    Ирина Yanez is a 85 year old female   ED Chief complaint: Shortness of Breath  . ED Diagnosis:   Final diagnoses:   Acute respiratory failure with hypoxia (H)   Pleural effusion on left     Allergies:   Allergies   Allergen Reactions     Augmentin Diarrhea     Vomiting       Cleocin      Hives     Tegretol [Carbamazepine]      Irregular heart beat       Code Status: Full Code  Activity level - Baseline/Home:  Independent. Activity Level - Current:   Assist X 2. Lift room needed: No. Bariatric: No   Needed: No   Isolation: Yes. Infection: COVID r/o and special precautions.     Vital Signs:   Vitals:    01/26/21 1230 01/26/21 1245 01/26/21 1300 01/26/21 1315   BP: (!) 148/67 (!) 143/72  (!) 155/77   Pulse: 76 71 75 75   Resp:       Temp:       SpO2: 94% 94%  91%   Weight:           Cardiac Rhythm:  ,      Pain level:    Patient confused: Yes. Patient Falls Risk: Yes.   Elimination Status: Has voided   Patient Report - Initial Complaint: Shortness of Breath. Focused Assessment: Ирина Yanez is a 85 year old female with history of CHF, type 2 DM, CKD, hypertension, lung mass, and anticoagulation on Eliquis for Afib who presents via EMS from [facility] with shortness of breath. The patient was admitted 12/29-1/15 for CHF and CKD, and was started on Eliquis for Afib. Today she reports worsening shortness of breath since discharge 1/15. She is normally on 3L PNC but is currently on 4.5 L PNC. She additionally notes cough, bilateral LE swelling and positive orthopnea. Denies any fever, chest pain, myalgias, or diarrhea. Most recent imaging below.  Tests Performed: Labs, Imaging Abnormal Results:   Abnormal Labs Reviewed   CBC WITH PLATELETS DIFFERENTIAL - Abnormal; Notable for the following components:       Result Value    WBC 11.6 (*)     RBC Count 3.31 (*)     Hemoglobin 9.4 (*)     Hematocrit 32.5 (*)     MCHC 28.9 (*)     RDW 17.3 (*)     Absolute  Neutrophil 10.0 (*)     Absolute Lymphocytes 0.6 (*)     All other components within normal limits   D DIMER QUANTITATIVE - Abnormal; Notable for the following components:    D Dimer 3.3 (*)     All other components within normal limits   COMPREHENSIVE METABOLIC PANEL - Abnormal; Notable for the following components:    Glucose 310 (*)     Urea Nitrogen 77 (*)     Creatinine 2.70 (*)     GFR Estimate 16 (*)     GFR Estimate If Black 18 (*)     Albumin 2.5 (*)     All other components within normal limits   NT PROBNP INPATIENT - Abnormal; Notable for the following components:    N-Terminal Pro BNP Inpatient 2,466 (*)     All other components within normal limits   INR - Abnormal; Notable for the following components:    INR 1.64 (*)     All other components within normal limits     XR Chest Port 1 View   Final Result   IMPRESSION: Continued at least moderate left pleural effusion and left   base compressive atelectasis and/or infiltrate. Slight improvement in   atelectasis and/or infiltrate at the right base.      KAROL DUCKWORTH MD      US Thoracentesis    (Results Pending)      Treatments provided: see EMAR  Family Comments: none at bedside  OBS brochure/video discussed/provided to patient:  N/A  ED Medications:   Medications   furosemide (LASIX) injection 60 mg (60 mg Intravenous Given 1/26/21 1202)   labetalol (NORMODYNE/TRANDATE) injection 20 mg (20 mg Intravenous Given 1/26/21 1227)     Drips infusing:  No  For the majority of the shift, the patient's behavior Green. Interventions performed were NA.    Sepsis treatment initiated: No     Patient tested for COVID 19 prior to admission: YES    ED Nurse Name/Phone Number: Onelia Barbosa RN,   1:29 PM

## 2021-01-26 NOTE — PHARMACY-ADMISSION MEDICATION HISTORY
Admission medication history interview status for this patient is complete. See [facility] admission navigator for allergy information, prior to admission medications and immunization status.     Medication history interview done via telephone during Covid-19 pandemic, indicate source(s): Patient  Medication history resources (including written lists, pill bottles, clinic record): [facility] Discharge summary by Dr. Julio from 1/15/21.  Pharmacy:  in [place].    Changes made to PTA medication list:  Added: none  Deleted: none  Changed: none    Actions taken by pharmacist (provider contacted, etc):None     Additional medication history information: Patient was not able to name all meds, but stated that NO CHANGES since last discharge on 1/15/21. Patient took her AM meds, but was not able to give me names. Patient was not sure if she took her insulin today or not.    Medication reconciliation/reorder completed by provider prior to medication history?  no  (Y/N)     For patients on insulin therapy: yes  Do you use sliding scale insulin based on blood sugars? yes    Prior to Admission medications    Medication Sig Last Dose Taking? Auth Provider   acetaminophen (TYLENOL) 325 MG tablet Take 2 tablets (650 mg) by mouth every 4 hours as needed for mild pain prn Yes Frandy Julio MD   albuterol (PROAIR HFA/PROVENTIL HFA/VENTOLIN HFA) 108 (90 Base) MCG/ACT inhaler Inhale 2 puffs into the lungs 4 times daily unknown Yes Yuridia Villarreal MD   apixaban ANTICOAGULANT (ELIQUIS) 2.5 MG tablet Take 1 tablet (2.5 mg) by mouth 2 times daily 1/26/2021 at am Yes Frandy Julio MD   calcium acetate (PHOSLO) 667 MG CAPS capsule Take 1 capsule (667 mg) by mouth 3 times daily (with meals) 1/26/2021 at am Yes Frandy Julio MD   diltiazem ER COATED BEADS (CARDIAZEM LA) 180 MG 24 hr tablet Take 180 mg by mouth 2 times daily  1/26/2021 at am Yes Reported, Patient   fish oil-omega-3 fatty acids 1000 MG capsule Take 1 g by mouth  daily 1/26/2021 at am Yes Unknown, Entered By History   gabapentin (NEURONTIN) 100 MG capsule Take 1 capsule (100 mg) by mouth 2 times daily 1/26/2021 at am Yes Frandy Julio MD   hydrALAZINE (APRESOLINE) 25 MG tablet Take 1 tablet (25 mg) by mouth 3 times daily as needed (for SBP > 140) prn Yes Frandy Julio MD   insulin aspart (NOVOLOG PEN) 100 UNIT/ML pen For Pre-Meal  - 189 give 1 unit. For Pre-Meal  - 239 give 2 units. For Pre-Meal  - 289 give 3 units. For Pre-Meal  - 339 give 4 units. For Pre-Meal - 399 give 5 units. For Pre-Meal -449 give 6 units For Pre-Meal BG greater than or equal to 450 give 7 units. pt does not remember if took today Yes Frandy Julio MD   insulin glargine (LANTUS PEN) 100 UNIT/ML pen Inject 8 Units Subcutaneous At Bedtime unknown Yes Frandy Julio MD   MULTI-VITAMIN OR TABS Take 2 tablets by mouth daily  1/26/2021 at am Yes Reported, Patient   polyethylene glycol (MIRALAX) 17 GM/Dose powder Take 17 g by mouth daily as needed for constipation prn Yes Frandy Julio MD   sertraline (ZOLOFT) 50 MG tablet Take 1 tablet (50 mg) by mouth daily 1/26/2021 at am Yes Yuridia Villarreal MD   simvastatin (ZOCOR) 10 MG tablet TAKE 1 TABLET BY MOUTH AT BEDTIME. 1/25/2021 at Unknown time Yes Yuridia Villarreal MD   torsemide (DEMADEX) 10 MG tablet Take 20 mg by mouth daily 1/26/2021 at am Yes Unknown, Entered By History

## 2021-01-26 NOTE — H&P
[facility]    History and Physical  Hospitalist       Date of Admission:  1/26/2021    Assessment & Plan   Ирина Yanez is a 85 year old female who presents with shortness of breath and low oxygen saturation.    Ирина Yanez is a 85 year old female with PMH including TERESSA on CPAP, type II DM, CKD stage IV with baseline creatinine 2, neuropathy, MDD, anxiety, HTN, RBBB, anemia, mild aortic stenosis, obesity, and grade 1 diastolic dysfunction on TTE who presents with shortness of breath and hypoxia.    She was recently admitted to our facility from 12/29/2020 -1/5/2021 for this similar issue of hypoxia, decompensated diastolic CHF, bilateral pleural effusions, acute kidney injury in the setting of CKD, new onset A. fib with RVR.  During that admission she underwent thoracentesis which appeared to be transudative in nature with negative cytology.  It was thought to be related to her CHF.  She was seen by pulmonary and nephrology teams during that admission.  She was discharged to a nursing facility.    She was sent to the emergency room for shortness of breath and decreased oxygen saturation.  She is not sure about orthopnea or PND.  She denies any fevers/chest pain/cough.    Chest x-ray in the emergency room showed reaccumulation of left-sided pleural effusion.  The emergency room physician spoke to interventional radiology about getting an urgent thoracentesis done because of her increased oxygen requirement and tachypnea.  In the emergency room, she appears to be tachypneic.  Requiring between 8 to 10 L of oxygen by simple mask.    Problem List:    Acute on chronic hypoxic respiratory failure.  Secondary to pleural effusion.  -Admission to medicine service.  -Agree with ultrasound-guided thoracentesis as patient has significant shortness of breath and increased oxygen requirement.  She appears tachypneic in the emergency room.  -Send pleural fluid studies for further analysis again  including cytology.  -Oxygen support to keep oxygen saturation greater than 90%.    Recurrent pleural effusion  -Reaccumulation of left-sided pleural effusion.  -Thought to be related to her underlying CHF.  -Send pleural fluid studies again, including cytology.  -Repeat CT chest without contrast tomorrow.  She had a CT chest done at the  end of December.  This was done as a follow-up for a CT done on 12/10/2020 which showed a right upper lobe masslike consolidation.  The repeat CT scan Done on 12/29/2020 showed improvement on diet consolidation/mass.    Diastolic dysfunction  -Given the reaccumulation of pleural effusion, possibility of decompensation of diastolic heart failure.  -At baseline on torsemide 20 mg daily.  Increase diuretic, start on Lasix 40 mg daily.  Patient received 60 mg of IV Lasix in the ER.  -Last echo on 12/30/2020:  The ascending aorta is Moderately dilated.  Mild valvular aortic stenosis, BRAYDON 1.6 cm2  The visual ejection fraction is estimated at 65-70%.  The left atrium is mildly dilated.  Dilation of the inferior vena cava is present with abnormal respiratory  variation in diameter.  Right ventricular systolic pressure is elevated, consistent with mild  pulmonary hypertension.  Grade I or early diastolic dysfunction.  The study was technically difficult. Compared to prior study, there is no  significant change.    CKD  -Creatinine appears to be at baseline today at 2.70  -Monitor.    Diabetes  -Continue Lantus and sliding scale insulin as before.  Check hemoglobin A1c with a.m. labs.    Hypertension  -Continue diltiazem as before.  Also on hydralazine on as-needed basis.    Obstructive sleep apnea  -On CPAP    Dyslipidemia  -Continue statin    Atrial fibrillation  -On diltiazem for rate control.  EKG showed right bundle branch block with atrial fibrillation.  -On Eliquis 2.5 mg twice daily for stroke prevention.    Chronic anemia    DVT Prophylaxis: On Eliquis.  Code Status: Full  Code    Ignacio Moore MD    Primary Care Physician   Yuridia Villarreal MD    Chief Complaint   Shortness of breath      History of Present Illness   Ирина Yanez is a 85 year old female presented with shortness of breath      Past Medical History    I have reviewed this patient's medical history and updated it with pertinent information if needed.   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     Depression      Diabetes mellitus (H)     sees Dr. Verde- type II     Falls frequently 7/10/2019     Heart murmur      HTN      Hyperlipidemia LDL goal < 100     Dr. Verde     Lichen sclerosus et atrophicus 2/15/2013     Melanoma (H)      Peripheral Neuropathy     hands, feet; used to see Dr. Tl Das     Skin cancer     face; basal and other. Melanoma. Dolan     Sleep apnea     CPAP     Spinal stenosis        Past Surgical History   I have reviewed this patient's surgical history and updated it with pertinent information if needed.  Past Surgical History:   Procedure Laterality Date     AMPUTATE TOE(S)  8/23/2012    left second toe Procedure: AMPUTATE TOE(S);;  Surgeon: Mil Jolly DPM;  Location: [facility] OR     CHOLECYSTECTOMY  Nov. 1975     COLONOSCOPY  early 2009    repeat 4-5 years (Had one prior to this with polyps)     COLONOSCOPY  Sept 2014    incomplete; recommend colography     COLONOSCOPY  02/25/2020    [facility]     COLONOSCOPY N/A 2/25/2020    Procedure: COLONOSCOPY, WITH biopsies using forceps;  Surgeon: Adebayo Pathak MD;  Location:  GI     OPTICAL TRACKING SYSTEM FUSION POSTERIOR SPINE LUMBAR N/A 9/16/2016    Procedure: OPTICAL TRACKING SYSTEM FUSION SPINE POSTERIOR LUMBAR ONE LEVEL;  Surgeon: Lennox Blue MD;  Location:  OR     PET, REC OF MELANOMA/MET CA Left     arm     REPAIR HAMMER TOE BILATERAL  8/23/2012    Procedure:  REPAIR HAMMER TOE BILATERAL;  Flexor Tenotomy 2,3,4,5 Toes both feet, Partial 2nd toe amputation left foot;  Surgeon: Mil Jolly DPM;  Location: [facility] OR     REPAIR TENDON QUADRICEPS Right 10/27/2015    Procedure: REPAIR TENDON QUADRICEPS;  Surgeon: Antonio Yi MD;  Location: [facility] OR     SURGICAL HISTORY OF -   1997    bilateral knee replacement     SURGICAL HISTORY OF -   1997    right knee revised; patella tendon ruptured     SURGICAL HISTORY OF -       surgery for spinal stenosis     SURGICAL HISTORY OF -       breast reduction surgery     SURGICAL HISTORY OF -       D and C        Prior to Admission Medications   Prior to Admission Medications   Prescriptions Last Dose Informant Patient Reported? Taking?   MULTI-VITAMIN OR TABS 1/26/2021 at am  Yes Yes   Sig: Take 2 tablets by mouth daily    acetaminophen (TYLENOL) 325 MG tablet prn  No Yes   Sig: Take 2 tablets (650 mg) by mouth every 4 hours as needed for mild pain   albuterol (PROAIR HFA/PROVENTIL HFA/VENTOLIN HFA) 108 (90 Base) MCG/ACT inhaler unknown  No Yes   Sig: Inhale 2 puffs into the lungs 4 times daily   apixaban ANTICOAGULANT (ELIQUIS) 2.5 MG tablet 1/26/2021 at am  No Yes   Sig: Take 1 tablet (2.5 mg) by mouth 2 times daily   blood glucose (NO BRAND SPECIFIED) lancets standard   No No   Sig: Use to test blood sugar 1 times daily or as directed, Contour Next lancets   blood glucose (NO BRAND SPECIFIED) test strip   No No   Sig: Use to test blood sugar 1 times daily or as directed, Contour Next strips   blood glucose monitoring (NO BRAND SPECIFIED) meter device kit   No No   Sig: Use to test blood sugar 1 times daily or as directed. Ultra 2   calcium acetate (PHOSLO) 667 MG CAPS capsule 1/26/2021 at am  No Yes   Sig: Take 1 capsule (667 mg) by mouth 3 times daily (with meals)   diltiazem ER COATED BEADS (CARDIAZEM LA) 180 MG 24 hr tablet 1/26/2021 at am  Yes Yes   Sig: Take 180 mg by mouth 2 times daily    fish oil-omega-3 fatty acids  1000 MG capsule 1/26/2021 at am  Yes Yes   Sig: Take 1 g by mouth daily   gabapentin (NEURONTIN) 100 MG capsule 1/26/2021 at am  No Yes   Sig: Take 1 capsule (100 mg) by mouth 2 times daily   hydrALAZINE (APRESOLINE) 25 MG tablet prn  No Yes   Sig: Take 1 tablet (25 mg) by mouth 3 times daily as needed (for SBP > 140)   insulin aspart (NOVOLOG PEN) 100 UNIT/ML pen pt does not remember if took today  No Yes   Sig: For Pre-Meal  - 189 give 1 unit. For Pre-Meal  - 239 give 2 units. For Pre-Meal  - 289 give 3 units. For Pre-Meal  - 339 give 4 units. For Pre-Meal - 399 give 5 units. For Pre-Meal -449 give 6 units For Pre-Meal BG greater than or equal to 450 give 7 units.   insulin glargine (LANTUS PEN) 100 UNIT/ML pen unknown  No Yes   Sig: Inject 8 Units Subcutaneous At Bedtime   order for DME   No No   Sig: Equipment being ordered: walker. 4 wheeled with brakes and a seat.   polyethylene glycol (MIRALAX) 17 GM/Dose powder prn  No Yes   Sig: Take 17 g by mouth daily as needed for constipation   sertraline (ZOLOFT) 50 MG tablet 1/26/2021 at am  No Yes   Sig: Take 1 tablet (50 mg) by mouth daily   simvastatin (ZOCOR) 10 MG tablet 1/25/2021 at Unknown time  No Yes   Sig: TAKE 1 TABLET BY MOUTH AT BEDTIME.   torsemide (DEMADEX) 10 MG tablet 1/26/2021 at am  Yes Yes   Sig: Take 20 mg by mouth daily      Facility-Administered Medications: None     Allergies   Allergies   Allergen Reactions     Augmentin Diarrhea     Vomiting       Cleocin      Hives     Tegretol [Carbamazepine]      Irregular heart beat       Social History   I have reviewed this patient's social history and updated it with pertinent information if needed. Ирина JOHNS Renata  reports that she has never smoked. She has never used smokeless tobacco. She reports that she does not drink alcohol or use drugs.    Family History   I have reviewed this patient's family history and updated it with pertinent information if needed.    Family History   Problem Relation Age of Onset     Cerebrovascular Disease Mother      Heart Disease Father      Neurologic Disorder Sister         ALS     C.A.D. Sister      Diabetes Sister      C.A.D. Brother      Psychotic Disorder Brother         [place] war: PTSD. suicide 2019     Gastrointestinal Disease Brother         diverticulitis     Colon Cancer No family hx of        Review of Systems   The 10 point Review of Systems is negative other than noted in the HPI or here.     Physical Exam   Temp: 97.6  F (36.4  C)   BP: (!) 170/84 Pulse: 87   Resp: 22 SpO2: 96 % O2 Device: Non-rebreather mask Oxygen Delivery: 8 LPM  Vital Signs with Ranges  Temp:  [97.6  F (36.4  C)] 97.6  F (36.4  C)  Pulse:  [71-94] 87  Resp:  [22-24] 22  BP: (127-211)/() 170/84  SpO2:  [87 %-98 %] 96 %  210 lbs 6.4 oz    Constitutional: Awake, alert, cooperative, appears tachypneic.  Eyes: Conjunctiva and pupils examined and normal.  HEENT: Moist mucous membranes, normal dentition.  Respiratory: Decreased air entry on the left side.  No wheezing.  Tachypneic.  Cardiovascular: Irregular rate and rhythm, normal S1 and S2, and no murmur noted.  Peripheral edema present  GI: Soft, non-distended, non-tender, normal bowel sounds.  Skin: No rashes, no cyanosis, peripheral edema present  Musculoskeletal: No joint swelling, erythema or tenderness.  Neurologic: Cranial nerves 2-12 intact, normal strength and sensation.  Psychiatric: Alert, oriented to person, place and time, no obvious anxiety or depression.    Data     Recent Labs   Lab 01/26/21  1017 01/20/21  1120   WBC 11.6* 11.0   HGB 9.4* 8.2*   MCV 98 96    269   INR 1.64*  --     137   POTASSIUM 4.3 3.5   CHLORIDE 103 100   CO2 32 31   BUN 77* 86*   CR 2.70* 3.20*   ANIONGAP 4 6   EDNA 9.2 9.4   * 300*   ALBUMIN 2.5*  --    PROTTOTAL 7.3  --    BILITOTAL 0.5  --    ALKPHOS 81  --    ALT 40  --    AST 27  --    TROPI <0.015  --        Recent Results (from the past  24 hour(s))   XR Chest Port 1 View    Narrative    CHEST ONE VIEW  1/26/2021 12:08 PM     HISTORY: Shortness of breath.    COMPARISON: January 10, 2021      Impression    IMPRESSION: Continued at least moderate left pleural effusion and left  base compressive atelectasis and/or infiltrate. Slight improvement in  atelectasis and/or infiltrate at the right base.    KAROL DUCKWORTH MD

## 2021-01-26 NOTE — PROGRESS NOTES
Bilateral Thoracentesis consent and procedure completed by Dr. Mccarty.  Tolerated well.  No complications.  VSS.   1,100mLs drained from Left dark miguelito red color.  800mLs drained from Right pleural space dark miguelito red color.  Sites WDL.  Bandaids applied.  Specimen collected and sent to lab from Left pleural space.  Pt returned to Room ED 01 via cart.

## 2021-01-26 NOTE — PROCEDURES
[facility]    Procedure: Bilateral thoracentesis.     Date/Time: 1/26/2021 4:55 PM  Performed by: Zarina Mccarty DO  Authorized by: Zarina Mccarty DO     UNIVERSAL PROTOCOL   Site Marked: Yes  Prior Images Obtained and Reviewed:  Yes  Required items: Required blood products, implants, devices and special equipment available    Patient identity confirmed:  Verbally with patient, arm band, provided demographic data and hospital-assigned identification number  Patient was reevaluated immediately before administering moderate or deep sedation or anesthesia  Confirmation Checklist:  Patient's identity using two indicators, relevant allergies, procedure was appropriate and matched the consent or emergent situation and correct equipment/implants were available  Time out: Immediately prior to the procedure a time out was called    Universal Protocol: the Joint Commission Universal Protocol was followed    Preparation: Patient was prepped and draped in usual sterile fashion           ANESTHESIA    Anesthesia: Local infiltration  Local Anesthetic:  Lidocaine 1% without epinephrine      SEDATION    Patient Sedated: No    See dictated procedure note for full details.  Findings: Bilateral thoracentesis.     Specimens: none and fluid and/or tissue for laboratory analysis and culture    Complications: None    Condition: Stable    PROCEDURE   Patient Tolerance:  Patient tolerated the procedure well with no immediate complications    Length of time physician/provider present for 1:1 monitoring during sedation: 0

## 2021-01-27 ENCOUNTER — APPOINTMENT (OUTPATIENT)
Dept: CT IMAGING | Facility: CLINIC | Age: 86
DRG: 291 | End: 2021-01-27
Attending: INTERNAL MEDICINE
Payer: COMMERCIAL

## 2021-01-27 LAB
AMYLASE FLD-CCNC: 18 U/L
ANION GAP SERPL CALCULATED.3IONS-SCNC: <1 MMOL/L (ref 3–14)
BUN SERPL-MCNC: 74 MG/DL (ref 7–30)
CALCIUM SERPL-MCNC: 8.8 MG/DL (ref 8.5–10.1)
CHLORIDE SERPL-SCNC: 106 MMOL/L (ref 94–109)
CHOLEST FLD-MCNC: <50 MG/DL
CO2 SERPL-SCNC: 36 MMOL/L (ref 20–32)
CREAT SERPL-MCNC: 2.55 MG/DL (ref 0.52–1.04)
CRP SERPL-MCNC: 142 MG/L (ref 0–8)
ERYTHROCYTE [DISTWIDTH] IN BLOOD BY AUTOMATED COUNT: 17 % (ref 10–15)
ERYTHROCYTE [SEDIMENTATION RATE] IN BLOOD BY WESTERGREN METHOD: 89 MM/H (ref 0–30)
GFR SERPL CREATININE-BSD FRML MDRD: 17 ML/MIN/{1.73_M2}
GLUCOSE BLDC GLUCOMTR-MCNC: 177 MG/DL (ref 70–99)
GLUCOSE BLDC GLUCOMTR-MCNC: 180 MG/DL (ref 70–99)
GLUCOSE BLDC GLUCOMTR-MCNC: 188 MG/DL (ref 70–99)
GLUCOSE BLDC GLUCOMTR-MCNC: 212 MG/DL (ref 70–99)
GLUCOSE BLDC GLUCOMTR-MCNC: 282 MG/DL (ref 70–99)
GLUCOSE SERPL-MCNC: 199 MG/DL (ref 70–99)
HCT VFR BLD AUTO: 26.6 % (ref 35–47)
HGB BLD-MCNC: 7.9 G/DL (ref 11.7–15.7)
MCH RBC QN AUTO: 28.4 PG (ref 26.5–33)
MCHC RBC AUTO-ENTMCNC: 29.7 G/DL (ref 31.5–36.5)
MCV RBC AUTO: 96 FL (ref 78–100)
PLATELET # BLD AUTO: 288 10E9/L (ref 150–450)
POTASSIUM SERPL-SCNC: 4.2 MMOL/L (ref 3.4–5.3)
RBC # BLD AUTO: 2.78 10E12/L (ref 3.8–5.2)
SODIUM SERPL-SCNC: 142 MMOL/L (ref 133–144)
SPECIMEN SOURCE FLD: NORMAL
TRIGL FLD-MCNC: 18 MG/DL
WBC # BLD AUTO: 9 10E9/L (ref 4–11)

## 2021-01-27 PROCEDURE — 99223 1ST HOSP IP/OBS HIGH 75: CPT | Performed by: INTERNAL MEDICINE

## 2021-01-27 PROCEDURE — 86431 RHEUMATOID FACTOR QUANT: CPT | Performed by: INTERNAL MEDICINE

## 2021-01-27 PROCEDURE — 999N000157 HC STATISTIC RCP TIME EA 10 MIN

## 2021-01-27 PROCEDURE — 250N000012 HC RX MED GY IP 250 OP 636 PS 637: Performed by: INTERNAL MEDICINE

## 2021-01-27 PROCEDURE — 71250 CT THORAX DX C-: CPT

## 2021-01-27 PROCEDURE — 250N000011 HC RX IP 250 OP 636: Performed by: INTERNAL MEDICINE

## 2021-01-27 PROCEDURE — 86039 ANTINUCLEAR ANTIBODIES (ANA): CPT | Performed by: INTERNAL MEDICINE

## 2021-01-27 PROCEDURE — 250N000013 HC RX MED GY IP 250 OP 250 PS 637: Performed by: INTERNAL MEDICINE

## 2021-01-27 PROCEDURE — 85027 COMPLETE CBC AUTOMATED: CPT | Performed by: INTERNAL MEDICINE

## 2021-01-27 PROCEDURE — 999N001017 HC STATISTIC GLUCOSE BY METER IP

## 2021-01-27 PROCEDURE — 86140 C-REACTIVE PROTEIN: CPT | Performed by: INTERNAL MEDICINE

## 2021-01-27 PROCEDURE — 85652 RBC SED RATE AUTOMATED: CPT | Performed by: INTERNAL MEDICINE

## 2021-01-27 PROCEDURE — 99222 1ST HOSP IP/OBS MODERATE 55: CPT | Performed by: INTERNAL MEDICINE

## 2021-01-27 PROCEDURE — 36415 COLL VENOUS BLD VENIPUNCTURE: CPT | Performed by: INTERNAL MEDICINE

## 2021-01-27 PROCEDURE — 86038 ANTINUCLEAR ANTIBODIES: CPT | Performed by: INTERNAL MEDICINE

## 2021-01-27 PROCEDURE — 120N000001 HC R&B MED SURG/OB

## 2021-01-27 PROCEDURE — 99233 SBSQ HOSP IP/OBS HIGH 50: CPT | Performed by: INTERNAL MEDICINE

## 2021-01-27 PROCEDURE — 86200 CCP ANTIBODY: CPT | Performed by: INTERNAL MEDICINE

## 2021-01-27 PROCEDURE — 80048 BASIC METABOLIC PNL TOTAL CA: CPT | Performed by: INTERNAL MEDICINE

## 2021-01-27 RX ORDER — NICOTINE POLACRILEX 4 MG
15-30 LOZENGE BUCCAL
Status: DISCONTINUED | OUTPATIENT
Start: 2021-01-27 | End: 2021-01-27

## 2021-01-27 RX ORDER — DEXTROSE MONOHYDRATE 25 G/50ML
25-50 INJECTION, SOLUTION INTRAVENOUS
Status: DISCONTINUED | OUTPATIENT
Start: 2021-01-27 | End: 2021-01-27

## 2021-01-27 RX ORDER — TORSEMIDE 20 MG/1
40 TABLET ORAL DAILY
Status: DISCONTINUED | OUTPATIENT
Start: 2021-01-28 | End: 2021-01-27

## 2021-01-27 RX ORDER — HYDRALAZINE HYDROCHLORIDE 25 MG/1
25 TABLET, FILM COATED ORAL 3 TIMES DAILY
Status: DISCONTINUED | OUTPATIENT
Start: 2021-01-27 | End: 2021-02-02 | Stop reason: HOSPADM

## 2021-01-27 RX ORDER — FUROSEMIDE 10 MG/ML
40 INJECTION INTRAMUSCULAR; INTRAVENOUS DAILY
Status: DISCONTINUED | OUTPATIENT
Start: 2021-01-28 | End: 2021-01-28

## 2021-01-27 RX ORDER — LANOLIN ALCOHOL/MO/W.PET/CERES
3 CREAM (GRAM) TOPICAL
Status: DISCONTINUED | OUTPATIENT
Start: 2021-01-27 | End: 2021-02-02 | Stop reason: HOSPADM

## 2021-01-27 RX ORDER — HYDRALAZINE HYDROCHLORIDE 25 MG/1
25 TABLET, FILM COATED ORAL 4 TIMES DAILY
Status: DISCONTINUED | OUTPATIENT
Start: 2021-01-27 | End: 2021-01-27

## 2021-01-27 RX ORDER — LIDOCAINE 40 MG/G
CREAM TOPICAL
Status: DISCONTINUED | OUTPATIENT
Start: 2021-01-27 | End: 2021-01-27

## 2021-01-27 RX ADMIN — CALCIUM ACETATE 667 MG: 667 CAPSULE ORAL at 17:41

## 2021-01-27 RX ADMIN — APIXABAN 2.5 MG: 2.5 TABLET, FILM COATED ORAL at 09:01

## 2021-01-27 RX ADMIN — HYDRALAZINE HYDROCHLORIDE 25 MG: 25 TABLET, FILM COATED ORAL at 21:58

## 2021-01-27 RX ADMIN — SIMVASTATIN 10 MG: 10 TABLET, FILM COATED ORAL at 21:58

## 2021-01-27 RX ADMIN — HYDRALAZINE HYDROCHLORIDE 25 MG: 25 TABLET, FILM COATED ORAL at 17:41

## 2021-01-27 RX ADMIN — GABAPENTIN 100 MG: 100 CAPSULE ORAL at 09:01

## 2021-01-27 RX ADMIN — APIXABAN 2.5 MG: 2.5 TABLET, FILM COATED ORAL at 21:58

## 2021-01-27 RX ADMIN — SERTRALINE HYDROCHLORIDE 50 MG: 50 TABLET ORAL at 09:01

## 2021-01-27 RX ADMIN — ACETAMINOPHEN 650 MG: 325 TABLET, FILM COATED ORAL at 18:50

## 2021-01-27 RX ADMIN — CALCIUM ACETATE 667 MG: 667 CAPSULE ORAL at 09:01

## 2021-01-27 RX ADMIN — DILTIAZEM HYDROCHLORIDE 180 MG: 180 CAPSULE, COATED, EXTENDED RELEASE ORAL at 21:58

## 2021-01-27 RX ADMIN — GABAPENTIN 100 MG: 100 CAPSULE ORAL at 21:58

## 2021-01-27 RX ADMIN — CALCIUM ACETATE 667 MG: 667 CAPSULE ORAL at 13:32

## 2021-01-27 RX ADMIN — FUROSEMIDE 40 MG: 10 INJECTION, SOLUTION INTRAMUSCULAR; INTRAVENOUS at 09:01

## 2021-01-27 RX ADMIN — INSULIN GLARGINE 8 UNITS: 100 INJECTION, SOLUTION SUBCUTANEOUS at 22:00

## 2021-01-27 RX ADMIN — ACETAMINOPHEN 650 MG: 325 TABLET, FILM COATED ORAL at 03:52

## 2021-01-27 RX ADMIN — DILTIAZEM HYDROCHLORIDE 180 MG: 180 CAPSULE, COATED, EXTENDED RELEASE ORAL at 09:01

## 2021-01-27 ASSESSMENT — ACTIVITIES OF DAILY LIVING (ADL)
ADLS_ACUITY_SCORE: 18

## 2021-01-27 ASSESSMENT — MIFFLIN-ST. JEOR: SCORE: 1354.45

## 2021-01-27 NOTE — PROGRESS NOTES
[facility]  Hospitalist Progress Note    Assessment & Plan   Ирина Yanez is a 85 year old female with a PMHx of TERESSA on CPAP, type II DM, CKD stage IV, peripheral neuropathy, MDD/anxiety, HTN, RBBB, mild aortic stenosis, obesity, grade I diastolic dysfunction, atrial fibrillation, and chronic anemia  who was admitted on 1/26/2021 with acute hypoxic respiratory failure and shorntess of breath.     CXR on admission showed a moderate left pleural effusion and left base compressive atelectasis/infiltrate. Slightly improved in right base. IR consulted on 1/26/21 bilateral thoracentesis completed with 1,100 mL removed from the right and 800 mL from the left. IV lasix started. Monitored weights and urine output. Low suspicion for infection. Procal 0.09. Recently treated for CAP.     Recently hospitalized 12/29/20 - 1/5/21 with similar complaint. Hypoxia, decompensated diastolic CHF, bilateral pleural effusions, FABY with CKD, newonset A. Fib with RVR. Thoracentesis transudative with negative cytology. Evaluated by pulmonary and nephrology. Discharged to TCU with a plan to discharge 1/27/21 with home health services.     Acute hypoxic respiratory failure secondary to bilateral pleural effusions   CHFpEF with acute exacerbation  Moderate pericardial effusion   Mild aortic stenosis   -Cardiology consulted - appreciate assistance   -Pulmonary consulted - previously evaluated patient during hospitalization. Transudate effusion previously ->now exudative.   -Pending tests: CRP/ESR, TSH, RF, CCP, MARK, pleural fluid cytology, pleural fluid culture, pleural fluid triglyceride lvl/CHO/chylomicrons, pleural fluid amylase  -Titrate oxygen - goal >90%   -Lasix 40 mg BID - adjust dosing pending response; PTA torsemide held   -Daily weights; Strict I/Os   -Limited Echo pending. Initial troponin negative     Diabetes mellitus, controlled  -A1c 7.1 Jan 2021  -Insulin glargine 8 units at bedtime; medium resistance  sliding scale insulin; CHO ratio 1:15 insulin   -POCT glucose ACHS; Hypoglycemia protocol     CKD stage IV: Baseline Cr. 2.8-3.2. On admission Cr 2.70. Stable. Monitor with diuresis.     TERESSA: Compliant with CPAP     Chronic anemia: Stable. Baseline hemoglobin around 8.     Elevated d-dimer: Unclear significant. Suspect associated with underlying inflammatory process. On chronic oral anticoagulation. Monitor.     Atrial fibrillation with RBBB/Hypertension: Apixaban 2.5 mg BID, diltiazem 180 mg BID. PTA hydralazine held    Incidental finding: Small pulmonary nodules measuring 3 mm in the right lower lobe. CT scan follow-up in 1 year.     MDD/Anxiety: Sertraline 50 mg daily    HLD: Simvastatin 10 mg daily     Neuropathy: Gabapentin 100 mg BID     Obesity BMI 34: Complicates cares     FEN: Oral hydration; K+ replacement protocol; regular   Activity: PT  DVT Prophylaxis: Apixaban   Family update: Yes, called Armin () - voicemail left     Code Status: Full Code    Expected discharge: 2 - 3 days, recommended to TBD once pleural effusions stable and oxygen needs improve.    Lanie Bowen, DO    Text Page (7am - 6pm, M-F)    Interval History   Sleeping on my assessment.  Easily awakens.  Wearing oxygen.  No shortness of breath.  Reports feeling better.  No chest tightness or palpitations.  No abdominal discomfort.  Significant lower extremity edema.  No nausea or vomiting.  Tolerating diet.  Discussed with nursing.    -Data reviewed today: I reviewed all new labs and imaging results over the last 24 hours. I personally reviewed     Physical Exam   Temp: 98.2  F (36.8  C) Temp src: Oral BP: 138/53 Pulse: 82   Resp: 18 SpO2: 95 % O2 Device: Nasal cannula Oxygen Delivery: 4 LPM  Vitals:    01/26/21 1126 01/26/21 1614 01/27/21 0644   Weight: 95.4 kg (210 lb 6.4 oz) 92.4 kg (203 lb 12.8 oz) 91.4 kg (201 lb 9.6 oz)     Vital Signs with Ranges  Temp:  [97.8  F (36.6  C)-98.3  F (36.8  C)] 98.2  F (36.8  C)  Pulse:  [72-86]  82  Resp:  [16-22] 18  BP: (138-167)/(53-67) 138/53  SpO2:  [87 %-100 %] 95 %  I/O last 3 completed shifts:  In: 240 [P.O.:240]  Out: 225 [Urine:225]    Constitutional: Awake, alert, cooperative, no apparent distress. Non-toxic. Appears stated age. [place].  HEENT: Atraumatic. Normocephalic. Conjunctiva non-injected. Sclera anicteric. MMM.   Respiratory: Moves air bilaterally. Crackles in lower lung fields. No wheezing.   Cardiovascular: Irregularly irregular, rate controlled normal S1 and S2, and grade 3/6 systolic murmur.   GI: Normal bowel sounds, soft, non-distended, non-tender  Skin/Integumen: No rashes, no cyanosis, +1-2 edema    Medications       apixaban ANTICOAGULANT  2.5 mg Oral BID     calcium acetate  667 mg Oral TID w/meals     diltiazem ER COATED BEADS  180 mg Oral BID     furosemide  40 mg Intravenous Daily     gabapentin  100 mg Oral BID     insulin aspart  1-7 Units Subcutaneous TID AC     insulin aspart  1-5 Units Subcutaneous At Bedtime     insulin glargine  8 Units Subcutaneous At Bedtime     sertraline  50 mg Oral Daily     simvastatin  10 mg Oral At Bedtime     Data   Recent Labs   Lab 01/27/21  0647 01/26/21  1641 01/26/21  1017   WBC 9.0  --  11.6*   HGB 7.9*  --  9.4*   MCV 96  --  98     --  355   INR  --   --  1.64*     --  139   POTASSIUM 4.2 4.4 4.3   CHLORIDE 106  --  103   CO2 36*  --  32   BUN 74*  --  77*   CR 2.55*  --  2.70*   ANIONGAP <1*  --  4   EDNA 8.8  --  9.2   *  --  310*   ALBUMIN  --   --  2.5*   PROTTOTAL  --   --  7.3   BILITOTAL  --   --  0.5   ALKPHOS  --   --  81   ALT  --   --  40   AST  --   --  27   TROPI  --   --  <0.015     Recent Results (from the past 24 hour(s))   XR Chest Port 1 View    Narrative    CHEST ONE VIEW  1/26/2021 12:08 PM     HISTORY: Shortness of breath.    COMPARISON: January 10, 2021      Impression    IMPRESSION: Continued at least moderate left pleural effusion and left  base compressive atelectasis and/or infiltrate. Slight  improvement in  atelectasis and/or infiltrate at the right base.    KAROL DUCKWORTH MD   US Thoracentesis Bilateral    Narrative    ULTRASOUND GUIDED THORACENTESIS  1/26/2021 3:45 PM     HISTORY: Shortness of breath. Bilateral pleural effusions.    FINDINGS: Limited ultrasound was performed to evaluate for the  presence and best approach for drainage of a pleural effusion. An  image is archived. Written and oral informed consent was obtained. A  pause for the cause procedure to verify the correct patient and  correct procedure.     The skin overlying the right chest posteriorly was prepped and draped  in the usual sterile fashion. The subcutaneous tissues were  anesthetized with 10 mL 1% lidocaine. Under direct ultrasound guidance  a catheter was advanced into the pleural space and 1100 mL of  miguelito  colored fluid was drained. The catheter was removed and a sterile  dressing was applied.     The skin overlying the left chest posteriorly was prepped and draped  in the usual sterile fashion. The subcutaneous tissues were  anesthetized with 10 mL 1% lidocaine. Under direct ultrasound guidance  a catheter was advanced into the pleural space and 800 mL of  miguelito  colored fluid was drained. The catheter was removed and a sterile  dressing was applied.     Patient was monitored by nurse under my direct supervision throughout  the exam. Ultrasound images were permanently stored.  There were no  immediate complications. Patient left the ultrasound suite in  satisfactory condition.      Impression    IMPRESSION: Technically successful bilateral thoracentesis without  immediate complications.    ANAYELI AVENDAÑO DO

## 2021-01-27 NOTE — CONSULTS
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      Recurrent bilateral pleural effusions: initial thoracentesis on 12/30 (bilateral), then left on 1/8, and again bilateral on 1/25. Most likely etiology remains fluid overload due to diastolic CHF. Fluid is exudative in nature this admission, however this is relatively common in patients with CHF on diuretics, as well as in patients with recent thoracenteses (as most recent pleural fluid was noted to be red, may have had some bleeding with most recent procedure, which would cause fluid to appear exudative). Still, should monitor cultures and cytology to ensure no other process is ongoing. Prior RUL mass noted on 12/10/20 CT is resolved, and with no lymphadenopathy, malignancy is thought to be unlikely. Patient without specific infectious symptoms and procal of only 0.09 with normal WBC count that has decreased without treatment-infection also unlikely. Rarer causes of exudative effusions can also be evaluated.   -Follow pleural fluid culture   -follow up pleural fluid cytology. Typically 3 negative cytology testing for malignancy has a high negative predictive value for malignancy (90% sensitive after third sample)  -Will add on pleural fluid  triglyceride level, cholesterol, and chylomicrons to look for chylothorax  -add on pleural fluid amylase  -LFTs normal yesterday, could consider RUQ US to rule out cirrhosis causing effusion, but much less likely. Hold off.   -check RF, CCP, MARK to look for autoimmune causes of recurrent effusions (especially given new pericardial effusion noted on CT)  -check ESR and CRP  -check TSH to ensure not related to hypothyroid state  -continue aggressive diuresis to control fluid (first line option)  -if work up shows that CHF remains the most likely cause, and fluid cannot be controlled with diuresis alone, an indwelling pleural catheter to allow for repeated drainage vs pleurodesis could be considered. We can help to arrange this, if needed. I am not convinced she was maximized on medical therapy and low salt diet/fluid restriction at [facility], and I don't think it is quite time to jump to one of these procedures quite yet. She should follow up with pulmonary after discharge, Dr. Odonnell at [facility] would be a good choice (she can place indwelling pleural catheters if needed). I can help to arrange this at discharge.   -may benefit from cardiology consultation.   -needs control of a fib. Currently in sinus, but specifically mentions that she felt palpitations at rehab, and felt more short of breath when these occurred. If she is still going into A fib, she is likely backing up fluid during these episodes   -would repeat echocardiogram.     Acute hypoxic respiratory failure: likely due to CHF exacerbation, as above, with effusions contributing. Of note, it is unclear what her baseline pulmonary function was, as she has previously endorsed significant dyspnea even before she ever was ill with pneumonia last month (could only walk across a room). She likely has baseline deconditioning contributing as well.   -treatment and work up of effusions/cause as above  -wean oxygen as able  -incentive spirometry      TERESSA,  with complex sleep apnea:   -home settings are ASV mode, EPAP 4-6, PS 0-15, max pressure 25, ordered    Pulmonary will continue to follow. We are in house at [facility] Monday, Wednesday, and Friday. For assistance on other days, please page the on-call pulmonologist through [facility] or the .      Vinayak Joshua MD  Pulmonary and Critical Care     Chief Complaint and History of Present Illness:    CC:  Shortness of breath  HPI:   Ms. 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     She was recently admitted at [facility] from 12/29-1/5 for hypoxia, diastolic CHF exacerbation, bilateral pleural effusions, and FABY on CKD. During that admission, she was found to have atrial fibrillation with RVR as well. She underwent thoracentesis that admission, and was given diuresis. She was seen by both nephrology and pulmonary that admission. Her CPAP was also adjusted to more accurately reflect home settings.     Yesterday, she presented to the ED from her living facility ([facility]) due to shortness of breath and low O2 saturation. She denied any fevers, chills, or chest pain. In the ED, she was found to have recurrent left sided effusion, and required up to 10 LPM oxygen by mask to support her SpO2. IR was consulted, and preformed thoracentesis with removal of 1100 mL from left side which was miguelito red in color, and 800 mL from right side, also miguelito red in color. Specimens were sent from analysis for left side only. She was admitted to medicine. She has also been given increased diuretics. Pulmonary is consulted due to recurrent effusions.     Ms. Yanez notes she had progressive dyspnea since her last discharge on 1/15. She had noted increased orthopnea and LE  swelling as well. Of note, weight appears approximately stable from discharge last admission to admission yesterday (208 pounds at discharge, 210 at presentation, now 201 pounds).     Currently, she feels much better. Oxygen is down to 2 LPM. She states her legs have already become less swollen. She is not convinced that she was getting appropriate diuretics or diet at her nursing facility between admissions, and noted decreased urine output while there. She confirms she has had no infectious symptoms, apart from a very mild dry cough she has had since early December. Her weight over the last year has gone up, and she is up to date with cancer screenings. No arthralgias or myalgias, no skin rashes. She was working with PT and OT at SNF rehab, but had not been able to walk outside her room there. She notes specifically that she continued to experience episodes of palpitations and rapid heart beat at SNF rehab which were associated with more trouble breathing.     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     Depression      Diabetes mellitus (H)     sees Dr. Verde- type II     Falls frequently 7/10/2019     Heart murmur      HTN      Hyperlipidemia LDL goal < 100     Dr. Verde     Lichen sclerosus et atrophicus 2/15/2013     Melanoma (H)      Peripheral Neuropathy     hands, feet; used to see Dr. Tl Das     Skin cancer     face; basal and other. Melanoma. Dolan     Sleep apnea     CPAP     Spinal stenosis        Past Surgical History:  Past Surgical History:   Procedure Laterality Date     AMPUTATE TOE(S)  8/23/2012    left second toe Procedure: AMPUTATE TOE(S);;  Surgeon: Mil Jolly DPM;   Location: [facility] OR     CHOLECYSTECTOMY  Nov. 1975     COLONOSCOPY  early 2009    repeat 4-5 years (Had one prior to this with polyps)     COLONOSCOPY  Sept 2014    incomplete; recommend colography     COLONOSCOPY  02/25/2020    Dr. Pathak [facility]     COLONOSCOPY N/A 2/25/2020    Procedure: COLONOSCOPY, WITH biopsies using forceps;  Surgeon: Adebayo Pathak MD;  Location:  GI     OPTICAL TRACKING SYSTEM FUSION POSTERIOR SPINE LUMBAR N/A 9/16/2016    Procedure: OPTICAL TRACKING SYSTEM FUSION SPINE POSTERIOR LUMBAR ONE LEVEL;  Surgeon: Lennox Blue MD;  Location: [facility] OR     PET, REC OF MELANOMA/MET CA Left     arm     REPAIR HAMMER TOE BILATERAL  8/23/2012    Procedure: REPAIR HAMMER TOE BILATERAL;  Flexor Tenotomy 2,3,4,5 Toes both feet, Partial 2nd toe amputation left foot;  Surgeon: Mil Jolly DPM;  Location: [facility] OR     REPAIR TENDON QUADRICEPS Right 10/27/2015    Procedure: REPAIR TENDON QUADRICEPS;  Surgeon: Antonio Yi MD;  Location: [facility] OR     SURGICAL HISTORY OF -   1997    bilateral knee replacement     SURGICAL HISTORY OF -   1997    right knee revised; patella tendon ruptured     SURGICAL HISTORY OF -       surgery for spinal stenosis     SURGICAL HISTORY OF -       breast reduction surgery     SURGICAL HISTORY OF -       D and C        Past Social History:  Social History     Socioeconomic History     Marital status:      Spouse name: Not on file     Number of children: Not on file     Years of education: Not on file     Highest education level: 12th grade   Occupational History     Occupation:  from home     Employer: RETIRED   Social Needs     Financial resource strain: Not hard at all     Food insecurity     Worry: Never true     Inability: Never true     Transportation needs     Medical: No     Non-medical: No   Tobacco Use     Smoking status: Never Smoker     Smokeless tobacco: Never Used   Substance and Sexual Activity     Alcohol use: No     Frequency: Never      Binge frequency: Never     Drug use: No     Sexual activity: Yes     Partners: Male   Lifestyle     Physical activity     Days per week: 0 days     Minutes per session: 0 min     Stress: Only a little   Relationships     Social connections     Talks on phone: Three times a week     Gets together: Never     Attends Zoroastrian service: Never     Active member of club or organization: No     Attends meetings of clubs or organizations: Never     Relationship status:      Intimate partner violence     Fear of current or ex partner: Not on file     Emotionally abused: Not on file     Physically abused: Not on file     Forced sexual activity: Not on file   Other Topics Concern      Service Not Asked     Blood Transfusions Not Asked     Caffeine Concern Not Asked     Occupational Exposure Not Asked     Hobby Hazards Not Asked     Sleep Concern Not Asked     Stress Concern Not Asked     Weight Concern Not Asked     Special Diet No     Comment: getting fruits and veggies.      Back Care Not Asked     Exercise No     Comment: gets little; limited with back and feet/leg pain.     Bike Helmet Not Asked     Seat Belt Not Asked     Self-Exams Not Asked     Parent/sibling w/ CABG, MI or angioplasty before 65F 55M? Yes   Social History Narrative    2 adopted children     Typically lives at home with , previously worked a desk job with , retired. No recent travel.      ETOH:  No use  Tobacco: none  Significant inhalational exposures:  none  PPD/TB exposure status: none known    Family History:  Family History   Problem Relation Age of Onset     Cerebrovascular Disease Mother      Heart Disease Father      Neurologic Disorder Sister         ALS     C.A.D. Sister      Diabetes Sister      C.A.D. Brother      Psychotic Disorder Brother         [place] war: PTSD. suicide 2019     Gastrointestinal Disease Brother         diverticulitis     Colon Cancer No family hx of      Medications:    apixaban  ANTICOAGULANT  2.5 mg Oral BID     calcium acetate  667 mg Oral TID w/meals     diltiazem ER COATED BEADS  180 mg Oral BID     furosemide  40 mg Intravenous Daily     gabapentin  100 mg Oral BID     insulin aspart  1-7 Units Subcutaneous TID AC     insulin aspart  1-5 Units Subcutaneous At Bedtime     insulin glargine  8 Units Subcutaneous At Bedtime     sertraline  50 mg Oral Daily     simvastatin  10 mg Oral At Bedtime     acetaminophen, glucose **OR** dextrose **OR** glucagon, lidocaine 4%, lidocaine (buffered or not buffered), melatonin, ondansetron **OR** ondansetron, polyethylene glycol, sodium chloride (PF), sodium chloride (PF)    Allergies:     Allergies   Allergen Reactions     Augmentin Diarrhea     Vomiting       Cleocin      Hives     Tegretol [Carbamazepine]      Irregular heart beat       Physical Exam:    Temp:  [97.6  F (36.4  C)-98.3  F (36.8  C)] 98.3  F (36.8  C)  Pulse:  [71-94] 75  Resp:  [16-24] 16  BP: (127-211)/() 145/60  SpO2:  [87 %-100 %] 98 %    Intake/Output Summary (Last 24 hours) at 1/27/2021 1005  Last data filed at 1/27/2021 0850  Gross per 24 hour   Intake 890 ml   Output 375 ml   Net 515 ml      General: laying in bed in NAD  HEENT: anicteric, moist mucosa  Neck: no palpable lymphadenopathy, JVP at about 8-9 cm  Chest: Normal work of breathing, on 2 LPM. Mild bibasilar crackles. No cough.   Cardiac: RRR, 3/6 systolic murmur.   Abdomen: Soft, obese, non tender, active BS. No fluid wave noted.   Extremities: No LE Edema noted currently.   Neuro: A&Ox3, no focal deficits   Skin: no rash noted    Laboratory, imaging, and microbiologic data:    All laboratory and imaging data reviewed.    Notable for:   K 4.2 Cr 2.55 (baseline recently 2-2.2, last admission was in 4s), was 2.7 at admission.   A1c 7.1   serum  T protein serum 7.3   BNP 2466  Procal 0.09  Trop negative  BGs in 300s initially, now 188.  VBG 7.3/66/41/33  WBC 9 (from 11.6), Hgb 7.9 (From 9.4, baseline appears  to be about 8.2), plts 288  INR 1.64    1/26:   Influenza A and B negative  COVID-19 PCR negative    Thoracentesis (left) analysis:  Cell count: 449 WBC, 22% L, 27% M, 26% N, 15% other.  Glucose 300  LDH (fluid) 349  T protein Fluid 3.7  Cytology: in process  Pleural fluid gram stain: no organisms, rare WBCs  Pleural fluid culture: in process    1/27/21 CT chest: 1.  New moderate pericardial effusion. 2.  Small bilateral pleural effusions, residual or recurrent. Overall improvement without resolution in infiltrate and bibasilar atelectasis/consolidation since 12/29/2020. 3.   Small pulmonary nodules measuring up to 3 mm in the right lower lobe is stable. A follow-up CT in one year can be considered.   1/26/21 CXR: Continued at least moderate left pleural effusion and left   base compressive atelectasis and/or infiltrate. Slight improvement in   atelectasis and/or infiltrate at the right base.   1/10/21 CXR: Increased left pleural effusion and associated atelectasis and/or infiltrate. Slight increase in groundglass infiltrates on the right.  12/29/20 CT chest: 1.  Near complete resolution of previously seen masslike consolidation in the right upper lobe. 2.  Large bilateral pleural effusions and partial lower lobe atelectasis, slightly increased.

## 2021-01-27 NOTE — PLAN OF CARE
Alert & oriented x4, forgetful at times. Chest CT done today. Pulmonary consulted. Weaned to 2L oxygen. BG checks 188 and 282, coverage given as ordered. Continuing IV lasix.  at bedside this afternoon. Discharge pending.

## 2021-01-27 NOTE — PROGRESS NOTES
End of Shift Summary  For vital signs and complete assessments, please see documentation flowsheets.     Pertinent assessments: A&O but forgetful. VSS  now on 4L O2. NC. Transfers with Ax1.   Major Shift Events: Pt started eating prior to bg check md notified- bg 300's earlier in the day. No new orders to check or correct bg.   Treatment Plan: Monitor I/Os, dm management.

## 2021-01-27 NOTE — CONSULTS
Care Management Initial Consult    General Information  Assessment completed with: Patient, Spouse or significant other,    Type of CM/SW Visit: Initial Assessment    Primary Care Provider verified and updated as needed: Yes   Readmission within the last 30 days: previous discharge plan unsuccessful   Return Category: Exacerbation of disease  Reason for Consult: care coordination/care conference, discharge planning      Communication Assessment  Patient's communication style: spoken language (English or Bilingual)    Hearing Difficulty or Deaf: no   Wear Glasses or Blind: yes    Cognitive  Cognitive/Neuro/Behavioral: .WDL except  Level of Consciousness: alert  Arousal Level: opens eyes spontaneously  Orientation: oriented x 4  Mood/Behavior: calm, cooperative  Best Language: 0 - No aphasia  Speech: clear, spontaneous, logical    Living Environment:   People in home: spouse     Current living Arrangements: mobile home      Able to return to prior arrangements: yes       Family/Social Support:  Care provided by: spouse/significant other, homecare agency  Provides care for:    Marital Status:             Description of Support System: Supportive         Current Resources:   Skilled Home Care Services: Skilled Nursing, Physicial Therapy, Occupational Therapy  Community Resources: None  Equipment currently used at home: raised toilet seat, walker, standard  Supplies currently used at home: None           Lifestyle & Psychosocial Needs:  Lifestyle     Physical activity     Days per week: 0 days     Minutes per session: 0 min     Stress: Only a little     Social Needs     Financial resource strain: Not hard at all     Food insecurity     Worry: Never true     Inability: Never true     Transportation needs     Medical: No     Non-medical: No     Socioeconomic History     Marital status:      Spouse name: Not on file     Number of children: Not on file     Years of education: Not on file     Highest  education level: 12th grade   Occupational History     Occupation:  from home     Employer: RETIRED   Relationships     Social connections     Talks on phone: Three times a week     Gets together: Never     Attends Evangelical service: Never     Active member of club or organization: No     Attends meetings of clubs or organizations: Never     Relationship status:      Intimate partner violence     Fear of current or ex partner: Not on file     Emotionally abused: Not on file     Physically abused: Not on file     Forced sexual activity: Not on file     Tobacco Use     Smoking status: Never Smoker     Smokeless tobacco: Never Used   Substance and Sexual Activity     Alcohol use: No     Frequency: Never     Binge frequency: Never     Drug use: No     Sexual activity: Yes     Partners: Male       Functional Status:  Prior to admission patient needed assistance: yes she came from [facility], but she was supposed to discharge today home with  and [facility] RN/PT/OT     Additional Information:  Pt is readmitted after 2 weeks with recurrent pleural effusion.  Pt was at [facility] for rehab and planned to go home today with  and [facility] RN/PT/OT.  She does have a walker, raised toilet seat and grab bar at home.  She has a cpap with 1 L NC that she uses at bedtime as well. She has 3 steps into her mobile home. They request resources on MOW and house cleaning support.  Hand out will be given. We did discuss CHF.  She is mindful of her salt intake.  She doesn't use a salt shaker and is aware of high salt foods.  She does have a scale at home.  She can weigh herself every morning.  I did give her a CHF action care plan.  She has seen Dr Beasley with [facility].  She is agreeable to establish care with Dr Odonnell [phone] per recommendations of pulmonary . I called the  and she said Pt needs a Oncology/hematology referral as Dr Odonnell works in their clinic and the referral CODE IS [id number].   After referral is placed, RN navigator team will review her case and then contact Pt for an appointment.  Pulmonary said that they can arrange this at discharge.       Meghan Wyman RN

## 2021-01-27 NOTE — CONSULTS
[facility]    Cardiology Consultation     Date of Admission:  1/26/2021    Assessment & Plan   Ирина Yanez is a 85 year old female who was admitted on 1/26/2021.    A pleasant 85-year-old female with history of obstructive sleep apnea, chronic disease stage IV, recently diagnosed atrial fibrillation on rate control strategy on apixaban for stroke prophylaxis which has been adjusted for age and kidney function, recent admission for bilateral pleural effusion s/p thoracentesis who is now readmitted with shortness of breath was found to have reaccumulation of bilateral pleural effusion and underwent bilateral thoracentesis with about 800 cc drained from each side and remarkably it was found to be exudative in nature at this time although it was transudative last time.  Cardiology now consulted because of preserved ejection fraction congestive heart failure and contribution to pleural effusion.  I did look at the echocardiogram done recently that showed normal LV systolic function but elevated E to E prime ratio indicated of elevated filling pressure.  This certainly can be some contribution from CHF in terms of pleural effusion but I also feel that there is contribution from underlying chronic kidney disease cardiorenal syndrome and definitely underlying lung issues cannot be excluded given exudative nature.  Overall on examination she appears euvolemic now.  No evidence of pedal edema JVP appears normal with some positive hepatojugular reflux.  Lungs clear to auscultation.  She has lost conservative amount of weight.  I recommended we can continue IV Lasix for 1 more day and likely switch to p.o. torsemide subsequently.  Her blood pressure is elevated I recommend adding hydralazine for better control this can definitely help with improving filling pressures on the left side.  Overall ventricular rates appear well controlled in terms of atrial fibrillation and she is appropriately on apixaban  for stroke prophylaxis.  I agree that if patient has recurrence of pleural effusion consideration should be given to pleurodesis.  To be noted recent CT chest showed some moderate pericardial effusion which was not noted previously.  I recommend doing a limited echocardiogram to reevaluate for any pericardial effusion.    1.  Recurrent bilateral pleural effusions.  Initially was transudative but subsequently exudative.  Given the rapid buildup of these effusions without any other concomitant volume overload status it is somewhat perplexing that this could be entirely from underlying CHF.  Nevertheless it can definitely contribute to it.  2.  Preserved ejection vision congestive heart failure with evidence of elevated filling pressure on left side on echocardiogram done recently.  NT proBNP also elevated.  3.  Recently diagnosed atrial fibrillation.  On rate control strategy.  Ventricular rates appear adequately controlled at rest.  Chads 2 Vascor of 6.  On apixaban in renally and age adjusted doses for stroke prophylaxis.  On diltiazem for rate control.  4.  Moderate pericardial effusion detected on recent CT.  This is new from previous CT.  No pedicle effusion on echocardiogram done about 4 weeks ago.  CT tends to overestimate size of pericardial effusion.  Recommend repeating limited echocardiogram.  5.  Chronic kidney disease with baseline creatinine reported to be around 2 but of late creatinine as high as 3.6, presently 2.55.  6.  Anemia, likely due to chronic kidney disease.  No overt bleeding noted.    Recommendations  Agree with IV Lasix.  1 more day of IV Lasix and likely switch to p.o. subsequently.  One option would be to switch to torsemide 40 mg daily.  Add hydralazine for better blood pressure control, 25 mg 3 times daily.  Continue diltiazem and apixaban.  Agree that if despite diuresis if she has recurrence of pleural effusion in future pleurodesis should be considered.  Limited echocardiogram for  evaluation of pedicle effusion as noted above.    Thank you for allowing the care of Ms. Yanez. Cardiology will continue to follow    Joe Davis MD    Primary Care Physician   Yuridia Villarreal MD    Reason for Consult   Reason for consult: I was asked by Dr Bowen to evaluate this patient for recurrent pleural effusion, CHF.    History of Present Illness   Ирина Yanez is a 85 year old female who presents with shortness of breath.  To be noted about 4 weeks ago patient was admitted with shortness of breath and was diagnosed with bilateral pleural effusion underwent thoracentesis.  She was also diagnosed with new atrial fibrillation and rate control strategy was adopted and she was started on apixaban for stroke prophylaxis.  She is now readmitted with shortness of breath and was found to have bilateral pleural effusion underwent a 10 cc bilateral thoracentesis, to be noted prior pleural effusion was transudative in nature and latest one are exudative in nature.  Patient also has chronic kidney disease with worsening of creatinine recently her creatinine has increased more than 3.6 recently now down to 2.55.  She is feeling much better after thoracentesis.  Pulmonology has also been consulted.  Patient denies any chest discomfort.  Ventricular rates appear well controlled at rest.  She is on diltiazem 180 mg twice daily.  She recently had a CT chest done that showed moderate pericardial effusion new from previous CT scan.  Echocardiogram done about 4 weeks ago showed no peripheral effusion, normal LV function, elevated E to E prime ratio.    Patient Active Problem List   Diagnosis     Diabetic polyneuropathy (H)     Hyperlipidemia with target LDL less than 100     Hypertension goal BP (blood pressure) < 140/90     Arthritis     Anxiety     Type 2 diabetes mellitus with diabetic nephropathy (H)     Health Care Home     Malignant melanoma of skin     Vulvar dystrophy     Lichen sclerosus et atrophicus     Vitamin B12  deficiency (non anemic)     Controlled substance agreement signed 2/5/15     Major depression in partial remission (H)     CKD (chronic kidney disease) stage 4, GFR 15-29 ml/min (H)     Basal cell carcinoma of skin      Chronic pain - diabetic neuropathy     Complex sleep apnea syndrome     Tendon rupture, traumatic. quadriceps     Right bundle branch block     S/P lumbar fusion     ACP (advance care planning)     Heart murmur     Aortic sclerosis     Toe amputation status, left     Elevated BMI with comorbidity (H)     Falls frequently     Abrasion of arm, right, initial encounter     Aortic valve stenosis, etiology of cardiac valve disease unspecified     Ascending aorta dilatation (H)     Lung mass     Chest tightness     Dyspnea on exertion     Iron deficiency anemia     Hypoxemia     CHF (congestive heart failure) (H)     Dyspnea     Acute on chronic kidney failure (H)     Paroxysmal atrial fibrillation (H)     Pleural effusion on left     Acute respiratory failure with hypoxia (H)       Past Medical History   I have reviewed this patient's medical history and updated it with pertinent information if needed.   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     Depression      Diabetes mellitus (H)     sees Dr. Verde- type II     Falls frequently 7/10/2019     Heart murmur      HTN      Hyperlipidemia LDL goal < 100     Dr. Verde     Lichen sclerosus et atrophicus 2/15/2013     Melanoma (H)      Peripheral Neuropathy     hands, feet; used to see Dr. Tl Das     Skin cancer     face; basal and other. Melanoma. Dolan     Sleep apnea     CPAP     Spinal stenosis        Past Surgical History   I have reviewed this patient's surgical history and updated it with pertinent information if needed.  Past Surgical History:   Procedure Laterality Date     AMPUTATE  TOE(S)  8/23/2012    left second toe Procedure: AMPUTATE TOE(S);;  Surgeon: Mil Jolly DPM;  Location: [facility] OR     CHOLECYSTECTOMY  Nov. 1975     COLONOSCOPY  early 2009    repeat 4-5 years (Had one prior to this with polyps)     COLONOSCOPY  Sept 2014    incomplete; recommend colography     COLONOSCOPY  02/25/2020    Dr. Pathak [facility]     COLONOSCOPY N/A 2/25/2020    Procedure: COLONOSCOPY, WITH biopsies using forceps;  Surgeon: Adebayo Pathak MD;  Location: [facility] GI     OPTICAL TRACKING SYSTEM FUSION POSTERIOR SPINE LUMBAR N/A 9/16/2016    Procedure: OPTICAL TRACKING SYSTEM FUSION SPINE POSTERIOR LUMBAR ONE LEVEL;  Surgeon: Lennox Blue MD;  Location: [facility] OR     PET, REC OF MELANOMA/MET CA Left     arm     REPAIR HAMMER TOE BILATERAL  8/23/2012    Procedure: REPAIR HAMMER TOE BILATERAL;  Flexor Tenotomy 2,3,4,5 Toes both feet, Partial 2nd toe amputation left foot;  Surgeon: Mil Jolly DPM;  Location: [facility] OR     REPAIR TENDON QUADRICEPS Right 10/27/2015    Procedure: REPAIR TENDON QUADRICEPS;  Surgeon: Antonio Yi MD;  Location: [facility] OR     SURGICAL HISTORY OF -   1997    bilateral knee replacement     SURGICAL HISTORY OF -   1997    right knee revised; patella tendon ruptured     SURGICAL HISTORY OF -       surgery for spinal stenosis     SURGICAL HISTORY OF -       breast reduction surgery     SURGICAL HISTORY OF -       D and C        Prior to Admission Medications   Prior to Admission Medications   Prescriptions Last Dose Informant Patient Reported? Taking?   MULTI-VITAMIN OR TABS 1/26/2021 at am  Yes Yes   Sig: Take 2 tablets by mouth daily    acetaminophen (TYLENOL) 325 MG tablet prn  No Yes   Sig: Take 2 tablets (650 mg) by mouth every 4 hours as needed for mild pain   albuterol (PROAIR HFA/PROVENTIL HFA/VENTOLIN HFA) 108 (90 Base) MCG/ACT inhaler unknown  No Yes   Sig: Inhale 2 puffs into the lungs 4 times daily   apixaban ANTICOAGULANT (ELIQUIS) 2.5 MG tablet 1/26/2021 at am   No Yes   Sig: Take 1 tablet (2.5 mg) by mouth 2 times daily   blood glucose (NO BRAND SPECIFIED) lancets standard   No No   Sig: Use to test blood sugar 1 times daily or as directed, Contour Next lancets   blood glucose (NO BRAND SPECIFIED) test strip   No No   Sig: Use to test blood sugar 1 times daily or as directed, Contour Next strips   blood glucose monitoring (NO BRAND SPECIFIED) meter device kit   No No   Sig: Use to test blood sugar 1 times daily or as directed. Ultra 2   calcium acetate (PHOSLO) 667 MG CAPS capsule 1/26/2021 at am  No Yes   Sig: Take 1 capsule (667 mg) by mouth 3 times daily (with meals)   diltiazem ER COATED BEADS (CARDIAZEM LA) 180 MG 24 hr tablet 1/26/2021 at am  Yes Yes   Sig: Take 180 mg by mouth 2 times daily    fish oil-omega-3 fatty acids 1000 MG capsule 1/26/2021 at am  Yes Yes   Sig: Take 1 g by mouth daily   gabapentin (NEURONTIN) 100 MG capsule 1/26/2021 at am  No Yes   Sig: Take 1 capsule (100 mg) by mouth 2 times daily   hydrALAZINE (APRESOLINE) 25 MG tablet prn  No Yes   Sig: Take 1 tablet (25 mg) by mouth 3 times daily as needed (for SBP > 140)   insulin aspart (NOVOLOG PEN) 100 UNIT/ML pen pt does not remember if took today  No Yes   Sig: For Pre-Meal  - 189 give 1 unit. For Pre-Meal  - 239 give 2 units. For Pre-Meal  - 289 give 3 units. For Pre-Meal  - 339 give 4 units. For Pre-Meal - 399 give 5 units. For Pre-Meal -449 give 6 units For Pre-Meal BG greater than or equal to 450 give 7 units.   insulin glargine (LANTUS PEN) 100 UNIT/ML pen unknown  No Yes   Sig: Inject 8 Units Subcutaneous At Bedtime   order for DME   No No   Sig: Equipment being ordered: walker. 4 wheeled with brakes and a seat.   polyethylene glycol (MIRALAX) 17 GM/Dose powder prn  No Yes   Sig: Take 17 g by mouth daily as needed for constipation   sertraline (ZOLOFT) 50 MG tablet 1/26/2021 at am  No Yes   Sig: Take 1 tablet (50 mg) by mouth daily   simvastatin (ZOCOR)  10 MG tablet 1/25/2021 at Unknown time  No Yes   Sig: TAKE 1 TABLET BY MOUTH AT BEDTIME.   torsemide (DEMADEX) 10 MG tablet 1/26/2021 at am  Yes Yes   Sig: Take 20 mg by mouth daily      Facility-Administered Medications: None     Current Facility-Administered Medications   Medication Dose Route Frequency     apixaban ANTICOAGULANT  2.5 mg Oral BID     calcium acetate  667 mg Oral TID w/meals     diltiazem ER COATED BEADS  180 mg Oral BID     gabapentin  100 mg Oral BID     hydrALAZINE  25 mg Oral 4x Daily     insulin aspart   Subcutaneous TID AC     insulin aspart  1-7 Units Subcutaneous TID AC     insulin aspart  1-5 Units Subcutaneous At Bedtime     insulin glargine  8 Units Subcutaneous At Bedtime     sertraline  50 mg Oral Daily     simvastatin  10 mg Oral At Bedtime     [START ON 1/28/2021] torsemide  40 mg Oral Daily     Current Facility-Administered Medications   Medication Last Rate     - MEDICATION INSTRUCTIONS -       - MEDICATION INSTRUCTIONS -       Allergies   Allergies   Allergen Reactions     Augmentin Diarrhea     Vomiting       Cleocin      Hives     Tegretol [Carbamazepine]      Irregular heart beat       Social History    reports that she has never smoked. She has never used smokeless tobacco. She reports that she does not drink alcohol or use drugs.    Family History   Family History   Problem Relation Age of Onset     Cerebrovascular Disease Mother      Heart Disease Father      Neurologic Disorder Sister         ALS     C.A.D. Sister      Diabetes Sister      C.A.D. Brother      Psychotic Disorder Brother         [place] war: PTSD. suicide 2019     Gastrointestinal Disease Brother         diverticulitis     Colon Cancer No family hx of        Review of Systems   The comprehensive 10 point Review of Systems is negative other than noted in the HPI or here.     Physical Exam   Vital Signs with Ranges  Temp:  [97.8  F (36.6  C)-98.3  F (36.8  C)] 98.2  F (36.8  C)  Pulse:  [72-85] 80  Resp:   "[16-22] 16  BP: (138-167)/(53-63) 160/59  SpO2:  [87 %-100 %] 93 %  Wt Readings from Last 4 Encounters:   01/27/21 91.4 kg (201 lb 9.6 oz)   01/25/21 94.3 kg (208 lb)   01/19/21 94.4 kg (208 lb 3.2 oz)   01/18/21 94.4 kg (208 lb 3.2 oz)     I/O last 3 completed shifts:  In: 1230 [P.O.:1230]  Out: 725 [Urine:725]      Vitals: BP (!) 160/59 (BP Location: Right arm)   Pulse 80   Temp 98.2  F (36.8  C) (Oral)   Resp 16   Ht 1.626 m (5' 4\")   Wt 91.4 kg (201 lb 9.6 oz)   LMP  (LMP Unknown)   SpO2 93%   BMI 34.60 kg/m    General patient appears comfortable  Neck normal JVP, positive hepatojugular reflex  Cardiovascular system irregular, normal rate, no murmur rub or gallop  Respiratory some decreased air entry bilaterally at bases, no crackles or wheezing  Extremities no pitting pedal edema  Neurological alert, oriented  Psych normal affect  Skin no obvious rash    HEENT pallor noted    Recent Labs   Lab 01/26/21  1017   TROPI <0.015       Recent Labs   Lab 01/27/21  0647 01/26/21  1641 01/26/21  1017   WBC 9.0  --  11.6*   HGB 7.9*  --  9.4*   MCV 96  --  98     --  355   INR  --   --  1.64*     --  139   POTASSIUM 4.2 4.4 4.3   CHLORIDE 106  --  103   CO2 36*  --  32   BUN 74*  --  77*   CR 2.55*  --  2.70*   GFRESTIMATED 17*  --  16*   GFRESTBLACK 19*  --  18*   ANIONGAP <1*  --  4   EDNA 8.8  --  9.2   *  --  310*   ALBUMIN  --   --  2.5*   PROTTOTAL  --   --  7.3   BILITOTAL  --   --  0.5   ALKPHOS  --   --  81   ALT  --   --  40   AST  --   --  27   TROPI  --   --  <0.015     Recent Labs   Lab Test 04/27/20  0920 01/21/20  0911 12/17/13  0859 12/17/13  0859   CHOL 181 207*   < > 165   HDL 44* 47*   < > 49*   LDL 89 115*   < > 76   TRIG 240* 223*   < > 204*   CHOLHDLRATIO  --   --   --  3.4    < > = values in this interval not displayed.     Recent Labs   Lab 01/27/21  0647 01/26/21  1017   WBC 9.0 11.6*   HGB 7.9* 9.4*   HCT 26.6* 32.5*   MCV 96 98    355     Recent Labs   Lab " 01/26/21  1328   PHV 7.30*   PO2V 41   PCO2V 66*   HCO3V 33*     Recent Labs   Lab 01/26/21  1017   NTBNPI 2,466*     Recent Labs   Lab 01/26/21  1017   DD 3.3*     Recent Labs   Lab 01/27/21  1239   SED 89*   .0*     Recent Labs   Lab 01/27/21  0647 01/26/21  1017    355     No results for input(s): TSH in the last 168 hours.  No results for input(s): COLOR, APPEARANCE, URINEGLC, URINEBILI, URINEKETONE, SG, UBLD, URINEPH, PROTEIN, UROBILINOGEN, NITRITE, LEUKEST, RBCU, WBCU in the last 168 hours.    Imaging:  Recent Results (from the past 48 hour(s))   XR Chest Port 1 View    Narrative    CHEST ONE VIEW  1/26/2021 12:08 PM     HISTORY: Shortness of breath.    COMPARISON: January 10, 2021      Impression    IMPRESSION: Continued at least moderate left pleural effusion and left  base compressive atelectasis and/or infiltrate. Slight improvement in  atelectasis and/or infiltrate at the right base.    KAROL DUCKWORTH MD   US Thoracentesis Bilateral    Narrative    ULTRASOUND GUIDED THORACENTESIS  1/26/2021 3:45 PM     HISTORY: Shortness of breath. Bilateral pleural effusions.    FINDINGS: Limited ultrasound was performed to evaluate for the  presence and best approach for drainage of a pleural effusion. An  image is archived. Written and oral informed consent was obtained. A  pause for the cause procedure to verify the correct patient and  correct procedure.     The skin overlying the right chest posteriorly was prepped and draped  in the usual sterile fashion. The subcutaneous tissues were  anesthetized with 10 mL 1% lidocaine. Under direct ultrasound guidance  a catheter was advanced into the pleural space and 1100 mL of  miguelito  colored fluid was drained. The catheter was removed and a sterile  dressing was applied.     The skin overlying the left chest posteriorly was prepped and draped  in the usual sterile fashion. The subcutaneous tissues were  anesthetized with 10 mL 1% lidocaine. Under direct  ultrasound guidance  a catheter was advanced into the pleural space and 800 mL of  miguelito  colored fluid was drained. The catheter was removed and a sterile  dressing was applied.     Patient was monitored by nurse under my direct supervision throughout  the exam. Ultrasound images were permanently stored.  There were no  immediate complications. Patient left the ultrasound suite in  satisfactory condition.      Impression    IMPRESSION: Technically successful bilateral thoracentesis without  immediate complications.    ANAYELI AVENDAÑO, DO   CT Chest w/o Contrast    Narrative    CT CHEST W/O CONTRAST 1/27/2021 9:47 AM    CLINICAL HISTORY: Pneumonia, effusion or abscess suspected, xray done    TECHNIQUE: CT chest without IV contrast. Multiplanar reformats were  obtained. Dose reduction techniques were used.  CONTRAST: None.    COMPARISON: Chest CT 12/29/2020. Images from chest radiograph  1/26/2021 ultrasound-guided thoracentesis 1/26/2011    FINDINGS:   LUNGS AND PLEURA: Small bilateral pleural effusions either recurrent  or residual. The central tracheobronchial tree is clear. Few scattered  linear and patchy airspace opacities throughout the lungs particularly  in the upper lobes bilaterally have improved since 12/29/2020.  Bibasilar atelectasis/consolidation has also improved. Few small  pulmonary nodules, not significantly changed. For example, a 3 mm  right lower lobe nodule (series 5, image 175) is unchanged. No  pulmonary masses.    MEDIASTINUM/AXILLAE: New moderate pericardial effusion. No  lymphadenopathy. Severe coronary artery calcifications. No thoracic  aortic aneurysm.    UPPER ABDOMEN: Stable cyst in the left hepatic lobe.    MUSCULOSKELETAL: No suspicious lesions in the bones.      Impression    IMPRESSION:   1.  New moderate pericardial effusion.  2.  Small bilateral pleural effusions, residual or recurrent. Overall  improvement without resolution in infiltrate and  bibasilar  atelectasis/consolidation since 12/29/2020.  3.   Small pulmonary nodules measuring up to 3 mm in the right lower  lobe is stable. A follow-up CT in one year can be considered.    STEVEN GILMORE MD       Echo:  No results found for this or any previous visit (from the past 4320 hour(s)).

## 2021-01-27 NOTE — PLAN OF CARE
End of Shift Summary  For vital signs and complete assessments, please see documentation flowsheets.     Pertinent assessments: A&O but forgetful. VSS  on 4L bled into cpap. Lung sounds diminished, fine crackles in bases, dyspneic on exertion.  BS active.  Denies nausea.  Some discomfort at thoracentesis site, tylenol given, site CDI.  Blood sugar 212.  Up A1 with walker.       Major Shift Events: uneventful  Treatment Plan: Monitor I/Os, dm management.   Bedside Nurse: Summer Masterson RN

## 2021-01-28 ENCOUNTER — TELEPHONE (OUTPATIENT)
Dept: FAMILY MEDICINE | Facility: CLINIC | Age: 86
End: 2021-01-28

## 2021-01-28 ENCOUNTER — APPOINTMENT (OUTPATIENT)
Dept: CARDIOLOGY | Facility: CLINIC | Age: 86
DRG: 291 | End: 2021-01-28
Attending: INTERNAL MEDICINE
Payer: COMMERCIAL

## 2021-01-28 ENCOUNTER — APPOINTMENT (OUTPATIENT)
Dept: PHYSICAL THERAPY | Facility: CLINIC | Age: 86
DRG: 291 | End: 2021-01-28
Attending: INTERNAL MEDICINE
Payer: COMMERCIAL

## 2021-01-28 LAB
ANA PAT SER IF-IMP: ABNORMAL
ANA SER QL IF: POSITIVE
ANA TITR SER IF: ABNORMAL {TITER}
ANION GAP SERPL CALCULATED.3IONS-SCNC: 3 MMOL/L (ref 3–14)
BUN SERPL-MCNC: 72 MG/DL (ref 7–30)
CALCIUM SERPL-MCNC: 9 MG/DL (ref 8.5–10.1)
CCP AB SER IA-ACNC: 2 U/ML
CHLORIDE SERPL-SCNC: 104 MMOL/L (ref 94–109)
CO2 SERPL-SCNC: 34 MMOL/L (ref 20–32)
CREAT SERPL-MCNC: 2.33 MG/DL (ref 0.52–1.04)
ERYTHROCYTE [DISTWIDTH] IN BLOOD BY AUTOMATED COUNT: 17 % (ref 10–15)
GFR SERPL CREATININE-BSD FRML MDRD: 19 ML/MIN/{1.73_M2}
GLUCOSE BLDC GLUCOMTR-MCNC: 158 MG/DL (ref 70–99)
GLUCOSE BLDC GLUCOMTR-MCNC: 160 MG/DL (ref 70–99)
GLUCOSE BLDC GLUCOMTR-MCNC: 164 MG/DL (ref 70–99)
GLUCOSE BLDC GLUCOMTR-MCNC: 191 MG/DL (ref 70–99)
GLUCOSE BLDC GLUCOMTR-MCNC: 217 MG/DL (ref 70–99)
GLUCOSE SERPL-MCNC: 186 MG/DL (ref 70–99)
HCT VFR BLD AUTO: 32.8 % (ref 35–47)
HGB BLD-MCNC: 9.8 G/DL (ref 11.7–15.7)
MAGNESIUM SERPL-MCNC: 2.2 MG/DL (ref 1.6–2.3)
MCH RBC QN AUTO: 28.5 PG (ref 26.5–33)
MCHC RBC AUTO-ENTMCNC: 29.9 G/DL (ref 31.5–36.5)
MCV RBC AUTO: 95 FL (ref 78–100)
PHOSPHATE SERPL-MCNC: 3.1 MG/DL (ref 2.5–4.5)
PLATELET # BLD AUTO: 383 10E9/L (ref 150–450)
POTASSIUM SERPL-SCNC: 4 MMOL/L (ref 3.4–5.3)
RBC # BLD AUTO: 3.44 10E12/L (ref 3.8–5.2)
RHEUMATOID FACT SER NEPH-ACNC: 12 IU/ML (ref 0–20)
SODIUM SERPL-SCNC: 141 MMOL/L (ref 133–144)
TSH SERPL DL<=0.005 MIU/L-ACNC: 1.45 MU/L (ref 0.4–4)
WBC # BLD AUTO: 10.7 10E9/L (ref 4–11)

## 2021-01-28 PROCEDURE — 80048 BASIC METABOLIC PNL TOTAL CA: CPT | Performed by: INTERNAL MEDICINE

## 2021-01-28 PROCEDURE — 36415 COLL VENOUS BLD VENIPUNCTURE: CPT | Performed by: INTERNAL MEDICINE

## 2021-01-28 PROCEDURE — 83735 ASSAY OF MAGNESIUM: CPT | Performed by: INTERNAL MEDICINE

## 2021-01-28 PROCEDURE — 120N000001 HC R&B MED SURG/OB

## 2021-01-28 PROCEDURE — 97530 THERAPEUTIC ACTIVITIES: CPT | Mod: GP | Performed by: PHYSICAL THERAPIST

## 2021-01-28 PROCEDURE — 99233 SBSQ HOSP IP/OBS HIGH 50: CPT | Performed by: INTERNAL MEDICINE

## 2021-01-28 PROCEDURE — 250N000013 HC RX MED GY IP 250 OP 250 PS 637: Performed by: INTERNAL MEDICINE

## 2021-01-28 PROCEDURE — 93325 DOPPLER ECHO COLOR FLOW MAPG: CPT | Mod: 26 | Performed by: INTERNAL MEDICINE

## 2021-01-28 PROCEDURE — 255N000002 HC RX 255 OP 636: Performed by: INTERNAL MEDICINE

## 2021-01-28 PROCEDURE — 99232 SBSQ HOSP IP/OBS MODERATE 35: CPT | Mod: 25 | Performed by: INTERNAL MEDICINE

## 2021-01-28 PROCEDURE — 250N000012 HC RX MED GY IP 250 OP 636 PS 637: Performed by: INTERNAL MEDICINE

## 2021-01-28 PROCEDURE — 84100 ASSAY OF PHOSPHORUS: CPT | Performed by: INTERNAL MEDICINE

## 2021-01-28 PROCEDURE — 85027 COMPLETE CBC AUTOMATED: CPT | Performed by: INTERNAL MEDICINE

## 2021-01-28 PROCEDURE — 84443 ASSAY THYROID STIM HORMONE: CPT | Performed by: INTERNAL MEDICINE

## 2021-01-28 PROCEDURE — 999N000208 ECHOCARDIOGRAM LIMITED

## 2021-01-28 PROCEDURE — 93321 DOPPLER ECHO F-UP/LMTD STD: CPT | Mod: 26 | Performed by: INTERNAL MEDICINE

## 2021-01-28 PROCEDURE — 93308 TTE F-UP OR LMTD: CPT | Mod: 26 | Performed by: INTERNAL MEDICINE

## 2021-01-28 PROCEDURE — 97116 GAIT TRAINING THERAPY: CPT | Mod: GP | Performed by: PHYSICAL THERAPIST

## 2021-01-28 PROCEDURE — 999N001017 HC STATISTIC GLUCOSE BY METER IP

## 2021-01-28 PROCEDURE — 97161 PT EVAL LOW COMPLEX 20 MIN: CPT | Mod: GP | Performed by: PHYSICAL THERAPIST

## 2021-01-28 RX ORDER — TORSEMIDE 20 MG/1
40 TABLET ORAL DAILY
Status: DISCONTINUED | OUTPATIENT
Start: 2021-01-28 | End: 2021-01-28

## 2021-01-28 RX ORDER — METOPROLOL TARTRATE 1 MG/ML
2.5 INJECTION, SOLUTION INTRAVENOUS EVERY 4 HOURS PRN
Status: DISCONTINUED | OUTPATIENT
Start: 2021-01-28 | End: 2021-02-02 | Stop reason: HOSPADM

## 2021-01-28 RX ORDER — TORSEMIDE 20 MG/1
40 TABLET ORAL
Status: DISCONTINUED | OUTPATIENT
Start: 2021-01-29 | End: 2021-02-02 | Stop reason: HOSPADM

## 2021-01-28 RX ADMIN — HYDRALAZINE HYDROCHLORIDE 25 MG: 25 TABLET, FILM COATED ORAL at 16:32

## 2021-01-28 RX ADMIN — INSULIN GLARGINE 8 UNITS: 100 INJECTION, SOLUTION SUBCUTANEOUS at 21:23

## 2021-01-28 RX ADMIN — DILTIAZEM HYDROCHLORIDE 180 MG: 180 CAPSULE, COATED, EXTENDED RELEASE ORAL at 21:23

## 2021-01-28 RX ADMIN — APIXABAN 2.5 MG: 2.5 TABLET, FILM COATED ORAL at 21:23

## 2021-01-28 RX ADMIN — SIMVASTATIN 10 MG: 10 TABLET, FILM COATED ORAL at 21:23

## 2021-01-28 RX ADMIN — DILTIAZEM HYDROCHLORIDE 180 MG: 180 CAPSULE, COATED, EXTENDED RELEASE ORAL at 09:07

## 2021-01-28 RX ADMIN — HYDRALAZINE HYDROCHLORIDE 25 MG: 25 TABLET, FILM COATED ORAL at 21:24

## 2021-01-28 RX ADMIN — CALCIUM ACETATE 667 MG: 667 CAPSULE ORAL at 19:04

## 2021-01-28 RX ADMIN — TORSEMIDE 40 MG: 20 TABLET ORAL at 09:07

## 2021-01-28 RX ADMIN — CALCIUM ACETATE 667 MG: 667 CAPSULE ORAL at 13:11

## 2021-01-28 RX ADMIN — SERTRALINE HYDROCHLORIDE 50 MG: 50 TABLET ORAL at 09:07

## 2021-01-28 RX ADMIN — ACETAMINOPHEN 650 MG: 325 TABLET, FILM COATED ORAL at 21:23

## 2021-01-28 RX ADMIN — CALCIUM ACETATE 667 MG: 667 CAPSULE ORAL at 09:07

## 2021-01-28 RX ADMIN — GABAPENTIN 100 MG: 100 CAPSULE ORAL at 21:23

## 2021-01-28 RX ADMIN — GABAPENTIN 100 MG: 100 CAPSULE ORAL at 09:07

## 2021-01-28 RX ADMIN — APIXABAN 2.5 MG: 2.5 TABLET, FILM COATED ORAL at 09:07

## 2021-01-28 RX ADMIN — HUMAN ALBUMIN MICROSPHERES AND PERFLUTREN 2 ML: 10; .22 INJECTION, SOLUTION INTRAVENOUS at 09:26

## 2021-01-28 RX ADMIN — HYDRALAZINE HYDROCHLORIDE 25 MG: 25 TABLET, FILM COATED ORAL at 09:07

## 2021-01-28 ASSESSMENT — ACTIVITIES OF DAILY LIVING (ADL)
ADLS_ACUITY_SCORE: 17
ADLS_ACUITY_SCORE: 17
ADLS_ACUITY_SCORE: 18
ADLS_ACUITY_SCORE: 17

## 2021-01-28 ASSESSMENT — MIFFLIN-ST. JEOR: SCORE: 1355.36

## 2021-01-28 NOTE — PLAN OF CARE
Pertinent assessments: Soft BP after bedtime hydralazine but improved later, all other VSS on 2L. CPAP @ HS. Denies pain. Aox4, forgetful at times. LS diminished. GARAY. Productive cough.     Major Shift Events: Uneventful     Treatment Plan: IV Lasix, PO hydralazine. Monitor respiratory status. Pulmonology and cardiology consults.     Bedside Nurse: Karmen Dolan RN

## 2021-01-28 NOTE — TELEPHONE ENCOUNTER
ED / Discharge Outreach Protocol    Patient Contact    Attempt # 1    Was call answered?  No.  Unable to leave message.    Samia Huff RN on 1/28/2021 at 11:48 AM

## 2021-01-28 NOTE — PLAN OF CARE
End of Shift Summary  For vital signs and complete assessments, please see documentation flowsheets.     Pertinent assessments: A&O x4, forgetful. Elevated blood pressures. Remains on 2L NC. Lungs diminished. SOB with ambulation, productive cough. Up assist of 1. Regular diet, good appetite. Voids without difficulty. B & 180.  Major Shift Events: Uneventful   Treatment Plan: IV Lasix, added hydralazine. Monitor respiratory status. Supplemental oxygen. Cardiology, CC, Pulmonology following.

## 2021-01-28 NOTE — PROGRESS NOTES
[facility]  Hospitalist Progress Note    Assessment & Plan   Ирина Yanez is a 85 year old female with a PMHx of TERESSA on CPAP, type II DM, CKD stage IV, peripheral neuropathy, MDD/anxiety, HTN, RBBB, mild aortic stenosis, obesity, grade I diastolic dysfunction, atrial fibrillation, and chronic anemia  who was admitted on 1/26/2021 with acute hypoxic respiratory failure and shorntess of breath.     CXR on admission showed a moderate left pleural effusion and left base compressive atelectasis/infiltrate. Slightly improved in right base. IR consulted on 1/26/21 bilateral thoracentesis completed with 1,100 mL removed from the right and 800 mL from the left. Effusion was exudative. Elevated  and ESR 89. Pleural fluid culture was negative. Triglyceride level/CHO/chylomicrons were not consistent chylothorax. Diuresis with IV lasix improved symptoms and patient transitioned to oral torsemide. Monitored weights and urine output. Low suspicion for infection. Procal 0.09. Recently treated for CAP.     CT chest 1/27 showed new moderate pericardial effusion. Small bilateral pleural effusions, residual or recurrent. Overall improvement. Small pulmonary nodule. 1/28 limited echo showed small to trivial pericardial effusion.     Recently hospitalized 12/29/20 - 1/5/21 with similar complaint. Hypoxia, decompensated diastolic CHF, bilateral pleural effusions, FABY with CKD, newonset A. Fib with RVR. Thoracentesis transudative with negative cytology. Evaluated by pulmonary and nephrology. Discharged to TCU with a plan to discharge 1/27/21 with home health services.     Acute hypoxic respiratory failure secondary to bilateral pleural effusions   CHFpEF with acute exacerbation  Small pericardial effusion   Mild aortic stenosis   -Cardiology consulted - appreciate assistance   -Pulmonary consulted - previously evaluated patient during hospitalization. Transudate effusion previously ->now exudative.    -Pending tests: TSH, RF, CCP, MARK, pleural fluid cytology, pleural fluid culture  -Titrate oxygen - goal >90%   -Lasix 40 mg BID changed to torsemide 40 mg daily (PTA torsemide 20 mg daily) - monitor response - if inadequate will need to return to IV diuresis   -Daily weights; Strict I/Os     Diabetes mellitus, controlled  -A1c 7.1 Jan 2021  -Insulin glargine 8 units at bedtime; medium resistance sliding scale insulin; CHO ratio 1:15 insulin   -POCT glucose ACHS; Hypoglycemia protocol     CKD stage IV: Baseline Cr. 2.8-3.2. On admission Cr 2.70. Stable. Monitor with diuresis. Continues to improve.     TERESSA: Compliant with CPAP     Chronic anemia: Stable. Baseline hemoglobin around 8.     Elevated d-dimer: Unclear significant. Suspect associated with underlying inflammatory process. On chronic oral anticoagulation. Monitor.     Atrial fibrillation with RBBB/Hypertension: Apixaban 2.5 mg BID, diltiazem 180 mg BID. Hydralazine 25 mg TID.     Incidental finding: Small pulmonary nodules measuring 3 mm in the right lower lobe. CT scan follow-up in 1 year.     MDD/Anxiety: Sertraline 50 mg daily    HLD: Simvastatin 10 mg daily     Neuropathy: Gabapentin 100 mg BID     Obesity BMI 34: Complicates cares     FEN: Oral hydration; monitor; regular   Activity: PT - Home RN/PT/OT  DVT Prophylaxis: Apixaban   Family update: Yes, called Armin ()    Code Status: Full Code    Expected discharge: 2 - 3 days, recommended to prior living arrangement once pleural effusions stable and oxygen needs improve.    Lanie Bowen, DO    Text Page (7am - 6pm, M-F)    Interval History   Patient is doing ok today. Reports not feeling back to normal but improved from admission. No difficulty with breathing. Denies SOB. No cough or sputum production. Afebrile. No chest pain or palpitations. No abdominal pain. Eating/drinking ok. Discussed with nursing.     -Data reviewed today: I reviewed all new labs and imaging results over the last 24  hours. I personally reviewed     Physical Exam   Temp: 98.3  F (36.8  C) Temp src: Oral BP: 133/47 Pulse: 84   Resp: 20 SpO2: 92 % O2 Device: Nasal cannula Oxygen Delivery: 2 LPM  Vitals:    01/26/21 1614 01/27/21 0644 01/28/21 0415   Weight: 92.4 kg (203 lb 12.8 oz) 91.4 kg (201 lb 9.6 oz) 91.5 kg (201 lb 12.8 oz)     Vital Signs with Ranges  Temp:  [98.2  F (36.8  C)-99.7  F (37.6  C)] 98.4  F (36.9  C)  Pulse:  [] 84  Resp:  [16-20] 20  BP: (106-160)/(31-62) 133/47  SpO2:  [92 %-96 %] 92 %  I/O last 3 completed shifts:  In: 1230 [P.O.:1230]  Out: 600 [Urine:600]    Constitutional: Awake, alert, cooperative, no apparent distress. Non-toxic. Appears stated age. [place].  HEENT: Atraumatic. Normocephalic. Conjunctiva non-injected. Sclera anicteric. MMM.   Respiratory: Moves air bilaterally. Bibasilar crackles. No wheezing.   Cardiovascular: Irregularly irregular, rate controlled normal S1 and S2, and grade 3/6 systolic murmur.   GI: Normal bowel sounds, soft, non-distended, non-tender. +JVD  Skin/Integumen: No rashes, no cyanosis, +1 edema    Medications     - MEDICATION INSTRUCTIONS -       - MEDICATION INSTRUCTIONS -         apixaban ANTICOAGULANT  2.5 mg Oral BID     calcium acetate  667 mg Oral TID w/meals     diltiazem ER COATED BEADS  180 mg Oral BID     gabapentin  100 mg Oral BID     hydrALAZINE  25 mg Oral TID     insulin aspart   Subcutaneous TID AC     insulin aspart  1-7 Units Subcutaneous TID AC     insulin aspart  1-5 Units Subcutaneous At Bedtime     insulin glargine  8 Units Subcutaneous At Bedtime     sertraline  50 mg Oral Daily     simvastatin  10 mg Oral At Bedtime     torsemide  40 mg Oral Daily     Data   Recent Labs   Lab 01/28/21  0714 01/27/21  0647 01/26/21  1641 01/26/21  1017   WBC 10.7 9.0  --  11.6*   HGB 9.8* 7.9*  --  9.4*   MCV 95 96  --  98    288  --  355   INR  --   --   --  1.64*    142  --  139   POTASSIUM 4.0 4.2 4.4 4.3   CHLORIDE 104 106  --  103   CO2 34* 36*   --  32   BUN 72* 74*  --  77*   CR 2.33* 2.55*  --  2.70*   ANIONGAP 3 <1*  --  4   EDNA 9.0 8.8  --  9.2   * 199*  --  310*   ALBUMIN  --   --   --  2.5*   PROTTOTAL  --   --   --  7.3   BILITOTAL  --   --   --  0.5   ALKPHOS  --   --   --  81   ALT  --   --   --  40   AST  --   --   --  27   TROPI  --   --   --  <0.015     Recent Results (from the past 24 hour(s))   CT Chest w/o Contrast    Narrative    CT CHEST W/O CONTRAST 1/27/2021 9:47 AM    CLINICAL HISTORY: Pneumonia, effusion or abscess suspected, xray done    TECHNIQUE: CT chest without IV contrast. Multiplanar reformats were  obtained. Dose reduction techniques were used.  CONTRAST: None.    COMPARISON: Chest CT 12/29/2020. Images from chest radiograph  1/26/2021 ultrasound-guided thoracentesis 1/26/2011    FINDINGS:   LUNGS AND PLEURA: Small bilateral pleural effusions either recurrent  or residual. The central tracheobronchial tree is clear. Few scattered  linear and patchy airspace opacities throughout the lungs particularly  in the upper lobes bilaterally have improved since 12/29/2020.  Bibasilar atelectasis/consolidation has also improved. Few small  pulmonary nodules, not significantly changed. For example, a 3 mm  right lower lobe nodule (series 5, image 175) is unchanged. No  pulmonary masses.    MEDIASTINUM/AXILLAE: New moderate pericardial effusion. No  lymphadenopathy. Severe coronary artery calcifications. No thoracic  aortic aneurysm.    UPPER ABDOMEN: Stable cyst in the left hepatic lobe.    MUSCULOSKELETAL: No suspicious lesions in the bones.      Impression    IMPRESSION:   1.  New moderate pericardial effusion.  2.  Small bilateral pleural effusions, residual or recurrent. Overall  improvement without resolution in infiltrate and bibasilar  atelectasis/consolidation since 12/29/2020.  3.   Small pulmonary nodules measuring up to 3 mm in the right lower  lobe is stable. A follow-up CT in one year can be considered.    STEVEN GILMORE,  MD

## 2021-01-28 NOTE — PROGRESS NOTES
[facility]  Cardiology Progress Note    Date of Service (when I saw the patient): 01/28/2021    Summary: Ирина Yanez is a 85 year old female with history of obstructive sleep apnea on CPAP, chronic disease stage IV, DM, HTN, recently diagnosed atrial fibrillation on rate control strategy on apixaban for stroke prophylaxis which has been adjusted for age and kidney function, who was admitted on 1/26/2021 with SOB.     Recent admission for bilateral pleural effusion s/p thoracentesis who is now readmitted with shortness of breath.  Found to have reaccumulation of bilateral pleural effusions and underwent bilateral thoracentesis with about 800 cc drained from each side and remarkably it was found to be exudative in nature at this time although it was transudative last time.       Interval History   Breathing remains improved         Assessment & Plan   1.  Recurrent bilateral pleural effusions.    - Initially was transudative but subsequently exudative.    - This certainly can be some contribution from CHF in terms of pleural effusion but there is likely contribution from underlying chronic kidney disease and underlying lung issues cannot be excluded given exudative nature  IM and pulm have ordered many labs/tests that are pending  - Has received IV Lasix 60 mg and 40 mg.  Renal function improving with diuresis.  Weight down 203 lbs to 201 lbs. Still some fluid retention on exam with bibasilar rales and mildly elevated JVP.  She was switched to PO torsemide 40 mg today (PTA dose was 20 mg).  The increase in PO torsemide dose from her PTA dose may be sufficient, but follow closely as she may need an additional IV Lasix dose.   - Pulm consulted  - Agree that if despite diuresis if she has recurrence of pleural effusion in future indwelling catheter placement vs pleurodesis should be considered.    2.  Preserved ejection vision congestive heart failure.   - Echocardiogram 12/30/20 showed normal  LV systolic function but elevated E to E prime ratio indicated of elevated filling pressure  - NT proBNP also elevated at 2466  - BP control.  On dilt and now hydralazine added  - Diuresis as noted above    3.  Recently diagnosed atrial fibrillation.  On rate control strategy with dilt  mg BID.  Ventricular rates appear adequately controlled at rest.  Vormm0Qyre score of 6.  On apixaban 2.5 mg BID (renally and age adjusted for stroke prophylaxis).      4.  Moderate pericardial effusion detected on recent CT ( CT tends to overestimate size of pericardial effusion).  This is new from previous CT.  No pericardial effusion on echocardiogram done 12/30/20.   - Limited echo today shows small pericardial effusion    5.  Chronic kidney disease with baseline creatinine reported to be around 2 but of late creatinine as high as 3.6, presently 2.33.  - Improving with diuresis    6.  Anemia, likely due to chronic kidney disease.  No overt bleeding noted.        Placido Gomez PA-C      Patient Active Problem List   Diagnosis     Diabetic polyneuropathy (H)     Hyperlipidemia with target LDL less than 100     Hypertension goal BP (blood pressure) < 140/90     Arthritis     Anxiety     Type 2 diabetes mellitus with diabetic nephropathy (H)     Health Care Home     Malignant melanoma of skin     Vulvar dystrophy     Lichen sclerosus et atrophicus     Vitamin B12 deficiency (non anemic)     Controlled substance agreement signed 2/5/15     Major depression in partial remission (H)     CKD (chronic kidney disease) stage 4, GFR 15-29 ml/min (H)     Basal cell carcinoma of skin      Chronic pain - diabetic neuropathy     Complex sleep apnea syndrome     Tendon rupture, traumatic. quadriceps     Right bundle branch block     S/P lumbar fusion     ACP (advance care planning)     Heart murmur     Aortic sclerosis     Toe amputation status, left     Elevated BMI with comorbidity (H)     Falls frequently     Abrasion of arm, right,  initial encounter     Aortic valve stenosis, etiology of cardiac valve disease unspecified     Ascending aorta dilatation (H)     Lung mass     Chest tightness     Dyspnea on exertion     Iron deficiency anemia     Hypoxemia     CHF (congestive heart failure) (H)     Dyspnea     Acute on chronic kidney failure (H)     Paroxysmal atrial fibrillation (H)     Pleural effusion on left     Acute respiratory failure with hypoxia (H)       Physical Exam   Temp: 98.3  F (36.8  C) Temp src: Oral BP: 118/52 Pulse: 86   Resp: 20 SpO2: 95 % O2 Device: Nasal cannula Oxygen Delivery: 2 LPM  Vitals:    01/26/21 1614 01/27/21 0644 01/28/21 0415   Weight: 92.4 kg (203 lb 12.8 oz) 91.4 kg (201 lb 9.6 oz) 91.5 kg (201 lb 12.8 oz)     Vital Signs with Ranges  Temp:  [98.2  F (36.8  C)-99.7  F (37.6  C)] 98.3  F (36.8  C)  Pulse:  [] 86  Resp:  [16-20] 20  BP: (106-160)/(31-62) 118/52  SpO2:  [93 %-96 %] 95 %  I/O last 3 completed shifts:  In: 1230 [P.O.:1230]  Out: 600 [Urine:600]    Constitutional: NAD.   Respiratory: O2 on, bibasilar rales  Cardiovascular: RRR, s1s2, soft systolic murmur, + mildly elevated JVP, no pitting edema  GI: obese, soft, BS+  Skin: warm, no rashes  Musculoskeletal: Moving all extremities  Neurologic: Alert, oriented x 3  Neuropsychiatric: Normal affect       Data   Recent Labs   Lab 01/28/21  0714 01/27/21  0647 01/26/21  1641 01/26/21  1017   WBC 10.7 9.0  --  11.6*   HGB 9.8* 7.9*  --  9.4*   MCV 95 96  --  98    288  --  355   INR  --   --   --  1.64*    142  --  139   POTASSIUM 4.0 4.2 4.4 4.3   CHLORIDE 104 106  --  103   CO2 34* 36*  --  32   BUN 72* 74*  --  77*   CR 2.33* 2.55*  --  2.70*   ANIONGAP 3 <1*  --  4   EDNA 9.0 8.8  --  9.2   * 199*  --  310*   ALBUMIN  --   --   --  2.5*   PROTTOTAL  --   --   --  7.3   BILITOTAL  --   --   --  0.5   ALKPHOS  --   --   --  81   ALT  --   --   --  40   AST  --   --   --  27   TROPI  --   --   --  <0.015     Recent Results (from the  past 24 hour(s))   Echocardiogram Limited    Narrative    [id number]  [id number]  [id number]  [id number]^KARISHMA^ASHISH           [facility]  Echocardiography Laboratory  [address]  [place] [address]        Name: BEATA TRIPLETT  MRN: [id number]  : 1935  Study Date: 2021 08:52 AM  Age: 85 yrs  Gender: Female  Patient Location: [facility]  Reason For Study: Pericardial Effusion  Ordering Physician: ASHISH SCHWARZ  Referring Physician: Yuridia Villarreal  Performed By: Pam Corea RDCS     BSA: 2.0 m2  Height: 64 in  Weight: 201 lb  HR: 95  BP: 138/53 mmHg  _____________________________________________________________________________  __        Procedure  Limited Portable Echo Adult.  _____________________________________________________________________________  __        Interpretation Summary     Trivial to small pericardial effusion  There are no echocardiographic indications of cardiac tamponade.  The study was technically difficult. Contrast was used without apparent  complications. Compared to prior study, there is no significant change.  _____________________________________________________________________________  __        Left Ventricle  The left ventricle is normal in size. The visual ejection fraction is  estimated at 60-65%. Left ventricular diastolic function is normal.     Right Ventricle  The right ventricle is normal in structure, function and size.     Atria  Normal left atrial size. Right atrial size is normal.     Mitral Valve  The mitral valve leaflets appear normal. There is no evidence of stenosis,  fluttering, or prolapse. There is mild mitral annular calcification.        Tricuspid Valve  The tricuspid valve is not well visualized, but is grossly normal. There is  trace tricuspid regurgitation. Right ventricular systolic pressure is normal.     Aortic Valve  There is severe aortic sclerosis of the non-coronary cusp.     Pulmonic Valve  The pulmonic valve is not well  seen, but is grossly normal. There is mild (1+)  pulmonic valvular regurgitation.     Vessels  The aortic root is normal size. Dilation of the inferior vena cava is present  with normal respiratory variation in diameter.     Pericardium  Small pericardial effusion. There are no echocardiographic indications of  cardiac tamponade.        Rhythm  Sinus rhythm was noted.  _____________________________________________________________________________  __           Doppler Measurements & Calculations  TR max ángel: 179.4 cm/sec  TR max P.9 mmHg           _____________________________________________________________________________  __           Report approved by: Balta Reardon 2021 10:59 AM          Medications     - MEDICATION INSTRUCTIONS -       - MEDICATION INSTRUCTIONS -         apixaban ANTICOAGULANT  2.5 mg Oral BID     calcium acetate  667 mg Oral TID w/meals     diltiazem ER COATED BEADS  180 mg Oral BID     gabapentin  100 mg Oral BID     hydrALAZINE  25 mg Oral TID     insulin aspart   Subcutaneous TID AC     insulin aspart  1-7 Units Subcutaneous TID AC     insulin aspart  1-5 Units Subcutaneous At Bedtime     insulin glargine  8 Units Subcutaneous At Bedtime     sertraline  50 mg Oral Daily     simvastatin  10 mg Oral At Bedtime     torsemide  40 mg Oral Daily

## 2021-01-28 NOTE — CONSULTS
"CLINICAL NUTRITION SERVICES  -  ASSESSMENT NOTE      MALNUTRITION:  % Weight Loss:  Weight loss does not meet criteria for malnutrition --> suspect largely diuresis component  % Intake:  No decreased intake noted  Subcutaneous Fat Loss:  Upper arm region mild to moderate depletion --> not using as indicator with only 1 region present   Muscle Loss:  Temporal region mild depletion, Clavicle bone region mild depletion and Acromion bone region mild depletion  Fluid Retention: None currently documented or noted in LEs    Malnutrition Diagnosis: Patient does not meet two of the above criteria necessary for diagnosing malnutrition        REASON FOR ASSESSMENT  Ирина Yanez is a 85 year old female seen by Registered Dietitian for RN Consult - \"2 gram Na diet\".      PMH of: TERESSA on CPAP, DMII, CKD stage 4.    Admit 2/2: SOB (BL PEs, CHF exacerbation), hypoxia, recent admit for CHF.    NUTRITION HISTORY  - Information obtained from patient and chart.  - Recently admitted and seen by RD team for LOS.  Mostly % intakes throughout admit, was on a renal diet with fluid restriction, and briefly received Nepro.    - Diet at home: Verbalizes no added salt.  With questions on high sodium foods during session, see education below.   - Makes own meals at home.  Patient verbalizes that convenience is extremely important to her and she often uses an instapot.  Meals throughout the day vary and notes she likes eggs, rice, meat, etc. (vague when it comes to diet history, more interested in talking through hospital menu).  She orders groceries from [employer] or has her son do the grocery shopping.  She does not currently use the salt shaker.  - Denies a decrease in appetite/intake PTA.  - Chewing/swallowing issues: Denied.  - Allergies: NKFA.      CURRENT NUTRITION ORDERS  Diet Order:     2 gram Na    Current Intake/Tolerance:  Mostly 100% intakes based on flowsheet review since admission, overall limited timeframe.  Care Coordination " "team following and consulted nutrition.  Noted patient/ received resources for MOW and patient has a scale at home, does not use added salt based on documentation.        NUTRITION FOCUSED PHYSICAL ASSESSMENT FOR DIAGNOSING MALNUTRITION)  Yes         Observed:    Muscle wasting (refer to documentation in Malnutrition section) and Subcutaneous fat loss (refer to documentation in Malnutrition section)    Obtained from Chart/Interdisciplinary Team:  - Pulmonology following, thoracentesis completed 1/25  - No documentation of PI  - Stooling patterns reviewed    ANTHROPOMETRICS  Height: 5' 4\"  Weight: 201 lbs 12.8 oz  Body mass index is 34.64 kg/m .  Weight Status:  Obesity Grade I BMI 30-34.9  Weight History:  Wt Readings from Last 10 Encounters:   01/28/21 91.5 kg (201 lb 12.8 oz)   01/25/21 94.3 kg (208 lb)   01/19/21 94.4 kg (208 lb 3.2 oz)   01/18/21 94.4 kg (208 lb 3.2 oz)   01/15/21 93.6 kg (206 lb 4.8 oz)   12/22/20 101.1 kg (222 lb 14.4 oz)   12/14/20 96.7 kg (213 lb 1.6 oz)   11/16/20 99.1 kg (218 lb 8 oz)   07/24/20 100.9 kg (222 lb 6.4 oz)   02/25/20 99.8 kg (220 lb)     - Has been diuresed this admit and last, experiencing fluid shifts.  Suspect dry wt near 201-205#.  Patient reports her weight has been quite elevated lately and feels her UBW is ~215#.    LABS  Labs reviewed:  Electrolytes  Potassium (mmol/L)   Date Value   01/28/2021 4.0   01/27/2021 4.2   01/26/2021 4.4     Phosphorus (mg/dL)   Date Value   01/28/2021 3.1   01/12/2021 3.4   01/10/2021 4.7 (H)   01/09/2021 4.9 (H)   01/06/2021 5.0 (H)    Blood Glucose  Glucose (mg/dL)   Date Value   01/28/2021 186 (H)   01/27/2021 199 (H)   01/26/2021 310 (H)   01/20/2021 300 (H)   01/15/2021 171 (H)     Hemoglobin A1C (%)   Date Value   01/26/2021 7.1 (H)   04/27/2020 6.8 (H)   09/27/2019 6.3 (H)   03/22/2019 6.9 (H)   06/12/2018 6.2 (H)    Inflammatory Markers  CRP Inflammation (mg/L)   Date Value   01/27/2021 142.0 (H)   12/10/2020 35.9 (H) "   08/12/2015 20.0 (H)     WBC (10e9/L)   Date Value   01/28/2021 10.7   01/27/2021 9.0   01/26/2021 11.6 (H)     Albumin (g/dL)   Date Value   01/26/2021 2.5 (L)   01/12/2021 2.5 (L)   01/06/2021 2.3 (L)      Magnesium (mg/dL)   Date Value   01/28/2021 2.2   01/13/2021 2.4 (H)   01/12/2021 2.8 (H)     Sodium (mmol/L)   Date Value   01/28/2021 141   01/27/2021 142   01/26/2021 139    Renal  Urea Nitrogen (mg/dL)   Date Value   01/28/2021 72 (H)   01/27/2021 74 (H)   01/26/2021 77 (H)     Creatinine (mg/dL)   Date Value   01/28/2021 2.33 (H)   01/27/2021 2.55 (H)   01/26/2021 2.70 (H)     Additional  Triglycerides (mg/dL)   Date Value   04/27/2020 240 (H)   01/21/2020 223 (H)   11/16/2018 315 (H)     Ketones Urine (mg/dL)   Date Value   12/29/2020 Negative        B/P: 118/52, T: 98.3, P: 86, R: 20      MEDICATIONS  Medications reviewed:    apixaban ANTICOAGULANT  2.5 mg Oral BID     calcium acetate  667 mg Oral TID w/meals     diltiazem ER COATED BEADS  180 mg Oral BID     gabapentin  100 mg Oral BID     hydrALAZINE  25 mg Oral TID     insulin aspart   Subcutaneous TID AC     insulin aspart  1-7 Units Subcutaneous TID AC     insulin aspart  1-5 Units Subcutaneous At Bedtime     insulin glargine  8 Units Subcutaneous At Bedtime     sertraline  50 mg Oral Daily     simvastatin  10 mg Oral At Bedtime     torsemide  40 mg Oral Daily        - MEDICATION INSTRUCTIONS -       - MEDICATION INSTRUCTIONS -            ASSESSED NUTRITION NEEDS PER APPROVED PRACTICE GUIDELINES:    Dosing Weight 92 kg - dry wt?  Estimated Energy Needs: 20-25 Kcal/Kg  Justification: maintenance  Estimated Protein Needs: 1-1.2 g pro/Kg  Justification: preservation of lean body mass  Estimated Fluid Needs: per MD      NUTRITION DIAGNOSIS:  Food and nutrition-related knowledge deficit related to lack of exposure to formal nutrition education for only 2 gram Na as evidenced by many questions during session, request for handouts.    NUTRITION  INTERVENTIONS  Recommendations / Nutrition Prescription  Continue 2 gram Na diet.    Implementation  Nutrition education: Provided education on 2 gram Na diet:    Assessed learning needs, learning preferences, and willingness to learn    Nutrition Education (Content):  a) Provided handout on recipes and low sodium foods and drinks  b) Discussed common foods high in sodium to avoid, appropriate substitutes  c) Discussed recommendations for </=2000 mg sodium daily  d) Discussed how to read Nutrition Facts Label    Nutrition Education (Application):  a) Discussed eating habits and recommended alternative food choices  b) Encouraged monitoring of sodium intake for at least 1 week at d/c    Patient verbalizes understanding of diet by need to consider canned soup intake as sodium contribution    Anticipate good compliance    Diet Education - refer to Education Flowsheet    Collaboration and Referral of Nutrition care: Discussed POC with team during rounds and education session with RN Care Coordinator.    Nutrition Goals  Patient to verbalize 2 common foods high in sodium to avoid.      MONITORING AND EVALUATION:  Progress towards goals will be monitored and evaluated per protocol and Practice Guidelines          Dorothea Guzman RDN, LD  Clinical Dietitian  3rd floor/ICU: [phone]  All other floors: [phone]  Weekend/holiday: [phone]

## 2021-01-28 NOTE — PLAN OF CARE
To Do:  End of Shift Summary  For vital signs and complete assessments, please see documentation flowsheets.     Pertinent assessments:  VSS, satting well on 2L O2. CPAP @ HS. Denies pain and nausea. AOx4, forgetful at times. LS diminished throughout. GARAY. Sats dropped to 85-88% on 4L NC while walking in cardenas. Non-productive cough.  & 217. ECHO performed today, EF 60-65%. PT eval scheduled for 1445 today.    Major Shift Events: ECHO, walk in cardenas, PT eval    Treatment Plan: PO Torsemide, PO Hydralazine. Monitor respiratory status. Pulmonology and cardiology consults.     Bedside Nurse: Kerry Su RN

## 2021-01-28 NOTE — TELEPHONE ENCOUNTER
Please contact patient for In-patient follow up.  There are no phone numbers on file.    Visit date: 01/27/21    Diagnosis listed:Acute Hypoxemic Respiratory Failure (H)    Number of visits in past 12 months:0/0

## 2021-01-28 NOTE — PROGRESS NOTES
01/28/21 1457   Quick Adds   Type of Visit Initial PT Evaluation   Living Environment   Living Environment Comments Pt lives in trailer home, 3 RAUL with railing on L side. All needs on main level.    Self-Care   Usual Activity Tolerance good   Current Activity Tolerance moderate   Equipment Currently Used at Home raised toilet seat;walker, standard;grab bar, toilet;grab bar, tub/shower;shower chair   Disability/Function   Fall history within last six months no   General Information   Onset of Illness/Injury or Date of Surgery 01/26/21   Referring Physician Lanie Bowen, DO   Patient/Family Therapy Goals Statement (PT) To return home   Pertinent History of Current Problem (include personal factors and/or comorbidities that impact the POC) Pt is a 85 year old female with a PMHx of TERESSA on CPAP, type II DM, CKD stage IV, peripheral neuropathy, MDD/anxiety, HTN, RBBB, mild aortic stenosis, obesity, grade I diastolic dysfunction, atrial fibrillation, and chronic anemia  who was admitted on 1/26/2021 with acute hypoxic respiratory failure and shorntess of breath.   Existing Precautions/Restrictions fall;oxygen therapy device and L/min  (1L)   Weight-Bearing Status - LLE full weight-bearing   Weight-Bearing Status - RLE full weight-bearing   Cognition   Orientation Status (Cognition) oriented x 4   Affect/Mental Status (Cognition) WFL   Follows Commands (Cognition) WFL   Pain Assessment   Patient Currently in Pain No   Range of Motion (ROM)   ROM Comment B LE WFL   Strength   Strength Comments B LE Functionally demonstrated >3/5 strength   Bed Mobility   Comment (Bed Mobility) SBA supine to sit with HOB raised   Transfers   Transfer Safety Comments CGA sit to stand with FWW   Gait/Stairs (Locomotion)   Distance in Feet (Required for LE Total Joints) 180   Pattern (Gait) step-through   Deviations/Abnormal Patterns (Gait) gait speed decreased;stride length decreased  (UE support)   Maintains Weight-bearing Status (Gait)  able to maintain   Comment (Gait/Stairs) CGA with FWW   Balance   Balance Comments good unsupported sitting; fair standing with B UE support   Clinical Impression   Criteria for Skilled Therapeutic Intervention yes, treatment indicated   PT Diagnosis (PT) impaired functional mobility   Influenced by the following impairments impaired balance; decreased functional strength   Functional limitations due to impairments impaired transfers, ambulation, stairs   Clinical Presentation Stable/Uncomplicated   Clinical Presentation Rationale Pt is medically stable   Clinical Decision Making (Complexity) low complexity   Therapy Frequency (PT) Daily   Predicted Duration of Therapy Intervention (days/wks) 3 days   Planned Therapy Interventions (PT) balance training;bed mobility training;gait training;home exercise program;neuromuscular re-education;patient/family education;stair training;strengthening;transfer training   Anticipated Equipment Needs at Discharge (PT) walker, rolling   Risk & Benefits of therapy have been explained evaluation/treatment results reviewed;care plan/treatment goals reviewed;patient;risks/benefits reviewed;current/potential barriers reviewed;participants voiced agreement with care plan;participants included;spouse/significant other   PT Discharge Planning    PT Discharge Recommendation (DC Rec) home with home care physical therapy   PT Rationale for DC Rec Anticipate that with continued skilled IP PT services that patient will demonstrated safe mobility in order to return home. HHPT indicated to assess home safety as it would be an extraordinary effort for patient to return home.    PT Brief overview of current status  CGA with FWW   Total Evaluation Time   Total Evaluation Time (Minutes) 5

## 2021-01-28 NOTE — PROGRESS NOTES
Pts home Bipap set up at bedside with 2lpm oxygen bleed in .    Settings :  ASV    EPAP : 4-6  PS : 0-15   Max pressure : 25   Full face mask with 2 lpm oxygen

## 2021-01-29 ENCOUNTER — APPOINTMENT (OUTPATIENT)
Dept: GENERAL RADIOLOGY | Facility: CLINIC | Age: 86
DRG: 291 | End: 2021-01-29
Attending: INTERNAL MEDICINE
Payer: COMMERCIAL

## 2021-01-29 LAB
ANION GAP SERPL CALCULATED.3IONS-SCNC: 3 MMOL/L (ref 3–14)
BUN SERPL-MCNC: 70 MG/DL (ref 7–30)
CALCIUM SERPL-MCNC: 9.1 MG/DL (ref 8.5–10.1)
CHLORIDE SERPL-SCNC: 104 MMOL/L (ref 94–109)
CO2 SERPL-SCNC: 33 MMOL/L (ref 20–32)
COPATH REPORT: NORMAL
CREAT SERPL-MCNC: 2.42 MG/DL (ref 0.52–1.04)
GFR SERPL CREATININE-BSD FRML MDRD: 18 ML/MIN/{1.73_M2}
GLUCOSE BLDC GLUCOMTR-MCNC: 167 MG/DL (ref 70–99)
GLUCOSE BLDC GLUCOMTR-MCNC: 176 MG/DL (ref 70–99)
GLUCOSE BLDC GLUCOMTR-MCNC: 177 MG/DL (ref 70–99)
GLUCOSE BLDC GLUCOMTR-MCNC: 185 MG/DL (ref 70–99)
GLUCOSE BLDC GLUCOMTR-MCNC: 204 MG/DL (ref 70–99)
GLUCOSE BLDC GLUCOMTR-MCNC: 206 MG/DL (ref 70–99)
GLUCOSE BLDC GLUCOMTR-MCNC: 232 MG/DL (ref 70–99)
GLUCOSE SERPL-MCNC: 171 MG/DL (ref 70–99)
POTASSIUM SERPL-SCNC: 4 MMOL/L (ref 3.4–5.3)
SODIUM SERPL-SCNC: 140 MMOL/L (ref 133–144)

## 2021-01-29 PROCEDURE — 250N000013 HC RX MED GY IP 250 OP 250 PS 637: Performed by: INTERNAL MEDICINE

## 2021-01-29 PROCEDURE — 71046 X-RAY EXAM CHEST 2 VIEWS: CPT

## 2021-01-29 PROCEDURE — 86160 COMPLEMENT ANTIGEN: CPT | Performed by: INTERNAL MEDICINE

## 2021-01-29 PROCEDURE — 250N000012 HC RX MED GY IP 250 OP 636 PS 637: Performed by: INTERNAL MEDICINE

## 2021-01-29 PROCEDURE — 999N001017 HC STATISTIC GLUCOSE BY METER IP

## 2021-01-29 PROCEDURE — 36415 COLL VENOUS BLD VENIPUNCTURE: CPT | Performed by: INTERNAL MEDICINE

## 2021-01-29 PROCEDURE — 120N000001 HC R&B MED SURG/OB

## 2021-01-29 PROCEDURE — 99232 SBSQ HOSP IP/OBS MODERATE 35: CPT | Performed by: INTERNAL MEDICINE

## 2021-01-29 PROCEDURE — 99233 SBSQ HOSP IP/OBS HIGH 50: CPT | Performed by: INTERNAL MEDICINE

## 2021-01-29 PROCEDURE — 80048 BASIC METABOLIC PNL TOTAL CA: CPT | Performed by: INTERNAL MEDICINE

## 2021-01-29 RX ADMIN — CALCIUM ACETATE 667 MG: 667 CAPSULE ORAL at 09:37

## 2021-01-29 RX ADMIN — CALCIUM ACETATE 667 MG: 667 CAPSULE ORAL at 17:42

## 2021-01-29 RX ADMIN — SIMVASTATIN 10 MG: 10 TABLET, FILM COATED ORAL at 22:32

## 2021-01-29 RX ADMIN — INSULIN ASPART 1 UNITS: 100 INJECTION, SOLUTION INTRAVENOUS; SUBCUTANEOUS at 22:36

## 2021-01-29 RX ADMIN — APIXABAN 2.5 MG: 2.5 TABLET, FILM COATED ORAL at 20:34

## 2021-01-29 RX ADMIN — INSULIN GLARGINE 8 UNITS: 100 INJECTION, SOLUTION SUBCUTANEOUS at 22:36

## 2021-01-29 RX ADMIN — CALCIUM ACETATE 667 MG: 667 CAPSULE ORAL at 12:32

## 2021-01-29 RX ADMIN — HYDRALAZINE HYDROCHLORIDE 25 MG: 25 TABLET, FILM COATED ORAL at 22:32

## 2021-01-29 RX ADMIN — HYDRALAZINE HYDROCHLORIDE 25 MG: 25 TABLET, FILM COATED ORAL at 09:37

## 2021-01-29 RX ADMIN — TORSEMIDE 40 MG: 20 TABLET ORAL at 17:42

## 2021-01-29 RX ADMIN — DILTIAZEM HYDROCHLORIDE 180 MG: 180 CAPSULE, COATED, EXTENDED RELEASE ORAL at 09:37

## 2021-01-29 RX ADMIN — HYDRALAZINE HYDROCHLORIDE 25 MG: 25 TABLET, FILM COATED ORAL at 17:42

## 2021-01-29 RX ADMIN — GABAPENTIN 100 MG: 100 CAPSULE ORAL at 09:37

## 2021-01-29 RX ADMIN — SERTRALINE HYDROCHLORIDE 50 MG: 50 TABLET ORAL at 09:37

## 2021-01-29 RX ADMIN — TORSEMIDE 40 MG: 20 TABLET ORAL at 09:37

## 2021-01-29 RX ADMIN — APIXABAN 2.5 MG: 2.5 TABLET, FILM COATED ORAL at 09:37

## 2021-01-29 RX ADMIN — GABAPENTIN 100 MG: 100 CAPSULE ORAL at 20:34

## 2021-01-29 RX ADMIN — DILTIAZEM HYDROCHLORIDE 180 MG: 180 CAPSULE, COATED, EXTENDED RELEASE ORAL at 20:34

## 2021-01-29 ASSESSMENT — ACTIVITIES OF DAILY LIVING (ADL)
ADLS_ACUITY_SCORE: 17

## 2021-01-29 ASSESSMENT — MIFFLIN-ST. JEOR: SCORE: 1356.27

## 2021-01-29 NOTE — PROGRESS NOTES
[facility]  Hospitalist Progress Note    Assessment & Plan   Ирина Yanez is a 85 year old female with a PMHx of TERESSA on CPAP, type II DM, CKD stage IV, peripheral neuropathy, MDD/anxiety, HTN, RBBB, mild aortic stenosis, obesity, grade I diastolic dysfunction, atrial fibrillation, and chronic anemia  who was admitted on 1/26/2021 with acute hypoxic respiratory failure and shorntess of breath.     CXR on admission showed a moderate left pleural effusion and left base compressive atelectasis/infiltrate. Slightly improved in right base. IR consulted on 1/26/21 bilateral thoracentesis completed with 1,100 mL removed from the right and 800 mL from the left. Effusion was exudative. Elevated  and ESR 89. Pleural fluid culture was negative. MARK weakly positive. RF >12. CCP 2. Triglyceride level/CHO/chylomicrons were not consistent chylothorax. TSH 1.45. Diuresis with IV lasix improved symptoms and patient transitioned to oral torsemide. Monitored weights and urine output. Low suspicion for infection. Procal 0.09. Recently treated for CAP.     CT chest 1/27 showed new moderate pericardial effusion. Small bilateral pleural effusions, residual or recurrent. Overall improvement. Small pulmonary nodule. 1/28 limited echo showed small to trivial pericardial effusion.     Recently hospitalized 12/29/20 - 1/5/21 with similar complaint. Hypoxia, decompensated diastolic CHF, bilateral pleural effusions, FABY with CKD, newonset A. Fib with RVR. Thoracentesis transudative with negative cytology. Evaluated by pulmonary and nephrology. Discharged to TCU with a plan to discharge 1/27/21 with home health services.     Acute hypoxic respiratory failure secondary to bilateral pleural effusions   CHFpEF with acute exacerbation  Small pericardial effusion   Mild aortic stenosis   -Cardiology consulted - appreciate assistance   -Pulmonary consulted - previously evaluated patient during hospitalization. Transudate  effusion previously and now exudative, MARK weakly positive and RF >12. Defer to pulmonary if additional workup is indicated with rheumatology.   -Pending tests: pleural fluid cytology, pleural fluid culture  -Titrate oxygen - goal >90%. Requiring 2-3 L - increased hypoxia with activity.  -Lasix 40 mg BID changed to torsemide 40 mg BID (PTA torsemide 20 mg daily) - monitor response - adequate response to diuretics. Repeat CXR given persistent oxygen needs to assess for recurrent pleural fluid accumulation  -Daily weights; Strict I/Os     Diabetes mellitus, controlled  -A1c 7.1 Jan 2021  -Insulin glargine 8 units at bedtime; medium resistance sliding scale insulin; CHO ratio 1:15 insulin   -POCT glucose ACHS; Hypoglycemia protocol     CKD stage IV: Baseline Cr. 2.8-3.2. On admission Cr 2.70. Stable. Monitor with diuresis. Remains stable.    TERESSA: Compliant - AVS mode, EPAP 4-6, PS 0-15, max pressure 25    Chronic anemia: Stable. Baseline hemoglobin around 8.     Elevated d-dimer: Unclear significant. Suspect associated with underlying inflammatory process. On chronic oral anticoagulation. Monitor.     Paroxysmal atrial fibrillation with RBBB/Hypertension: Apixaban 2.5 mg BID, diltiazem 180 mg BID. Hydralazine 25 mg TID. Episode of RVR 1/28 overnight. Treated with IV metoprolol and rates controlled. Telemetry reviewed.     Incidental finding: Small pulmonary nodules measuring 3 mm in the right lower lobe. CT scan follow-up in 1 year.     MDD/Anxiety: Sertraline 50 mg daily    HLD: Simvastatin 10 mg daily     Neuropathy: Gabapentin 100 mg BID     Obesity BMI 34: Complicates cares     FEN: Oral hydration; monitor; regular   Activity: PT - Home RN/PT/OT  DVT Prophylaxis: Apixaban   Family update: Yes, called Armin ()    Code Status: Full Code    Expected discharge: 1-2 days, recommended to prior living arrangement once pleural effusions stable and oxygen needs improve.    Lanie Bowen, DO    Text Page (7am - 6pm,  M-F)    Interval History   Episode of a. Fib RVR overnight - rates improved with one dose of IV metoprolol. Cardiology following. No symptoms during event. Required increase oxygen needs - 2-3, worse with activity. No chest pain or palpitations. No worsening shortness of breath. Improved from admission. No cough or sputum production. Ambulating well despite hypoxia. Eating/drinking ok. Regular BM. No other concerns today.     -Data reviewed today: I reviewed all new labs and imaging results over the last 24 hours. I personally reviewed     Physical Exam   Temp: 99  F (37.2  C) Temp src: Oral BP: 132/51 Pulse: 84   Resp: 20 SpO2: 94 % O2 Device: Nasal cannula Oxygen Delivery: 3 LPM  Vitals:    01/27/21 0644 01/28/21 0415 01/29/21 0550   Weight: 91.4 kg (201 lb 9.6 oz) 91.5 kg (201 lb 12.8 oz) 91.6 kg (202 lb)     Vital Signs with Ranges  Temp:  [98.3  F (36.8  C)-99.1  F (37.3  C)] 99  F (37.2  C)  Pulse:  [] 84  Resp:  [20] 20  BP: (111-159)/(45-56) 132/51  SpO2:  [92 %-95 %] 94 %  I/O last 3 completed shifts:  In: 1020 [P.O.:1020]  Out: 775 [Urine:775]    Constitutional: Awake, alert, cooperative, no apparent distress. Non-toxic. Appears stated age. [place].  HEENT: Atraumatic. Normocephalic. Conjunctiva non-injected. Sclera anicteric. MMM.   Respiratory: Moves air bilaterally - diminished in bases. No wheezing. Occasional bibasilar crackles.   Cardiovascular: Irregularly irregular, rate controlled normal S1 and S2, and grade 3/6 systolic murmur.   GI: Normal bowel sounds, soft, non-distended, non-tender. +JVD  Skin/Integumen: No rashes, no cyanosis, +1 edema    Medications     - MEDICATION INSTRUCTIONS -       - MEDICATION INSTRUCTIONS -         apixaban ANTICOAGULANT  2.5 mg Oral BID     calcium acetate  667 mg Oral TID w/meals     diltiazem ER COATED BEADS  180 mg Oral BID     gabapentin  100 mg Oral BID     hydrALAZINE  25 mg Oral TID     insulin aspart   Subcutaneous TID AC     insulin aspart  1-7 Units  Subcutaneous TID AC     insulin aspart  1-5 Units Subcutaneous At Bedtime     insulin glargine  8 Units Subcutaneous At Bedtime     sertraline  50 mg Oral Daily     simvastatin  10 mg Oral At Bedtime     torsemide  40 mg Oral BID     Data   Recent Labs   Lab 01/29/21  0656 01/28/21  0714 01/27/21  0647 01/26/21  1017 01/26/21  1017   WBC  --  10.7 9.0  --  11.6*   HGB  --  9.8* 7.9*  --  9.4*   MCV  --  95 96  --  98   PLT  --  383 288  --  355   INR  --   --   --   --  1.64*    141 142  --  139   POTASSIUM 4.0 4.0 4.2   < > 4.3   CHLORIDE 104 104 106  --  103   CO2 33* 34* 36*  --  32   BUN 70* 72* 74*  --  77*   CR 2.42* 2.33* 2.55*  --  2.70*   ANIONGAP 3 3 <1*  --  4   EDNA 9.1 9.0 8.8  --  9.2   * 186* 199*  --  310*   ALBUMIN  --   --   --   --  2.5*   PROTTOTAL  --   --   --   --  7.3   BILITOTAL  --   --   --   --  0.5   ALKPHOS  --   --   --   --  81   ALT  --   --   --   --  40   AST  --   --   --   --  27   TROPI  --   --   --   --  <0.015    < > = values in this interval not displayed.     Recent Results (from the past 24 hour(s))   XR Chest 2 Views    Narrative    CHEST TWO VIEWS   1/29/2021 11:18 AM     HISTORY: Shortness of breath/pleural effusions.    COMPARISON: Chest x-ray 1/26/2021.      Impression    IMPRESSION: Two views of the chest. Atelectasis lateral right lung  base is noted. Right lung is otherwise clear. No right pleural fluid.  Small-to-moderate left pleural effusion appears slightly improved  compared to prior x-ray. No evidence of pneumothorax. Heart remains  enlarged.    SHERRI REYES MD

## 2021-01-29 NOTE — PLAN OF CARE
To Do:  End of Shift Summary  For vital signs and complete assessments, please see documentation flowsheets.      Pertinent assessments: currently 2L NC. CPAP @ HS. Denies pain. Aox4,  LS diminished in bases. GARAY. Infrequent cough.     Major Shift Events: Tele monitoring. In and out of a fib throughout nights. Chest xray today.      Treatment Plan: Torsemide, PO hydralazine. Monitor respiratory status. Pulmonology and cardiology consults.      Bedside Nurse: Missy Hernandez RN

## 2021-01-29 NOTE — PROGRESS NOTES
[facility]  Cardiology Progress Note    Date of Service (when I saw the patient): 01/29/2021    Summary: Ирина Yanez is a 85 year old female with history of obstructive sleep apnea on CPAP, chronic disease stage IV, DM, HTN, recently diagnosed atrial fibrillation on rate control strategy on apixaban for stroke prophylaxis which has been adjusted for age and kidney function, who was admitted on 1/26/2021 with SOB.     Recent admission for bilateral pleural effusion s/p thoracentesis who is now readmitted with shortness of breath.  Found to have reaccumulation of bilateral pleural effusions and underwent bilateral thoracentesis with about 800 cc drained from each side and remarkably it was found to be exudative in nature at this time although it was transudative last time.       Interval History   Tele: Afib last night for about 2 hours (rate 150s), this AM for about 1 hour from 9977-4902 (rate a bit better 120-130s).   Now in sinus, rate 80s      Breathing remains improved         Assessment & Plan   1.  Recurrent bilateral pleural effusions.    - Initially was transudative but subsequently exudative.    - This certainly can be some contribution from CHF in terms of pleural effusion but there is likely contribution from underlying chronic kidney disease and underlying lung issues cannot be excluded given exudative nature  Cytology has been negative but repeated  IM and pulm have ordered many labs/tests that are starting to result  - Received IV Lasix 60 mg and 40 mg.  Renal function improving with diuresis.  Weight down 203 lbs to 201 lbs.  She was switched to PO torsemide 40 mg 1/28/21(PTA dose was 20 mg).   Weight today is 202 lbs.  She still has some mild JVD, but lungs sound better today.   IM increased torsemide to 40 mg BID.  Follow volume status closely.  - BP control  - Pulm consulted  - Agree that if despite diuresis if she has recurrence of pleural effusion in future indwelling  catheter placement vs pleurodesis should be considered.    2.  Preserved ejection vision congestive heart failure.   - Echocardiogram 12/30/20 showed normal LV systolic function but elevated E to E prime ratio indicated of elevated filling pressure  - NT proBNP also elevated at 2466  - BP control.  On dilt and now hydralazine added.  Can titrate hydralazine as needed.  Additionally, with diuresis, BP may come down  - Diuresis as above    3.  Recently diagnosed atrial fibrillation.    - On rate control strategy with dilt  mg BID.  Had a couple short paroxysms of afib with RVR.  Continue current regimen for now.  - [id number] score of 6.  On apixaban 2.5 mg BID (renally and age adjusted for stroke prophylaxis).      4.  Moderate pericardial effusion detected on recent CT ( CT tends to overestimate size of pericardial effusion).  This is new from previous CT.  No pericardial effusion on echocardiogram done 12/30/20.   - Limited echo 1/28/21 shows small pericardial effusion    5.  Chronic kidney disease with baseline creatinine reported to be around 2 but of late creatinine as high as 3.6, presently 2.42.  - Improving with diuresis    6.  Anemia, likely due to chronic kidney disease.  No overt bleeding noted.          Placido Gomez PA-C      Patient Active Problem List   Diagnosis     Diabetic polyneuropathy (H)     Hyperlipidemia with target LDL less than 100     Hypertension goal BP (blood pressure) < 140/90     Arthritis     Anxiety     Type 2 diabetes mellitus with diabetic nephropathy (H)     Health Care Home     Malignant melanoma of skin     Vulvar dystrophy     Lichen sclerosus et atrophicus     Vitamin B12 deficiency (non anemic)     Controlled substance agreement signed 2/5/15     Major depression in partial remission (H)     CKD (chronic kidney disease) stage 4, GFR 15-29 ml/min (H)     Basal cell carcinoma of skin      Chronic pain - diabetic neuropathy     Complex sleep apnea syndrome     Tendon  rupture, traumatic. quadriceps     Right bundle branch block     S/P lumbar fusion     ACP (advance care planning)     Heart murmur     Aortic sclerosis     Toe amputation status, left     Elevated BMI with comorbidity (H)     Falls frequently     Abrasion of arm, right, initial encounter     Aortic valve stenosis, etiology of cardiac valve disease unspecified     Ascending aorta dilatation (H)     Lung mass     Chest tightness     Dyspnea on exertion     Iron deficiency anemia     Hypoxemia     CHF (congestive heart failure) (H)     Dyspnea     Acute on chronic kidney failure (H)     Paroxysmal atrial fibrillation (H)     Pleural effusion on left     Acute respiratory failure with hypoxia (H)       Physical Exam   Temp: 98.3  F (36.8  C) Temp src: Oral BP: (!) 152/56 Pulse: 91   Resp: 20 SpO2: 95 % O2 Device: Nasal cannula Oxygen Delivery: 3 LPM  Vitals:    01/27/21 0644 01/28/21 0415 01/29/21 0550   Weight: 91.4 kg (201 lb 9.6 oz) 91.5 kg (201 lb 12.8 oz) 91.6 kg (202 lb)     Vital Signs with Ranges  Temp:  [98.3  F (36.8  C)-99.1  F (37.3  C)] 98.3  F (36.8  C)  Pulse:  [] 91  Resp:  [20] 20  BP: (111-159)/(45-56) 152/56  SpO2:  [92 %-95 %] 95 %  I/O last 3 completed shifts:  In: 1020 [P.O.:1020]  Out: 775 [Urine:775]    Constitutional: NAD.   Respiratory: O2 on, few rales at bases, improved from yesterday, decreased BS  Cardiovascular: RRR, s1s2, soft systolic murmur, + mildly elevated JVP, no pitting edema  GI: obese, soft, BS+  Skin: warm, no rashes  Musculoskeletal: Moving all extremities  Neurologic: Alert, oriented x 3  Neuropsychiatric: Normal affect       Data   Recent Labs   Lab 01/29/21  0656 01/28/21  0714 01/27/21  0647 01/26/21  1017 01/26/21  1017   WBC  --  10.7 9.0  --  11.6*   HGB  --  9.8* 7.9*  --  9.4*   MCV  --  95 96  --  98   PLT  --  383 288  --  355   INR  --   --   --   --  1.64*    141 142  --  139   POTASSIUM 4.0 4.0 4.2   < > 4.3   CHLORIDE 104 104 106  --  103   CO2 33*  34* 36*  --  32   BUN 70* 72* 74*  --  77*   CR 2.42* 2.33* 2.55*  --  2.70*   ANIONGAP 3 3 <1*  --  4   EDNA 9.1 9.0 8.8  --  9.2   * 186* 199*  --  310*   ALBUMIN  --   --   --   --  2.5*   PROTTOTAL  --   --   --   --  7.3   BILITOTAL  --   --   --   --  0.5   ALKPHOS  --   --   --   --  81   ALT  --   --   --   --  40   AST  --   --   --   --  27   TROPI  --   --   --   --  <0.015    < > = values in this interval not displayed.     Recent Results (from the past 24 hour(s))   Echocardiogram Limited    Narrative    [id number]  [id number]  [id number]  [id number]^KARISHMA^ASHISH           [facility]  Echocardiography Laboratory  [address] [place] [address]        Name: BEATA TRIPLETT  MRN: [id number]  : 1935  Study Date: 2021 08:52 AM  Age: 85 yrs  Gender: Female  [facility]  Reason For Study: Pericardial Effusion  Ordering Physician: ASHISH SCHWARZ  Referring Physician: Yuridia Villarreal  Performed By: Pam Corea RDCS     BSA: 2.0 m2  Height: 64 in  Weight: 201 lb  HR: 95  BP: 138/53 mmHg  _____________________________________________________________________________  __        Procedure  Limited Portable Echo Adult.  _____________________________________________________________________________  __        Interpretation Summary     Trivial to small pericardial effusion  There are no echocardiographic indications of cardiac tamponade.  The study was technically difficult. Contrast was used without apparent  complications. Compared to prior study, there is no significant change.  _____________________________________________________________________________  __        Left Ventricle  The left ventricle is normal in size. The visual ejection fraction is  estimated at 60-65%. Left ventricular diastolic function is normal.     Right Ventricle  The right ventricle is normal in structure, function and size.     Atria  Normal left atrial size. Right atrial size is normal.      Mitral Valve  The mitral valve leaflets appear normal. There is no evidence of stenosis,  fluttering, or prolapse. There is mild mitral annular calcification.        Tricuspid Valve  The tricuspid valve is not well visualized, but is grossly normal. There is  trace tricuspid regurgitation. Right ventricular systolic pressure is normal.     Aortic Valve  There is severe aortic sclerosis of the non-coronary cusp.     Pulmonic Valve  The pulmonic valve is not well seen, but is grossly normal. There is mild (1+)  pulmonic valvular regurgitation.     Vessels  The aortic root is normal size. Dilation of the inferior vena cava is present  with normal respiratory variation in diameter.     Pericardium  Small pericardial effusion. There are no echocardiographic indications of  cardiac tamponade.        Rhythm  Sinus rhythm was noted.  _____________________________________________________________________________  __           Doppler Measurements & Calculations  TR max ángel: 179.4 cm/sec  TR max P.9 mmHg           _____________________________________________________________________________  __           Report approved by: Balta Reardon 2021 10:59 AM          Medications     - MEDICATION INSTRUCTIONS -       - MEDICATION INSTRUCTIONS -         apixaban ANTICOAGULANT  2.5 mg Oral BID     calcium acetate  667 mg Oral TID w/meals     diltiazem ER COATED BEADS  180 mg Oral BID     gabapentin  100 mg Oral BID     hydrALAZINE  25 mg Oral TID     insulin aspart   Subcutaneous TID AC     insulin aspart  1-7 Units Subcutaneous TID AC     insulin aspart  1-5 Units Subcutaneous At Bedtime     insulin glargine  8 Units Subcutaneous At Bedtime     sertraline  50 mg Oral Daily     simvastatin  10 mg Oral At Bedtime     torsemide  40 mg Oral BID

## 2021-01-29 NOTE — PROGRESS NOTES
[facility]    Pulmonary, Allergy, Critical Care and Sleep Medicine    Follow-up Note  01/29/2021    Assessment and Recommendations:    Ирина Yanez is a 85 year old female with a history of TERESSA/complex sleep apnea on ASV via BiPAP, atrial fibrillation, type II DM, CKD IV complicated by neuropathy, depression, anxiety, HTN, RBBB, anemia, aortic stenosis, chronic diastolic CHF, and obesity who presented on 1/26/2021 with hypoxia and shortness of breath, found to have recurrent pleural effusion, now s/p thoracentesis.       Recurrent bilateral pleural effusions: initial thoracentesis on 12/30 (bilateral), then left on 1/8, and again bilateral on 1/25. Most likely etiology remains fluid overload due to diastolic CHF. Fluid is exudative in nature this admission, however this is relatively common in patients with CHF on diuretics, as well as in patients with recent thoracenteses (as most recent pleural fluid was noted to be red, may have had some bleeding with most recent procedure, which would cause fluid to appear exudative). Still, should monitor cultures and cytology to ensure no other process is ongoing. Prior RUL mass noted on 12/10/20 CT is resolved, and with no lymphadenopathy, malignancy is thought to be unlikely. Pleural fluid amylase, TG, cholesterol all normal. RF mildly elevated, but CCP negative. MARK mildly positive, but ESR and CRP are markedly elevated  -Follow pleural fluid culture   -follow up pleural fluid cytology. Typically 3 negative cytology testing for malignancy has a high negative predictive value for malignancy (90% sensitive after third sample)  -follow up pleural fluid chylomicrons to look for chylothorax, much less likely with normal TG and cholesterol  -check C3 and C4 given elevated inflammatory markers (ordered)   -continue aggressive diuresis to control fluid  -if work up shows that CHF remains the most likely cause, and fluid cannot be controlled with diuresis  alone, an indwelling pleural catheter to allow for repeated drainage vs pleurodesis could be considered. Would have the patient follow up with Dr. Odonnell (interventional pulm, sees patients at [facility]) for this. She is aware of the patient and will help arrange follow up. She also takes over the consult service next week, if the patient remains admitted   -appreciate cardiology consult   -needs control of a fib.      Acute hypoxic respiratory failure: likely due to CHF exacerbation, as above, with effusions contributing. Of note, it is unclear what her baseline pulmonary function was, as she has previously endorsed significant dyspnea even before she ever was ill with pneumonia last month (could only walk across a room). She likely has baseline deconditioning contributing as well.   -treatment and work up of effusions/cause as above  -wean oxygen as able  -incentive spirometry      TERESSA, with complex sleep apnea:   -home settings are ASV mode, EPAP 4-6, PS 0-15, max pressure 25     Pulmonary will continue to follow. We are in house at [facility] Monday, Wednesday, and Friday. For assistance on other days, please page the on-call pulmonologist through [facility] or the .      Vinayak Joshua MD  Pulmonary and Critical Care       Subjective, Interval history:   Doing okay. Quite fatigued today, but didn't sleep well last night. Feels she is working harder to breath, but noticed this after she went on a walk down the hallway and back. LE edema improved. No fever, chills, chest pain, or palpitations. Mild dry cough continues. Good appetite.     Objective:   Medications:    apixaban ANTICOAGULANT  2.5 mg Oral BID     calcium acetate  667 mg Oral TID w/meals     diltiazem ER COATED BEADS  180 mg Oral BID     gabapentin  100 mg Oral BID     hydrALAZINE  25 mg Oral TID     insulin aspart   Subcutaneous TID AC     insulin aspart  1-7 Units Subcutaneous TID AC     insulin aspart  1-5 Units Subcutaneous At Bedtime      insulin glargine  8 Units Subcutaneous At Bedtime     sertraline  50 mg Oral Daily     simvastatin  10 mg Oral At Bedtime     torsemide  40 mg Oral BID     acetaminophen, glucose **OR** dextrose **OR** glucagon, lidocaine 4%, lidocaine (buffered or not buffered), melatonin, metoprolol, - MEDICATION INSTRUCTIONS -, ondansetron **OR** ondansetron, - MEDICATION INSTRUCTIONS -, polyethylene glycol, sodium chloride (PF), sodium chloride (PF)    Physical Exam:  Temp:  [98.3  F (36.8  C)-99.1  F (37.3  C)] 98.3  F (36.8  C)  Pulse:  [] 91  Resp:  [20] 20  BP: (111-159)/(45-56) 152/56  SpO2:  [92 %-95 %] 95 %    Intake/Output Summary (Last 24 hours) at 1/29/2021 0843  Last data filed at 1/29/2021 0636  Gross per 24 hour   Intake 1020 ml   Output 775 ml   Net 245 ml       General: laying in bed in NAD  HEENT: anicteric, moist mucosa  Neck: no palpable lymphadenopathy  Chest: Normal work of breathing, on 2 LPM.  No cough during my exam.   Cardiac: RRR, soft systolic murmur (improved)   Abdomen: Soft, obese, non tender, active BS.  Extremities: No LE Edema noted currently.   Neuro: A&Ox3, no focal deficits   Skin: no rash noted    Labs and imaging: All laboratory and imaging data personally reviewed.    Notable for:  K 4.0, Cr 2.42, bicarb 33    TSH 1.45.     1/27   MARK: 1:160 homogeneous   CCP 2  RF 12 (slightly positive)    ESR 89    1/26 pleural fluid add on studies:  Chylomicron screen in process  Amylase 18  Cholesterol <50  Triglycerides 18  Fluid culture remains negative  Cytology remains in process.     1/28 Echo: Trivial to small pericardial effusion. There are no echocardiographic indications of cardiac tamponade. The study was technically difficult. Contrast was used without apparent  complications. Compared to prior study, there is no significant change.    1/29 CXR: Two views of the chest. Atelectasis lateral right lung  base is noted. Right lung is otherwise clear. No right pleural  fluid.  Small-to-moderate left pleural effusion appears slightly improved  compared to prior x-ray. No evidence of pneumothorax. Heart remains enlarged.

## 2021-01-29 NOTE — PLAN OF CARE
Pertinent assessments: Soft BP after bedtime hydralazine but improved later, all other VSS on 3L. CPAP @ HS. Denies pain. Aox4, forgetful at times. LS diminished. GARAY. Productive cough. Tylenol given for headache.     Major Shift Events: Tele monitoring ordered per MD. In and out of a fib throughout shift.      Treatment Plan: Torsemide, PO hydralazine. Monitor respiratory status. Pulmonology and cardiology consults.      Bedside Nurse: Karmen Dolan RN

## 2021-01-29 NOTE — PROVIDER NOTIFICATION
Md paged- Pt. HR up to 150's at rest. Asymptomatic. Hx. of a-fib. Can you please order tele? Thanks.    Romy Lee RN on 1/28/2021 at 7:37 PM

## 2021-01-30 ENCOUNTER — APPOINTMENT (OUTPATIENT)
Dept: PHYSICAL THERAPY | Facility: CLINIC | Age: 86
DRG: 291 | End: 2021-01-30
Payer: COMMERCIAL

## 2021-01-30 LAB
ANION GAP SERPL CALCULATED.3IONS-SCNC: 4 MMOL/L (ref 3–14)
BUN SERPL-MCNC: 67 MG/DL (ref 7–30)
CALCIUM SERPL-MCNC: 9.1 MG/DL (ref 8.5–10.1)
CHLORIDE SERPL-SCNC: 102 MMOL/L (ref 94–109)
CO2 SERPL-SCNC: 34 MMOL/L (ref 20–32)
CREAT SERPL-MCNC: 2.49 MG/DL (ref 0.52–1.04)
GFR SERPL CREATININE-BSD FRML MDRD: 17 ML/MIN/{1.73_M2}
GLUCOSE BLDC GLUCOMTR-MCNC: 171 MG/DL (ref 70–99)
GLUCOSE BLDC GLUCOMTR-MCNC: 204 MG/DL (ref 70–99)
GLUCOSE BLDC GLUCOMTR-MCNC: 226 MG/DL (ref 70–99)
GLUCOSE BLDC GLUCOMTR-MCNC: 226 MG/DL (ref 70–99)
GLUCOSE BLDC GLUCOMTR-MCNC: 240 MG/DL (ref 70–99)
GLUCOSE SERPL-MCNC: 252 MG/DL (ref 70–99)
MAGNESIUM SERPL-MCNC: 2 MG/DL (ref 1.6–2.3)
POTASSIUM SERPL-SCNC: 3.8 MMOL/L (ref 3.4–5.3)
SODIUM SERPL-SCNC: 140 MMOL/L (ref 133–144)

## 2021-01-30 PROCEDURE — 120N000001 HC R&B MED SURG/OB

## 2021-01-30 PROCEDURE — 36415 COLL VENOUS BLD VENIPUNCTURE: CPT | Performed by: INTERNAL MEDICINE

## 2021-01-30 PROCEDURE — 99233 SBSQ HOSP IP/OBS HIGH 50: CPT | Performed by: INTERNAL MEDICINE

## 2021-01-30 PROCEDURE — 250N000013 HC RX MED GY IP 250 OP 250 PS 637: Performed by: INTERNAL MEDICINE

## 2021-01-30 PROCEDURE — 83735 ASSAY OF MAGNESIUM: CPT | Performed by: INTERNAL MEDICINE

## 2021-01-30 PROCEDURE — 999N001017 HC STATISTIC GLUCOSE BY METER IP

## 2021-01-30 PROCEDURE — 97110 THERAPEUTIC EXERCISES: CPT | Mod: GP | Performed by: PHYSICAL THERAPIST

## 2021-01-30 PROCEDURE — 80048 BASIC METABOLIC PNL TOTAL CA: CPT | Performed by: INTERNAL MEDICINE

## 2021-01-30 PROCEDURE — 250N000012 HC RX MED GY IP 250 OP 636 PS 637: Performed by: INTERNAL MEDICINE

## 2021-01-30 PROCEDURE — 97116 GAIT TRAINING THERAPY: CPT | Mod: GP | Performed by: PHYSICAL THERAPIST

## 2021-01-30 RX ADMIN — DILTIAZEM HYDROCHLORIDE 180 MG: 180 CAPSULE, COATED, EXTENDED RELEASE ORAL at 21:37

## 2021-01-30 RX ADMIN — SIMVASTATIN 10 MG: 10 TABLET, FILM COATED ORAL at 21:37

## 2021-01-30 RX ADMIN — HYDRALAZINE HYDROCHLORIDE 25 MG: 25 TABLET, FILM COATED ORAL at 15:48

## 2021-01-30 RX ADMIN — ACETAMINOPHEN 650 MG: 325 TABLET, FILM COATED ORAL at 15:48

## 2021-01-30 RX ADMIN — INSULIN GLARGINE 10 UNITS: 100 INJECTION, SOLUTION SUBCUTANEOUS at 22:18

## 2021-01-30 RX ADMIN — HYDRALAZINE HYDROCHLORIDE 25 MG: 25 TABLET, FILM COATED ORAL at 08:52

## 2021-01-30 RX ADMIN — INSULIN ASPART 1 UNITS: 100 INJECTION, SOLUTION INTRAVENOUS; SUBCUTANEOUS at 22:18

## 2021-01-30 RX ADMIN — GABAPENTIN 100 MG: 100 CAPSULE ORAL at 21:37

## 2021-01-30 RX ADMIN — GABAPENTIN 100 MG: 100 CAPSULE ORAL at 08:52

## 2021-01-30 RX ADMIN — CALCIUM ACETATE 667 MG: 667 CAPSULE ORAL at 08:52

## 2021-01-30 RX ADMIN — CALCIUM ACETATE 667 MG: 667 CAPSULE ORAL at 18:26

## 2021-01-30 RX ADMIN — APIXABAN 2.5 MG: 2.5 TABLET, FILM COATED ORAL at 21:37

## 2021-01-30 RX ADMIN — APIXABAN 2.5 MG: 2.5 TABLET, FILM COATED ORAL at 08:52

## 2021-01-30 RX ADMIN — SERTRALINE HYDROCHLORIDE 50 MG: 50 TABLET ORAL at 08:52

## 2021-01-30 RX ADMIN — TORSEMIDE 40 MG: 20 TABLET ORAL at 15:48

## 2021-01-30 RX ADMIN — DILTIAZEM HYDROCHLORIDE 180 MG: 180 CAPSULE, COATED, EXTENDED RELEASE ORAL at 08:52

## 2021-01-30 RX ADMIN — HYDRALAZINE HYDROCHLORIDE 25 MG: 25 TABLET, FILM COATED ORAL at 21:37

## 2021-01-30 RX ADMIN — CALCIUM ACETATE 667 MG: 667 CAPSULE ORAL at 14:09

## 2021-01-30 RX ADMIN — TORSEMIDE 40 MG: 20 TABLET ORAL at 08:52

## 2021-01-30 ASSESSMENT — ACTIVITIES OF DAILY LIVING (ADL)
ADLS_ACUITY_SCORE: 17
ADLS_ACUITY_SCORE: 15
ADLS_ACUITY_SCORE: 17
ADLS_ACUITY_SCORE: 17

## 2021-01-30 ASSESSMENT — MIFFLIN-ST. JEOR: SCORE: 1348.1

## 2021-01-30 NOTE — PROVIDER NOTIFICATION
Provider paged at 1331:  Patient HR fluctuating in 150s down to 80s and now sustaining in 140-150s. Thanks!    Awaiting response.    Missy Hernandez RN on 1/30/2021 at 1:31 PM      Provider response: Page again with BP and follow up page if sustaining in the 150s for 20-30 minutes.      Follow up page from nursing:  Patient /47. HR at the time went back to 89. Back in the 150s since. Thanks    Missy Hernandez RN on 1/30/2021 at 1:50 PM

## 2021-01-30 NOTE — PROGRESS NOTES
[facility]  Hospitalist Progress Note    Assessment & Plan   Ирина Yanez is a 85 year old female with a PMHx of TERESSA on CPAP, type II DM, CKD stage IV, peripheral neuropathy, MDD/anxiety, HTN, RBBB, mild aortic stenosis, obesity, grade I diastolic dysfunction, atrial fibrillation, and chronic anemia  who was admitted on 1/26/2021 with acute hypoxic respiratory failure and shorntess of breath.     CXR on admission showed a moderate left pleural effusion and left base compressive atelectasis/infiltrate. Slightly improved in right base. IR consulted on 1/26/21 bilateral thoracentesis completed with 1,100 mL removed from the right and 800 mL from the left. Effusion was exudative. Elevated  and ESR 89. Pleural fluid culture was negative. MARK weakly positive. RF >12. CCP 2. Triglyceride level/CHO/chylomicrons were not consistent chylothorax. TSH 1.45. Diuresis with IV lasix improved symptoms and patient transitioned to oral torsemide. Monitored weights and urine output. Low suspicion for infection. Procal 0.09. Recently treated for CAP.     CT chest 1/27 showed new moderate pericardial effusion. Small bilateral pleural effusions, residual or recurrent. Overall improvement. Small pulmonary nodule. 1/28 limited echo showed small to trivial pericardial effusion. 1/29 CXR showed atelectasis of lateral right lung base.  Small to moderate left pleural effusion slightly improved.    Recently hospitalized 12/29/20 - 1/5/21 with similar complaint. Hypoxia, decompensated diastolic CHF, bilateral pleural effusions, FABY with CKD, newonset A. Fib with RVR. Thoracentesis transudative with negative cytology. Evaluated by pulmonary and nephrology. Discharged to TCU with a plan to discharge 1/27/21 with home health services. Patient is doing well with therapy while hospitalized and will discharge with home health services.     Acute hypoxic respiratory failure secondary to bilateral pleural effusions    CHFpEF with acute exacerbation  Small pericardial effusion   Mild aortic stenosis   -Cardiology consulted - appreciate assistance   -Pulmonary consulted - previously evaluated patient during hospitalization. Transudate effusion previously and now exudative, MARK weakly positive and RF >12. Defer to pulmonary if additional workup is indicated with rheumatology.   -Pending tests: pleural fluid cytology, pleural fluid culture, C3/C4  -Titrate oxygen - goal >90%. Requiring 3-4 L CXR 1/29 did not show worsening pleural effusion. If oxygen needs continue to increase will request US evaluation for thoracentesis  -Lasix 40 mg BID (PTA torsemide 20 mg daily) - monitor response - adequate response to diuretics.   -Daily weights; Strict I/Os     Diabetes mellitus, controlled  -A1c 7.1 Jan 2021  -Insulin glargine 10 units at bedtime; medium resistance sliding scale insulin; CHO ratio 1:10 insulin   -POCT glucose ACHS; Hypoglycemia protocol     CKD stage IV: Baseline Cr. 2.8-3.2. On admission Cr 2.70. Stable. Monitor with diuresis. Remains stable.    TERESSA: Compliant - AVS mode, EPAP 4-6, PS 0-15, max pressure 25. Hypoxia at night with CPAP. Request respiratory evaluate settings if occurs again overnight/discuss with pulm on Monday.     Chronic anemia: Stable. Baseline hemoglobin around 8.     Elevated d-dimer: Unclear significant. Suspect associated with underlying inflammatory process. On chronic oral anticoagulation. Monitor.     Paroxysmal atrial fibrillation with RBBB/Hypertension: Apixaban 2.5 mg BID, diltiazem 180 mg BID. Hydralazine 25 mg TID. Episodes of RVR. Consider dose adjusting medications vs adding beta blocker. Monitor HR 1/30 and if sustained RVR will adjust medications. IV metoprolol available prn.  Telemetry reviewed.     Incidental finding: Small pulmonary nodules measuring 3 mm in the right lower lobe. CT scan follow-up in 1 year.     MDD/Anxiety: Sertraline 50 mg daily    HLD: Simvastatin 10 mg daily      Neuropathy: Gabapentin 100 mg BID     Obesity BMI 34: Complicates cares     FEN: Oral hydration; monitor-check mag; regular/2gm Na  Activity: PT - Home RN/PT/OT  DVT Prophylaxis: Apixaban   Family update: Not today.     Code Status: Full Code    Expected discharge: 1-2 days, recommended to prior living arrangement once pleural effusions stable and oxygen needs improve.    Lanie Bowen, DO    Text Page (7am - 6pm, M-F)    Interval History   Patient is doing ok today. She is having some mild increased chest heaviness but no chest pressure or palpitations. Episode of V-tach overnight (short 8 beats). No cough or sputum production. Tolerating diet. No abdominal pain. In the afternoon patient developed atrial fibrillation with rapid ventricular rate. Not sustained. HR been variable between 80 and 150. Discussed with nursing.     -Data reviewed today: I reviewed all new labs and imaging results over the last 24 hours. I personally reviewed     Physical Exam   Temp: 98.2  F (36.8  C) Temp src: Oral BP: 117/49 Pulse: 74   Resp: 18 SpO2: 91 % O2 Device: Nasal cannula Oxygen Delivery: 3 LPM  Vitals:    01/28/21 0415 01/29/21 0550 01/30/21 0518   Weight: 91.5 kg (201 lb 12.8 oz) 91.6 kg (202 lb) 90.8 kg (200 lb 3.2 oz)     Vital Signs with Ranges  Temp:  [98.2  F (36.8  C)-99.3  F (37.4  C)] 98.2  F (36.8  C)  Pulse:  [74-92] 74  Resp:  [16-22] 18  BP: (112-161)/(38-65) 117/49  SpO2:  [80 %-96 %] 91 %  I/O last 3 completed shifts:  In: 1610 [P.O.:1610]  Out: 900 [Urine:900]    Constitutional: Awake, alert, cooperative, no apparent distress. Non-toxic. Appears stated age. [place].  HEENT: Atraumatic. Normocephalic. Conjunctiva non-injected. Sclera anicteric. MMM.   Respiratory: Moves air bilaterally - diminished in bases left greater than right. No wheezing. Occasional bibasilar crackles.   Cardiovascular: Irregularly irregular, rate controlled normal S1 and S2, and grade 3/6 systolic murmur.   GI: Normal bowel sounds, soft,  non-distended, non-tender. +JVD  Skin/Integumen: No rashes, no cyanosis, trace edema    Medications     - MEDICATION INSTRUCTIONS -       - MEDICATION INSTRUCTIONS -         apixaban ANTICOAGULANT  2.5 mg Oral BID     calcium acetate  667 mg Oral TID w/meals     diltiazem ER COATED BEADS  180 mg Oral BID     gabapentin  100 mg Oral BID     hydrALAZINE  25 mg Oral TID     insulin aspart   Subcutaneous TID AC     insulin aspart  1-7 Units Subcutaneous TID AC     insulin aspart  1-5 Units Subcutaneous At Bedtime     insulin glargine  10 Units Subcutaneous At Bedtime     sertraline  50 mg Oral Daily     simvastatin  10 mg Oral At Bedtime     torsemide  40 mg Oral BID     Data   Recent Labs   Lab 01/30/21  0636 01/29/21  0656 01/28/21  0714 01/27/21  0647 01/26/21  1017 01/26/21  1017   WBC  --   --  10.7 9.0  --  11.6*   HGB  --   --  9.8* 7.9*  --  9.4*   MCV  --   --  95 96  --  98   PLT  --   --  383 288  --  355   INR  --   --   --   --   --  1.64*    140 141 142  --  139   POTASSIUM 3.8 4.0 4.0 4.2   < > 4.3   CHLORIDE 102 104 104 106  --  103   CO2 34* 33* 34* 36*  --  32   BUN 67* 70* 72* 74*  --  77*   CR 2.49* 2.42* 2.33* 2.55*  --  2.70*   ANIONGAP 4 3 3 <1*  --  4   EDNA 9.1 9.1 9.0 8.8  --  9.2   * 171* 186* 199*  --  310*   ALBUMIN  --   --   --   --   --  2.5*   PROTTOTAL  --   --   --   --   --  7.3   BILITOTAL  --   --   --   --   --  0.5   ALKPHOS  --   --   --   --   --  81   ALT  --   --   --   --   --  40   AST  --   --   --   --   --  27   TROPI  --   --   --   --   --  <0.015    < > = values in this interval not displayed.     Recent Results (from the past 24 hour(s))   XR Chest 2 Views    Narrative    CHEST TWO VIEWS   1/29/2021 11:18 AM     HISTORY: Shortness of breath/pleural effusions.    COMPARISON: Chest x-ray 1/26/2021.      Impression    IMPRESSION: Two views of the chest. Atelectasis lateral right lung  base is noted. Right lung is otherwise clear. No right pleural  fluid.  Small-to-moderate left pleural effusion appears slightly improved  compared to prior x-ray. No evidence of pneumothorax. Heart remains  enlarged.    SHERRI REYES MD

## 2021-01-30 NOTE — PLAN OF CARE
To Do:  End of Shift Summary  For vital signs and complete assessments, please see documentation flowsheets.      Pertinent assessments: A&O. CPAP at night. On 2-3L when awake, maintaining low-mid 90s. LS: diminished. No c/o SOB. No c/o pain. Up SBA. Slept well overnight. Dressing changed.     Major Shift Events: Notified around 1330 patients HR in the 150s, Patient not symptomatic and HR switched back and forth from 80s to 150s a couple times before sustaining, Md paged and asked to monitor and page again if continuing to sustain for 20-30 mins. Patients HR went down to 90s  on own. Continuing to monitor HR closely and frequently. Patient complained of slight headache after tachycardia subsided. Tele read with a fib in RVR, otherwise SR prior.     Treatment Plan: Remote Tele monitoring, Continuous pulse ox. Torsemide, PO hydralazine. Monitor respiratory status. Pulmonology and cardiology consults.      Bedside Nurse: Missy Hernandez RN

## 2021-01-30 NOTE — PROVIDER NOTIFICATION
MD notified:    FYI- pt had 8 beats Vtach. VSS on 3 L. No c/o chest pain. States slightly harder to take deep breath.

## 2021-01-30 NOTE — PLAN OF CARE
Pt A&O, VSS. On 2L O2 via NC, CPAP at night. Mild dyspnea with activity. Up with assist x1 with walker. Dressings on back changed. Mepilex in place on sacrum, has some blanchable redness. On tele. Tolerating diet. BS checks covered with insulin per orders. LS dim, denies pain.

## 2021-01-30 NOTE — PLAN OF CARE
End of Shift Summary  For vital signs and complete assessments, please see documentation flowsheets.      Pertinent assessments: A&O. VSS on CPAP on 4L. On 3L when awake, maintaining 93%. LS: diminished. No c/o SOB. No c/o pain. Up SBA. Slept well overnight. Notified at 0620 that pt had 8 beats of Vtach, checked on pt, MD notified. Checked vitals, stable. Pt stated no chest pain but slight difficulty taking a deep breath.    Major Shift Events: Uneventful     Treatment Plan: Torsemide, PO hydralazine. Monitor respiratory status. Pulmonology and cardiology consults.

## 2021-01-31 LAB
ANION GAP SERPL CALCULATED.3IONS-SCNC: 5 MMOL/L (ref 3–14)
BACTERIA SPEC CULT: NO GROWTH
BUN SERPL-MCNC: 70 MG/DL (ref 7–30)
CALCIUM SERPL-MCNC: 9.1 MG/DL (ref 8.5–10.1)
CHLORIDE SERPL-SCNC: 102 MMOL/L (ref 94–109)
CO2 SERPL-SCNC: 33 MMOL/L (ref 20–32)
CREAT SERPL-MCNC: 2.47 MG/DL (ref 0.52–1.04)
GFR SERPL CREATININE-BSD FRML MDRD: 17 ML/MIN/{1.73_M2}
GLUCOSE BLDC GLUCOMTR-MCNC: 179 MG/DL (ref 70–99)
GLUCOSE BLDC GLUCOMTR-MCNC: 182 MG/DL (ref 70–99)
GLUCOSE BLDC GLUCOMTR-MCNC: 187 MG/DL (ref 70–99)
GLUCOSE BLDC GLUCOMTR-MCNC: 201 MG/DL (ref 70–99)
GLUCOSE BLDC GLUCOMTR-MCNC: 267 MG/DL (ref 70–99)
GLUCOSE SERPL-MCNC: 198 MG/DL (ref 70–99)
LDH SERPL L TO P-CCNC: 288 U/L (ref 81–234)
POTASSIUM SERPL-SCNC: 3.6 MMOL/L (ref 3.4–5.3)
PROT SERPL-MCNC: 7.1 G/DL (ref 6.8–8.8)
SODIUM SERPL-SCNC: 140 MMOL/L (ref 133–144)
SPECIMEN SOURCE: NORMAL

## 2021-01-31 PROCEDURE — 250N000013 HC RX MED GY IP 250 OP 250 PS 637: Performed by: INTERNAL MEDICINE

## 2021-01-31 PROCEDURE — 120N000001 HC R&B MED SURG/OB

## 2021-01-31 PROCEDURE — 84155 ASSAY OF PROTEIN SERUM: CPT | Performed by: INTERNAL MEDICINE

## 2021-01-31 PROCEDURE — 83615 LACTATE (LD) (LDH) ENZYME: CPT | Performed by: INTERNAL MEDICINE

## 2021-01-31 PROCEDURE — 99232 SBSQ HOSP IP/OBS MODERATE 35: CPT | Performed by: INTERNAL MEDICINE

## 2021-01-31 PROCEDURE — 250N000012 HC RX MED GY IP 250 OP 636 PS 637: Performed by: INTERNAL MEDICINE

## 2021-01-31 PROCEDURE — 36415 COLL VENOUS BLD VENIPUNCTURE: CPT | Performed by: INTERNAL MEDICINE

## 2021-01-31 PROCEDURE — 999N001017 HC STATISTIC GLUCOSE BY METER IP

## 2021-01-31 PROCEDURE — 80048 BASIC METABOLIC PNL TOTAL CA: CPT | Performed by: INTERNAL MEDICINE

## 2021-01-31 RX ORDER — CARVEDILOL 3.12 MG/1
3.12 TABLET ORAL 2 TIMES DAILY WITH MEALS
Status: DISCONTINUED | OUTPATIENT
Start: 2021-01-31 | End: 2021-02-02 | Stop reason: HOSPADM

## 2021-01-31 RX ADMIN — GABAPENTIN 100 MG: 100 CAPSULE ORAL at 08:55

## 2021-01-31 RX ADMIN — INSULIN ASPART 2 UNITS: 100 INJECTION, SOLUTION INTRAVENOUS; SUBCUTANEOUS at 21:36

## 2021-01-31 RX ADMIN — APIXABAN 2.5 MG: 2.5 TABLET, FILM COATED ORAL at 08:55

## 2021-01-31 RX ADMIN — CARVEDILOL 3.12 MG: 3.12 TABLET, FILM COATED ORAL at 17:59

## 2021-01-31 RX ADMIN — TORSEMIDE 40 MG: 20 TABLET ORAL at 15:33

## 2021-01-31 RX ADMIN — DILTIAZEM HYDROCHLORIDE 180 MG: 180 CAPSULE, COATED, EXTENDED RELEASE ORAL at 08:55

## 2021-01-31 RX ADMIN — CALCIUM ACETATE 667 MG: 667 CAPSULE ORAL at 12:09

## 2021-01-31 RX ADMIN — INSULIN GLARGINE 12 UNITS: 100 INJECTION, SOLUTION SUBCUTANEOUS at 21:35

## 2021-01-31 RX ADMIN — TORSEMIDE 40 MG: 20 TABLET ORAL at 08:55

## 2021-01-31 RX ADMIN — APIXABAN 2.5 MG: 2.5 TABLET, FILM COATED ORAL at 20:06

## 2021-01-31 RX ADMIN — CALCIUM ACETATE 667 MG: 667 CAPSULE ORAL at 08:55

## 2021-01-31 RX ADMIN — GABAPENTIN 100 MG: 100 CAPSULE ORAL at 20:06

## 2021-01-31 RX ADMIN — HYDRALAZINE HYDROCHLORIDE 25 MG: 25 TABLET, FILM COATED ORAL at 21:38

## 2021-01-31 RX ADMIN — DILTIAZEM HYDROCHLORIDE 180 MG: 180 CAPSULE, COATED, EXTENDED RELEASE ORAL at 20:07

## 2021-01-31 RX ADMIN — HYDRALAZINE HYDROCHLORIDE 25 MG: 25 TABLET, FILM COATED ORAL at 08:55

## 2021-01-31 RX ADMIN — CALCIUM ACETATE 667 MG: 667 CAPSULE ORAL at 17:58

## 2021-01-31 RX ADMIN — HYDRALAZINE HYDROCHLORIDE 25 MG: 25 TABLET, FILM COATED ORAL at 15:33

## 2021-01-31 RX ADMIN — SIMVASTATIN 10 MG: 10 TABLET, FILM COATED ORAL at 21:38

## 2021-01-31 RX ADMIN — SERTRALINE HYDROCHLORIDE 50 MG: 50 TABLET ORAL at 08:55

## 2021-01-31 ASSESSMENT — ACTIVITIES OF DAILY LIVING (ADL)
ADLS_ACUITY_SCORE: 15
ADLS_ACUITY_SCORE: 17

## 2021-01-31 ASSESSMENT — MIFFLIN-ST. JEOR: SCORE: 1349.01

## 2021-01-31 NOTE — PLAN OF CARE
To Do:  End of Shift Summary  For vital signs and complete assessments, please see documentation flowsheets.    Pertinent assessments: Pt alert and oriented. Up with SBA.  and 179. Covered per orders. 4L O2 nasal canula, c pap with sleep. Tolerating diet well. VSS. Plans for pulmonology consult and thoracentesis tomorrow. Tele SR with R BBB and PVCs.     Major Shift Events: Uneventful     Treatment Plan: Thora Monday, pulm consult Monday. Tele, O2.        Bedside Nurse: NICOLE Sumner

## 2021-01-31 NOTE — PLAN OF CARE
VSS. Patient denies pain. Dressing on back CDI. Denies SOB. CPAP on for NOC. , insulin given per order. Pt c/o feeling tired. No other concerns.

## 2021-01-31 NOTE — PLAN OF CARE
End of Shift Summary  For vital signs and complete assessments, please see documentation flowsheets.    Pertinent assessments: A&O. VSS. On CPAP overnight on 5 L. Switched to 4 L NC in the morning. Lung sounds dim. No c/o SOB. GARAY. Apical pulse irregular, murmur detected. No c/o pain. Up SBA. On tele. 0200 . Voided once, ELAM.  Major Shift Events: Uneventful   Treatment Plan: Tele monitoring, Continuous pulse ox. Torsemide, PO hydralazine. Monitor respiratory status. Pulmonology and cardiology consults.

## 2021-01-31 NOTE — PLAN OF CARE
Pertinent assessments: A&O. 3L O2, continuous pulse ox in place. Lung sounds dim. GARAY. Apical pulse irregular, murmur detected. No c/o pain. Up SBA. Tele reading SA.   Major Shift Events: Uneventful   Treatment Plan: Tele monitoring, Continuous pulse ox. Torsemide, PO hydralazine. Monitor respiratory status. Pulmonology and cardiology consults.

## 2021-01-31 NOTE — PROGRESS NOTES
[facility]  Hospitalist Progress Note    Assessment & Plan   Ирина Yanez is a 85 year old female with a PMHx of TERESSA on CPAP, type II DM, CKD stage IV, peripheral neuropathy, MDD/anxiety, HTN, RBBB, mild aortic stenosis, obesity, grade I diastolic dysfunction, atrial fibrillation, and chronic anemia  who was admitted on 1/26/2021 with acute hypoxic respiratory failure and shorntess of breath.     CXR on admission showed a moderate left pleural effusion and left base compressive atelectasis/infiltrate. Slightly improved in right base. IR consulted on 1/26/21 bilateral thoracentesis completed with 1,100 mL removed from the right and 800 mL from the left. Effusion was exudative. Previously transudate. Elevated  and ESR 89. Pleural fluid culture was negative. MARK weakly positive. RF >12. CCP 2. Triglyceride level/CHO/chylomicrons were not consistent chylothorax. 12/29/20 and 1/26/21 cytology negative for malignancy. TSH 1.45. Diuresis with IV lasix improved symptoms and patient transitioned to oral torsemide. Monitored weights and urine output. Low suspicion for infection. Procal 0.09. Recently treated for CAP. 1/31 worsening oxygen needs concern for pleural fluid accumulation. Repeat thoracentesis 2/1/21.     CT chest 1/27 showed new moderate pericardial effusion. Small bilateral pleural effusions, residual or recurrent. Overall improvement. Small pulmonary nodule. 1/28 limited echo showed small to trivial pericardial effusion. 1/29 CXR showed atelectasis of lateral right lung base.  Small to moderate left pleural effusion slightly improved.    Recently hospitalized 12/29/20 - 1/5/21 with similar complaint. Hypoxia, decompensated diastolic CHF, bilateral pleural effusions, FABY with CKD, newonset A. Fib with RVR. Thoracentesis transudative with negative cytology. Evaluated by pulmonary and nephrology. Discharged to TCU with a plan to discharge 1/27/21 with home health services. Patient is  doing well with therapy while hospitalized and will discharge with home health services.     Acute hypoxic respiratory failure secondary to bilateral pleural effusions   CHFpEF with acute exacerbation  Small pericardial effusion   Mild aortic stenosis   -Cardiology consulted - appreciate assistance   -Pulmonary consulted - previously evaluated patient during hospitalization. Transudate effusion previously and now exudative, MARK weakly positive and RF >12. Defer to pulmonary if additional workup is indicated with rheumatology.   -Pending tests: pleural fluid culture, C3/C4  -Repeat thoracentesis 2/1 - cytology and fluid studies ordered. This will be the 3rd cytology exam for patient if negative low suspicion for malignancy.   -Titrate oxygen - goal >90%. Requiring 3-4 L   -Torsemide 40 mg BID (PTA torsemide 20 mg daily) - monitor response - adequate response to diuretics.   -Daily weights; Strict I/Os     Diabetes mellitus, controlled  -A1c 7.1 Jan 2021  -Insulin glargine 12 units at bedtime; medium resistance sliding scale insulin; CHO ratio 1:10 insulin - continue to adjust due to hyperglycemia  -POCT glucose ACHS; Hypoglycemia protocol     CKD stage IV: Baseline Cr. 2.8-3.2. On admission Cr 2.70. Stable. Monitor with diuresis. Remains stable.    TERESSA: Compliant - AVS mode, EPAP 4-6, PS 0-15, max pressure 25. Hypoxia at night with CPAP. Request respiratory evaluate settings if occurs again overnight/discuss with pulm on Monday.     Chronic anemia: Stable. Baseline hemoglobin around 8.     Elevated d-dimer: Unclear significant. Suspect associated with underlying inflammatory process. On chronic oral anticoagulation. Monitor.     Paroxysmal atrial fibrillation with RBBB/Hypertension: Apixaban 2.5 mg BID, diltiazem 180 mg BID. Hydralazine 25 mg TID. Carvedilol 3.125 mg BID started 1/31 to help prevent recurrent RVR episodes and BP control. Episodes of RVR for a few seconds but not sustained. Telemetry reviewed.      Incidental finding: Small pulmonary nodules measuring 3 mm in the right lower lobe. CT scan follow-up in 1 year.    MDD/Anxiety: Sertraline 50 mg daily    HLD: Simvastatin 10 mg daily     Neuropathy: Gabapentin 100 mg BID     Obesity BMI 34: Complicates cares     FEN: Oral hydration; monitor; regular/2gm Na  Activity: PT - Home RN/PT/OT  DVT Prophylaxis: Apixaban   Family update: Not today.     Code Status: Full Code    Expected discharge: 2 - 3 days, recommended to prior living arrangement once pleural effusions stable and oxygen needs improve.    Lanie Bowen, DO    Text Page (7am - 6pm, M-F)    Interval History   Patient is doing okay today.  She just awoken from a nap.  She is reporting some chest heaviness and shortness of breath.  No palpitations or chest pain.  No increasing sputum production.  Tolerating diet.  No GI symptoms.  Adequate diuresis.  Discussed with nursing.    -Data reviewed today: I reviewed all new labs and imaging results over the last 24 hours. I personally reviewed     Physical Exam   Temp: 99.6  F (37.6  C) Temp src: Axillary BP: 130/53 Pulse: 82   Resp: 16 SpO2: 92 % O2 Device: BiPAP/CPAP Oxygen Delivery: 4 LPM  Vitals:    01/29/21 0550 01/30/21 0518 01/31/21 0405   Weight: 91.6 kg (202 lb) 90.8 kg (200 lb 3.2 oz) 90.9 kg (200 lb 6.4 oz)     Vital Signs with Ranges  Temp:  [97.9  F (36.6  C)-99.6  F (37.6  C)] 99.6  F (37.6  C)  Pulse:  [71-85] 82  Resp:  [16-18] 18  BP: (107-142)/(37-62) 130/53  SpO2:  [88 %-96 %] 92 %  I/O last 3 completed shifts:  In: 887 [P.O.:887]  Out: 1000 [Urine:1000]    Constitutional: Awake, alert, cooperative, no apparent distress. Non-toxic. Appears stated age. [place].  HEENT: Atraumatic. Normocephalic. Conjunctiva non-injected. Sclera anicteric. MMM.   Respiratory: Moves air bilaterally but diminished. No wheezing. Occasional bibasilar crackles.   Cardiovascular: Irregularly irregular, rate controlled normal S1 and S2, and grade 3/6 systolic murmur.   GI:  Normal bowel sounds, soft, non-distended, non-tender. +JVD  Skin/Integumen: No rashes, no cyanosis, trace edema    Medications     - MEDICATION INSTRUCTIONS -       - MEDICATION INSTRUCTIONS -         apixaban ANTICOAGULANT  2.5 mg Oral BID     calcium acetate  667 mg Oral TID w/meals     diltiazem ER COATED BEADS  180 mg Oral BID     gabapentin  100 mg Oral BID     hydrALAZINE  25 mg Oral TID     insulin aspart   Subcutaneous TID AC     insulin aspart  1-7 Units Subcutaneous TID AC     insulin aspart  1-5 Units Subcutaneous At Bedtime     insulin glargine  12 Units Subcutaneous At Bedtime     sertraline  50 mg Oral Daily     simvastatin  10 mg Oral At Bedtime     torsemide  40 mg Oral BID     Data   Recent Labs   Lab 01/31/21  0700 01/30/21  0636 01/29/21  0656 01/28/21  0714 01/27/21  0647 01/26/21  1017 01/26/21  1017   WBC  --   --   --  10.7 9.0  --  11.6*   HGB  --   --   --  9.8* 7.9*  --  9.4*   MCV  --   --   --  95 96  --  98   PLT  --   --   --  383 288  --  355   INR  --   --   --   --   --   --  1.64*    140 140 141 142  --  139   POTASSIUM 3.6 3.8 4.0 4.0 4.2   < > 4.3   CHLORIDE 102 102 104 104 106  --  103   CO2 33* 34* 33* 34* 36*  --  32   BUN 70* 67* 70* 72* 74*  --  77*   CR 2.47* 2.49* 2.42* 2.33* 2.55*  --  2.70*   ANIONGAP 5 4 3 3 <1*  --  4   EDNA 9.1 9.1 9.1 9.0 8.8  --  9.2   * 252* 171* 186* 199*  --  310*   ALBUMIN  --   --   --   --   --   --  2.5*   PROTTOTAL 7.1  --   --   --   --   --  7.3   BILITOTAL  --   --   --   --   --   --  0.5   ALKPHOS  --   --   --   --   --   --  81   ALT  --   --   --   --   --   --  40   AST  --   --   --   --   --   --  27   TROPI  --   --   --   --   --   --  <0.015    < > = values in this interval not displayed.     No results found for this or any previous visit (from the past 24 hour(s)).

## 2021-02-01 ENCOUNTER — APPOINTMENT (OUTPATIENT)
Dept: PHYSICAL THERAPY | Facility: CLINIC | Age: 86
DRG: 291 | End: 2021-02-01
Payer: COMMERCIAL

## 2021-02-01 ENCOUNTER — APPOINTMENT (OUTPATIENT)
Dept: ULTRASOUND IMAGING | Facility: CLINIC | Age: 86
DRG: 291 | End: 2021-02-01
Attending: INTERNAL MEDICINE
Payer: COMMERCIAL

## 2021-02-01 DIAGNOSIS — Z11.59 ENCOUNTER FOR SCREENING FOR OTHER VIRAL DISEASES: Primary | ICD-10-CM

## 2021-02-01 LAB
ANION GAP SERPL CALCULATED.3IONS-SCNC: 7 MMOL/L (ref 3–14)
APPEARANCE FLD: NORMAL
BUN SERPL-MCNC: 73 MG/DL (ref 7–30)
C3 SERPL-MCNC: 135 MG/DL (ref 81–157)
C4 SERPL-MCNC: 31 MG/DL (ref 13–39)
CALCIUM SERPL-MCNC: 8.9 MG/DL (ref 8.5–10.1)
CHLORIDE SERPL-SCNC: 100 MMOL/L (ref 94–109)
CO2 SERPL-SCNC: 32 MMOL/L (ref 20–32)
COLOR FLD: NORMAL
CREAT SERPL-MCNC: 2.39 MG/DL (ref 0.52–1.04)
ERYTHROCYTE [DISTWIDTH] IN BLOOD BY AUTOMATED COUNT: 16.6 % (ref 10–15)
GFR SERPL CREATININE-BSD FRML MDRD: 18 ML/MIN/{1.73_M2}
GLUCOSE BLDC GLUCOMTR-MCNC: 156 MG/DL (ref 70–99)
GLUCOSE BLDC GLUCOMTR-MCNC: 176 MG/DL (ref 70–99)
GLUCOSE BLDC GLUCOMTR-MCNC: 197 MG/DL (ref 70–99)
GLUCOSE BLDC GLUCOMTR-MCNC: 217 MG/DL (ref 70–99)
GLUCOSE BLDC GLUCOMTR-MCNC: 224 MG/DL (ref 70–99)
GLUCOSE FLD-MCNC: 240 MG/DL
GLUCOSE SERPL-MCNC: 193 MG/DL (ref 70–99)
GRAM STN SPEC: NORMAL
HCT VFR BLD AUTO: 33.9 % (ref 35–47)
HGB BLD-MCNC: 10.1 G/DL (ref 11.7–15.7)
LDH FLD L TO P-CCNC: 410 U/L
LYMPHOCYTES NFR FLD MANUAL: 34 %
MCH RBC QN AUTO: 28.1 PG (ref 26.5–33)
MCHC RBC AUTO-ENTMCNC: 29.8 G/DL (ref 31.5–36.5)
MCV RBC AUTO: 94 FL (ref 78–100)
MONOS+MACROS NFR FLD MANUAL: 12 %
NEUTS BAND NFR FLD MANUAL: 53 %
OTHER CELLS FLD MANUAL: 1 %
PH FLD: 8.5 PH
PLATELET # BLD AUTO: 369 10E9/L (ref 150–450)
POTASSIUM SERPL-SCNC: 3.5 MMOL/L (ref 3.4–5.3)
PROT FLD-MCNC: 3.8 G/DL
RBC # BLD AUTO: 3.59 10E12/L (ref 3.8–5.2)
SODIUM SERPL-SCNC: 139 MMOL/L (ref 133–144)
SPECIMEN SOURCE FLD: NORMAL
SPECIMEN SOURCE: NORMAL
WBC # BLD AUTO: 11.6 10E9/L (ref 4–11)
WBC # FLD AUTO: 854 /UL

## 2021-02-01 PROCEDURE — 84157 ASSAY OF PROTEIN OTHER: CPT | Performed by: INTERNAL MEDICINE

## 2021-02-01 PROCEDURE — 250N000013 HC RX MED GY IP 250 OP 250 PS 637: Performed by: INTERNAL MEDICINE

## 2021-02-01 PROCEDURE — 120N000001 HC R&B MED SURG/OB

## 2021-02-01 PROCEDURE — 88112 CYTOPATH CELL ENHANCE TECH: CPT | Mod: TC | Performed by: INTERNAL MEDICINE

## 2021-02-01 PROCEDURE — 85027 COMPLETE CBC AUTOMATED: CPT | Performed by: INTERNAL MEDICINE

## 2021-02-01 PROCEDURE — 82945 GLUCOSE OTHER FLUID: CPT | Performed by: INTERNAL MEDICINE

## 2021-02-01 PROCEDURE — 80048 BASIC METABOLIC PNL TOTAL CA: CPT | Performed by: INTERNAL MEDICINE

## 2021-02-01 PROCEDURE — 99232 SBSQ HOSP IP/OBS MODERATE 35: CPT | Mod: 25 | Performed by: INTERNAL MEDICINE

## 2021-02-01 PROCEDURE — 0W993ZZ DRAINAGE OF RIGHT PLEURAL CAVITY, PERCUTANEOUS APPROACH: ICD-10-PCS | Performed by: RADIOLOGY

## 2021-02-01 PROCEDURE — 32555 ASPIRATE PLEURA W/ IMAGING: CPT | Mod: 50

## 2021-02-01 PROCEDURE — 89051 BODY FLUID CELL COUNT: CPT | Performed by: INTERNAL MEDICINE

## 2021-02-01 PROCEDURE — 999N001017 HC STATISTIC GLUCOSE BY METER IP

## 2021-02-01 PROCEDURE — 999N001014 HC STATISTIC CYTO WRIGHT STAIN TC: Performed by: INTERNAL MEDICINE

## 2021-02-01 PROCEDURE — 99232 SBSQ HOSP IP/OBS MODERATE 35: CPT | Performed by: INTERNAL MEDICINE

## 2021-02-01 PROCEDURE — 88305 TISSUE EXAM BY PATHOLOGIST: CPT | Mod: TC | Performed by: INTERNAL MEDICINE

## 2021-02-01 PROCEDURE — 83986 ASSAY PH BODY FLUID NOS: CPT | Performed by: INTERNAL MEDICINE

## 2021-02-01 PROCEDURE — 83615 LACTATE (LD) (LDH) ENZYME: CPT | Performed by: INTERNAL MEDICINE

## 2021-02-01 PROCEDURE — 97116 GAIT TRAINING THERAPY: CPT | Mod: GP | Performed by: PHYSICAL THERAPIST

## 2021-02-01 PROCEDURE — 87015 SPECIMEN INFECT AGNT CONCNTJ: CPT | Performed by: INTERNAL MEDICINE

## 2021-02-01 PROCEDURE — 250N000012 HC RX MED GY IP 250 OP 636 PS 637: Performed by: INTERNAL MEDICINE

## 2021-02-01 PROCEDURE — 999N001018 HC STATISTIC H-CELL BLOCK W/STAIN: Performed by: INTERNAL MEDICINE

## 2021-02-01 PROCEDURE — 0W9B3ZZ DRAINAGE OF LEFT PLEURAL CAVITY, PERCUTANEOUS APPROACH: ICD-10-PCS | Performed by: RADIOLOGY

## 2021-02-01 PROCEDURE — 250N000009 HC RX 250: Performed by: RADIOLOGY

## 2021-02-01 PROCEDURE — 36415 COLL VENOUS BLD VENIPUNCTURE: CPT | Performed by: INTERNAL MEDICINE

## 2021-02-01 PROCEDURE — 87205 SMEAR GRAM STAIN: CPT | Performed by: INTERNAL MEDICINE

## 2021-02-01 PROCEDURE — 87070 CULTURE OTHR SPECIMN AEROBIC: CPT | Performed by: INTERNAL MEDICINE

## 2021-02-01 RX ADMIN — ACETAMINOPHEN 650 MG: 325 TABLET, FILM COATED ORAL at 22:13

## 2021-02-01 RX ADMIN — CALCIUM ACETATE 667 MG: 667 CAPSULE ORAL at 13:21

## 2021-02-01 RX ADMIN — ACETAMINOPHEN 650 MG: 325 TABLET, FILM COATED ORAL at 11:02

## 2021-02-01 RX ADMIN — TORSEMIDE 40 MG: 20 TABLET ORAL at 16:52

## 2021-02-01 RX ADMIN — CALCIUM ACETATE 667 MG: 667 CAPSULE ORAL at 18:35

## 2021-02-01 RX ADMIN — DILTIAZEM HYDROCHLORIDE 180 MG: 180 CAPSULE, COATED, EXTENDED RELEASE ORAL at 22:13

## 2021-02-01 RX ADMIN — GABAPENTIN 100 MG: 100 CAPSULE ORAL at 08:29

## 2021-02-01 RX ADMIN — HYDRALAZINE HYDROCHLORIDE 25 MG: 25 TABLET, FILM COATED ORAL at 08:29

## 2021-02-01 RX ADMIN — INSULIN ASPART 1 UNITS: 100 INJECTION, SOLUTION INTRAVENOUS; SUBCUTANEOUS at 22:14

## 2021-02-01 RX ADMIN — CALCIUM ACETATE 667 MG: 667 CAPSULE ORAL at 08:28

## 2021-02-01 RX ADMIN — APIXABAN 2.5 MG: 2.5 TABLET, FILM COATED ORAL at 08:29

## 2021-02-01 RX ADMIN — APIXABAN 2.5 MG: 2.5 TABLET, FILM COATED ORAL at 22:13

## 2021-02-01 RX ADMIN — GABAPENTIN 100 MG: 100 CAPSULE ORAL at 22:13

## 2021-02-01 RX ADMIN — SIMVASTATIN 10 MG: 10 TABLET, FILM COATED ORAL at 22:14

## 2021-02-01 RX ADMIN — LIDOCAINE HYDROCHLORIDE 10 ML: 10 INJECTION, SOLUTION EPIDURAL; INFILTRATION; INTRACAUDAL; PERINEURAL at 09:11

## 2021-02-01 RX ADMIN — INSULIN GLARGINE 12 UNITS: 100 INJECTION, SOLUTION SUBCUTANEOUS at 22:14

## 2021-02-01 RX ADMIN — DILTIAZEM HYDROCHLORIDE 180 MG: 180 CAPSULE, COATED, EXTENDED RELEASE ORAL at 08:29

## 2021-02-01 RX ADMIN — HYDRALAZINE HYDROCHLORIDE 25 MG: 25 TABLET, FILM COATED ORAL at 16:52

## 2021-02-01 RX ADMIN — HYDRALAZINE HYDROCHLORIDE 25 MG: 25 TABLET, FILM COATED ORAL at 22:13

## 2021-02-01 RX ADMIN — SERTRALINE HYDROCHLORIDE 50 MG: 50 TABLET ORAL at 08:29

## 2021-02-01 RX ADMIN — CARVEDILOL 3.12 MG: 3.12 TABLET, FILM COATED ORAL at 08:28

## 2021-02-01 RX ADMIN — CARVEDILOL 3.12 MG: 3.12 TABLET, FILM COATED ORAL at 18:34

## 2021-02-01 RX ADMIN — TORSEMIDE 40 MG: 20 TABLET ORAL at 08:29

## 2021-02-01 ASSESSMENT — MIFFLIN-ST. JEOR: SCORE: 1348.1

## 2021-02-01 ASSESSMENT — ACTIVITIES OF DAILY LIVING (ADL)
ADLS_ACUITY_SCORE: 17
ADLS_ACUITY_SCORE: 17
ADLS_ACUITY_SCORE: 15
ADLS_ACUITY_SCORE: 15
ADLS_ACUITY_SCORE: 17
ADLS_ACUITY_SCORE: 17

## 2021-02-01 NOTE — PROGRESS NOTES
[facility]    Pulmonology Follow Up    Date of Admission:  1/26/2021  Date of Consult (When I saw the patient): 02/01/21    Assessment & Plan   Ирина Yanez is a 85 year old female who was admitted on 1/26/2021. I was asked to see the patient for recurrent pleural effusions. She has a history of TERESSA/complex sleep apnea on ASV via BiPAP, atrial fibrillation, type II DM, CKD IV complicated by neuropathy, depression, anxiety, HTN, RBBB, anemia, aortic stenosis, chronic diastolic CHF, and obesity who presented on 1/26/2021 with hypoxia and shortness of breath, found to have recurrent pleural effusion, now s/p thoracentesis.       Recurrent bilateral pleural effusions: initial thoracentesis on 12/30 (bilateral), then left on 1/8, and again bilateral on 1/25. Most likely etiology remains fluid overload due to diastolic CHF. Fluid is exudative in nature this admission, however this is relatively common in patients with CHF on diuretics, as well as in patients with recent thoracenteses (as most recent pleural fluid was noted to be red, may have had some bleeding with most recent procedure, which would cause fluid to appear exudative).     Bilateral PleurX catheter placement scheduled Feb 8 11:00  Hold anticoagulation prior to procedure (skip doses on Sunday as well as Monday)  Patient will benefit from home nursing care after the procedure for teaching and assist with drainage    Smitha Odonnell MD  Interventional Pulmonary  Text page    Code Status    Full Code    History of Present Illness   Ирина Yanez is a 85 year old female with bilateral recurrent pleural effusions. She underwent bilateral thoracentesis today with significant relief    Allergies     Review of Systems   The 5 point Review of Systems is negative other than noted in the HPI or here.     Physical Exam   Temp: 98  F (36.7  C) Temp src: Oral BP: 115/51 Pulse: 67   Resp: 20 SpO2: 94 % O2 Device: Nasal cannula Oxygen Delivery:  1.5 LPM  Vital Signs with Ranges  Temp:  [98  F (36.7  C)-99.6  F (37.6  C)] 98  F (36.7  C)  Pulse:  [67-87] 67  Resp:  [16-20] 20  BP: (110-141)/(43-59) 115/51  SpO2:  [92 %-99 %] 94 %  200 lbs 3.2 oz    Constitutional: Awake, alert, cooperative, no apparent distress, and appears stated age.  Eyes: Lids and lashes normal, pupils equal, round and reactive to light, extra ocular muscles intact, sclera clear, conjunctiva normal.  ENT: Normocephalic, without obvious abnormality, atraumatic, sinuses nontender on palpation, external ears without lesions, oral pharynx with moist mucus membranes, tonsils without erythema or exudates, gums normal and good dentition.  Respiratory: No increased work of breathing, good air exchange, clear to auscultation bilaterally, no crackles or wheezing.  Cardiovascular: Normal apical impulse, regular rate and rhythm, normal S1 and S2, no S3 or S4, and no murmur noted.  GI: No scars, normal bowel sounds, soft, non-distended, non-tender, no masses palpated, no hepatosplenomegaly.  Lymph/Hematologic: No cervical lymphadenopathy and no supraclavicular lymphadenopathy.  Skin: No bruising or bleeding, normal skin color, texture, turgor, no redness, warmth, or swelling, no rashes, no lesions, no abnormal moles, nails normal without discoloration or clubbing and no jaundice.  Musculoskeletal: There is no redness, warmth, or swelling of the joints.  Full range of motion noted.  Motor strength is 5 out of 5 all extremities bilaterally.  Tone is normal.  Neurologic: Awake, alert, oriented to name, place and time.   Neuropsychiatric: Calm, normal eye contact, alert, normal affect, oriented to self, place, time and situation, memory for past and recent events intact and thought process normal.  No flowsheet data found.    All cultures:  Recent Labs   Lab 01/26/21  1515   CULT No growth       Data   I personally reviewed the chest CT image(s) showing resolved RUL consolidation/opacity, small bilateral  effusions.    Smitha Odonnell MD  Text page     Pulmonary will continue to follow. We are in house at [facility] on Monday, Wednesday, and Friday. For follow up questions on previously seen patients, please use text page link or page the on-call pulmonologist through [facility] or the .      Dr Odonnell [facility] Information    [facility]  Interventional Pulmonology and [facility]  [address] [place], [place]  Fax ?[phone]?  Phone [phone]    ---------------------------------------

## 2021-02-01 NOTE — PROGRESS NOTES
Patient tolerated bilateral thoracentesis well by Dr Ellison.  Right side has 600 ml and left had 900 mls of blood tinged fluid.  Samples collected from left side brought to lab for processing.  Bandages clean dry and intact at time of transfer via cart to nursing unit.  Report called to bedside RN.  Concepcion Multani, RN, BSN

## 2021-02-01 NOTE — PROGRESS NOTES
[facility]  Hospitalist Progress Note  Mil Eckert MD 02/01/2021    Reason for Stay (Diagnosis): resp failure, pleural effusion         Assessment and Plan:      Summary of Stay: Ирина Yanez is a 85 year old female with a PMHx of TERESSA on CPAP, type II DM, CKD stage IV, peripheral neuropathy, MDD/anxiety, HTN, RBBB, mild aortic stenosis, obesity, grade I diastolic dysfunction, atrial fibrillation, and chronic anemia  who was admitted on 1/26/2021 with acute hypoxic respiratory failure and shorntess of breath.      CXR on admission showed a moderate left pleural effusion and left base compressive atelectasis/infiltrate. Slightly improved in right base. IR consulted on 1/26/21 bilateral thoracentesis completed with 1,100 mL removed from the right and 800 mL from the left. Effusion was exudative. Previously transudate. Elevated  and ESR 89. Pleural fluid culture was negative. MARK weakly positive. RF >12. CCP 2. Triglyceride level/CHO/chylomicrons were not consistent chylothorax. 12/29/20 and 1/26/21 cytology negative for malignancy. TSH 1.45. Diuresis with IV lasix improved symptoms and patient transitioned to oral torsemide. Monitored weights and urine output. Low suspicion for infection. Procal 0.09. Recently treated for CAP. 1/31 worsening oxygen needs concern for pleural fluid accumulation. Repeat thoracentesis 2/1/21.      CT chest 1/27 showed new moderate pericardial effusion. Small bilateral pleural effusions, residual or recurrent. Overall improvement. Small pulmonary nodule. 1/28 limited echo showed small to trivial pericardial effusion. 1/29 CXR showed atelectasis of lateral right lung base.  Small to moderate left pleural effusion slightly improved.     Recently hospitalized 12/29/20 - 1/5/21 with similar complaint. Hypoxia, decompensated diastolic CHF, bilateral pleural effusions, FABY with CKD, newonset A. Fib with RVR. Thoracentesis transudative with negative cytology.  Evaluated by pulmonary and nephrology. Discharged to TCU with a plan to discharge 1/27/21 with home health services. Patient is doing well with therapy while hospitalized and will discharge with home health services.     Pt underwent another thoracentesis 2/1.  Pulmonary following and is planning on performing a PleurX catheter on 2/8/21.  I spoke with Dr. Odonnell today  Anticipate discharge home on 2/2/21     Acute hypoxic respiratory failure secondary to bilateral pleural effusions   CHFpEF with acute exacerbation  Small pericardial effusion   Mild aortic stenosis   -Cardiology consulted - appreciate assistance   -Pulmonary consulted - previously evaluated patient during hospitalization. Transudate effusion previously and now exudative, MARK weakly positive and RF >12. Defer to pulmonary if additional workup is indicated with rheumatology.   -Pending tests: pleural fluid culture, C3/C4  -Repeat thoracentesis 2/1 - cytology and fluid studies ordered. This will be the 3rd cytology exam for patient if negative low suspicion for malignancy.   -Titrate oxygen - goal >90%. Requiring 3-4 L; hoping to wean after another thoracentesis today  -Torsemide 40 mg BID (PTA torsemide 20 mg daily) - monitor response - adequate response to diuretics.   -Daily weights; Strict I/Os      Diabetes mellitus, controlled  -A1c 7.1 Jan 2021  -Insulin glargine 12 units at bedtime; medium resistance sliding scale insulin; CHO ratio 1:10 insulin - continue to adjust due to hyperglycemia  -POCT glucose ACHS; Hypoglycemia protocol      CKD stage IV: Baseline Cr. 2.8-3.2. On admission Cr 2.70. Stable. Monitor with diuresis. Remains stable.     TERESSA: Compliant - AVS mode, EPAP 4-6, PS 0-15, max pressure 25. Hypoxia at night with CPAP. Request respiratory evaluate settings if occurs again overnight/discuss with pulm on Monday.      Chronic anemia: Stable. Baseline hemoglobin around 8.      Elevated d-dimer: Unclear significant. Suspect associated  "with underlying inflammatory process. On chronic oral anticoagulation. Monitor.      Paroxysmal atrial fibrillation with RBBB/Hypertension: Apixaban 2.5 mg BID, diltiazem 180 mg BID. Hydralazine 25 mg TID. Carvedilol 3.125 mg BID started 1/31 to help prevent recurrent RVR episodes and BP control. Episodes of RVR for a few seconds but not sustained. Telemetry reviewed.      Incidental finding: Small pulmonary nodules measuring 3 mm in the right lower lobe. CT scan follow-up in 1 year.     MDD/Anxiety: Sertraline 50 mg daily     HLD: Simvastatin 10 mg daily      Neuropathy: Gabapentin 100 mg BID      Obesity BMI 34: Complicates cares      FEN: Oral hydration; monitor; regular/2gm Na  Activity: PT - Home RN/PT/OT  DVT Prophylaxis: Apixaban   DISPO:  Anticipating discharge tomorrow assuming able to wean down oxygen.  Likely will require home O2 which she says she has at home from last hospitalization            Interval History (Subjective):      Underwent thoracentesis earlier this AM.  Interested in speaking with pulmonologist again today.  On diuretics.  Remains on 4 L oxygen this AM.  Has oxygen tank at home from recent hospitalization                  Physical Exam:      Last Vital Signs:  /51 (BP Location: Right arm)   Pulse 67   Temp 98  F (36.7  C) (Oral)   Resp 20   Ht 1.626 m (5' 4\")   Wt 90.8 kg (200 lb 3.2 oz)   LMP  (LMP Unknown)   SpO2 94%   BMI 34.36 kg/m        Intake/Output Summary (Last 24 hours) at 2/1/2021 1402  Last data filed at 2/1/2021 0840  Gross per 24 hour   Intake 1830 ml   Output 500 ml   Net 1330 ml       Constitutional: Awake, alert, cooperative, no apparent distress   Respiratory: Clear to auscultation bilaterally, no crackles or wheezing   Cardiovascular: Regular rate and rhythm, normal S1 and S2, and no murmur noted   Abdomen: Normal bowel sounds, soft, non-distended, non-tender   Skin: No rashes, no cyanosis, dry to touch   Neuro: Alert and oriented x3, no weakness, " numbness, memory loss   Extremities: No edema, normal range of motion   Other(s):        All other systems: Negative          Medications:      All current medications were reviewed with changes reflected in problem list.         Data:      All new lab and imaging data was reviewed.   Labs:  Recent Labs   Lab 02/01/21  0748   WBC 11.6*   HGB 10.1*   HCT 33.9*   MCV 94         Imaging:   No results found for this or any previous visit (from the past 24 hour(s)).

## 2021-02-01 NOTE — PLAN OF CARE
1500H-2300H    Pertinent assessments: A&O, on room air, up with SBA, SoB with exertion.  & 267mg/dl. 4L O2 nasal canula - CPAP used for sleep. Tele SR 70s.     Major Shift Events: Uneventful     Treatment Plan: Repeat thoracentesis tomorrow, Titrate O2,  Strict I/Os, Pulmo consult     Bedside Nurse: Arslan Baca RN

## 2021-02-01 NOTE — PROCEDURES
[facility]    Procedure: Bialteral thoracentesis    Date/Time: 2/1/2021 9:34 AM  Performed by: Laureen Ellison MD  Authorized by: Laureen Ellison MD     UNIVERSAL PROTOCOL   Site Marked: Yes  Prior Images Obtained and Reviewed:  Yes  Required items: Required blood products, implants, devices and special equipment available    Patient identity confirmed:  Verbally with patient  Patient was reevaluated immediately before administering moderate or deep sedation or anesthesia  Confirmation Checklist:  Patient's identity using two indicators, procedure was appropriate and matched the consent or emergent situation, correct equipment/implants were available and relevant allergies  Time out: Immediately prior to the procedure a time out was called    Universal Protocol: the Joint Commission Universal Protocol was followed    Preparation: Patient was prepped and draped in usual sterile fashion        PROCEDURE   Patient Tolerance:  Patient tolerated the procedure well with no immediate complications  Describe Procedure: Bilateral thoracentesis completed  Fluid sent to lab  Length of time physician/provider present for 1:1 monitoring during sedation: 0

## 2021-02-01 NOTE — PLAN OF CARE
Pertinent assessments: A&O. VSS on 4 L CPAP overnight, switched to nasal cannula during day. 0200 B. On tele. No c/o SOB, has GARAY. Up SBA with walker.     Major Shift Events: Uneventful     Treatment Plan: Repeat thoracentesis tomorrow, Titrate O2,  Strict I/Os, Pulmo consult

## 2021-02-01 NOTE — PLAN OF CARE
Up with standby assist to chair and to bathroom. Thoracentesis done today, site clean, dry, intact. Gave tylenol for site pain. Appetite good. States she feels much better. Pulmonology consult.

## 2021-02-02 ENCOUNTER — PATIENT OUTREACH (OUTPATIENT)
Dept: ONCOLOGY | Facility: CLINIC | Age: 86
End: 2021-02-02

## 2021-02-02 ENCOUNTER — DOCUMENTATION ONLY (OUTPATIENT)
Dept: MEDSURG UNIT | Facility: CLINIC | Age: 86
End: 2021-02-02

## 2021-02-02 VITALS
WEIGHT: 198.4 LBS | RESPIRATION RATE: 18 BRPM | SYSTOLIC BLOOD PRESSURE: 121 MMHG | OXYGEN SATURATION: 95 % | HEIGHT: 64 IN | TEMPERATURE: 97.7 F | HEART RATE: 76 BPM | BODY MASS INDEX: 33.87 KG/M2 | DIASTOLIC BLOOD PRESSURE: 48 MMHG

## 2021-02-02 LAB
ANION GAP SERPL CALCULATED.3IONS-SCNC: 5 MMOL/L (ref 3–14)
BUN SERPL-MCNC: 78 MG/DL (ref 7–30)
CALCIUM SERPL-MCNC: 8.5 MG/DL (ref 8.5–10.1)
CHLORIDE SERPL-SCNC: 102 MMOL/L (ref 94–109)
CO2 SERPL-SCNC: 33 MMOL/L (ref 20–32)
COPATH REPORT: NORMAL
CREAT SERPL-MCNC: 2.65 MG/DL (ref 0.52–1.04)
GFR SERPL CREATININE-BSD FRML MDRD: 16 ML/MIN/{1.73_M2}
GLUCOSE BLDC GLUCOMTR-MCNC: 138 MG/DL (ref 70–99)
GLUCOSE BLDC GLUCOMTR-MCNC: 175 MG/DL (ref 70–99)
GLUCOSE BLDC GLUCOMTR-MCNC: 225 MG/DL (ref 70–99)
GLUCOSE SERPL-MCNC: 154 MG/DL (ref 70–99)
POTASSIUM SERPL-SCNC: 3.9 MMOL/L (ref 3.4–5.3)
SODIUM SERPL-SCNC: 139 MMOL/L (ref 133–144)

## 2021-02-02 PROCEDURE — 999N001017 HC STATISTIC GLUCOSE BY METER IP

## 2021-02-02 PROCEDURE — 99239 HOSP IP/OBS DSCHRG MGMT >30: CPT | Performed by: INTERNAL MEDICINE

## 2021-02-02 PROCEDURE — 80048 BASIC METABOLIC PNL TOTAL CA: CPT | Performed by: INTERNAL MEDICINE

## 2021-02-02 PROCEDURE — 36415 COLL VENOUS BLD VENIPUNCTURE: CPT | Performed by: INTERNAL MEDICINE

## 2021-02-02 PROCEDURE — 250N000013 HC RX MED GY IP 250 OP 250 PS 637: Performed by: INTERNAL MEDICINE

## 2021-02-02 RX ORDER — HYDRALAZINE HYDROCHLORIDE 25 MG/1
25 TABLET, FILM COATED ORAL 3 TIMES DAILY
Qty: 90 TABLET | Refills: 1 | Status: SHIPPED | OUTPATIENT
Start: 2021-02-02 | End: 2021-03-25

## 2021-02-02 RX ORDER — CARVEDILOL 3.12 MG/1
3.12 TABLET ORAL 2 TIMES DAILY WITH MEALS
Qty: 60 TABLET | Refills: 1 | Status: SHIPPED | OUTPATIENT
Start: 2021-02-02 | End: 2021-03-25

## 2021-02-02 RX ORDER — TORSEMIDE 20 MG/1
40 TABLET ORAL DAILY
Qty: 60 TABLET | Refills: 1 | Status: SHIPPED | OUTPATIENT
Start: 2021-02-02 | End: 2021-11-29

## 2021-02-02 RX ADMIN — SERTRALINE HYDROCHLORIDE 50 MG: 50 TABLET ORAL at 08:51

## 2021-02-02 RX ADMIN — CALCIUM ACETATE 667 MG: 667 CAPSULE ORAL at 08:51

## 2021-02-02 RX ADMIN — CARVEDILOL 3.12 MG: 3.12 TABLET, FILM COATED ORAL at 08:51

## 2021-02-02 RX ADMIN — HYDRALAZINE HYDROCHLORIDE 25 MG: 25 TABLET, FILM COATED ORAL at 08:51

## 2021-02-02 RX ADMIN — TORSEMIDE 40 MG: 20 TABLET ORAL at 08:51

## 2021-02-02 RX ADMIN — DILTIAZEM HYDROCHLORIDE 180 MG: 180 CAPSULE, COATED, EXTENDED RELEASE ORAL at 08:51

## 2021-02-02 RX ADMIN — APIXABAN 2.5 MG: 2.5 TABLET, FILM COATED ORAL at 08:51

## 2021-02-02 RX ADMIN — GABAPENTIN 100 MG: 100 CAPSULE ORAL at 08:51

## 2021-02-02 ASSESSMENT — ACTIVITIES OF DAILY LIVING (ADL)
ADLS_ACUITY_SCORE: 15

## 2021-02-02 ASSESSMENT — MIFFLIN-ST. JEOR: SCORE: 1339.94

## 2021-02-02 NOTE — DISCHARGE SUMMARY
Admit Date:     01/26/2021   Discharge Date:     02/02/2021      PRINCIPAL FINAL DIAGNOSES:   1.  Acute hypoxic respiratory failure secondary to recurrent bilateral pleural effusions.   2.  Acute diastolic congestive heart failure exacerbation.   3.  Diabetes mellitus.   4.  Chronic kidney disease, with a creatinine near 3 at baseline.   5.  Obstructive sleep apnea, on nocturnal continuous positive airway pressure.   6.  Chronic anemia, baseline hemoglobin near 8.   7.  Paroxysmal atrial fibrillation, on Eliquis for anticoagulation.   8.  Anxiety.   9.  Hyperlipidemia.   10.  Neuropathy.   11.  Mild aortic stenosis.   12.  Peripheral neuropathy.   13.  Right bundle branch block.        PRINCIPAL PROCEDURES THIS ADMISSION:   1.  Thoracentesis.   2.  Echocardiogram.   3.  Chest CT scan.   4.  Pulmonary consultation.   5.  Diuresis.   6.  Cultures of pleural fluid that were negative for infection.      REASON FOR ADMISSION:  Please see dictated history and physical.        In brief, Ms. Yanez is a pleasant 85-year-old female with the above medical history who presented to the hospital with concerns about shortness of breath.  Please see dictated history and physical for details.      HOSPITAL COURSE:  Pleural effusions:  The patient's acute hypoxic respiratory failure appeared to be due to pleural effusions.  The patient underwent 2 ultrasound-guided thoracenteses this admission.  Given her history of recurrent pleural effusions, Pulmonary was consulted.  It is thought that diastolic congestive heart failure is likely contributing to her pleural effusions in the setting of underlying atrial fibrillation.  Pulmonary is recommending a PleurX catheter placement next week as an outpatient, given recurrent pleural effusions.  This is being arranged by Dr. Odonnell of Pulmonary, who evaluated the patient this admission.      Plan is for discharge to home today with outpatient pulmonary followup for placement of PleurX  catheter next week.      DISCHARGE MEDICATIONS:   1.  Acetaminophen as needed for pain.   2.  Albuterol as needed for shortness of breath.   3.  Eliquis 2.5 mg twice a day.   4.  Calcium acetate 667 mg 3 times a day.   5.  Carvedilol 3.125 mg twice a day.   6.  Diltiazem long-acting 180 mg twice a day.   7.  Fish oil.   8.  Gabapentin 100 mg twice a day.   9.  Hydralazine 25 mg 3 times a day.   10.  Multivitamin daily.   11.  MiraLax 17 grams daily as needed.   12.  Zoloft 50 mg daily.   13.  Simvastatin 10 mg at bedtime.   14.  Torsemide 40 mg daily, dose increased from 20 mg daily previously.      FOLLOWUP INSTRUCTIONS:   1.  Bilateral PleurX catheter placement scheduled for 02/08/2021 at 11:00 a.m.  She was seen by Dr. Odonnell of Pulmonology this admission, who is arranging this procedure.   2.  Follow-up with your primary MD in 5-7 days.  Given medication changes this admission, recommend recheck labs at that visit, including electrolytes, potassium, and kidney function.  Creatinine is 2.6 on discharge from the hospital.   3.  Do not take Eliquis on Sunday, 02/07/2021, and Monday, 02/08/2021, prior to catheter placement.   4.  Discuss diabetic management with primary MD at next clinic visit.  The patient was using insulin at the rehabilitation center recently as well as this hospitalization, but is declining insulin therapy on discharge from the hospital.  Can follow-up with primary MD regarding further diabetic management.  She does take glipizide at home.  She had previously been on metformin, but was advised to stop this medication given her renal disease.      On discharge from the hospital, patient is being prescribed oxygen 1 liter via nasal cannula with activity.      On discharge, home OT, home PT, home health nurse were ordered.      I examined the patient on day of discharge.  I spent greater than 30 minutes helping to coordinate this patient's discharge from the hospital today.         MINGO BARNES  MD JOSE             D: 2021   T: 2021   MT: TANAY      Name:     BEATA TRIPLETT   MRN:      [id number]        Account:        [id number]   :      1935           Admit Date:     2021                                  Discharge Date: 2021      Document: [id number]

## 2021-02-02 NOTE — PROGRESS NOTES
Care Management Discharge Note    Discharge Date: 02/02/21       Discharge Disposition:  Home w/    Discharge Services:  RN/PT/OT    Discharge DME:  Home 02 portability 1 L NC     Discharge Transportation:      Education Provided on the Discharge Plan:  yes  Persons Notified of Discharge Plans: Pt, [facility] and [facility]  Patient/Family in Agreement with the Plan:  yes      Additional Information:  Pt can discharge today.  [facility] notified.  Pt has 1 L NC at bedtime.  She doesn't have portability. Pt actually does have portability .I called [facility] and they will deliver a portable tank and give her education on portability.      Le Wyman RN BSN   Inpatient Care Coordination  [facility]  [phone]    Meghan Wyman, RN

## 2021-02-02 NOTE — PROGRESS NOTES
Discharge instructions given, iv removed, filled given for home use. Patient to contact primary provider for instructions on resuming oral diabetic medication. Patient wheeled down to vehicle by staff.

## 2021-02-02 NOTE — PROGRESS NOTES
Patient already has portable oxygen at home, that is appropriate for her LPM. No one is able to bring the unit to the hospital, so we will deliver a tank to the hospital for discharge. We will also do re-education with the patient at bedside so they know how to use our equipment once home.   Told Le we should have transport oxygen there within the hour.

## 2021-02-02 NOTE — PROGRESS NOTES
Pt seen and examined.  Feels well and ready for discharge today.      Discharge home today    Addendum:  Oxygen Documentation:   I certify that this patient, Ирина Yanez has been under my care (or a nurse practitioner or physican's assistant working with me). This is the face-to-face encounter for oxygen medical necessity.      Ирина Yanez is now in a chronic stable state and continues to require supplemental oxygen. Patient has continued oxygen desaturation due to pleural effusion.    Alternative treatment(s) tried or considered and deemed clinically infective for treatment of pleural effusion include diuretics.  If portability is ordered, is the patient mobile within the home? yes    **Patients who qualify for home O2 coverage under the CMS guidelines require ABG tests or O2 sat readings obtained closest to, but no earlier than 2 days prior to the discharge, as evidence of the need for home oxygen therapy. Testing must be performed while patient is in the chronic stable state. See notes for O2 sats.**

## 2021-02-02 NOTE — PLAN OF CARE
Physical Therapy Discharge Summary    Reason for therapy discharge:    Discharged to home with home therapy.    Progress towards therapy goal(s). See goals on Care Plan in [facility] electronic health record for goal details.  Goals partially met.  Barriers to achieving goals:   discharge from facility.  Pt met stair goal and is close to meeting bed mobility, transfer and gait goals.    Therapy recommendation(s):    Continued therapy is recommended.  Rationale/Recommendations:  Home PT/OT/RN .

## 2021-02-02 NOTE — PROGRESS NOTES
Genevieve, home care liaison, called from [facility] home care reporting pt is discharging home from the hospital today and getting set up with home care. She is scheduled for bilateral pleurX placement with Dr. Odonnell on 2/8/21.    Genevieve is wondering if coverage could be checked for supplies and where supplies would be coming from.     Writer will contact the PleurX Patient navigator at [phone] and will keep Genevieve updated.     Bisi Moffett, RN, BSN, MARIA TERESA  RN Care Coordinator  [facility]  [phone]

## 2021-02-02 NOTE — PROGRESS NOTES
[facility]  Spoke with pt to discuss plans for HC.  Pt to be discharged home today and has agreed to have [facility] follow with services of SN, PT and OT. Patient care support center processing referral.  Pt verbalized understanding that initial visit is scheduled for 2/3 or 2/4/2021.  Pt has 24 hour phone number for [facility] for any questions or concerns provided on AVS.   Pt has multiple pending appointments, requested CC clarify upcoming appointments with pt.

## 2021-02-02 NOTE — DISCHARGE INSTRUCTIONS
Your home care referral was sent to [facility]  If you haven't heard from them within the next 24-48 hours,  Please call them at [phone]    [address] [phone]

## 2021-02-02 NOTE — PLAN OF CARE
Pertinent assessments: A&Ox4. BiPAP on with 4 L of O2 infused. B. Tele: AFIB. Thoracentesis site dressings CDI.  Major Shift Events: Uneventful.  Treatment Plan: Wean O2 as able. Strict I & O's. Pulmonology & PT following. Bilateral PleurX cath placement scheduled for .

## 2021-02-02 NOTE — PROGRESS NOTES
Patient has been assessed for Home Oxygen needs. Oxygen readings:    *Pulse oximetry (SpO2) = 90% on room air at rest while awake.        *SpO2 = 87% on room air during activity/with exercise.    *SpO2 improved to *89% on  1liters/minute during activity/with exercise.

## 2021-02-03 ENCOUNTER — PATIENT OUTREACH (OUTPATIENT)
Dept: NURSING | Facility: CLINIC | Age: 86
End: 2021-02-03
Payer: COMMERCIAL

## 2021-02-03 LAB — CHYLO FLD QL: NORMAL

## 2021-02-03 RX ORDER — CALCIUM ACETATE 667 MG/1
667 CAPSULE ORAL
Qty: 90 CAPSULE | Refills: 3 | Status: SHIPPED | OUTPATIENT
Start: 2021-02-03 | End: 2021-05-19

## 2021-02-03 NOTE — LETTER
[place] CARE COORDINATION  [facility]  February 3, 2021        Ирина Yanez  [address] [place] [address]          Dear Ирина,     Attached is an updated Complex Care Plan for your continued enrollment in Care Coordination. Please let us know if you have additional questions, concerns, or goals that we can assist with.    Sincerely,    Sandra Savage, [facility]  Complex Care Plan  About Me:    Patient Name:  Ирина Yanez    YOB: 1935  Age:         85 year old   [place] MRN:    [id number] Telephone Information:  Home Phone [phone]   Mobile [phone]       Address:  [address] [place] [address] Email address:  [email or web address]      Emergency Contact(s)    Name Relationship Lgl Grd Work Phone Home Phone Mobile Phone   1. BETTYE YANEZ  Spouse   [phone]    2. BRAYDON Son    [phone]           Primary language:  English     needed? No   [place] [employer]:  [phone] op. 1  Other communication barriers: None  Preferred Method of Communication:  Dinorah  Current living arrangement: I live in a private home with spouse  Mobility Status/ Medical Equipment: Independent w/Device    Health Maintenance  Health Maintenance Reviewed: Due/Overdue   HF ACTION PLAN  URINE DRUG SCREEN  COVID-19 Vaccine (1 of 2)  ZOSTER IMMUNIZATION     My Access Plan  Medical Emergency 911   Primary Clinic Line [facility] - [phone]   24 Hour Appointment Line [phone] or  [phone]) (toll-free)   24 Hour Nurse Line [phone] (toll-free)   Preferred Urgent Care     Preferred Hospital     Preferred Pharmacy [facility] [place]     Behavioral Health Crisis Line The National Suicide Prevention Lifeline at 1-763.191.7335 or 911             My Care Team Members  Patient Care Team       Relationship Specialty Notifications Start End    Yuridia Villarreal,  MD PCP - General Family Practice  4/29/10     Phone: [phone] Fax: [phone]         [address] [place] [address]    Yuridia Villarreal MD Assigned PCP   1/26/20     Phone: [phone] Fax: [phone]         [address] [place] [address]    Reji Verde MD Assigned Endocrinology Provider   10/23/20     Phone: [phone] Fax: [phone]         [address] [place] [address]    Henrik Beasley MD Assigned Heart and Vascular Provider   11/22/20     Phone: [phone] Fax: [phone]         [address] [place] [address]    [facility] [place] [facility]  12/22/20     Phone: [phone]         Abeba Christensen RN Lead Care Coordinator  Admissions 12/28/20     Phone: [phone]         Bisi Moffett RN Specialty Care Coordinator Nurse Admissions 2/3/21     Sandra Savage LSW Clinic Care Coordinator Primary Care -   2/3/21     Phone: [phone]                 My Care Plans  Self Management and Treatment Plan  Goals and (Comments)  Goals        General    1. Improve chronic symptoms (pt-stated)     Notes - Note edited  2/3/2021  3:18 PM by Sandra Savage LSW    Goal Statement: I would like support in managing my chronic health conditions to maintain my health and wellness and prevent readmission to the hospital.   Date Goal set:  Updated on 2/3/21  Barriers: weak due to chronic health conditions.   Strengths: motivated, engaged in care coordination, home care therapies.   Date to Achieve By: 06/2021  Patient expressed understanding of goal: yes  Action steps to achieve this goal:  1. I will follow up with my providers as scheduled/recommended. (Primary Care Provider , CT 02/05, Pulmonology  etc.)  2. I will take my medications as prescribed.   3. I will contact my care team to report questions, concerns, support needs. 24/7 after hours services available.   4. Care Coordinator will collaborate with care team as  needed.                Action Plans on File:            Depression          Advance Care Plans/Directives Type:        My Medical and Care Information  Problem List   Patient Active Problem List   Diagnosis     Diabetic polyneuropathy (H)     Hyperlipidemia with target LDL less than 100     Hypertension goal BP (blood pressure) < 140/90     Arthritis     Anxiety     Type 2 diabetes mellitus with diabetic nephropathy (H)     Health Care Home     Malignant melanoma of skin     Vulvar dystrophy     Lichen sclerosus et atrophicus     Vitamin B12 deficiency (non anemic)     Controlled substance agreement signed 2/5/15     Major depression in partial remission (H)     CKD (chronic kidney disease) stage 4, GFR 15-29 ml/min (H)     Basal cell carcinoma of skin      Chronic pain - diabetic neuropathy     Complex sleep apnea syndrome     Tendon rupture, traumatic. quadriceps     Right bundle branch block     S/P lumbar fusion     ACP (advance care planning)     Heart murmur     Aortic sclerosis     Toe amputation status, left     Elevated BMI with comorbidity (H)     Falls frequently     Abrasion of arm, right, initial encounter     Aortic valve stenosis, etiology of cardiac valve disease unspecified     Ascending aorta dilatation (H)     Lung mass     Chest tightness     Dyspnea on exertion     Iron deficiency anemia     Hypoxemia     CHF (congestive heart failure) (H)     Dyspnea     Acute on chronic kidney failure (H)     Paroxysmal atrial fibrillation (H)     Pleural effusion on left     Acute respiratory failure with hypoxia (H)      Current Medications and Allergies:  See printed Medication Report.    Care Coordination Start Date: 12/28/2020   Frequency of Care Coordination: monthly   Form Last Updated: 02/03/2021

## 2021-02-03 NOTE — PROGRESS NOTES
Writer called [employer] patient navigator and spoke with Monisha. She reports normally once the PleurX catheters are placed they come with a detailed order form for the provider to complete. The order form is faxed to [employer] and they will run the insurance and do a benefit check. They will be able look up copay and set up delivery.    If the supplies are not covered through [employer] because of the patient's insurance then the patient's information is sent back to  so they can look up other suppliers.     Monisha gave lisseth the [employer] phone number [phone], option 1 for the PleurX team.

## 2021-02-03 NOTE — PROGRESS NOTES
Writer called [facility] and left a message for Carmella Delgadillo - patient coordinator to return call.     Writer called to see if patient education can be done in the home prior to PleurX placement and discuss insurance coverage of PleurX supplies.     Bisi Moffett, RN, BSN, MARIA TERESA  RN Care Coordinator  [facility]  [phone]

## 2021-02-04 ENCOUNTER — PATIENT OUTREACH (OUTPATIENT)
Dept: ONCOLOGY | Facility: CLINIC | Age: 86
End: 2021-02-04

## 2021-02-04 DIAGNOSIS — Z11.59 ENCOUNTER FOR SCREENING FOR OTHER VIRAL DISEASES: ICD-10-CM

## 2021-02-04 LAB
SARS-COV-2 RNA RESP QL NAA+PROBE: NORMAL
SPECIMEN SOURCE: NORMAL

## 2021-02-04 PROCEDURE — U0005 INFEC AGEN DETEC AMPLI PROBE: HCPCS | Performed by: INTERNAL MEDICINE

## 2021-02-04 PROCEDURE — U0003 INFECTIOUS AGENT DETECTION BY NUCLEIC ACID (DNA OR RNA); SEVERE ACUTE RESPIRATORY SYNDROME CORONAVIRUS 2 (SARS-COV-2) (CORONAVIRUS DISEASE [COVID-19]), AMPLIFIED PROBE TECHNIQUE, MAKING USE OF HIGH THROUGHPUT TECHNOLOGIES AS DESCRIBED BY CMS-2020-01-R: HCPCS | Performed by: INTERNAL MEDICINE

## 2021-02-04 NOTE — PROGRESS NOTES
Writer called pt to introduce myself and discuss her procedure scheduled for Monday, 2/8/21 with Dr. Odonnell.     Per Dr. Odonnell the time has changed to 7:30 am on Monday.     There was no answer. Left voicemail to return call.     Bisi Moffett, RN, BSN, MIRIAMM  RN Care Coordinator  [facility]  [phone]

## 2021-02-05 ENCOUNTER — TELEPHONE (OUTPATIENT)
Dept: FAMILY MEDICINE | Facility: CLINIC | Age: 86
End: 2021-02-05

## 2021-02-05 ENCOUNTER — HOSPITAL ENCOUNTER (OUTPATIENT)
Dept: CT IMAGING | Facility: CLINIC | Age: 86
Discharge: HOME | End: 2021-02-05
Attending: INTERNAL MEDICINE | Admitting: INTERNAL MEDICINE
Payer: COMMERCIAL

## 2021-02-05 DIAGNOSIS — R91.8 LUNG MASS: ICD-10-CM

## 2021-02-05 LAB
LABORATORY COMMENT REPORT: NORMAL
SARS-COV-2 RNA RESP QL NAA+PROBE: NEGATIVE
SPECIMEN SOURCE: NORMAL

## 2021-02-05 PROCEDURE — 71250 CT THORAX DX C-: CPT

## 2021-02-05 NOTE — PROGRESS NOTES
Genevieve called from [facility] following up to see if any coverage has been found for the PleurX supplies. Writer explained that I typically do not check insurance coverage. I was informed that coverage is checked after the initial prescription is sent. Genevieve verbalized understanding. Will connect with Genevieve again on Monday, 2/8.    Bisi Moffett, RN, BSN, MARIA TERESA  RN Care Coordinator  [facility]  [phone]

## 2021-02-05 NOTE — PROGRESS NOTES
Writer called again, attempt #2. The call went straight to voicemail on home phone number. Writer left a voicemail on home phone number and mobil phone number.     Writer explained her bronch time has been moved to 7:30 am on Monday and she needs to arrive to the hospital at approx 6 am.     Marcelar requested a call back at [phone].     Bisi Moffett, RN, BSN, MARIA TERESA  RN Care Coordinator  [facility]  [phone]

## 2021-02-05 NOTE — TELEPHONE ENCOUNTER
Call received from [facility]. States patient's O2 sats are 86% with activity. Patient recovers to 91% with rest. Left lung sounds are diminished, right lung sounds have scattered rhonchi. Blood pressure 120/50 P 76. R 16, 22 with activity. Patient has scheduled plurex on Monday. Patient was advised to call if increased distress or sob.    Requesting orders for skilled nursing 2x/week for 2 weeks, 1x/week for 2 weeks and 4 prn visits and physical therapy/OT to eval and treat.    Next 5 appointments (look out 90 days)    Feb 09, 2021  2:20 PM  Office Visit with Yuridia Villarreal MD  [facility] ([facility] ) [address] [facility] [address] [phone]        If order is approved please forward approval to Claudia Pascual as she is working all weekend. If orders are approved she would like to check on patient again this weekend.

## 2021-02-05 NOTE — PROGRESS NOTES
Per Dr. Odonnell, pt was notified of new OR start time and reminded to hold her anticoagulation.     Writer will attempt to follow-up with the patient after the bronch.     Bisi Moffett, RN, BSN, MARIA TERESA  RN Care Coordinator  [facility]  [phone]

## 2021-02-06 LAB
BACTERIA SPEC CULT: NO GROWTH
SPECIMEN SOURCE: NORMAL

## 2021-02-06 RX ORDER — CEFAZOLIN SODIUM 2 G/100ML
2 INJECTION, SOLUTION INTRAVENOUS
Status: CANCELLED | OUTPATIENT
Start: 2021-02-06

## 2021-02-06 RX ORDER — KETOROLAC TROMETHAMINE 15 MG/ML
15 INJECTION, SOLUTION INTRAMUSCULAR; INTRAVENOUS ONCE
Status: CANCELLED | OUTPATIENT
Start: 2021-02-06 | End: 2021-02-06

## 2021-02-06 RX ORDER — CEFAZOLIN SODIUM 1 G/3ML
1 INJECTION, POWDER, FOR SOLUTION INTRAMUSCULAR; INTRAVENOUS SEE ADMIN INSTRUCTIONS
Status: CANCELLED | OUTPATIENT
Start: 2021-02-06

## 2021-02-08 ENCOUNTER — ANESTHESIA EVENT (OUTPATIENT)
Dept: SURGERY | Facility: CLINIC | Age: 86
End: 2021-02-08
Payer: COMMERCIAL

## 2021-02-08 ENCOUNTER — HOSPITAL ENCOUNTER (OUTPATIENT)
Facility: CLINIC | Age: 86
Discharge: HOME | End: 2021-02-08
Attending: INTERNAL MEDICINE | Admitting: INTERNAL MEDICINE
Payer: COMMERCIAL

## 2021-02-08 ENCOUNTER — APPOINTMENT (OUTPATIENT)
Dept: GENERAL RADIOLOGY | Facility: CLINIC | Age: 86
End: 2021-02-08
Attending: INTERNAL MEDICINE
Payer: COMMERCIAL

## 2021-02-08 ENCOUNTER — APPOINTMENT (OUTPATIENT)
Dept: ULTRASOUND IMAGING | Facility: CLINIC | Age: 86
End: 2021-02-08
Attending: RADIOLOGY
Payer: COMMERCIAL

## 2021-02-08 ENCOUNTER — ANESTHESIA (OUTPATIENT)
Dept: SURGERY | Facility: CLINIC | Age: 86
End: 2021-02-08
Payer: COMMERCIAL

## 2021-02-08 VITALS
HEIGHT: 64 IN | RESPIRATION RATE: 16 BRPM | DIASTOLIC BLOOD PRESSURE: 60 MMHG | TEMPERATURE: 97.5 F | SYSTOLIC BLOOD PRESSURE: 131 MMHG | OXYGEN SATURATION: 94 % | BODY MASS INDEX: 34.31 KG/M2 | WEIGHT: 201 LBS | HEART RATE: 80 BPM

## 2021-02-08 DIAGNOSIS — J90 PLEURAL EFFUSION ON LEFT: ICD-10-CM

## 2021-02-08 LAB — GLUCOSE BLDC GLUCOMTR-MCNC: 154 MG/DL (ref 70–99)

## 2021-02-08 PROCEDURE — C1729 CATH, DRAINAGE: HCPCS

## 2021-02-08 PROCEDURE — 250N000011 HC RX IP 250 OP 636

## 2021-02-08 PROCEDURE — 999N000141 HC STATISTIC PRE-PROCEDURE NURSING ASSESSMENT: Performed by: INTERNAL MEDICINE

## 2021-02-08 PROCEDURE — 250N000011 HC RX IP 250 OP 636: Performed by: NURSE ANESTHETIST, CERTIFIED REGISTERED

## 2021-02-08 PROCEDURE — 250N000011 HC RX IP 250 OP 636: Performed by: INTERNAL MEDICINE

## 2021-02-08 PROCEDURE — 360N000077 HC SURGERY LEVEL 4, PER MIN: Performed by: INTERNAL MEDICINE

## 2021-02-08 PROCEDURE — 710N000012 HC RECOVERY PHASE 2, PER MINUTE: Performed by: INTERNAL MEDICINE

## 2021-02-08 PROCEDURE — 250N000009 HC RX 250: Performed by: NURSE ANESTHETIST, CERTIFIED REGISTERED

## 2021-02-08 PROCEDURE — C1729 CATH, DRAINAGE: HCPCS | Performed by: INTERNAL MEDICINE

## 2021-02-08 PROCEDURE — 258N000003 HC RX IP 258 OP 636: Performed by: ANESTHESIOLOGY

## 2021-02-08 PROCEDURE — 32550 INSERT PLEURAL CATH: CPT | Performed by: INTERNAL MEDICINE

## 2021-02-08 PROCEDURE — 258N000003 HC RX IP 258 OP 636: Performed by: NURSE ANESTHETIST, CERTIFIED REGISTERED

## 2021-02-08 PROCEDURE — 250N000009 HC RX 250: Performed by: INTERNAL MEDICINE

## 2021-02-08 PROCEDURE — 999N001017 HC STATISTIC GLUCOSE BY METER IP

## 2021-02-08 PROCEDURE — 999N000063 XR CHEST PORT 1 VW

## 2021-02-08 PROCEDURE — 250N000009 HC RX 250

## 2021-02-08 PROCEDURE — 370N000017 HC ANESTHESIA TECHNICAL FEE, PER MIN: Performed by: INTERNAL MEDICINE

## 2021-02-08 RX ORDER — KETOROLAC TROMETHAMINE 30 MG/ML
15 INJECTION, SOLUTION INTRAMUSCULAR; INTRAVENOUS ONCE
Status: DISCONTINUED | OUTPATIENT
Start: 2021-02-08 | End: 2021-02-08 | Stop reason: HOSPADM

## 2021-02-08 RX ORDER — CEFAZOLIN SODIUM 2 G/100ML
2 INJECTION, SOLUTION INTRAVENOUS
Status: COMPLETED | OUTPATIENT
Start: 2021-02-08 | End: 2021-02-08

## 2021-02-08 RX ORDER — NALOXONE HYDROCHLORIDE 0.4 MG/ML
0.2 INJECTION, SOLUTION INTRAMUSCULAR; INTRAVENOUS; SUBCUTANEOUS
Status: DISCONTINUED | OUTPATIENT
Start: 2021-02-08 | End: 2021-02-08 | Stop reason: HOSPADM

## 2021-02-08 RX ORDER — FENTANYL CITRATE 50 UG/ML
25-50 INJECTION, SOLUTION INTRAMUSCULAR; INTRAVENOUS
Status: DISCONTINUED | OUTPATIENT
Start: 2021-02-08 | End: 2021-02-08 | Stop reason: HOSPADM

## 2021-02-08 RX ORDER — ONDANSETRON 4 MG/1
4 TABLET, ORALLY DISINTEGRATING ORAL EVERY 30 MIN PRN
Status: DISCONTINUED | OUTPATIENT
Start: 2021-02-08 | End: 2021-02-08 | Stop reason: HOSPADM

## 2021-02-08 RX ORDER — GLYCOPYRROLATE 0.2 MG/ML
INJECTION, SOLUTION INTRAMUSCULAR; INTRAVENOUS PRN
Status: DISCONTINUED | OUTPATIENT
Start: 2021-02-08 | End: 2021-02-08

## 2021-02-08 RX ORDER — KETAMINE HYDROCHLORIDE 10 MG/ML
INJECTION, SOLUTION INTRAMUSCULAR; INTRAVENOUS PRN
Status: DISCONTINUED | OUTPATIENT
Start: 2021-02-08 | End: 2021-02-08

## 2021-02-08 RX ORDER — ONDANSETRON 2 MG/ML
INJECTION INTRAMUSCULAR; INTRAVENOUS PRN
Status: DISCONTINUED | OUTPATIENT
Start: 2021-02-08 | End: 2021-02-08

## 2021-02-08 RX ORDER — LABETALOL 20 MG/4 ML (5 MG/ML) INTRAVENOUS SYRINGE
10
Status: DISCONTINUED | OUTPATIENT
Start: 2021-02-08 | End: 2021-02-08 | Stop reason: HOSPADM

## 2021-02-08 RX ORDER — ONDANSETRON 2 MG/ML
4 INJECTION INTRAMUSCULAR; INTRAVENOUS EVERY 30 MIN PRN
Status: DISCONTINUED | OUTPATIENT
Start: 2021-02-08 | End: 2021-02-08 | Stop reason: HOSPADM

## 2021-02-08 RX ORDER — FLUMAZENIL 0.1 MG/ML
0.2 INJECTION, SOLUTION INTRAVENOUS
Status: DISCONTINUED | OUTPATIENT
Start: 2021-02-08 | End: 2021-02-08 | Stop reason: HOSPADM

## 2021-02-08 RX ORDER — MEPERIDINE HYDROCHLORIDE 25 MG/ML
12.5 INJECTION INTRAMUSCULAR; INTRAVENOUS; SUBCUTANEOUS
Status: DISCONTINUED | OUTPATIENT
Start: 2021-02-08 | End: 2021-02-08 | Stop reason: HOSPADM

## 2021-02-08 RX ORDER — SODIUM CHLORIDE, SODIUM LACTATE, POTASSIUM CHLORIDE, CALCIUM CHLORIDE 600; 310; 30; 20 MG/100ML; MG/100ML; MG/100ML; MG/100ML
INJECTION, SOLUTION INTRAVENOUS CONTINUOUS
Status: DISCONTINUED | OUTPATIENT
Start: 2021-02-08 | End: 2021-02-08 | Stop reason: HOSPADM

## 2021-02-08 RX ORDER — NALOXONE HYDROCHLORIDE 0.4 MG/ML
0.4 INJECTION, SOLUTION INTRAMUSCULAR; INTRAVENOUS; SUBCUTANEOUS
Status: DISCONTINUED | OUTPATIENT
Start: 2021-02-08 | End: 2021-02-08 | Stop reason: HOSPADM

## 2021-02-08 RX ORDER — FENTANYL CITRATE 50 UG/ML
INJECTION, SOLUTION INTRAMUSCULAR; INTRAVENOUS
Status: COMPLETED
Start: 2021-02-08 | End: 2021-02-08

## 2021-02-08 RX ORDER — CEFAZOLIN SODIUM 1 G/3ML
1 INJECTION, POWDER, FOR SOLUTION INTRAMUSCULAR; INTRAVENOUS SEE ADMIN INSTRUCTIONS
Status: DISCONTINUED | OUTPATIENT
Start: 2021-02-08 | End: 2021-02-08 | Stop reason: HOSPADM

## 2021-02-08 RX ORDER — LIDOCAINE HYDROCHLORIDE 10 MG/ML
INJECTION, SOLUTION EPIDURAL; INFILTRATION; INTRACAUDAL; PERINEURAL PRN
Status: DISCONTINUED | OUTPATIENT
Start: 2021-02-08 | End: 2021-02-08 | Stop reason: HOSPADM

## 2021-02-08 RX ORDER — PROPOFOL 10 MG/ML
INJECTION, EMULSION INTRAVENOUS PRN
Status: DISCONTINUED | OUTPATIENT
Start: 2021-02-08 | End: 2021-02-08

## 2021-02-08 RX ORDER — LIDOCAINE HYDROCHLORIDE 10 MG/ML
INJECTION, SOLUTION INFILTRATION; PERINEURAL
Status: COMPLETED
Start: 2021-02-08 | End: 2021-02-08

## 2021-02-08 RX ORDER — FENTANYL CITRATE 50 UG/ML
25-50 INJECTION, SOLUTION INTRAMUSCULAR; INTRAVENOUS EVERY 5 MIN PRN
Status: DISCONTINUED | OUTPATIENT
Start: 2021-02-08 | End: 2021-02-08 | Stop reason: HOSPADM

## 2021-02-08 RX ORDER — DEXAMETHASONE SODIUM PHOSPHATE 4 MG/ML
INJECTION, SOLUTION INTRA-ARTICULAR; INTRALESIONAL; INTRAMUSCULAR; INTRAVENOUS; SOFT TISSUE PRN
Status: DISCONTINUED | OUTPATIENT
Start: 2021-02-08 | End: 2021-02-08

## 2021-02-08 RX ORDER — LIDOCAINE 40 MG/G
CREAM TOPICAL
Status: DISCONTINUED | OUTPATIENT
Start: 2021-02-08 | End: 2021-02-08 | Stop reason: HOSPADM

## 2021-02-08 RX ADMIN — CEFAZOLIN SODIUM 2 G: 2 INJECTION, SOLUTION INTRAVENOUS at 07:38

## 2021-02-08 RX ADMIN — LIDOCAINE HYDROCHLORIDE 15 ML: 10 INJECTION, SOLUTION EPIDURAL; INFILTRATION; INTRACAUDAL; PERINEURAL at 15:05

## 2021-02-08 RX ADMIN — PROPOFOL 10 MG: 10 INJECTION, EMULSION INTRAVENOUS at 07:44

## 2021-02-08 RX ADMIN — DEXMEDETOMIDINE HYDROCHLORIDE 0.3 MCG/KG/HR: 100 INJECTION, SOLUTION INTRAVENOUS at 07:45

## 2021-02-08 RX ADMIN — LIDOCAINE HYDROCHLORIDE 15 ML: 10 INJECTION, SOLUTION INFILTRATION; PERINEURAL at 15:05

## 2021-02-08 RX ADMIN — DEXAMETHASONE SODIUM PHOSPHATE 4 MG: 4 INJECTION, SOLUTION INTRA-ARTICULAR; INTRALESIONAL; INTRAMUSCULAR; INTRAVENOUS; SOFT TISSUE at 07:53

## 2021-02-08 RX ADMIN — PROPOFOL 10 MG: 10 INJECTION, EMULSION INTRAVENOUS at 07:57

## 2021-02-08 RX ADMIN — DEXMEDETOMIDINE HYDROCHLORIDE 12 MCG: 100 INJECTION, SOLUTION INTRAVENOUS at 07:38

## 2021-02-08 RX ADMIN — KETAMINE HYDROCHLORIDE 20 MG: 10 INJECTION, SOLUTION INTRAMUSCULAR; INTRAVENOUS at 07:44

## 2021-02-08 RX ADMIN — PROPOFOL 20 MG: 10 INJECTION, EMULSION INTRAVENOUS at 08:00

## 2021-02-08 RX ADMIN — MIDAZOLAM 0.5 MG: 1 INJECTION INTRAMUSCULAR; INTRAVENOUS at 15:03

## 2021-02-08 RX ADMIN — SODIUM CHLORIDE, POTASSIUM CHLORIDE, SODIUM LACTATE AND CALCIUM CHLORIDE: 600; 310; 30; 20 INJECTION, SOLUTION INTRAVENOUS at 07:16

## 2021-02-08 RX ADMIN — FENTANYL CITRATE 25 MCG: 50 INJECTION, SOLUTION INTRAMUSCULAR; INTRAVENOUS at 15:03

## 2021-02-08 RX ADMIN — ONDANSETRON HYDROCHLORIDE 4 MG: 2 INJECTION, SOLUTION INTRAVENOUS at 07:42

## 2021-02-08 RX ADMIN — GLYCOPYRROLATE 0.1 MG: 0.2 INJECTION, SOLUTION INTRAMUSCULAR; INTRAVENOUS at 07:42

## 2021-02-08 RX ADMIN — PROPOFOL 10 MG: 10 INJECTION, EMULSION INTRAVENOUS at 08:11

## 2021-02-08 ASSESSMENT — COPD QUESTIONNAIRES: COPD: 0

## 2021-02-08 ASSESSMENT — MIFFLIN-ST. JEOR: SCORE: 1351.73

## 2021-02-08 ASSESSMENT — ENCOUNTER SYMPTOMS
DYSRHYTHMIAS: 1
SEIZURES: 0

## 2021-02-08 NOTE — OP NOTE
INTERVENTIONAL PULMONOLOGY       Procedure(s):    Tunnelled pleural catheter placement attempt (left chest)    Indication:  Recurrent pleural effusions    Attending of Record:  Smitha Odonnell MD     Interventional Pulmonary Fellow   None    Trainees Present:   None     Medications:    General Anesthesia - See anesthesia flowsheet for details    Sedation Time:   Per Anesthesia Care Provider    Time Out:  Performed    The patient's medical record has been reviewed.  The indication for the procedure was reviewed.  The necessary history and physical examination was performed and reviewed.  The risks, benefits and alternatives of the procedure were discussed with the the patient in detail and she had the opportunity to ask questions.  I discussed in particular the potential complications including risks of minor or life-threatening bleeding and/or infection, respiratory failure, vocal cord trauma / paralysis, pneumothorax, and discomfort. Sedation risks were also discussed including abnormal heart rhythms, low blood pressure, and respiratory failure. All questions were answered to the best of my ability.  Verbal and written informed consent was obtained.  The proposed procedure and the patient's identification were verified prior to the procedure by the physician and the nurse.      After clinical evaluation and reviewing the indication, risks, alternatives and benefits of the procedure the patient was deemed to be in satisfactory condition to undergo the procedure.           A Tuberculosis risk assessment was performed:  The patient has no known RISK of Tuberculosis    The procedure was performed in a negative airflow room: The patient could not be moved to a negative airflow room because of needed OR for the procedure    Maneuvers / Procedure:      Indwelling pleural catheter insertion: Once the site was marked using US, the pleurX catheter kit was used for the procedure. The patient's left chest was prepped in  sterile fashion. A total of 10ml 1%lidocaine used to anesthetize the area. A finder needle was used to aspirate fluid. After aspiration of 3-4 ml of miguelito pleural fluid, only air was aspirated. I repeated the same process with confirmation on ultrasound of placement with same outcome x 2. Procedure aborted and stat portable chest x-ray obtained  Any disposable equipment was visually inspected and deemed to be intact immediately post procedure.      Relevant Pictures      Recommendations:     -->  Discuss with IR to see if they can attempt  -->  CT chest      Smitha Odonnell MD   of Medicine  Interventional Pulmonary  Department of Pulmonary, Allergy, Critical Care and Sleep Medicine   [facility], [employer]  Pager: [phone]

## 2021-02-08 NOTE — DISCHARGE INSTRUCTIONS
Avoid standing under shower for 2 weeks (until sutures removed).   Drain as needed to improve breathing using instructions for catheter or with home health assistance

## 2021-02-08 NOTE — PRE-PROCEDURE
GENERAL PRE-PROCEDURE:   Procedure:  Left chest pleurex catheter.   Date/Time:  2/8/2021 3:18 PM    Verbal consent obtained?: Yes    Written consent obtained?: Yes    Risks and benefits: Risks, benefits and alternatives were discussed    Consent given by:  Patient  Patient states understanding of procedure being performed: Yes    Patient's understanding of procedure matches consent: Yes    Procedure consent matches procedure scheduled: Yes    Expected level of sedation:  Moderate  Appropriately NPO:  Yes  ASA Class:  Class 2- mild systemic disease, no acute problems, no functional limitations  Mallampati  :  Grade 2- soft palate, base of uvula, tonsillar pillars, and portion of posterior pharyngeal wall visible  Lungs:  Lungs clear with good breath sounds bilaterally  Heart:  Normal heart sounds and rate  History & Physical reviewed:  History and physical reviewed and no updates needed  Statement of review:  I have reviewed the lab findings, diagnostic data, medications, and the plan for sedation

## 2021-02-08 NOTE — ANESTHESIA PREPROCEDURE EVALUATION
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     Complication of anesthesia      Depression      Diabetes mellitus (H)     sees Dr. Verde- type II     Falls frequently 7/10/2019     Heart murmur      HTN      Hyperlipidemia LDL goal < 100     Dr. Verde     Lichen sclerosus et atrophicus 2/15/2013     Melanoma (H)      Oxygen dependent     1 liter continuously     Peripheral Neuropathy     hands, feet; used to see Dr. Tl ARRIAZA (postoperative nausea and vomiting)      Skin cancer     face; basal and other. Melanoma. Dolan     Sleep apnea     CPAP     Spinal stenosis       Past Surgical History:   Procedure Laterality Date     AMPUTATE TOE(S)  2012    left second toe Procedure: AMPUTATE TOE(S);;  Surgeon: Mil Jolly DPM;  Location:  OR     CHOLECYSTECTOMY  1975     COLONOSCOPY  early     repeat 4-5 years (Had one prior to this with polyps)     COLONOSCOPY  2014    incomplete; recommend colography     COLONOSCOPY  2020    Dr. Pathak [facility]     COLONOSCOPY N/A 2020    Procedure: COLONOSCOPY, WITH biopsies using forceps;  Surgeon: Adebayo Pathak MD;  Location:  GI     OPTICAL TRACKING SYSTEM FUSION POSTERIOR SPINE LUMBAR N/A 2016    Procedure: OPTICAL TRACKING SYSTEM FUSION SPINE POSTERIOR LUMBAR ONE LEVEL;  Surgeon: Lennox Blue MD;  Location:  OR     PET, REC OF MELANOMA/MET CA Left     arm     REPAIR HAMMER TOE BILATERAL  2012    Procedure: REPAIR HAMMER TOE BILATERAL;  Flexor Tenotomy 2,3,4,5 Toes both feet, Partial 2nd toe amputation left foot;   Surgeon: Mil Jolly DPM;  Location: [facility] OR     REPAIR TENDON QUADRICEPS Right 10/27/2015    Procedure: REPAIR TENDON QUADRICEPS;  Surgeon: Antonio Yi MD;  Location: [facility] OR     SURGICAL HISTORY OF -   1997    bilateral knee replacement     SURGICAL HISTORY OF -   1997    right knee revised; patella tendon ruptured     SURGICAL HISTORY OF -       surgery for spinal stenosis     SURGICAL HISTORY OF -       breast reduction surgery     SURGICAL HISTORY OF -       D and C       Allergies   Allergen Reactions     Augmentin Diarrhea     Vomiting       Cleocin      Hives     Tegretol [Carbamazepine]      Irregular heart beat      Social History     Tobacco Use     Smoking status: Never Smoker     Smokeless tobacco: Never Used   Substance Use Topics     Alcohol use: No     Frequency: Never     Binge frequency: Never      Wt Readings from Last 1 Encounters:   02/08/21 91.2 kg (201 lb)        Anesthesia Evaluation   Pt has had prior anesthetic. Type: General and MAC.    History of anesthetic complications  - PONV.      ROS/MED HX  ENT/Pulmonary:     (+) sleep apnea, uses CPAP,  (-) asthma, COPD and recent URI   Neurologic:  - neg neurologic ROS  (-) no seizures and no CVA   Cardiovascular:     (+) Dyslipidemia hypertension-----CHF GARAY. dysrhythmias, a-fib, valvular problems/murmurs type: AS Previous cardiac testing   Echo: Date: 1/26/21 Results:  Trivial to small pericardial effusion  There are no echocardiographic indications of cardiac tamponade.  The study was technically difficult. Contrast was used without apparent  complications. Compared to prior study, there is no significant change.  Stress Test: Date: Results:    ECG Reviewed: Date: 1/26/21 Results:  A-fib rate 92.  RBBB.  Cath: Date: Results:      METS/Exercise Tolerance:     Hematologic: Comments: Lab Test        02/01/21 01/28/21 01/27/21 01/26/21 12/30/20 12/30/20                       0748          0714          0668           1017          0823          0823          WBC          11.6*        10.7         9.0          11.6*          < >        7.9           HGB          10.1*        9.8*         7.9*         9.4*           < >        8.1*          MCV          94           95           96           98             < >        96            PLT          369          383          288          355            < >        192           INR           --           --           --          1.64*         --          1.23*          < > = values in this interval not displayed.                  Lab Test        02/02/21 02/01/21 01/31/21                       0633          0748          0700          NA           139          139          140           POTASSIUM    3.9          3.5          3.6           CHLORIDE     102          100          102           CO2          33*          32           33*           BUN          78*          73*          70*           CR           2.65*        2.39*        2.47*         ANIONGAP     5            7            5             EDNA          8.5          8.9          9.1           GLC          154*         193*         198*         - neg hematologic  ROS     Musculoskeletal:  - neg musculoskeletal ROS     GI/Hepatic:  - neg GI/hepatic ROS     Renal/Genitourinary:     (+) renal disease, type: CRI, Pt does not require dialysis,     Endo:     (+) type II DM, Obesity,  (-) Type I DM, thyroid disease and chronic steroid usage   Psychiatric/Substance Use:     (+) psychiatric history anxiety     Infectious Disease:  - neg infectious disease ROS     Malignancy:  - neg malignancy ROS     Other:  - neg other ROS          Physical Exam    Airway        Mallampati: III   TM distance: > 3 FB   Neck ROM: full   Mouth opening: > 3 cm    Respiratory Devices and Support         Dental  no notable dental history         Cardiovascular          Rhythm and rate: irregular   (+) murmur       Pulmonary       Comment: Distant  breath sounds with limited repiratory excursion    breath sounds clear to auscultation           OUTSIDE LABS:  CBC:   Lab Results   Component Value Date    WBC 11.6 (H) 02/01/2021    WBC 10.7 01/28/2021    HGB 10.1 (L) 02/01/2021    HGB 9.8 (L) 01/28/2021    HCT 33.9 (L) 02/01/2021    HCT 32.8 (L) 01/28/2021     02/01/2021     01/28/2021     BMP:   Lab Results   Component Value Date     02/02/2021     02/01/2021    POTASSIUM 3.9 02/02/2021    POTASSIUM 3.5 02/01/2021    CHLORIDE 102 02/02/2021    CHLORIDE 100 02/01/2021    CO2 33 (H) 02/02/2021    CO2 32 02/01/2021    BUN 78 (H) 02/02/2021    BUN 73 (H) 02/01/2021    CR 2.65 (H) 02/02/2021    CR 2.39 (H) 02/01/2021     (H) 02/02/2021     (H) 02/01/2021     COAGS:   Lab Results   Component Value Date    INR 1.64 (H) 01/26/2021     POC:   Lab Results   Component Value Date     (H) 02/08/2021     HEPATIC:   Lab Results   Component Value Date    ALBUMIN 2.5 (L) 01/26/2021    PROTTOTAL 7.1 01/31/2021    ALT 40 01/26/2021    AST 27 01/26/2021    ALKPHOS 81 01/26/2021    BILITOTAL 0.5 01/26/2021     OTHER:   Lab Results   Component Value Date    LACT 0.7 12/29/2020    A1C 7.1 (H) 01/26/2021    EDNA 8.5 02/02/2021    PHOS 3.1 01/28/2021    MAG 2.0 01/30/2021    TSH 1.45 01/28/2021    .0 (H) 01/27/2021    SED 89 (H) 01/27/2021       Anesthesia Plan    ASA Status:  3   NPO Status:  NPO Appropriate    Anesthesia Type: MAC     - Reason for MAC: Chronic cardiopulmonary disease (G9)              Consents    Anesthesia Plan(s) and associated risks, benefits, and realistic alternatives discussed. Questions answered and patient/representative(s) expressed understanding.     - Discussed with:  Patient         Postoperative Care    Pain management: IV analgesics, Oral pain medications.        Comments:                Terrell Francois MD

## 2021-02-08 NOTE — ANESTHESIA POSTPROCEDURE EVALUATION
Patient: Ирина Yanez    Procedure(s):  Attempted INSERTION, CATHETER, INTERCOSTAL, FOR DRAINAGE (Pleurx)    Diagnosis:Pleural effusion on left [J90]  Diagnosis Additional Information: No value filed.    Anesthesia Type:  MAC    Note:  Disposition: Outpatient   Postop Pain Control: Uneventful            Sign Out: Well controlled pain   PONV: No   Neuro/Psych: Uneventful            Sign Out: Acceptable/Baseline neuro status   Airway/Respiratory: Uneventful            Sign Out: Acceptable/Baseline resp. status   CV/Hemodynamics: Uneventful            Sign Out: Acceptable CV status   Other NRE:    DID A NON-ROUTINE EVENT OCCUR?          Last vitals:  Vitals:    02/08/21 0617 02/08/21 0843   BP: 120/51 112/50   Pulse: 81 76   Resp: 20 16   Temp: 98.9  F (37.2  C) 97.3  F (36.3  C)   SpO2: 95% 92%       Last vitals prior to Anesthesia Care Transfer:  CRNA VITALS  2/8/2021 0758 - 2/8/2021 0858      2/8/2021             SpO2:  98 %          Electronically Signed By: Terrell Francois MD  February 8, 2021  9:41 AM

## 2021-02-08 NOTE — ADDENDUM NOTE
Addendum  created 02/08/21 1614 by Marie Multani RN    Clinical Note Signed, Order list changed

## 2021-02-08 NOTE — PROCEDURES
[facility]    Procedure: Left chest pleurex catheter placement.     Date/Time: 2/8/2021 3:19 PM  Performed by: Zarina Mccarty DO  Authorized by: Zarina Mccarty DO     UNIVERSAL PROTOCOL   Site Marked: Yes  Prior Images Obtained and Reviewed:  Yes  Required items: Required blood products, implants, devices and special equipment available    Patient identity confirmed:  Verbally with patient, arm band, provided demographic data and hospital-assigned identification number  Patient was reevaluated immediately before administering moderate or deep sedation or anesthesia  Confirmation Checklist:  Patient's identity using two indicators, relevant allergies, procedure was appropriate and matched the consent or emergent situation and correct equipment/implants were available  Time out: Immediately prior to the procedure a time out was called    Universal Protocol: the Joint Commission Universal Protocol was followed    Preparation: Patient was prepped and draped in usual sterile fashion           ANESTHESIA    Anesthesia: Local infiltration  Local Anesthetic:  Lidocaine 1% without epinephrine      SEDATION    Patient Sedated: Yes    Sedation Type:  Moderate (conscious) sedation  Vital signs: Vital signs monitored during sedation    See dictated procedure note for full details.  Findings: Left chest pleurex catheter placement.     Specimens: none    Complications: None    Condition: Stable    Plan: May discharge when criteria is met.     PROCEDURE   Patient Tolerance:  Patient tolerated the procedure well with no immediate complications    Length of time physician/provider present for 1:1 monitoring during sedation: 20

## 2021-02-08 NOTE — ANESTHESIA CARE TRANSFER NOTE
Patient: Ирина Yanez    Procedure(s):  Attempted INSERTION, CATHETER, INTERCOSTAL, FOR DRAINAGE (Pleurx)    Diagnosis: Pleural effusion on left [J90]  Diagnosis Additional Information: No value filed.    Anesthesia Type:   MAC     Note:    Oropharynx: oropharynx clear of all foreign objects  Level of Consciousness: awake  Oxygen Supplementation: face mask    Independent Airway: airway patency satisfactory and stable  Dentition: dentition unchanged  Vital Signs Stable: post-procedure vital signs reviewed and stable  Report to RN Given: handoff report given  Patient transferred to: Phase II  Comments: Pt's VSS, A/O x3 resting comfortably post procedure, care continued by RN.   Handoff Report: Identifed the Patient, Identified the Reponsible Provider, Reviewed the pertinent medical history, Discussed the surgical course, Reviewed Intra-OP anesthesia mangement and issues during anesthesia, Set expectations for post-procedure period and Allowed opportunity for questions and acknowledgement of understanding      Vitals: (Last set prior to Anesthesia Care Transfer)  CRNA VITALS  2/8/2021 0758 - 2/8/2021 0844      2/8/2021             SpO2:  98 %        Electronically Signed By: SAMY Alfonso CRNA  February 8, 2021  8:44 AM

## 2021-02-08 NOTE — PROGRESS NOTES
Patient tolerated placement of left tunneled catheter for recurrent pleural effusions well by Dr Bhatia.  25 mcg of fentanyl and 0.5 mg of versed given for conscious sedation.  Dressing remained clean dry and intact at time of discharge to home via .  Personal oxygen tank, CPAP and clothing home with patient.  Concepcion Multani RN, BSN

## 2021-02-09 ENCOUNTER — OFFICE VISIT (OUTPATIENT)
Dept: FAMILY MEDICINE | Facility: CLINIC | Age: 86
End: 2021-02-09
Payer: COMMERCIAL

## 2021-02-09 ENCOUNTER — PATIENT OUTREACH (OUTPATIENT)
Dept: NURSING | Facility: CLINIC | Age: 86
End: 2021-02-09
Payer: COMMERCIAL

## 2021-02-09 ENCOUNTER — PATIENT OUTREACH (OUTPATIENT)
Dept: ONCOLOGY | Facility: CLINIC | Age: 86
End: 2021-02-09

## 2021-02-09 VITALS
WEIGHT: 208.7 LBS | DIASTOLIC BLOOD PRESSURE: 52 MMHG | SYSTOLIC BLOOD PRESSURE: 118 MMHG | RESPIRATION RATE: 16 BRPM | HEART RATE: 68 BPM | TEMPERATURE: 98.1 F | HEIGHT: 64 IN | BODY MASS INDEX: 35.63 KG/M2 | OXYGEN SATURATION: 97 %

## 2021-02-09 DIAGNOSIS — Z09 HOSPITAL DISCHARGE FOLLOW-UP: Primary | ICD-10-CM

## 2021-02-09 DIAGNOSIS — E11.21 TYPE 2 DIABETES MELLITUS WITH DIABETIC NEPHROPATHY, UNSPECIFIED WHETHER LONG TERM INSULIN USE (H): ICD-10-CM

## 2021-02-09 DIAGNOSIS — J90 PLEURAL EFFUSION ON LEFT: Primary | ICD-10-CM

## 2021-02-09 DIAGNOSIS — N18.4 CKD (CHRONIC KIDNEY DISEASE) STAGE 4, GFR 15-29 ML/MIN (H): ICD-10-CM

## 2021-02-09 DIAGNOSIS — R60.9 EDEMA, UNSPECIFIED TYPE: ICD-10-CM

## 2021-02-09 DIAGNOSIS — J90 BILATERAL PLEURAL EFFUSION: ICD-10-CM

## 2021-02-09 DIAGNOSIS — E66.01 MORBID OBESITY (H): ICD-10-CM

## 2021-02-09 DIAGNOSIS — I50.30 DIASTOLIC HEART FAILURE, UNSPECIFIED HF CHRONICITY (H): ICD-10-CM

## 2021-02-09 DIAGNOSIS — I48.0 PAROXYSMAL ATRIAL FIBRILLATION (H): ICD-10-CM

## 2021-02-09 LAB
ERYTHROCYTE [DISTWIDTH] IN BLOOD BY AUTOMATED COUNT: 15.6 % (ref 10–15)
HCT VFR BLD AUTO: 31.1 % (ref 35–47)
HGB BLD-MCNC: 9 G/DL (ref 11.7–15.7)
MCH RBC QN AUTO: 27.4 PG (ref 26.5–33)
MCHC RBC AUTO-ENTMCNC: 28.9 G/DL (ref 31.5–36.5)
MCV RBC AUTO: 95 FL (ref 78–100)
PLATELET # BLD AUTO: 387 10E9/L (ref 150–450)
RBC # BLD AUTO: 3.28 10E12/L (ref 3.8–5.2)
WBC # BLD AUTO: 14.5 10E9/L (ref 4–11)

## 2021-02-09 PROCEDURE — 85027 COMPLETE CBC AUTOMATED: CPT | Performed by: FAMILY MEDICINE

## 2021-02-09 PROCEDURE — 99495 TRANSJ CARE MGMT MOD F2F 14D: CPT | Performed by: FAMILY MEDICINE

## 2021-02-09 PROCEDURE — 80048 BASIC METABOLIC PNL TOTAL CA: CPT | Performed by: FAMILY MEDICINE

## 2021-02-09 PROCEDURE — 36415 COLL VENOUS BLD VENIPUNCTURE: CPT | Performed by: FAMILY MEDICINE

## 2021-02-09 ASSESSMENT — MIFFLIN-ST. JEOR: SCORE: 1386.66

## 2021-02-09 NOTE — PROGRESS NOTES
Writer called pt to follow-up on how she is doing with her PleurX drain. There was no answer. Left voicemail to return call to Dr. Odonnell's nurse at [phone].    Bisi Moffett, RN, BSN, MARIA TERESA  RN Care Coordinator  [facility]  [phone]

## 2021-02-09 NOTE — Clinical Note
Hello! Thanks for everything you have done with this patient! I do have a couple questions... one is should she be following up with you?   If not, how do we manage the catheter? Is this permanent? Anything I should be watching for? (I don't have any experience with these)  Thank you!  Yuridia

## 2021-02-09 NOTE — PROGRESS NOTES
Clinic Care Coordination Contact    Clinic Care Coordination Contact  OUTREACH    Referral Information:  Referral Source: IP Report    Primary Diagnosis: CHF    Chief Complaint   Patient presents with     Clinic Care Coordination - Post Hospital     Clinic Care Coordination RN         Universal Utilization: Hospital visit 2/8/2021  Procedure: Left chest pleurex catheter placement.   Clinic Utilization  Difficulty keeping appointments:: No  Compliance Concerns: No  No-Show Concerns: No  No PCP office visit in Past Year: No  Utilization    Last refreshed: 2/9/2021 11:43 AM: Hospital Admissions 5           Last refreshed: 2/9/2021 11:43 AM: ED Visits 3           Last refreshed: 2/9/2021 11:43 AM: No Show Count (past year) 5              Current as of: 2/9/2021 11:43 AM              Clinical Concerns:  Current Medical Concerns: Patient reports she tolerated the procedure well and no concerns of SOB episodes.  Patient continues to wear oxygen continuously at 1 liter.  Patient has a hospital follow up this afternoon with PCP   Current Behavioral Concerns: No  Education Provided to patient: CC RN available for questions or concerns   Patient just had a full assessment by Sandra VARMA on 2/3/2021  Pain  Pain (GOAL):: No  Health Maintenance Reviewed: Not assessed  Clinical Pathway: None    Medication Management:  Medication reconciliation status: Medications reviewed and reconciled.  Continue medications without change.    Functional Status:  Dependent ADLs:: Ambulation-walker  Bed or wheelchair confined:: No  Mobility Status: Independent w/Device    Living Situation:  Current living arrangement:: I live in a private home with spouse  Type of residence:: Private home - stairs    Lifestyle & Psychosocial Needs:  Lifestyle     Physical activity     Days per week: 0 days     Minutes per session: 0 min     Stress: Only a little     Social Needs     Financial resource strain: Not hard at all     Food insecurity     Worry:  Never true     Inability: Never true     Transportation needs     Medical: No     Non-medical: No     Inadequate nutrition (GOAL):: No  Tube Feeding: No  Inadequate activity/exercise (GOAL):: No  Significant changes in sleep pattern (GOAL): No  Transportation means:: Family     Congregation or spiritual beliefs that impact treatment:: No  Mental health DX:: No  Mental health DX how managed:: None  Mental health management concern (GOAL):: No  Chemical Dependency Status: No Current Concerns  Informal Support system:: Family, Neighbors, Children   Socioeconomic History     Marital status:      Spouse name: Not on file     Number of children: Not on file     Years of education: Not on file     Highest education level: 12th grade   Occupational History     Occupation:  from home     Employer: RETIRED   Relationships     Social connections     Talks on phone: Three times a week     Gets together: Never     Attends Alevism service: Never     Active member of club or organization: No     Attends meetings of clubs or organizations: Never     Relationship status:      Intimate partner violence     Fear of current or ex partner: Not on file     Emotionally abused: Not on file     Physically abused: Not on file     Forced sexual activity: Not on file     Tobacco Use     Smoking status: Never Smoker     Smokeless tobacco: Never Used   Substance and Sexual Activity     Alcohol use: No     Frequency: Never     Binge frequency: Never     Drug use: No     Sexual activity: Yes     Partners: Male        Resources and Interventions:  Current Resources:      Community Resources: None  Supplies used at home:: Oxygen Tubing/Supplies  Equipment Currently Used at Home: walker, rolling, grab bar, tub/shower, shower chair  Employment Status: retired)      Advance Care Plan/Directive  Advanced Care Plans/Directives on file:: Yes    Referrals Placed: None     Goals:   Goals        General    1. Improve chronic symptoms  (pt-stated)     Notes - Note edited  2/3/2021  3:18 PM by Sandra Savage LSW    Goal Statement: I would like support in managing my chronic health conditions to maintain my health and wellness and prevent readmission to the hospital.   Date Goal set:  Updated on 2/3/21  Barriers: weak due to chronic health conditions.   Strengths: motivated, engaged in care coordination, home care therapies.   Date to Achieve By: 06/2021  Patient expressed understanding of goal: yes  Action steps to achieve this goal:  1. I will follow up with my providers as scheduled/recommended. (Primary Care Provider , [place] 02/05, Pulmonology  etc.)  2. I will take my medications as prescribed.   3. I will contact my care team to report questions, concerns, support needs. 24/7 after hours services available.   4. Care Coordinator will collaborate with care team as needed.               Patient/Caregiver understanding: Patient expresses good understanding of discharge instructions     Outreach Frequency: monthly  Future Appointments              Today Rn, H[facility],     Today Yuridia Villarreal MD [facility] JAMES Perez CL    In 6 days Smitha Odonnell MD [facility] RID          Plan:   Clinic NICOLE Christensen will make a follow up call in a month   [facility]   Reyna Limon RN, Care Coordinator   [facility]   E-mail [email or web address]   [phone]

## 2021-02-09 NOTE — PROGRESS NOTES
"    Assessment & Plan     Hospital discharge follow-up      Bilateral pleural effusion  This has precipitated hypoxia; relieved with draining the fluid.   Now has a drain.   Thought to be related to heart failure.   At this time, will CC Pulmonary on this regarding follow up or how to manage.     Diastolic heart failure, unspecified HF chronicity (H)  Thought to be the reason for the effusions. Will CC Cardiology. Recommend she set up a follow up appointment with Cardiology with this and the a fib.   Recommend to discuss pioglitazone with Endocrinologist.    Paroxysmal atrial fibrillation (H)  As above.   This was newly found. She is on anticoagulation currently.     Elevated BMI with comorbidity (H)  She is working on nutrition.     Type 2 diabetes mellitus with diabetic nephropathy, unspecified whether long term insulin use (H)  Reviewed her HgbA1C. She is on oral medications at home.   Recommend to update Endocrinologist.  Recommend to discuss pioglitazone with Endocrinologist.     CKD (chronic kidney disease) stage 4, GFR 15-29 ml/min (H)  To also follow up with Nephrlogy.    Edema, unspecified type    - Basic metabolic panel  - CBC with platelets             BMI:   Estimated body mass index is 35.82 kg/m  as calculated from the following:    Height as of this encounter: 1.626 m (5' 4\").    Weight as of this encounter: 94.7 kg (208 lb 11.2 oz).   Weight management plan: Discussed healthy diet and exercise guidelines      Patient Instructions       Your To Do list:    1) schedule an appointment with Dr. Beasley (Cardiology)  2) schedule an appointment with Dr. Verde (Endocrinology).  (talk to him about the pioglitazone. I don't have a dose on that and sometimes it can be implicated in heart failure).  3) see Dr. Hernandez as recommended. You may want to contact them to see when.      I will forward each of them this note so they are aware.  I will contact Pulmonary to see if you need to follow up with them or to " see what we are to be following.          Return in about 6 weeks (around 3/23/2021), or if symptoms worsen or fail to improve, for Face to Face visit preferred.    CC Dr. Verde.  CC Dr. Beasley  CC Dr. Odonnell  CC Dr. Mary Villarreal MD, MD  [facility] JAMES Gifford is a 85 year old who presents for the following health issues     HPI         Hospital Follow-up Visit:    Hospital/Nursing Home/ Rehab Facility: [facility]   Date of Admission: 01/26/2021  Date of Discharge: 02/02/2021  Reason(s) for Admission: SOB      Was your hospitalization related to COVID-19? No   Problems taking medications regularly:  None  Medication changes since discharge: None  Problems adhering to non-medication therapy:  None    Summary of hospitalization:  [facility] discharge summary reviewed  Diagnostic Tests/Treatments reviewed.  Follow up needed: electrolytes; diabetic assessment.   Other Healthcare Providers Involved in Patient s Care:         recent placement of catheter.   Update since discharge: improved.       Post Discharge Medication Reconciliation: unable to reconcile discharge medications due to uncertain of dose of Actos.  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 quadriceps     Right bundle branch block     S/P lumbar fusion     ACP (advance care planning)     Heart murmur     Aortic sclerosis     Toe amputation status, left     Elevated BMI with comorbidity (H)     Falls frequently     Abrasion of arm, right, initial encounter     Aortic valve stenosis, etiology of cardiac valve disease unspecified     Ascending aorta dilatation (H)     Lung mass     Chest tightness     Dyspnea on exertion     Iron deficiency anemia     Hypoxemia     CHF (congestive heart failure) (H)     Dyspnea     Acute on chronic kidney failure (H)     Paroxysmal atrial fibrillation (H)     Pleural effusion on left     Acute respiratory failure with hypoxia (H)       Current Outpatient Medications   Medication Sig Dispense Refill     acetaminophen (TYLENOL) 325 MG tablet Take 2 tablets (650 mg) by mouth every 4 hours as needed for mild pain       albuterol (PROAIR HFA/PROVENTIL HFA/VENTOLIN HFA) 108 (90 Base) MCG/ACT inhaler Inhale 2 puffs into the lungs 4 times daily 1 Inhaler 0     apixaban ANTICOAGULANT (ELIQUIS) 2.5 MG tablet Take 1 tablet (2.5 mg) by mouth 2 times daily 60 tablet 3     blood glucose (NO BRAND SPECIFIED) lancets standard Use to test blood sugar 1 times daily or as directed, Contour Next lancets 100 each 3     blood glucose (NO BRAND SPECIFIED) test strip Use to test blood sugar 1 times daily or as directed, Contour Next strips 100 strip 1     blood glucose monitoring (NO BRAND SPECIFIED) meter device kit Use to test blood sugar 1 times daily or as directed. Ultra 2 1 kit 1     calcium acetate (PHOSLO) 667 MG CAPS capsule Take 1 capsule (667 mg) by mouth 3 times daily (with meals) 90 capsule 3     carvedilol (COREG) 3.125 MG tablet Take 1 tablet (3.125 mg) by mouth 2 times daily (with meals) 60 tablet 1     diltiazem ER COATED BEADS (CARDIAZEM LA) 180 MG 24 hr tablet Take 180 mg by mouth 2 times daily   3     gabapentin (NEURONTIN) 100 MG capsule Take 1 capsule  (100 mg) by mouth 2 times daily 360 capsule 1     hydrALAZINE (APRESOLINE) 25 MG tablet Take 1 tablet (25 mg) by mouth 3 times daily 90 tablet 1     MULTI-VITAMIN OR TABS Take 2 tablets by mouth daily        order for DME Equipment being ordered: walker. 4 wheeled with brakes and a seat. 1 Device 0     PIOGLITAZONE HCL PO Take by mouth daily       polyethylene glycol (MIRALAX) 17 GM/Dose powder Take 17 g by mouth daily as needed for constipation 510 g      sertraline (ZOLOFT) 50 MG tablet Take 1 tablet (50 mg) by mouth daily 90 tablet 2     simvastatin (ZOCOR) 10 MG tablet TAKE 1 TABLET BY MOUTH AT BEDTIME. 90 tablet 3     torsemide (DEMADEX) 20 MG tablet Take 2 tablets (40 mg) by mouth daily 60 tablet 1     fish oil-omega-3 fatty acids 1000 MG capsule Take 1 g by mouth daily           Review of Systems   CONSTITUTIONAL:NEGATIVE for fever, chills  ENT/MOUTH: NEGATIVE for ear, mouth and throat problems  RESP:NEGATIVE for significant cough or short of breath. Breathing is better since tube put in yesterday.   CV: NEGATIVE for chest pain, palpitations or peripheral edema  GI: NEGATIVE for nausea, abdominal pain, heartburn, or change in bowel habits  : no urinary complaints.   PSYCHIATRIC: NEGATIVE for changes in mood or affect      This am, her weight was 198; half pound lower than the day prior.   She has been hospitalized with pleural effusions, requiring removal.  No cancer diagnosed.  The thought as to etiology is heart failure.    Did get a tube in yesterday after a few tries.     Home health care will come out to help with teaching how to drain it.   She notes she was not told of follow up; she believes the tube will be in there forever.   Dr. Odonnell of Pulmonary had arranged this.   She is currently on 1 liter oxygen at home. Will go to 2 liter if active moving around.   Swelling is down.    Was put on a blood thinner due to newly discovered a fib.      Sees Dr. Verde. She was given insulin at the hospital.  "On oral medications at home. These were held during the hospitalization.    Meals are good.   She notes she has changed how she eats. She has decreased portions. No salt.   Has checked sugar the past couple mornings; over 140 but not too high.         Objective    /52 (BP Location: Right arm, Cuff Size: Adult Regular)   Pulse 68   Temp 98.1  F (36.7  C) (Oral)   Resp 16   Ht 1.626 m (5' 4\")   Wt 94.7 kg (208 lb 11.2 oz)   LMP  (LMP Unknown)   SpO2 97%   BMI 35.82 kg/m    Body mass index is 35.82 kg/m .  Physical Exam   GENERAL APPEARANCE: alert and no distress  RESP: lungs clear to auscultation - no rales, rhonchi or wheezes  CV: regular rates and rhythm; there is a dressing left anterior lower chest; with coiled tubing palpable.   MS: no edema.   PSYCH: mentation appears normal and affect normal      Lab Results   Component Value Date    A1C 7.1 01/26/2021    A1C 6.8 04/27/2020    A1C 6.3 09/27/2019    A1C 6.9 03/22/2019    A1C 6.2 06/12/2018     Recent Labs   Lab Test 04/27/20  0920 01/21/20  0911 12/17/13  0859 12/17/13  0859   CHOL 181 207*   < > 165   HDL 44* 47*   < > 49*   LDL 89 115*   < > 76   TRIG 240* 223*   < > 204*   CHOLHDLRATIO  --   --   --  3.4    < > = values in this interval not displayed.     GFR Estimate   Date Value Ref Range Status                   02/02/2021 16 (L) >60 mL/min/[1.73_m2] Final     Comment:     Non  GFR Calc  Starting 12/18/2018, serum creatinine based estimated GFR (eGFR) will be   calculated using the Chronic Kidney Disease Epidemiology Collaboration   (CKD-EPI) equation.     02/01/2021 18 (L) >60 mL/min/[1.73_m2] Final     Comment:     Non  GFR Calc  Starting 12/18/2018, serum creatinine based estimated GFR (eGFR) will be   calculated using the Chronic Kidney Disease Epidemiology Collaboration   (CKD-EPI) equation.             Reviewed Cardiology note from 11/2020.    Reviewed Discharge summary from January.              "

## 2021-02-09 NOTE — PATIENT INSTRUCTIONS
Your To Do list:    1) schedule an appointment with Dr. Beasley (Cardiology)  2) schedule an appointment with Dr. Verde (Endocrinology).  (talk to him about the pioglitazone. I don't have a dose on that and sometimes it can be implicated in heart failure).  3) see Dr. Hernandez as recommended. You may want to contact them to see when.      I will forward each of them this note so they are aware.  I will contact Pulmonary to see if you need to follow up with them or to see what we are to be following.

## 2021-02-09 NOTE — Clinical Note
MURRAY. She has recently been in the hospital with pleural effusions; they have thought related to diastolic heart failure. She was also found to have a fib. I have recommended a follow up appointment with you.   Thanks!  Yuridia

## 2021-02-09 NOTE — PROGRESS NOTES
Genevieve, from [facility] home care called to verify if PleurX drains had been placed yesterday and if the draining prescription had been faxed.     Writer spoke with NICOLE Pierce in IR. Per Kari, only the left pleurX was placed because there was not enough fluid to drain the right side. The patient was sent home with the start-up kit but no forms/prescriptions were faxed.     Writer will complete supply order form and fax to Dr. Odonnell for signature. Writer will also request order in Epic with drainage instructions for home care nurses.    Per Genevieve, pt might be having a home care visit as soon as tomorrow.     Bisi Moffett, RN, BSN, MARIA TERESA  RN Care Coordinator  [facility]  [phone]

## 2021-02-09 NOTE — Clinical Note
MURRAY regarding recent hospitalization. She is starting to work on lifestyle more. I did recommend she connect with you; not sure what you think about pioglitazone use currently.   Thank you!  Yuridia

## 2021-02-10 ENCOUNTER — DOCUMENTATION ONLY (OUTPATIENT)
Dept: ONCOLOGY | Facility: CLINIC | Age: 86
End: 2021-02-10

## 2021-02-10 LAB
ANION GAP SERPL CALCULATED.3IONS-SCNC: 6 MMOL/L (ref 3–14)
BUN SERPL-MCNC: 73 MG/DL (ref 7–30)
CALCIUM SERPL-MCNC: 9.2 MG/DL (ref 8.5–10.1)
CHLORIDE SERPL-SCNC: 102 MMOL/L (ref 94–109)
CO2 SERPL-SCNC: 31 MMOL/L (ref 20–32)
CREAT SERPL-MCNC: 2.97 MG/DL (ref 0.52–1.04)
GFR SERPL CREATININE-BSD FRML MDRD: 14 ML/MIN/{1.73_M2}
GLUCOSE SERPL-MCNC: 125 MG/DL (ref 70–99)
POTASSIUM SERPL-SCNC: 4.7 MMOL/L (ref 3.4–5.3)
SODIUM SERPL-SCNC: 139 MMOL/L (ref 133–144)

## 2021-02-10 NOTE — PROGRESS NOTES
Home care PleurX drainage instructions, per Dr. Odonnell:     Drain 1-2 times a week 500 mL or to patient comfort. Do not drain more than 1,000 mL at any one time. Stop draining if patient experiences pain or discomfort during draining. Notify provider if there is any bleeding, blocked drain, signs of infection, or difficulty breathing.       Bisi Moffett, RN, BSN, MARIA TERESA  RN Care Coordinator  [facility]  [phone]

## 2021-02-10 NOTE — PROGRESS NOTES
Ayala, RN faxed completed PleurX supply form and facesheet to Abdi [phone].    Fax transmission confirmed.     Copy sent to scanning.    Please see separate encounter for home care instructions for PleurX drainage.     Bisi Moffett RN, BSN, MARIA TERESA  RN Care Coordinator  [facility]  [phone]

## 2021-02-11 ENCOUNTER — TELEPHONE (OUTPATIENT)
Dept: FAMILY MEDICINE | Facility: CLINIC | Age: 86
End: 2021-02-11

## 2021-02-11 DIAGNOSIS — E11.40 TYPE 2 DIABETES MELLITUS WITH DIABETIC NEUROPATHY, WITHOUT LONG-TERM CURRENT USE OF INSULIN (H): Primary | ICD-10-CM

## 2021-02-11 DIAGNOSIS — E11.21 TYPE 2 DIABETES MELLITUS WITH DIABETIC NEPHROPATHY, WITHOUT LONG-TERM CURRENT USE OF INSULIN (H): ICD-10-CM

## 2021-02-11 DIAGNOSIS — E11.21 TYPE 2 DIABETES MELLITUS WITH DIABETIC NEPHROPATHY, WITHOUT LONG-TERM CURRENT USE OF INSULIN (H): Primary | ICD-10-CM

## 2021-02-11 NOTE — TELEPHONE ENCOUNTER
"Dr Verde:     Call from patient [phone]    In hospital with major problems     Wants to be seen by Dr Verde - visit scheduled     Is on O2 and difficulty leaving home     Can do blood work - at [facility] if needed prior to visit     In hospital - \"pumped me full of insulin\"     3/27/2020 - last endo visit     Per AVS \"talk to him [Dr Verde] about the pioglitazone. I don't have  a dose on that and sometimes it can be  implicated in heart failure).\"     Previously was on Pioglitazone 15mg - 1 tablet orally     Glimepiride 1mg daily - was stopped 1/15/21 at discharge (per ER notes stopped due to CHF and decreased kidney function)     Blood sugars:   141 - on 2/10/21 in AM before breakfast  217 - hot chocolate 2 hours prior     Sunday   155 7am     (unsure of date) 6am 109 -  2/6 172    Pt asking what to take for diabetes meds, if any before upcoming visit and are any labs needed at this time?     "

## 2021-02-11 NOTE — PROGRESS NOTES
Writer confirmed with Genevieve at  home care that they can see the PleurX orders from Dr. Odonnell. Per Ирина Vallejo has a nurse scheduled to go out to the home today and has the drainage instructions.     Pt has a 2 week follow-up appt with Dr. Odonnell 2/15.    Bisi Moffett, RN, BSN, MARIA TERESA  RN Care Coordinator  [facility]  [phone]

## 2021-02-12 ENCOUNTER — PATIENT OUTREACH (OUTPATIENT)
Dept: ONCOLOGY | Facility: CLINIC | Age: 86
End: 2021-02-12

## 2021-02-12 ENCOUNTER — TELEPHONE (OUTPATIENT)
Dept: ENDOCRINOLOGY | Facility: CLINIC | Age: 86
End: 2021-02-12

## 2021-02-12 NOTE — TELEPHONE ENCOUNTER
Diabetes Education Scheduling Outreach #1:    Call to patient to schedule. Left message with phone number to call to schedule.    Plan for 2nd outreach attempt within 1 week.    Tracy Barbosa  [place] [facility]  Diabetes and Nutrition Scheduling

## 2021-02-12 NOTE — TELEPHONE ENCOUNTER
Called patient. She was rescheduled to 3/10/21 (on [place] schedule) instead of 3/15.  Call sooner if needed.  She was called by Diab.Educator today; will schedule soon  Olivia Colon RN on 2/12/2021 at 4:27 PM

## 2021-02-12 NOTE — PROGRESS NOTES
Writer received a fax from [employer] requesting a new signed supply form. Writer faxed new signed supply order form back to [employer]. Copy sent to scanning.     Fax transmission confirmed via Right Fax.     Bisi Moffett RN, BSN, MARIA TERESA  RN Care Coordinator  [facility]  [phone]

## 2021-02-12 NOTE — TELEPHONE ENCOUNTER
Message noted.  Patient should continue the diabetes medication plan (or maybe no medication) per the hospital physicians... and have a follow-up evaluation with me soon.  She should also have an appointment with one of our [place] Diabetes Educators to wear a diagnostic Elizabeth sensor- order placed (and she can call [phone] to schedule).    Since I am doing virtual visits routinely, needs a virtual visit (telephone or video) with me in next 3 weeks.  OK to use a 10 am or 3pm workin slot.  Please relay message and assist with scheduling.    ROMINA Verde MD, MS  Endocrinology  [facility]

## 2021-02-14 PROBLEM — J96.01 ACUTE RESPIRATORY FAILURE WITH HYPOXIA (H): Status: RESOLVED | Noted: 2021-01-26 | Resolved: 2021-02-14

## 2021-02-14 PROBLEM — I50.30 DIASTOLIC HEART FAILURE, UNSPECIFIED HF CHRONICITY (H): Status: ACTIVE | Noted: 2021-02-14

## 2021-02-15 ENCOUNTER — TRANSFERRED RECORDS (OUTPATIENT)
Dept: HEALTH INFORMATION MANAGEMENT | Facility: CLINIC | Age: 86
End: 2021-02-15

## 2021-02-15 ENCOUNTER — VIRTUAL VISIT (OUTPATIENT)
Dept: ONCOLOGY | Facility: CLINIC | Age: 86
End: 2021-02-15
Attending: INTERNAL MEDICINE
Payer: COMMERCIAL

## 2021-02-15 DIAGNOSIS — J90 PLEURAL EFFUSION: Primary | ICD-10-CM

## 2021-02-15 PROCEDURE — 99213 OFFICE O/P EST LOW 20 MIN: CPT | Mod: 95 | Performed by: INTERNAL MEDICINE

## 2021-02-15 PROCEDURE — 999N001193 HC VIDEO/TELEPHONE VISIT; NO CHARGE

## 2021-02-15 NOTE — PROGRESS NOTES
Ирина is a 85 year old who is being evaluated via a billable telephone visit.      What phone number would you like to be contacted at? [phone]  How would you like to obtain your AVS? Dinorah Wood CMA on 2/15/2021 at 12:03 PM    Phone call duration: 15 minutes    [facility] Follow Up Visit    Reason for Visit  Ирина Yanez is a 85 year old female who is seen in follow up for pleural effusions   Pulmonary HPI    - Ирина underwent left PleurX placement one week ago for recurrent pleural effusions. She is getting home health for drainage as well as home PT and OT.  She had a drainage this morning. 450ml drained at the time of procedure, 350ml last Thursday (slight improvement in breathing after), 75ml out today. She had some left chest pain last week. She is using oxygen at home and breathing is comfortable.     Other active medical problems include:   - CHF with diastolic dysfunction  - FABY       ROS Pulmonary  Dyspnea: No, Cough: No, Chest pain: Yes, Wheezing: No, Sputum Production: No, Hemoptysis: No  A complete ROS was otherwise negative except as noted in the HPI.    The patient was seen and examined by Smitha Odonnell MD   Current Outpatient Medications   Medication     acetaminophen (TYLENOL) 325 MG tablet     apixaban ANTICOAGULANT (ELIQUIS) 2.5 MG tablet     blood glucose (NO BRAND SPECIFIED) lancets standard     blood glucose (NO BRAND SPECIFIED) test strip     blood glucose monitoring (NO BRAND SPECIFIED) meter device kit     calcium acetate (PHOSLO) 667 MG CAPS capsule     carvedilol (COREG) 3.125 MG tablet     diltiazem ER COATED BEADS (CARDIAZEM LA) 180 MG 24 hr tablet     gabapentin (NEURONTIN) 100 MG capsule     hydrALAZINE (APRESOLINE) 25 MG tablet     order for DME     PIOGLITAZONE HCL PO     polyethylene glycol (MIRALAX) 17 GM/Dose powder     sertraline (ZOLOFT) 50 MG tablet     simvastatin (ZOCOR) 10 MG tablet     torsemide  (DEMADEX) 20 MG tablet     albuterol (PROAIR HFA/PROVENTIL HFA/VENTOLIN HFA) 108 (90 Base) MCG/ACT inhaler     MULTI-VITAMIN OR TABS     No current facility-administered medications for this visit.      Allergies   Allergen Reactions     Augmentin Diarrhea     Vomiting       Cleocin      Hives     Tegretol [Carbamazepine]      Irregular heart beat     Social History     Socioeconomic History     Marital status:      Spouse name: Not on file     Number of children: Not on file     Years of education: Not on file     Highest education level: 12th grade   Occupational History     Occupation:  from home     Employer: RETIRED   Social Needs     Financial resource strain: Not hard at all     Food insecurity     Worry: Never true     Inability: Never true     Transportation needs     Medical: No     Non-medical: No   Tobacco Use     Smoking status: Never Smoker     Smokeless tobacco: Never Used   Substance and Sexual Activity     Alcohol use: No     Frequency: Never     Binge frequency: Never     Drug use: No     Sexual activity: Yes     Partners: Male   Lifestyle     Physical activity     Days per week: 0 days     Minutes per session: 0 min     Stress: Only a little   Relationships     Social connections     Talks on phone: Three times a week     Gets together: Never     Attends Hoahaoism service: Never     Active member of club or organization: No     Attends meetings of clubs or organizations: Never     Relationship status:      Intimate partner violence     Fear of current or ex partner: Not on file     Emotionally abused: Not on file     Physically abused: Not on file     Forced sexual activity: Not on file   Other Topics Concern      Service Not Asked     Blood Transfusions Not Asked     Caffeine Concern Not Asked     Occupational Exposure Not Asked     Hobby Hazards Not Asked     Sleep Concern Not Asked     Stress Concern Not Asked     Weight Concern Not Asked     Special Diet No      Comment: getting fruits and veggies.      Back Care Not Asked     Exercise No     Comment: gets little; limited with back and feet/leg pain.     Bike Helmet Not Asked     Seat Belt Not Asked     Self-Exams Not Asked     Parent/sibling w/ CABG, MI or angioplasty before 65F 55M? 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     Complication of anesthesia      Depression      Diabetes mellitus (H)     sees Dr. Verde- type II     Falls frequently 7/10/2019     Heart murmur      HTN      Hyperlipidemia LDL goal < 100     Dr. Verde     Lichen sclerosus et atrophicus 2/15/2013     Melanoma (H)      Oxygen dependent     1 liter continuously     Peripheral Neuropathy     hands, feet; used to see Dr. Tl ARRIAZA (postoperative nausea and vomiting)      Skin cancer     face; basal and other. Melanoma. Dolan     Sleep apnea     CPAP     Spinal stenosis      Past Surgical History:   Procedure Laterality Date     AMPUTATE TOE(S)  8/23/2012    left second toe Procedure: AMPUTATE TOE(S);;  Surgeon: Mil Jolly DPM;  Location:  OR     CHOLECYSTECTOMY  Nov. 1975     COLONOSCOPY  early 2009    repeat 4-5 years (Had one prior to this with polyps)     COLONOSCOPY  Sept 2014    incomplete; recommend colography     COLONOSCOPY  02/25/2020    Dr. Pathak [facility]     COLONOSCOPY N/A 2/25/2020    Procedure: COLONOSCOPY, WITH biopsies using forceps;  Surgeon: Adebayo Pathak MD;  Location:  GI     INSERT CHEST TUBE Bilateral 2/8/2021    Procedure: Attempted INSERTION, CATHETER, INTERCOSTAL, FOR DRAINAGE (Pleurx);  Surgeon: Smitha Odonnell MD;  Location:  OR     OPTICAL TRACKING SYSTEM FUSION POSTERIOR SPINE LUMBAR N/A 9/16/2016    Procedure: OPTICAL TRACKING SYSTEM FUSION SPINE POSTERIOR LUMBAR ONE LEVEL;   Surgeon: Lennox Blue MD;  Location: [facility] OR     PET, REC OF MELANOMA/MET CA Left     arm     REPAIR HAMMER TOE BILATERAL  8/23/2012    Procedure: REPAIR HAMMER TOE BILATERAL;  Flexor Tenotomy 2,3,4,5 Toes both feet, 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 toe amputation left foot;  Surgeon: Mil Jolly DPM;  Location: [facility] OR     REPAIR TENDON QUADRICEPS Right 10/27/2015    Procedure: REPAIR TENDON QUADRICEPS;  Surgeon: Antonio Yi MD;  Location: [facility] OR     SURGICAL HISTORY OF -   1997    bilateral knee replacement     SURGICAL HISTORY OF -   1997    right knee revised; patella tendon ruptured     SURGICAL HISTORY OF -       surgery for spinal stenosis     SURGICAL HISTORY OF -       breast reduction surgery     SURGICAL HISTORY OF -       D and C      Family History   Problem Relation Age of Onset     Cerebrovascular Disease Mother      Heart Disease Father      Neurologic Disorder Sister         ALS     C.A.D. Sister      Diabetes Sister      C.A.D. Brother      Psychotic Disorder Brother         [place] war: PTSD. suicide 2019     Gastrointestinal Disease Brother         diverticulitis     Colon Cancer No family hx of        Exam:   LMP  (LMP Unknown)   PSYCH: mentation appears normal. and affect normal/bright  Results:  - My interpretation of the images relevant for this visit includes: none   - My interpretation of the PFT's relevant for this visit includes: None       Assessment and plan: Ирина is an 84 yo female who is being seen in follow up for recurrent pleural effusions. She had bilateral effusions with multiple thoracentesis. Initially they were transudate but converted to exudate after a few taps. She had left PleurX placed 2/8.   Pleural effusion s/p PleurX - agree with drainage based on symptoms, once or twice a week. Next time Thursday or Monday. I advised her that if there are two consecutive drainage attempts with less than 100mL we should obtain CXR (Ordered)

## 2021-02-15 NOTE — LETTER
2/15/2021         RE: Ирина Yanez  [address] [facility] [id number]        Dear Colleague,    Thank you for referring your patient, Ирина Yanez, to the [facility]. Please see a copy of my visit note below.    Ирина is a 85 year old who is being evaluated via a billable telephone visit.      What phone number would you like to be contacted at? [phone]  How would you like to obtain your AVS? Dinorah Wood CMA on 2/15/2021 at 12:03 PM    Phone call duration: 15 minutes    [facility] Follow Up Visit    Reason for Visit  Ирина Yanez is a 85 year old female who is seen in follow up for pleural effusions   Pulmonary HPI    - Ирина underwent left PleurX placement one week ago for recurrent pleural effusions. She is getting home health for drainage as well as home PT and OT.  She had a drainage this morning. 450ml drained at the time of procedure, 350ml last Thursday (slight improvement in breathing after), 75ml out today. She had some left chest pain last week. She is using oxygen at home and breathing is comfortable.     Other active medical problems include:   - CHF with diastolic dysfunction  - FABY       ROS Pulmonary  Dyspnea: No, Cough: No, Chest pain: Yes, Wheezing: No, Sputum Production: No, Hemoptysis: No  A complete ROS was otherwise negative except as noted in the HPI.    The patient was seen and examined by Smitha Odonnell MD   Current Outpatient Medications   Medication     acetaminophen (TYLENOL) 325 MG tablet     apixaban ANTICOAGULANT (ELIQUIS) 2.5 MG tablet     blood glucose (NO BRAND SPECIFIED) lancets standard     blood glucose (NO BRAND SPECIFIED) test strip     blood glucose monitoring (NO BRAND SPECIFIED) meter device kit     calcium acetate (PHOSLO) 667 MG CAPS capsule     carvedilol (COREG) 3.125 MG tablet     diltiazem ER COATED BEADS (CARDIAZEM LA) 180 MG 24 hr tablet      gabapentin (NEURONTIN) 100 MG capsule     hydrALAZINE (APRESOLINE) 25 MG tablet     order for DME     PIOGLITAZONE HCL PO     polyethylene glycol (MIRALAX) 17 GM/Dose powder     sertraline (ZOLOFT) 50 MG tablet     simvastatin (ZOCOR) 10 MG tablet     torsemide (DEMADEX) 20 MG tablet     albuterol (PROAIR HFA/PROVENTIL HFA/VENTOLIN HFA) 108 (90 Base) MCG/ACT inhaler     MULTI-VITAMIN OR TABS     No current facility-administered medications for this visit.      Allergies   Allergen Reactions     Augmentin Diarrhea     Vomiting       Cleocin      Hives     Tegretol [Carbamazepine]      Irregular heart beat     Social History     Socioeconomic History     Marital status:      Spouse name: Not on file     Number of children: Not on file     Years of education: Not on file     Highest education level: 12th grade   Occupational History     Occupation:  from home     Employer: RETIRED   Social Needs     Financial resource strain: Not hard at all     Food insecurity     Worry: Never true     Inability: Never true     Transportation needs     Medical: No     Non-medical: No   Tobacco Use     Smoking status: Never Smoker     Smokeless tobacco: Never Used   Substance and Sexual Activity     Alcohol use: No     Frequency: Never     Binge frequency: Never     Drug use: No     Sexual activity: Yes     Partners: Male   Lifestyle     Physical activity     Days per week: 0 days     Minutes per session: 0 min     Stress: Only a little   Relationships     Social connections     Talks on phone: Three times a week     Gets together: Never     Attends Mosque service: Never     Active member of club or organization: No     Attends meetings of clubs or organizations: Never     Relationship status:      Intimate partner violence     Fear of current or ex partner: Not on file     Emotionally abused: Not on file     Physically abused: Not on file     Forced sexual activity: Not on file   Other Topics Concern       Service Not Asked     Blood Transfusions Not Asked     Caffeine Concern Not Asked     Occupational Exposure Not Asked     Hobby Hazards Not Asked     Sleep Concern Not Asked     Stress Concern Not Asked     Weight Concern Not Asked     Special Diet No     Comment: getting fruits and veggies.      Back Care Not Asked     Exercise No     Comment: gets little; limited with back and feet/leg pain.     Bike Helmet Not Asked     Seat Belt Not Asked     Self-Exams Not Asked     Parent/sibling w/ CABG, MI or angioplasty before 65F 55M? 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     Complication of anesthesia      Depression      Diabetes mellitus (H)     sees Dr. Verde- type II     Falls frequently 7/10/2019     Heart murmur      HTN      Hyperlipidemia LDL goal < 100     Dr. Verde     Lichen sclerosus et atrophicus 2/15/2013     Melanoma (H)      Oxygen dependent     1 liter continuously     Peripheral Neuropathy     hands, feet; used to see Dr. Tl ARRIAZA (postoperative nausea and vomiting)      Skin cancer     face; basal and other. Melanoma. Dolan     Sleep apnea     CPAP     Spinal stenosis      Past Surgical History:   Procedure Laterality Date     AMPUTATE TOE(S)  8/23/2012    left second toe Procedure: AMPUTATE TOE(S);;  Surgeon: Mil Jolly DPM;  Location: [facility] OR     CHOLECYSTECTOMY  Nov. 1975     COLONOSCOPY  early 2009    repeat 4-5 years (Had one prior to this with polyps)     COLONOSCOPY  Sept 2014    incomplete; recommend colography     COLONOSCOPY  02/25/2020    Dr. Pathak [facility]     COLONOSCOPY N/A 2/25/2020    Procedure: COLONOSCOPY, WITH biopsies using forceps;  Surgeon: Adebayo Pathak MD;  Location:  GI     INSERT CHEST TUBE Bilateral 2/8/2021    Procedure: Attempted  INSERTION, CATHETER, INTERCOSTAL, FOR DRAINAGE (Pleurx);  Surgeon: Smitha Odonnell MD;  Location: [facility] OR     OPTICAL TRACKING SYSTEM FUSION POSTERIOR SPINE LUMBAR N/A 9/16/2016    Procedure: OPTICAL TRACKING SYSTEM FUSION SPINE POSTERIOR LUMBAR ONE LEVEL;  Surgeon: Lennox Blue MD;  Location: [facility] OR     PET, REC OF MELANOMA/MET CA Left     arm     REPAIR HAMMER TOE BILATERAL  8/23/2012    Procedure: REPAIR HAMMER TOE BILATERAL;  Flexor Tenotomy 2,3,4,5 Toes both feet, 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 toe amputation left foot;  Surgeon: Mil Jolly DPM;  Location: [facility] OR     REPAIR TENDON QUADRICEPS Right 10/27/2015    Procedure: REPAIR TENDON QUADRICEPS;  Surgeon: Antonio Yi MD;  Location: [facility] OR     SURGICAL HISTORY OF -   1997    bilateral knee replacement     SURGICAL HISTORY OF -   1997    right knee revised; patella tendon ruptured     SURGICAL HISTORY OF -       surgery for spinal stenosis     SURGICAL HISTORY OF -       breast reduction surgery     SURGICAL HISTORY OF -       D and C      Family History   Problem Relation Age of Onset     Cerebrovascular Disease Mother      Heart Disease Father      Neurologic Disorder Sister         ALS     C.A.D. Sister      Diabetes Sister      C.A.D. Brother      Psychotic Disorder Brother         [place] war: PTSD. suicide 2019     Gastrointestinal Disease Brother         diverticulitis     Colon Cancer No family hx of        Exam:   LMP  (LMP Unknown)   PSYCH: mentation appears normal. and affect normal/bright  Results:  - My interpretation of the images relevant for this visit includes: none   - My interpretation of the PFT's relevant for this visit includes: None       Assessment and plan: Ирина is an 86 yo female who is being seen in follow up for recurrent pleural effusions. She had bilateral effusions with multiple thoracentesis. Initially they were transudate but converted to exudate after a few taps. She had left PleurX placed 2/8.   Pleural  effusion s/p PleurX - agree with drainage based on symptoms, once or twice a week. Next time Thursday or Monday. I advised her that if there are two consecutive drainage attempts with less than 100mL we should obtain CXR (Ordered)          Again, thank you for allowing me to participate in the care of your patient.        Sincerely,        Smitha Odonnell MD

## 2021-02-15 NOTE — PATIENT INSTRUCTIONS
agree with drainage based on symptoms, once or twice a week.   Next time Thursday or Monday.   If there are two consecutive drainage attempts with less than 100mL we should obtain Chest XRay (Ordered)    CXR and appt with Dr. Odonnell scheduled 4/5/21  Bisi Moffett RN on 2/24/2021 at 11:41 AM

## 2021-02-15 NOTE — LETTER
2/15/2021         RE: Ирина Yanez  [address] [facility] [address]        Dear Colleague,    Thank you for referring your patient, Ирина Yanez, to the [facility] [place]. Please see a copy of my visit note below.    Ирина is a 85 year old who is being evaluated via a billable telephone visit.      What phone number would you like to be contacted at? [phone]  How would you like to obtain your AVS? Dinorah Wood CMA on 2/15/2021 at 12:03 PM    Phone call duration: 15 minutes    [facility] Follow Up Visit    Reason for Visit  Ирина Yanez is a 85 year old female who is seen in follow up for pleural effusions   Pulmonary HPI    - Ирина underwent left PleurX placement one week ago for recurrent pleural effusions. She is getting home health for drainage as well as home PT and OT.  She had a drainage this morning. 450ml drained at the time of procedure, 350ml last Thursday (slight improvement in breathing after), 75ml out today. She had some left chest pain last week. She is using oxygen at home and breathing is comfortable.     Other active medical problems include:   - CHF with diastolic dysfunction  - FABY       ROS Pulmonary  Dyspnea: No, Cough: No, Chest pain: Yes, Wheezing: No, Sputum Production: No, Hemoptysis: No  A complete ROS was otherwise negative except as noted in the HPI.    The patient was seen and examined by Smitha Odonnell MD   Current Outpatient Medications   Medication     acetaminophen (TYLENOL) 325 MG tablet     apixaban ANTICOAGULANT (ELIQUIS) 2.5 MG tablet     blood glucose (NO BRAND SPECIFIED) lancets standard     blood glucose (NO BRAND SPECIFIED) test strip     blood glucose monitoring (NO BRAND SPECIFIED) meter device kit     calcium acetate (PHOSLO) 667 MG CAPS capsule     carvedilol (COREG) 3.125 MG tablet     diltiazem ER COATED BEADS (CARDIAZEM LA) 180 MG 24 hr tablet      gabapentin (NEURONTIN) 100 MG capsule     hydrALAZINE (APRESOLINE) 25 MG tablet     order for DME     PIOGLITAZONE HCL PO     polyethylene glycol (MIRALAX) 17 GM/Dose powder     sertraline (ZOLOFT) 50 MG tablet     simvastatin (ZOCOR) 10 MG tablet     torsemide (DEMADEX) 20 MG tablet     albuterol (PROAIR HFA/PROVENTIL HFA/VENTOLIN HFA) 108 (90 Base) MCG/ACT inhaler     MULTI-VITAMIN OR TABS     No current facility-administered medications for this visit.      Allergies   Allergen Reactions     Augmentin Diarrhea     Vomiting       Cleocin      Hives     Tegretol [Carbamazepine]      Irregular heart beat     Social History     Socioeconomic History     Marital status:      Spouse name: Not on file     Number of children: Not on file     Years of education: Not on file     Highest education level: 12th grade   Occupational History     Occupation:  from home     Employer: RETIRED   Social Needs     Financial resource strain: Not hard at all     Food insecurity     Worry: Never true     Inability: Never true     Transportation needs     Medical: No     Non-medical: No   Tobacco Use     Smoking status: Never Smoker     Smokeless tobacco: Never Used   Substance and Sexual Activity     Alcohol use: No     Frequency: Never     Binge frequency: Never     Drug use: No     Sexual activity: Yes     Partners: Male   Lifestyle     Physical activity     Days per week: 0 days     Minutes per session: 0 min     Stress: Only a little   Relationships     Social connections     Talks on phone: Three times a week     Gets together: Never     Attends Synagogue service: Never     Active member of club or organization: No     Attends meetings of clubs or organizations: Never     Relationship status:      Intimate partner violence     Fear of current or ex partner: Not on file     Emotionally abused: Not on file     Physically abused: Not on file     Forced sexual activity: Not on file   Other Topics Concern       Service Not Asked     Blood Transfusions Not Asked     Caffeine Concern Not Asked     Occupational Exposure Not Asked     Hobby Hazards Not Asked     Sleep Concern Not Asked     Stress Concern Not Asked     Weight Concern Not Asked     Special Diet No     Comment: getting fruits and veggies.      Back Care Not Asked     Exercise No     Comment: gets little; limited with back and feet/leg pain.     Bike Helmet Not Asked     Seat Belt Not Asked     Self-Exams Not Asked     Parent/sibling w/ CABG, MI or angioplasty before 65F 55M? 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     Complication of anesthesia      Depression      Diabetes mellitus (H)     sees Dr. Verde- type II     Falls frequently 7/10/2019     Heart murmur      HTN      Hyperlipidemia LDL goal < 100     Dr. Verde     Lichen sclerosus et atrophicus 2/15/2013     Melanoma (H)      Oxygen dependent     1 liter continuously     Peripheral Neuropathy     hands, feet; used to see Dr. Tl ARRIAZA (postoperative nausea and vomiting)      Skin cancer     face; basal and other. Melanoma. Dolan     Sleep apnea     CPAP     Spinal stenosis      Past Surgical History:   Procedure Laterality Date     AMPUTATE TOE(S)  8/23/2012    left second toe Procedure: AMPUTATE TOE(S);;  Surgeon: Mil Jolly DPM;  Location: [facility] OR     CHOLECYSTECTOMY  Nov. 1975     COLONOSCOPY  early 2009    repeat 4-5 years (Had one prior to this with polyps)     COLONOSCOPY  Sept 2014    incomplete; recommend colography     COLONOSCOPY  02/25/2020    Dr. Pathak [facility]     COLONOSCOPY N/A 2/25/2020    Procedure: COLONOSCOPY, WITH biopsies using forceps;  Surgeon: Adebayo Pathak MD;  Location:  GI     INSERT CHEST TUBE Bilateral 2/8/2021    Procedure: Attempted  INSERTION, CATHETER, INTERCOSTAL, FOR DRAINAGE (Pleurx);  Surgeon: Smitha Odonnell MD;  Location: [facility] OR     OPTICAL TRACKING SYSTEM FUSION POSTERIOR SPINE LUMBAR N/A 9/16/2016    Procedure: OPTICAL TRACKING SYSTEM FUSION SPINE POSTERIOR LUMBAR ONE LEVEL;  Surgeon: Lennox Blue MD;  Location: [facility] OR     PET, REC OF MELANOMA/MET CA Left     arm     REPAIR HAMMER TOE BILATERAL  8/23/2012    Procedure: REPAIR HAMMER TOE BILATERAL;  Flexor Tenotomy 2,3,4,5 Toes both feet, 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 toe amputation left foot;  Surgeon: Mil Jolly DPM;  Location: [facility] OR     REPAIR TENDON QUADRICEPS Right 10/27/2015    Procedure: REPAIR TENDON QUADRICEPS;  Surgeon: Antonio Yi MD;  Location: [facility] OR     SURGICAL HISTORY OF -   1997    bilateral knee replacement     SURGICAL HISTORY OF -   1997    right knee revised; patella tendon ruptured     SURGICAL HISTORY OF -       surgery for spinal stenosis     SURGICAL HISTORY OF -       breast reduction surgery     SURGICAL HISTORY OF -       D and C      Family History   Problem Relation Age of Onset     Cerebrovascular Disease Mother      Heart Disease Father      Neurologic Disorder Sister         ALS     C.A.D. Sister      Diabetes Sister      C.A.D. Brother      Psychotic Disorder Brother         [place] war: PTSD. suicide 2019     Gastrointestinal Disease Brother         diverticulitis     Colon Cancer No family hx of        Exam:   LMP  (LMP Unknown)   PSYCH: mentation appears normal. and affect normal/bright  Results:  - My interpretation of the images relevant for this visit includes: none   - My interpretation of the PFT's relevant for this visit includes: None       Assessment and plan: Ирина is an 84 yo female who is being seen in follow up for recurrent pleural effusions. She had bilateral effusions with multiple thoracentesis. Initially they were transudate but converted to exudate after a few taps. She had left PleurX placed 2/8.   Pleural  effusion s/p PleurX - agree with drainage based on symptoms, once or twice a week. Next time Thursday or Monday. I advised her that if there are two consecutive drainage attempts with less than 100mL we should obtain CXR (Ordered)          Again, thank you for allowing me to participate in the care of your patient.        Sincerely,        Smitha Odonnell MD

## 2021-02-16 ENCOUNTER — IMMUNIZATION (OUTPATIENT)
Dept: NURSING | Facility: CLINIC | Age: 86
End: 2021-02-16
Payer: COMMERCIAL

## 2021-02-16 PROCEDURE — 91300 PR COVID VAC PFIZER DIL RECON 30 MCG/0.3 ML IM: CPT

## 2021-02-16 PROCEDURE — 0001A PR COVID VAC PFIZER DIL RECON 30 MCG/0.3 ML IM: CPT

## 2021-02-19 DIAGNOSIS — D63.1 ANEMIA OF CHRONIC RENAL FAILURE: ICD-10-CM

## 2021-02-19 DIAGNOSIS — N18.4 CHRONIC KIDNEY DISEASE, STAGE IV (SEVERE) (H): Primary | ICD-10-CM

## 2021-02-19 DIAGNOSIS — E21.1 SECONDARY HYPERPARATHYROIDISM, NON-RENAL (H): ICD-10-CM

## 2021-02-19 DIAGNOSIS — N18.9 ANEMIA OF CHRONIC RENAL FAILURE: ICD-10-CM

## 2021-02-22 ENCOUNTER — VIRTUAL VISIT (OUTPATIENT)
Dept: PHARMACY | Facility: CLINIC | Age: 86
End: 2021-02-22
Payer: COMMERCIAL

## 2021-02-22 DIAGNOSIS — G62.9 NEUROPATHY: ICD-10-CM

## 2021-02-22 DIAGNOSIS — I48.0 PAROXYSMAL ATRIAL FIBRILLATION (H): ICD-10-CM

## 2021-02-22 DIAGNOSIS — I10 HYPERTENSION: ICD-10-CM

## 2021-02-22 DIAGNOSIS — E78.5 HYPERLIPIDEMIA WITH TARGET LDL LESS THAN 100: Primary | ICD-10-CM

## 2021-02-22 DIAGNOSIS — R60.9 FLUID RETENTION: ICD-10-CM

## 2021-02-22 DIAGNOSIS — Z78.9 TAKES DIETARY SUPPLEMENTS: ICD-10-CM

## 2021-02-22 DIAGNOSIS — E11.21 TYPE 2 DIABETES MELLITUS WITH DIABETIC NEPHROPATHY (H): ICD-10-CM

## 2021-02-22 DIAGNOSIS — N18.4 CKD (CHRONIC KIDNEY DISEASE) STAGE 4, GFR 15-29 ML/MIN (H): ICD-10-CM

## 2021-02-22 PROCEDURE — 99605 MTMS BY PHARM NP 15 MIN: CPT | Mod: TEL | Performed by: PHARMACIST

## 2021-02-22 PROCEDURE — 99607 MTMS BY PHARM ADDL 15 MIN: CPT | Mod: TEL | Performed by: PHARMACIST

## 2021-02-22 NOTE — Clinical Note
See note for all details. Once I figure out warfarin dosing I will message you to order the INR referral.      Lara

## 2021-02-22 NOTE — PATIENT INSTRUCTIONS
Recommendations from today's MTM visit:                                                    MTM (medication therapy management) is a service provided by a clinical pharmacist designed to help you get the most of out of your medicines.   Today we reviewed what your medicines are for, how to know if they are working, that your medicines are safe and how to make your medicine regimen as easy as possible.      1. Contact the [place] Prescription Assistance Program/Fund (Shonna Christiansonelizabethkanika and Yulia Vaughn) at [phone] to see if you qualify for assistance with the Eliquis.    2. Apply for low income subsidy through social security. Here is the website - https://www.ssa.gov/benefits/medicare/prescriptionhelp/     3. Write down your blood pressure readings so we can see if it is controlled or getting too low.  I will talk to Dr. Villarreal about your blood pressures and see if she wants to reduce one of your blood pressure medicines.    4. Because of the interaction between diltiazem and simvastatin we are switching you to pravastatin 20 mg once daily. I sent this to your mail order pharmacy.    5. Start checking your blood sugars, sometimes fasting and sometimes 2 hours after a meal. Review with Dr. Verde at your upcoming appointment.     7. Get labs done by the end of this week. You may want to message Dr. Whatley to find out if he wants any additional labs for his visit next week.     8. Call [facility] mail order pharmacy to transfer your prescriptions and ask them to call the pharmacy where your prescriptions are currently located. They will do the work in transferring them.     9. Talk to Dr. Whatley about how long you should use the torsemide and how that medicine effects effecting your kidneys.    10. Schedule follow up with Dr. Beasley the cardiologist as requested by Dr. Villarreal at your last visit with her.    It was great to speak with you today.  I value your experience and would be very thankful for your time with  providing feedback on our clinic survey. You may receive a survey via email or text message in the next few days.     Next MTM visit: 1 month    To schedule another MTM appointment, please call the clinic directly or you may call the MTM scheduling line at [phone] or toll-free at [phone].     My Clinical Pharmacist's contact information:                                                      It was a pleasure talking with you today!  Please feel free to contact me with any questions or concerns you have.      Lara Cain, PharmD  Medication Therapy Management Pharmacist  [facility] - Monday and Wednesday 7:30 - 4:00  Pager: [phone]

## 2021-02-22 NOTE — PROGRESS NOTES
Medication Therapy Management (MTM) Encounter    ASSESSMENT:                            Medication Adherence/Access: No issues identified    Hypertension/Afib: Patient is meeting blood pressure goal of < 140/90mmHg. Since it will be at least a couple of months before she would get Eliquis through the , we will look into switching her to warfarin.  I did discuss this with her PCP and she is in agreement.  I will look into how to make this switch and get that information to her PCP so that a referral can be placed for INR monitoring through home care.  Also discussed patient's lower diastolic reading with PCP and since patient is asymptomatic for hypotension, we feel it is okay for her to follow-up as scheduled with her nephrologist Dr. Whatley on March 2nd.  I asked the patient to write down her blood pressure readings so that she can provide this information at that visit which is scheduled to be a telephone visit.    Hyperlipidemia: With the drug interaction between simvastatin and diltiazem, recommended today that we switch from simvastatin 10 mg to pravastatin 20 mg which is an equivalent dose.    Type 2 Diabetes/CKD: Patient is meeting A1c goal of < 8%. Patient would benefit from following up with her endocrinologist as scheduled. Microalbumin is not at goal < 30 mg/g. Not taking an ACE-inhibitor or ARB; stopped by nephrologist. Due for annual foot exam. Aspirin therapy is not indicated in this patient due to anticoagulant/antiplatelet therapy.  Reviewed all her medications to make sure doses were appropriate based on most recent kidney function lab results.  All are appropriate except for the frequency of gabapentin which was changed today.    Neuropathy: Stable.  Changed gabapentin to 200 mg once daily at bedtime based on creatinine clearance between 15 and 29 mL/min.     Fluid retention: Recommended patient discuss duration of use of the torsemide with her nephrologist at her upcoming appointment.       Supplements: No changes recommended.  Will discuss restarting multivitamin at a future visit as I am not sure what is in the product. Would need to be mindful if/when warfarin is started.    PLAN:                            1. Contact the [place] Prescription Assistance Program/Fund (Shonna Myers and Yuliaperri Vaughn) at [phone] to see if you qualify for assistance with the Eliquis.    2. Apply for low income subsidy through social security. Here is the website - https://www.ssa.gov/benefits/medicare/prescriptionhelp/     3. Write down your blood pressure readings so we can see if it is controlled or getting too low.  I will talk to Dr. Villarreal about your blood pressures and see if she wants to reduce one of your blood pressure medicines.    4. Because of the interaction between diltiazem and simvastatin we are switching you to pravastatin 20 mg once daily. I sent this to your mail order pharmacy.    5. Start checking your blood sugars, sometimes fasting and sometimes 2 hours after a meal. Review with Dr. Verde at your upcoming appointment.     7. Get labs done by the end of this week. You may want to message Dr. Whatley to find out if he wants any additional labs for his visit next week.     8. Call [facility] mail order pharmacy to transfer your prescriptions and ask them to call the pharmacy where your prescriptions are currently located. They will do the work in transferring them.       Follow-up: 1 month    SUBJECTIVE/OBJECTIVE:                          Ирина Yanez is a 86 year old female called for an initial visit. She was referred to me from her insurance provider [facility].      Reason for visit: complete medication review.  I noticed she was recently in the hospital so will     - She cant afford the Eliquis - wondering about switching to warfarin while she looks into assistance?    - Feels pretty good since leaving out of hospital - new meds phoslo, carvedilol, diltiazem and O2 1l/min all the time  and with BPAP at night; Decreased gabapentin and hydralazine; stopped pioglitazone 15 mg and glimepiride 1 mg (waiting to hear back from endocrinologist)    Allergies/ADRs: Reviewed in chart  Tobacco: She reports that she has never smoked. She has never used smokeless tobacco.  Alcohol: not currently using  Past Medical History: Reviewed in chart - no personal history of MI or stroke    Endocrinologist: Reji Verde  Nephrologist: Dr. Whatley outside of [facility] - appt per patient scheduled 3/2/21    Medication Adherence/Access: no issues reported    Hypertension/Afib: Current medications include carvedilol 3.125 mg twice daily (new since hospital), diltiazem  mg twice daily (new since hospital), hydralazine 25 mg three times daily (decreased in hospital), Eliquis 2.5 mg twice daily (new since hospital).  Patient does self-monitor blood pressure. She has not been writing the readings down. Reports that OP/PT said her bottom number is down.   Took blood pressure today at home and reports 122/41, pulse 72. She confirmed her home cuff is accurate last fall. Patient reports no current medication side effects. No dizziness or lightheadedness, no fatigue, no shortness of breath.  She is using oxygen at 1 L/min during the day as well as at night.  This is new since being in the hospital last.  BP Readings from Last 3 Encounters:   02/09/21 118/52   02/08/21 131/60   02/02/21 121/48     Hyperlipidemia: Current therapy includes simvastatin 10mg daily.  Patient reports no significant myalgias or other side effects.      Recent Labs   Lab Test 04/27/20  0920 01/21/20  0911 12/17/13  0859 12/17/13  0859   CHOL 181 207*   < > 165   HDL 44* 47*   < > 49*   LDL 89 115*   < > 76   TRIG 240* 223*   < > 204*   CHOLHDLRATIO  --   --   --  3.4    < > = values in this interval not displayed.     Type 2 Diabetes/CKD:  Currently taking calcium acetate 667 mg three times daily for CKD (new since hospital visit). Both pioglitazone and  glimepiride were stopped in the hospital. Patient reports she was using insulin in the hospital because blood sugars were high.   Blood sugar monitoring: never.   Symptoms of low blood sugar? none  Symptoms of high blood sugar? none  Eye exam: up to date  Foot exam: due  Diet/Exercise: did not discuss today  Aspirin: Not taking due to age  Statin: Yes: simvastatin   ACEi/ARB: No. Stopped by nephrology per care everywhere note from 12/9/20.  Urine Albumin:   Lab Results   Component Value Date    UMALCR 85.22 (H) 03/22/2019      Lab Results   Component Value Date    A1C 7.1 01/26/2021    A1C 6.8 04/27/2020    A1C 6.3 09/27/2019    A1C 6.9 03/22/2019    A1C 6.2 06/12/2018     Last Comprehensive Metabolic Panel:  Sodium   Date Value Ref Range Status   02/09/2021 139 133 - 144 mmol/L Final     Potassium   Date Value Ref Range Status   02/09/2021 4.7 3.4 - 5.3 mmol/L Final     Chloride   Date Value Ref Range Status   02/09/2021 102 94 - 109 mmol/L Final     Carbon Dioxide   Date Value Ref Range Status   02/09/2021 31 20 - 32 mmol/L Final     Anion Gap   Date Value Ref Range Status   02/09/2021 6 3 - 14 mmol/L Final     Glucose   Date Value Ref Range Status   02/09/2021 125 (H) 70 - 99 mg/dL Final     Urea Nitrogen   Date Value Ref Range Status   02/09/2021 73 (H) 7 - 30 mg/dL Final     Creatinine   Date Value Ref Range Status   02/09/2021 2.97 (H) 0.52 - 1.04 mg/dL Final     GFR Estimate   Date Value Ref Range Status   02/09/2021 14 (L) >60 mL/min/[1.73_m2] Final     Comment:     Non  GFR Calc  Starting 12/18/2018, serum creatinine based estimated GFR (eGFR) will be   calculated using the Chronic Kidney Disease Epidemiology Collaboration   (CKD-EPI) equation.       Calcium   Date Value Ref Range Status   02/09/2021 9.2 8.5 - 10.1 mg/dL Final     CrCl cannot be calculated (Patient's most recent lab result is older than the maximum 10 days allowed.).     Manual CrCl calculation was done using adjusted body  weight: 16 ml/min    Neuropathy:  Current medications include: Gabapentin 100 mg twice daily (decreased during hospital stay).     Fluid retention:  Current medications include: Torsemide 40 mg once daily.  Patient reports this is managed by her nephrologist Dr. Whatley.  Patient has chest x-ray scheduled for tomorrow and then following up with pulmonology.    Supplements:  Current medications include: None.  Wondering if she can restart her Shaklee multivitamin.    Today's Vitals: LMP  (LMP Unknown)   ----------------  Post Discharge Medication Reconciliation Status: discharge medications reconciled and changed, per note/orders.    I spent 40 minutes with this patient today. All changes were made via verbal approval with Dr. Yuridia Villarreal. A copy of the visit note was provided to the patient's primary care provider.    The patient was sent via [employer] a summary of these recommendations.     Lara Cain, PharmD  Medication Therapy Management Pharmacist    Telemedicine Visit Details  Type of service:  Telephone visit  Start Time: 9:47 AM  End Time: 10:27 AM  Originating Location (patient location): Home  Distant Location (provider location):  [facility]        Medication Therapy Recommendations  Diabetic polyneuropathy (H)    Current Medication: gabapentin (NEURONTIN) 100 MG capsule   Rationale: Frequency inappropriate - Dosage too high - Safety   Recommendation: Decrease Frequency - Take 200 mg once daily   Status: Accepted per Provider   Note: based on most recent renal function         Hyperlipidemia with target LDL less than 100    Current Medication: simvastatin (ZOCOR) 10 MG tablet (Discontinued)   Rationale: Unsafe medication for the patient - Adverse medication event - Safety   Recommendation: Change Medication - pravastatin 20 MG tablet - 1 tablet daily   Status: Accepted per Provider         Paroxysmal atrial fibrillation (H)    Current Medication: apixaban ANTICOAGULANT (ELIQUIS) 2.5 MG  tablet   Rationale: Cannot afford medication product - Cost - Adherence   Recommendation: Change Medication - WARFARIN SODIUM   Status: Accepted per Provider

## 2021-02-22 NOTE — Clinical Note
Please see my updates regarding switching to warfarin. She only has about a week and a half left of Eliquis so in order to bridge her we should get this done asap. Thank you    Lara Cain, PharmD  Medication Therapy Management Pharmacist

## 2021-02-22 NOTE — LETTER
"        Date: 2021    Ирина Yanez  [address] [facility] [address]    Dear Ms. Yanez,    Thank you for talking with me on 21 about your health and medications. Medicare s MTM (Medication Therapy Management) program helps you understand your medications and use them safely.      This letter includes an action plan (Medication Action Plan) and medication list (Personal Medication List). The action plan has steps you should take to help you get the best results from your medications. The medication list will help you keep track of your medications and how to use them the right way.       Have your action plan and medication list with you when you talk with your doctors, pharmacists, and other healthcare providers in your care team.     Ask your doctors, pharmacists, and other healthcare providers to update the action plan and medication list at every visit.     Take your medication list with you if you go to the hospital or emergency room.     Give a copy of the action plan and medication list to your family or caregivers.     If you want to talk about this letter or any of the papers with it, please call   [phone].We look forward to working with you, your doctors, and other healthcare providers to help you stay healthy through the [employer] [place] MTM program.    Sincerely,  Zarina Cain [facility]    Enclosed: Medication Action Plan and Personal Medication List    MEDICATION ACTION PLAN FOR Ирина Yanez,  1935     This action plan will help you get the best results from your medications if you:   1. Read \"What we talked about.\"   2. Take the steps listed in the \"What I need to do\" boxes.   3. Fill in \"What I did and when I did it.\"   4. Fill in \"My follow-up plan\" and \"Questions I want to ask.\"     Have this action plan with you when you talk with your doctors, pharmacists, and other healthcare providers in your care team. Share this with your family or caregivers " too.  DATE PREPARED: 2021  What we talked about: Changing your cholesterol medicine simvastatin because it interacts with your new medicine diltiazem.                                                  What I need to do: Stop taking simvastatin and start taking pravastatin 20 mg once daily at night.       What I did and when I did it:                                              What we talked about: Changing gabapentin to once daily                                                  What I need to do: Start taking two 100 mg capsules of gabapentin once daily at night because your kidney function is reduced.       What I did and when I did it:                                               What we talked about: Switching to warfarin while you get assistance for Eliquis through the .                                                  What I need to do: I am working with Dr. Villarreal to figure out the correct dosing and monitoring plan. I will let you know when we have figured this out.       What I did and when I did it:                                                     My follow-up plan:                 Questions I want to ask:              If you have any questions about your action plan, call Zarina Cain [facility], Phone: [phone] , Monday-Friday 8-4:30pm.           PERSONAL MEDICATION LIST FOR [place]рина [place] 1935     This medication list was made for you after we talked. We also used information from your doctor's chart.      Use blank rows to add new medications. Then fill in the dates you started using them.    Cross out medications when you no longer use them. Then write the date and why you stopped using them.    Ask your doctors, pharmacists, and other healthcare providers to update this list at every visit. Keep this list up-to-date with:       Prescription medications    Over the counter drugs     Herbals    Vitamins    Minerals      If you go to the hospital or emergency room, take  this list with you. Share this with your family or caregivers too.     DATE PREPARED: 2/24/2021  Allergies or side effects: Augmentin, Cleocin, and Tegretol [carbamazepine]     Medication:  ACETAMINOPHEN 325 MG PO TABS      How I use it:  Take 2 tablets (650 mg) by mouth every 4 hours as needed for mild pain      Why I use it: Generalized pain    Prescriber:  Frandy Julio MD      Date I started using it:       Date I stopped using it:         Why I stopped using it:            Medication:  ALBUTEROL SULFATE  (90 BASE) MCG/ACT IN AERS      How I use it:  Inhale 2 puffs into the lungs 4 times daily      Why I use it: Dyspnea, unspecified type    Prescriber:  Yuridia Villarreal MD      Date I started using it:       Date I stopped using it:         Why I stopped using it:            Medication:  APIXABAN 2.5 MG PO TABS      How I use it:  Take 1 tablet (2.5 mg) by mouth 2 times daily      Why I use it: Atrial fibrillation, unspecified type (H)    Prescriber:  Mil Eckert MD      Date I started using it:       Date I stopped using it:         Why I stopped using it:            Medication:  BLOOD GLUCOSE MONITORING SUPPL KIT      How I use it:  Use to test blood sugar 1 times daily or as directed. Ultra 2      Why I use it: Type 2 diabetes mellitus with diabetic neuropathy, without long-term current use of insulin (H)    Prescriber:  Reji Verde MD      Date I started using it:       Date I stopped using it:         Why I stopped using it:            Medication:  CALCIUM ACETATE (PHOS BINDER) 667 MG PO CAPS      How I use it:  Take 1 capsule (667 mg) by mouth 3 times daily (with meals)      Why I use it: CKD (chronic kidney disease) stage 4, GFR 15-29 ml/min (H)    Prescriber:  Mil Eckert MD      Date I started using it:       Date I stopped using it:         Why I stopped using it:            Medication:  CARVEDILOL 3.125 MG PO TABS      How I use it:  Take 1 tablet (3.125 mg) by  mouth 2 times daily (with meals)      Why I use it: Hypertension goal BP (blood pressure) < 140/90    Prescriber:  Mil Eckert MD      Date I started using it:       Date I stopped using it:         Why I stopped using it:            Medication:  DILTIAZEM HCL ER COATED BEADS 180 MG OR TB24      How I use it:  Take 180 mg by mouth 2 times daily       Why I use it:      Prescriber:  Patient Reported      Date I started using it:       Date I stopped using it:         Why I stopped using it:            Medication:  GABAPENTIN 100 MG PO CAPS      How I use it:  Take 2 capsule (200 mg) by mouth once daily      Why I use it: Diabetic polyneuropathy associated with type 2 diabetes mellitus (H)    Prescriber:  Frandy Julio MD      Date I started using it:       Date I stopped using it:         Why I stopped using it:            Medication:  GLUCOSE BLOOD VI STRP      How I use it:  Use to test blood sugar 1 times daily or as directed, Contour Next strips      Why I use it: Type 2 diabetes mellitus with diabetic nephropathy, without long-term current use of insulin (H); Type 2 diabetes mellitus with diabetic neuropathy, without long-term current use of insulin (H)    Prescriber:  Reji Verde MD      Date I started using it:       Date I stopped using it:         Why I stopped using it:            Medication:  HYDRALAZINE HCL 25 MG PO TABS      How I use it:  Take 1 tablet (25 mg) by mouth 3 times daily      Why I use it: Hypertension goal BP (blood pressure) < 140/90    Prescriber:  Mil Eckert MD      Date I started using it:       Date I stopped using it:         Why I stopped using it:            Medication:  LANCETS STANDARD MISC      How I use it:  Use to test blood sugar 1 times daily or as directed, Contour Next lancets      Why I use it: Type 2 diabetes mellitus with diabetic neuropathy, without long-term current use of insulin (H); Type 2 diabetes mellitus with diabetic nephropathy,  without long-term current use of insulin (H)    Prescriber:  Reji Verde MD      Date I started using it:       Date I stopped using it:         Why I stopped using it:            Medication:  MULTI-VITAMIN OR TABS      How I use it:  Take 2 tablets by mouth daily       Why I use it:  General Health    Prescriber:  Patient Reported      Date I started using it:       Date I stopped using it:         Why I stopped using it:               Medication:  POLYETHYLENE GLYCOL 3350 17 GM/DOSE PO POWD      How I use it:  Take 17 g by mouth daily as needed for constipation      Why I use it: Constipation, unspecified constipation type    Prescriber:  Frandy Julio MD      Date I started using it:       Date I stopped using it:         Why I stopped using it:            Medication:  PRAVASTATIN SODIUM 20 MG PO TABS      How I use it:  Take 1 tablet (20 mg) by mouth daily      Why I use it: Hyperlipidemia with target LDL less than 100    Prescriber:  Yuridia Villarreal MD      Date I started using it:       Date I stopped using it:         Why I stopped using it:            Medication:  SERTRALINE HCL 50 MG PO TABS      How I use it:  Take 1 tablet (50 mg) by mouth daily      Why I use it: Anxiety    Prescriber:  Yuridia Villarreal MD      Date I started using it:       Date I stopped using it:         Why I stopped using it:            Medication:  TORSEMIDE 20 MG PO TABS      How I use it:  Take 2 tablets (40 mg) by mouth daily      Why I use it: Pleural effusion on left    Prescriber:  Mil Eckert MD      Date I started using it:       Date I stopped using it:         Why I stopped using it:            Medication:         How I use it:         Why I use it:      Prescriber:         Date I started using it:       Date I stopped using it:         Why I stopped using it:            Medication:         How I use it:         Why I use it:      Prescriber:         Date I started using it:       Date I stopped using  it:         Why I stopped using it:            Medication:         How I use it:         Why I use it:      Prescriber:         Date I started using it:       Date I stopped using it:         Why I stopped using it:              Other Information:     If you have any questions about your medication list, call Zarina Cain [facility], Phone: [phone] , Monday-Friday 8-4:30pm.    According to the Paperwork Reduction Act of 1995, no persons are required to respond to a collection of information unless it displays a valid OMB control number. The valid OMB number for this information collection is [id number]. The time required to complete this information collection is estimated to average 40 minutes per response, including the time to review instructions, searching existing data resources, gather the data needed, and complete and review the information collection. If you have any comments concerning the accuracy of the time estimate(s) or suggestions for improving this form, please write to: CMS, Attn: BAIRON Reports Clearance Officer, [address] [place] [phone].

## 2021-02-23 ENCOUNTER — TELEPHONE (OUTPATIENT)
Dept: FAMILY MEDICINE | Facility: CLINIC | Age: 86
End: 2021-02-23

## 2021-02-23 ENCOUNTER — TELEPHONE (OUTPATIENT)
Dept: PULMONOLOGY | Facility: CLINIC | Age: 86
End: 2021-02-23

## 2021-02-23 NOTE — TELEPHONE ENCOUNTER
[facility]    Phone Message    May a detailed message be left on voicemail: yes     Reason for Call: Other: Araceli calling to report that the amount of fluid being drained has dropped.  It started at 325 cc's on 2/11, then 75 cc's on 2/15, and 50 cc's on 2/22.  They are draining it weekly at this time and Ирина was informed to call her if she feels like she is filling up with fluid and they will come out to drain her.  Araceli states that Ирина feels fine at this time and is wondering if there is more she should be doing or if the amount of fluid is ok.  Please review and then call Araceli back to discuss     Action Taken: Message routed to:  [facility] UC Pulm    Travel Screening: Not Applicable

## 2021-02-23 NOTE — TELEPHONE ENCOUNTER
Received Home Health Certification and Plan of Care, placed in PCP's in basket.    Please review, sign and fax back to [phone].  -Carey Greco

## 2021-02-24 RX ORDER — PRAVASTATIN SODIUM 20 MG
20 TABLET ORAL DAILY
Qty: 90 TABLET | Refills: 1 | Status: SHIPPED | OUTPATIENT
Start: 2021-02-24 | End: 2021-07-28

## 2021-02-24 NOTE — PROGRESS NOTES
Here is what I found for making the switch from Eliquis to warfarin for a short time while the patient works on assistance for Eliquis.     We can bridge with Eliquis while getting INR up to therapeutic range. Once therapeutic, we stop Eliquis and recheck INR 24 hours later. PCP will need to place an Anticoagulation referral with approprate goal range. Anticoag manage INR results. If she is having home care check and do the monitoring it is my understanding that anticofan De Jesus will do the managing. PCP will need to order warfarin.  Starting dose is usually 5 mg warfarin daily, unless increased age or small body size, then recommend 2.5 mg daily. Will need to set up lab draws to check INR every 3-4 days. PCP would also need to place standing orders for INR POCT.     Dr. Villarreal I think if you order the referral and enter the prescription, then the antico team will handle the rest unless you know different.   I will call the patient and let her know the plan.  Please reach out to the patient if these plans change.  She only has about a week and a half left of Eliquis.     Routing to PCP for review.    Lara Cain, PharmD  Medication Therapy Management Pharmacist

## 2021-02-24 NOTE — TELEPHONE ENCOUNTER
In my outbox.         ------------------------------------------------------------------------------------    Home Care has doxaszosin 1 mg daily.   They do not have Miralax, tyelnol, MVI

## 2021-02-24 NOTE — TELEPHONE ENCOUNTER
Anyi called writer back. Writer updated Anyi that Ирина will be getting a CXR tomorrow for her low pleurX output. Anyi verbalized understanding and reported she will update Ирина Charles's .    Bisi Moffett, RN, BSN, MARIA TERESA  RN Care Coordinator  [facility]  [phone]

## 2021-02-24 NOTE — TELEPHONE ENCOUNTER
"Per Dr. Odonnell's last note from 2/15/21, she recommended pt get a CXR if drainage output is < 100 x 2 times.     Writer called Anyi Jj,  at [facility] and left a voicemail to return call.     Writer called Ирина and spoke with her. She reports she is \"feeling pretty good\" and denies any SOB. Writer explained per Dr. Odonnell's office visit notes from 2/15/21, she recommended a CXR if the output is < 100 x 2 times. Ирина verbalized understanding and is in agreement with the plan. Writer called Imaging scheduling with Ирина on the phone, and scheduled Ирина for CXR tomorrow, 2/25 at 11:00 am.     Ирина verbalized understanding and is in agreement with the plan.     Bisi Moffett, RN, BSN, MARIA TERESA  RN Care Coordinator  [facility]  [phone]          "

## 2021-02-24 NOTE — TELEPHONE ENCOUNTER
Writer called Araceli, there was no answer. Left voicemail to return call to [phone].    Bisi Moffett, RN, BSN, MARIA TERESA  RN Care Coordinator  [facility]  [phone]

## 2021-02-25 ENCOUNTER — TELEPHONE (OUTPATIENT)
Dept: FAMILY MEDICINE | Facility: CLINIC | Age: 86
End: 2021-02-25

## 2021-02-25 ENCOUNTER — HOSPITAL ENCOUNTER (OUTPATIENT)
Dept: GENERAL RADIOLOGY | Facility: CLINIC | Age: 86
Discharge: HOME | End: 2021-02-25
Attending: INTERNAL MEDICINE | Admitting: INTERNAL MEDICINE
Payer: COMMERCIAL

## 2021-02-25 DIAGNOSIS — I48.0 PAROXYSMAL ATRIAL FIBRILLATION (H): Primary | ICD-10-CM

## 2021-02-25 DIAGNOSIS — J90 PLEURAL EFFUSION: ICD-10-CM

## 2021-02-25 PROCEDURE — 71046 X-RAY EXAM CHEST 2 VIEWS: CPT

## 2021-02-25 NOTE — TELEPHONE ENCOUNTER
Per chart review, Ирина got her CXR done today. Writer reviewed the CXR with Dr. Odonnell and she recommended having the home care nurse flush the pleurX catheter with saline to help it drain. If the saline does not help then we can flush it with TPA.     Writer called Ирина and updated her. Ирина reports she is breathing well today. She had her oxygen off and took a shower and walked out to the living room and her sats here 90%. Ирина is in agreement with the plan.     Writer called Araceli at [phone] and left a voicemail to return call to Dr. Odonnell's nurse so we can coordinate getting Ирина's pleurx catheter flushed.     Bisi Moffett, RN, BSN, MARIA TERESA  RN Care Coordinator  [facility]  [phone]

## 2021-02-25 NOTE — TELEPHONE ENCOUNTER
Spoke with Genevieve who reports that next visit from home care is scheduled for 3/1/21.  Her  is Jessika,  home care, at [phone].      Gave VO to jessika after speaking with [facility], Dorothea Richter, and patient will have INR drawn on Monday.  She will resume eliquis over the weekend and once INR is drawn, we will have her start warfarin based on result.    Per Jessika, patient has a clogged pleurx catheter and it will be flushed on Monday and may need TPA for this. She would like [facility] to know just in case it effects INR.    Spoke with Ирина and advised her to continue her eliquis over the weekend and that INR will be drawn on Monday with home care. She verbalized understanding that warfarin therapy will start Monday 3/1 after home care draws INR.        Valerie Bustamante RN

## 2021-02-25 NOTE — TELEPHONE ENCOUNTER
Writer spoke with Araceli, . Per Araceli, she does not believe they flush PleurX catheters because they don't have the special connectors.     Writer confirmed they would be able to drain Ирина after the flush.     Writer called and updated Ирина. She reports she is able to come in to clinic on Monday, March 1st at 10 am to get her PleurX flushed. Writer called Araceli and left voicemail with appt information so home care can arrange for a visit about an hour later to drain the pt.     Bisi Moffett, RN, BSN, MARIA TERESA  RN Care Coordinator  [facility]  [phone]

## 2021-02-25 NOTE — TELEPHONE ENCOUNTER
She is planning to switch to warfarin.     Currently on Eliquis.    She does have Home care involved at this time; so suspect this will be managed through [facility] at this time.   I am not sure about after that; so will also send this to .    Let me know if I need to be doing anything else.     Thanks!!

## 2021-03-01 ENCOUNTER — ANTICOAGULATION THERAPY VISIT (OUTPATIENT)
Dept: FAMILY MEDICINE | Facility: CLINIC | Age: 86
End: 2021-03-01

## 2021-03-01 ENCOUNTER — ONCOLOGY VISIT (OUTPATIENT)
Dept: ONCOLOGY | Facility: CLINIC | Age: 86
End: 2021-03-01
Attending: FAMILY MEDICINE
Payer: COMMERCIAL

## 2021-03-01 ENCOUNTER — TELEPHONE (OUTPATIENT)
Dept: FAMILY MEDICINE | Facility: CLINIC | Age: 86
End: 2021-03-01

## 2021-03-01 DIAGNOSIS — I48.0 PAROXYSMAL ATRIAL FIBRILLATION (H): ICD-10-CM

## 2021-03-01 DIAGNOSIS — J90 PLEURAL EFFUSION: Primary | ICD-10-CM

## 2021-03-01 LAB — INR PPP: 1.3 (ref 0.9–1.1)

## 2021-03-01 PROCEDURE — 99207 PR NO CHARGE NURSE ONLY: CPT

## 2021-03-01 PROCEDURE — 96523 IRRIG DRUG DELIVERY DEVICE: CPT

## 2021-03-01 PROCEDURE — 258N000003 HC RX IP 258 OP 636: Performed by: INTERNAL MEDICINE

## 2021-03-01 RX ORDER — WARFARIN SODIUM 2.5 MG/1
2.5 TABLET ORAL DAILY
Qty: 30 TABLET | Refills: 1 | Status: SHIPPED | OUTPATIENT
Start: 2021-03-01 | End: 2021-09-08

## 2021-03-01 RX ADMIN — SODIUM CHLORIDE, PRESERVATIVE FREE 10 ML: 5 INJECTION INTRAVENOUS at 10:29

## 2021-03-01 NOTE — TELEPHONE ENCOUNTER
[facility] to handle.  We will advise Home Care to Call [facility] number for future INRs. See [facility] encounter for details on Results dated today. Patient is transitioning from Eliquis to Coumadin/Warfarin.  Genevieve Kign RN  Anticoagulation Nurse - [facility] [place]

## 2021-03-01 NOTE — TELEPHONE ENCOUNTER
Shelby from [facility] called and stated that She was able to pull about 20 CCs of a yellow/miguelito clear fluid. Please follow up at number listed for [facility] if there are any questions.

## 2021-03-01 NOTE — TELEPHONE ENCOUNTER
This should be going to INR clinic. Will forward; sending to  Triage also.    Usually this happens without coming through us; would be great to figure out how to make this happen

## 2021-03-01 NOTE — TELEPHONE ENCOUNTER
Reason for Call:  Other INR    Detailed comments: Shelby is calling with INR results today of 1.3   Please call with orders   Thank you   Best Time: any    Can we leave a detailed message on this number? YES    Call taken on 3/1/2021 at 1:08 PM by Jil Bolivar

## 2021-03-01 NOTE — TELEPHONE ENCOUNTER
Yuridia from [facility] is in the home currently and is wanting a clarification regarding INR draw. There is no order stating how to obtain INR.   Yuridia is wondering if she can do a finger poke to get the INR vs a blood draw.  Verbal order given to do finger stick as she is in the home now.  Will route to primary care provider.      See TE from 2/25/21

## 2021-03-01 NOTE — PROGRESS NOTES
Juan Manuel Bolivar-ClinicalSigned  1:08 PM    Addend             Reason for Call:  Other INR     Detailed comments: Shelby is calling with INR results today of 1.3   Please call with orders   Thank you   Best Time: any     Can we leave a detailed message on this number? YES     Call taken on 3/1/2021 at 1:08 PM by Jil Bolivar

## 2021-03-01 NOTE — PROGRESS NOTES
Patient transitioning from Apixaban to Warfarin.  Does not appear patient has warfarin on order as of yet.    See TE dated 2/25/21.    Please advise as INR is 1.3 today.    Genevieve King RN  Anticoagulation Nurse - [facility] [place]

## 2021-03-01 NOTE — PROGRESS NOTES
Ирина was seen in clinic today for PleurX line flush, on Dr. Odonnell's recommendations, because the PleurX output has been < 100 mL the past two drains.     Writer cleaned the catheter and accessed the catheter in a sterile manner. Catheter was easily flushed with 10 mL NS. Skin and catheter were cleaned with CHG and new sterile dressing was placed. Skin site showed no signs of infection.    Pt will get drained by home care nurse in approx 1 hour.     Of note: pt currently on 2 L NC on oxygen tank. Pt came in to clinic with E cylinder tank. Writer encouraged pt to discuss with her home care RN later today to get set up with a portable oxygen tank that is smaller/lighter.     Pt verbalized understanding and had no further questions or concerns. Pt left clinic ambulatory with a walker and in no distress.    Bisi Moffett, RN, BSN, MARIA TERESA  RN Care Coordinator  [facility]  [phone]

## 2021-03-01 NOTE — PROGRESS NOTES
OK to start warfarin 2.5mg daily and continue Eliquis.  Recheck INR end of week, keeping in mind Eliquis may interfere with INR.  Once INR >2, stop Eliquis and recheck INR 1-2 days after last dose to ensure INR stays in range.    Warfarin RX ent to preferred local [facility], [facility].    Dorothe[facility] Richter, PharmD BCACP  Anticoagulation Clinical Pharmacist

## 2021-03-02 ENCOUNTER — TELEPHONE (OUTPATIENT)
Dept: FAMILY MEDICINE | Facility: CLINIC | Age: 86
End: 2021-03-02

## 2021-03-02 NOTE — TELEPHONE ENCOUNTER
FYI~ March 2, 2021 I spoke with Ирина, She is in need of financial assistance for medication.     We reviewed the Pharmacy Assistance Fund $500, the Prescription Assistance Program for manfacturer brand name assistance programs, gross income, Rx insurance and Rx list.     She is over income for the Pharmacy Assistance Fund.  I will be completing  assistance enrollment applications for Eliquis.    Thank you,    Yulia Butler  Prescription Assistance

## 2021-03-02 NOTE — PROGRESS NOTES
[facility] Laureen called back stating Ирина took last dose of Eliquis this morning. If she needs to bridge, she will need heparin or adjusted lovenox dose.    Bridge assessment: CHADVASc=7 (HTN, DM2, age, female, Afib, cascular disease.    If bridge is needed:    Cr 02/09: 2.97  Wt 02/09: 94.7kg    Estimated CrCl: 21.1 mL/min    Suggested dose: 1mg/kg subcutaneous Q 24 hrs. Will order Lovenox 90mg Q24 hrs.      Forwarding to [facility] for formal bridge assessment/lovenox dosing.

## 2021-03-02 NOTE — PROGRESS NOTES
[facility] ANTICOAGULATION  MANAGEMENT    Assessment       A. Fib      Risk stratification for thromboembolism: high (2017 ACC periprocedure pathway for NVAF Expert Consensus)      [id number] = 6 (CHF, HTN, Age++, DM, female)     Elevated D-dimer 01/26/2021, increased from 1.3 12/10/2020    If high suspicion for VTE, +2 to [id number]    NVAF: 2017 ACC periprocedure pathway for NVAF advises likely NO bridge for moderate risk stratification ([id number] score 5-6)  without a hx of stroke, TIA or systemic embolism    NVAF: 2017 ACC periprocedure pathway for NVAF advises likely bridge for moderate/high risk stratification ([id number] score 5-6)  with a hx of stroke, TIA or systemic embolism or overall score 7+  ?   Due to poor kidney function would advise  Purchase smaller amount Eliquis, with free trial card if not already used, or contact [facility] Assistance Fund if not already utilized, or bridge with heparin     [facility]    Subjective/Objective:      Ирина Yanez, a 86 year old female    Reason for Anticoagulation: A. Fib    Goal INR Range: 2.0-3.0    Patient bridged in past: No    Pertinent History: calc CrCl with Adjusted body weight 16ml/min    BMI 35.7kg/m2    Wt Readings from Last 3 Encounters:   02/09/21 94.7 kg (208 lb 11.2 oz)   02/08/21 91.2 kg (201 lb)   02/02/21 90 kg (198 lb 6.4 oz)        Ideal body weight: 54.7 kg (120 lb 9.5 oz)  Adjusted ideal body weight: 70.7 kg (155 lb 13.4 oz)     Lab Results   Component Value Date    INR 1.3 (A) 03/01/2021    INR 1.64 (H) 01/26/2021    INR 1.23 (H) 12/30/2020     Lab Results   Component Value Date    HGB 9.0 02/09/2021    HCT 31.1 02/09/2021     02/09/2021     Lab Results   Component Value Date    CR 2.97 (H) 02/09/2021    CR 2.65 (H) 02/02/2021    CR 2.39 (H) 02/01/2021     CrCl cannot be calculated (Patient's most recent lab result is older than the maximum 10 days allowed.).

## 2021-03-02 NOTE — PROGRESS NOTES
HC nurse Laureen called ([phone]) to clarify that it is OK for patient to take eliquis and warfarin at the same time. Instructions from [facility] note below were relayed to Laureen.    Laureen will call the patient to make sure she has enough eliquis left to make the transition to warfarin. Advised Laureen that pt may need to start lovenox if she does not have enough eliquis to last through the weekend.     Laureen will call back ACC with updates.

## 2021-03-02 NOTE — PROGRESS NOTES
Per  Provider review:    Recommend Eliquis as safest option, as CrCl on edge for Lovenox use, and will be comparable cost to Eliquis or heparin.    Dorothea Richter, PharmD BCACP  Anticoagulation Clinical Pharmacist

## 2021-03-02 NOTE — PROGRESS NOTES
Called Ирина who is agreeable to  7 day supply of Eliquis from the pharmacy today in addition to her warfarin prescription. Ирина will start on 2.5mg of warfarin tonight and continue Eliquis until she is therapeutic on warfarin.    Spoke with HC nurse Laureen who will recheck INR on 03/05.

## 2021-03-02 NOTE — PROGRESS NOTES
Per discussion with PCP, preference is to try get more Eliquis, or assess if Xarelto would be safe and utilize a voucher.     Heparin would be $99/10 doses    Eliquis would be $84 for 10 doses.    Need to encourage patient to get warfarin picked up and started along with bridge of choice.    Last option would be Xarelto, if no voucher has been utilized before, would be no cost, but not a lot of data with renal patients:    From Up to Date for Afib   CrCl 15 to 50 mL/minute: Rivaroxaban 15 mg once daily with food (AHA/ACC/HRS [January 2014, 2019]). Patients with CrCl <30   mL/minute were excluded from clinical trials (Vasiliy 2011).    Up to Date for PE/VTE Rivaroxaban:  CrCl 15 to <30 mL/minute: Avoid use (Jeaneth 2018). Patients with CrCl <30 mL/minute were excluded from clinical trials ([employer] Investigators 2010; [facility] Investigators 2012; Ledy 2017); no other clinical data are available to support use in this population (Ha 2019).  labeling recommends no dose adjustment in patients with CrCl ?15 mL/minute but mentions that for CrCl <30 mL/minute, exposure and pharmacodynamic effects are increased compared to patients with normal kidney function. The  also acknowledges that there are limited clinical data in patients with CrCl 15 to <30 mL/minute.    Dorothea Richter, PharmD [facility]  Anticoagulation Clinical Pharmacist

## 2021-03-02 NOTE — TELEPHONE ENCOUNTER
I do tend to defer to Pulmonary around oxygen needs.   I would recommend starting there.   I am not familiar with the catheter she has in place and what the plans are with that (it is draining fluid that was causing her hypoxia).      As for coumadin and lovenox... I have also seen an encounter recently to see about getting some medication assistance for Eliquis. So not sure where things are at with that.   Yes, MTM and INR clinic have been involved otherwise.

## 2021-03-02 NOTE — TELEPHONE ENCOUNTER
Pt states she needs an order for portable oxygen. It is completely empty. She needs a different tank that is more portable too. Hers is too big and heavy.     Right now, She only has the tank that plugs in at home to electricity.     She doesn't know how long she will need to use Oxygen either.     1 Liter all the time.     Oxygen through [facility]. [facility].  This was given at the Hospital discharge.     Please advise. Or if needs to go through her Pulmonologist.     She is also asking about her Lovenox shots and Warfarin, but it looks like there is another encounter and RN and Pharmacist are working on this.   Advised her of this and she should be getting a call about this. She will wait for call.

## 2021-03-05 ENCOUNTER — ANTICOAGULATION THERAPY VISIT (OUTPATIENT)
Dept: FAMILY MEDICINE | Facility: CLINIC | Age: 86
End: 2021-03-05
Payer: COMMERCIAL

## 2021-03-05 ENCOUNTER — PATIENT OUTREACH (OUTPATIENT)
Dept: ONCOLOGY | Facility: CLINIC | Age: 86
End: 2021-03-05

## 2021-03-05 DIAGNOSIS — I48.0 PAROXYSMAL ATRIAL FIBRILLATION (H): ICD-10-CM

## 2021-03-05 LAB
ALBUMIN SERPL-MCNC: 2.5 G/DL (ref 3.4–5)
ANION GAP SERPL CALCULATED.3IONS-SCNC: 6 MMOL/L (ref 3–14)
BUN SERPL-MCNC: 47 MG/DL (ref 7–30)
CALCIUM SERPL-MCNC: 9.2 MG/DL (ref 8.5–10.1)
CHLORIDE SERPL-SCNC: 97 MMOL/L (ref 94–109)
CO2 SERPL-SCNC: 34 MMOL/L (ref 20–32)
CREAT SERPL-MCNC: 2.23 MG/DL (ref 0.52–1.04)
FERRITIN SERPL-MCNC: 412 NG/ML (ref 8–252)
GFR SERPL CREATININE-BSD FRML MDRD: 20 ML/MIN/{1.73_M2}
GLUCOSE SERPL-MCNC: 175 MG/DL (ref 70–99)
HGB BLD-MCNC: 9.6 G/DL (ref 11.7–15.7)
INR PPP: 1.2 (ref 0.9–1.1)
IRON SATN MFR SERPL: 23 % (ref 15–46)
IRON SERPL-MCNC: 58 UG/DL (ref 35–180)
PHOSPHATE SERPL-MCNC: 3.7 MG/DL (ref 2.5–4.5)
POTASSIUM SERPL-SCNC: 4 MMOL/L (ref 3.4–5.3)
PTH-INTACT SERPL-MCNC: 160 PG/ML (ref 18–80)
SODIUM SERPL-SCNC: 137 MMOL/L (ref 133–144)
TIBC SERPL-MCNC: 250 UG/DL (ref 240–430)

## 2021-03-05 PROCEDURE — 83550 IRON BINDING TEST: CPT | Performed by: PHYSICIAN ASSISTANT

## 2021-03-05 PROCEDURE — 83970 ASSAY OF PARATHORMONE: CPT | Performed by: PHYSICIAN ASSISTANT

## 2021-03-05 PROCEDURE — 99207 PR NO CHARGE NURSE ONLY: CPT | Performed by: FAMILY MEDICINE

## 2021-03-05 PROCEDURE — 80069 RENAL FUNCTION PANEL: CPT | Performed by: PHYSICIAN ASSISTANT

## 2021-03-05 PROCEDURE — 82728 ASSAY OF FERRITIN: CPT | Performed by: PHYSICIAN ASSISTANT

## 2021-03-05 PROCEDURE — 85018 HEMOGLOBIN: CPT | Performed by: PHYSICIAN ASSISTANT

## 2021-03-05 PROCEDURE — 82306 VITAMIN D 25 HYDROXY: CPT | Mod: GZ | Performed by: PHYSICIAN ASSISTANT

## 2021-03-05 PROCEDURE — 83540 ASSAY OF IRON: CPT | Performed by: PHYSICIAN ASSISTANT

## 2021-03-05 NOTE — PROGRESS NOTES
Araceli, RN from [facility] called in to clinic requesting verbal orders to continue PleurX drainings and teaching for once a week x 3 weeks plus 2 PRN visits.     Writer will route to Dr. Odonnell.    Araceli also reports Ирина's PleurX only drained about 20 mL on Monday, 3/1 after the NS flush. She is scheduled to get drained again next Monday, 3/8. Araceli will call after Monday's appt with the updated PleurX output.     Bisi Moffett, RN, BSN, MARIA TERESA  RN Care Coordinator  [facility]  [phone]

## 2021-03-05 NOTE — PROGRESS NOTES
Chart reviewed with ACC RN.  Has enough Eliquis through 03/08, advise boost today to 5mg today, and ~50% dose increase to 5mg Sat/Sun/Tues/Thurs, 2.5mg ROW.    Dorothea Richter, PharmD BCACP  Anticoagulation Clinical Pharmacist

## 2021-03-05 NOTE — PROGRESS NOTES
ANTICOAGULATION FOLLOW-UP CLINIC VISIT    Patient Name:  Crystal Yanez  Date:  3/5/2021  Contact Type:  Telephone    SUBJECTIVE:  Patient Findings     Comments:  Araceli, from [facility] called with crystal's subtherapeutic INR today. She has taken 2.5 mg warfarin x3 days and is continuing to bridge with Eliquis. She has enough Eliquis to last until 3/8/21. Patient has PleurX drain, went in on Monday to check status with saline injection, because it's not draining well, didn't get much return. They plan to check it again next week. No s/sx of infection noted. Otherwise, the patient was assessed for diet, medication, and activity level changes, missed or extra doses, bruising or bleeding, with no problem findings. Per new start protocol and discussion with Dorothea Richter RPH, patient advised to increase maintenance dose by a little over 50% and take a booster dose today of 5 mg since we would like to get her to therapeutic before her Eliquis runs out (see Pharm D note). Next INR in 3 days.          Clinical Outcomes     Negatives:  Major bleeding event, Thromboembolic event, Anticoagulation-related hospital admission, Anticoagulation-related ED visit, Anticoagulation-related fatality    Comments:  Araceli, from [facility] called with crystal's subtherapeutic INR today. She has taken 2.5 mg warfarin x3 days and is continuing to bridge with Eliquis. She has enough Eliquis to last until 3/8/21. Patient has PleurX drain, went in on Monday to check status with saline injection, because it's not draining well, didn't get much return. They plan to check it again next week. No s/sx of infection noted. Otherwise, the patient was assessed for diet, medication, and activity level changes, missed or extra doses, bruising or bleeding, with no problem findings. Per new start protocol and discussion with Dorothea Richter RPH, patient advised to increase maintenance dose by a little over 50% and take a booster dose today of 5 mg since we would  like to get her to therapeutic before her Eliquis runs out (see Pharm D note). Next INR in 3 days.             OBJECTIVE    Recent labs: (last 7 days)     21   INR 1.2*       ASSESSMENT / PLAN  INR assessment SUB    Recheck INR In: 3 DAYS    INR Location Home[facility] INR      Anticoagulation Summary  As of 3/5/2021    INR goal:  2.0-3.0   TTR:  --   INR used for dosin.2 (3/5/2021)   Warfarin maintenance plan:  2.5 mg (2.5 mg x 1) every Mon, Wed, Fri; 5 mg (2.5 mg x 2) all other days   Full warfarin instructions:  3/5: 5 mg; Otherwise 2.5 mg every Mon, Wed, Fri; 5 mg all other days   Weekly warfarin total:  27.5 mg   Plan last modified:  Claudia Dong RN (3/5/2021)   Next INR check:  3/8/2021   Priority:  High   Target end date:  2022    Indications    Paroxysmal atrial fibrillation (H) [I48.0]             Anticoagulation Episode Summary     INR check location:      Preferred lab:      Send INR reminders to:  KRIS ROGERS    Comments:        Anticoagulation Care Providers     Provider Role Specialty Phone number    Yuridia Villarreal MD Referring Family Medicine [phone]            See the Encounter Report to view Anticoagulation Flowsheet and Dosing Calendar (Go to Encounters tab in chart review, and find the Anticoagulation Therapy Visit)    Dosage adjustment made based on physician directed care plan.    Claudia Dong, RN

## 2021-03-08 ENCOUNTER — PATIENT OUTREACH (OUTPATIENT)
Dept: ONCOLOGY | Facility: CLINIC | Age: 86
End: 2021-03-08

## 2021-03-08 ENCOUNTER — ANTICOAGULATION THERAPY VISIT (OUTPATIENT)
Dept: FAMILY MEDICINE | Facility: CLINIC | Age: 86
End: 2021-03-08

## 2021-03-08 ENCOUNTER — VIRTUAL VISIT (OUTPATIENT)
Dept: PHARMACY | Facility: CLINIC | Age: 86
End: 2021-03-08
Payer: COMMERCIAL

## 2021-03-08 DIAGNOSIS — E11.21 TYPE 2 DIABETES MELLITUS WITH DIABETIC NEPHROPATHY, UNSPECIFIED WHETHER LONG TERM INSULIN USE (H): ICD-10-CM

## 2021-03-08 DIAGNOSIS — I48.0 PAROXYSMAL ATRIAL FIBRILLATION (H): ICD-10-CM

## 2021-03-08 DIAGNOSIS — I10 HYPERTENSION, UNSPECIFIED TYPE: ICD-10-CM

## 2021-03-08 DIAGNOSIS — R60.9 FLUID RETENTION: ICD-10-CM

## 2021-03-08 DIAGNOSIS — G62.9 NEUROPATHY: ICD-10-CM

## 2021-03-08 DIAGNOSIS — F33.41 RECURRENT MAJOR DEPRESSIVE DISORDER, IN PARTIAL REMISSION (H): ICD-10-CM

## 2021-03-08 DIAGNOSIS — N18.4 CKD (CHRONIC KIDNEY DISEASE) STAGE 4, GFR 15-29 ML/MIN (H): Primary | ICD-10-CM

## 2021-03-08 LAB
CREAT UR-MCNC: 105 MG/DL
DEPRECATED CALCIDIOL+CALCIFEROL SERPL-MC: 41 UG/L (ref 20–75)
INR PPP: 1.3 (ref 0.9–1.1)
PROT UR-MCNC: 0.1 G/L
PROT/CREAT 24H UR: 0.09 G/G CR (ref 0–0.2)

## 2021-03-08 PROCEDURE — 99606 MTMS BY PHARM EST 15 MIN: CPT | Mod: TEL | Performed by: PHARMACIST

## 2021-03-08 PROCEDURE — 84156 ASSAY OF PROTEIN URINE: CPT | Performed by: PHYSICIAN ASSISTANT

## 2021-03-08 PROCEDURE — 99607 MTMS BY PHARM ADDL 15 MIN: CPT | Mod: TEL | Performed by: PHARMACIST

## 2021-03-08 NOTE — Clinical Note
No changes, following up in 3-6 months. Made recommendations for Dr. Verde. She has appointment with him tomorrow.    Lara

## 2021-03-08 NOTE — PROGRESS NOTES
Per Dr. Odonnell, ok to schedule next available in-person appt with her in clinic for PleurX removal.     Writer called NICOLE Charles and left a voicemail, updating her that Ирина is requesting her PleurX be removed.     Writer sent a staff message to  schedulers requesting to call pt to schedule with Dr. Odonnell on 3/15 for an in-person visit for PleurX removal.     Writer will watch for appt to be made.    Bisi Moffett, RN, BSN, MARIA TERESA  RN Care Coordinator  [facility]  [phone]

## 2021-03-08 NOTE — PROGRESS NOTES
Araceli, home care RN, called reporting Ирина did not have any output from her PleurX today.     Per Araceli, Ирина is not symptomatic.    Writer will update Dr. Odonnell and call Araceli back with any recommendations.     Bisi Moffett, RN, BSN, MIRIAMM  RN Care Coordinator  [facility]  [phone]

## 2021-03-08 NOTE — PROGRESS NOTES
Chart reviewed, recommend dose increase as suggested, and boost today of 10mg due to renal issues influencing warfarin initiation, may need more aggressive boost to get INR moving, with lower maintenance dose once INR in range.    Dorothea Richter, PharmD BCACP  Anticoagulation Clinical Pharmacist

## 2021-03-08 NOTE — PATIENT INSTRUCTIONS
Recommendations from today's MTM visit:                                                       1. No changes today.    2.  I would recommend that you write down your meals and snacks for 3 days before you meet with the diabetes educator so that you can discuss any recommended changes at that time.  Below you will find some general information about your blood sugar goals and lifestyle changes to help lower blood sugars.    3.  At your visit with Dr. Verde tomorrow you could discuss alternative medications to help lower blood sugars other than the pioglitazone and glimepiride you were using before the hospital.  Your kidney function is too low to use Metformin and glimepiride has a higher chance of causing low blood sugars especially when your kidney function is reduced like yours is.  A newer medication called Ozempic which is a once a week injectable medicine, this is not insulin, could help reduce your fasting blood sugars as well as your after meal blood sugars intimate cause some weight loss as well.  It has also been shown to have heart protection to reduce chance of heart attack. This might be a good medicine to discuss with Dr. Verde.  This medicine does not have a generic so it can be expensive however you may qualify for assistance through the manufacture without having to pay a certain amount out-of-pocket.  You could check with the prescription assistance office.    4.  If you do not hear from Dr. Whatley or one of his staff regarding your lab work done last week by the end of this week, please contact his office to have them review the results.    Diabetes Goals:    Home Monitoring of Blood Sugars:Fasting  mg/dL and 2 hours after a meal less than 180 mg/dL.    Hemoglobin A1C: Less than 8%. Yours is   Lab Results   Component Value Date    A1C 7.1 01/26/2021     Blood Pressure: Less than 140/90mmHg. Yours is LMP  (LMP Unknown) .    Cholesterol: You are taking pravastatin to help decrease the risk  of heart disease.    Things you can do to help lower your blood sugars:    Diet: 3 meals and 1-2 snacks per day with 45-60 grams of carbohydrates per meal and 15-30 grams of carbohydrates per snack. Try to fill your plate at least half-full with vegetables, fill one-quarter full with lean meats or protein, and also make sure you get at least some carbohydrate with every meal.    Exercise: 30 minutes per day of anything that will increase your heart rate and make you break a sweat! Gardening, walking, cleaning the house, changing the oil in your car, etc. If you feel like 30 minutes per day is too much, start small. Even lifting canned foods or working your arms with a resistance band in front of the TV can help.      It was great to speak with you today.  I value your experience and would be very thankful for your time with providing feedback on our clinic survey. You may receive a survey via email or text message in the next few days.     Next MTM visit: 3-6 months, I will send you a [employer] reminder message and check in 3 months.    To schedule another MTM appointment, please call the clinic directly or you may call the MTM scheduling line at [phone] or toll-free at [phone].     My Clinical Pharmacist's contact information:                                                      It was a pleasure talking with you today!  Please feel free to contact me with any questions or concerns you have.      Lara Cain, PharmD  Medication Therapy Management Pharmacist  [facility] - Monday and Wednesday 7:30 - 4:00  Pager: [phone]

## 2021-03-08 NOTE — PROGRESS NOTES
Per Dr. Odonnell, ok to stop the scheduled drainings and just drain as needed.     If the patient would like to have the drain removed, we could also do that if she wants.     Writer called Araceli and left a detailed voicemail.     Writer called Ирина and updated her. She reports she would like to have the drain taken out, rather than keep it in longer and watching for need to get drained. She denies feeling symptomatic.     Writer will update Dr. Odonnell.     Bisi Moffett, RN, BSN, MAOM  RN Care Coordinator  [facility]  [phone]

## 2021-03-08 NOTE — PROGRESS NOTES
Fax received with home care orders for skilled nursing visits and pleurX drainage orders.     Orders were signed by Dr. Odonnell and faxed back to [facility] [phone] and sent to [facility].     Bisi Moffett, RN, BSN, MARIA TERESA  RN Care Coordinator  [facility]  [phone]

## 2021-03-08 NOTE — PROGRESS NOTES
Medication Therapy Management (MTM) Encounter    ASSESSMENT:                            Medication Adherence/Access: No issues identified    Hypertension/Afib: Stable, warfarin managed by anticoagulation nurse.  Encouraged patient to avoid starting any new medications without checking for drug interactions first.    Type 2 Diabetes/CKD: Patient is meeting A1c goal of < 8%. Self monitoring of blood glucose is not at goal of fasting  mg/dL. Patient would benefit from reviewing diet with diabetes educator as scheduled.  She would likely benefit from restarting a diabetes medication if she is unable to decrease her blood sugars with lifestyle changes.  Possible medication options to consider would be a GLP-1 agonist.  Due to CKD would need to avoid Metformin or an SGLT2 inhibitor.  Pioglitazone and glimepiride may not be the best choices either with her medical history (both stopped during her last hospital visit ). Microalbumin is not at goal < 30 mg/g. Not taking an ACE-inhibitor or ARB; stopped by nephrologist Dr. Whatley. Due for annual foot exam. Aspirin therapy is not indicated in this patient due to age and due to anticoagulant/antiplatelet therapy.     Neuropathy:  Stable    Fluid retention:  Stable    Depression: Stable    PLAN:                            1. No changes today.    2.  I would recommend that you write down your meals and snacks for 3 days before you meet with the diabetes educator so that you can discuss any recommended changes at that time.  Below you will find some general information about your blood sugar goals and lifestyle changes to help lower blood sugars.    3.  At your visit with Dr. Verde tomorrow you could discuss alternative medications to help lower blood sugars other than the pioglitazone and glimepiride you were using before the hospital.  Your kidney function is too low to use Metformin and glimepiride has a higher chance of causing low blood sugars especially when your  kidney function is reduced like yours is.  A newer medication called Ozempic which is a once a week injectable medicine, this is not insulin, could help reduce your fasting blood sugars as well as your after meal blood sugars intimate cause some weight loss as well.  It has also been shown to have heart protection to reduce chance of heart attack. This might be a good medicine to discuss with Dr. Verde.  This medicine does not have a generic so it can be expensive however you may qualify for assistance through the manufacture without having to pay a certain amount out-of-pocket.  You could check with the prescription assistance office.    Follow-up: 3-6 months or sooner if needed.    SUBJECTIVE/OBJECTIVE:                          Ирина Yanez is a 86 year old female called for a follow-up visit. She was referred to me from insurance provider [facility].  Today's visit is a follow-up MTM visit from 2/22/21     Reason for visit: follow-up afib, blood pressure and diabetes.    - current year she has to have $1,900 out of pocket for the year but she wont get that so will stay on warfarin.     Allergies/ADRs: Reviewed in chart  Tobacco: She reports that she has never smoked. She has never used smokeless tobacco.  Alcohol: not currently using  Caffeine: no caffeine  Activity:  Working around the house and doing some physical therapy in home. She might get outside today.   Past Medical History: Reviewed in chart - no personal history of MI or stroke    Endocrinologist: Reji Verde  Nephrologist: Dr. Whatley outside of  - appt per patient had last appt on 3/2/21    Medication Adherence/Access: no issues reported    Hypertension/Afib: Current medications include carvedilol 3.125 mg twice daily (2/2021), diltiazem  mg twice daily (2/2021), hydralazine 25 mg three times daily (decreased in hospital), and warfarin 5 mg daily for the last three days (started on 3/1/21 to replace Eliquis 2.5 mg twice daily since she could  no longer afford Eliquis.  She is still taking Eliquis to bridge. She has about 4 days left of the Eliquis.  Patient does self-monitor blood pressure. She has not been writing the readings down, home care nurse checks weekly.   She confirmed her home cuff is accurate last fall. Patient reports no current medication side effects. No dizziness or lightheadedness, no fatigue, no shortness of breath.  She is using oxygen at 1 L/min during the day as well as at night.  This is new since being in the hospital last.    INR   Date Value Ref Range Status   03/05/2021 1.2 (A) 0.90 - 1.10 Final     BP Readings from Last 3 Encounters:   02/09/21 118/52   02/08/21 131/60   02/02/21 121/48     Type 2 Diabetes/CKD:  Currently taking calcium acetate 667 mg three times daily for CKD (started 2/2021). Both pioglitazone and glimepiride were stopped in the hospital and she has not restarted yet. She has not heard back from her endocrinologist but has visit with him tomorrow. Patient reports she was using insulin in the hospital because blood sugars were high. Next appointment scheduled tomorrow 3/9/21.  Blood sugar monitoring:once daily fasting (per patient report): 201, 191 - did not write anymore down.  Symptoms of low blood sugar? none  Symptoms of high blood sugar? none  Eye exam: up to date  Foot exam: due  Diet/Exercise: Did not discuss today.  Aspirin: Not taking due to age and on anticoagulation  Statin: Yes: simvastatin   ACEi/ARB: No. Stopped by nephrology per care everywhere note from 12/9/20.  Urine Albumin:   Lab Results   Component Value Date    UMALCR 85.22 (H) 03/22/2019      Lab Results   Component Value Date    A1C 7.1 01/26/2021    A1C 6.8 04/27/2020    A1C 6.3 09/27/2019    A1C 6.9 03/22/2019    A1C 6.2 06/12/2018     Last Comprehensive Metabolic Panel:  Sodium   Date Value Ref Range Status   03/05/2021 137 133 - 144 mmol/L Final     Potassium   Date Value Ref Range Status   03/05/2021 4.0 3.4 - 5.3 mmol/L Final      Chloride   Date Value Ref Range Status   03/05/2021 97 94 - 109 mmol/L Final     Carbon Dioxide   Date Value Ref Range Status   03/05/2021 34 (H) 20 - 32 mmol/L Final     Anion Gap   Date Value Ref Range Status   03/05/2021 6 3 - 14 mmol/L Final     Glucose   Date Value Ref Range Status   03/05/2021 175 (H) 70 - 99 mg/dL Final     Urea Nitrogen   Date Value Ref Range Status   03/05/2021 47 (H) 7 - 30 mg/dL Final     Creatinine   Date Value Ref Range Status   03/05/2021 2.23 (H) 0.52 - 1.04 mg/dL Final     GFR Estimate   Date Value Ref Range Status   03/05/2021 20 (L) >60 mL/min/[1.73_m2] Final     Comment:     Non  GFR Calc  Starting 12/18/2018, serum creatinine based estimated GFR (eGFR) will be   calculated using the Chronic Kidney Disease Epidemiology Collaboration   (CKD-EPI) equation.       Calcium   Date Value Ref Range Status   03/05/2021 9.2 8.5 - 10.1 mg/dL Final     Estimated Creatinine Clearance: 21 mL/min (A) (based on SCr of 2.23 mg/dL (H)).    Neuropathy:  Current medications include: Gabapentin 200 mg once daily in the evening (changed to once daily at last MTM visit). Reports no change in pain. No side effects reported.    Fluid retention:  Current medications include: Torsemide 40 mg once daily.  Patient reports this is managed by her nephrologist Dr. Whatley.  Patient continues to use PleurX for chest drainage.    Depression:  Current medications include: Sertraline 50mg once daily. Patient reports that depression symptoms are unchanged.  PHQ-9 SCORE 8/13/2019 1/16/2020 7/24/2020   PHQ-9 Total Score - - -   PHQ-9 Total Score 2 1 2     Today's Vitals: LMP  (LMP Unknown)   ----------------  Post Discharge Medication Reconciliation Status: discharge medications reconciled, continue medications without change.    I spent 16 minutes with this patient today. A copy of the visit note was provided to the patient's primary care provider.    The patient was sent via [employer] a summary of  these recommendations.     Tish RandleD  Medication Therapy Management Pharmacist    Telemedicine Visit Details  Type of service:  Telephone visit  Start Time: 9:05 AM  End Time: 9:21 AM  Originating Location (patient location): Home  Distant Location (provider location):  [facility]        Medication Therapy Recommendations  Type 2 diabetes mellitus with diabetic nephropathy (H)    Rationale: Untreated condition - Needs additional medication therapy - Indication   Recommendation: Start Medication - OZEMPIC (0.25 OR 0.5 MG/DOSE) SC - 0.25 mg once weekly   Status: Contact Provider - Awaiting Response

## 2021-03-08 NOTE — Clinical Note
Hi Ирина Bain has appt with you this Wednesday. See assessment and plan for thoughts on meds.  Cost may be the barrier. Thanks!    Lara Cain, PharmD  Medication Therapy Management Pharmacist

## 2021-03-09 ENCOUNTER — MEDICAL CORRESPONDENCE (OUTPATIENT)
Dept: HEALTH INFORMATION MANAGEMENT | Facility: CLINIC | Age: 86
End: 2021-03-09

## 2021-03-09 ENCOUNTER — IMMUNIZATION (OUTPATIENT)
Dept: NURSING | Facility: CLINIC | Age: 86
End: 2021-03-09
Attending: INTERNAL MEDICINE
Payer: COMMERCIAL

## 2021-03-09 PROCEDURE — 0002A PR COVID VAC PFIZER DIL RECON 30 MCG/0.3 ML IM: CPT

## 2021-03-09 PROCEDURE — 91300 PR COVID VAC PFIZER DIL RECON 30 MCG/0.3 ML IM: CPT

## 2021-03-09 NOTE — PROGRESS NOTES
Per chart review, appt with Dr. Odonnell has been made for 3/15 at 11:00 am for PleurX removal.     Bisi Moffett, RN, BSN, MARIA TERESA  RN Care Coordinator  [facility]  [phone]

## 2021-03-10 ENCOUNTER — VIRTUAL VISIT (OUTPATIENT)
Dept: ENDOCRINOLOGY | Facility: CLINIC | Age: 86
End: 2021-03-10
Payer: COMMERCIAL

## 2021-03-10 DIAGNOSIS — E11.40 TYPE 2 DIABETES MELLITUS WITH DIABETIC NEUROPATHY, WITHOUT LONG-TERM CURRENT USE OF INSULIN (H): ICD-10-CM

## 2021-03-10 DIAGNOSIS — E11.21 TYPE 2 DIABETES MELLITUS WITH DIABETIC NEPHROPATHY, WITHOUT LONG-TERM CURRENT USE OF INSULIN (H): Primary | ICD-10-CM

## 2021-03-10 PROCEDURE — 99214 OFFICE O/P EST MOD 30 MIN: CPT | Mod: 95 | Performed by: INTERNAL MEDICINE

## 2021-03-10 NOTE — PROGRESS NOTES
Ирина is a 86 year old who is being evaluated via a billable telephone visit.      What phone number would you like to be contacted at? Cell phone  How would you like to obtain your AVS?  Reviewed verbally      Recent issues:  Had several hospitalizations recently for pneumonia, renal insufficiency  12/2020. Admitted with acute hypoxic respiratory failure and bilateral pleural effusions, acute on chronic diastolic CHF, new AFib  Adjustments with medications and change in diuretic med use, then left chest Pleurx catheter placement and using nasal canula oxygen  Hospital team stopped her glimeparide and pioglitazone medications          Diagnosis of diabetes mellitus age 64  Has taken metformin, then discontinue 2/2015 due to rise in creatinine  Had seen me for diabetes evaluations at my former clinic ([facility])  12/2017. Dose reduction of glimeparide  Had taken pioglitazone 15 mg daily and glimeparide 1 mg daily    12/2020. Admitted with acute hypoxic respiratory failure and bilateral pleural effusions, acute on chronic diastolic CHF, new AFib  Adjustments with medications and change in diuretic med use, then left chest Pleurx catheter placement and using nasal canula oxygen  Hospital team stopped her glimeparide and pioglitazone medications    Current DM meds:  none   Using One Touch BG meter              Recent -210 mg/dl     Previous [facility] labs include:  Lab Results   Component Value Date    A1C 7.1 (H) 01/26/2021     03/05/2021    POTASSIUM 4.0 03/05/2021    CHLORIDE 97 03/05/2021    CO2 34 (H) 03/05/2021    ANIONGAP 6 03/05/2021     (H) 03/05/2021    BUN 47 (H) 03/05/2021    CR 2.23 (H) 03/05/2021    GFRESTIMATED 20 (L) 03/05/2021    GFRESTBLACK 23 (L) 03/05/2021    EDNA 9.2 03/05/2021    CHOL 181 04/27/2020    TRIG 240 (H) 04/27/2020    HDL 44 (L) 04/27/2020    LDL 89 04/27/2020    NHDL 137 (H) 04/27/2020    UCRR 105 03/08/2021    MICROL 17 03/22/2019    UMALCR 85.22 (H) 03/22/2019      Hyperlipidemia:              Has taken fenofibrate, discontinued 2011              Took simvastatin 10 mg po QHS and fish oil supplement 1000 mg every day  Last eye exam with Dr. DIPAK Gupta 2/2017, no DR  DM Complications:              Neuropathy                          Intermittent feet pains                          Has tried Tramadol also gabapentin                          Current gabapentin 300 mg 1-capsule at bedtime                          Weares Docsox feet open-toe compression socks               Nephropathy                          CKD and microalbuminuria                          [facility]        , lives in [place] with  nicko Weber  Sees Dr. Yuridia Villarreal/[facility] for general medicine evaluations.    ROS: 10 point ROS neg other than the symptoms noted above in the HPI.     GENERAL: some fatigue, wt stable; denies fevers, chills, night sweats.   HEENT: no dysphagia, odonophagia, diplopia, neck pain  THYROID:  no apparent hyper or hypothyroid symptoms  CV: no chest pain, pressure, palpitations  LUNGS: dyspnea, GARAY and chest aching; denies SOB, cough, wheezing   ABDOMEN: no diarrhea, constipation, abdominal pain  EXTREMITIES: no rashes, ulcers, edema  NEUROLOGY: leg/foot pains, decreased feet sensation; no headaches, denies changes in vision, tingling  MSK: no muscle aches or pains, weakness  SKIN: no rashes or lesions  : no menses, denies hot flashes  PSYCH:  stable mood, no significant anxiety or depression  ENDOCRINE: no heat or cold intolerance     Physical Exam (visual exam)  VS:  no vital signs taken for video visit  CONSTITUTIONAL: healthy, alert and NAD, well dressed, answering questions appropriately  ENT: no nose swelling or nasal discharge, mouth redness or gum changes.  EYES: eyes grossly normal to inspection, conjunctivae and sclerae normal, no exophthalmos or proptosis  THYROID:  no apparent nodules or goiter  LUNGS: no audible  wheeze, cough or visible cyanosis, no visible retractions or increased work of breathing  ABDOMEN: abdomen not evaluated  EXTREMITIES: no hand tremors, limited exam  NEUROLOGY: CN grossly intact, mentation intact and speech normal   SKIN:  no apparent skin lesions, rash, or edema with visualized skin appearance  PSYCH: mentation appears normal, affect normal/bright, judgement and insight intact,   normal speech and appearance well groomed       A/P:       Encounter Diagnosis   Name      Type 2 diabetes mellitus with diabetic nephropathy, without long-term current use of insulin (H)  Type 2 diabetes mellitus with diabetic neuropathy, without long-term current use of insulin          Comments:  Reviewed health history and diabetes issues.  Difficult to assess glycemic control without more frequent BG trend data.  Reviewed and interpreted tests that I previously ordered.   Ordered appropriate tests for the endocrinology disease management.    Management options discussed and implemented after shared medical decision making with the patient.       Plan:  Need to restart diabetes medication treatment.  She prefers to not take injections... which limits or options  Avoid the metformin and pioglitazone medications  Restart glimeparide 1 mg as 1/2-tab daily  Consider starting SGLT2-I medication   Advised starting Jardiance 10 mg daily, if affordable   She will check with her pharmacy on cost of Jardiance, then message me  Other med options with basal insulin daily or GLP1RA daily vs weekly, if able to do pen dosing and affordable  Goal target BG  mg/dl, test more frequently... 2-3x/week  Continue current simvastatin daily dose at this time  Contact our office if questions about management plan.     Total time spent in with the patient evaluation:  25 min  Additional time spent reviewing pertinent lab tests and chart notes, and documentation:  5 min    Follow-up:  5/2021 or nae Verde MD, MS  Endocrinology  M  [facility]

## 2021-03-10 NOTE — TELEPHONE ENCOUNTER
Called the pt to advise.      She said the catheter is coming out on Monday.    She said she got a different oxygen tank and has something that she can refill it with and so she has what she needs.

## 2021-03-10 NOTE — LETTER
3/10/2021         RE: Ирина Yanez  [address] [place] [address]        Dear Colleague,    Thank you for referring your patient, Ирина Yanez, to the [facility]. Please see a copy of my visit note below.    Ирина is a 86 year old who is being evaluated via a billable telephone visit.      What phone number would you like to be contacted at? Cell phone  How would you like to obtain your AVS?  Reviewed verbally      Recent issues:  Had several hospitalizations recently for pneumonia, renal insufficiency  12/2020. Admitted with acute hypoxic respiratory failure and bilateral pleural effusions, acute on chronic diastolic CHF, new AFib  Adjustments with medications and change in diuretic med use, then left chest Pleurx catheter placement and using nasal canula oxygen  Hospital team stopped her glimeparide and pioglitazone medications          Diagnosis of diabetes mellitus age 64  Has taken metformin, then discontinue 2/2015 due to rise in creatinine  Had seen me for diabetes evaluations at my former clinic ([facility])  12/2017. Dose reduction of glimeparide  Had taken pioglitazone 15 mg daily and glimeparide 1 mg daily    12/2020. Admitted with acute hypoxic respiratory failure and bilateral pleural effusions, acute on chronic diastolic CHF, new AFib  Adjustments with medications and change in diuretic med use, then left chest Pleurx catheter placement and using nasal canula oxygen  Hospital team stopped her glimeparide and pioglitazone medications    Current DM meds:  none   Using One Touch BG meter              Recent -210 mg/dl     Previous [facility] labs include:  Lab Results   Component Value Date    A1C 7.1 (H) 01/26/2021     03/05/2021    POTASSIUM 4.0 03/05/2021    CHLORIDE 97 03/05/2021    CO2 34 (H) 03/05/2021    ANIONGAP 6 03/05/2021     (H) 03/05/2021    BUN 47 (H) 03/05/2021    CR 2.23 (H) 03/05/2021    GFRESTIMATED 20 (L) 03/05/2021     GFRESTBLACK 23 (L) 03/05/2021    EDNA 9.2 03/05/2021    CHOL 181 04/27/2020    TRIG 240 (H) 04/27/2020    HDL 44 (L) 04/27/2020    LDL 89 04/27/2020    NHDL 137 (H) 04/27/2020    UCRR 105 03/08/2021    MICROL 17 03/22/2019    UMALCR 85.22 (H) 03/22/2019     Hyperlipidemia:              Has taken fenofibrate, discontinued 2011              Took simvastatin 10 mg po QHS and fish oil supplement 1000 mg every day  Last eye exam with Dr. DIPAK Gupta 2/2017, no DR  DM Complications:              Neuropathy                          Intermittent feet pains                          Has tried Tramadol also gabapentin                          Current gabapentin 300 mg 1-capsule at bedtime                          Weares Docsox feet open-toe compression socks               Nephropathy                          CKD and microalbuminuria                          Sees Dr[facility] KEMI Whatley/[facility]        , lives in [place] with  Gus, cat Flobailey  Sees Dr. Yuridia Villarreal/[facility] for general medicine evaluations.    ROS: 10 point ROS neg other than the symptoms noted above in the HPI.     GENERAL: some fatigue, wt stable; denies fevers, chills, night sweats.   HEENT: no dysphagia, odonophagia, diplopia, neck pain  THYROID:  no apparent hyper or hypothyroid symptoms  CV: no chest pain, pressure, palpitations  LUNGS: dyspnea, GARAY and chest aching; denies SOB, cough, wheezing   ABDOMEN: no diarrhea, constipation, abdominal pain  EXTREMITIES: no rashes, ulcers, edema  NEUROLOGY: leg/foot pains, decreased feet sensation; no headaches, denies changes in vision, tingling  MSK: no muscle aches or pains, weakness  SKIN: no rashes or lesions  : no menses, denies hot flashes  PSYCH:  stable mood, no significant anxiety or depression  ENDOCRINE: no heat or cold intolerance     Physical Exam (visual exam)  VS:  no vital signs taken for video visit  CONSTITUTIONAL: healthy, alert and NAD, well dressed, answering  questions appropriately  ENT: no nose swelling or nasal discharge, mouth redness or gum changes.  EYES: eyes grossly normal to inspection, conjunctivae and sclerae normal, no exophthalmos or proptosis  THYROID:  no apparent nodules or goiter  LUNGS: no audible wheeze, cough or visible cyanosis, no visible retractions or increased work of breathing  ABDOMEN: abdomen not evaluated  EXTREMITIES: no hand tremors, limited exam  NEUROLOGY: CN grossly intact, mentation intact and speech normal   SKIN:  no apparent skin lesions, rash, or edema with visualized skin appearance  PSYCH: mentation appears normal, affect normal/bright, judgement and insight intact,   normal speech and appearance well groomed       A/P:       Encounter Diagnosis   Name      Type 2 diabetes mellitus with diabetic nephropathy, without long-term current use of insulin (H)  Type 2 diabetes mellitus with diabetic neuropathy, without long-term current use of insulin          Comments:  Reviewed health history and diabetes issues.  Difficult to assess glycemic control without more frequent BG trend data.  Reviewed and interpreted tests that I previously ordered.   Ordered appropriate tests for the endocrinology disease management.    Management options discussed and implemented after shared medical decision making with the patient.       Plan:  Need to restart diabetes medication treatment.  She prefers to not take injections... which limits or options  Avoid the metformin and pioglitazone medications  Restart glimeparide 1 mg as 1/2-tab daily  Consider starting SGLT2-I medication   Advised starting Jardiance 10 mg daily, if affordable   She will check with her pharmacy on cost of Jardiance, then message me  Other med options with basal insulin daily or GLP1RA daily vs weekly, if able to do pen dosing and affordable  Goal target BG  mg/dl, test more frequently... 2-3x/week  Continue current simvastatin daily dose at this time  Contact our office if  questions about management plan.     Total time spent in with the patient evaluation:  25 min  Additional time spent reviewing pertinent lab tests and chart notes, and documentation:  5 min    Follow-up:  5/2021 or prn    ROMINA Verde MD, MS  Endocrinology  [facility]                 Again, thank you for allowing me to participate in the care of your patient.        Sincerely,        Reji Verde MD

## 2021-03-11 ENCOUNTER — ANTICOAGULATION THERAPY VISIT (OUTPATIENT)
Dept: FAMILY MEDICINE | Facility: CLINIC | Age: 86
End: 2021-03-11

## 2021-03-11 DIAGNOSIS — I48.0 PAROXYSMAL ATRIAL FIBRILLATION (H): ICD-10-CM

## 2021-03-11 LAB — INR PPP: 2.1 (ref 0.9–1.1)

## 2021-03-11 NOTE — PROGRESS NOTES
ANTICOAGULATION MANAGEMENT     Patient Name:  Ирина Yanez  Date:  3/11/2021    ASSESSMENT /SUBJECTIVE:    Today's INR result of 2.1 is therapeutic. Goal INR of 2.0-3.0      Warfarin dose taken: Less warfarin taken than planned which may be affecting INR    Diet: No new diet changes affecting INR    Medication changes/ interactions: Eliquis Daily    Previous INR: Subtherapeutic     S/S of bleeding or thromboembolism: No    New injury or illness: No    Upcoming surgery, procedure or cardioversion: No    Additional findings: Patient transitioning from Eliquis to Warfarin. She's taking Eliquis daily and took last dose 9:00 am today. Eliquis may be interfering with INR result today and INR may be actually lower than 2.1.  Patient also took boost dose of warfarin on Monday (10 mg) but only took 2.5 mg Tue and Wed when she was to take 5 mg daily. Will continue 5 mg daily at this time as INR may be less than noted, and boost dose peak, and less warfarin last 2 days. Patient will stop Eliquis. [facility] reviewed.       PLAN:    Telephone call with home care nurse [place] regarding INR result and instructed:     Warfarin Dosing Instructions: Continue your current warfarin dose 5 mg daily; may stop Eliquis    Instructed patient to follow up no later than: 6 days  Orders given to  Homecare nurse/facility to recheck    Education provided: Monitoring for bleeding signs and symptoms and Monitoring for clotting signs and symptoms      [place] Nurse verbalizes understanding and agrees to warfarin dosing plan.    Instructed to call the Anticoagulation Clinic for any changes, questions or concerns. (#[phone])        Genevieve King RN      OBJECTIVE:  Recent labs: (last 7 days)     21   INR 1.2* 1.3* 2.1*         No question data found.  Anticoagulation Summary  As of 3/11/2021    INR goal:  2.0-3.0   TTR:  9.4 % (4 d)   INR used for dosin.1 (3/11/2021)   Warfarin maintenance plan:  5 mg (2.5 mg x 2) every day    Full warfarin instructions:  5 mg every day   Weekly warfarin total:  35 mg   Plan last modified:  Addy Eng RN (3/8/2021)   Next INR check:  3/17/2021   Priority:  High   Target end date:  2/25/2022    Indications    Paroxysmal atrial fibrillation (H) [I48.0]             Anticoagulation Episode Summary     INR check location:      Preferred lab:      Send INR reminders to:  KRIS ROGERS    Comments:        Anticoagulation Care Providers     Provider Role Specialty Phone number    Yuridia Villarreal MD Referring Family Medicine [phone]

## 2021-03-13 ENCOUNTER — PATIENT OUTREACH (OUTPATIENT)
Dept: ONCOLOGY | Facility: CLINIC | Age: 86
End: 2021-03-13

## 2021-03-13 NOTE — PROGRESS NOTES
Ирина was scheduled for PleurX removal on Monday, March 15th with Dr. Odonnell but it was discovered that pt is currently on Warfarin for A. Fib.     Per Dr. Odonnell, pt should hold warfarin for 5 days prior to PleurX removal.     Writer called Ирина and explained we will have to move her appt on Monday to Friday, March 19th at 10 am so that will give her enough time to hold her Warfarin.     Ирина verbalized understanding and is in agreement with the plan.     Bisi Moffett, RN, BSN, MARIA TERESA  RN Care Coordinator  [facility]  [phone]

## 2021-03-15 ENCOUNTER — TELEPHONE (OUTPATIENT)
Dept: FAMILY MEDICINE | Facility: CLINIC | Age: 86
End: 2021-03-15

## 2021-03-15 ENCOUNTER — PATIENT OUTREACH (OUTPATIENT)
Dept: CARE COORDINATION | Facility: CLINIC | Age: 86
End: 2021-03-15

## 2021-03-15 NOTE — TELEPHONE ENCOUNTER
Dr. Blas Mathews office called to advise the patient is having a procedure on 03/19/21 and was advised on 03/14/21 to stop taking her Warfarin.    Any questions, please reach out to them.

## 2021-03-15 NOTE — PROGRESS NOTES
Clinic Care Coordination Contact  Assessment:   Ирина reports today that she is doing well.  She will be having the Pleur drainage tube removed on Monday.  She continues to recive INR treatments.      Goals addressed this encounter:   Goals Addressed                 This Visit's Progress       Patient Stated      1. Improve chronic symptoms (pt-stated)   40%     Goal Statement: I would like support in managing my chronic health conditions to maintain my health and wellness and prevent readmission to the hospital.   Date Goal set:  Updated on 2/3/21  Barriers: weak due to chronic health conditions.   Strengths: motivated, engaged in care coordination, home care therapies.   Date to Achieve By: 06/2021  Patient expressed understanding of goal: yes  Action steps to achieve this goal:  1. I will follow up with my providers as scheduled/recommended. (Primary Care Provider , CT 02/05, Pulmonology  etc.)  2. I will take my medications as prescribed.   3. I will contact my care team to report questions, concerns, support needs. 24/7 after hours services available.   4. Care Coordinator will collaborate with care team as needed.                Intervention/Education provided during outreach: NA     Outreach Frequency: monthly    Plan:   Ирина will attend her upcoming medical appointments including one with her PCP on 3/15/21/   Care Coordinator will follow up in one month    JOSE FRANCISCO Dickey   Care Coordination Team  [phone]

## 2021-03-16 ENCOUNTER — ALLIED HEALTH/NURSE VISIT (OUTPATIENT)
Dept: EDUCATION SERVICES | Facility: CLINIC | Age: 86
End: 2021-03-16
Attending: INTERNAL MEDICINE
Payer: COMMERCIAL

## 2021-03-16 DIAGNOSIS — E11.21 TYPE 2 DIABETES MELLITUS WITH DIABETIC NEPHROPATHY, WITHOUT LONG-TERM CURRENT USE OF INSULIN (H): ICD-10-CM

## 2021-03-16 PROCEDURE — G0108 DIAB MANAGE TRN  PER INDIV: HCPCS

## 2021-03-16 PROCEDURE — 95250 CONT GLUC MNTR PHYS/QHP EQP: CPT

## 2021-03-16 NOTE — PROGRESS NOTES
"Diabetes Self-Management Education & Support    Presents for: Professional CGM Start    SUBJECTIVE/OBJECTIVE:  Presents for: Professional CGM Start  Accompanied by: Self, Spouse  Diabetes education in the past 24mo: No  Focus of Visit: CGM, Taking Medication  Type of CGM visit: Professional CGM  Diabetes type: Type 2  Disease course: Stable  Diabetes management related comments/concerns: dont want to take insulin  Transportation concerns: No  Difficulty affording diabetes medication?: Sometimes  Difficulty affording diabetes testing supplies?: No  Cultural Influences/Ethnic Background:  American    Diabetes Symptoms & Complications:  Fatigue: No  Neuropathy: No  Polydipsia: No  Polyphagia: No  Polyuria: No  Visual change: No  Slow healing wounds: No  Complications assessed today?: Yes  Autonomic neuropathy: No  CVA: No  Heart disease: Yes  Nephropathy: Yes  Peripheral neuropathy: Yes  Peripheral Vascular Disease: No  Retinopathy: No  Sexual dysfunction: No    Patient Problem List and Family Medical History reviewed for relevant medical history, current medical status, and diabetes risk factors.    Vitals:  LMP  (LMP Unknown)   Estimated body mass index is 35.82 kg/m  as calculated from the following:    Height as of 2/9/21: 1.626 m (5' 4\").    Weight as of 2/9/21: 94.7 kg (208 lb 11.2 oz).   Last 3 BP:   BP Readings from Last 3 Encounters:   02/09/21 118/52   02/08/21 131/60   02/02/21 121/48       History   Smoking Status     Never Smoker   Smokeless Tobacco     Never Used       Labs:  Lab Results   Component Value Date    A1C 7.1 01/26/2021     Lab Results   Component Value Date     03/05/2021     Lab Results   Component Value Date    LDL 89 04/27/2020     HDL Cholesterol   Date Value Ref Range Status   04/27/2020 44 (L) >49 mg/dL Final   ]  GFR Estimate   Date Value Ref Range Status   03/05/2021 20 (L) >60 mL/min/[1.73_m2] Final     Comment:     Non  GFR Calc  Starting 12/18/2018, serum " creatinine based estimated GFR (eGFR) will be   calculated using the Chronic Kidney Disease Epidemiology Collaboration   (CKD-EPI) equation.       GFR Estimate If Black   Date Value Ref Range Status   03/05/2021 23 (L) >60 mL/min/[1.73_m2] Final     Comment:      GFR Calc  Starting 12/18/2018, serum creatinine based estimated GFR (eGFR) will be   calculated using the Chronic Kidney Disease Epidemiology Collaboration   (CKD-EPI) equation.       Lab Results   Component Value Date    CR 2.23 03/05/2021     No results found for: MICROALBUMIN    Healthy Eating:  Healthy Eating Assessed Today: Yes  Cultural/Restoration diet restrictions?: No  Meal planning/habits: Low salt  How many times a week on average do you eat food made away from home (restaurant/take-out)?: 0  Meals include: Breakfast, Lunch, Dinner  Breakfast: cereal, milk, egg fruit and tea  Lunch: soup- wild rice with 4 crackers Or moms meals  Dinner: chicken, dressing, cranberry, green beans  Snacks: kush crackers  Beverages: Water, Tea, Milk, Juice  Has patient met with a dietitian in the past?: Yes    Being Active:  Being Active Assessed Today: Yes  Exercise:: Currently not exercising  Barrier to exercise: Safety, Physical limitation    Monitoring:  Monitoring Assessed Today: Yes  Did patient bring glucose meter to appointment? : No  Blood Glucose Meter: ContourNext  Times checking blood sugar at home (number): 3  Times checking blood sugar at home (per): Week  Blood glucose trend: No change    Blood sugars:  Date fasting    Before   3/6 201   3/7 197   3/9 200   3/11 225   3/14 199   3/15 203       Taking Medications:      Taking Medication Assessed Today: Yes  Current Treatments: Diet, Oral Medication (taken by mouth)  Problems taking diabetes medications regularly?: No  Diabetes medication side effects?: No    Problem Solving:  Problem Solving Assessed Today: Yes  Is the patient at risk for hypoglycemia?: Yes  Hypoglycemia Frequency:  Rarely    Hypoglycemia symptoms  Confusion: No  Dizziness or Light-Headedness: Yes  Headaches: No  Hunger: Yes  Nervousness/Anxiety: No  Sleepiness: No  Speech difficulty: No  Sweats: No  Feeling shaky: Yes(weak)    Hypoglycemia Complications  Blackouts: No  Hospitalization: No  Nocturnal hypoglycemia: No  Required assistance: No  Required glucagon injection: No  Seizures: No    Reducing Risks:  Reducing Risks Assessed Today: Yes  Diabetes Risks: Age over 45 years, Family History, Sedentary Lifestyle  CAD Risks: Diabetes Mellitus, Family history, Hypertension, Post-menopausal, Sedentary lifestyle  Has dilated eye exam at least once a year?: Yes  Sees dentist every 6 months?: Yes  Feet checked by healthcare provider in the last year?: Yes    Healthy Coping:  Healthy Coping Assessed Today: Yes  Emotional response to diabetes: Ready to learn  Informal Support system:: Children, Family, Friends, Neighbors, Spouse  Stage of change: PREPARATION (Decided to change - considering how)  Patient Activation Measure Survey Score:  LUIZ Score (Last Two) 6/20/2012   LUIZ Raw Score 52   Activation Score 100   LUIZ Level 4       Diabetes knowledge and skills assessment:   Patient is knowledgeable in diabetes management concepts related to: NA    Patient needs further education on the following diabetes management concepts: Healthy Eating, Being Active, Monitoring, Taking Medication, Problem Solving, Reducing Risks and Healthy Coping    Based on learning assessment above, most appropriate setting for further diabetes education would be: Individual setting.      INTERVENTIONS:    Sensor Type: LibrePro  Lot #: [id number]  Serial #: [id number]  Expiration Date: 03/31/21  Indication(s) for CGM Study: Difficult to manage hypoglycemia and/or hyperglycemia, Other    Education provided today on:  AADE Self-Care Behaviors:  Diabetes Pathophysiology  Healthy Eating: consistency in amount, composition, and timing of food intake, portion control and  plate planning method  Taking Medication: action of prescribed medication and side effects of prescribed medications  Problem Solving: high blood glucose - causes, signs/symptoms, treatment and prevention and low blood glucose - causes, signs/symptoms, treatment and prevention  Reducing Risks: A1C - goals, relating to blood glucose levels, how often to check  Healthy Coping: recognize feelings about diagnosis and benefits of making appropriate lifestyle changes    WRITTEN AND VERBAL INFORMATION GIVEN TO SUPPORT UNDERSTANDING OF: LibrePro CGM: Sensor insertion, intention of monitoring for 14 days. Keep records of BG, food intake, exercise, and medication dosing during wear.     Opportunities for ongoing education and support in diabetes-self management were discussed.    Pt verbalized understanding of concepts discussed and recommendations provided today.       Education Materials Provided:  My Plate Planner and healthy snack and breakfast ideas    ASSESSMENT:  Professional CGM placed today to evaluate glucose patterns and trends.  Her fasting blood sugars are above goal. She would benefit from additional medication. The Jardiance was too expensive and she is apprehensive about injectable mediations.    Instruction provided on possible future medications:  Insulin annd GLP1 action, side effects, risks and administration technique taught today.    She is willing to consider one of the injectable medications if CGM indicated the need for additional medication.  She would also like th work on her diet.     Sensor was inserted with no resistance or bleeding at insertion site.  Pt verbalized understanding of concepts discussed and recommendations provided today.        PLAN  See Patient Instructions for co-developed, patient-stated behavior change goals.  AVS printed and provided to patient today. See Follow-Up section for recommended follow-up.    Viri Mosquera RN, [facility]    Time spent in DSMT: 45 minutes   Time spent in  CGM insertion: 15 minutes, in addition to time spent in DSMT  Encounter Type: Individual    Any diabetes medication dose changes were made via the CDE Protocol and Collaborative Practice Agreement with the patient's referring provider. A copy of this encounter was shared with the provider.

## 2021-03-16 NOTE — LETTER
"    3/16/2021         RE: Ирина Yanez  [address] [facility] [address]        Dear Colleague,    Thank you for referring your patient, Ирина Yanez, to the [facility]. Please see a copy of my visit note below.    Diabetes Self-Management Education & Support    Presents for: Professional CGM Start    SUBJECTIVE/OBJECTIVE:  Presents for: Professional CGM Start  Accompanied by: Self, Spouse  Diabetes education in the past 24mo: No  Focus of Visit: CGM, Taking Medication  Type of CGM visit: Professional CGM  Diabetes type: Type 2  Disease course: Stable  Diabetes management related comments/concerns: dont want to take insulin  Transportation concerns: No  Difficulty affording diabetes medication?: Sometimes  Difficulty affording diabetes testing supplies?: No  Cultural Influences/Ethnic Background:  American    Diabetes Symptoms & Complications:  Fatigue: No  Neuropathy: No  Polydipsia: No  Polyphagia: No  Polyuria: No  Visual change: No  Slow healing wounds: No  Complications assessed today?: Yes  Autonomic neuropathy: No  CVA: No  Heart disease: Yes  Nephropathy: Yes  Peripheral neuropathy: Yes  Peripheral Vascular Disease: No  Retinopathy: No  Sexual dysfunction: No    Patient Problem List and Family Medical History reviewed for relevant medical history, current medical status, and diabetes risk factors.    Vitals:  LMP  (LMP Unknown)   Estimated body mass index is 35.82 kg/m  as calculated from the following:    Height as of 2/9/21: 1.626 m (5' 4\").    Weight as of 2/9/21: 94.7 kg (208 lb 11.2 oz).   Last 3 BP:   BP Readings from Last 3 Encounters:   02/09/21 118/52   02/08/21 131/60   02/02/21 121/48       History   Smoking Status     Never Smoker   Smokeless Tobacco     Never Used       Labs:  Lab Results   Component Value Date    A1C 7.1 01/26/2021     Lab Results   Component Value Date     03/05/2021     Lab Results   Component Value Date    LDL 89 04/27/2020 "     HDL Cholesterol   Date Value Ref Range Status   04/27/2020 44 (L) >49 mg/dL Final   ]  GFR Estimate   Date Value Ref Range Status   03/05/2021 20 (L) >60 mL/min/[1.73_m2] Final     Comment:     Non  GFR Calc  Starting 12/18/2018, serum creatinine based estimated GFR (eGFR) will be   calculated using the Chronic Kidney Disease Epidemiology Collaboration   (CKD-EPI) equation.       GFR Estimate If Black   Date Value Ref Range Status   03/05/2021 23 (L) >60 mL/min/[1.73_m2] Final     Comment:      GFR Calc  Starting 12/18/2018, serum creatinine based estimated GFR (eGFR) will be   calculated using the Chronic Kidney Disease Epidemiology Collaboration   (CKD-EPI) equation.       Lab Results   Component Value Date    CR 2.23 03/05/2021     No results found for: MICROALBUMIN    Healthy Eating:  Healthy Eating Assessed Today: Yes  Cultural/Mormonism diet restrictions?: No  Meal planning/habits: Low salt  How many times a week on average do you eat food made away from home (restaurant/take-out)?: 0  Meals include: Breakfast, Lunch, Dinner  Breakfast: cereal, milk, egg fruit and tea  Lunch: soup- wild rice with 4 crackers Or moms meals  Dinner: chicken, dressing, cranberry, green beans  Snacks: kush crackers  Beverages: Water, Tea, Milk, Juice  Has patient met with a dietitian in the past?: Yes    Being Active:  Being Active Assessed Today: Yes  Exercise:: Currently not exercising  Barrier to exercise: Safety, Physical limitation    Monitoring:  Monitoring Assessed Today: Yes  Did patient bring glucose meter to appointment? : No  Blood Glucose Meter: ContourNext  Times checking blood sugar at home (number): 3  Times checking blood sugar at home (per): Week  Blood glucose trend: No change    Blood sugars:  Date fasting    Before   3/6 201   3/7 197   3/9 200   3/11 225   3/14 199   3/15 203       Taking Medications:      Taking Medication Assessed Today: Yes  Current Treatments: Diet,  Oral Medication (taken by mouth)  Problems taking diabetes medications regularly?: No  Diabetes medication side effects?: No    Problem Solving:  Problem Solving Assessed Today: Yes  Is the patient at risk for hypoglycemia?: Yes  Hypoglycemia Frequency: Rarely    Hypoglycemia symptoms  Confusion: No  Dizziness or Light-Headedness: Yes  Headaches: No  Hunger: Yes  Nervousness/Anxiety: No  Sleepiness: No  Speech difficulty: No  Sweats: No  Feeling shaky: Yes(weak)    Hypoglycemia Complications  Blackouts: No  Hospitalization: No  Nocturnal hypoglycemia: No  Required assistance: No  Required glucagon injection: No  Seizures: No    Reducing Risks:  Reducing Risks Assessed Today: Yes  Diabetes Risks: Age over 45 years, Family History, Sedentary Lifestyle  CAD Risks: Diabetes Mellitus, Family history, Hypertension, Post-menopausal, Sedentary lifestyle  Has dilated eye exam at least once a year?: Yes  Sees dentist every 6 months?: Yes  Feet checked by healthcare provider in the last year?: Yes    Healthy Coping:  Healthy Coping Assessed Today: Yes  Emotional response to diabetes: Ready to learn  Informal Support system:: Children, Family, Friends, Neighbors, Spouse  Stage of change: PREPARATION (Decided to change - considering how)  Patient Activation Measure Survey Score:  LUIZ Score (Last Two) 6/20/2012   LUIZ Raw Score 52   Activation Score 100   LUIZ Level 4       Diabetes knowledge and skills assessment:   Patient is knowledgeable in diabetes management concepts related to: NA    Patient needs further education on the following diabetes management concepts: Healthy Eating, Being Active, Monitoring, Taking Medication, Problem Solving, Reducing Risks and Healthy Coping    Based on learning assessment above, most appropriate setting for further diabetes education would be: Individual setting.      INTERVENTIONS:    Sensor Type: LibrePro  Lot #: [id number]  Serial #: [id number]  Expiration Date: 03/31/21  Indication(s) for CGM  Study: Difficult to manage hypoglycemia and/or hyperglycemia, Other    Education provided today on:  AADE Self-Care Behaviors:  Diabetes Pathophysiology  Healthy Eating: consistency in amount, composition, and timing of food intake, portion control and plate planning method  Taking Medication: action of prescribed medication and side effects of prescribed medications  Problem Solving: high blood glucose - causes, signs/symptoms, treatment and prevention and low blood glucose - causes, signs/symptoms, treatment and prevention  Reducing Risks: A1C - goals, relating to blood glucose levels, how often to check  Healthy Coping: recognize feelings about diagnosis and benefits of making appropriate lifestyle changes    WRITTEN AND VERBAL INFORMATION GIVEN TO SUPPORT UNDERSTANDING OF: LibrePro CGM: Sensor insertion, intention of monitoring for 14 days. Keep records of BG, food intake, exercise, and medication dosing during wear.     Opportunities for ongoing education and support in diabetes-self management were discussed.    Pt verbalized understanding of concepts discussed and recommendations provided today.       Education Materials Provided:  My Plate Planner and healthy snack and breakfast ideas    ASSESSMENT:  Professional CGM placed today to evaluate glucose patterns and trends.  Her fasting blood sugars are above goal. She would benefit from additional medication. The Jardiance was too expensive and she is apprehensive about injectable mediations.    Instruction provided on possible future medications:  Insulin annd GLP1 action, side effects, risks and administration technique taught today.    She is willing to consider one of the injectable medications if CGM indicated the need for additional medication.  She would also like th work on her diet.     Sensor was inserted with no resistance or bleeding at insertion site.  Pt verbalized understanding of concepts discussed and recommendations provided  today.        PLAN  See Patient Instructions for co-developed, patient-stated behavior change goals.  AVS printed and provided to patient today. See Follow-Up section for recommended follow-up.    Viri Mosquera RN, [facility]    Time spent in DSMT: 45 minutes   Time spent in CGM insertion: 15 minutes, in addition to time spent in DSMT  Encounter Type: Individual    Any diabetes medication dose changes were made via the CDE Protocol and Collaborative Practice Agreement with the patient's referring provider. A copy of this encounter was shared with the provider.

## 2021-03-16 NOTE — PATIENT INSTRUCTIONS
Care Plan:  Meal Plan Recommendation: eat 3 meals a day, have small snacks between meals, if needed, use portion control, use plate planning method  -try an alternate breakfast to your cereal a few days- refer tot the breakfast list    Follow up:  Follow-up diabetes education appointment scheduled on 3/23/21 at 7:30am.      Bring blood glucose meter and logbook with you to all doctor and follow-up appointments.     1. Plan to wear the LibrePro sensor for 14 days. It is okay to shower, bathe, and swim (up to 3 feet deep for 30 minutes)    2. Continue with your usual diabetes care plan - check blood sugars and take medicines, as prescribed.    3. Keep a log of what you eat and drink, when you take your medications and how much you take, and exercise you do while you are wearing the sensor.    3. Do not cover the sensor with extra adhesive (the small hole in the center of the sensor must remain uncovered)    4. Use a little extra care, especially when getting dressed or exercising, to avoid accidentally loosening or removing the sensor.     5. Remove the sensor if you need to have an MRI or CT scan.     If the LibrePro sensor comes off early, place it in a plastic bag or envelope and call your diabetes educator or bring it with you to your follow-up visit.     [facility] for the [facility]:  For Your Diabetes Education and Nutrition Appointments Call:  [phone]   For Diabetes Education or Nutrition Related Questions:   Phone: [phone]  Send [place] Message   If you need a medication refill please contact your pharmacy. Please allow 3 business days for your refills to be completed.      .

## 2021-03-17 ENCOUNTER — TELEPHONE (OUTPATIENT)
Dept: FAMILY MEDICINE | Facility: CLINIC | Age: 86
End: 2021-03-17

## 2021-03-17 DIAGNOSIS — I48.0 PAROXYSMAL ATRIAL FIBRILLATION (H): ICD-10-CM

## 2021-03-17 NOTE — TELEPHONE ENCOUNTER
Per Dr. Odonnell's office patient was to hold her warfarin for 5 days prior to her pluerX drain being placed (see note from 3/15). Nuvia from [facility] called asking for warfarin orders for after her procedure.    Tentative plan: continue holding, resume warfarin at 7.5mg on Friday 3/19, then resume 5mg daily. Recheck the INR on 3/22. Will call homecare RN back with changes at [phone]. Routing to clinical pharmacist for review.

## 2021-03-17 NOTE — TELEPHONE ENCOUNTER
Confirmed warfarin plan with Maulik Luz [facility] clinical pharmacist. Will proceed with the plan for 7.5mg Friday, 3/19 then resume 5mg daily. Recheck the INR on 3/22.     Robert GARZA RN, CACP

## 2021-03-19 ENCOUNTER — ONCOLOGY VISIT (OUTPATIENT)
Dept: ONCOLOGY | Facility: CLINIC | Age: 86
End: 2021-03-19
Attending: INTERNAL MEDICINE
Payer: COMMERCIAL

## 2021-03-19 VITALS
DIASTOLIC BLOOD PRESSURE: 53 MMHG | OXYGEN SATURATION: 97 % | HEART RATE: 68 BPM | WEIGHT: 192.7 LBS | TEMPERATURE: 98.3 F | RESPIRATION RATE: 16 BRPM | SYSTOLIC BLOOD PRESSURE: 121 MMHG | HEIGHT: 64 IN | BODY MASS INDEX: 32.9 KG/M2

## 2021-03-19 DIAGNOSIS — G47.31 PRIMARY CENTRAL SLEEP APNEA: Primary | ICD-10-CM

## 2021-03-19 DIAGNOSIS — J90 PLEURAL EFFUSION: Primary | ICD-10-CM

## 2021-03-19 DIAGNOSIS — J96.11 CHRONIC RESPIRATORY FAILURE WITH HYPOXIA (H): ICD-10-CM

## 2021-03-19 PROCEDURE — 32552 REMOVE LUNG CATHETER: CPT | Performed by: INTERNAL MEDICINE

## 2021-03-19 PROCEDURE — G0463 HOSPITAL OUTPT CLINIC VISIT: HCPCS

## 2021-03-19 PROCEDURE — 99214 OFFICE O/P EST MOD 30 MIN: CPT | Mod: 24 | Performed by: INTERNAL MEDICINE

## 2021-03-19 ASSESSMENT — MIFFLIN-ST. JEOR: SCORE: 1309.08

## 2021-03-19 ASSESSMENT — PAIN SCALES - GENERAL: PAINLEVEL: NO PAIN (0)

## 2021-03-19 NOTE — NURSING NOTE
"Oncology Rooming Note    March 19, 2021 9:57 AM   Ирина Yanez is a 86 year old female who presents for:    Chief Complaint   Patient presents with     Oncology Clinic Visit     Pleural effusion - PleurX Removal     Initial Vitals: /53   Pulse 68   Temp 98.3  F (36.8  C) (Tympanic)   Resp 16   Ht 1.626 m (5' 4\")   Wt 87.4 kg (192 lb 11.2 oz)   LMP  (LMP Unknown)   SpO2 97%   BMI 33.08 kg/m   Estimated body mass index is 33.08 kg/m  as calculated from the following:    Height as of this encounter: 1.626 m (5' 4\").    Weight as of this encounter: 87.4 kg (192 lb 11.2 oz). Body surface area is 1.99 meters squared.  No Pain (0) Comment: Data Unavailable   No LMP recorded (lmp unknown). Patient is postmenopausal.  Allergies reviewed: Yes  Medications reviewed: Yes    Medications: Medication refills not needed today.  Pharmacy name entered into EPIC:    [facility]  [facility] - [place] - [id number]  [facility] [place] [facility] [id number] AT [place] & [place]    Clinical concerns: PleurX Removal      Bertha Mcneil CMA              "

## 2021-03-19 NOTE — LETTER
3/19/2021         RE: Ирина Yanez  [address]  [facility] [address]        Dear Colleague,    Thank you for referring your patient, Ирина Yanez, to the [facility]. Please see a copy of my visit note below.      [facility] Follow Up Visit    Reason for Visit  Ирина Yanez is a 86 year old female who is seen in follow up for pleural effusions   Pulmonary HPI    - Ирина underwent left PleurX placement 2/8/21 for recurrent pleural effusions. She has had no output for a few weeks, flushed with saline and no improvement in output. CXR shows some left pleural thickening but minimal effusion    Other active medical problems include:   - CHF with diastolic dysfunction  - FABY       ROS Pulmonary  Dyspnea: No, Cough: No, Chest pain: No, Wheezing: No, Sputum Production: No, Hemoptysis: No  A complete ROS was otherwise negative except as noted in the HPI.  The patient was seen and examined by Smitha Odonnell MD   Current Outpatient Medications   Medication     acetaminophen (TYLENOL) 325 MG tablet     blood glucose (NO BRAND SPECIFIED) lancets standard     blood glucose (NO BRAND SPECIFIED) test strip     blood glucose monitoring (NO BRAND SPECIFIED) meter device kit     calcium acetate (PHOSLO) 667 MG CAPS capsule     carvedilol (COREG) 3.125 MG tablet     diltiazem ER COATED BEADS (CARDIAZEM LA) 180 MG 24 hr tablet     gabapentin (NEURONTIN) 100 MG capsule     hydrALAZINE (APRESOLINE) 25 MG tablet     order for DME     polyethylene glycol (MIRALAX) 17 GM/Dose powder     pravastatin (PRAVACHOL) 20 MG tablet     sertraline (ZOLOFT) 50 MG tablet     torsemide (DEMADEX) 20 MG tablet     warfarin ANTICOAGULANT (COUMADIN) 2.5 MG tablet     MULTI-VITAMIN OR TABS     No current facility-administered medications for this visit.      Allergies   Allergen Reactions     Augmentin Diarrhea     Vomiting       Cleocin      Hives     Tegretol  [Carbamazepine]      Irregular heart beat     Social History     Socioeconomic History     Marital status:      Spouse name: Not on file     Number of children: Not on file     Years of education: Not on file     Highest education level: 12th grade   Occupational History     Occupation:  from home     Employer: RETIRED   Social Needs     Financial resource strain: Not hard at all     Food insecurity     Worry: Never true     Inability: Never true     Transportation needs     Medical: No     Non-medical: No   Tobacco Use     Smoking status: Never Smoker     Smokeless tobacco: Never Used   Substance and Sexual Activity     Alcohol use: No     Frequency: Never     Binge frequency: Never     Drug use: No     Sexual activity: Yes     Partners: Male   Lifestyle     Physical activity     Days per week: 0 days     Minutes per session: 0 min     Stress: Only a little   Relationships     Social connections     Talks on phone: Three times a week     Gets together: Never     Attends Restorationism service: Never     Active member of club or organization: No     Attends meetings of clubs or organizations: Never     Relationship status:      Intimate partner violence     Fear of current or ex partner: Not on file     Emotionally abused: Not on file     Physically abused: Not on file     Forced sexual activity: Not on file   Other Topics Concern      Service Not Asked     Blood Transfusions Not Asked     Caffeine Concern Not Asked     Occupational Exposure Not Asked     Hobby Hazards Not Asked     Sleep Concern Not Asked     Stress Concern Not Asked     Weight Concern Not Asked     Special Diet No     Comment: getting fruits and veggies.      Back Care Not Asked     Exercise No     Comment: gets little; limited with back and feet/leg pain.     Bike Helmet Not Asked     Seat Belt Not Asked     Self-Exams Not Asked     Parent/sibling w/ CABG, MI or angioplasty before 65F 55M? 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     Complication of anesthesia      Depression      Diabetes mellitus (H)     sees Dr. Verde- type II     Falls frequently 7/10/2019     Heart murmur      HTN      Hyperlipidemia LDL goal < 100     Dr. Verde     Lichen sclerosus et atrophicus 2/15/2013     Melanoma (H)      Oxygen dependent     1 liter continuously     Peripheral Neuropathy     hands, feet; used to see Dr. Tl ARRIAZA (postoperative nausea and vomiting)      Skin cancer     face; basal and other. Melanoma. Dolan     Sleep apnea     CPAP     Spinal stenosis      Past Surgical History:   Procedure Laterality Date     AMPUTATE TOE(S)  8/23/2012    left second toe Procedure: AMPUTATE TOE(S);;  Surgeon: Mil Jolly DPM;  Location:  OR     CHOLECYSTECTOMY  Nov. 1975     COLONOSCOPY  early 2009    repeat 4-5 years (Had one prior to this with polyps)     COLONOSCOPY  Sept 2014    incomplete; recommend colography     COLONOSCOPY  02/25/2020    Dr. Pathak [facility]     COLONOSCOPY N/A 2/25/2020    Procedure: COLONOSCOPY, WITH biopsies using forceps;  Surgeon: Adebayo Pathak MD;  Location:  GI     INSERT CHEST TUBE Bilateral 2/8/2021    Procedure: Attempted INSERTION, CATHETER, INTERCOSTAL, FOR DRAINAGE (Pleurx);  Surgeon: Smitha Odonnell MD;  Location:  OR     OPTICAL TRACKING SYSTEM FUSION POSTERIOR SPINE LUMBAR N/A 9/16/2016    Procedure: OPTICAL TRACKING SYSTEM FUSION SPINE POSTERIOR LUMBAR ONE LEVEL;  Surgeon: Lennox Blue MD;  Location:  OR     PET, REC OF MELANOMA/MET CA Left     arm     REPAIR HAMMER TOE BILATERAL  8/23/2012    Procedure: REPAIR HAMMER TOE BILATERAL;  Flexor Tenotomy 2,3,4,5 Toes both feet, Partial 2nd toe amputation left foot;  Surgeon: Mil Jolly DPM;  Location:   "OR     REPAIR TENDON QUADRICEPS Right 10/27/2015    Procedure: REPAIR TENDON QUADRICEPS;  Surgeon: Antonio Yi MD;  Location: [facility] OR     SURGICAL HISTORY OF -   1997    bilateral knee replacement     SURGICAL HISTORY OF -   1997    right knee revised; patella tendon ruptured     SURGICAL HISTORY OF -       surgery for spinal stenosis     SURGICAL HISTORY OF -       breast reduction surgery     SURGICAL HISTORY OF -       D and C      Family History   Problem Relation Age of Onset     Cerebrovascular Disease Mother      Heart Disease Father      Neurologic Disorder Sister         ALS     C.A.D. Sister      Diabetes Sister      C.A.D. Brother      Psychotic Disorder Brother         [place] war: PTSD. suicide 2019     Gastrointestinal Disease Brother         diverticulitis     Colon Cancer No family hx of        Exam:   /53   Pulse 68   Temp 98.3  F (36.8  C) (Tympanic)   Resp 16   Ht 1.626 m (5' 4\")   Wt 87.4 kg (192 lb 11.2 oz)   LMP  (LMP Unknown)   SpO2 97%   BMI 33.08 kg/m    PSYCH: mentation appears normal. and affect normal/bright  Results:  - My interpretation of the images relevant for this visit includes: none   - My interpretation of the PFT's relevant for this visit includes: None       Assessment and plan: Ирина is an 87 yo female who is being seen in follow up for recurrent pleural effusions. She had bilateral effusions with multiple thoracentesis. Initially they were transudate but converted to exudate after a few taps. She had left PleurX placed 2/8.   Pleural effusion s/p PleurX - agree with drainage based on symptoms, once or twice a week. Next time Thursday or Monday. I advised her that if there are two consecutive drainage attempts with less than 100mL we should obtain CXR (Ordered)    Hypoxic respiratory failure - we did a walk in clinic and without oxygen she dropped to 87%. Dragging the tanks around is bothersome.    Recommend continue nighttime use as is (1 lpm through " bipap), use as needed for dyspnea, low oxygen with exertion.     [facility]   Pulmonary, Allergy, Critical Care and Sleep Medicine    Procedure Date: 3/19/2021    Procedure: PleurX Indwelling Pleural Catheter Placement REMOVAL   Diagnosis: left pleural effusion    Post-operative Diagnosis: same    Procedure performed: An appropriate time-out confirming correct patient, correct procedure, and correct site was performed.left chest was prepared and draped in a sterile fashion, Lidocaine 1% was used for local analgesia in the area of the tunnel. Using hemostats and blunt dissection, PleurX catheter was removed from the tunnel. The skin was dressed with dermabond and steristrips    Findings: Successful removal of left PleurX indwelling pleural catheter    Surgeon(s)/Assistant(s): Smitha Odonnell MD    Type of Anesthesia: local,     EBL: 0  Complications: none    Disposition/condition: home        Nursing Note:  Ирина Yanez presents today for PleurX Removal.  Patient seen by provider today: Yes: .   present during visit today: Not Applicable.    Note: Medications used: .  MED: 5ml of Xylocaine - MPF 1% 50mg/5ml  LOT: [id number]  EXP: 10/2022  ND: 56590-911-38    MED: 1.0 ml of Exofin 1.0g (1.0ml)  LOT: [id number]  EXP: 11/13/2021  NDC: 05584099    Labs:  Not Applicable.     Procedure:  Pulmonary Procedure: Chest tube removal - PleurX    Verified:  Time out included verbal and active participation of all team members: Yes.      Post Procedure:  Patient tolerated the procedure without incident..  Dressing clean, dry and intact prior to discharge.      Post Pain Assessment: 0 out of 10.    Discharge Plan:   Departure Mode: Ambulatory.    Bertha Mcneil CMA          Again, thank you for allowing me to participate in the care of your patient.        Sincerely,        Smitha Odonnell MD

## 2021-03-19 NOTE — PROGRESS NOTES
[facility]odule Clinic Follow Up Visit    Reason for Visit  Ирина Yanez is a 86 year old female who is seen in follow up for pleural effusions   Pulmonary HPI    - Ирина underwent left PleurX placement 2/8/21 for recurrent pleural effusions. She has had no output for a few weeks, flushed with saline and no improvement in output. CXR shows some left pleural thickening but minimal effusion    Other active medical problems include:   - CHF with diastolic dysfunction  - FABY       ROS Pulmonary  Dyspnea: No, Cough: No, Chest pain: No, Wheezing: No, Sputum Production: No, Hemoptysis: No  A complete ROS was otherwise negative except as noted in the HPI.  The patient was seen and examined by Smitha Odonnell MD   Current Outpatient Medications   Medication     acetaminophen (TYLENOL) 325 MG tablet     blood glucose (NO BRAND SPECIFIED) lancets standard     blood glucose (NO BRAND SPECIFIED) test strip     blood glucose monitoring (NO BRAND SPECIFIED) meter device kit     calcium acetate (PHOSLO) 667 MG CAPS capsule     carvedilol (COREG) 3.125 MG tablet     diltiazem ER COATED BEADS (CARDIAZEM LA) 180 MG 24 hr tablet     gabapentin (NEURONTIN) 100 MG capsule     hydrALAZINE (APRESOLINE) 25 MG tablet     order for DME     polyethylene glycol (MIRALAX) 17 GM/Dose powder     pravastatin (PRAVACHOL) 20 MG tablet     sertraline (ZOLOFT) 50 MG tablet     torsemide (DEMADEX) 20 MG tablet     warfarin ANTICOAGULANT (COUMADIN) 2.5 MG tablet     MULTI-VITAMIN OR TABS     No current facility-administered medications for this visit.      Allergies   Allergen Reactions     Augmentin Diarrhea     Vomiting       Cleocin      Hives     Tegretol [Carbamazepine]      Irregular heart beat     Social History     Socioeconomic History     Marital status:      Spouse name: Not on file     Number of children: Not on file     Years of education: Not on file     Highest education level: 12th grade    Occupational History     Occupation:  from home     Employer: RETIRED   Social Needs     Financial resource strain: Not hard at all     Food insecurity     Worry: Never true     Inability: Never true     Transportation needs     Medical: No     Non-medical: No   Tobacco Use     Smoking status: Never Smoker     Smokeless tobacco: Never Used   Substance and Sexual Activity     Alcohol use: No     Frequency: Never     Binge frequency: Never     Drug use: No     Sexual activity: Yes     Partners: Male   Lifestyle     Physical activity     Days per week: 0 days     Minutes per session: 0 min     Stress: Only a little   Relationships     Social connections     Talks on phone: Three times a week     Gets together: Never     Attends Hinduism service: Never     Active member of club or organization: No     Attends meetings of clubs or organizations: Never     Relationship status:      Intimate partner violence     Fear of current or ex partner: Not on file     Emotionally abused: Not on file     Physically abused: Not on file     Forced sexual activity: Not on file   Other Topics Concern      Service Not Asked     Blood Transfusions Not Asked     Caffeine Concern Not Asked     Occupational Exposure Not Asked     Hobby Hazards Not Asked     Sleep Concern Not Asked     Stress Concern Not Asked     Weight Concern Not Asked     Special Diet No     Comment: getting fruits and veggies.      Back Care Not Asked     Exercise No     Comment: gets little; limited with back and feet/leg pain.     Bike Helmet Not Asked     Seat Belt Not Asked     Self-Exams Not Asked     Parent/sibling w/ CABG, MI or angioplasty before 65F 55M? 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     Complication of anesthesia       Depression      Diabetes mellitus (H)     sees Dr. Verde- type II     Falls frequently 7/10/2019     Heart murmur      HTN      Hyperlipidemia LDL goal < 100     Dr. Verde     Lichen sclerosus et atrophicus 2/15/2013     Melanoma (H)      Oxygen dependent     1 liter continuously     Peripheral Neuropathy     hands, feet; used to see Dr. Tl ARRIAZA (postoperative nausea and vomiting)      Skin cancer     face; basal and other. Melanoma. Dolan     Sleep apnea     CPAP     Spinal stenosis      Past Surgical History:   Procedure Laterality Date     AMPUTATE TOE(S)  8/23/2012    left second toe Procedure: AMPUTATE TOE(S);;  Surgeon: Mil Jolly DPM;  Location: [facility] OR     CHOLECYSTECTOMY  Nov. 1975     COLONOSCOPY  early 2009    repeat 4-5 years (Had one prior to this with polyps)     COLONOSCOPY  Sept 2014    incomplete; recommend colography     COLONOSCOPY  02/25/2020    Dr. Pathak [facility]     COLONOSCOPY N/A 2/25/2020    Procedure: COLONOSCOPY, WITH biopsies using forceps;  Surgeon: Adebayo Pathak MD;  Location:  GI     INSERT CHEST TUBE Bilateral 2/8/2021    Procedure: Attempted INSERTION, CATHETER, INTERCOSTAL, FOR DRAINAGE (Pleurx);  Surgeon: Smitha Odonnell MD;  Location:  OR     OPTICAL TRACKING SYSTEM FUSION POSTERIOR SPINE LUMBAR N/A 9/16/2016    Procedure: OPTICAL TRACKING SYSTEM FUSION SPINE POSTERIOR LUMBAR ONE LEVEL;  Surgeon: Lennox Blue MD;  Location: [facility] OR     PET, REC OF MELANOMA/MET CA Left     arm     REPAIR HAMMER TOE BILATERAL  8/23/2012    Procedure: REPAIR HAMMER TOE BILATERAL;  Flexor Tenotomy 2,3,4,5 Toes both feet, 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 toe amputation left foot;  Surgeon: Mil Jolly DPM;  Location:  OR     REPAIR TENDON QUADRICEPS Right 10/27/2015    Procedure: REPAIR TENDON QUADRICEPS;  Surgeon: Antonio Yi MD;  Location: [facility] OR     SURGICAL HISTORY OF -   1997    bilateral knee replacement     SURGICAL HISTORY OF -   1997    right knee  "revised; patella tendon ruptured     SURGICAL HISTORY OF -       surgery for spinal stenosis     SURGICAL HISTORY OF -       breast reduction surgery     SURGICAL HISTORY OF -       D and C      Family History   Problem Relation Age of Onset     Cerebrovascular Disease Mother      Heart Disease Father      Neurologic Disorder Sister         ALS     C.A.D. Sister      Diabetes Sister      C.A.D. Brother      Psychotic Disorder Brother         [place] war: PTSD. suicide 2019     Gastrointestinal Disease Brother         diverticulitis     Colon Cancer No family hx of        Exam:   /53   Pulse 68   Temp 98.3  F (36.8  C) (Tympanic)   Resp 16   Ht 1.626 m (5' 4\")   Wt 87.4 kg (192 lb 11.2 oz)   LMP  (LMP Unknown)   SpO2 97%   BMI 33.08 kg/m    PSYCH: mentation appears normal. and affect normal/bright  Results:  - My interpretation of the images relevant for this visit includes: none   - My interpretation of the PFT's relevant for this visit includes: None       Assessment and plan: Ирина is an 87 yo female who is being seen in follow up for recurrent pleural effusions. She had bilateral effusions with multiple thoracentesis. Initially they were transudate but converted to exudate after a few taps. She had left PleurX placed 2/8.   Pleural effusion s/p PleurX - agree with drainage based on symptoms, once or twice a week. Next time Thursday or Monday. I advised her that if there are two consecutive drainage attempts with less than 100mL we should obtain CXR (Ordered)    Hypoxic respiratory failure - we did a walk in clinic and without oxygen she dropped to 87%. Dragging the tanks around is bothersome.    Recommend continue nighttime use as is (1 lpm through bipap), use as needed for dyspnea, low oxygen with exertion.     [facility]   Pulmonary, Allergy, Critical Care and Sleep Medicine    Procedure Date: 3/19/2021    Procedure: PleurX Indwelling Pleural Catheter Placement REMOVAL "   Diagnosis: left pleural effusion    Post-operative Diagnosis: same    Procedure performed: An appropriate time-out confirming correct patient, correct procedure, and correct site was performed.left chest was prepared and draped in a sterile fashion, Lidocaine 1% was used for local analgesia in the area of the tunnel. Using hemostats and blunt dissection, PleurX catheter was removed from the tunnel. The skin was dressed with dermabond and steristrips    Findings: Successful removal of left PleurX indwelling pleural catheter    Surgeon(s)/Assistant(s): Smitha Odonnell MD    Type of Anesthesia: local,     EBL: 0  Complications: none    Disposition/condition: home

## 2021-03-19 NOTE — PATIENT INSTRUCTIONS
Continue using oxygen during the day as desired, if needed for shortness of breath or low oxygen.    At night, keep using it at you are.       Per Dr. Odonnell, no additional follow-up needed at this time. Pt welcome to follow-up as needed.  Bisi Moffett RN on 3/23/2021 at 3:44 PM

## 2021-03-19 NOTE — PROGRESS NOTES
Nursing Note:  Ирина Yanez presents today for PleurX Removal.  Patient seen by provider today: Yes: .   present during visit today: Not Applicable.    Note: Medications used: .  MED: 5ml of Xylocaine - MPF 1% 50mg/5ml  LOT: [id number]  EXP: 10/2022  NDC: 81901-941-12    MED: 1.0 ml of Exofin 1.0g (1.0ml)  LOT: [id number]  EXP: 11/13/2021  NDC: [id number]    Labs:  Not Applicable.     Procedure:  Pulmonary Procedure: Chest tube removal - PleurX    Verified:  Time out included verbal and active participation of all team members: Yes.      Post Procedure:  Patient tolerated the procedure without incident..  Dressing clean, dry and intact prior to discharge.      Post Pain Assessment: 0 out of 10.    Discharge Plan:   Departure Mode: Ambulatory.    Bertha Mcneil, CMA

## 2021-03-20 ENCOUNTER — HEALTH MAINTENANCE LETTER (OUTPATIENT)
Age: 86
End: 2021-03-20

## 2021-03-22 ENCOUNTER — ANTICOAGULATION THERAPY VISIT (OUTPATIENT)
Dept: FAMILY MEDICINE | Facility: CLINIC | Age: 86
End: 2021-03-22

## 2021-03-22 DIAGNOSIS — I48.0 PAROXYSMAL ATRIAL FIBRILLATION (H): ICD-10-CM

## 2021-03-22 LAB — INR PPP: 1.4 (ref 0.9–1.1)

## 2021-03-22 NOTE — PROGRESS NOTES
ANTICOAGULATION MANAGEMENT     Patient Name:  Ирина Yanez  Date:  3/22/2021    ASSESSMENT /SUBJECTIVE:    Today's INR result of 1.4 is subtherapeutic. Goal INR of 2.0-3.0      Warfarin dose taken: Warfarin recently held for plurex removal which may be affecting INR    Diet: No new diet changes affecting INR    Medication changes/ interactions: No new medications/supplements affecting INR    Previous INR: Therapeutic     S/S of bleeding or thromboembolism: No    New injury or illness: No    Upcoming surgery, procedure or cardioversion: No    Additional findings: None      PLAN:    Telephone call with Ирина regarding INR result and instructed:     Warfarin Dosing Instructions: Take 7.5mg today then continue your current warfarin dose of 5mg daily    Instructed patient to follow up no later than:   Orders given to  Homecare nurse/facility to recheck    Education provided: Target INR goal and significance of current INR result      Araceli verbalizes understanding and agrees to warfarin dosing plan.    Instructed to call the Anticoagulation Clinic for any changes, questions or concerns. (#[phone])        Addy Eng RN      OBJECTIVE:  Recent labs: (last 7 days)     21   INR 1.4*         No question data found.  Anticoagulation Summary  As of 3/22/2021    INR goal:  2.0-3.0   TTR:  13.0 % (2.1 wk)   INR used for dosin.4 (3/22/2021)   Warfarin maintenance plan:  5 mg (2.5 mg x 2) every day   Full warfarin instructions:  3/22: 7.5 mg; Otherwise 5 mg every day   Weekly warfarin total:  35 mg   Plan last modified:  Addy Eng RN (3/8/2021)   Next INR check:  3/26/2021   Priority:  High   Target end date:  2022    Indications    Paroxysmal atrial fibrillation (H) [I48.0]             Anticoagulation Episode Summary     INR check location:      Preferred lab:      Send INR reminders to:  KRIS ROGERS    Comments:        Anticoagulation Care Providers     Provider Role Specialty Phone  number    Yuridia Villarreal MD [facility] Medicine [phone]

## 2021-03-23 ENCOUNTER — TELEPHONE (OUTPATIENT)
Dept: FAMILY MEDICINE | Facility: CLINIC | Age: 86
End: 2021-03-23

## 2021-03-23 ENCOUNTER — PATIENT OUTREACH (OUTPATIENT)
Dept: ONCOLOGY | Facility: CLINIC | Age: 86
End: 2021-03-23

## 2021-03-23 ENCOUNTER — ALLIED HEALTH/NURSE VISIT (OUTPATIENT)
Dept: EDUCATION SERVICES | Facility: CLINIC | Age: 86
End: 2021-03-23
Payer: COMMERCIAL

## 2021-03-23 DIAGNOSIS — E11.21 TYPE 2 DIABETES MELLITUS WITH DIABETIC NEPHROPATHY, WITHOUT LONG-TERM CURRENT USE OF INSULIN (H): Primary | ICD-10-CM

## 2021-03-23 PROCEDURE — G0108 DIAB MANAGE TRN  PER INDIV: HCPCS

## 2021-03-23 NOTE — LETTER
"    3/23/2021         RE: Ирина Yanez  [address] [facility] [address]        Dear Colleague,    Thank you for referring your patient, Ирина Yanez, to the [facility]. Please see a copy of my visit note below.    Diabetes Self-Management Education & Support    Presents for: CGM Review    SUBJECTIVE/OBJECTIVE:  Presents for: CGM Review  Accompanied by: Self, Spouse  Diabetes education in the past 24mo: Yes  Focus of Visit: CGM, Taking Medication, Healthy Eating  Type of CGM visit: Professional CGM  Diabetes type: Type 2  Disease course: Stable  Diabetes management related comments/concerns: dont want to take insulin, working on making diet changes. Sensor fell off on Sunday.  Transportation concerns: No  Difficulty affording diabetes medication?: Sometimes  Difficulty affording diabetes testing supplies?: No  Cultural Influences/Ethnic Background:  American    Diabetes Symptoms & Complications:  Fatigue: No  Neuropathy: No  Polydipsia: No  Polyphagia: No  Polyuria: No  Visual change: No  Slow healing wounds: No  Complications assessed today?: Yes  Autonomic neuropathy: No  CVA: No  Heart disease: Yes  Nephropathy: Yes  Peripheral neuropathy: Yes  Peripheral Vascular Disease: No  Retinopathy: No  Sexual dysfunction: No    Patient Problem List and Family Medical History reviewed for relevant medical history, current medical status, and diabetes risk factors.    Vitals:  LMP  (LMP Unknown)   Estimated body mass index is 33.08 kg/m  as calculated from the following:    Height as of 3/19/21: 1.626 m (5' 4\").    Weight as of 3/19/21: 87.4 kg (192 lb 11.2 oz).   Declines weight check today    Last 3 BP:   BP Readings from Last 3 Encounters:   03/19/21 121/53   02/09/21 118/52   02/08/21 131/60       History   Smoking Status     Never Smoker   Smokeless Tobacco     Never Used       Labs:  Lab Results   Component Value Date    A1C 7.1 01/26/2021     Lab Results   Component Value Date "     03/05/2021     Lab Results   Component Value Date    LDL 89 04/27/2020     HDL Cholesterol   Date Value Ref Range Status   04/27/2020 44 (L) >49 mg/dL Final   ]  GFR Estimate   Date Value Ref Range Status   03/05/2021 20 (L) >60 mL/min/[1.73_m2] Final     Comment:     Non  GFR Calc  Starting 12/18/2018, serum creatinine based estimated GFR (eGFR) will be   calculated using the Chronic Kidney Disease Epidemiology Collaboration   (CKD-EPI) equation.       GFR Estimate If Black   Date Value Ref Range Status   03/05/2021 23 (L) >60 mL/min/[1.73_m2] Final     Comment:      GFR Calc  Starting 12/18/2018, serum creatinine based estimated GFR (eGFR) will be   calculated using the Chronic Kidney Disease Epidemiology Collaboration   (CKD-EPI) equation.       Lab Results   Component Value Date    CR 2.23 03/05/2021     No results found for: MICROALBUMIN    Healthy Eating:  Healthy Eating Assessed Today: Yes  Cultural/Taoism diet restrictions?: No  Meal planning/habits: Low salt, Smaller portions  How many times a week on average do you eat food made away from home (restaurant/take-out)?: 0  Meals include: Breakfast, Lunch, Dinner  Beverages: Water, Tea, Milk, Juice  Has patient met with a dietitian in the past?: Yes        Being Active:  Being Active Assessed Today: Yes  Exercise:: Currently not exercising  Barrier to exercise: Safety, Physical limitation    Monitoring:  Monitoring Assessed Today: Yes  Did patient bring glucose meter to appointment? : No  Blood Glucose Meter: ContourNext  Times checking blood sugar at home (number): 3  Times checking blood sugar at home (per): Week    Taking Medications:  Taking Medication Assessed Today: Yes  Current Treatments: Diet, Oral Medication (taken by mouth)  Problems taking diabetes medications regularly?: No  Diabetes medication side effects?: No    Problem Solving:  Problem Solving Assessed Today: Yes  Is the patient at risk for  hypoglycemia?: Yes  Hypoglycemia Frequency: Rarely  Hypoglycemia Treatment: Juice, Glucose (tablets or gel)    Hypoglycemia symptoms  Confusion: No  Dizziness or Light-Headedness: Yes  Headaches: No  Hunger: Yes  Nervousness/Anxiety: No  Sleepiness: No  Speech difficulty: No  Sweats: No  Feeling shaky: Yes(weak)    Hypoglycemia Complications  Blackouts: No  Hospitalization: No  Nocturnal hypoglycemia: No  Required assistance: No  Required glucagon injection: No  Seizures: No    Reducing Risks:  Reducing Risks Assessed Today: Yes  Diabetes Risks: Age over 45 years, Family History, Sedentary Lifestyle  CAD Risks: Diabetes Mellitus, Family history, Hypertension, Post-menopausal, Sedentary lifestyle  Has dilated eye exam at least once a year?: Yes  Sees dentist every 6 months?: Yes  Feet checked by healthcare provider in the last year?: Yes    Healthy Coping:  Healthy Coping Assessed Today: Yes  Emotional response to diabetes: Ready to learn  Informal Support system:: Children, Family, Friends, Neighbors, Spouse  Stage of change: PREPARATION (Decided to change - considering how)  Patient Activation Measure Survey Score:  LUIZ Score (Last Two) 6/20/2012   LUIZ Raw Score 52   Activation Score 100   LUIZ Level 4       Diabetes knowledge and skills assessment:   Patient is knowledgeable in diabetes management concepts related to: Monitoring, Taking Medication and Problem Solving    Patient needs further education on the following diabetes management concepts: Healthy Eating, Being Active, Reducing Risks     Based on learning assessment above, most appropriate setting for further diabetes education would be: Individual setting.      INTERVENTIONS:  Placed new sensor  Sensor Type: LibrePro  Lot #: [id number]  Serial #: [id number]  Expiration Date: 03/31/21    REPORTS:              Education provided today on:  AADE Self-Care Behaviors:  Diabetes Pathophysiology  Healthy Eating: consistency in amount, composition, and timing of  food intake and portion control  Being Active: relationship to blood glucose, describe appropriate activity program, precautions to take and follow physical therapy guidelines  Monitoring: individual blood glucose targets and frequency of monitoring  Taking Medication: action of prescribed medication and when to take medications    Pt verbalized understanding of concepts discussed and recommendations provided today.       Education Materials Provided:  My Plate Planner and 30-45 gram carb meal ideas    ASSESSMENT:    Review of Libreview report. Glucose overall average 167mg/dl,  in target 72%, above target 28% and below target 0% of the time. Pattern of post-meal hyperglycemia at breakfast and dinner.     Patient is motivated to make lifestyle changes. She  working on portions at meals to control blood sugars, she prefers not to add medication. She reports feeling better this last week. Fasting blood sugar improved from last week and she reports her weight is down, which she is checking daily at home.     Recommend: continue working on portions at meals-try saving fruit for snacks to decrease meal rise. Add in daily activity- she has physical therapy exercises but has not been doing the exercises but is willing to start.   She can also try taking the glimepiride a little earlier to capture the breakfast meal rise. Recommend she start with 30-60 minute before breakfast but may need to be 2-3 hours prior to breakfast to be effective.   Discussed the benefits of additional medication if glucose continues to be elevated. She is willing to consider at next follow up if needed. Cost is a concern to her,I would recommend a small dose of Novolin 70/30 to cover breakfast glucose rise and provide continued coverage throughout the day. It's inexpensive at [facility] and comes in a pen form.       PLAN  See Patient Instructions for co-developed, patient-stated behavior change goals.  AVS printed and provided to patient today. See  Follow-Up section for recommended follow-up.    Viri Mosquera RN, [facility]    Time Spent: 30 minutes  Encounter Type: Individual    Any diabetes medication dose changes were made via the CDE Protocol and Collaborative Practice Agreement with the patient's referring provider. A copy of this encounter was shared with the provider.

## 2021-03-23 NOTE — PROGRESS NOTES
"Diabetes Self-Management Education & Support    Presents for: CGM Review    SUBJECTIVE/OBJECTIVE:  Presents for: CGM Review  Accompanied by: Self, Spouse  Diabetes education in the past 24mo: Yes  Focus of Visit: CGM, Taking Medication, Healthy Eating  Type of CGM visit: Professional CGM  Diabetes type: Type 2  Disease course: Stable  Diabetes management related comments/concerns: dont want to take insulin, working on making diet changes. Sensor fell off on Sunday.  Transportation concerns: No  Difficulty affording diabetes medication?: Sometimes  Difficulty affording diabetes testing supplies?: No  Cultural Influences/Ethnic Background:  American    Diabetes Symptoms & Complications:  Fatigue: No  Neuropathy: No  Polydipsia: No  Polyphagia: No  Polyuria: No  Visual change: No  Slow healing wounds: No  Complications assessed today?: Yes  Autonomic neuropathy: No  CVA: No  Heart disease: Yes  Nephropathy: Yes  Peripheral neuropathy: Yes  Peripheral Vascular Disease: No  Retinopathy: No  Sexual dysfunction: No    Patient Problem List and Family Medical History reviewed for relevant medical history, current medical status, and diabetes risk factors.    Vitals:  LMP  (LMP Unknown)   Estimated body mass index is 33.08 kg/m  as calculated from the following:    Height as of 3/19/21: 1.626 m (5' 4\").    Weight as of 3/19/21: 87.4 kg (192 lb 11.2 oz).   Declines weight check today    Last 3 BP:   BP Readings from Last 3 Encounters:   03/19/21 121/53   02/09/21 118/52   02/08/21 131/60       History   Smoking Status     Never Smoker   Smokeless Tobacco     Never Used       Labs:  Lab Results   Component Value Date    A1C 7.1 01/26/2021     Lab Results   Component Value Date     03/05/2021     Lab Results   Component Value Date    LDL 89 04/27/2020     HDL Cholesterol   Date Value Ref Range Status   04/27/2020 44 (L) >49 mg/dL Final   ]  GFR Estimate   Date Value Ref Range Status   03/05/2021 20 (L) >60 " mL/min/[1.73_m2] Final     Comment:     Non  GFR Calc  Starting 12/18/2018, serum creatinine based estimated GFR (eGFR) will be   calculated using the Chronic Kidney Disease Epidemiology Collaboration   (CKD-EPI) equation.       GFR Estimate If Black   Date Value Ref Range Status   03/05/2021 23 (L) >60 mL/min/[1.73_m2] Final     Comment:      GFR Calc  Starting 12/18/2018, serum creatinine based estimated GFR (eGFR) will be   calculated using the Chronic Kidney Disease Epidemiology Collaboration   (CKD-EPI) equation.       Lab Results   Component Value Date    CR 2.23 03/05/2021     No results found for: MICROALBUMIN    Healthy Eating:  Healthy Eating Assessed Today: Yes  Cultural/Worship diet restrictions?: No  Meal planning/habits: Low salt, Smaller portions  How many times a week on average do you eat food made away from home (restaurant/take-out)?: 0  Meals include: Breakfast, Lunch, Dinner  Beverages: Water, Tea, Milk, Juice  Has patient met with a dietitian in the past?: Yes        Being Active:  Being Active Assessed Today: Yes  Exercise:: Currently not exercising  Barrier to exercise: Safety, Physical limitation    Monitoring:  Monitoring Assessed Today: Yes  Did patient bring glucose meter to appointment? : No  Blood Glucose Meter: ContourNext  Times checking blood sugar at home (number): 3  Times checking blood sugar at home (per): Week    Taking Medications:  Taking Medication Assessed Today: Yes  Current Treatments: Diet, Oral Medication (taken by mouth)  Problems taking diabetes medications regularly?: No  Diabetes medication side effects?: No    Problem Solving:  Problem Solving Assessed Today: Yes  Is the patient at risk for hypoglycemia?: Yes  Hypoglycemia Frequency: Rarely  Hypoglycemia Treatment: Juice, Glucose (tablets or gel)    Hypoglycemia symptoms  Confusion: No  Dizziness or Light-Headedness: Yes  Headaches: No  Hunger: Yes  Nervousness/Anxiety:  No  Sleepiness: No  Speech difficulty: No  Sweats: No  Feeling shaky: Yes(weak)    Hypoglycemia Complications  Blackouts: No  Hospitalization: No  Nocturnal hypoglycemia: No  Required assistance: No  Required glucagon injection: No  Seizures: No    Reducing Risks:  Reducing Risks Assessed Today: Yes  Diabetes Risks: Age over 45 years, Family History, Sedentary Lifestyle  CAD Risks: Diabetes Mellitus, Family history, Hypertension, Post-menopausal, Sedentary lifestyle  Has dilated eye exam at least once a year?: Yes  Sees dentist every 6 months?: Yes  Feet checked by healthcare provider in the last year?: Yes    Healthy Coping:  Healthy Coping Assessed Today: Yes  Emotional response to diabetes: Ready to learn  Informal Support system:: Children, Family, Friends, Neighbors, Spouse  Stage of change: PREPARATION (Decided to change - considering how)  Patient Activation Measure Survey Score:  LUIZ Score (Last Two) 6/20/2012   LUIZ Raw Score 52   Activation Score 100   LUIZ Level 4       Diabetes knowledge and skills assessment:   Patient is knowledgeable in diabetes management concepts related to: Monitoring, Taking Medication and Problem Solving    Patient needs further education on the following diabetes management concepts: Healthy Eating, Being Active, Reducing Risks     Based on learning assessment above, most appropriate setting for further diabetes education would be: Individual setting.      INTERVENTIONS:  Placed new sensor  Sensor Type: LibrePro  Lot #: [id number]  Serial #: [id number]  Expiration Date: 03/31/21    REPORTS:              Education provided today on:  AADE Self-Care Behaviors:  Diabetes Pathophysiology  Healthy Eating: consistency in amount, composition, and timing of food intake and portion control  Being Active: relationship to blood glucose, describe appropriate activity program, precautions to take and follow physical therapy guidelines  Monitoring: individual blood glucose targets and frequency  of monitoring  Taking Medication: action of prescribed medication and when to take medications    Pt verbalized understanding of concepts discussed and recommendations provided today.       Education Materials Provided:  My Plate Planner and 30-45 gram carb meal ideas    ASSESSMENT:    Review of Libreview report. Glucose overall average 167mg/dl,  in target 72%, above target 28% and below target 0% of the time. Pattern of post-meal hyperglycemia at breakfast and dinner.     Patient is motivated to make lifestyle changes. She  working on portions at meals to control blood sugars, she prefers not to add medication. She reports feeling better this last week. Fasting blood sugar improved from last week and she reports her weight is down, which she is checking daily at home.     Recommend: continue working on portions at meals-try saving fruit for snacks to decrease meal rise. Add in daily activity- she has physical therapy exercises but has not been doing the exercises but is willing to start.   She can also try taking the glimepiride a little earlier to capture the breakfast meal rise. Recommend she start with 30-60 minute before breakfast but may need to be 2-3 hours prior to breakfast to be effective.   Discussed the benefits of additional medication if glucose continues to be elevated. She is willing to consider at next follow up if needed. Cost is a concern to her,I would recommend a small dose of Novolin 70/30 to cover breakfast glucose rise and provide continued coverage throughout the day. It's inexpensive at [facility] and comes in a pen form.       PLAN  See Patient Instructions for co-developed, patient-stated behavior change goals.  AVS printed and provided to patient today. See Follow-Up section for recommended follow-up.    Viri Mosquera RN, [facility]    Time Spent: 30 minutes  Encounter Type: Individual    Any diabetes medication dose changes were made via the CDE Protocol and Collaborative Practice Agreement  with the patient's referring provider. A copy of this encounter was shared with the provider.

## 2021-03-23 NOTE — PROGRESS NOTES
Writer called Ирина for post-pleurx removal follow up.     Writer also was going to discuss canceling her 4/5 CXR. Per Dr. Odonnell's check out notes from 3/19/21, this can get canceled.    There was no answer. Left voicemail to return call.    Bisi Moffett, RN, BSN, MARIA TERESA  RN Care Coordinator  [facility]  [phone]

## 2021-03-23 NOTE — PATIENT INSTRUCTIONS
Care Plan:  Meal Plan Recommendation: eat 3 meals a day- continue to work on portions and follow the meal plan ideas.  - try saving your breakfast fruit for a morning snack  - try having a small snack around 4pm     Exercise / activity plan: follow your physical therapy exercises  Check blood sugars fasting    Recommend take your glimepiride 1/2 tablet 30 minutes before breakfast.     Bring your weight chart to the next appointment   Bring your blood sugar meter    Follow up:  Follow-up diabetes education appointment scheduled on 3/30/21 at 11:30am.       [place] [facility] for the [facility]:  For Your Diabetes or Nutrition Education Appointments Call:  [phone]   For Diabetes or Nutrition Related Questions:   [phone]  Send [employer] Message   If you need a medication refill please contact your pharmacy. Please allow 3 business days for your refills to be completed.

## 2021-03-25 ENCOUNTER — OFFICE VISIT (OUTPATIENT)
Dept: FAMILY MEDICINE | Facility: CLINIC | Age: 86
End: 2021-03-25
Payer: COMMERCIAL

## 2021-03-25 VITALS
RESPIRATION RATE: 16 BRPM | SYSTOLIC BLOOD PRESSURE: 130 MMHG | OXYGEN SATURATION: 96 % | WEIGHT: 189.9 LBS | HEART RATE: 67 BPM | HEIGHT: 64 IN | DIASTOLIC BLOOD PRESSURE: 50 MMHG | BODY MASS INDEX: 32.42 KG/M2 | TEMPERATURE: 97.8 F

## 2021-03-25 DIAGNOSIS — J90 BILATERAL PLEURAL EFFUSION: Primary | ICD-10-CM

## 2021-03-25 DIAGNOSIS — I35.0 AORTIC VALVE STENOSIS, ETIOLOGY OF CARDIAC VALVE DISEASE UNSPECIFIED: ICD-10-CM

## 2021-03-25 DIAGNOSIS — I50.30 DIASTOLIC HEART FAILURE, UNSPECIFIED HF CHRONICITY (H): ICD-10-CM

## 2021-03-25 DIAGNOSIS — Z89.422 ACQUIRED ABSENCE OF OTHER LEFT TOE(S) (H): ICD-10-CM

## 2021-03-25 DIAGNOSIS — M20.12 HALLUX VALGUS, ACQUIRED, LEFT: ICD-10-CM

## 2021-03-25 DIAGNOSIS — I10 HYPERTENSION GOAL BP (BLOOD PRESSURE) < 140/90: ICD-10-CM

## 2021-03-25 DIAGNOSIS — I48.0 PAROXYSMAL ATRIAL FIBRILLATION (H): ICD-10-CM

## 2021-03-25 DIAGNOSIS — E11.21 TYPE 2 DIABETES MELLITUS WITH DIABETIC NEPHROPATHY, WITHOUT LONG-TERM CURRENT USE OF INSULIN (H): ICD-10-CM

## 2021-03-25 DIAGNOSIS — N18.4 CKD (CHRONIC KIDNEY DISEASE) STAGE 4, GFR 15-29 ML/MIN (H): ICD-10-CM

## 2021-03-25 PROCEDURE — 99215 OFFICE O/P EST HI 40 MIN: CPT | Performed by: FAMILY MEDICINE

## 2021-03-25 RX ORDER — CARVEDILOL 3.12 MG/1
3.12 TABLET ORAL 2 TIMES DAILY WITH MEALS
Qty: 180 TABLET | Refills: 1 | Status: SHIPPED | OUTPATIENT
Start: 2021-03-25 | End: 2021-04-28

## 2021-03-25 RX ORDER — HYDRALAZINE HYDROCHLORIDE 25 MG/1
25 TABLET, FILM COATED ORAL 3 TIMES DAILY
Qty: 270 TABLET | Refills: 1 | Status: SHIPPED | OUTPATIENT
Start: 2021-03-25 | End: 2021-08-26

## 2021-03-25 ASSESSMENT — ANXIETY QUESTIONNAIRES
1. FEELING NERVOUS, ANXIOUS, OR ON EDGE: NOT AT ALL
5. BEING SO RESTLESS THAT IT IS HARD TO SIT STILL: NOT AT ALL
GAD7 TOTAL SCORE: 0
7. FEELING AFRAID AS IF SOMETHING AWFUL MIGHT HAPPEN: NOT AT ALL
1. FEELING NERVOUS, ANXIOUS, OR ON EDGE: NOT AT ALL
7. FEELING AFRAID AS IF SOMETHING AWFUL MIGHT HAPPEN: NOT AT ALL
3. WORRYING TOO MUCH ABOUT DIFFERENT THINGS: NOT AT ALL
IF YOU CHECKED OFF ANY PROBLEMS ON THIS QUESTIONNAIRE, HOW DIFFICULT HAVE THESE PROBLEMS MADE IT FOR YOU TO DO YOUR WORK, TAKE CARE OF THINGS AT HOME, OR GET ALONG WITH OTHER PEOPLE: NOT DIFFICULT AT ALL
3. WORRYING TOO MUCH ABOUT DIFFERENT THINGS: NOT AT ALL
6. BECOMING EASILY ANNOYED OR IRRITABLE: NOT AT ALL
2. NOT BEING ABLE TO STOP OR CONTROL WORRYING: NOT AT ALL
2. NOT BEING ABLE TO STOP OR CONTROL WORRYING: NOT AT ALL
GAD7 TOTAL SCORE: 0
6. BECOMING EASILY ANNOYED OR IRRITABLE: NOT AT ALL
5. BEING SO RESTLESS THAT IT IS HARD TO SIT STILL: NOT AT ALL

## 2021-03-25 ASSESSMENT — PATIENT HEALTH QUESTIONNAIRE - PHQ9
SUM OF ALL RESPONSES TO PHQ QUESTIONS 1-9: 0
5. POOR APPETITE OR OVEREATING: NOT AT ALL
5. POOR APPETITE OR OVEREATING: NOT AT ALL

## 2021-03-25 ASSESSMENT — MIFFLIN-ST. JEOR: SCORE: 1296.38

## 2021-03-25 NOTE — Clinical Note
Hello! It looks like she has done well with getting the tube out; but still needing oxygen. I am wondering if you have any insights or suggestions for following her and if you think she may be able to come off oxygen. I am used to having a specialist involved and that may be Cardiology since we are thinking the effusions were heart related.   Thank you!   Yuridia Villarreal

## 2021-03-25 NOTE — LETTER
"My Heart Failure Action Plan   Name: Ирина Yanez    YOB: 1935   Date: 3/25/2021    My doctor: Yuridia Villarreal [facility]     [address] [facility] [address] [phone]  My Diagnosis: {HF STAGES:931199}   My Exercise Goal: 30 minutes daily  .     My Weight Plan:   Wt Readings from Last 2 Encounters:   03/25/21 86.1 kg (189 lb 14.4 oz)   03/19/21 87.4 kg (192 lb 11.2 oz)     Weigh yourself daily using the same scale. If you gain more than 2 pounds in 24 hours or 5 pounds in a week {HF WEIGHT GOAL:660334}    My Diet Goal: { :433701::\"No added salt\"}    Emergency Room Visits:    Our goal is to improve your quality of life and help you avoid a visit to the emergency room or hospital.  If we work together, we can achieve this goal. But, if you feel you need to call 911 or go to the emergency room, please do so.  If you go to the emergency room, please bring your list of medicines and your daily weight chart with you.       GREEN ZONE     Doing well today    Weight gained is no more than 2 pounds a day or 5 pounds a week.    No swelling in feet, ankles, legs or stomach.    No more swelling than usual.    No more trouble breathing than usual.    No change in my sleep.    No other problems. Actions:    I am doing fine.  I will take my medicine, follow my diet, see my doctor, exercise, and watch for symptoms.           YELLOW ZONE         Having a bad day or flare up    Weight gain of more than 2 pounds in one day or 5 pounds in one week.    New swelling in ankle, leg, knee or thigh.    Bloating in belly, pants feel tighter.    Swelling in hands or face.    Coughing or trouble breathing while walking or talking.    Harder to breathe last night.    Have trouble sleeping, wake up short of breath.    Much more tired than usual.    Not eating.    Pain in my chest or bad leg cramps.    Feel weak or dizzy. Actions:    I need to take action and call my doctor or nurse " today.                 RED ZONE         Need medical care now    Weight gain of 5 pounds overnight.    Chest pain or pressure that does not go away.    Feel less alert.    Wheezing or have trouble breathing when at rest.    Cannot sleep lying down.    Cannot take my water pill.    Pass out or faint. Actions:    I need to call my doctor or nurse now!    Call 911 if I have chest pain or cannot breathe.

## 2021-03-25 NOTE — PROGRESS NOTES
Assessment & Plan     Bilateral pleural effusion  Had PleurX tube removed recently. Stable, but still requiring oxygen.   The effusions were thought due to heart failure. Would like her to follow up with Cardiology; have placed order.  - CARDIOLOGY EVAL ADULT REFERRAL; Future    Diastolic heart failure, unspecified HF chronicity (H)  As above.   - CARDIOLOGY EVAL ADULT REFERRAL; Future    Paroxysmal atrial fibrillation (H)  Stable. Needing refills.  She is on coumadin now. Had recently signed form for other anticoagulant; she notes she will be staying with coumadin. Needing refill in a month; did discuss the INR clinic will take care of this.   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  Would encourage Podiatry; she notes some concerns with cost and at this time not ready to pursue. Also discussed checking with [facility] or other as well.  (Looks like this would be a surgical procedure as I look up lapiplastic on internet)    CKD (chronic kidney disease) stage 4, GFR 15-29 ml/min (H)  She is being followed by Nephrology.   She will go to an educational appointment on dialysis.     Type 2 diabetes mellitus with diabetic nephropathy, without long-term current use of insulin (H)  She does see Endocrinology.  Currently paying a lot of attention to diet. Working with diabetic educator.  No lows.  She does have the [employer] [facility].    Aortic valve stenosis, etiology of cardiac valve disease unspecified  As above.   - CARDIOLOGY EVAL ADULT REFERRAL; Future    Hypertension goal BP (blood pressure) < 140/90  Controlled. Refilling current medication.  -  carvedilol (COREG) 3.125 MG tablet; Take 1 tablet (3.125 mg) by mouth 2 times daily (with meals)  - diltiazem ER COATED BEADS (CARDIAZEM LA) 180 MG 24 hr tablet; Take 1 tablet (180 mg) by mouth 2 times daily  - hydrALAZINE (APRESOLINE) 25 MG tablet; Take 1 tablet (25 mg) by mouth 3 times daily    Hallux valgus, acquired, left  As above; would recommend Podiatry.         54 minutes spent on the date of the encounter doing chart review, review of test results, patient visit and documentation            Return in about 2 months (around 5/25/2021), or if symptoms worsen or fail to improve, for Medication recheck.    Yuridia Villarreal MD, MD  [facility]    Will CC Dr. Odonnell regarding any future considerations/insights around her oxygen use.    Bárbara Gifford is a 86 year old who presents for the following health issues     HPI     Hypertension Follow-up      Do you check your blood pressure regularly outside of the clinic? Not regularly      Are you following a low salt diet? Yes    Are your blood pressures ever more than 140 on the top number (systolic) OR more   than 90 on the bottom number (diastolic), for example 140/90? Not taking       How many servings of fruits and vegetables do you eat daily?  2-3    On average, how many sweetened beverages do you drink each day (Examples: soda, juice, sweet tea, etc.  Do NOT count diet or artificially sweetened beverages)?   0    How many days per week do you exercise enough to make your heart beat faster? none    How many minutes a day do you exercise enough to make your heart beat faster? none    How many days per week do you miss taking your medication? 0        Review of Systems   CONSTITUTIONAL:NEGATIVE for fever, chills, change in weight x gradual weight loss.  RESP:see below.  CV: NEGATIVE for chest pain, palpitations or peripheral edema; does not feel palpitations.  MUSCULOSKELETAL: no swelling.  PSYCHIATRIC: NEGATIVE for changes in mood or  "affect    The tube has been out since last Friday. She notes it is healing well.    Will take her oxygen off if sitting around the house.  She notes her O2 sats will go down to 87-88 % if doing some of the house hold chores. She uses the O2 then or if out and about.      Eating well; she notes probably eating better than previously. Losing weight gradually. She has been to diabetic educator. Tracking what she eats.    Has not made appointment with Cardiology yet; forgot the name of the person she had seen.    Has been in touch with Nephrology.  Will go in for a class around dialysis education, just in case. At this time, she notes she does not want to do dialysis.    Got another 30 days of warfarin.  Will need a new prescription.  Sticking with the coumadin. Notes that even with assistance, it would be very expensive.    Asking about lapiplastic. This would be something to help straighten her toe. Notes medial MTP is prominent. Not painful now; but notes it is at risk for this.  Left foot second toe removed.     Objective    /50 (BP Location: Right arm, Cuff Size: Adult Regular)   Pulse 67   Temp 97.8  F (36.6  C) (Oral)   Resp 16   Ht 1.626 m (5' 4\")   Wt 86.1 kg (189 lb 14.4 oz)   LMP  (LMP Unknown)   SpO2 96%   BMI 32.60 kg/m    Body mass index is 32.6 kg/m .  Physical Exam   GENERAL APPEARANCE: alert and no distress  RESP: lungs clear to auscultation - no rales, rhonchi or wheezes  CV: regular rates and rhythm  MS: no edema.  Left foot: second toe is absent. She does have hallux valgus and a bunion.  PSYCH: mentation appears normal and affect normal/bright    Reviewed Diabetic educator note.  Reviewed Pulmonary (oncolog) note    From today:  Writer called Ирина and spoke with her. She reports she is doing very well after her PleurX has been removed and has had no issues.      Writer also confirmed she was aware we will be canceling her April 5th CXR because per Dr. Odonnell it is no longer needed. " Per Dr. Odonnell's office visit notes from 3/19/21, cancel the 4/5 follow up visit and CXR and pt can follow-up as needed.      Ирина verbalized understanding and is in agreement with the plan. She had no further questions or concerns at this time.     From visit 3/19/2021:     Hypoxic respiratory failure - we did a walk in clinic and without oxygen she dropped to 87%. Dragging the tanks around is bothersome.               Recommend continue nighttime use as is (1 lpm through bipap), use as needed for dyspnea, low oxygen with exertion.

## 2021-03-25 NOTE — PROGRESS NOTES
Writer called Ирина and spoke with her. She reports she is doing very well after her PleurX has been removed and has had no issues.     Writer also confirmed she was aware we will be canceling her April 5th CXR because per Dr. Odonnell it is no longer needed. Per Dr. Odonnell's office visit notes from 3/19/21, cancel the 4/5 follow up visit and CXR and pt can follow-up as needed.     Ирина verbalized understanding and is in agreement with the plan. She had no further questions or concerns at this time.     Bisi Moffett, RN, BSN, MARIA TERESA  RN Care Coordinator  [facility]  [phone]

## 2021-03-25 NOTE — PATIENT INSTRUCTIONS
I put in a referral for Cardiology follow up.  It has been Dr. Beasley who you have seen.

## 2021-03-26 ENCOUNTER — ANTICOAGULATION THERAPY VISIT (OUTPATIENT)
Dept: FAMILY MEDICINE | Facility: CLINIC | Age: 86
End: 2021-03-26

## 2021-03-26 DIAGNOSIS — I48.0 PAROXYSMAL ATRIAL FIBRILLATION (H): ICD-10-CM

## 2021-03-26 LAB — INR PPP: 1.7 (ref 0.9–1.1)

## 2021-03-26 ASSESSMENT — ANXIETY QUESTIONNAIRES: GAD7 TOTAL SCORE: 0

## 2021-03-26 NOTE — PROGRESS NOTES
ANTICOAGULATION MANAGEMENT     Patient Name:  Ирина Yanez  Date:  3/26/2021    ASSESSMENT /SUBJECTIVE:    Today's INR result of 1.7 is therapeutic. Goal INR of 2.0-3.0      Warfarin dose taken: Warfarin taken as instructed    Diet: Increased greens/vitamin K in diet; ongoing change    Medication changes/ interactions: No new medications/supplements affecting INR    Previous INR: Subtherapeutic     S/S of bleeding or thromboembolism: No    New injury or illness: No    Upcoming surgery, procedure or cardioversion: No    Additional findings: Patient transitioned from eliquis to warfarin this month, has only been in range x1, will dose per goal range protocol.      PLAN:    Telephone call with home care nurse Shelby regarding INR result and instructed:     Warfarin Dosing Instructions: Change your warfarin dose to 6.25mg MF & %mg AOD  . (7 % change)    Instructed patient to follow up no later than: 5 days  Orders given to  Homecare nurse/facility to recheck    Education provided: Contact [facility] Anticoagulation: [phone]  with any changes, questions or concerns. importance of being consistent with diet       Shelby RN verbalizes understanding and agrees to warfarin dosing plan.    Instructed to call the Anticoagulation Clinic for any changes, questions or concerns. (#[phone])        Naomi Liu RN      OBJECTIVE:  Recent labs: (last 7 days)     21   INR 1.4* 1.7*         No question data found.  Anticoagulation Summary  As of 3/26/2021    INR goal:  2.0-3.0   TTR:  10.2 % (2.7 wk)   INR used for dosin.7 (3/26/2021)   Warfarin maintenance plan:  5 mg (2.5 mg x 2) every day   Full warfarin instructions:  5 mg every day   Weekly warfarin total:  35 mg   Plan last modified:  Addy Eng, RN (3/8/2021)   Next INR check:     Priority:  High   Target end date:  2022    Indications    Paroxysmal atrial fibrillation (H) [I48.0]             Anticoagulation Episode Summary      INR check location:      Preferred lab:      Send INR reminders to:  KRIS ROGERS    Comments:        Anticoagulation Care Providers     Provider Role Specialty Phone number    Yuridia Villarreal MD Referring Family Medicine [phone]         Naomi Parsons RN, BSN, PHN

## 2021-03-30 ENCOUNTER — ALLIED HEALTH/NURSE VISIT (OUTPATIENT)
Dept: EDUCATION SERVICES | Facility: CLINIC | Age: 86
End: 2021-03-30
Payer: COMMERCIAL

## 2021-03-30 VITALS — WEIGHT: 188.8 LBS | BODY MASS INDEX: 32.41 KG/M2

## 2021-03-30 DIAGNOSIS — E11.21 TYPE 2 DIABETES MELLITUS WITH DIABETIC NEPHROPATHY, WITHOUT LONG-TERM CURRENT USE OF INSULIN (H): Primary | ICD-10-CM

## 2021-03-30 PROCEDURE — G0108 DIAB MANAGE TRN  PER INDIV: HCPCS

## 2021-03-30 NOTE — PROGRESS NOTES
"Diabetes Self-Management Education & Support    Presents for: CGM Review    SUBJECTIVE/OBJECTIVE:  Presents for: CGM Review  Accompanied by: Self, Spouse  Diabetes education in the past 24mo: Yes  Focus of Visit: CGM, Taking Medication, Healthy Eating  Type of CGM visit: Professional CGM  Diabetes type: Type 2  Disease course: Stable  Diabetes management related comments/concerns: no  Transportation concerns: No  Difficulty affording diabetes medication?: Sometimes  Difficulty affording diabetes testing supplies?: No  Cultural Influences/Ethnic Background:  American    Diabetes Symptoms & Complications:  Fatigue: No  Neuropathy: No  Polydipsia: No  Polyphagia: No  Polyuria: No  Visual change: No  Slow healing wounds: No  Complications assessed today?: No  Autonomic neuropathy: No  CVA: No  Heart disease: Yes  Nephropathy: Yes  Peripheral neuropathy: Yes  Peripheral Vascular Disease: No  Retinopathy: No  Sexual dysfunction: No    Patient Problem List and Family Medical History reviewed for relevant medical history, current medical status, and diabetes risk factors.    Vitals:  LMP  (LMP Unknown)   Estimated body mass index is 32.6 kg/m  as calculated from the following:    Height as of 3/25/21: 1.626 m (5' 4\").    Weight as of 3/30/21: 188.8 lb   Last 3 BP:   BP Readings from Last 3 Encounters:   03/25/21 130/50   03/19/21 121/53   02/09/21 118/52       History   Smoking Status     Never Smoker   Smokeless Tobacco     Never Used       Labs:  Lab Results   Component Value Date    A1C 7.1 01/26/2021     Lab Results   Component Value Date     03/05/2021     Lab Results   Component Value Date    LDL 89 04/27/2020     HDL Cholesterol   Date Value Ref Range Status   04/27/2020 44 (L) >49 mg/dL Final   ]  GFR Estimate   Date Value Ref Range Status   03/05/2021 20 (L) >60 mL/min/[1.73_m2] Final     Comment:     Non  GFR Calc  Starting 12/18/2018, serum creatinine based estimated GFR (eGFR) will be "   calculated using the Chronic Kidney Disease Epidemiology Collaboration   (CKD-EPI) equation.       GFR Estimate If Black   Date Value Ref Range Status   03/05/2021 23 (L) >60 mL/min/[1.73_m2] Final     Comment:      GFR Calc  Starting 12/18/2018, serum creatinine based estimated GFR (eGFR) will be   calculated using the Chronic Kidney Disease Epidemiology Collaboration   (CKD-EPI) equation.       Lab Results   Component Value Date    CR 2.23 03/05/2021     No results found for: MICROALBUMIN    Healthy Eating:  Healthy Eating Assessed Today: Yes  Cultural/Mormonism diet restrictions?: No  Meal planning/habits: Low salt, Smaller portions  How many times a week on average do you eat food made away from home (restaurant/take-out)?: 0  Meals include: Breakfast, Lunch, Dinner  Beverages: Water, Tea, Milk, Juice  Has patient met with a dietitian in the past?: Yes    Being Active:  Being Active Assessed Today: Yes  Exercise:: Currently not exercising  Barrier to exercise: Safety, Physical limitation    Monitoring:  Monitoring Assessed Today: Yes  Did patient bring glucose meter to appointment? : No  Blood Glucose Meter: ContourNext  Times checking blood sugar at home (number): 3  Times checking blood sugar at home (per): Week    Taking Medications:  Taking Medication Assessed Today: Yes  Current Treatments: Diet, Oral Medication (taken by mouth)  Problems taking diabetes medications regularly?: No  Diabetes medication side effects?: No    Problem Solving:  Problem Solving Assessed Today: Yes  Is the patient at risk for hypoglycemia?: Yes  Hypoglycemia Frequency: Rarely  Hypoglycemia Treatment: Juice, Glucose (tablets or gel)    Hypoglycemia symptoms  Confusion: No  Dizziness or Light-Headedness: Yes  Headaches: No  Hunger: Yes  Nervousness/Anxiety: No  Sleepiness: No  Speech difficulty: No  Sweats: No  Feeling shaky: Yes(weak)    Hypoglycemia Complications  Blackouts: No  Hospitalization: No  Nocturnal  hypoglycemia: No  Required assistance: No  Required glucagon injection: No  Seizures: No    Reducing Risks:  Reducing Risks Assessed Today: Yes  Diabetes Risks: Age over 45 years, Family History, Sedentary Lifestyle  CAD Risks: Diabetes Mellitus, Family history, Hypertension, Post-menopausal, Sedentary lifestyle  Has dilated eye exam at least once a year?: Yes  Sees dentist every 6 months?: Yes  Feet checked by healthcare provider in the last year?: Yes    Healthy Coping:  Healthy Coping Assessed Today: Yes  Emotional response to diabetes: Ready to learn  Informal Support system:: Children, Family, Friends, Neighbors, Spouse  Stage of change: ACTION (Actively working towards change)  Patient Activation Measure Survey Score:  LUIZ Score (Last Two) 6/20/2012   LUIZ Raw Score 52   Activation Score 100   LUIZ Level 4       Diabetes knowledge and skills assessment:   Patient is knowledgeable in diabetes management concepts related to: Taking Medication    Patient needs further education on the following diabetes management concepts: Healthy Eating, Monitoring, Taking Medication and Reducing Risks    Based on learning assessment above, most appropriate setting for further diabetes education would be: Individual setting.      INTERVENTIONS:    REPORTS:                Education provided today on:  AADE Self-Care Behaviors:  Healthy Eating: consistency in amount, composition, and timing of food intake, portion control and plate planning method  Monitoring: individual blood glucose targets and frequency of monitoring  Taking Medication: action of prescribed medication  Healthy Coping: benefits of making appropriate lifestyle changes    Pt verbalized understanding of concepts discussed and recommendations provided today.       Education Materials Provided:  No new materials provided today    ASSESSMENT:  Review of [employer] report. Glucose overall average 139mg/dl,  in target 87%, above target 13% and below target 0% of the time.  Pattern of post meal hyperglycemia with breakfast cereal.   Glucose average improved from last week with watching portions, following meal plans and change timing of glimepiride.   Recommend she continue with current plan, try changing to unsweetened cereal with stevia, she does have stevia packets but has not yet tried it. Add small snack in the afternoon to reduce risk of hypoglycemia before dinner.  She will continue to wear the sensor for 1 more week to evaluate current plan.          PLAN  See Patient Instructions for co-developed, patient-stated behavior change goals.  AVS printed and provided to patient today. See Follow-Up section for recommended follow-up.    Viir Mosquera RN, [facility]    Time Spent: 35 minutes  Encounter Type: Individual    Any diabetes medication dose changes were made via the CDE Protocol and Collaborative Practice Agreement with the patient's referring provider. A copy of this encounter was shared with the provider.

## 2021-03-30 NOTE — LETTER
"    3/30/2021         RE: Ирина Yanez  [address] [place] [address]        Dear Colleague,    Thank you for referring your patient, Ирина Yanez, to the [facility]. Please see a copy of my visit note below.    Diabetes Self-Management Education & Support    Presents for: CGM Review    SUBJECTIVE/OBJECTIVE:  Presents for: CGM Review  Accompanied by: Self, Spouse  Diabetes education in the past 24mo: Yes  Focus of Visit: CGM, Taking Medication, Healthy Eating  Type of CGM visit: Professional CGM  Diabetes type: Type 2  Disease course: Stable  Diabetes management related comments/concerns: no  Transportation concerns: No  Difficulty affording diabetes medication?: Sometimes  Difficulty affording diabetes testing supplies?: No  Cultural Influences/Ethnic Background:  American    Diabetes Symptoms & Complications:  Fatigue: No  Neuropathy: No  Polydipsia: No  Polyphagia: No  Polyuria: No  Visual change: No  Slow healing wounds: No  Complications assessed today?: No  Autonomic neuropathy: No  CVA: No  Heart disease: Yes  Nephropathy: Yes  Peripheral neuropathy: Yes  Peripheral Vascular Disease: No  Retinopathy: No  Sexual dysfunction: No    Patient Problem List and Family Medical History reviewed for relevant medical history, current medical status, and diabetes risk factors.    Vitals:  LMP  (LMP Unknown)   Estimated body mass index is 32.6 kg/m  as calculated from the following:    Height as of 3/25/21: 1.626 m (5' 4\").    Weight as of 3/30/21: 188.8 lb   Last 3 BP:   BP Readings from Last 3 Encounters:   03/25/21 130/50   03/19/21 121/53   02/09/21 118/52       History   Smoking Status     Never Smoker   Smokeless Tobacco     Never Used       Labs:  Lab Results   Component Value Date    A1C 7.1 01/26/2021     Lab Results   Component Value Date     03/05/2021     Lab Results   Component Value Date    LDL 89 04/27/2020     HDL Cholesterol   Date Value Ref Range Status "   04/27/2020 44 (L) >49 mg/dL Final   ]  GFR Estimate   Date Value Ref Range Status   03/05/2021 20 (L) >60 mL/min/[1.73_m2] Final     Comment:     Non  GFR Calc  Starting 12/18/2018, serum creatinine based estimated GFR (eGFR) will be   calculated using the Chronic Kidney Disease Epidemiology Collaboration   (CKD-EPI) equation.       GFR Estimate If Black   Date Value Ref Range Status   03/05/2021 23 (L) >60 mL/min/[1.73_m2] Final     Comment:      GFR Calc  Starting 12/18/2018, serum creatinine based estimated GFR (eGFR) will be   calculated using the Chronic Kidney Disease Epidemiology Collaboration   (CKD-EPI) equation.       Lab Results   Component Value Date    CR 2.23 03/05/2021     No results found for: MICROALBUMIN    Healthy Eating:  Healthy Eating Assessed Today: Yes  Cultural/Yazidi diet restrictions?: No  Meal planning/habits: Low salt, Smaller portions  How many times a week on average do you eat food made away from home (restaurant/take-out)?: 0  Meals include: Breakfast, Lunch, Dinner  Beverages: Water, Tea, Milk, Juice  Has patient met with a dietitian in the past?: Yes    Being Active:  Being Active Assessed Today: Yes  Exercise:: Currently not exercising  Barrier to exercise: Safety, Physical limitation    Monitoring:  Monitoring Assessed Today: Yes  Did patient bring glucose meter to appointment? : No  Blood Glucose Meter: ContourNext  Times checking blood sugar at home (number): 3  Times checking blood sugar at home (per): Week    Taking Medications:  Taking Medication Assessed Today: Yes  Current Treatments: Diet, Oral Medication (taken by mouth)  Problems taking diabetes medications regularly?: No  Diabetes medication side effects?: No    Problem Solving:  Problem Solving Assessed Today: Yes  Is the patient at risk for hypoglycemia?: Yes  Hypoglycemia Frequency: Rarely  Hypoglycemia Treatment: Juice, Glucose (tablets or gel)    Hypoglycemia  symptoms  Confusion: No  Dizziness or Light-Headedness: Yes  Headaches: No  Hunger: Yes  Nervousness/Anxiety: No  Sleepiness: No  Speech difficulty: No  Sweats: No  Feeling shaky: Yes(weak)    Hypoglycemia Complications  Blackouts: No  Hospitalization: No  Nocturnal hypoglycemia: No  Required assistance: No  Required glucagon injection: No  Seizures: No    Reducing Risks:  Reducing Risks Assessed Today: Yes  Diabetes Risks: Age over 45 years, Family History, Sedentary Lifestyle  CAD Risks: Diabetes Mellitus, Family history, Hypertension, Post-menopausal, Sedentary lifestyle  Has dilated eye exam at least once a year?: Yes  Sees dentist every 6 months?: Yes  Feet checked by healthcare provider in the last year?: Yes    Healthy Coping:  Healthy Coping Assessed Today: Yes  Emotional response to diabetes: Ready to learn  Informal Support system:: Children, Family, Friends, Neighbors, Spouse  Stage of change: ACTION (Actively working towards change)  Patient Activation Measure Survey Score:  LUIZ Score (Last Two) 6/20/2012   LUIZ Raw Score 52   Activation Score 100   LUIZ Level 4       Diabetes knowledge and skills assessment:   Patient is knowledgeable in diabetes management concepts related to: Taking Medication    Patient needs further education on the following diabetes management concepts: Healthy Eating, Monitoring, Taking Medication and Reducing Risks    Based on learning assessment above, most appropriate setting for further diabetes education would be: Individual setting.      INTERVENTIONS:    REPORTS:                Education provided today on:  AADE Self-Care Behaviors:  Healthy Eating: consistency in amount, composition, and timing of food intake, portion control and plate planning method  Monitoring: individual blood glucose targets and frequency of monitoring  Taking Medication: action of prescribed medication  Healthy Coping: benefits of making appropriate lifestyle changes    Pt verbalized understanding of  concepts discussed and recommendations provided today.       Education Materials Provided:  No new materials provided today    ASSESSMENT:  Review of Libreview report. Glucose overall average 139mg/dl,  in target 87%, above target 13% and below target 0% of the time. Pattern of post meal hyperglycemia with breakfast cereal.   Glucose average improved from last week with watching portions, following meal plans and change timing of glimepiride.   Recommend she continue with current plan, try changing to unsweetened cereal with stevia, she does have stevia packets but has not yet tried it. Add small snack in the afternoon to reduce risk of hypoglycemia before dinner.  She will continue to wear the sensor for 1 more week to evaluate current plan.          PLAN  See Patient Instructions for co-developed, patient-stated behavior change goals.  AVS printed and provided to patient today. See Follow-Up section for recommended follow-up.    Viri Mosquera RN, [facility]    Time Spent: 35 minutes  Encounter Type: Individual    Any diabetes medication dose changes were made via the CDE Protocol and Collaborative Practice Agreement with the patient's referring provider. A copy of this encounter was shared with the provider.

## 2021-03-30 NOTE — PATIENT INSTRUCTIONS
Care Plan:  Meal Plan Recommendation: eat 3 portion control meals a day, have small snacks between meals.  - continue to have your breakfast fruit for your morning snack  - make sure to have an afternoon snack around 3-4pm  - have your cereal every other day, try to switch to unfrosted cereal when your current supply runs out.     Check blood sugars once a day, either before breakfast OR before dinner.    Recommend change to oral medication regimen - take the glimepiride 1/2 tablet 30-60 minutes before breakfast    Follow up:  Drop off your sensor and food record Any time between 7am-6pm on Monday April 5th     Bring blood glucose meter and logbook with you to all doctor and follow-up appointments.     [facility] for the [facility]:  For Your Diabetes or Nutrition Education Appointments Call:  [phone]   For Diabetes or Nutrition Related Questions:   [phone]  Send SnapLogic Message   If you need a medication refill please contact your pharmacy. Please allow 3 business days for your refills to be completed.

## 2021-03-31 ENCOUNTER — ANTICOAGULATION THERAPY VISIT (OUTPATIENT)
Dept: FAMILY MEDICINE | Facility: CLINIC | Age: 86
End: 2021-03-31

## 2021-03-31 DIAGNOSIS — I48.0 PAROXYSMAL ATRIAL FIBRILLATION (H): ICD-10-CM

## 2021-03-31 LAB — INR PPP: 1.7 (ref 0.9–1.1)

## 2021-03-31 RX ORDER — WARFARIN SODIUM 2.5 MG/1
TABLET ORAL
Qty: 192 TABLET | Refills: 1 | Status: SHIPPED | OUTPATIENT
Start: 2021-03-31 | End: 2021-09-08

## 2021-03-31 NOTE — PROGRESS NOTES
ANTICOAGULATION MANAGEMENT     Patient Name:  Ирина Yanez  Date:  3/31/2021    ASSESSMENT /SUBJECTIVE:    Today's INR result of 1.7 is subtherapeutic. Goal INR of 2.0-3.0      Warfarin dose taken: Warfarin taken as instructed    Diet: No new diet changes affecting INR. Overall eating less sweets and eating more balanced meals. Ирина states she has not eaten more greens though.    Medication changes/ interactions: No new medications/supplements affecting INR    Previous INR: Subtherapeutic     S/S of bleeding or thromboembolism: No    New injury or illness: No    Upcoming surgery, procedure or cardioversion: No    Additional findings: None      PLAN:    Telephone call with home care nurse Araceli butts Ирина regarding INR result and instructed:     Warfarin Dosing Instructions: Change your warfarin dose to 5mg every Sun, Tue, u; 6.25mg all other days  . (6.7 % change)    Instructed patient to follow up no later than: 1 week  Lab visit scheduled    Education provided: Target INR goal and significance of current INR result      Ирина verbalizes understanding and agrees to warfarin dosing plan.    Instructed to call the Anticoagulation Clinic for any changes, questions or concerns. (#[phone])        Addy Eng RN      OBJECTIVE:  Recent labs: (last 7 days)     21   INR 1.7* 1.7*         No question data found.  Anticoagulation Summary  As of 3/31/2021    INR goal:  2.0-3.0   TTR:  8.1 % (3.4 wk)   INR used for dosin.7 (3/31/2021)   Warfarin maintenance plan:  6.25 mg (2.5 mg x 2.5) every Mon, Fri; 5 mg (2.5 mg x 2) all other days   Full warfarin instructions:  6.25 mg every Mon, Fri; 5 mg all other days   Weekly warfarin total:  37.5 mg   Plan last modified:  Naomi Liu RN (3/26/2021)   Next INR check:     Priority:  High   Target end date:  2022    Indications    Paroxysmal atrial fibrillation (H) [I48.0]             Anticoagulation Episode Summary     INR check location:       Preferred lab:      Send INR reminders to:  KRIS ROGERS    Comments:        Anticoagulation Care Providers     Provider Role Specialty Phone number    Yuridia Villarreal MD Referring Family Medicine [phone]

## 2021-03-31 NOTE — PROGRESS NOTES
Anticoagulation Management    Unable to reach Araceli today.    Today's INR result of 1.7 is subtherapeutic (goal INR of 2.0-3.0).  Result received from: Home Care    Follow up required to confirm warfarin dose taken and assess for changes    [facility]      Anticoagulation clinic to follow up    Addy Eng RN

## 2021-03-31 NOTE — PROGRESS NOTES
AKI from [facility] with home care, [phone], reporting INR of 1.7.    Please followup    Valerie Bustamante RN

## 2021-04-01 ENCOUNTER — MEDICAL CORRESPONDENCE (OUTPATIENT)
Dept: HEALTH INFORMATION MANAGEMENT | Facility: CLINIC | Age: 86
End: 2021-04-01

## 2021-04-02 DIAGNOSIS — Z79.01 LONG TERM CURRENT USE OF ANTICOAGULANTS WITH INR GOAL OF 2.0-3.0: Primary | ICD-10-CM

## 2021-04-02 DIAGNOSIS — I48.0 PAROXYSMAL ATRIAL FIBRILLATION (H): ICD-10-CM

## 2021-04-06 ENCOUNTER — ALLIED HEALTH/NURSE VISIT (OUTPATIENT)
Dept: EDUCATION SERVICES | Facility: CLINIC | Age: 86
End: 2021-04-06
Payer: COMMERCIAL

## 2021-04-06 DIAGNOSIS — E11.21 TYPE 2 DIABETES MELLITUS WITH DIABETIC NEPHROPATHY, WITHOUT LONG-TERM CURRENT USE OF INSULIN (H): Primary | ICD-10-CM

## 2021-04-06 RX ORDER — GLIMEPIRIDE 1 MG/1
TABLET ORAL
Qty: 30 TABLET | Refills: 0
Start: 2021-04-06 | End: 2021-11-16

## 2021-04-06 NOTE — LETTER
4/6/2021         RE: Ирина Yanez  [address] [facility] [address]        Dear Colleague,    Thank you for referring your patient, Ирина Yanez, to the [facility]. Please see a copy of my visit note below.    Diabetes Follow-up    Subjective/Objective:    Ирина Yanez dropped of Elizabeth sensor for downloading.    Diabetes is being managed with Diabetes Medications   Glimepiride 1mg (1/2 tablet) daily with breakfast     BG/Food log or report:                 Assessment/Plan:  Glucose continues to improve with diet changes and glimepiride  (1/2 of 1mg tablet daily with breakfast)    Call to patient to continue with current plan and follow up with Endocrinology.     Viri Mosquera RN, [facility]    Time spent:15 minutes    Any diabetes medication dose changes were made via the CDE Protocol and Collaborative Practice Agreement with the patient's referring provider. A copy of this encounter was shared with the provider.

## 2021-04-06 NOTE — PROGRESS NOTES
Diabetes Follow-up    Subjective/Objective:    Ирина Yanez dropped of Elizabeth sensor for downloading.    Diabetes is being managed with Diabetes Medications   Glimepiride 1mg (1/2 tablet) daily with breakfast     BG/Food log or report:                 Assessment/Plan:  Glucose continues to improve with diet changes and glimepiride  (1/2 of 1mg tablet daily with breakfast)    Call to patient to continue with current plan and follow up with Endocrinology.     Viri Mosquera RN, [facility]    Time spent:15 minutes    Any diabetes medication dose changes were made via the CDE Protocol and Collaborative Practice Agreement with the patient's referring provider. A copy of this encounter was shared with the provider.

## 2021-04-07 ENCOUNTER — ANTICOAGULATION THERAPY VISIT (OUTPATIENT)
Dept: FAMILY MEDICINE | Facility: CLINIC | Age: 86
End: 2021-04-07

## 2021-04-07 DIAGNOSIS — I48.0 PAROXYSMAL ATRIAL FIBRILLATION (H): ICD-10-CM

## 2021-04-07 DIAGNOSIS — Z79.01 LONG TERM CURRENT USE OF ANTICOAGULANTS WITH INR GOAL OF 2.0-3.0: ICD-10-CM

## 2021-04-07 LAB
CAPILLARY BLOOD COLLECTION: NORMAL
INR PPP: 2.6 (ref 0.86–1.14)

## 2021-04-07 PROCEDURE — 99207 PR NO CHARGE NURSE ONLY: CPT | Performed by: FAMILY MEDICINE

## 2021-04-07 PROCEDURE — 36416 COLLJ CAPILLARY BLOOD SPEC: CPT | Performed by: FAMILY MEDICINE

## 2021-04-07 PROCEDURE — 85610 PROTHROMBIN TIME: CPT | Performed by: FAMILY MEDICINE

## 2021-04-07 NOTE — PROGRESS NOTES
Anticoagulation Management    Unable to reach Ирина today.    Today's INR result of 2.6 is therapeutic (goal INR of 2.0-3.0).  Result received from: Clinic Lab    Follow up required to confirm warfarin dose taken and assess for changes. New dosing at last visit. If no changes or concerns, next INR in 2 weeks.    No answer. Left  to call [phone]. Can transfer to [facility] at [phone].    Anticoagulation clinic to follow up    Claudia Dong RN    ANTICOAGULATION FOLLOW-UP CLINIC VISIT    Patient Name:  Ирина Yanez  Date:  4/7/2021  Contact Type:  Telephone    SUBJECTIVE:  Patient Findings     Comments:  Called patient to discuss today's INR results: The patient was assessed for diet, medication, and activity level changes, missed or extra doses, bruising or bleeding, with no problem findings. New, higher, maintenance dosing is working well for Ирина. Reviewed maintenance warfarin dosing with patient. Patient will remain on the same dose until next INR check. No other questions or concerns. Scheduled next lab-only INR in 2 weeks.  Claudia GRIFFIN RN  Anticoagulation Team          Clinical Outcomes     Negatives:  Major bleeding event, Thromboembolic event, Anticoagulation-related hospital admission, Anticoagulation-related ED visit, Anticoagulation-related fatality    Comments:  Called patient to discuss today's INR results: The patient was assessed for diet, medication, and activity level changes, missed or extra doses, bruising or bleeding, with no problem findings. New, higher, maintenance dosing is working well for Ирина. Reviewed maintenance warfarin dosing with patient. Patient will remain on the same dose until next INR check. No other questions or concerns. Scheduled next lab-only INR in 2 weeks.  Claudia GRIFFIN RN  Anticoagulation Team             OBJECTIVE    Recent labs: (last 7 days)     04/07/21  0947   INR 2.60*       ASSESSMENT / PLAN  INR assessment THER    Recheck INR In: 2 WEEKS    INR  Location Clinic      Anticoagulation Summary  As of 2021    INR goal:  2.0-3.0   TTR:  21.9 % (1 mo)   INR used for dosin.60 (2021)   Warfarin maintenance plan:  5 mg (2.5 mg x 2) every Sun, Tue, Thu; 6.25 mg (2.5 mg x 2.5) all other days   Full warfarin instructions:  5 mg every Sun, Tue, Thu; 6.25 mg all other days   Weekly warfarin total:  40 mg   No change documented:  Claudia Dong RN   Plan last modified:  Addy Eng RN (3/31/2021)   Next INR check:  2021   Priority:  Maintenance   Target end date:  2022    Indications    Paroxysmal atrial fibrillation (H) [I48.0]             Anticoagulation Episode Summary     INR check location:      Preferred lab:      Send INR reminders to:  KRIS RAMIREZ    Comments:        Anticoagulation Care Providers     Provider Role Specialty Phone number    Yuridia Villarreal MD Referring Family Medicine [phone]            See the Encounter Report to view Anticoagulation Flowsheet and Dosing Calendar (Go to Encounters tab in chart review, and find the Anticoagulation Therapy Visit)    Claudia Dong, RN

## 2021-04-12 ENCOUNTER — PATIENT OUTREACH (OUTPATIENT)
Dept: NURSING | Facility: CLINIC | Age: 86
End: 2021-04-12
Payer: COMMERCIAL

## 2021-04-12 NOTE — PROGRESS NOTES
Clinic Care Coordination Contact    Follow Up Progress Note      Assessment: Patient states that she is doing well. Continues using oxygen only at night. Denies any chest pain, increased edema or any shortness of breath. Continues monitoring weights, blood pressures, blood sugars, and oxygen levels daily. Weight this morning was 186.6 pounds and has remained stable and steady for the past week. She reports that her oxygen this morning was 95%, blood sugar this morning was 151, last few blood pressure readings were 128/58 & 132/62. She has been discharged from home care services and completing her home exercises as instructed/recommended. Completed follow up with pulmonologist on 03/19 where she had her pleurix tubes removed.     Goals addressed this encounter:   Goals Addressed                 This Visit's Progress       Patient Stated      COMPLETED: 1. Improve chronic symptoms (pt-stated)   100%     Goal Statement: I would like support in managing my chronic health conditions to maintain my health and wellness and prevent readmission to the hospital.   Date Goal set:  Updated on 2/3/21  Barriers: weak due to chronic health conditions.   Strengths: motivated, engaged in care coordination, home care therapies.   Date to Achieve By: 06/2021  Patient expressed understanding of goal: yes  Action steps to achieve this goal:  1. I will follow up with my providers as scheduled/recommended. (Primary Care Provider 05/25, CT 02/05, Pulmonology - PRN, nephrology 04/13 & 06/09, cardiology 04/28,  etc.)  2. I will take my medications as prescribed.   3. I will contact my care team to report questions, concerns, support needs. 24/7 after hours services available.   4. Care Coordinator will collaborate with care team as needed.             Intervention/Education provided during outreach: CC role, clinic after hours, appointments, goal reviewed. Patient verbalizes that she is doing well and feels that goal can be completed.  Enrollment status changed to maintenance.      Outreach Frequency: 2 months    Plan:   Patient will call RNCC with questions, concerns, support needs. RNCC will be available as needed.    Care Coordinator will follow up in 2 months to identify any outstanding concerns, support needs.     Abeba Christensen RN Care Coordinator  [facility] [place]  Email: [email or web address]  Phone: [phone]

## 2021-04-14 DIAGNOSIS — N18.4 CHRONIC KIDNEY DISEASE, STAGE IV (SEVERE) (H): Primary | ICD-10-CM

## 2021-04-21 ENCOUNTER — ANTICOAGULATION THERAPY VISIT (OUTPATIENT)
Dept: FAMILY MEDICINE | Facility: CLINIC | Age: 86
End: 2021-04-21

## 2021-04-21 DIAGNOSIS — Z79.01 LONG TERM CURRENT USE OF ANTICOAGULANTS WITH INR GOAL OF 2.0-3.0: ICD-10-CM

## 2021-04-21 DIAGNOSIS — I48.0 PAROXYSMAL ATRIAL FIBRILLATION (H): ICD-10-CM

## 2021-04-21 DIAGNOSIS — N18.4 CHRONIC KIDNEY DISEASE, STAGE IV (SEVERE) (H): ICD-10-CM

## 2021-04-21 LAB
ANION GAP SERPL CALCULATED.3IONS-SCNC: 6 MMOL/L (ref 3–14)
BUN SERPL-MCNC: 84 MG/DL (ref 7–30)
CALCIUM SERPL-MCNC: 9.4 MG/DL (ref 8.5–10.1)
CHLORIDE SERPL-SCNC: 101 MMOL/L (ref 94–109)
CO2 SERPL-SCNC: 30 MMOL/L (ref 20–32)
CREAT SERPL-MCNC: 2.38 MG/DL (ref 0.52–1.04)
GFR SERPL CREATININE-BSD FRML MDRD: 18 ML/MIN/{1.73_M2}
GLUCOSE SERPL-MCNC: 230 MG/DL (ref 70–99)
INR PPP: 3 (ref 0.86–1.14)
POTASSIUM SERPL-SCNC: 3.8 MMOL/L (ref 3.4–5.3)
SODIUM SERPL-SCNC: 137 MMOL/L (ref 133–144)

## 2021-04-21 PROCEDURE — 36416 COLLJ CAPILLARY BLOOD SPEC: CPT | Performed by: PHYSICIAN ASSISTANT

## 2021-04-21 PROCEDURE — 85610 PROTHROMBIN TIME: CPT | Performed by: PHYSICIAN ASSISTANT

## 2021-04-21 PROCEDURE — 80048 BASIC METABOLIC PNL TOTAL CA: CPT | Performed by: PHYSICIAN ASSISTANT

## 2021-04-21 PROCEDURE — 99207 PR NO CHARGE NURSE ONLY: CPT | Performed by: FAMILY MEDICINE

## 2021-04-21 NOTE — PROGRESS NOTES
ANTICOAGULATION FOLLOW-UP CLINIC VISIT    Patient Name:  Ирина Yanez  Date:  4/21/2021  Contact Type:  Telephone/ Ирина    SUBJECTIVE:  Patient Findings     Comments:  The patient was assessed for diet, medication, and activity level changes, missed or extra doses, bruising or bleeding, with no problem findings.          Clinical Outcomes     Negatives:  Major bleeding event, Thromboembolic event, Anticoagulation-related hospital admission, Anticoagulation-related ED visit, Anticoagulation-related fatality    Comments:  The patient was assessed for diet, medication, and activity level changes, missed or extra doses, bruising or bleeding, with no problem findings.             OBJECTIVE    Recent labs: (last 7 days)     04/21/21  0931   INR 3.00*       ASSESSMENT / PLAN  INR assessment THER    Recheck INR In: 2 WEEKS    INR Location Clinic      Anticoagulation Summary  As of 4/21/2021    INR goal:  2.0-3.0   TTR:  46.0 % (1.5 mo)   INR used for dosing:  3.00 (4/21/2021)   Warfarin maintenance plan:  5 mg (2.5 mg x 2) every Sun, Tue, Thu; 6.25 mg (2.5 mg x 2.5) all other days   Full warfarin instructions:  5 mg every Sun, Tue, Thu; 6.25 mg all other days   Weekly warfarin total:  40 mg   Plan last modified:  Joann Ruby RN (4/21/2021)   Next INR check:  5/5/2021   Priority:  Maintenance   Target end date:  2/25/2022    Indications    Paroxysmal atrial fibrillation (H) [I48.0]             Anticoagulation Episode Summary     INR check location:      Preferred lab:      Send INR reminders to:  KRIS RAMIREZ    Comments:        Anticoagulation Care Providers     Provider Role Specialty Phone number    Yuridia Villarreal MD Referring Family Medicine [phone]            See the Encounter Report to view Anticoagulation Flowsheet and Dosing Calendar (Go to Encounters tab in chart review, and find the Anticoagulation Therapy Visit)    Patient INR is therapeutic.  Patient will continue to take 40 mg weekly  dosing and follow up in 2 weeks or sooner for any problems or concerns.        Joann Ruby RN

## 2021-04-21 NOTE — PROGRESS NOTES
Anticoagulation Management    Unable to reach Ирина today.    Today's INR result of 3.0 is therapeutic (goal INR of 2.0-3.0).  Result received from: Clinic Lab    Follow up required to confirm warfarin dose taken and assess for changes. Any green veggies? Inflammation or other med/health changes? If no changes or concerns, recheck INR in 2 weeks.    No answer. Left  to call [phone]. Can transfer to [facility] at [phone].    Anticoagulation clinic to follow up    Claudia Dong RN

## 2021-04-26 DIAGNOSIS — E21.1 SECONDARY HYPERPARATHYROIDISM, NON-RENAL (H): ICD-10-CM

## 2021-04-26 DIAGNOSIS — D63.1 ANEMIA OF CHRONIC RENAL FAILURE: ICD-10-CM

## 2021-04-26 DIAGNOSIS — N18.4 CHRONIC KIDNEY DISEASE, STAGE IV (SEVERE) (H): Primary | ICD-10-CM

## 2021-04-26 DIAGNOSIS — N18.9 ANEMIA OF CHRONIC RENAL FAILURE: ICD-10-CM

## 2021-04-28 ENCOUNTER — OFFICE VISIT (OUTPATIENT)
Dept: CARDIOLOGY | Facility: CLINIC | Age: 86
End: 2021-04-28
Attending: FAMILY MEDICINE
Payer: COMMERCIAL

## 2021-04-28 VITALS
OXYGEN SATURATION: 98 % | HEART RATE: 60 BPM | SYSTOLIC BLOOD PRESSURE: 124 MMHG | BODY MASS INDEX: 32.61 KG/M2 | DIASTOLIC BLOOD PRESSURE: 64 MMHG | WEIGHT: 191 LBS | HEIGHT: 64 IN

## 2021-04-28 DIAGNOSIS — I35.0 AORTIC VALVE STENOSIS, ETIOLOGY OF CARDIAC VALVE DISEASE UNSPECIFIED: ICD-10-CM

## 2021-04-28 DIAGNOSIS — J90 BILATERAL PLEURAL EFFUSION: ICD-10-CM

## 2021-04-28 DIAGNOSIS — I50.30 DIASTOLIC HEART FAILURE, UNSPECIFIED HF CHRONICITY (H): ICD-10-CM

## 2021-04-28 DIAGNOSIS — I48.0 PAROXYSMAL ATRIAL FIBRILLATION (H): ICD-10-CM

## 2021-04-28 PROCEDURE — 99214 OFFICE O/P EST MOD 30 MIN: CPT | Performed by: INTERNAL MEDICINE

## 2021-04-28 ASSESSMENT — MIFFLIN-ST. JEOR: SCORE: 1301.37

## 2021-04-28 NOTE — PROGRESS NOTES
HISTORY OF PRESENT ILLNESS:  Off oxygen therapy. No dyspnea or chest discomfort. Receiving instruction regarding dialysis.     Orders this Visit:  No orders of the defined types were placed in this encounter.    No orders of the defined types were placed in this encounter.    There are no discontinued medications.    Encounter Diagnoses   Name Primary?     Aortic valve stenosis, etiology of cardiac valve disease unspecified      Bilateral pleural effusion      Diastolic heart failure, unspecified HF chronicity (H)      Paroxysmal atrial fibrillation (H)        CURRENT MEDICATIONS:  Current Outpatient Medications   Medication Sig Dispense Refill     acetaminophen (TYLENOL) 325 MG tablet Take 2 tablets (650 mg) by mouth every 4 hours as needed for mild pain       blood glucose (NO BRAND SPECIFIED) lancets standard Use to test blood sugar 1 times daily or as directed, Contour Next lancets 100 each 3     blood glucose (NO BRAND SPECIFIED) test strip Use to test blood sugar 1 times daily or as directed, Contour Next strips 100 strip 1     blood glucose monitoring (NO BRAND SPECIFIED) meter device kit Use to test blood sugar 1 times daily or as directed. Ultra 2 1 kit 1     calcium acetate (PHOSLO) 667 MG CAPS capsule Take 1 capsule (667 mg) by mouth 3 times daily (with meals) 90 capsule 3     carvedilol (COREG) 3.125 MG tablet Take 1 tablet (3.125 mg) by mouth 2 times daily (with meals) 180 tablet 1     diltiazem ER COATED BEADS (CARDIAZEM LA) 180 MG 24 hr tablet Take 1 tablet (180 mg) by mouth 2 times daily 180 tablet 1     gabapentin (NEURONTIN) 100 MG capsule Take 1 capsule (100 mg) by mouth 2 times daily 360 capsule 1     glimepiride (AMARYL) 1 MG tablet Take 1/2 tablet daily before breakfast 30 tablet 0     hydrALAZINE (APRESOLINE) 25 MG tablet Take 1 tablet (25 mg) by mouth 3 times daily 270 tablet 1     MULTI-VITAMIN OR TABS Take 2 tablets by mouth daily        order for DME Equipment being ordered: walker. Marquis  "wheeled with brakes and a seat. 1 Device 0     polyethylene glycol (MIRALAX) 17 GM/Dose powder Take 17 g by mouth daily as needed for constipation 510 g      pravastatin (PRAVACHOL) 20 MG tablet Take 1 tablet (20 mg) by mouth daily 90 tablet 1     sertraline (ZOLOFT) 50 MG tablet Take 1 tablet (50 mg) by mouth daily 90 tablet 2     torsemide (DEMADEX) 20 MG tablet Take 2 tablets (40 mg) by mouth daily 60 tablet 1     warfarin ANTICOAGULANT (COUMADIN) 2.5 MG tablet 5mg every Sun, Tue, Thu; 6.25mg all other days or as directed by INR nurse 192 tablet 1     warfarin ANTICOAGULANT (COUMADIN) 2.5 MG tablet Take 1 tablet (2.5 mg) by mouth daily Or as directed by INR clinic 30 tablet 1       ALLERGIES     Allergies   Allergen Reactions     Augmentin Diarrhea     Vomiting       Cleocin      Hives     Tegretol [Carbamazepine]      Irregular heart beat       PAST MEDICAL, SURGICAL, FAMILY, SOCIAL HISTORY:  History was reviewed and updated as needed, see medical record.    Review of Systems:  A 12-point review of systems was completed, see medical record for detailed review of systems information.    Physical Exam:  Vitals: /64 (BP Location: Right arm, Patient Position: Sitting, Cuff Size: Adult Large)   Pulse 60   Ht 1.626 m (5' 4\")   Wt 86.6 kg (191 lb)   LMP  (LMP Unknown)   SpO2 98%   Breastfeeding No   BMI 32.79 kg/m      Constitutional:           Skin:           Head:           Eyes:           ENT:           Neck:           Chest:           Cardiac:                    Abdomen:           Vascular:                                        Extremities and Back:           Neurological:           ASSESSMENT: likely does not need carvedilol very low dose rate control.        RECOMMENDATIONS:   Stop carvedilol   Continue remainder of medications  Follow-up in one year  Follow-up with nephrologist      Recent Lab Results:  LIPID RESULTS:  Lab Results   Component Value Date    CHOL 181 04/27/2020    HDL 44 (L) " 04/27/2020    LDL 89 04/27/2020    TRIG 240 (H) 04/27/2020    CHOLHDLRATIO 3.4 12/17/2013       LIVER ENZYME RESULTS:  Lab Results   Component Value Date    AST 27 01/26/2021    ALT 40 01/26/2021       CBC RESULTS:  Lab Results   Component Value Date    WBC 14.5 (H) 02/09/2021    RBC 3.28 (L) 02/09/2021    HGB 9.6 (L) 03/05/2021    HCT 31.1 (L) 02/09/2021    MCV 95 02/09/2021    MCH 27.4 02/09/2021    MCHC 28.9 (L) 02/09/2021    RDW 15.6 (H) 02/09/2021     02/09/2021       BMP RESULTS:  Lab Results   Component Value Date     04/21/2021    POTASSIUM 3.8 04/21/2021    CHLORIDE 101 04/21/2021    CO2 30 04/21/2021    ANIONGAP 6 04/21/2021     (H) 04/21/2021    BUN 84 (H) 04/21/2021    CR 2.38 (H) 04/21/2021    GFRESTIMATED 18 (L) 04/21/2021    GFRESTBLACK 21 (L) 04/21/2021    EDNA 9.4 04/21/2021        A1C RESULTS:  Lab Results   Component Value Date    A1C 7.1 (H) 01/26/2021       INR RESULTS:  Lab Results   Component Value Date    INR 3.00 (H) 04/21/2021    INR 2.60 (H) 04/07/2021       We greatly appreciate the opportunity to be involved in the care of your patient, Ирина Yanez.    Sincerely,  Henrik Beasley MD      CC  Yuridia Villarreal MD  [address] [address]

## 2021-04-28 NOTE — PROGRESS NOTES
Service Date: 04/28/2021    CARDIOLOGY CLINIC VISIT NOTE    PRIMARY CARE PHYSICIAN:  Yuridia Villarreal MD     HISTORY OF PRESENT ILLNESS:  Ирина Yanez, an 86-year-old woman with chronic kidney disease, chronic atrial fibrillation, aortic stenosis, diabetes mellitus, hypertension, dyslipidemia and history of pleural effusions( status post drainage), was seen today at your request for recommendations regarding long-term management of diastolic heart failure.    Since I last saw the patient, she was hospitalized at [facility] late January 2021  for respiratory failure due to recurrent bilateral pleural effusions unresponsive to outpatient diuretic therapy . During that hospitalization, she was seen by my colleague Dr. Davis who noted  newly diagnosed chronic atrial fibrillation with a well-controlled ventricular response rate, and she was changed from furosemide to torsemide and placed on apixaban.  The patient eventually underwent placement of a PleurX catheter, which has now been removed.  Her weight remains  Stable at about  190 lbs (she had been up to 220 lbs)  and she  Is free of  exertional dyspnea, syncope or chest pain.  She is followed carefully by her nephrologist for chronic kidney disease and will likely require dialysis at some point in the future.    Ms. Yanez was changed from apixaban to warfarin because of her progressive renal dysfunction.  Her INRs have remained therapeutic.  An echocardiogram during that late January hospitalization showed no change in mild aortic stenosis with a well-preserved ejection fraction.    PAST MEDICAL HISTORY:    1.  Chronic kidney disease: Current creatinine/  GFR =  2.4/  18.  2.  Chronic atrial fibrillation: Well-controlled ventricular response rate, on combination of diltiazem and carvedilol.  On long-term anticoagulation due to CHADS-VASc score of 5.  3.  Chronic diastolic heart failure:  Echocardiogram showing well-preserved ejection fraction with no wall  motion abnormalities.  4.  Mild aortic stenosis:  Most recent echo 12/30/2020 demonstrating aortic valve area of 1.7 cm2 with mean systolic gradient of 15.  5.  Diabetes mellitus.    a.  Diabetic nephrosclerosis.  b.  Severe stocking glove distribution neuropathy.  6.  Chronic normocytic normochromic anemia:  Hemoglobin of 9.6.   7.  Chronic right bundle branch block.    PHYSICAL EXAMINATION:    GENERAL:  Exam today demonstrates a very pleasant, cooperative and intelligent, 86-year-old woman who appears comfortable at rest.  VITAL SIGNS:  Her blood pressure is 124/64.  Her heart rate is 60 and irregular.  Her height is 1.63 m.  Her weight is 86.6 kg (191 pounds).  Her BMI is 32.8.  LUNGS:  Clear to percussion and auscultation in the upper lobes.  There is some mild dullness bilaterally in both lobes.  CARDIOVASCULAR:  An irregular S1 and S2.  There is a grade 2-3/6 systolic ejection murmur unchanged from previous exams.  EXTREMITIES:  Her legs show no edema.    MEDICATIONS:    1.  Diltiazem  b.i.d.   2.  Gabapentin 200 mg b.i.d.   3.  Glimepiride 0.5 mg daily.   4.  Hydralazine 25 t.i.d.   5.  Pravastatin 20 mg daily.   6.  Sertraline 50 mg daily.   7.  Torsemide 20 mg daily.  8.  Warfarin to maintain INR between 2-3.    LABORATORY STUDIES:  I have personally reviewed her most recent echo from 12/2020 and her most recent ECGs.  Her echo shows mild aortic stenosis with a preserved ejection fraction and biatrial enlargement.  Her ECG shows atrial fibrillation with a well-controlled ventricular response rate and a chronic right bundle branch block.    ASSESSMENT:   The patient's weight is stable and she  Is free of heart failure symptoms on current therapy. Clearly the appropriate timing for dialysis will be the largest long term concern in the management of her volume status. In the meantime she has done very well on her current diuretic dose. Her aortic stenosis is not severe enough to produce heart failure or  require intervention,but will require long term follow up. Her  Ventricular response rate is well controlled and I am doubtful the current very low dose carvedilol is needed in addition to diltiazem. Her systolic blood pressure well controlled. She will require chronic anticoagulation.     RECOMMENDATIONS:    1.  Discontinue carvedilol.  2.  Maintain therapeutic INRs.  3.  Management of volume status per nephrologist.  She appears to be stable on current dose of diuretics.  4.  Follow up with me in about 1 year.    We have appreciated the opportunity to care for your patient Beata Yanez.     cc:  Yuridia Villarreal MD   [facility]  [address] [place], [address]    Hudgins Wicho Beasley MD        D: 2021   T: 2021   MT: liz    Name:     BEATA YANEZ  MRN:      [id number]        Account:      [id number]   :      1935           Service Date: 2021       Document: [id number]

## 2021-04-28 NOTE — LETTER
4/28/2021    [place]  [address] [address] [address]    RE: Ирина Yanez       Dear Colleague,    I had the pleasure of seeing Ирина Yanez in the [facility].    HISTORY OF PRESENT ILLNESS:  Off oxygen therapy. No dyspnea or chest discomfort. Receiving instruction regarding dialysis.     Orders this Visit:  No orders of the defined types were placed in this encounter.    No orders of the defined types were placed in this encounter.    There are no discontinued medications.    Encounter Diagnoses   Name Primary?     Aortic valve stenosis, etiology of cardiac valve disease unspecified      Bilateral pleural effusion      Diastolic heart failure, unspecified HF chronicity (H)      Paroxysmal atrial fibrillation (H)        CURRENT MEDICATIONS:  Current Outpatient Medications   Medication Sig Dispense Refill     acetaminophen (TYLENOL) 325 MG tablet Take 2 tablets (650 mg) by mouth every 4 hours as needed for mild pain       blood glucose (NO BRAND SPECIFIED) lancets standard Use to test blood sugar 1 times daily or as directed, Contour Next lancets 100 each 3     blood glucose (NO BRAND SPECIFIED) test strip Use to test blood sugar 1 times daily or as directed, Contour Next strips 100 strip 1     blood glucose monitoring (NO BRAND SPECIFIED) meter device kit Use to test blood sugar 1 times daily or as directed. Ultra 2 1 kit 1     calcium acetate (PHOSLO) 667 MG CAPS capsule Take 1 capsule (667 mg) by mouth 3 times daily (with meals) 90 capsule 3     carvedilol (COREG) 3.125 MG tablet Take 1 tablet (3.125 mg) by mouth 2 times daily (with meals) 180 tablet 1     diltiazem ER COATED BEADS (CARDIAZEM LA) 180 MG 24 hr tablet Take 1 tablet (180 mg) by mouth 2 times daily 180 tablet 1     gabapentin (NEURONTIN) 100 MG capsule Take 1 capsule (100 mg) by mouth 2 times daily 360 capsule 1     glimepiride (AMARYL) 1 MG tablet Take 1/2 tablet daily  "before breakfast 30 tablet 0     hydrALAZINE (APRESOLINE) 25 MG tablet Take 1 tablet (25 mg) by mouth 3 times daily 270 tablet 1     MULTI-VITAMIN OR TABS Take 2 tablets by mouth daily        order for DME Equipment being ordered: walker. 4 wheeled with brakes and a seat. 1 Device 0     polyethylene glycol (MIRALAX) 17 GM/Dose powder Take 17 g by mouth daily as needed for constipation 510 g      pravastatin (PRAVACHOL) 20 MG tablet Take 1 tablet (20 mg) by mouth daily 90 tablet 1     sertraline (ZOLOFT) 50 MG tablet Take 1 tablet (50 mg) by mouth daily 90 tablet 2     torsemide (DEMADEX) 20 MG tablet Take 2 tablets (40 mg) by mouth daily 60 tablet 1     warfarin ANTICOAGULANT (COUMADIN) 2.5 MG tablet 5mg every Sun, Tue, Thu; 6.25mg all other days or as directed by INR nurse 192 tablet 1     warfarin ANTICOAGULANT (COUMADIN) 2.5 MG tablet Take 1 tablet (2.5 mg) by mouth daily Or as directed by INR clinic 30 tablet 1       ALLERGIES     Allergies   Allergen Reactions     Augmentin Diarrhea     Vomiting       Cleocin      Hives     Tegretol [Carbamazepine]      Irregular heart beat       PAST MEDICAL, SURGICAL, FAMILY, SOCIAL HISTORY:  History was reviewed and updated as needed, see medical record.    Review of Systems:  A 12-point review of systems was completed, see medical record for detailed review of systems information.    Physical Exam:  Vitals: /64 (BP Location: Right arm, Patient Position: Sitting, Cuff Size: Adult Large)   Pulse 60   Ht 1.626 m (5' 4\")   Wt 86.6 kg (191 lb)   LMP  (LMP Unknown)   SpO2 98%   Breastfeeding No   BMI 32.79 kg/m      Constitutional:           Skin:           Head:           Eyes:           ENT:           Neck:           Chest:           Cardiac:                    Abdomen:           Vascular:                                        Extremities and Back:           Neurological:           ASSESSMENT: likely does not need carvedilol very low dose rate control.    "     RECOMMENDATIONS:   Stop carvedilol   Continue remainder of medications  Follow-up in one year  Follow-up with nephrologist      Recent Lab Results:  LIPID RESULTS:  Lab Results   Component Value Date    CHOL 181 04/27/2020    HDL 44 (L) 04/27/2020    LDL 89 04/27/2020    TRIG 240 (H) 04/27/2020    CHOLHDLRATIO 3.4 12/17/2013       LIVER ENZYME RESULTS:  Lab Results   Component Value Date    AST 27 01/26/2021    ALT 40 01/26/2021       CBC RESULTS:  Lab Results   Component Value Date    WBC 14.5 (H) 02/09/2021    RBC 3.28 (L) 02/09/2021    HGB 9.6 (L) 03/05/2021    HCT 31.1 (L) 02/09/2021    MCV 95 02/09/2021    MCH 27.4 02/09/2021    MCHC 28.9 (L) 02/09/2021    RDW 15.6 (H) 02/09/2021     02/09/2021       BMP RESULTS:  Lab Results   Component Value Date     04/21/2021    POTASSIUM 3.8 04/21/2021    CHLORIDE 101 04/21/2021    CO2 30 04/21/2021    ANIONGAP 6 04/21/2021     (H) 04/21/2021    BUN 84 (H) 04/21/2021    CR 2.38 (H) 04/21/2021    GFRESTIMATED 18 (L) 04/21/2021    GFRESTBLACK 21 (L) 04/21/2021    EDNA 9.4 04/21/2021        A1C RESULTS:  Lab Results   Component Value Date    A1C 7.1 (H) 01/26/2021       INR RESULTS:  Lab Results   Component Value Date    INR 3.00 (H) 04/21/2021    INR 2.60 (H) 04/07/2021       We greatly appreciate the opportunity to be involved in the care of your patient, Ирина Yanez.    Sincerely,  Henrik Beasley MD      CC  Yuridia Villarreal [place]  [address] [address]

## 2021-05-05 ENCOUNTER — ANTICOAGULATION THERAPY VISIT (OUTPATIENT)
Dept: FAMILY MEDICINE | Facility: CLINIC | Age: 86
End: 2021-05-05

## 2021-05-05 DIAGNOSIS — I48.0 PAROXYSMAL ATRIAL FIBRILLATION (H): ICD-10-CM

## 2021-05-05 DIAGNOSIS — Z79.01 LONG TERM CURRENT USE OF ANTICOAGULANTS WITH INR GOAL OF 2.0-3.0: ICD-10-CM

## 2021-05-05 LAB
CAPILLARY BLOOD COLLECTION: NORMAL
INR PPP: 2.7 (ref 0.86–1.14)

## 2021-05-05 PROCEDURE — 99207 PR NO CHARGE NURSE ONLY: CPT | Performed by: FAMILY MEDICINE

## 2021-05-05 PROCEDURE — 85610 PROTHROMBIN TIME: CPT | Performed by: FAMILY MEDICINE

## 2021-05-05 PROCEDURE — 36416 COLLJ CAPILLARY BLOOD SPEC: CPT | Performed by: FAMILY MEDICINE

## 2021-05-05 NOTE — PROGRESS NOTES
ANTICOAGULATION FOLLOW-UP CLINIC VISIT    Patient Name:  Ирина Yanez  Date:  2021  Contact Type:  Telephone    SUBJECTIVE:  Patient Findings     Comments:  Called patient to discuss today's INR results: The patient was assessed for diet, medication, and activity level changes, missed or extra doses, bruising or bleeding, with no problem findings. Reviewed maintenance warfarin dosing with patient. Patient will remain on the same dose until next INR check. No other questions or concerns. Scheduled next lab-only INR in 4 weeks.  Claudia GRIFFIN RN  Anticoagulation Team          Clinical Outcomes     Negatives:  Major bleeding event, Thromboembolic event, Anticoagulation-related hospital admission, Anticoagulation-related ED visit, Anticoagulation-related fatality    Comments:  Called patient to discuss today's INR results: The patient was assessed for diet, medication, and activity level changes, missed or extra doses, bruising or bleeding, with no problem findings. Reviewed maintenance warfarin dosing with patient. Patient will remain on the same dose until next INR check. No other questions or concerns. Scheduled next lab-only INR in 4 weeks.  Claudia GRIFFIN RN  Anticoagulation Team             OBJECTIVE    Recent labs: (last 7 days)     21  1051   INR 2.70*       ASSESSMENT / PLAN  INR assessment THER    Recheck INR In: 4 WEEKS    INR Location Clinic      Anticoagulation Summary  As of 2021    INR goal:  2.0-3.0   TTR:  58.8 % (2 mo)   INR used for dosin.70 (2021)   Warfarin maintenance plan:  5 mg (2.5 mg x 2) every Sun, Tue, Thu; 6.25 mg (2.5 mg x 2.5) all other days   Full warfarin instructions:  5 mg every Sun, Tue, Thu; 6.25 mg all other days   Weekly warfarin total:  40 mg   No change documented:  Claudia Dong RN   Plan last modified:  Joann Ruby RN (2021)   Next INR check:  2021   Priority:  Maintenance   Target end date:  2022    Indications    Paroxysmal  atrial fibrillation (H) [I48.0]             Anticoagulation Episode Summary     INR check location:      Preferred lab:      Send INR reminders to:  KRIS RAMIREZ    Comments:        Anticoagulation Care Providers     Provider Role Specialty Phone number    Yuridia Villarreal MD Referring Family Medicine [phone]            See the Encounter Report to view Anticoagulation Flowsheet and Dosing Calendar (Go to Encounters tab in chart review, and find the Anticoagulation Therapy Visit)    Claudia Dong RN

## 2021-05-18 ENCOUNTER — NURSE TRIAGE (OUTPATIENT)
Dept: NURSING | Facility: CLINIC | Age: 86
End: 2021-05-18

## 2021-05-18 DIAGNOSIS — N18.4 CKD (CHRONIC KIDNEY DISEASE) STAGE 4, GFR 15-29 ML/MIN (H): ICD-10-CM

## 2021-05-18 NOTE — TELEPHONE ENCOUNTER
[facility] located in [facility] is requesting a refill for Calcium Acetate 667 mg.  Please phone [facility] at [phone].  COVID 19 Nurse Triage Plan/Patient Instructions    Please be aware that novel coronavirus (COVID-19) may be circulating in the community. If you develop symptoms such as fever, cough, or SOB or if you have concerns about the presence of another infection including coronavirus (COVID-19), please contact your health care provider or visit [email or web address].     Disposition/Instructions    Home care recommended. Follow home care protocol based instructions.    Thank you for taking steps to prevent the spread of this virus.  o Limit your contact with others.  o Wear a simple mask to cover your cough.  o Wash your hands well and often.    Resources    [facility]: About COVID-19: [email or web address]    CDC: What to Do If You're Sick: www.cdc.gov/coronavirus/2019-ncov/about/steps-when-sick.html    CDC: Ending Home Isolation: www.cdc.gov/coronavirus/2019-ncov/hcp/disposition-in-home-patients.html     CDC: Caring for Someone: www.cdc.gov/coronavirus/2019-ncov/if-you-are-sick/care-for-someone.html     [facility]: Interim Guidance for Hospital Discharge to Home: www.health.[facility].mn.us/diseases/coronavirus/hcp/hospdischarge.pdf    [facility] clinical trials (COVID-19 research studies): [email or web address]     Below are the COVID-19 hotlines at the [facility] ([facility]). Interpreters are available.   o For health questions: Call [phone] or [phone] (7 a.m. to 7 p.m.)  o For questions about schools and childcare: Call [phone] or [phone] (7 a.m. to 7 p.m.)                       Reason for Disposition    Caller requesting a NON-URGENT new prescription or refill and triager unable to refill per department policy    Additional Information    Negative: MORE THAN A DOUBLE DOSE of a  prescription or over-the-counter (OTC) drug    Negative: DOUBLE DOSE (an extra dose or lesser amount) of over-the-counter (OTC) drug and any symptoms (e.g., dizziness, nausea, pain, sleepiness)    Negative: DOUBLE DOSE (an extra dose or lesser amount) of prescription drug and any symptoms (e.g., dizziness, nausea, pain, sleepiness)    Negative: Took another person's prescription drug    Negative: DOUBLE DOSE (an extra dose or lesser amount) of prescription drug and NO symptoms (Exception: a double dose of antibiotics)    Negative: Diabetes drug error or overdose (e.g., took wrong type of insulin or took extra dose)    Negative: Caller has medication question about med not prescribed by PCP and triager unable to answer question (e.g., compatibility with other med, storage)    Negative: Request for URGENT new prescription or refill of 'essential' medication (i.e., likelihood of harm to patient if not taken) and triager unable to fill per department policy    Negative: Prescription not at pharmacy and was prescribed today by PCP    Negative: Pharmacy calling with prescription questions and triager unable to answer question    Negative: Caller has urgent medication question about med that PCP prescribed and triager unable to answer question    Negative: Caller has NON-URGENT medication question about med that PCP prescribed and triager unable to answer question    Protocols used: MEDICATION QUESTION CALL-A-[place]

## 2021-05-18 NOTE — TELEPHONE ENCOUNTER
[facility]  ([place] located in [facility] is requesting a refill for Calcium Acetate 667 mg.  Please phone [facility] at [phone].  COVID 19 Nurse Triage Plan/Patient Instructions

## 2021-05-19 RX ORDER — CALCIUM ACETATE 667 MG/1
667 CAPSULE ORAL
Qty: 90 CAPSULE | Refills: 2 | Status: SHIPPED | OUTPATIENT
Start: 2021-05-19 | End: 2022-02-10

## 2021-05-19 NOTE — TELEPHONE ENCOUNTER
Approved Calcium acetate per prev written prescription to retail   Prev RX calcium acetate 90, 3 take 1 cap TID    Olga SPARKS RN

## 2021-05-25 ENCOUNTER — OFFICE VISIT (OUTPATIENT)
Dept: FAMILY MEDICINE | Facility: CLINIC | Age: 86
End: 2021-05-25
Payer: COMMERCIAL

## 2021-05-25 VITALS
OXYGEN SATURATION: 97 % | TEMPERATURE: 98 F | RESPIRATION RATE: 16 BRPM | WEIGHT: 196.2 LBS | HEART RATE: 70 BPM | BODY MASS INDEX: 33.49 KG/M2 | DIASTOLIC BLOOD PRESSURE: 58 MMHG | HEIGHT: 64 IN | SYSTOLIC BLOOD PRESSURE: 130 MMHG

## 2021-05-25 DIAGNOSIS — I10 HYPERTENSION GOAL BP (BLOOD PRESSURE) < 140/90: ICD-10-CM

## 2021-05-25 DIAGNOSIS — N18.4 CKD (CHRONIC KIDNEY DISEASE) STAGE 4, GFR 15-29 ML/MIN (H): ICD-10-CM

## 2021-05-25 DIAGNOSIS — F41.9 ANXIETY: ICD-10-CM

## 2021-05-25 DIAGNOSIS — N25.81 SECONDARY HYPERPARATHYROIDISM OF RENAL ORIGIN (H): ICD-10-CM

## 2021-05-25 DIAGNOSIS — E78.5 HYPERLIPIDEMIA WITH TARGET LDL LESS THAN 100: Primary | ICD-10-CM

## 2021-05-25 DIAGNOSIS — E11.21 TYPE 2 DIABETES MELLITUS WITH DIABETIC NEPHROPATHY, WITHOUT LONG-TERM CURRENT USE OF INSULIN (H): ICD-10-CM

## 2021-05-25 PROCEDURE — 99214 OFFICE O/P EST MOD 30 MIN: CPT | Performed by: FAMILY MEDICINE

## 2021-05-25 ASSESSMENT — PAIN SCALES - GENERAL: PAINLEVEL: SEVERE PAIN (6)

## 2021-05-25 ASSESSMENT — MIFFLIN-ST. JEOR: SCORE: 1324.96

## 2021-05-25 NOTE — PROGRESS NOTES
Assessment & Plan     Hyperlipidemia with target LDL less than 100  She will get lipid panel in the near future.  - Lipid panel reflex to direct LDL Non-fasting; Future    Anxiety  She is stable. Refilling medication.   - sertraline (ZOLOFT) 50 MG tablet; Take 1 tablet (50 mg) by mouth daily    Hypertension goal BP (blood pressure) < 140/90  bp satisfactory. Also followed by Renal.     Type 2 diabetes mellitus with diabetic nephropathy, without long-term current use of insulin (H)  Satisfactory control. She does follow with Endocrinology.     CKD (chronic kidney disease) stage 4, GFR 15-29 ml/min (H)  She follows with Renal.  Regarding the calcium acetate (phoslo); recommend she discuss with Renal    Secondary hyperparathyroidism of renal origin (H)  Continue with Renal.                    Return in about 6 months (around 11/25/2021).    Yuridia Villarreal MD, MD  [facility]    gettign blood test nextt week; Dr. Hernandez in a couple weeks.    Subjective   Ирина is a 86 year old who presents for the following health issues     HPI     Hypertension Follow-up      Do you check your blood pressure regularly outside of the clinic? Yes     Are you following a low salt diet? Yes    Are your blood pressures ever more than 140 on the top number (systolic) OR more   than 90 on the bottom number (diastolic), for example 140/90? Yes to 140.      How many servings of fruits and vegetables do you eat daily?  2-3    On average, how many sweetened beverages do you drink each day (Examples: soda, juice, sweet tea, etc.  Do NOT count diet or artificially sweetened beverages)?   1 glass every 3 days    How many days per week do you exercise enough to make your heart beat faster? 0    How many minutes a day do you exercise enough to make your heart beat faster? 0  How many days per week do you miss taking your medication? 1    What makes it hard for you to take your medications?  remembering to take    See under  ROS     Patient Active Problem List   Diagnosis     Diabetic polyneuropathy (H)     Hyperlipidemia with target LDL less than 100     Hypertension goal BP (blood pressure) < 140/90     Arthritis     Anxiety     Type 2 diabetes mellitus with diabetic nephropathy (H)     Health Care Home     Malignant melanoma of skin     Vulvar dystrophy     Lichen sclerosus et atrophicus     Vitamin B12 deficiency (non anemic)     Controlled substance agreement signed 2/5/15     Major depression in partial remission (H)     CKD (chronic kidney disease) stage 4, GFR 15-29 ml/min (H)     Basal cell carcinoma of skin      Chronic pain - diabetic neuropathy     Complex sleep apnea syndrome     Tendon rupture, traumatic. quadriceps     Right bundle branch block     S/P lumbar fusion     ACP (advance care planning)     Heart murmur     Aortic sclerosis     Toe amputation status, left     Elevated BMI with comorbidity (H)     Falls frequently     Abrasion of arm, right, initial encounter     Aortic valve stenosis, etiology of cardiac valve disease unspecified     Ascending aorta dilatation (H)     Lung mass     Chest tightness     Dyspnea on exertion     Iron deficiency anemia     Hypoxemia     CHF (congestive heart failure) (H)     Dyspnea     Acute on chronic kidney failure (H)     Paroxysmal atrial fibrillation (H)     Bilateral pleural effusion     Diastolic heart failure, unspecified HF chronicity (H)     Acquired absence of other left toe(s) (H)       Current Outpatient Medications   Medication Sig Dispense Refill     acetaminophen (TYLENOL) 325 MG tablet Take 2 tablets (650 mg) by mouth every 4 hours as needed for mild pain       blood glucose (NO BRAND SPECIFIED) lancets standard Use to test blood sugar 1 times daily or as directed, Contour Next lancets 100 each 3     blood glucose (NO BRAND SPECIFIED) test strip Use to test blood sugar 1 times daily or as directed, Contour Next strips 100 strip 1     blood glucose monitoring (NO  BRAND SPECIFIED) meter device kit Use to test blood sugar 1 times daily or as directed. Ultra 2 1 kit 1     calcium acetate (PHOSLO) 667 MG CAPS capsule Take 1 capsule (667 mg) by mouth 3 times daily (with meals) 90 capsule 2     diltiazem ER COATED BEADS (CARDIAZEM LA) 180 MG 24 hr tablet Take 1 tablet (180 mg) by mouth 2 times daily 180 tablet 1     gabapentin (NEURONTIN) 100 MG capsule TAKE 2 CAPSULES BY MOUTH TWICE DAILY GENERIC EQUIVALENT FOR NEURONTIN 360 capsule 1     glimepiride (AMARYL) 1 MG tablet Take 1/2 tablet daily before breakfast 30 tablet 0     hydrALAZINE (APRESOLINE) 25 MG tablet Take 1 tablet (25 mg) by mouth 3 times daily 270 tablet 1     MULTI-VITAMIN OR TABS Take 2 tablets by mouth daily        order for DME Equipment being ordered: walker. 4 wheeled with brakes and a seat. 1 Device 0     polyethylene glycol (MIRALAX) 17 GM/Dose powder Take 17 g by mouth daily as needed for constipation 510 g      pravastatin (PRAVACHOL) 20 MG tablet Take 1 tablet (20 mg) by mouth daily 90 tablet 1     sertraline (ZOLOFT) 50 MG tablet Take 1 tablet (50 mg) by mouth daily 90 tablet 2     torsemide (DEMADEX) 20 MG tablet Take 2 tablets (40 mg) by mouth daily 60 tablet 1     warfarin ANTICOAGULANT (COUMADIN) 2.5 MG tablet 5mg every Sun, Tue, Thu; 6.25mg all other days or as directed by INR nurse 192 tablet 1     warfarin ANTICOAGULANT (COUMADIN) 2.5 MG tablet Take 1 tablet (2.5 mg) by mouth daily Or as directed by INR clinic 30 tablet 1         Review of Systems   CONSTITUTIONAL:NEGATIVE for fever, chills, change in weight  ENT/MOUTH: NEGATIVE for ear, mouth and throat problems  RESP:NEGATIVE for significant cough or SOB  CV: NEGATIVE for chest pain, palpitations or peripheral edema  PSYCHIATRIC: NEGATIVE for changes in mood or affect    She does experience symptoms of arthritis.    She is requesting a prescription for calcium acetate. She notes she was unable to get a refill; the pharmacy did a brief one under  "my name for her.           Objective    /58 (BP Location: Right arm, Patient Position: Sitting, Cuff Size: Adult Large)   Pulse 70   Temp 98  F (36.7  C) (Oral)   Resp 16   Ht 1.626 m (5' 4\")   Wt 89 kg (196 lb 3.2 oz)   LMP  (LMP Unknown)   SpO2 97%   BMI 33.68 kg/m    Body mass index is 33.68 kg/m .  Physical Exam   GENERAL APPEARANCE: healthy, alert and no distress  CV: regular rates and rhythm  MS: no edema.   PSYCH: mentation appears normal and affect normal/bright    PHQ-9 SCORE 7/24/2020 3/25/2021 3/25/2021   PHQ-9 Total Score - - -   PHQ-9 Total Score 2 0 0       HUBERT-7 SCORE 7/24/2020 3/25/2021 3/25/2021   Total Score - - -   Total Score 0 0 0           GFR Estimate   Date Value Ref Range Status   04/21/2021 18 (L) >60 mL/min/[1.73_m2] Final     Comment:     Non  GFR Calc  Starting 12/18/2018, serum creatinine based estimated GFR (eGFR) will be   calculated using the Chronic Kidney Disease Epidemiology Collaboration   (CKD-EPI) equation.     03/05/2021 20 (L) >60 mL/min/[1.73_m2] Final     Comment:     Non  GFR Calc  Starting 12/18/2018, serum creatinine based estimated GFR (eGFR) will be   calculated using the Chronic Kidney Disease Epidemiology Collaboration   (CKD-EPI) equation.     02/09/2021 14 (L) >60 mL/min/[1.73_m2] Final     Comment:     Non  GFR Calc  Starting 12/18/2018, serum creatinine based estimated GFR (eGFR) will be   calculated using the Chronic Kidney Disease Epidemiology Collaboration   (CKD-EPI) equation.       Recent Labs   Lab Test 04/27/20  0920 01/21/20  0911 12/17/13  0859 12/17/13  0859   CHOL 181 207*   < > 165   HDL 44* 47*   < > 49*   LDL 89 115*   < > 76   TRIG 240* 223*   < > 204*   CHOLHDLRATIO  --   --   --  3.4    < > = values in this interval not displayed.                   "

## 2021-05-26 PROBLEM — N25.81 SECONDARY HYPERPARATHYROIDISM OF RENAL ORIGIN (H): Status: ACTIVE | Noted: 2021-05-26

## 2021-06-02 ENCOUNTER — ANTICOAGULATION THERAPY VISIT (OUTPATIENT)
Dept: FAMILY MEDICINE | Facility: CLINIC | Age: 86
End: 2021-06-02

## 2021-06-02 DIAGNOSIS — E78.5 HYPERLIPIDEMIA WITH TARGET LDL LESS THAN 100: ICD-10-CM

## 2021-06-02 DIAGNOSIS — N18.9 ANEMIA OF CHRONIC RENAL FAILURE: ICD-10-CM

## 2021-06-02 DIAGNOSIS — I48.0 PAROXYSMAL ATRIAL FIBRILLATION (H): ICD-10-CM

## 2021-06-02 DIAGNOSIS — E21.1 SECONDARY HYPERPARATHYROIDISM, NON-RENAL (H): ICD-10-CM

## 2021-06-02 DIAGNOSIS — Z79.01 LONG TERM CURRENT USE OF ANTICOAGULANTS WITH INR GOAL OF 2.0-3.0: ICD-10-CM

## 2021-06-02 DIAGNOSIS — N18.4 CHRONIC KIDNEY DISEASE, STAGE IV (SEVERE) (H): ICD-10-CM

## 2021-06-02 DIAGNOSIS — D63.1 ANEMIA OF CHRONIC RENAL FAILURE: ICD-10-CM

## 2021-06-02 LAB
ALBUMIN SERPL-MCNC: 3.3 G/DL (ref 3.4–5)
ANION GAP SERPL CALCULATED.3IONS-SCNC: 5 MMOL/L (ref 3–14)
BUN SERPL-MCNC: 66 MG/DL (ref 7–30)
CALCIUM SERPL-MCNC: 9.7 MG/DL (ref 8.5–10.1)
CAPILLARY BLOOD COLLECTION: NORMAL
CHLORIDE SERPL-SCNC: 103 MMOL/L (ref 94–109)
CHOLEST SERPL-MCNC: 195 MG/DL
CO2 SERPL-SCNC: 32 MMOL/L (ref 20–32)
CREAT SERPL-MCNC: 2.43 MG/DL (ref 0.52–1.04)
GFR SERPL CREATININE-BSD FRML MDRD: 18 ML/MIN/{1.73_M2}
GLUCOSE SERPL-MCNC: 187 MG/DL (ref 70–99)
HDLC SERPL-MCNC: 45 MG/DL
HGB BLD-MCNC: 11.1 G/DL (ref 11.7–15.7)
INR PPP: 1.9 (ref 0.86–1.14)
LDLC SERPL CALC-MCNC: 100 MG/DL
NONHDLC SERPL-MCNC: 150 MG/DL
PHOSPHATE SERPL-MCNC: 4.2 MG/DL (ref 2.5–4.5)
POTASSIUM SERPL-SCNC: 4.2 MMOL/L (ref 3.4–5.3)
PTH-INTACT SERPL-MCNC: 106 PG/ML (ref 18–80)
SODIUM SERPL-SCNC: 140 MMOL/L (ref 133–144)
TRIGL SERPL-MCNC: 249 MG/DL

## 2021-06-02 PROCEDURE — 80069 RENAL FUNCTION PANEL: CPT | Performed by: FAMILY MEDICINE

## 2021-06-02 PROCEDURE — 82306 VITAMIN D 25 HYDROXY: CPT | Performed by: FAMILY MEDICINE

## 2021-06-02 PROCEDURE — 80061 LIPID PANEL: CPT | Performed by: FAMILY MEDICINE

## 2021-06-02 PROCEDURE — 99207 PR NO CHARGE NURSE ONLY: CPT | Performed by: FAMILY MEDICINE

## 2021-06-02 PROCEDURE — 83970 ASSAY OF PARATHORMONE: CPT | Performed by: FAMILY MEDICINE

## 2021-06-02 PROCEDURE — 85018 HEMOGLOBIN: CPT | Performed by: FAMILY MEDICINE

## 2021-06-02 PROCEDURE — 85610 PROTHROMBIN TIME: CPT | Performed by: FAMILY MEDICINE

## 2021-06-02 PROCEDURE — 36416 COLLJ CAPILLARY BLOOD SPEC: CPT | Performed by: FAMILY MEDICINE

## 2021-06-02 NOTE — PROGRESS NOTES
ANTICOAGULATION FOLLOW-UP CLINIC VISIT    Patient Name:  Ирина Yanez  Date:  6/2/2021  Contact Type:  Telephone    SUBJECTIVE:  Patient Findings     Positives:  Change in health, Change in diet/appetite    Comments:  Called patient to discuss today's INR results: Patient denies any identifiable changes that caused the subtherapeutic INR. The patient was assessed for medication,alcohol and activity level changes, missed or extra doses, bruising or bleeding, with no problem findings. She does note she actually has had less green veggies this past week. Has also been more exhausted lately. Checking hgb today - is worried it's low. She plans to return to more green veggies, which can drop her INR even lower, just placed her grocery order. For ongoing factors, advised patient to increase maintenance dosing by about 6% and recheck INR in 2 weeks. Patient stated understanding and is in agreement with plan.  Claudia GRIFFIN RN  Anticoagulation Team            Clinical Outcomes     Negatives:  Major bleeding event, Thromboembolic event, Anticoagulation-related hospital admission, Anticoagulation-related ED visit, Anticoagulation-related fatality    Comments:  Called patient to discuss today's INR results: Patient denies any identifiable changes that caused the subtherapeutic INR. The patient was assessed for medication,alcohol and activity level changes, missed or extra doses, bruising or bleeding, with no problem findings. She does note she actually has had less green veggies this past week. Has also been more exhausted lately. Checking hgb today - is worried it's low. She plans to return to more green veggies, which can drop her INR even lower, just placed her grocery order. For ongoing factors, advised patient to increase maintenance dosing by about 6% and recheck INR in 2 weeks. Patient stated understanding and is in agreement with plan.  Claudia GRIFFIN RN  Anticoagulation Team               OBJECTIVE    Recent labs: (last 7  days)     21  0900   INR 1.90*       ASSESSMENT / PLAN  INR assessment SUB    Recheck INR In: 2 WEEKS    INR Location Clinic      Anticoagulation Summary  As of 2021    INR goal:  2.0-3.0   TTR:  67.9 % (2.9 mo)   INR used for dosin.90 (2021)   Warfarin maintenance plan:  5 mg (2.5 mg x 2) every Mon; 6.25 mg (2.5 mg x 2.5) all other days   Full warfarin instructions:  5 mg every Mon; 6.25 mg all other days   Weekly warfarin total:  42.5 mg   Plan last modified:  Claudia Dong RN (2021)   Next INR check:  2021   Priority:  Maintenance   Target end date:  2022    Indications    Paroxysmal atrial fibrillation (H) [I48.0]             Anticoagulation Episode Summary     INR check location:      Preferred lab:      Send INR reminders to:  KRIS RAMIREZ    Comments:        Anticoagulation Care Providers     Provider Role Specialty Phone number    Yuridia Villarreal MD Referring Family Medicine [phone]            See the Encounter Report to view Anticoagulation Flowsheet and Dosing Calendar (Go to Encounters tab in chart review, and find the Anticoagulation Therapy Visit)    Dosage adjustment made based on physician directed care plan.    Claudia Dong RN

## 2021-06-07 LAB
DEPRECATED CALCIDIOL+CALCIFEROL SERPL-MC: <65 UG/L (ref 20–75)
VITAMIN D2 SERPL-MCNC: <5 UG/L
VITAMIN D3 SERPL-MCNC: 60 UG/L

## 2021-06-11 ENCOUNTER — PATIENT OUTREACH (OUTPATIENT)
Dept: NURSING | Facility: CLINIC | Age: 86
End: 2021-06-11
Payer: COMMERCIAL

## 2021-06-11 NOTE — LETTER
[place] CARE COORDINATION  [address] [address] [facility] [address]      June 11, 2021    Ирина JOHNS Renata  [address] [facility] [address]    Dear Ирина,  Your Care Team congratulates you on your journey to maintain wellness. This document will help guide you on your journey to maintain a healthy lifestyle.  You can use this to help you overcome any barriers you may encounter.  If you should have any questions or concerns, you can contact the members of your Care Team or contact your Primary Care Clinic for assistance.     Health Maintenance  Health Maintenance Reviewed: Due/Overdue   Health Maintenance Topics with due status: Overdue       Topic Date Due    HF ACTION PLAN Never done    ZOSTER IMMUNIZATION 10/12/2012    DIABETIC FOOT EXAM 09/27/2020    MEDICARE ANNUAL WELLNESS VISIT 01/16/2021    EYE EXAM 05/06/2021     My Access Plan  Medical Emergency 911   Primary Clinic Line [facility] - [phone]   24 Hour Appointment Line [phone] or  [phone]) (toll-free)   24 Hour Nurse Line [phone] (toll-free)   Preferred Urgent Care [facility], [phone]   Preferred Hospital [facility]  [phone]   Preferred Pharmacy [facility]     Behavioral Health Crisis Line The National Suicide Prevention Lifeline at 1-977.338.5280 or 911     My Care Team Members  Patient Care Team       Relationship Specialty Notifications Start End    Yuridia Villarreal MD PCP - General Family Practice  4/29/10     Phone: [phone] Fax: [phone]         [address] [address] [facility] [address]    Yuridia Villarreal MD Assigned PCP   1/26/20     Phone: [phone] Fax: [phone]         [address] [address] [address]    Reji Verde MD Assigned Endocrinology Provider   10/23/20     Phone: [phone] Fax: [phone]         [address] [place] [address]    Henrik Beasley MD Assigned  Heart and Vascular Provider   11/22/20     Phone: [phone] Fax: [phone]         [address] [facility] [address]    Bisi Moffett, RN Specialty Care Coordinator Nurse Admissions 2/3/21     Smitha Odonnell MD Assigned Pulmonology Provider   2/21/21     Phone: [phone] Fax: [phone]         [address]  [facility] [address]    Zarina Cain [facility] Pharmacist Pharmacist  2/22/21     Phone: [phone] Fax: [phone]         [address] [facility] [address]    Goltz, Bennett Ezra, PA-C Assigned Sleep Provider   3/17/21     Phone: [phone] Fax: [phone]         [address] [place] [address]    Goltz, Bennett Ezra, PA-C Assigned Neuroscience Provider   3/17/21     Phone: [phone] Fax: [phone]         [address] [place] [address]              Goals        Patient Stated      COMPLETED: 1. Improve chronic symptoms (pt-stated)      Goal Statement: I would like support in managing my chronic health conditions to maintain my health and wellness and prevent readmission to the hospital.   Date Goal set:  Updated on 2/3/21  Barriers: weak due to chronic health conditions.   Strengths: motivated, engaged in care coordination, home care therapies.   Date to Achieve By: 06/2021  Patient expressed understanding of goal: yes  Action steps to achieve this goal:  1. I will follow up with my providers as scheduled/recommended. (Primary Care Provider 05/25, CT 02/05, Pulmonology - PRN, nephrology 04/13 & 06/09, cardiology 04/28,  etc.)  2. I will take my medications as prescribed.   3. I will contact my care team to report questions, concerns, support needs. 24/7 after hours services available.   4. Care Coordinator will collaborate with care team as needed.                Advance Care Plans/Directives Type:   Type Advanced Care Plans/Directives: Advanced Directive - On File  Thank you for providing us with your Advance Directive. We will keep  this on file and recommend that it be updated every 2 years, if your health situation changes, if there is a death of one of your health care agents, and/or if there are changes in your marital status.    It has been your Clinic Care Team's pleasure to work with you on your goals.    Regards,  Your Clinic Care Team

## 2021-06-11 NOTE — PROGRESS NOTES
Clinic Care Coordination Contact    Assessment: Care Coordinator contacted patient for 2 month follow up.  Patient has continued to follow the plan of care and assessment is negative for any new needs or concerns. RNCC reviewed clinic after hours, appointments and overdue health maintenance with patient. Patient reports that she continues to use her oxygen during the night and monitoring her vital signs regularly with readings that are consistent and at her baseline, within normal limits. She denies any signs/symptoms of exacerbation and verbalizes correct knowledge and understanding of how to identify these and action steps.     Enrollment status: Graduated.      Plan: No further outreaches at this time.  Patient will continue to follow the plan of care.  If new needs arise a new Care Coordination referral may be placed.  RNCC will send clinic care coordination graduation letter to patient via [email or web address] per standard work.     Abeba Christensen RN Care Coordinator  [facility]  Email: [email or web address]  Phone: [phone]

## 2021-06-16 ENCOUNTER — ANTICOAGULATION THERAPY VISIT (OUTPATIENT)
Dept: FAMILY MEDICINE | Facility: CLINIC | Age: 86
End: 2021-06-16

## 2021-06-16 DIAGNOSIS — I48.0 PAROXYSMAL ATRIAL FIBRILLATION (H): ICD-10-CM

## 2021-06-16 DIAGNOSIS — Z79.01 LONG TERM CURRENT USE OF ANTICOAGULANTS WITH INR GOAL OF 2.0-3.0: ICD-10-CM

## 2021-06-16 LAB
CAPILLARY BLOOD COLLECTION: NORMAL
INR PPP: 2.1 (ref 0.86–1.14)

## 2021-06-16 PROCEDURE — 36416 COLLJ CAPILLARY BLOOD SPEC: CPT | Performed by: FAMILY MEDICINE

## 2021-06-16 PROCEDURE — 85610 PROTHROMBIN TIME: CPT | Performed by: FAMILY MEDICINE

## 2021-06-16 PROCEDURE — 99207 PR NO CHARGE NURSE ONLY: CPT | Performed by: FAMILY MEDICINE

## 2021-06-16 NOTE — PROGRESS NOTES
Anticoagulation Management    Unable to reach Ирина today.    Today's INR result of 2.1 is therapeutic (goal INR of 2.0-3.0).  Result received from: Clinic Lab    Follow up required to confirm warfarin dose taken and assess for changes. On new higher warfarin dose. If no other changes/concerns, next INR in 3 weeks.    No answer. Left  to call [phone]. Can transfer to [facility] at [phone].    Anticoagulation clinic to follow up    Claudia Dong RN    ANTICOAGULATION FOLLOW-UP CLINIC VISIT    Patient Name:  Ирина Yanez  Date:  6/16/2021  Contact Type:  Telephone    SUBJECTIVE:  Patient Findings     Comments:  Called patient to discuss today's INR results: The patient was assessed for diet, medication, and activity level changes, missed or extra doses, bruising or bleeding, with no problem findings. Reviewed maintenance warfarin dosing with patient. Patient will remain on the same dose until next INR check. No other questions or concerns. Scheduled next lab-only INR in 3 weeks.  Claudia GRIFFIN RN  Anticoagulation Team            Clinical Outcomes     Negatives:  Major bleeding event, Thromboembolic event, Anticoagulation-related hospital admission, Anticoagulation-related ED visit, Anticoagulation-related fatality    Comments:  Called patient to discuss today's INR results: The patient was assessed for diet, medication, and activity level changes, missed or extra doses, bruising or bleeding, with no problem findings. Reviewed maintenance warfarin dosing with patient. Patient will remain on the same dose until next INR check. No other questions or concerns. Scheduled next lab-only INR in 3 weeks.  Claudia GRIFFIN RN  Anticoagulation Team               OBJECTIVE    Recent labs: (last 7 days)     06/16/21  0837   INR 2.10*       ASSESSMENT / PLAN  INR assessment THER    Recheck INR In: 3 WEEKS    INR Location Clinic      Anticoagulation Summary  As of 6/16/2021    INR goal:  2.0-3.0   TTR:  65.5 % (3.4 mo)   INR  used for dosin.10 (2021)   Warfarin maintenance plan:  5 mg (2.5 mg x 2) every Mon; 6.25 mg (2.5 mg x 2.5) all other days   Full warfarin instructions:  5 mg every Mon; 6.25 mg all other days   Weekly warfarin total:  42.5 mg   No change documented:  Claudia Dong RN   Plan last modified:  Claudia Dong RN (2021)   Next INR check:  2021   Priority:  Maintenance   Target end date:  2022    Indications    Paroxysmal atrial fibrillation (H) [I48.0]             Anticoagulation Episode Summary     INR check location:      Preferred lab:      Send INR reminders to:  KRIS RAMIREZ    Comments:        Anticoagulation Care Providers     Provider Role Specialty Phone number    Yuridia Villarreal MD Referring Family Medicine [phone]            See the Encounter Report to view Anticoagulation Flowsheet and Dosing Calendar (Go to Encounters tab in chart review, and find the Anticoagulation Therapy Visit)    Claudia Dong RN

## 2021-07-07 ENCOUNTER — ANTICOAGULATION THERAPY VISIT (OUTPATIENT)
Dept: FAMILY MEDICINE | Facility: CLINIC | Age: 86
End: 2021-07-07

## 2021-07-07 DIAGNOSIS — Z79.01 LONG TERM CURRENT USE OF ANTICOAGULANTS WITH INR GOAL OF 2.0-3.0: ICD-10-CM

## 2021-07-07 DIAGNOSIS — I48.0 PAROXYSMAL ATRIAL FIBRILLATION (H): ICD-10-CM

## 2021-07-07 DIAGNOSIS — Z79.01 LONG TERM CURRENT USE OF ANTICOAGULANTS WITH INR GOAL OF 2.0-3.0: Primary | ICD-10-CM

## 2021-07-07 LAB
CAPILLARY BLOOD COLLECTION: NORMAL
INR PPP: 2.1 (ref 0.86–1.14)

## 2021-07-07 PROCEDURE — 36416 COLLJ CAPILLARY BLOOD SPEC: CPT | Performed by: FAMILY MEDICINE

## 2021-07-07 PROCEDURE — 85610 PROTHROMBIN TIME: CPT | Performed by: FAMILY MEDICINE

## 2021-07-07 NOTE — PROGRESS NOTES
Attempted to reach [place] regarding today's INR result.  No answer at this time, left  requesting callback at earliest convenience.  Please transfer callback to [facility] direct line #[phone].   PHOEBE CastilloN, RN

## 2021-07-07 NOTE — PROGRESS NOTES
ANTICOAGULATION MANAGEMENT     Ирина Yanez 86 year old female is on warfarin with therapeutic INR result. (Goal INR 2.0-3.0)    Recent labs: (last 7 days)     07/07/21  0844   INR 2.10*       ASSESSMENT     Source(s): Patient/Caregiver Call       Warfarin doses taken: Warfarin taken as instructed    Diet: No new diet changes identified    New illness, injury, or hospitalization: Minor injury, patient hit her thumb with a hammer.    Medication/supplement changes: None noted    Signs or symptoms of bleeding or clotting: No    Previous INR: Therapeutic last visit; previously outside of goal range    Additional findings: None     PLAN     Recommended plan for no diet, medication or health factor changes affecting INR     Dosing Instructions: Continue your current warfarin dose with next INR in 3-4 weeks       Summary  As of 7/7/2021    Full warfarin instructions:  5 mg every Mon; 6.25 mg all other days   Next INR check:  7/28/2021             Telephone call with Ирина who verbalizes understanding and agrees to plan    Lab visit scheduled    Education provided: Please call back if any changes to your diet, medications or how you've been taking warfarin, Monitoring for bleeding signs and symptoms, Monitoring for clotting signs and symptoms and Importance of notifying clinic for changes in medications; a sooner lab recheck maybe needed.    Plan made per [facility] anticoagulation protocol    Mecca Hayden RN  Anticoagulation Clinic  7/7/2021    _______________________________________________________________________     Anticoagulation Episode Summary     Current INR goal:  2.0-3.0   TTR:  71.4 % (4.1 mo)   Target end date:  2/25/2022   Send INR reminders to:  [facility]    Indications    Paroxysmal atrial fibrillation (H) [I48.0]           Comments:           Anticoagulation Care Providers     Provider Role Specialty Phone number    Yuridia Villarreal MD Referring Family Medicine [phone]

## 2021-07-16 ENCOUNTER — TRANSFERRED RECORDS (OUTPATIENT)
Dept: HEALTH INFORMATION MANAGEMENT | Facility: CLINIC | Age: 86
End: 2021-07-16

## 2021-07-16 LAB — RETINOPATHY: NEGATIVE

## 2021-07-28 ENCOUNTER — LAB (OUTPATIENT)
Dept: LAB | Facility: CLINIC | Age: 86
End: 2021-07-28
Payer: COMMERCIAL

## 2021-07-28 ENCOUNTER — ANTICOAGULATION THERAPY VISIT (OUTPATIENT)
Dept: ANTICOAGULATION | Facility: CLINIC | Age: 86
End: 2021-07-28

## 2021-07-28 DIAGNOSIS — E78.5 HYPERLIPIDEMIA WITH TARGET LDL LESS THAN 100: ICD-10-CM

## 2021-07-28 DIAGNOSIS — I48.0 PAROXYSMAL ATRIAL FIBRILLATION (H): Primary | ICD-10-CM

## 2021-07-28 DIAGNOSIS — I48.0 PAROXYSMAL ATRIAL FIBRILLATION (H): ICD-10-CM

## 2021-07-28 DIAGNOSIS — Z79.01 LONG TERM CURRENT USE OF ANTICOAGULANTS WITH INR GOAL OF 2.0-3.0: ICD-10-CM

## 2021-07-28 DIAGNOSIS — F41.9 ANXIETY: ICD-10-CM

## 2021-07-28 LAB — INR BLD: 2.7 (ref 0.9–1.1)

## 2021-07-28 PROCEDURE — 36416 COLLJ CAPILLARY BLOOD SPEC: CPT

## 2021-07-28 PROCEDURE — 85610 PROTHROMBIN TIME: CPT

## 2021-07-28 RX ORDER — PRAVASTATIN SODIUM 20 MG
TABLET ORAL
Qty: 90 TABLET | Refills: 1 | Status: SHIPPED | OUTPATIENT
Start: 2021-07-28 | End: 2022-01-26

## 2021-07-28 NOTE — TELEPHONE ENCOUNTER
Prescription approved per [facility] Refill Protocol.  Lizzette Mejia RN

## 2021-07-28 NOTE — PROGRESS NOTES
ANTICOAGULATION MANAGEMENT     Ирина Yanez 86 year old female is on warfarin with therapeutic INR result. (Goal INR 2.0-3.0)    Recent labs: (last 7 days)     07/28/21  0857   INR 2.7*       ASSESSMENT     Source(s): Chart Review and Patient/Caregiver Call       Warfarin doses taken: Warfarin taken as instructed    Diet: No new diet changes identified    New illness, injury, or hospitalization: No    Medication/supplement changes: None noted    Signs or symptoms of bleeding or clotting: No    Previous INR: Therapeutic last 2(+) visits    Additional findings: None     PLAN     Recommended plan for no diet, medication or health factor changes affecting INR     Dosing Instructions: Continue your current warfarin dose with next INR in 4 weeks       Summary  As of 7/28/2021    Full warfarin instructions:  5 mg every Mon; 6.25 mg all other days   Next INR check:  8/25/2021             Telephone call with Ирина who verbalizes understanding and agrees to plan    Lab visit scheduled    Education provided: Please call back if any changes to your diet, medications or how you've been taking warfarin    Plan made per [facility] anticoagulation protocol    Claudia Dong RN  Anticoagulation Clinic  7/28/2021    _______________________________________________________________________     Anticoagulation Episode Summary     Current INR goal:  2.0-3.0   TTR:  75.6 % (4.8 mo)   Target end date:  2/25/2022   Send INR reminders to:  KRIS RAMIREZ    Indications    Paroxysmal atrial fibrillation (H) [I48.0]           Comments:           Anticoagulation Care Providers     Provider Role Specialty Phone number    Yuridia Villarreal MD Referring Family Medicine [phone]

## 2021-08-06 ENCOUNTER — PATIENT OUTREACH (OUTPATIENT)
Dept: ONCOLOGY | Facility: CLINIC | Age: 86
End: 2021-08-06

## 2021-08-06 NOTE — PROGRESS NOTES
Writer notified by NICOLE Garcia that pt had stopped by clinic and asked where she can go to donate her unused PleurX bottles, as she no longer needs them.     Writer called Ирина and explained she can bring them by our clinic and we will bring them to the hospital donation center where they accept unopened medical supplies to donate. Ирина verbalized understanding.    Ирина also asked if it is okay to discontinue her nighttime oxygen that has been going into her cpap at night. She reports she does not feel like she needs it anymore. Ирина no longer requires supplemental oxygen when she is up and active during the day. She reports she does not feel like she needs it at night.     Writer will update Dr. Odonnell to write order to discontinue home oxygen use, if appropriate, and notify [facility].    Bisi Moffett, RN, BSN, MARIA TERESA  RN Care Coordinator  [facility]  [phone]

## 2021-08-10 NOTE — PROGRESS NOTES
Write contacted [facility]. They requested that Dr. Odonnell have documentation in Epic to state ok to discontinue home oxygen then they will contact pt to arrange  of oxygen supplies.      Per Dr. Odonnell, ok to discontinue home oxygen use.       Writer will route to Dr. Odonnell for cosign.    Biis Moffett, RN, BSN, MARIA TERESA  RN Care Coordinator  [facility]  [phone]

## 2021-08-12 NOTE — PROGRESS NOTES
Writer called [facility] to update them. They will reach out to pt to arrange  of oxygen supplies.     Bisi Moffett, RN, BSN, MARIA TERESA  RN Care Coordinator  [facility]  [phone]

## 2021-08-25 ENCOUNTER — ANTICOAGULATION THERAPY VISIT (OUTPATIENT)
Dept: ANTICOAGULATION | Facility: CLINIC | Age: 86
End: 2021-08-25

## 2021-08-25 ENCOUNTER — LAB (OUTPATIENT)
Dept: LAB | Facility: CLINIC | Age: 86
End: 2021-08-25
Payer: COMMERCIAL

## 2021-08-25 DIAGNOSIS — Z79.01 LONG TERM CURRENT USE OF ANTICOAGULANTS WITH INR GOAL OF 2.0-3.0: ICD-10-CM

## 2021-08-25 DIAGNOSIS — I48.0 PAROXYSMAL ATRIAL FIBRILLATION (H): ICD-10-CM

## 2021-08-25 DIAGNOSIS — I48.0 PAROXYSMAL ATRIAL FIBRILLATION (H): Primary | ICD-10-CM

## 2021-08-25 LAB — INR BLD: 1.5 (ref 0.9–1.1)

## 2021-08-25 PROCEDURE — 85610 PROTHROMBIN TIME: CPT

## 2021-08-25 PROCEDURE — 36416 COLLJ CAPILLARY BLOOD SPEC: CPT

## 2021-08-25 NOTE — PROGRESS NOTES
ANTICOAGULATION MANAGEMENT     Ирина Yanez 86 year old female is on warfarin with subtherapeutic INR result. (Goal INR 2.0-3.0)    Recent labs: (last 7 days)     08/25/21  0855   INR 1.5*       ASSESSMENT     Source(s): Chart Review and Patient/Caregiver Call       Warfarin doses taken: Warfarin taken as instructed    Diet: Increased greens/vitamin K in diet; plans to resume previous intake    New illness, injury, or hospitalization: No    Medication/supplement changes: None noted    Signs or symptoms of bleeding or clotting: No    Previous INR: Therapeutic last 2(+) visits    Additional findings: None     PLAN     Recommended plan for temporary change(s) affecting INR     Dosing Instructions: Booster dose then continue your current warfarin dose with next INR in 10 days       Summary  As of 8/25/2021    Full warfarin instructions:  8/25: 12.5 mg; Otherwise 5 mg every Mon; 6.25 mg all other days   Next INR check:  9/2/2021             Telephone call with Ирина who verbalizes understanding and agrees to plan    Lab visit scheduled    Education provided: Please call back if any changes to your diet, medications or how you've been taking warfarin, Importance of consistent vitamin K intake, Impact of vitamin K foods on INR, Vitamin K content of foods, Goal range and significance of current result, Monitoring for bleeding signs and symptoms, Monitoring for clotting signs and symptoms, When to seek medical attention/emergency care, Contact [phone]  with any changes, questions or concerns.  and Discussed with patient if she wants to increase her greens/berries intake on a more ongoing basis she should discuss with [facility] RN and we can adjust maintenance warfarin dosing if the increase in veggies/berries causes INR to remain low.    Plan made per [facility] anticoagulation protocol    Claudia Dong, RN  Anticoagulation Clinic  8/25/2021    _______________________________________________________________________      Anticoagulation Episode Summary     Current INR goal:  2.0-3.0   TTR:  72.8 % (5.7 mo)   Target end date:  2/25/2022   Send INR reminders to:  KRIS RAMIREZ    Indications    Paroxysmal atrial fibrillation (H) [I48.0]           Comments:           Anticoagulation Care Providers     Provider Role Specialty Phone number    Yuridia Villarreal MD Referring Family Medicine [phone]

## 2021-09-01 DIAGNOSIS — I10 HYPERTENSION GOAL BP (BLOOD PRESSURE) < 140/90: ICD-10-CM

## 2021-09-01 DIAGNOSIS — I48.0 PAROXYSMAL ATRIAL FIBRILLATION (H): ICD-10-CM

## 2021-09-02 ENCOUNTER — LAB (OUTPATIENT)
Dept: LAB | Facility: CLINIC | Age: 86
End: 2021-09-02
Payer: COMMERCIAL

## 2021-09-02 ENCOUNTER — ANTICOAGULATION THERAPY VISIT (OUTPATIENT)
Dept: ANTICOAGULATION | Facility: CLINIC | Age: 86
End: 2021-09-02

## 2021-09-02 DIAGNOSIS — Z79.01 LONG TERM CURRENT USE OF ANTICOAGULANTS WITH INR GOAL OF 2.0-3.0: ICD-10-CM

## 2021-09-02 DIAGNOSIS — I48.0 PAROXYSMAL ATRIAL FIBRILLATION (H): ICD-10-CM

## 2021-09-02 DIAGNOSIS — I48.0 PAROXYSMAL ATRIAL FIBRILLATION (H): Primary | ICD-10-CM

## 2021-09-02 LAB — INR BLD: 3.3 (ref 0.9–1.1)

## 2021-09-02 PROCEDURE — 36416 COLLJ CAPILLARY BLOOD SPEC: CPT

## 2021-09-02 PROCEDURE — 85610 PROTHROMBIN TIME: CPT

## 2021-09-02 NOTE — PROGRESS NOTES
ANTICOAGULATION MANAGEMENT     Ирина Yanez 86 year old female is on warfarin with supratherapeutic INR result. (Goal INR 2.0-3.0)    Recent labs: (last 7 days)     09/02/21  1057   INR 3.3*       ASSESSMENT     Source(s): Chart Review and Patient/Caregiver Call       Warfarin doses taken: Warfarin taken as instructed    Diet: Decreased greens/vitamin K in diet; plans to resume previous intake. She cut WAY back on her green veggies after overeating them the prior week, despite conversation about keeping stable from week to week.     New illness, injury, or hospitalization: No    Medication/supplement changes: None noted    Signs or symptoms of bleeding or clotting: No    Previous INR: Subtherapeutic    Additional findings: None     PLAN     Recommended plan for temporary change(s) affecting INR     Dosing Instructions: Continue your current warfarin dose with next INR in 2 weeks       Summary  As of 9/2/2021    Full warfarin instructions:  5 mg every Mon; 6.25 mg all other days   Next INR check:  9/17/2021             Telephone call with Ирина who verbalizes understanding and agrees to plan    Lab visit scheduled    Education provided: Please call back if any changes to your diet, medications or how you've been taking warfarin, Importance of consistent vitamin K intake, Impact of vitamin K foods on INR, Vitamin K content of foods, Monitoring for bleeding signs and symptoms, Monitoring for clotting signs and symptoms, Contact [phone]  with any changes, questions or concerns.  and mailed written list of foods with vitamin K per serving.    Plan made per [facility] anticoagulation protocol    Claudia Dnog RN  Anticoagulation Clinic  9/2/2021    _______________________________________________________________________     Anticoagulation Episode Summary     Current INR goal:  2.0-3.0   TTR:  72.0 % (6 mo)   Target end date:  2/25/2022   Send INR reminders to:  [facility]    Indications    Paroxysmal atrial  fibrillation (H) [I48.0]           Comments:           Anticoagulation Care Providers     Provider Role Specialty Phone number    Yuridia Villarreal MD Referring Family Medicine [phone]

## 2021-09-02 NOTE — PROGRESS NOTES
Anticoagulation Management    Unable to reach Ирина today.    Today's INR result of 3.3 is supratherapeutic (goal INR of 2.0-3.0).  Result received from: Clinic Lab    Follow up required to discuss out of range INR . Last INR sub d/t green lulu changes, she planned to return to regular intake. Gave 1x dose boost last week.     LVM to return call to [facility] RN. Left message to continue current dose of warfarin 6.25 mg tonight. if unable to reach nurse before 5 p.m.    Anticoagulation clinic to follow up    Claudia Dong RN

## 2021-09-03 DIAGNOSIS — I48.0 PAROXYSMAL ATRIAL FIBRILLATION (H): ICD-10-CM

## 2021-09-03 RX ORDER — DILTIAZEM HYDROCHLORIDE 180 MG/1
TABLET, EXTENDED RELEASE ORAL
Qty: 180 TABLET | Refills: 0 | Status: SHIPPED | OUTPATIENT
Start: 2021-09-03 | End: 2021-11-18

## 2021-09-03 NOTE — TELEPHONE ENCOUNTER
Routing refill request to provider for review/approval because:  Labs out of range:  Creatinine elevated    Creatinine   Date Value Ref Range Status   06/02/2021 2.43 (H) 0.52 - 1.04 mg/dL Final     Lizzette Mejia RN

## 2021-09-08 RX ORDER — WARFARIN SODIUM 2.5 MG/1
TABLET ORAL
Qty: 215 TABLET | Refills: 1 | Status: SHIPPED | OUTPATIENT
Start: 2021-09-08 | End: 2022-03-23

## 2021-09-08 NOTE — TELEPHONE ENCOUNTER
"Requested Prescriptions   Pending Prescriptions Disp Refills     warfarin ANTICOAGULANT (JANTOVEN ANTICOAGULANT) 2.5 MG tablet [Pharmacy Med Name: JANTOVEN 2.5MG TABS]    Last Written Prescription Date:  03/31/2021  Last Fill Quantity: 192 tablet,  # refills: 1   Last office visit: 5/25/2021 with prescribing provider:  Cherelle   Future Office Visit:     Next 5 appointments (look out 90 days)    Nov 22, 2021  9:00 AM  Office Visit with Mil García MD  [facility] [address] [facility] [address] [phone]          192 tablet 1     Sig: TAKE TWO TABLETS (5 MG) BY MOUTH ON SUNDAY, TUESDAY, AND THURSDAY; TAKE  TWO AND ONE-HALF TABLETS (6.25 MG) BY MOUTH ON ALL OTHER DAY OR AS DIRECTED BY INR NURSE       Vitamin K Antagonists Failed - 9/3/2021  9:18 AM        Failed - INR is within goal in the past 6 weeks     Confirm INR is within goal in the past 6 weeks.     Recent Labs   Lab Test 09/02/21  1057   INR 3.3*                       Passed - Recent (12 mo) or future (30 days) visit within the authorizing provider's specialty     Patient has had an office visit with the authorizing provider or a provider within the authorizing providers department within the previous 12 mos or has a future within next 30 days. See \"Patient Info\" tab in inbasket, or \"Choose Columns\" in Meds & Orders section of the refill encounter.              Passed - Medication is active on med list        Passed - Patient is 18 years of age or older        Passed - Patient is not pregnant        Passed - No positive pregnancy on file in past 12 months              "

## 2021-09-08 NOTE — TELEPHONE ENCOUNTER
Warfarin refill approved per [facility] protocol. Previous INR was 3.3 on 9/2/21. Next appt scheduled for 9/17. Mecca Hayden, PHOEBEN, RN

## 2021-09-17 ENCOUNTER — ANTICOAGULATION THERAPY VISIT (OUTPATIENT)
Dept: ANTICOAGULATION | Facility: CLINIC | Age: 86
End: 2021-09-17

## 2021-09-17 ENCOUNTER — LAB (OUTPATIENT)
Dept: LAB | Facility: CLINIC | Age: 86
End: 2021-09-17
Payer: COMMERCIAL

## 2021-09-17 DIAGNOSIS — I48.0 PAROXYSMAL ATRIAL FIBRILLATION (H): ICD-10-CM

## 2021-09-17 DIAGNOSIS — I48.0 PAROXYSMAL ATRIAL FIBRILLATION (H): Primary | ICD-10-CM

## 2021-09-17 DIAGNOSIS — Z79.01 LONG TERM CURRENT USE OF ANTICOAGULANTS WITH INR GOAL OF 2.0-3.0: ICD-10-CM

## 2021-09-17 LAB — INR BLD: 2.7 (ref 0.9–1.1)

## 2021-09-17 PROCEDURE — 85610 PROTHROMBIN TIME: CPT

## 2021-09-17 PROCEDURE — 36416 COLLJ CAPILLARY BLOOD SPEC: CPT

## 2021-09-17 NOTE — PROGRESS NOTES
ANTICOAGULATION MANAGEMENT     Ирина Yanez 86 year old female is on warfarin with therapeutic INR result. (Goal INR 2.0-3.0)    Recent labs: (last 7 days)     09/17/21  0819   INR 2.7*       ASSESSMENT     Source(s): Chart Review and Patient/Caregiver Call       Warfarin doses taken: Warfarin taken as instructed    Diet: No new diet changes identified, eating something green every other day.    New illness, injury, or hospitalization: No    Medication/supplement changes: None noted    Signs or symptoms of bleeding or clotting: No    Previous INR: Supratherapeutic    Additional findings: None     PLAN     Recommended plan for no diet, medication or health factor changes affecting INR     Dosing Instructions: Continue your current warfarin dose with next INR in 3 weeks       Summary  As of 9/17/2021    Full warfarin instructions:  5 mg every Mon; 6.25 mg all other days   Next INR check:  10/8/2021             Telephone call with Ирина who verbalizes understanding and agrees to plan    Lab visit scheduled    Education provided: Please call back if any changes to your diet, medications or how you've been taking warfarin, Importance of consistent vitamin K intake, Monitoring for bleeding signs and symptoms, Monitoring for clotting signs and symptoms and Contact [phone]  with any changes, questions or concerns.     Plan made per [facility] anticoagulation protocol    Claudia Dong RN  Anticoagulation Clinic  9/17/2021    _______________________________________________________________________     Anticoagulation Episode Summary     Current INR goal:  2.0-3.0   TTR:  70.3 % (6.5 mo)   Target end date:  2/25/2022   Send INR reminders to:  [facility]    Indications    Paroxysmal atrial fibrillation (H) [I48.0]           Comments:           Anticoagulation Care Providers     Provider Role Specialty Phone number    Yuridia Villarreal MD Referring Family Medicine [phone]

## 2021-09-30 DIAGNOSIS — N18.4 CHRONIC KIDNEY DISEASE, STAGE IV (SEVERE) (H): Primary | ICD-10-CM

## 2021-10-08 ENCOUNTER — ANTICOAGULATION THERAPY VISIT (OUTPATIENT)
Dept: ANTICOAGULATION | Facility: CLINIC | Age: 86
End: 2021-10-08

## 2021-10-08 ENCOUNTER — LAB (OUTPATIENT)
Dept: LAB | Facility: CLINIC | Age: 86
End: 2021-10-08
Payer: COMMERCIAL

## 2021-10-08 DIAGNOSIS — Z79.01 LONG TERM CURRENT USE OF ANTICOAGULANTS WITH INR GOAL OF 2.0-3.0: ICD-10-CM

## 2021-10-08 DIAGNOSIS — I48.0 PAROXYSMAL ATRIAL FIBRILLATION (H): ICD-10-CM

## 2021-10-08 DIAGNOSIS — N18.4 CHRONIC KIDNEY DISEASE, STAGE IV (SEVERE) (H): ICD-10-CM

## 2021-10-08 DIAGNOSIS — I48.0 PAROXYSMAL ATRIAL FIBRILLATION (H): Primary | ICD-10-CM

## 2021-10-08 LAB
ANION GAP SERPL CALCULATED.3IONS-SCNC: 9 MMOL/L (ref 3–14)
BUN SERPL-MCNC: 69 MG/DL (ref 7–30)
CALCIUM SERPL-MCNC: 9.2 MG/DL (ref 8.5–10.1)
CHLORIDE BLD-SCNC: 105 MMOL/L (ref 94–109)
CO2 SERPL-SCNC: 25 MMOL/L (ref 20–32)
CREAT SERPL-MCNC: 2.33 MG/DL (ref 0.52–1.04)
GFR SERPL CREATININE-BSD FRML MDRD: 19 ML/MIN/1.73M2
GLUCOSE BLD-MCNC: 246 MG/DL (ref 70–99)
INR BLD: 5.1 (ref 0.9–1.1)
POTASSIUM BLD-SCNC: 3.7 MMOL/L (ref 3.4–5.3)
SODIUM SERPL-SCNC: 139 MMOL/L (ref 133–144)

## 2021-10-08 PROCEDURE — 36415 COLL VENOUS BLD VENIPUNCTURE: CPT

## 2021-10-08 PROCEDURE — 80048 BASIC METABOLIC PNL TOTAL CA: CPT

## 2021-10-08 PROCEDURE — 85610 PROTHROMBIN TIME: CPT

## 2021-10-08 NOTE — PROGRESS NOTES
ANTICOAGULATION MANAGEMENT     Ирина Yanez 86 year old female is on warfarin with supratherapeutic INR result. (Goal INR 2.0-3.0)    Recent labs: (last 7 days)     10/08/21  0819   INR 5.1*       ASSESSMENT     Source(s): Chart Review and Patient/Caregiver Call       Warfarin doses taken: Warfarin taken as instructed    Diet: Decreased greens/vitamin K in diet; plans to resume previous intake    New illness, injury, or hospitalization: Yes: chronic back pain and neuropathy - denies worsening sx.    Medication/supplement changes: None noted    Signs or symptoms of bleeding or clotting: No    Previous INR: Therapeutic last visit; previously outside of goal range    Additional findings: None     PLAN     Recommended plan for no diet, medication or health factor changes affecting INR     Dosing Instructions: Hold today and partial hold 10/9 then continue your current warfarin dose (plus resume normal green intake) with next INR in 3 days       Summary  As of 10/8/2021    Full warfarin instructions:  10/8: Hold; 10/9: 3.75 mg; Otherwise 5 mg every Mon; 6.25 mg all other days   Next INR check:  10/11/2021             Telephone call with Ирина who verbalizes understanding and agrees to plan    Lab visit scheduled    Education provided: Please call back if any changes to your diet, medications or how you've been taking warfarin, Monitoring for bleeding signs and symptoms, Monitoring for clotting signs and symptoms, When to seek medical attention/emergency care and Importance of notifying clinic for changes in medications; a sooner lab recheck maybe needed.    Plan made per [facility] anticoagulation protocol. Encounter routed to PCP/DOD as an FYI.    Mecca Hayden RN  Anticoagulation Clinic  10/8/2021    _______________________________________________________________________     Anticoagulation Episode Summary     Current INR goal:  2.0-3.0   TTR:  64.7 % (7.2 mo)   Target end date:  2/25/2022   Send INR reminders to:   [facility]    Indications    Paroxysmal atrial fibrillation (H) [I48.0]           Comments:           Anticoagulation Care Providers     Provider Role Specialty Phone number    Yuridia Villarreal MD Referring Family Medicine [phone]

## 2021-10-11 ENCOUNTER — LAB (OUTPATIENT)
Dept: LAB | Facility: CLINIC | Age: 86
End: 2021-10-11
Payer: COMMERCIAL

## 2021-10-11 ENCOUNTER — ANTICOAGULATION THERAPY VISIT (OUTPATIENT)
Dept: ANTICOAGULATION | Facility: CLINIC | Age: 86
End: 2021-10-11

## 2021-10-11 DIAGNOSIS — Z79.01 LONG TERM CURRENT USE OF ANTICOAGULANTS WITH INR GOAL OF 2.0-3.0: ICD-10-CM

## 2021-10-11 DIAGNOSIS — I48.0 PAROXYSMAL ATRIAL FIBRILLATION (H): ICD-10-CM

## 2021-10-11 DIAGNOSIS — I48.0 PAROXYSMAL ATRIAL FIBRILLATION (H): Primary | ICD-10-CM

## 2021-10-11 LAB — INR BLD: 2.7 (ref 0.9–1.1)

## 2021-10-11 PROCEDURE — 36415 COLL VENOUS BLD VENIPUNCTURE: CPT

## 2021-10-11 PROCEDURE — 85610 PROTHROMBIN TIME: CPT

## 2021-10-11 NOTE — PROGRESS NOTES
ANTICOAGULATION MANAGEMENT     Иирна Yanez 86 year old female is on warfarin with therapeutic INR result. (Goal INR 2.0-3.0)    Recent labs: (last 7 days)     10/11/21  1316   INR 2.7*       ASSESSMENT     Source(s): Chart Review and Patient/Caregiver Call       Warfarin doses taken: Warfarin taken as instructed    Diet: No new diet changes identified. Back to her normal intake of something green every other day.    New illness, injury, or hospitalization: No    Medication/supplement changes: None noted    Signs or symptoms of bleeding or clotting: No    Previous INR: Subtherapeutic    Additional findings: None     PLAN     Recommended plan for no diet, medication or health factor changes affecting INR     Dosing Instructions: Continue your current warfarin dose with next INR in 1 week       Summary  As of 10/11/2021    Full warfarin instructions:  5 mg every Mon; 6.25 mg all other days   Next INR check:  10/19/2021             Telephone call with Ирина who verbalizes understanding and agrees to plan    Lab visit scheduled    Education provided: Please call back if any changes to your diet, medications or how you've been taking warfarin, Importance of consistent vitamin K intake, Monitoring for bleeding signs and symptoms, Monitoring for clotting signs and symptoms and Contact [phone]  with any changes, questions or concerns.     Plan made per [facility] anticoagulation protocol    Claudia Dong RN  Anticoagulation Clinic  10/11/2021    _______________________________________________________________________     Anticoagulation Episode Summary     Current INR goal:  2.0-3.0   TTR:  63.9 % (7.3 mo)   Target end date:  2/25/2022   Send INR reminders to:  [facility]    Indications    Paroxysmal atrial fibrillation (H) [I48.0]           Comments:           Anticoagulation Care Providers     Provider Role Specialty Phone number    Yuridia Villarreal MD Referring Family Medicine [phone]

## 2021-10-19 ENCOUNTER — ANTICOAGULATION THERAPY VISIT (OUTPATIENT)
Dept: ANTICOAGULATION | Facility: CLINIC | Age: 86
End: 2021-10-19

## 2021-10-19 ENCOUNTER — LAB (OUTPATIENT)
Dept: LAB | Facility: CLINIC | Age: 86
End: 2021-10-19
Payer: COMMERCIAL

## 2021-10-19 DIAGNOSIS — I48.0 PAROXYSMAL ATRIAL FIBRILLATION (H): Primary | ICD-10-CM

## 2021-10-19 DIAGNOSIS — I48.0 PAROXYSMAL ATRIAL FIBRILLATION (H): ICD-10-CM

## 2021-10-19 DIAGNOSIS — Z79.01 LONG TERM CURRENT USE OF ANTICOAGULANTS WITH INR GOAL OF 2.0-3.0: ICD-10-CM

## 2021-10-19 LAB — INR BLD: 2.8 (ref 0.9–1.1)

## 2021-10-19 PROCEDURE — 36415 COLL VENOUS BLD VENIPUNCTURE: CPT

## 2021-10-19 PROCEDURE — 85610 PROTHROMBIN TIME: CPT

## 2021-10-19 NOTE — PROGRESS NOTES
ANTICOAGULATION MANAGEMENT     Ирина Yanez 86 year old female is on warfarin with therapeutic INR result. (Goal INR 2.0-3.0)    Recent labs: (last 7 days)     10/19/21  1254   INR 2.8*       ASSESSMENT     Source(s): Chart Review and Patient/Caregiver Call       Warfarin doses taken: Warfarin taken as instructed    Diet: No new diet changes identified    New illness, injury, or hospitalization: No    Medication/supplement changes: None noted    Signs or symptoms of bleeding or clotting: No    Previous INR: Therapeutic last visit; previously outside of goal range    Additional findings: None     PLAN     Recommended plan for no diet, medication or health factor changes affecting INR     Dosing Instructions: Continue your current warfarin dose with next INR in 2 weeks       Summary  As of 10/19/2021    Full warfarin instructions:  5 mg every Mon; 6.25 mg all other days   Next INR check:  11/2/2021             Telephone call with Ирина who verbalizes understanding and agrees to plan    Lab visit scheduled    Education provided: Please call back if any changes to your diet, medications or how you've been taking warfarin, Importance of consistent vitamin K intake, Impact of vitamin K foods on INR and Contact [phone]  with any changes, questions or concerns.     Plan made per [facility] anticoagulation protocol    Claudia Dong RN  Anticoagulation Clinic  10/19/2021    _______________________________________________________________________     Anticoagulation Episode Summary     Current INR goal:  2.0-3.0   TTR:  65.2 % (7.5 mo)   Target end date:  2/25/2022   Send INR reminders to:  [facility]    Indications    Paroxysmal atrial fibrillation (H) [I48.0]           Comments:           Anticoagulation Care Providers     Provider Role Specialty Phone number    Yuridia Villarreal MD Referring Family Medicine [phone]

## 2021-10-29 ENCOUNTER — OFFICE VISIT (OUTPATIENT)
Dept: SLEEP MEDICINE | Facility: CLINIC | Age: 86
End: 2021-10-29
Payer: COMMERCIAL

## 2021-10-29 VITALS
HEIGHT: 64 IN | SYSTOLIC BLOOD PRESSURE: 139 MMHG | WEIGHT: 195 LBS | BODY MASS INDEX: 33.29 KG/M2 | HEART RATE: 73 BPM | DIASTOLIC BLOOD PRESSURE: 57 MMHG | OXYGEN SATURATION: 97 %

## 2021-10-29 DIAGNOSIS — F51.04 PSYCHOPHYSIOLOGICAL INSOMNIA: ICD-10-CM

## 2021-10-29 DIAGNOSIS — G47.39 COMPLEX SLEEP APNEA SYNDROME: Primary | ICD-10-CM

## 2021-10-29 PROCEDURE — 99215 OFFICE O/P EST HI 40 MIN: CPT | Performed by: PHYSICIAN ASSISTANT

## 2021-10-29 ASSESSMENT — MIFFLIN-ST. JEOR: SCORE: 1319.51

## 2021-10-29 NOTE — NURSING NOTE
"Chief Complaint   Patient presents with     CPAP Follow Up       Initial /57   Pulse 73   Ht 1.626 m (5' 4\")   Wt 88.5 kg (195 lb)   LMP  (LMP Unknown)   SpO2 97%   BMI 33.47 kg/m   Estimated body mass index is 33.47 kg/m  as calculated from the following:    Height as of this encounter: 1.626 m (5' 4\").    Weight as of this encounter: 88.5 kg (195 lb).    Medication Reconciliation: complete  ESS 3  Ariane Tejeda CMA  "

## 2021-10-29 NOTE — PROGRESS NOTES
CPAP Follow-Up Visit:    Date on this visit: 10/29/2021    Ирина Yanez has a follow-up of her ASV use for complex sleep apnea.   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  ECHO 1/2021 showed aortic stenosis and LVEF 60-65%.     PSG from 8/23/2001 showed an AHI of 88 per hour. Treatment emergent central apneas were noted with CPAP. Titration PSG on 01/03/2016 showed good response to ASV.     She did register her machine for the recall about 6 weeks ago. They have a SoClean which they use occasionally.  She has not noticed any symptoms that would be associated with the recall.    PAP machine: [employer] (2/26/2016). ASV with pressures: EPAP 6-14 cm, PS 0-13 cm, max pressure 25, breath rate auto    The compliance data shows that the patient used the ASV for 30/30 nights, 90% of nights for >4 hours.  The 90th% pressure is 23.4/12 cm.  The average time in large leak is 20 sec.  The average nightly usage is 7:21.  The average AHI is 3.1/hr. The avg breath rate is 16.9 bpm, avg minute vent 5.4.       Interface:  Mask: full face mask  Chin strap: No  Leak: Only in the morning occasionally  Using Humidifier: Yes set at 1, hose temp at 4. She thought the hose temp was adjusting the humidifier.  Condensation in hose or mask: No     Difficulties with therapy:    [-] Snoring with CPAP:  [-] Difficulty tolerating the pressure:  [-] Epistaxis/dry nose:   [-] Nasal congestion:  [+] Dry mouth: always  [-] Mouth breathing:   [-] Pain/skin breakdown:      Improvements noted with CPAP:   [+] waking up more refreshed  [+] sleeping better with less arousals  [+] nocturia improved   [+] improved energy level during the day    Weight change since sleep study: 195 lbs, down from 220 in January    She had been having some trouble sleeping a couple of years ago after her sister in law passed away a couple of years ago.   Bedtime: 10:30-11  PM. She can be falling asleep in the living room but wakes up when she gets into bed.  Wake time: as late as 8 AM. She is sometimes up by 2-4 AM. Sometimes she can't get back to sleep. Sometimes she goes to the recliner and falls asleep there. Falls asleep in 60 minutes-listens to CDs.   Naps: inadvertently when sitting playing on the phone, especially around 4 PM.   She continues to feel more tired than usual since her admission last December/January for bilateral pleural effusion and A-Fib.       Past medical/surgical history, family history, social history, medications and allergies were reviewed.      Problem List:  Patient Active Problem List    Diagnosis Date Noted     Health Care Home 07/27/2011     Priority: High       Date Noted Care Team Member TO DO/Alerts to be completed   7/27/2011   MD Dr Mehreen Mills rechecking kidney function and prescribes lisinopril                Diagnosis Specialist Due to be seen Following   BRIGHT Verde June 2012    Melanoma, skin cancer [facility]; [facility] July 2012                    DX V65.8 REPLACED WITH [facility] (04/08/2013)       Secondary hyperparathyroidism of renal origin (H) 05/26/2021     Priority: Medium     Acquired absence of other left toe(s) (H) 03/25/2021     Priority: Medium     Diastolic heart failure, unspecified HF chronicity (H) 02/14/2021     Priority: Medium     Bilateral pleural effusion 01/26/2021     Priority: Medium     Paroxysmal atrial fibrillation (H) 01/15/2021     Priority: Medium     Noted during hospitalization January 2021       Hypoxemia 12/29/2020     Priority: Medium     CHF (congestive heart failure) (H) 12/29/2020     Priority: Medium     Dyspnea 12/29/2020     Priority: Medium     Acute on chronic kidney failure (H) 12/29/2020     Priority: Medium     Iron deficiency anemia 12/23/2020     Priority: Medium     Lung mass 12/10/2020     Priority: Medium     Chest tightness 12/10/2020     Priority: Medium     Dyspnea  on exertion 12/10/2020     Priority: Medium     Aortic valve stenosis, etiology of cardiac valve disease unspecified 02/04/2020     Priority: Medium     See Cardiology note 11/16/2020       Ascending aorta dilatation (H) 02/04/2020     Priority: Medium     See Cardiology note 11/16/2020. Recommend TTE in 2022       Falls frequently 07/10/2019     Priority: Medium     Abrasion of arm, right, initial encounter 07/10/2019     Priority: Medium     Elevated BMI with comorbidity (H) 09/13/2018     Priority: Medium     Toe amputation status, left 07/15/2017     Priority: Medium     Heart murmur 01/02/2017     Priority: Medium     Aortic sclerosis 01/02/2017     Priority: Medium     ACP (advance care planning) 11/21/2016     Priority: Medium     Advance Care Planning 11/21/2016: Receipt of ACP document:  Received: Health Care Directive which was witnessed or notarized on 9-16-16.  Document previously scanned on 9-20-16.  Validation form completed and sent to be scanned.  Code Status reflects choices in most recent ACP document.  Confirmed/documented designated decision maker(s).  Added by Yelitza Richter RN Advance Care Planning Liaison with [employer]             S/P lumbar fusion 09/16/2016     Priority: Medium     Right bundle branch block 09/13/2016     Priority: Medium     Tendon rupture, traumatic. quadriceps 10/27/2015     Priority: Medium     Complex sleep apnea syndrome 10/12/2015     Priority: Medium      Chronic pain - diabetic neuropathy 07/05/2015     Priority: Medium     Patient is followed by Data Unavailable for ongoing prescription of pain medication.  All refills should be approved by this provider, or covering partner.    Medication(s): tramadol.   Maximum quantity per month: 180  Clinic visit frequency required: Q 6  months     Controlled substance agreement on file: Yes       Date(s): 2/5/15    Pain Clinic evaluation in the past: No    DIRE Total Score(s):  No flowsheet data found.    [facility]:  Checked on 02/23/16 and pt is compliant.Federica Singleton RN.     [email or web address]         Basal cell carcinoma of skin 05/11/2015     Priority: Medium     CKD (chronic kidney disease) stage 4, GFR 15-29 ml/min (H) 05/07/2015     Priority: Medium     Controlled substance agreement signed 2/5/15 02/05/2015     Priority: Medium     Patient is followed by RAOUL MCCOY for ongoing prescription of narcotic pain medicine.  Med: tramadol.   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 Please review for accuracy       Type 2 diabetes mellitus with diabetic nephropathy (H) 10/31/2010     Priority: Medium     Hypertension goal BP (blood pressure) < 140/90 06/30/2010     Priority: Medium     Arthritis 06/30/2010     Priority: Medium     Anxiety 06/30/2010     Priority: Medium     Hyperlipidemia with target LDL less than 100      Priority: Medium     Diagnosis updated by automated process. Provider to review and confirm.       Diabetic polyneuropathy (H) 12/20/2007     Priority: Medium     Problem list name updated by automated process. Provider to review          Impression/Plan:    (G47.31) Complex sleep apnea syndrome  (primary encounter diagnosis)  Comment: Ирина is doing pretty well with ASV. She has concerns about the recall. She also gets a dry mouth. Her humidifier was set at 1. She thought she was increasing that setting, but was actually changing the hose temp. Her AHI is down to about 3/hr, down from 22/hr last year. Unclear if the  improvement was from turning the auto backup rate on or from improvement in cardiovascular function. She did register her machine with [employer] recently. She has not noted symptoms associated with the recall.  Plan: Comprehensive DME        Order placed for replacement ASV machine with the same settings: EPAP 6-14 cm, PS 0-13 cm, max pressure 25, breath rate auto. Order placed for supplies as well. She was advised to avoid using SoClean. She was advised she could get in line bacterial filters if desired.    (F51.04) Psychophysiological insomnia  Comment: It takes her an hour to fall asleep and she often wakes early and has difficulty getting back to sleep. She is in bed an average of 9 hours per night. She notes she sleeps better when she is more active, but feels she is not motivated to do some of the things she has to do. She has not fully recovered her energy since a hospital stay at the beginning of the year.  Plan: reduce time in bed to no more than 8 hours a day. Avoid dozing in the daytime. Try to get up and walk around the room every 10-15 minutes if doing something sedentary, to help avoid inadvertent dozing.     She will follow up with me in about 2 month(s) after getting a new machine.     44 minutes were spent on the date of the encounter doing chart review, history and exam, documentation and further activities as noted above.     Bennett Goltz, PA-C    CC: No ref. provider found

## 2021-10-29 NOTE — PATIENT INSTRUCTIONS
Your BMI is Body mass index is 33.47 kg/m .  Weight management is a personal decision.  If you are interested in exploring weight loss strategies, the following discussion covers the approaches that may be successful. Body mass index (BMI) is one way to tell whether you are at a healthy weight, overweight, or obese. It measures your weight in relation to your height.  A BMI of 18.5 to 24.9 is in the healthy range. A person with a BMI of 25 to 29.9 is considered overweight, and someone with a BMI of 30 or greater is considered obese. More than two-thirds of American adults are considered overweight or obese.  Being overweight or obese increases the risk for further weight gain. Excess weight may lead to heart disease and diabetes.  Creating and following plans for healthy eating and physical activity may help you improve your health.  Weight control is part of healthy lifestyle and includes exercise, emotional health, and healthy eating habits. Careful eating habits lifelong are the mainstay of weight control. Though there are significant health benefits from weight loss, long-term weight loss with diet alone may be very difficult to achieve- studies show long-term success with dietary management in less than 10% of people. Attaining a healthy weight may be especially difficult to achieve in those with severe obesity. In some cases, medications, devices and surgical management might be considered.  What can you do?  If you are overweight or obese and are interested in methods for weight loss, you should discuss this with your provider.     Consider reducing daily calorie intake by 500 calories.     Keep a food journal.     Avoiding skipping meals, consider cutting portions instead.    Diet combined with exercise helps maintain muscle while optimizing fat loss. Strength training is particularly important for building and maintaining muscle mass. Exercise helps reduce stress, increase energy, and improves fitness.  Increasing exercise without diet control, however, may not burn enough calories to loose weight.       Start walking three days a week 10-20 minutes at a time    Work towards walking thirty minutes five days a week     Eventually, increase the speed of your walking for 1-2 minutes at time    In addition, we recommend that you review healthy lifestyles and methods for weight loss available through the National Institutes of Health patient information sites:  http://win.niddk.nih.gov/publications/index.htm    And look into health and wellness programs that may be available through your health insurance provider, employer, local community center, or [employer].    Weight management plan: Patient was referred to their PCP to discuss a diet and exercise plan.

## 2021-11-02 ENCOUNTER — ANTICOAGULATION THERAPY VISIT (OUTPATIENT)
Dept: ANTICOAGULATION | Facility: CLINIC | Age: 86
End: 2021-11-02

## 2021-11-02 ENCOUNTER — LAB (OUTPATIENT)
Dept: LAB | Facility: CLINIC | Age: 86
End: 2021-11-02
Payer: COMMERCIAL

## 2021-11-02 DIAGNOSIS — Z79.01 LONG TERM CURRENT USE OF ANTICOAGULANTS WITH INR GOAL OF 2.0-3.0: ICD-10-CM

## 2021-11-02 DIAGNOSIS — I48.0 PAROXYSMAL ATRIAL FIBRILLATION (H): ICD-10-CM

## 2021-11-02 DIAGNOSIS — I48.0 PAROXYSMAL ATRIAL FIBRILLATION (H): Primary | ICD-10-CM

## 2021-11-02 LAB — INR BLD: 6 (ref 0.9–1.1)

## 2021-11-02 PROCEDURE — 85610 PROTHROMBIN TIME: CPT

## 2021-11-02 PROCEDURE — 36416 COLLJ CAPILLARY BLOOD SPEC: CPT

## 2021-11-02 NOTE — PROGRESS NOTES
ANTICOAGULATION MANAGEMENT     Ирина Yanez 86 year old female is on warfarin with supratherapeutic INR result. (Goal INR 2.0-3.0)    Recent labs: (last 7 days)     11/02/21  1124   INR 6.0*       ASSESSMENT     Source(s): Chart Review and Patient/Caregiver Call       Warfarin doses taken: Warfarin taken as instructed    Diet: Decreased greens/vitamin K in diet; plans to resume previous intake, thought she was getting enough but after detailed review of last few days she has been light.    New illness, injury, or hospitalization: Yes: Her right knee (which she has had several surgeries on) is very painful today, not really noticing any visible changes.    Medication/supplement changes: took prophylactic amoxicillin 7 days ago for detal cleaning.    Signs or symptoms of bleeding or clotting: No    Previous INR: Therapeutic last 2(+) visits    Additional findings: None     PLAN     Recommended plan for temporary change(s) affecting INR     Dosing Instructions: Hold warfarin x2 days with next INR in 2 days       Summary  As of 11/2/2021    Full warfarin instructions:  11/2: Hold; 11/3: Hold; Otherwise 5 mg every Mon; 6.25 mg all other days   Next INR check:  11/4/2021             Telephone call with Ирина who verbalizes understanding and agrees to plan    Lab visit scheduled    Education provided: Please call back if any changes to your diet, medications or how you've been taking warfarin, Importance of consistent vitamin K intake, Goal range and significance of current result, Potential interaction between warfarin and amoxicillin, Monitoring for bleeding signs and symptoms, When to seek medical attention/emergency care and Contact [phone]  with any changes, questions or concerns.     Plan made per [facility]agulation protocol    Claudia Dong, RN  Anticoagulation Clinic  11/2/2021    _______________________________________________________________________     Anticoagulation Episode Summary     Current INR  goal:  2.0-3.0   TTR:  61.8 % (8 mo)   Target end date:  2/25/2022   Send INR reminders to:  [facility]    Indications    Paroxysmal atrial fibrillation (H) [I48.0]           Comments:           Anticoagulation Care Providers     Provider Role Specialty Phone number    Yuridia Villarreal MD Referring Family Medicine [phone]

## 2021-11-04 ENCOUNTER — ANTICOAGULATION THERAPY VISIT (OUTPATIENT)
Dept: ANTICOAGULATION | Facility: CLINIC | Age: 86
End: 2021-11-04

## 2021-11-04 ENCOUNTER — LAB (OUTPATIENT)
Dept: LAB | Facility: CLINIC | Age: 86
End: 2021-11-04
Payer: COMMERCIAL

## 2021-11-04 ENCOUNTER — TRANSFERRED RECORDS (OUTPATIENT)
Dept: HEALTH INFORMATION MANAGEMENT | Facility: CLINIC | Age: 86
End: 2021-11-04

## 2021-11-04 DIAGNOSIS — Z79.01 LONG TERM CURRENT USE OF ANTICOAGULANTS WITH INR GOAL OF 2.0-3.0: ICD-10-CM

## 2021-11-04 DIAGNOSIS — I48.0 PAROXYSMAL ATRIAL FIBRILLATION (H): Primary | ICD-10-CM

## 2021-11-04 DIAGNOSIS — I48.0 PAROXYSMAL ATRIAL FIBRILLATION (H): ICD-10-CM

## 2021-11-04 LAB — INR BLD: 2.9 (ref 0.9–1.1)

## 2021-11-04 PROCEDURE — 36415 COLL VENOUS BLD VENIPUNCTURE: CPT

## 2021-11-04 PROCEDURE — 85610 PROTHROMBIN TIME: CPT

## 2021-11-04 NOTE — PROGRESS NOTES
Anticoagulation Management     Attempted to reach Ирина to discuss today's INR result, no answer at this time.      Left  requesting callback at patient's earliest convenience.      Anticoagulation clinic to follow up     Mecca Hayden RN

## 2021-11-04 NOTE — PROGRESS NOTES
ANTICOAGULATION MANAGEMENT     Ирина Yanez 86 year old female is on warfarin with therapeutic INR result. (Goal INR 2.0-3.0)    Recent labs: (last 7 days)     11/04/21  1120   INR 2.9*       ASSESSMENT     Source(s): Chart Review and Patient/Caregiver Call       Warfarin doses taken: Warfarin taken as instructed Held last 2 days.Diet: No new diet changes identified    New illness, injury, or hospitalization: Yes: re-injured knee. Went to [facility] today they did x-rays and extracted dark sanguinous fluid off the knee. She is scheduled for MRI for continued assessment.     Medication/supplement changes: None noted    Signs or symptoms of bleeding or clotting: No, except for the dark bloody fluid pulled off the knee today.    Previous INR: Supratherapeutic    Additional findings: None     PLAN     Recommended plan for temporary change(s) affecting INR     Dosing Instructions: Continue your current warfarin dose with next INR in 1 week. If elevated again may need to consider lowering maintenance dose.       Summary  As of 11/4/2021    Full warfarin instructions:  5 mg every Mon; 6.25 mg all other days   Next INR check:  11/11/2021             Telephone call with Ирина who agrees to plan and repeated back plan correctly    Lab visit scheduled    Education provided: Please call back if any changes to your diet, medications or how you've been taking warfarin, Goal range and significance of current result, Potential interaction between warfarin and pain and inflammation, Monitoring for bleeding signs and symptoms, When to seek medical attention/emergency care, Importance of notifying clinic of upcoming surgeries and procedures 2 weeks in advance and Contact [phone]  with any changes, questions or concerns.     Plan made per [facility]agulation protocol    Claudia Dong RN  Anticoagulation Clinic  11/4/2021    _______________________________________________________________________     Anticoagulation Episode Summary      Current INR goal:  2.0-3.0   TTR:  61.3 % (8.1 mo)   Target end date:  2/25/2022   Send INR reminders to:  [facility]    Indications    Paroxysmal atrial fibrillation (H) [I48.0]           Comments:           Anticoagulation Care Providers     Provider Role Specialty Phone number    Yuridia Villarreal MD Referring Family Medicine [phone]

## 2021-11-09 ENCOUNTER — TELEPHONE (OUTPATIENT)
Dept: CARE COORDINATION | Facility: CLINIC | Age: 86
End: 2021-11-09
Payer: COMMERCIAL

## 2021-11-09 DIAGNOSIS — I48.0 PAROXYSMAL ATRIAL FIBRILLATION (H): Primary | ICD-10-CM

## 2021-11-09 DIAGNOSIS — I35.0 AORTIC VALVE STENOSIS, ETIOLOGY OF CARDIAC VALVE DISEASE UNSPECIFIED: ICD-10-CM

## 2021-11-09 NOTE — TELEPHONE ENCOUNTER
[facility] checking with in home lab connection #[phone]   They do not accept Medicare, would be $75 OOP cost.

## 2021-11-09 NOTE — TELEPHONE ENCOUNTER
CHW spoke with patient regarding her  as he is enrolled in . Patient presents concerns of her own.     Patient states that she injured her knee again, saw [facility] on 11/4, they drained fluid and she has MRI next week. She expresses major difficulty ambulating and feels she cannot make it to the clinic on 11/11 for INR. She is requesting home care to come and administer the draw. Is this a possibility?     Pt will either need home care referral or Community Paramedic referral from provider.     Thank you    IVETH Ingram, B.S. [facility] Care Coordination  [facility]:  [place], [place] and [place]  [phone]  [email or web address]

## 2021-11-11 ENCOUNTER — LAB (OUTPATIENT)
Dept: LAB | Facility: CLINIC | Age: 86
End: 2021-11-11
Payer: COMMERCIAL

## 2021-11-11 ENCOUNTER — ANTICOAGULATION THERAPY VISIT (OUTPATIENT)
Dept: ANTICOAGULATION | Facility: CLINIC | Age: 86
End: 2021-11-11

## 2021-11-11 DIAGNOSIS — I48.0 PAROXYSMAL ATRIAL FIBRILLATION (H): Primary | ICD-10-CM

## 2021-11-11 DIAGNOSIS — Z79.01 LONG TERM CURRENT USE OF ANTICOAGULANTS WITH INR GOAL OF 2.0-3.0: ICD-10-CM

## 2021-11-11 DIAGNOSIS — N18.9 ACUTE ON CHRONIC KIDNEY FAILURE (H): Primary | ICD-10-CM

## 2021-11-11 DIAGNOSIS — I48.0 PAROXYSMAL ATRIAL FIBRILLATION (H): ICD-10-CM

## 2021-11-11 DIAGNOSIS — N17.9 ACUTE ON CHRONIC KIDNEY FAILURE (H): Primary | ICD-10-CM

## 2021-11-11 LAB — INR BLD: 3.2 (ref 0.9–1.1)

## 2021-11-11 PROCEDURE — 85610 PROTHROMBIN TIME: CPT

## 2021-11-11 PROCEDURE — 36416 COLLJ CAPILLARY BLOOD SPEC: CPT

## 2021-11-11 NOTE — TELEPHONE ENCOUNTER
It would be best if she can make it in, but I will go ahead with EMT referral for lab just in case.  (How do I make that referral?)    Mil García MD

## 2021-11-11 NOTE — PROGRESS NOTES
ANTICOAGULATION MANAGEMENT     Ирина Yanez 86 year old female is on warfarin with supratherapeutic INR result. (Goal INR 2.0-3.0)    Recent labs: (last 7 days)     11/11/21  1431   INR 3.2*       ASSESSMENT     Source(s): Chart Review and Patient/Caregiver Call       Warfarin doses taken: Warfarin taken as instructed    Diet: No new diet changes identified    New illness, injury, or hospitalization: No    Medication/supplement changes: None noted. Taking 1500 mg Tylenol once daily, usually before bedtime.    Signs or symptoms of bleeding or clotting: No    Previous INR: Therapeutic last visit; previously outside of goal range    Additional findings: right knee pain continues though she feels it may be slightly improved. Has an MRI scheduled on Tuesday.     PLAN     Recommended plan for ongoing change(s) affecting INR     Dosing Instructions:  Decrease your warfarin dose (6% change) with next INR in 2 weeks       Summary  As of 11/11/2021    Full warfarin instructions:  5 mg every Tue, Thu, Sat; 6.25 mg all other days   Next INR check:  11/29/2021             Telephone call with Ирина who agrees to plan and repeated back plan correctly    Check at provider office visit 11/29/21    Education provided: Please call back if any changes to your diet, medications or how you've been taking warfarin, Goal range and significance of current result, Monitoring for bleeding signs and symptoms, Monitoring for clotting signs and symptoms and Contact [phone]  with any changes, questions or concerns.     Plan made per [facility] anticoagulation protocol    Claudia Dong RN  Anticoagulation Clinic  11/11/2021    _______________________________________________________________________     Anticoagulation Episode Summary     Current INR goal:  2.0-3.0   TTR:  60.5 % (8.3 mo)   Target end date:  2/25/2022   Send INR reminders to:  [facility]    Indications    Paroxysmal atrial fibrillation (H) [I48.0]           Comments:            Anticoagulation Care Providers     Provider Role Specialty Phone number    Yuridia Villarreal MD Referring Family Medicine [phone]

## 2021-11-11 NOTE — TELEPHONE ENCOUNTER
Spoke with Milly. Yes, they do draw with paramedic referral and lab order.    Spoke vandana [facility] Paramedics are short so would probably not be able to do this until next week or so. Home care would also take weeks to establish.    Did leave message with patient to call clinic to see if could have someone drive her here for her lab only today.    Please advise if provider would still want to do referral or wait to see if patient can come to lab appointment.    Frieda Granado RN

## 2021-11-15 ENCOUNTER — PATIENT OUTREACH (OUTPATIENT)
Dept: OTHER | Facility: CLINIC | Age: 86
End: 2021-11-15
Payer: COMMERCIAL

## 2021-11-15 NOTE — PROGRESS NOTES
Community Paramedic Program Contact  UTC/Voicemail    CP Community Health Worker Outreach  Outreach attempted x 1.  Left message on patient's voicemail with call back information and requested return call.  Plan: Community Health Worker will try to reach patient again in 1-2 business days.    Note:  Available with CP2 on 11/17 in the pm?      Referral source: Pt's PCP & CC CHW  Referral reason:  Order Specific Question Answer Comment   Reason(s) for visit: Labs/Injections (please ensure orders have been placed in Epic)     Goals for visit(s): Medication Compliance     How often should patient be seen: Other once or twice   Preference on when patient should be seen: 1-2 Days     Comments   PCP: Mil García MD  Other info that would be helpful: On Coumadin

## 2021-11-16 DIAGNOSIS — E11.21 TYPE 2 DIABETES MELLITUS WITH DIABETIC NEPHROPATHY, WITHOUT LONG-TERM CURRENT USE OF INSULIN (H): ICD-10-CM

## 2021-11-16 DIAGNOSIS — I48.0 PAROXYSMAL ATRIAL FIBRILLATION (H): ICD-10-CM

## 2021-11-16 DIAGNOSIS — I10 HYPERTENSION GOAL BP (BLOOD PRESSURE) < 140/90: ICD-10-CM

## 2021-11-16 RX ORDER — GLIMEPIRIDE 1 MG/1
TABLET ORAL
Qty: 30 TABLET | Refills: 1 | Status: SHIPPED | OUTPATIENT
Start: 2021-11-16 | End: 2022-02-10

## 2021-11-16 NOTE — TELEPHONE ENCOUNTER
LOV 3- TL virtual, follow up 2 months NOT DONE  No future OV scheduled    Dose was changed to 1/2 tab daily, chart was updated by Diab Ed 4-6-2021 however Rx was not sent to pharmacy.  Pharmacy seeking Rx.      MyChart active, will send message - Endo appointment due.  Are any labs needed?        Irene Joshua, RT (R)  [facility] (internal med)

## 2021-11-17 NOTE — TELEPHONE ENCOUNTER
Routing refill request to provider for review/approval because:  Previous patient of Dr. Villarreal.  Has an upcoming appointment with Dr. García    Next 5 appointments (look out 90 days)      Nov 29, 2021 10:20 AM  Office Visit with iMl García MD  [facility] ([facility] ) [address] [address] [address] [phone]        Lizzette Mejia RN

## 2021-11-17 NOTE — PROGRESS NOTES
"Community Paramedic Program  Community Health Worker Initial Outreach    Referral source: Pt's PCP  Referral reason:  Reason(s) for visit: Labs/Injections (please ensure orders have been placed in Epic)     Goals for visit(s): Medication Compliance     How often should patient be seen: Other once or twice   Preference on when patient should be seen: 1-2 Days     Comments   PCP: Mil García MD  Other info that would be helpful: On Coumadin     Initial visit:  Scheduled for tomorrow, Thursday, November 18th at 12:00 pm at pt's mobile home in [place].     Additional information:  Called and spoke with pt. I explained my role and the reason for my call. Confirmed referral information with pt over the phone. Pt said \"I actually did make it into the clinic on November 11th and got the INR draw done then.\" She asked if \"this is needed again, and if so, I'm more than happy to have someone come over.\" Offered to schedule visit with CP Gaudencio, which pt agreed to, for tomorrow at 12 pm. Offered to give her CP's phone number, and she declined. Pt said \"I will be here and am not going anywhere.\" Confirmed pt's address and the name of the mobile home park she lives in, which is called [facility].     Pt shared that she is \"more mobile this week.\" She said that she reinjured one of her knees and \"I had an MRI appointment yesterday.\" Pt also shared that she has another appointment in clinic coming up on November 29th and \"they'll be checking my INR then too.\"     I thanked pt for the information and told her I plan to reach out to her doctor to see if the visit is still needed. Pt agreed. I told pt that if it's not, I will call her back to cancel. I told pt if she does not hear back from me, to expect that the CP will come tomorrow as scheduled. She agreed and said \"thank you for your help with this.\" Thanked her for her time and wished her a good day before ending the call.           "

## 2021-11-17 NOTE — PROGRESS NOTES
"Community Paramedic Program  Community Health Worker Follow Up    Intervention and Education during outreach:     Called pt to update her that I spoke with Dr. García. I let her know that an INR check is not needed since she made her November 11th appointment. She said \"I saw that you canceled it on MyChart.\" I let her know that in the future, if she has difficulty with getting into the clinic due to her knee, she could give me a call back to discuss setting up a CP visit for an in-home lab draw. She said \"well thank you, I sure appreciate that.\" Confirmed my phone number with pt. Thanked her for her time and wished her a good afternoon before ending the call.    CHW Plan:   1. Pt will call the CP CHW in the future if she would like to schedule a CP visit for an in-home lab draw.  2. CP CHW will do no further outreaches.  "

## 2021-11-17 NOTE — TELEPHONE ENCOUNTER
If she made it in to her INR appt then I guess this is not needed.  I'm assuming that she will be able to make her next one as well, but if she has diffiuclty with that then you could arrange to go out then insteadh.    Mil García MD

## 2021-11-18 RX ORDER — HYDRALAZINE HYDROCHLORIDE 25 MG/1
TABLET, FILM COATED ORAL
Qty: 270 TABLET | Refills: 0 | Status: SHIPPED | OUTPATIENT
Start: 2021-11-18 | End: 2022-02-10

## 2021-11-18 RX ORDER — DILTIAZEM HYDROCHLORIDE 180 MG/1
TABLET, EXTENDED RELEASE ORAL
Qty: 180 TABLET | Refills: 0 | Status: SHIPPED | OUTPATIENT
Start: 2021-11-18 | End: 2022-01-03

## 2021-11-29 ENCOUNTER — OFFICE VISIT (OUTPATIENT)
Dept: FAMILY MEDICINE | Facility: CLINIC | Age: 86
End: 2021-11-29
Payer: COMMERCIAL

## 2021-11-29 ENCOUNTER — ANTICOAGULATION THERAPY VISIT (OUTPATIENT)
Dept: ANTICOAGULATION | Facility: CLINIC | Age: 86
End: 2021-11-29

## 2021-11-29 VITALS
SYSTOLIC BLOOD PRESSURE: 128 MMHG | OXYGEN SATURATION: 97 % | TEMPERATURE: 97.7 F | DIASTOLIC BLOOD PRESSURE: 54 MMHG | HEART RATE: 66 BPM | BODY MASS INDEX: 33.13 KG/M2 | RESPIRATION RATE: 16 BRPM | WEIGHT: 193 LBS

## 2021-11-29 DIAGNOSIS — I48.0 PAROXYSMAL ATRIAL FIBRILLATION (H): Primary | ICD-10-CM

## 2021-11-29 DIAGNOSIS — I10 HYPERTENSION GOAL BP (BLOOD PRESSURE) < 140/90: ICD-10-CM

## 2021-11-29 DIAGNOSIS — I48.0 PAROXYSMAL ATRIAL FIBRILLATION (H): ICD-10-CM

## 2021-11-29 DIAGNOSIS — Z79.01 LONG TERM CURRENT USE OF ANTICOAGULANTS WITH INR GOAL OF 2.0-3.0: ICD-10-CM

## 2021-11-29 DIAGNOSIS — I35.0 AORTIC VALVE STENOSIS, ETIOLOGY OF CARDIAC VALVE DISEASE UNSPECIFIED: ICD-10-CM

## 2021-11-29 DIAGNOSIS — I50.32 CHRONIC DIASTOLIC CONGESTIVE HEART FAILURE (H): ICD-10-CM

## 2021-11-29 DIAGNOSIS — N18.4 CKD (CHRONIC KIDNEY DISEASE) STAGE 4, GFR 15-29 ML/MIN (H): Primary | ICD-10-CM

## 2021-11-29 LAB — INR BLD: 6.3 (ref 0.9–1.1)

## 2021-11-29 PROCEDURE — 36415 COLL VENOUS BLD VENIPUNCTURE: CPT | Performed by: FAMILY MEDICINE

## 2021-11-29 PROCEDURE — 99214 OFFICE O/P EST MOD 30 MIN: CPT | Performed by: FAMILY MEDICINE

## 2021-11-29 PROCEDURE — 85610 PROTHROMBIN TIME: CPT | Performed by: FAMILY MEDICINE

## 2021-11-29 RX ORDER — METOPROLOL TARTRATE 25 MG/1
25 TABLET, FILM COATED ORAL 2 TIMES DAILY
Qty: 120 TABLET | Refills: 1 | Status: SHIPPED | OUTPATIENT
Start: 2021-11-29 | End: 2022-03-23

## 2021-11-29 RX ORDER — TORSEMIDE 20 MG/1
40 TABLET ORAL DAILY
Qty: 180 TABLET | Refills: 1 | Status: SHIPPED | OUTPATIENT
Start: 2021-11-29 | End: 2022-08-11

## 2021-11-29 ASSESSMENT — ENCOUNTER SYMPTOMS
SHORTNESS OF BREATH: 0
HEADACHES: 0
CONSTITUTIONAL NEGATIVE: 1
PALPITATIONS: 0

## 2021-11-29 NOTE — PROGRESS NOTES
Assessment and Plan    (N18.4) CKD (chronic kidney disease) stage 4, GFR 15-29 ml/min (H)  (primary encounter diagnosis)  Comment: stable, labs reviewed  Plan:     (I50.32) Chronic diastolic congestive heart failure (H)  Comment: weight stable, no current sx  Plan: torsemide (DEMADEX) 20 MG tablet, metoprolol         tartrate (LOPRESSOR) 25 MG tablet            (I10) Hypertension goal BP (blood pressure) < 140/90  Comment: well controlled today  Plan: torsemide (DEMADEX) 20 MG tablet, metoprolol         tartrate (LOPRESSOR) 25 MG tablet            (I48.0) Paroxysmal atrial fibrillation (H)  Comment: NSR today  Plan:     (I35.0) Aortic valve stenosis, etiology of cardiac valve disease unspecified  Comment: noted on exam, does follow with cards  Plan:     (Z79.01) Long term current use of anticoagulants with INR goal of 2.0-3.0  Comment: due for routine lab today  Plan:       RTC in 2m for DM    Mil García MD      Bárbara Gifford is a 86 year old who presents for the following health issues  accompanied by her spouse.    HPI     Does follow with nephrology for CRF.  Does not routinely follow with cardiology.  Normal weight for her is 195#.      Feeling well otherwise.      Review of Systems   Constitutional: Negative.    Eyes: Negative for visual disturbance.   Respiratory: Negative for shortness of breath.    Cardiovascular: Negative for chest pain, palpitations and peripheral edema.   Neurological: Negative for headaches.            Objective    /54 (BP Location: Right arm, Patient Position: Sitting, Cuff Size: Adult Large)   Pulse 66   Temp 97.7  F (36.5  C) (Oral)   Resp 16   Wt 87.5 kg (193 lb)   LMP  (LMP Unknown)   SpO2 97%   BMI 33.13 kg/m    Body mass index is 33.13 kg/m .  Physical Exam  Vitals reviewed.   Eyes:      Conjunctiva/sclera: Conjunctivae normal.   Cardiovascular:      Rate and Rhythm: Normal rate and regular rhythm.      Heart sounds: Murmur heard.    Systolic murmur is  present with a grade of 2/6.      Pulmonary:      Effort: Pulmonary effort is normal.      Breath sounds: Normal breath sounds.   Skin:     General: Skin is warm and dry.   Neurological:      Mental Status: She is alert and oriented to person, place, and time.

## 2021-11-29 NOTE — PROGRESS NOTES
Anticoagulation Management    Unable to reach [place] today.    Today's INR result of 5.3 is supratherapeutic (goal INR of 2.0-3.0).  Result received from: Clinic Lab    Follow up required to discuss out of range INR . This happened about 4 weeks ago as well - she was eating less greens but the pain in her rt knee was starting up as well. Did a 2-day hold with therapeutic INR 2 days later. Last INR was 3.2 and her warfarin dose was reduced by 6%. Has had MRI of right knee since then - would like to hear results/tretment plan and if anything else is influencing today's INR. Will await callback from [place].    Left message to return call to [facility] RN, also Left message to hold warfarin tonight.   Anticoagulation clinic to follow up    Claudia Dong RN

## 2021-11-29 NOTE — PROGRESS NOTES
ANTICOAGULATION MANAGEMENT     Ирина Yanez 86 year old female is on warfarin with supratherapeutic INR result. (Goal INR 2.0-3.0)    Recent labs: (last 7 days)     11/29/21  1112   INR 6.3*       ASSESSMENT     Source(s): Chart Review and Patient/Caregiver Call       Warfarin doses taken: Warfarin taken as instructed    Diet: Decreased greens/vitamin K in diet; ongoing change.  doesn't like salads/veggies and they are generally eating less in general as they are pretty inactive.    New illness, injury, or hospitalization: No. Knee is feeling better, starting PT.    Medication/supplement changes: None noted    Signs or symptoms of bleeding or clotting: No    Previous INR: Supratherapeutic    Additional findings: dose reduced by 6% last INR     PLAN     Recommended plan for ongoing change(s) affecting INR     Dosing Instructions: hold today and tomorrow, also for reduced greens intake and ongoing supratherapeutic INR, plan to reduce maitnenance dose after this by about 12.5% (5 mg daily). with next INR in 2 days       Summary  As of 11/29/2021    Full warfarin instructions:  11/29: Hold; 11/30: Hold; Otherwise 5 mg every day   Next INR check:  12/1/2021             Telephone call with Ирина who agrees to plan and repeated back plan correctly. Discussed that to reduce INR we need to either continue with increased green veggie intake or reduce warfarin dosing and Ирина was in agreement with reducing the warfarin plan.    Lab visit scheduled    Education provided: Please call back if any changes to your diet, medications or how you've been taking warfarin, Importance of consistent vitamin K intake, Impact of vitamin K foods on INR, Vitamin K content of foods, Goal range and significance of current result, Monitoring for bleeding signs and symptoms, When to seek medical attention/emergency care and Contact [phone]  with any changes, questions or concerns.     Plan made per [facility] anticoagulation  protocol    Claudia Dong RN  Anticoagulation Clinic  11/29/2021    _______________________________________________________________________     Anticoagulation Episode Summary     Current INR goal:  2.0-3.0   TTR:  56.4 % (8.9 mo)   Target end date:  2/25/2022   Send INR reminders to:  [facility]    Indications    Paroxysmal atrial fibrillation (H) [I48.0]           Comments:           Anticoagulation Care Providers     Provider Role Specialty Phone number    Yuridia Villarreal MD Referring Family Medicine [phone]

## 2021-12-01 ENCOUNTER — LAB (OUTPATIENT)
Dept: LAB | Facility: CLINIC | Age: 86
End: 2021-12-01
Payer: COMMERCIAL

## 2021-12-01 ENCOUNTER — ANTICOAGULATION THERAPY VISIT (OUTPATIENT)
Dept: ANTICOAGULATION | Facility: CLINIC | Age: 86
End: 2021-12-01

## 2021-12-01 DIAGNOSIS — Z79.01 LONG TERM CURRENT USE OF ANTICOAGULANTS WITH INR GOAL OF 2.0-3.0: ICD-10-CM

## 2021-12-01 DIAGNOSIS — I48.0 PAROXYSMAL ATRIAL FIBRILLATION (H): ICD-10-CM

## 2021-12-01 DIAGNOSIS — I48.0 PAROXYSMAL ATRIAL FIBRILLATION (H): Primary | ICD-10-CM

## 2021-12-01 LAB — INR BLD: 3.8 (ref 0.9–1.1)

## 2021-12-01 PROCEDURE — 36415 COLL VENOUS BLD VENIPUNCTURE: CPT

## 2021-12-01 PROCEDURE — 85610 PROTHROMBIN TIME: CPT

## 2021-12-01 NOTE — PROGRESS NOTES
ANTICOAGULATION MANAGEMENT     Ирина Yanez 86 year old female is on warfarin with supratherapeutic INR result. (Goal INR 2.0-3.0)    Recent labs: (last 7 days)     12/01/21  1249   INR 3.8*       ASSESSMENT     Source(s): Chart Review and Patient/Caregiver Call       Warfarin doses taken: Warfarin taken as instructed    Diet: No new diet changes identified    New illness, injury, or hospitalization: No    Medication/supplement changes: None noted    Signs or symptoms of bleeding or clotting: No    Previous INR: Supratherapeutic    Additional findings: None     PLAN     Recommended plan for ongoing change(s) affecting INR     Dosing Instructions: Partial hold then continue your current warfarin dose with next INR in 10 days. New lower maintenance dose given this past Monday, still need to see what that looks like.       Summary  As of 12/1/2021    Full warfarin instructions:  12/1: 2.5 mg; Otherwise 5 mg every day   Next INR check:  12/10/2021             Telephone call with Ирина who verbalizes understanding and agrees to plan    Lab visit scheduled    Education provided: Please call back if any changes to your diet, medications or how you've been taking warfarin, Importance of consistent vitamin K intake, Goal range and significance of current result, Monitoring for bleeding signs and symptoms, Monitoring for clotting signs and symptoms, When to seek medical attention/emergency care and Contact [phone]  with any changes, questions or concerns.     Plan made per [facility] anticoagulation protocol    Claudia Dong RN  Anticoagulation Clinic  12/1/2021    _______________________________________________________________________     Anticoagulation Episode Summary     Current INR goal:  2.0-3.0   TTR:  56.0 % (9 mo)   Target end date:  2/25/2022   Send INR reminders to:  [facility]    Indications    Paroxysmal atrial fibrillation (H) [I48.0]           Comments:           Anticoagulation Care Providers      Provider Role Specialty Phone number    Yuridia Villarreal MD Referring Family Medicine [phone]

## 2021-12-01 NOTE — PROGRESS NOTES
Anticoagulation Management    Unable to reach Ирина today at home or on cell.    Today's INR result of 3.8 is supratherapeutic (goal INR of 2.0-3.0).  Result received from: Clinic Lab    Follow up required to discuss out of range INR . Needs RN assessment; however, on Monday INR of 6.3, less greens ongoing, knee feeling better slowly. Held dose x2 and reduced maintenance dose. With today's INR will advise a partial hold today then she should continue with new maintenance dosing and next INR 12/13/21 when she comes in for nurse only.     Left vm to return call to [facility] RN, but if unable to reach the nurse then Left message to take reduced dose of warfarin, 2.5 mg tonight.    Anticoagulation clinic to follow up    Claudia Dong RN

## 2021-12-06 ENCOUNTER — TELEPHONE (OUTPATIENT)
Dept: PODIATRY | Facility: CLINIC | Age: 86
End: 2021-12-06
Payer: COMMERCIAL

## 2021-12-06 NOTE — TELEPHONE ENCOUNTER
Phone call to patient.   She states she has neuropathy in her feet. This am she noticed blood on her sock that covered her first three toes and discovered the nail hanging of her great toe. She covered it with gauze and tape.   She is on warfarin so was concerned about the bleeding as well. Bleeding has seemed to have stopped as it has not soaked through the bandage.    She does not recall any injury to the toe or toenail before now. Nails are thick and brittle as she is diabetic.   There are no 30 minute appointments available this week. Will discuss with provider and get back with her.     Discussed with Dr. Dr. Cardona. Ok to schedule 30 minute appointment on 12/8/21.     Phone call to patient and offered appointment on 12/8/21 at 1pm. Asked that patient arrive 10 minutes early for paperwork. Patient to keep her toe clean and dry in the meantime. She verbalized understanding.     ARSENIO Rogers RN

## 2021-12-06 NOTE — TELEPHONE ENCOUNTER
Reason for call:  Message from Ирина, says her Big toenail came off last night in bed, and is just hanging on at the base.  Would like to get in ASAP so it doesn't get  Infected.  Please call back.    Phone number to reach patient:  Home number on file [phone] (home)    Best Time:  -    Can we leave a detailed message on this number?  NO

## 2021-12-08 ENCOUNTER — OFFICE VISIT (OUTPATIENT)
Dept: PODIATRY | Facility: CLINIC | Age: 86
End: 2021-12-08
Payer: COMMERCIAL

## 2021-12-08 VITALS
WEIGHT: 193 LBS | BODY MASS INDEX: 32.95 KG/M2 | SYSTOLIC BLOOD PRESSURE: 132 MMHG | HEIGHT: 64 IN | DIASTOLIC BLOOD PRESSURE: 64 MMHG

## 2021-12-08 DIAGNOSIS — S91.209A TRAUMATIC AVULSION OF NAIL PLATE OF TOE, INITIAL ENCOUNTER: Primary | ICD-10-CM

## 2021-12-08 PROCEDURE — 99203 OFFICE O/P NEW LOW 30 MIN: CPT | Performed by: PODIATRIST

## 2021-12-08 ASSESSMENT — MIFFLIN-ST. JEOR: SCORE: 1310.44

## 2021-12-08 NOTE — LETTER
"    12/8/2021         RE: Ирина Yanez  [address]  [facility] [address]        Dear Colleague,    Thank you for referring your patient, Ирина Yanez, to the [facility]. Please see a copy of my visit note below.    [facility] PODIATRY/FOOT & ANKLE SURGERY  CLINIC NOTE    CHIEF COMPLAINT:  left foot    PATIENT HISTORY:  Ирина Yanez is a 86 year old female  who presents for her left hallux. States that she thinks a few days ago, she ripped her hallux nail off with a sheet at that now it's \"barely hanging on.\" States that there's no pain because she has neuropathy. States she's been showering with it. States there hasn't been much drainage. States her blood sugar was 180 this morning, which is higher for her. Has a prior history of toe amputations, years ago.         Review of Systems:  A 10 point review of systems was performed and is positive for that noted above in the patient history.  All other areas are negative.     PAST MEDICAL HISTORY:   Past Medical History:   Diagnosis Date     Abrasion of arm, right, initial encounter 7/10/2019     Anxiety      Arthritis      Chronic pain     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     Complication of anesthesia      Depression      Diabetes mellitus (H)     sees Dr. Verde- type II     Falls frequently 7/10/2019     Heart murmur      HTN      Hyperlipidemia LDL goal < 100     Dr. Verde     Lichen sclerosus et atrophicus 2/15/2013     Melanoma (H)      Oxygen dependent     1 liter continuously     Peripheral Neuropathy     hands, feet; used to see Dr. Tl ARRIAZA (postoperative nausea and vomiting)      Skin cancer     face; basal and other. Melanoma. Dolan     Sleep apnea     CPAP     Spinal stenosis         PAST SURGICAL HISTORY:   Past Surgical History:   Procedure Laterality Date     AMPUTATE TOE(S)  8/23/2012    left second toe Procedure: AMPUTATE TOE(S);;  Surgeon: Mil Jolly DPM;  " Location: [facility] OR     CHOLECYSTECTOMY  Nov. 1975     COLONOSCOPY  early 2009    repeat 4-5 years (Had one prior to this with polyps)     COLONOSCOPY  Sept 2014    incomplete; recommend colography     COLONOSCOPY  02/25/2020    Dr. Pathak [facility]     COLONOSCOPY N/A 2/25/2020    Procedure: COLONOSCOPY, WITH biopsies using forceps;  Surgeon: Adebayo Pathak MD;  Location: [facility] GI     INSERT CHEST TUBE Bilateral 2/8/2021    Procedure: Attempted INSERTION, CATHETER, INTERCOSTAL, FOR DRAINAGE (Pleurx);  Surgeon: Smitha Odonnell MD;  Location: [facility] OR     OPTICAL TRACKING SYSTEM FUSION POSTERIOR SPINE LUMBAR N/A 9/16/2016    Procedure: OPTICAL TRACKING SYSTEM FUSION SPINE POSTERIOR LUMBAR ONE LEVEL;  Surgeon: Lennox Blue MD;  Location: [facility] OR     PET, REC OF MELANOMA/MET CA Left     arm     REPAIR HAMMER TOE BILATERAL  8/23/2012    Procedure: REPAIR HAMMER TOE BILATERAL;  Flexor Tenotomy 2,3,4,5 Toes both feet, Partial 2nd toe amputation left foot;  Surgeon: Mil Jolly DPM;  Location: [facility] OR     REPAIR TENDON QUADRICEPS Right 10/27/2015    Procedure: REPAIR TENDON QUADRICEPS;  Surgeon: Antonio Yi MD;  Location: [facility] OR     SURGICAL HISTORY OF -   1997    bilateral knee replacement     SURGICAL HISTORY OF -   1997    right knee revised; patella tendon ruptured     SURGICAL HISTORY OF -       surgery for spinal stenosis     SURGICAL HISTORY OF -       breast reduction surgery     SURGICAL HISTORY OF -       D and C         MEDICATIONS:  Reviewed in Epic.     ALLERGIES:    Allergies   Allergen Reactions     Augmentin Diarrhea     Vomiting       Cleocin      Hives     Tegretol [Carbamazepine]      Irregular heart beat        SOCIAL HISTORY:   Social History     Socioeconomic History     Marital status:      Spouse name: Not on file     Number of children: Not on file     Years of education: Not on file     Highest education level: 12th grade   Occupational History     Occupation:  from  home     Employer: RETIRED   Tobacco Use     Smoking status: Never Smoker     Smokeless tobacco: Never Used   Vaping Use     Vaping Use: Never used   Substance and Sexual Activity     Alcohol use: No     Drug use: No     Sexual activity: Not Currently     Partners: Male   Other Topics Concern      Service Not Asked     Blood Transfusions Not Asked     Caffeine Concern Not Asked     Occupational Exposure Not Asked     Hobby Hazards Not Asked     Sleep Concern Not Asked     Stress Concern Not Asked     Weight Concern Not Asked     Special Diet No     Comment: getting fruits and veggies.      Back Care Not Asked     Exercise No     Comment: gets little; limited with back and feet/leg pain.     Bike Helmet Not Asked     Seat Belt Not Asked     Self-Exams Not Asked     Parent/sibling w/ CABG, MI or angioplasty before 65F 55M? Yes   Social History Narrative    2 adopted children     Social Determinants of Health     Financial Resource Strain: Low Risk      Difficulty of Paying Living Expenses: Not hard at all   Food Insecurity: No Food Insecurity     Worried About Running Out of Food in the Last Year: Never true     Ran Out of Food in the Last Year: Never true   Transportation Needs: No Transportation Needs     Lack of Transportation (Medical): No     Lack of Transportation (Non-Medical): No   Physical Activity: Inactive     Days of Exercise per Week: 0 days     Minutes of Exercise per Session: 0 min   Stress: No Stress Concern Present     Feeling of Stress : Only a little   Social Connections: Moderately Isolated     Frequency of Communication with Friends and Family: Three times a week     Frequency of Social Gatherings with Friends and Family: Never     Attends [employer] Services: Never     Active Member of Clubs or Organizations: No     Attends Club or Organization Meetings: Never     Marital Status:    Intimate Partner Violence: Not on file   Housing Stability: Low Risk      Unable to Pay for Housing  "in the Last Year: No     Number of Places Lived in the Last Year: 1     Unstable Housing in the Last Year: No        FAMILY HISTORY:   Family History   Problem Relation Age of Onset     Cerebrovascular Disease Mother      Heart Disease Father      Neurologic Disorder Sister         ALS     C.A.D. Sister      Diabetes Sister      C.A.D. Brother      Psychotic Disorder Brother         [place] war: PTSD. suicide 2019     Gastrointestinal Disease Brother         diverticulitis     Colon Cancer No family hx of         EXAM:Vitals: /64 (BP Location: Right arm, Patient Position: Chair, Cuff Size: Adult Large)   Ht 1.626 m (5' 4\")   Wt 87.5 kg (193 lb)   LMP  (LMP Unknown)   BMI 33.13 kg/m    BMI= Body mass index is 33.13 kg/m .      General appearance: Patient is alert and fully cooperative with history & exam.  No sign of distress is noted during the visit.      Respiratory: Breathing is regular & unlabored while sitting.      HEENT: Hearing is intact to spoken word.  Speech is clear.  No gross evidence of visual impairment that would impact ambulation.      Dermatologic: Left hallux nail, loosely adhered. Distal 2/3s of nail loose, proximal edge in place. No cellulitis. Nail bed removed, granular nail bed with some open tissue in the dermis. No exposed deep structures. No cellulitis.      Vascular: Dorsalis pedis and posterior tibial pulses are intact & regular bilaterally.  CFT and skin temperature is normal to both lower extremities.       Neurologic: Lower extremity sensation is, bilateral foot, to light touch.  No evidence of neurological-based weakness or contracture in the lower extremities.       Musculoskeletal: Patient is ambulatory without an assistive device or brace.  S/p partial 2nd digit amputation   Psychiatric: Affect is pleasant & appropriate.      ASSESSMENT:  1. Left hallux ulcer s/p nail avulsion         MEDICAL DECISION MAKING:   -Discussed at length, recommendation was to remove the nail " in its entirety, given how loosely adhered it is. She agrees to this. Nail was excised without complication. Site was cleansed with alcohol and covered with bacitracin and a bandaid.   -Discussed keeping site clean and dry for one week, given hx of prior amputations and diabetes   -F/u in one week. Anticipate will be able to resume all regular activity at that time.          Shu Cardona DPM     [facility] Department of Podiatry/Foot & Ankle Surgery                  Again, thank you for allowing me to participate in the care of your patient.        Sincerely,        Shu Cardona DPM

## 2021-12-08 NOTE — PATIENT INSTRUCTIONS
Thank you for choosing [facility] Podiatry / Foot & Ankle Surgery!    DR. WAITE'S CLINIC:  [place] [facility] [place] SCHEDULE SURGERY: [phone]   [address] BILLING QUESTIONS: [phone]   [place] [address] APPOINTMENTS: [phone]   PH: [phone] CONSUMER SUGGS LINE:[phone]   FAX: [phone]      Follow up: 1 week     Next steps:   -Left great toe nail was removed today in clinic.   -Keep toe dry, and covered.   -Cover toe with dry dressing, band-aid, gauze and tape.   -Do not get toe wet in the shower.     SIGNS OF INFECTION    expanding redness around the wound     yellow or greenish-colored pus or cloudy wound drainage     red streaking spreading from the wound     increased swelling, tenderness, or pain around the wound     fever  *If you notice any of these signs of infection, call us right away!          Flu vaccines are now available at all [facility] clinics and retail pharmacies across the [facility]. Appointments are required for clinic locations. To schedule an appointment online, please log into [employer] or create an account if you are a new user. You can also call [phone], or simply walk in at one of the [facility] retail pharmacy locations.

## 2021-12-08 NOTE — PROGRESS NOTES
"[facility] PODIATRY/FOOT & ANKLE SURGERY  CLINIC NOTE    CHIEF COMPLAINT:  left foot    PATIENT HISTORY:  Ирина Yanez is a 86 year old female  who presents for her left hallux. States that she thinks a few days ago, she ripped her hallux nail off with a sheet at that now it's \"barely hanging on.\" States that there's no pain because she has neuropathy. States she's been showering with it. States there hasn't been much drainage. States her blood sugar was 180 this morning, which is higher for her. Has a prior history of toe amputations, years ago.         Review of Systems:  A 10 point review of systems was performed and is positive for that noted above in the patient history.  All other areas are negative.     PAST MEDICAL HISTORY:   Past Medical History:   Diagnosis Date     Abrasion of arm, right, initial encounter 7/10/2019     Anxiety      Arthritis      Chronic pain     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     Complication of anesthesia      Depression      Diabetes mellitus (H)     sees Dr. Verde- type II     Falls frequently 7/10/2019     Heart murmur      HTN      Hyperlipidemia LDL goal < 100     Dr. Verde     Lichen sclerosus et atrophicus 2/15/2013     Melanoma (H)      Oxygen dependent     1 liter continuously     Peripheral Neuropathy     hands, feet; used to see Dr. Tl ARRIAZA (postoperative nausea and vomiting)      Skin cancer     face; basal and other. Melanoma. Dolan     Sleep apnea     CPAP     Spinal stenosis         PAST SURGICAL HISTORY:   Past Surgical History:   Procedure Laterality Date     AMPUTATE TOE(S)  8/23/2012    left second toe Procedure: AMPUTATE TOE(S);;  Surgeon: Mil Jolly DPM;  Location: [facility] OR     CHOLECYSTECTOMY  Nov. 1975     COLONOSCOPY  early 2009    repeat 4-5 years (Had one prior to this with polyps)     COLONOSCOPY  Sept 2014    incomplete; recommend colography     COLONOSCOPY  02/25/2020    Dr. Pathak [facility]     COLONOSCOPY N/A " 2/25/2020    Procedure: COLONOSCOPY, WITH biopsies using forceps;  Surgeon: Adebayo Pathak MD;  Location:  GI     INSERT CHEST TUBE Bilateral 2/8/2021    Procedure: Attempted INSERTION, CATHETER, INTERCOSTAL, FOR DRAINAGE (Pleurx);  Surgeon: Smitha Odonnell MD;  Location: [facility] OR     OPTICAL TRACKING SYSTEM FUSION POSTERIOR SPINE LUMBAR N/A 9/16/2016    Procedure: OPTICAL TRACKING SYSTEM FUSION SPINE POSTERIOR LUMBAR ONE LEVEL;  Surgeon: Lennox Blue MD;  Location: [facility] OR     PET, REC OF MELANOMA/MET CA Left     arm     REPAIR HAMMER TOE BILATERAL  8/23/2012    Procedure: REPAIR HAMMER TOE BILATERAL;  Flexor Tenotomy 2,3,4,5 Toes both feet, Partial 2nd toe amputation left foot;  Surgeon: Mil Jolly DPM;  Location: [facility] OR     REPAIR TENDON QUADRICEPS Right 10/27/2015    Procedure: REPAIR TENDON QUADRICEPS;  Surgeon: Antonio Yi MD;  Location: [facility] OR     SURGICAL HISTORY OF -   1997    bilateral knee replacement     SURGICAL HISTORY OF -   1997    right knee revised; patella tendon ruptured     SURGICAL HISTORY OF -       surgery for spinal stenosis     SURGICAL HISTORY OF -       breast reduction surgery     SURGICAL HISTORY OF -       D and C         MEDICATIONS:  Reviewed in Epic.     ALLERGIES:    Allergies   Allergen Reactions     Augmentin Diarrhea     Vomiting       Cleocin      Hives     Tegretol [Carbamazepine]      Irregular heart beat        SOCIAL HISTORY:   Social History     Socioeconomic History     Marital status:      Spouse name: Not on file     Number of children: Not on file     Years of education: Not on file     Highest education level: 12th grade   Occupational History     Occupation:  from home     Employer: RETIRED   Tobacco Use     Smoking status: Never Smoker     Smokeless tobacco: Never Used   Vaping Use     Vaping Use: Never used   Substance and Sexual Activity     Alcohol use: No     Drug use: No     Sexual activity: Not Currently      Partners: Male   Other Topics Concern      Service Not Asked     Blood Transfusions Not Asked     Caffeine Concern Not Asked     Occupational Exposure Not Asked     Hobby Hazards Not Asked     Sleep Concern Not Asked     Stress Concern Not Asked     Weight Concern Not Asked     Special Diet No     Comment: getting fruits and veggies.      Back Care Not Asked     Exercise No     Comment: gets little; limited with back and feet/leg pain.     Bike Helmet Not Asked     Seat Belt Not Asked     Self-Exams Not Asked     Parent/sibling w/ CABG, MI or angioplasty before 65F 55M? Yes   Social History Narrative    2 adopted children     Social Determinants of Health     Financial Resource Strain: Low Risk      Difficulty of Paying Living Expenses: Not hard at all   Food Insecurity: No Food Insecurity     Worried About Running Out of Food in the Last Year: Never true     Ran Out of Food in the Last Year: Never true   Transportation Needs: No Transportation Needs     Lack of Transportation (Medical): No     Lack of Transportation (Non-Medical): No   Physical Activity: Inactive     Days of Exercise per Week: 0 days     Minutes of Exercise per Session: 0 min   Stress: No Stress Concern Present     Feeling of Stress : Only a little   Social Connections: Moderately Isolated     Frequency of Communication with Friends and Family: Three times a week     Frequency of Social Gatherings with Friends and Family: Never     Attends Nondenominational Services: Never     Active Member of Clubs or Organizations: No     Attends Club or Organization Meetings: Never     Marital Status:    Intimate Partner Violence: Not on file   Housing Stability: Low Risk      Unable to Pay for Housing in the Last Year: No     Number of Places Lived in the Last Year: 1     Unstable Housing in the Last Year: No        FAMILY HISTORY:   Family History   Problem Relation Age of Onset     Cerebrovascular Disease Mother      Heart Disease Father       "Neurologic Disorder Sister         ALS     C.A.D. Sister      Diabetes Sister      C.A.D. Brother      Psychotic Disorder Brother         [place] war: PTSD. suicide 2019     Gastrointestinal Disease Brother         diverticulitis     Colon Cancer No family hx of         EXAM:Vitals: /64 (BP Location: Right arm, Patient Position: Chair, Cuff Size: Adult Large)   Ht 1.626 m (5' 4\")   Wt 87.5 kg (193 lb)   LMP  (LMP Unknown)   BMI 33.13 kg/m    BMI= Body mass index is 33.13 kg/m .      General appearance: Patient is alert and fully cooperative with history & exam.  No sign of distress is noted during the visit.      Respiratory: Breathing is regular & unlabored while sitting.      HEENT: Hearing is intact to spoken word.  Speech is clear.  No gross evidence of visual impairment that would impact ambulation.      Dermatologic: Left hallux nail, loosely adhered. Distal 2/3s of nail loose, proximal edge in place. No cellulitis. Nail bed removed, granular nail bed with some open tissue in the dermis. No exposed deep structures. No cellulitis.      Vascular: Dorsalis pedis and posterior tibial pulses are intact & regular bilaterally.  CFT and skin temperature is normal to both lower extremities.       Neurologic: Lower extremity sensation is, bilateral foot, to light touch.  No evidence of neurological-based weakness or contracture in the lower extremities.       Musculoskeletal: Patient is ambulatory without an assistive device or brace.  S/p partial 2nd digit amputation   Psychiatric: Affect is pleasant & appropriate.      ASSESSMENT:  1. Left hallux ulcer s/p nail avulsion         MEDICAL DECISION MAKING:   -Discussed at length, recommendation was to remove the nail in its entirety, given how loosely adhered it is. She agrees to this. Nail was excised without complication. Site was cleansed with alcohol and covered with bacitracin and a bandaid.   -Discussed keeping site clean and dry for one week, given hx of " prior amputations and diabetes   -F/u in one week. Anticipate will be able to resume all regular activity at that time.          Shu Cardona DPM     [place] Department of Podiatry/Foot & Ankle Surgery

## 2021-12-10 ENCOUNTER — ANTICOAGULATION THERAPY VISIT (OUTPATIENT)
Dept: ANTICOAGULATION | Facility: CLINIC | Age: 86
End: 2021-12-10

## 2021-12-10 ENCOUNTER — LAB (OUTPATIENT)
Dept: LAB | Facility: CLINIC | Age: 86
End: 2021-12-10
Payer: COMMERCIAL

## 2021-12-10 DIAGNOSIS — I48.0 PAROXYSMAL ATRIAL FIBRILLATION (H): ICD-10-CM

## 2021-12-10 DIAGNOSIS — Z79.01 LONG TERM CURRENT USE OF ANTICOAGULANTS WITH INR GOAL OF 2.0-3.0: ICD-10-CM

## 2021-12-10 DIAGNOSIS — I48.0 PAROXYSMAL ATRIAL FIBRILLATION (H): Primary | ICD-10-CM

## 2021-12-10 LAB — INR BLD: 2.3 (ref 0.9–1.1)

## 2021-12-10 PROCEDURE — 36416 COLLJ CAPILLARY BLOOD SPEC: CPT

## 2021-12-10 PROCEDURE — 85610 PROTHROMBIN TIME: CPT

## 2021-12-10 NOTE — PROGRESS NOTES
ANTICOAGULATION MANAGEMENT     Ирина Yanez 86 year old female is on warfarin with therapeutic INR result. (Goal INR 2.0-3.0)    Recent labs: (last 7 days)     12/10/21  1455   INR 2.3*       ASSESSMENT     Source(s): Chart Review and Patient/Caregiver Call       Warfarin doses taken: Warfarin taken as instructed    Diet: No new diet changes identified    New illness, injury, or hospitalization: No    Medication/supplement changes: None noted    Signs or symptoms of bleeding or clotting: No    Previous INR: Supratherapeutic    Additional findings: None     PLAN     Recommended plan for no diet, medication or health factor changes affecting INR     Dosing Instructions: Continue your current warfarin dose with next INR in 2 weeks       Summary  As of 12/10/2021    Full warfarin instructions:  5 mg every day   Next INR check:  12/22/2021             Telephone call with Ирина who verbalizes understanding and agrees to plan    Lab visit scheduled    Education provided: Please call back if any changes to your diet, medications or how you've been taking warfarin, Monitoring for bleeding signs and symptoms, Monitoring for clotting signs and symptoms and Importance of notifying clinic for changes in medications; a sooner lab recheck maybe needed.    Plan made per [facility] anticoagulation protocol    Mecca Hayden RN  Anticoagulation Clinic  12/10/2021    _______________________________________________________________________     Anticoagulation Episode Summary     Current INR goal:  2.0-3.0   TTR:  55.7 % (9.3 mo)   Target end date:  2/25/2022   Send INR reminders to:  KRIS RAMIREZ    Indications    Paroxysmal atrial fibrillation (H) [I48.0]           Comments:           Anticoagulation Care Providers     Provider Role Specialty Phone number    Yuridia Villarreal MD Referring Family Medicine [phone]

## 2021-12-21 DIAGNOSIS — D63.1 ANEMIA OF CHRONIC RENAL FAILURE: ICD-10-CM

## 2021-12-21 DIAGNOSIS — E21.1 SECONDARY HYPERPARATHYROIDISM, NON-RENAL (H): ICD-10-CM

## 2021-12-21 DIAGNOSIS — N18.9 ANEMIA OF CHRONIC RENAL FAILURE: ICD-10-CM

## 2021-12-21 DIAGNOSIS — N18.4 CHRONIC KIDNEY DISEASE, STAGE IV (SEVERE) (H): Primary | ICD-10-CM

## 2021-12-22 ENCOUNTER — ALLIED HEALTH/NURSE VISIT (OUTPATIENT)
Dept: FAMILY MEDICINE | Facility: CLINIC | Age: 86
End: 2021-12-22
Payer: COMMERCIAL

## 2021-12-22 ENCOUNTER — ANTICOAGULATION THERAPY VISIT (OUTPATIENT)
Dept: ANTICOAGULATION | Facility: CLINIC | Age: 86
End: 2021-12-22

## 2021-12-22 ENCOUNTER — LAB (OUTPATIENT)
Dept: LAB | Facility: CLINIC | Age: 86
End: 2021-12-22
Payer: COMMERCIAL

## 2021-12-22 VITALS — SYSTOLIC BLOOD PRESSURE: 140 MMHG | HEART RATE: 55 BPM | DIASTOLIC BLOOD PRESSURE: 59 MMHG

## 2021-12-22 DIAGNOSIS — N18.9 ACUTE ON CHRONIC KIDNEY FAILURE (H): ICD-10-CM

## 2021-12-22 DIAGNOSIS — I10 HYPERTENSION GOAL BP (BLOOD PRESSURE) < 140/90: Primary | ICD-10-CM

## 2021-12-22 DIAGNOSIS — D63.1 ANEMIA OF CHRONIC RENAL FAILURE: ICD-10-CM

## 2021-12-22 DIAGNOSIS — I48.0 PAROXYSMAL ATRIAL FIBRILLATION (H): ICD-10-CM

## 2021-12-22 DIAGNOSIS — I48.0 PAROXYSMAL ATRIAL FIBRILLATION (H): Primary | ICD-10-CM

## 2021-12-22 DIAGNOSIS — N17.9 ACUTE ON CHRONIC KIDNEY FAILURE (H): ICD-10-CM

## 2021-12-22 DIAGNOSIS — N18.4 CHRONIC KIDNEY DISEASE, STAGE IV (SEVERE) (H): ICD-10-CM

## 2021-12-22 DIAGNOSIS — Z79.01 LONG TERM CURRENT USE OF ANTICOAGULANTS WITH INR GOAL OF 2.0-3.0: ICD-10-CM

## 2021-12-22 DIAGNOSIS — N18.9 ANEMIA OF CHRONIC RENAL FAILURE: ICD-10-CM

## 2021-12-22 DIAGNOSIS — E21.1 SECONDARY HYPERPARATHYROIDISM, NON-RENAL (H): ICD-10-CM

## 2021-12-22 LAB
BASOPHILS # BLD AUTO: 0 10E3/UL (ref 0–0.2)
BASOPHILS NFR BLD AUTO: 0 %
EOSINOPHIL # BLD AUTO: 0 10E3/UL (ref 0–0.7)
EOSINOPHIL NFR BLD AUTO: 0 %
ERYTHROCYTE [DISTWIDTH] IN BLOOD BY AUTOMATED COUNT: 13.4 % (ref 10–15)
HCT VFR BLD AUTO: 38.2 % (ref 35–47)
HGB BLD-MCNC: 12.2 G/DL (ref 11.7–15.7)
INR BLD: 3 (ref 0.9–1.1)
LYMPHOCYTES # BLD AUTO: 1.9 10E3/UL (ref 0.8–5.3)
LYMPHOCYTES NFR BLD AUTO: 19 %
MCH RBC QN AUTO: 30.5 PG (ref 26.5–33)
MCHC RBC AUTO-ENTMCNC: 31.9 G/DL (ref 31.5–36.5)
MCV RBC AUTO: 96 FL (ref 78–100)
MONOCYTES # BLD AUTO: 0.5 10E3/UL (ref 0–1.3)
MONOCYTES NFR BLD AUTO: 6 %
NEUTROPHILS # BLD AUTO: 7.4 10E3/UL (ref 1.6–8.3)
NEUTROPHILS NFR BLD AUTO: 75 %
PLATELET # BLD AUTO: 268 10E3/UL (ref 150–450)
PTH-INTACT SERPL-MCNC: 124 PG/ML (ref 18–80)
RBC # BLD AUTO: 4 10E6/UL (ref 3.8–5.2)
WBC # BLD AUTO: 9.9 10E3/UL (ref 4–11)

## 2021-12-22 PROCEDURE — 83970 ASSAY OF PARATHORMONE: CPT

## 2021-12-22 PROCEDURE — 36415 COLL VENOUS BLD VENIPUNCTURE: CPT

## 2021-12-22 PROCEDURE — 99207 PR NO CHARGE NURSE ONLY: CPT

## 2021-12-22 PROCEDURE — 85025 COMPLETE CBC W/AUTO DIFF WBC: CPT

## 2021-12-22 PROCEDURE — 82306 VITAMIN D 25 HYDROXY: CPT

## 2021-12-22 PROCEDURE — 85610 PROTHROMBIN TIME: CPT

## 2021-12-22 PROCEDURE — 80069 RENAL FUNCTION PANEL: CPT

## 2021-12-22 NOTE — PROGRESS NOTES
ANTICOAGULATION MANAGEMENT     Ирина Yanez 86 year old female is on warfarin with therapeutic INR result. (Goal INR 2.0-3.0)    Recent labs: (last 7 days)     12/22/21  1131   INR 3.0*       ASSESSMENT     Source(s): Chart Review and Patient/Caregiver Call       Warfarin doses taken: Warfarin taken as instructed    Diet: Decreased greens/vitamin K in diet; plans to resume previous intake    New illness, injury, or hospitalization: No    Medication/supplement changes: None noted    Signs or symptoms of bleeding or clotting: No    Previous INR: Therapeutic last visit; previously outside of goal range    Additional findings: None     PLAN     Recommended plan for no diet, medication or health factor changes affecting INR     Dosing Instructions: Continue your current warfarin dose with next INR in 3 weeks       Summary  As of 12/22/2021    Full warfarin instructions:  5 mg every day   Next INR check:  1/12/2022             Telephone call with Ирина who verbalizes understanding and agrees to plan    Lab visit scheduled    Education provided: Please call back if any changes to your diet, medications or how you've been taking warfarin, Importance of consistent vitamin K intake, Impact of vitamin K foods on INR, Goal range and significance of current result and Contact [phone]  with any changes, questions or concerns.     Plan made per [facility] anticoagulation protocol    Claudia Dong RN  Anticoagulation Clinic  12/22/2021    _______________________________________________________________________     Anticoagulation Episode Summary     Current INR goal:  2.0-3.0   TTR:  57.5 % (9.7 mo)   Target end date:  2/25/2022   Send INR reminders to:  [facility]    Indications    Paroxysmal atrial fibrillation (H) [I48.0]           Comments:           Anticoagulation Care Providers     Provider Role Specialty Phone number    Yuridia Villarreal MD Referring Family Medicine [phone]

## 2021-12-22 NOTE — PROGRESS NOTES
Ирина Yanez is a 86 year old patient who comes in today for a Blood Pressure check.  Initial BP:  BP (!) 140/59 (BP Location: Right arm, Patient Position: Sitting, Cuff Size: Adult Large)   Pulse 55   LMP  (LMP Unknown)      Data Unavailable  Disposition: results routed to provider

## 2021-12-23 LAB
ALBUMIN SERPL-MCNC: 3.5 G/DL (ref 3.4–5)
ANION GAP SERPL CALCULATED.3IONS-SCNC: 12 MMOL/L (ref 3–14)
BUN SERPL-MCNC: 71 MG/DL (ref 7–30)
CALCIUM SERPL-MCNC: 10 MG/DL (ref 8.5–10.1)
CHLORIDE BLD-SCNC: 104 MMOL/L (ref 94–109)
CO2 SERPL-SCNC: 28 MMOL/L (ref 20–32)
CREAT SERPL-MCNC: 2.43 MG/DL (ref 0.52–1.04)
GFR SERPL CREATININE-BSD FRML MDRD: 19 ML/MIN/1.73M2
GLUCOSE BLD-MCNC: 173 MG/DL (ref 70–99)
PHOSPHATE SERPL-MCNC: 4 MG/DL (ref 2.5–4.5)
POTASSIUM BLD-SCNC: 4.2 MMOL/L (ref 3.4–5.3)
SODIUM SERPL-SCNC: 144 MMOL/L (ref 133–144)

## 2021-12-24 LAB
DEPRECATED CALCIDIOL+CALCIFEROL SERPL-MC: <60 UG/L (ref 20–75)
VITAMIN D2 SERPL-MCNC: <5 UG/L
VITAMIN D3 SERPL-MCNC: 55 UG/L

## 2022-01-03 DIAGNOSIS — I10 HYPERTENSION GOAL BP (BLOOD PRESSURE) < 140/90: ICD-10-CM

## 2022-01-03 DIAGNOSIS — I48.0 PAROXYSMAL ATRIAL FIBRILLATION (H): ICD-10-CM

## 2022-01-05 NOTE — TELEPHONE ENCOUNTER
Routing refill request to provider for review/approval because:  Labs out of range:  Creatinine elevated  BP elevated    Creatinine   Date Value Ref Range Status   12/22/2021 2.43 (H) 0.52 - 1.04 mg/dL Final   06/02/2021 2.43 (H) 0.52 - 1.04 mg/dL Final     BP Readings from Last 3 Encounters:   12/22/21 (!) 140/59   12/08/21 132/64   11/29/21 128/54      Lizzette Mejia RN

## 2022-01-12 ENCOUNTER — ANTICOAGULATION THERAPY VISIT (OUTPATIENT)
Dept: ANTICOAGULATION | Facility: CLINIC | Age: 87
End: 2022-01-12

## 2022-01-12 ENCOUNTER — LAB (OUTPATIENT)
Dept: LAB | Facility: CLINIC | Age: 87
End: 2022-01-12
Payer: COMMERCIAL

## 2022-01-12 ENCOUNTER — DOCUMENTATION ONLY (OUTPATIENT)
Dept: ANTICOAGULATION | Facility: CLINIC | Age: 87
End: 2022-01-12

## 2022-01-12 DIAGNOSIS — I48.0 PAROXYSMAL ATRIAL FIBRILLATION (H): Primary | ICD-10-CM

## 2022-01-12 DIAGNOSIS — I48.0 PAROXYSMAL ATRIAL FIBRILLATION (H): ICD-10-CM

## 2022-01-12 DIAGNOSIS — Z79.01 LONG TERM CURRENT USE OF ANTICOAGULANTS WITH INR GOAL OF 2.0-3.0: ICD-10-CM

## 2022-01-12 DIAGNOSIS — Z79.01 LONG TERM CURRENT USE OF ANTICOAGULANTS WITH INR GOAL OF 2.0-3.0: Primary | ICD-10-CM

## 2022-01-12 LAB — INR BLD: 2.5 (ref 0.9–1.1)

## 2022-01-12 PROCEDURE — 36415 COLL VENOUS BLD VENIPUNCTURE: CPT

## 2022-01-12 PROCEDURE — 85610 PROTHROMBIN TIME: CPT

## 2022-01-12 NOTE — PROGRESS NOTES
Reason for Call:  Other      Detailed comments: Patient called to report INR. follow procedure. Since there was no answer I offer to send message . Patient advised she call at a later time    Phone Number Patient can be reached at: Cell number on file:    Telephone Information:   Mobile [phone]       Best Time: n/a    Can we leave a detailed message on this number? Not Applicable    Call taken on 1/12/2022 at 1:34 PM by Juliana Shi

## 2022-01-12 NOTE — PROGRESS NOTES
ANTICOAGULATION MANAGEMENT      Ирина Yanez due for annual renewal of referral to anticoagulation monitoring. Order pended for your review and signature.      ANTICOAGULATION SUMMARY      Warfarin indication(s)     Atrial fibrillation    Heart valve present?  NO       Current goal range   INR: 2.0-3.0     Goal appropriate for indication? Yes, INR 2-3 appropriate for hx of DVT, PE, hypercoagulable state, Afib, LVAD, or bileaflet AVR without risk factors     Current duration of therapy Indefinite/long term therapy   Time in Therapeutic Range (TTR)  (Goal > 60%) 60.4%       Office visit with referring provider's group within last year yes on 11/29/21       Alondra Richter RN

## 2022-01-12 NOTE — PROGRESS NOTES
Left message on home and cell to call [phone].   Alondra Richter RN, BSN  Anticoagulation Clinic

## 2022-01-14 ENCOUNTER — TELEPHONE (OUTPATIENT)
Dept: FAMILY MEDICINE | Facility: CLINIC | Age: 87
End: 2022-01-14
Payer: COMMERCIAL

## 2022-01-14 NOTE — TELEPHONE ENCOUNTER
Reason for Call:  Other     Detailed comments: pt would like to talk with an INR nurse     Phone Number Patient can be reached at: Home number on file [phone] (home)    Best Time: any    Can we leave a detailed message on this number? YES    Call taken on 1/14/2022 at 2:57 PM by Melinda Rangel

## 2022-01-14 NOTE — TELEPHONE ENCOUNTER
Spoke to pt. Pt advised to continue taking 5 mg of warfarin daily per 1/12/22 [facility] encounter. Pt scheduled to see her doctor on 2/10/22 and would like to cancel the INR scheduled on 2/9/22 and do an INR level after the appointment with  on 2/10/22. This is approved. Unable to schedule a lab appointment but pt instructed to go to lab after appointment. Pt thinks that  may have other labs that need to be drawn as well.

## 2022-01-24 DIAGNOSIS — F41.9 ANXIETY: ICD-10-CM

## 2022-01-24 DIAGNOSIS — E78.5 HYPERLIPIDEMIA WITH TARGET LDL LESS THAN 100: ICD-10-CM

## 2022-01-26 RX ORDER — PRAVASTATIN SODIUM 20 MG
TABLET ORAL
Qty: 90 TABLET | Refills: 1 | Status: SHIPPED | OUTPATIENT
Start: 2022-01-26 | End: 2022-06-08

## 2022-02-08 PROBLEM — Z79.01 LONG TERM CURRENT USE OF ANTICOAGULANTS WITH INR GOAL OF 2.0-3.0: Status: ACTIVE | Noted: 2022-02-08

## 2022-02-10 ENCOUNTER — ANTICOAGULATION THERAPY VISIT (OUTPATIENT)
Dept: ANTICOAGULATION | Facility: CLINIC | Age: 87
End: 2022-02-10

## 2022-02-10 ENCOUNTER — OFFICE VISIT (OUTPATIENT)
Dept: FAMILY MEDICINE | Facility: CLINIC | Age: 87
End: 2022-02-10
Payer: COMMERCIAL

## 2022-02-10 VITALS
WEIGHT: 198 LBS | DIASTOLIC BLOOD PRESSURE: 60 MMHG | SYSTOLIC BLOOD PRESSURE: 130 MMHG | TEMPERATURE: 97.6 F | BODY MASS INDEX: 33.99 KG/M2 | HEART RATE: 53 BPM | RESPIRATION RATE: 16 BRPM | OXYGEN SATURATION: 98 %

## 2022-02-10 DIAGNOSIS — I48.0 PAROXYSMAL ATRIAL FIBRILLATION (H): ICD-10-CM

## 2022-02-10 DIAGNOSIS — E78.5 HYPERLIPIDEMIA WITH TARGET LDL LESS THAN 100: ICD-10-CM

## 2022-02-10 DIAGNOSIS — E11.21 TYPE 2 DIABETES MELLITUS WITH DIABETIC NEPHROPATHY, WITHOUT LONG-TERM CURRENT USE OF INSULIN (H): Primary | ICD-10-CM

## 2022-02-10 DIAGNOSIS — N18.4 CKD (CHRONIC KIDNEY DISEASE) STAGE 4, GFR 15-29 ML/MIN (H): ICD-10-CM

## 2022-02-10 DIAGNOSIS — I48.0 PAROXYSMAL ATRIAL FIBRILLATION (H): Primary | ICD-10-CM

## 2022-02-10 DIAGNOSIS — I10 HYPERTENSION GOAL BP (BLOOD PRESSURE) < 140/90: ICD-10-CM

## 2022-02-10 DIAGNOSIS — E11.42 DIABETIC POLYNEUROPATHY ASSOCIATED WITH TYPE 2 DIABETES MELLITUS (H): ICD-10-CM

## 2022-02-10 DIAGNOSIS — Z79.01 LONG TERM CURRENT USE OF ANTICOAGULANTS WITH INR GOAL OF 2.0-3.0: ICD-10-CM

## 2022-02-10 LAB
ALBUMIN SERPL-MCNC: 3.1 G/DL (ref 3.4–5)
ALP SERPL-CCNC: 74 U/L (ref 40–150)
ALT SERPL W P-5'-P-CCNC: 25 U/L (ref 0–50)
AST SERPL W P-5'-P-CCNC: 13 U/L (ref 0–45)
BILIRUB DIRECT SERPL-MCNC: <0.1 MG/DL (ref 0–0.2)
BILIRUB SERPL-MCNC: 0.4 MG/DL (ref 0.2–1.3)
CREAT UR-MCNC: 51 MG/DL
HBA1C MFR BLD: 7.6 % (ref 0–5.6)
HOLD SPECIMEN: NORMAL
INR BLD: 4.3 (ref 0.9–1.1)
MICROALBUMIN UR-MCNC: 105 MG/L
MICROALBUMIN/CREAT UR: 205.88 MG/G CR (ref 0–25)
PROT SERPL-MCNC: 7.6 G/DL (ref 6.8–8.8)

## 2022-02-10 PROCEDURE — 80076 HEPATIC FUNCTION PANEL: CPT | Performed by: FAMILY MEDICINE

## 2022-02-10 PROCEDURE — 99214 OFFICE O/P EST MOD 30 MIN: CPT | Performed by: FAMILY MEDICINE

## 2022-02-10 PROCEDURE — 82043 UR ALBUMIN QUANTITATIVE: CPT | Performed by: FAMILY MEDICINE

## 2022-02-10 PROCEDURE — 83036 HEMOGLOBIN GLYCOSYLATED A1C: CPT | Performed by: FAMILY MEDICINE

## 2022-02-10 PROCEDURE — 36415 COLL VENOUS BLD VENIPUNCTURE: CPT | Performed by: FAMILY MEDICINE

## 2022-02-10 PROCEDURE — 85610 PROTHROMBIN TIME: CPT | Performed by: FAMILY MEDICINE

## 2022-02-10 RX ORDER — CALCIUM ACETATE 667 MG/1
667 CAPSULE ORAL
Qty: 270 CAPSULE | Refills: 1 | Status: SHIPPED | OUTPATIENT
Start: 2022-02-10 | End: 2023-06-14 | Stop reason: ALTCHOICE

## 2022-02-10 RX ORDER — GABAPENTIN 100 MG/1
CAPSULE ORAL
Qty: 180 CAPSULE | Refills: 1 | Status: SHIPPED | OUTPATIENT
Start: 2022-02-10 | End: 2022-08-11

## 2022-02-10 RX ORDER — METFORMIN HCL 500 MG
1000 TABLET, EXTENDED RELEASE 24 HR ORAL 2 TIMES DAILY WITH MEALS
Qty: 360 TABLET | Refills: 1 | Status: SHIPPED | OUTPATIENT
Start: 2022-02-10 | End: 2022-05-10

## 2022-02-10 RX ORDER — GLIMEPIRIDE 1 MG/1
0.5 TABLET ORAL
Qty: 45 TABLET | Refills: 1 | Status: SHIPPED | OUTPATIENT
Start: 2022-02-10 | End: 2022-03-23

## 2022-02-10 ASSESSMENT — PATIENT HEALTH QUESTIONNAIRE - PHQ9
5. POOR APPETITE OR OVEREATING: NOT AT ALL
SUM OF ALL RESPONSES TO PHQ QUESTIONS 1-9: 2

## 2022-02-10 ASSESSMENT — ENCOUNTER SYMPTOMS
PALPITATIONS: 0
HEADACHES: 0
CONSTITUTIONAL NEGATIVE: 1
SHORTNESS OF BREATH: 0
DIARRHEA: 1
NUMBNESS: 1
PARESTHESIAS: 1

## 2022-02-10 ASSESSMENT — PAIN SCALES - GENERAL: PAINLEVEL: MODERATE PAIN (5)

## 2022-02-10 ASSESSMENT — ANXIETY QUESTIONNAIRES
5. BEING SO RESTLESS THAT IT IS HARD TO SIT STILL: NOT AT ALL
6. BECOMING EASILY ANNOYED OR IRRITABLE: NOT AT ALL
GAD7 TOTAL SCORE: 0
7. FEELING AFRAID AS IF SOMETHING AWFUL MIGHT HAPPEN: NOT AT ALL
1. FEELING NERVOUS, ANXIOUS, OR ON EDGE: NOT AT ALL
IF YOU CHECKED OFF ANY PROBLEMS ON THIS QUESTIONNAIRE, HOW DIFFICULT HAVE THESE PROBLEMS MADE IT FOR YOU TO DO YOUR WORK, TAKE CARE OF THINGS AT HOME, OR GET ALONG WITH OTHER PEOPLE: NOT DIFFICULT AT ALL
2. NOT BEING ABLE TO STOP OR CONTROL WORRYING: NOT AT ALL
3. WORRYING TOO MUCH ABOUT DIFFERENT THINGS: NOT AT ALL

## 2022-02-10 NOTE — PROGRESS NOTES
Assessment and Plan    (E11.21) Type 2 diabetes mellitus with diabetic nephropathy, without long-term current use of insulin (H)  (primary encounter diagnosis)  Comment: A1c is high, never did get restarted on Metformin after past admission, will restart this  Plan: Hemoglobin A1c, Albumin Random Urine         Quantitative with Creat Ratio, metFORMIN         (GLUCOPHAGE-XR) 500 MG 24 hr tablet,         glimepiride (AMARYL) 1 MG tablet            (E78.5) Hyperlipidemia with target LDL less than 100  Comment: needs some catch-up labs  Plan: Hepatic panel (Albumin, ALT, AST, Bili, Alk         Phos, TP)            (I48.0) Paroxysmal atrial fibrillation (H)  Comment: refilling  Plan: diltiazem ER COATED BEADS (MATZIM LA) 180 MG 24        hr tablet            (Z79.01) Long term current use of anticoagulants with INR goal of 2.0-3.0  Comment:   Plan:     (E11.42) Diabetic polyneuropathy associated with type 2 diabetes mellitus (H)  Comment: uses nightly  Plan: gabapentin (NEURONTIN) 100 MG capsule            (N18.4) CKD (chronic kidney disease) stage 4, GFR 15-29 ml/min (H)  Comment: refill  Plan: calcium acetate (PHOSLO) 667 MG CAPS capsule            (I10) Hypertension goal BP (blood pressure) < 140/90  Comment: BP well controlled  Plan: diltiazem ER COATED BEADS (MATZIM LA) 180 MG 24        hr tablet              RTC in  for DM    Mil García MD      Bárbara Gifford is a 86 year old who presents for the following health issues     History of Present Illness       CKD: She is not using over the counter pain medicine.     Diabetes:   She presents for follow up of diabetes.  She is checking home blood glucose a few times a month. She checks blood glucose before meals.  Blood glucose is never over 200 and never under 70. She is aware of hypoglycemia symptoms including shakiness. She has no concerns regarding her diabetes at this time.  She is having numbness in feet and burning in feet.         Hyperlipidemia:   "She presents for follow up of hyperlipidemia.  She is taking medication to lower cholesterol. She is not having myalgia or other side effects to statin medications.    Hypertension: She presents for follow up of hypertension.  She does not check blood pressure  regularly outside of the clinic. Outside blood pressures have been over 140/90. She follows a low salt diet.     She eats 2-3 servings of fruits and vegetables daily.She consumes 1 sweetened beverage(s) daily.She exercises with enough effort to increase her heart rate 9 or less minutes per day.  She exercises with enough effort to increase her heart rate 3 or less days per week.   She is taking medications regularly.     Does note some occasional fecal stress incontinence, about once weekly when straining.  No urinary incontinence.  This stool is typically \"pudding\" consistency.  Otherwise stool is \"normal\", but will be every 2-3 days.    Otherwise feeling well, no acute concerns.  Review of Systems   Constitutional: Negative.    Eyes: Negative for visual disturbance.   Respiratory: Negative for shortness of breath.    Cardiovascular: Negative for chest pain, palpitations and peripheral edema.   Gastrointestinal: Positive for diarrhea.   Genitourinary: Negative.    Neurological: Positive for numbness and paresthesias. Negative for headaches.            Objective    /60 (BP Location: Right arm, Patient Position: Sitting, Cuff Size: Adult Large)   Pulse 53   Temp 97.6  F (36.4  C) (Oral)   Resp 16   Wt 89.8 kg (198 lb)   LMP  (LMP Unknown)   SpO2 98%   BMI 33.99 kg/m    Body mass index is 33.99 kg/m .  Physical Exam  Vitals reviewed.   Eyes:      Conjunctiva/sclera: Conjunctivae normal.   Cardiovascular:      Rate and Rhythm: Normal rate and regular rhythm.      Heart sounds: Normal heart sounds.   Pulmonary:      Effort: Pulmonary effort is normal.      Breath sounds: Normal breath sounds.   Skin:     General: Skin is warm and dry.   Neurological: "      Mental Status: She is alert and oriented to person, place, and time.

## 2022-02-10 NOTE — PROGRESS NOTES
ANTICOAGULATION MANAGEMENT     Ирина Yanez 86 year old female is on warfarin with supratherapeutic INR result. (Goal INR 2.0-3.0)    Recent labs: (last 7 days)     02/10/22  0806   INR 4.3*       ASSESSMENT     Source(s): Chart Review and Patient/Caregiver Call       Warfarin doses taken: Warfarin taken as instructed    Diet: Decreased greens/vitamin K in diet; plans to resume previous intake    New illness, injury, or hospitalization: No    Medication/supplement changes: Metformin added today.  Should not effect INR.    Signs or symptoms of bleeding or clotting: No    Previous INR: Therapeutic last 2(+) visits    Additional findings: None     PLAN     Recommended plan for temporary changes affecting INR     Dosing Instructions: Hold 1 dose today and partial hold tomorrow (2/10) then continue your current warfarin dose with next INR in 10 days       Summary  As of 2/10/2022    Full warfarin instructions:  2/10: Hold; 2/11: 2.5 mg; Otherwise 5 mg every day   Next INR check:  2/22/2022             Telephone call with Ирина who agrees to plan and repeated back plan correctly    Lab visit scheduled    Education provided: Please call back if any changes to your diet, medications or how you've been taking warfarin and Importance of consistent vitamin K intake    Plan made per [facility] anticoagulation protocol    Sherri Tipton RN  Anticoagulation Clinic  2/10/2022    _______________________________________________________________________     Anticoagulation Episode Summary     Current INR goal:  2.0-3.0   TTR:  57.6 % (11.3 mo)   Target end date:  Indefinite   Send INR reminders to:  [facility]    Indications    Paroxysmal atrial fibrillation (H) [I48.0]  Long term current use of anticoagulants with INR goal of 2.0-3.0 [Z79.01]           Comments:           Anticoagulation Care Providers     Provider Role Specialty Phone number    Yuridia Villarreal MD Referring Family Medicine [phone]    Mil García MD  [facility] [phone]

## 2022-02-11 ASSESSMENT — ANXIETY QUESTIONNAIRES: GAD7 TOTAL SCORE: 0

## 2022-02-21 ENCOUNTER — ANTICOAGULATION THERAPY VISIT (OUTPATIENT)
Dept: ANTICOAGULATION | Facility: CLINIC | Age: 87
End: 2022-02-21

## 2022-02-21 ENCOUNTER — LAB (OUTPATIENT)
Dept: LAB | Facility: CLINIC | Age: 87
End: 2022-02-21
Payer: COMMERCIAL

## 2022-02-21 DIAGNOSIS — Z79.01 LONG TERM CURRENT USE OF ANTICOAGULANTS WITH INR GOAL OF 2.0-3.0: ICD-10-CM

## 2022-02-21 DIAGNOSIS — I48.0 PAROXYSMAL ATRIAL FIBRILLATION (H): Primary | ICD-10-CM

## 2022-02-21 DIAGNOSIS — I48.0 PAROXYSMAL ATRIAL FIBRILLATION (H): ICD-10-CM

## 2022-02-21 LAB — INR BLD: 2.1 (ref 0.9–1.1)

## 2022-02-21 PROCEDURE — 85610 PROTHROMBIN TIME: CPT

## 2022-02-21 PROCEDURE — 36415 COLL VENOUS BLD VENIPUNCTURE: CPT

## 2022-02-21 NOTE — PROGRESS NOTES
ANTICOAGULATION MANAGEMENT     Ирина Yanez 86 year old female is on warfarin with therapeutic INR result. (Goal INR 2.0-3.0)    Recent labs: (last 7 days)     02/21/22  1350   INR 2.1*       ASSESSMENT     Source(s): Chart Review and Patient/Caregiver Call       Warfarin doses taken: Warfarin taken as instructed with intentional hold on 2/10 and partial hold on 2/11/22    Diet: No new diet changes identified --eating her greens every other day as is listed on her calendar    New illness, injury, or hospitalization: No    Medication/supplement changes: None noted    Signs or symptoms of bleeding or clotting: No    Previous INR: Supratherapeutic    Additional findings: None     PLAN     Recommended plan for no diet, medication or health factor changes affecting INR     Dosing Instructions: Continue your current warfarin dose with next INR in 3 weeks       Summary  As of 2/21/2022    Full warfarin instructions:  5 mg every day   Next INR check:  3/14/2022             Telephone call with Ирина who verbalizes understanding and agrees to plan    Lab visit scheduled    Education provided: Importance of consistent vitamin K intake and Contact [phone]  with any changes, questions or concerns.     Plan made per [facility] anticoagulation protocol    Summer Mina, RN  Anticoagulation Clinic  2/21/2022    _______________________________________________________________________     Anticoagulation Episode Summary     Current INR goal:  2.0-3.0   TTR:  57.1 % (11.7 mo)   Target end date:  Indefinite   Send INR reminders to:  [facility]    Indications    Paroxysmal atrial fibrillation (H) [I48.0]  Long term current use of anticoagulants with INR goal of 2.0-3.0 [Z79.01]           Comments:           Anticoagulation Care Providers     Provider Role Specialty Phone number    Yuridia Villarreal MD Referring Family Medicine [phone]    Mil García MD Referring Family Medicine [phone]          '

## 2022-03-14 ENCOUNTER — LAB (OUTPATIENT)
Dept: LAB | Facility: CLINIC | Age: 87
End: 2022-03-14
Payer: COMMERCIAL

## 2022-03-14 ENCOUNTER — ANTICOAGULATION THERAPY VISIT (OUTPATIENT)
Dept: ANTICOAGULATION | Facility: CLINIC | Age: 87
End: 2022-03-14

## 2022-03-14 DIAGNOSIS — Z79.01 LONG TERM CURRENT USE OF ANTICOAGULANTS WITH INR GOAL OF 2.0-3.0: ICD-10-CM

## 2022-03-14 DIAGNOSIS — I48.0 PAROXYSMAL ATRIAL FIBRILLATION (H): ICD-10-CM

## 2022-03-14 DIAGNOSIS — I48.0 PAROXYSMAL ATRIAL FIBRILLATION (H): Primary | ICD-10-CM

## 2022-03-14 LAB — INR BLD: 7 (ref 0.9–1.1)

## 2022-03-14 PROCEDURE — 85610 PROTHROMBIN TIME: CPT

## 2022-03-14 PROCEDURE — 36416 COLLJ CAPILLARY BLOOD SPEC: CPT

## 2022-03-14 NOTE — PROGRESS NOTES
ANTICOAGULATION MANAGEMENT     Ирина Yanez 87 year old female is on warfarin with supratherapeutic INR result. (Goal INR 2.0-3.0)    Recent labs: (last 7 days)     03/14/22  1319   INR 7.0*       ASSESSMENT       Source(s): Chart Review and Patient/Caregiver Call       Warfarin doses taken: More warfarin taken than planned which may be affecting INR . Patient states she has been taking 2.5 tablets daily. She doesn't know when she started this or why. She just forgot and guessed at some point. (of note, patient has been on 2 tablets daily since beginning of December)    Diet: No new diet changes identified    New illness, injury, or hospitalization: Yes, had an area of basal cell carcinoma removed from her nose a while back and the area did bleed more than expected. No unusual bleeding/bruising noted today.     Medication/supplement changes: None noted    Signs or symptoms of bleeding or clotting: No    Previous INR: Therapeutic last visit; previously outside of goal range    Additional findings: Upcoming surgery/procedure Is having another area of basal cell carcinoma removed 3/17/22.       PLAN     Recommended plan for temporary change(s) affecting INR     Dosing Instructions: Hold today and tomorrow  with next INR in 2 days, before next derm procedure.       Summary  As of 3/14/2022    Full warfarin instructions:  3/14: Hold; 3/15: Hold; Otherwise 5 mg every day   Next INR check:  3/16/2022             Telephone call with Ирина who agrees to plan and repeated back plan correctly    Lab visit scheduled    Education provided: Please call back if any changes to your diet, medications or how you've been taking warfarin, Goal range and significance of current result, Importance of taking warfarin as instructed, Monitoring for bleeding signs and symptoms, When to seek medical attention/emergency care and Contact [phone]  with any changes, questions or concerns.     Plan made per [facility] anticoagulation  protocol    Claudia Dong RN  Anticoagulation Clinic  3/14/2022    _______________________________________________________________________     Anticoagulation Episode Summary     Current INR goal:  2.0-3.0   TTR:  55.4 % (1 y)   Target end date:  Indefinite   Send INR reminders to:  [facility]    Indications    Paroxysmal atrial fibrillation (H) [I48.0]  Long term current use of anticoagulants with INR goal of 2.0-3.0 [Z79.01]           Comments:           Anticoagulation Care Providers     Provider Role Specialty Phone number    Yuridia Villarreal MD Referring Family Medicine [phone]    Mil García MD Referring Family Medicine [phone]

## 2022-03-16 ENCOUNTER — ANTICOAGULATION THERAPY VISIT (OUTPATIENT)
Dept: ANTICOAGULATION | Facility: CLINIC | Age: 87
End: 2022-03-16

## 2022-03-16 ENCOUNTER — LAB (OUTPATIENT)
Dept: LAB | Facility: CLINIC | Age: 87
End: 2022-03-16
Payer: COMMERCIAL

## 2022-03-16 DIAGNOSIS — N18.9 ACUTE ON CHRONIC KIDNEY FAILURE (H): Primary | ICD-10-CM

## 2022-03-16 DIAGNOSIS — I48.0 PAROXYSMAL ATRIAL FIBRILLATION (H): Primary | ICD-10-CM

## 2022-03-16 DIAGNOSIS — N17.9 ACUTE ON CHRONIC KIDNEY FAILURE (H): Primary | ICD-10-CM

## 2022-03-16 DIAGNOSIS — Z79.01 LONG TERM CURRENT USE OF ANTICOAGULANTS WITH INR GOAL OF 2.0-3.0: ICD-10-CM

## 2022-03-16 DIAGNOSIS — I48.0 PAROXYSMAL ATRIAL FIBRILLATION (H): ICD-10-CM

## 2022-03-16 LAB — INR BLD: 4.9 (ref 0.9–1.1)

## 2022-03-16 PROCEDURE — 36415 COLL VENOUS BLD VENIPUNCTURE: CPT

## 2022-03-16 PROCEDURE — 85610 PROTHROMBIN TIME: CPT

## 2022-03-16 NOTE — PROGRESS NOTES
ANTICOAGULATION MANAGEMENT     Ирина Yanez 87 year old female is on warfarin with supratherapeutic INR result. (Goal INR 2.0-3.0)    Recent labs: (last 7 days)     03/16/22  1037   INR 4.9*       ASSESSMENT       Source(s): Chart Review and Patient/Caregiver Call       Warfarin doses taken: warfarin has been held since Monday as instructed    Diet: No new diet changes identified    New illness, injury, or hospitalization: No    Medication/supplement changes: None noted    Signs or symptoms of bleeding or clotting: No    Previous INR: Supratherapeutic d/t patient mistakenly taking too high a dose of warfarin    Additional findings: Ирина is scheduled to have a basal cell removed tomorrow. Instructed her to call her dermatologist to update on INR today and see what they want to do. She notes that she did have another basal cell removed last week prior to knowing how high her INR was.       PLAN     Recommended plan for temporary change(s) affecting INR     Dosing Instructions: Hold 2 more doses then likely will plan to resume prior maintenance warfarin dose with next INR in 2 days       Summary  As of 3/16/2022    Full warfarin instructions:  3/16: Hold; 3/17: Hold; Otherwise 5 mg every day   Next INR check:  3/18/2022             Telephone call with Ирина who verbalizes understanding and agrees to plan    Lab visit scheduled    Education provided: Goal range and significance of current result, Importance of taking warfarin as instructed and Contact [phone]  with any changes, questions or concerns.     Plan made with [facility] Pharmacist Natty Herrera RN  Anticoagulation Clinic  3/16/2022    _______________________________________________________________________     Anticoagulation Episode Summary     Current INR goal:  2.0-3.0   TTR:  55.4 % (1 y)   Target end date:  Indefinite   Send INR reminders to:  [facility]    Indications    Paroxysmal atrial fibrillation (H) [I48.0]  Long term  current use of anticoagulants with INR goal of 2.0-3.0 [Z79.01]           Comments:           Anticoagulation Care Providers     Provider Role Specialty Phone number    Yuridia Villarreal MD Referring Family Medicine [phone]    Mil García MD Referring Family Medicine [phone]

## 2022-03-18 ENCOUNTER — LAB (OUTPATIENT)
Dept: LAB | Facility: CLINIC | Age: 87
End: 2022-03-18
Payer: COMMERCIAL

## 2022-03-18 ENCOUNTER — ANTICOAGULATION THERAPY VISIT (OUTPATIENT)
Dept: ANTICOAGULATION | Facility: CLINIC | Age: 87
End: 2022-03-18

## 2022-03-18 DIAGNOSIS — I48.0 PAROXYSMAL ATRIAL FIBRILLATION (H): ICD-10-CM

## 2022-03-18 DIAGNOSIS — Z79.01 LONG TERM CURRENT USE OF ANTICOAGULANTS WITH INR GOAL OF 2.0-3.0: ICD-10-CM

## 2022-03-18 DIAGNOSIS — I48.0 PAROXYSMAL ATRIAL FIBRILLATION (H): Primary | ICD-10-CM

## 2022-03-18 LAB — INR BLD: 3.2 (ref 0.9–1.1)

## 2022-03-18 PROCEDURE — 36416 COLLJ CAPILLARY BLOOD SPEC: CPT

## 2022-03-18 PROCEDURE — 85610 PROTHROMBIN TIME: CPT

## 2022-03-18 NOTE — PROGRESS NOTES
ANTICOAGULATION MANAGEMENT     Ириан Yanez 87 year old female is on warfarin with supratherapeutic INR result. (Goal INR 2.0-3.0)    Recent labs: (last 7 days)     03/18/22  0847   INR 3.2*       ASSESSMENT       Source(s): Chart Review and Patient/Caregiver Call       Warfarin doses taken: Warfarin recently held for 4 days which may be affecting INR    Diet: No new diet changes identified    New illness, injury, or hospitalization: No    Medication/supplement changes: None noted    Signs or symptoms of bleeding or clotting: No    Previous INR: Supratherapeutic    Additional findings: Mohs procedure was cancelled on 3/17/22 due to supra INR, rescheduled to 4/14/22       PLAN     Recommended plan for temporary change(s) affecting INR     Dosing Instructions: Partial hold then continue your current warfarin dose with next INR in 5 days       Summary  As of 3/18/2022    Full warfarin instructions:  3/18: 2.5 mg; Otherwise 5 mg every day   Next INR check:  3/23/2022             Telephone call with Ирина who agrees to plan and repeated back plan correctly    Lab visit scheduled    Education provided: Monitoring for bleeding signs and symptoms and Contact [phone]  with any changes, questions or concerns.     Plan made per [facility] anticoagulation protocol    Summer Mina, RN  Anticoagulation Clinic  3/18/2022    _______________________________________________________________________     Anticoagulation Episode Summary     Current INR goal:  2.0-3.0   TTR:  55.4 % (1 y)   Target end date:  Indefinite   Send INR reminders to:  [facility]    Indications    Paroxysmal atrial fibrillation (H) [I48.0]  Long term current use of anticoagulants with INR goal of 2.0-3.0 [Z79.01]           Comments:           Anticoagulation Care Providers     Provider Role Specialty Phone number    Yuridia Villarreal MD Referring Family Medicine [phone]    Mil García MD Referring [facility] Medicine [phone]

## 2022-03-22 DIAGNOSIS — I10 HYPERTENSION GOAL BP (BLOOD PRESSURE) < 140/90: ICD-10-CM

## 2022-03-22 DIAGNOSIS — I50.32 CHRONIC DIASTOLIC CONGESTIVE HEART FAILURE (H): ICD-10-CM

## 2022-03-23 ENCOUNTER — ANTICOAGULATION THERAPY VISIT (OUTPATIENT)
Dept: ANTICOAGULATION | Facility: CLINIC | Age: 87
End: 2022-03-23

## 2022-03-23 ENCOUNTER — LAB (OUTPATIENT)
Dept: LAB | Facility: CLINIC | Age: 87
End: 2022-03-23
Payer: COMMERCIAL

## 2022-03-23 DIAGNOSIS — Z79.01 LONG TERM CURRENT USE OF ANTICOAGULANTS WITH INR GOAL OF 2.0-3.0: ICD-10-CM

## 2022-03-23 DIAGNOSIS — E11.21 TYPE 2 DIABETES MELLITUS WITH DIABETIC NEPHROPATHY, WITHOUT LONG-TERM CURRENT USE OF INSULIN (H): ICD-10-CM

## 2022-03-23 DIAGNOSIS — I48.0 PAROXYSMAL ATRIAL FIBRILLATION (H): ICD-10-CM

## 2022-03-23 DIAGNOSIS — I48.0 PAROXYSMAL ATRIAL FIBRILLATION (H): Primary | ICD-10-CM

## 2022-03-23 LAB — INR BLD: 2.2 (ref 0.9–1.1)

## 2022-03-23 PROCEDURE — 36415 COLL VENOUS BLD VENIPUNCTURE: CPT

## 2022-03-23 PROCEDURE — 85610 PROTHROMBIN TIME: CPT

## 2022-03-23 RX ORDER — METOPROLOL TARTRATE 25 MG/1
TABLET, FILM COATED ORAL
Qty: 180 TABLET | Refills: 1 | Status: SHIPPED | OUTPATIENT
Start: 2022-03-23 | End: 2022-05-10

## 2022-03-23 NOTE — PROGRESS NOTES
ANTICOAGULATION MANAGEMENT     Ирина Yanez 87 year old female is on warfarin with therapeutic INR result. (Goal INR 2.0-3.0)    Recent labs: (last 7 days)     03/23/22  1159   INR 2.2*       ASSESSMENT       Source(s): Chart Review and Patient/Caregiver Call       Warfarin doses taken: Warfarin taken as instructed    Diet: No new diet changes identified    New illness, injury, or hospitalization: No    Medication/supplement changes: None noted    Signs or symptoms of bleeding or clotting: No    Previous INR: Supratherapeutic    Additional findings: Mohs procedure scheduled for 4/14/22       PLAN     Recommended plan for no diet, medication or health factor changes affecting INR     Dosing Instructions: Continue your current warfarin dose with next INR in 3 weeks       Summary  As of 3/23/2022    Full warfarin instructions:  5 mg every day   Next INR check:  4/13/2022             Telephone call with Ирина who agrees to plan and repeated back plan correctly    Lab visit scheduled    Education provided: Importance of taking warfarin as instructed and Contact [phone]  with any changes, questions or concerns.     Plan made per [facility] anticoagulation protocol    Shanell Adrian RN  Anticoagulation Clinic  3/23/2022    _______________________________________________________________________     Anticoagulation Episode Summary     Current INR goal:  2.0-3.0   TTR:  56.5 % (1 y)   Target end date:  Indefinite   Send INR reminders to:  [facility]    Indications    Paroxysmal atrial fibrillation (H) [I48.0]  Long term current use of anticoagulants with INR goal of 2.0-3.0 [Z79.01]           Comments:           Anticoagulation Care Providers     Provider Role Specialty Phone number    Yuridia Villarreal MD Referring Family Medicine [phone]    Mil García MD Referring Family Medicine [phone]

## 2022-03-23 NOTE — TELEPHONE ENCOUNTER
glimepiride (AMARYL) 1 MG tablet 45 tablet 1 2/10/2022  No   Sig - Route: Take 0.5 tablets (0.5 mg) by mouth every morning (before breakfast) Take 1/2 to full tablet daily before breakfast - Oral   Sent to pharmacy as: Glimepiride 1 MG Oral Tablet (AMARYL)     Change to mail order pharmacy, new Rx is needed    Two dosing instructions are given - please clarify correct dose, with correct quantity.    LOV 2- Raquel    Appointments in Next Year    Mar 23, 2022 12:00 PM  INR LAB with  LAB  [facility] ([facility] ) [phone]   Mar 29, 2022 11:00 AM  (Arrive by 10:40 AM)  Provider Visit with Mil García MD  [facility] ([facility] ) [phone]   Apr 13, 2022 10:30 AM  INR LAB with  LAB  [facility] [phone]   May 10, 2022  9:00 AM  (Arrive by 8:40 AM)  Provider Visit with Mil García MD  [facility] ([facility] ) [phone]          RT Quynh (R)     No

## 2022-03-24 RX ORDER — GLIMEPIRIDE 1 MG/1
0.5 TABLET ORAL
Qty: 45 TABLET | Refills: 1 | Status: SHIPPED | OUTPATIENT
Start: 2022-03-24 | End: 2022-05-10

## 2022-03-29 ENCOUNTER — VIRTUAL VISIT (OUTPATIENT)
Dept: FAMILY MEDICINE | Facility: CLINIC | Age: 87
End: 2022-03-29
Payer: COMMERCIAL

## 2022-03-29 DIAGNOSIS — E11.21 TYPE 2 DIABETES MELLITUS WITH DIABETIC NEPHROPATHY, WITHOUT LONG-TERM CURRENT USE OF INSULIN (H): Primary | ICD-10-CM

## 2022-03-29 DIAGNOSIS — R19.7 DIARRHEA, UNSPECIFIED TYPE: ICD-10-CM

## 2022-03-29 PROCEDURE — 99214 OFFICE O/P EST MOD 30 MIN: CPT | Mod: 95 | Performed by: FAMILY MEDICINE

## 2022-03-29 RX ORDER — SEMAGLUTIDE 1.34 MG/ML
INJECTION, SOLUTION SUBCUTANEOUS
Qty: 5 MG | Refills: 0 | Status: SHIPPED | OUTPATIENT
Start: 2022-03-29 | End: 2022-06-09

## 2022-03-29 ASSESSMENT — ENCOUNTER SYMPTOMS
PALPITATIONS: 0
SHORTNESS OF BREATH: 0
HEADACHES: 0
CONSTITUTIONAL NEGATIVE: 1

## 2022-03-29 NOTE — PROGRESS NOTES
Ирина is a 87 year old who is being evaluated via a billable video visit.      How would you like to obtain your AVS? MyChart  If the video visit is dropped, the invitation should be resent by: Text to cell phone: [phone]  Will anyone else be joining your video visit? No    Video Start Time: 11:11 AM    Assessment and Plan    (E11.21) Type 2 diabetes mellitus with diabetic nephropathy, without long-term current use of insulin (H)  (primary encounter diagnosis)  Comment: will trial weekly injectable.    Plan: semaglutide (OZEMPIC, 0.25 OR 0.5 MG/DOSE,) 2         MG/1.5ML SOPN pen, insulin pen needle (32G X 6         MM) 32G X 6 MM miscellaneous              (R19.7) Diarrhea, unspecified type  Comment:   Plan: probiotic, start fiber supplment, increase dose weekly if still having issues.        RTC in keep f/u appt in May.    Mil García MD      Subjective   Ирина is a 87 year old who presents for the following health issues     History of Present Illness       Back Pain:  She presents for follow up of back pain. Patient's back pain is a chronic problem.  Location of back pain:  Right lower back, left lower back and right middle of back  Description of back pain: fullness  Back pain spreads: nowhere    Since patient first noticed back pain, pain is: unchanged  Does back pain interfere with her job:  Not applicable      Diabetes:   She presents for follow up of diabetes.  She is checking home blood glucose a few times a week. She checks blood glucose before meals.  Blood glucose is sometimes over 200 and never under 70. She is aware of hypoglycemia symptoms including shakiness. She is concerned about blood sugar frequently over 200. She is having numbness in feet and burning in feet.         Hypertension: She presents for follow up of hypertension.  She does check blood pressure  regularly outside of the clinic. Outside blood pressures have been over 140/90. She follows a low salt diet.     She eats 2-3  servings of fruits and vegetables daily.She consumes 0 sweetened beverage(s) daily.She exercises with enough effort to increase her heart rate 9 or less minutes per day.  She exercises with enough effort to increase her heart rate 3 or less days per week.   She is taking medications regularly.       Stopped taking invokona due to getting yeast infections.  Yeast infection cleared up after stopping.  She also found the cost a bit high.  Also not using metformin as it gives her diarrhea.  Had had that initially several years ago, and when she restarted it happened again.      Have urgency diarrhea, typically triggered by eating breakfast.  Can have some trouble making it to he bathroom.  Did try fiber supplement, but only used for a few days.    Review of Systems   Constitutional: Negative.    Eyes: Negative for visual disturbance.   Respiratory: Negative for shortness of breath.    Cardiovascular: Negative for chest pain, palpitations and peripheral edema.   Neurological: Negative for headaches.            Objective           Vitals:  No vitals were obtained today due to virtual visit.    Physical Exam   GENERAL: Healthy, alert and no distress  EYES: Eyes grossly normal to inspection.  No discharge or erythema, or obvious scleral/conjunctival abnormalities.  RESP: No audible wheeze, cough, or visible cyanosis.  No visible retractions or increased work of breathing.    SKIN: Visible skin clear. No significant rash, abnormal pigmentation or lesions.  NEURO: Cranial nerves grossly intact.  Mentation and speech appropriate for age.  PSYCH: Mentation appears normal, affect normal/bright, judgement and insight intact, normal speech and appearance well-groomed.                Video-Visit Details    Type of service:  Video Visit    Video End Time:11:26 AM    Originating Location (pt. Location): Home    Distant Location (provider location):  [facility] [employer]     Platform used for Video Visit: [employer]

## 2022-04-10 ENCOUNTER — HEALTH MAINTENANCE LETTER (OUTPATIENT)
Age: 87
End: 2022-04-10

## 2022-04-13 ENCOUNTER — LAB (OUTPATIENT)
Dept: LAB | Facility: CLINIC | Age: 87
End: 2022-04-13
Payer: COMMERCIAL

## 2022-04-13 ENCOUNTER — ANTICOAGULATION THERAPY VISIT (OUTPATIENT)
Dept: ANTICOAGULATION | Facility: CLINIC | Age: 87
End: 2022-04-13

## 2022-04-13 DIAGNOSIS — Z79.01 LONG TERM CURRENT USE OF ANTICOAGULANTS WITH INR GOAL OF 2.0-3.0: ICD-10-CM

## 2022-04-13 DIAGNOSIS — I48.0 PAROXYSMAL ATRIAL FIBRILLATION (H): ICD-10-CM

## 2022-04-13 DIAGNOSIS — I48.0 PAROXYSMAL ATRIAL FIBRILLATION (H): Primary | ICD-10-CM

## 2022-04-13 LAB — INR BLD: 3.5 (ref 0.9–1.1)

## 2022-04-13 PROCEDURE — 36415 COLL VENOUS BLD VENIPUNCTURE: CPT

## 2022-04-13 PROCEDURE — 85610 PROTHROMBIN TIME: CPT

## 2022-04-13 NOTE — PROGRESS NOTES
ANTICOAGULATION MANAGEMENT     Ирина Yanez 87 year old female is on warfarin with supratherapeutic INR result. (Goal INR 2.0-3.0)    Recent labs: (last 7 days)     04/13/22  1021   INR 3.5*       ASSESSMENT       Source(s): Chart Review and Patient/Caregiver Call       Warfarin doses taken: Missed dose(s) may be affecting INR.  Missed one dose within the last week, but unsure which day she missed.    Diet: No new diet changes identified    New illness, injury, or hospitalization: No    Medication/supplement changes: None noted    Signs or symptoms of bleeding or clotting: No    Previous INR: Therapeutic last visit; previously outside of goal range (recently had critical value INRs)    Additional findings: Upcoming surgery/procedure MOHS procedure on 4/13/22       PLAN     Recommended plan for no diet, medication or health factor changes affecting INR     Dosing Instructions: hold dose then decrease your warfarin dose (10% change) with next INR in 10 days       Summary  As of 4/13/2022    Full warfarin instructions:  4/13: Hold; Otherwise 3.75 mg every Mon, Thu, Sat; 5 mg all other days   Next INR check:  4/25/2022             Telephone call with Ирина who agrees to plan and repeated back plan correctly.  Also sent Wandrian message with dosing instructions.    Lab visit scheduled    Education provided: Please call back if any changes to your diet, medications or how you've been taking warfarin and Monitoring for bleeding signs and symptoms    Plan made per [facility] anticoagulation protocol    Sherri Tipton RN  Anticoagulation Clinic  4/13/2022    _______________________________________________________________________     Anticoagulation Episode Summary     Current INR goal:  2.0-3.0   TTR:  57.2 % (1 y)   Target end date:  Indefinite   Send INR reminders to:  [facility]    Indications    Paroxysmal atrial fibrillation (H) [I48.0]  Long term current use of anticoagulants with INR goal of 2.0-3.0 [Z79.01]            Comments:           Anticoagulation Care Providers     Provider Role Specialty Phone number    Yuridia Villarreal MD Referring Family Medicine [phone]    Mil García MD Referring Family Medicine [phone]

## 2022-04-25 ENCOUNTER — ANTICOAGULATION THERAPY VISIT (OUTPATIENT)
Dept: ANTICOAGULATION | Facility: CLINIC | Age: 87
End: 2022-04-25

## 2022-04-25 ENCOUNTER — TRANSFERRED RECORDS (OUTPATIENT)
Dept: HEALTH INFORMATION MANAGEMENT | Facility: CLINIC | Age: 87
End: 2022-04-25

## 2022-04-25 ENCOUNTER — LAB (OUTPATIENT)
Dept: LAB | Facility: CLINIC | Age: 87
End: 2022-04-25
Payer: COMMERCIAL

## 2022-04-25 DIAGNOSIS — I48.0 PAROXYSMAL ATRIAL FIBRILLATION (H): Primary | ICD-10-CM

## 2022-04-25 DIAGNOSIS — Z79.01 LONG TERM CURRENT USE OF ANTICOAGULANTS WITH INR GOAL OF 2.0-3.0: ICD-10-CM

## 2022-04-25 DIAGNOSIS — I48.0 PAROXYSMAL ATRIAL FIBRILLATION (H): ICD-10-CM

## 2022-04-25 LAB — INR BLD: 1.9 (ref 0.9–1.1)

## 2022-04-25 PROCEDURE — 36416 COLLJ CAPILLARY BLOOD SPEC: CPT

## 2022-04-25 PROCEDURE — 85610 PROTHROMBIN TIME: CPT

## 2022-04-25 NOTE — PROGRESS NOTES
ANTICOAGULATION MANAGEMENT     Ирина Yanez 87 year old female is on warfarin with subtherapeutic INR result. (Goal INR 2.0-3.0)    Recent labs: (last 7 days)     04/25/22  1359   INR 1.9*       ASSESSMENT       Source(s): Chart Review and Patient/Caregiver Call       Warfarin doses taken: Warfarin taken as instructed    Diet: Decreased greens/vitamin K in diet; plans to resume previous intake    New illness, injury, or hospitalization: No - Patient reports she has not had any diarrhea since 4/19/22, per patient, advised by GI today to increase her fiber    Medication/supplement changes: None noted    Signs or symptoms of bleeding or clotting: No    Previous INR: Supratherapeutic    Additional findings: None       PLAN     Recommended plan for no diet, medication or health factor changes affecting INR     Dosing Instructions: Increase your warfarin dose (4% change) with next INR in 2 weeks       Summary  As of 4/25/2022    Full warfarin instructions:  3.75 mg every Mon, Fri; 5 mg all other days   Next INR check:  5/10/2022             Telephone call with Ирина who agrees to plan and repeated back plan correctly    Check at provider office visit    Education provided: Please call back if any changes to your diet, medications or how you've been taking warfarin and Contact [phone]  with any changes, questions or concerns.     Plan made per [facility] anticoagulation protocol    Alondra Richter RN  Anticoagulation Clinic  4/25/2022    _______________________________________________________________________     Anticoagulation Episode Summary     Current INR goal:  2.0-3.0   TTR:  56.0 % (1 y)   Target end date:  Indefinite   Send INR reminders to:  [facility]    Indications    Paroxysmal atrial fibrillation (H) [I48.0]  Long term current use of anticoagulants with INR goal of 2.0-3.0 [Z79.01]           Comments:           Anticoagulation Care Providers     Provider Role Specialty Phone number    Yuridia Villarreal,  MD Referring Family Medicine [phone]    Mil García MD Referring Family Medicine [phone]

## 2022-05-10 ENCOUNTER — ANTICOAGULATION THERAPY VISIT (OUTPATIENT)
Dept: ANTICOAGULATION | Facility: CLINIC | Age: 87
End: 2022-05-10

## 2022-05-10 ENCOUNTER — OFFICE VISIT (OUTPATIENT)
Dept: FAMILY MEDICINE | Facility: CLINIC | Age: 87
End: 2022-05-10
Payer: COMMERCIAL

## 2022-05-10 VITALS
RESPIRATION RATE: 16 BRPM | DIASTOLIC BLOOD PRESSURE: 62 MMHG | HEART RATE: 67 BPM | SYSTOLIC BLOOD PRESSURE: 153 MMHG | TEMPERATURE: 97 F | BODY MASS INDEX: 33.12 KG/M2 | OXYGEN SATURATION: 98 % | HEIGHT: 64 IN | WEIGHT: 194 LBS

## 2022-05-10 DIAGNOSIS — I50.32 CHRONIC DIASTOLIC CONGESTIVE HEART FAILURE (H): ICD-10-CM

## 2022-05-10 DIAGNOSIS — I10 HYPERTENSION GOAL BP (BLOOD PRESSURE) < 140/90: ICD-10-CM

## 2022-05-10 DIAGNOSIS — Z79.01 LONG TERM CURRENT USE OF ANTICOAGULANTS WITH INR GOAL OF 2.0-3.0: ICD-10-CM

## 2022-05-10 DIAGNOSIS — L30.4 INTERTRIGO: ICD-10-CM

## 2022-05-10 DIAGNOSIS — N18.4 CKD (CHRONIC KIDNEY DISEASE) STAGE 4, GFR 15-29 ML/MIN (H): ICD-10-CM

## 2022-05-10 DIAGNOSIS — I48.0 PAROXYSMAL ATRIAL FIBRILLATION (H): ICD-10-CM

## 2022-05-10 DIAGNOSIS — E11.21 TYPE 2 DIABETES MELLITUS WITH DIABETIC NEPHROPATHY, WITHOUT LONG-TERM CURRENT USE OF INSULIN (H): ICD-10-CM

## 2022-05-10 DIAGNOSIS — L90.0 LICHEN SCLEROSUS ET ATROPHICUS: ICD-10-CM

## 2022-05-10 DIAGNOSIS — E78.5 HYPERLIPIDEMIA WITH TARGET LDL LESS THAN 100: Primary | ICD-10-CM

## 2022-05-10 DIAGNOSIS — I48.0 PAROXYSMAL ATRIAL FIBRILLATION (H): Primary | ICD-10-CM

## 2022-05-10 LAB
ANION GAP SERPL CALCULATED.3IONS-SCNC: 4 MMOL/L (ref 3–14)
BUN SERPL-MCNC: 61 MG/DL (ref 7–30)
CALCIUM SERPL-MCNC: 10 MG/DL (ref 8.5–10.1)
CHLORIDE BLD-SCNC: 104 MMOL/L (ref 94–109)
CHOLEST SERPL-MCNC: 196 MG/DL
CO2 SERPL-SCNC: 31 MMOL/L (ref 20–32)
CREAT SERPL-MCNC: 2.1 MG/DL (ref 0.52–1.04)
CREAT UR-MCNC: 70 MG/DL
FASTING STATUS PATIENT QL REPORTED: YES
GFR SERPL CREATININE-BSD FRML MDRD: 23 ML/MIN/1.73M2
GLUCOSE BLD-MCNC: 148 MG/DL (ref 70–99)
HBA1C MFR BLD: 7.2 % (ref 0–5.6)
HDLC SERPL-MCNC: 38 MG/DL
HGB BLD-MCNC: 12.1 G/DL (ref 11.7–15.7)
HOLD SPECIMEN: NORMAL
INR BLD: 3.9 (ref 0.9–1.1)
LDLC SERPL CALC-MCNC: 88 MG/DL
LDLC SERPL CALC-MCNC: ABNORMAL MG/DL
MICROALBUMIN UR-MCNC: 64 MG/L
MICROALBUMIN/CREAT UR: 91.43 MG/G CR (ref 0–25)
NONHDLC SERPL-MCNC: 158 MG/DL
POTASSIUM BLD-SCNC: 4.3 MMOL/L (ref 3.4–5.3)
SODIUM SERPL-SCNC: 139 MMOL/L (ref 133–144)
TRIGL SERPL-MCNC: 417 MG/DL

## 2022-05-10 PROCEDURE — 80061 LIPID PANEL: CPT | Performed by: FAMILY MEDICINE

## 2022-05-10 PROCEDURE — 83721 ASSAY OF BLOOD LIPOPROTEIN: CPT | Mod: 59 | Performed by: FAMILY MEDICINE

## 2022-05-10 PROCEDURE — 83036 HEMOGLOBIN GLYCOSYLATED A1C: CPT | Performed by: FAMILY MEDICINE

## 2022-05-10 PROCEDURE — 85610 PROTHROMBIN TIME: CPT | Performed by: FAMILY MEDICINE

## 2022-05-10 PROCEDURE — 99214 OFFICE O/P EST MOD 30 MIN: CPT | Performed by: FAMILY MEDICINE

## 2022-05-10 PROCEDURE — 82043 UR ALBUMIN QUANTITATIVE: CPT | Performed by: FAMILY MEDICINE

## 2022-05-10 PROCEDURE — 99207 PR FOOT EXAM NO CHARGE: CPT | Mod: 25 | Performed by: FAMILY MEDICINE

## 2022-05-10 PROCEDURE — 85018 HEMOGLOBIN: CPT | Performed by: FAMILY MEDICINE

## 2022-05-10 PROCEDURE — 36415 COLL VENOUS BLD VENIPUNCTURE: CPT | Performed by: FAMILY MEDICINE

## 2022-05-10 PROCEDURE — 80048 BASIC METABOLIC PNL TOTAL CA: CPT | Performed by: FAMILY MEDICINE

## 2022-05-10 RX ORDER — SEMAGLUTIDE 1.34 MG/ML
0.5 INJECTION, SOLUTION SUBCUTANEOUS
Qty: 1.5 ML | Refills: 0 | Status: SHIPPED | OUTPATIENT
Start: 2022-05-10 | End: 2022-05-31

## 2022-05-10 RX ORDER — NYSTATIN 100000 [USP'U]/G
POWDER TOPICAL
Qty: 60 G | Refills: 1 | Status: SHIPPED | OUTPATIENT
Start: 2022-05-10 | End: 2024-06-14

## 2022-05-10 RX ORDER — METOPROLOL SUCCINATE 25 MG/1
25 TABLET, EXTENDED RELEASE ORAL DAILY
Qty: 90 TABLET | Refills: 1 | Status: SHIPPED | OUTPATIENT
Start: 2022-05-10 | End: 2022-10-13

## 2022-05-10 RX ORDER — BETAMETHASONE VALERATE 1.2 MG/G
CREAM TOPICAL DAILY
Qty: 45 G | Refills: 3 | Status: SHIPPED | OUTPATIENT
Start: 2022-05-10 | End: 2023-08-28

## 2022-05-10 ASSESSMENT — ENCOUNTER SYMPTOMS
PARESTHESIAS: 1
NUMBNESS: 0
PALPITATIONS: 0
HEADACHES: 0
SHORTNESS OF BREATH: 0
CONSTITUTIONAL NEGATIVE: 1

## 2022-05-10 ASSESSMENT — PAIN SCALES - GENERAL: PAINLEVEL: NO PAIN (0)

## 2022-05-10 NOTE — PROGRESS NOTES
Assessment and Plan    (E78.5) Hyperlipidemia with target LDL less than 100  (primary encounter diagnosis)  Comment:   Plan: Lipid panel reflex to direct LDL Fasting, LDL         cholesterol direct            (E11.21) Type 2 diabetes mellitus with diabetic nephropathy, without long-term current use of insulin (H)  Comment: stable, will try stopping Actos as she is on such a low dose.    Plan: HEMOGLOBIN A1C, Albumin Random Urine         Quantitative with Creat Ratio, blood glucose         (NO BRAND SPECIFIED) lancets standard, blood         glucose (NO BRAND SPECIFIED) test strip, FOOT         EXAM, semaglutide (OZEMPIC, 0.25 OR 0.5         MG/DOSE,) 2 MG/1.5ML SOPN pen            (N18.4) CKD (chronic kidney disease) stage 4, GFR 15-29 ml/min (H)  Comment:   Plan: Hemoglobin            (I48.0) Paroxysmal atrial fibrillation (H)  Comment: NSR today  Plan: metoprolol succinate ER (TOPROL XL) 25 MG 24 hr        tablet            (Z79.01) Long term current use of anticoagulants with INR goal of 2.0-3.0  Comment: stable, follows with INR  Plan:     (L30.4) Intertrigo  Comment:   Plan: nystatin (MYCOSTATIN) 429133 UNIT/GM external         powder            (L90.0) Lichen sclerosus et atrophicus  Comment:   Plan: betamethasone valerate (VALISONE) 0.1 %         external cream            (I50.32) Chronic diastolic congestive heart failure (H)  Comment: baseline, no concerns today  Plan:     (I10) Hypertension goal BP (blood pressure) < 140/90  Comment:   Plan: metoprolol succinate ER (TOPROL XL) 25 MG 24 hr        tablet              RTC in     Mil García MD      Bárbara Gifford is a 87 year old who presents for the following health issues     History of Present Illness       CKD: She uses over the counter pain medication, including Tylenol, a few times a month.    Diabetes:   She presents for follow up of diabetes.  She is checking home blood glucose a few times a week. She checks blood glucose before meals.   "Blood glucose is never over 200 and never under 70. She is aware of hypoglycemia symptoms including shakiness and weakness. She is concerned about other. She is having numbness in feet and burning in feet.         Hyperlipidemia:  She presents for follow up of hyperlipidemia.  She is taking medication to lower cholesterol. She is not having myalgia or other side effects to statin medications.    Hypertension: She presents for follow up of hypertension.  She does check blood pressure  regularly outside of the clinic. Outside blood pressures have been over 140/90. She follows a low salt diet.     She eats 4 or more servings of fruits and vegetables daily.She consumes 0 sweetened beverage(s) daily.She exercises with enough effort to increase her heart rate 9 or less minutes per day.  She exercises with enough effort to increase her heart rate 3 or less days per week.   She is taking medications regularly.     Feeling well.  Has not yet had morning meds.      Has had some vaginal itching, most botherwome at night.  Did start after tril of Invokana and has improved but not fully resolved since stopping it.    Notes that she is not supposed to take her Calcium Acetate near her metoprolol due to an interaction, but the timing for that is difficult.    Stopping glimepiride as Ozempic is working well.     Does have baseline numbness in hands and feet, some neuropathic pain in feet, most bothersome at bight.  Gabapentin does seem to be helpful.    Review of Systems   Constitutional: Negative.    Eyes: Negative for visual disturbance.   Respiratory: Negative for shortness of breath.    Cardiovascular: Negative for chest pain, palpitations and peripheral edema.   Neurological: Positive for paresthesias. Negative for numbness and headaches.            Objective    BP (!) 153/62   Pulse 67   Temp 97  F (36.1  C) (Oral)   Resp 16   Ht 1.626 m (5' 4\")   Wt 88 kg (194 lb)   LMP  (LMP Unknown)   SpO2 98%   BMI 33.30 kg/m  "   Body mass index is 33.3 kg/m .  Physical Exam  Vitals reviewed.   Eyes:      Conjunctiva/sclera: Conjunctivae normal.   Cardiovascular:      Rate and Rhythm: Normal rate and regular rhythm.      Pulses:           Dorsalis pedis pulses are 0 on the right side and 0 on the left side.      Heart sounds: Murmur heard.    Systolic murmur is present with a grade of 2/6.  Pulmonary:      Effort: Pulmonary effort is normal.      Breath sounds: Normal breath sounds.   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  R bunion.  Skin:     General: Skin is warm and dry.      Comments: JESSICA feet - no callus, onychomycosis   Neurological:      Mental Status: She is alert and oriented to person, place, and time.      Comments: Absent filament JESSICA feet, nl proprioception in R great toe, absent L great toe

## 2022-05-10 NOTE — PROGRESS NOTES
ANTICOAGULATION MANAGEMENT     Ирина Yanez 87 year old female is on warfarin with supratherapeutic INR result. (Goal INR 2.0-3.0)    Recent labs: (last 7 days)     05/10/22  0917   INR 3.9*       ASSESSMENT       Source(s): Chart Review and Patient/Caregiver Call       Warfarin doses taken: Warfarin taken as instructed    Diet: Decreased greens/vitamin K in diet; plans to resume previous intake - patient had a large salad yesterday, but intake had been low since last Thursday    New illness, injury, or hospitalization: Recent yeast infection - lingering vaginal itching sx    Medication/supplement changes: None noted    Signs or symptoms of bleeding or clotting: No    Previous INR: Subtherapeutic - previously elevated levels    Additional findings: None       PLAN     Recommended plan for temporary change(s) affecting INR     Dosing Instructions: partial hold then decrease your warfarin dose (3.8% change) with next INR in 2 weeks       Summary  As of 5/10/2022    Full warfarin instructions:  5/10: 1.25 mg; Otherwise 3.75 mg every Mon, Wed, Fri; 5 mg all other days   Next INR check:  5/24/2022             Telephone call with Ирина who verbalizes understanding and agrees to plan    Lab visit scheduled    Education provided: Please call back if any changes to your diet, medications or how you've been taking warfarin, Monitoring for bleeding signs and symptoms, Monitoring for clotting signs and symptoms and Importance of notifying clinic for changes in medications; a sooner lab recheck maybe needed.    Plan made per [facility] protocol    Mecca Hayden RN  Anticoagulation Clinic  5/10/2022    _______________________________________________________________________     Anticoagulation Episode Summary     Current INR goal:  2.0-3.0   TTR:  53.9 % (1 y)   Target end date:  Indefinite   Send INR reminders to:  [facility]    Indications    Paroxysmal atrial fibrillation (H) [I48.0]  Long term current  use of anticoagulants with INR goal of 2.0-3.0 [Z79.01]           Comments:           Anticoagulation Care Providers     Provider Role Specialty Phone number    Yuridia Villarreal MD Referring Family Medicine [phone]    Mil García MD Referring Family Medicine [phone]

## 2022-05-26 ENCOUNTER — LAB (OUTPATIENT)
Dept: LAB | Facility: CLINIC | Age: 87
End: 2022-05-26
Payer: COMMERCIAL

## 2022-05-26 ENCOUNTER — ANTICOAGULATION THERAPY VISIT (OUTPATIENT)
Dept: ANTICOAGULATION | Facility: CLINIC | Age: 87
End: 2022-05-26

## 2022-05-26 ENCOUNTER — ALLIED HEALTH/NURSE VISIT (OUTPATIENT)
Dept: FAMILY MEDICINE | Facility: CLINIC | Age: 87
End: 2022-05-26

## 2022-05-26 VITALS — HEART RATE: 64 BPM | DIASTOLIC BLOOD PRESSURE: 54 MMHG | SYSTOLIC BLOOD PRESSURE: 142 MMHG

## 2022-05-26 DIAGNOSIS — I48.0 PAROXYSMAL ATRIAL FIBRILLATION (H): ICD-10-CM

## 2022-05-26 DIAGNOSIS — I48.0 PAROXYSMAL ATRIAL FIBRILLATION (H): Primary | ICD-10-CM

## 2022-05-26 DIAGNOSIS — Z79.01 LONG TERM CURRENT USE OF ANTICOAGULANTS WITH INR GOAL OF 2.0-3.0: ICD-10-CM

## 2022-05-26 DIAGNOSIS — I10 HYPERTENSION GOAL BP (BLOOD PRESSURE) < 140/90: Primary | ICD-10-CM

## 2022-05-26 LAB — INR BLD: 2.4 (ref 0.9–1.1)

## 2022-05-26 PROCEDURE — 36416 COLLJ CAPILLARY BLOOD SPEC: CPT

## 2022-05-26 PROCEDURE — 85610 PROTHROMBIN TIME: CPT

## 2022-05-26 PROCEDURE — 99207 PR NO CHARGE NURSE ONLY: CPT

## 2022-05-26 NOTE — PROGRESS NOTES
ANTICOAGULATION MANAGEMENT     Ирина Yanez 87 year old female is on warfarin with therapeutic INR result. (Goal INR 2.0-3.0)    Recent labs: (last 7 days)     05/26/22  1351   INR 2.4*       ASSESSMENT       Source(s): Chart Review and Patient/Caregiver Call       Warfarin doses taken: Warfarin taken as instructed    Diet: No new diet changes identified    New illness, injury, or hospitalization: No    Medication/supplement changes: None noted    Signs or symptoms of bleeding or clotting: No    Previous INR: Supratherapeutic. Maintenance dose was lowered by 3.8% at last check.    Additional findings: None       PLAN     Recommended plan for no diet, medication or health factor changes affecting INR     Dosing Instructions: continue your current warfarin dose with next INR in 3 weeks       Summary  As of 5/26/2022    Full warfarin instructions:  3.75 mg every Mon, Wed, Fri; 5 mg all other days   Next INR check:  6/16/2022             Telephone call with Ирина who agrees to plan and repeated back plan correctly    Lab visit scheduled    Education provided: Please call back if any changes to your diet, medications or how you've been taking warfarin    Plan made per [facility]agulation protocol    Sherri Tipton RN  Anticoagulation Clinic  5/26/2022    _______________________________________________________________________     Anticoagulation Episode Summary     Current INR goal:  2.0-3.0   TTR:  51.3 % (1 y)   Target end date:  Indefinite   Send INR reminders to:  [facility]    Indications    Paroxysmal atrial fibrillation (H) [I48.0]  Long term current use of anticoagulants with INR goal of 2.0-3.0 [Z79.01]           Comments:           Anticoagulation Care Providers     Provider Role Specialty Phone number    Yuridia Villarreal MD Referring Family Medicine [phone]    Mil García MD Referring Family Medicine [phone]

## 2022-05-26 NOTE — PROGRESS NOTES
Ирина Yanez is a 87 year old patient who comes in today for a Blood Pressure check.  Initial BP:  BP (!) 142/54 (BP Location: Right arm, Patient Position: Sitting, Cuff Size: Adult Regular)   Pulse 64   LMP  (LMP Unknown)      Data Unavailable  Disposition: provider notified while patient in the clinic    Tamara Hughes MA    Per Dr. García, he is okay with bp reading today. Patient is to follow up at scheduled appointment in August.   This information was given to patient and she verbalized understanding of this.    Tamara Hughes MA

## 2022-06-06 DIAGNOSIS — E78.5 HYPERLIPIDEMIA WITH TARGET LDL LESS THAN 100: ICD-10-CM

## 2022-06-06 DIAGNOSIS — E11.21 TYPE 2 DIABETES MELLITUS WITH DIABETIC NEPHROPATHY, WITHOUT LONG-TERM CURRENT USE OF INSULIN (H): ICD-10-CM

## 2022-06-08 RX ORDER — PRAVASTATIN SODIUM 20 MG
TABLET ORAL
Qty: 90 TABLET | Refills: 0 | Status: SHIPPED | OUTPATIENT
Start: 2022-06-08 | End: 2022-08-11

## 2022-06-08 NOTE — TELEPHONE ENCOUNTER
Prescription approved per [facility] Refill Protocol.  Yuridia Peñaloza RN, BSN  [facility]

## 2022-06-08 NOTE — TELEPHONE ENCOUNTER
Routing refill request to provider for review/approval because:  Labs out of range:  Creatinine elevated.    Creatinine   Date Value Ref Range Status   05/10/2022 2.10 (H) 0.52 - 1.04 mg/dL Final   06/02/2021 2.43 (H) 0.52 - 1.04 mg/dL Final     Lizzette Mejia RN

## 2022-06-09 RX ORDER — SEMAGLUTIDE 1.34 MG/ML
0.5 INJECTION, SOLUTION SUBCUTANEOUS
Qty: 4.5 ML | Refills: 1 | Status: SHIPPED | OUTPATIENT
Start: 2022-06-09 | End: 2022-06-15

## 2022-06-15 ENCOUNTER — LAB (OUTPATIENT)
Dept: LAB | Facility: CLINIC | Age: 87
End: 2022-06-15
Payer: COMMERCIAL

## 2022-06-15 ENCOUNTER — ANTICOAGULATION THERAPY VISIT (OUTPATIENT)
Dept: ANTICOAGULATION | Facility: CLINIC | Age: 87
End: 2022-06-15

## 2022-06-15 ENCOUNTER — OFFICE VISIT (OUTPATIENT)
Dept: PHARMACY | Facility: CLINIC | Age: 87
End: 2022-06-15
Payer: COMMERCIAL

## 2022-06-15 VITALS
OXYGEN SATURATION: 99 % | BODY MASS INDEX: 32.29 KG/M2 | WEIGHT: 188.1 LBS | DIASTOLIC BLOOD PRESSURE: 61 MMHG | HEART RATE: 62 BPM | SYSTOLIC BLOOD PRESSURE: 137 MMHG

## 2022-06-15 DIAGNOSIS — Z79.01 LONG TERM CURRENT USE OF ANTICOAGULANTS WITH INR GOAL OF 2.0-3.0: ICD-10-CM

## 2022-06-15 DIAGNOSIS — G62.9 NEUROPATHY: ICD-10-CM

## 2022-06-15 DIAGNOSIS — N18.4 CKD (CHRONIC KIDNEY DISEASE) STAGE 4, GFR 15-29 ML/MIN (H): ICD-10-CM

## 2022-06-15 DIAGNOSIS — I10 HYPERTENSION, UNSPECIFIED TYPE: ICD-10-CM

## 2022-06-15 DIAGNOSIS — E11.21 TYPE 2 DIABETES MELLITUS WITH DIABETIC NEPHROPATHY, UNSPECIFIED WHETHER LONG TERM INSULIN USE (H): Primary | ICD-10-CM

## 2022-06-15 DIAGNOSIS — E78.5 HYPERLIPIDEMIA WITH TARGET LDL LESS THAN 100: ICD-10-CM

## 2022-06-15 DIAGNOSIS — I48.0 PAROXYSMAL ATRIAL FIBRILLATION (H): ICD-10-CM

## 2022-06-15 DIAGNOSIS — F33.41 RECURRENT MAJOR DEPRESSIVE DISORDER, IN PARTIAL REMISSION (H): ICD-10-CM

## 2022-06-15 DIAGNOSIS — I48.0 PAROXYSMAL ATRIAL FIBRILLATION (H): Primary | ICD-10-CM

## 2022-06-15 DIAGNOSIS — R60.9 FLUID RETENTION: ICD-10-CM

## 2022-06-15 LAB — INR BLD: 2.5 (ref 0.9–1.1)

## 2022-06-15 PROCEDURE — 99607 MTMS BY PHARM ADDL 15 MIN: CPT | Performed by: PHARMACIST

## 2022-06-15 PROCEDURE — 85610 PROTHROMBIN TIME: CPT

## 2022-06-15 PROCEDURE — 36415 COLL VENOUS BLD VENIPUNCTURE: CPT

## 2022-06-15 PROCEDURE — 99605 MTMS BY PHARM NP 15 MIN: CPT | Performed by: PHARMACIST

## 2022-06-15 RX ORDER — GLIPIZIDE 5 MG/1
2.5 TABLET ORAL
Qty: 15 TABLET | Refills: 1 | Status: SHIPPED | OUTPATIENT
Start: 2022-06-15 | End: 2022-07-13

## 2022-06-15 NOTE — PROGRESS NOTES
ANTICOAGULATION MANAGEMENT     Ирина Yanez 87 year old female is on warfarin with therapeutic INR result. (Goal INR 2.0-3.0)    Recent labs: (last 7 days)     06/15/22  0830   INR 2.5*       ASSESSMENT       Source(s): Chart Review    Previous INR was Therapeutic last visit; previously outside of goal range    Medication, diet, health changes since last INR chart reviewed; none identified     Saw pharmacist today.  Glipizide added (no interaction with warfarin)           PLAN     Recommended plan for no diet, medication or health factor changes affecting INR     Dosing Instructions: continue your current warfarin dose with next INR in 4 weeks       Summary  As of 6/15/2022    Full warfarin instructions:  3.75 mg every Mon, Wed, Fri; 5 mg all other days   Next INR check:  7/13/2022             Detailed voice message left for Ирина with dosing instructions and follow up date.     Contact [phone]  to schedule and with any changes, questions or concerns.     Education provided: Please call back if any changes to your diet, medications or how you've been taking warfarin and Contact [phone]  with any changes, questions or concerns.     Plan made per [facility] anticoagulation protocol    Sherri Tipton RN  Anticoagulation Clinic  6/15/2022    _______________________________________________________________________     Anticoagulation Episode Summary     Current INR goal:  2.0-3.0   TTR:  54.1 % (1 y)   Target end date:  Indefinite   Send INR reminders to:  [facility]    Indications    Paroxysmal atrial fibrillation (H) [I48.0]  Long term current use of anticoagulants with INR goal of 2.0-3.0 [Z79.01]           Comments:           Anticoagulation Care Providers     Provider Role Specialty Phone number    Yuridia Villarreal MD Referring Family Medicine [phone]    Mil García MD Referring Family Medicine [phone]

## 2022-06-15 NOTE — LETTER
_  Medication List        Prepared on: 6/15/2022     Bring your Medication List when you go to the doctor, hospital, or   emergency room. And, share it with your family or caregivers.     Note any changes to how you take your medications.  Cross out medications when you no longer use them.    Medication How I take it Why I use it Prescriber   acetaminophen (TYLENOL) 325 MG tablet Take 2 tablets (650 mg) by mouth every 4 hours as needed for mild pain Generalized Pain Frandy Julio MD   betamethasone valerate (VALISONE) 0.1 % external cream Apply topically daily Lichen Sclerosus et Atrophicus Mil García MD   blood glucose (NO BRAND SPECIFIED) lancets standard Use to test blood sugar 1 times daily or as directed, Contour Next lancets Type 2 diabetes mellitus with diabetic nephropathy, without long-term current use of insulin (H) iMl García MD   blood glucose (NO BRAND SPECIFIED) test strip Use to test blood sugar 1 times daily or as directed, Contour Next strips Type 2 diabetes mellitus with diabetic nephropathy, without long-term current use of insulin (H) Mil García MD   blood glucose monitoring (NO BRAND SPECIFIED) meter device kit Use to test blood sugar 1 times daily or as directed. Ultra 2 Type 2 diabetes mellitus with diabetic neuropathy, without long-term current use of insulin (H) Reji Verde MD   calcium acetate (PHOSLO) 667 MG CAPS capsule Take 1 capsule (667 mg) by mouth 3 times daily (with meals) CKD (Chronic Kidney Disease) Stage 4, GFR 15-29 ml/min (H) Mil García MD   diltiazem ER COATED BEADS (MATZIM LA) 180 MG 24 hr tablet Take 1 tablet (180 mg) by mouth 2 times daily Paroxysmal Atrial Fibrillation (H); Hypertension Goal BP (Blood Pressure) < 140/90 Mil García MD   gabapentin (NEURONTIN) 100 MG capsule Taking 2 once daily in evening. Diabetic polyneuropathy associated with type 2 diabetes mellitus (H) Mil García MD    glipiZIDE (GLUCOTROL) 5 MG tablet Take 0.5 tablets (2.5 mg) by mouth every morning (before breakfast) Type 2 diabetes mellitus with diabetic nephropathy, unspecified whether long term insulin use (H) Mil García MD   metoprolol succinate ER (TOPROL XL) 25 MG 24 hr tablet Take 1 tablet (25 mg) by mouth daily Paroxysmal Atrial Fibrillation (H); Hypertension Goal BP (Blood Pressure) < 140/90 Mil García MD   MULTI-VITAMIN OR TABS Take 2 tablets by mouth daily  General Health Self   nystatin (MYCOSTATIN) 715627 UNIT/GM external powder Apply a small amount to affected area three times daily. Intertrigo Mil García MD   pravastatin (PRAVACHOL) 20 MG tablet TAKE 1 TABLET BY MOUTH DAILY. GENERIC EQUIVALENT FOR PRAVACHOL REPLACES SIMVASTATIN Hyperlipidemia with Target LDL Less than 100 Yuridia Villarreal MD   sertraline (ZOLOFT) 50 MG tablet TAKE 1 TABLET BY MOUTH DAILY GENERIC EQUIVALENT FOR ZOLOFT Anxiety Yuridia Villarreal MD   torsemide (DEMADEX) 20 MG tablet Take 2 tablets (40 mg) by mouth daily Chronic diastolic congestive heart failure (H); Hypertension Goal BP (Blood Pressure) < 140/90 Mil García MD   warfarin ANTICOAGULANT (JANTOVEN ANTICOAGULANT) 2.5 MG tablet Take 5 mg daily or as directed by the INR clinic Paroxysmal Atrial Fibrillation (H) Yuridia Villarreal MD         Add new medications, over-the-counter drugs, herbals, vitamins, or  minerals in the blank rows below.    Medication How I take it Why I use it Prescriber                          Allergies:      augmentin; cleocin; tegretol [carbamazepine]        Side effects I have had:               Other Information:              My notes and questions:

## 2022-06-15 NOTE — PROGRESS NOTES
Medication Therapy Management (MTM) Encounter    ASSESSMENT:                            Medication Adherence/Access: No issues identified    Hypertension/Afib: Stable. Patient is meeting blood pressure goal of < 140/90mmHg. Warfarin managed by anticoagulation nurse.  Recommended she follow-up with her cardiologist as its been over a year.  She is taking a once a day formulation of diltiazem twice daily.  Will route encounter to cardiologist to verify this is appropriate.  I also asked her to discuss at next visit.    Type 2 Diabetes/CKD: Patient is meeting A1c goal of < 8%. Self monitoring of blood glucose is not at goal of fasting  mg/dL not off Ozempic.  Would prefer she stay on a GLP-1 agonist due to lower chance of hypoglycemia especially with her renal function.  Provided her with the prescription assistance office phone number for her to contact them to see if she qualifies to get Ozempic free through the manufacture.  In the meantime, we will start very low-dose glipizide immediate release once daily in the morning since patient cannot take metformin and other oral options are expensive.  We reviewed signs and symptoms of hypoglycemia and I asked her to contact myself or the clinic if she starts to have any blood sugars less than 90. Due to CKD would need to avoid Metformin or an SGLT2 inhibitor.  Microalbumin is not at goal < 30 mg/g. Not taking an ACE-inhibitor or ARB; stopped by nephrologist Dr. Whatley. Due for annual foot exam. Aspirin therapy is not indicated in this patient due to age and due to anticoagulant/antiplatelet therapy.  I did check all of her medications and they are appropriately dosed based on her current renal function.  Recommended following up with her nephrologist.    Fluid retention:  Stable    Depression: Stable    Neuropathy:  Stable    Hyperlipidemia: Stable. Triglycerides are not substantially elevated enough to warrant additional triglyceride-lowering therapy.  I did  provide her with some information on how to reduce triglycerides, see AVS.    PLAN:                            1.Call the prescription assistance office and see if you qualify to get Ozempic free through the . Contact the [place] Prescription Assistance Program/Fund at [phone].    2. Follow up with Dr. Whatley next month to recheck kidney function. Schedule an appointment if you have not done so already.    3. Schedule a diabetic eye exam.    4. Schedule a follow up with Dr. Beasley the cardiologist as it has been over a year. When you see him ask him if you should be taking the extended release diltiazem twice daily. Usually this is dosed just once daily.    5. Start glipizide 2.5 mg once daily with breakfast. If you are not feeling well and not eating don't take the glipizide. Watch for signs of low blood sugar and if you have any blood sugars less than 90 please let me know.    6. Start checking blood sugars more often and get some 2 hour after meal readings if you can. Bring your meter to next visit.    7. Lifestyle interventions to lower triglycerides    Aerobic exercise (150 minutes/week of moderate exercise or 75 minutes/week of vigorous exercise)     Weight loss of 5% to 10%. Most patients can reduce triglycerides by 10% to 20% with weight loss. Dietary changes plus exercise can lead to a 20% to 50% reduction, or even >70% depending on baseline triglycerides and adherence.    Reduced consumption of alcohol (one [females] or two drinks/day). Complete avoidance is recommended if triglycerides are ?500 mg/dL.    Glucose control (diabetes). Fasting blood sugars goal is  and 2 hour after meal goal is less than 150.    Reduced consumption of simple sugars (e.g., table sugar, jams, jellies, honey, sugar-sweetened beverages, desserts). Complete avoidance is recommended if triglycerides are ?500 mg/dL.    A healthy diet that emphasizes legumes, vegetables, poultry and other lean meats,  fiber-rich whole grains, and unsaturated fat.    If triglycerides are ?500 mg/dL, avoid high glycemic index vegetables (e.g., potatoes, carrots)    Follow-up: Return in 4 weeks (on 7/13/2022) for MTM office visit with Lara Cain.    SUBJECTIVE/OBJECTIVE:                          Ирина Yanez is a 87 year old female seen for a follow-up visit.  Today's visit is a follow-up MTM visit from 3/8/21. It has been over a year since last visit.     Reason for visit: comprehensive medication review.    - she needs a different prescription for a diabetes med, Ozempic is $200 a month now that she is in the donut hole so wants to go back on a cheap medicine. She cant afford this.     Allergies/ADRs: Reviewed in chart  Past Medical History: Reviewed in chart  - no personal history of MI or stroke  Tobacco: She reports that she has never smoked. She has never used smokeless tobacco.  Alcohol: not currently using    Endocrinologist: Reji Verde - no longer seeing him, Dr. García took it over  Nephrologist: Dr. Whatley outside of  - follow up every three months, next visit in July sometime.  Cardiologist: Dr. Beasley    Medication Adherence/Access: Once in a great while she will for get a night dose. Takes medicines twice daily and has a couple that she takes in between.    Hypertension/Afib: Current medications include metoprolol ER 25 mg once daily (recently switched from metoprolol tartrate 25 mg twice daily), diltiazem  mg twice daily, hydralazine 25 mg three times daily (decreased in hospital), and warfarin 5 mg daily or as directed by the INR clinic. She has been on Eliquis 2.5 mg twice daily in the past but switched to warfarin due to cost.  She has also been on carvedilol and hydralazine in the past.  Carvedilol stopped by cardiologist see 4/28/21 encounter. Hydralazine stopped by nephrology, see 1/8/21 encounter. Patient does self-monitor blood pressure. Reports right and left arm are different. Right arm reads  130's/60's and left arm higher can recall number but not extremely high. Patient reports no current medication side effects. No dizziness or lightheadedness, no fatigue, no shortness of breath.  She is no longer using oxygen.    INR   Date Value Ref Range Status   06/15/2022 2.5 (H) 0.9 - 1.1 Final   2021 2.10 (H) 0.86 - 1.14 Final     Comment:     This test is intended for monitoring Coumadin therapy.  Results are not   accurate in patients with prolonged INR due to factor deficiency.       BP Readings from Last 3 Encounters:   06/15/22 137/61   22 (!) 142/54   05/10/22 (!) 153/62     Type 2 Diabetes/CKD:  Currently taking calcium acetate 667 mg three times daily for CKD (started 2021) and not taking any medicines for her diabetes currently. She was on Ozempic but has been off for 2.5 weeks due to cost. She did tolerate it well. She has not reached out to the prescription assistance office yet to see if she qualifies for the  assistance program. She was on Invokana in the past but stopped due to vaginal itching.     Blood sugar monitorin-3 times weekly (per patient report): 174 is the only one she can recall but they have been higher. It was lower on Ozempic.    From last [facility] visit:  Blood sugar monitoring:once daily fasting (per patient report): 201, 191 - did not write anymore down.    Symptoms of low blood sugar? none  Symptoms of high blood sugar? none  Eye exam: up to date  Foot exam: up to date  Diet/Exercise: Reports she is doing normal house work and some yard work but no other physical activity. She tries to have a green vegetable every other day. She is having fruit every day but not having vegetables every day. If he eats meat it is chicken. Also has some eggs. She does watch carbs. Reports portion sizes are small. Salads are usually bigger. She is eating three meals a day most of the time. She does not snack between meals or after dinner. She doesn't feel that she has that  much of an appetite and this is true before starting Ozempic.    Aspirin: Not taking due to age and on anticoagulation  Statin: Yes: pravastatin   ACEi/ARB: No. Stopped by nephrology per care everywhere note from 12/9/20.  Urine Albumin:   Lab Results   Component Value Date    UMALCR 91.43 (H) 05/10/2022     Lab Results   Component Value Date    A1C 7.2 05/10/2022    A1C 7.6 02/10/2022    A1C 7.1 01/26/2021    A1C 6.8 04/27/2020    A1C 6.3 09/27/2019    A1C 6.9 03/22/2019    A1C 6.2 06/12/2018     Last Comprehensive Metabolic Panel:  Sodium   Date Value Ref Range Status   05/10/2022 139 133 - 144 mmol/L Final   06/02/2021 140 133 - 144 mmol/L Final     Potassium   Date Value Ref Range Status   05/10/2022 4.3 3.4 - 5.3 mmol/L Final   06/02/2021 4.2 3.4 - 5.3 mmol/L Final     Chloride   Date Value Ref Range Status   05/10/2022 104 94 - 109 mmol/L Final   06/02/2021 103 94 - 109 mmol/L Final     Carbon Dioxide   Date Value Ref Range Status   06/02/2021 32 20 - 32 mmol/L Final     Carbon Dioxide (CO2)   Date Value Ref Range Status   05/10/2022 31 20 - 32 mmol/L Final     Anion Gap   Date Value Ref Range Status   05/10/2022 4 3 - 14 mmol/L Final   06/02/2021 5 3 - 14 mmol/L Final     Glucose   Date Value Ref Range Status   05/10/2022 148 (H) 70 - 99 mg/dL Final   06/02/2021 187 (H) 70 - 99 mg/dL Final     Urea Nitrogen   Date Value Ref Range Status   05/10/2022 61 (H) 7 - 30 mg/dL Final   06/02/2021 66 (H) 7 - 30 mg/dL Final     Creatinine   Date Value Ref Range Status   05/10/2022 2.10 (H) 0.52 - 1.04 mg/dL Final   06/02/2021 2.43 (H) 0.52 - 1.04 mg/dL Final     GFR Estimate   Date Value Ref Range Status   05/10/2022 23 (L) >60 mL/min/1.73m2 Final     Comment:     Effective December 21, 2021 eGFRcr in adults is calculated using the 2021 CKD-EPI creatinine equation which includes age and gender (Balta et al., NEJM, DOI: 10.1056/IOABlz1220308)   06/02/2021 18 (L) >60 mL/min/[1.73_m2] Final     Comment:     Non   American GFR Calc  Starting 12/18/2018, serum creatinine based estimated GFR (eGFR) will be   calculated using the Chronic Kidney Disease Epidemiology Collaboration   (CKD-EPI) equation.       Calcium   Date Value Ref Range Status   05/10/2022 10.0 8.5 - 10.1 mg/dL Final   06/02/2021 9.7 8.5 - 10.1 mg/dL Final     Calculated CrCl courtney on adjusted body weight: 21 ml/min    Fluid retention:  Current medications include: Torsemide 40 mg once daily.  Patient reports this is managed by her nephrologist Dr. Whatley.      Depression:  Current medications include: Sertraline 50mg once daily. Patient reports that depression symptoms are unchanged.  PHQ 3/25/2021 3/25/2021 2/10/2022   PHQ-9 Total Score 0 0 2   Q9: Thoughts of better off dead/self-harm past 2 weeks Not at all Not at all Not at all     Neuropathy:  Current medications include: gabapentin 200 mg in the evening. She has pain in her left foot about 5 pm it starts. Feels that gabapentin helps.       Hyperlipidemia: Current therapy includes pravastatin 20mg daily.  Patient reports no significant myalgias or other side effects.    Recent Labs   Lab Test 05/10/22  0917 06/02/21  0903   CHOL 196 195   HDL 38* 45*   LDL 88 100*   TRIG 417* 249*       Today's Vitals: /61   Pulse 62   Wt 188 lb 1.6 oz (85.3 kg)   LMP  (LMP Unknown)   SpO2 99%   BMI 32.29 kg/m    ----------------    I spent 60 minutes with this patient today. All changes were made via collaborative practice agreement with Mil García MD. A copy of the visit note was provided to the patient's provider(s).    The patient was given a summary of these recommendations.     Lara Cain, PharmD  Medication Therapy Management Pharmacist     Medication Therapy Recommendations  Type 2 diabetes mellitus with diabetic nephropathy (H)    Current Medication: glipiZIDE (GLUCOTROL) 5 MG tablet   Rationale: Untreated condition - Needs additional medication therapy - Indication   Recommendation: Start  Medication   Status: Accepted per CPA

## 2022-06-15 NOTE — Clinical Note
Dr. Beasley,  I had an annual medication review visit with Ирина.  I noticed she is taking diltiazem ER (once daily formulation) twice daily.  I usually just do this dosed once daily but wanted to get your input.  I did ask her to schedule a follow up with you as it has been over a year.  Lara Cain, PharmD Medication Therapy Management Pharmacist

## 2022-06-15 NOTE — LETTER
"Recommended To-Do List      Prepared on: 6/15/2022     You can get the best results from your medications by completing the items on this \"To-Do List.\"      Bring your To-Do List when you go to your doctor. And, share it with your family or caregivers.    My To-Do List:  What we talked about: What I should do:   Starting a low dose glipizide once daily to get your blood sugars down while you check with the prescription assistance office to see if you qualify for Ozempic from the .    Start taking glipizide 2.5 mg (half tablet) by mouth once daily with breakfast.          What we talked about: What I should do:                 What we talked about: What I should do:                       "

## 2022-06-15 NOTE — LETTER
Mayte 15, 2022  Ирина M Renata  [address] [place] [address]    Dear Ms. Yanez, [facility]        Thank you for talking with me on Federico 15, 2022 about your health and medications. As a follow-up to our conversation, I have included two documents:      1. Your Recommended To-Do List has steps you should take to get the best results from your medications.  2. Your Medication List will help you keep track of your medications and how to take them.    If you want to talk about these documents, please call Zarina Cain RPH at phone: [phone], Monday-Friday 8-4:30pm.    I look forward to working with you and your doctors to make sure your medications work well for you.    Sincerely,    Zarina Cain RPH  [facility] Pharmacist, [facility]

## 2022-06-15 NOTE — PATIENT INSTRUCTIONS
Recommendations from today's MTM visit:                                                    MTM (medication therapy management) is a service provided by a clinical pharmacist designed to help you get the most of out of your medicines.   Today we reviewed what your medicines are for, how to know if they are working, that your medicines are safe and how to make your medicine regimen as easy as possible.      1.Call the prescription assistance office and see if you qualify to get Ozempic free through the . Contact the [place] Prescription Assistance Program/[facility] at [phone].    2. Follow up with Dr. Whatley next month to recheck kidney function. Schedule an appoitment if you have not done so already.    3. Schedule a diabetic eye exam.    4. Schedule a follow up with Dr. Beasley the cardiologist as it has been over a year. When you see him ask him if you should be taking the extended release diltiazem twice daily. Usually this is dosed just once daily.    5. Start glipizide 2.5 mg once daily with breakfast. If you are not feeling well and not eating don't take the glipizide. Watch for signs of low blood sugar and if you have any blood sugars less than 90 please let me know.    6. Start checking blood sugars more often and get some 2 hour after meal readings if you can. Bring your meter to next visit.    7. Lifestyle interventions to lower triglycerides    Aerobic exercise (150 minutes/week of moderate exercise or 75 minutes/week of vigorous exercise)     Weight loss of 5% to 10%. Most patients can reduce triglycerides by 10% to 20% with weight loss. Dietary changes plus exercise can lead to a 20% to 50% reduction, or even >70% depending on baseline triglycerides and adherence.    Reduced consumption of alcohol (one [females] or two drinks/day). Complete avoidance is recommended if triglycerides are ?500 mg/dL.    Glucose control (diabetes). Fasting blood sugars goal is  and 2 hour after meal goal  "is less than 150.    Reduced consumption of simple sugars (e.g., table sugar, jams, jellies, honey, sugar-sweetened beverages, desserts). Complete avoidance is recommended if triglycerides are ?500 mg/dL.    A healthy diet that emphasizes legumes, vegetables, poultry and other lean meats, fiber-rich whole grains, and unsaturated fat.    If triglycerides are ?500 mg/dL, avoid high glycemic index vegetables (e.g., potatoes, carrots)    Follow-up: Return in 4 weeks (on 7/13/2022) for MTM office visit with Lara Cain.    It was great speaking with you today.  I value your experience and would be very thankful for your time in providing feedback in our clinic survey. In the next few days, you may receive an email or text message from [employer] with a link to a survey related to your  clinical pharmacist.\"     To schedule another MTM appointment, please call the clinic directly or you may call the MTM scheduling line at [phone] or toll-free at [phone].     My Clinical Pharmacist's contact information:                                                      Please feel free to contact me with any questions or concerns you have.      Lara Cain, PharmD  Medication Therapy Management Pharmacist  [facility] - Monday and Wednesday 7:30 - 4:00  Pager: [phone]    "

## 2022-06-22 NOTE — PROGRESS NOTES
Received message back from Dr. Beasley and he agrees that the diltiazem can be dosed once daily. He is not the one who prescribed the medication initially. He feels that patient does not need to follow up with him unless her PCP feels it is needed.    Routing to PCP for review. I will plan to discuss with patient at follow up visit.    Lara Cain, PharmD  Medication Therapy Management Pharmacist

## 2022-07-13 ENCOUNTER — OFFICE VISIT (OUTPATIENT)
Dept: PHARMACY | Facility: CLINIC | Age: 87
End: 2022-07-13
Payer: COMMERCIAL

## 2022-07-13 ENCOUNTER — ANTICOAGULATION THERAPY VISIT (OUTPATIENT)
Dept: ANTICOAGULATION | Facility: CLINIC | Age: 87
End: 2022-07-13

## 2022-07-13 ENCOUNTER — LAB (OUTPATIENT)
Dept: LAB | Facility: CLINIC | Age: 87
End: 2022-07-13
Payer: COMMERCIAL

## 2022-07-13 VITALS
WEIGHT: 188.7 LBS | HEART RATE: 60 BPM | SYSTOLIC BLOOD PRESSURE: 130 MMHG | DIASTOLIC BLOOD PRESSURE: 62 MMHG | BODY MASS INDEX: 32.39 KG/M2

## 2022-07-13 DIAGNOSIS — Z79.01 LONG TERM CURRENT USE OF ANTICOAGULANTS WITH INR GOAL OF 2.0-3.0: ICD-10-CM

## 2022-07-13 DIAGNOSIS — I48.0 PAROXYSMAL ATRIAL FIBRILLATION (H): ICD-10-CM

## 2022-07-13 DIAGNOSIS — I10 HYPERTENSION, UNSPECIFIED TYPE: ICD-10-CM

## 2022-07-13 DIAGNOSIS — I48.0 PAROXYSMAL ATRIAL FIBRILLATION (H): Primary | ICD-10-CM

## 2022-07-13 DIAGNOSIS — E11.21 TYPE 2 DIABETES MELLITUS WITH DIABETIC NEPHROPATHY, UNSPECIFIED WHETHER LONG TERM INSULIN USE (H): Primary | ICD-10-CM

## 2022-07-13 DIAGNOSIS — G62.9 NEUROPATHY: ICD-10-CM

## 2022-07-13 DIAGNOSIS — N18.4 CKD (CHRONIC KIDNEY DISEASE) STAGE 4, GFR 15-29 ML/MIN (H): ICD-10-CM

## 2022-07-13 LAB — INR BLD: 2.5 (ref 0.9–1.1)

## 2022-07-13 PROCEDURE — 85610 PROTHROMBIN TIME: CPT

## 2022-07-13 PROCEDURE — 99606 MTMS BY PHARM EST 15 MIN: CPT | Performed by: PHARMACIST

## 2022-07-13 PROCEDURE — 99607 MTMS BY PHARM ADDL 15 MIN: CPT | Performed by: PHARMACIST

## 2022-07-13 PROCEDURE — 36416 COLLJ CAPILLARY BLOOD SPEC: CPT

## 2022-07-13 RX ORDER — GLIPIZIDE 5 MG/1
5 TABLET ORAL
Qty: 15 TABLET | Refills: 1 | Status: SHIPPED | OUTPATIENT
Start: 2022-07-13 | End: 2022-08-11

## 2022-07-13 NOTE — PATIENT INSTRUCTIONS
"Recommendations from today's MTM visit:                                                       1. You could talk to Dr. García about switching gabapentin to pregabalin when you see him next month and see if that works better.    2. Increase glipizide to 5 mg (whole tablet) with breakfast.     3. Follow up with the prescription assistance office to see if qualify for Ozempic through the .  [phone].    4. Follow up with Dr. Beasley as planned and you can talk to him about switching diltiazem to once daily.    Follow-up: Return in 2 months (on 9/12/2022) for MTM office visit with Lara Cain.    It was great speaking with you today.  I value your experience and would be very thankful for your time in providing feedback in our clinic survey. In the next few days, you may receive an email or text message from [employer] with a link to a survey related to your  clinical pharmacist.\"     To schedule another MTM appointment, please call the clinic directly or you may call the MTM scheduling line at [phone] or toll-free at [phone].     My Clinical Pharmacist's contact information:                                                      Please feel free to contact me with any questions or concerns you have.      Lara Cain, PharmD  Medication Therapy Management Pharmacist  [facility] - Monday and Wednesday 7:30 - 4:00  Pager: [phone]    "

## 2022-07-13 NOTE — PROGRESS NOTES
ANTICOAGULATION MANAGEMENT     Ирина Yanez 87 year old female is on warfarin with therapeutic INR result. (Goal INR 2.0-3.0)    Recent labs: (last 7 days)     07/13/22  0954   INR 2.5*       ASSESSMENT       Source(s): Chart Review and Patient/Caregiver Call       Warfarin doses taken: Warfarin taken as instructed    Diet: No new diet changes identified    New illness, injury, or hospitalization: No    Medication/supplement changes: Glipizide dose increased on 7/13/22 Per [employer]: Concurrent use of GLIPIZIDE and WARFARIN may result in an increased risk of hypoglycemia.    Signs or symptoms of bleeding or clotting: No    Previous INR: Therapeutic last 2(+) visits    Additional findings: None       PLAN     Recommended plan for no diet, medication or health factor changes affecting INR     Dosing Instructions: continue your current warfarin dose with next INR in 4 weeks       Summary  As of 7/13/2022    Full warfarin instructions:  3.75 mg every Mon, Wed, Fri; 5 mg all other days   Next INR check:  8/11/2022             Telephone call with Ирина who agrees to plan and repeated back plan correctly    Check at provider office visit 8/11/22    Education provided: Please call back if any changes to your diet, medications or how you've been taking warfarin and Contact [phone]  with any changes, questions or concerns.     Plan made per [facility] anticoagulation protocol    Alondra Richter RN  Anticoagulation Clinic  7/13/2022    _______________________________________________________________________     Anticoagulation Episode Summary     Current INR goal:  2.0-3.0   TTR:  54.1 % (1 y)   Target end date:  Indefinite   Send INR reminders to:  [facility]    Indications    Paroxysmal atrial fibrillation (H) [I48.0]  Long term current use of anticoagulants with INR goal of 2.0-3.0 [Z79.01]           Comments:           Anticoagulation Care Providers     Provider Role Specialty Phone number    Yuridia Villarreal MD  Referring Family Medicine [phone]    Mil García MD Referring Family Medicine [phone]

## 2022-07-13 NOTE — PROGRESS NOTES
Medication Therapy Management (MTM) Encounter    ASSESSMENT:                            Medication Adherence/Access: No issues identified    Hypertension/Afib: Stable. Patient is meeting blood pressure goal of < 140/90mmHg. Warfarin managed by anticoagulation nurse.  We discussed that diltiazem ER can be dosed once daily.  Would recommend switching to diltiazem 180 mg or 240 mg once daily depending on blood pressure.  She has follow-up with cardiologist and would like to discuss with him at that time.     Also diltiazem generally potentiates the pharmacologic effects of beta-adrenergic blocking agents, which may be useful in patients with normal heart function. However, hypotension, left ventricular failure, and AV conduction disturbances have been reported when diltiazem has been added to beta blocker therapy. This is more likely to occur in the elderly and in patients with left ventricular dysfunction, aortic stenosis, or those patients using large doses of either drug. Diltiazem may also increase plasma concentrations of some hepatically metabolized beta blockers by 30% to 40%. Caution should be used when taking both agents.    Type 2 Diabetes/CKD: Patient is meeting A1c goal of < 8%. Self monitoring of blood glucose is not at goal of fasting  mg/dL.  Recommend increasing glipizide today.  We reviewed signs and symptoms of hypoglycemia and I asked her to contact myself or the clinic if she starts to have blood sugars in the 80's. Due to CKD would need to avoid Metformin or an SGLT2 inhibitor.  Microalbumin is not at goal < 30 mg/g. Not taking an ACE-inhibitor or ARB; stopped by nephrologist Dr. Whatley. Due for annual foot exam. Aspirin therapy is not indicated in this patient due to age and due to anticoagulant/antiplatelet therapy.  Recommend she follow-up with nephrologist as planned next month.  Asked that she follow-up with the prescription assistance office to see if she qualifies for Ozempic to the  manufacture.    Neuropathy: Suggested she discuss switching to pregabalin from gabapentin with her PCP at upcoming visit to see if that is more beneficial.  Would need to dose adjust based on renal function.    PLAN:                            1. You could talk to Dr. García about switching gabapentin to pregabalin when you see him next month and see if that works better.    2. Increase glipizide to 5 mg (whole tablet) with breakfast.     3. Follow up with the prescription assistance office to see if qualify for Ozempic through the .  [phone].    4. Follow up with Dr. Beasley as planned and you can talk to him about switching diltiazem to once daily.    5. Start checking some 2 hour after dinner blood sugar readings.    Follow-up: Return in 2 months (on 9/12/2022) for MTM office visit with Lara Cain.    SUBJECTIVE/OBJECTIVE:                          Ирина Yanez is a 87 year old female coming in for a follow-up visit.  Today's visit is a follow-up MTM visit from 6/15/22.     Reason for visit: diabetes follow-up.    Allergies/ADRs: Reviewed in chart  Past Medical History: Reviewed in chart  - no personal history of MI or stroke  Tobacco: She reports that she has never smoked. She has never used smokeless tobacco.  Alcohol: not currently using    Endocrinologist: Reji Verde - no longer seeing him, Dr. García took it over  Nephrologist: Dr. Whatley outside of  - follow up every three months - next visit early August  Cardiologist: Dr. Beasley    Medication Adherence/Access: Once in a great while she will forget a night dose. Takes medicines twice daily and has a couple that she takes in between.    Hypertension/Afib: Current medications include metoprolol ER 25 mg once daily (recently switched from metoprolol tartrate 25 mg twice daily), diltiazem  mg twice daily (has always used twice daily), and warfarin 5 mg daily or as directed by the INR clinic. I did reach out to her cardiologist   Gale to see if we could change the ER diltiazem to once daily and he agreed.     Medication history: She has been on Eliquis 2.5 mg twice daily in the past but switched to warfarin due to cost.  She has also been on carvedilol and hydralazine in the past.  Carvedilol stopped by cardiologist see 21 encounter. Hydralazine stopped by nephrology, see 21 encounter.     Patient does self-monitor blood pressure. Reports right and left arm are different. Right arm reads 130's/60's and left arm higher cant recall number but not extremely high. Patient reports no current medication side effects. No dizziness or lightheadedness, no fatigue, no shortness of breath.      INR   Date Value Ref Range Status   2022 2.5 (H) 0.9 - 1.1 Final   2021 2.10 (H) 0.86 - 1.14 Final     Comment:     This test is intended for monitoring Coumadin therapy.  Results are not   accurate in patients with prolonged INR due to factor deficiency.       BP Readings from Last 3 Encounters:   22 130/62   06/15/22 137/61   22 (!) 142/54     Type 2 Diabetes/CKD:  Currently taking calcium acetate 667 mg three times daily for CKD (started 2021) and glipizide 2.5 mg once daily with breakfast (started at last MTM visit).     Medication history: She was on Ozempic in the past but stopped due to cost. She did tolerate it well and felt it worked well. She did reached out to the prescription assistance office yet to see if she qualifies for the  assistance program for Ozempic but she has not heard back. She was on Invokana in the past but stopped due to vaginal itching. Metformin discontinued 2015 due to reduced renal function.    Blood sugar monitoring:3-4 times weekly (per meter): see below            From last MTM visit:  Blood sugar monitorin-3 times weekly (per patient report): 174 is the only one she can recall but they have been higher. It was lower on Ozempic.    Symptoms of low blood sugar? None    Symptoms of high blood sugar? none  Eye exam: up to date  Foot exam: up to date  Diet/Exercise: Report no change since last visit. Copied forward from last note - Reports she is doing normal house work and some yard work but no other physical activity. She tries to have a green vegetable every other day. She is having fruit every day but not having vegetables every day. If he eats meat it is chicken. Also has some eggs. She does watch carbs. Reports portion sizes are small. Salads are usually bigger. She is eating 3 meals a day most of the time. She does not snack between meals or after dinner.     Aspirin: Not taking due to age and on anticoagulation  Statin: Yes: pravastatin   ACEi/ARB: No. Stopped by nephrology per care everywhere note from 12/9/20.  Urine Albumin:   Lab Results   Component Value Date    UMALCR 91.43 (H) 05/10/2022     Lab Results   Component Value Date    A1C 7.2 05/10/2022    A1C 7.6 02/10/2022    A1C 7.1 01/26/2021    A1C 6.8 04/27/2020    A1C 6.3 09/27/2019    A1C 6.9 03/22/2019    A1C 6.2 06/12/2018     Last Comprehensive Metabolic Panel:  Sodium   Date Value Ref Range Status   05/10/2022 139 133 - 144 mmol/L Final   06/02/2021 140 133 - 144 mmol/L Final     Potassium   Date Value Ref Range Status   05/10/2022 4.3 3.4 - 5.3 mmol/L Final   06/02/2021 4.2 3.4 - 5.3 mmol/L Final     Chloride   Date Value Ref Range Status   05/10/2022 104 94 - 109 mmol/L Final   06/02/2021 103 94 - 109 mmol/L Final     Carbon Dioxide   Date Value Ref Range Status   06/02/2021 32 20 - 32 mmol/L Final     Carbon Dioxide (CO2)   Date Value Ref Range Status   05/10/2022 31 20 - 32 mmol/L Final     Anion Gap   Date Value Ref Range Status   05/10/2022 4 3 - 14 mmol/L Final   06/02/2021 5 3 - 14 mmol/L Final     Glucose   Date Value Ref Range Status   05/10/2022 148 (H) 70 - 99 mg/dL Final   06/02/2021 187 (H) 70 - 99 mg/dL Final     Urea Nitrogen   Date Value Ref Range Status   05/10/2022 61 (H) 7 - 30 mg/dL Final    06/02/2021 66 (H) 7 - 30 mg/dL Final     Creatinine   Date Value Ref Range Status   05/10/2022 2.10 (H) 0.52 - 1.04 mg/dL Final   06/02/2021 2.43 (H) 0.52 - 1.04 mg/dL Final     GFR Estimate   Date Value Ref Range Status   05/10/2022 23 (L) >60 mL/min/1.73m2 Final     Comment:     Effective December 21, 2021 eGFRcr in adults is calculated using the 2021 CKD-EPI creatinine equation which includes age and gender (Balta et al., NEJ, DOI: 10.1056/CKDDvr6728816)   06/02/2021 18 (L) >60 mL/min/[1.73_m2] Final     Comment:     Non  GFR Calc  Starting 12/18/2018, serum creatinine based estimated GFR (eGFR) will be   calculated using the Chronic Kidney Disease Epidemiology Collaboration   (CKD-EPI) equation.       Calcium   Date Value Ref Range Status   05/10/2022 10.0 8.5 - 10.1 mg/dL Final   06/02/2021 9.7 8.5 - 10.1 mg/dL Final     Calculated CrCl courtney on adjusted body weight: 21 ml/min    Neuropathy:  Current medications include: Gabapentin 100 mg once daily.  Current prescription is for 200 mg once daily however she decreased on her own and has not noticed any worsening in symptoms.  It does help some for the pain.    Today's Vitals: /62   Pulse 60   Wt 188 lb 11.2 oz (85.6 kg)   LMP  (LMP Unknown)   BMI 32.39 kg/m    ----------------    I spent 30 minutes with this patient today. All changes were made via collaborative practice agreement with Mil García MD. A copy of the visit note was provided to the patient's provider(s).    The patient was given a summary of these recommendations.     Lara Cain, PharmD  Medication Therapy Management Pharmacist     Medication Therapy Recommendations  Hypertension goal BP (blood pressure) < 140/90    Current Medication: diltiazem ER COATED BEADS (MATZIM LA) 180 MG 24 hr tablet   Rationale: Frequency inappropriate - Dosage too high - Safety   Recommendation: Decrease Frequency - dilTIAZem  MG Cp24   Status: Contact Provider - Awaiting  Response         Type 2 diabetes mellitus with diabetic nephropathy (H)    Current Medication: glipiZIDE (GLUCOTROL) 5 MG tablet (Discontinued)   Rationale: Dose too low - Dosage too low - Effectiveness   Recommendation: Increase Dose - glipiZIDE 5 MG tablet   Status: Accepted per CPA

## 2022-08-08 DIAGNOSIS — N18.4 CHRONIC RENAL DISEASE, STAGE IV (H): Primary | ICD-10-CM

## 2022-08-08 DIAGNOSIS — N25.81 SECONDARY HYPERPARATHYROIDISM (H): ICD-10-CM

## 2022-08-11 ENCOUNTER — OFFICE VISIT (OUTPATIENT)
Dept: FAMILY MEDICINE | Facility: CLINIC | Age: 87
End: 2022-08-11
Payer: COMMERCIAL

## 2022-08-11 ENCOUNTER — ANTICOAGULATION THERAPY VISIT (OUTPATIENT)
Dept: ANTICOAGULATION | Facility: CLINIC | Age: 87
End: 2022-08-11

## 2022-08-11 VITALS
WEIGHT: 190 LBS | BODY MASS INDEX: 32.44 KG/M2 | OXYGEN SATURATION: 98 % | TEMPERATURE: 97 F | RESPIRATION RATE: 16 BRPM | HEART RATE: 57 BPM | DIASTOLIC BLOOD PRESSURE: 62 MMHG | HEIGHT: 64 IN | SYSTOLIC BLOOD PRESSURE: 130 MMHG

## 2022-08-11 DIAGNOSIS — N25.81 SECONDARY HYPERPARATHYROIDISM (H): ICD-10-CM

## 2022-08-11 DIAGNOSIS — I48.0 PAROXYSMAL ATRIAL FIBRILLATION (H): ICD-10-CM

## 2022-08-11 DIAGNOSIS — I50.32 CHRONIC DIASTOLIC CONGESTIVE HEART FAILURE (H): ICD-10-CM

## 2022-08-11 DIAGNOSIS — N18.4 CHRONIC RENAL DISEASE, STAGE IV (H): ICD-10-CM

## 2022-08-11 DIAGNOSIS — E11.42 DIABETIC POLYNEUROPATHY ASSOCIATED WITH TYPE 2 DIABETES MELLITUS (H): ICD-10-CM

## 2022-08-11 DIAGNOSIS — Z79.01 LONG TERM CURRENT USE OF ANTICOAGULANTS WITH INR GOAL OF 2.0-3.0: ICD-10-CM

## 2022-08-11 DIAGNOSIS — E78.5 HYPERLIPIDEMIA WITH TARGET LDL LESS THAN 100: ICD-10-CM

## 2022-08-11 DIAGNOSIS — I48.0 PAROXYSMAL ATRIAL FIBRILLATION (H): Primary | ICD-10-CM

## 2022-08-11 DIAGNOSIS — I10 HYPERTENSION GOAL BP (BLOOD PRESSURE) < 140/90: ICD-10-CM

## 2022-08-11 DIAGNOSIS — E11.21 TYPE 2 DIABETES MELLITUS WITH DIABETIC NEPHROPATHY, WITHOUT LONG-TERM CURRENT USE OF INSULIN (H): Primary | ICD-10-CM

## 2022-08-11 PROBLEM — I77.810 ASCENDING AORTA DILATATION (H): Status: RESOLVED | Noted: 2020-02-04 | Resolved: 2022-08-11

## 2022-08-11 PROBLEM — J96.11 CHRONIC RESPIRATORY FAILURE WITH HYPOXIA (H): Status: ACTIVE | Noted: 2022-08-11

## 2022-08-11 PROBLEM — J96.11 CHRONIC RESPIRATORY FAILURE WITH HYPOXIA (H): Status: RESOLVED | Noted: 2022-08-11 | Resolved: 2022-08-11

## 2022-08-11 PROBLEM — R06.00 DYSPNEA: Status: RESOLVED | Noted: 2020-12-29 | Resolved: 2022-08-11

## 2022-08-11 PROBLEM — Z89.422 ACQUIRED ABSENCE OF OTHER LEFT TOE(S) (H): Status: RESOLVED | Noted: 2021-03-25 | Resolved: 2022-08-11

## 2022-08-11 LAB
CREAT UR-MCNC: 23 MG/DL
HBA1C MFR BLD: 7.7 % (ref 0–5.6)
HGB BLD-MCNC: 12.2 G/DL (ref 11.7–15.7)
INR BLD: 3.3 (ref 0.9–1.1)
MICROALBUMIN UR-MCNC: 88 MG/L
MICROALBUMIN/CREAT UR: 382.61 MG/G CR (ref 0–25)
PTH-INTACT SERPL-MCNC: 133 PG/ML (ref 15–65)

## 2022-08-11 PROCEDURE — 36415 COLL VENOUS BLD VENIPUNCTURE: CPT | Performed by: FAMILY MEDICINE

## 2022-08-11 PROCEDURE — 82043 UR ALBUMIN QUANTITATIVE: CPT | Performed by: FAMILY MEDICINE

## 2022-08-11 PROCEDURE — 83036 HEMOGLOBIN GLYCOSYLATED A1C: CPT | Performed by: FAMILY MEDICINE

## 2022-08-11 PROCEDURE — 83970 ASSAY OF PARATHORMONE: CPT | Performed by: FAMILY MEDICINE

## 2022-08-11 PROCEDURE — 85018 HEMOGLOBIN: CPT | Performed by: FAMILY MEDICINE

## 2022-08-11 PROCEDURE — G0439 PPPS, SUBSEQ VISIT: HCPCS | Performed by: FAMILY MEDICINE

## 2022-08-11 PROCEDURE — 99214 OFFICE O/P EST MOD 30 MIN: CPT | Mod: 25 | Performed by: FAMILY MEDICINE

## 2022-08-11 PROCEDURE — 85610 PROTHROMBIN TIME: CPT | Performed by: FAMILY MEDICINE

## 2022-08-11 RX ORDER — GABAPENTIN 100 MG/1
CAPSULE ORAL
Qty: 180 CAPSULE | Refills: 1 | Status: SHIPPED | OUTPATIENT
Start: 2022-08-11 | End: 2023-02-22

## 2022-08-11 RX ORDER — GLIPIZIDE 10 MG/1
10 TABLET ORAL
Qty: 90 TABLET | Refills: 1 | Status: SHIPPED | OUTPATIENT
Start: 2022-08-11 | End: 2022-09-12

## 2022-08-11 RX ORDER — PRAVASTATIN SODIUM 20 MG
20 TABLET ORAL DAILY
Qty: 90 TABLET | Refills: 3 | Status: SHIPPED | OUTPATIENT
Start: 2022-08-11 | End: 2023-11-27

## 2022-08-11 RX ORDER — TORSEMIDE 20 MG/1
20 TABLET ORAL DAILY
Qty: 90 TABLET | Refills: 1 | COMMUNITY
Start: 2022-08-11 | End: 2022-10-13

## 2022-08-11 RX ORDER — CALCITRIOL 0.25 UG/1
0.25 CAPSULE, LIQUID FILLED ORAL
COMMUNITY
Start: 2022-08-08 | End: 2024-06-10

## 2022-08-11 ASSESSMENT — PATIENT HEALTH QUESTIONNAIRE - PHQ9
SUM OF ALL RESPONSES TO PHQ QUESTIONS 1-9: 0
10. IF YOU CHECKED OFF ANY PROBLEMS, HOW DIFFICULT HAVE THESE PROBLEMS MADE IT FOR YOU TO DO YOUR WORK, TAKE CARE OF THINGS AT HOME, OR GET ALONG WITH OTHER PEOPLE: NOT DIFFICULT AT ALL
SUM OF ALL RESPONSES TO PHQ QUESTIONS 1-9: 0

## 2022-08-11 ASSESSMENT — ENCOUNTER SYMPTOMS
HEADACHES: 0
SHORTNESS OF BREATH: 0
PALPITATIONS: 0
CONSTITUTIONAL NEGATIVE: 1
PARESTHESIAS: 1

## 2022-08-11 ASSESSMENT — PAIN SCALES - GENERAL: PAINLEVEL: MILD PAIN (2)

## 2022-08-11 NOTE — PROGRESS NOTES
ANTICOAGULATION MANAGEMENT     Ирина Yanez 87 year old female is on warfarin with supratherapeutic INR result. (Goal INR 2.0-3.0)    Recent labs: (last 7 days)     08/11/22  0824   INR 3.3*       ASSESSMENT       Source(s): Chart Review and Patient/Caregiver Call       Warfarin doses taken: Warfarin taken as instructed    Diet: Decreased greens/vitamin K in diet; plans to resume previous intake    New illness, injury, or hospitalization: No; however, patient saw PCP today for multiple issues but chronic back pain was addressed. Patient states the pain intensity varies.    Medication/supplement changes: Glipizide and Torsemide reduced at office visit today, 8/11/22. Patient also reports that nephrologist recently prescribed Calcitrol. Patient also reports that she takes 2 extra strength Tylenol at bedtime most nights.    Signs or symptoms of bleeding or clotting: No    Previous INR: Therapeutic last 2(+) visits    Additional findings:This is 3rd supra INR on current maintenance dose, patient preferred to increase her greens back up versus warfarin dose change at this time. Also, Torsemide dose decreased so we may see INR trend down with that change.       PLAN     Recommended plan for temporary change(s) and ongoing change(s) affecting INR     Dosing Instructions: Continue your current warfarin dose with next INR in 2 weeks       Summary  As of 8/11/2022    Full warfarin instructions:  3.75 mg every Mon, Wed, Fri; 5 mg all other days   Next INR check:  8/25/2022             Telephone call with Ирина who verbalizes understanding and agrees to plan    Lab visit scheduled    Education provided: Importance of consistent vitamin K intake and Vitamin K content of foods    Plan made per [facility] anticoagulation protocol    Summer Mina, RN  Anticoagulation Clinic  8/11/2022    _______________________________________________________________________     Anticoagulation Episode Summary     Current INR goal:  2.0-3.0   TTR:   51.2 % (1 y)   Target end date:  Indefinite   Send INR reminders to:  KRIS RAMIREZ    Indications    Paroxysmal atrial fibrillation (H) [I48.0]  Long term current use of anticoagulants with INR goal of 2.0-3.0 [Z79.01]           Comments:           Anticoagulation Care Providers     Provider Role Specialty Phone number    Yuridia Villarreal MD Referring Family Medicine [phone]    Mil García MD Referring [facility] Medicine [phone]

## 2022-08-11 NOTE — PROGRESS NOTES
Assessment and Plan    (E11.21) Type 2 diabetes mellitus with diabetic nephropathy, without long-term current use of insulin (H)  (primary encounter diagnosis)  Comment: A1c slighlty up, increasing glipizide  Plan: HEMOGLOBIN A1C, Albumin Random Urine         Quantitative with Creat Ratio, Hemoglobin, glipiZIDE (GLUCOTROL) 10 MG tablet            (N25.81) Secondary hyperparathyroidism (H)  Comment: labs from renal  Plan:     (I48.0) Paroxysmal atrial fibrillation (H)  Comment: stable, on coumadin  Plan:     (Z79.01) Long term current use of anticoagulants with INR goal of 2.0-3.0  Comment: follows with INR clinic  Plan:     (N18.4) Chronic renal disease, stage IV (H)  Comment: labs from renal  Plan:     (I10) Hypertension goal BP (blood pressure) < 140/90  Comment: refilling, BP looks good  Plan: torsemide (DEMADEX) 20 MG tablet            (E11.42) Diabetic polyneuropathy associated with type 2 diabetes mellitus (H)  Comment: refilling  Plan: gabapentin (NEURONTIN) 100 MG capsule            (E78.5) Hyperlipidemia with target LDL less than 100  Comment: refilling  Plan: pravastatin (PRAVACHOL) 20 MG tablet            (I50.32) Chronic diastolic congestive heart failure (H)  Comment:   Plan: torsemide (DEMADEX) 20 MG tablet              RTC in  for DM    Mil García MD      Bárbara Gifford is a 87 year old, presenting for the following health issues:  No chief complaint on file.      History of Present Illness       Back Pain:  She presents for follow up of back pain. Patient's back pain is a chronic problem.  Location of back pain:  Right middle of back  Description of back pain: dull ache  Back pain spreads: nowhere    Since patient first noticed back pain, pain is: always present, but gets better and worse  Does back pain interfere with her job:  Not applicable      CKD: She uses over the counter pain medication, including Tylenol, one time daily.    Diabetes:   She presents for follow up of diabetes.  She  "is checking home blood glucose a few times a week. She checks blood glucose before meals and after meals.  Blood glucose is never over 200 and never under 70. When her blood glucose is low, the patient is asymptomatic for confusion, blurred vision, lethargy and reports not feeling dizzy, shaky, or weak.  She is concerned about other. She is having numbness in feet and burning in feet. The patient has had a diabetic eye exam in the last 12 months. Eye exam performed on July 2021. Location of last eye exam [facility] in [place].        Hypertension: She presents for follow up of hypertension.  She does check blood pressure  regularly outside of the clinic. Outpatient blood pressures have not been over 140/90. She follows a low salt diet.      Today's PHQ-9         PHQ-9 Total Score: 0    PHQ-9 Q9 Thoughts of better off dead/self-harm past 2 weeks :   Not at all    How difficult have these problems made it for you to do your work, take care of things at home, or get along with other people: Not difficult at all       Annual Wellness Visit    Patient has been advised of split billing requirements and indicates understanding: Yes     Are you in the first 12 months of your Medicare Part B coverage?  No    Physical Health:    In general, how would you rate your overall physical health? good    Outside of work, how many days during the week do you exercise?none    Outside of work, approximately how many minutes a day do you exercise?not applicable    If you drink alcohol do you typically have >3 drinks per day or >7 drinks per week? No    Do you usually eat at least 4 servings of fruit and vegetables a day, include whole grains & fiber and avoid regularly eating high fat or \"junk\" foods? Yes    Do you have any problems taking medications regularly? No    Do you have any side effects from medications? not applicable    Needs assistance for the following daily activities: no assistance needed    Which of the following " safety concerns are present in your home?  none identified     Hearing impairment: No    In the past 6 months, have you been bothered by leaking of urine? yes    Mental Health:    In general, how would you rate your overall mental or emotional health? good  PHQ-2 Score:      Do you feel safe in your environment? Yes    Have you ever done Advance Care Planning? (For example, a Health Directive, POLST, or a discussion with a medical provider or your loved ones about your wishes)? Yes, advance care planning is on file.    Fall risk:  Fallen 2 or more times in the past year?: No  Any fall with injury in the past year?: No  click delete button to remove this line now  Cognitive Screenin) Repeat 3 items (Leader, Season, Table)    2) Clock draw: ABNORMAL reviewed  3) 3 item recall: Recalls 3 objects  Results: ABNORMAL clock, 1-2 items recalled: PROBABLE COGNITIVE IMPAIRMENT, **INFORM PROVIDER**    Mini-CogTM Copyright S Lilliam. Licensed by the author for use in [facility]; reprinted with permission (soob@[facility]). All rights reserved.      Do you have sleep apnea, excessive snoring or daytime drowsiness?: no    Current providers sharing in care for this patient include:   Patient Care Team:  Mil García MD as PCP - General (Family Medicine)  Reji Verde MD as Assigned Endocrinology Provider  Henrik Beasley MD as Assigned Heart and Vascular Provider  Bisi Moffett, RN as Specialty Care Coordinator (Nurse)  Smitha Odonnell MD as Assigned Pulmonology Provider  Zarina Cain RPH as Pharmacist (Pharmacist)  Goltz, Bennett Ezra, PA-C as Assigned Neuroscience Provider  Shu Cardona DPM as Assigned Musculoskeletal Provider  Mil García MD as Assigned PCP  Zarina Cain RPH as Assigned MTM Pharmacist  Jef Whatley MD as MD (Nephrology)    Patient has been advised of split billing requirements and indicates understanding: Yes    Feeling well, no  acute concerns.  Can not tolerate metformin, ozempic was too expensive, Inokana cause issues with yeast infection.      Has an appt for eye exam in about 1m    Denies CP, palpitations, edema, dyspnea, HA, vision changes.  Some bothersome tingling in her feet, tyelnol usually works well for this.    Renal has reduced toresemide to 20mg daily.  Also reports that they reduced her diltiazem, although she is less clear on what that change was.    Review of Systems   Constitutional: Negative.    HENT: Negative.    Eyes: Negative for visual disturbance.   Respiratory: Negative for shortness of breath.    Cardiovascular: Negative for chest pain, palpitations and peripheral edema.   Neurological: Positive for paresthesias. Negative for headaches.            Objective    LMP  (LMP Unknown)   There is no height or weight on file to calculate BMI.  Physical Exam  Vitals reviewed.   Eyes:      Conjunctiva/sclera: Conjunctivae normal.   Cardiovascular:      Rate and Rhythm: Normal rate and regular rhythm.      Heart sounds: Murmur heard.    Systolic murmur is present with a grade of 2/6.  Pulmonary:      Effort: Pulmonary effort is normal.      Breath sounds: Normal breath sounds.   Skin:     General: Skin is warm and dry.   Neurological:      Mental Status: She is alert and oriented to person, place, and time.            .  ..

## 2022-08-11 NOTE — PROGRESS NOTES
Patient declined warfarin refill today, she states she has plenty of pills and will let Anti-coagulation Clinic know when she needs warfarin refilled.    Summer Mina RN  Anticoagulation Clinic

## 2022-08-12 PROBLEM — E66.01 MORBID OBESITY (H): Status: RESOLVED | Noted: 2018-09-13 | Resolved: 2022-08-12

## 2022-08-16 DIAGNOSIS — N18.4 CHRONIC KIDNEY DISEASE, STAGE IV (SEVERE) (H): Primary | ICD-10-CM

## 2022-08-16 DIAGNOSIS — N25.81 SECONDARY HYPERPARATHYROIDISM OF RENAL ORIGIN (H): ICD-10-CM

## 2022-08-16 LAB
ALBUMIN SERPL-MCNC: NORMAL G/DL
ANION GAP SERPL CALCULATED.3IONS-SCNC: NORMAL MMOL/L
BUN SERPL-MCNC: NORMAL MG/DL
CALCIUM SERPL-MCNC: NORMAL MG/DL
CHLORIDE BLD-SCNC: NORMAL MMOL/L
CO2 SERPL-SCNC: NORMAL MMOL/L
CREAT SERPL-MCNC: NORMAL MG/DL
GFR SERPL CREATININE-BSD FRML MDRD: NORMAL ML/MIN/{1.73_M2}
GLUCOSE BLD-MCNC: NORMAL MG/DL
PHOSPHATE SERPL-MCNC: NORMAL MG/DL
POTASSIUM BLD-SCNC: NORMAL MMOL/L
SODIUM SERPL-SCNC: NORMAL MMOL/L

## 2022-08-25 ENCOUNTER — TELEPHONE (OUTPATIENT)
Dept: FAMILY MEDICINE | Facility: CLINIC | Age: 87
End: 2022-08-25

## 2022-08-25 ENCOUNTER — LAB (OUTPATIENT)
Dept: LAB | Facility: CLINIC | Age: 87
End: 2022-08-25
Payer: COMMERCIAL

## 2022-08-25 ENCOUNTER — ANTICOAGULATION THERAPY VISIT (OUTPATIENT)
Dept: ANTICOAGULATION | Facility: CLINIC | Age: 87
End: 2022-08-25

## 2022-08-25 DIAGNOSIS — Z79.01 LONG TERM CURRENT USE OF ANTICOAGULANTS WITH INR GOAL OF 2.0-3.0: ICD-10-CM

## 2022-08-25 DIAGNOSIS — N25.81 SECONDARY HYPERPARATHYROIDISM OF RENAL ORIGIN (H): Primary | ICD-10-CM

## 2022-08-25 DIAGNOSIS — I48.0 PAROXYSMAL ATRIAL FIBRILLATION (H): Primary | ICD-10-CM

## 2022-08-25 DIAGNOSIS — I48.0 PAROXYSMAL ATRIAL FIBRILLATION (H): ICD-10-CM

## 2022-08-25 LAB — INR BLD: 3.8 (ref 0.9–1.1)

## 2022-08-25 PROCEDURE — 36415 COLL VENOUS BLD VENIPUNCTURE: CPT

## 2022-08-25 PROCEDURE — 85610 PROTHROMBIN TIME: CPT

## 2022-08-25 NOTE — TELEPHONE ENCOUNTER
Reason for Call:  INR    Who is calling?  PATIENT    Phone number:  [phone]    Fax number:  N/A    Name of caller: BEATA TRIPLETT    INR Value:  UNKNOWN    Are there any other concerns:  Yes: PLEASE CALL PATIENT    Can we leave a detailed message on this number? YES    Phone number patient can be reached at: Home number on file [phone] (home)      Call taken on 8/25/2022 at 2:04 PM by Summer Li

## 2022-08-25 NOTE — PROGRESS NOTES
ANTICOAGULATION MANAGEMENT     Ирина Yanez 87 year old female is on warfarin with supratherapeutic INR result. (Goal INR 2.0-3.0)    Recent labs: (last 7 days)     08/25/22  0938   INR 3.8*       ASSESSMENT       Source(s): Chart Review    Previous INR was Supratherapeutic    Medication, diet, health changes since last INR chart reviewed; none identified     Plan to reduce warfarin maintenance dose 8% since this is 2nd supra INR.           PLAN     Unable to reach Ирина today.    Left message to take reduced dose of warfarin, 2.5 mg tonight. Request call back for assessment.    Follow up required to confirm warfarin dose taken and assess for changes    Summer Mina, RN  Anticoagulation Clinic  8/25/2022

## 2022-08-25 NOTE — PROGRESS NOTES
ANTICOAGULATION MANAGEMENT     Ирина Yanez 87 year old female is on warfarin with supratherapeutic INR result. (Goal INR 2.0-3.0)    Recent labs: (last 7 days)     08/25/22  0938   INR 3.8*       ASSESSMENT       Source(s): Chart Review and Patient/Caregiver Call       Warfarin doses taken: Warfarin taken as instructed    Diet: Decreased greens/vitamin K in diet; ongoing change    New illness, injury, or hospitalization: No    Medication/supplement changes: patient reports that she recently started iron and calcium.    Signs or symptoms of bleeding or clotting: No    Previous INR: Supratherapeutic    Additional findings: None       PLAN     Recommended plan for ongoing change(s) affecting INR     Dosing Instructions: partial hold then decrease your warfarin dose (8% change) with next INR in 12 days       Summary  As of 8/25/2022    Full warfarin instructions:  8/25: 2.5 mg; Otherwise 5 mg every Tue, Sat; 3.75 mg all other days   Next INR check:  9/6/2022             Telephone call with Ирина who agrees to plan and repeated back plan correctly and patient wrote down instructions, as well.    Lab visit scheduled    Education provided: Importance of consistent vitamin K intake, Impact of vitamin K foods on INR and Vitamin K content of foods    Plan made per [facility] anticoagulation protocol    Summer Mina, RN  Anticoagulation Clinic  8/25/2022    _______________________________________________________________________     Anticoagulation Episode Summary     Current INR goal:  2.0-3.0   TTR:  50.5 % (1 y)   Target end date:  Indefinite   Send INR reminders to:  [facility]    Indications    Paroxysmal atrial fibrillation (H) [I48.0]  Long term current use of anticoagulants with INR goal of 2.0-3.0 [Z79.01]           Comments:           Anticoagulation Care Providers     Provider Role Specialty Phone number    Yuridia Villarreal MD Referring Family Medicine [phone]    Mil García MD Referring Family  Medicine [phone]

## 2022-08-30 ENCOUNTER — OFFICE VISIT (OUTPATIENT)
Dept: CARDIOLOGY | Facility: CLINIC | Age: 87
End: 2022-08-30
Payer: COMMERCIAL

## 2022-08-30 VITALS
HEART RATE: 56 BPM | SYSTOLIC BLOOD PRESSURE: 138 MMHG | WEIGHT: 188.7 LBS | HEIGHT: 64 IN | DIASTOLIC BLOOD PRESSURE: 60 MMHG | BODY MASS INDEX: 32.21 KG/M2

## 2022-08-30 DIAGNOSIS — I48.0 PAROXYSMAL ATRIAL FIBRILLATION (H): ICD-10-CM

## 2022-08-30 DIAGNOSIS — I50.30 DIASTOLIC HEART FAILURE, UNSPECIFIED HF CHRONICITY (H): Primary | ICD-10-CM

## 2022-08-30 PROCEDURE — 99214 OFFICE O/P EST MOD 30 MIN: CPT | Performed by: INTERNAL MEDICINE

## 2022-08-30 NOTE — PROGRESS NOTES
HISTORY OF PRESENT ILLNESS:  Living independently. Walks in garden uses walker drives.     Orders this Visit:  No orders of the defined types were placed in this encounter.    No orders of the defined types were placed in this encounter.    There are no discontinued medications.    No diagnosis found.    CURRENT MEDICATIONS:  Current Outpatient Medications   Medication Sig Dispense Refill     acetaminophen (TYLENOL) 325 MG tablet Take 2 tablets (650 mg) by mouth every 4 hours as needed for mild pain       betamethasone valerate (VALISONE) 0.1 % external cream Apply topically daily 45 g 3     blood glucose (NO BRAND SPECIFIED) lancets standard Use to test blood sugar 1 times daily or as directed, Contour Next lancets 100 each 3     blood glucose (NO BRAND SPECIFIED) test strip Use to test blood sugar 1 times daily or as directed, Contour Next strips 100 strip 1     blood glucose monitoring (NO BRAND SPECIFIED) meter device kit Use to test blood sugar 1 times daily or as directed. Ultra 2 1 kit 1     calcitRIOL (ROCALTROL) 0.25 MCG capsule Take 0.25 mcg by mouth daily       calcium acetate (PHOSLO) 667 MG CAPS capsule Take 1 capsule (667 mg) by mouth 3 times daily (with meals) 270 capsule 1     diltiazem ER COATED BEADS (MATZIM LA) 180 MG 24 hr tablet Take 1 tablet (180 mg) by mouth 2 times daily 180 tablet 0     gabapentin (NEURONTIN) 100 MG capsule Taking 2 once daily in evening. (Patient taking differently: Take 100 mg by mouth daily) 180 capsule 1     glipiZIDE (GLUCOTROL) 10 MG tablet Take 1 tablet (10 mg) by mouth 2 times daily (before meals) (Patient taking differently: Take 10 mg by mouth daily) 90 tablet 1     metoprolol succinate ER (TOPROL XL) 25 MG 24 hr tablet Take 1 tablet (25 mg) by mouth daily 90 tablet 1     MULTI-VITAMIN OR TABS Take 2 tablets by mouth daily        nystatin (MYCOSTATIN) 627706 UNIT/GM external powder Apply a small amount to affected area three times daily. (Patient taking  "differently: Apply a small amount to affected area three times daily PRN) 60 g 1     pravastatin (PRAVACHOL) 20 MG tablet Take 1 tablet (20 mg) by mouth daily 90 tablet 3     sertraline (ZOLOFT) 50 MG tablet TAKE 1 TABLET BY MOUTH DAILY GENERIC EQUIVALENT FOR ZOLOFT 90 tablet 2     torsemide (DEMADEX) 20 MG tablet Take 1 tablet (20 mg) by mouth daily 90 tablet 1     warfarin ANTICOAGULANT (JANTOVEN ANTICOAGULANT) 2.5 MG tablet Take 5 mg daily or as directed by the INR clinic 190 tablet 1       ALLERGIES     Allergies   Allergen Reactions     Augmentin Diarrhea     Vomiting       Cleocin      Hives     Metformin Diarrhea     Tegretol [Carbamazepine]      Irregular heart beat       PAST MEDICAL, SURGICAL, FAMILY, SOCIAL HISTORY:  History was reviewed and updated as needed, see medical record.    Review of Systems:  A 12-point review of systems was completed, see medical record for detailed review of systems information.    Physical Exam:  Vitals: BP (!) 150/66   Pulse 56   Ht 1.626 m (5' 4\")   Wt 85.6 kg (188 lb 11.2 oz)   LMP  (LMP Unknown)   BMI 32.39 kg/m      Constitutional:           Skin:           Head:           Eyes:           ENT:           Neck:           Chest:           Cardiac:                    Abdomen:           Vascular:                                        Extremities and Back:           Neurological:           ASSESSMENT: stable        RECOMMENDATIONS:   Continue present meds       Recent Lab Results:  LIPID RESULTS:  Lab Results   Component Value Date    CHOL 196 05/10/2022    CHOL 195 06/02/2021    HDL 38 (L) 05/10/2022    HDL 45 (L) 06/02/2021    LDL  05/10/2022      Comment:      Cannot estimate LDL when triglyceride exceeds 400 mg/dL    LDL 88 05/10/2022     (H) 06/02/2021    TRIG 417 (H) 05/10/2022    TRIG 249 (H) 06/02/2021    CHOLHDLRATIO 3.4 12/17/2013       LIVER ENZYME RESULTS:  Lab Results   Component Value Date    AST 13 02/10/2022    AST 27 01/26/2021    ALT 25 " 02/10/2022    ALT 40 01/26/2021       CBC RESULTS:  Lab Results   Component Value Date    WBC 9.9 12/22/2021    WBC 14.5 (H) 02/09/2021    RBC 4.00 12/22/2021    RBC 3.28 (L) 02/09/2021    HGB 12.2 08/11/2022    HGB 11.1 (L) 06/02/2021    HCT 38.2 12/22/2021    HCT 31.1 (L) 02/09/2021    MCV 96 12/22/2021    MCV 95 02/09/2021    MCH 30.5 12/22/2021    MCH 27.4 02/09/2021    MCHC 31.9 12/22/2021    MCHC 28.9 (L) 02/09/2021    RDW 13.4 12/22/2021    RDW 15.6 (H) 02/09/2021     12/22/2021     02/09/2021       BMP RESULTS:  Lab Results   Component Value Date    NA  08/11/2022      Comment:      Disregard result  This is a corrected result. Previous result was 142 mmol/L on 8/11/2022 at  8:27 PM CDT     06/02/2021    POTASSIUM  08/11/2022      Comment:      Disregard result  This is a corrected result. Previous result was 4.1 mmol/L on 8/11/2022 at  8:27 PM CDT    POTASSIUM 4.2 06/02/2021    CHLORIDE  08/11/2022      Comment:      Disregard result  This is a corrected result. Previous result was 108 mmol/L on 8/11/2022 at  8:27 PM CDT    CHLORIDE 103 06/02/2021    CO2  08/11/2022      Comment:      Disregard result  This is a corrected result. Previous result was 28 mmol/L on 8/11/2022 at  8:27 PM CDT    CO2 32 06/02/2021    ANIONGAP  08/11/2022      Comment:      Disregard result  This is a corrected result. Previous result was 6 mmol/L on 8/11/2022 at  8:27 PM CDT    ANIONGAP 5 06/02/2021    GLC  08/11/2022      Comment:      Disregard result  This is a corrected result. Previous result was 182 mg/dL on 8/11/2022 at  8:27 PM CDT     (H) 06/02/2021    BUN  08/11/2022      Comment:      Disregard result  This is a corrected result. Previous result was 61 mg/dL on 8/11/2022 at  8:27 PM CDT    BUN 66 (H) 06/02/2021    CR  08/11/2022      Comment:      Disregard result  This is a corrected result. Previous result was 1.98 mg/dL on 8/11/2022 at  8:27 PM CDT    CR 2.43 (H) 06/02/2021    GFRESTIMATED   08/11/2022      Comment:      Effective December 21, 2021 eGFRcr in adults is calculated using the 2021 CKD-EPI creatinine equation which includes age and gender (Balta et al., NEJM, DOI: 10.1056/DPTQoi5631406)  This is a corrected result. Previous result was 24 mL/min/1.73m2 on 8/11/2022 at  8:27 PM CDT    GFRESTIMATED 18 (L) 06/02/2021    GFRESTBLACK 20 (L) 06/02/2021    EDNA  08/11/2022      Comment:      Disregard result  This is a corrected result. Previous result was 9.4 mg/dL on 8/11/2022 at  8:27 PM CDT    EDNA 9.7 06/02/2021        A1C RESULTS:  Lab Results   Component Value Date    A1C 7.7 (H) 08/11/2022    A1C 7.1 (H) 01/26/2021       INR RESULTS:  Lab Results   Component Value Date    INR 3.8 (H) 08/25/2022    INR 3.3 (H) 08/11/2022    INR 2.10 (H) 07/07/2021    INR 2.10 (H) 06/16/2021       We greatly appreciate the opportunity to be involved in the care of your patient, Ирина Yanez.    Sincerely,  Henrik Beasley MD      CC  No referring provider defined for this encounter.

## 2022-08-30 NOTE — PROGRESS NOTES
Service Date: 08/30/2022    HISTORY OF PRESENT ILLNESS:  Ирина Yanez, an 87-year-old woman with chronic kidney disease, paroxysmal atrial fibrillation, aortic stenosis, diabetes mellitus, hypertension and dyslipidemia, was seen today at your request for followup.    Since last seen, Ms. Yanez remains free of exertional dyspnea, chest pain, syncope or focal neurologic deficit.  She has remained compliant with medical therapy.  She uses a walker, but is able to live independently, drives a car and has a garden.    PAST MEDICAL HISTORY:    1.  Chronic kidney disease, creatinine 2.1, GFR 23.  2.  Persistent atrial fibrillation, ventricular response rate well controlled on diltiazem/metoprolol.  On chronic warfarin anticoagulation for CHADS-VASc score of 5.  3.  Chronic diastolic heart failure, recent echocardiogram 2021 showing ejection fraction of 60% with no regional wall motion abnormalities.  4.  Mild aortic stenosis.  Recent echo 2021 showing aortic valve area of about 1.7 cm2, mean gradient of 15.  5.  Diabetes mellitus.  a.  Diabetic nephrosclerosis.  b.  Severe longstanding stocking glove distribution neuropathy.  5.  Chronic normocytic normochromic anemia.  6.  Chronic right bundle branch block.    PHYSICAL EXAMINATION:    GENERAL:  Exam today demonstrates a very pleasant, cooperative and intelligent 87-year-old woman.  VITAL SIGNS:  Her blood pressure is 138/60, her heart rate is 56 and regular.  Her height is 1.63 meters.  Her weight is 85 kg.  Her BMI is 32.  RESPIRATORY:  Her lungs are clear to percussion and auscultation.  CARDIOVASCULAR:  Shows a normal S1 with a normal S2.  There is no murmur, rub or click.  Her pulses are full and symmetrical.    MEDICATIONS:    1.  Diltiazem 180 mg twice a day.  2.  Metoprolol 25 mg daily.  3.  Pravastatin 20 mg daily.  4.  Torsemide 20 mg daily.  5.  Warfarin to maintain INR between 2 and 3.  6.  Sertraline 50 mg daily.    LABORATORY STUDIES:  Her most recent BMP  shows a creatinine of 2.1, GFR 23.    ASSESSMENT: Mrs. Yanez remains asymptomatic with stable weights on current therapy. Fortunately her Cr/GFR remains stable.    RECOMMENDATIONS:    1.  Continue active lifestyle as tolerated.  2.  Continue present medical therapy.  3.  Followup visit in 1 year.    We appreciate the opportunity to care for your patient, Beata Yanez.    cc:    Mil García MD   [facility]  [address] [place] [address]     Henrik Beasley MD        D: 2022   T: 2022   MT: PAULINE    Name:     BEATA YANEZ  MRN:      [id number]        Account:      [id number]   :      1935           Service Date: 2022       Document: [id number]

## 2022-08-30 NOTE — LETTER
8/30/2022    Mil García MD  [address] [place] [address]    RE: Ирина Yanez       Dear Colleague,     I had the pleasure of seeing Ирина Yanez in the [facility].  HISTORY OF PRESENT ILLNESS:  Living independently. Walks in garden uses walker drives.     Orders this Visit:  No orders of the defined types were placed in this encounter.    No orders of the defined types were placed in this encounter.    There are no discontinued medications.    No diagnosis found.    CURRENT MEDICATIONS:  Current Outpatient Medications   Medication Sig Dispense Refill     acetaminophen (TYLENOL) 325 MG tablet Take 2 tablets (650 mg) by mouth every 4 hours as needed for mild pain       betamethasone valerate (VALISONE) 0.1 % external cream Apply topically daily 45 g 3     blood glucose (NO BRAND SPECIFIED) lancets standard Use to test blood sugar 1 times daily or as directed, Contour Next lancets 100 each 3     blood glucose (NO BRAND SPECIFIED) test strip Use to test blood sugar 1 times daily or as directed, Contour Next strips 100 strip 1     blood glucose monitoring (NO BRAND SPECIFIED) meter device kit Use to test blood sugar 1 times daily or as directed. Ultra 2 1 kit 1     calcitRIOL (ROCALTROL) 0.25 MCG capsule Take 0.25 mcg by mouth daily       calcium acetate (PHOSLO) 667 MG CAPS capsule Take 1 capsule (667 mg) by mouth 3 times daily (with meals) 270 capsule 1     diltiazem ER COATED BEADS (MATZIM LA) 180 MG 24 hr tablet Take 1 tablet (180 mg) by mouth 2 times daily 180 tablet 0     gabapentin (NEURONTIN) 100 MG capsule Taking 2 once daily in evening. (Patient taking differently: Take 100 mg by mouth daily) 180 capsule 1     glipiZIDE (GLUCOTROL) 10 MG tablet Take 1 tablet (10 mg) by mouth 2 times daily (before meals) (Patient taking differently: Take 10 mg by mouth daily) 90 tablet 1     metoprolol succinate ER (TOPROL XL) 25 MG 24 hr tablet Take 1 tablet (25 mg) by mouth daily 90  "tablet 1     MULTI-VITAMIN OR TABS Take 2 tablets by mouth daily        nystatin (MYCOSTATIN) 691315 UNIT/GM external powder Apply a small amount to affected area three times daily. (Patient taking differently: Apply a small amount to affected area three times daily PRN) 60 g 1     pravastatin (PRAVACHOL) 20 MG tablet Take 1 tablet (20 mg) by mouth daily 90 tablet 3     sertraline (ZOLOFT) 50 MG tablet TAKE 1 TABLET BY MOUTH DAILY GENERIC EQUIVALENT FOR ZOLOFT 90 tablet 2     torsemide (DEMADEX) 20 MG tablet Take 1 tablet (20 mg) by mouth daily 90 tablet 1     warfarin ANTICOAGULANT (JANTOVEN ANTICOAGULANT) 2.5 MG tablet Take 5 mg daily or as directed by the INR clinic 190 tablet 1       ALLERGIES     Allergies   Allergen Reactions     Augmentin Diarrhea     Vomiting       Cleocin      Hives     Metformin Diarrhea     Tegretol [Carbamazepine]      Irregular heart beat       PAST MEDICAL, SURGICAL, FAMILY, SOCIAL HISTORY:  History was reviewed and updated as needed, see medical record.    Review of Systems:  A 12-point review of systems was completed, see medical record for detailed review of systems information.    Physical Exam:  Vitals: BP (!) 150/66   Pulse 56   Ht 1.626 m (5' 4\")   Wt 85.6 kg (188 lb 11.2 oz)   LMP  (LMP Unknown)   BMI 32.39 kg/m      Constitutional:           Skin:           Head:           Eyes:           ENT:           Neck:           Chest:           Cardiac:                    Abdomen:           Vascular:                                        Extremities and Back:           Neurological:           ASSESSMENT: stable        RECOMMENDATIONS:   Continue present meds       Recent Lab Results:  LIPID RESULTS:  Lab Results   Component Value Date    CHOL 196 05/10/2022    CHOL 195 06/02/2021    HDL 38 (L) 05/10/2022    HDL 45 (L) 06/02/2021    LDL  05/10/2022      Comment:      Cannot estimate LDL when triglyceride exceeds 400 mg/dL    LDL 88 05/10/2022     (H) 06/02/2021    TRIG 417 " (H) 05/10/2022    TRIG 249 (H) 06/02/2021    CHOLHDLRATIO 3.4 12/17/2013       LIVER ENZYME RESULTS:  Lab Results   Component Value Date    AST 13 02/10/2022    AST 27 01/26/2021    ALT 25 02/10/2022    ALT 40 01/26/2021       CBC RESULTS:  Lab Results   Component Value Date    WBC 9.9 12/22/2021    WBC 14.5 (H) 02/09/2021    RBC 4.00 12/22/2021    RBC 3.28 (L) 02/09/2021    HGB 12.2 08/11/2022    HGB 11.1 (L) 06/02/2021    HCT 38.2 12/22/2021    HCT 31.1 (L) 02/09/2021    MCV 96 12/22/2021    MCV 95 02/09/2021    MCH 30.5 12/22/2021    MCH 27.4 02/09/2021    MCHC 31.9 12/22/2021    MCHC 28.9 (L) 02/09/2021    RDW 13.4 12/22/2021    RDW 15.6 (H) 02/09/2021     12/22/2021     02/09/2021       BMP RESULTS:  Lab Results   Component Value Date    NA  08/11/2022      Comment:      Disregard result  This is a corrected result. Previous result was 142 mmol/L on 8/11/2022 at  8:27 PM CDT     06/02/2021    POTASSIUM  08/11/2022      Comment:      Disregard result  This is a corrected result. Previous result was 4.1 mmol/L on 8/11/2022 at  8:27 PM CDT    POTASSIUM 4.2 06/02/2021    CHLORIDE  08/11/2022      Comment:      Disregard result  This is a corrected result. Previous result was 108 mmol/L on 8/11/2022 at  8:27 PM CDT    CHLORIDE 103 06/02/2021    CO2  08/11/2022      Comment:      Disregard result  This is a corrected result. Previous result was 28 mmol/L on 8/11/2022 at  8:27 PM CDT    CO2 32 06/02/2021    ANIONGAP  08/11/2022      Comment:      Disregard result  This is a corrected result. Previous result was 6 mmol/L on 8/11/2022 at  8:27 PM CDT    ANIONGAP 5 06/02/2021    GLC  08/11/2022      Comment:      Disregard result  This is a corrected result. Previous result was 182 mg/dL on 8/11/2022 at  8:27 PM CDT     (H) 06/02/2021    BUN  08/11/2022      Comment:      Disregard result  This is a corrected result. Previous result was 61 mg/dL on 8/11/2022 at  8:27 PM CDT    BUN 66 (H)  06/02/2021    CR  08/11/2022      Comment:      Disregard result  This is a corrected result. Previous result was 1.98 mg/dL on 8/11/2022 at  8:27 PM CDT    CR 2.43 (H) 06/02/2021    GFRESTIMATED  08/11/2022      Comment:      Effective December 21, 2021 eGFRcr in adults is calculated using the 2021 CKD-EPI creatinine equation which includes age and gender (Balta et al., NE, DOI: 10.1056/KIYEuf8281380)  This is a corrected result. Previous result was 24 mL/min/1.73m2 on 8/11/2022 at  8:27 PM CDT    GFRESTIMATED 18 (L) 06/02/2021    GFRESTBLACK 20 (L) 06/02/2021    EDNA  08/11/2022      Comment:      Disregard result  This is a corrected result. Previous result was 9.4 mg/dL on 8/11/2022 at  8:27 PM CDT    EDNA 9.7 06/02/2021        A1C RESULTS:  Lab Results   Component Value Date    A1C 7.7 (H) 08/11/2022    A1C 7.1 (H) 01/26/2021       INR RESULTS:  Lab Results   Component Value Date    INR 3.8 (H) 08/25/2022    INR 3.3 (H) 08/11/2022    INR 2.10 (H) 07/07/2021    INR 2.10 (H) 06/16/2021       We greatly appreciate the opportunity to be involved in the care of your patient, Ириан Yanez.    Sincerely,  Henrik Beasley MD      CC  No referring provider defined for this encounter.                                                                       Service Date: 08/30/2022    HISTORY OF PRESENT ILLNESS:  Ирина Yanez, an 87-year-old woman with chronic kidney disease, paroxysmal atrial fibrillation, aortic stenosis, diabetes mellitus, hypertension and dyslipidemia, was seen today at your request for followup.    Since last seen, Ms. Yanez remains free of exertional dyspnea, chest pain, syncope or focal neurologic deficit.  She has remained compliant with medical therapy.  She uses a walker, but is able to live independently, drives a car and has a garden.    PAST MEDICAL HISTORY:    1.  Chronic kidney disease, creatinine 2.1, GFR 23.  2.  Persistent atrial fibrillation, ventricular response rate well  controlled on diltiazem/metoprolol.  On chronic warfarin anticoagulation for CHADS-VASc score of 5.  3.  Chronic diastolic heart failure, recent echocardiogram  showing ejection fraction of 60% with no regional wall motion abnormalities.  4.  Mild aortic stenosis.  Recent echo  showing aortic valve area of about 1.7 cm2, mean gradient of 15.  5.  Diabetes mellitus.  a.  Diabetic nephrosclerosis.  b.  Severe longstanding stocking glove distribution neuropathy.  5.  Chronic normocytic normochromic anemia.  6.  Chronic right bundle branch block.    PHYSICAL EXAMINATION:    GENERAL:  Exam today demonstrates a very pleasant, cooperative and intelligent 87-year-old woman.  VITAL SIGNS:  Her blood pressure is 138/60, her heart rate is 56 and regular.  Her height is 1.63 meters.  Her weight is 85 kg.  Her BMI is 32.  RESPIRATORY:  Her lungs are clear to percussion and auscultation.  CARDIOVASCULAR:  Shows a normal S1 with a normal S2.  There is no murmur, rub or click.  Her pulses are full and symmetrical.    MEDICATIONS:    1.  Diltiazem 180 mg twice a day.  2.  Metoprolol 25 mg daily.  3.  Pravastatin 20 mg daily.  4.  Torsemide 20 mg daily.  5.  Warfarin to maintain INR between 2 and 3.  6.  Sertraline 50 mg daily.    LABORATORY STUDIES:  Her most recent BMP shows a creatinine of 2.1, GFR 23.    ASSESSMENT:  I am pleased that Ms. Yanez remains asymptomatic with a stable weight and a stable creatinine at this time.    RECOMMENDATIONS:    1.  Continue active lifestyle as tolerated.  2.  Continue present medical therapy.  3.  Followup visit in 1 year.    We appreciate the opportunity to care for your patient, Beata Yanez.    cc:    Mil García MD   [facility]  [address] [place] [address]     Henrik Beasley MD        D: 2022   T: 2022   MT: PAULINE    Name:     BEATA YANEZ  MRN:      [id number]        Account:      [id number]   :      1935            Service Date: 08/30/2022       Document: [id number]      Thank you for allowing me to participate in the care of your patient.      Sincerely,     Henrik Beasley MD     [facility]  cc:   No referring provider defined for this encounter.

## 2022-09-06 ENCOUNTER — LAB (OUTPATIENT)
Dept: LAB | Facility: CLINIC | Age: 87
End: 2022-09-06
Payer: COMMERCIAL

## 2022-09-06 ENCOUNTER — ANTICOAGULATION THERAPY VISIT (OUTPATIENT)
Dept: ANTICOAGULATION | Facility: CLINIC | Age: 87
End: 2022-09-06

## 2022-09-06 DIAGNOSIS — I48.0 PAROXYSMAL ATRIAL FIBRILLATION (H): Primary | ICD-10-CM

## 2022-09-06 DIAGNOSIS — Z79.01 LONG TERM CURRENT USE OF ANTICOAGULANTS WITH INR GOAL OF 2.0-3.0: ICD-10-CM

## 2022-09-06 DIAGNOSIS — I48.0 PAROXYSMAL ATRIAL FIBRILLATION (H): ICD-10-CM

## 2022-09-06 LAB — INR BLD: 2.7 (ref 0.9–1.1)

## 2022-09-06 PROCEDURE — 85610 PROTHROMBIN TIME: CPT

## 2022-09-06 PROCEDURE — 36416 COLLJ CAPILLARY BLOOD SPEC: CPT

## 2022-09-06 NOTE — PROGRESS NOTES
ANTICOAGULATION MANAGEMENT     Ирина Yanez 87 year old female is on warfarin with therapeutic INR result. (Goal INR 2.0-3.0)    Recent labs: (last 7 days)     09/06/22  1037   INR 2.7*       ASSESSMENT       Source(s): Chart Review and Patient/Caregiver Call       Warfarin doses taken: Warfarin taken as instructed    Diet: No new diet changes identified    New illness, injury, or hospitalization: No    Medication/supplement changes: None noted    Signs or symptoms of bleeding or clotting: No    Previous INR: Supratherapeutic    Additional findings: Cardiology visit 8/302/22, no changes were made to patient's care plan.       PLAN     Recommended plan for no diet, medication or health factor changes affecting INR     Dosing Instructions: Continue your current warfarin dose with next INR in 3 weeks       Summary  As of 9/6/2022    Full warfarin instructions:  5 mg every Tue, Sat; 3.75 mg all other days   Next INR check:  9/27/2022             Telephone call with Ирина who verbalizes understanding and agrees to plan    Lab visit scheduled    Education provided: Please call back if any changes to your diet, medications or how you've been taking warfarin and Contact [phone]  with any changes, questions or concerns.     Plan made per [facility] anticoagulation protocol    Alondra Richter RN  Anticoagulation Clinic  9/6/2022    _______________________________________________________________________     Anticoagulation Episode Summary     Current INR goal:  2.0-3.0   TTR:  50.1 % (1 y)   Target end date:  Indefinite   Send INR reminders to:  [facility]    Indications    Paroxysmal atrial fibrillation (H) [I48.0]  Long term current use of anticoagulants with INR goal of 2.0-3.0 [Z79.01]           Comments:           Anticoagulation Care Providers     Provider Role Specialty Phone number    Yuridia Villarreal MD Referring Family Medicine [phone]    Mil García MD Referring Family Medicine [phone]

## 2022-09-09 ENCOUNTER — TRANSFERRED RECORDS (OUTPATIENT)
Dept: HEALTH INFORMATION MANAGEMENT | Facility: CLINIC | Age: 87
End: 2022-09-09

## 2022-09-09 LAB — RETINOPATHY: NEGATIVE

## 2022-09-12 ENCOUNTER — OFFICE VISIT (OUTPATIENT)
Dept: PHARMACY | Facility: CLINIC | Age: 87
End: 2022-09-12
Payer: COMMERCIAL

## 2022-09-12 VITALS
SYSTOLIC BLOOD PRESSURE: 140 MMHG | WEIGHT: 188.9 LBS | OXYGEN SATURATION: 99 % | BODY MASS INDEX: 32.42 KG/M2 | DIASTOLIC BLOOD PRESSURE: 59 MMHG | HEART RATE: 49 BPM

## 2022-09-12 DIAGNOSIS — N18.4 CKD (CHRONIC KIDNEY DISEASE) STAGE 4, GFR 15-29 ML/MIN (H): ICD-10-CM

## 2022-09-12 DIAGNOSIS — E11.21 TYPE 2 DIABETES MELLITUS WITH DIABETIC NEPHROPATHY, UNSPECIFIED WHETHER LONG TERM INSULIN USE (H): ICD-10-CM

## 2022-09-12 DIAGNOSIS — I10 HYPERTENSION, UNSPECIFIED TYPE: ICD-10-CM

## 2022-09-12 DIAGNOSIS — E11.21 TYPE 2 DIABETES MELLITUS WITH DIABETIC NEPHROPATHY, WITHOUT LONG-TERM CURRENT USE OF INSULIN (H): Primary | ICD-10-CM

## 2022-09-12 DIAGNOSIS — I50.32 CHRONIC DIASTOLIC CONGESTIVE HEART FAILURE (H): ICD-10-CM

## 2022-09-12 PROCEDURE — 99607 MTMS BY PHARM ADDL 15 MIN: CPT | Performed by: PHARMACIST

## 2022-09-12 PROCEDURE — 99606 MTMS BY PHARM EST 15 MIN: CPT | Performed by: PHARMACIST

## 2022-09-12 RX ORDER — GLIPIZIDE 10 MG/1
TABLET ORAL
Qty: 135 TABLET | Refills: 1 | Status: SHIPPED | OUTPATIENT
Start: 2022-09-12 | End: 2023-01-11

## 2022-09-12 NOTE — PATIENT INSTRUCTIONS
"Recommendations from today's MTM visit:                                                       1. Increase glipizide to 10 mg in the morning with breakfast (same as you have been doing) and half tablet (5 mg) with supper. If your morning blood sugars are still over 150 then we could increase as long as your blood sugars are not getting less than 100.    2. Schedule a blood pressure recheck with the pharmacy in 1 week. Use the QR code on the slip I gave you or go to [email or web address]    3. Bring your home blood pressure cuff in when you see me next.    4. Try to check your blood sugar more often for sure the 2 weeks before you see me.    Follow-up: Return in 1 month (on 10/12/2022).    It was great speaking with you today.  I value your experience and would be very thankful for your time in providing feedback in our clinic survey. In the next few days, you may receive an email or text message from [employer] with a link to a survey related to your  clinical pharmacist.\"     To schedule another MTM appointment, please call the clinic directly or you may call the MTM scheduling line at [phone] or toll-free at [phone].     My Clinical Pharmacist's contact information:                                                      Please feel free to contact me with any questions or concerns you have.      Lara Cain, PharmD  Medication Therapy Management Pharmacist  [facility] - Monday and Wednesday 7:30 - 4:00  Pager: [phone]    "

## 2022-09-12 NOTE — Clinical Note
Could you please reach out to this patient to help with determining if she would qualify for Ozempic? She has reached out to the prescription assistance office with no follow-up. Thanks!  Lara Cain, PharmD Medication Therapy Management Pharmacist

## 2022-09-12 NOTE — PROGRESS NOTES
Medication Therapy Management (MTM) Encounter    ASSESSMENT:                            Medication Adherence/Access: No issues identified    Type 2 Diabetes/CKD: Patient is meeting A1c goal of < 8%. Self monitoring of blood glucose is not at goal of fasting  mg/dL.  Recommend increasing glipizide today but starting with only 5 mg in the evening to prevent hypoglycemia.  We reviewed signs and symptoms of hypoglycemia and I asked her to contact myself or the clinic if she starts to have low blood sugar's. Due to CKD would need to avoid Metformin or an SGLT2 inhibitor.  Microalbumin is not at goal < 30 mg/g. Not taking an ACE-inhibitor or ARB; stopped by nephrologist Dr. Whatley. Due for annual eye exam. Aspirin therapy is not indicated in this patient due to age and due to anticoagulant/antiplatelet therapy.  Recommend she follow-up with nephrologist as planned.  I will route my encounter to the diabetes liaison team to help with the prescription assistance program for Ozempic.    Hypertension/CHF: Patient is not meeting blood pressure goal of < 140/90mmHg.  With her blood pressure barely being elevated, recommended she schedule a blood pressure only check with the pharmacy next week.  If still elevated could consider increasing her diltiazem dose to 240 mg but dosing once a day since its extended release.  May need to consider another option if her pulse is still in the 40's.  Would prefer to involve cardiology and nephrology to determine appropriate therapy.    As previously mentioned in my past encounter, diltiazem generally potentiates the pharmacologic effects of beta-adrenergic blocking agents, which may be useful in patients with normal heart function. However, hypotension, left ventricular failure, and AV conduction disturbances have been reported when diltiazem has been added to beta blocker therapy. This is more likely to occur in the elderly and in patients with left ventricular dysfunction, aortic  stenosis, or those patients using large doses of either drug. Diltiazem may also increase plasma concentrations of some hepatically metabolized beta blockers by 30% to 40%, this does include metoprolol which she is taking. Caution should be used when taking both agents.     PLAN:                            1. Increase glipizide to 10 mg in the morning with breakfast (same as you have been doing) and half tablet (5 mg) with supper. If your morning blood sugars are still over 150 then we could increase as long as your blood sugars are not getting less than 100.    2. Schedule a blood pressure recheck with the pharmacy in 1 week. Use the QR code on the slip I gave you or go to [email or web address]    3. Bring your home blood pressure cuff in when you see me next.    Follow-up: Return in 1 month (on 10/12/2022).    SUBJECTIVE/OBJECTIVE:                          Ирина Yanez is a 87 year old female coming in for a follow-up visit.  Today's visit is a follow-up MTM visit from 7/13/22.     Reason for visit: diabetes follow-up.    Allergies/ADRs: Reviewed in chart  Past Medical History: Reviewed in chart  Tobacco: She reports that she has never smoked. She has never used smokeless tobacco.  Alcohol: not currently using    Medication Adherence/Access: Once in a great while she will forget a night dose. Takes medicines twice daily and has a couple that she takes in between.    Endocrinologist: Reji Verde - no longer seeing him, Dr. García took it over  Nephrologist: Dr. Whatley outside of  - follow up every three months   Cardiologist: Dr. Beasley - follows annually, last visit August    Type 2 Diabetes/CKD:  Currently taking calcium acetate 667 mg three times daily for CKD (started 2/2021), calcitriol 0.25 mg once daily and glipizide 10 mg once daily with breakfast (increased at last PCP visit). Her PCP had increased glipizide to 10 mg twice daily but she didn't want to increase until speaking with me first and  reviewing blood sugars.    Medication history: She was on Ozempic in the past but stopped due to cost. She did tolerate it well and felt it worked well. She did reached out to the prescription assistance office to see if she qualifies for the  assistance program for Ozempic.  They called back but were still waiting on something? She was on Invokana in the past but stopped due to vaginal itching. Metformin discontinued 2015 due to reduced renal function.    Blood sugar monitoring: not checking much lately, maybe 3-4 times a month and only check fasting.    Blood sugars per meter:  Am fastin, 155, 153, 191, 183, 183    From last MTM visit:  Blood sugar monitoring:3-4 times weekly (per meter): see below            From last MTM visit:  Blood sugar monitorin-3 times weekly (per patient report): 174 is the only one she can recall but they have been higher. It was lower on Ozempic.    Symptoms of low blood sugar? None   Symptoms of high blood sugar? none  Eye exam: up to date  Foot exam: up to date  Diet/Exercise: Eats twice daily and they are small meals. She limits salt, has green leafy vegetables every other day. She does not snack in between meals. She does not have sweetened drinks. No other changes noted.  Aspirin: Not taking due to age and on anticoagulation  Statin: Yes: pravastatin   ACEi/ARB: No. Stopped by nephrology per care everywhere note from 20.  Urine Albumin:   Lab Results   Component Value Date    UMALCR 91.43 (H) 05/10/2022     Lab Results   Component Value Date    A1C 7.7 2022    A1C 7.2 05/10/2022    A1C 7.6 02/10/2022    A1C 7.1 2021    A1C 6.8 2020    A1C 6.3 2019    A1C 6.9 2019    A1C 6.2 2018     Last Comprehensive Metabolic Panel:  Sodium   Date Value Ref Range Status   2022   Corrected     Comment:     Disregard result  This is a corrected result. Previous result was 142 mmol/L on 2022 at  8:27 PM CDT   2021 140  133 - 144 mmol/L Final     Potassium   Date Value Ref Range Status   08/11/2022   Corrected     Comment:     Disregard result  This is a corrected result. Previous result was 4.1 mmol/L on 8/11/2022 at  8:27 PM CDT   06/02/2021 4.2 3.4 - 5.3 mmol/L Final     Chloride   Date Value Ref Range Status   08/11/2022   Corrected     Comment:     Disregard result  This is a corrected result. Previous result was 108 mmol/L on 8/11/2022 at  8:27 PM CDT   06/02/2021 103 94 - 109 mmol/L Final     Carbon Dioxide   Date Value Ref Range Status   06/02/2021 32 20 - 32 mmol/L Final     Carbon Dioxide (CO2)   Date Value Ref Range Status   08/11/2022   Corrected     Comment:     Disregard result  This is a corrected result. Previous result was 28 mmol/L on 8/11/2022 at  8:27 PM CDT     Anion Gap   Date Value Ref Range Status   08/11/2022   Corrected     Comment:     Disregard result  This is a corrected result. Previous result was 6 mmol/L on 8/11/2022 at  8:27 PM CDT   06/02/2021 5 3 - 14 mmol/L Final     Glucose   Date Value Ref Range Status   08/11/2022   Corrected     Comment:     Disregard result  This is a corrected result. Previous result was 182 mg/dL on 8/11/2022 at  8:27 PM CDT   06/02/2021 187 (H) 70 - 99 mg/dL Final     Urea Nitrogen   Date Value Ref Range Status   08/11/2022   Corrected     Comment:     Disregard result  This is a corrected result. Previous result was 61 mg/dL on 8/11/2022 at  8:27 PM CDT   06/02/2021 66 (H) 7 - 30 mg/dL Final     Creatinine   Date Value Ref Range Status   08/11/2022   Corrected     Comment:     Disregard result  This is a corrected result. Previous result was 1.98 mg/dL on 8/11/2022 at  8:27 PM CDT   06/02/2021 2.43 (H) 0.52 - 1.04 mg/dL Final     GFR Estimate   Date Value Ref Range Status   08/11/2022   Corrected     Comment:     Effective December 21, 2021 eGFRcr in adults is calculated using the 2021 CKD-EPI creatinine equation which includes age and gender (Balta et al., NEJM, DOI:  10.1056/LLLLmw7105505)  This is a corrected result. Previous result was 24 mL/min/1.73m2 on 8/11/2022 at  8:27 PM CDT   06/02/2021 18 (L) >60 mL/min/[1.73_m2] Final     Comment:     Non  GFR Calc  Starting 12/18/2018, serum creatinine based estimated GFR (eGFR) will be   calculated using the Chronic Kidney Disease Epidemiology Collaboration   (CKD-EPI) equation.       Calcium   Date Value Ref Range Status   08/11/2022   Corrected     Comment:     Disregard result  This is a corrected result. Previous result was 9.4 mg/dL on 8/11/2022 at  8:27 PM CDT   06/02/2021 9.7 8.5 - 10.1 mg/dL Final     CrCl cannot be calculated (Patient's most recent lab result is older than the maximum 30 days allowed.).    Calculated CrCl courtney on adjusted body weight: 20 ml/min    Hypertension/CHF: Current medications include diltiazem  mg twice daily, metoprolol ER 25 mg once daily and torsemide 20 mg once daily.  Patient does not self-monitor blood pressure.  Patient reports no current medication side effects. She reports no chest pain or dizziness. She does have some shortness of breath on exertion but none laying down. She does limit her salt and avoid alcohol.   EF%= 60-65% on 1/26/21. She did just have visit with her cardiologist and there were no changes.    BP Readings from Last 3 Encounters:   09/12/22 (!) 140/59   08/30/22 138/60   08/11/22 130/62     Today's Vitals: BP (!) 140/59   Pulse (!) 49   Wt 188 lb 14.4 oz (85.7 kg)   LMP  (LMP Unknown)   SpO2 99%   BMI 32.42 kg/m    ----------------    I spent 30 minutes with this patient today. All changes were made via collaborative practice agreement with Mil García MD. A copy of the visit note was provided to the patient's provider(s).    The patient was given a summary of these recommendations.     Lara Cain, PharmD  Medication Therapy Management Pharmacist     Medication Therapy Recommendations  Type 2 diabetes mellitus with diabetic  nephropathy (H)    Current Medication: glipiZIDE (GLUCOTROL) 10 MG tablet (Discontinued)   Rationale: Dose too low - Dosage too low - Effectiveness   Recommendation: Increase Dose - glipiZIDE 10 MG tablet   Status: Accepted per CPA

## 2022-09-13 ENCOUNTER — LAB (OUTPATIENT)
Dept: LAB | Facility: CLINIC | Age: 87
End: 2022-09-13
Payer: COMMERCIAL

## 2022-09-13 DIAGNOSIS — N18.4 CHRONIC KIDNEY DISEASE, STAGE IV (SEVERE) (H): ICD-10-CM

## 2022-09-13 DIAGNOSIS — N25.81 SECONDARY HYPERPARATHYROIDISM OF RENAL ORIGIN (H): ICD-10-CM

## 2022-09-13 LAB — PTH-INTACT SERPL-MCNC: 108 PG/ML (ref 15–65)

## 2022-09-13 PROCEDURE — 83970 ASSAY OF PARATHORMONE: CPT

## 2022-09-13 PROCEDURE — 80069 RENAL FUNCTION PANEL: CPT

## 2022-09-13 PROCEDURE — 36415 COLL VENOUS BLD VENIPUNCTURE: CPT

## 2022-09-14 LAB
ALBUMIN SERPL-MCNC: 3.5 G/DL (ref 3.4–5)
ANION GAP SERPL CALCULATED.3IONS-SCNC: 7 MMOL/L (ref 3–14)
BUN SERPL-MCNC: 67 MG/DL (ref 7–30)
CALCIUM SERPL-MCNC: 9.6 MG/DL (ref 8.5–10.1)
CHLORIDE BLD-SCNC: 109 MMOL/L (ref 94–109)
CO2 SERPL-SCNC: 24 MMOL/L (ref 20–32)
CREAT SERPL-MCNC: 2.15 MG/DL (ref 0.52–1.04)
GFR SERPL CREATININE-BSD FRML MDRD: 22 ML/MIN/1.73M2
GLUCOSE BLD-MCNC: 131 MG/DL (ref 70–99)
PHOSPHATE SERPL-MCNC: 3.3 MG/DL (ref 2.5–4.5)
POTASSIUM BLD-SCNC: 4.8 MMOL/L (ref 3.4–5.3)
SODIUM SERPL-SCNC: 140 MMOL/L (ref 133–144)

## 2022-09-20 ENCOUNTER — ALLIED HEALTH/NURSE VISIT (OUTPATIENT)
Dept: FAMILY MEDICINE | Facility: CLINIC | Age: 87
End: 2022-09-20
Payer: COMMERCIAL

## 2022-09-20 VITALS — SYSTOLIC BLOOD PRESSURE: 136 MMHG | DIASTOLIC BLOOD PRESSURE: 50 MMHG

## 2022-09-20 DIAGNOSIS — Z01.30 BP CHECK: Primary | ICD-10-CM

## 2022-09-20 PROCEDURE — 99207 PR NO CHARGE NURSE ONLY: CPT | Performed by: FAMILY MEDICINE

## 2022-09-20 NOTE — PROGRESS NOTES
Ирина Yanez was evaluated at [facility] on September 20, 2022 at which time her blood pressure was:    BP Readings from Last 3 Encounters:   09/20/22 136/50   09/12/22 (!) 140/59   08/30/22 138/60     Pulse Readings from Last 3 Encounters:   09/12/22 (!) 49   08/30/22 56   08/11/22 57       Reviewed lifestyle modifications for blood pressure control and reduction: including making healthy food choices, managing weight, getting regular exercise, smoking cessation, reducing alcohol consumption, monitoring blood pressure regularly.     Symptoms: None    BP Goal:< 140/90 mmHg    BP Assessment:  BP at goal    Potential Reasons for BP too high: NA - Not applicable    BP Follow-Up Plan: Recheck BP in 6 months at pharmacy    Recommendation to Provider: none     Note completed by: Bony Vidal    Thank You   Maulik Vela [facility]

## 2022-09-27 ENCOUNTER — LAB (OUTPATIENT)
Dept: LAB | Facility: CLINIC | Age: 87
End: 2022-09-27
Payer: COMMERCIAL

## 2022-09-27 ENCOUNTER — ANTICOAGULATION THERAPY VISIT (OUTPATIENT)
Dept: ANTICOAGULATION | Facility: CLINIC | Age: 87
End: 2022-09-27

## 2022-09-27 DIAGNOSIS — Z79.01 LONG TERM CURRENT USE OF ANTICOAGULANTS WITH INR GOAL OF 2.0-3.0: ICD-10-CM

## 2022-09-27 DIAGNOSIS — I48.0 PAROXYSMAL ATRIAL FIBRILLATION (H): Primary | ICD-10-CM

## 2022-09-27 DIAGNOSIS — I48.0 PAROXYSMAL ATRIAL FIBRILLATION (H): ICD-10-CM

## 2022-09-27 LAB — INR BLD: 3.7 (ref 0.9–1.1)

## 2022-09-27 PROCEDURE — 85610 PROTHROMBIN TIME: CPT

## 2022-09-27 PROCEDURE — 36416 COLLJ CAPILLARY BLOOD SPEC: CPT

## 2022-09-27 NOTE — PROGRESS NOTES
ANTICOAGULATION MANAGEMENT     Ирина Yanez 87 year old female is on warfarin with supratherapeutic INR result. (Goal INR 2.0-3.0)    Recent labs: (last 7 days)     09/27/22  0948   INR 3.7*       ASSESSMENT       Source(s): Chart Review and Patient/Caregiver Call       Warfarin doses taken: Warfarin taken as instructed    Diet: Decreased greens/vitamin K in diet; plans to resume previous intake. Purchased greens but they went bad right after, so could not consume her usual amount.    New illness, injury, or hospitalization: No    Medication/supplement changes: None noted    Signs or symptoms of bleeding or clotting: No    Previous INR: Therapeutic last visit; previously outside of goal range    Additional findings: None       PLAN     Recommended plan for temporary change(s) affecting INR     Dosing Instructions: partial hold then continue your current warfarin dose with next INR in 2 weeks       Summary  As of 9/27/2022    Full warfarin instructions:  9/27: 2.5 mg; Otherwise 5 mg every Tue, Sat; 3.75 mg all other days   Next INR check:  10/12/2022             Telephone call with Ирина who verbalizes understanding and agrees to plan    Lab visit scheduled for same day as pharmacist office visit    Education provided: Please call back if any changes to your diet, medications or how you've been taking warfarin, Importance of consistent vitamin K intake and Impact of vitamin K foods on INR    Plan made per [facility] anticoagulation protocol    Crsi Richter RN  Anticoagulation Clinic  9/27/2022    _______________________________________________________________________     Anticoagulation Episode Summary     Current INR goal:  2.0-3.0   TTR:  49.0 % (1 y)   Target end date:  Indefinite   Send INR reminders to:  [facility]    Indications    Paroxysmal atrial fibrillation (H) [I48.0]  Long term current use of anticoagulants with INR goal of 2.0-3.0 [Z79.01]           Comments:           Anticoagulation  Care Providers     Provider Role Specialty Phone number    Yuridia Villarreal MD Referring Family Medicine [phone]    Mil García MD Referring Family Medicine [phone]

## 2022-10-11 DIAGNOSIS — I50.32 CHRONIC DIASTOLIC CONGESTIVE HEART FAILURE (H): ICD-10-CM

## 2022-10-11 DIAGNOSIS — I10 HYPERTENSION GOAL BP (BLOOD PRESSURE) < 140/90: ICD-10-CM

## 2022-10-11 DIAGNOSIS — I48.0 PAROXYSMAL ATRIAL FIBRILLATION (H): ICD-10-CM

## 2022-10-12 ENCOUNTER — LAB (OUTPATIENT)
Dept: LAB | Facility: CLINIC | Age: 87
End: 2022-10-12
Payer: COMMERCIAL

## 2022-10-12 ENCOUNTER — OFFICE VISIT (OUTPATIENT)
Dept: PHARMACY | Facility: CLINIC | Age: 87
End: 2022-10-12
Payer: COMMERCIAL

## 2022-10-12 ENCOUNTER — ANTICOAGULATION THERAPY VISIT (OUTPATIENT)
Dept: ANTICOAGULATION | Facility: CLINIC | Age: 87
End: 2022-10-12

## 2022-10-12 VITALS
SYSTOLIC BLOOD PRESSURE: 131 MMHG | OXYGEN SATURATION: 98 % | HEART RATE: 55 BPM | WEIGHT: 186.9 LBS | BODY MASS INDEX: 32.08 KG/M2 | DIASTOLIC BLOOD PRESSURE: 54 MMHG

## 2022-10-12 DIAGNOSIS — I48.0 PAROXYSMAL ATRIAL FIBRILLATION (H): Primary | ICD-10-CM

## 2022-10-12 DIAGNOSIS — I48.0 PAROXYSMAL ATRIAL FIBRILLATION (H): ICD-10-CM

## 2022-10-12 DIAGNOSIS — Z79.01 LONG TERM CURRENT USE OF ANTICOAGULANTS WITH INR GOAL OF 2.0-3.0: ICD-10-CM

## 2022-10-12 DIAGNOSIS — I10 HYPERTENSION GOAL BP (BLOOD PRESSURE) < 140/90: Primary | ICD-10-CM

## 2022-10-12 DIAGNOSIS — N18.4 CKD (CHRONIC KIDNEY DISEASE) STAGE 4, GFR 15-29 ML/MIN (H): ICD-10-CM

## 2022-10-12 DIAGNOSIS — I50.32 CHRONIC DIASTOLIC CONGESTIVE HEART FAILURE (H): ICD-10-CM

## 2022-10-12 DIAGNOSIS — E11.21 TYPE 2 DIABETES MELLITUS WITH DIABETIC NEPHROPATHY, WITHOUT LONG-TERM CURRENT USE OF INSULIN (H): ICD-10-CM

## 2022-10-12 LAB — INR BLD: 2.8 (ref 0.9–1.1)

## 2022-10-12 PROCEDURE — 99607 MTMS BY PHARM ADDL 15 MIN: CPT | Performed by: PHARMACIST

## 2022-10-12 PROCEDURE — 36415 COLL VENOUS BLD VENIPUNCTURE: CPT

## 2022-10-12 PROCEDURE — 85610 PROTHROMBIN TIME: CPT

## 2022-10-12 PROCEDURE — 99606 MTMS BY PHARM EST 15 MIN: CPT | Performed by: PHARMACIST

## 2022-10-12 NOTE — PROGRESS NOTES
Medication Therapy Management (MTM) Encounter    ASSESSMENT:                            Medication Adherence/Access: No issues identified    Type 2 Diabetes/CKD: Patient is meeting A1c goal of < 8%. Self monitoring of blood glucose is at goal of fasting  mg/dL some of the time.  Recommend continuing medications with no change for now since one before dinner reading was in the low 100's. She could start having low blood sugars if we increase her glipizide. Due to CKD would need to avoid Metformin or an SGLT2 inhibitor.  Microalbumin is not at goal < 30 mg/g. Not taking an ACE-inhibitor or ARB; stopped by nephrologist Dr. Whatley. Due for annual eye exam. Aspirin therapy is not indicated in this patient due to age and due to anticoagulant/antiplatelet therapy.  Recommend she follow-up with nephrologist as planned.  I will route my encounter to the diabetes liaison team to help with the prescription assistance program for Ozempic.    Hypertension/CHF/afib: Patient is meeting blood pressure goal of < 140/90mmHg.  She would benefit from restarting diltiazem for afib but only once daily since it is an extended release formulation. I have discussed this with her cardiologist Dr. Beasley and he feels this transition from twice daily to once daily would be safe. Recommended she schedule a blood pressure only check with the pharmacy next week and follow up if sooner than planned if blood pressure still elevated. If her pulse drops into the 40's again may need to consider decreasing diltiazem.     PLAN:                            1. Restart diltiazem for your heart and blood pressure but decrease and only take one in the evening instead of twice daily. Your blood pressure was good today. This diltiazem is usually only given once daily so lets see if this works to control blood pressure and pulse.     2. Schedule a follow up blood pressure check in about a week to see what your blood pressure looks like back on the  diltiazem. If your blood pressure is high schedule follow up with me.    3. Keep checking your blood pressure at home a few times a week and write those down and bring them in.    4. Keep checking your blood sugars at different times a day.     Follow-up: Return in 13 weeks (on 1/11/2023) for MTM office visit with Lara Cain. Or sooner if blood pressure is not controlled.    SUBJECTIVE/OBJECTIVE:                          Ирина Yanez is a 87 year old female coming in for a follow-up visit.  Today's visit is a follow-up MTM visit from 9/12/22.     Reason for visit: diabetes and blood pressure follow.    Allergies/ADRs: Reviewed in chart  Past Medical History: Reviewed in chart  Tobacco: She reports that she has never smoked. She has never used smokeless tobacco.  Alcohol: not currently using    Medication Adherence/Access: Once in a great while she will forget a night dose, less than once a month. Takes medicines twice daily and has a couple that she takes in between.    Endocrinologist: Reji Verde - no longer seeing him, Dr. García took it over  Nephrologist: Dr. Whatley outside of  - follow up every three months   Cardiologist: Dr. Beasley - follows annually, last visit August    Type 2 Diabetes/CKD:  Currently taking calcium acetate 667 mg three times daily for CKD (started 2/2021), calcitriol 0.25 mg once daily and glipizide 10 mg daily with breakfast and 5 mg daily with dinner (increased at last MTM visit).     Medication history: She was on Ozempic in the past but stopped due to cost. She did tolerate it well and felt it worked well. She did reached out to the prescription assistance office to see if she qualifies for the  assistance program for Ozempic.  They called back but were still waiting on something? She was on Invokana in the past but stopped due to vaginal itching. Metformin discontinued 2/2015 due to reduced renal function.    Blood sugar monitoring: checking about twice a  week                  From last MTM visit:  Blood sugars per meter:  Am fastin, 155, 153, 191, 183, 183    Symptoms of low blood sugar? None   Symptoms of high blood sugar? none  Eye exam: up to date  Foot exam: up to date  Diet/Exercise: Reports no changes in diet or activity level. Copied forward from last visit. Eats twice daily and they are small meals. She limits salt, has green leafy vegetables every other day. She does not snack in between meals. She does not have sweetened drinks.   Aspirin: Not taking due to age and on anticoagulation  Statin: Yes: pravastatin   ACEi/ARB: No. Stopped by nephrology per care everywhere note from 20.  Urine Albumin:   Lab Results   Component Value Date    UMALCR 91.43 (H) 05/10/2022     Lab Results   Component Value Date    A1C 7.7 2022    A1C 7.2 05/10/2022    A1C 7.6 02/10/2022    A1C 7.1 2021    A1C 6.8 2020    A1C 6.3 2019    A1C 6.9 2019    A1C 6.2 2018     Last Comprehensive Metabolic Panel:  Sodium   Date Value Ref Range Status   2022 140 133 - 144 mmol/L Final   2021 140 133 - 144 mmol/L Final     Potassium   Date Value Ref Range Status   2022 4.8 3.4 - 5.3 mmol/L Final   2021 4.2 3.4 - 5.3 mmol/L Final     Chloride   Date Value Ref Range Status   2022 109 94 - 109 mmol/L Final   2021 103 94 - 109 mmol/L Final     Carbon Dioxide   Date Value Ref Range Status   2021 32 20 - 32 mmol/L Final     Carbon Dioxide (CO2)   Date Value Ref Range Status   2022 24 20 - 32 mmol/L Final     Anion Gap   Date Value Ref Range Status   2022 7 3 - 14 mmol/L Final   2021 5 3 - 14 mmol/L Final     Glucose   Date Value Ref Range Status   2022 131 (H) 70 - 99 mg/dL Final   2021 187 (H) 70 - 99 mg/dL Final     Urea Nitrogen   Date Value Ref Range Status   2022 67 (H) 7 - 30 mg/dL Final   2021 66 (H) 7 - 30 mg/dL Final     Creatinine   Date Value Ref Range Status    09/13/2022 2.15 (H) 0.52 - 1.04 mg/dL Final   06/02/2021 2.43 (H) 0.52 - 1.04 mg/dL Final     GFR Estimate   Date Value Ref Range Status   09/13/2022 22 (L) >60 mL/min/1.73m2 Final     Comment:     Effective December 21, 2021 eGFRcr in adults is calculated using the 2021 CKD-EPI creatinine equation which includes age and gender (Balta magallon al., NE, DOI: 10.1056/OIXEwd5269448)   06/02/2021 18 (L) >60 mL/min/[1.73_m2] Final     Comment:     Non  GFR Calc  Starting 12/18/2018, serum creatinine based estimated GFR (eGFR) will be   calculated using the Chronic Kidney Disease Epidemiology Collaboration   (CKD-EPI) equation.       Calcium   Date Value Ref Range Status   09/13/2022 9.6 8.5 - 10.1 mg/dL Final   06/02/2021 9.7 8.5 - 10.1 mg/dL Final     CrCl cannot be calculated (Patient's most recent lab result is older than the maximum 30 days allowed.).    Hypertension/CHF/afib: Current medications include diltiazem  mg twice daily (she has been out of this for at least a week), metoprolol ER 25 mg once daily,torsemide 20 mg once daily and warfarin 5 mg daily or as directed.  Per Dr. Whatley nephrologist from 9/19/22 telephone encounter no medication change. Patient does self-monitor blood pressure but not often.  She brought her home monitor in today with the following readings saved 146/62, 55; 151/63, 57; 152/65, 59; 142/62, 56; 139/56, 56. Today in clinic her blood pressure was 139/63 mmHg, pulse 57 with her home cuff. Patient reports no current medication side effects. She reports no chest pain or dizziness. She does have some shortness of breath on exertion but none laying down. She does limit her salt and avoid alcohol. She does check her weight at home and reports it is stable.    EF%= 60-65% on 1/26/21.     BP Readings from Last 3 Encounters:   10/12/22 131/54   09/20/22 136/50   09/12/22 (!) 140/59     Wt Readings from Last 5 Encounters:   10/12/22 186 lb 14.4 oz (84.8 kg)   09/12/22 188 lb  14.4 oz (85.7 kg)   08/30/22 188 lb 11.2 oz (85.6 kg)   08/11/22 190 lb (86.2 kg)   07/13/22 188 lb 11.2 oz (85.6 kg)   .  Today's Vitals: /54   Pulse 55   Wt 186 lb 14.4 oz (84.8 kg)   LMP  (LMP Unknown)   SpO2 98%   BMI 32.08 kg/m    ----------------    I spent 45 minutes with this patient today. All changes were made via collaborative practice agreement with Mil García MD. A copy of the visit note was provided to the patient's provider(s).    The patient was given a summary of these recommendations.     Lara Cain, PharmD  Medication Therapy Management Pharmacist     Medication Therapy Recommendations  Hypertension goal BP (blood pressure) < 140/90    Current Medication: diltiazem ER COATED BEADS (MATZIM LA) 180 MG 24 hr tablet (Discontinued)   Rationale: Frequency inappropriate - Dosage too high - Safety   Recommendation: Decrease Frequency - dilTIAZem  MG Cp24   Status: Accepted per CPA

## 2022-10-12 NOTE — PATIENT INSTRUCTIONS
"Recommendations from today's MTM visit:                                                       1. Restart diltiazem for your heart and blood pressure but decrease and only take one in the evening instead of twice daily. Your blood pressure was good today. This diltiazem is usually only given once daily so lets see if this works to control blood pressure and pulse.     2. Schedule a follow up blood pressure check in about a week to see what your blood pressure looks like back on the diltiazem.    3. Keep checking your blood pressure at home a few times a week and write those down and bring them in.    4. Keep checking your blood sugars at different times a day.     Follow-up: Return in 13 weeks (on 1/11/2023) for MTM office visit with Lara Cain.    It was great speaking with you today.  I value your experience and would be very thankful for your time in providing feedback in our clinic survey. In the next few days, you may receive an email or text message from [employer] with a link to a survey related to your  clinical pharmacist.\"     To schedule another MTM appointment, please call the clinic directly or you may call the MTM scheduling line at [phone] or toll-free at [phone].     My Clinical Pharmacist's contact information:                                                      Please feel free to contact me with any questions or concerns you have.      Lara Cain, PharmD  Medication Therapy Management Pharmacist  [facility] - Monday and Wednesday 7:30 - 4:00  Pager: [phone]    "

## 2022-10-12 NOTE — PROGRESS NOTES
ANTICOAGULATION MANAGEMENT     Ирина Yanez 87 year old female is on warfarin with therapeutic INR result. (Goal INR 2.0-3.0)    Recent labs: (last 7 days)     10/12/22  1102   INR 2.8*       ASSESSMENT       Source(s): Chart Review and Patient/Caregiver Call       Warfarin doses taken: Warfarin taken as instructed    Diet: No new diet changes identified    New illness, injury, or hospitalization: No    Medication/supplement changes: None noted    Signs or symptoms of bleeding or clotting: No    Previous INR: Supratherapeutic    Additional findings: None       PLAN     Recommended plan for no diet, medication or health factor changes affecting INR     Dosing Instructions: Continue your current warfarin dose with next INR in 3 weeks       Summary  As of 10/12/2022    Full warfarin instructions:  5 mg every Tue, Sat; 3.75 mg all other days   Next INR check:  11/2/2022             Telephone call with Ирина who verbalizes understanding and agrees to plan    Lab visit scheduled    Education provided: Please call back if any changes to your diet, medications or how you've been taking warfarin and Contact [phone]  with any changes, questions or concerns.     Plan made per [facility] anticoagulation protocol    Alondra Richter RN  Anticoagulation Clinic  10/12/2022    _______________________________________________________________________     Anticoagulation Episode Summary     Current INR goal:  2.0-3.0   TTR:  49.7 % (1 y)   Target end date:  Indefinite   Send INR reminders to:  [facility]    Indications    Paroxysmal atrial fibrillation (H) [I48.0]  Long term current use of anticoagulants with INR goal of 2.0-3.0 [Z79.01]           Comments:           Anticoagulation Care Providers     Provider Role Specialty Phone number    Yuridia Villarreal MD Referring Family Medicine [phone]    Mil García MD Referring Family Medicine [phone]

## 2022-10-13 RX ORDER — TORSEMIDE 20 MG/1
TABLET ORAL
Qty: 180 TABLET | Refills: 1 | Status: SHIPPED | OUTPATIENT
Start: 2022-10-13 | End: 2023-01-11

## 2022-10-13 RX ORDER — METOPROLOL SUCCINATE 25 MG/1
TABLET, EXTENDED RELEASE ORAL
Qty: 90 TABLET | Refills: 1 | Status: SHIPPED | OUTPATIENT
Start: 2022-10-13 | End: 2023-05-19

## 2022-10-13 NOTE — TELEPHONE ENCOUNTER
Routing refill request to provider for review/approval because:  Labs out of range:  cr  Creatinine   Date Value Ref Range Status   09/13/2022 2.15 (H) 0.52 - 1.04 mg/dL Final   06/02/2021 2.43 (H) 0.52 - 1.04 mg/dL Final   Kale Garces RN on 10/13/2022 at 11:47 AM

## 2022-10-19 ENCOUNTER — TELEPHONE (OUTPATIENT)
Dept: PHARMACY | Facility: CLINIC | Age: 87
End: 2022-10-19

## 2022-10-19 ENCOUNTER — ALLIED HEALTH/NURSE VISIT (OUTPATIENT)
Dept: FAMILY MEDICINE | Facility: CLINIC | Age: 87
End: 2022-10-19
Payer: COMMERCIAL

## 2022-10-19 VITALS — SYSTOLIC BLOOD PRESSURE: 144 MMHG | DIASTOLIC BLOOD PRESSURE: 60 MMHG | HEART RATE: 52 BPM

## 2022-10-19 DIAGNOSIS — Z01.30 BP CHECK: Primary | ICD-10-CM

## 2022-10-19 PROCEDURE — 99207 PR NO CHARGE NURSE ONLY: CPT | Performed by: FAMILY MEDICINE

## 2022-10-19 NOTE — PROGRESS NOTES
Ирина Yanez was evaluated at [facility] on October 19, 2022 at which time her blood pressure was:    BP Readings from Last 3 Encounters:   10/19/22 (!) 144/60   10/12/22 131/54   09/20/22 136/50     Pulse Readings from Last 3 Encounters:   10/19/22 52   10/12/22 55   09/12/22 (!) 49       Reviewed lifestyle modifications for blood pressure control and reduction: including making healthy food choices, managing weight, getting regular exercise, smoking cessation, reducing alcohol consumption, monitoring blood pressure regularly.     Symptoms: None    BP Goal:< 140/90 mmHg    BP Assessment:  BP too high    Potential Reasons for BP too high: NA - Not applicable    BP Follow-Up Plan: Recheck BP in 30 days at pharmacy    Recommendation to Provider: Recheck blood pressure within 30 days.    Note completed by: Bony Vidal    Thank You   Maulik Vela  [facility]

## 2022-10-19 NOTE — TELEPHONE ENCOUNTER
Called patient to follow up on high blood pressure at pharmacy today.  She confirms that she did restart diltiazem  mg ONCE daily.  She had been out of her diltiazem for a week at our previous visit on 10/12/2022 and blood pressure well controlled so I just restarted her on diltiazem ER once daily instead of twice daily since the ER formulation is typically only dosed once a day.  She is checking her blood pressure at home with the most recent readings below:    Last night before bed it was high 176/75 but this morning it was 136/55.     It appears based on pharmacy record that Dr. Whatley her nephrologist had previously been prescribing her diltiazem.  I did call and leave a message with Dr. Whatley's nurse Maria De Jesus to find out if Dr. Whatley is managing her blood pressure or if he is deferring that to Dr. García her primary care provider.  I was also calling to find out if he has any input on what medications Ирина is currently on for blood pressure and if he has any recommendations to further improve her readings.    BP Readings from Last 3 Encounters:   10/19/22 (!) 144/60   10/12/22 131/54   09/20/22 136/50      Pulse Readings from Last 3 Encounters:   10/19/22 52   10/12/22 55   09/12/22 (!) 49     Wt Readings from Last 3 Encounters:   10/12/22 186 lb 14.4 oz (84.8 kg)   09/12/22 188 lb 14.4 oz (85.7 kg)   08/30/22 188 lb 11.2 oz (85.6 kg)     Called patient to let her know that I am waiting to hear back from Dr. Whatley on any input recommendations around blood pressure management.  Asked her to continue to monitor her blood pressure at home and write down the readings.  I will follow-up with her on Monday and come up with a follow-up plan. Hopefully I will have heard back from Dr. Whatley's nurse by then.    Lara Cain, PharmD  Medication Therapy Management Pharmacist

## 2022-10-24 NOTE — TELEPHONE ENCOUNTER
I received a voicemail back from Maria De Jesus, Dr. Whatley's nurse, who is patient's nephrologist.  Maria De Jesus stated that Dr. Whatley would like to manage Ирина's blood pressure.  I did follow-up with Ирина and Dr. Whatley's office did call to get her home blood pressure readings and we will call her back with a plan for any medication changes.  Patient will follow-up with Dr. Whatley in regards to any blood pressure concerns in the future.  Routing to Dr. García for review.     Lara Cain, PharmD  Medication Therapy Management Pharmacist

## 2022-11-02 ENCOUNTER — LAB (OUTPATIENT)
Dept: LAB | Facility: CLINIC | Age: 87
End: 2022-11-02
Payer: COMMERCIAL

## 2022-11-02 ENCOUNTER — ANTICOAGULATION THERAPY VISIT (OUTPATIENT)
Dept: ANTICOAGULATION | Facility: CLINIC | Age: 87
End: 2022-11-02

## 2022-11-02 DIAGNOSIS — Z79.01 LONG TERM CURRENT USE OF ANTICOAGULANTS WITH INR GOAL OF 2.0-3.0: ICD-10-CM

## 2022-11-02 DIAGNOSIS — I48.0 PAROXYSMAL ATRIAL FIBRILLATION (H): ICD-10-CM

## 2022-11-02 DIAGNOSIS — I48.0 PAROXYSMAL ATRIAL FIBRILLATION (H): Primary | ICD-10-CM

## 2022-11-02 LAB — INR BLD: 2.6 (ref 0.9–1.1)

## 2022-11-02 PROCEDURE — 36416 COLLJ CAPILLARY BLOOD SPEC: CPT

## 2022-11-02 PROCEDURE — 85610 PROTHROMBIN TIME: CPT

## 2022-11-02 NOTE — PROGRESS NOTES
ANTICOAGULATION MANAGEMENT     Ирина Yanez 87 year old female is on warfarin with therapeutic INR result. (Goal INR 2.0-3.0)    Recent labs: (last 7 days)     11/02/22  0953   INR 2.6*       ASSESSMENT       Source(s): Chart Review and Patient/Caregiver Call       Warfarin doses taken: Warfarin taken as instructed    Diet: No new diet changes identified    New illness, injury, or hospitalization: No    Medication/supplement changes: None noted    Signs or symptoms of bleeding or clotting: No    Previous INR: Therapeutic last visit; previously outside of goal range    Additional findings: None       PLAN     Recommended plan for no diet, medication or health factor changes affecting INR     Dosing Instructions: Continue your current warfarin dose with next INR in 3 weeks       Summary  As of 11/2/2022    Full warfarin instructions:  5 mg every Tue, Sat; 3.75 mg all other days; Starting 11/2/2022   Next INR check:  11/22/2022             Telephone call with Ирина who verbalizes understanding and agrees to plan    Lab visit scheduled    Education provided:     Please call back if any changes to your diet, medications or how you've been taking warfarin    Symptom monitoring: monitoring for bleeding signs and symptoms and monitoring for clotting signs and symptoms    Plan made per [facility] anticoagulation protocol    Mecca Hayden RN  Anticoagulation Clinic  11/2/2022    _______________________________________________________________________     Anticoagulation Episode Summary     Current INR goal:  2.0-3.0   TTR:  53.1 % (1 y)   Target end date:  Indefinite   Send INR reminders to:  [facility]    Indications    Paroxysmal atrial fibrillation (H) [I48.0]  Long term current use of anticoagulants with INR goal of 2.0-3.0 [Z79.01]           Comments:           Anticoagulation Care Providers     Provider Role Specialty Phone number    Yuridia Villarreal MD Referring Family Medicine [phone]    Mil García  MD [facility] Medicine [phone]

## 2022-11-15 ENCOUNTER — TELEPHONE (OUTPATIENT)
Dept: FAMILY MEDICINE | Facility: CLINIC | Age: 87
End: 2022-11-15

## 2022-11-15 NOTE — TELEPHONE ENCOUNTER
Rec'd call from [employer]  Regarding patients application for ozempic. Nguyen explained that will need a new completed application. Printed application and insurance card information and placed in PCP basket to discuss at visit on 11/22.    Caroline Perez

## 2022-11-17 DIAGNOSIS — I48.0 PAROXYSMAL ATRIAL FIBRILLATION (H): ICD-10-CM

## 2022-11-18 RX ORDER — WARFARIN SODIUM 2.5 MG/1
TABLET ORAL
Qty: 150 TABLET | Refills: 1 | Status: SHIPPED | OUTPATIENT
Start: 2022-11-18 | End: 2023-04-14

## 2022-11-18 NOTE — TELEPHONE ENCOUNTER
ANTICOAGULATION MANAGEMENT:  Medication Refill    Anticoagulation Summary  As of 11/2/2022    Warfarin maintenance plan:  5 mg (2.5 mg x 2) every Tue, Sat; 3.75 mg (2.5 mg x 1.5) all other days; Starting 11/2/2022   Next INR check:  11/22/2022   Target end date:  Indefinite    Indications    Paroxysmal atrial fibrillation (H) [I48.0]  Long term current use of anticoagulants with INR goal of 2.0-3.0 [Z79.01]             Anticoagulation Care Providers     Provider Role Specialty Phone number    Yuridia Villarreal MD Referring Family Medicine [phone]    Mil García MD Referring Family Medicine [phone]          Visit with referring provider/group within last year: Yes    [facility] referral signed within last year: Yes    Ирина meets all criteria for refill (current [facility] referral, office visit with referring provider/group in last year, lab monitoring up to date or not exceeding 2 weeks overdue). Rx instructions and quantity supplied updated to match patient's current dosing plan. Warfarin 90 day supply with 1 refill granted per [facility] protocol     Sherri Tipton RN  Anticoagulation Clinic

## 2022-11-18 NOTE — TELEPHONE ENCOUNTER
Routing to [facility].     Jamila PALAFOX RN, BSN, PHN  [facility]

## 2022-11-21 ENCOUNTER — ALLIED HEALTH/NURSE VISIT (OUTPATIENT)
Dept: FAMILY MEDICINE | Facility: CLINIC | Age: 87
End: 2022-11-21
Payer: COMMERCIAL

## 2022-11-21 VITALS — DIASTOLIC BLOOD PRESSURE: 60 MMHG | SYSTOLIC BLOOD PRESSURE: 136 MMHG

## 2022-11-21 DIAGNOSIS — Z01.30 BP CHECK: Primary | ICD-10-CM

## 2022-11-21 PROCEDURE — 99207 PR NO CHARGE NURSE ONLY: CPT | Performed by: FAMILY MEDICINE

## 2022-11-21 NOTE — PROGRESS NOTES
Ирина Yanez was evaluated at [facility] on November 21, 2022 at which time her blood pressure was:    BP Readings from Last 3 Encounters:   11/21/22 136/60   10/19/22 (!) 144/60   10/12/22 131/54     Pulse Readings from Last 3 Encounters:   10/19/22 52   10/12/22 55   09/12/22 (!) 49       Reviewed lifestyle modifications for blood pressure control and reduction: including making healthy food choices, managing weight, getting regular exercise, smoking cessation, reducing alcohol consumption, monitoring blood pressure regularly.     Symptoms: None    BP Goal:< 140/90 mmHg    BP Assessment:  BP at goal    Potential Reasons for BP too high: NA - Not applicable    BP Follow-Up Plan: Recheck BP in 6 months at pharmacy    Recommendation to Provider: None    Note completed by: Bony Vidal    Thank You   Maulik Vela [facility]

## 2022-11-22 ENCOUNTER — OFFICE VISIT (OUTPATIENT)
Dept: FAMILY MEDICINE | Facility: CLINIC | Age: 87
End: 2022-11-22
Payer: COMMERCIAL

## 2022-11-22 ENCOUNTER — ANTICOAGULATION THERAPY VISIT (OUTPATIENT)
Dept: ANTICOAGULATION | Facility: CLINIC | Age: 87
End: 2022-11-22

## 2022-11-22 VITALS
TEMPERATURE: 98.6 F | OXYGEN SATURATION: 99 % | RESPIRATION RATE: 14 BRPM | SYSTOLIC BLOOD PRESSURE: 130 MMHG | HEIGHT: 64 IN | WEIGHT: 189.3 LBS | DIASTOLIC BLOOD PRESSURE: 50 MMHG | HEART RATE: 67 BPM | BODY MASS INDEX: 32.32 KG/M2

## 2022-11-22 DIAGNOSIS — E11.21 TYPE 2 DIABETES MELLITUS WITH DIABETIC NEPHROPATHY, WITHOUT LONG-TERM CURRENT USE OF INSULIN (H): Primary | ICD-10-CM

## 2022-11-22 DIAGNOSIS — I48.0 PAROXYSMAL ATRIAL FIBRILLATION (H): ICD-10-CM

## 2022-11-22 DIAGNOSIS — F41.9 ANXIETY: ICD-10-CM

## 2022-11-22 DIAGNOSIS — Z79.01 LONG TERM CURRENT USE OF ANTICOAGULANTS WITH INR GOAL OF 2.0-3.0: ICD-10-CM

## 2022-11-22 DIAGNOSIS — N18.4 CKD (CHRONIC KIDNEY DISEASE) STAGE 4, GFR 15-29 ML/MIN (H): ICD-10-CM

## 2022-11-22 DIAGNOSIS — I48.0 PAROXYSMAL ATRIAL FIBRILLATION (H): Primary | ICD-10-CM

## 2022-11-22 LAB
ANION GAP SERPL CALCULATED.3IONS-SCNC: 15 MMOL/L (ref 7–15)
BUN SERPL-MCNC: 67.6 MG/DL (ref 8–23)
CALCIUM SERPL-MCNC: 10.2 MG/DL (ref 8.8–10.2)
CHLORIDE SERPL-SCNC: 103 MMOL/L (ref 98–107)
CREAT SERPL-MCNC: 2.43 MG/DL (ref 0.51–0.95)
CREAT UR-MCNC: 47.4 MG/DL
DEPRECATED HCO3 PLAS-SCNC: 25 MMOL/L (ref 22–29)
ERYTHROCYTE [DISTWIDTH] IN BLOOD BY AUTOMATED COUNT: 13.3 % (ref 10–15)
GFR SERPL CREATININE-BSD FRML MDRD: 19 ML/MIN/1.73M2
GLUCOSE SERPL-MCNC: 189 MG/DL (ref 70–99)
HBA1C MFR BLD: 7.3 % (ref 0–5.6)
HCT VFR BLD AUTO: 36.1 % (ref 35–47)
HGB BLD-MCNC: 11.5 G/DL (ref 11.7–15.7)
INR BLD: 2.3 (ref 0.9–1.1)
MCH RBC QN AUTO: 29.9 PG (ref 26.5–33)
MCHC RBC AUTO-ENTMCNC: 31.9 G/DL (ref 31.5–36.5)
MCV RBC AUTO: 94 FL (ref 78–100)
MICROALBUMIN UR-MCNC: 39.2 MG/L
MICROALBUMIN/CREAT UR: 82.7 MG/G CR (ref 0–25)
PLATELET # BLD AUTO: 227 10E3/UL (ref 150–450)
POTASSIUM SERPL-SCNC: 4.8 MMOL/L (ref 3.4–5.3)
RBC # BLD AUTO: 3.84 10E6/UL (ref 3.8–5.2)
SODIUM SERPL-SCNC: 143 MMOL/L (ref 136–145)
WBC # BLD AUTO: 8.2 10E3/UL (ref 4–11)

## 2022-11-22 PROCEDURE — 85610 PROTHROMBIN TIME: CPT | Performed by: FAMILY MEDICINE

## 2022-11-22 PROCEDURE — 36416 COLLJ CAPILLARY BLOOD SPEC: CPT | Performed by: FAMILY MEDICINE

## 2022-11-22 PROCEDURE — 85027 COMPLETE CBC AUTOMATED: CPT | Performed by: FAMILY MEDICINE

## 2022-11-22 PROCEDURE — 83036 HEMOGLOBIN GLYCOSYLATED A1C: CPT | Performed by: FAMILY MEDICINE

## 2022-11-22 PROCEDURE — 82043 UR ALBUMIN QUANTITATIVE: CPT | Performed by: FAMILY MEDICINE

## 2022-11-22 PROCEDURE — 36415 COLL VENOUS BLD VENIPUNCTURE: CPT | Performed by: FAMILY MEDICINE

## 2022-11-22 PROCEDURE — 80048 BASIC METABOLIC PNL TOTAL CA: CPT | Performed by: FAMILY MEDICINE

## 2022-11-22 PROCEDURE — 99214 OFFICE O/P EST MOD 30 MIN: CPT | Performed by: FAMILY MEDICINE

## 2022-11-22 RX ORDER — DOXAZOSIN 2 MG/1
2 TABLET ORAL EVERY EVENING
COMMUNITY
Start: 2022-10-26 | End: 2023-10-26

## 2022-11-22 ASSESSMENT — ENCOUNTER SYMPTOMS
CONSTITUTIONAL NEGATIVE: 1
HEADACHES: 0
PALPITATIONS: 0
SHORTNESS OF BREATH: 0

## 2022-11-22 ASSESSMENT — PAIN SCALES - GENERAL: PAINLEVEL: NO PAIN (0)

## 2022-11-22 NOTE — PROGRESS NOTES
ANTICOAGULATION MANAGEMENT     Ирина Yanez 87 year old female is on warfarin with therapeutic INR result. (Goal INR 2.0-3.0)    Recent labs: (last 7 days)     11/22/22  1417   INR 2.3*       ASSESSMENT       Source(s): Chart Review and Patient/Caregiver Call       Warfarin doses taken: Warfarin taken as instructed    Diet: No new diet changes identified    New illness, injury, or hospitalization: No    Medication/supplement changes: Reports starting doxazosin about 1 month ago. No interaction per uptodate. Office visit today, notes still pending, Ирина reports no changes.    Signs or symptoms of bleeding or clotting: No    Previous INR: Therapeutic last 2(+) visits    Additional findings: None       PLAN     Recommended plan for no diet, medication or health factor changes affecting INR     Dosing Instructions: Continue your current warfarin dose with next INR in 4 weeks       Summary  As of 11/22/2022    Full warfarin instructions:  5 mg every Tue, Sat; 3.75 mg all other days; Starting 11/22/2022   Next INR check:  12/20/2022             Telephone call with Ирина who verbalizes understanding and agrees to plan    Lab visit scheduled    Education provided:     Please call back if any changes to your diet, medications or how you've been taking warfarin    Interaction IS NOT anticipated between warfarin and doxazosin    Plan made per [facility] anticoagulation protocol    Cris Richter RN  Anticoagulation Clinic  11/22/2022    _______________________________________________________________________     Anticoagulation Episode Summary     Current INR goal:  2.0-3.0   TTR:  58.0 % (1 y)   Target end date:  Indefinite   Send INR reminders to:  [facility]    Indications    Paroxysmal atrial fibrillation (H) [I48.0]  Long term current use of anticoagulants with INR goal of 2.0-3.0 [Z79.01]           Comments:           Anticoagulation Care Providers     Provider Role Specialty Phone number    Yuridia Villarreal  MD LESLIE Referring Family Medicine [phone]    Mil García MD Referring Family Medicine [phone]

## 2022-11-22 NOTE — PROGRESS NOTES
Assessment and Plan    (E11.21) Type 2 diabetes mellitus with diabetic nephropathy, without long-term current use of insulin (H)  (primary encounter diagnosis)  Comment: A1c at goal, con't current plan  Plan: HEMOGLOBIN A1C, Albumin Random Urine         Quantitative with Creat Ratio, BASIC METABOLIC         PANEL            (N18.4) CKD (chronic kidney disease) stage 4, GFR 15-29 ml/min (H)  Comment: monitoring stable, follows with nephro  Plan: Albumin Random Urine Quantitative with Creat         Ratio            (I48.0) Paroxysmal atrial fibrillation (H)  Comment: stable, on coumadin  Plan:     (Z79.01) Long term current use of anticoagulants with INR goal of 2.0-3.0  Comment:   Plan: see above    (F41.9) Anxiety  Comment: mood stable  Plan: sertraline (ZOLOFT) 50 MG tablet              RTC in 6m PE    Mil García MD      Bárbara Gifford is a 87 year old, presenting for the following health issues:  Diabetes      History of Present Illness       Diabetes:   She presents for follow up of diabetes.  She is checking home blood glucose a few times a week. She checks blood glucose before meals and at bedtime.  Blood glucose is never over 200 and never under 70. When her blood glucose is low, the patient is asymptomatic for confusion, blurred vision, lethargy and reports not feeling dizzy, shaky, or weak.  She is concerned about other. She is having numbness in feet and burning in feet.         She eats 2-3 servings of fruits and vegetables daily.She consumes 0 sweetened beverage(s) daily.She exercises with enough effort to increase her heart rate 9 or less minutes per day.  She exercises with enough effort to increase her heart rate 3 or less days per week.   She is taking medications regularly.     Blood sugars have been ranging around 145    Feeling well.  No acute questions or concerns.  Is still working with [facility] on getting accepted to program for Ozempic.    Feels mood is OK, although feeling sad due to  "'s health problems and the extra care he needs.  Does find it helpful.    Review of Systems   Constitutional: Negative.    Eyes: Negative for visual disturbance.   Respiratory: Negative for shortness of breath.    Cardiovascular: Negative for chest pain, palpitations and peripheral edema.   Neurological: Negative for headaches.            Objective    /50 (BP Location: Right arm, Patient Position: Sitting, Cuff Size: Adult Large)   Pulse 67   Temp 98.6  F (37  C) (Tympanic)   Resp 14   Ht 1.626 m (5' 4\")   Wt 85.9 kg (189 lb 4.8 oz)   LMP  (LMP Unknown)   SpO2 99%   BMI 32.49 kg/m    Body mass index is 32.49 kg/m .  Physical Exam  Vitals and nursing note reviewed.   Constitutional:       Appearance: She is well-developed and well-nourished.   Cardiovascular:      Rate and Rhythm: Normal rate and regular rhythm.      Heart sounds: Murmur heard.    Systolic murmur is present with a grade of 3/6.  Pulmonary:      Effort: Pulmonary effort is normal.   Skin:     General: Skin is warm and dry.   Neurological:      Mental Status: She is alert and oriented to person, place, and time.                            "

## 2022-12-05 DIAGNOSIS — N25.81 SECONDARY RENAL HYPERPARATHYROIDISM (H): ICD-10-CM

## 2022-12-05 DIAGNOSIS — D63.1 ANEMIA IN STAGE 4 CHRONIC KIDNEY DISEASE (H): ICD-10-CM

## 2022-12-05 DIAGNOSIS — N18.4 CHRONIC KIDNEY DISEASE, STAGE IV (SEVERE) (H): Primary | ICD-10-CM

## 2022-12-05 DIAGNOSIS — D50.8 IRON DEFICIENCY ANEMIA SECONDARY TO INADEQUATE DIETARY IRON INTAKE: ICD-10-CM

## 2022-12-05 DIAGNOSIS — N18.4 ANEMIA IN STAGE 4 CHRONIC KIDNEY DISEASE (H): ICD-10-CM

## 2022-12-08 ENCOUNTER — TELEPHONE (OUTPATIENT)
Dept: FAMILY MEDICINE | Facility: CLINIC | Age: 87
End: 2022-12-08

## 2022-12-20 ENCOUNTER — DOCUMENTATION ONLY (OUTPATIENT)
Dept: ANTICOAGULATION | Facility: CLINIC | Age: 87
End: 2022-12-20

## 2022-12-20 ENCOUNTER — ANTICOAGULATION THERAPY VISIT (OUTPATIENT)
Dept: ANTICOAGULATION | Facility: CLINIC | Age: 87
End: 2022-12-20

## 2022-12-20 ENCOUNTER — LAB (OUTPATIENT)
Dept: LAB | Facility: CLINIC | Age: 87
End: 2022-12-20
Payer: COMMERCIAL

## 2022-12-20 DIAGNOSIS — I48.0 PAROXYSMAL ATRIAL FIBRILLATION (H): Primary | ICD-10-CM

## 2022-12-20 DIAGNOSIS — N25.81 SECONDARY RENAL HYPERPARATHYROIDISM (H): ICD-10-CM

## 2022-12-20 DIAGNOSIS — Z79.01 LONG TERM CURRENT USE OF ANTICOAGULANTS WITH INR GOAL OF 2.0-3.0: ICD-10-CM

## 2022-12-20 DIAGNOSIS — D63.1 ANEMIA IN STAGE 4 CHRONIC KIDNEY DISEASE (H): ICD-10-CM

## 2022-12-20 DIAGNOSIS — N18.4 CHRONIC KIDNEY DISEASE, STAGE IV (SEVERE) (H): ICD-10-CM

## 2022-12-20 DIAGNOSIS — I48.0 PAROXYSMAL ATRIAL FIBRILLATION (H): ICD-10-CM

## 2022-12-20 DIAGNOSIS — D50.8 IRON DEFICIENCY ANEMIA SECONDARY TO INADEQUATE DIETARY IRON INTAKE: ICD-10-CM

## 2022-12-20 DIAGNOSIS — N18.4 ANEMIA IN STAGE 4 CHRONIC KIDNEY DISEASE (H): ICD-10-CM

## 2022-12-20 LAB
ALBUMIN SERPL BCG-MCNC: 4 G/DL (ref 3.5–5.2)
ANION GAP SERPL CALCULATED.3IONS-SCNC: 15 MMOL/L (ref 7–15)
BUN SERPL-MCNC: 58.1 MG/DL (ref 8–23)
CALCIUM SERPL-MCNC: 10 MG/DL (ref 8.8–10.2)
CHLORIDE SERPL-SCNC: 102 MMOL/L (ref 98–107)
CREAT SERPL-MCNC: 2.24 MG/DL (ref 0.51–0.95)
DEPRECATED HCO3 PLAS-SCNC: 26 MMOL/L (ref 22–29)
GFR SERPL CREATININE-BSD FRML MDRD: 21 ML/MIN/1.73M2
GLUCOSE SERPL-MCNC: 217 MG/DL (ref 70–99)
INR BLD: 3.4 (ref 0.9–1.1)
IRON BINDING CAPACITY (ROCHE): 263 UG/DL (ref 240–430)
IRON SATN MFR SERPL: 25 % (ref 15–46)
IRON SERPL-MCNC: 65 UG/DL (ref 37–145)
PHOSPHATE SERPL-MCNC: 4.3 MG/DL (ref 2.5–4.5)
POTASSIUM SERPL-SCNC: 4.3 MMOL/L (ref 3.4–5.3)
SODIUM SERPL-SCNC: 143 MMOL/L (ref 136–145)

## 2022-12-20 PROCEDURE — 83970 ASSAY OF PARATHORMONE: CPT

## 2022-12-20 PROCEDURE — 85610 PROTHROMBIN TIME: CPT

## 2022-12-20 PROCEDURE — 83540 ASSAY OF IRON: CPT

## 2022-12-20 PROCEDURE — 83550 IRON BINDING TEST: CPT

## 2022-12-20 PROCEDURE — 80069 RENAL FUNCTION PANEL: CPT

## 2022-12-20 PROCEDURE — 36415 COLL VENOUS BLD VENIPUNCTURE: CPT

## 2022-12-20 PROCEDURE — 82728 ASSAY OF FERRITIN: CPT

## 2022-12-20 NOTE — PROGRESS NOTES
ANTICOAGULATION CLINIC REFERRAL RENEWAL REQUEST       An annual renewal order is required for all patients referred to [facility].?  Please review and sign the pended referral order for Ирина Yanez.       ANTICOAGULATION SUMMARY      Warfarin indication(s)   Atrial Fibrillation    Mechanical heart valve present?  NO       Current goal range   INR: 2.0-3.0     Goal appropriate for indication? Goal INR 2-3, standard for indication(s) above     Time in Therapeutic Range (TTR)  (Goal > 60%) 59%       Office visit with referring provider's group within last year yes on 11/22/2022       Summer Mina RN  [facility]

## 2022-12-20 NOTE — PROGRESS NOTES
ANTICOAGULATION MANAGEMENT     Ирина Yanez 87 year old female is on warfarin with supratherapeutic INR result. (Goal INR 2.0-3.0)    Recent labs: (last 7 days)     12/20/22  1037   INR 3.4*       ASSESSMENT       Source(s): Chart Review and Patient/Caregiver Call       Warfarin doses taken: Warfarin taken as instructed    Diet: Decreased greens/vitamin K in diet; plans to resume previous intake    New illness, injury, or hospitalization: No    Medication/supplement changes: None noted    Signs or symptoms of bleeding or clotting: No    Previous INR: Therapeutic last 2(+) visits    Additional findings: see chart review notes below regarding recent office visits.       PLAN     Recommended plan for temporary change(s) affecting INR     Dosing Instructions: partial hold then continue your current warfarin dose with next INR in 2 weeks       Summary  As of 12/20/2022    Full warfarin instructions:  12/20: 2.5 mg; Otherwise 5 mg every Tue, Sat; 3.75 mg all other days; Starting 12/20/2022   Next INR check:  1/3/2023             Telephone call with Ирина who verbalizes understanding and agrees to plan    Lab visit scheduled    Education provided:     Dietary considerations: importance of consistent vitamin K intake    Plan made per [facility] anticoagulation protocol    Summer Mina, RN  Anticoagulation Clinic  12/20/2022    _______________________________________________________________________     Anticoagulation Episode Summary     Current INR goal:  2.0-3.0   TTR:  59.1 % (1 y)   Target end date:  Indefinite   Send INR reminders to:  [facility]    Indications    Paroxysmal atrial fibrillation (H) [I48.0]  Long term current use of anticoagulants with INR goal of 2.0-3.0 [Z79.01]           Comments:           Anticoagulation Care Providers     Provider Role Specialty Phone number    Yuridia Villarreal MD Referring Family Medicine [phone]    Mil García MD Referring Family Medicine [phone]

## 2022-12-20 NOTE — PROGRESS NOTES
ANTICOAGULATION MANAGEMENT     Ирина Yanez 87 year old female is on warfarin with supratherapeutic INR result. (Goal INR 2.0-3.0)    Recent labs: (last 7 days)     12/20/22  1037   INR 3.4*       ASSESSMENT       Source(s): Chart Review    Previous INR was Therapeutic last 2(+) visits    Medication, diet, health changes since last INR     11/22/22 PCP visit-no changes    11/29/22 nephrology visit-no changes           PLAN     Unable to reach Ирина palmer x 2 on either home or cell    Left VM to take 2.5mg tonight and to call [phone] with transfer to [facility] OR [facility]      Follow up required to confirm warfarin dose taken and assess for changes    Summer Mina, RN  Anticoagulation Clinic  12/20/2022

## 2022-12-21 LAB
FERRITIN SERPL-MCNC: 254 NG/ML (ref 11–328)
PTH-INTACT SERPL-MCNC: 105 PG/ML (ref 15–65)

## 2023-01-04 ENCOUNTER — TELEPHONE (OUTPATIENT)
Dept: PHARMACY | Facility: CLINIC | Age: 88
End: 2023-01-04

## 2023-01-04 NOTE — TELEPHONE ENCOUNTER
I filled out the [employer] patient assistance program provider application section for patient to receive Ozempic through the .  I placed on Dr. García's desk for signature and date. Please place on my desk once signed so I can put with patients section and fax together. Thank you.    Lara Cain, PharmD  Medication Therapy Management Pharmacist

## 2023-01-09 NOTE — TELEPHONE ENCOUNTER
Faxed provider section and patients application for the [employer] prescription assistance lena for Ozempic for 2023.    Lara Cain, PharmD  Medication Therapy Management Pharmacist

## 2023-01-11 ENCOUNTER — VIRTUAL VISIT (OUTPATIENT)
Dept: PHARMACY | Facility: CLINIC | Age: 88
End: 2023-01-11
Payer: COMMERCIAL

## 2023-01-11 ENCOUNTER — LAB (OUTPATIENT)
Dept: LAB | Facility: CLINIC | Age: 88
End: 2023-01-11
Payer: COMMERCIAL

## 2023-01-11 ENCOUNTER — ANTICOAGULATION THERAPY VISIT (OUTPATIENT)
Dept: ANTICOAGULATION | Facility: CLINIC | Age: 88
End: 2023-01-11

## 2023-01-11 DIAGNOSIS — I10 HYPERTENSION GOAL BP (BLOOD PRESSURE) < 140/90: ICD-10-CM

## 2023-01-11 DIAGNOSIS — I50.32 CHRONIC DIASTOLIC CONGESTIVE HEART FAILURE (H): ICD-10-CM

## 2023-01-11 DIAGNOSIS — I48.0 PAROXYSMAL ATRIAL FIBRILLATION (H): ICD-10-CM

## 2023-01-11 DIAGNOSIS — E11.21 TYPE 2 DIABETES MELLITUS WITH DIABETIC NEPHROPATHY, WITHOUT LONG-TERM CURRENT USE OF INSULIN (H): Primary | ICD-10-CM

## 2023-01-11 DIAGNOSIS — Z79.01 LONG TERM CURRENT USE OF ANTICOAGULANTS WITH INR GOAL OF 2.0-3.0: ICD-10-CM

## 2023-01-11 DIAGNOSIS — I48.0 PAROXYSMAL ATRIAL FIBRILLATION (H): Primary | ICD-10-CM

## 2023-01-11 DIAGNOSIS — N18.4 CKD (CHRONIC KIDNEY DISEASE) STAGE 4, GFR 15-29 ML/MIN (H): ICD-10-CM

## 2023-01-11 LAB — INR BLD: 2.9 (ref 0.9–1.1)

## 2023-01-11 PROCEDURE — 36416 COLLJ CAPILLARY BLOOD SPEC: CPT

## 2023-01-11 PROCEDURE — 99607 MTMS BY PHARM ADDL 15 MIN: CPT | Performed by: PHARMACIST

## 2023-01-11 PROCEDURE — 99605 MTMS BY PHARM NP 15 MIN: CPT | Performed by: PHARMACIST

## 2023-01-11 PROCEDURE — 85610 PROTHROMBIN TIME: CPT

## 2023-01-11 RX ORDER — TORSEMIDE 20 MG/1
20 TABLET ORAL DAILY
Qty: 90 TABLET | Refills: 0 | Status: SHIPPED | OUTPATIENT
Start: 2023-01-11 | End: 2023-05-22

## 2023-01-11 RX ORDER — GLIPIZIDE 5 MG/1
5 TABLET ORAL
Qty: 30 TABLET | Refills: 1 | Status: SHIPPED | OUTPATIENT
Start: 2023-01-11 | End: 2023-03-27 | Stop reason: ALTCHOICE

## 2023-01-11 NOTE — LETTER
"Recommended To-Do List      Prepared on: Jan 11, 2023       You can get the best results from your medications by completing the items on this \"To-Do List.\"      Bring your To-Do List when you go to your doctor. And, share it with your family or caregivers.    My To-Do List:  What we talked about: What I should do:   An issue with your medication    Decrease your dosage of glipiZIDE (GLUCOTROL)          What we talked about: What I should do:                       "

## 2023-01-11 NOTE — PROGRESS NOTES
Medication Therapy Management (MTM) Encounter    ASSESSMENT:                            Medication Adherence/Access: No issues identified    Type 2 Diabetes/CKD: Patient is meeting A1c goal of < 8%.  Unsure if self monitoring of blood glucose is at goal of fasting  mg/dL since she has not been checking.  I would imagine they are higher since being off glipizide.  Its not entirely clear if glipizide caused her excessive diarrhea.  We discussed the potential for starting a basal insulin but patient is concerned about cost.  She is willing to retry glipizide at a lower dose to see if she can tolerate it.  Her application for the Ozempic prescription assistance program is currently being processed and hopefully will find out soon if she qualifies to get Ozempic free through the .  If she does qualify we should stop glipizide and start Ozempic and titrate.  Microalbumin is not at goal < 30 mg/g. Not taking an ACE-inhibitor or ARB; stopped by nephrologist Dr. Whatley. Aspirin therapy is not indicated in this patient due to age and due to anticoagulant/antiplatelet therapy.  Recommend she follow-up with nephrologist as planned.     Hypertension/CHF/afib: Patient is meeting blood pressure goal of < 140/90mmHg.  Her blood pressure is managed by nephrology.  Her diuretic dose seems appropriate based on stable weight.  We will send new prescription for torsemide with current directions.    PLAN:                            1. While we are waiting to see if you qualify to get Ozempic free through the manufacture, restart glipizide just 5 mg once daily in the morning to see if you can tolerate that dose.    2.  Start checking more blood sugars fasting and occasionally 2 hours after meal.  Your fasting readings should be between 90 and 150 and your 2-hour after meal readings should be less than 180.    3. Call Saul Casper with the diabetes liaison team if you have not heard about the status of your Ozempic  application by early next week.  His phone number is [phone]     4.  No need to ask your nephrologist about updating the torsemide prescription.  It appears that Dr. García has been managing this medication so I sent in an updated prescription.    Follow-up: Return in 6 weeks (on 2/22/2023) for MTM phone visit with Lara Cain.    SUBJECTIVE/OBJECTIVE:                          Ирина Yanez is a 87 year old female called for a follow-up visit.  Today's visit is a follow-up MTM visit from 10/12/22.     Reason for visit: diabetes and blood pressure follow-up.    Allergies/ADRs: Reviewed in chart  Past Medical History: Reviewed in chart  Tobacco: She reports that she has never smoked. She has never used smokeless tobacco.  Alcohol: not currently using    Medication Adherence/Access: Once in a great while she will forget a night dose, less than once a month. Takes medicines twice daily and has a couple that she takes in between.    Endocrinologist: Reji Verde - no longer seeing him, Dr. García took it over  Nephrologist: Dr. Whatley outside of  - follow up every three months - next visit end of Feb  Cardiologist: Dr. Beasley - follows annually, last visit August 2022    Type 2 Diabetes/CKD:  Currently taking calcium acetate 667 mg three times daily for CKD (started 2/2021) and calcitriol 0.25 mg once every other daily. She had been on glipizide 10 mg daily with breakfast and 5 mg daily with dinner but she stopped it about 2 weeks ago. She reports that she had multiple episodes of very bad diarrhea in December (8,9,11,12, 21, 29). She stopped glipizide around the 21 st and since the 29 th she has not had any diarrhea issues. She did take glipizide for a while before the issue started. She is pretty certain that it was from glipizide.    Medication history: She was on Ozempic in the past but stopped due to cost. She did tolerate it well and felt it worked well. Her application to get Ozempic through the  prescription assistance program was faxed earlier this month. She was on Invokana in the past but stopped due to vaginal itching. Metformin discontinued 2/2015 due to reduced renal function.    Blood sugar monitoring: not currently checking.    Blood sugars per meter:  Am fasting:  Last check was 183 about a week ago.    From last MTM visit:                Symptoms of low blood sugar? None   Symptoms of high blood sugar? none  Eye exam: up to date  Foot exam: up to date  Diet/Exercise: Reports no changes in diet or activity level. Copied forward from last visit. Eats twice daily and they are small meals. She limits salt, has green leafy vegetables every other day. She does not snack in between meals. She does not have sweetened drinks.   Aspirin: Not taking due to age and on anticoagulation  Statin: Yes: pravastatin   ACEi/ARB: No. Stopped by nephrology per care everywhere note from 12/9/20.  Urine Albumin:   Lab Results   Component Value Date    UMALCR 91.43 (H) 05/10/2022     Lab Results   Component Value Date    A1C 7.3 11/22/2022    A1C 7.7 08/11/2022    A1C 7.2 05/10/2022    A1C 7.6 02/10/2022    A1C 7.1 01/26/2021    A1C 6.8 04/27/2020    A1C 6.3 09/27/2019    A1C 6.9 03/22/2019    A1C 6.2 06/12/2018     Last Comprehensive Metabolic Panel:  Sodium   Date Value Ref Range Status   12/20/2022 143 136 - 145 mmol/L Final   06/02/2021 140 133 - 144 mmol/L Final     Potassium   Date Value Ref Range Status   12/20/2022 4.3 3.4 - 5.3 mmol/L Final   09/13/2022 4.8 3.4 - 5.3 mmol/L Final   06/02/2021 4.2 3.4 - 5.3 mmol/L Final     Chloride   Date Value Ref Range Status   12/20/2022 102 98 - 107 mmol/L Final   09/13/2022 109 94 - 109 mmol/L Final   06/02/2021 103 94 - 109 mmol/L Final     Carbon Dioxide   Date Value Ref Range Status   06/02/2021 32 20 - 32 mmol/L Final     Carbon Dioxide (CO2)   Date Value Ref Range Status   12/20/2022 26 22 - 29 mmol/L Final   09/13/2022 24 20 - 32 mmol/L Final     Anion Gap   Date Value  Ref Range Status   12/20/2022 15 7 - 15 mmol/L Final   09/13/2022 7 3 - 14 mmol/L Final   06/02/2021 5 3 - 14 mmol/L Final     Glucose   Date Value Ref Range Status   12/20/2022 217 (H) 70 - 99 mg/dL Final   09/13/2022 131 (H) 70 - 99 mg/dL Final   06/02/2021 187 (H) 70 - 99 mg/dL Final     Urea Nitrogen   Date Value Ref Range Status   12/20/2022 58.1 (H) 8.0 - 23.0 mg/dL Final   09/13/2022 67 (H) 7 - 30 mg/dL Final   06/02/2021 66 (H) 7 - 30 mg/dL Final     Creatinine   Date Value Ref Range Status   12/20/2022 2.24 (H) 0.51 - 0.95 mg/dL Final   06/02/2021 2.43 (H) 0.52 - 1.04 mg/dL Final     GFR Estimate   Date Value Ref Range Status   12/20/2022 21 (L) >60 mL/min/1.73m2 Final     Comment:     Effective December 21, 2021 eGFRcr in adults is calculated using the 2021 CKD-EPI creatinine equation which includes age and gender (Balta et al., NEJM, DOI: 10.1056/DQAWvk5518269)   06/02/2021 18 (L) >60 mL/min/[1.73_m2] Final     Comment:     Non  GFR Calc  Starting 12/18/2018, serum creatinine based estimated GFR (eGFR) will be   calculated using the Chronic Kidney Disease Epidemiology Collaboration   (CKD-EPI) equation.       Calcium   Date Value Ref Range Status   12/20/2022 10.0 8.8 - 10.2 mg/dL Final   06/02/2021 9.7 8.5 - 10.1 mg/dL Final     Bilirubin Total   Date Value Ref Range Status   02/10/2022 0.4 0.2 - 1.3 mg/dL Final   01/26/2021 0.5 0.2 - 1.3 mg/dL Final     Alkaline Phosphatase   Date Value Ref Range Status   02/10/2022 74 40 - 150 U/L Final   01/26/2021 81 40 - 150 U/L Final     ALT   Date Value Ref Range Status   02/10/2022 25 0 - 50 U/L Final   01/26/2021 40 0 - 50 U/L Final     AST   Date Value Ref Range Status   02/10/2022 13 0 - 45 U/L Final   01/26/2021 27 0 - 45 U/L Final     Hypertension/CHF/afib: Current medications include diltiazem  mg once daily (switched to once daily at last MTM visit), metoprolol ER 25 mg once daily, doxazosin 1 mg once daily in the evening (started  10/25 by nephrology), torsemide 20 mg once daily (current prescription says 40 mg once daily but she has been taking 20 mg daily since August 11, 2022 when it was decreased) and warfarin daily as directed. Per telephone encounter from 10/19/22, Dr. Whatley nephrologist would like to manage her blood pressure.  The last nephrology visit on 11/29/22 had no medication changes. Patient has not been checking blood pressure at home lately.  Patient reports no current medication side effects. She reports no chest pain, dizziness or shortness of breath. She does limit her salt and avoid alcohol. She does check her weight at home and reports it is stable.    EF%= 60-65% on 1/26/21.      BP Readings from Last 3 Encounters:   11/22/22 130/50   11/21/22 136/60   10/19/22 (!) 144/60     Wt Readings from Last 5 Encounters:   11/22/22 189 lb 4.8 oz (85.9 kg)   10/12/22 186 lb 14.4 oz (84.8 kg)   09/12/22 188 lb 14.4 oz (85.7 kg)   08/30/22 188 lb 11.2 oz (85.6 kg)   08/11/22 190 lb (86.2 kg)       Today's Vitals: LMP  (LMP Unknown)   ----------------    I spent 29 minutes with this patient today. All changes were made via collaborative practice agreement with Mil García MD. A copy of the visit note was provided to the patient's provider(s).    A summary of these recommendations was sent via [employer].    Lara Cain PharmD  Medication Therapy Management Pharmacist    Telemedicine Visit Details  Type of service:  Telephone visit  Start Time: 1:36 PM  End Time: 2:05 PM     Medication Therapy Recommendations  Type 2 diabetes mellitus with diabetic nephropathy (H)    Current Medication: glipiZIDE (GLUCOTROL) 10 MG tablet (Discontinued)   Rationale: Undesirable effect - Adverse medication event - Safety   Recommendation: Decrease Dose - glipiZIDE 5 MG tablet   Status: Accepted per CPA

## 2023-01-11 NOTE — PROGRESS NOTES
Warfarin dose was decreased since patient reports decreasing greens and this will be ongoing. Patient was eating green veggies every other day, will be cutting back to 2-3 times a week.  Alondra Richter RN, BSN  Anticoagulation Clinic

## 2023-01-11 NOTE — LETTER
_  Medication List        Prepared on: Jan 11, 2023     Bring your Medication List when you go to the doctor, hospital, or   emergency room. And, share it with your family or caregivers.     Note any changes to how you take your medications.  Cross out medications when you no longer use them.    Medication How I take it Why I use it Prescriber   acetaminophen (TYLENOL) 325 MG tablet Take 2 tablets (650 mg) by mouth every 4 hours as needed for mild pain Generalized Pain Frandy Julio MD   betamethasone valerate (VALISONE) 0.1 % external cream Apply topically daily Lichen Sclerosus et Atrophicus Mil García MD   blood glucose (NO BRAND SPECIFIED) lancets standard Use to test blood sugar 1 times daily or as directed, Contour Next lancets Type 2 diabetes mellitus with diabetic nephropathy, without long-term current use of insulin (H) Mil García MD   blood glucose (NO BRAND SPECIFIED) test strip Use to test blood sugar 1 times daily or as directed, Contour Next strips Type 2 diabetes mellitus with diabetic nephropathy, without long-term current use of insulin (H) Mil García MD   blood glucose monitoring (NO BRAND SPECIFIED) meter device kit Use to test blood sugar 1 times daily or as directed. Ultra 2 Type 2 diabetes mellitus with diabetic neuropathy, without long-term current use of insulin (H) Reji Verde MD   calcitRIOL (ROCALTROL) 0.25 MCG capsule Take 0.25 mcg by mouth every other day CKD (Chronic Kidney Disease) Stage 4, GFR 15-29 ml/min (H) Jef Whatley MD   calcium acetate (PHOSLO) 667 MG CAPS capsule Take 1 capsule (667 mg) by mouth 3 times daily (with meals) CKD (Chronic Kidney Disease) Stage 4, GFR 15-29 ml/min (H) Mil García MD   diltiazem ER COATED BEADS (CARDIAZEM LA) 180 MG 24 hr tablet TAKE 1 TABLET (180 MG) BY MOUTH EVERY EVENING Paroxysmal Atrial Fibrillation (H); Hypertension Goal BP (Blood Pressure) < 140/90 Mil García MD    doxazosin (CARDURA) 1 MG tablet Take 1 mg by mouth every evening Hypertension Goal BP (Blood Pressure) < 140/90 Jef Whatley MD   gabapentin (NEURONTIN) 100 MG capsule Taking 2 once daily in evening. Diabetic polyneuropathy associated with type 2 diabetes mellitus (H) Mil García MD   glipiZIDE (GLUCOTROL) 5 MG tablet Take 1 tablet (5 mg) by mouth every morning (before breakfast) Type 2 diabetes mellitus with diabetic nephropathy, without long-term current use of insulin (H) Mil García MD   metoprolol succinate ER (TOPROL XL) 25 MG 24 hr tablet TAKE 1 TABLET BY MOUTH DAILY. GENERIC EQUIVALENT FOR TOPROL XL Paroxysmal Atrial Fibrillation (H); Hypertension Goal BP (Blood Pressure) < 140/90 Mil García MD   MULTI-VITAMIN OR TABS Take 2 tablets by mouth daily  General Health   Patient Reported   nystatin (MYCOSTATIN) 028006 UNIT/GM external powder Apply a small amount to affected area three times daily. Intertrigo Mil García MD   pravastatin (PRAVACHOL) 20 MG tablet Take 1 tablet (20 mg) by mouth daily Hyperlipidemia with Target LDL Less than 100 Mil García MD   sertraline (ZOLOFT) 50 MG tablet Take 1 tablet (50 mg) by mouth daily Anxiety Mil García MD   torsemide (DEMADEX) 20 MG tablet Take 1 tablet (20 mg) by mouth daily Hypertension Goal BP (Blood Pressure) < 140/90; Chronic diastolic congestive heart failure (H) Mil García MD   warfarin ANTICOAGULANT (JANTOVEN ANTICOAGULANT) 2.5 MG tablet Take one and a half tablets (3.75 mg) by mouth daily except take 2 tablets (5 mg) Tue, Sat or as directed by INR Clinic. Paroxysmal Atrial Fibrillation (H) Yuridia Villarreal MD         Add new medications, over-the-counter drugs, herbals, vitamins, or  minerals in the blank rows below.    Medication How I take it Why I use it Prescriber                          Allergies:      amoxicillin; augmentin; clavulanic acid; cleocin; metformin; tegretol  [carbamazepine]        Side effects I have had:               Other Information:              My notes and questions:

## 2023-01-11 NOTE — PATIENT INSTRUCTIONS
"Recommendations from today's MTM visit:                                                       1. While we are waiting to see if you qualify to get Ozempic free through the manufacture, restart glipizide just 5 mg once daily in the morning to see if you can tolerate that dose.    2.  Start checking more blood sugars fasting and occasionally 2 hours after meal.  Your fasting readings should be between 90 and 150 and your 2-hour after meal readings should be less than 180.    3. Call Saul Casper with the diabetes liaison team if you have not heard about the status of your Ozempic application by early next week.  His phone number is [phone]     4.  No need to ask your nephrologist about updating the torsemide prescription.  It appears that Dr. García has been managing this medication so I sent in an updated prescription.    Follow-up: Return in 6 weeks (on 2/22/2023) for MTM phone visit with Lara Cain.    It was great speaking with you today.  I value your experience and would be very thankful for your time in providing feedback in our clinic survey. In the next few days, you may receive an email or text message from [employer] with a link to a survey related to your  clinical pharmacist.\"     To schedule another MTM appointment, please call the clinic directly or you may call the MTM scheduling line at [phone] or toll-free at [phone].     My Clinical Pharmacist's contact information:                                                      Please feel free to contact me with any questions or concerns you have.      Lara Cain, PharmD  Medication Therapy Management Pharmacist  [facility] - Monday and Wednesday 7:30 - 4:00  Pager: [phone]    "

## 2023-01-18 ENCOUNTER — TELEPHONE (OUTPATIENT)
Dept: PHARMACY | Facility: CLINIC | Age: 88
End: 2023-01-18
Payer: COMMERCIAL

## 2023-01-18 DIAGNOSIS — E11.21 TYPE 2 DIABETES MELLITUS WITH DIABETIC NEPHROPATHY, WITHOUT LONG-TERM CURRENT USE OF INSULIN (H): Primary | ICD-10-CM

## 2023-01-18 RX ORDER — SEMAGLUTIDE 1.34 MG/ML
INJECTION, SOLUTION SUBCUTANEOUS
Qty: 1.5 ML | Refills: 0 | Status: SHIPPED | OUTPATIENT
Start: 2023-01-18 | End: 2023-02-16

## 2023-01-18 NOTE — TELEPHONE ENCOUNTER
Patient called and left voicemail letting me know that she has gotten diarrhea again on the one tablet of glipizide. I called her back and told her to stop the glipizide.  Saul Casper from the diabetes liaison team relayed that she was approved to get Ozempic free from the  but they are running behind in shipping out orders due to the short supply. He is working on getting her a voucher to get Ozempic at our local pharmacy for free in the meantime. He will be reaching out to give her to give her an update. I sent a prescription for the Ozempic to our [place] pharmacy for her to start as soon as possible in place of glipizide. She will start 0.25 mg once weekly for 4 weeks then increase to 0.5 mg weekly. Will follow-up as planned on 2/22/23.    Routing to Saul Casper

## 2023-01-25 ENCOUNTER — ANTICOAGULATION THERAPY VISIT (OUTPATIENT)
Dept: ANTICOAGULATION | Facility: CLINIC | Age: 88
End: 2023-01-25

## 2023-01-25 ENCOUNTER — LAB (OUTPATIENT)
Dept: LAB | Facility: CLINIC | Age: 88
End: 2023-01-25
Payer: COMMERCIAL

## 2023-01-25 DIAGNOSIS — Z79.01 LONG TERM CURRENT USE OF ANTICOAGULANTS WITH INR GOAL OF 2.0-3.0: ICD-10-CM

## 2023-01-25 DIAGNOSIS — I48.0 PAROXYSMAL ATRIAL FIBRILLATION (H): Primary | ICD-10-CM

## 2023-01-25 DIAGNOSIS — I48.0 PAROXYSMAL ATRIAL FIBRILLATION (H): ICD-10-CM

## 2023-01-25 LAB — INR BLD: 3.3 (ref 0.9–1.1)

## 2023-01-25 PROCEDURE — 85610 PROTHROMBIN TIME: CPT

## 2023-01-25 PROCEDURE — 36416 COLLJ CAPILLARY BLOOD SPEC: CPT

## 2023-01-25 NOTE — PROGRESS NOTES
ANTICOAGULATION MANAGEMENT     Ирина Yanez 87 year old female is on warfarin with supratherapeutic INR result. (Goal INR 2.0-3.0)    Recent labs: (last 7 days)     01/25/23  1028   INR 3.3*       ASSESSMENT       Source(s): Chart Review and Patient/Caregiver Call       Warfarin doses taken: Warfarin taken as instructed, dose reduced 4%  two weeks ago for ongoing factor of less green veggies.    Diet: No new diet changes identified, continues with lower vit K intake (noted at last INR visit), this week also switched marti to iceberg.    New illness, injury, or hospitalization: Yes: when she started glipizide a couple weeks ago, she noted diarrhea 1/18. She was advised to stop the glipizide. Diarrhea is improved but she continues with loose stools, sometimes incontinent even.    Medication/supplement changes: Ozempic started on 1/22/23 (had to wait for pharmacy to get supply) No interaction anticipated    Signs or symptoms of bleeding or clotting: No    Previous INR: Therapeutic last visit; previously outside of goal range    Additional findings: None       PLAN     Recommended plan for ongoing change(s) affecting INR     Dosing Instructions: decrease your warfarin dose (9% change) with next INR in 2 weeks       Summary  As of 1/25/2023    Full warfarin instructions:  2.5 mg every Wed; 3.75 mg all other days   Next INR check:  2/8/2023             Telephone call with Ирина who agrees to plan and repeated back plan correctly    Lab visit scheduled    Education provided:     Please call back if any changes to your diet, medications or how you've been taking warfarin    Interaction IS NOT anticipated between warfarin and Ozempic    Importance of notifying anticoagulation clinic for: diarrhea, nausea/vomiting, reduced intake, cold/flu, and/or infections; a sooner lab recheck maybe needed    Contact [phone]  with any changes, questions or concerns.     Plan made per [facility] anticoagulation protocol    Claudia LORA  NICOLE Dong  Anticoagulation Clinic  1/25/2023    _______________________________________________________________________     Anticoagulation Episode Summary     Current INR goal:  2.0-3.0   TTR:  52.7 % (1 y)   Target end date:  Indefinite   Send INR reminders to:  [facility]    Indications    Paroxysmal atrial fibrillation (H) [I48.0]  Long term current use of anticoagulants with INR goal of 2.0-3.0 [Z79.01]           Comments:           Anticoagulation Care Providers     Provider Role Specialty Phone number    Yuridia Villarreal MD Referring Family Medicine [phone]    Mil García MD Referring Family Medicine [phone]        .

## 2023-02-14 ENCOUNTER — ANTICOAGULATION THERAPY VISIT (OUTPATIENT)
Dept: ANTICOAGULATION | Facility: CLINIC | Age: 88
End: 2023-02-14

## 2023-02-14 ENCOUNTER — LAB (OUTPATIENT)
Dept: LAB | Facility: CLINIC | Age: 88
End: 2023-02-14
Payer: COMMERCIAL

## 2023-02-14 DIAGNOSIS — I48.0 PAROXYSMAL ATRIAL FIBRILLATION (H): Primary | ICD-10-CM

## 2023-02-14 DIAGNOSIS — I48.0 PAROXYSMAL ATRIAL FIBRILLATION (H): ICD-10-CM

## 2023-02-14 DIAGNOSIS — Z79.01 LONG TERM CURRENT USE OF ANTICOAGULANTS WITH INR GOAL OF 2.0-3.0: ICD-10-CM

## 2023-02-14 LAB — INR BLD: 1.6 (ref 0.9–1.1)

## 2023-02-14 PROCEDURE — 36415 COLL VENOUS BLD VENIPUNCTURE: CPT

## 2023-02-14 PROCEDURE — 85610 PROTHROMBIN TIME: CPT

## 2023-02-14 NOTE — PROGRESS NOTES
ANTICOAGULATION MANAGEMENT     Ирина Yanez 87 year old female is on warfarin with subtherapeutic INR result. (Goal INR 2.0-3.0)    Recent labs: (last 7 days)     02/14/23  0958   INR 1.6*       ASSESSMENT       Source(s): Chart Review and Patient/Caregiver Call       Warfarin doses taken: Warfarin taken as instructed and patient is unsure if she missed any warfarin doses since her spouse is in the hospital but does not think she missed any warfarin doses    Diet: Increased greens/vitamin K in diet; plans to resume previous intake, patient reports she may have had 1 extra serving of green veggies this past week.    New illness, injury, or hospitalization: No    Medication/supplement changes: None noted    Signs or symptoms of bleeding or clotting: No    Previous INR: Supratherapeutic    Additional findings: None       PLAN     Recommended plan for temporary change(s) affecting INR, patient has had 2 recent warfarin dose decreases slight adjustment to warfarin maintenance dose today.     Dosing Instructions: booster dose then Increase your warfarin dose (5% change) with next INR in 1-2 weeks       Summary  As of 2/14/2023    Full warfarin instructions:  2/14: 5 mg; Otherwise 3.75 mg every day   Next INR check:  2/23/2023             Telephone call with Ирина who verbalizes understanding and agrees to plan    Lab visit scheduled    Education provided:     Please call back if any changes to your diet, medications or how you've been taking warfarin    Contact [phone]  with any changes, questions or concerns.     Plan made per [facility] anticoagulation protocol    Alondra Richter RN  Anticoagulation Clinic  2/14/2023    _______________________________________________________________________     Anticoagulation Episode Summary     Current INR goal:  2.0-3.0   TTR:  55.9 % (1 y)   Target end date:  Indefinite   Send INR reminders to:  KRIS RAMIREZ    Indications    Paroxysmal atrial fibrillation (H) [I48.0]  Long  term current use of anticoagulants with INR goal of 2.0-3.0 [Z79.01]           Comments:           Anticoagulation Care Providers     Provider Role Specialty Phone number    Yuridia Villarreal MD Referring Family Medicine [phone]    Mil García MD Referring Family Medicine [phone]

## 2023-02-22 ENCOUNTER — VIRTUAL VISIT (OUTPATIENT)
Dept: PHARMACY | Facility: CLINIC | Age: 88
End: 2023-02-22
Payer: COMMERCIAL

## 2023-02-22 DIAGNOSIS — N18.4 CKD (CHRONIC KIDNEY DISEASE) STAGE 4, GFR 15-29 ML/MIN (H): ICD-10-CM

## 2023-02-22 DIAGNOSIS — E11.21 TYPE 2 DIABETES MELLITUS WITH DIABETIC NEPHROPATHY, WITHOUT LONG-TERM CURRENT USE OF INSULIN (H): Primary | ICD-10-CM

## 2023-02-22 DIAGNOSIS — G89.29 OTHER CHRONIC PAIN: ICD-10-CM

## 2023-02-22 PROCEDURE — 99606 MTMS BY PHARM EST 15 MIN: CPT | Performed by: PHARMACIST

## 2023-02-22 PROCEDURE — 99607 MTMS BY PHARM ADDL 15 MIN: CPT | Performed by: PHARMACIST

## 2023-02-22 RX ORDER — ACETAMINOPHEN 500 MG
1000 TABLET ORAL
COMMUNITY

## 2023-02-22 RX ORDER — SEMAGLUTIDE 1.34 MG/ML
0.5 INJECTION, SOLUTION SUBCUTANEOUS
Qty: 1.5 ML | Refills: 1 | Status: SHIPPED | OUTPATIENT
Start: 2023-02-22 | End: 2023-05-08 | Stop reason: DRUGHIGH

## 2023-02-22 NOTE — PROGRESS NOTES
Medication Therapy Management (MTM) Encounter    ASSESSMENT:                            Medication Adherence/Access: No issues identified    Pain: Recommend she stop gabapentin as it doesn't seem to help and could be contributing to her diarrhea. Asked her to watch for increase in pain.    Type 2 Diabetes/CKD: Patient is meeting A1c goal of < 8%. Patient is not at goal of fasting  mg/dL.  She would benefit from increasing Ozempic and stopping glipizide, see plan. Microalbumin is not at goal < 30 mg/g. Not taking an ACE-inhibitor or ARB; stopped by nephrologist Dr. Whatley and he is managing her blood pressure.  Aspirin therapy is not indicated in this patient due to age and due to anticoagulant/antiplatelet therapy.      PLAN:                            1. I sent a prescription to the pharmacy for Ozempic. You should be able to  next week. It should be no charge.    2. Stop taking gabapentin to see if any change in pain and if diarrhea improves.    3. Increase Ozempic to 0.5 mg once weekly on Saturday 3/4. When you increase the dose of Ozempic stop the glipizide.    4. If you have not received your Ozempic from the  by the third week in March call Josefina Casper at [phone]     Follow-up: Return in 5 weeks (on 3/27/2023) for MTM phone visit with Lara Cain.    SUBJECTIVE/OBJECTIVE:                          Ирина Yanez is a 88 year old female called for a follow-up visit.  Today's visit is a follow-up MTM visit from 1/11/23.     Reason for visit: diabetes follow-up.    Allergies/ADRs: Reviewed in chart  Past Medical History: Reviewed in chart  Tobacco: She reports that she has never smoked. She has never used smokeless tobacco.  Alcohol: not currently using    Medication Adherence/Access: no issues reported    Endocrinologist: Reji Verde - no longer seeing him, Dr. García took it over  Nephrologist: Dr. Whatley outside of  - follow up every three months - he is managing her blood  pressure  Cardiologist: Dr. Beasley - follows annually, last visit 2022    Pain:  Current medications include: gabapentin 100 mg at bedtime and acetaminophen 1000 mg at bedtime as needed.  She is taking gabapentin but doesn't feel that it is really helping. Feels acetaminophen helps more. She is just taking it so that she doesn't waste it.    Type 2 Diabetes/CKD:  Currently taking Ozempic 0.25 mg once weekly on . She did not increase to 0.5 mg weekly after 4 weeks. She restarted glipizide 5 mg once daily in the morning on her own due to high blood sugars, started 1-2 weeks ago. Diarrhea did start up again after restarting this. She takes calcium acetate 667 mg three times daily for (started 2021) and calcitriol 0.25 mg once every other day for  CKD. She was approved to get Ozempic free through the manufacture however there was a delay in shipment due to supply.  The patient was able to get a 1 month free voucher which she filled at the [facility] on 2023.  I was able to team coordinators work with the diabetes liaison team to get her another free voucher since her order is still delayed.  Patient reports no side effects from Ozempic and the diarrhea has not worsened since starting Ozempic.    Medication history: She was on Invokana in the past but stopped due to vaginal itching. Metformin discontinued 2015 due to reduced renal function.  She has tried glipizide in the past which caused diarrhea.    SMBG: Blood sugars per patient report    Am fastin, 162, 137, 174, 202, 196, 266, 229, 215, 288 (restarted glipizide?), 273, 284, 268, 291, 192    From last MTM visit:  Blood sugar monitoring: not currently checking.    Symptoms of low blood sugar? None   Symptoms of high blood sugar? none  Eye exam: up to date  Foot exam: up to date  Diet/Exercise: Reports no changes in diet or activity level. Copied forward from last visit. Eats twice daily and they are small meals. She  limits salt, has green leafy vegetables every other day. She does not snack in between meals. She does not have sweetened drinks. She has been under a lot of stress lately with her  in the hospital.  Aspirin: Not taking due to age and on anticoagulation  Statin: Yes: pravastatin   ACEi/ARB: No. Stopped by nephrology per care everywhere note from 12/9/20.  Urine Albumin:   Lab Results   Component Value Date    UMALCR 82.70 (H) 11/22/2022    UMALCR 382.61 (H) 08/11/2022    UMALCR 91.43 (H) 05/10/2022     Lab Results   Component Value Date    A1C 7.3 11/22/2022    A1C 7.7 08/11/2022    A1C 7.2 05/10/2022    A1C 7.6 02/10/2022    A1C 7.1 01/26/2021    A1C 6.8 04/27/2020    A1C 6.3 09/27/2019    A1C 6.9 03/22/2019    A1C 6.2 06/12/2018     Last Comprehensive Metabolic Panel:  Sodium   Date Value Ref Range Status   12/20/2022 143 136 - 145 mmol/L Final   06/02/2021 140 133 - 144 mmol/L Final     Potassium   Date Value Ref Range Status   12/20/2022 4.3 3.4 - 5.3 mmol/L Final   09/13/2022 4.8 3.4 - 5.3 mmol/L Final   06/02/2021 4.2 3.4 - 5.3 mmol/L Final     Chloride   Date Value Ref Range Status   12/20/2022 102 98 - 107 mmol/L Final   09/13/2022 109 94 - 109 mmol/L Final   06/02/2021 103 94 - 109 mmol/L Final     Carbon Dioxide   Date Value Ref Range Status   06/02/2021 32 20 - 32 mmol/L Final     Carbon Dioxide (CO2)   Date Value Ref Range Status   12/20/2022 26 22 - 29 mmol/L Final   09/13/2022 24 20 - 32 mmol/L Final     Anion Gap   Date Value Ref Range Status   12/20/2022 15 7 - 15 mmol/L Final   09/13/2022 7 3 - 14 mmol/L Final   06/02/2021 5 3 - 14 mmol/L Final     Glucose   Date Value Ref Range Status   12/20/2022 217 (H) 70 - 99 mg/dL Final   09/13/2022 131 (H) 70 - 99 mg/dL Final   06/02/2021 187 (H) 70 - 99 mg/dL Final     Urea Nitrogen   Date Value Ref Range Status   12/20/2022 58.1 (H) 8.0 - 23.0 mg/dL Final   09/13/2022 67 (H) 7 - 30 mg/dL Final   06/02/2021 66 (H) 7 - 30 mg/dL Final     Creatinine    Date Value Ref Range Status   12/20/2022 2.24 (H) 0.51 - 0.95 mg/dL Final   06/02/2021 2.43 (H) 0.52 - 1.04 mg/dL Final     GFR Estimate   Date Value Ref Range Status   12/20/2022 21 (L) >60 mL/min/1.73m2 Final     Comment:     Effective December 21, 2021 eGFRcr in adults is calculated using the 2021 CKD-EPI creatinine equation which includes age and gender (Balta et al., [place], DOI: 10.1056/CJMKqz3318704)   06/02/2021 18 (L) >60 mL/min/[1.73_m2] Final     Comment:     Non  GFR Calc  Starting 12/18/2018, serum creatinine based estimated GFR (eGFR) will be   calculated using the Chronic Kidney Disease Epidemiology Collaboration   (CKD-EPI) equation.       Calcium   Date Value Ref Range Status   12/20/2022 10.0 8.8 - 10.2 mg/dL Final   06/02/2021 9.7 8.5 - 10.1 mg/dL Final     Bilirubin Total   Date Value Ref Range Status   02/10/2022 0.4 0.2 - 1.3 mg/dL Final   01/26/2021 0.5 0.2 - 1.3 mg/dL Final     Alkaline Phosphatase   Date Value Ref Range Status   02/10/2022 74 40 - 150 U/L Final   01/26/2021 81 40 - 150 U/L Final     ALT   Date Value Ref Range Status   02/10/2022 25 0 - 50 U/L Final   01/26/2021 40 0 - 50 U/L Final     AST   Date Value Ref Range Status   02/10/2022 13 0 - 45 U/L Final   01/26/2021 27 0 - 45 U/L Final     BP Readings from Last 3 Encounters:   11/22/22 130/50   11/21/22 136/60   10/19/22 (!) 144/60     Today's Vitals: LMP  (LMP Unknown)   ----------------    I spent 28 minutes with this patient today. All changes were made via collaborative practice agreement with Mil García MD. A copy of the visit note was provided to the patient's provider(s).    A summary of these recommendations was mailed to the patient.    Lara Cain PharmD  Medication Therapy Management Pharmacist    Telemedicine Visit Details  Type of service:  Telephone visit  Start Time: 2:51 PM  End Time: 3:19 PM     Medication Therapy Recommendations  Chronic pain    Current Medication: gabapentin  (NEURONTIN) 100 MG capsule (Discontinued)   Rationale: Undesirable effect - Adverse medication event - Safety   Recommendation: Discontinue Medication   Status: Accepted per CPA         Type 2 diabetes mellitus with diabetic nephropathy (H)    Current Medication: semaglutide (OZEMPIC, 0.25 OR 0.5 MG/DOSE,) 2 MG/1.5ML SOPN pen (Discontinued)   Rationale: Dose too low - Dosage too low - Effectiveness   Recommendation: Increase Dose - Ozempic (0.25 or 0.5 MG/DOSE) 2 MG/1.5ML Sopn   Status: Accepted per CPA

## 2023-02-22 NOTE — PATIENT INSTRUCTIONS
"Recommendations from today's MTM visit:                                                       1. I sent a prescription to the pharmacy for Ozempic. You should be able to  next week. It should be no charge.    2. Stop taking gabapentin to see if any change in pain and if diarrhea improves.    3. Increase Ozempic to 0.5 mg once weekly on Saturday 3/4. When you increase the dose of Ozempic stop the glipizide.    4. If you have not received your Ozempic from the  by the third week in March call Josefina Casper at [phone]     I forgot to wish you a Happy Birthday when during your visit!!!    Follow-up: Return in 5 weeks (on 3/27/2023) for MTM phone visit with Lara Cain.    It was great speaking with you today.  I value your experience and would be very thankful for your time in providing feedback in our clinic survey. In the next few days, you may receive an email or text message from [employer] with a link to a survey related to your  clinical pharmacist.\"     To schedule another MTM appointment, please call the clinic directly or you may call the MTM scheduling line at [phone] or toll-free at [phone].     My Clinical Pharmacist's contact information:                                                      Please feel free to contact me with any questions or concerns you have.     Lara Cain, PharmD  Medication Therapy Management Pharmacist  [facility] - Monday and Wednesday 7:30 - 4:00  Pager: [phone]      "

## 2023-02-24 ENCOUNTER — ANTICOAGULATION THERAPY VISIT (OUTPATIENT)
Dept: ANTICOAGULATION | Facility: CLINIC | Age: 88
End: 2023-02-24

## 2023-02-24 ENCOUNTER — LAB (OUTPATIENT)
Dept: LAB | Facility: CLINIC | Age: 88
End: 2023-02-24
Payer: COMMERCIAL

## 2023-02-24 DIAGNOSIS — N18.4 CKD (CHRONIC KIDNEY DISEASE) STAGE 4, GFR 15-29 ML/MIN (H): ICD-10-CM

## 2023-02-24 DIAGNOSIS — I48.0 PAROXYSMAL ATRIAL FIBRILLATION (H): ICD-10-CM

## 2023-02-24 DIAGNOSIS — E78.5 HYPERLIPIDEMIA WITH TARGET LDL LESS THAN 100: ICD-10-CM

## 2023-02-24 DIAGNOSIS — I48.0 PAROXYSMAL ATRIAL FIBRILLATION (H): Primary | ICD-10-CM

## 2023-02-24 DIAGNOSIS — Z79.01 LONG TERM CURRENT USE OF ANTICOAGULANTS WITH INR GOAL OF 2.0-3.0: ICD-10-CM

## 2023-02-24 DIAGNOSIS — N17.9 ACUTE ON CHRONIC KIDNEY FAILURE (H): ICD-10-CM

## 2023-02-24 DIAGNOSIS — N18.9 ACUTE ON CHRONIC KIDNEY FAILURE (H): ICD-10-CM

## 2023-02-24 DIAGNOSIS — E11.21 TYPE 2 DIABETES MELLITUS WITH DIABETIC NEPHROPATHY (H): ICD-10-CM

## 2023-02-24 LAB — INR BLD: 2.1 (ref 0.9–1.1)

## 2023-02-24 PROCEDURE — 85610 PROTHROMBIN TIME: CPT

## 2023-02-24 PROCEDURE — 36415 COLL VENOUS BLD VENIPUNCTURE: CPT

## 2023-02-24 NOTE — PROGRESS NOTES
ANTICOAGULATION MANAGEMENT     Ирина Yanez 88 year old female is on warfarin with therapeutic INR result. (Goal INR 2.0-3.0)    Recent labs: (last 7 days)     02/24/23  1354   INR 2.1*       ASSESSMENT       Source(s): Chart Review    Previous INR was Subtherapeutic.  Warfarin maintenance dose was increased by 5%.    Medication, diet, health changes since last INR chart reviewed; none identified             PLAN     Recommended plan for no diet, medication or health factor changes affecting INR     Dosing Instructions: Continue your current warfarin dose with next INR in 2 weeks       Summary  As of 2/24/2023    Full warfarin instructions:  3.75 mg every day   Next INR check:  3/10/2023             Detailed voice message left for Ирина with dosing instructions and follow up date.     Contact [phone]  to schedule and with any changes, questions or concerns.     Education provided:     None required    Plan made per [facility] anticoagulation protocol    Sherri Tipton RN  Anticoagulation Clinic  2/24/2023    _______________________________________________________________________     Anticoagulation Episode Summary     Current INR goal:  2.0-3.0   TTR:  54.5 % (1 y)   Target end date:  Indefinite   Send INR reminders to:  [facility]    Indications    Paroxysmal atrial fibrillation (H) [I48.0]  Long term current use of anticoagulants with INR goal of 2.0-3.0 [Z79.01]           Comments:           Anticoagulation Care Providers     Provider Role Specialty Phone number    Yuridia Villarreal MD Referring Family Medicine [phone]    Mil García MD Referring Family Medicine [phone]

## 2023-03-08 ENCOUNTER — TELEPHONE (OUTPATIENT)
Dept: FAMILY MEDICINE | Facility: CLINIC | Age: 88
End: 2023-03-08
Payer: COMMERCIAL

## 2023-03-08 NOTE — TELEPHONE ENCOUNTER
Ozempic was received from patient assistance program  A total of 6 boxes received and when I contacted the pt she asked to have the medication placed at the pharmacy and she will come thru the drive thru to pick them up    Talked with Bony and he will keep in the pharmacy and give it to the pt when she comes in.

## 2023-03-14 ENCOUNTER — ANTICOAGULATION THERAPY VISIT (OUTPATIENT)
Dept: ANTICOAGULATION | Facility: CLINIC | Age: 88
End: 2023-03-14

## 2023-03-14 ENCOUNTER — LAB (OUTPATIENT)
Dept: LAB | Facility: CLINIC | Age: 88
End: 2023-03-14
Payer: COMMERCIAL

## 2023-03-14 DIAGNOSIS — I48.0 PAROXYSMAL ATRIAL FIBRILLATION (H): ICD-10-CM

## 2023-03-14 DIAGNOSIS — N17.9 ACUTE ON CHRONIC KIDNEY FAILURE (H): ICD-10-CM

## 2023-03-14 DIAGNOSIS — N18.9 ACUTE ON CHRONIC KIDNEY FAILURE (H): ICD-10-CM

## 2023-03-14 DIAGNOSIS — E11.21 TYPE 2 DIABETES MELLITUS WITH DIABETIC NEPHROPATHY (H): ICD-10-CM

## 2023-03-14 DIAGNOSIS — I48.0 PAROXYSMAL ATRIAL FIBRILLATION (H): Primary | ICD-10-CM

## 2023-03-14 DIAGNOSIS — Z79.01 LONG TERM CURRENT USE OF ANTICOAGULANTS WITH INR GOAL OF 2.0-3.0: ICD-10-CM

## 2023-03-14 DIAGNOSIS — E78.5 HYPERLIPIDEMIA WITH TARGET LDL LESS THAN 100: ICD-10-CM

## 2023-03-14 DIAGNOSIS — N18.4 CKD (CHRONIC KIDNEY DISEASE) STAGE 4, GFR 15-29 ML/MIN (H): ICD-10-CM

## 2023-03-14 LAB
ALT SERPL W P-5'-P-CCNC: 24 U/L (ref 10–35)
ANION GAP SERPL CALCULATED.3IONS-SCNC: 16 MMOL/L (ref 7–15)
BUN SERPL-MCNC: 66.5 MG/DL (ref 8–23)
CALCIUM SERPL-MCNC: 11.2 MG/DL (ref 8.8–10.2)
CHLORIDE SERPL-SCNC: 100 MMOL/L (ref 98–107)
CREAT SERPL-MCNC: 2.61 MG/DL (ref 0.51–0.95)
CREAT UR-MCNC: 60 MG/DL
DEPRECATED HCO3 PLAS-SCNC: 28 MMOL/L (ref 22–29)
GFR SERPL CREATININE-BSD FRML MDRD: 17 ML/MIN/1.73M2
GLUCOSE SERPL-MCNC: 143 MG/DL (ref 70–99)
HBA1C MFR BLD: 8.2 % (ref 0–5.6)
HGB BLD-MCNC: 11.6 G/DL (ref 11.7–15.7)
INR BLD: 2.2 (ref 0.9–1.1)
MICROALBUMIN UR-MCNC: 72.2 MG/L
MICROALBUMIN/CREAT UR: 120.33 MG/G CR (ref 0–25)
POTASSIUM SERPL-SCNC: 4.7 MMOL/L (ref 3.4–5.3)
SODIUM SERPL-SCNC: 144 MMOL/L (ref 136–145)
TSH SERPL DL<=0.005 MIU/L-ACNC: 3.03 UIU/ML (ref 0.3–4.2)

## 2023-03-14 PROCEDURE — 83036 HEMOGLOBIN GLYCOSYLATED A1C: CPT

## 2023-03-14 PROCEDURE — 84460 ALANINE AMINO (ALT) (SGPT): CPT

## 2023-03-14 PROCEDURE — 36415 COLL VENOUS BLD VENIPUNCTURE: CPT

## 2023-03-14 PROCEDURE — 82043 UR ALBUMIN QUANTITATIVE: CPT

## 2023-03-14 PROCEDURE — 80048 BASIC METABOLIC PNL TOTAL CA: CPT

## 2023-03-14 PROCEDURE — 82570 ASSAY OF URINE CREATININE: CPT

## 2023-03-14 PROCEDURE — 85610 PROTHROMBIN TIME: CPT

## 2023-03-14 PROCEDURE — 84443 ASSAY THYROID STIM HORMONE: CPT

## 2023-03-14 PROCEDURE — 85018 HEMOGLOBIN: CPT

## 2023-03-14 NOTE — PROGRESS NOTES
ANTICOAGULATION MANAGEMENT     Ирина Yanez 88 year old female is on warfarin with therapeutic INR result. (Goal INR 2.0-3.0)    Recent labs: (last 7 days)     03/14/23  1452   INR 2.2*       ASSESSMENT       Source(s): Chart Review and Patient/Caregiver Call       Warfarin doses taken: More warfarin taken than planned which may be affecting INR    Diet: No new diet changes identified    New illness, injury, or hospitalization: No    Medication/supplement changes: Continuing on ozempic (no interaction). Doxazosin dose was also increased (no interaction). Decreased calcitriol dose (no interaction).    Signs or symptoms of bleeding or clotting: No    Previous INR: Therapeutic last visit; previously outside of goal range    Additional findings: Ирина is certain she's been taking 5 mg on Tuesdays. Will allow her to continue with this dose (seen as a 4.8% increase today).         PLAN     Recommended plan for temporary change(s) affecting INR     Dosing Instructions: Continue your current warfarin dose with next INR in 3 weeks       Summary  As of 3/14/2023    Full warfarin instructions:  5 mg every Tue; 3.75 mg all other days   Next INR check:  4/4/2023             Telephone call with Ирина who verbalizes understanding and agrees to plan    Lab visit scheduled    Education provided:     Please call back if any changes to your diet, medications or how you've been taking warfarin    Taking warfarin: importance of following [facility] instructions vs instructions on the prescription bottle and Importance of taking warfarin as instructed    Plan made per [facility] anticoagulation protocol    Cris Richter RN  Anticoagulation Clinic  3/14/2023    _______________________________________________________________________     Anticoagulation Episode Summary     Current INR goal:  2.0-3.0   TTR:  59.0 % (1 y)   Target end date:  Indefinite   Send INR reminders to:  KR[facility]IRE[facility]    Indications    Paroxysmal atrial  fibrillation (H) [I48.0]  Long term current use of anticoagulants with INR goal of 2.0-3.0 [Z79.01]           Comments:           Anticoagulation Care Providers     Provider Role Specialty Phone number    Yuridia Villarreal MD Referring Family Medicine [phone]    Mil García MD Referring [facility] Medicine [phone]

## 2023-03-27 ENCOUNTER — VIRTUAL VISIT (OUTPATIENT)
Dept: PHARMACY | Facility: CLINIC | Age: 88
End: 2023-03-27
Payer: COMMERCIAL

## 2023-03-27 DIAGNOSIS — G89.29 OTHER CHRONIC PAIN: ICD-10-CM

## 2023-03-27 DIAGNOSIS — E11.42 DIABETIC POLYNEUROPATHY ASSOCIATED WITH TYPE 2 DIABETES MELLITUS (H): Primary | ICD-10-CM

## 2023-03-27 DIAGNOSIS — E11.21 TYPE 2 DIABETES MELLITUS WITH DIABETIC NEPHROPATHY, WITHOUT LONG-TERM CURRENT USE OF INSULIN (H): ICD-10-CM

## 2023-03-27 DIAGNOSIS — N18.4 CKD (CHRONIC KIDNEY DISEASE) STAGE 4, GFR 15-29 ML/MIN (H): ICD-10-CM

## 2023-03-27 PROCEDURE — 99606 MTMS BY PHARM EST 15 MIN: CPT | Performed by: PHARMACIST

## 2023-03-27 PROCEDURE — 99607 MTMS BY PHARM ADDL 15 MIN: CPT | Performed by: PHARMACIST

## 2023-03-27 RX ORDER — GABAPENTIN 100 MG/1
200 CAPSULE ORAL EVERY EVENING
Qty: 180 CAPSULE | Refills: 0 | Status: SHIPPED | OUTPATIENT
Start: 2023-03-27 | End: 2023-07-27

## 2023-03-27 RX ORDER — GABAPENTIN 100 MG/1
200 CAPSULE ORAL EVERY EVENING
COMMUNITY
End: 2023-03-27

## 2023-03-27 NOTE — PROGRESS NOTES
Medication Therapy Management (MTM) Encounter    ASSESSMENT:                            Medication Adherence/Access: No issues identified    Pain: Gabapentin is beneficial and not causing diarrhea so we will continue 200 mg at bedtime which is appropriate based on her renal function.  We will send an updated prescription to her pharmacy.    Type 2 Diabetes/CKD: Patient is meeting A1c goal of < 8%. Patient is at goal of fasting 100-150 mg/dL some of the time.  With her decreased renal function would prefer if she is not taking glipizide to prevent hypoglycemia.  Asked her to stop glipizide today and we will put in a request for a dose change to the manufacture that supplies her Ozempic to increase the dose to 1 mg weekly.  This could take a few weeks.  I asked her to reach out if her morning blood sugars are consistently over 150 and we may consider restarting glipizide at a lower dose. I will update the Ozempic prescription on file once she gets the new dose from the . Microalbumin is not at goal < 30 mg/g. Not taking an ACE-inhibitor or ARB; stopped by nephrologist Dr. Whatley and he is managing her blood pressure.  Aspirin therapy is not indicated in this patient due to age and due to anticoagulant/antiplatelet therapy.     PLAN:                            1. Stop the glipizide and continue the Ozempic 0.5 mg weekly. If you blood sugars are over 150 in the morning most of the time let me know and we may need to start the glipizide back up.    2. I will put in a dose change order with the  to go up to Ozempic 1 mg weekly     3. I sent a new prescription for the gabapentin 100 mg capsules to the [facility] when you need them.    Follow-up: Return in 6 weeks (on 5/8/2023) for MTM phone visit with Lara Cain.    SUBJECTIVE/OBJECTIVE:                          Ирина Yanez is a 88 year old female called for a follow-up visit.  Today's visit is a follow-up MTM visit from 2/22/23.   Reason  for visit: diabetes follow-up.    Allergies/ADRs: Reviewed in chart  Past Medical History: Reviewed in chart  Tobacco: She reports that she has never smoked. She has never used smokeless tobacco.  Alcohol: not currently using    Medication Adherence/Access: no issues reported    Endocrinologist: Reji Verde - no longer seeing him, Dr. García took it over  Nephrologist: Dr. Whatley outside of  - follow up every three months (next 6/13/23)- he is managing her blood pressure  Cardiologist: Dr. Beasley - follows annually, last visit August 2022    Pain:  Current medications include: gabapentin 200 mg at bedtime and acetaminophen 1000 mg at bedtime as needed.  She did try stopping the gabapentin as instructed at last MTM visit but the pain came back in her feet. When she is using the gabapentin it helps with the pain for the most part at night. Sometimes the pain will linger before bed but pain is gone in the morning.     Type 2 Diabetes/CKD:  Currently taking Ozempic 0.5 mg once weekly on Saturdays (increased on 3/4/23). She continued on glipizide 5 mg once daily in the morning. I had stopped this at last visit but she continued. Patient reports diarrhea has gotten better since last visit even with continuing glipizide. She has been working on taking the calcium acetate after the first bit of food and feels this has helped. She has also had less stress with her  now home from the hospital.  takes calcium acetate 667 mg three times daily for (started 2/2021) and calcitriol 0.25 mg twice per week for CKD (frequency reduced at last visit with Dr. Whatley her nephrologist).     She did get her supply of Ozempic free through the  per 3/8/23 telephone encounter. Patient reports no side effects from Ozempic.    Medication history: She was on Invokana in the past but stopped due to vaginal itching. Metformin discontinued 2/2015 due to reduced renal function.  She has tried glipizide in the past which  caused diarrhea.    SMBG: Blood sugars per patient report - checking a few times a week.    Am Fasting (started from today): 116 (this was 6 hours after breakfast), 152, 149, 166, 159, 166, 173, 159 (3/1/23)    From last [facility] visit:    Am fastin, 162, 137, 174, 202, 196, 266, 229, 215, 288 (restarted glipizide?), 273, 284, 268, 291, 192    Symptoms of low blood sugar? None   Symptoms of high blood sugar? none  Eye exam: up to date  Foot exam: up to date  Diet/Exercise:  She had a [place]v[employer] broth based soup last night.  She is eating less and less hungry with the increase in Ozempic but still knows she needs to eat.  Aspirin: Not taking due to age and on anticoagulation  Statin: Yes: pravastatin   ACEi/ARB: No. Stopped by nephrology per care everywhere note from 20.  Urine Albumin:   Lab Results   Component Value Date    UMALCR 120.33 (H) 2023    UMALCR 82.70 (H) 2022    UMALCR 382.61 (H) 2022    UMALCR 91.43 (H) 05/10/2022    UMALCR 205.88 (H) 02/10/2022     Lab Results   Component Value Date    A1C 8.2 2023    A1C 7.3 2022    A1C 7.7 2022    A1C 7.2 05/10/2022    A1C 7.6 02/10/2022    A1C 7.1 2021    A1C 6.8 2020    A1C 6.3 2019    A1C 6.9 2019    A1C 6.2 2018     Calculated CrCl based on adjusted body weight: 15 ml/min    Last Comprehensive Metabolic Panel:  Sodium   Date Value Ref Range Status   2023 144 136 - 145 mmol/L Final   2021 140 133 - 144 mmol/L Final     Potassium   Date Value Ref Range Status   2023 4.7 3.4 - 5.3 mmol/L Final   2022 4.8 3.4 - 5.3 mmol/L Final   2021 4.2 3.4 - 5.3 mmol/L Final     Chloride   Date Value Ref Range Status   2023 100 98 - 107 mmol/L Final   2022 109 94 - 109 mmol/L Final   2021 103 94 - 109 mmol/L Final     Carbon Dioxide   Date Value Ref Range Status   2021 32 20 - 32 mmol/L Final     Carbon Dioxide (CO2)   Date Value Ref Range Status    03/14/2023 28 22 - 29 mmol/L Final   09/13/2022 24 20 - 32 mmol/L Final     Anion Gap   Date Value Ref Range Status   03/14/2023 16 (H) 7 - 15 mmol/L Final   09/13/2022 7 3 - 14 mmol/L Final   06/02/2021 5 3 - 14 mmol/L Final     Glucose   Date Value Ref Range Status   03/14/2023 143 (H) 70 - 99 mg/dL Final   09/13/2022 131 (H) 70 - 99 mg/dL Final   06/02/2021 187 (H) 70 - 99 mg/dL Final     Urea Nitrogen   Date Value Ref Range Status   03/14/2023 66.5 (H) 8.0 - 23.0 mg/dL Final   09/13/2022 67 (H) 7 - 30 mg/dL Final   06/02/2021 66 (H) 7 - 30 mg/dL Final     Creatinine   Date Value Ref Range Status   03/14/2023 2.61 (H) 0.51 - 0.95 mg/dL Final   06/02/2021 2.43 (H) 0.52 - 1.04 mg/dL Final     GFR Estimate   Date Value Ref Range Status   03/14/2023 17 (L) >60 mL/min/1.73m2 Final     Comment:     eGFR calculated using 2021 CKD-EPI equation.   06/02/2021 18 (L) >60 mL/min/[1.73_m2] Final     Comment:     Non  GFR Calc  Starting 12/18/2018, serum creatinine based estimated GFR (eGFR) will be   calculated using the Chronic Kidney Disease Epidemiology Collaboration   (CKD-EPI) equation.       Calcium   Date Value Ref Range Status   03/14/2023 11.2 (H) 8.8 - 10.2 mg/dL Final   06/02/2021 9.7 8.5 - 10.1 mg/dL Final     Bilirubin Total   Date Value Ref Range Status   02/10/2022 0.4 0.2 - 1.3 mg/dL Final   01/26/2021 0.5 0.2 - 1.3 mg/dL Final     Alkaline Phosphatase   Date Value Ref Range Status   02/10/2022 74 40 - 150 U/L Final   01/26/2021 81 40 - 150 U/L Final     ALT   Date Value Ref Range Status   03/14/2023 24 10 - 35 U/L Final   01/26/2021 40 0 - 50 U/L Final     AST   Date Value Ref Range Status   02/10/2022 13 0 - 45 U/L Final   01/26/2021 27 0 - 45 U/L Final       BP Readings from Last 3 Encounters:   11/22/22 130/50   11/21/22 136/60   10/19/22 (!) 144/60     Today's Vitals: LMP  (LMP Unknown)   ----------------    I spent 30 minutes with this patient today. All changes were made via  collaborative practice agreement with Mil García MD. A copy of the visit note was provided to the patient's provider(s).    A summary of these recommendations was mailed to the patient.    Lara Cain, TishD  Medication Therapy Management Pharmacist      Telemedicine Visit Details  Type of service:  Telephone visit  Start Time: 2:38 PM  End Time: 3:08 PM     Medication Therapy Recommendations  Type 2 diabetes mellitus with diabetic nephropathy (H)    Current Medication: glipiZIDE (GLUCOTROL) 5 MG tablet (Discontinued)   Rationale: Duplicate Therapy - Unnecessary medication therapy - Indication   Recommendation: Discontinue Medication   Status: Patient Agreed - Adherence/Education          Current Medication: semaglutide (OZEMPIC, 0.25 OR 0.5 MG/DOSE,) 2 MG/1.5ML SOPN pen   Rationale: Dose too low - Dosage too low - Effectiveness   Recommendation: Increase Dose - Ozempic (1 MG/DOSE) 4 MG/3ML Sopn   Status: Accepted per CPA   Note: Will increase once she gets the new 1 mg pens from the  assistance program

## 2023-03-27 NOTE — PATIENT INSTRUCTIONS
"Recommendations from today's MTM visit:                                                       1. Stop the glipizide and continue the Ozempic 0.5 mg weekly. If you blood sugars are over 150 in the morning most of the time let me know and we may need to start the glipizide back up.    2. I will put in a dose change order with the  to go up to Ozempic 1 mg weekly     3. I sent a new prescription for the gabapentin 100 mg capsules to the [facility] when you need them.    Follow-up: Return in 6 weeks (on 5/8/2023) for MTM phone visit with Lara Cain.    It was great speaking with you today.  I value your experience and would be very thankful for your time in providing feedback in our clinic survey. In the next few days, you may receive an email or text message from [employer] with a link to a survey related to your  clinical pharmacist.\"     To schedule another MTM appointment, please call the clinic directly or you may call the MTM scheduling line at [phone] or toll-free at [phone].     My Clinical Pharmacist's contact information:                                                      Please feel free to contact me with any questions or concerns you have.   Lara Cain, PharmD  Medication Therapy Management Pharmacist  [facility] - Monday and Wednesday 7:30 - 4:00        "

## 2023-03-28 ENCOUNTER — TELEPHONE (OUTPATIENT)
Dept: FAMILY MEDICINE | Facility: CLINIC | Age: 88
End: 2023-03-28
Payer: COMMERCIAL

## 2023-03-29 NOTE — TELEPHONE ENCOUNTER
Form completed and signed by provider. Emailed back to Saul Casper.    Lara Cain, PharmD  Medication Therapy Management Pharmacist

## 2023-04-03 NOTE — TELEPHONE ENCOUNTER
Rec'd call from meredith that refill was placed for the ozempic, on 3/31, should be shipped in about six weeks.    Caroline Perez

## 2023-04-04 ENCOUNTER — ANTICOAGULATION THERAPY VISIT (OUTPATIENT)
Dept: ANTICOAGULATION | Facility: CLINIC | Age: 88
End: 2023-04-04

## 2023-04-04 ENCOUNTER — LAB (OUTPATIENT)
Dept: LAB | Facility: CLINIC | Age: 88
End: 2023-04-04
Payer: COMMERCIAL

## 2023-04-04 DIAGNOSIS — I48.0 PAROXYSMAL ATRIAL FIBRILLATION (H): ICD-10-CM

## 2023-04-04 DIAGNOSIS — I48.0 PAROXYSMAL ATRIAL FIBRILLATION (H): Primary | ICD-10-CM

## 2023-04-04 DIAGNOSIS — Z79.01 LONG TERM CURRENT USE OF ANTICOAGULANTS WITH INR GOAL OF 2.0-3.0: ICD-10-CM

## 2023-04-04 LAB — INR BLD: 1.4 (ref 0.9–1.1)

## 2023-04-04 PROCEDURE — 85610 PROTHROMBIN TIME: CPT

## 2023-04-04 PROCEDURE — 36416 COLLJ CAPILLARY BLOOD SPEC: CPT

## 2023-04-04 NOTE — PROGRESS NOTES
ANTICOAGULATION MANAGEMENT     Ирина Yanez 88 year old female is on warfarin with subtherapeutic INR result. (Goal INR 2.0-3.0)    Recent labs: (last 7 days)     04/04/23  0934   INR 1.4*       ASSESSMENT     Warfarin Lab Questionnaire        4/4/2023     9:29 AM   Dose in Tablet or Mg   TAB or MG? tablet (tab)     Ирина confirms proper maintenance dosing      4/4/2023     9:29 AM   Warfarin Lab Questionnaire   Missed doses? No   Medication changes? No   Abnormal bleeding? No   Shortness of breath? No   Injuries or illness since last INR? No   Changes in diet or alcohol? No   Upcoming surgery, procedure? No       Additional findings: Confident there are no missed doses or changes in diet/supplements.  Glipizide was stopped 3/27/23, which may be contributing to lower INR. Also started ozempic and continues on gabapentin (no interaction per [employer]).        PLAN     Recommended plan for ongoing change(s) affecting INR     Dosing Instructions: booster dose then Increase your warfarin dose (10% change) with next INR in 1 week       Summary  As of 4/4/2023    Full warfarin instructions:  4/4: 10 mg; Otherwise 5 mg every Mon, Wed, Fri; 3.75 mg all other days   Next INR check:  4/11/2023             Telephone call with Ирина who verbalizes understanding and agrees to plan    Lab visit scheduled    Education provided:     Please call back if any changes to your diet, medications or how you've been taking warfarin    Symptom monitoring: monitoring for clotting signs and symptoms and when to seek medical attention/emergency care    Importance of notifying anticoagulation clinic for: changes in medications; a sooner lab recheck maybe needed    Plan made per [facility] anticoagulation protocol    Cris Richter RN  Anticoagulation Clinic  4/4/2023    _______________________________________________________________________     Anticoagulation Episode Summary     Current INR goal:  2.0-3.0   TTR:  56.2 % (1 y)   Target  end date:  Indefinite   Send INR reminders to:  KRIS RAMIREZ    Indications    Paroxysmal atrial fibrillation (H) [I48.0]  Long term current use of anticoagulants with INR goal of 2.0-3.0 [Z79.01]           Comments:           Anticoagulation Care Providers     Provider Role Specialty Phone number    Yuridia Villarreal MD Referring Family Medicine [phone]    Mil García MD Referring Family Medicine [phone]

## 2023-04-11 ENCOUNTER — LAB (OUTPATIENT)
Dept: LAB | Facility: CLINIC | Age: 88
End: 2023-04-11
Payer: COMMERCIAL

## 2023-04-11 ENCOUNTER — ANTICOAGULATION THERAPY VISIT (OUTPATIENT)
Dept: ANTICOAGULATION | Facility: CLINIC | Age: 88
End: 2023-04-11

## 2023-04-11 DIAGNOSIS — I48.0 PAROXYSMAL ATRIAL FIBRILLATION (H): ICD-10-CM

## 2023-04-11 DIAGNOSIS — Z79.01 LONG TERM CURRENT USE OF ANTICOAGULANTS WITH INR GOAL OF 2.0-3.0: ICD-10-CM

## 2023-04-11 DIAGNOSIS — I48.0 PAROXYSMAL ATRIAL FIBRILLATION (H): Primary | ICD-10-CM

## 2023-04-11 LAB — INR BLD: 2 (ref 0.9–1.1)

## 2023-04-11 PROCEDURE — 36416 COLLJ CAPILLARY BLOOD SPEC: CPT

## 2023-04-11 PROCEDURE — 85610 PROTHROMBIN TIME: CPT

## 2023-04-11 NOTE — PROGRESS NOTES
ANTICOAGULATION MANAGEMENT     Ирина Yanez 88 year old female is on warfarin with therapeutic INR result. (Goal INR 2.0-3.0)    Recent labs: (last 7 days)     04/11/23  1021   INR 2.0*       ASSESSMENT     Warfarin Lab Questionnaire        4/11/2023    10:15 AM   Dose in Tablet or Mg   TAB or MG? tablet (tab)     Pt Rptd Dose MONDAY TUESDAY WED THURS FRIDAY SATURDAY 4/11/2023  10:15 AM 2 1.5 2 2 1.5 2         4/11/2023    10:15 AM   Warfarin Lab Questionnaire   Missed doses? No   Medication changes? No   Abnormal bleeding? No   Shortness of breath? No   Injuries or illness since last INR? No   Changes in diet or alcohol? No   Upcoming surgery, procedure? No       Additional findings: warfarin maintenance dose was increased 10% on 4/4/23, patient confirmed that she took booster dose on 4/4/23.  Warfarin maintenance dose increased today because without booster dose 1 week ago, INR would most likely still be subtherapeutic--OK from [facility] Nat[facility]      PLAN     Recommended plan for no diet, medication or health factor changes affecting INR     Dosing Instructions: Increase your warfarin dose (12.5% change) with next INR in 2 weeks       Summary  As of 4/11/2023    Full warfarin instructions:  3.75 mg every Sun; 5 mg all other days   Next INR check:  4/25/2023             Telephone call with Ирина who agrees to plan and repeated back plan correctly    Lab visit scheduled    Education provided:     Contact [phone]  with any changes, questions or concerns.     Plan made with [facility] Pharmacist Natty Mina, RN  Anticoagulation Clinic  4/11/2023    _______________________________________________________________________     Anticoagulation Episode Summary     Current INR goal:  2.0-3.0   TTR:  55.8 % (1 y)   Target end date:  Indefinite   Send INR reminders to:  [facility]    Indications    Paroxysmal atrial fibrillation (H) [I48.0]  Long term current use of anticoagulants with INR goal of 2.0-3.0  [Z79.01]           Comments:           Anticoagulation Care Providers     Provider Role Specialty Phone number    Yuridia Villarreal MD Referring Family Medicine [phone]    Mil García MD Referring Family Medicine [phone]

## 2023-04-14 ENCOUNTER — TELEPHONE (OUTPATIENT)
Dept: FAMILY MEDICINE | Facility: CLINIC | Age: 88
End: 2023-04-14
Payer: COMMERCIAL

## 2023-04-14 DIAGNOSIS — I48.0 PAROXYSMAL ATRIAL FIBRILLATION (H): ICD-10-CM

## 2023-04-14 RX ORDER — WARFARIN SODIUM 2.5 MG/1
TABLET ORAL
Qty: 180 TABLET | Refills: 1 | Status: SHIPPED | OUTPATIENT
Start: 2023-04-14 | End: 2023-04-14

## 2023-04-14 RX ORDER — WARFARIN SODIUM 2.5 MG/1
TABLET ORAL
Qty: 180 TABLET | Refills: 1 | Status: SHIPPED | OUTPATIENT
Start: 2023-04-14 | End: 2023-08-22

## 2023-04-14 NOTE — TELEPHONE ENCOUNTER
Please refer to refill encounter today, 4/14/23.    Need to reorder/resend jantoven prescription under Dr. Mil García. Yuridia Villarreal is no longer practicing. Routing back to INR pool to advise. Thank you!    Jayashree Langley RN on 4/14/2023 at 3:19 PM

## 2023-04-14 NOTE — TELEPHONE ENCOUNTER
ANTICOAGULATION MANAGEMENT:  Medication Refill    Anticoagulation Summary  As of 4/11/2023    Warfarin maintenance plan:  3.75 mg (2.5 mg x 1.5) every Sun; 5 mg (2.5 mg x 2) all other days   Next INR check:  4/25/2023   Target end date:  Indefinite    Indications    Paroxysmal atrial fibrillation (H) [I48.0]  Long term current use of anticoagulants with INR goal of 2.0-3.0 [Z79.01]             Anticoagulation Care Providers     Provider Role Specialty Phone number    Yuridia Villarreal MD Referring Family Medicine [phone]    Mil García MD Referring Family Medicine [phone]          Visit with referring provider/group within last year: Yes    [facility] referral signed within last year: Yes    Ирина meets all criteria for refill (current [facility] referral, office visit with referring provider/group in last year, lab monitoring up to date or not exceeding 2 weeks overdue). Rx instructions and quantity supplied updated to match patient's current dosing plan. Warfarin 90 day supply with 1 refill granted per [facility] protocol     Sherri Tipton RN  Anticoagulation Clinic

## 2023-04-25 ENCOUNTER — LAB (OUTPATIENT)
Dept: LAB | Facility: CLINIC | Age: 88
End: 2023-04-25
Payer: COMMERCIAL

## 2023-04-25 ENCOUNTER — ANTICOAGULATION THERAPY VISIT (OUTPATIENT)
Dept: ANTICOAGULATION | Facility: CLINIC | Age: 88
End: 2023-04-25

## 2023-04-25 DIAGNOSIS — I48.0 PAROXYSMAL ATRIAL FIBRILLATION (H): Primary | ICD-10-CM

## 2023-04-25 DIAGNOSIS — Z79.01 LONG TERM CURRENT USE OF ANTICOAGULANTS WITH INR GOAL OF 2.0-3.0: ICD-10-CM

## 2023-04-25 DIAGNOSIS — I48.0 PAROXYSMAL ATRIAL FIBRILLATION (H): ICD-10-CM

## 2023-04-25 LAB — INR BLD: 3.4 (ref 0.9–1.1)

## 2023-04-25 PROCEDURE — 85610 PROTHROMBIN TIME: CPT

## 2023-04-25 PROCEDURE — 36416 COLLJ CAPILLARY BLOOD SPEC: CPT

## 2023-04-25 NOTE — PROGRESS NOTES
ANTICOAGULATION MANAGEMENT     Ирина Yanez 88 year old female is on warfarin with supratherapeutic INR result. (Goal INR 2.0-3.0)    Recent labs: (last 7 days)     04/25/23  1027   INR 3.4*       ASSESSMENT     Warfarin Lab Questionnaire    Warfarin Doses Last 7 Days      4/25/2023    10:27 AM   Dose in Tablet or Mg   TAB or MG? milligram (mg)           4/25/2023   Warfarin Lab Questionnaire   Missed doses within past 14 days? No   Changes in diet or alcohol within past 14 days? No   Medication changes since last result? No   Injuries or illness since last result? No   New shortness of breath, severe headaches or sudden changes in vision since last result? No   Abnormal bleeding since last result? No   Upcoming surgery, procedure? No        Previous result: Therapeutic last visit; previously outside of goal range  Additional findings: INR was increased the last 2 visits. With no changes to report today, advised dosing decrease.        PLAN     Recommended plan for no diet, medication or health factor changes affecting INR     Dosing Instructions: partial hold then decrease your warfarin dose (4% change) with next INR in 2 weeks       Summary  As of 4/25/2023    Full warfarin instructions:  4/25: 2.5 mg; Otherwise 3.75 mg every Sun, Thu; 5 mg all other days   Next INR check:  5/9/2023             Telephone call with Ирина who verbalizes understanding and agrees to plan    Lab visit scheduled    Education provided:     Please call back if any changes to your diet, medications or how you've been taking warfarin    Taking warfarin: new maintenance dose discussion    Plan made per [facility] anticoagulation protocol    Cris Richter RN  Anticoagulation Clinic  4/25/2023    _______________________________________________________________________     Anticoagulation Episode Summary     Current INR goal:  2.0-3.0   TTR:  56.7 % (1 y)   Target end date:  Indefinite   Send INR reminders to:  KRIS RAMIREZ     Indications    Paroxysmal atrial fibrillation (H) [I48.0]  Long term current use of anticoagulants with INR goal of 2.0-3.0 [Z79.01]           Comments:           Anticoagulation Care Providers     Provider Role Specialty Phone number    Yuridia Villarreal MD Referring [facility] Medicine [phone]    Mil García MD Referring [facility] [phone]

## 2023-05-08 ENCOUNTER — VIRTUAL VISIT (OUTPATIENT)
Dept: PHARMACY | Facility: CLINIC | Age: 88
End: 2023-05-08
Payer: COMMERCIAL

## 2023-05-08 DIAGNOSIS — R29.6 FALLS FREQUENTLY: ICD-10-CM

## 2023-05-08 DIAGNOSIS — N18.4 CKD (CHRONIC KIDNEY DISEASE) STAGE 4, GFR 15-29 ML/MIN (H): ICD-10-CM

## 2023-05-08 DIAGNOSIS — E11.21 TYPE 2 DIABETES MELLITUS WITH DIABETIC NEPHROPATHY, WITHOUT LONG-TERM CURRENT USE OF INSULIN (H): Primary | ICD-10-CM

## 2023-05-08 PROCEDURE — 99607 MTMS BY PHARM ADDL 15 MIN: CPT | Performed by: PHARMACIST

## 2023-05-08 PROCEDURE — 99606 MTMS BY PHARM EST 15 MIN: CPT | Performed by: PHARMACIST

## 2023-05-08 NOTE — PATIENT INSTRUCTIONS
"Recommendations from today's MTM visit:                                                         1. Use your walker move when going from sitting to standing and moving around to prevent falls. Also talk to Dr. García at your next visit.    2. Continue Ozempic 1 mg weekly.    3. Start checking some morning fasting AND 2 hour after meal readings.  You can alternate so you are not checking twice a day.    Follow-up: Return in 5 weeks (on 6/14/2023) for MTM phone visit with Lara Cain.    It was great speaking with you today.  I value your experience and would be very thankful for your time in providing feedback in our clinic survey. In the next few days, you may receive an email or text message from [facility] [employer] with a link to a survey related to your  clinical pharmacist.\"     To schedule another MTM appointment, please call the clinic directly or you may call the MTM scheduling line at [phone] or toll-free at [phone].     My Clinical Pharmacist's contact information:                                                      Please feel free to contact me with any questions or concerns you have.      Lara Cain, PharmD  Medication Therapy Management Pharmacist  [facility] - Monday and Wednesday 7:30 - 4:00      "

## 2023-05-08 NOTE — Clinical Note
I am not sure if Ирина brought up her concerns about TIA's at your last visit but wanted you to be aware. I did recommend she discuss with you.  Lara Cain, PharmD Medication Therapy Management Pharmacist  Pt to CT scan via wheelchair

## 2023-05-08 NOTE — Clinical Note
Tayo Love,    I have a question about this patient. She has had multiple falls with most recent on 4/10/23. She stood up and she just kept moving forward and couldn't stop herself, then fell forward. Reports no dizziness she just didn't feel right. She didn't have any slurred speech, no weakness on one side, no changes in vision. Other than gabapentin am I missing any med that she is on that stands out to you. She didn't report being dizzy before falling just that she felt off. I dont think its blood sugar related with A1C at 8.2 and fasting's are running a little high. She repots no hypo symptoms. Let me know your thoughts. Thanks!!  Lara

## 2023-05-08 NOTE — PROGRESS NOTES
Medication Therapy Management (MTM) Encounter    ASSESSMENT:                            Medication Adherence/Access: No issues identified    Recent Fall: Discussed that gabapentin can cause loss of coordination. She doesn't feel that this was the cause of her fall. She is taking appropriate dose based on most recent renal function.  I recommended she use her walker more especially when going from sitting to standing.  Recommended she discuss further with her PCP at upcoming visit.    Type 2 Diabetes/CKD: Patient is not meeting A1c goal of < 8%. Patient is not at goal of fasting 100-150 mg/dL some of the time.  She would benefit from continuing Ozempic 1 mg weekly and checking blood sugars more frequently to determine if an increase in Ozempic is indicated. I updated her medication list.  Microalbumin is not at goal < 30 mg/g. Not taking an ACE-inhibitor or ARB; stopped by nephrologist Dr. Whatley and he is managing her blood pressure.  Aspirin therapy is not indicated in this patient due to age and due to anticoagulant/antiplatelet therapy.     Hypertension: Stable.  This is managed by nephrology.  With her most recent blood pressure and pulse could consider reducing metoprolol to reduce chance of future falls.    PLAN:                            1. Use your walker move when going from sitting to standing and moving around to prevent falls. Also talk to Dr. García at your next visit.    2. Continue Ozempic 1 mg weekly.    3. Start checking some morning fasting AND 2 hour after meal readings.  You can alternate so you are not checking twice a day.    Follow-up: Return in 5 weeks (on 6/14/2023) for MTM phone visit with Lara Cain.    SUBJECTIVE/OBJECTIVE:                          Ирина Yanez is a 88 year old female seen for a follow-up visit.  Today's visit is a follow-up MTM visit from 3/27/23     Reason for visit: diabetes follow-up.    Allergies/ADRs: Reviewed in chart  Past Medical History: Reviewed in  chart  Tobacco: She reports that she has never smoked. She has never used smokeless tobacco.  Alcohol: not currently using  Assisted devices: She does have a walker to move around at times.     Medication Adherence/Access: no issues reported    Nephrologist: Dr. Whatley outside of  - follow up every three months (next 6/13/23)- he is managing her blood pressure  Cardiologist: Dr. Beasley - follows annually, last visit August 2022    Recent Fall: She has had multiple falls with most recent on 4/10/23. She stood up and she just kept moving forward and couldn't stop herself, then fell forward. She did not hit her head or have any injuries. Reports no dizziness she just didn't feel right. She didn't have any slurred speech, no weakness on one side, no changes in vision. She had a hard time getting back up and had to call her son to come help her up.     Type 2 Diabetes/CKD:  Currently taking Ozempic 1.0 mg once weekly on Saturdays (increased after last MTM visit and started middle of April sometime unsure of exact date). She also takes calcium acetate 667 mg three times daily for (started 2/2021) and calcitriol 0.25 mg twice per week for CKD (frequency reduced at last visit with Dr. Whatley her nephrologist). Glipizide 5 mg once daily stopped at last visit. Patient reports diarrhea has been better taking Phoslo with first bit of food.  Patient reports no side effects from Ozempic.    She did get her supply of Ozempic 1 mg free through the .     Medication history: She was on Invokana in the past but stopped due to vaginal itching. Metformin discontinued 2/2015 due to reduced renal function.  She has tried glipizide in the past which caused diarrhea.    SMBG: Blood sugars per patient report - checking a few times a week.    Am Fasting (started from today): 169 (today), 166, 165, 178, 183, 149, 146, 144 (3/28)    From last MTM visit:  Am Fasting (started from today): 116 (this was 6 hours after breakfast),  152, 149, 166, 159, 166, 173, 159 (3/1/23)    Symptoms of low blood sugar? None   Symptoms of high blood sugar? none  Eye exam: up to date  Foot exam: up to date  Diet/Exercise:  She eats breakfast (mini wheats, milk and tea) and then 2pm is her heavy meal but will sometimes have something small (ex. Slice of bread, cheese and pickle) and 7pm she will eat something small (toast with jelly or piece of fruit). She is eating less and less hungry with the increase in Ozempic but still knows she needs to eat. Reports little activity. She does do house work but that is about it. She may be getting out with warmer weather.   Aspirin: Not taking due to age and on anticoagulation  Statin: Yes: pravastatin   ACEi/ARB: No. Stopped by nephrology per care everywhere note from 12/9/20.  Urine Albumin:   Lab Results   Component Value Date    UMALCR 120.33 (H) 03/14/2023    UMALCR 82.70 (H) 11/22/2022    UMALCR 382.61 (H) 08/11/2022    UMALCR 91.43 (H) 05/10/2022    UMALCR 205.88 (H) 02/10/2022     Lab Results   Component Value Date    A1C 8.2 03/14/2023    A1C 7.3 11/22/2022    A1C 7.7 08/11/2022    A1C 7.2 05/10/2022    A1C 7.6 02/10/2022    A1C 7.1 01/26/2021    A1C 6.8 04/27/2020    A1C 6.3 09/27/2019    A1C 6.9 03/22/2019    A1C 6.2 06/12/2018     Last Comprehensive Metabolic Panel:  Sodium   Date Value Ref Range Status   03/14/2023 144 136 - 145 mmol/L Final   06/02/2021 140 133 - 144 mmol/L Final     Potassium   Date Value Ref Range Status   03/14/2023 4.7 3.4 - 5.3 mmol/L Final   09/13/2022 4.8 3.4 - 5.3 mmol/L Final   06/02/2021 4.2 3.4 - 5.3 mmol/L Final     Chloride   Date Value Ref Range Status   03/14/2023 100 98 - 107 mmol/L Final   09/13/2022 109 94 - 109 mmol/L Final   06/02/2021 103 94 - 109 mmol/L Final     Carbon Dioxide   Date Value Ref Range Status   06/02/2021 32 20 - 32 mmol/L Final     Carbon Dioxide (CO2)   Date Value Ref Range Status   03/14/2023 28 22 - 29 mmol/L Final   09/13/2022 24 20 - 32 mmol/L Final      Anion Gap   Date Value Ref Range Status   03/14/2023 16 (H) 7 - 15 mmol/L Final   09/13/2022 7 3 - 14 mmol/L Final   06/02/2021 5 3 - 14 mmol/L Final     Glucose   Date Value Ref Range Status   03/14/2023 143 (H) 70 - 99 mg/dL Final   09/13/2022 131 (H) 70 - 99 mg/dL Final   06/02/2021 187 (H) 70 - 99 mg/dL Final     Urea Nitrogen   Date Value Ref Range Status   03/14/2023 66.5 (H) 8.0 - 23.0 mg/dL Final   09/13/2022 67 (H) 7 - 30 mg/dL Final   06/02/2021 66 (H) 7 - 30 mg/dL Final     Creatinine   Date Value Ref Range Status   03/14/2023 2.61 (H) 0.51 - 0.95 mg/dL Final   06/02/2021 2.43 (H) 0.52 - 1.04 mg/dL Final     GFR Estimate   Date Value Ref Range Status   03/14/2023 17 (L) >60 mL/min/1.73m2 Final     Comment:     eGFR calculated using 2021 CKD-EPI equation.   06/02/2021 18 (L) >60 mL/min/[1.73_m2] Final     Comment:     Non  GFR Calc  Starting 12/18/2018, serum creatinine based estimated GFR (eGFR) will be   calculated using the Chronic Kidney Disease Epidemiology Collaboration   (CKD-EPI) equation.       Calcium   Date Value Ref Range Status   03/14/2023 11.2 (H) 8.8 - 10.2 mg/dL Final   06/02/2021 9.7 8.5 - 10.1 mg/dL Final     Bilirubin Total   Date Value Ref Range Status   02/10/2022 0.4 0.2 - 1.3 mg/dL Final   01/26/2021 0.5 0.2 - 1.3 mg/dL Final     Alkaline Phosphatase   Date Value Ref Range Status   02/10/2022 74 40 - 150 U/L Final   01/26/2021 81 40 - 150 U/L Final     ALT   Date Value Ref Range Status   03/14/2023 24 10 - 35 U/L Final   01/26/2021 40 0 - 50 U/L Final     AST   Date Value Ref Range Status   02/10/2022 13 0 - 45 U/L Final   01/26/2021 27 0 - 45 U/L Final     Calculated CrCl based on adjusted body weight: 15 ml/min    Hypertension: Currently taking diltiazem  mg once daily, doxazosin 1 mg daily and metoprolol ER 25 mg once daily.  Patient reports no current medication side effects.  BP Readings from Last 3 Encounters:   11/22/22 130/50   11/21/22 136/60    10/19/22 (!) 144/60     Pulse Readings from Last 3 Encounters:   11/22/22 67   10/19/22 52   10/12/22 55     Today's Vitals: LMP  (LMP Unknown)   ----------------    I spent 33 minutes with this patient today. All changes were made via collaborative practice agreement with Mil García MD. A copy of the visit note was provided to the patient's provider(s).    A summary of these recommendations was mailed to the patient.    Lara Cain, Jered  Medication Therapy Management Pharmacist    Telemedicine Visit Details  Type of service:  Telephone visit  Start Time: 2:38 PM  End Time: 3:11 PM     Medication Therapy Recommendations  No medication therapy recommendations to display

## 2023-05-08 NOTE — PROGRESS NOTES
I did contact Maria De Jesus, Dr. Whatley's (nephrologist) nurse, to have her inform Dr. Whatley about Ирина's recent fall since Dr. Whatley is managing her blood pressure medications.  Maria De Jesus did mention that Dr. Whatley increased the doxazosin from 1 mg daily to 2 mg daily on 3/9/2023.  During my visit today with Ирина she stated she is taking 2 of the 0.5 mg tablets to equal 1 mg.  Maria De Jesus did say that she would follow-up with Ирина this week on her blood pressures and verify the dose of doxazosin.  After she verifies the dose she will call my pager message so I can update her medication list.  She will also talk with Dr. Whatley about my fall concern.  I recommended that they not be overly aggressive with her blood pressure as she has had multiple falls.      Lara Cain, PharmD  Medication Therapy Management Pharmacist

## 2023-05-08 NOTE — TELEPHONE ENCOUNTER
"Called [employer] at [phone] to check status of Ozempic 1 mg shipment. The Ozempic was received by the providers office on 4/12 and signed for by \"Daniel.\" I did not see documentation in Epic that it was received so I called Ирина to confirm. She did receive a 4 month supply in the middle of April.       Thank you,     Luis Daniel Steele, [facility]  Pharmacy Clinic [facility]  [email or web address]   Phone: [phone]  Fax: [phone]  "

## 2023-05-09 ENCOUNTER — ANTICOAGULATION THERAPY VISIT (OUTPATIENT)
Dept: ANTICOAGULATION | Facility: CLINIC | Age: 88
End: 2023-05-09

## 2023-05-09 ENCOUNTER — LAB (OUTPATIENT)
Dept: LAB | Facility: CLINIC | Age: 88
End: 2023-05-09
Payer: COMMERCIAL

## 2023-05-09 DIAGNOSIS — Z79.01 LONG TERM CURRENT USE OF ANTICOAGULANTS WITH INR GOAL OF 2.0-3.0: ICD-10-CM

## 2023-05-09 DIAGNOSIS — I48.0 PAROXYSMAL ATRIAL FIBRILLATION (H): Primary | ICD-10-CM

## 2023-05-09 DIAGNOSIS — I48.0 PAROXYSMAL ATRIAL FIBRILLATION (H): ICD-10-CM

## 2023-05-09 LAB — INR BLD: 2.7 (ref 0.9–1.1)

## 2023-05-09 PROCEDURE — 85610 PROTHROMBIN TIME: CPT

## 2023-05-09 PROCEDURE — 36416 COLLJ CAPILLARY BLOOD SPEC: CPT

## 2023-05-09 NOTE — PROGRESS NOTES
ANTICOAGULATION MANAGEMENT     Ирина Yanez 88 year old female is on warfarin with therapeutic INR result. (Goal INR 2.0-3.0)    Recent labs: (last 7 days)     05/09/23  1001   INR 2.7*       ASSESSMENT     Warfarin Lab Questionnaire    Warfarin Doses Last 7 Days      5/9/2023     9:56 AM   Dose in Tablet or Mg   TAB or MG? milligram (mg)           5/9/2023   Warfarin Lab Questionnaire   Missed doses within past 14 days? Yes   If yes; please list when: dont remember-patient states she found a pill on the floor but she doesn't recall when she missed a dose.   Changes in diet or alcohol within past 14 days? No   Medication changes since last result? No   Injuries or illness since last result? No   New shortness of breath, severe headaches or sudden changes in vision since last result? No   Abnormal bleeding since last result? No   Upcoming surgery, procedure? No        Previous result: Supratherapeutic  Additional findings: virtual visit with JOSUE Lawton on 5/8/23--no change in care plan.  Warfarin maintenance dose was reduced 4% on 4/25/23     PLAN     Recommended plan for temporary change(s) affecting INR     Dosing Instructions: Continue your current warfarin dose with next INR in 2 weeks since patient will be in clinic for her annual wellness.    Summary  As of 5/9/2023    Full warfarin instructions:  3.75 mg every Sun, Thu; 5 mg all other days   Next INR check:  5/22/2023             Telephone call with Ирина who verbalizes understanding and agrees to plan    Check at provider office visit    Education provided:     Contact [phone]  with any changes, questions or concerns.     Plan made per [facility] anticoagulation protocol    Summer Mina, RN  Anticoagulation Clinic  5/9/2023    _______________________________________________________________________     Anticoagulation Episode Summary     Current INR goal:  2.0-3.0   TTR:  56.1 % (1 y)   Target end date:  Indefinite   Send INR reminders to:  KRIS RAMIREZ     Indications    Paroxysmal atrial fibrillation (H) [I48.0]  Long term current use of anticoagulants with INR goal of 2.0-3.0 [Z79.01]           Comments:           Anticoagulation Care Providers     Provider Role Specialty Phone number    Yuridia Villarreal MD Referring [facility] Medicine [phone]    Mil García MD Referring [facility] [phone]

## 2023-05-19 DIAGNOSIS — I48.0 PAROXYSMAL ATRIAL FIBRILLATION (H): ICD-10-CM

## 2023-05-19 DIAGNOSIS — I10 HYPERTENSION GOAL BP (BLOOD PRESSURE) < 140/90: ICD-10-CM

## 2023-05-19 RX ORDER — METOPROLOL SUCCINATE 25 MG/1
TABLET, EXTENDED RELEASE ORAL
Qty: 90 TABLET | Refills: 1 | Status: SHIPPED | OUTPATIENT
Start: 2023-05-19 | End: 2023-10-10

## 2023-05-19 NOTE — TELEPHONE ENCOUNTER
Prescription approved per [facility] Refill Protocol.    Jenny GARZA RN   [facility]

## 2023-05-22 ENCOUNTER — TELEPHONE (OUTPATIENT)
Dept: FAMILY MEDICINE | Facility: CLINIC | Age: 88
End: 2023-05-22

## 2023-05-22 ENCOUNTER — OFFICE VISIT (OUTPATIENT)
Dept: FAMILY MEDICINE | Facility: CLINIC | Age: 88
End: 2023-05-22
Payer: COMMERCIAL

## 2023-05-22 ENCOUNTER — ANTICOAGULATION THERAPY VISIT (OUTPATIENT)
Dept: ANTICOAGULATION | Facility: CLINIC | Age: 88
End: 2023-05-22

## 2023-05-22 VITALS
WEIGHT: 176.9 LBS | HEIGHT: 64 IN | BODY MASS INDEX: 30.2 KG/M2 | TEMPERATURE: 97.9 F | HEART RATE: 61 BPM | OXYGEN SATURATION: 99 % | DIASTOLIC BLOOD PRESSURE: 84 MMHG | SYSTOLIC BLOOD PRESSURE: 128 MMHG

## 2023-05-22 DIAGNOSIS — I48.0 PAROXYSMAL ATRIAL FIBRILLATION (H): ICD-10-CM

## 2023-05-22 DIAGNOSIS — Z79.01 LONG TERM CURRENT USE OF ANTICOAGULANTS WITH INR GOAL OF 2.0-3.0: ICD-10-CM

## 2023-05-22 DIAGNOSIS — N25.81 SECONDARY HYPERPARATHYROIDISM (H): ICD-10-CM

## 2023-05-22 DIAGNOSIS — N18.4 CKD (CHRONIC KIDNEY DISEASE) STAGE 4, GFR 15-29 ML/MIN (H): ICD-10-CM

## 2023-05-22 DIAGNOSIS — F41.9 ANXIETY: ICD-10-CM

## 2023-05-22 DIAGNOSIS — I48.0 PAROXYSMAL ATRIAL FIBRILLATION (H): Primary | ICD-10-CM

## 2023-05-22 DIAGNOSIS — F33.41 RECURRENT MAJOR DEPRESSIVE DISORDER, IN PARTIAL REMISSION (H): ICD-10-CM

## 2023-05-22 DIAGNOSIS — I10 HYPERTENSION GOAL BP (BLOOD PRESSURE) < 140/90: ICD-10-CM

## 2023-05-22 DIAGNOSIS — E78.5 HYPERLIPIDEMIA LDL GOAL <100: ICD-10-CM

## 2023-05-22 DIAGNOSIS — Z23 NEED FOR SHINGLES VACCINE: ICD-10-CM

## 2023-05-22 DIAGNOSIS — I50.32 CHRONIC DIASTOLIC CONGESTIVE HEART FAILURE (H): ICD-10-CM

## 2023-05-22 DIAGNOSIS — Z00.00 ENCOUNTER FOR MEDICARE ANNUAL WELLNESS EXAM: Primary | ICD-10-CM

## 2023-05-22 DIAGNOSIS — E11.21 TYPE 2 DIABETES MELLITUS WITH DIABETIC NEPHROPATHY, WITHOUT LONG-TERM CURRENT USE OF INSULIN (H): ICD-10-CM

## 2023-05-22 PROBLEM — N18.9 ACUTE ON CHRONIC KIDNEY FAILURE (H): Status: RESOLVED | Noted: 2020-12-29 | Resolved: 2023-05-22

## 2023-05-22 PROBLEM — N17.9 ACUTE ON CHRONIC KIDNEY FAILURE (H): Status: RESOLVED | Noted: 2020-12-29 | Resolved: 2023-05-22

## 2023-05-22 LAB
ANION GAP SERPL CALCULATED.3IONS-SCNC: 14 MMOL/L (ref 7–15)
BUN SERPL-MCNC: 52.3 MG/DL (ref 8–23)
CALCIUM SERPL-MCNC: 10.5 MG/DL (ref 8.8–10.2)
CHLORIDE SERPL-SCNC: 101 MMOL/L (ref 98–107)
CHOLEST SERPL-MCNC: 215 MG/DL
CREAT SERPL-MCNC: 2.08 MG/DL (ref 0.51–0.95)
DEPRECATED HCO3 PLAS-SCNC: 27 MMOL/L (ref 22–29)
GFR SERPL CREATININE-BSD FRML MDRD: 23 ML/MIN/1.73M2
GLUCOSE SERPL-MCNC: 170 MG/DL (ref 70–99)
HBA1C MFR BLD: 7.1 % (ref 0–5.6)
HDLC SERPL-MCNC: 42 MG/DL
INR BLD: 3.6 (ref 0.9–1.1)
LDLC SERPL CALC-MCNC: 94 MG/DL
NONHDLC SERPL-MCNC: 173 MG/DL
POTASSIUM SERPL-SCNC: 4.5 MMOL/L (ref 3.4–5.3)
SODIUM SERPL-SCNC: 142 MMOL/L (ref 136–145)
TRIGL SERPL-MCNC: 393 MG/DL

## 2023-05-22 PROCEDURE — 80061 LIPID PANEL: CPT | Performed by: FAMILY MEDICINE

## 2023-05-22 PROCEDURE — 36415 COLL VENOUS BLD VENIPUNCTURE: CPT | Performed by: FAMILY MEDICINE

## 2023-05-22 PROCEDURE — 99207 PR FOOT EXAM NO CHARGE: CPT | Mod: 25 | Performed by: FAMILY MEDICINE

## 2023-05-22 PROCEDURE — 83036 HEMOGLOBIN GLYCOSYLATED A1C: CPT | Performed by: FAMILY MEDICINE

## 2023-05-22 PROCEDURE — 99213 OFFICE O/P EST LOW 20 MIN: CPT | Mod: 25 | Performed by: FAMILY MEDICINE

## 2023-05-22 PROCEDURE — G0439 PPPS, SUBSEQ VISIT: HCPCS | Performed by: FAMILY MEDICINE

## 2023-05-22 PROCEDURE — 85610 PROTHROMBIN TIME: CPT | Performed by: FAMILY MEDICINE

## 2023-05-22 PROCEDURE — 80048 BASIC METABOLIC PNL TOTAL CA: CPT | Performed by: FAMILY MEDICINE

## 2023-05-22 RX ORDER — DILTIAZEM HYDROCHLORIDE 180 MG/1
180 CAPSULE, COATED, EXTENDED RELEASE ORAL AT BEDTIME
Qty: 90 CAPSULE | Refills: 1 | Status: SHIPPED | OUTPATIENT
Start: 2023-05-22 | End: 2023-08-22

## 2023-05-22 RX ORDER — TORSEMIDE 20 MG/1
20 TABLET ORAL DAILY
Qty: 90 TABLET | Refills: 0 | Status: SHIPPED | OUTPATIENT
Start: 2023-05-22 | End: 2023-08-22

## 2023-05-22 ASSESSMENT — ENCOUNTER SYMPTOMS
FREQUENCY: 0
HEARTBURN: 0
ABDOMINAL PAIN: 0
EYE PAIN: 0
FEVER: 0
DIZZINESS: 0
CHILLS: 0
DYSURIA: 0
WEAKNESS: 1
HEMATOCHEZIA: 0
MYALGIAS: 0
JOINT SWELLING: 0
PARESTHESIAS: 0
SORE THROAT: 0
NERVOUS/ANXIOUS: 0
COUGH: 0
HEADACHES: 0
DIARRHEA: 0
NAUSEA: 0
PALPITATIONS: 0
BREAST MASS: 0
CONSTIPATION: 1
SHORTNESS OF BREATH: 0
HEMATURIA: 0
ARTHRALGIAS: 0

## 2023-05-22 ASSESSMENT — ACTIVITIES OF DAILY LIVING (ADL): CURRENT_FUNCTION: NO ASSISTANCE NEEDED

## 2023-05-22 ASSESSMENT — PAIN SCALES - GENERAL: PAINLEVEL: NO PAIN (0)

## 2023-05-22 ASSESSMENT — PATIENT HEALTH QUESTIONNAIRE - PHQ9
SUM OF ALL RESPONSES TO PHQ QUESTIONS 1-9: 0
SUM OF ALL RESPONSES TO PHQ QUESTIONS 1-9: 0

## 2023-05-22 NOTE — PROGRESS NOTES
"SUBJECTIVE:   Ирина is a 88 year old who presents for Preventive Visit.      5/22/2023     8:48 AM   Additional Questions   Roomed by tammy ackerman         5/22/2023     8:48 AM   Patient Reported Additional Medications   Patient reports taking the following new medications none   Patient has been advised of split billing requirements and indicates understanding: Yes  Are you in the first 12 months of your Medicare coverage?  No    Healthy Habits:     In general, how would you rate your overall health?  Fair    Frequency of exercise:  1 day/week    Duration of exercise:  Less than 15 minutes    Do you usually eat at least 4 servings of fruit and vegetables a day, include whole grains    & fiber and avoid regularly eating high fat or \"junk\" foods?  Yes    Taking medications regularly:  Yes    Medication side effects:  None    Ability to successfully perform activities of daily living:  No assistance needed    Home Safety:  No safety concerns identified    Hearing Impairment:  Difficulty following a conversation in a noisy restaurant or crowded room and no hearing concerns    In the past 6 months, have you been bothered by leaking of urine? Yes    In general, how would you rate your overall mental or emotional health?  Good      PHQ-2 Total Score: 0    Additional concerns today:  No      Have you ever done Advance Care Planning? (For example, a Health Directive, POLST, or a discussion with a medical provider or your loved ones about your wishes): Yes, advance care planning is on file.       Fall risk  Fallen 2 or more times in the past year?: Yes  Any fall with injury in the past year?: No    Cognitive Screening   1) Repeat 3 items (Leader, Season, Table)    2) Clock draw: NORMAL  3) 3 item recall: Recalls 3 objects  Results: 3 items recalled: COGNITIVE IMPAIRMENT LESS LIKELY    Mini-CogTM Copyright STACI Vyas. Licensed by the author for use in [facility]; reprinted with permission (bryce@.[facility]). All rights " reserved.      Do you have sleep apnea, excessive snoring or daytime drowsiness?: yes    Reviewed and updated as needed this visit by clinical staff   Tobacco  Allergies  Meds              Reviewed and updated as needed this visit by Provider                 Social History     Tobacco Use     Smoking status: Never     Smokeless tobacco: Never   Vaping Use     Vaping status: Never Used   Substance Use Topics     Alcohol use: No             5/22/2023     8:26 AM   Alcohol Use   Prescreen: >3 drinks/day or >7 drinks/week? Not Applicable     Do you have a current opioid prescription? No  Do you use any other controlled substances or medications that are not prescribed by a provider? None    4/10 had an episode of light headedness after getting up, stumbled a few feet and fell.  Only happened that one time.    Ozempic has suppressed appetite.      Breathing is OK.  No manolo shortness of breath.  NO manolo exercise intolerance, but notes that she does not do very much.          Current providers sharing in care for this patient include:   Patient Care Team:  Mil García MD as PCP - General (Family Medicine)  Henrik Beasley MD as Assigned Heart and Vascular Provider  Bisi Moffett RN as Specialty Care Coordinator (Nurse)  Zarina Cain RPH as Pharmacist (Pharmacist)  Mil García MD as Assigned PCP  Jef Whatley MD as MD (Nephrology)  Zarina Cain RPH as Assigned MTM Pharmacist  Shu Cardona DPM as Assigned Surgical Provider    The following health maintenance items are reviewed in Epic and correct as of today:  Health Maintenance   Topic Date Due     HF ACTION PLAN  Never done     ZOSTER IMMUNIZATION (1 of 2) 10/12/2012     MEDICARE ANNUAL WELLNESS VISIT  01/16/2021     COVID-19 Vaccine (6 - Pfizer series) 01/22/2023     LIPID  05/10/2023     DIABETIC FOOT EXAM  05/10/2023     BMP  06/14/2023     A1C  06/14/2023     EYE EXAM  09/09/2023     HEMOGLOBIN  09/14/2023      PHQ-9  11/22/2023     CBC  11/22/2023     ALT  03/14/2024     TSH W/FREE T4 REFLEX  03/14/2024     ANNUAL REVIEW OF HM ORDERS  05/22/2024     FALL RISK ASSESSMENT  05/22/2024     DTAP/TDAP/TD IMMUNIZATION (3 - Td or Tdap) 05/06/2025     ADVANCE CARE PLANNING  05/22/2028     PARATHYROID  Completed     PHOSPHORUS  Completed     DEPRESSION ACTION PLAN  Completed     INFLUENZA VACCINE  Completed     Pneumococcal Vaccine: 65+ Years  Completed     URINALYSIS  Completed     ALK PHOS  Completed     IPV IMMUNIZATION  Aged Out     MENINGITIS IMMUNIZATION  Aged Out     MICROALBUMIN  Discontinued     URINE DRUG SCREEN  Discontinued     MAMMO SCREENING  Discontinued     Lab work is in process  Labs reviewed in EPIC      Mammogram Screening - Patient over age 75, has elected to discontinue screenings.  Pertinent mammograms are reviewed under the imaging tab.    Review of Systems   Constitutional: Negative for chills and fever.   HENT: Negative for congestion, ear pain, hearing loss and sore throat.    Eyes: Negative for pain and visual disturbance.   Respiratory: Negative for cough and shortness of breath.    Cardiovascular: Negative for chest pain, palpitations and peripheral edema.   Gastrointestinal: Positive for constipation. Negative for abdominal pain, diarrhea, heartburn, hematochezia and nausea.   Breasts:  Negative for tenderness, breast mass and discharge.   Genitourinary: Negative for dysuria, frequency, genital sores, hematuria, pelvic pain, urgency, vaginal bleeding and vaginal discharge.   Musculoskeletal: Negative for arthralgias, joint swelling and myalgias.   Skin: Negative for rash.   Neurological: Positive for weakness. Negative for dizziness, headaches and paresthesias.   Psychiatric/Behavioral: Negative for mood changes. The patient is not nervous/anxious.          OBJECTIVE:   LMP  (LMP Unknown)  Estimated body mass index is 32.49 kg/m  as calculated from the following:    Height as of 11/22/22: 1.626 m  "(5' 4\").    Weight as of 11/22/22: 85.9 kg (189 lb 4.8 oz).  Physical Exam  Vitals and nursing note reviewed.   Constitutional:       Appearance: Normal appearance.   HENT:      Head: Normocephalic.      Right Ear: Tympanic membrane, ear canal and external ear normal.      Left Ear: Tympanic membrane, ear canal and external ear normal.      Mouth/Throat:      Mouth: Mucous membranes are moist.      Pharynx: Oropharynx is clear.   Eyes:      Extraocular Movements: Extraocular movements intact.      Conjunctiva/sclera: Conjunctivae normal.      Pupils: Pupils are equal, round, and reactive to light.   Neck:      Thyroid: No thyroid mass or thyromegaly.   Cardiovascular:      Rate and Rhythm: Normal rate and regular rhythm.      Pulses:           Dorsalis pedis pulses are 0 on the right side and 0 on the left side.      Heart sounds: Murmur heard.       Systolic murmur is present with a grade of 2/6.  Pulmonary:      Effort: Pulmonary effort is normal.      Breath sounds: Normal breath sounds.   Abdominal:      General: Bowel sounds are normal.      Palpations: Abdomen is soft. There is no mass.      Tenderness: There is no abdominal tenderness.   Musculoskeletal:         General: Normal range of motion.      Comments: Normal joints JESSICA feet   Feet:      Comments: L second toe surgically absent.  Marked JESSICA bunion, L>R  Lymphadenopathy:      Cervical: No cervical adenopathy.   Skin:     General: Skin is warm and dry.      Comments: JESSICA feet - no callus, onychomycosis   Neurological:      General: No focal deficit present.      Mental Status: She is alert and oriented to person, place, and time.      Comments: Absent filament test and proprioception JESSICA feet   Psychiatric:         Mood and Affect: Mood normal.         Behavior: Behavior normal.           Diagnostic Test Results:  Labs reviewed in Epic    ASSESSMENT / PLAN:       ICD-10-CM    1. Encounter for Medicare annual wellness exam  Z00.00       2. CKD (chronic " "kidney disease) stage 4, GFR 15-29 ml/min (H)  N18.4 BASIC METABOLIC PANEL     BASIC METABOLIC PANEL     OFFICE/OUTPT VISIT,EST,LEVL IV      3. Type 2 diabetes mellitus with diabetic nephropathy, without long-term current use of insulin (H)  E11.21 Lipid panel reflex to direct LDL Non-fasting     BASIC METABOLIC PANEL     Hemoglobin A1c     Lipid panel reflex to direct LDL Non-fasting     BASIC METABOLIC PANEL     Hemoglobin A1c     FOOT EXAM     OFFICE/OUTPT VISIT,EST,LEVL IV     Semaglutide, 1 MG/DOSE, (OZEMPIC) 4 MG/3ML pen     DISCONTINUED: Semaglutide, 1 MG/DOSE, (OZEMPIC) 4 MG/3ML pen      4. Hyperlipidemia LDL goal <100  E78.5 Lipid panel reflex to direct LDL Non-fasting     Lipid panel reflex to direct LDL Non-fasting     OFFICE/OUTPT VISIT,EST,LEVL IV      5. Recurrent major depressive disorder, in partial remission (H)  F33.41 OFFICE/OUTPT VISIT,EST,LEVL IV      6. Secondary hyperparathyroidism (H)  N25.81 OFFICE/OUTPT VISIT,EST,LEVL IV      7. Paroxysmal atrial fibrillation (H)  I48.0 INR point of care     diltiazem ER COATED BEADS (CARDIZEM CD/CARTIA XT) 180 MG 24 hr capsule     OFFICE/OUTPT VISIT,EST,LEVL IV      8. Need for shingles vaccine  Z23       9. Hypertension goal BP (blood pressure) < 140/90  I10 torsemide (DEMADEX) 20 MG tablet     diltiazem ER COATED BEADS (CARDIZEM CD/CARTIA XT) 180 MG 24 hr capsule     OFFICE/OUTPT VISIT,EST,LEVL IV      10. Anxiety  F41.9 sertraline (ZOLOFT) 50 MG tablet     OFFICE/OUTPT VISIT,EST,LEVL IV      11. Chronic diastolic congestive heart failure (H)  I50.32 torsemide (DEMADEX) 20 MG tablet     OFFICE/OUTPT VISIT,EST,LEVL IV                COUNSELING:  Reviewed preventive health counseling, as reflected in patient instructions      BMI:   Estimated body mass index is 32.49 kg/m  as calculated from the following:    Height as of 11/22/22: 1.626 m (5' 4\").    Weight as of 11/22/22: 85.9 kg (189 lb 4.8 oz).   Weight management plan: Discussed healthy diet and " exercise guidelines      She reports that she has never smoked. She has never used smokeless tobacco.      Appropriate preventive services were discussed with this patient, including applicable screening as appropriate for cardiovascular disease, diabetes, osteopenia/osteoporosis, and glaucoma.  As appropriate for age/gender, discussed screening for colorectal cancer, prostate cancer, breast cancer, and cervical cancer. Checklist reviewing preventive services available has been given to the patient.    Reviewed patients plan of care and provided an AVS. The Basic Care Plan (routine screening as documented in Health Maintenance) for Ирина meets the Care Plan requirement. This Care Plan has been established and reviewed with the Patient.      Mil García MD  [facility]    Identified Health Risks:    Answers for HPI/ROS submitted by the patient on 5/22/2023  PHQ9 TOTAL SCORE: 0

## 2023-05-22 NOTE — PATIENT INSTRUCTIONS
Patient Education   Personalized Prevention Plan  You are due for the preventive services outlined below.  Your care team is available to assist you in scheduling these services.  If you have already completed any of these items, please share that information with your care team to update in your medical record.  Health Maintenance Due   Topic Date Due     Heart Failure Action Plan  Never done     Zoster (Shingles) Vaccine (1 of 2) 10/12/2012     Annual Wellness Visit  01/16/2021     COVID-19 Vaccine (6 - Pfizer series) 01/22/2023     Cholesterol Lab  05/10/2023     Diabetic Foot Exam  05/10/2023     Basic Metabolic Panel  06/14/2023       Preventing Falls at Home  A person can fall for many reasons. Older adults may fall because reaction time slows down as we age. Your muscles and joints may get stiff, weak, or less flexible because of illness, medicines, or a physical condition.   Other health problems that make falls more likely include:     Arthritis    Dizziness or lightheadedness when you stand up (orthostatic hypotension)    History of a stroke    Dizziness    Anemia    Certain medicines taken for mental illness or to control blood pressure.    Problems with balance or gait    Bladder or urinary problems    History of falling    Changes in vision (vision impairment)    Changes in thinking skills and memory (cognitive impairment)  Injuries from a fall can include serious injuries such as broken bones, dislocated joints, internal bleeding and cuts. Injuries like these can limit your independence.   Prevention tips  To help prevent falls and fall-related injuries, follow the tips below.    Floors  To make floors safer:     Put nonskid pads under area rugs.    Remove small rugs.    Replace worn floor coverings.    Tack carpets firmly to each step on carpeted stairs. Put nonskid strips on the edges of uncarpeted stairs.    Keep floors and stairs free of clutter and cords.    Arrange furniture so there are clear  pathways.    Clean up any spills right away.  Bathrooms    To make bathrooms safer:     Install grab bars in the tub or shower.    Apply nonskid strips or put a nonskid rubber mat in the tub or shower.    Sit on a bath chair to bathe.    Use bathmats with nonskid backing.  Lighting  To improve visibility in your home:      Keep a flashlight in each room. Or put a lamp next to the bed within easy reach.    Put nightlights in the bedrooms, hallways, kitchen, and bathrooms.    Make sure all stairways have good lighting.    Take your time when going up and down stairs.    Put handrails on both sides of stairs and in walkways for more support. To prevent injury to your wrist or arm, don t use handrails to pull yourself up.    Install grab bars to pull yourself up.    Move or rearrange items that you use often. This will make them easier to find or reach.    Look at your home to find any safety hazards. Especially look at doorways, walkways, and the driveway. Remove or repair any safety problems that you find.  Other changes to make    Look around to find any safety hazards. Look closely at doorways, walkways, and the driveway. Remove or repair any safety problems that you find.    Wear shoes that fit well.    Take your time when going up and down stairs.    Put handrails on both sides of stairs and in walkways for more support. To prevent injury to your wrist or arm, don t use handrails to pull yourself up.    Install grab bars wherever needed to pull yourself up.    Arrange items that you use often. This will make them easier to find or reach.    [employer] last reviewed this educational content on 3/1/2020    4791-3191 [employer]. All rights reserved. This information is not intended as a substitute for professional medical care. Always follow your healthcare professional's instructions.

## 2023-05-22 NOTE — PROGRESS NOTES
ANTICOAGULATION MANAGEMENT     Ирина Yanez 88 year old female is on warfarin with supratherapeutic INR result. (Goal INR 2.0-3.0)    Recent labs: (last 7 days)     05/22/23  0855   INR 3.6*       ASSESSMENT       Source(s): Chart Review and Patient/Caregiver Call       Warfarin doses taken: Warfarin taken as instructed    Diet: Decreased greens/vitamin K in diet; ongoing change    Medication/supplement changes: Ozempic dose titrating up    New illness, injury, or hospitalization: No    Signs or symptoms of bleeding or clotting: No    Previous result: Therapeutic last visit; previously outside of goal range    Additional findings: patient states since starting Ozempic at the beginning of 2023, her appetite has decreased and therefore, is not and does not want to eat as many greens. Due to this ongoing change, I reduced her warfarin maintenance dose.         PLAN     Recommended plan for ongoing change(s) affecting INR     Dosing Instructions: partial hold then decrease your warfarin dose (7.7% change) with next INR in 2 weeks       Summary  As of 5/22/2023    Full warfarin instructions:  5/22: 2.5 mg; Otherwise 5 mg every Mon, Wed, Fri; 3.75 mg all other days   Next INR check:  6/5/2023             Telephone call with Ирина who agrees to plan and repeated back plan correctly    Lab visit scheduled    Education provided:     Contact [phone]  with any changes, questions or concerns.     Plan made per [facility] anticoagulation protocol    Summer Mina RN  Anticoagulation Clinic  5/22/2023    _______________________________________________________________________     Anticoagulation Episode Summary     Current INR goal:  2.0-3.0   TTR:  56.8 % (1 y)   Target end date:  Indefinite   Send INR reminders to:  [facility]    Indications    Paroxysmal atrial fibrillation (H) [I48.0]  Long term current use of anticoagulants with INR goal of 2.0-3.0 [Z79.01]           Comments:           Anticoagulation Care Providers      Provider Role Specialty Phone number    Yuridia Villarreal MD Referring Family Medicine [phone]    Mil García MD Referring Family Medicine [phone]

## 2023-05-22 NOTE — TELEPHONE ENCOUNTER
General Call    Contacts       Type Contact Phone/Fax    05/22/2023 11:48 AM CDT Phone (Incoming) Ирина Yanez (Self) [phone] ()        Reason for Call:  Patient is returning call for INR/please contact patient for next route of care    What are your questions or concerns:  Return call    Date of last appointment with provider: 5/22/23    Could we send this information to you in [facility] or would you prefer to receive a phone call?:   Patient would prefer a phone call   Okay to leave a detailed message?: Yes at Home number on file [phone] (home)

## 2023-05-22 NOTE — TELEPHONE ENCOUNTER
Prior Authorization Retail Medication Request    Medication/Dose: Ozempic 1mg  ICD code (if different than what is on RX):    Previously Tried and Failed:    Rationale:      Insurance Name:  [facility] part D  Insurance ID:  [id number]      Thank You  [place] [facility]  [facility]/[place]

## 2023-05-23 ENCOUNTER — TELEPHONE (OUTPATIENT)
Dept: FAMILY MEDICINE | Facility: CLINIC | Age: 88
End: 2023-05-23
Payer: COMMERCIAL

## 2023-05-23 DIAGNOSIS — R29.6 FALLS FREQUENTLY: Primary | ICD-10-CM

## 2023-05-23 NOTE — TELEPHONE ENCOUNTER
[employer]  calls, elsa Brenner message sent to Dr García    ~routine follow up from pt insurance, pt qualifies for safety eval  ~pt saw SB yesterday  ~wonders if provider would order in home safety eval/physical therapy   ~one time order for in home safety evaluation at home due to multiple falls  ~pt not homebound    Routed to SB, inform pt when final  Yuridia Peñaloza RN, BSN  [facility]

## 2023-05-24 NOTE — PLAN OF CARE
End of Shift Summary  For vital signs and complete assessments, please see documentation flowsheets.     Pertinent assessments: diastolic BP somewhat low, O2 at 6L via NC, some SoB noted with activity, new PIV over right wrist. Lasix given, Intake 600ml, output 675ml via [place], per Tele - SR 64    Major Shift Events: uneventful    Treatment Plan: IV Lasix and iron, daily weight, 1200 daily fluid restriction, (strict I&O)     Bedside Nurse: rAslan Baca RN     Treatment Number (Optional): 3

## 2023-05-25 NOTE — TELEPHONE ENCOUNTER
PA Initiation    Medication: OZEMPIC (1 MG/DOSE) 4 MG/3ML SC SOPN  Insurance Company: [employer] - Phone [phone] Fax [phone]  Pharm[facility]illing the Rx[facility] [place] [place] [facility] [address]  Filling Pharmacy Phone: [phone]  Filling Pharmacy Fax:    Start Date: 5/25/2023    Central Prior Authorization Team   Phone: [phone]

## 2023-05-26 NOTE — TELEPHONE ENCOUNTER
Prior Authorization Approval    Medication: OZEMPIC (1 MG/DOSE) 4 MG/3ML SC SOPN  Authorization Effective Date: 2/24/2023  Authorization Expiration Date: 5/25/2024  Approved Dose/Quantity:   Reference #:     Insurance Company: [employer] - Phone [phone] Fax [phone]  Expected CoPay:       CoPay Card Available:      Financial Assistance Needed:   Which Pharmacy is filling the prescription: [facility] [place] - [address] [facility]  Pharmacy Notified: Yes  Patient Notified: Yes

## 2023-06-05 ENCOUNTER — ANTICOAGULATION THERAPY VISIT (OUTPATIENT)
Dept: ANTICOAGULATION | Facility: CLINIC | Age: 88
End: 2023-06-05

## 2023-06-05 ENCOUNTER — LAB (OUTPATIENT)
Dept: LAB | Facility: CLINIC | Age: 88
End: 2023-06-05
Payer: COMMERCIAL

## 2023-06-05 DIAGNOSIS — Z79.01 LONG TERM CURRENT USE OF ANTICOAGULANTS WITH INR GOAL OF 2.0-3.0: ICD-10-CM

## 2023-06-05 DIAGNOSIS — I48.0 PAROXYSMAL ATRIAL FIBRILLATION (H): ICD-10-CM

## 2023-06-05 DIAGNOSIS — I48.0 PAROXYSMAL ATRIAL FIBRILLATION (H): Primary | ICD-10-CM

## 2023-06-05 LAB — INR BLD: 2.2 (ref 0.9–1.1)

## 2023-06-05 PROCEDURE — 36416 COLLJ CAPILLARY BLOOD SPEC: CPT

## 2023-06-05 PROCEDURE — 85610 PROTHROMBIN TIME: CPT

## 2023-06-05 NOTE — PROGRESS NOTES
ANTICOAGULATION MANAGEMENT     Ирина Yanez 88 year old female is on warfarin with therapeutic INR result. (Goal INR 2.0-3.0)    Recent labs: (last 7 days)     06/05/23  1345   INR 2.2*       ASSESSMENT     Warfarin Lab Questionnaire    Warfarin Doses Last 7 Days      6/5/2023     1:46 PM   Dose in Tablet or Mg   TAB or MG? milligram (mg)           6/5/2023   Warfarin Lab Questionnaire   Missed doses within past 14 days? No   Changes in diet or alcohol within past 14 days? No   Medication changes since last result? Yes   Please list: no more calcium started taking other medication dont remember name - when discussing with Ирина, she looked at the bottle and determined this medication was sevelamer. No interaction with warfarin per [employer].   Injuries or illness since last result? No   New shortness of breath, severe headaches or sudden changes in vision since last result? No   Abnormal bleeding since last result? No   Upcoming surgery, procedure? No        Previous result: Supratherapeutic  Additional findings: None       PLAN     Recommended plan for no diet, medication or health factor changes affecting INR     Dosing Instructions: Continue your current warfarin dose with next INR in 3 weeks       Summary  As of 6/5/2023    Full warfarin instructions:  5 mg every Mon, Wed, Fri; 3.75 mg all other days   Next INR check:  6/26/2023             Telephone call with Ирина who verbalizes understanding and agrees to plan    Lab visit scheduled    Education provided:     Please call back if any changes to your diet, medications or how you've been taking warfarin    Plan made per [facility] anticoagulation protocol    Cris Richter RN  Anticoagulation Clinic  6/5/2023    _______________________________________________________________________     Anticoagulation Episode Summary     Current INR goal:  2.0-3.0   TTR:  55.1 % (1 y)   Target end date:  Indefinite   Send INR reminders to:  KRIS RAMIREZ     Indications    Paroxysmal atrial fibrillation (H) [I48.0]  Long term current use of anticoagulants with INR goal of 2.0-3.0 [Z79.01]           Comments:           Anticoagulation Care Providers     Provider Role Specialty Phone number    Yuridia Villarreal MD Referring [facility] Medicine [phone]    Mil García MD Referring [facility] [phone]

## 2023-06-07 NOTE — PROGRESS NOTES
I did received a voicemail from Maria De Jesus, Dr. Whatley's (nephrologists) nurse, regarding patients doxazosin dose and she is taking 2 mg daily at bedtime. I will update her medication list.     She also mentioned that patient feels that she may have some concerns about TIA's with some weakness along with drooling 2 times a week. I will send patient a [employer] message to make sure she schedules to discuss with her primary care provider. Maria De Jesus mentioned checking orthostatics. Will route to primary care provider for review and follow-up with patient if necessary.    Lara Cain, PharmD  Medication Therapy Management Pharmacist

## 2023-06-14 ENCOUNTER — VIRTUAL VISIT (OUTPATIENT)
Dept: PHARMACY | Facility: CLINIC | Age: 88
End: 2023-06-14
Payer: COMMERCIAL

## 2023-06-14 DIAGNOSIS — R29.6 FALLS FREQUENTLY: ICD-10-CM

## 2023-06-14 DIAGNOSIS — E11.21 TYPE 2 DIABETES MELLITUS WITH DIABETIC NEPHROPATHY, WITHOUT LONG-TERM CURRENT USE OF INSULIN (H): Primary | ICD-10-CM

## 2023-06-14 DIAGNOSIS — N18.4 CKD (CHRONIC KIDNEY DISEASE) STAGE 4, GFR 15-29 ML/MIN (H): ICD-10-CM

## 2023-06-14 PROCEDURE — 99607 MTMS BY PHARM ADDL 15 MIN: CPT | Performed by: PHARMACIST

## 2023-06-14 PROCEDURE — 99606 MTMS BY PHARM EST 15 MIN: CPT | Performed by: PHARMACIST

## 2023-06-14 RX ORDER — SEVELAMER CARBONATE 800 MG/1
1 TABLET, FILM COATED ORAL
COMMUNITY
Start: 2023-05-30 | End: 2024-06-10

## 2023-06-14 NOTE — PATIENT INSTRUCTIONS
"Recommendations from today's MTM visit:                                                      1. Contact the [place] Prescription Assistance Program/Fund at [phone] to see if there is a program that you could apply for Renvela (Sevelamer).     2. No medicine changes today.    Follow-up: Return in 2 months (on 8/28/2023) for MTM phone visit with Lara Cain.    It was great speaking with you today.  I value your experience and would be very thankful for your time in providing feedback in our clinic survey. In the next few days, you may receive an email or text message from [email or web address] with a link to a survey related to your  clinical pharmacist.\"     To schedule another MTM appointment, please call the clinic directly or you may call the MTM scheduling line at [phone] or toll-free at [phone].     My Clinical Pharmacist's contact information:                                                      Please feel free to contact me with any questions or concerns you have.      Lara Cain, PharmD  Medication Therapy Management Pharmacist  [facility] - Monday and Wednesday 7:30 - 4:00      "

## 2023-06-14 NOTE — PROGRESS NOTES
Medication Therapy Management (MTM) Encounter    ASSESSMENT:                            Medication Adherence/Access: No issues identified    Fall history: Stable    Type 2 Diabetes/CKD: Patient is meeting A1c goal of < 8%. Patient is at goal of fasting 100-150 mg/dL some of the time.  She would benefit from continuing Ozempic 1 mg weekly and checking blood sugars more frequently to determine if an increase in Ozempic is indicated. Microalbumin is not at goal < 30 mg/g. Not taking an ACE-inhibitor or ARB; stopped by nephrologist Dr. Whatley and he is managing her blood pressure.  Aspirin therapy is not indicated in this patient due to age and due to anticoagulant/antiplatelet therapy.     Hypertension: Stable.  This is managed by nephrology.      PLAN:                            1. Contact the [employer] Prescription Assistance Program/SmartPill at [phone] to see if there is a program that you could apply for Renvela (Sevelamer).     2. No medicine changes today.    Follow-up: Return in 2 months (on 8/28/2023) for MTM phone visit with Lara Cain.    SUBJECTIVE/OBJECTIVE:                          Ирина Yanez is a 88 year old female called for a follow-up visit.  Today's visit is a follow-up MTM visit from 5/8/23.     Reason for visit: follow-up diabetes.    Allergies/ADRs: Reviewed in chart  Past Medical History: Reviewed in chart  Tobacco: She reports that she has never smoked. She has never used smokeless tobacco.  Alcohol: not currently using  Assisted devices: She does have a walker to move around at times.     Medication Adherence/Access: no issues reported    Nephrologist: Dr. Whatley outside of  - follow up every three months (next in sept)- he is managing her blood pressure  Cardiologist: Dr. Beasley - follows annually, last visit August 2022    Fall history: She has had no falls since last visit and no symptoms of stroke.      Type 2 Diabetes/CKD:    Ozempic 1.0 mg once weekly on Saturdays -  gets free  through the  (currently has 3 unopened pens right now)  Sevelamer (Renvela) 800 mg three times daily with meals   Calcitriol 0.25 mg twice per week for CKD    Patient reports no side effects. Since switching from calcium acetate to sevelamer  Medication history: She was on Invokana in the past but stopped due to vaginal itching. Metformin discontinued 2/2015 due to reduced renal function.  She has tried glipizide in the past which caused diarrhea. Calcium acetate 667 mg three times daily switched to sevelamer due to hypercalcemia  SMBG: Blood sugars per patient report - checking a few times a week.    Am Fasting (started from today): 157 (6/7), 169 (5/7), 154 (5/21)    From last MTM visit:  Am Fasting (started from today): 169 (today), 166, 165, 178, 183, 149, 146, 144 (3/28)    Symptoms of low blood sugar? None   Symptoms of high blood sugar? none  Eye exam: up to date  Foot exam: up to date  Diet/Exercise: Reports no changes diet and activity.    Aspirin: Not taking due to age and on anticoagulation  Statin: Yes: pravastatin   ACEi/ARB: No. Stopped by nephrology per care everywhere note from 12/9/20.  Urine Albumin:   Lab Results   Component Value Date    UMALCR 120.33 (H) 03/14/2023    UMALCR 82.70 (H) 11/22/2022    UMALCR 382.61 (H) 08/11/2022    UMALCR 91.43 (H) 05/10/2022    UMALCR 205.88 (H) 02/10/2022     Lab Results   Component Value Date    A1C 7.1 (H) 05/22/2023     Creatinine   Date Value Ref Range Status   05/22/2023 2.08 (H) 0.51 - 0.95 mg/dL Final   03/14/2023 2.61 (H) 0.51 - 0.95 mg/dL Final   06/02/2021 2.43 (H) 0.52 - 1.04 mg/dL Final   04/21/2021 2.38 (H) 0.52 - 1.04 mg/dL Final     Hypertension:   diltiazem  mg once daily  doxazosin 2 mg daily  metoprolol ER 25 mg once daily  Patient self-monitors blood pressure.  Home BP monitoring in range of 140-150/?? mm Hg. She did see nephrologist yesterday and provided all home readings. Reports no medication changes made. Patient reports  no current medication side effects.  BP Readings from Last 3 Encounters:   05/22/23 128/84   11/22/22 130/50   11/21/22 136/60     Pulse Readings from Last 3 Encounters:   05/22/23 61   11/22/22 67   10/19/22 52     Today's Vitals: LMP  (LMP Unknown)   ----------------    I spent 15 minutes with this patient today.  A copy of the visit note was provided to the patient's provider(s).    A summary of these recommendations was mailed to the patient.    Lara Cain, Jered  Medication Therapy Management Pharmacist    Telemedicine Visit Details  Type of service:  Telephone visit  Start Time: 10:10 AM  End Time: 10:25 AM     Medication Therapy Recommendations  No medication therapy recommendations to display        What Type Of Note Output Would You Prefer (Optional)?: Standard Output How Severe Is Your Skin Lesion?: mild Has Your Skin Lesion Been Treated?: not been treated Is This A New Presentation, Or A Follow-Up?: Skin Lesion

## 2023-06-21 ENCOUNTER — TRANSFERRED RECORDS (OUTPATIENT)
Dept: HEALTH INFORMATION MANAGEMENT | Facility: CLINIC | Age: 88
End: 2023-06-21
Payer: COMMERCIAL

## 2023-06-26 ENCOUNTER — LAB (OUTPATIENT)
Dept: LAB | Facility: CLINIC | Age: 88
End: 2023-06-26
Payer: COMMERCIAL

## 2023-06-26 ENCOUNTER — ANTICOAGULATION THERAPY VISIT (OUTPATIENT)
Dept: ANTICOAGULATION | Facility: CLINIC | Age: 88
End: 2023-06-26

## 2023-06-26 DIAGNOSIS — Z79.01 LONG TERM CURRENT USE OF ANTICOAGULANTS WITH INR GOAL OF 2.0-3.0: ICD-10-CM

## 2023-06-26 DIAGNOSIS — I48.0 PAROXYSMAL ATRIAL FIBRILLATION (H): ICD-10-CM

## 2023-06-26 DIAGNOSIS — I48.0 PAROXYSMAL ATRIAL FIBRILLATION (H): Primary | ICD-10-CM

## 2023-06-26 LAB — INR BLD: 3.1 (ref 0.9–1.1)

## 2023-06-26 PROCEDURE — 36416 COLLJ CAPILLARY BLOOD SPEC: CPT

## 2023-06-26 PROCEDURE — 85610 PROTHROMBIN TIME: CPT

## 2023-06-26 NOTE — PROGRESS NOTES
ANTICOAGULATION MANAGEMENT     Ирина Yanez 88 year old female is on warfarin with supratherapeutic INR result. (Goal INR 2.0-3.0)    Recent labs: (last 7 days)     06/26/23  0937   INR 3.1*       ASSESSMENT     Warfarin Lab Questionnaire    Warfarin Doses Last 7 Days      6/26/2023     9:29 AM   Dose in Tablet or Mg   TAB or MG? milligram (mg)     Ирина confirmed proper dosing.      6/26/2023   Warfarin Lab Questionnaire   Missed doses within past 14 days? Yes   If yes; please list when: 6/25 Evening   Changes in diet or alcohol within past 14 days? No   Medication changes since last result? No   Injuries or illness since last result? No   New shortness of breath, severe headaches or sudden changes in vision since last result? No   Abnormal bleeding since last result? No   Upcoming surgery, procedure? No     Previous result: Therapeutic last visit; previously outside of goal range  Additional findings: Ирина reports less greens than usual, as she had not visited the grocery store.  INR still elevated despite missed dose from yesterday evening. Ирина would like to return to her usual amount of greens though and stay with current dosing. Modification will need to be made if still supra next visit.       PLAN     Recommended plan for temporary change(s) affecting INR     Dosing Instructions: Continue your current warfarin dose with next INR in 2 weeks       Summary  As of 6/26/2023    Full warfarin instructions:  5 mg every Mon, Wed, Fri; 3.75 mg all other days   Next INR check:  7/10/2023             Telephone call with Ирина who verbalizes understanding and agrees to plan    Lab visit scheduled    Education provided:     Please call back if any changes to your diet, medications or how you've been taking warfarin     Taking warfarin: ability to take evening dose the morning after if missed    Plan made per [facility] anticoagulation protocol    Cris Richter RN  Anticoagulation  Clinic  6/26/2023    _______________________________________________________________________     Anticoagulation Episode Summary     Current INR goal:  2.0-3.0   TTR:  54.5 % (1 y)   Target end date:  Indefinite   Send INR reminders to:  [facility]    Indications    Paroxysmal atrial fibrillation (H) [I48.0]  Long term current use of anticoagulants with INR goal of 2.0-3.0 [Z79.01]           Comments:           Anticoagulation Care Providers     Provider Role Specialty Phone number    Yuridia Villarreal MD Referring Family Medicine [phone]    Mil García MD Referring Family Medicine [phone]

## 2023-06-27 NOTE — PROVIDER NOTIFICATION
MD notified d/t pt began eating before blood sugar checked- patient bg has been in the 300's- should I check blood sugar now and replace off that value?     Awaiting new orders.   left upper arm

## 2023-06-28 DIAGNOSIS — I10 HYPERTENSION GOAL BP (BLOOD PRESSURE) < 140/90: Primary | ICD-10-CM

## 2023-06-29 RX ORDER — DILTIAZEM HYDROCHLORIDE EXTENDED-RELEASE TABLETS 180 MG/1
TABLET, EXTENDED RELEASE ORAL
Qty: 90 TABLET | Refills: 1 | Status: SHIPPED | OUTPATIENT
Start: 2023-06-29 | End: 2023-12-28

## 2023-07-10 ENCOUNTER — ANTICOAGULATION THERAPY VISIT (OUTPATIENT)
Dept: ANTICOAGULATION | Facility: CLINIC | Age: 88
End: 2023-07-10

## 2023-07-10 ENCOUNTER — LAB (OUTPATIENT)
Dept: LAB | Facility: CLINIC | Age: 88
End: 2023-07-10
Payer: COMMERCIAL

## 2023-07-10 DIAGNOSIS — I48.0 PAROXYSMAL ATRIAL FIBRILLATION (H): ICD-10-CM

## 2023-07-10 DIAGNOSIS — Z79.01 LONG TERM CURRENT USE OF ANTICOAGULANTS WITH INR GOAL OF 2.0-3.0: ICD-10-CM

## 2023-07-10 DIAGNOSIS — I48.0 PAROXYSMAL ATRIAL FIBRILLATION (H): Primary | ICD-10-CM

## 2023-07-10 LAB — INR BLD: 2.5 (ref 0.9–1.1)

## 2023-07-10 PROCEDURE — 36416 COLLJ CAPILLARY BLOOD SPEC: CPT

## 2023-07-10 PROCEDURE — 85610 PROTHROMBIN TIME: CPT

## 2023-07-10 NOTE — PROGRESS NOTES
ANTICOAGULATION MANAGEMENT     Ирина Yanez 88 year old female is on warfarin with therapeutic INR result. (Goal INR 2.0-3.0)    Recent labs: (last 7 days)     07/10/23  1032   INR 2.5*       ASSESSMENT     Warfarin Lab Questionnaire    Warfarin Doses Last 7 Days    Dosing confirmed.       7/10/2023   Warfarin Lab Questionnaire   Missed doses within past 14 days? No   Changes in diet or alcohol within past 14 days? No   Medication changes since last result? No   Injuries or illness since last result? No   New shortness of breath, severe headaches or sudden changes in vision since last result? No   Abnormal bleeding since last result? No   Upcoming surgery, procedure? No     Previous result: Supratherapeutic  Additional findings: None       PLAN     Recommended plan for no diet, medication or health factor changes affecting INR     Dosing Instructions: Continue your current warfarin dose with next INR in 3 weeks       Summary  As of 7/10/2023    Full warfarin instructions:  5 mg every Mon, Wed, Fri; 3.75 mg all other days   Next INR check:  7/31/2023             Telephone call with Ирина who verbalizes understanding and agrees to plan    Lab visit scheduled    Education provided:     Please call back if any changes to your diet, medications or how you've been taking warfarin    Plan made per [facility] anticoagulation protocol    Cris Richter RN  Anticoagulation Clinic  7/10/2023    _______________________________________________________________________     Anticoagulation Episode Summary     Current INR goal:  2.0-3.0   TTR:  53.9 % (1 y)   Target end date:  Indefinite   Send INR reminders to:  [facility]    Indications    Paroxysmal atrial fibrillation (H) [I48.0]  Long term current use of anticoagulants with INR goal of 2.0-3.0 [Z79.01]           Comments:           Anticoagulation Care Providers     Provider Role Specialty Phone number    Yuridia Villarreal MD Referring Family Medicine [phone]     Mil García MD [facility] [phone]

## 2023-07-13 ENCOUNTER — TELEPHONE (OUTPATIENT)
Dept: FAMILY MEDICINE | Facility: CLINIC | Age: 88
End: 2023-07-13
Payer: COMMERCIAL

## 2023-07-13 NOTE — TELEPHONE ENCOUNTER
Medication Question or Refill    Contacts       Type Contact Phone/Fax    07/13/2023 11:52 AM CDT Phone (Incoming) Ирина Yanez ANDREAS (Self) [phone] (H)          What medication are you calling about (include dose and sig)?: BIPAP Prescription    Preferred Pharmacy:   [facility]: [place]    Controlled Substance Agreement on file:   CSA -- Patient Level:    CSA: None found at the patient level.       Who prescribed the medication?: DR ROBERSON    Do you need a refill? Yes, PT needs a new order placed for her BIPAP machine    When did you use the medication last? Daily    Patient offered an appointment? Yes: Scheduled with Bennett Goltz for 11/02/2023.   Do you have any questions or concerns?  Yes: Pt states her BIPAP is not working anymore, not blowing. PT needs a new machine ASAP and needs a new prescription placed for it.         Could we send this information to you in [employer] or would you prefer to receive a phone call?:   Patient would prefer a phone call   Okay to leave a detailed message?: Yes at Home number on file [phone] (home)

## 2023-07-13 NOTE — TELEPHONE ENCOUNTER
Spoke with patient. Advised she bring machine into DME to have them look at it. It may be repairable or they may be able to provide a loaner. She will call writer back if she needs further assistance. Patient also placed on wait list.     Claudia GARZA RN  [facility]

## 2023-07-20 DIAGNOSIS — G47.31 PRIMARY CENTRAL SLEEP APNEA: Primary | ICD-10-CM

## 2023-07-20 NOTE — PROGRESS NOTES
Order placed for replacement ASV machine with EPAP 6-14 cm, PS 0-13 cm, max pressure 25 cm, rate auto. She has a follow up in November.  Last ECHO in 1/2021 showed LVEF 60-65%.  Bennett Goltz, PA-C

## 2023-07-27 DIAGNOSIS — E11.42 DIABETIC POLYNEUROPATHY ASSOCIATED WITH TYPE 2 DIABETES MELLITUS (H): ICD-10-CM

## 2023-07-27 RX ORDER — GABAPENTIN 100 MG/1
CAPSULE ORAL
Qty: 180 CAPSULE | Refills: 0 | Status: SHIPPED | OUTPATIENT
Start: 2023-07-27 | End: 2023-08-22

## 2023-07-31 ENCOUNTER — LAB (OUTPATIENT)
Dept: LAB | Facility: CLINIC | Age: 88
End: 2023-07-31
Payer: COMMERCIAL

## 2023-07-31 ENCOUNTER — ANTICOAGULATION THERAPY VISIT (OUTPATIENT)
Dept: ANTICOAGULATION | Facility: CLINIC | Age: 88
End: 2023-07-31

## 2023-07-31 DIAGNOSIS — I48.0 PAROXYSMAL ATRIAL FIBRILLATION (H): ICD-10-CM

## 2023-07-31 DIAGNOSIS — I48.0 PAROXYSMAL ATRIAL FIBRILLATION (H): Primary | ICD-10-CM

## 2023-07-31 DIAGNOSIS — Z79.01 LONG TERM CURRENT USE OF ANTICOAGULANTS WITH INR GOAL OF 2.0-3.0: ICD-10-CM

## 2023-07-31 DIAGNOSIS — N18.4 CKD (CHRONIC KIDNEY DISEASE) STAGE 4, GFR 15-29 ML/MIN (H): Primary | ICD-10-CM

## 2023-07-31 LAB — INR BLD: 3.6 (ref 0.9–1.1)

## 2023-07-31 PROCEDURE — 85610 PROTHROMBIN TIME: CPT

## 2023-07-31 PROCEDURE — 36416 COLLJ CAPILLARY BLOOD SPEC: CPT

## 2023-07-31 NOTE — PROGRESS NOTES
ANTICOAGULATION MANAGEMENT     Ирина Yanez 88 year old female is on warfarin with supratherapeutic INR result. (Goal INR 2.0-3.0)    Recent labs: (last 7 days)     07/31/23  0926   INR 3.6*       ASSESSMENT     Warfarin Lab Questionnaire    Warfarin Doses Last 7 Days      7/31/2023     9:21 AM   Dose in Tablet or Mg   TAB or MG? milligram (mg)           7/31/2023   Warfarin Lab Questionnaire   Missed doses within past 14 days? No   Changes in diet or alcohol within past 14 days? No - Per assessment, patient reported less greens in the last week - temporary change.   Medication changes since last result? No   Injuries or illness since last result? No   New shortness of breath, severe headaches or sudden changes in vision since last result? No   Abnormal bleeding since last result? No   Upcoming surgery, procedure? No     Previous result: Therapeutic last 2(+) visits  Additional findings: None       PLAN     Recommended plan for temporary change(s) affecting INR     Dosing Instructions: partial hold then continue your current warfarin dose with next INR in 2 weeks       Summary  As of 7/31/2023      Full warfarin instructions:  7/31: 1.25 mg; Otherwise 5 mg every Mon, Wed, Fri; 3.75 mg all other days   Next INR check:  8/14/2023               Telephone call with Ирина who verbalizes understanding and agrees to plan    Lab visit scheduled    Education provided:   Please call back if any changes to your diet, medications or how you've been taking warfarin  Symptom monitoring: monitoring for bleeding signs and symptoms and monitoring for clotting signs and symptoms    Plan made per [facility] anticoagulation protocol    Mecca Hayden RN  Anticoagulation Clinic  7/31/2023    _______________________________________________________________________     Anticoagulation Episode Summary       Current INR goal:  2.0-3.0   TTR:  50.7 % (1 y)   Target end date:  Indefinite   Send INR reminders to:  KRIS RAMIREZ     Indications    Paroxysmal atrial fibrillation (H) [I48.0]  Long term current use of anticoagulants with INR goal of 2.0-3.0 [Z79.01]             Comments:               Anticoagulation Care Providers       Provider Role Specialty Phone number    Yuridia Villarreal MD Referring [facility] Medicine [phone]    Mil García MD Referring [facility] [phone]

## 2023-08-14 ENCOUNTER — LAB (OUTPATIENT)
Dept: LAB | Facility: CLINIC | Age: 88
End: 2023-08-14
Payer: COMMERCIAL

## 2023-08-14 ENCOUNTER — ANTICOAGULATION THERAPY VISIT (OUTPATIENT)
Dept: ANTICOAGULATION | Facility: CLINIC | Age: 88
End: 2023-08-14

## 2023-08-14 DIAGNOSIS — I48.0 PAROXYSMAL ATRIAL FIBRILLATION (H): Primary | ICD-10-CM

## 2023-08-14 DIAGNOSIS — Z79.01 LONG TERM CURRENT USE OF ANTICOAGULANTS WITH INR GOAL OF 2.0-3.0: ICD-10-CM

## 2023-08-14 DIAGNOSIS — N18.4 CKD (CHRONIC KIDNEY DISEASE) STAGE 4, GFR 15-29 ML/MIN (H): ICD-10-CM

## 2023-08-14 DIAGNOSIS — I48.0 PAROXYSMAL ATRIAL FIBRILLATION (H): ICD-10-CM

## 2023-08-14 DIAGNOSIS — E11.21 TYPE 2 DIABETES MELLITUS WITH DIABETIC NEPHROPATHY (H): Primary | ICD-10-CM

## 2023-08-14 LAB
ANION GAP SERPL CALCULATED.3IONS-SCNC: 15 MMOL/L (ref 7–15)
BUN SERPL-MCNC: 60 MG/DL (ref 8–23)
CALCIUM SERPL-MCNC: 10.3 MG/DL (ref 8.8–10.2)
CHLORIDE SERPL-SCNC: 104 MMOL/L (ref 98–107)
CREAT SERPL-MCNC: 2.06 MG/DL (ref 0.51–0.95)
DEPRECATED HCO3 PLAS-SCNC: 26 MMOL/L (ref 22–29)
GFR SERPL CREATININE-BSD FRML MDRD: 23 ML/MIN/1.73M2
GLUCOSE SERPL-MCNC: 201 MG/DL (ref 70–99)
HBA1C MFR BLD: 7 % (ref 0–5.6)
HGB BLD-MCNC: 11.2 G/DL (ref 11.7–15.7)
INR BLD: 2.6 (ref 0.9–1.1)
POTASSIUM SERPL-SCNC: 4.2 MMOL/L (ref 3.4–5.3)
SODIUM SERPL-SCNC: 145 MMOL/L (ref 136–145)

## 2023-08-14 PROCEDURE — 36415 COLL VENOUS BLD VENIPUNCTURE: CPT

## 2023-08-14 PROCEDURE — 80048 BASIC METABOLIC PNL TOTAL CA: CPT

## 2023-08-14 PROCEDURE — 85610 PROTHROMBIN TIME: CPT

## 2023-08-14 PROCEDURE — 85018 HEMOGLOBIN: CPT

## 2023-08-14 PROCEDURE — 83036 HEMOGLOBIN GLYCOSYLATED A1C: CPT

## 2023-08-14 NOTE — PROGRESS NOTES
ANTICOAGULATION MANAGEMENT     Ирина Yanez 88 year old female is on warfarin with therapeutic INR result. (Goal INR 2.0-3.0)    Recent labs: (last 7 days)     08/14/23  0948   INR 2.6*       ASSESSMENT     Warfarin Lab Questionnaire    Warfarin Doses Last 7 Days      8/13/2023     5:07 PM   Dose in Tablet or Mg   TAB or MG? milligram (mg)     Pt Rptd Dose SUNDAY MONDAY TUESDAY WED THURS FRIDAY SATURDAY 8/13/2023   5:07 PM 3.75 5 3.75 5 3.75 5 3.75         8/13/2023   Warfarin Lab Questionnaire   Missed doses within past 14 days? No   Changes in diet or alcohol within past 14 days? No   Medication changes since last result? No   Injuries or illness since last result? No   New shortness of breath, severe headaches or sudden changes in vision since last result? No   Abnormal bleeding since last result? No   Upcoming surgery, procedure? No   Best number to call with results? [phone]     Previous result: Supratherapeutic d/t less green veggies, now back to regular intake/diet  Additional findings: None       PLAN     Recommended plan for no diet, medication or health factor changes affecting INR     Dosing Instructions: Continue your current warfarin dose with next INR in 4 weeks       Summary  As of 8/14/2023      Full warfarin instructions:  5 mg every Mon, Wed, Fri; 3.75 mg all other days   Next INR check:  9/11/2023               Telephone call with Ирина who verbalizes understanding and agrees to plan    Lab visit scheduled    Education provided:   Please call back if any changes to your diet, medications or how you've been taking warfarin  Contact [phone]  with any changes, questions or concerns.     Plan made per [facility] anticoagulation protocol    Claudia Dong, RN  Anticoagulation Clinic  8/14/2023    _______________________________________________________________________     Anticoagulation Episode Summary       Current INR goal:  2.0-3.0   TTR:  52.1 % (1 y)   Target end date:  Indefinite    Send INR reminders to:  KIMAG JAMES    Indications    Paroxysmal atrial fibrillation (H) [I48.0]  Long term current use of anticoagulants with INR goal of 2.0-3.0 [Z79.01]             Comments:               Anticoagulation Care Providers       Provider Role Specialty Phone number    Yuridia Villarreal MD Referring Family Medicine [phone]    Mil García MD Referring [facility] Medicine [phone]

## 2023-08-22 ENCOUNTER — OFFICE VISIT (OUTPATIENT)
Dept: FAMILY MEDICINE | Facility: CLINIC | Age: 88
End: 2023-08-22
Payer: COMMERCIAL

## 2023-08-22 VITALS
OXYGEN SATURATION: 98 % | RESPIRATION RATE: 19 BRPM | HEIGHT: 64 IN | DIASTOLIC BLOOD PRESSURE: 64 MMHG | BODY MASS INDEX: 30.05 KG/M2 | SYSTOLIC BLOOD PRESSURE: 132 MMHG | TEMPERATURE: 97.9 F | WEIGHT: 176 LBS | HEART RATE: 66 BPM

## 2023-08-22 DIAGNOSIS — I48.0 PAROXYSMAL ATRIAL FIBRILLATION (H): ICD-10-CM

## 2023-08-22 DIAGNOSIS — I50.32 CHRONIC DIASTOLIC CONGESTIVE HEART FAILURE (H): ICD-10-CM

## 2023-08-22 DIAGNOSIS — E11.21 TYPE 2 DIABETES MELLITUS WITH DIABETIC NEPHROPATHY, WITHOUT LONG-TERM CURRENT USE OF INSULIN (H): Primary | ICD-10-CM

## 2023-08-22 DIAGNOSIS — I10 HYPERTENSION GOAL BP (BLOOD PRESSURE) < 140/90: ICD-10-CM

## 2023-08-22 PROCEDURE — 99214 OFFICE O/P EST MOD 30 MIN: CPT | Performed by: FAMILY MEDICINE

## 2023-08-22 RX ORDER — GABAPENTIN 100 MG/1
200 CAPSULE ORAL AT BEDTIME
Qty: 180 CAPSULE | Refills: 1 | Status: SHIPPED | OUTPATIENT
Start: 2023-08-22 | End: 2024-02-22

## 2023-08-22 RX ORDER — WARFARIN SODIUM 2.5 MG/1
TABLET ORAL
Qty: 180 TABLET | Refills: 1 | Status: SHIPPED | OUTPATIENT
Start: 2023-08-22 | End: 2024-02-22

## 2023-08-22 RX ORDER — TORSEMIDE 20 MG/1
20 TABLET ORAL DAILY
Qty: 90 TABLET | Refills: 0 | Status: SHIPPED | OUTPATIENT
Start: 2023-08-22 | End: 2024-02-22

## 2023-08-22 ASSESSMENT — ENCOUNTER SYMPTOMS
SHORTNESS OF BREATH: 0
HEADACHES: 0
PALPITATIONS: 0
CONSTITUTIONAL NEGATIVE: 1

## 2023-08-22 ASSESSMENT — PAIN SCALES - GENERAL: PAINLEVEL: NO PAIN (0)

## 2023-08-22 NOTE — PROGRESS NOTES
Assessment and Plan    (E11.21) Type 2 diabetes mellitus with diabetic nephropathy (H)  (primary encounter diagnosis)  Comment: A1c reviewed, at goal  Plan:     (E11.42) Diabetic polyneuropathy associated with type 2 diabetes mellitus (H)  Comment: refilling medsPlan: gabapentin (NEURONTIN) 100 MG capsule            (E11.21) Type 2 diabetes mellitus with diabetic nephropathy, without long-term current use of insulin (H)  Comment: has meds, A1c looks good - at [facility].  Plan:     (I10) Hypertension goal BP (blood pressure) < 140/90  Comment: BPOK,, refill  Plan: torsemide (DEMADEX) 20 MG tablet            (I50.32) Chronic diastolic congestive heart failure (H)  Comment:   Plan: torsemide (DEMADEX) 20 MG tablet            (I48.0) Paroxysmal atrial fibrillation (H)  Comment: refill  Plan: warfarin ANTICOAGULANT (JANTOVEN ANTICOAGULANT)        2.5 MG tablet              RTC in 6m CPE    Mil García MD      Bárbara Gifford is a 88 year old, presenting for the following health issues:  No chief complaint on file.      History of Present Illness       Diabetes:   She presents for follow up of diabetes.  She is checking home blood glucose a few times a month.   She checks blood glucose before meals.  Blood glucose is never over 200 and never under 70. She is aware of hypoglycemia symptoms including shakiness.    She has no concerns regarding her diabetes at this time.  She is having numbness in feet and burning in feet.  The patient has had a diabetic eye exam in the last 12 months. Eye exam performed on Sept 2021. Location of last eye exam [facility].        She eats 2-3 servings of fruits and vegetables daily.She consumes 0 sweetened beverage(s) daily.She exercises with enough effort to increase her heart rate 9 or less minutes per day.  She exercises with enough effort to increase her heart rate 3 or less days per week.   She is taking medications regularly.     Feeling well.  NO acute concerns    Does  "have eye exam every year in September at [facility].    Otherwise well.        Review of Systems   Constitutional: Negative.    Eyes:  Negative for visual disturbance.   Respiratory:  Negative for shortness of breath.    Cardiovascular:  Negative for chest pain, palpitations and peripheral edema.   Neurological:  Negative for headaches.            Objective    /64 (BP Location: Right arm, Patient Position: Sitting, Cuff Size: Adult Large)   Pulse 66   Temp 97.9  F (36.6  C) (Tympanic)   Resp 19   Ht 1.626 m (5' 4\")   Wt 79.8 kg (176 lb)   LMP  (LMP Unknown)   SpO2 98%   BMI 30.21 kg/m    Body mass index is 30.21 kg/m .  Physical Exam  Vitals reviewed.   HENT:      Head: Normocephalic and atraumatic.   Eyes:      Conjunctiva/sclera: Conjunctivae normal.   Cardiovascular:      Rate and Rhythm: Normal rate and regular rhythm.      Heart sounds: Normal heart sounds.   Pulmonary:      Effort: Pulmonary effort is normal.      Breath sounds: Normal breath sounds.   Skin:     General: Skin is warm and dry.      Findings: No bruising.   Neurological:      Mental Status: She is alert and oriented to person, place, and time.   Psychiatric:         Mood and Affect: Mood normal.         Behavior: Behavior normal.                              "

## 2023-08-28 ENCOUNTER — VIRTUAL VISIT (OUTPATIENT)
Dept: PHARMACY | Facility: CLINIC | Age: 88
End: 2023-08-28
Payer: COMMERCIAL

## 2023-08-28 DIAGNOSIS — L90.0 LICHEN SCLEROSUS ET ATROPHICUS: ICD-10-CM

## 2023-08-28 DIAGNOSIS — N18.4 CKD (CHRONIC KIDNEY DISEASE) STAGE 4, GFR 15-29 ML/MIN (H): ICD-10-CM

## 2023-08-28 DIAGNOSIS — E11.21 TYPE 2 DIABETES MELLITUS WITH DIABETIC NEPHROPATHY, WITHOUT LONG-TERM CURRENT USE OF INSULIN (H): Primary | ICD-10-CM

## 2023-08-28 PROCEDURE — 99606 MTMS BY PHARM EST 15 MIN: CPT | Performed by: PHARMACIST

## 2023-08-28 PROCEDURE — 99607 MTMS BY PHARM ADDL 15 MIN: CPT | Performed by: PHARMACIST

## 2023-08-28 NOTE — PATIENT INSTRUCTIONS
"Recommendations from today's MTM visit:                                                       1. Contact the [place] Prescription Assistance Program/Fund at [phone] to see if there is a program that you could apply for Renvela (Sevelamer).  2. I checked with the our pharmacy and they will not price match so I would contact Dr. Whatley's office to see if he could send a prescription for Sevelamer to the [facility]   2. [facility] online pharmacy has Sevelamer  [email or web address]  3. Call [phone] to get a refill of your Ozempic    Follow-up: Return in about 3 months (around 11/28/2023).    It was great speaking with you today.  I value your experience and would be very thankful for your time in providing feedback in our clinic survey. In the next few days, you may receive an email or text message from [employer] with a link to a survey related to your  clinical pharmacist.\"     To schedule another MTM appointment, please call the clinic directly or you may call the MTM scheduling line at [phone] or toll-free at [phone].     My Clinical Pharmacist's contact information:                                                      Please feel free to contact me with any questions or concerns you have.      Lara Cain, PharmD  Medication Therapy Management Pharmacist  [facility] - Monday and Wednesday 7:30 - 4:00      "

## 2023-08-28 NOTE — PROGRESS NOTES
Medication Therapy Management (MTM) Encounter    ASSESSMENT:                            Medication Adherence/Access: No issues identified    Fall history: Recommended balance physical therapy with multiple falls but patient declined today.    Type 2 Diabetes/CKD: Patient is meeting A1c goal of < 8%. She would benefit from no medication changes today. Microalbumin is not at goal < 30 mg/g. Not taking an ACE-inhibitor or ARB; stopped by nephrologist Dr. Whatley and he is managing her blood pressure.  Patient can get 30 day supply of sevelamer for $16.50 through [facility]. Asked patient to reach out to Dr. Whatley to send the prescription here since our pharmacy cannot price match that medication. Aspirin therapy is not indicated in this patient due to age and due to anticoagulant/antiplatelet therapy.     PLAN:                            1. Contact the [place] Prescription Assistance Program/Fund at [phone] to see if there is a program that you could apply for Renvela (Sevelamer).  2. I checked with the our pharmacy and they will not price match so I would contact Dr. Whatley's office to see if he could send a prescription for Sevelamer to the [facility]   2. [facility] pharmacy has Sevelamer  [email or web address]  3. Call [phone] to get a refill of your Ozempic.    Follow-up: Return in about 3 months (around 11/28/2023).    SUBJECTIVE/OBJECTIVE:                          Ирина Yanez is a 88 year old female called for a follow-up visit from 6/14/23.       Reason for visit: follow-up diabetes.    Allergies/ADRs: Reviewed in chart  Past Medical History: Reviewed in chart  Tobacco: She reports that she has never smoked. She has never used smokeless tobacco.  Alcohol: not currently using  Assisted devices: She does have a walker that she uses more often.    Medication Adherence/Access: no issues reported,  she does use a pill box which helps. Wondering if there is a way to get the cost of sevelamer down.    Nephrologist: Dr. Whatley outside of  - follow up every three months (next in sept)- he is managing her blood pressure  Cardiologist: Dr. Beasley - follows annually, last visit August 2022    Fall history: She did fall in the garden this summer. She lost her balance but did not have any injury. Reports no dizziness. She has not done physical therapy for balance. She does not want to do physical therapy today.    Type 2 Diabetes/CKD:    Ozempic 1.0 mg once weekly on Saturdays -  gets free through the  (currently has 1 unopened pens right now)  Sevelamer (Renvela) 800 mg three times daily with meals   Calcitriol 0.25 mg twice per week for CKD    Patient reports no side effects.     Medication history: She was on Invokana in the past but stopped due to vaginal itching. Metformin discontinued 2/2015 due to reduced renal function.  She has tried glipizide in the past which caused diarrhea. Calcium acetate 667 mg three times daily switched to sevelamer due to hypercalcemia    SMBG: Blood sugars per patient report - not checking    From last MTM visit:  Am Fasting (started from today): 157 (6/7), 169 (5/7), 154 (5/21)    Symptoms of low blood sugar? None   Symptoms of high blood sugar? none  Eye exam: up to date  Foot exam: up to date  Diet/Exercise: Reports no changes diet and activity.      Urine Albumin:   Lab Results   Component Value Date    UMALCR 120.33 (H) 03/14/2023    UMALCR 82.70 (H) 11/22/2022     Lab Results   Component Value Date    A1C 7.0 (H) 08/14/2023     Creatinine   Date Value Ref Range Status   08/14/2023 2.06 (H) 0.51 - 0.95 mg/dL Final   05/22/2023 2.08 (H) 0.51 - 0.95 mg/dL Final   06/02/2021 2.43 (H) 0.52 - 1.04 mg/dL Final   04/21/2021 2.38 (H) 0.52 - 1.04 mg/dL Final     Hypertension:   diltiazem  mg once daily  doxazosin 2 mg daily  metoprolol ER 25 mg once daily  Patient  reports no current medication side effects. Nephrology appointment scheduled for 9/26/23.    BP Readings from Last 3 Encounters:   08/22/23 132/64   05/22/23 128/84   11/22/22 130/50     Pulse Readings from Last 3 Encounters:   08/22/23 66   05/22/23 61   11/22/22 67       Today's Vitals: LMP  (LMP Unknown)   ----------------    I spent 20 minutes with this patient today. All changes were made via collaborative practice agreement with Mil García MD. A copy of the visit note was provided to the patient's provider(s).    A summary of these recommendations was sent via [employer].    Lara Cain, TishD  Medication Therapy Management Pharmacist    Telemedicine Visit Details  Type of service:  Telephone visit  Start Time: 10:11 AM  End Time: 10:31 AM     Medication Therapy Recommendations  No medication therapy recommendations to display

## 2023-08-29 RX ORDER — BETAMETHASONE VALERATE 1.2 MG/G
CREAM TOPICAL DAILY
Qty: 45 G | Refills: 3 | Status: SHIPPED | OUTPATIENT
Start: 2023-08-29

## 2023-09-08 ENCOUNTER — TRANSFERRED RECORDS (OUTPATIENT)
Dept: FAMILY MEDICINE | Facility: CLINIC | Age: 88
End: 2023-09-08
Payer: COMMERCIAL

## 2023-09-08 LAB — RETINOPATHY: NEGATIVE

## 2023-09-11 ENCOUNTER — LAB (OUTPATIENT)
Dept: LAB | Facility: CLINIC | Age: 88
End: 2023-09-11
Payer: COMMERCIAL

## 2023-09-11 DIAGNOSIS — I48.0 PAROXYSMAL ATRIAL FIBRILLATION (H): ICD-10-CM

## 2023-09-11 LAB — INR BLD: 1.9 (ref 0.9–1.1)

## 2023-09-11 PROCEDURE — 36416 COLLJ CAPILLARY BLOOD SPEC: CPT

## 2023-09-11 PROCEDURE — 85610 PROTHROMBIN TIME: CPT

## 2023-09-12 ENCOUNTER — OFFICE VISIT (OUTPATIENT)
Dept: CARDIOLOGY | Facility: CLINIC | Age: 88
End: 2023-09-12
Payer: COMMERCIAL

## 2023-09-12 VITALS
WEIGHT: 173.5 LBS | DIASTOLIC BLOOD PRESSURE: 62 MMHG | HEART RATE: 66 BPM | OXYGEN SATURATION: 99 % | SYSTOLIC BLOOD PRESSURE: 136 MMHG | HEIGHT: 64 IN | BODY MASS INDEX: 29.62 KG/M2

## 2023-09-12 DIAGNOSIS — I35.0 AORTIC VALVE STENOSIS, ETIOLOGY OF CARDIAC VALVE DISEASE UNSPECIFIED: ICD-10-CM

## 2023-09-12 DIAGNOSIS — I48.0 PAROXYSMAL ATRIAL FIBRILLATION (H): ICD-10-CM

## 2023-09-12 DIAGNOSIS — I50.32 CHRONIC DIASTOLIC HEART FAILURE (H): Primary | ICD-10-CM

## 2023-09-12 PROCEDURE — 99214 OFFICE O/P EST MOD 30 MIN: CPT | Performed by: INTERNAL MEDICINE

## 2023-09-12 NOTE — LETTER
9/12/2023    Mil García MD  [id number] [place] [address]    RE: Ирина Yanez       Dear Colleague,     I had the pleasure of seeing Ирина Yanez in the [facility].  HISTORY OF PRESENT ILLNESS:  stable    Orders this Visit:  No orders of the defined types were placed in this encounter.    No orders of the defined types were placed in this encounter.    There are no discontinued medications.    No diagnosis found.    CURRENT MEDICATIONS:  Current Outpatient Medications   Medication Sig Dispense Refill    acetaminophen (TYLENOL) 500 MG tablet Take 1,000 mg by mouth every evening as needed for mild pain      betamethasone valerate (VALISONE) 0.1 % external cream Apply topically daily 45 g 3    blood glucose (NO BRAND SPECIFIED) lancets standard Use to test blood sugar 1 times daily or as directed, Contour Next lancets 100 each 3    blood glucose (NO BRAND SPECIFIED) test strip Use to test blood sugar 1 times daily or as directed, Contour Next strips 100 strip 1    blood glucose monitoring (NO BRAND SPECIFIED) meter device kit Use to test blood sugar 1 times daily or as directed. Ultra 2 1 kit 1    calcitRIOL (ROCALTROL) 0.25 MCG capsule Take 0.25 mcg by mouth Twice a week currently      diltiazem ER (CARDIAZEM LA) 180 MG 24 hr tablet TAKE ONE TABLET BY MOUTH EVERY EVENING 90 tablet 1    doxazosin (CARDURA) 2 MG tablet Take 2 mg by mouth every evening      gabapentin (NEURONTIN) 100 MG capsule Take 2 capsules (200 mg) by mouth At Bedtime 180 capsule 1    metoprolol succinate ER (TOPROL XL) 25 MG 24 hr tablet TAKE 1 TABLET BY MOUTH DAILY. GENERIC EQUIVALENT FOR TOPROL XL 90 tablet 1    MULTI-VITAMIN OR TABS Take 2 tablets by mouth daily       nystatin (MYCOSTATIN) 187513 UNIT/GM external powder Apply a small amount to affected area three times daily. 60 g 1    pravastatin (PRAVACHOL) 20 MG tablet Take 1 tablet (20 mg) by mouth daily 90 tablet 3    Semaglutide, 1 MG/DOSE,  "(OZEMPIC) 4 MG/3ML pen Inject 1 mg Subcutaneous every 7 days 9 mL 1    sertraline (ZOLOFT) 50 MG tablet Take 1 tablet (50 mg) by mouth daily 90 tablet 1    sevelamer carbonate (RENVELA) 800 MG tablet 1 tablet 3 times daily (before meals)      torsemide (DEMADEX) 20 MG tablet Take 1 tablet (20 mg) by mouth daily 90 tablet 0    warfarin ANTICOAGULANT (JANTOVEN ANTICOAGULANT) 2.5 MG tablet Take 2 tablets (5 mg) by mouth daily except take 1.5 tablets (3.75 mg) on Sunday or as directed by INR Clinic 180 tablet 1       ALLERGIES     Allergies   Allergen Reactions    Amoxicillin      Other reaction(s): cannot remember    Amoxicillin-Pot Clavulanate Diarrhea     Vomiting      Clavulanic Acid      Other reaction(s): cannot remember    Clindamycin Phosphate      Hives    Metformin Diarrhea    Tegretol [Carbamazepine]      Irregular heart beat       PAST MEDICAL, SURGICAL, FAMILY, SOCIAL HISTORY:  History was reviewed and updated as needed, see medical record.    Review of Systems:  A 12-point review of systems was completed, see medical record for detailed review of systems information.    Physical Exam:  Vitals: /62 (BP Location: Right arm, Patient Position: Sitting, Cuff Size: Adult Regular)   Pulse 66   Ht 1.626 m (5' 4\")   Wt 78.7 kg (173 lb 8 oz)   LMP  (LMP Unknown)   SpO2 99%   BMI 29.78 kg/m      Constitutional:           Skin:           Head:           Eyes:           ENT:           Neck:           Chest:           Cardiac:                    Abdomen:           Vascular:                                        Extremities and Back:           Neurological:           ASSESSMENT: stable       RECOMMENDATIONS:   Follow up in one year with TTE  Continue present medications      Recent Lab Results:  LIPID RESULTS:  Lab Results   Component Value Date    CHOL 215 (H) 05/22/2023    CHOL 195 06/02/2021    HDL 42 (L) 05/22/2023    HDL 45 (L) 06/02/2021    LDL 94 05/22/2023    LDL 88 05/10/2022     (H) " 06/02/2021    TRIG 393 (H) 05/22/2023    TRIG 249 (H) 06/02/2021    CHOLHDLRATIO 3.4 12/17/2013       LIVER ENZYME RESULTS:  Lab Results   Component Value Date    AST 13 02/10/2022    AST 27 01/26/2021    ALT 24 03/14/2023    ALT 40 01/26/2021       CBC RESULTS:  Lab Results   Component Value Date    WBC 8.2 11/22/2022    WBC 14.5 (H) 02/09/2021    RBC 3.84 11/22/2022    RBC 3.28 (L) 02/09/2021    HGB 11.2 (L) 08/14/2023    HGB 11.1 (L) 06/02/2021    HCT 36.1 11/22/2022    HCT 31.1 (L) 02/09/2021    MCV 94 11/22/2022    MCV 95 02/09/2021    MCH 29.9 11/22/2022    MCH 27.4 02/09/2021    MCHC 31.9 11/22/2022    MCHC 28.9 (L) 02/09/2021    RDW 13.3 11/22/2022    RDW 15.6 (H) 02/09/2021     11/22/2022     02/09/2021       BMP RESULTS:  Lab Results   Component Value Date     08/14/2023     06/02/2021    POTASSIUM 4.2 08/14/2023    POTASSIUM 4.8 09/13/2022    POTASSIUM 4.2 06/02/2021    CHLORIDE 104 08/14/2023    CHLORIDE 109 09/13/2022    CHLORIDE 103 06/02/2021    CO2 26 08/14/2023    CO2 24 09/13/2022    CO2 32 06/02/2021    ANIONGAP 15 08/14/2023    ANIONGAP 7 09/13/2022    ANIONGAP 5 06/02/2021     (H) 08/14/2023     (H) 09/13/2022     (H) 06/02/2021    BUN 60.0 (H) 08/14/2023    BUN 67 (H) 09/13/2022    BUN 66 (H) 06/02/2021    CR 2.06 (H) 08/14/2023    CR 2.43 (H) 06/02/2021    GFRESTIMATED 23 (L) 08/14/2023    GFRESTIMATED 18 (L) 06/02/2021    GFRESTBLACK 20 (L) 06/02/2021    EDNA 10.3 (H) 08/14/2023    EDNA 9.7 06/02/2021        A1C RESULTS:  Lab Results   Component Value Date    A1C 7.0 (H) 08/14/2023    A1C 7.1 (H) 01/26/2021       INR RESULTS:  Lab Results   Component Value Date    INR 1.9 (H) 09/11/2023    INR 2.6 (H) 08/14/2023    INR 2.10 (H) 07/07/2021    INR 2.10 (H) 06/16/2021       We greatly appreciate the opportunity to be involved in the care of your patient, Ирина Yanez.    Sincerely,  Henrik Beasley MD      CC  No referring provider defined for  this encounter.

## 2023-09-12 NOTE — PROGRESS NOTES
HISTORY OF PRESENT ILLNESS:    Ирина Yanez, an 88-year-old woman with chronic kidney disease, paroxysmal atrial fibrillation, aortic stenosis, diabetes mellitus, hypertension and dyslipidemia, was seen today at your request for followup.     Since last seen 1 year ago, the patient remains free of orthopnea dyspnea PND symptomatic palpitation or new neurologic deficit.  She remains independent and is able to drive, garden, ambulates with a walker.  Her  fell, fracturing his hip in August 2023 and is scheduled to be discharged from acute rehabilitation in the near future.  A follow-up visit is planned with her nephrologist in the near future, but her creatinine remained stable.      PAST MEDICAL HISTORY:    1.  Chronic kidney disease, creatinine 2.1, GFR 23.  2.  Paroxysmal  atrial fibrillation : on  diltiazem/metoprolol.  On chronic warfarin anticoagulation for CHADS-VASc score of 5.  3.  Chronic diastolic heart failure, rechocardiogram 2021 showing ejection fraction of 60% with no regional wall motion abnormalities.  4.  Mild aortic stenosis.  echo 2021 showing aortic valve area of about 1.7 cm2, mean gradient of 15.  5.  Diabetes mellitus.  a.  Diabetic nephrosclerosis.  b.  Severe longstanding stocking glove distribution neuropathy.  5.  Chronic normocytic normochromic anemia.  6.  Chronic right bundle branch block.    Orders this Visit:  No orders of the defined types were placed in this encounter.    No orders of the defined types were placed in this encounter.    There are no discontinued medications.    No diagnosis found.    CURRENT MEDICATIONS:  Current Outpatient Medications   Medication Sig Dispense Refill    acetaminophen (TYLENOL) 500 MG tablet Take 1,000 mg by mouth every evening as needed for mild pain      betamethasone valerate (VALISONE) 0.1 % external cream Apply topically daily 45 g 3    blood glucose (NO BRAND SPECIFIED) lancets standard Use to test blood sugar 1 times daily or as  directed, Contour Next lancets 100 each 3    blood glucose (NO BRAND SPECIFIED) test strip Use to test blood sugar 1 times daily or as directed, Contour Next strips 100 strip 1    blood glucose monitoring (NO BRAND SPECIFIED) meter device kit Use to test blood sugar 1 times daily or as directed. Ultra 2 1 kit 1    calcitRIOL (ROCALTROL) 0.25 MCG capsule Take 0.25 mcg by mouth Twice a week currently      diltiazem ER (CARDIAZEM LA) 180 MG 24 hr tablet TAKE ONE TABLET BY MOUTH EVERY EVENING 90 tablet 1    doxazosin (CARDURA) 2 MG tablet Take 2 mg by mouth every evening      gabapentin (NEURONTIN) 100 MG capsule Take 2 capsules (200 mg) by mouth At Bedtime 180 capsule 1    metoprolol succinate ER (TOPROL XL) 25 MG 24 hr tablet TAKE 1 TABLET BY MOUTH DAILY. GENERIC EQUIVALENT FOR TOPROL XL 90 tablet 1    MULTI-VITAMIN OR TABS Take 2 tablets by mouth daily       nystatin (MYCOSTATIN) 945422 UNIT/GM external powder Apply a small amount to affected area three times daily. 60 g 1    pravastatin (PRAVACHOL) 20 MG tablet Take 1 tablet (20 mg) by mouth daily 90 tablet 3    Semaglutide, 1 MG/DOSE, (OZEMPIC) 4 MG/3ML pen Inject 1 mg Subcutaneous every 7 days 9 mL 1    sertraline (ZOLOFT) 50 MG tablet Take 1 tablet (50 mg) by mouth daily 90 tablet 1    sevelamer carbonate (RENVELA) 800 MG tablet 1 tablet 3 times daily (before meals)      torsemide (DEMADEX) 20 MG tablet Take 1 tablet (20 mg) by mouth daily 90 tablet 0    warfarin ANTICOAGULANT (JANTOVEN ANTICOAGULANT) 2.5 MG tablet Take 2 tablets (5 mg) by mouth daily except take 1.5 tablets (3.75 mg) on Sunday or as directed by INR Clinic 180 tablet 1       ALLERGIES     Allergies   Allergen Reactions    Amoxicillin      Other reaction(s): cannot remember    Amoxicillin-Pot Clavulanate Diarrhea     Vomiting      Clavulanic Acid      Other reaction(s): cannot remember    Clindamycin Phosphate      Hives    Metformin Diarrhea    Tegretol [Carbamazepine]      Irregular heart beat  "      PAST MEDICAL, SURGICAL, FAMILY, SOCIAL HISTORY:  History was reviewed and updated as needed, see medical record.    Review of Systems:  A 12-point review of systems was completed, see medical record for detailed review of systems information.    Physical Exam:  Vitals: /62 (BP Location: Right arm, Patient Position: Sitting, Cuff Size: Adult Regular)   Pulse 66   Ht 1.626 m (5' 4\")   Wt 78.7 kg (173 lb 8 oz)   LMP  (LMP Unknown)   SpO2 99%   BMI 29.78 kg/m      Pleasant, cooperative, intelligent,  Lungs clear to percussion auscultation    Cardiovascular normal S1 normal S2 grade 2/6 stock ejection murmur    Pulses full and symmetrical in the carotid radial brachial femoral popliteal and dorsalis pedis.    Extremities: No edema      ASSESSMENT: The patient remains stable on current medical therapy with no new cardiovascular symptoms since last seen.  We have discussed the benefits of continued regular physical activity with caution to avoid falls.  The patient remains compliant with her current medical therapy.  We will plan a follow-up echocardiogram in 1 year to reassess her aortic valve.       RECOMMENDATIONS:   Follow up in one year with TTE  Continue present medications      Recent Lab Results:  LIPID RESULTS:  Lab Results   Component Value Date    CHOL 215 (H) 05/22/2023    CHOL 195 06/02/2021    HDL 42 (L) 05/22/2023    HDL 45 (L) 06/02/2021    LDL 94 05/22/2023    LDL 88 05/10/2022     (H) 06/02/2021    TRIG 393 (H) 05/22/2023    TRIG 249 (H) 06/02/2021    CHOLHDLRATIO 3.4 12/17/2013       LIVER ENZYME RESULTS:  Lab Results   Component Value Date    AST 13 02/10/2022    AST 27 01/26/2021    ALT 24 03/14/2023    ALT 40 01/26/2021       CBC RESULTS:  Lab Results   Component Value Date    WBC 8.2 11/22/2022    WBC 14.5 (H) 02/09/2021    RBC 3.84 11/22/2022    RBC 3.28 (L) 02/09/2021    HGB 11.2 (L) 08/14/2023    HGB 11.1 (L) 06/02/2021    HCT 36.1 11/22/2022    HCT 31.1 (L) 02/09/2021    " MCV 94 11/22/2022    MCV 95 02/09/2021    MCH 29.9 11/22/2022    MCH 27.4 02/09/2021    MCHC 31.9 11/22/2022    MCHC 28.9 (L) 02/09/2021    RDW 13.3 11/22/2022    RDW 15.6 (H) 02/09/2021     11/22/2022     02/09/2021       BMP RESULTS:  Lab Results   Component Value Date     08/14/2023     06/02/2021    POTASSIUM 4.2 08/14/2023    POTASSIUM 4.8 09/13/2022    POTASSIUM 4.2 06/02/2021    CHLORIDE 104 08/14/2023    CHLORIDE 109 09/13/2022    CHLORIDE 103 06/02/2021    CO2 26 08/14/2023    CO2 24 09/13/2022    CO2 32 06/02/2021    ANIONGAP 15 08/14/2023    ANIONGAP 7 09/13/2022    ANIONGAP 5 06/02/2021     (H) 08/14/2023     (H) 09/13/2022     (H) 06/02/2021    BUN 60.0 (H) 08/14/2023    BUN 67 (H) 09/13/2022    BUN 66 (H) 06/02/2021    CR 2.06 (H) 08/14/2023    CR 2.43 (H) 06/02/2021    GFRESTIMATED 23 (L) 08/14/2023    GFRESTIMATED 18 (L) 06/02/2021    GFRESTBLACK 20 (L) 06/02/2021    EDNA 10.3 (H) 08/14/2023    EDNA 9.7 06/02/2021        A1C RESULTS:  Lab Results   Component Value Date    A1C 7.0 (H) 08/14/2023    A1C 7.1 (H) 01/26/2021       INR RESULTS:  Lab Results   Component Value Date    INR 1.9 (H) 09/11/2023    INR 2.6 (H) 08/14/2023    INR 2.10 (H) 07/07/2021    INR 2.10 (H) 06/16/2021       We greatly appreciate the opportunity to be involved in the care of your patient, Ирина Yanez.    Sincerely,  Henrik Beasley MD      CC  No referring provider defined for this encounter.

## 2023-09-22 ENCOUNTER — TELEPHONE (OUTPATIENT)
Dept: PHARMACY | Facility: CLINIC | Age: 88
End: 2023-09-22
Payer: COMMERCIAL

## 2023-09-22 NOTE — TELEPHONE ENCOUNTER
Ozempic refill application filled out and sent to Dr. García for signature and date. Once completed it needs to be faxed to [employer] at [phone]. Please complete as soon as possible as patient used her last dose today.    Lara Cain, PharmD  Medication Therapy Management Pharmacist          The form was faxed on 9/22/2023     Sheri Gutierres CMA

## 2023-10-02 ENCOUNTER — LAB (OUTPATIENT)
Dept: LAB | Facility: CLINIC | Age: 88
End: 2023-10-02
Payer: COMMERCIAL

## 2023-10-02 ENCOUNTER — ANTICOAGULATION THERAPY VISIT (OUTPATIENT)
Dept: ANTICOAGULATION | Facility: CLINIC | Age: 88
End: 2023-10-02

## 2023-10-02 DIAGNOSIS — Z79.01 LONG TERM CURRENT USE OF ANTICOAGULANTS WITH INR GOAL OF 2.0-3.0: ICD-10-CM

## 2023-10-02 DIAGNOSIS — I48.0 PAROXYSMAL ATRIAL FIBRILLATION (H): ICD-10-CM

## 2023-10-02 DIAGNOSIS — I48.0 PAROXYSMAL ATRIAL FIBRILLATION (H): Primary | ICD-10-CM

## 2023-10-02 LAB — INR BLD: 1.7 (ref 0.9–1.1)

## 2023-10-02 PROCEDURE — 36416 COLLJ CAPILLARY BLOOD SPEC: CPT

## 2023-10-02 PROCEDURE — 85610 PROTHROMBIN TIME: CPT

## 2023-10-02 NOTE — PROGRESS NOTES
ANTICOAGULATION MANAGEMENT     Ирина Yanez 88 year old female is on warfarin with subtherapeutic INR result. (Goal INR 2.0-3.0)    Recent labs: (last 7 days)     10/02/23  1341   INR 1.7*     Cardiology office visit 9/12/23-no change in care plan  Nephrology office visit 9/26/23-  ASSESSMENT     Warfarin Lab Questionnaire    Warfarin Doses Last 7 Days--Patient confirmed taking warfarin maintenance dose as listed       10/2/2023     1:42 PM   Dose in Tablet or Mg   TAB or MG? milligram (mg)           10/2/2023   Warfarin Lab Questionnaire   Missed doses within past 14 days? No   Changes in diet or alcohol within past 14 days? No   Medication changes since last result? No   Injuries or illness since last result? No   New shortness of breath, severe headaches or sudden changes in vision since last result? No   Abnormal bleeding since last result? No   Upcoming surgery, procedure? No     Previous result: Subtherapeutic  Additional findings: patient denies any missed warfarin doses or diet changes, she does not drink boost or ensure. 2nd subtherapeutic INR so I increased warfarin maintenance dose at lowest %, patient aware we may need to increase more depending on next INR result.     PLAN     Recommended plan for no diet, medication or health factor changes affecting INR     Dosing Instructions: Increase your warfarin dose (4.2% change) with next INR in 2 weeks       Summary  As of 10/2/2023      Full warfarin instructions:  3.75 mg every Tue, Thu, Sat; 5 mg all other days   Next INR check:  10/16/2023               Telephone call with Ирина who agrees to plan and repeated back plan correctly    Lab visit scheduled    Education provided:   Dietary considerations: importance of consistent vitamin K intake    Plan made per [facility] anticoagulation protocol    Summer Mina, RN  Anticoagulation Clinic  10/2/2023    _______________________________________________________________________     Anticoagulation Episode Summary        Current INR goal:  2.0-3.0   TTR:  56.0 % (1 y)   Target end date:  Indefinite   Send INR reminders to:  KIMAG JAMES    Indications    Paroxysmal atrial fibrillation (H) [I48.0]  Long term current use of anticoagulants with INR goal of 2.0-3.0 [Z79.01]             Comments:               Anticoagulation Care Providers       Provider Role Specialty Phone number    Yuridia Villarreal MD Referring [facility] [phone]    Mil García MD Referring [facility] [phone]

## 2023-10-10 DIAGNOSIS — I48.0 PAROXYSMAL ATRIAL FIBRILLATION (H): ICD-10-CM

## 2023-10-10 DIAGNOSIS — I10 HYPERTENSION GOAL BP (BLOOD PRESSURE) < 140/90: ICD-10-CM

## 2023-10-10 RX ORDER — METOPROLOL SUCCINATE 25 MG/1
TABLET, EXTENDED RELEASE ORAL
Qty: 90 TABLET | Refills: 1 | Status: SHIPPED | OUTPATIENT
Start: 2023-10-10 | End: 2024-02-22

## 2023-10-16 ENCOUNTER — LAB (OUTPATIENT)
Dept: LAB | Facility: CLINIC | Age: 88
End: 2023-10-16
Payer: COMMERCIAL

## 2023-10-16 ENCOUNTER — ANTICOAGULATION THERAPY VISIT (OUTPATIENT)
Dept: ANTICOAGULATION | Facility: CLINIC | Age: 88
End: 2023-10-16

## 2023-10-16 DIAGNOSIS — I48.0 PAROXYSMAL ATRIAL FIBRILLATION (H): ICD-10-CM

## 2023-10-16 DIAGNOSIS — I48.0 PAROXYSMAL ATRIAL FIBRILLATION (H): Primary | ICD-10-CM

## 2023-10-16 DIAGNOSIS — Z79.01 LONG TERM CURRENT USE OF ANTICOAGULANTS WITH INR GOAL OF 2.0-3.0: ICD-10-CM

## 2023-10-16 LAB — INR BLD: 1.6 (ref 0.9–1.1)

## 2023-10-16 PROCEDURE — 36416 COLLJ CAPILLARY BLOOD SPEC: CPT

## 2023-10-16 PROCEDURE — 85610 PROTHROMBIN TIME: CPT

## 2023-10-16 NOTE — PROGRESS NOTES
ANTICOAGULATION MANAGEMENT     Ирина Yanez 88 year old female is on warfarin with subtherapeutic INR result. (Goal INR 2.0-3.0)    Recent labs: (last 7 days)     10/16/23  1323   INR 1.6*       ASSESSMENT     Warfarin Lab Questionnaire    Warfarin Doses Last 7 Days      10/16/2023     1:19 PM   Dose in Tablet or Mg   TAB or MG? milligram (mg)           10/16/2023   Warfarin Lab Questionnaire   Missed doses within past 14 days? No   Changes in diet or alcohol within past 14 days? No  patient reports decreased greens, has not had any the last 2 weeks, decreased appetite after starting Ozempic earlier this year   Medication changes since last result? No   Injuries or illness since last result? No   New shortness of breath, severe headaches or sudden changes in vision since last result? No   Abnormal bleeding since last result? No   Upcoming surgery, procedure? No     Previous result: Subtherapeutic  Additional findings: None       PLAN     Recommended plan for no diet, medication or health factor changes affecting INR     Dosing Instructions: booster dose then Increase your warfarin dose (8% change) with next INR in 1-2 weeks       Summary  As of 10/16/2023      Full warfarin instructions:  10/16: 7.5 mg; Otherwise 3.75 mg every Sat; 5 mg all other days   Next INR check:  10/25/2023               Telephone call with Ирина who agrees to plan and repeated back plan correctly    Lab visit scheduled    Education provided:   Please call back if any changes to your diet, medications or how you've been taking warfarin  Contact [phone]  with any changes, questions or concerns.     Plan made per [facility] anticoagulation protocol    Alondra Richter RN  Anticoagulation Clinic  10/16/2023    _______________________________________________________________________     Anticoagulation Episode Summary       Current INR goal:  2.0-3.0   TTR:  54.2% (1 y)   Target end date:  Indefinite   Send INR reminders to:  KRIS RAMIREZ     Indications    Paroxysmal atrial fibrillation (H) [I48.0]  Long term current use of anticoagulants with INR goal of 2.0-3.0 [Z79.01]             Comments:               Anticoagulation Care Providers       Provider Role Specialty Phone number    Yuridia Villarreal MD Referring [facility] [phone]    Mil García MD Referring [facility] [phone]

## 2023-10-25 ENCOUNTER — LAB (OUTPATIENT)
Dept: LAB | Facility: CLINIC | Age: 88
End: 2023-10-25
Payer: COMMERCIAL

## 2023-10-25 ENCOUNTER — ANTICOAGULATION THERAPY VISIT (OUTPATIENT)
Dept: ANTICOAGULATION | Facility: CLINIC | Age: 88
End: 2023-10-25

## 2023-10-25 DIAGNOSIS — I48.0 PAROXYSMAL ATRIAL FIBRILLATION (H): Primary | ICD-10-CM

## 2023-10-25 DIAGNOSIS — Z79.01 LONG TERM CURRENT USE OF ANTICOAGULANTS WITH INR GOAL OF 2.0-3.0: ICD-10-CM

## 2023-10-25 DIAGNOSIS — I48.0 PAROXYSMAL ATRIAL FIBRILLATION (H): ICD-10-CM

## 2023-10-25 LAB — INR BLD: 2.3 (ref 0.9–1.1)

## 2023-10-25 PROCEDURE — 36416 COLLJ CAPILLARY BLOOD SPEC: CPT

## 2023-10-25 PROCEDURE — 85610 PROTHROMBIN TIME: CPT

## 2023-10-25 NOTE — PROGRESS NOTES
ANTICOAGULATION MANAGEMENT     Ирина Yanez 88 year old female is on warfarin with therapeutic INR result. (Goal INR 2.0-3.0)    Recent labs: (last 7 days)     10/25/23  1340   INR 2.3*       ASSESSMENT     Warfarin Lab Questionnaire    Warfarin Doses Last 7 Days      10/25/2023     1:35 PM   Dose in Tablet or Mg   TAB or MG? milligram (mg)     Warfarin dose confirmed with patient and taken as directed        10/25/2023   Warfarin Lab Questionnaire   Missed doses within past 14 days? No   Changes in diet or alcohol within past 14 days? No   Medication changes since last result? No   Injuries or illness since last result? No   New shortness of breath, severe headaches or sudden changes in vision since last result? No   Abnormal bleeding since last result? No   Upcoming surgery, procedure? No     Previous result: Subtherapeutic  Additional findings: None       PLAN     Recommended plan for no diet, medication or health factor changes affecting INR     Dosing Instructions: Continue your current warfarin dose with next INR in 2 weeks       Summary  As of 10/25/2023      Full warfarin instructions:  3.75 mg every Sat; 5 mg all other days   Next INR check:  11/9/2023               Telephone call with Ирина who agrees to plan and repeated back plan correctly    Lab visit scheduled    Education provided:   Please call back if any changes to your diet, medications or how you've been taking warfarin    Plan made per [facility] anticoagulation protocol    Sherri Tipton RN  Anticoagulation Clinic  10/25/2023    _______________________________________________________________________     Anticoagulation Episode Summary       Current INR goal:  2.0-3.0   TTR:  52.8% (1 y)   Target end date:  Indefinite   Send INR reminders to:  [facility]    Indications    Paroxysmal atrial fibrillation (H) [I48.0]  Long term current use of anticoagulants with INR goal of 2.0-3.0 [Z79.01]             Comments:               Anticoagulation  Care Providers       Provider Role Specialty Phone number    Yuridia Villarreal MD Referring Family Medicine [phone]    Mil García MD Referring Family Medicine [phone]

## 2023-10-31 ENCOUNTER — DOCUMENTATION ONLY (OUTPATIENT)
Dept: OTHER | Facility: CLINIC | Age: 88
End: 2023-10-31
Payer: COMMERCIAL

## 2023-10-31 ENCOUNTER — TELEPHONE (OUTPATIENT)
Dept: SLEEP MEDICINE | Facility: CLINIC | Age: 88
End: 2023-10-31
Payer: COMMERCIAL

## 2023-10-31 ASSESSMENT — SLEEP AND FATIGUE QUESTIONNAIRES
HOW LIKELY ARE YOU TO NOD OFF OR FALL ASLEEP WHILE SITTING AND TALKING TO SOMEONE: WOULD NEVER DOZE
HOW LIKELY ARE YOU TO NOD OFF OR FALL ASLEEP WHILE SITTING QUIETLY AFTER LUNCH WITHOUT ALCOHOL: SLIGHT CHANCE OF DOZING
HOW LIKELY ARE YOU TO NOD OFF OR FALL ASLEEP IN A CAR, WHILE STOPPED FOR A FEW MINUTES IN TRAFFIC: WOULD NEVER DOZE
HOW LIKELY ARE YOU TO NOD OFF OR FALL ASLEEP WHILE SITTING INACTIVE IN A PUBLIC PLACE: WOULD NEVER DOZE
HOW LIKELY ARE YOU TO NOD OFF OR FALL ASLEEP WHEN YOU ARE A PASSENGER IN A CAR FOR AN HOUR WITHOUT A BREAK: SLIGHT CHANCE OF DOZING
HOW LIKELY ARE YOU TO NOD OFF OR FALL ASLEEP WHILE WATCHING TV: MODERATE CHANCE OF DOZING
HOW LIKELY ARE YOU TO NOD OFF OR FALL ASLEEP WHILE SITTING AND READING: MODERATE CHANCE OF DOZING
HOW LIKELY ARE YOU TO NOD OFF OR FALL ASLEEP WHILE LYING DOWN TO REST IN THE AFTERNOON WHEN CIRCUMSTANCES PERMIT: SLIGHT CHANCE OF DOZING

## 2023-10-31 NOTE — TELEPHONE ENCOUNTER
Reason for Call:  Other prescription    Detailed comments: patient called and states that needs a new prescription for a new [facility]ap machine [facility]     Jeremías will also be calling for this as well.    Please contact patient.  Thank you.    Phone Number Patient can be reached at: [phone]     Best Time: any    Can we leave a detailed message on this number? YES    Call taken on 10/31/2023 at 3:43 PM by Jeannie Donahue

## 2023-11-01 NOTE — PROGRESS NOTES
CPAP Follow-Up Visit:    Date on this visit: 11/2/2023    Ирина Yanez has a follow-up of her ASV use for complex sleep apnea.   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  ECHO 1/2021 showed aortic stenosis and LVEF 60-65%.     PSG from 8/23/2001 showed an AHI of 88 per hour. Treatment emergent central apneas were noted with CPAP. Titration PSG on 01/03/2016 showed good response to ASV.           PAP machine: ResMed. ASV with pressures: EPAP 6-14 cm, PS 0-13 cm, max pressure 25, breath rate auto  The compliance data shows that the patient used the ASV for 30/30 nights, 97% of nights for >4 hours.  The 95th% pressure is 13.9/12.4 cm.  The 95th% leak is 3.9 lpm.  The average nightly usage is 8:29.  The average AHI is 0.1/hr.       She did register her machine for the recall. She is still waiting for the replacement. They have a SoClean which they use occasionally.    No new ECHO since 2021    She has been on a loaned ASV machine from [facility]. She feels the pressure is lower than the old machine, but that does not bother her. Her Respironics device was delivering 23/12 cm in 2021. She does not like the large hose on the ResMed machine.  She is still tired when she wakes. She will take a couple hour nap (off of ASV) and then feel a little better. Her  had a hip replacement and is having trouble walking. They are both sitting around more now as a result. She does all of the chores and meal prep around the house.       No specialty comments available.    Do you use a CPAP Machine at home: Yes  Overall, on a scale of 0-10 how would you rate your CPAP (0 poor, 10 great): 9    What type of mask do you use: Full Face Mask  Is your mask comfortable: No  If not, why: hurts the bridge of my nose  How often do you replace supplies: end of September    Is your mask leaking: Yes  If yes, where do you feel it: hear it whistle in  the morning  How many night per week does the mask leak (0-7): 3 (Download shows only 1 night with high leak in the last month).    Do you notice snoring with mask on: No  Do you notice gasping arousals with mask on: No  Are you having significant oral or nasal dryness: Yes  Are you using the humidifier: yes  Does the water chamber run out before the night is over:pretty close  Do you get condensation in the mask or hose:no  Is the pressure setting comfortable: Yes  If not, why:      Typical bedtime: 10:00 to 10:30pm  Sleep latency on PAP therapy: 1  to 2hrs. However, she dozes watching TV before bed. She used to fall right to sleep when she was taking care of her sister in law, but she passed away.   Typical wake time: 7am  Wakes rarely. Once asleep, she stays asleep pretty well  How many hours on average per night are you using PAP therapy: 9hrs  How many hours are you sleeping per night: 7 - 8hrs  Do you feel well rested in the morning: Yes    Naps: most days in the morning for 1-2 hours and then she dozes in the evening before bed       She takes 200 mg gabapentin at bedtime which helps with her neuropathy. She also uses a frankincense rub which helps too.      Weight change since sleep study: 170 lbs      Past medical/surgical history, family history, social history, medications and allergies were reviewed.      Problem List:  Patient Active Problem List    Diagnosis Date Noted    Health Care Home 07/27/2011     Priority: High       Date Noted Care Team Member TO DO/Alerts to be completed   7/27/2011   MD Dr Mehreen Mills rechecking kidney function and prescribes lisinopril                Diagnosis Specialist Due to be seen Following   BRIGHT [facility]de June 2012    Melanoma, skin cancer [facility]; [facility] July 2012                    DX V65.8 REPLACED WITH [facility] (04/08/2013)      Long term current use of anticoagulants with INR goal of 2.0-3.0 02/08/2022     Priority: Medium    Secondary  hyperparathyroidism of renal origin (H24) 05/26/2021     Priority: Medium    Diastolic heart failure, unspecified HF chronicity (H) 02/14/2021     Priority: Medium    Bilateral pleural effusion 01/26/2021     Priority: Medium    Paroxysmal atrial fibrillation (H) 01/15/2021     Priority: Medium     Noted during hospitalization January 2021      Hypoxemia 12/29/2020     Priority: Medium    CHF (congestive heart failure) (H) 12/29/2020     Priority: Medium    Iron deficiency anemia 12/23/2020     Priority: Medium    Lung mass 12/10/2020     Priority: Medium    Chest tightness 12/10/2020     Priority: Medium    Dyspnea on exertion 12/10/2020     Priority: Medium    Aortic valve stenosis, etiology of cardiac valve disease unspecified 02/04/2020     Priority: Medium     See Cardiology note 11/16/2020      Falls frequently 07/10/2019     Priority: Medium    Abrasion of arm, right, initial encounter 07/10/2019     Priority: Medium    Toe amputation status, left 07/15/2017     Priority: Medium    Heart murmur 01/02/2017     Priority: Medium    Aortic sclerosis 01/02/2017     Priority: Medium    ACP (advance care planning) 11/21/2016     Priority: Medium     Advance Care Planning 11/21/2016: Receipt of ACP document:  Received: Health Care Directive which was witnessed or notarized on 9-16-16.  Document previously scanned on 9-20-16.  Validation form completed and sent to be scanned.  Code Status reflects choices in most recent ACP document.  Confirmed/documented designated decision maker(s).  Added by Yelitza Richter RN Advance Care Planning Liaison with [employer]            S/P lumbar fusion 09/16/2016     Priority: Medium    Right bundle branch block 09/13/2016     Priority: Medium    Tendon rupture, traumatic. quadriceps 10/27/2015     Priority: Medium    Complex sleep apnea syndrome 10/12/2015     Priority: Medium     Chronic pain - diabetic neuropathy 07/05/2015     Priority: Medium     Patient is followed by Data  Unavailable for ongoing prescription of pain medication.  All refills should be approved by this provider, or covering partner.    Medication(s): tramadol.   Maximum quantity per month: 180  Clinic visit frequency required: Q 6  months     Controlled substance agreement on file: Yes       Date(s): 2/5/15    Pain Clinic evaluation in the past: No    DIRE Total Score(s):  No flowsheet data found.    Last [facility] website verification:  Checked on 02/23/16 and pt is compliant.Federica Singleton RN.     [email or web address]        Basal cell carcinoma of skin 05/11/2015     Priority: Medium    CKD (chronic kidney disease) stage 4, GFR 15-29 ml/min (H) 05/07/2015     Priority: Medium    Controlled substance agreement signed 2/5/15 02/05/2015     Priority: Medium     Patient is followed by RAOUL MCCOY for ongoing prescription of narcotic pain medicine.  Med: tramadol.   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 Provider to review and confirm.      Diabetic polyneuropathy (H) 12/20/2007     Priority: Medium     Problem list name updated by automated process. Provider to review          Impression/Plan:    (G47.31) Complex sleep apnea syndrome  (primary encounter diagnosis)  Comment: Ирина has been  using a loaner ResMed ASV since her Respironics machine stopped working.  She is waiting for a replacement machine from [employer].  The pressure settings are the same, but the ResMed device is not giving her any pressure support for the Respironics device gave her a pressure support of over 10 cm.  She notices the difference and feels more tired in the daytime, but it is not clear that that pressure difference is responsible for her sleepiness.  She has had the change in her daytime activities.  She used to get up every day to take care of her sister-in-law.  Now she takes a 1-2-hour nap in the morning and is more sedentary throughout the day.  She then has difficulty falling asleep when she gets into bed (although is dozing before she gets into bed).  She seems to not have a lot of activities that she enjoys doing anymore.  She used to enjoy sewing, but that has not been of interest to her lately.  Most of her activities in the daytime are doing chores.  Her  is disabled and cannot help with them.  I get the sense that Ирина is feeling a little depressed.  She feels leak but the download shows great seal.   Plan: Continue ASV with current setting. I will check oximetry on ASV with current settings to see if her O2 is well maintained or if there is any evidence of under-treated apnea. I encouraged her to try to minimize napping in the day and try to find activities that she enjoys doing to keep her active in the day. She may benefit from discussing depression evaluation with her primary.     She will follow up with me in about 6 months     45 minutes were spent on the date of the encounter doing chart review, history and exam, documentation and further activities as noted above.     Bennett Goltz, PA-C    CC: Mil García MD

## 2023-11-02 ENCOUNTER — DOCUMENTATION ONLY (OUTPATIENT)
Dept: SLEEP MEDICINE | Facility: CLINIC | Age: 88
End: 2023-11-02
Payer: COMMERCIAL

## 2023-11-02 ENCOUNTER — OFFICE VISIT (OUTPATIENT)
Dept: SLEEP MEDICINE | Facility: CLINIC | Age: 88
End: 2023-11-02
Payer: COMMERCIAL

## 2023-11-02 VITALS
WEIGHT: 170.6 LBS | HEART RATE: 66 BPM | HEIGHT: 64 IN | BODY MASS INDEX: 29.12 KG/M2 | OXYGEN SATURATION: 96 % | DIASTOLIC BLOOD PRESSURE: 68 MMHG | SYSTOLIC BLOOD PRESSURE: 124 MMHG

## 2023-11-02 DIAGNOSIS — G47.39 COMPLEX SLEEP APNEA SYNDROME: ICD-10-CM

## 2023-11-02 DIAGNOSIS — G47.39 COMPLEX SLEEP APNEA SYNDROME: Primary | ICD-10-CM

## 2023-11-02 PROCEDURE — 99215 OFFICE O/P EST HI 40 MIN: CPT | Performed by: PHYSICIAN ASSISTANT

## 2023-11-02 NOTE — TELEPHONE ENCOUNTER
Faxed to Erasto GARZA RN  [facility]

## 2023-11-02 NOTE — NURSING NOTE
"Chief Complaint   Patient presents with    Sleep Problem     TERESSA follow up, cpap        Initial BP (!) 150/69   Pulse 66   Ht 1.626 m (5' 4\")   Wt 77.4 kg (170 lb 9.6 oz)   LMP  (LMP Unknown)   SpO2 96%   BMI 29.28 kg/m   Estimated body mass index is 29.28 kg/m  as calculated from the following:    Height as of this encounter: 1.626 m (5' 4\").    Weight as of this encounter: 77.4 kg (170 lb 9.6 oz).    Medication Reconciliation: complete  ESS 3  FRANK 17  DME: BRIGITTE Jama MA      "

## 2023-11-02 NOTE — PROGRESS NOTES
Patient presented to clinic for  and demonstration of the RILEY. Patient was set up and instructed use. Patient verbalized understanding and demonstrated set up back to me. Patient will be returning device by 11/3/2023. Patient given RILEY.    Shelley Jama on 11/2/2023 at 12:40 PM

## 2023-11-03 ENCOUNTER — DOCUMENTATION ONLY (OUTPATIENT)
Dept: SLEEP MEDICINE | Facility: CLINIC | Age: 88
End: 2023-11-03
Payer: COMMERCIAL

## 2023-11-03 NOTE — PROGRESS NOTES
Patient returned RILEY device, device did not capture any data. A voice message has been left for patient to contact sleep clinic to discuss repeating testing.     Shelley Jama on 11/3/2023 at 1:06 PM

## 2023-11-06 ENCOUNTER — TELEPHONE (OUTPATIENT)
Dept: SLEEP MEDICINE | Facility: CLINIC | Age: 88
End: 2023-11-06
Payer: COMMERCIAL

## 2023-11-06 NOTE — TELEPHONE ENCOUNTER
Reason for Call:  Other appointment    Detailed comments: patient called and needs to re schedule her Oximetry test.    States did not record.    Please contact patient.  Thank you.    Phone Number Patient can be reached at: Home number on file [phone] (home)    Best Time: any    Can we leave a detailed message on this number? YES    Call taken on 11/6/2023 at 12:09 PM by Jeannie Donahue

## 2023-11-09 ENCOUNTER — DOCUMENTATION ONLY (OUTPATIENT)
Dept: ANTICOAGULATION | Facility: CLINIC | Age: 88
End: 2023-11-09

## 2023-11-09 ENCOUNTER — ANTICOAGULATION THERAPY VISIT (OUTPATIENT)
Dept: ANTICOAGULATION | Facility: CLINIC | Age: 88
End: 2023-11-09

## 2023-11-09 ENCOUNTER — CARE COORDINATION (OUTPATIENT)
Dept: SLEEP MEDICINE | Facility: CLINIC | Age: 88
End: 2023-11-09

## 2023-11-09 ENCOUNTER — LAB (OUTPATIENT)
Dept: LAB | Facility: CLINIC | Age: 88
End: 2023-11-09
Payer: COMMERCIAL

## 2023-11-09 DIAGNOSIS — I48.0 PAROXYSMAL ATRIAL FIBRILLATION (H): Primary | ICD-10-CM

## 2023-11-09 DIAGNOSIS — Z79.01 LONG TERM CURRENT USE OF ANTICOAGULANTS WITH INR GOAL OF 2.0-3.0: ICD-10-CM

## 2023-11-09 DIAGNOSIS — I48.0 PAROXYSMAL ATRIAL FIBRILLATION (H): ICD-10-CM

## 2023-11-09 LAB — INR BLD: 2.9 (ref 0.9–1.1)

## 2023-11-09 PROCEDURE — 36415 COLL VENOUS BLD VENIPUNCTURE: CPT

## 2023-11-09 PROCEDURE — 85610 PROTHROMBIN TIME: CPT

## 2023-11-09 NOTE — PROGRESS NOTES
ANTICOAGULATION MANAGEMENT     Ирина Yanez 88 year old female is on warfarin with therapeutic INR result. (Goal INR 2.0-3.0)    Recent labs: (last 7 days)     11/09/23  1134   INR 2.9*       ASSESSMENT     Warfarin Lab Questionnaire    Warfarin Doses Last 7 Days      11/9/2023    11:30 AM   Dose in Tablet or Mg   TAB or MG? tablet (tab)     Warfarin dose confirmed with patient and taken as directed        11/9/2023   Warfarin Lab Questionnaire   Missed doses within past 14 days? No   Changes in diet or alcohol within past 14 days? No   Medication changes since last result? No   Injuries or illness since last result? No   New shortness of breath, severe headaches or sudden changes in vision since last result? No   Abnormal bleeding since last result? No   Upcoming surgery, procedure? No     Previous result: Therapeutic last visit; previously outside of goal range  Additional findings: None       PLAN     Recommended plan for no diet, medication or health factor changes affecting INR     Dosing Instructions: Continue your current warfarin dose with next INR in 3 weeks       Summary  As of 11/9/2023      Full warfarin instructions:  3.75 mg every Sat; 5 mg all other days   Next INR check:  11/30/2023               Telephone call with Ирина who agrees to plan and repeated back plan correctly    Lab visit scheduled    Education provided:   Please call back if any changes to your diet, medications or how you've been taking warfarin    Plan made per [facility] anticoagulation protocol    Sherri Tipton RN  Anticoagulation Clinic  11/9/2023    _______________________________________________________________________     Anticoagulation Episode Summary       Current INR goal:  2.0-3.0   TTR:  52.7% (1 y)   Target end date:  Indefinite   Send INR reminders to:  [facility]    Indications    Paroxysmal atrial fibrillation (H) [I48.0]  Long term current use of anticoagulants with INR goal of 2.0-3.0 [Z79.01]              Comments:               Anticoagulation Care Providers       Provider Role Specialty Phone number    Yuridia Villarreal MD Referring Family Medicine [phone]    Mil García MD Referring Family Medicine [phone]

## 2023-11-09 NOTE — PROGRESS NOTES
Oximetry has been resulted and  scanned into chart.  Encounter forwarded to provider for review.    Recording Start Date: 11/8/2023    Completed on: 11/9/2023    Duration: 8  Hrs: 56 Minutes: 00 Seconds   Time (min) Spent below 88%: 0      Shelley Jama on 11/9/2023 at 1:25 PM

## 2023-11-09 NOTE — PROGRESS NOTES
ANTICOAGULATION CLINIC REFERRAL RENEWAL REQUEST       An annual renewal order is required for all patients referred to [facility].?  Please review and sign the pended referral order for Ирина Yanez.       ANTICOAGULATION SUMMARY      Warfarin indication(s)   Atrial Fibrillation    Mechanical heart valve present?  NO       Current goal range   INR: 2.0-3.0     Goal appropriate for indication? Goal INR 2-3, standard for indication(s) above     Time in Therapeutic Range (TTR)  (Goal > 60%) 52.7%       Office visit with referring provider's group within last year yes on 8/22/23       Sherri Tipton RN  [facility]

## 2023-11-15 NOTE — PROGRESS NOTES
Oximetry results were obtained for the date of 11/9/23.  The patient was on a treatment of ASV 6-14 cm, PS 0-13 cm, max pressure 25, breath rate auto.  The study showed a valid recording time of 8:36 with a 4% desaturation index of 0.5/hr.  The mean oxygen saturation was 93.1% and the lowest SpO2 was 88% (report shows a conchita of 76% but that was a false reading from the probe getting dislodged).  The patient spent 0.3 minutes below 89% SpO2.    This tests shows well controlled apnea and no significant hypoxemia.  Bennett Goltz, PA-C

## 2023-11-27 DIAGNOSIS — F41.9 ANXIETY: ICD-10-CM

## 2023-11-27 DIAGNOSIS — E78.5 HYPERLIPIDEMIA WITH TARGET LDL LESS THAN 100: ICD-10-CM

## 2023-11-27 RX ORDER — PRAVASTATIN SODIUM 20 MG
20 TABLET ORAL DAILY
Qty: 90 TABLET | Refills: 0 | Status: SHIPPED | OUTPATIENT
Start: 2023-11-27 | End: 2024-02-01

## 2023-11-30 ENCOUNTER — ANTICOAGULATION THERAPY VISIT (OUTPATIENT)
Dept: ANTICOAGULATION | Facility: CLINIC | Age: 88
End: 2023-11-30

## 2023-11-30 ENCOUNTER — LAB (OUTPATIENT)
Dept: LAB | Facility: CLINIC | Age: 88
End: 2023-11-30
Payer: COMMERCIAL

## 2023-11-30 DIAGNOSIS — Z79.01 LONG TERM CURRENT USE OF ANTICOAGULANTS WITH INR GOAL OF 2.0-3.0: ICD-10-CM

## 2023-11-30 DIAGNOSIS — N18.4 CKD (CHRONIC KIDNEY DISEASE) STAGE 4, GFR 15-29 ML/MIN (H): ICD-10-CM

## 2023-11-30 DIAGNOSIS — E11.21 TYPE 2 DIABETES MELLITUS WITH DIABETIC NEPHROPATHY (H): Primary | ICD-10-CM

## 2023-11-30 DIAGNOSIS — I48.0 PAROXYSMAL ATRIAL FIBRILLATION (H): Primary | ICD-10-CM

## 2023-11-30 DIAGNOSIS — I50.9 CHF (CONGESTIVE HEART FAILURE) (H): ICD-10-CM

## 2023-11-30 DIAGNOSIS — I48.0 PAROXYSMAL ATRIAL FIBRILLATION (H): ICD-10-CM

## 2023-11-30 LAB
ANION GAP SERPL CALCULATED.3IONS-SCNC: 14 MMOL/L (ref 7–15)
BUN SERPL-MCNC: 46.2 MG/DL (ref 8–23)
CALCIUM SERPL-MCNC: 10.3 MG/DL (ref 8.8–10.2)
CHLORIDE SERPL-SCNC: 104 MMOL/L (ref 98–107)
CREAT SERPL-MCNC: 1.98 MG/DL (ref 0.51–0.95)
DEPRECATED HCO3 PLAS-SCNC: 27 MMOL/L (ref 22–29)
EGFRCR SERPLBLD CKD-EPI 2021: 24 ML/MIN/1.73M2
ERYTHROCYTE [DISTWIDTH] IN BLOOD BY AUTOMATED COUNT: 13.2 % (ref 10–15)
GLUCOSE SERPL-MCNC: 156 MG/DL (ref 70–99)
HBA1C MFR BLD: 6.7 % (ref 0–5.6)
HCT VFR BLD AUTO: 35.4 % (ref 35–47)
HGB BLD-MCNC: 11.8 G/DL (ref 11.7–15.7)
INR BLD: 2.3 (ref 0.9–1.1)
MCH RBC QN AUTO: 31.1 PG (ref 26.5–33)
MCHC RBC AUTO-ENTMCNC: 33.3 G/DL (ref 31.5–36.5)
MCV RBC AUTO: 93 FL (ref 78–100)
PLATELET # BLD AUTO: 246 10E3/UL (ref 150–450)
POTASSIUM SERPL-SCNC: 4 MMOL/L (ref 3.4–5.3)
RBC # BLD AUTO: 3.8 10E6/UL (ref 3.8–5.2)
SODIUM SERPL-SCNC: 145 MMOL/L (ref 135–145)
WBC # BLD AUTO: 6.4 10E3/UL (ref 4–11)

## 2023-11-30 PROCEDURE — 83036 HEMOGLOBIN GLYCOSYLATED A1C: CPT

## 2023-11-30 PROCEDURE — 85027 COMPLETE CBC AUTOMATED: CPT

## 2023-11-30 PROCEDURE — 80048 BASIC METABOLIC PNL TOTAL CA: CPT

## 2023-11-30 PROCEDURE — 36415 COLL VENOUS BLD VENIPUNCTURE: CPT

## 2023-11-30 PROCEDURE — 85610 PROTHROMBIN TIME: CPT

## 2023-11-30 NOTE — PROGRESS NOTES
ANTICOAGULATION MANAGEMENT     Ирина Yanez 88 year old female is on warfarin with therapeutic INR result. (Goal INR 2.0-3.0)    Recent labs: (last 7 days)     11/30/23  1049   INR 2.3*       ASSESSMENT     Warfarin Lab Questionnaire    Warfarin Doses Last 7 Days      11/30/2023    10:44 AM   Dose in Tablet or Mg   TAB or MG? tablet (tab)     Warfarin dose confirmed with patient and taken as directed          11/30/2023   Warfarin Lab Questionnaire   Missed doses within past 14 days? No   Changes in diet or alcohol within past 14 days? No   Medication changes since last result? No   Injuries or illness since last result? No   New shortness of breath, severe headaches or sudden changes in vision since last result? No   Abnormal bleeding since last result? No   Upcoming surgery, procedure? No     Previous result: Therapeutic last 2(+) visits  Additional findings: None       PLAN     Recommended plan for no diet, medication or health factor changes affecting INR     Dosing Instructions: Continue your current warfarin dose with next INR in 4 weeks       Summary  As of 11/30/2023      Full warfarin instructions:  3.75 mg every Sat; 5 mg all other days   Next INR check:  12/28/2023               Telephone call with Ирина who agrees to plan and repeated back plan correctly    Lab visit scheduled    Education provided:   Please call back if any changes to your diet, medications or how you've been taking warfarin    Plan made per [facility] anticoagulation protocol    Sherri Tipton RN  Anticoagulation Clinic  11/30/2023    _______________________________________________________________________     Anticoagulation Episode Summary       Current INR goal:  2.0-3.0   TTR:  52.7% (1 y)   Target end date:  Indefinite   Send INR reminders to:  [facility]    Indications    Paroxysmal atrial fibrillation (H) [I48.0]  Long term current use of anticoagulants with INR goal of 2.0-3.0 [Z79.01]             Comments:                Anticoagulation Care Providers       Provider Role Specialty Phone number    Yuridia Villarreal MD Referring Family Medicine [phone]    Mil García MD Referring Family Medicine [phone]

## 2023-12-07 ENCOUNTER — TELEPHONE (OUTPATIENT)
Dept: FAMILY MEDICINE | Facility: CLINIC | Age: 88
End: 2023-12-07
Payer: COMMERCIAL

## 2023-12-12 ENCOUNTER — ALLIED HEALTH/NURSE VISIT (OUTPATIENT)
Dept: FAMILY MEDICINE | Facility: CLINIC | Age: 88
End: 2023-12-12
Payer: COMMERCIAL

## 2023-12-12 VITALS — SYSTOLIC BLOOD PRESSURE: 130 MMHG | DIASTOLIC BLOOD PRESSURE: 54 MMHG

## 2023-12-12 DIAGNOSIS — I10 HYPERTENSION GOAL BP (BLOOD PRESSURE) < 140/90: Primary | ICD-10-CM

## 2023-12-12 PROCEDURE — 99207 PR NO CHARGE NURSE ONLY: CPT | Performed by: FAMILY MEDICINE

## 2023-12-12 NOTE — PROGRESS NOTES
Ирина Yanez was evaluated at [facility] on December 12, 2023 at which time her blood pressure was:    BP Readings from Last 1 Encounters:   12/12/23 130/54     No data recorded      Reviewed lifestyle modifications for blood pressure control and reduction: including making healthy food choices, managing weight, getting regular exercise, smoking cessation, reducing alcohol consumption, monitoring blood pressure regularly.     Symptoms: None    BP Goal:< 140/90 mmHg    BP Assessment:  BP at goal    Potential Reasons for BP too high: NA - Not applicable    BP Follow-Up Plan: Recheck BP in 6 months at pharmacy    Recommendation to Provider: recheck in 6 months     Note completed by: Philomena Pino RPH

## 2023-12-27 ENCOUNTER — TELEPHONE (OUTPATIENT)
Dept: FAMILY MEDICINE | Facility: CLINIC | Age: 88
End: 2023-12-27
Payer: COMMERCIAL

## 2023-12-27 NOTE — TELEPHONE ENCOUNTER
General Call    Contacts         Type Contact Phone/Fax    12/27/2023 01:37 PM CST Phone (Incoming) Ирина Yanez (Self) [phone] (H)          Reason for Call:  BIPAP Machine    What are your questions or concerns:  Pt states that Bennett Goltz will need to send the following information to Jeremías: So Veronica will replace machine.   Fax: [phone]    Following Details need to be included:     Pt Name  Registration Confirmation Number: [id number]  Serial Number: [id number]  YOB: 1935  Device Name: Resmed BIPAP   Device Setting: Harinder Will Have  Prescriber Signature & NPI  Date of Order:     [phone] Veronica number in case of any questions.       Date of last appointment with provider: NA    Could [facility] this [facility] or would you prefer to receive a phone call?:   Patient would prefer a phone call   Okay to leave a detailed message?: Yes at Home number on file [phone] (home)

## 2023-12-28 ENCOUNTER — LAB (OUTPATIENT)
Dept: LAB | Facility: CLINIC | Age: 88
End: 2023-12-28
Payer: COMMERCIAL

## 2023-12-28 ENCOUNTER — ANTICOAGULATION THERAPY VISIT (OUTPATIENT)
Dept: ANTICOAGULATION | Facility: CLINIC | Age: 88
End: 2023-12-28

## 2023-12-28 DIAGNOSIS — Z79.01 LONG TERM CURRENT USE OF ANTICOAGULANTS WITH INR GOAL OF 2.0-3.0: ICD-10-CM

## 2023-12-28 DIAGNOSIS — I48.0 PAROXYSMAL ATRIAL FIBRILLATION (H): Primary | ICD-10-CM

## 2023-12-28 DIAGNOSIS — I48.0 PAROXYSMAL ATRIAL FIBRILLATION (H): ICD-10-CM

## 2023-12-28 DIAGNOSIS — I10 HYPERTENSION GOAL BP (BLOOD PRESSURE) < 140/90: ICD-10-CM

## 2023-12-28 LAB — INR BLD: 2.1 (ref 0.9–1.1)

## 2023-12-28 PROCEDURE — 36415 COLL VENOUS BLD VENIPUNCTURE: CPT

## 2023-12-28 PROCEDURE — 85610 PROTHROMBIN TIME: CPT

## 2023-12-28 RX ORDER — DILTIAZEM HYDROCHLORIDE EXTENDED-RELEASE TABLETS 180 MG/1
TABLET, EXTENDED RELEASE ORAL
Qty: 90 TABLET | Refills: 0 | Status: SHIPPED | OUTPATIENT
Start: 2023-12-28 | End: 2024-03-25

## 2023-12-28 NOTE — PROGRESS NOTES
Left message to call [phone]. Need to confirm warfarin dosing, entered into patient questionnaire differently than what is on the warfarin dosing calendar.  Alondra Richter RN, BSN  Anticoagulation Clinic

## 2023-12-28 NOTE — TELEPHONE ENCOUNTER
Form has been generated for Bennett Goltz to sign.  Form has been faxed to [place] for provider to sign and fax to [employer].      BRIDGER Calderón

## 2023-12-28 NOTE — PROGRESS NOTES
ANTICOAGULATION MANAGEMENT     Ирина Yanez 88 year old female is on warfarin with therapeutic INR result. (Goal INR 2.0-3.0)    Recent labs: (last 7 days)     12/28/23  1038   INR 2.1*       ASSESSMENT     Warfarin Lab Questionnaire    Warfarin Doses Last 7 Days      12/27/2023     2:27 PM   Dose in Tablet or Mg   TAB or MG? milligram (mg)     Pt Rptd Dose SUNDAY MONDAY TUESDAY WED THURS FRIDAY SATURDAY 12/27/2023   2:27 PM 5 5 5 5 5 5 2.5   Warfarin dosing verbally confirmed with patient-- taking as instructed, 3.75 mg every Sat; 5 mg all other days not as listed in questionnaire        12/27/2023   Warfarin Lab Questionnaire   Missed doses within past 14 days? No   Changes in diet or alcohol within past 14 days? No   Medication changes since last result? No   Injuries or illness since last result? No   New shortness of breath, severe headaches or sudden changes in vision since last result? No   Abnormal bleeding since last result? No   Upcoming surgery, procedure? No     Previous result: Therapeutic last 2(+) visits  Additional findings: None       PLAN     Recommended plan for no diet, medication or health factor changes affecting INR     Dosing Instructions: Continue your current warfarin dose with next INR in 4 weeks       Summary  As of 12/28/2023      Full warfarin instructions:  3.75 mg every Sat; 5 mg all other days   Next INR check:  1/25/2024               Telephone call with Ирина who verbalizes understanding and agrees to plan    Lab visit scheduled    Education provided:   Please call back if any changes to your diet, medications or how you've been taking warfarin  Contact [phone]  with any changes, questions or concerns.     Plan made per [facility] anticoagulation protocol    Alondra Richter RN  Anticoagulation Clinic  12/28/2023    _______________________________________________________________________     Anticoagulation Episode Summary       Current INR goal:  2.0-3.0   TTR:  57.6% (1 y)    Target end date:  Indefinite   Send INR reminders to:  KIMAG JAMES    Indications    Paroxysmal atrial fibrillation (H) [I48.0]  Long term current use of anticoagulants with INR goal of 2.0-3.0 [Z79.01]             Comments:               Anticoagulation Care Providers       Provider Role Specialty Phone number    Yuridia Villarreal MD Referring Family Medicine [phone]    Mil García MD Referring [facility] Medicine [phone]

## 2024-01-11 NOTE — TELEPHONE ENCOUNTER
Call regarding appt. with PCP on 01/02/2024 after hospitalization.  At time of outreach call the patient feels as if their condition has improved since last visit.  Reviewed the PCP appointment with the pt and addressed any questions or concerns.     Patient has complaints of lightheadedness. Patients son called PCP and Zio Patch has been ordered. Patient is to see Cardiology as well. Patient was unsure what number to call for cardiology. This CM called [facility] to inquire. Mel at [facility] states that a nurse will call the patient directly. The patient has been notified that he will receive a call from the office. He was thankful for this call and assistance.    Not sure what this is about.....  I wonder if it is a misunderstanding?     I don't recall anything about potassium..  I am not aware of a substitute.    One can lose potassium with lasix; and hold onto it with lisinopril.   We do watch it. Sometimes will do trial without it to see if can manage without it, but I usually have it on with lasix.     It looks like awhile since I have seen her last values.   It is possible her Endocrinologist has checked it.    I would be happy checking it if she would like...

## 2024-01-12 ENCOUNTER — TELEPHONE (OUTPATIENT)
Dept: FAMILY MEDICINE | Facility: CLINIC | Age: 89
End: 2024-01-12
Payer: COMMERCIAL

## 2024-01-12 NOTE — TELEPHONE ENCOUNTER
Ozempic application has been interofficed to Mil García and mailed to Ирина to review, sign, and send back to me.     We will note EPIC when ready to send to the .     Thank you!    Autumn Waterman   Prescription Assistance Assistant

## 2024-01-16 NOTE — TELEPHONE ENCOUNTER
Forms received, in pcp basket. Complete sign and interoffice back.    Ariane GRIFFIN, - Flex  Primary Care- ADS, Cory Sandoval [facility]

## 2024-01-25 ENCOUNTER — ANTICOAGULATION THERAPY VISIT (OUTPATIENT)
Dept: ANTICOAGULATION | Facility: CLINIC | Age: 89
End: 2024-01-25

## 2024-01-25 ENCOUNTER — LAB (OUTPATIENT)
Dept: LAB | Facility: CLINIC | Age: 89
End: 2024-01-25
Payer: COMMERCIAL

## 2024-01-25 DIAGNOSIS — I48.0 PAROXYSMAL ATRIAL FIBRILLATION (H): Primary | ICD-10-CM

## 2024-01-25 DIAGNOSIS — I48.0 PAROXYSMAL ATRIAL FIBRILLATION (H): ICD-10-CM

## 2024-01-25 DIAGNOSIS — Z79.01 LONG TERM CURRENT USE OF ANTICOAGULANTS WITH INR GOAL OF 2.0-3.0: ICD-10-CM

## 2024-01-25 LAB — INR BLD: 2.1 (ref 0.9–1.1)

## 2024-01-25 PROCEDURE — 36416 COLLJ CAPILLARY BLOOD SPEC: CPT

## 2024-01-25 PROCEDURE — 85610 PROTHROMBIN TIME: CPT
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
## 2024-01-27 ENCOUNTER — TRANSFERRED RECORDS (OUTPATIENT)
Dept: HEALTH INFORMATION MANAGEMENT | Facility: CLINIC | Age: 89
End: 2024-01-27

## 2024-01-30 ENCOUNTER — TELEPHONE (OUTPATIENT)
Dept: FAMILY MEDICINE | Facility: CLINIC | Age: 89
End: 2024-01-30
Payer: COMMERCIAL

## 2024-01-30 NOTE — TELEPHONE ENCOUNTER
Ирина's Ozempic application has been submitted to the [employer] prescription assistance program.     We will note EPIC when Nguyen has made their decision.     Thank you!    Autumn Waterman   Prescription Assistance Assistant

## 2024-01-31 DIAGNOSIS — E78.5 HYPERLIPIDEMIA WITH TARGET LDL LESS THAN 100: ICD-10-CM

## 2024-02-01 RX ORDER — PRAVASTATIN SODIUM 20 MG
20 TABLET ORAL DAILY
Qty: 90 TABLET | Refills: 0 | Status: SHIPPED | OUTPATIENT
Start: 2024-02-01 | End: 2024-02-22

## 2024-02-05 ENCOUNTER — TELEPHONE (OUTPATIENT)
Dept: SLEEP MEDICINE | Facility: CLINIC | Age: 89
End: 2024-02-05
Payer: COMMERCIAL

## 2024-02-05 NOTE — CONFIDENTIAL NOTE
General Call      Reason for Call:  new BIPAP machine.     What are your questions or concerns:  Pt is using a rental and would like to get a rx for a new one instead of paying for a rental.     Date of last appointment with provider: 11/2/2023. Pt has 3 mo follow up on 4/11/24    Could we send this information to you in [employer] or would you prefer to receive a phone call?:   Patient would prefer a phone call     Okay to leave a detailed message?: Yes at Cell number on file:    Telephone Information:   Mobile [phone]

## 2024-02-06 ENCOUNTER — TELEPHONE (OUTPATIENT)
Dept: FAMILY MEDICINE | Facility: CLINIC | Age: 89
End: 2024-02-06
Payer: COMMERCIAL

## 2024-02-06 NOTE — TELEPHONE ENCOUNTER
Received Ozempic  4mg/3ml prefilled pens  4 boxes     Called and notified pt that they are available for .    Pt states will come in on 2/7/2024 to pick them up.    Placed them in the Fridge at [facility]   pt sees Dr. García

## 2024-02-06 NOTE — TELEPHONE ENCOUNTER
Contacted patient and explained the rent to own process to her that Medicare uses.  She will contact [facility] for questions about cost breakdown

## 2024-02-07 NOTE — TELEPHONE ENCOUNTER
Patient in clinic today picking up medication, verified patient with name and birthdate.  Erika Dang Patient Rep.

## 2024-02-09 DIAGNOSIS — F41.9 ANXIETY: ICD-10-CM

## 2024-02-19 ENCOUNTER — TELEPHONE (OUTPATIENT)
Dept: FAMILY MEDICINE | Facility: CLINIC | Age: 89
End: 2024-02-19
Payer: COMMERCIAL

## 2024-02-22 ENCOUNTER — OFFICE VISIT (OUTPATIENT)
Dept: FAMILY MEDICINE | Facility: CLINIC | Age: 89
End: 2024-02-22
Attending: FAMILY MEDICINE
Payer: COMMERCIAL

## 2024-02-22 VITALS
TEMPERATURE: 97.6 F | SYSTOLIC BLOOD PRESSURE: 138 MMHG | BODY MASS INDEX: 27.83 KG/M2 | DIASTOLIC BLOOD PRESSURE: 61 MMHG | WEIGHT: 163 LBS | HEART RATE: 85 BPM | HEIGHT: 64 IN | OXYGEN SATURATION: 97 % | RESPIRATION RATE: 12 BRPM

## 2024-02-22 DIAGNOSIS — I10 HYPERTENSION GOAL BP (BLOOD PRESSURE) < 140/90: ICD-10-CM

## 2024-02-22 DIAGNOSIS — Z00.00 PREVENTATIVE HEALTH CARE: Primary | ICD-10-CM

## 2024-02-22 DIAGNOSIS — I50.32 CHRONIC DIASTOLIC CONGESTIVE HEART FAILURE (H): ICD-10-CM

## 2024-02-22 DIAGNOSIS — F33.41 RECURRENT MAJOR DEPRESSIVE DISORDER, IN PARTIAL REMISSION (H): ICD-10-CM

## 2024-02-22 DIAGNOSIS — N25.81 SECONDARY HYPERPARATHYROIDISM OF RENAL ORIGIN (H): ICD-10-CM

## 2024-02-22 DIAGNOSIS — N18.4 CKD (CHRONIC KIDNEY DISEASE) STAGE 4, GFR 15-29 ML/MIN (H): ICD-10-CM

## 2024-02-22 DIAGNOSIS — I48.0 PAROXYSMAL ATRIAL FIBRILLATION (H): ICD-10-CM

## 2024-02-22 DIAGNOSIS — E78.5 HYPERLIPIDEMIA WITH TARGET LDL LESS THAN 100: ICD-10-CM

## 2024-02-22 DIAGNOSIS — F41.9 ANXIETY: ICD-10-CM

## 2024-02-22 DIAGNOSIS — E11.21 TYPE 2 DIABETES MELLITUS WITH DIABETIC NEPHROPATHY, WITHOUT LONG-TERM CURRENT USE OF INSULIN (H): ICD-10-CM

## 2024-02-22 LAB
ANION GAP SERPL CALCULATED.3IONS-SCNC: 13 MMOL/L (ref 7–15)
BUN SERPL-MCNC: 55.1 MG/DL (ref 8–23)
CALCIUM SERPL-MCNC: 10 MG/DL (ref 8.8–10.2)
CHLORIDE SERPL-SCNC: 103 MMOL/L (ref 98–107)
CHOLEST SERPL-MCNC: 233 MG/DL
CREAT SERPL-MCNC: 2.08 MG/DL (ref 0.51–0.95)
DEPRECATED HCO3 PLAS-SCNC: 27 MMOL/L (ref 22–29)
EGFRCR SERPLBLD CKD-EPI 2021: 23 ML/MIN/1.73M2
FASTING STATUS PATIENT QL REPORTED: YES
GLUCOSE SERPL-MCNC: 140 MG/DL (ref 70–99)
HBA1C MFR BLD: 6.7 % (ref 0–5.6)
HDLC SERPL-MCNC: 43 MG/DL
HOLD SPECIMEN: NORMAL
LDLC SERPL CALC-MCNC: 130 MG/DL
NONHDLC SERPL-MCNC: 190 MG/DL
POTASSIUM SERPL-SCNC: 4.2 MMOL/L (ref 3.4–5.3)
SODIUM SERPL-SCNC: 143 MMOL/L (ref 135–145)
TRIGL SERPL-MCNC: 301 MG/DL

## 2024-02-22 PROCEDURE — 36415 COLL VENOUS BLD VENIPUNCTURE: CPT | Performed by: FAMILY MEDICINE

## 2024-02-22 PROCEDURE — 99214 OFFICE O/P EST MOD 30 MIN: CPT | Mod: 25 | Performed by: FAMILY MEDICINE

## 2024-02-22 PROCEDURE — G0439 PPPS, SUBSEQ VISIT: HCPCS | Performed by: FAMILY MEDICINE

## 2024-02-22 PROCEDURE — 80061 LIPID PANEL: CPT | Performed by: FAMILY MEDICINE

## 2024-02-22 PROCEDURE — 83036 HEMOGLOBIN GLYCOSYLATED A1C: CPT | Performed by: FAMILY MEDICINE

## 2024-02-22 PROCEDURE — 80048 BASIC METABOLIC PNL TOTAL CA: CPT | Performed by: FAMILY MEDICINE

## 2024-02-22 RX ORDER — GABAPENTIN 100 MG/1
200 CAPSULE ORAL AT BEDTIME
Qty: 180 CAPSULE | Refills: 1 | Status: SHIPPED | OUTPATIENT
Start: 2024-02-22

## 2024-02-22 RX ORDER — WARFARIN SODIUM 2.5 MG/1
TABLET ORAL
Qty: 180 TABLET | Refills: 1 | Status: SHIPPED | OUTPATIENT
Start: 2024-02-22 | End: 2024-08-22

## 2024-02-22 RX ORDER — PRAVASTATIN SODIUM 20 MG
20 TABLET ORAL DAILY
Qty: 90 TABLET | Refills: 0 | Status: SHIPPED | OUTPATIENT
Start: 2024-02-22 | End: 2024-08-22

## 2024-02-22 RX ORDER — TORSEMIDE 20 MG/1
20 TABLET ORAL DAILY
Qty: 90 TABLET | Refills: 0 | Status: SHIPPED | OUTPATIENT
Start: 2024-02-22 | End: 2024-06-10

## 2024-02-22 RX ORDER — METOPROLOL SUCCINATE 25 MG/1
TABLET, EXTENDED RELEASE ORAL
Qty: 90 TABLET | Refills: 1 | Status: SHIPPED | OUTPATIENT
Start: 2024-02-22 | End: 2024-08-22

## 2024-02-22 SDOH — HEALTH STABILITY: PHYSICAL HEALTH: ON AVERAGE, HOW MANY DAYS PER WEEK DO YOU ENGAGE IN MODERATE TO STRENUOUS EXERCISE (LIKE A BRISK WALK)?: 0 DAYS

## 2024-02-22 ASSESSMENT — PAIN SCALES - GENERAL: PAINLEVEL: NO PAIN (0)

## 2024-02-22 ASSESSMENT — PATIENT HEALTH QUESTIONNAIRE - PHQ9
SUM OF ALL RESPONSES TO PHQ QUESTIONS 1-9: 0
SUM OF ALL RESPONSES TO PHQ QUESTIONS 1-9: 0

## 2024-02-22 ASSESSMENT — SOCIAL DETERMINANTS OF HEALTH (SDOH): HOW OFTEN DO YOU GET TOGETHER WITH FRIENDS OR RELATIVES?: TWICE A WEEK

## 2024-02-22 NOTE — PROGRESS NOTES
Preventive Care Visit  [facility]  Mil García MD, Family Medicine  Feb 22, 2024    Assessment and Plan    (Z00.00) Preventative health care  (primary encounter diagnosis)  Comment:   Plan:     (N18.4) CKD (chronic kidney disease) stage 4, GFR 15-29 ml/min (H)  Comment: monitoring, medical mgmt  Plan: BASIC METABOLIC PANEL, OFFICE/OUTPT         VISIT,EST,LEVL IV            (E78.5) Hyperlipidemia with target LDL less than 100  Comment: high risk, complex cardiac history, will keep on statin  Plan: Lipid panel reflex to direct LDL Fasting,         pravastatin (PRAVACHOL) 20 MG tablet,         OFFICE/OUTPT VISIT,EST,LEVL IV            (F33.41) Recurrent major depressive disorder, in partial remission (H24)  Comment: mood stable, no concerns.  Plan: OFFICE/OUTPT VISIT,EST,LEVL IV            (I48.0) Paroxysmal atrial fibrillation (H)  Comment: rate controlled, anticoag  Plan: metoprolol succinate ER (TOPROL XL) 25 MG 24 hr        tablet, warfarin ANTICOAGULANT (JANTOVEN         ANTICOAGULANT) 2.5 MG tablet, OFFICE/OUTPT         VISIT,EST,LEVL IV            (N25.81) Secondary hyperparathyroidism of renal origin (H24)  Comment: monitoring  Plan: OFFICE/OUTPT VISIT,EST,LEVL IV            (I10) Hypertension goal BP (blood pressure) < 140/90  Comment: BP well controlled, refilling  Plan: metoprolol succinate ER (TOPROL XL) 25 MG 24 hr        tablet, torsemide (DEMADEX) 20 MG tablet,         OFFICE/OUTPT VISIT,EST,LEVL IV            (E11.21) Type 2 diabetes mellitus with diabetic nephropathy, without long-term current use of insulin (H)  Comment: A1c at goal  Plan: gabapentin (NEURONTIN) 100 MG capsule,         Semaglutide, 1 MG/DOSE, (OZEMPIC) 4 MG/3ML pen,        OFFICE/OUTPT VISIT,EST,LEVL IV            (F41.9) Anxiety  Comment: mood stable, refill  Plan: sertraline (ZOLOFT) 50 MG tablet, OFFICE/OUTPT         VISIT,EST,LEVL IV            (I50.32) Chronic diastolic congestive heart failure  (H)  Comment: weight stable, daily diuretic, follows with cardiology  Plan: torsemide (DEMADEX) 20 MG tablet, OFFICE/OUTPT         VISIT,EST,LEVL IV              RTC in 6m    Mil García MD      Bárbara Gifford is a 89 year old, presenting for the following:  Physical        2/22/2024     9:16 AM   Additional Questions   Roomed by MR   Accompanied by NA         2/22/2024     9:16 AM   Patient Reported Additional Medications   Patient reports taking the following new medications NA         Health Care Directive  Patient has a Health Care Directive on file  Advance care planning document is on file and is current.    HPI     recently discharged from serious pneumonia.  He is at home, and caring for him is challenging.  Does have home care starting.    Despite recent stress feels mood is is good.  Health near baseline.  Pleased with weight loss from semaglutide.    Last visit with cardiology 9/23.  Follows closely with them.          2/22/2024   General Health   How would you rate your overall physical health? (!) FAIR   Feel stress (tense, anxious, or unable to sleep) Only a little   (!) STRESS CONCERN      2/22/2024   Nutrition   Diet: Low salt    Diabetic         2/22/2024   Exercise   Days per week of moderate/strenous exercise 0 days   (!) EXERCISE CONCERN      2/22/2024   Social Factors   Frequency of gathering with friends or relatives Twice a week   Worry food won't last until get money to buy more No   Food not last or not have enough money for food? No   Do you have housing?  Yes   Are you worried about losing your housing? No   Lack of transportation? No   Unable to get utilities (heat,electricity)? No         5/22/2023   Fall Risk   Fallen 2 or more times in the past year? Yes          2/22/2024   Activities of Daily Living- Home Safety   Needs help with the following daily activites Shopping    Housework   Safety concerns in the home None of the above         2/22/2024   Dental   Dentist two  times every year? Yes         5/22/2023   Hearing Screening   Hearing concerns? Difficulty following a conversation in a noisy restaurant or crowded room    No concerns         2/22/2024   Driving Risk Screening   Patient/family members have concerns about driving (!) YES          2/22/2024   General Alertness/Fatigue Screening   Have you been more tired than usual lately? No         2/22/2024   Urinary Incontinence Screening   Bothered by leaking urine in past 6 months Yes          Today's PHQ-9 Score:       2/22/2024     9:18 AM   PHQ-9 SCORE   PHQ-9 Total Score MyChart 0   PHQ-9 Total Score 0         2/22/2024   Substance Use   Do you have a current opioid prescription? No   How severe/bad is pain from 1 to 10? 4/10   Do you use any other substances recreationally? No     Social History     Tobacco Use    Smoking status: Never    Smokeless tobacco: Never   Vaping Use    Vaping Use: Never used   Substance Use Topics    Alcohol use: No    Drug use: Never          Mammogram Screening - After age 74- determine frequency with patient based on health status, life expectancy and patient goals          Reviewed and updated as needed this visit by Provider                      Current providers sharing in care for this patient include:  Patient Care Team:  Mil García MD as PCP - General (Family Medicine)  Henrik Beasley MD as Assigned Heart and Vascular Provider  Bisi Moffett RN as Specialty Care Coordinator (Nurse)  Zarina Cain RPH as Pharmacist (Pharmacist)  Mil García MD as Assigned PCP  Jef Whatley MD as MD (Nephrology)  Zarina Cain RPH as Assigned MTM Pharmacist  Goltz, Bennett Ezra, PA-C as Assigned Neuroscience Provider    The following health maintenance items are reviewed in Epic and correct as of today:  Health Maintenance   Topic Date Due    HF ACTION PLAN  Never done    BMP  02/29/2024    ZOSTER IMMUNIZATION (3 of 3) 03/21/2024    MEDICARE ANNUAL  "WELLNESS VISIT  05/22/2024    A1C  05/22/2024    LIPID  05/22/2024    DIABETIC FOOT EXAM  05/22/2024    ANNUAL REVIEW OF HM ORDERS  05/22/2024    HEMOGLOBIN  05/30/2024    PHQ-9  08/22/2024    EYE EXAM  09/08/2024    CBC  11/30/2024    FALL RISK ASSESSMENT  02/22/2025    DTAP/TDAP/TD IMMUNIZATION (3 - Td or Tdap) 05/06/2025    ADVANCE CARE PLANNING  10/31/2028    PARATHYROID  Completed    PHOSPHORUS  Completed    TSH W/FREE T4 REFLEX  Completed    DEPRESSION ACTION PLAN  Completed    INFLUENZA VACCINE  Completed    Pneumococcal Vaccine: 65+ Years  Completed    URINALYSIS  Completed    ALK PHOS  Completed    RSV VACCINE (Pregnancy & 60+)  Completed    COVID-19 Vaccine  Completed    IPV IMMUNIZATION  Aged Out    HPV IMMUNIZATION  Aged Out    MENINGITIS IMMUNIZATION  Aged Out    RSV MONOCLONAL ANTIBODY  Aged Out    ALT  Discontinued    MICROALBUMIN  Discontinued    URINE DRUG SCREEN  Discontinued    MAMMO SCREENING  Discontinued       Review of Systems   Constitutional:  Negative for chills, fever and unexpected weight change.   HENT:  Negative for congestion, ear pain and sore throat.    Eyes:  Negative for pain and visual disturbance.   Respiratory:  Negative for cough and shortness of breath.    Cardiovascular:  Negative for chest pain, palpitations and peripheral edema.   Gastrointestinal:  Negative for abdominal pain, constipation and diarrhea.   Endocrine: Negative for polyuria.   Genitourinary:  Negative for dysuria, frequency and vaginal discharge.   Musculoskeletal:  Negative for arthralgias and myalgias.   Skin:  Negative for rash.   Neurological:  Positive for numbness and paresthesias. Negative for dizziness, weakness and headaches.   Hematological:  Does not bruise/bleed easily.   Psychiatric/Behavioral: Negative.            Objective    Exam  /61 (BP Location: Right arm, Patient Position: Sitting, Cuff Size: Adult Regular)   Pulse 85   Temp 97.6  F (36.4  C) (Oral)   Resp 12   Ht 1.626 m (5' 4\") " "  Wt 73.9 kg (163 lb)   LMP  (LMP Unknown)   SpO2 97%   BMI 27.98 kg/m     Estimated body mass index is 27.98 kg/m  as calculated from the following:    Height as of this encounter: 1.626 m (5' 4\").    Weight as of this encounter: 73.9 kg (163 lb).    Physical Exam  Vitals and nursing note reviewed.   Constitutional:       Appearance: Normal appearance.   HENT:      Head: Normocephalic.      Right Ear: Tympanic membrane, ear canal and external ear normal.      Left Ear: Tympanic membrane, ear canal and external ear normal.      Mouth/Throat:      Mouth: Mucous membranes are moist.      Pharynx: Oropharynx is clear.   Eyes:      Extraocular Movements: Extraocular movements intact.      Conjunctiva/sclera: Conjunctivae normal.      Pupils: Pupils are equal, round, and reactive to light.   Neck:      Thyroid: No thyroid mass or thyromegaly.   Cardiovascular:      Rate and Rhythm: Normal rate and regular rhythm.   Pulmonary:      Effort: Pulmonary effort is normal.      Breath sounds: Normal breath sounds.   Abdominal:      General: Bowel sounds are normal.      Palpations: Abdomen is soft. There is no mass.      Tenderness: There is no abdominal tenderness.   Musculoskeletal:         General: Normal range of motion.   Lymphadenopathy:      Cervical: No cervical adenopathy.   Skin:     General: Skin is warm and dry.   Neurological:      General: No focal deficit present.      Mental Status: She is alert and oriented to person, place, and time.   Psychiatric:         Mood and Affect: Mood normal.         Behavior: Behavior normal.               2/22/2024   Mini Cog   Clock Draw Score 2 Normal   3 Item Recall 3 objects recalled   Mini Cog Total Score 5            Signed Electronically by: Mil García MD    Answers submitted by the patient for this visit:  Patient Health Questionnaire (Submitted on 2/22/2024)  PHQ9 TOTAL SCORE: 0    "

## 2024-02-22 NOTE — COMMUNITY RESOURCES LIST (ENGLISH)
02/22/2024    [employer]  N/A  For questions about this resource list or additional care needs, please contact your primary care clinic or care manager.  Phone: [phone]   Email: N/A   Address: [address] [place] [address]   Hours: N/A        Exercise and Recreation       Gym or workout facility  1  [employer] - [place] Distance: 1.16 miles      In-Person   [address] [place] [address]  Language: English  Hours: Mon - Sun Open 24 Hours  Fees: Insurance, Self Pay, Sliding Fee   Phone: (112) 279-6923 Email: taylor@[employer] Website: [email or web address]://[email or web address]/gyms/2305/yixuwnnvi-ar-51047/     2  [facility] [place] - Kickboxing Classes Distance: 4.8 miles      In-Person   [address] [place] [address]  Language: English  Hours: Mon - Fri 6:00 AM - 12:30 PM , Mon - Fri 3:00 PM - 8:00 PM , Sat 8:00 AM - 12:00 PM  Fees: Self Pay   Phone: [phone]) [phone] Website: [email or web address]/[email or web address]          Important Numbers & Websites       Emergency Services   911  [facility] Services   311  Poison Control   (485) 323-6743  Suicide Prevention Lifeline   (907) 716-4737 (TALK)  Child Abuse Hotline   (107) 456-9219 (4-A-Child)  Sexual Assault Hotline   (176) 191-4804 (HOPE)  National Runaway Safeline   (797) 807-7352 (RUNAWAY)  All-Options Talkline   (512) 242-7717  Substance Abuse Referral   (584) 312-3997 (HELP)

## 2024-02-23 ASSESSMENT — ENCOUNTER SYMPTOMS
ARTHRALGIAS: 0
NUMBNESS: 1
ABDOMINAL PAIN: 0
CHILLS: 0
MYALGIAS: 0
CONSTIPATION: 0
BRUISES/BLEEDS EASILY: 0
SHORTNESS OF BREATH: 0
DYSURIA: 0
UNEXPECTED WEIGHT CHANGE: 0
FEVER: 0
WEAKNESS: 0
SORE THROAT: 0
PSYCHIATRIC NEGATIVE: 1
DIARRHEA: 0
COUGH: 0
PALPITATIONS: 0
PARESTHESIAS: 1
DIZZINESS: 0
FREQUENCY: 0
HEADACHES: 0
EYE PAIN: 0

## 2024-03-07 ENCOUNTER — ANTICOAGULATION THERAPY VISIT (OUTPATIENT)
Dept: ANTICOAGULATION | Facility: CLINIC | Age: 89
End: 2024-03-07

## 2024-03-07 ENCOUNTER — LAB (OUTPATIENT)
Dept: LAB | Facility: CLINIC | Age: 89
End: 2024-03-07
Payer: COMMERCIAL

## 2024-03-07 DIAGNOSIS — I48.0 PAROXYSMAL ATRIAL FIBRILLATION (H): Primary | ICD-10-CM

## 2024-03-07 DIAGNOSIS — Z79.01 LONG TERM CURRENT USE OF ANTICOAGULANTS WITH INR GOAL OF 2.0-3.0: ICD-10-CM

## 2024-03-07 DIAGNOSIS — N18.4 CKD (CHRONIC KIDNEY DISEASE) STAGE 4, GFR 15-29 ML/MIN (H): Primary | ICD-10-CM

## 2024-03-07 DIAGNOSIS — I48.0 PAROXYSMAL ATRIAL FIBRILLATION (H): ICD-10-CM

## 2024-03-07 LAB
HGB BLD-MCNC: 11.3 G/DL (ref 11.7–15.7)
INR BLD: 2.3 (ref 0.9–1.1)

## 2024-03-07 PROCEDURE — 85018 HEMOGLOBIN: CPT

## 2024-03-07 PROCEDURE — 85610 PROTHROMBIN TIME: CPT

## 2024-03-07 PROCEDURE — 36415 COLL VENOUS BLD VENIPUNCTURE: CPT

## 2024-03-07 NOTE — PROGRESS NOTES
ANTICOAGULATION MANAGEMENT     Ирина Yanez 89 year old female is on warfarin with therapeutic INR result. (Goal INR 2.0-3.0)    Recent labs: (last 7 days)     03/07/24  1317   INR 2.3*       ASSESSMENT     Source(s): Chart Review and Patient/Caregiver Call     Warfarin doses taken: Warfarin taken as instructed  Diet: No new diet changes identified  Medication/supplement changes: None noted  New illness, injury, or hospitalization: No  Signs or symptoms of bleeding or clotting: No  Previous result: Therapeutic last 2(+) visits  Additional findings: None       PLAN     Recommended plan for no diet, medication or health factor changes affecting INR     Dosing Instructions: Continue your current warfarin dose with next INR in 6 weeks       Summary  As of 3/7/2024      Full warfarin instructions:  3.75 mg every Sat; 5 mg all other days   Next INR check:  4/18/2024               Telephone call with Ирина who agrees to plan and repeated back plan correctly    Lab visit scheduled    Education provided:   Please call back if any changes to your diet, medications or how you've been taking warfarin    Plan made per [facility] anticoagulation protocol    Sherri Tipton RN  Anticoagulation Clinic  3/7/2024    _______________________________________________________________________     Anticoagulation Episode Summary       Current INR goal:  2.0-3.0   TTR:  67.7% (1 y)   Target end date:  Indefinite   [facility]    Indications    Paroxysmal atrial fibrillation (H) [I48.0]  Long term current use of anticoagulants with INR goal of 2.0-3.0 [Z79.01]             Comments:               Anticoagulation Care Providers       Provider Role Specialty Phone number    Yuridia Villarreal MD Referring Family Medicine [phone]    Mil García MD Referring Family Medicine [phone]

## 2024-03-22 DIAGNOSIS — N18.4 CHRONIC KIDNEY DISEASE, STAGE IV (SEVERE) (H): Primary | ICD-10-CM

## 2024-03-22 DIAGNOSIS — N18.4 ANEMIA IN STAGE 4 CHRONIC KIDNEY DISEASE (H): ICD-10-CM

## 2024-03-22 DIAGNOSIS — N25.81 SECONDARY RENAL HYPERPARATHYROIDISM (H): ICD-10-CM

## 2024-03-22 DIAGNOSIS — D50.8 IRON DEFICIENCY ANEMIA SECONDARY TO INADEQUATE DIETARY IRON INTAKE: ICD-10-CM

## 2024-03-22 DIAGNOSIS — D63.1 ANEMIA IN STAGE 4 CHRONIC KIDNEY DISEASE (H): ICD-10-CM

## 2024-03-23 DIAGNOSIS — I10 HYPERTENSION GOAL BP (BLOOD PRESSURE) < 140/90: ICD-10-CM

## 2024-03-25 RX ORDER — DILTIAZEM HYDROCHLORIDE EXTENDED-RELEASE TABLETS 180 MG/1
TABLET, EXTENDED RELEASE ORAL
Qty: 90 TABLET | Refills: 1 | Status: SHIPPED | OUTPATIENT
Start: 2024-03-25 | End: 2024-08-22
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
## 2024-04-11 ENCOUNTER — TRANSFERRED RECORDS (OUTPATIENT)
Dept: HEALTH INFORMATION MANAGEMENT | Facility: CLINIC | Age: 89
End: 2024-04-11

## 2024-04-11 ENCOUNTER — OFFICE VISIT (OUTPATIENT)
Dept: SLEEP MEDICINE | Facility: CLINIC | Age: 89
End: 2024-04-11
Payer: COMMERCIAL

## 2024-04-11 VITALS
WEIGHT: 158.9 LBS | SYSTOLIC BLOOD PRESSURE: 122 MMHG | HEART RATE: 55 BPM | BODY MASS INDEX: 27.13 KG/M2 | HEIGHT: 64 IN | OXYGEN SATURATION: 97 % | DIASTOLIC BLOOD PRESSURE: 72 MMHG

## 2024-04-11 DIAGNOSIS — G47.39 COMPLEX SLEEP APNEA SYNDROME: Primary | ICD-10-CM

## 2024-04-11 PROCEDURE — 99213 OFFICE O/P EST LOW 20 MIN: CPT | Performed by: PHYSICIAN ASSISTANT

## 2024-04-11 NOTE — NURSING NOTE
"Chief Complaint   Patient presents with    Sleep Problem     TERESSA, cpap follow        Initial /72   Pulse 55   Ht 1.626 m (5' 4\")   Wt 72.1 kg (158 lb 14.4 oz)   LMP  (LMP Unknown)   SpO2 97%   BMI 27.28 kg/m   Estimated body mass index is 27.28 kg/m  as calculated from the following:    Height as of this encounter: 1.626 m (5' 4\").    Weight as of this encounter: 72.1 kg (158 lb 14.4 oz).    Medication Reconciliation: complete  ESS   FRANK  DME: BRIGITTE Jama MA      "

## 2024-04-18 ENCOUNTER — ALLIED HEALTH/NURSE VISIT (OUTPATIENT)
Dept: FAMILY MEDICINE | Facility: CLINIC | Age: 89
End: 2024-04-18

## 2024-04-18 ENCOUNTER — LAB (OUTPATIENT)
Dept: LAB | Facility: CLINIC | Age: 89
End: 2024-04-18
Payer: COMMERCIAL

## 2024-04-18 ENCOUNTER — ANTICOAGULATION THERAPY VISIT (OUTPATIENT)
Dept: ANTICOAGULATION | Facility: CLINIC | Age: 89
End: 2024-04-18

## 2024-04-18 VITALS — SYSTOLIC BLOOD PRESSURE: 118 MMHG | DIASTOLIC BLOOD PRESSURE: 60 MMHG

## 2024-04-18 DIAGNOSIS — N25.81 SECONDARY RENAL HYPERPARATHYROIDISM (H): ICD-10-CM

## 2024-04-18 DIAGNOSIS — I48.0 PAROXYSMAL ATRIAL FIBRILLATION (H): ICD-10-CM

## 2024-04-18 DIAGNOSIS — D63.1 ANEMIA IN STAGE 4 CHRONIC KIDNEY DISEASE (H): ICD-10-CM

## 2024-04-18 DIAGNOSIS — N18.4 CHRONIC KIDNEY DISEASE, STAGE IV (SEVERE) (H): ICD-10-CM

## 2024-04-18 DIAGNOSIS — Z79.01 LONG TERM CURRENT USE OF ANTICOAGULANTS WITH INR GOAL OF 2.0-3.0: ICD-10-CM

## 2024-04-18 DIAGNOSIS — N18.4 ANEMIA IN STAGE 4 CHRONIC KIDNEY DISEASE (H): ICD-10-CM

## 2024-04-18 DIAGNOSIS — D50.8 IRON DEFICIENCY ANEMIA SECONDARY TO INADEQUATE DIETARY IRON INTAKE: ICD-10-CM

## 2024-04-18 DIAGNOSIS — I48.0 PAROXYSMAL ATRIAL FIBRILLATION (H): Primary | ICD-10-CM

## 2024-04-18 DIAGNOSIS — Z01.30 BP CHECK: Primary | ICD-10-CM

## 2024-04-18 LAB
HGB BLD-MCNC: 10.8 G/DL (ref 11.7–15.7)
INR BLD: 3.1 (ref 0.9–1.1)

## 2024-04-18 PROCEDURE — 84156 ASSAY OF PROTEIN URINE: CPT

## 2024-04-18 PROCEDURE — 99207 PR NO CHARGE NURSE ONLY: CPT | Performed by: FAMILY MEDICINE

## 2024-04-18 PROCEDURE — 36415 COLL VENOUS BLD VENIPUNCTURE: CPT

## 2024-04-18 PROCEDURE — 83550 IRON BINDING TEST: CPT

## 2024-04-18 PROCEDURE — 80069 RENAL FUNCTION PANEL: CPT

## 2024-04-18 PROCEDURE — 83970 ASSAY OF PARATHORMONE: CPT

## 2024-04-18 PROCEDURE — 82728 ASSAY OF FERRITIN: CPT

## 2024-04-18 PROCEDURE — 85018 HEMOGLOBIN: CPT

## 2024-04-18 PROCEDURE — 83540 ASSAY OF IRON: CPT

## 2024-04-18 PROCEDURE — 85610 PROTHROMBIN TIME: CPT

## 2024-04-18 NOTE — PROGRESS NOTES
ANTICOAGULATION MANAGEMENT     Ирина Yanez 89 year old female is on warfarin with supratherapeutic INR result. (Goal INR 2.0-3.0)    Recent labs: (last 7 days)     04/18/24  0955   INR 3.1*       ASSESSMENT     Warfarin Lab Questionnaire    Warfarin Doses Last 7 Days      4/17/2024    11:48 AM   Dose in Tablet or Mg   TAB or MG? milligram (mg)     Pt Rptd Dose SUNDAY MONDAY TUESDAY WED THURS FRIDAY SATURDAY 4/17/2024  11:48 AM 3.8 taking 3.75 mg 5 5 5 5 5 5     Warfarin taken differently than instructed, but does not effect weekly total. Warfarin maintenance dose adjusted to reflect what patient is currently taking.        4/17/2024   Warfarin Lab Questionnaire   Missed doses within past 14 days? No   Changes in diet or alcohol within past 14 days? No.  Patient reports that she has not had green veggies this week. Will resume her usual diet.   Medication changes since last result? No   Injuries or illness since last result? No   New shortness of breath, severe headaches or sudden changes in vision since last result? No   Abnormal bleeding since last result? No   Upcoming surgery, procedure? No     Previous result: Therapeutic last 2(+) visits  Additional findings: None       PLAN     Recommended plan for temporary change(s) affecting INR     Dosing Instructions: Continue your current warfarin dose with next INR in 2-3 weeks       Summary  As of 4/18/2024      Full warfarin instructions:  3.75 mg every Sat; 5 mg all other days   Next INR check:  5/8/2024               Telephone call with Ирина who agrees to plan and repeated back plan correctly    Lab visit scheduled    Education provided:   Please call back if any changes to your diet, medications or how you've been taking warfarin  Goal range and lab monitoring: goal range and significance of current result  Dietary considerations: importance of consistent vitamin K intake    Plan made per [facility] anticoagulation protocol    Sherri Tipton, RN  Anticoagulation  Clinic  4/18/2024    _______________________________________________________________________     Anticoagulation Episode Summary       Current INR goal:  2.0-3.0   TTR:  72.4% (1 y)   Target end date:  Indefinite   Send INR reminders to:  [facility]    Indications    Paroxysmal atrial fibrillation (H) [I48.0]  Long term current use of anticoagulants with INR goal of 2.0-3.0 [Z79.01]             Comments:               Anticoagulation Care Providers       Provider Role Specialty Phone number    Yuridia Villarreal MD Referring Family Medicine [phone]    Mil García MD Referring Family Medicine [phone]

## 2024-04-18 NOTE — PROGRESS NOTES
Ирина Yanez was evaluated at [facility] on April 18, 2024 at which time her blood pressure was:    BP Readings from Last 1 Encounters:   04/18/24 118/60     No data recorded      Reviewed lifestyle modifications for blood pressure control and reduction: including making healthy food choices, managing weight, getting regular exercise, smoking cessation, reducing alcohol consumption, monitoring blood pressure regularly.     Symptoms: None    BP Goal:< 140/90 mmHg    BP Assessment:  BP at goal    Potential Reasons for BP too high: NA - Not applicable    BP Follow-Up Plan: Recheck BP in 6 months at pharmacy    Recommendation to Provider: none    Note completed by: Bony Vidal RPH     Thank You   Maulik Vela  [facility]

## 2024-04-19 LAB
ALBUMIN MFR UR ELPH: 17 MG/DL
ALBUMIN SERPL BCG-MCNC: 3.8 G/DL (ref 3.5–5.2)
ANION GAP SERPL CALCULATED.3IONS-SCNC: 12 MMOL/L (ref 7–15)
BUN SERPL-MCNC: 54.2 MG/DL (ref 8–23)
CALCIUM SERPL-MCNC: 10.2 MG/DL (ref 8.8–10.2)
CHLORIDE SERPL-SCNC: 104 MMOL/L (ref 98–107)
CREAT SERPL-MCNC: 2.39 MG/DL (ref 0.51–0.95)
CREAT UR-MCNC: 116.7 MG/DL
DEPRECATED HCO3 PLAS-SCNC: 29 MMOL/L (ref 22–29)
EGFRCR SERPLBLD CKD-EPI 2021: 19 ML/MIN/1.73M2
FERRITIN SERPL-MCNC: 303 NG/ML (ref 11–328)
GLUCOSE SERPL-MCNC: 210 MG/DL (ref 70–99)
IRON BINDING CAPACITY (ROCHE): 235 UG/DL (ref 240–430)
IRON SATN MFR SERPL: 31 % (ref 15–46)
IRON SERPL-MCNC: 73 UG/DL (ref 37–145)
PHOSPHATE SERPL-MCNC: 4 MG/DL (ref 2.5–4.5)
POTASSIUM SERPL-SCNC: 4.7 MMOL/L (ref 3.4–5.3)
PROT/CREAT 24H UR: 0.15 MG/MG CR (ref 0–0.2)
PTH-INTACT SERPL-MCNC: 81 PG/ML (ref 15–65)
SODIUM SERPL-SCNC: 145 MMOL/L (ref 135–145)

## 2024-04-23 DIAGNOSIS — D63.1 ANEMIA OF CHRONIC RENAL FAILURE: ICD-10-CM

## 2024-04-23 DIAGNOSIS — N18.4 CKD (CHRONIC KIDNEY DISEASE) STAGE 4, GFR 15-29 ML/MIN (H): Primary | ICD-10-CM

## 2024-04-23 DIAGNOSIS — N18.9 ANEMIA OF CHRONIC RENAL FAILURE: ICD-10-CM

## 2024-04-23 DIAGNOSIS — N25.81 SECONDARY HYPERPARATHYROIDISM OF RENAL ORIGIN (H): ICD-10-CM

## 2024-05-08 ENCOUNTER — LAB (OUTPATIENT)
Dept: LAB | Facility: CLINIC | Age: 89
End: 2024-05-08
Payer: COMMERCIAL

## 2024-05-08 ENCOUNTER — ANTICOAGULATION THERAPY VISIT (OUTPATIENT)
Dept: ANTICOAGULATION | Facility: CLINIC | Age: 89
End: 2024-05-08

## 2024-05-08 DIAGNOSIS — N25.81 SECONDARY HYPERPARATHYROIDISM OF RENAL ORIGIN (H): ICD-10-CM

## 2024-05-08 DIAGNOSIS — N18.4 CKD (CHRONIC KIDNEY DISEASE) STAGE 4, GFR 15-29 ML/MIN (H): ICD-10-CM

## 2024-05-08 DIAGNOSIS — E11.21 TYPE 2 DIABETES MELLITUS WITH DIABETIC NEPHROPATHY (H): Primary | ICD-10-CM

## 2024-05-08 DIAGNOSIS — D63.1 ANEMIA OF CHRONIC RENAL FAILURE: ICD-10-CM

## 2024-05-08 DIAGNOSIS — N18.9 ANEMIA OF CHRONIC RENAL FAILURE: ICD-10-CM

## 2024-05-08 DIAGNOSIS — Z79.01 LONG TERM CURRENT USE OF ANTICOAGULANTS WITH INR GOAL OF 2.0-3.0: ICD-10-CM

## 2024-05-08 DIAGNOSIS — I48.0 PAROXYSMAL ATRIAL FIBRILLATION (H): Primary | ICD-10-CM

## 2024-05-08 DIAGNOSIS — I48.0 PAROXYSMAL ATRIAL FIBRILLATION (H): ICD-10-CM

## 2024-05-08 LAB
HBA1C MFR BLD: 6.3 % (ref 0–5.6)
HGB BLD-MCNC: 10.9 G/DL (ref 11.7–15.7)
INR BLD: 1.6 (ref 0.9–1.1)

## 2024-05-08 PROCEDURE — 83970 ASSAY OF PARATHORMONE: CPT

## 2024-05-08 PROCEDURE — 80069 RENAL FUNCTION PANEL: CPT

## 2024-05-08 PROCEDURE — 36415 COLL VENOUS BLD VENIPUNCTURE: CPT

## 2024-05-08 PROCEDURE — 85610 PROTHROMBIN TIME: CPT

## 2024-05-08 PROCEDURE — 85018 HEMOGLOBIN: CPT

## 2024-05-08 PROCEDURE — 83036 HEMOGLOBIN GLYCOSYLATED A1C: CPT

## 2024-05-08 NOTE — PROGRESS NOTES
ANTICOAGULATION MANAGEMENT     Ирина Yanez 89 year old female is on warfarin with subtherapeutic INR result. (Goal INR 2.0-3.0)    Recent labs: (last 7 days)     05/08/24  0934   INR 1.6*       ASSESSMENT     Source(s): Chart Review and Patient/Caregiver Call     Warfarin doses taken: Missed dose(s) may be affecting INR  Diet: patient reports she hasn't eaten well recently due to getting ready for a garage sale  Medication/supplement changes: patient reports GI doctor advised Immodium as needed for chronic diarrhea  New illness, injury, or hospitalization: No; however, diarrhea is intermittent but chronic  Signs or symptoms of bleeding or clotting: No  Previous result: Supratherapeutic  Additional findings: None       PLAN     Recommended plan for temporary change(s) affecting INR     Dosing Instructions: booster dose then continue your current warfarin dose with next INR in 2 weeks       Summary  As of 5/8/2024      Full warfarin instructions:  5/8: 6.25 mg; Otherwise 3.75 mg every Sat; 5 mg all other days   Next INR check:  5/22/2024               Telephone call with Ирина who verbalizes understanding and agrees to plan and who agrees to plan and repeated back plan correctly    Lab visit scheduled    Education provided:   Taking warfarin: how to make up for known missed warfarin doses in the future    Plan made per [facility] anticoagulation protocol    Summer Mina RN  Anticoagulation Clinic  5/8/2024    _______________________________________________________________________     Anticoagulation Episode Summary       Current INR goal:  2.0-3.0   TTR:  73.8% (1 y)   Target end date:  Indefinite   Send INR reminders to:  [facility]    Indications    Paroxysmal atrial fibrillation (H) [I48.0]  Long term current use of anticoagulants with INR goal of 2.0-3.0 [Z79.01]             Comments:               Anticoagulation Care Providers       Provider Role Specialty Phone number    Yuridia Villarreal MD Referring Family  Medicine [phone]    Mil García MD Referring [facility] Medicine [phone]

## 2024-05-09 LAB
ALBUMIN SERPL BCG-MCNC: 4 G/DL (ref 3.5–5.2)
ANION GAP SERPL CALCULATED.3IONS-SCNC: 14 MMOL/L (ref 7–15)
BUN SERPL-MCNC: 57.3 MG/DL (ref 8–23)
CALCIUM SERPL-MCNC: 9.9 MG/DL (ref 8.8–10.2)
CHLORIDE SERPL-SCNC: 104 MMOL/L (ref 98–107)
CREAT SERPL-MCNC: 2.41 MG/DL (ref 0.51–0.95)
DEPRECATED HCO3 PLAS-SCNC: 26 MMOL/L (ref 22–29)
EGFRCR SERPLBLD CKD-EPI 2021: 19 ML/MIN/1.73M2
GLUCOSE SERPL-MCNC: 201 MG/DL (ref 70–99)
PHOSPHATE SERPL-MCNC: 4.3 MG/DL (ref 2.5–4.5)
POTASSIUM SERPL-SCNC: 4.2 MMOL/L (ref 3.4–5.3)
PTH-INTACT SERPL-MCNC: 95 PG/ML (ref 15–65)
SODIUM SERPL-SCNC: 144 MMOL/L (ref 135–145)

## 2024-05-10 DIAGNOSIS — N18.4 CKD (CHRONIC KIDNEY DISEASE) STAGE 4, GFR 15-29 ML/MIN (H): Primary | ICD-10-CM

## 2024-05-13 DIAGNOSIS — N18.4 CKD (CHRONIC KIDNEY DISEASE) STAGE 4, GFR 15-29 ML/MIN (H): Primary | ICD-10-CM

## 2024-05-21 ENCOUNTER — TELEPHONE (OUTPATIENT)
Dept: PHARMACY | Facility: OTHER | Age: 89
End: 2024-05-21
Payer: COMMERCIAL

## 2024-05-21 NOTE — TELEPHONE ENCOUNTER
MT Recruitment: [facility]  insurance     Referral outreach attempt #1 on May 21, 2024      Outcome: left voicemail- Call back number [phone]    Sherri Valdez CPhT  [facility]

## 2024-05-22 ENCOUNTER — LAB (OUTPATIENT)
Dept: LAB | Facility: CLINIC | Age: 89
End: 2024-05-22
Payer: COMMERCIAL

## 2024-05-22 ENCOUNTER — ANTICOAGULATION THERAPY VISIT (OUTPATIENT)
Dept: ANTICOAGULATION | Facility: CLINIC | Age: 89
End: 2024-05-22

## 2024-05-22 DIAGNOSIS — I48.0 PAROXYSMAL ATRIAL FIBRILLATION (H): ICD-10-CM

## 2024-05-22 DIAGNOSIS — Z79.01 LONG TERM CURRENT USE OF ANTICOAGULANTS WITH INR GOAL OF 2.0-3.0: ICD-10-CM

## 2024-05-22 DIAGNOSIS — I48.0 PAROXYSMAL ATRIAL FIBRILLATION (H): Primary | ICD-10-CM

## 2024-05-22 LAB — INR BLD: 2.4 (ref 0.9–1.1)

## 2024-05-22 PROCEDURE — 36416 COLLJ CAPILLARY BLOOD SPEC: CPT

## 2024-05-22 PROCEDURE — 85610 PROTHROMBIN TIME: CPT

## 2024-05-22 NOTE — PROGRESS NOTES
Last night patient was briefly placed on an autoset CPAP of 5-10 with 5 LPM bled in oxygen. Pt said that the CPAP was drying her mouth. Pt refused to maintain CPAP and was placed on 5 LPM NC. Will continue to monitor and assess the pt's current respiratory status and needs.      Benton Malone, RT     Mohamud Heatonsatya Dash is a 63 y.o. male who is currently ordered Vancomycin IV with management by the Pharmacy Consult service.  Relevant clinical data and objective / subjective history reviewed.  Vancomycin Assessment:  Indication and Goal AUC/Trough: Pneumonia (goal -600, trough >10)  Clinical Status:  New  Micro:     Renal Function:  SCr: 0.52 mg/dL  CrCl: 125 mL/min  Renal replacement: Not on dialysis  Days of Therapy: 1  Current Dose: 1750mg IV x 1 dose load  Vancomycin Plan:  New Dosinmg IV Q12  Estimated AUC: 507 mcg*hr/mL  Estimated Trough: 10.6 mcg/mL  Next Level:  @ 0600  Renal Function Monitoring: Daily BMP and UOP  Pharmacy will continue to follow closely for s/sx of nephrotoxicity, infusion reactions and appropriateness of therapy.  BMP and CBC will be ordered per protocol. We will continue to follow the patient’s culture results and clinical progress daily.    Yelena Walker, Pharmacist

## 2024-05-22 NOTE — PROGRESS NOTES
"ANTICOAGULATION MANAGEMENT     Ирина Yanez 89 year old female is on warfarin with therapeutic INR result. (Goal INR 2.0-3.0)    Recent labs: (last 7 days)     05/22/24  0849   INR 2.4*       ASSESSMENT     Source(s): Chart Review and Patient/Caregiver Call     Warfarin doses taken: Warfarin taken as instructed  Diet: No new diet changes identified  Medication/supplement changes: Tosemide dose was lowered 1.5-2 weeks ago.  Per Up to Date, \"Torsemide may increase the serum concentration of Warfarin.\"   New illness, injury, or hospitalization: No  Signs or symptoms of bleeding or clotting: No  Previous result: Subtherapeutic  Additional findings: None       PLAN     Recommended plan for ongoing change(s) affecting INR     Dosing Instructions: Continue your current warfarin dose with next INR in 2 weeks (to combine with other lab appointment already scheduled)       Summary  As of 5/22/2024      Full warfarin instructions:  3.75 mg every Sat; 5 mg all other days   Next INR check:  6/5/2024               Telephone call with Ирина who agrees to plan and repeated back plan correctly    Lab visit scheduled    Education provided:   Please call back if any changes to your diet, medications or how you've been taking warfarin    Plan made per [facility] anticoagulation protocol    Sherri Tipton RN  Anticoagulation Clinic  5/22/2024    _______________________________________________________________________     Anticoagulation Episode Summary       Current INR goal:  2.0-3.0   TTR:  74.5% (1 y)   Target end date:  Indefinite   Send INR reminders to:  [facility]    Indications    Paroxysmal atrial fibrillation (H) [I48.0]  Long term current use of anticoagulants with INR goal of 2.0-3.0 [Z79.01]             Comments:               Anticoagulation Care Providers       Provider Role Specialty Phone number    Yuridia Villarreal MD Referring Family Medicine [phone]    Mil García MD Referring Family Medicine " [phone]

## 2024-05-23 ENCOUNTER — TELEPHONE (OUTPATIENT)
Dept: PHARMACY | Facility: OTHER | Age: 89
End: 2024-05-23
Payer: COMMERCIAL

## 2024-05-23 ENCOUNTER — TRANSFERRED RECORDS (OUTPATIENT)
Dept: HEALTH INFORMATION MANAGEMENT | Facility: CLINIC | Age: 89
End: 2024-05-23
Payer: COMMERCIAL

## 2024-05-23 NOTE — TELEPHONE ENCOUNTER
JOSUE Recruitment: [facility]  insurance     Referral outreach attempt #2 on May 23, 2024      Outcome: left voicemail- Call back number [phone]    JOSUE Frank

## 2024-06-05 ENCOUNTER — LAB (OUTPATIENT)
Dept: LAB | Facility: CLINIC | Age: 89
End: 2024-06-05
Payer: COMMERCIAL

## 2024-06-05 ENCOUNTER — ANTICOAGULATION THERAPY VISIT (OUTPATIENT)
Dept: ANTICOAGULATION | Facility: CLINIC | Age: 89
End: 2024-06-05

## 2024-06-05 DIAGNOSIS — I48.0 PAROXYSMAL ATRIAL FIBRILLATION (H): ICD-10-CM

## 2024-06-05 DIAGNOSIS — Z79.01 LONG TERM CURRENT USE OF ANTICOAGULANTS WITH INR GOAL OF 2.0-3.0: ICD-10-CM

## 2024-06-05 DIAGNOSIS — I48.0 PAROXYSMAL ATRIAL FIBRILLATION (H): Primary | ICD-10-CM

## 2024-06-05 DIAGNOSIS — N18.4 CKD (CHRONIC KIDNEY DISEASE) STAGE 4, GFR 15-29 ML/MIN (H): ICD-10-CM

## 2024-06-05 LAB
ANION GAP SERPL CALCULATED.3IONS-SCNC: 13 MMOL/L (ref 7–15)
BUN SERPL-MCNC: 35.8 MG/DL (ref 8–23)
CALCIUM SERPL-MCNC: 9.8 MG/DL (ref 8.8–10.2)
CHLORIDE SERPL-SCNC: 108 MMOL/L (ref 98–107)
CREAT SERPL-MCNC: 1.92 MG/DL (ref 0.51–0.95)
DEPRECATED HCO3 PLAS-SCNC: 24 MMOL/L (ref 22–29)
EGFRCR SERPLBLD CKD-EPI 2021: 25 ML/MIN/1.73M2
GLUCOSE SERPL-MCNC: 145 MG/DL (ref 70–99)
INR BLD: 5 (ref 0.9–1.1)
POTASSIUM SERPL-SCNC: 4.5 MMOL/L (ref 3.4–5.3)
SODIUM SERPL-SCNC: 145 MMOL/L (ref 135–145)

## 2024-06-05 PROCEDURE — 36415 COLL VENOUS BLD VENIPUNCTURE: CPT

## 2024-06-05 PROCEDURE — 85610 PROTHROMBIN TIME: CPT

## 2024-06-05 PROCEDURE — 80048 BASIC METABOLIC PNL TOTAL CA: CPT

## 2024-06-05 NOTE — PROGRESS NOTES
Per protocol, reporting to PCP a supratherapeutic INR of  5.0  on this patient. INR was managed by the [facility].   Alondra Richter RN, BSN  Anticoagulation Clinic

## 2024-06-05 NOTE — LETTER
June 5, 2024      Ирина Yanez  [address] [facility] [id number]        Dear MsBozena,    We are writing to inform you of your test results.    {results letter list:[id number]}    No results found from the In Basket message.    If you have any questions or concerns, please call the clinic at the number listed above.       Sincerely,

## 2024-06-05 NOTE — PROGRESS NOTES
"ANTICOAGULATION MANAGEMENT     Ирина Yanez 89 year old female is on warfarin with supratherapeutic INR result. (Goal INR 2.0-3.0)    Recent labs: (last 7 days)     06/05/24  0854   INR 5.0*       ASSESSMENT     Source(s): Chart Review and Patient/Caregiver Call     Warfarin doses taken: Warfarin taken as instructed  Diet: Decreased greens/vitamin K in diet; plans to resume previous intake when able, patient reports she hasn't been eating much at all with increased diarrhea  Medication/supplement changes: None noted  New illness, injury, or hospitalization: Yes: Patient reports diarrhea has been \"really bad\" will have diarrhea within 10 minutes of eating, patient is seeing GI, will have a breath test in about 2 weeks  Signs or symptoms of bleeding or clotting: No  Previous result: Therapeutic last visit; previously outside of goal range  Additional findings: None       PLAN     Recommended plan for temporary change(s) affecting INR     Dosing Instructions: hold dose then continue your current warfarin dose with next INR in 1-3 days       Summary  As of 6/5/2024      Full warfarin instructions:  6/5: Hold; Otherwise 3.75 mg every Sat; 5 mg all other days   Next INR check:  6/6/2024               Telephone call with Ирина who agrees to plan and repeated back plan correctly    Lab visit scheduled    Education provided:   Please call back if any changes to your diet, medications or how you've been taking warfarin  Contact [phone]  with any changes, questions or concerns.     Plan made per [facility] anticoagulation protocol    Alondra Richter RN  Anticoagulation Clinic  6/5/2024    _______________________________________________________________________     Anticoagulation Episode Summary       Current INR goal:  2.0-3.0   TTR:  73.1% (1 y)   Target end date:  Indefinite   Send INR reminders to:  [facility]    Indications    Paroxysmal atrial fibrillation (H) [I48.0]  Long term current use of anticoagulants with " INR goal of 2.0-3.0 [Z79.01]             Comments:               Anticoagulation Care Providers       Provider Role Specialty Phone number    Yuridia Villarreal MD Referring Family Medicine [phone]    Mil García MD Referring Family Medicine [phone]

## 2024-06-06 ENCOUNTER — ANTICOAGULATION THERAPY VISIT (OUTPATIENT)
Dept: ANTICOAGULATION | Facility: CLINIC | Age: 89
End: 2024-06-06

## 2024-06-06 ENCOUNTER — LAB (OUTPATIENT)
Dept: LAB | Facility: CLINIC | Age: 89
End: 2024-06-06
Payer: COMMERCIAL

## 2024-06-06 DIAGNOSIS — I48.0 PAROXYSMAL ATRIAL FIBRILLATION (H): Primary | ICD-10-CM

## 2024-06-06 DIAGNOSIS — Z79.01 LONG TERM CURRENT USE OF ANTICOAGULANTS WITH INR GOAL OF 2.0-3.0: ICD-10-CM

## 2024-06-06 DIAGNOSIS — I48.0 PAROXYSMAL ATRIAL FIBRILLATION (H): ICD-10-CM

## 2024-06-06 LAB — INR BLD: 4.6 (ref 0.9–1.1)

## 2024-06-06 PROCEDURE — 36416 COLLJ CAPILLARY BLOOD SPEC: CPT

## 2024-06-06 PROCEDURE — 85610 PROTHROMBIN TIME: CPT

## 2024-06-06 NOTE — PROGRESS NOTES
ANTICOAGULATION MANAGEMENT     Ирина Yanez 89 year old female is on warfarin with supratherapeutic INR result. (Goal INR 2.0-3.0)    Recent labs: (last 7 days)     06/06/24  1344   INR 4.6*       ASSESSMENT     Source(s): Chart Review and Patient/Caregiver Call     Warfarin doses taken: Warfarin taken as instructed  Diet: Decreased greens/vitamin K in diet; ongoing change.  Has not been able to eat many solid foods.  Medication/supplement changes: None noted  New illness, injury, or hospitalization: Yes: diarrhea has improved slightly today.  Signs or symptoms of bleeding or clotting: No  Previous result: Supratherapeutic  Additional findings: None       PLAN     Recommended plan for temporary change(s) affecting INR     Dosing Instructions: hold 2 doses then continue your current warfarin dose with next INR in 4 days       Summary  As of 6/6/2024      Full warfarin instructions:  6/6: Hold; 6/7: Hold; Otherwise 5 mg every Sun, Tue, Fri; 3.75 mg all other days   Next INR check:  6/10/2024               Telephone call with Ирина who agrees to plan and repeated back plan correctly    Lab visit scheduled    Education provided:   Please call back if any changes to your diet, medications or how you've been taking warfarin  Goal range and lab monitoring: goal range and significance of current result  Symptom monitoring: monitoring for bleeding signs and symptoms    Plan made per [facility] anticoagulation protocol    Sherri Tipton RN  Anticoagulation Clinic  6/6/2024    _______________________________________________________________________     Anticoagulation Episode Summary       Current INR goal:  2.0-3.0   TTR:  72.7% (1 y)   Target end date:  Indefinite   Send INR reminders to:  [facility]    Indications    Paroxysmal atrial fibrillation (H) [I48.0]  Long term current use of anticoagulants with INR goal of 2.0-3.0 [Z79.01]             Comments:               Anticoagulation Care Providers       Provider Role  Specialty Phone number    Yuridia Villarreal MD Referring Family Medicine [phone]    Mil García MD Referring Family Medicine [phone]

## 2024-06-10 ENCOUNTER — LAB (OUTPATIENT)
Dept: LAB | Facility: CLINIC | Age: 89
End: 2024-06-10
Payer: COMMERCIAL

## 2024-06-10 ENCOUNTER — ANTICOAGULATION THERAPY VISIT (OUTPATIENT)
Dept: ANTICOAGULATION | Facility: CLINIC | Age: 89
End: 2024-06-10

## 2024-06-10 ENCOUNTER — VIRTUAL VISIT (OUTPATIENT)
Dept: PHARMACY | Facility: CLINIC | Age: 89
End: 2024-06-10
Payer: COMMERCIAL

## 2024-06-10 DIAGNOSIS — I10 HYPERTENSION GOAL BP (BLOOD PRESSURE) < 140/90: ICD-10-CM

## 2024-06-10 DIAGNOSIS — Z79.01 LONG TERM CURRENT USE OF ANTICOAGULANTS WITH INR GOAL OF 2.0-3.0: ICD-10-CM

## 2024-06-10 DIAGNOSIS — E11.21 TYPE 2 DIABETES MELLITUS WITH DIABETIC NEPHROPATHY, WITHOUT LONG-TERM CURRENT USE OF INSULIN (H): Primary | ICD-10-CM

## 2024-06-10 DIAGNOSIS — Z78.9 TAKES DIETARY SUPPLEMENTS: ICD-10-CM

## 2024-06-10 DIAGNOSIS — I48.0 PAROXYSMAL ATRIAL FIBRILLATION (H): ICD-10-CM

## 2024-06-10 DIAGNOSIS — R19.7 DIARRHEA, UNSPECIFIED TYPE: ICD-10-CM

## 2024-06-10 DIAGNOSIS — N18.4 CKD (CHRONIC KIDNEY DISEASE) STAGE 4, GFR 15-29 ML/MIN (H): ICD-10-CM

## 2024-06-10 DIAGNOSIS — R29.6 FALLS FREQUENTLY: ICD-10-CM

## 2024-06-10 DIAGNOSIS — E11.42 DIABETIC POLYNEUROPATHY ASSOCIATED WITH TYPE 2 DIABETES MELLITUS (H): ICD-10-CM

## 2024-06-10 DIAGNOSIS — F41.9 ANXIETY: ICD-10-CM

## 2024-06-10 DIAGNOSIS — I48.0 PAROXYSMAL ATRIAL FIBRILLATION (H): Primary | ICD-10-CM

## 2024-06-10 LAB — INR BLD: 1.7 (ref 0.9–1.1)

## 2024-06-10 PROCEDURE — 36415 COLL VENOUS BLD VENIPUNCTURE: CPT

## 2024-06-10 PROCEDURE — 99605 MTMS BY PHARM NP 15 MIN: CPT | Mod: 93 | Performed by: PHARMACIST

## 2024-06-10 PROCEDURE — 85610 PROTHROMBIN TIME: CPT

## 2024-06-10 PROCEDURE — 99607 MTMS BY PHARM ADDL 15 MIN: CPT | Mod: 93 | Performed by: PHARMACIST

## 2024-06-10 RX ORDER — DOXAZOSIN 2 MG/1
2 TABLET ORAL AT BEDTIME
COMMUNITY

## 2024-06-10 RX ORDER — TORSEMIDE 10 MG/1
10 TABLET ORAL DAILY
COMMUNITY

## 2024-06-10 NOTE — LETTER
Mayte 10, 2024  Ирина JOHNS Renata  [address] [facility] [address]    Dear Ms. Yanez, ANDREAS [facility]     Thank you for talking with me on Federico 10, 2024 about your health and medications. As a follow-up to our conversation, I have included two documents:      Your Recommended To-Do List has steps you should take to get the best results from your medications.  Your Medication List will help you keep track of your medications and how to take them.    If you want to talk about these documents, please call Zarina Cain RPH at phone: [phone], Monday-Friday 8-4:30pm.    I look forward to working with you and your doctors to make sure your medications work well for you.    Sincerely,  Zarina Cain RPH  [facility] Pharmacist, [facility]

## 2024-06-10 NOTE — PROGRESS NOTES
ANTICOAGULATION MANAGEMENT     Ирина Yanez 89 year old female is on warfarin with subtherapeutic INR result. (Goal INR 2.0-3.0)    Recent labs: (last 7 days)     06/10/24  1136   INR 1.7*       ASSESSMENT     Source(s): Chart Review and Patient/Caregiver Call     Warfarin doses taken: Held for 3 days  recently which may be affecting INR  Diet: Increased greens/vitamin K in diet; plans to resume previous intake, patient reports she ate a small amount of broccoli over the weekend; however, is still averaging 2 servings of greens per week.  Medication/supplement changes: None noted  New illness, injury, or hospitalization: No  Signs or symptoms of bleeding or clotting: No  Previous result: Supratherapeutic  Additional findings: patient reports diarrhea is intermittent, not worse or better than previously reported.   MTANDREAS PharmD virtual visit today, no change in care plan, patient is to follow up with nephrology and GI.       PLAN     Recommended plan for temporary change(s) affecting INR     Dosing Instructions: Continue your current warfarin dose with next INR in 4 days, no booster today given recent supratherapeutic INRs.       Summary  As of 6/10/2024      Full warfarin instructions:  3.75 mg every Sat; 5 mg all other days   Next INR check:  6/14/2024               Telephone call with Ирина who verbalizes understanding and agrees to plan    Lab visit scheduled    Education provided:   Dietary considerations: importance of consistent vitamin K intake, impact of vitamin K foods on INR, and vitamin K content of foods    Plan made per [facility] anticoagulation protocol    Summer Mina, RN  Anticoagulation Clinic  6/10/2024    _______________________________________________________________________     Anticoagulation Episode Summary       Current INR goal:  2.0-3.0   TTR:  72.0% (1 y)   Target end date:  Indefinite   Send INR reminders to:  KRIS RAMIREZ    Indications    Paroxysmal atrial fibrillation (H)  [I48.0]  Long term current use of anticoagulants with INR goal of 2.0-3.0 [Z79.01]             Comments:               Anticoagulation Care Providers       Provider Role Specialty Phone number    Yuridia Villarreal MD Referring Family Medicine [phone]    Mil García MD Referring Family Medicine [phone]

## 2024-06-10 NOTE — PROGRESS NOTES
Medication Therapy Management (MTM) Encounter    ASSESSMENT:                            Medication Adherence/Access: No issues identified    Diarrhea:    Stable with no recent falls.    Diabetes:   Patient is meeting A1c goal of < 8%.  Microalbumin is not at goal <30mg/g. Managed by nephrology.  Patient would benefit from continuing current medications.     Hypertension/CKD   Patient is meeting blood pressure goal of < 140/90mmHg. Recommended follow-up with nephrology as planned. Recommended that she ask Dr. Whatley if she should be on iron. Last nephrology encounter noted iron every other day. I did route my encounter to Dr. Whatley to send updated prescription for torsemide since he decreased in May.    Hyperlipidemia   Stable    Mental Health/Anxiety  Stable    Supplements   Stable    Afib:  Stable, managed by cardiology and anticoagulation team.    Neuropathy pain:   Stable    PLAN:                            Ask Dr. Whatley if you should be on iron.  Follow up with GI and nephrology as planned.  Schedule with me if you have questions come up about your medicines.    Follow-up: Return in about 1 year (around 6/10/2025).    SUBJECTIVE/OBJECTIVE:                          Ирина Yanez is a 89 year old female called for a follow-up visit from 8/258/23.       Reason for visit: annual medication review.    Allergies/ADRs: Reviewed in chart  Past Medical History: Reviewed in chart - she started seeing [facility] and will be doing breath test. Checking for methane to check for bacterial overgrowth  Tobacco: She reports that she has never smoked. She has never used smokeless tobacco.  Alcohol: not currently using    Medication Adherence/Access: no issues reported but will forget on dose in the evening every 6 months, she does use a pill box which helps.     Nephrologist: Dr. Whatley outside of  - follow up every three months (next in sept)- he is managing her blood pressure  Cardiologist: Dr. Beasley - follows annually, last  visit Sept 23  [facility]: Dr. Pathak in [place]    Diarrhea:    Align probiotic once daily    Reports no side effects. Not sure if this is helping. She does have follow-up with GI.  She doesn't feel it is related to medications as it started when there were no medication changes. She noticed that this really only happens when she eats flour, discovered this just about 3-4 months ago. She has done physical therapy for balance.    Fall history:   She has not had any recent falls. The last one was last summer in the garden. She lost her balance but did not have any injury. Reports no dizziness. She has not done physical therapy for balance. She does not want to do physical therapy today.    Diabetes   Ozempic 1.0 mg once weekly on Saturdays -  gets free through the      Patient reports no side effects.     Medication history: She was on Invokana in the past but stopped due to vaginal itching. Metformin discontinued 2/2015 due to reduced renal function.  She has tried glipizide in the past which caused diarrhea.     Current diabetes symptoms: none and no low blood sugars  Blood sugar monitoring: occasionally; Ranges: (patient reported) Fasting- 110's - 130's   Diet/Exercise: Reports eating less, twice daily and also eating less portion sizes. She is not snacking. She reports not doing any regular exercise. She continues to have issues with balance. She does have walker but only uses outside the home.     Current home weight 151 lbs     Eye exam is up to date  Foot exam: due      Hypertension /CKD  diltiazem  mg once daily  doxazosin 2 mg daily  metoprolol ER 25 mg once daily  Torsemide 10 mg once daily - decreased by nephrology on 5/13/24    Medication history: Calcium acetate 667 mg three times daily switched to sevelamer due to hypercalcemia. Calcitriol 0.25 mg twice per week for CKD - stopped by Dr. Whatley in 4/23/24. Sevelamer (Renvela) 800 mg three times daily with meals was stopped by Dr. Whatley,  nephrologist, on 3/19/24.    Patient reports no current medication side effects  Patient does not self-monitor blood pressure.    Nephrology is managing blood pressure.     BP Readings from Last 3 Encounters:   04/18/24 118/60   04/11/24 122/72   02/22/24 138/61       Hyperlipidemia   pravastatin 20 mg daily  Patient reports no significant myalgias or other side effects.  The ASCVD Risk score (Kerline LILLY, et al., 2019) failed to calculate for the following reasons:    The 2019 ASCVD risk score is only valid for ages 40 to 79       Mental Health   Anxiety  Sertraline 50 mg once daily.   Patient reports no current medication side effects.  Current symptoms include: none feeling well.         Supplements   multivitamin  No reported issues at this time.        Afib:  warfarin 5 mg daily except 2.5 mg once a week for stroke prevention     Patient reports no current medication side effects.    Patient does not have a history of GI bleed.   Patient does not self-monitor blood pressure.   [id number] Score    Date Calculated: 4/11/2024 10:03 AM  [id number] Score: 6         Neuropathy pain:   Gabapentin 200 mg at bedtime    She feels this works along with acetaminophen. She tried to go off the gabapentin and the pain was bad in her feet.    Today's Vitals: LMP  (LMP Unknown)   ----------------    I spent 34 minutes with this patient today.  A copy of the visit note was provided to the patient's provider(s).    A summary of these recommendations was sent via [employer] and mailed    Lara Cain PharmD  Medication Therapy Management Pharmacist      Telemedicine Visit Details  Type of service:  Telephone visit  Start Time: 9:10 AM  End Time: 9:44 AM     Medication Therapy Recommendations  No medication therapy recommendations to display

## 2024-06-10 NOTE — PATIENT INSTRUCTIONS
"Recommendations from today's MTM visit:                                                    MTM (medication therapy management) is a service provided by a clinical pharmacist designed to help you get the most of out of your medicines.   Today we reviewed what your medicines are for, how to know if they are working, that your medicines are safe and how to make your medicine regimen as easy as possible.      Ask Dr. Whatley if you should be on iron.  Follow up with GI and nephrology as planned.  Schedule with me if you have questions come up about your medicines.    Follow-up: Return in about 1 year (around 6/10/2025).    It was great speaking with you today.  I value your experience and would be very thankful for your time in providing feedback in our clinic survey. In the next few days, you may receive an email or text message from [employer] with a link to a survey related to your  clinical pharmacist.\"     To schedule another MTM appointment, please call the clinic directly or you may call the MTM scheduling line at [phone] or toll-free at [phone].     My Clinical Pharmacist's contact information:                                                      Please feel free to contact me with any questions or concerns you have.     Lara Cain, PharmD  Medication Therapy Management Pharmacist  [facility] - Monday and Wednesday 7:30 - 4:00      "

## 2024-06-10 NOTE — Clinical Note
Dr. Whatley,    I did an annual medication review with Ирина today and wondering if you could send an updated prescription for her torsemide as you decreased her dose in May.  Thank you.  Lara Cain, PharmD Medication Therapy Management Pharmacist [facility] - Monday and Wednesday 7:30 - 4:00

## 2024-06-10 NOTE — LETTER
_  Medication List        Prepared on: 06/10/2024     Bring your Medication List when you go to the doctor, hospital, or   emergency room. And, share it with your family or caregivers.     Note any changes to how you take your medications.  Cross out medications when you no longer use them.    Medication How I take it Why I use it Prescriber   acetaminophen (TYLENOL) 500 MG tablet Take 1,000 mg by mouth every evening as needed for mild pain  pain Patient Reported   betamethasone valerate (VALISONE) 0.1 % external cream Apply topically daily Lichen Sclerosus et Atrophicus Mil García MD   blood glucose (NO BRAND SPECIFIED) lancets standard Use to test blood sugar 1 times daily or as directed, Contour Next lancets Type 2 diabetes mellitus with diabetic nephropathy, without long-term current use of insulin (H) Mil García MD   blood glucose (NO BRAND SPECIFIED) test strip Use to test blood sugar 1 times daily or as directed, Contour Next strips Type 2 diabetes mellitus with diabetic nephropathy, without long-term current use of insulin (H) Mil García MD   blood glucose monitoring (NO BRAND SPECIFIED) meter device kit Use to test blood sugar 1 times daily or as directed. Ultra 2 Type 2 diabetes mellitus with diabetic neuropathy, without long-term current use of insulin (H) Reji Verde MD   diltiazem ER (CARDIAZEM LA) 180 MG 24 hr tablet TAKE ONE TABLET BY MOUTH EVERY EVENING Hypertension Goal BP (Blood Pressure) < 140/90 Mil García MD   doxazosin (CARDURA) 2 MG tablet Take 2 mg by mouth at bedtime  Blood pressure  Jef Whatley MD   gabapentin (NEURONTIN) 100 MG capsule Take 2 capsules (200 mg) by mouth at bedtime Type 2 diabetes mellitus with diabetic nephropathy, without long-term current use of insulin (H) Mil García MD   metoprolol succinate ER (TOPROL XL) 25 MG 24 hr tablet TAKE 1 TABLET BY MOUTH DAILY. GENERIC EQUIVALENT FOR TOPROL XL Paroxysmal Atrial  Fibrillation (H); Hypertension Goal BP (Blood Pressure) < 140/90 Mil García MD   MULTI-VITAMIN OR TABS Take 2 tablets by mouth daily   General Health  Patient Reported   nystatin (MYCOSTATIN) 227740 UNIT/GM external powder Apply a small amount to affected area three times daily. Intertrigo Mil García MD   pravastatin (PRAVACHOL) 20 MG tablet Take 1 tablet (20 mg) by mouth daily Hyperlipidemia with Target LDL Less than 100 Mil García MD   Probiotic Product (ALIGN DUALBIOTIC PO) Take 1 capsule by mouth daily  Diarrhea  Dr. Pathak   Semaglutide, 1 MG/DOSE, (OZEMPIC) 4 MG/3ML pen Inject 1 mg Subcutaneous every 7 days Type 2 diabetes mellitus with diabetic nephropathy, without long-term current use of insulin (H) Mil García MD   sertraline (ZOLOFT) 50 MG tablet Take 1 tablet (50 mg) by mouth daily Anxiety Mil García MD   torsemide (DEMADEX) 20 MG tablet Take 1 tablet (20 mg) by mouth daily Hypertension Goal BP (Blood Pressure) < 140/90; Chronic diastolic congestive heart failure (H) Mil García MD   warfarin ANTICOAGULANT (JANTOVEN ANTICOAGULANT) 2.5 MG tablet Take 2 tablets (5 mg) by mouth daily except take 1.5 tablets (3.75 mg) on Sunday or as directed by INR Clinic Paroxysmal Atrial Fibrillation (H) Mil García MD         Add new medications, over-the-counter drugs, herbals, vitamins, or  minerals in the blank rows below.    Medication How I take it Why I use it Prescriber                                      Allergies:      - Amoxicillin  - Amoxicillin-pot Clavulanate - Diarrhea  - Clavulanic Acid  - Clindamycin Phosphate  - Metformin - Diarrhea  - Tegretol [carbamazepine]        Side effects I have had:      Not on File        Other Information:              My notes and questions:

## 2024-06-10 NOTE — LETTER
"Recommended To-Do List      Prepared on: 06/10/2024       You can get the best results from your medications by completing the items on this \"To-Do List.\"      Bring your To-Do List when you go to your doctor. And, share it with your family or caregivers.    My To-Do List:  What we talked about: What I should do:    What my medicines are for, how to know if my medicines are working, made sure my medicines are safe for me and reviewed how to take my medicines.      Take my medicines every day               "

## 2024-06-14 ENCOUNTER — ANTICOAGULATION THERAPY VISIT (OUTPATIENT)
Dept: ANTICOAGULATION | Facility: CLINIC | Age: 89
End: 2024-06-14

## 2024-06-14 ENCOUNTER — LAB (OUTPATIENT)
Dept: LAB | Facility: CLINIC | Age: 89
End: 2024-06-14
Payer: COMMERCIAL

## 2024-06-14 DIAGNOSIS — I48.0 PAROXYSMAL ATRIAL FIBRILLATION (H): Primary | ICD-10-CM

## 2024-06-14 DIAGNOSIS — I48.0 PAROXYSMAL ATRIAL FIBRILLATION (H): ICD-10-CM

## 2024-06-14 DIAGNOSIS — Z79.01 LONG TERM CURRENT USE OF ANTICOAGULANTS WITH INR GOAL OF 2.0-3.0: ICD-10-CM

## 2024-06-14 DIAGNOSIS — L30.4 INTERTRIGO: ICD-10-CM

## 2024-06-14 LAB — INR BLD: 2.4 (ref 0.9–1.1)

## 2024-06-14 PROCEDURE — 85610 PROTHROMBIN TIME: CPT

## 2024-06-14 PROCEDURE — 36416 COLLJ CAPILLARY BLOOD SPEC: CPT

## 2024-06-14 RX ORDER — NYSTATIN 100000 [USP'U]/G
POWDER TOPICAL
Qty: 60 G | Refills: 1 | Status: SHIPPED | OUTPATIENT
Start: 2024-06-14

## 2024-06-14 NOTE — PROGRESS NOTES
ANTICOAGULATION MANAGEMENT     Ирина aYnez 89 year old female is on warfarin with therapeutic INR result. (Goal INR 2.0-3.0)    Recent labs: (last 7 days)     06/14/24  1322   INR 2.4*       ASSESSMENT     Source(s): Chart Review and Patient/Caregiver Call     Warfarin doses taken: Warfarin taken as instructed  Diet: No new diet changes identified.  Was able to eat broccoli twice this week.    Medication/supplement changes: None noted  New illness, injury, or hospitalization: No.  Diarrhea has improved and patient is having normal BMs.  Signs or symptoms of bleeding or clotting: No  Previous result: Subtherapeutic  Additional findings: None       PLAN     Recommended plan for no diet, medication or health factor changes affecting INR     Dosing Instructions: Continue your current warfarin dose with next INR in 1 week       Summary  As of 6/14/2024      Full warfarin instructions:  3.75 mg every Sat; 5 mg all other days   Next INR check:  6/20/2024               Telephone call with Ирина who agrees to plan and repeated back plan correctly    Lab visit scheduled    Education provided:   Please call back if any changes to your diet, medications or how you've been taking warfarin    Plan made per [facility] anticoagulation protocol    Sherri Tipton RN  Anticoagulation Clinic  6/14/2024    _______________________________________________________________________     Anticoagulation Episode Summary       Current INR goal:  2.0-3.0   TTR:  71.6% (1 y)   Target end date:  Indefinite   Send INR reminders to:  KIMAG JAMES    Indications    Paroxysmal atrial fibrillation (H) [I48.0]  Long term current use of anticoagulants with INR goal of 2.0-3.0 [Z79.01]             Comments:               Anticoagulation Care Providers       Provider Role Specialty Phone number    Yuridia Villarreal MD Referring Family Medicine [phone]    Mil García MD Referring Family Medicine [phone]

## 2024-06-20 ENCOUNTER — ANTICOAGULATION THERAPY VISIT (OUTPATIENT)
Dept: ANTICOAGULATION | Facility: CLINIC | Age: 89
End: 2024-06-20

## 2024-06-20 ENCOUNTER — LAB (OUTPATIENT)
Dept: LAB | Facility: CLINIC | Age: 89
End: 2024-06-20
Payer: COMMERCIAL

## 2024-06-20 DIAGNOSIS — Z79.01 LONG TERM CURRENT USE OF ANTICOAGULANTS WITH INR GOAL OF 2.0-3.0: ICD-10-CM

## 2024-06-20 DIAGNOSIS — I48.0 PAROXYSMAL ATRIAL FIBRILLATION (H): Primary | ICD-10-CM

## 2024-06-20 DIAGNOSIS — I48.0 PAROXYSMAL ATRIAL FIBRILLATION (H): ICD-10-CM

## 2024-06-20 LAB — INR BLD: 3.1 (ref 0.9–1.1)

## 2024-06-20 PROCEDURE — 85610 PROTHROMBIN TIME: CPT

## 2024-06-20 PROCEDURE — 36415 COLL VENOUS BLD VENIPUNCTURE: CPT

## 2024-06-20 NOTE — PROGRESS NOTES
ANTICOAGULATION MANAGEMENT     Ирина Yanez 89 year old female is on warfarin with supratherapeutic INR result. (Goal INR 2.0-3.0)    Recent labs: (last 7 days)     06/20/24  1128   INR 3.1*       ASSESSMENT     Warfarin Lab Questionnaire    Warfarin Doses Last 7 Days      6/20/2024    10:12 AM   Dose in Tablet or Mg   TAB or MG? tablet (tab)     Pt Rptd Dose SUNDAY MONDAY TUESDAY WED THURS FRIDAY SATURDAY 6/20/2024  10:12 AM 1 2 2 3 2 2 2     Patient took warfarin as directed (5 mg daily except 3.75 mg Sat) not as written above.        6/20/2024   Warfarin Lab Questionnaire   Missed doses within past 14 days? No   Changes in diet or alcohol within past 14 days? No.  Less green veggies this week due to breath test.  Test was submitted yesterday.  Patient plans to resume her usual diet.   Medication changes since last result? No   Injuries or illness since last result? No   New shortness of breath, severe headaches or sudden changes in vision since last result? No.  Diarrhea increased again due to the fluid she was drinking for the breath test.   Abnormal bleeding since last result? No   Upcoming surgery, procedure? No   Best number to call with results? [phone]        Previous result: Therapeutic last visit; previously outside of goal range  Additional findings: None       PLAN     Recommended plan for ongoing change(s) affecting INR     Dosing Instructions: decrease your warfarin dose (7.4% change) with next INR in 1 week       Summary  As of 6/20/2024      Full warfarin instructions:  3.75 mg every Tue, Thu, Sat; 5 mg all other days   Next INR check:  6/27/2024               Telephone call with Ирина who agrees to plan and repeated back plan correctly    Lab visit scheduled    Education provided:   Please call back if any changes to your diet, medications or how you've been taking warfarin    Plan made per [facility] anticoagulation protocol    Sherri Tipton RN  Anticoagulation  Clinic  6/20/2024    _______________________________________________________________________     Anticoagulation Episode Summary       Current INR goal:  2.0-3.0   TTR:  71.3% (1 y)   Target end date:  Indefinite   Send INR reminders to:  [facility]    Indications    Paroxysmal atrial fibrillation (H) [I48.0]  Long term current use of anticoagulants with INR goal of 2.0-3.0 [Z79.01]             Comments:               Anticoagulation Care Providers       Provider Role Specialty Phone number    Yuridia Villarreal MD Referring Family Medicine [phone]    Mil García MD Referring Family Medicine [phone]

## 2024-06-27 ENCOUNTER — ANTICOAGULATION THERAPY VISIT (OUTPATIENT)
Dept: ANTICOAGULATION | Facility: CLINIC | Age: 89
End: 2024-06-27

## 2024-06-27 ENCOUNTER — LAB (OUTPATIENT)
Dept: LAB | Facility: CLINIC | Age: 89
End: 2024-06-27
Payer: COMMERCIAL

## 2024-06-27 DIAGNOSIS — Z79.01 LONG TERM CURRENT USE OF ANTICOAGULANTS WITH INR GOAL OF 2.0-3.0: ICD-10-CM

## 2024-06-27 DIAGNOSIS — I48.0 PAROXYSMAL ATRIAL FIBRILLATION (H): Primary | ICD-10-CM

## 2024-06-27 DIAGNOSIS — I48.0 PAROXYSMAL ATRIAL FIBRILLATION (H): ICD-10-CM

## 2024-06-27 LAB — INR BLD: 2.8 (ref 0.9–1.1)

## 2024-06-27 PROCEDURE — 85610 PROTHROMBIN TIME: CPT

## 2024-06-27 PROCEDURE — 36415 COLL VENOUS BLD VENIPUNCTURE: CPT

## 2024-06-27 NOTE — PROGRESS NOTES
ANTICOAGULATION MANAGEMENT     Ирина Yanez 89 year old female is on warfarin with therapeutic INR result. (Goal INR 2.0-3.0)    Recent labs: (last 7 days)     06/27/24  1143   INR 2.8*       ASSESSMENT     Source(s): Chart Review and Patient/Caregiver Call     Warfarin doses taken: Warfarin taken as instructed  Diet: No new diet changes identified  Medication/supplement changes: None noted  New illness, injury, or hospitalization: No.  Having a little less diarrhea.  Signs or symptoms of bleeding or clotting: No  Previous result: Supratherapeutic  Additional findings: None       PLAN     Recommended plan for no diet, medication or health factor changes affecting INR     Dosing Instructions: Continue your current warfarin dose with next INR in 2 weeks       Summary  As of 6/27/2024      Full warfarin instructions:  3.75 mg every Tue, Thu, Sat; 5 mg all other days   Next INR check:  7/11/2024               Telephone call with Ирина who agrees to plan and repeated back plan correctly    Lab visit scheduled    Education provided: Please call back if any changes to your diet, medications or how you've been taking warfarin    Plan made per [facility] anticoagulation protocol    Sherri Tipton RN  Anticoagulation Clinic  6/27/2024    _______________________________________________________________________     Anticoagulation Episode Summary       Current INR goal:  2.0-3.0   TTR:  71.8% (1 y)   Target end date:  Indefinite   Send INR reminders to:  [facility]    Indications    Paroxysmal atrial fibrillation (H) [I48.0]  Long term current use of anticoagulants with INR goal of 2.0-3.0 [Z79.01]             Comments:               Anticoagulation Care Providers       Provider Role Specialty Phone number    Yuridia Villarreal MD Referring Family Medicine [phone]    Mil García MD Referring Family Medicine [phone]

## 2024-07-11 ENCOUNTER — LAB (OUTPATIENT)
Dept: LAB | Facility: CLINIC | Age: 89
End: 2024-07-11
Payer: COMMERCIAL

## 2024-07-11 ENCOUNTER — ANTICOAGULATION THERAPY VISIT (OUTPATIENT)
Dept: ANTICOAGULATION | Facility: CLINIC | Age: 89
End: 2024-07-11

## 2024-07-11 ENCOUNTER — TRANSFERRED RECORDS (OUTPATIENT)
Dept: HEALTH INFORMATION MANAGEMENT | Facility: CLINIC | Age: 89
End: 2024-07-11

## 2024-07-11 DIAGNOSIS — I48.0 PAROXYSMAL ATRIAL FIBRILLATION (H): Primary | ICD-10-CM

## 2024-07-11 DIAGNOSIS — Z79.01 LONG TERM CURRENT USE OF ANTICOAGULANTS WITH INR GOAL OF 2.0-3.0: ICD-10-CM

## 2024-07-11 DIAGNOSIS — I48.0 PAROXYSMAL ATRIAL FIBRILLATION (H): ICD-10-CM

## 2024-07-11 LAB — INR BLD: 2.2 (ref 0.9–1.1)

## 2024-07-11 PROCEDURE — 36415 COLL VENOUS BLD VENIPUNCTURE: CPT

## 2024-07-11 PROCEDURE — 85610 PROTHROMBIN TIME: CPT

## 2024-07-11 NOTE — PROGRESS NOTES
ANTICOAGULATION MANAGEMENT     Ирина Yanez 89 year old female is on warfarin with therapeutic INR result. (Goal INR 2.0-3.0)    Recent labs: (last 7 days)     07/11/24  1315   INR 2.2*       ASSESSMENT     Source(s): Chart Review and Patient/Caregiver Call     Warfarin doses taken: Warfarin taken as instructed  Diet: Decreased greens/vitamin K in diet; plans to resume previous intake  Medication/supplement changes: None noted  New illness, injury, or hospitalization: No, patient reports she has not had diarrhea for 2 weeks  Signs or symptoms of bleeding or clotting: No  Previous result: Therapeutic last visit; previously outside of goal range, Warfarin maintenance dose was decreased 7.4% 6/20/24  Additional findings: None       PLAN     Recommended plan for temporary change(s) affecting INR     Dosing Instructions: Continue your current warfarin dose with next INR in 2 weeks       Summary  As of 7/11/2024      Full warfarin instructions:  3.75 mg every Tue, Thu, Sat; 5 mg all other days   Next INR check:  7/25/2024               Telephone call with Ирина who verbalizes understanding and agrees to plan    Lab visit scheduled    Education provided: Please call back if any changes to your diet, medications or how you've been taking warfarin  Contact [phone] with any changes, questions or concerns.     Plan made per [facility] anticoagulation protocol    Alondra Richter RN  Anticoagulation Clinic  7/11/2024    _______________________________________________________________________     Anticoagulation Episode Summary       Current INR goal:  2.0-3.0   TTR:  72.0% (1 y)   Target end date:  Indefinite   Send INR reminders to:  [facility]    Indications    Paroxysmal atrial fibrillation (H) [I48.0]  Long term current use of anticoagulants with INR goal of 2.0-3.0 [Z79.01]             Comments:               Anticoagulation Care Providers       Provider Role Specialty Phone number    Yuridia Villarreal MD Referring  Family Medicine [phone]    Mil García MD Referring Family Medicine [phone]

## 2024-07-25 ENCOUNTER — ANTICOAGULATION THERAPY VISIT (OUTPATIENT)
Dept: ANTICOAGULATION | Facility: CLINIC | Age: 89
End: 2024-07-25

## 2024-07-25 ENCOUNTER — LAB (OUTPATIENT)
Dept: LAB | Facility: CLINIC | Age: 89
End: 2024-07-25
Payer: COMMERCIAL

## 2024-07-25 DIAGNOSIS — I48.0 PAROXYSMAL ATRIAL FIBRILLATION (H): Primary | ICD-10-CM

## 2024-07-25 DIAGNOSIS — Z79.01 LONG TERM CURRENT USE OF ANTICOAGULANTS WITH INR GOAL OF 2.0-3.0: ICD-10-CM

## 2024-07-25 DIAGNOSIS — I48.0 PAROXYSMAL ATRIAL FIBRILLATION (H): ICD-10-CM

## 2024-07-25 LAB — INR BLD: 2 (ref 0.9–1.1)

## 2024-07-25 PROCEDURE — 85610 PROTHROMBIN TIME: CPT

## 2024-07-25 PROCEDURE — 36416 COLLJ CAPILLARY BLOOD SPEC: CPT

## 2024-07-25 NOTE — PROGRESS NOTES
ANTICOAGULATION MANAGEMENT     Ирина Yanez 89 year old female is on warfarin with therapeutic INR result. (Goal INR 2.0-3.0)    Recent labs: (last 7 days)     07/25/24  1055   INR 2.0*       ASSESSMENT     Source(s): Chart Review and Patient/Caregiver Call     Warfarin doses taken: Warfarin taken as instructed  Diet: Decreased greens/vitamin K in diet; plans to resume previous intake, patient reports she went to grocery shopping today  Medication/supplement changes: None noted  New illness, injury, or hospitalization: No  Signs or symptoms of bleeding or clotting: No  Previous result: Therapeutic last 2(+) visits, INR has been trending downward since Warfarin maintenance dose was decreased 7.4% 6/20/24   Additional findings: None       PLAN     Recommended plan for temporary change(s) affecting INR. Will recheck in 2 weeks again since patient reports less green veggies and will be resuming usual diet and INR has been trending downward.     Dosing Instructions: Continue your current warfarin dose with next INR in 2 weeks       Summary  As of 7/25/2024      Full warfarin instructions:  3.75 mg every Tue, Thu, Sat; 5 mg all other days   Next INR check:  8/8/2024               Telephone call with Ирина who verbalizes understanding and agrees to plan    Lab visit scheduled    Education provided: Please call back if any changes to your diet, medications or how you've been taking warfarin  Contact [phone] with any changes, questions or concerns.     Plan made per [facility] anticoagulation protocol    Alondra Richter RN  Anticoagulation Clinic  7/25/2024    _______________________________________________________________________     Anticoagulation Episode Summary       Current INR goal:  2.0-3.0   TTR:  73.6% (1 y)   Target end date:  Indefinite   Send INR reminders to:  KIMAG JAMES    Indications    Paroxysmal atrial fibrillation (H) [I48.0]  Long term current use of anticoagulants with INR goal of 2.0-3.0  [Z79.01]             Comments:               Anticoagulation Care Providers       Provider Role Specialty Phone number    Yuridia Villarreal MD Referring Family Medicine [phone]    Mil García MD Referring Family Medicine [phone]

## 2024-08-08 ENCOUNTER — ANTICOAGULATION THERAPY VISIT (OUTPATIENT)
Dept: ANTICOAGULATION | Facility: CLINIC | Age: 89
End: 2024-08-08

## 2024-08-08 ENCOUNTER — LAB (OUTPATIENT)
Dept: LAB | Facility: CLINIC | Age: 89
End: 2024-08-08
Payer: COMMERCIAL

## 2024-08-08 DIAGNOSIS — I48.0 PAROXYSMAL ATRIAL FIBRILLATION (H): ICD-10-CM

## 2024-08-08 DIAGNOSIS — Z79.01 LONG TERM CURRENT USE OF ANTICOAGULANTS WITH INR GOAL OF 2.0-3.0: ICD-10-CM

## 2024-08-08 DIAGNOSIS — I48.0 PAROXYSMAL ATRIAL FIBRILLATION (H): Primary | ICD-10-CM

## 2024-08-08 LAB — INR BLD: 2.1 (ref 0.9–1.1)

## 2024-08-08 PROCEDURE — 36415 COLL VENOUS BLD VENIPUNCTURE: CPT

## 2024-08-08 PROCEDURE — 85610 PROTHROMBIN TIME: CPT

## 2024-08-08 NOTE — PROGRESS NOTES
ANTICOAGULATION MANAGEMENT     Ирина Yanez 89 year old female is on warfarin with therapeutic INR result. (Goal INR 2.0-3.0)    Recent labs: (last 7 days)     08/08/24  1101   INR 2.1*       ASSESSMENT     Source(s): Chart Review and Patient/Caregiver Call     Warfarin doses taken: Warfarin taken as instructed  Diet: No new diet changes identified  Medication/supplement changes: None noted  New illness, injury, or hospitalization: No  Signs or symptoms of bleeding or clotting: No  Previous result: Therapeutic last 2(+) visits  Additional findings: None       PLAN     Recommended plan for no diet, medication or health factor changes affecting INR     Dosing Instructions: Continue your current warfarin dose with next INR in 3 weeks       Summary  As of 8/8/2024      Full warfarin instructions:  3.75 mg every Tue, Thu, Sat; 5 mg all other days   Next INR check:  8/29/2024               Telephone call with Ирина who verbalizes understanding and agrees to plan    Lab visit scheduled    Education provided: Contact [phone] with any changes, questions or concerns.     Plan made per [facility] anticoagulation protocol    Kelley Swanson RN  Anticoagulation Clinic  8/8/2024    _______________________________________________________________________     Anticoagulation Episode Summary       Current INR goal:  2.0-3.0   TTR:  77.4% (1 y)   Target end date:  Indefinite   Send INR reminders to:  [facility]    Indications    Paroxysmal atrial fibrillation (H) [I48.0]  Long term current use of anticoagulants with INR goal of 2.0-3.0 [Z79.01]             Comments:               Anticoagulation Care Providers       Provider Role Specialty Phone number    Yuridia Villarreal MD Referring Family Medicine [phone]    Mil García MD Referring Family Medicine [phone]

## 2024-08-10 ENCOUNTER — HEALTH MAINTENANCE LETTER (OUTPATIENT)
Age: 89
End: 2024-08-10

## 2024-08-22 ENCOUNTER — TELEPHONE (OUTPATIENT)
Dept: FAMILY MEDICINE | Facility: CLINIC | Age: 89
End: 2024-08-22

## 2024-08-22 ENCOUNTER — OFFICE VISIT (OUTPATIENT)
Dept: FAMILY MEDICINE | Facility: CLINIC | Age: 89
End: 2024-08-22
Attending: FAMILY MEDICINE
Payer: COMMERCIAL

## 2024-08-22 VITALS
SYSTOLIC BLOOD PRESSURE: 127 MMHG | WEIGHT: 143 LBS | BODY MASS INDEX: 24.41 KG/M2 | HEART RATE: 62 BPM | OXYGEN SATURATION: 99 % | HEIGHT: 64 IN | DIASTOLIC BLOOD PRESSURE: 54 MMHG | RESPIRATION RATE: 15 BRPM | TEMPERATURE: 97.8 F

## 2024-08-22 DIAGNOSIS — I10 HYPERTENSION GOAL BP (BLOOD PRESSURE) < 140/90: ICD-10-CM

## 2024-08-22 DIAGNOSIS — E78.5 HYPERLIPIDEMIA WITH TARGET LDL LESS THAN 100: ICD-10-CM

## 2024-08-22 DIAGNOSIS — I48.0 PAROXYSMAL ATRIAL FIBRILLATION (H): ICD-10-CM

## 2024-08-22 DIAGNOSIS — F41.9 ANXIETY: ICD-10-CM

## 2024-08-22 DIAGNOSIS — N18.4 CKD (CHRONIC KIDNEY DISEASE) STAGE 4, GFR 15-29 ML/MIN (H): ICD-10-CM

## 2024-08-22 DIAGNOSIS — L08.9 INFECTION, SKIN, STAPH: ICD-10-CM

## 2024-08-22 DIAGNOSIS — E11.21 TYPE 2 DIABETES MELLITUS WITH DIABETIC NEPHROPATHY, WITHOUT LONG-TERM CURRENT USE OF INSULIN (H): Primary | ICD-10-CM

## 2024-08-22 DIAGNOSIS — B95.8 INFECTION, SKIN, STAPH: ICD-10-CM

## 2024-08-22 LAB
ANION GAP SERPL CALCULATED.3IONS-SCNC: 12 MMOL/L (ref 7–15)
BUN SERPL-MCNC: 51.7 MG/DL (ref 8–23)
CALCIUM SERPL-MCNC: 10 MG/DL (ref 8.8–10.4)
CHLORIDE SERPL-SCNC: 108 MMOL/L (ref 98–107)
CREAT SERPL-MCNC: 1.8 MG/DL (ref 0.51–0.95)
EGFRCR SERPLBLD CKD-EPI 2021: 27 ML/MIN/1.73M2
GLUCOSE SERPL-MCNC: 177 MG/DL (ref 70–99)
HBA1C MFR BLD: 6 % (ref 0–5.6)
HCO3 SERPL-SCNC: 26 MMOL/L (ref 22–29)
HGB BLD-MCNC: 11.2 G/DL (ref 11.7–15.7)
POTASSIUM SERPL-SCNC: 4.4 MMOL/L (ref 3.4–5.3)
SODIUM SERPL-SCNC: 146 MMOL/L (ref 135–145)

## 2024-08-22 PROCEDURE — G2211 COMPLEX E/M VISIT ADD ON: HCPCS | Performed by: FAMILY MEDICINE

## 2024-08-22 PROCEDURE — 80048 BASIC METABOLIC PNL TOTAL CA: CPT | Performed by: FAMILY MEDICINE

## 2024-08-22 PROCEDURE — 99214 OFFICE O/P EST MOD 30 MIN: CPT | Performed by: FAMILY MEDICINE

## 2024-08-22 PROCEDURE — 83036 HEMOGLOBIN GLYCOSYLATED A1C: CPT | Performed by: FAMILY MEDICINE

## 2024-08-22 PROCEDURE — 85018 HEMOGLOBIN: CPT | Performed by: FAMILY MEDICINE

## 2024-08-22 PROCEDURE — 36415 COLL VENOUS BLD VENIPUNCTURE: CPT | Performed by: FAMILY MEDICINE

## 2024-08-22 RX ORDER — WARFARIN SODIUM 2.5 MG/1
TABLET ORAL
Qty: 180 TABLET | Refills: 1 | Status: SHIPPED | OUTPATIENT
Start: 2024-08-22

## 2024-08-22 RX ORDER — MUPIROCIN 20 MG/G
OINTMENT TOPICAL 3 TIMES DAILY
Qty: 1 G | Refills: 0 | Status: SHIPPED | OUTPATIENT
Start: 2024-08-22 | End: 2024-08-29

## 2024-08-22 RX ORDER — DILTIAZEM HYDROCHLORIDE EXTENDED-RELEASE TABLETS 180 MG/1
180 TABLET, EXTENDED RELEASE ORAL EVERY MORNING
Qty: 90 TABLET | Refills: 1 | Status: SHIPPED | OUTPATIENT
Start: 2024-08-22

## 2024-08-22 RX ORDER — PRAVASTATIN SODIUM 20 MG
20 TABLET ORAL DAILY
Qty: 90 TABLET | Refills: 1 | Status: SHIPPED | OUTPATIENT
Start: 2024-08-22

## 2024-08-22 RX ORDER — METOPROLOL SUCCINATE 25 MG/1
TABLET, EXTENDED RELEASE ORAL
Qty: 90 TABLET | Refills: 1 | Status: SHIPPED | OUTPATIENT
Start: 2024-08-22

## 2024-08-22 ASSESSMENT — PAIN SCALES - GENERAL: PAINLEVEL: NO PAIN (0)

## 2024-08-22 NOTE — PROGRESS NOTES
Assessment and Plan    (E11.21) Type 2 diabetes mellitus with diabetic nephropathy, without long-term current use of insulin (H)  (primary encounter diagnosis)  Comment: A1c lower than needed, continuing to loose weight.  Will reduce dose to 05.g.  Is getting medication through an assistance program.  Will reach out to MTM to help her navigate filling lower dose  Plan: HEMOGLOBIN A1C, semaglutide (OZEMPIC) 2 MG/3ML         pen            (N18.4) CKD (chronic kidney disease) stage 4, GFR 15-29 ml/min (H)  Comment: monitoring  Plan: BASIC METABOLIC PANEL, Hemoglobin            (I10) Hypertension goal BP (blood pressure) < 140/90  Comment: BP well controlled, refilling  Plan: diltiazem ER (CARDIAZEM LA) 180 MG 24 hr         tablet, metoprolol succinate ER (TOPROL XL) 25         MG 24 hr tablet            (I48.0) Paroxysmal atrial fibrillation (H)  Comment: does follow with cardiology  Plan: metoprolol succinate ER (TOPROL XL) 25 MG 24 hr        tablet, warfarin ANTICOAGULANT (JANTOVEN         ANTICOAGULANT) 2.5 MG tablet            (E78.5) Hyperlipidemia with target LDL less than 100  Comment: refilling  Plan: pravastatin (PRAVACHOL) 20 MG tablet            (F41.9) Anxiety  Comment: Mood stable, no acute concerns  Plan: sertraline (ZOLOFT) 50 MG tablet            (L08.9,  B95.8) Infection, skin, staph  Comment: two weeks of topical antifungal not helpful.  May need to consider culture if Bactroban not helpful  Plan: mupirocin (BACTROBAN) 2 % external ointment              RTC in 6m CPOE    Mil García MD     Bárbara Gifford is a 89 year old, presenting for the following health issues:  Follow Up and Diabetes        8/22/2024    10:09 AM   Additional Questions   Roomed by Ariane BOLTON     History of Present Illness       Diabetes:   She presents for follow up of diabetes.  She is checking home blood glucose a few times a month.   She checks blood glucose before meals.  Blood glucose is never over 200 and never  "under 70. She is aware of hypoglycemia symptoms including dizziness and weakness.   She is concerned about other.   She is having numbness in feet, burning in feet, redness, sores, or blisters on feet and weight loss.  The patient has had a diabetic eye exam in the last 12 months. Eye exam performed on 10/1/23. Location of last eye exam [facility].        She eats 0-1 servings of fruits and vegetables daily.She consumes 0 sweetened beverage(s) daily.She exercises with enough effort to increase her heart rate 9 or less minutes per day.  She exercises with enough effort to increase her heart rate 3 or less days per week.   She is taking medications regularly.     Feeling well enough overall.  No specific concerns today.  Denies CP, palpitations, edema, dyspnea, HA, vision changes.     [facility] She's generally feeling well.      Sore on buttock that she links back to a bout of diarrhea.  Her dermatologist prescribed nystatin cream, it doesn't seem to to have sone much - still present.  Is a bit painful.            Objective    /54   Pulse 62   Temp 97.8  F (36.6  C) (Oral)   Resp 15   Ht 1.626 m (5' 4\")   Wt 64.9 kg (143 lb)   LMP  (LMP Unknown)   SpO2 99%   BMI 24.55 kg/m    Body mass index is 24.55 kg/m .  Physical Exam  Vitals reviewed.   HENT:      Head: Normocephalic and atraumatic.   Eyes:      Conjunctiva/sclera: Conjunctivae normal.   Cardiovascular:      Rate and Rhythm: Normal rate and regular rhythm.      Heart sounds: Normal heart sounds.   Pulmonary:      Effort: Pulmonary effort is normal.      Breath sounds: Normal breath sounds.   Skin:     General: Skin is warm and dry.   Neurological:      Mental Status: She is alert and oriented to person, place, and time.   Psychiatric:         Mood and Affect: Mood normal.         Behavior: Behavior normal.              Signed Electronically by: Mil García MD    "

## 2024-08-22 NOTE — TELEPHONE ENCOUNTER
I have interoffice mailed to Dr García the [employer] change form to reduce the dose of her Ozempic to 0.5mg.    Please review, complete, sign and return to me in the enclosed interoffice envelope.    You should receive this via interoffice mail within a few days.    Thanks so much for your help!    Shonna Myers  Prescription Assistance Supervisor  Pharmacy Assistance

## 2024-08-22 NOTE — Clinical Note
Lara - given Ирина's weight loss and A1c at 6.0% I am cutting back on her semaglutide to 0.5 mg.  We discussed dosing by clicks and she'll talk this over with Bony.  But she says you'll need to help her work with the  to get the new dose.  -S

## 2024-08-26 ENCOUNTER — TELEPHONE (OUTPATIENT)
Dept: FAMILY MEDICINE | Facility: CLINIC | Age: 89
End: 2024-08-26
Payer: COMMERCIAL

## 2024-08-26 NOTE — TELEPHONE ENCOUNTER
New Medication Request    Contacts       Contact Date/Time Type Contact Phone/Fax    08/26/2024 11:53 AM CDT Mail (Incoming) Shonna Myers (Provider)      Please review, sign, and return documents Inter office mail            What medication are you requesting?: Ozempic Decreased dose    Reason for medication request: Read, Sign and Send forms back VIA Inter office Mail.     Have you taken this medication before?: Yes:     Controlled Substance Agreement on file:   CSA -- Patient Level:    CSA: None found at the patient level.         Patient offered an appointment? No-  Unknown     Preferred Pharmacy:   [facility] [place], [place] - [address] [address] [facility]  [address] [address] [address]  Phone: [phone] Fax: [phone]      Could we send this information to you in [facility] or would you prefer to receive a phone call?:   No preference

## 2024-08-26 NOTE — TELEPHONE ENCOUNTER
Placed in provider's basket for review and signature.     Betsey Sanchez  Lead   [facility]

## 2024-08-27 NOTE — TELEPHONE ENCOUNTER
Paperwork signed and placed in internal mail for delivery.     Betsey Sanchez  Lead   [facility]

## 2024-08-29 ENCOUNTER — ANTICOAGULATION THERAPY VISIT (OUTPATIENT)
Dept: ANTICOAGULATION | Facility: CLINIC | Age: 89
End: 2024-08-29

## 2024-08-29 ENCOUNTER — LAB (OUTPATIENT)
Dept: LAB | Facility: CLINIC | Age: 89
End: 2024-08-29
Payer: COMMERCIAL

## 2024-08-29 DIAGNOSIS — I48.0 PAROXYSMAL ATRIAL FIBRILLATION (H): Primary | ICD-10-CM

## 2024-08-29 DIAGNOSIS — I48.0 PAROXYSMAL ATRIAL FIBRILLATION (H): ICD-10-CM

## 2024-08-29 DIAGNOSIS — Z79.01 LONG TERM CURRENT USE OF ANTICOAGULANTS WITH INR GOAL OF 2.0-3.0: ICD-10-CM

## 2024-08-29 LAB — INR BLD: 2.1 (ref 0.9–1.1)

## 2024-08-29 PROCEDURE — 85610 PROTHROMBIN TIME: CPT

## 2024-08-29 PROCEDURE — 36416 COLLJ CAPILLARY BLOOD SPEC: CPT

## 2024-08-29 NOTE — PROGRESS NOTES
ANTICOAGULATION MANAGEMENT     Ирина Yanez 89 year old female is on warfarin with therapeutic INR result. (Goal INR 2.0-3.0)    Recent labs: (last 7 days)     08/29/24  0910   INR 2.1*       ASSESSMENT     Source(s): Chart Review and Patient/Caregiver Call     Warfarin doses taken: Warfarin taken as instructed  Diet: No new diet changes identified  Medication/supplement changes: None noted except lowered Ozempic dose, Dr. García doesn't want her to lose any more weight and her A1C is looking better  New illness, injury, or hospitalization: No  Signs or symptoms of bleeding or clotting: No  Previous result: Therapeutic last 2(+) visits  Additional findings: None, [facility]       PLAN     Recommended plan for no diet, medication or health factor changes affecting INR     Dosing Instructions: Continue your current warfarin dose with next INR in 4 weeks       Summary  As of 8/29/2024      Full warfarin instructions:  3.75 mg every Tue, Thu, Sat; 5 mg all other days   Next INR check:  9/26/2024               Telephone call with Ирина who verbalizes understanding and agrees to plan    Lab visit scheduled    Education provided: Please call back if any changes to your diet, medications or how you've been taking warfarin  Dietary considerations: importance of consistent vitamin K intake  Contact [phone] with any changes, questions or concerns.     Plan made per [facility] anticoagulation protocol    Claudia Dong RN  Anticoagulation Clinic  8/29/2024    _______________________________________________________________________     Anticoagulation Episode Summary       Current INR goal:  2.0-3.0   TTR:  77.4% (1 y)   Target end date:  Indefinite   Send INR reminders to:  [facility]    Indications    Paroxysmal atrial fibrillation (H) [I48.0]  Long term current use of anticoagulants with INR goal of 2.0-3.0 [Z79.01]             Comments:               Anticoagulation Care  Providers       Provider Role Specialty Phone number    Yuridia Villarreal MD Referring Family Medicine [phone]    Mil García MD Referring Family Medicine [phone]

## 2024-09-11 NOTE — TELEPHONE ENCOUNTER
A Ozempic dose change request has been made to the [employer] assistance program.    A 120 day supply of Ozempic 0.5 mg should arrive at [facility] within 10-14 business days. Please contact Ирина to .    Thanks so much for your help!    Anastasiya Wagoner  Prescription Assistance Admin

## 2024-09-12 ENCOUNTER — HOSPITAL ENCOUNTER (OUTPATIENT)
Dept: CARDIOLOGY | Facility: CLINIC | Age: 89
Discharge: HOME | End: 2024-09-12
Attending: INTERNAL MEDICINE | Admitting: INTERNAL MEDICINE
Payer: COMMERCIAL

## 2024-09-12 DIAGNOSIS — I35.0 AORTIC VALVE STENOSIS, ETIOLOGY OF CARDIAC VALVE DISEASE UNSPECIFIED: ICD-10-CM

## 2024-09-12 DIAGNOSIS — I48.0 PAROXYSMAL ATRIAL FIBRILLATION (H): ICD-10-CM

## 2024-09-12 DIAGNOSIS — I50.32 CHRONIC DIASTOLIC HEART FAILURE (H): ICD-10-CM

## 2024-09-12 LAB — LVEF ECHO: NORMAL

## 2024-09-12 PROCEDURE — 93306 TTE W/DOPPLER COMPLETE: CPT | Mod: 26 | Performed by: INTERNAL MEDICINE

## 2024-09-12 PROCEDURE — 93306 TTE W/DOPPLER COMPLETE: CPT

## 2024-09-13 NOTE — RESULT ENCOUNTER NOTE
Results and recommendations routed Via My Chart with call back information if needed.   Please tell the patient she  has moderate aortic stenosis,not severe enough  to  require  intervention at this time. There has  been slight progression in the severity of aortic  stenosis since last year but  this is expected We can address in detail at  follow up . Thanks

## 2024-09-18 ENCOUNTER — DOCUMENTATION ONLY (OUTPATIENT)
Dept: CARDIOLOGY | Facility: CLINIC | Age: 89
End: 2024-09-18
Payer: COMMERCIAL

## 2024-09-18 NOTE — PROGRESS NOTES
Left detailed message did not review on My Chart message left message with call back number if questions follow up in December. Page Rowe RN on 9/18/2024 at 3:38 PM       Please tell the patient she  has moderate aortic stenosis,not severe enough  to  require  intervention at this time. There has  been slight progression in the severity of aortic  stenosis since last year but  this is expected We can address in detail at  follow up . Thanks

## 2024-09-20 NOTE — PROGRESS NOTES
Problem: Discharge Planning  Goal: Discharge to home or other facility with appropriate resources  Outcome: Progressing  Flowsheets (Taken 9/20/2024 0344)  Discharge to home or other facility with appropriate resources:   Identify barriers to discharge with patient and caregiver   Arrange for needed discharge resources and transportation as appropriate   Identify discharge learning needs (meds, wound care, etc)     Problem: Safety - Adult  Goal: Free from fall injury  Outcome: Progressing     Problem: Respiratory - Adult  Goal: Achieves optimal ventilation and oxygenation  Outcome: Progressing  Flowsheets (Taken 9/20/2024 0344)  Achieves optimal ventilation and oxygenation:   Assess for changes in respiratory status   Assess for changes in mentation and behavior   Position to facilitate oxygenation and minimize respiratory effort   Oxygen supplementation based on oxygen saturation or arterial blood gases     Problem: Musculoskeletal - Adult  Goal: Return mobility to safest level of function  Outcome: Progressing  Flowsheets (Taken 9/20/2024 0344)  Return Mobility to Safest Level of Function:   Assess patient stability and activity tolerance for standing, transferring and ambulating with or without assistive devices   Ensure adequate protection for wounds/incisions during mobilization   Assist with transfers and ambulation using safe patient handling equipment as needed   Obtain physical therapy/occupational therapy consults as needed  Goal: Return ADL status to a safe level of function  Outcome: Progressing  Flowsheets (Taken 9/20/2024 0344)  Return ADL Status to a Safe Level of Function: Assess activities of daily living deficits and provide assistive devices as needed     Problem: Genitourinary - Adult  Goal: Absence of urinary retention  Outcome: Progressing  Flowsheets (Taken 9/20/2024 0344)  Absence of urinary retention: Assess patient’s ability to void and empty bladder      ANTICOAGULATION MANAGEMENT     Ирина Yanez 88 year old female is on warfarin with supratherapeutic INR result. (Goal INR 2.0-3.0)    Recent labs: (last 7 days)     05/22/23  0855   INR 3.6*       ASSESSMENT       Source(s): Chart Review    Previous INR was Therapeutic last visit; previously outside of goal range    Medication, diet, health changes since last INR     Annual wellness today with PCP, no change in care plan    Warfarin maintenance dose was reduced 4% on 4/25/23             PLAN     Unable to reach Ирина today.    Left message to take reduced dose of warfarin, 2.5 mg tonight. Request call back for assessment.    Follow up required to confirm warfarin dose taken and assess for changes    Summer Mina, RN  Anticoagulation Clinic  5/22/2023

## 2024-09-26 ENCOUNTER — ANTICOAGULATION THERAPY VISIT (OUTPATIENT)
Dept: ANTICOAGULATION | Facility: CLINIC | Age: 89
End: 2024-09-26

## 2024-09-26 ENCOUNTER — LAB (OUTPATIENT)
Dept: LAB | Facility: CLINIC | Age: 89
End: 2024-09-26
Payer: COMMERCIAL

## 2024-09-26 ENCOUNTER — DOCUMENTATION ONLY (OUTPATIENT)
Dept: ANTICOAGULATION | Facility: CLINIC | Age: 89
End: 2024-09-26

## 2024-09-26 DIAGNOSIS — Z79.01 LONG TERM CURRENT USE OF ANTICOAGULANTS WITH INR GOAL OF 2.0-3.0: ICD-10-CM

## 2024-09-26 DIAGNOSIS — I48.0 PAROXYSMAL ATRIAL FIBRILLATION (H): Primary | ICD-10-CM

## 2024-09-26 DIAGNOSIS — I48.0 PAROXYSMAL ATRIAL FIBRILLATION (H): ICD-10-CM

## 2024-09-26 LAB — INR BLD: 2.1 (ref 0.9–1.1)

## 2024-09-26 PROCEDURE — 36416 COLLJ CAPILLARY BLOOD SPEC: CPT

## 2024-09-26 PROCEDURE — 85610 PROTHROMBIN TIME: CPT

## 2024-09-26 NOTE — PROGRESS NOTES
ANTICOAGULATION MANAGEMENT     Ирина Yanez 89 year old female is on warfarin with therapeutic INR result. (Goal INR 2.0-3.0)    Recent labs: (last 7 days)     09/26/24  0916   INR 2.1*       ASSESSMENT     Source(s): Chart Review and Patient/Caregiver Call     Warfarin doses taken: Warfarin taken as instructed  Diet: No new diet changes identified  Medication/supplement changes: None noted  New illness, injury, or hospitalization: No  Signs or symptoms of bleeding or clotting: No  Previous result: Therapeutic last 5 INR visits  Additional findings: reported doing well.       PLAN     Recommended plan for no diet, medication or health factor changes affecting INR     Dosing Instructions: Continue your current warfarin dose with next INR in 6 weeks       Summary  As of 9/26/2024      Full warfarin instructions:  3.75 mg every Tue, Thu, Sat; 5 mg all other days   Next INR check:  10/31/2024               Telephone call with Ирина who verbalizes understanding and agrees to plan    Lab visit scheduled - INR on 11/4/24 Winnie Ramirez    Education provided: Taking warfarin: Importance of taking warfarin as instructed  Goal range and lab monitoring: goal range and significance of current result    Plan made per [facility] anticoagulation protocol    Imani Benites, RN  9/26/2024  Anticoagulation Clinic  Guid[employer] for routing messages: kam RAMIREZ  [facility] patient phone line: [phone]        _______________________________________________________________________     Anticoagulation Episode Summary       Current INR goal:  2.0-3.0   TTR:  82.7% (1 y)   Target end date:  Indefinite   Send INR reminders to:  KRIS RAMIREZ    Indications    Paroxysmal atrial fibrillation (H) [I48.0]  Long term current use of anticoagulants with INR goal of 2.0-3.0 [Z79.01]             Comments:               Anticoagulation Care Providers       Provider Role Specialty Phone number    Yuridia Villarreal MD Referring Family Medicine  [phone]    Mil García MD [facility] Medicine [phone]

## 2024-09-26 NOTE — PROGRESS NOTES
ANTICOAGULATION CLINIC REFERRAL RENEWAL REQUEST       An annual renewal order is required for all patients referred to [facility].?  Please review and sign the pended referral order for Ирина Yanez.       ANTICOAGULATION SUMMARY      Warfarin indication(s)   Atrial Fibrillation, paroxysmal    Mechanical heart valve present?  NO       Current goal range   INR: 2.0-3.0     Goal appropriate for indication? Goal INR 2-3, standard for indication(s) above     Time in Therapeutic Range (TTR)  (Goal > 60%) 82.7 %       Office visit with referring provider's group within last year Yes on 8/22/2024 with Dr. Mil García.       Imani Benites RN  [facility]

## 2024-09-26 NOTE — TELEPHONE ENCOUNTER
Ozempic from [employer] received today via .  Contacted patient to inform of ready for . Patient verbalized understanding. Medication is in the [facility] refrigerator.     Tamara Hughes MA

## 2024-09-27 NOTE — TELEPHONE ENCOUNTER
Patient picked up Ozempic today 09- at 11:52 AM     Asim Zhong  Patient Representative  [facility]

## 2024-10-03 ENCOUNTER — TRANSFERRED RECORDS (OUTPATIENT)
Dept: HEALTH INFORMATION MANAGEMENT | Facility: CLINIC | Age: 89
End: 2024-10-03
Payer: COMMERCIAL

## 2024-10-31 ENCOUNTER — TRANSFERRED RECORDS (OUTPATIENT)
Dept: HEALTH INFORMATION MANAGEMENT | Facility: CLINIC | Age: 89
End: 2024-10-31
Payer: COMMERCIAL

## 2024-11-04 ENCOUNTER — ANTICOAGULATION THERAPY VISIT (OUTPATIENT)
Dept: ANTICOAGULATION | Facility: CLINIC | Age: 89
End: 2024-11-04

## 2024-11-04 ENCOUNTER — LAB (OUTPATIENT)
Dept: LAB | Facility: CLINIC | Age: 89
End: 2024-11-04
Payer: COMMERCIAL

## 2024-11-04 DIAGNOSIS — I48.0 PAROXYSMAL ATRIAL FIBRILLATION (H): ICD-10-CM

## 2024-11-04 DIAGNOSIS — Z79.01 LONG TERM CURRENT USE OF ANTICOAGULANTS WITH INR GOAL OF 2.0-3.0: ICD-10-CM

## 2024-11-04 DIAGNOSIS — I48.0 PAROXYSMAL ATRIAL FIBRILLATION (H): Primary | ICD-10-CM

## 2024-11-04 LAB — INR BLD: 2 (ref 0.9–1.1)

## 2024-11-04 PROCEDURE — 36416 COLLJ CAPILLARY BLOOD SPEC: CPT

## 2024-11-04 PROCEDURE — 85610 PROTHROMBIN TIME: CPT

## 2024-11-04 NOTE — PROGRESS NOTES
ANTICOAGULATION MANAGEMENT     Ирина Yanez 89 year old female is on warfarin with therapeutic INR result. (Goal INR 2.0-3.0)    Recent labs: (last 7 days)     11/04/24  0948   INR 2.0*       ASSESSMENT     Source(s): Chart Review and Patient/Caregiver Call     Warfarin doses taken: Warfarin taken as instructed  Diet: No new diet changes identified  Medication/supplement changes: None noted  New illness, injury, or hospitalization: No  Signs or symptoms of bleeding or clotting: No  Previous result: Therapeutic last 2(+) visits  Additional findings:  patient reports no change in care plan at nephrology office visit on 10/2/24       PLAN     Recommended plan for no diet, medication or health factor changes affecting INR     Dosing Instructions: Continue your current warfarin dose with next INR in 6 weeks       Summary  As of 11/4/2024      Full warfarin instructions:  3.75 mg every Tue, Thu, Sat; 5 mg all other days   Next INR check:  12/16/2024               Telephone call with Ирина who verbalizes understanding and agrees to plan    Lab visit scheduled    Education provided: Dietary considerations: importance of consistent vitamin K intake and impact of vitamin K foods on INR    Plan made per [facility] anticoagulation protocol    Summer Mina RN  11/4/2024  Anticoagulation Clinic  [employer] for routing messages: kam RAMIREZ  [facility] patient phone line: [phone]        _______________________________________________________________________     Anticoagulation Episode Summary       Current INR goal:  2.0-3.0   TTR:  89.5% (1 y)   Target end date:  Indefinite   Send INR reminders to:  KRIS RAMIREZ    Indications    Paroxysmal atrial fibrillation (H) [I48.0]  Long term current use of anticoagulants with INR goal of 2.0-3.0 [Z79.01]             Comments:  --             Anticoagulation Care Providers       Provider Role Specialty Phone number    Yuridia Villarreal MD Referring Family Medicine [phone]     Mil García MD [facility] [phone]

## 2024-11-07 DIAGNOSIS — E11.21 TYPE 2 DIABETES MELLITUS WITH DIABETIC NEPHROPATHY, WITHOUT LONG-TERM CURRENT USE OF INSULIN (H): ICD-10-CM

## 2024-11-08 RX ORDER — GABAPENTIN 100 MG/1
CAPSULE ORAL
Qty: 180 CAPSULE | Refills: 1 | Status: SHIPPED | OUTPATIENT
Start: 2024-11-08

## 2024-12-22 ENCOUNTER — HEALTH MAINTENANCE LETTER (OUTPATIENT)
Age: 89
End: 2024-12-22

## 2024-12-23 ENCOUNTER — TELEPHONE (OUTPATIENT)
Dept: ANTICOAGULATION | Facility: CLINIC | Age: 89
End: 2024-12-23

## 2024-12-23 ENCOUNTER — OFFICE VISIT (OUTPATIENT)
Dept: CARDIOLOGY | Facility: CLINIC | Age: 89
End: 2024-12-23
Payer: COMMERCIAL

## 2024-12-23 VITALS
HEIGHT: 64 IN | BODY MASS INDEX: 24.67 KG/M2 | OXYGEN SATURATION: 99 % | DIASTOLIC BLOOD PRESSURE: 64 MMHG | SYSTOLIC BLOOD PRESSURE: 150 MMHG | WEIGHT: 144.5 LBS | HEART RATE: 61 BPM

## 2024-12-23 DIAGNOSIS — I48.0 PAROXYSMAL ATRIAL FIBRILLATION (H): ICD-10-CM

## 2024-12-23 DIAGNOSIS — I35.0 AORTIC VALVE STENOSIS, ETIOLOGY OF CARDIAC VALVE DISEASE UNSPECIFIED: Primary | ICD-10-CM

## 2024-12-23 DIAGNOSIS — I10 BENIGN ESSENTIAL HYPERTENSION: ICD-10-CM

## 2024-12-23 NOTE — PROGRESS NOTES
HISTORY OF PRESENT ILLNESS:  Ирина Yanez a 89 year old female with chronic kidney disease, paroxysmal atrial fibrillation, aortic stenosis, diabetes mellitus, hypertension and dyslipidemia, was seen today at your request for followup.     Since I last saw the patient, she remains free of cardiovascular symptoms and specifically denies chest pain dyspnea syncope or palpitation.  She lost her  Armin after 66 years of marriage last week.  Despite this loss, patient been able to remain relatively independent.  She receives regular follow-up with Dr. Whatley, her nephrologist, who manages her hypertension.  The patient states her systolic blood pressures remain less than 140 mmHg  at home on her current regimen, although her office blood pressure today was 150 systolic.  I advised to contact both myself and Dr. Hernandez if her blood pressures remain greater than 140 systolic.        PAST MEDICAL HISTORY:    1.  Chronic kidney disease, creatinine 1.8, GFR 23.  2.  Paroxysmal  atrial fibrillation : on  diltiazem/metoprolol.  On chronic warfarin anticoagulation for CHADS-VASc score of 5.  3.  Chronic diastolic heart failure, rechocardiogram 2021 showing ejection fraction of 60% with no regional wall motion abnormalities.  4.  Mild aortic stenosis.  echo 2023 showing aortic valve area of about 1.3 cm2, mean gradient of 18mmHg/DI 0.35.  5.  Diabetes mellitus.  a.  Diabetic nephrosclerosis.  b.  Severe longstanding stocking glove distribution neuropathy.  5.  Chronic normocytic normochromic anemia.  6.  Chronic right bundle branch block.      Orders this Visit:  No orders of the defined types were placed in this encounter.    No orders of the defined types were placed in this encounter.    There are no discontinued medications.    No diagnosis found.    CURRENT MEDICATIONS:  Current Outpatient Medications   Medication Sig Dispense Refill    acetaminophen (TYLENOL) 500 MG tablet Take 1,000 mg by mouth every evening as  needed for mild pain      betamethasone valerate (VALISONE) 0.1 % external cream Apply topically daily 45 g 3    blood glucose (NO BRAND SPECIFIED) lancets standard Use to test blood sugar 1 times daily or as directed, Contour Next lancets 100 each 3    blood glucose (NO BRAND SPECIFIED) test strip Use to test blood sugar 1 times daily or as directed, Contour Next strips 100 strip 1    blood glucose monitoring (NO BRAND SPECIFIED) meter device kit Use to test blood sugar 1 times daily or as directed. Ultra 2 1 kit 1    diltiazem ER (CARDIAZEM LA) 180 MG 24 hr tablet Take 1 tablet (180 mg) by mouth every morning. 90 tablet 1    doxazosin (CARDURA) 2 MG tablet Take 2 mg by mouth at bedtime      gabapentin (NEURONTIN) 100 MG capsule TAKE TWO CAPSULES BY MOUTH EVERY NIGHT AT BEDTIME 180 capsule 1    metoprolol succinate ER (TOPROL XL) 25 MG 24 hr tablet TAKE 1 TABLET BY MOUTH DAILY. GENERIC EQUIVALENT FOR TOPROL XL 90 tablet 1    MULTI-VITAMIN OR TABS Take 2 tablets by mouth daily       nystatin (MYCOSTATIN) 308247 UNIT/GM external powder Apply a small amount to affected area three times daily. 60 g 1    pravastatin (PRAVACHOL) 20 MG tablet Take 1 tablet (20 mg) by mouth daily. 90 tablet 1    Probiotic Product (ALIGN DUALBIOTIC PO) Take 1 capsule by mouth daily      semaglutide (OZEMPIC) 2 MG/3ML pen Inject 0.5 mg subcutaneously every 7 days. 9 mL 1    Semaglutide, 1 MG/DOSE, (OZEMPIC) 4 MG/3ML pen Inject 1 mg Subcutaneous every 7 days 9 mL 1    sertraline (ZOLOFT) 50 MG tablet Take 1 tablet (50 mg) by mouth daily. 90 tablet 1    torsemide (DEMADEX) 10 MG tablet Take 10 mg by mouth daily      warfarin ANTICOAGULANT (JANTOVEN ANTICOAGULANT) 2.5 MG tablet Take 2 tablets (5 mg) by mouth daily except take 1.5 tablets (3.75 mg) on Sunday or as directed by INR Clinic 180 tablet 1       ALLERGIES     Allergies   Allergen Reactions    Amoxicillin      Other reaction(s): cannot remember    Amoxicillin-Pot Clavulanate Diarrhea     " Vomiting      Clavulanic Acid      Other reaction(s): cannot remember    Clindamycin Phosphate      Hives    Metformin Diarrhea    Tegretol [Carbamazepine]      Irregular heart beat       PAST MEDICAL, SURGICAL, FAMILY, SOCIAL HISTORY:  History was reviewed and updated as needed, see medical record.        Review of Systems:  A 12-point review of systems was completed, see medical record for detailed review of systems information.    Physical Exam:  Vitals: BP (!) 150/64 (BP Location: Right arm, Patient Position: Sitting, Cuff Size: Adult Regular)   Pulse 61   Ht 1.626 m (5' 4\")   Wt 65.5 kg (144 lb 8 oz)   LMP  (LMP Unknown)   SpO2 99%   BMI 24.80 kg/m      Constitutional: No apparent distress    HEENT: pupils equal round reactive to light, thyroid normal size, JVP is normal    Chest: Clear to percussion and auscultation    Cardiac: Normal S1, normal S2 no S3, soft 1/6 systolic ejection murmur.    Abdomen: Scaphoid.  Liver percusses to 6 cm spleen is not palpable aorta is not tender or enlarged          ASSESSMENT: The patient remains free of cardiovascular symptoms since last seen.  Her blood pressures in the office been elevated, but they are well-controlled outpatient setting at home.  We have asked her to continue to monitor them closely.  She is otherwise on guideline directed medical therapy for atrial fibrillation.    RECOMMENDATIONS:   1) continue present medical therapy.  Closely monitor with home blood pressures.  Notify for systolic pressures greater than 140 per nephrologist recommendation.  2) follow-up in 1 year with echocardiogram           Recent Lab Results:  LIPID RESULTS:  Lab Results   Component Value Date    CHOL 233 (H) 02/22/2024    CHOL 195 06/02/2021    HDL 43 (L) 02/22/2024    HDL 45 (L) 06/02/2021     (H) 02/22/2024    LDL 88 05/10/2022     (H) 06/02/2021    TRIG 301 (H) 02/22/2024    TRIG 249 (H) 06/02/2021    CHOLHDLRATIO 3.4 12/17/2013       LIVER ENZYME " RESULTS:  Lab Results   Component Value Date    AST 13 02/10/2022    AST 27 01/26/2021    ALT 24 03/14/2023    ALT 40 01/26/2021       CBC RESULTS:  Lab Results   Component Value Date    WBC 6.4 11/30/2023    WBC 14.5 (H) 02/09/2021    RBC 3.80 11/30/2023    RBC 3.28 (L) 02/09/2021    HGB 11.2 (L) 08/22/2024    HGB 11.1 (L) 06/02/2021    HCT 35.4 11/30/2023    HCT 31.1 (L) 02/09/2021    MCV 93 11/30/2023    MCV 95 02/09/2021    MCH 31.1 11/30/2023    MCH 27.4 02/09/2021    MCHC 33.3 11/30/2023    MCHC 28.9 (L) 02/09/2021    RDW 13.2 11/30/2023    RDW 15.6 (H) 02/09/2021     11/30/2023     02/09/2021       BMP RESULTS:  Lab Results   Component Value Date     (H) 08/22/2024     06/02/2021    POTASSIUM 4.4 08/22/2024    POTASSIUM 4.8 09/13/2022    POTASSIUM 4.2 06/02/2021    CHLORIDE 108 (H) 08/22/2024    CHLORIDE 109 09/13/2022    CHLORIDE 103 06/02/2021    CO2 26 08/22/2024    CO2 24 09/13/2022    CO2 32 06/02/2021    ANIONGAP 12 08/22/2024    ANIONGAP 7 09/13/2022    ANIONGAP 5 06/02/2021     (H) 08/22/2024     (H) 09/13/2022     (H) 06/02/2021    BUN 51.7 (H) 08/22/2024    BUN 67 (H) 09/13/2022    BUN 66 (H) 06/02/2021    CR 1.80 (H) 08/22/2024    CR 2.43 (H) 06/02/2021    GFRESTIMATED 27 (L) 08/22/2024    GFRESTIMATED 18 (L) 06/02/2021    GFRESTBLACK 20 (L) 06/02/2021    EDNA 10.0 08/22/2024    EDNA 9.7 06/02/2021        A1C RESULTS:  Lab Results   Component Value Date    A1C 6.0 (H) 08/22/2024    A1C 7.1 (H) 01/26/2021       INR RESULTS:  Lab Results   Component Value Date    INR 2.0 (H) 11/04/2024    INR 2.1 (H) 09/26/2024    INR 2.10 (H) 07/07/2021    INR 2.10 (H) 06/16/2021       We greatly appreciate the opportunity to be involved in the care of your patient, Ирина Yanez.    Sincerely,  Henrik Beasley MD        Henrik Beasley MD  [address]  [place] [address]

## 2024-12-23 NOTE — TELEPHONE ENCOUNTER
ANTICOAGULATION     Ирина Yanez is overdue for an INR check.     Left message for patient to call and schedule lab appointment as soon as possible. If returning call, please schedule.     Cris Richter RN  12/23/2024  Anticoagulation Clinic  [facility] for routing messages: kam RAMIREZ  [facility] patient phone line: [phone]

## 2024-12-23 NOTE — LETTER
12/23/2024    Mil García MD  [address]  [facility] [address]    RE: Ирина Yanez       Dear Colleague,     I had the pleasure of seeing Ирина Yanez in the [facility].  HISTORY OF PRESENT ILLNESS:  Ирина Yanez a 89 year old female with chronic kidney disease, paroxysmal atrial fibrillation, aortic stenosis, diabetes mellitus, hypertension and dyslipidemia, was seen today at your request for followup.     Since I last saw the patient, she remains free of cardiovascular symptoms and specifically denies chest pain dyspnea syncope or palpitation.  She lost her  Armin after 66 years of marriage last week.  Despite this loss, patient been able to remain relatively independent.  She receives regular follow-up with Dr. Whatley, her nephrologist, who manages her hypertension.  The patient states her systolic blood pressures remain less than 140 mmHg  at home on her current regimen, although her office blood pressure today was 150 systolic.  I advised to contact both myself and Dr. Hernandez if her blood pressures remain greater than 140 systolic.        PAST MEDICAL HISTORY:    1.  Chronic kidney disease, creatinine 1.8, GFR 23.  2.  Paroxysmal  atrial fibrillation : on  diltiazem/metoprolol.  On chronic warfarin anticoagulation for CHADS-VASc score of 5.  3.  Chronic diastolic heart failure, rechocardiogram 2021 showing ejection fraction of 60% with no regional wall motion abnormalities.  4.  Mild aortic stenosis.  echo 2023 showing aortic valve area of about 1.3 cm2, mean gradient of 18mmHg/DI 0.35.  5.  Diabetes mellitus.  a.  Diabetic nephrosclerosis.  b.  Severe longstanding stocking glove distribution neuropathy.  5.  Chronic normocytic normochromic anemia.  6.  Chronic right bundle branch block.      Orders this Visit:  No orders of the defined types were placed in this encounter.    No orders of the defined types were placed in this encounter.    There are no discontinued  medications.    No diagnosis found.    CURRENT MEDICATIONS:  Current Outpatient Medications   Medication Sig Dispense Refill     acetaminophen (TYLENOL) 500 MG tablet Take 1,000 mg by mouth every evening as needed for mild pain       betamethasone valerate (VALISONE) 0.1 % external cream Apply topically daily 45 g 3     blood glucose (NO BRAND SPECIFIED) lancets standard Use to test blood sugar 1 times daily or as directed, Contour Next lancets 100 each 3     blood glucose (NO BRAND SPECIFIED) test strip Use to test blood sugar 1 times daily or as directed, Contour Next strips 100 strip 1     blood glucose monitoring (NO BRAND SPECIFIED) meter device kit Use to test blood sugar 1 times daily or as directed. Ultra 2 1 kit 1     diltiazem ER (CARDIAZEM LA) 180 MG 24 hr tablet Take 1 tablet (180 mg) by mouth every morning. 90 tablet 1     doxazosin (CARDURA) 2 MG tablet Take 2 mg by mouth at bedtime       gabapentin (NEURONTIN) 100 MG capsule TAKE TWO CAPSULES BY MOUTH EVERY NIGHT AT BEDTIME 180 capsule 1     metoprolol succinate ER (TOPROL XL) 25 MG 24 hr tablet TAKE 1 TABLET BY MOUTH DAILY. GENERIC EQUIVALENT FOR TOPROL XL 90 tablet 1     MULTI-VITAMIN OR TABS Take 2 tablets by mouth daily        nystatin (MYCOSTATIN) 325150 UNIT/GM external powder Apply a small amount to affected area three times daily. 60 g 1     pravastatin (PRAVACHOL) 20 MG tablet Take 1 tablet (20 mg) by mouth daily. 90 tablet 1     Probiotic Product (ALIGN DUALBIOTIC PO) Take 1 capsule by mouth daily       semaglutide (OZEMPIC) 2 MG/3ML pen Inject 0.5 mg subcutaneously every 7 days. 9 mL 1     Semaglutide, 1 MG/DOSE, (OZEMPIC) 4 MG/3ML pen Inject 1 mg Subcutaneous every 7 days 9 mL 1     sertraline (ZOLOFT) 50 MG tablet Take 1 tablet (50 mg) by mouth daily. 90 tablet 1     torsemide (DEMADEX) 10 MG tablet Take 10 mg by mouth daily       warfarin ANTICOAGULANT (JANTOVEN ANTICOAGULANT) 2.5 MG tablet Take 2 tablets (5 mg) by mouth daily except  "take 1.5 tablets (3.75 mg) on Sunday or as directed by INR Clinic 180 tablet 1       ALLERGIES     Allergies   Allergen Reactions     Amoxicillin      Other reaction(s): cannot remember     Amoxicillin-Pot Clavulanate Diarrhea     Vomiting       Clavulanic Acid      Other reaction(s): cannot remember     Clindamycin Phosphate      Hives     Metformin Diarrhea     Tegretol [Carbamazepine]      Irregular heart beat       PAST MEDICAL, SURGICAL, FAMILY, SOCIAL HISTORY:  History was reviewed and updated as needed, see medical record.        Review of Systems:  A 12-point review of systems was completed, see medical record for detailed review of systems information.    Physical Exam:  Vitals: BP (!) 150/64 (BP Location: Right arm, Patient Position: Sitting, Cuff Size: Adult Regular)   Pulse 61   Ht 1.626 m (5' 4\")   Wt 65.5 kg (144 lb 8 oz)   LMP  (LMP Unknown)   SpO2 99%   BMI 24.80 kg/m      Constitutional: No apparent distress    HEENT: pupils equal round reactive to light, thyroid normal size, JVP is normal    Chest: Clear to percussion and auscultation    Cardiac: Normal S1, normal S2 no S3, soft 1/6 systolic ejection murmur.    Abdomen: Scaphoid.  Liver percusses to 6 cm spleen is not palpable aorta is not tender or enlarged          ASSESSMENT: The patient remains free of cardiovascular symptoms since last seen.  Her blood pressures in the office been elevated, but they are well-controlled outpatient setting at home.  We have asked her to continue to monitor them closely.  She is otherwise on guideline directed medical therapy for atrial fibrillation.    RECOMMENDATIONS:   1) continue present medical therapy.  Closely monitor with home blood pressures.  Notify for systolic pressures greater than 140 per nephrologist recommendation.  2) follow-up in 1 year with echocardiogram           Recent Lab Results:  LIPID RESULTS:  Lab Results   Component Value Date    CHOL 233 (H) 02/22/2024    CHOL 195 06/02/2021    " HDL 43 (L) 02/22/2024    HDL 45 (L) 06/02/2021     (H) 02/22/2024    LDL 88 05/10/2022     (H) 06/02/2021    TRIG 301 (H) 02/22/2024    TRIG 249 (H) 06/02/2021    CHOLHDLRATIO 3.4 12/17/2013       LIVER ENZYME RESULTS:  Lab Results   Component Value Date    AST 13 02/10/2022    AST 27 01/26/2021    ALT 24 03/14/2023    ALT 40 01/26/2021       CBC RESULTS:  Lab Results   Component Value Date    WBC 6.4 11/30/2023    WBC 14.5 (H) 02/09/2021    RBC 3.80 11/30/2023    RBC 3.28 (L) 02/09/2021    HGB 11.2 (L) 08/22/2024    HGB 11.1 (L) 06/02/2021    HCT 35.4 11/30/2023    HCT 31.1 (L) 02/09/2021    MCV 93 11/30/2023    MCV 95 02/09/2021    MCH 31.1 11/30/2023    MCH 27.4 02/09/2021    MCHC 33.3 11/30/2023    MCHC 28.9 (L) 02/09/2021    RDW 13.2 11/30/2023    RDW 15.6 (H) 02/09/2021     11/30/2023     02/09/2021       BMP RESULTS:  Lab Results   Component Value Date     (H) 08/22/2024     06/02/2021    POTASSIUM 4.4 08/22/2024    POTASSIUM 4.8 09/13/2022    POTASSIUM 4.2 06/02/2021    CHLORIDE 108 (H) 08/22/2024    CHLORIDE 109 09/13/2022    CHLORIDE 103 06/02/2021    CO2 26 08/22/2024    CO2 24 09/13/2022    CO2 32 06/02/2021    ANIONGAP 12 08/22/2024    ANIONGAP 7 09/13/2022    ANIONGAP 5 06/02/2021     (H) 08/22/2024     (H) 09/13/2022     (H) 06/02/2021    BUN 51.7 (H) 08/22/2024    BUN 67 (H) 09/13/2022    BUN 66 (H) 06/02/2021    CR 1.80 (H) 08/22/2024    CR 2.43 (H) 06/02/2021    GFRESTIMATED 27 (L) 08/22/2024    GFRESTIMATED 18 (L) 06/02/2021    GFRESTBLACK 20 (L) 06/02/2021    EDNA 10.0 08/22/2024    EDNA 9.7 06/02/2021        A1C RESULTS:  Lab Results   Component Value Date    A1C 6.0 (H) 08/22/2024    A1C 7.1 (H) 01/26/2021       INR RESULTS:  Lab Results   Component Value Date    INR 2.0 (H) 11/04/2024    INR 2.1 (H) 09/26/2024    INR 2.10 (H) 07/07/2021    INR 2.10 (H) 06/16/2021       We greatly appreciate the opportunity to be involved in the care of  your patient, Ирина Yanez.    Sincerely,  Henrik Beasley MD      CC  Henrik Beasley MD  [address]  [place] [address]                                                                       Thank you for allowing me to participate in the care of your patient.      Sincerely,     Henrik Beasley MD     [facility]  cc:   Henrik Beasley MD  [address]  [place] [address]

## 2024-12-24 ENCOUNTER — ANTICOAGULATION THERAPY VISIT (OUTPATIENT)
Dept: ANTICOAGULATION | Facility: CLINIC | Age: 89
End: 2024-12-24

## 2024-12-24 ENCOUNTER — LAB (OUTPATIENT)
Dept: LAB | Facility: CLINIC | Age: 89
End: 2024-12-24
Payer: COMMERCIAL

## 2024-12-24 DIAGNOSIS — Z79.01 LONG TERM CURRENT USE OF ANTICOAGULANTS WITH INR GOAL OF 2.0-3.0: ICD-10-CM

## 2024-12-24 DIAGNOSIS — I48.0 PAROXYSMAL ATRIAL FIBRILLATION (H): Primary | ICD-10-CM

## 2024-12-24 DIAGNOSIS — I48.0 PAROXYSMAL ATRIAL FIBRILLATION (H): ICD-10-CM

## 2024-12-24 LAB — INR BLD: 1.9 (ref 0.9–1.1)

## 2024-12-24 PROCEDURE — 85610 PROTHROMBIN TIME: CPT

## 2024-12-24 PROCEDURE — 36416 COLLJ CAPILLARY BLOOD SPEC: CPT

## 2024-12-24 NOTE — PROGRESS NOTES
ANTICOAGULATION MANAGEMENT     Ирина Yanez 89 year old female is on warfarin with therapeutic INR result. (Goal INR 2.0-3.0)    Recent labs: (last 7 days)     12/24/24  1037   INR 1.9*       ASSESSMENT     Source(s): Chart Review and Patient/Caregiver Call     Warfarin doses taken: Warfarin taken as instructed  Diet: Decreased greens/vitamin K in diet; plans to resume previous intake  Medication/supplement changes: None noted  New illness, injury, or hospitalization: No  Signs or symptoms of bleeding or clotting: No  Previous result: Therapeutic last 2(+) visits  Additional findings:  Patient's spouse passed away 12/16/24  The last several INR's have been at bottom of goal range, given patient had less green veggies, INR is low and will do a dose increase  No changes were made in patient's plan of care at Cardio visit 12/23/24       PLAN     Recommended plan for ongoing change(s) affecting INR     Dosing Instructions: Increase your warfarin dose (4% change) with next INR in 2 weeks       Summary  As of 12/24/2024      Full warfarin instructions:  3.75 mg every Sun, Thu; 5 mg all other days   Next INR check:  1/7/2025               Telephone call with Ирниа who verbalizes understanding and agrees to plan    Lab visit scheduled    Education provided: Please call back if any changes to your diet, medications or how you've been taking warfarin  Contact [phone] with any changes, questions or concerns.     Plan made per [facility] anticoagulation protocol    Alondra Richter RN  12/24/2024  Anticoagulation Clinic  [facility] for routing messages: kam RAMIREZ  [facility] patient phone line: [phone]        _______________________________________________________________________     Anticoagulation Episode Summary       Current INR goal:  2.0-3.0   TTR:  75.8% (1 y)   Target end date:  Indefinite   Send INR reminders to:  KRIS RAMIREZ    Indications    Paroxysmal atrial fibrillation (H) [I48.0]  Long term current  use of anticoagulants with INR goal of 2.0-3.0 [Z79.01]             Comments:  --             Anticoagulation Care Providers       Provider Role Specialty Phone number    Yuridia Villarreal MD Referring Family Medicine [phone]    Mil García MD Referring Family Medicine [phone]

## 2025-01-07 ENCOUNTER — ANTICOAGULATION THERAPY VISIT (OUTPATIENT)
Dept: ANTICOAGULATION | Facility: CLINIC | Age: 89
End: 2025-01-07

## 2025-01-07 ENCOUNTER — LAB (OUTPATIENT)
Dept: LAB | Facility: CLINIC | Age: 89
End: 2025-01-07
Payer: COMMERCIAL

## 2025-01-07 DIAGNOSIS — I50.9 CHF (CONGESTIVE HEART FAILURE) (H): ICD-10-CM

## 2025-01-07 DIAGNOSIS — N18.4 CKD (CHRONIC KIDNEY DISEASE) STAGE 4, GFR 15-29 ML/MIN (H): ICD-10-CM

## 2025-01-07 DIAGNOSIS — E11.21 TYPE 2 DIABETES MELLITUS WITH DIABETIC NEPHROPATHY, WITHOUT LONG-TERM CURRENT USE OF INSULIN (H): Primary | ICD-10-CM

## 2025-01-07 DIAGNOSIS — I48.0 PAROXYSMAL ATRIAL FIBRILLATION (H): Primary | ICD-10-CM

## 2025-01-07 DIAGNOSIS — I48.0 PAROXYSMAL ATRIAL FIBRILLATION (H): ICD-10-CM

## 2025-01-07 DIAGNOSIS — Z79.01 LONG TERM CURRENT USE OF ANTICOAGULANTS WITH INR GOAL OF 2.0-3.0: ICD-10-CM

## 2025-01-07 LAB — INR BLD: 1.9 (ref 0.9–1.1)

## 2025-01-07 PROCEDURE — 85610 PROTHROMBIN TIME: CPT

## 2025-01-07 PROCEDURE — 36415 COLL VENOUS BLD VENIPUNCTURE: CPT

## 2025-01-07 NOTE — PROGRESS NOTES
ANTICOAGULATION MANAGEMENT     Ирина Yanez 89 year old female is on warfarin with subtherapeutic INR result. (Goal INR 2.0-3.0)    Recent labs: (last 7 days)     01/07/25  1047   INR 1.9*       ASSESSMENT     Source(s): Chart Review and Patient/Caregiver Call     Warfarin doses taken: Warfarin taken as instructed - confirmed new maint dose as established last visit  Diet: Decreased greens/vitamin K in diet; plans to resume previous intake - - learning what meals look like as she cooks only for herself now. This would be increasing INR though so not considered a temp factor.   Medication/supplement changes: None noted  New illness, injury, or hospitalization: No  Signs or symptoms of bleeding or clotting: No  Previous result: Subtherapeutic  Additional findings: None       PLAN     Recommended plan for no diet, medication or health factor changes affecting INR     Dosing Instructions: Increase your warfarin dose (7.7% change) with next INR in 2 weeks       Summary  As of 1/7/2025      Full warfarin instructions:  5 mg every day   Next INR check:  1/21/2025               Telephone call with Ирина who verbalizes understanding and agrees to plan    Lab visit scheduled    Education provided: Please call back if any changes to your diet, medications or how you've been taking warfarin    Plan made per [facility] anticoagulation protocol    Cris Richter RN  1/7/2025  Anticoagulation Clinic  [employer] for routing messages: kam RAMIREZ  [facility] patient phone line: [phone]        _______________________________________________________________________     Anticoagulation Episode Summary       Current INR goal:  2.0-3.0   TTR:  72.0% (1 y)   Target end date:  Indefinite   Send INR reminders to:  KRIS RAMIREZ    Indications    Paroxysmal atrial fibrillation (H) [I48.0]  Long term current use of anticoagulants with INR goal of 2.0-3.0 [Z79.01]             Comments:  --             Anticoagulation Care  Providers       Provider Role Specialty Phone number    Yuridia Villarreal MD Referring Family Medicine [phone]    Mil García MD Referring Family Medicine [phone]

## 2025-01-08 ENCOUNTER — TELEPHONE (OUTPATIENT)
Dept: FAMILY MEDICINE | Facility: CLINIC | Age: 89
End: 2025-01-08
Payer: COMMERCIAL

## 2025-01-08 NOTE — TELEPHONE ENCOUNTER
Could you please Review, fill in and sign this Patient Assistance Form for Ирина. If there is anything I need to correct please Advise. (Scanned to media tab)

## 2025-01-08 NOTE — TELEPHONE ENCOUNTER
Where would you like it sent after it is completed and signed?    Lara Cain, PharmD  Medication Therapy Management Pharmacist

## 2025-01-09 NOTE — TELEPHONE ENCOUNTER
FREE DRUG APPLICATION INITIATED    Medication: OZEMPIC (0.25 OR 0.5 MG/DOSE) 2 MG/3ML SC SOPN  Free Drug Program Name:  [employer]  Date Submitted: 1/8/2025  2:44 PM  Phone #: [phone]  Fax #: [phone]  Additional Information: faxed MD mas

## 2025-01-09 NOTE — TELEPHONE ENCOUNTER
Printed form and placed in provider's basket for review and signature.     Betsey Sanchez  Lead   [facility]

## 2025-01-09 NOTE — TELEPHONE ENCOUNTER
Form signed and scanned into patient's chart. Routing back to original sender.     Betsey Sanchez  Lead   [facility]

## 2025-01-23 ENCOUNTER — LAB (OUTPATIENT)
Dept: LAB | Facility: CLINIC | Age: 89
End: 2025-01-23
Payer: COMMERCIAL

## 2025-01-23 ENCOUNTER — ANTICOAGULATION THERAPY VISIT (OUTPATIENT)
Dept: ANTICOAGULATION | Facility: CLINIC | Age: 89
End: 2025-01-23

## 2025-01-23 DIAGNOSIS — E11.21 TYPE 2 DIABETES MELLITUS WITH DIABETIC NEPHROPATHY, WITHOUT LONG-TERM CURRENT USE OF INSULIN (H): Primary | ICD-10-CM

## 2025-01-23 DIAGNOSIS — I48.0 PAROXYSMAL ATRIAL FIBRILLATION (H): Primary | ICD-10-CM

## 2025-01-23 DIAGNOSIS — Z79.01 LONG TERM CURRENT USE OF ANTICOAGULANTS WITH INR GOAL OF 2.0-3.0: ICD-10-CM

## 2025-01-23 DIAGNOSIS — I48.0 PAROXYSMAL ATRIAL FIBRILLATION (H): ICD-10-CM

## 2025-01-23 LAB — INR BLD: 2.7 (ref 0.9–1.1)

## 2025-01-23 NOTE — PROGRESS NOTES
ANTICOAGULATION MANAGEMENT     Ирина Yanez 89 year old female is on warfarin with therapeutic INR result. (Goal INR 2.0-3.0)    Recent labs: (last 7 days)     01/23/25  0937   INR 2.7*       ASSESSMENT     Source(s): Chart Review and Patient/Caregiver Call     Warfarin doses taken: Warfarin taken as instructed  Diet: No new diet changes identified, patient reports her diet is staying more consistent since the passing of her spouse mid December  Medication/supplement changes: None noted  New illness, injury, or hospitalization: No  Signs or symptoms of bleeding or clotting: No  Previous result: Subtherapeutic  Additional findings: None       PLAN     Recommended plan for no diet, medication or health factor changes affecting INR     Dosing Instructions: Continue your current warfarin dose with next INR in 3 weeks       Summary  As of 1/23/2025      Full warfarin instructions:  5 mg every day   Next INR check:  2/13/2025               Telephone call with Ирина who verbalizes understanding and agrees to plan    Lab visit scheduled    Education provided: Please call back if any changes to your diet, medications or how you've been taking warfarin  Contact [phone] with any changes, questions or concerns.     Plan made per [facility] anticoagulation protocol    Alondra Richter RN  1/23/2025  Anticoagulation Clinic  [employer] for routing messages: kam RAMIREZ  [facility] patient phone line: [phone]        _______________________________________________________________________     Anticoagulation Episode Summary       Current INR goal:  2.0-3.0   TTR:  71.4% (1 y)   Target end date:  Indefinite   Send INR reminders to:  KRIS RAMIREZ    Indications    Paroxysmal atrial fibrillation (H) [I48.0]  Long term current use of anticoagulants with INR goal of 2.0-3.0 [Z79.01]             Comments:  --             Anticoagulation Care Providers       Provider Role Specialty Phone number    Yuridia Villarreal MD Referring  Family Medicine [phone]    Mil García MD Referring Family Medicine [phone]

## 2025-01-24 ENCOUNTER — MEDICAL CORRESPONDENCE (OUTPATIENT)
Dept: HEALTH INFORMATION MANAGEMENT | Facility: CLINIC | Age: 89
End: 2025-01-24
Payer: COMMERCIAL

## 2025-02-13 ENCOUNTER — ANTICOAGULATION THERAPY VISIT (OUTPATIENT)
Dept: ANTICOAGULATION | Facility: CLINIC | Age: 89
End: 2025-02-13

## 2025-02-13 ENCOUNTER — LAB (OUTPATIENT)
Dept: LAB | Facility: CLINIC | Age: 89
End: 2025-02-13
Payer: COMMERCIAL

## 2025-02-13 DIAGNOSIS — I48.0 PAROXYSMAL ATRIAL FIBRILLATION (H): ICD-10-CM

## 2025-02-13 DIAGNOSIS — I50.9 CHF (CONGESTIVE HEART FAILURE) (H): ICD-10-CM

## 2025-02-13 DIAGNOSIS — Z79.01 LONG TERM CURRENT USE OF ANTICOAGULANTS WITH INR GOAL OF 2.0-3.0: ICD-10-CM

## 2025-02-13 DIAGNOSIS — E11.21 TYPE 2 DIABETES MELLITUS WITH DIABETIC NEPHROPATHY, WITHOUT LONG-TERM CURRENT USE OF INSULIN (H): ICD-10-CM

## 2025-02-13 DIAGNOSIS — N18.4 CKD (CHRONIC KIDNEY DISEASE) STAGE 4, GFR 15-29 ML/MIN (H): ICD-10-CM

## 2025-02-13 DIAGNOSIS — I48.0 PAROXYSMAL ATRIAL FIBRILLATION (H): Primary | ICD-10-CM

## 2025-02-13 LAB
ERYTHROCYTE [DISTWIDTH] IN BLOOD BY AUTOMATED COUNT: 13 % (ref 10–15)
EST. AVERAGE GLUCOSE BLD GHB EST-MCNC: 123 MG/DL
HBA1C MFR BLD: 5.9 % (ref 0–5.6)
HCT VFR BLD AUTO: 33.8 % (ref 35–47)
HGB BLD-MCNC: 11.3 G/DL (ref 11.7–15.7)
INR BLD: 2.4 (ref 0.9–1.1)
MCH RBC QN AUTO: 31 PG (ref 26.5–33)
MCHC RBC AUTO-ENTMCNC: 33.4 G/DL (ref 31.5–36.5)
MCV RBC AUTO: 93 FL (ref 78–100)
PLATELET # BLD AUTO: 208 10E3/UL (ref 150–450)
RBC # BLD AUTO: 3.65 10E6/UL (ref 3.8–5.2)
WBC # BLD AUTO: 7 10E3/UL (ref 4–11)

## 2025-02-13 NOTE — PROGRESS NOTES
ANTICOAGULATION MANAGEMENT     Ирина Yanez 89 year old female is on warfarin with therapeutic INR result. (Goal INR 2.0-3.0)    Recent labs: (last 7 days)     02/13/25  1137   INR 2.4*       ASSESSMENT     Source(s): Chart Review and Patient/Caregiver Call     Warfarin doses taken: Warfarin taken as instructed  Diet: No new diet changes identified  Medication/supplement changes: None noted  New illness, injury, or hospitalization: No  Signs or symptoms of bleeding or clotting: No  Previous result: Therapeutic last visit; previously outside of goal range  Additional findings: None       PLAN     Recommended plan for no diet, medication or health factor changes affecting INR     Dosing Instructions: Continue your current warfarin dose with next INR in 4 weeks       Summary  As of 2/13/2025      Full warfarin instructions:  5 mg every day   Next INR check:  3/13/2025               Telephone call with Ирина who verbalizes understanding and agrees to plan    Lab visit scheduled    Education provided: Please call back if any changes to your diet, medications or how you've been taking warfarin  Contact [phone] with any changes, questions or concerns.     Plan made per [facility] anticoagulation protocol    Alondra Richter RN  2/13/2025  Anticoagulation Clinic  [employer] for routing messages: kam RAMIREZ  [facility] patient phone line: [phone]        _______________________________________________________________________     Anticoagulation Episode Summary       Current INR goal:  2.0-3.0   TTR:  71.4% (1 y)   Target end date:  Indefinite   Send INR reminders to:  KRIS RAMIREZ    Indications    Paroxysmal atrial fibrillation (H) [I48.0]  Long term current use of anticoagulants with INR goal of 2.0-3.0 [Z79.01]             Comments:  --             Anticoagulation Care Providers       Provider Role Specialty Phone number    Yuridia Villarreal MD Referring Family Medicine [phone]    Mil García MD Referring  Family Medicine [phone]

## 2025-02-15 LAB
ANION GAP SERPL CALCULATED.3IONS-SCNC: 18 MMOL/L (ref 7–15)
BUN SERPL-MCNC: 61.5 MG/DL (ref 8–23)
CALCIUM SERPL-MCNC: 9.9 MG/DL (ref 8.8–10.4)
CHLORIDE SERPL-SCNC: 105 MMOL/L (ref 98–107)
CHOLEST SERPL-MCNC: 181 MG/DL
CREAT SERPL-MCNC: 1.73 MG/DL (ref 0.51–0.95)
EGFRCR SERPLBLD CKD-EPI 2021: 28 ML/MIN/1.73M2
FASTING STATUS PATIENT QL REPORTED: YES
FASTING STATUS PATIENT QL REPORTED: YES
GLUCOSE SERPL-MCNC: 122 MG/DL (ref 70–99)
HCO3 SERPL-SCNC: 21 MMOL/L (ref 22–29)
HDLC SERPL-MCNC: 46 MG/DL
LDLC SERPL CALC-MCNC: 104 MG/DL
NONHDLC SERPL-MCNC: 135 MG/DL
POTASSIUM SERPL-SCNC: 5 MMOL/L (ref 3.4–5.3)
SODIUM SERPL-SCNC: 144 MMOL/L (ref 135–145)
TRIGL SERPL-MCNC: 154 MG/DL

## 2025-02-24 ENCOUNTER — OFFICE VISIT (OUTPATIENT)
Dept: FAMILY MEDICINE | Facility: CLINIC | Age: 89
End: 2025-02-24
Attending: FAMILY MEDICINE
Payer: COMMERCIAL

## 2025-02-24 VITALS
OXYGEN SATURATION: 100 % | HEART RATE: 65 BPM | SYSTOLIC BLOOD PRESSURE: 169 MMHG | DIASTOLIC BLOOD PRESSURE: 62 MMHG | TEMPERATURE: 97.8 F | RESPIRATION RATE: 20 BRPM | BODY MASS INDEX: 24.75 KG/M2 | WEIGHT: 145 LBS | HEIGHT: 64 IN

## 2025-02-24 DIAGNOSIS — I10 HYPERTENSION GOAL BP (BLOOD PRESSURE) < 140/90: ICD-10-CM

## 2025-02-24 DIAGNOSIS — Z00.00 ENCOUNTER FOR MEDICARE ANNUAL WELLNESS EXAM: Primary | ICD-10-CM

## 2025-02-24 DIAGNOSIS — E78.5 HYPERLIPIDEMIA WITH TARGET LDL LESS THAN 100: ICD-10-CM

## 2025-02-24 DIAGNOSIS — F41.9 ANXIETY: ICD-10-CM

## 2025-02-24 DIAGNOSIS — I50.22 CHRONIC SYSTOLIC HEART FAILURE (H): ICD-10-CM

## 2025-02-24 DIAGNOSIS — I48.0 PAROXYSMAL ATRIAL FIBRILLATION (H): ICD-10-CM

## 2025-02-24 PROBLEM — I50.30 DIASTOLIC HEART FAILURE, UNSPECIFIED HF CHRONICITY (H): Status: RESOLVED | Noted: 2021-02-14 | Resolved: 2025-02-24

## 2025-02-24 PROBLEM — I50.9 CHF (CONGESTIVE HEART FAILURE) (H): Status: RESOLVED | Noted: 2020-12-29 | Resolved: 2025-02-24

## 2025-02-24 PROCEDURE — G2211 COMPLEX E/M VISIT ADD ON: HCPCS | Performed by: FAMILY MEDICINE

## 2025-02-24 PROCEDURE — 99214 OFFICE O/P EST MOD 30 MIN: CPT | Mod: 25 | Performed by: FAMILY MEDICINE

## 2025-02-24 PROCEDURE — 1125F AMNT PAIN NOTED PAIN PRSNT: CPT | Performed by: FAMILY MEDICINE

## 2025-02-24 PROCEDURE — 3077F SYST BP >= 140 MM HG: CPT | Performed by: FAMILY MEDICINE

## 2025-02-24 PROCEDURE — G0439 PPPS, SUBSEQ VISIT: HCPCS | Performed by: FAMILY MEDICINE

## 2025-02-24 PROCEDURE — 3078F DIAST BP <80 MM HG: CPT | Performed by: FAMILY MEDICINE

## 2025-02-24 RX ORDER — WARFARIN SODIUM 2.5 MG/1
TABLET ORAL
Qty: 180 TABLET | Refills: 1 | Status: SHIPPED | OUTPATIENT
Start: 2025-02-24

## 2025-02-24 RX ORDER — METOPROLOL SUCCINATE 25 MG/1
TABLET, EXTENDED RELEASE ORAL
Qty: 90 TABLET | Refills: 1 | Status: SHIPPED | OUTPATIENT
Start: 2025-02-24

## 2025-02-24 RX ORDER — TORSEMIDE 10 MG/1
10 TABLET ORAL DAILY
Qty: 90 TABLET | Refills: 1 | Status: SHIPPED | OUTPATIENT
Start: 2025-02-24

## 2025-02-24 RX ORDER — PRAVASTATIN SODIUM 20 MG
20 TABLET ORAL DAILY
Qty: 90 TABLET | Refills: 3 | Status: SHIPPED | OUTPATIENT
Start: 2025-02-24

## 2025-02-24 RX ORDER — DILTIAZEM HYDROCHLORIDE EXTENDED-RELEASE TABLETS 180 MG/1
180 TABLET, EXTENDED RELEASE ORAL EVERY MORNING
Qty: 90 TABLET | Refills: 1 | Status: SHIPPED | OUTPATIENT
Start: 2025-02-24

## 2025-02-24 SDOH — HEALTH STABILITY: PHYSICAL HEALTH: ON AVERAGE, HOW MANY DAYS PER WEEK DO YOU ENGAGE IN MODERATE TO STRENUOUS EXERCISE (LIKE A BRISK WALK)?: 0 DAYS

## 2025-02-24 SDOH — HEALTH STABILITY: PHYSICAL HEALTH: ON AVERAGE, HOW MANY MINUTES DO YOU ENGAGE IN EXERCISE AT THIS LEVEL?: 0 MIN

## 2025-02-24 ASSESSMENT — PATIENT HEALTH QUESTIONNAIRE - PHQ9
SUM OF ALL RESPONSES TO PHQ QUESTIONS 1-9: 0
SUM OF ALL RESPONSES TO PHQ QUESTIONS 1-9: 0
10. IF YOU CHECKED OFF ANY PROBLEMS, HOW DIFFICULT HAVE THESE PROBLEMS MADE IT FOR YOU TO DO YOUR WORK, TAKE CARE OF THINGS AT HOME, OR GET ALONG WITH OTHER PEOPLE: NOT DIFFICULT AT ALL

## 2025-02-24 ASSESSMENT — PAIN SCALES - GENERAL: PAINLEVEL_OUTOF10: MODERATE PAIN (4)

## 2025-02-24 ASSESSMENT — SOCIAL DETERMINANTS OF HEALTH (SDOH): HOW OFTEN DO YOU GET TOGETHER WITH FRIENDS OR RELATIVES?: TWICE A WEEK

## 2025-02-24 NOTE — PATIENT INSTRUCTIONS
Patient Education   Preventive Care Advice   This is general advice given by our system to help you stay healthy. However, your care team may have specific advice just for you. Please talk to your care team about your preventive care needs.  Nutrition  Eat 5 or more servings of fruits and vegetables each day.  Try wheat bread, brown rice and whole grain pasta (instead of white bread, rice, and pasta).  Get enough calcium and vitamin D. Check the label on foods and aim for 100% of the RDA (recommended daily allowance).  Lifestyle  Exercise at least 150 minutes each week  (30 minutes a day, 5 days a week).  Do muscle strengthening activities 2 days a week. These help control your weight and prevent disease.  No smoking.  Wear sunscreen to prevent skin cancer.  Have a dental exam and cleaning every 6 months.  Yearly exams  See your health care team every year to talk about:  Any changes in your health.  Any medicines your care team has prescribed.  Preventive care, family planning, and ways to prevent chronic diseases.  Shots (vaccines)   HPV shots (up to age 26), if you've never had them before.  Hepatitis B shots (up to age 59), if you've never had them before.  COVID-19 shot: Get this shot when it's due.  Flu shot: Get a flu shot every year.  Tetanus shot: Get a tetanus shot every 10 years.  Pneumococcal, hepatitis A, and RSV shots: Ask your care team if you need these based on your risk.  Shingles shot (for age 50 and up)  General health tests  Diabetes screening:  Starting at age 35, Get screened for diabetes at least every 3 years.  If you are younger than age 35, ask your care team if you should be screened for diabetes.  Cholesterol test: At age 39, start having a cholesterol test every 5 years, or more often if advised.  Bone density scan (DEXA): At age 50, ask your care team if you should have this scan for osteoporosis (brittle bones).  Hepatitis C: Get tested at least once in your life.  STIs (sexually  transmitted infections)  Before age 24: Ask your care team if you should be screened for STIs.  After age 24: Get screened for STIs if you're at risk. You are at risk for STIs (including HIV) if:  You are sexually active with more than one person.  You don't use condoms every time.  You or a partner was diagnosed with a sexually transmitted infection.  If you are at risk for HIV, ask about PrEP medicine to prevent HIV.  Get tested for HIV at least once in your life, whether you are at risk for HIV or not.  Cancer screening tests  Cervical cancer screening: If you have a cervix, begin getting regular cervical cancer screening tests starting at age 21.  Breast cancer scan (mammogram): If you've ever had breasts, begin having regular mammograms starting at age 40. This is a scan to check for breast cancer.  Colon cancer screening: It is important to start screening for colon cancer at age 45.  Have a colonoscopy test every 10 years (or more often if you're at risk) Or, ask your provider about stool tests like a FIT test every year or Cologuard test every 3 years.  To learn more about your testing options, visit:   .  For help making a decision, visit:   https://bit.ly/mc03045.  Prostate cancer screening test: If you have a prostate, ask your care team if a prostate cancer screening test (PSA) at age 55 is right for you.  Lung cancer screening: If you are a current or former smoker ages 50 to 80, ask your care team if ongoing lung cancer screenings are right for you.  For informational purposes only. Not to replace the advice of your health care provider. Copyright   2023 [facility]. All rights reserved. Clinically reviewed by the [facility]. [employer] 032725 - REV 01/24.  Learning About Activities of Daily Living  What are activities of daily living?     Activities of daily living (ADLs) are the basic self-care tasks you do every day. These include eating, bathing, dressing,  and moving around.  As you age, and if you have health problems, you may find that it's harder to do some of these tasks. If so, your doctor can suggest ideas that may help.  To measure what kind of help you may need, your doctor will ask how well you are able to do ADLs. Let your doctor know if there are any tasks that you are having trouble doing. This is an important first step to getting help. And when you have the help you need, you can stay as independent as possible.  How will a doctor assess your ADLs?  Asking about ADLs is part of a routine health checkup your doctor will likely do as you age. Your health check might be done in a doctor's office, in your home, or at a hospital. The goal is to find out if you are having any problems that could make it hard to care for yourself or that make it unsafe for you to be on your own.  To measure your ADLs, your doctor will ask how hard it is for you to do routine tasks. Your doctor may also want to know if you have changed the way you do a task because of a health problem. Your doctor may watch how you:  Walk back and forth.  Keep your balance while you stand or walk.  Move from sitting to standing or from a bed to a chair.  Button or unbutton a shirt or sweater.  Remove and put on your shoes.  It's common to feel a little worried or anxious if you find you can't do all the things you used to be able to do. Talking with your doctor about ADLs is a way to make sure you're as safe as possible and able to care for yourself as well as you can. You may want to bring a caregiver, friend, or family member to your checkup. They can help you talk to your doctor.  Follow-up care is a key part of your treatment and safety. Be sure to make and go to all appointments, and call your doctor if you are having problems. It's also a good idea to know your test results and keep a list of the medicines you take.  Current as of: October 24, 2023  Content Version: 14.3    2024 [facility]  [employer].   Care instructions adapted under license by your healthcare professional. If you have questions about a medical condition or this instruction, always ask your healthcare professional. [facility] [employer] disclaims any warranty or liability for your use of this information.    Preventing Falls: Care Instructions  Injuries and health problems such as trouble walking or poor eyesight can increase your risk of falling. So can some medicines. But there are things you can do to help prevent falls. You can exercise to get stronger. You can also arrange your home to make it safer.    Talk to your doctor about the medicines you take. Ask if any of them increase the risk of falls and whether they can be changed or stopped.   Try to exercise regularly. It can help improve your strength and balance. This can help lower your risk of falling.         Practice fall safety and prevention.   Wear low-heeled shoes that fit well and give your feet good support. Talk to your doctor if you have foot problems that make this hard.  Carry a cellphone or wear a medical alert device that you can use to call for help.  Use stepladders instead of chairs to reach high objects. Don't climb if you're at risk for falls. Ask for help, if needed.  Wear the correct eyeglasses, if you need them.        Make your home safer.   Remove rugs, cords, clutter, and furniture from walkways.  Keep your house well lit. Use night-lights in hallways and bathrooms.  Install and use sturdy handrails on stairways.  Wear nonskid footwear, even inside. Don't walk barefoot or in socks without shoes.        Be safe outside.   Use handrails, curb cuts, and ramps whenever possible.  Keep your hands free by using a shoulder bag or backpack.  Try to walk in well-lit areas. Watch out for uneven ground, changes in pavement, and debris.  Be careful in the winter. Walk on the grass or gravel when sidewalks are slippery. Use de-icer on steps and walkways.  "Add non-slip devices to shoes.    Put grab bars and nonskid mats in your shower or tub and near the toilet. Try to use a shower chair or bath bench when bathing.   Get into a tub or shower by putting in your weaker leg first. Get out with your strong side first. Have a phone or medical alert device in the bathroom with you.   Where can you learn more?  Go to https://www.arGEN-X.Oppten/patiented  Enter G117 in the search box to learn more about \"Preventing Falls: Care Instructions.\"  Current as of: July 31, 2024  Content Version: 14.3    2024 [employer].   Care instructions adapted under license by your healthcare professional. If you have questions about a medical condition or this instruction, always ask your healthcare professional. [employer] disclaims any warranty or liability for your use of this information.    Hearing Loss: Care Instructions  Overview     Hearing loss is a sudden or slow decrease in how well you hear. It can range from slight to profound. Permanent hearing loss can occur with aging. It also can happen when you are exposed long-term to loud noise. Examples include listening to loud music, riding motorcycles, or being around other loud machines.  Hearing loss can affect your work and home life. It can make you feel lonely or depressed. You may feel that you have lost your independence. But hearing aids and other devices can help you hear better and feel connected to others.  Follow-up care is a key part of your treatment and safety. Be sure to make and go to all appointments, and call your doctor if you are having problems. It's also a good idea to know your test results and keep a list of the medicines you take.  How can you care for yourself at home?  Avoid loud noises whenever possible. This helps keep your hearing from getting worse.  Always wear hearing protection around loud noises.  Wear a hearing aid as directed.  A professional can help you pick a hearing aid " "that will work best for you.  You can also get hearing aids over the counter for mild to moderate hearing loss.  Have hearing tests as your doctor suggests. They can show whether your hearing has changed. Your hearing aid may need to be adjusted.  Use other devices as needed. These may include:  Telephone amplifiers and hearing aids that can connect to a television, stereo, radio, or microphone.  Devices that use lights or vibrations. These alert you to the doorbell, a ringing telephone, or a baby monitor.  Television closed-captioning. This shows the words at the bottom of the screen. Most new TVs can do this.  TTY (text telephone). This lets you type messages back and forth on the telephone instead of talking or listening. These devices are also called TDD. When messages are typed on the keyboard, they are sent over the phone line to a receiving TTY. The message is shown on a monitor.  Use text messaging, social media, and email if it is hard for you to communicate by telephone.  Try to learn a listening technique called speechreading. It is not lipreading. You pay attention to people's gestures, expressions, posture, and tone of voice. These clues can help you understand what a person is saying. Face the person you are talking to, and have them face you. Make sure the lighting is good. You need to see the other person's face clearly.  Think about counseling if you need help to adjust to your hearing loss.  When should you call for help?  Watch closely for changes in your health, and be sure to contact your doctor if:    You think your hearing is getting worse.     You have new symptoms, such as dizziness or nausea.   Where can you learn more?  Go to https://www.healthAventones.net/patiented  Enter R798 in the search box to learn more about \"Hearing Loss: Care Instructions.\"  Current as of: September 27, 2023  Content Version: 14.3    2024 [facility].   Care instructions adapted under license by your " healthcare professional. If you have questions about a medical condition or this instruction, always ask your healthcare professional. [employer] disclaims any warranty or liability for your use of this information.      Increase metoprolol to 50mg daily (2 x 25 mg tabs)

## 2025-02-24 NOTE — PROGRESS NOTES
Preventive Care Visit  [facility]  Mil García MD, Family Medicine  Feb 24, 2025      Assessment & Plan     Assessment and Plan    (Z00.00) Encounter for Medicare annual wellness exam  (primary encounter diagnosis)  Comment:   Plan:     (I10) Hypertension goal BP (blood pressure) < 140/90  Comment: Will have him increase his metoprolol to 50mg daily, follow up in 1m  Plan: diltiazem ER (CARDIAZEM LA) 180 MG 24 hr         tablet, metoprolol succinate ER (TOPROL XL) 25         MG 24 hr tablet, OFFICE/OUTPT VISIT,EST,LEVL IV            (I48.0) Paroxysmal atrial fibrillation (H)  Comment: refilling  Plan: metoprolol succinate ER (TOPROL XL) 25 MG 24 hr        tablet, warfarin ANTICOAGULANT (JANTOVEN         ANTICOAGULANT) 2.5 MG tablet, OFFICE/OUTPT         VISIT,EST,LEVL IV            (E78.5) Hyperlipidemia with target LDL less than 100  Comment: refills, recent labs reviewed  Plan: pravastatin (PRAVACHOL) 20 MG tablet,         OFFICE/OUTPT VISIT,EST,LEVL IV            (F41.9) Anxiety  Comment: Mood stable, refilling  Plan: sertraline (ZOLOFT) 50 MG tablet, OFFICE/OUTPT         VISIT,EST,LEVL IV            (I50.22) Chronic systolic heart failure (H)  Comment: refilling  Plan: torsemide (DEMADEX) 10 MG tablet, OFFICE/OUTPT         VISIT,EST,LEVL IV              RTC in 6m    Mil García MD             Counseling  Appropriate preventive services were addressed with this patient via screening, questionnaire, or discussion as appropriate for fall prevention, nutrition, physical activity, Tobacco-use cessation, social engagement, weight loss and cognition.  Checklist reviewing preventive services available has been given to the patient.  Reviewed patient's diet, addressing concerns and/or questions.   Updated plan of care.  Patient reported difficulty with activities of daily living were addressed today.Addressed any concerns about safety while driving.  The patient was provided with written  information regarding signs of hearing loss.   Information on urinary incontinence and treatment options given to patient.           Subjective   Ирина is a 89 year old, presenting for the following:  Medicare Visit        2/24/2025    10:24 AM   Additional Questions   Roomed by Ariane Palomo VF           Needs refills on meds (pt brought list with her).  Noting diarrhea at least weekly, which she links to her ozempic.  Last dose was three weeks ago.  Otherwise feeling well.      Last visit with cardiology was this last December.     passed away this last December from PNA.    No additional concerns.       HPI      Health Care Directive  Patient has a Health Care Directive on file        2/24/2025   General Health   How would you rate your overall physical health? Good   Feel stress (tense, anxious, or unable to sleep) Only a little   (!) STRESS CONCERN      2/24/2025   Nutrition   Diet: Low salt    Diabetic       Multiple values from one day are sorted in reverse-chronological order         2/24/2025   Exercise   Days per week of moderate/strenous exercise 0 days   Average minutes spent exercising at this level 0 min   (!) EXERCISE CONCERN      2/24/2025   Social Factors   Frequency of gathering with friends or relatives Twice a week   Worry food won't last until get money to buy more No   Food not last or not have enough money for food? No   Do you have housing? (Housing is defined as stable permanent housing and does not include staying ouside in a car, in a tent, in an abandoned building, in an overnight shelter, or couch-surfing.) Yes   Are you worried about losing your housing? No   Lack of transportation? No   Unable to get utilities (heat,electricity)? No         2/24/2025   Fall Risk   Fallen 2 or more times in the past year? No    No   Trouble with walking or balance? Yes    Yes   Reason Gait Speed Test Not Completed Patient does not tolerate an upright or standing position (e.g. wheelchair)        Multiple values from one day are sorted in reverse-chronological order          2/24/2025   Activities of Daily Living- Home Safety   Needs help with the following daily activites Transportation    Housework   Safety concerns in the home None of the above       Multiple values from one day are sorted in reverse-chronological order         2/24/2025   Dental   Dentist two times every year? Yes         2/24/2025   Hearing Screening   Hearing concerns? (!) TROUBLE UNDERSTANDING SOFT OR WHISPERED SPEECH.         2/24/2025   Driving Risk Screening   Patient/family members have concerns about driving (!) YES          2/24/2025   General Alertness/Fatigue Screening   Have you been more tired than usual lately? No         2/24/2025   Urinary Incontinence Screening   Bothered by leaking urine in past 6 months Yes          Today's PHQ-9 Score:       2/24/2025    10:13 AM   PHQ-9 SCORE   PHQ-9 Total Score MyChart 0   PHQ-9 Total Score 0        Patient-reported         2/24/2025   Substance Use   Alcohol more than 3/day or more than 7/wk Not Applicable   Do you have a current opioid prescription? No   How severe/bad is pain from 1 to 10? 3/10   Do you use any other substances recreationally? No     Social History     Tobacco Use    Smoking status: Never    Smokeless tobacco: Never   Vaping Use    Vaping status: Never Used   Substance Use Topics    Alcohol use: No    Drug use: Never          Mammogram Screening - After age 74- determine frequency with patient based on health status, life expectancy and patient goals          Reviewed and updated as needed this visit by Provider                      Current providers sharing in care for this patient include:  Patient Care Team:  Mil García MD as PCP - General (Family Medicine)  Henrik Beasley MD as Assigned Heart and Vascular Provider  Bisi Moffett RN as Specialty Care Coordinator (Nurse)  Zarina Cain RPH as Pharmacist (Pharmacist)  Mil García MD as  "Assigned PCP  Jef Whatley MD as MD (Nephrology)  Zarina Cain RPH as Assigned MTM Pharmacist  Goltz, Bennett Ezra, PA-C as Assigned Neuroscience Provider    The following health maintenance items are reviewed in Epic and correct as of today:  Health Maintenance   Topic Date Due    HF ACTION PLAN  Never done    DIABETIC FOOT EXAM  05/22/2024    MEDICARE ANNUAL WELLNESS VISIT  02/22/2025    ANNUAL REVIEW OF HM ORDERS  08/22/2025    COVID-19 Vaccine (10 - 2024-25 season) 03/26/2025    DTAP/TDAP/TD IMMUNIZATION (3 - Td or Tdap) 05/06/2025    A1C  05/13/2025    BMP  05/13/2025    DEXA  06/08/2025    HEMOGLOBIN  08/13/2025    PHQ-9  08/24/2025    EYE EXAM  10/31/2025    LIPID  02/13/2026    CBC  02/13/2026    FALL RISK ASSESSMENT  02/24/2026    ADVANCE CARE PLANNING  02/23/2029    PARATHYROID  Completed    PHOSPHORUS  Completed    TSH W/FREE T4 REFLEX  Completed    DEPRESSION ACTION PLAN  Completed    INFLUENZA VACCINE  Completed    Pneumococcal Vaccine: 50+ Years  Completed    URINALYSIS  Completed    ALK PHOS  Completed    ZOSTER IMMUNIZATION  Completed    RSV VACCINE  Completed    HPV IMMUNIZATION  Aged Out    MENINGITIS IMMUNIZATION  Aged Out    ALT  Discontinued    MICROALBUMIN  Discontinued    URINE DRUG SCREEN  Discontinued    MAMMO SCREENING  Discontinued            Objective    Exam  BP (!) 169/62 (BP Location: Right arm, Patient Position: Sitting, Cuff Size: Adult Regular)   Pulse 65   Temp 97.8  F (36.6  C) (Oral)   Resp 20   Ht 1.626 m (5' 4\")   Wt 65.8 kg (145 lb)   LMP  (LMP Unknown)   SpO2 100%   BMI 24.89 kg/m     Estimated body mass index is 24.89 kg/m  as calculated from the following:    Height as of this encounter: 1.626 m (5' 4\").    Weight as of this encounter: 65.8 kg (145 lb).    Physical Exam  Vitals and nursing note reviewed.   Constitutional:       Appearance: Normal appearance.   HENT:      Head: Normocephalic.      Right Ear: Tympanic membrane, ear canal and external ear " normal.      Left Ear: Tympanic membrane, ear canal and external ear normal.      Mouth/Throat:      Mouth: Mucous membranes are moist.      Pharynx: Oropharynx is clear.   Eyes:      Extraocular Movements: Extraocular movements intact.      Conjunctiva/sclera: Conjunctivae normal.      Pupils: Pupils are equal, round, and reactive to light.   Neck:      Thyroid: No thyroid mass or thyromegaly.   Cardiovascular:      Rate and Rhythm: Normal rate and regular rhythm.   Pulmonary:      Effort: Pulmonary effort is normal.      Breath sounds: Normal breath sounds.   Abdominal:      General: Bowel sounds are normal.      Palpations: Abdomen is soft. There is no mass.      Tenderness: There is no abdominal tenderness.   Musculoskeletal:         General: Normal range of motion.   Lymphadenopathy:      Cervical: No cervical adenopathy.   Skin:     General: Skin is warm and dry.   Neurological:      General: No focal deficit present.      Mental Status: She is alert and oriented to person, place, and time.   Psychiatric:         Mood and Affect: Mood normal.         Behavior: Behavior normal.              2/24/2025   Mini Cog   Clock Draw Score 2 Normal   3 Item Recall 3 objects recalled   Mini Cog Total Score 5              Signed Electronically by: Mil García MD    Answers submitted by the patient for this visit:  Patient Health Questionnaire (Submitted on 2/24/2025)  If you checked off any problems, how difficult have these problems made it for you to do your work, take care of things at home, or get along with other people?: Not difficult at all  PHQ9 TOTAL SCORE: 0

## 2025-02-27 ENCOUNTER — PATIENT OUTREACH (OUTPATIENT)
Dept: CARE COORDINATION | Facility: CLINIC | Age: 89
End: 2025-02-27
Payer: COMMERCIAL

## 2025-03-04 ENCOUNTER — TELEPHONE (OUTPATIENT)
Dept: FAMILY MEDICINE | Facility: CLINIC | Age: 89
End: 2025-03-04
Payer: COMMERCIAL

## 2025-03-04 NOTE — TELEPHONE ENCOUNTER
Ozempic  was picked up today 03-   Patient did not have ID today but verified all information/demographics.   Asim Zhong

## 2025-03-13 ENCOUNTER — LAB (OUTPATIENT)
Dept: LAB | Facility: CLINIC | Age: 89
End: 2025-03-13
Payer: COMMERCIAL

## 2025-03-13 ENCOUNTER — ANTICOAGULATION THERAPY VISIT (OUTPATIENT)
Dept: ANTICOAGULATION | Facility: CLINIC | Age: 89
End: 2025-03-13

## 2025-03-13 DIAGNOSIS — Z79.01 LONG TERM CURRENT USE OF ANTICOAGULANTS WITH INR GOAL OF 2.0-3.0: ICD-10-CM

## 2025-03-13 DIAGNOSIS — I48.0 PAROXYSMAL ATRIAL FIBRILLATION (H): Primary | ICD-10-CM

## 2025-03-13 DIAGNOSIS — I48.0 PAROXYSMAL ATRIAL FIBRILLATION (H): ICD-10-CM

## 2025-03-13 LAB — INR BLD: 2.7 (ref 0.9–1.1)

## 2025-03-13 NOTE — PROGRESS NOTES
ANTICOAGULATION MANAGEMENT     Ирина Yanez 89 year old female is on warfarin with therapeutic INR result. (Goal INR 2.0-3.0)    Recent labs: (last 7 days)     03/13/25  1130   INR 2.7*       ASSESSMENT     Source(s): Chart Review and Patient/Caregiver Call     Warfarin doses taken: Warfarin taken as instructed  Diet: No new diet changes identified  Medication/supplement changes: Wellness exam on 2/24/25--metoprolol increased   New illness, injury, or hospitalization: No  Signs or symptoms of bleeding or clotting: No  Previous result: Therapeutic last 2(+) visits  Additional findings: None       PLAN     Recommended plan for no diet, medication or health factor changes affecting INR     Dosing Instructions: Continue your current warfarin dose with next INR in 5 weeks       Summary  As of 3/13/2025      Full warfarin instructions:  5 mg every day   Next INR check:  4/17/2025               Telephone call with Ирина who verbalizes understanding and agrees to plan    Lab visit scheduled    Education provided: Taking warfarin: Importance of taking warfarin as instructed    Plan made per [facility] anticoagulation protocol    Summer Mina RN  3/13/2025  Anticoagulation Clinic  [place] for routing messages: kam RAMIREZ  [facility] patient phone line: [phone]        _______________________________________________________________________     Anticoagulation Episode Summary       Current INR goal:  2.0-3.0   TTR:  71.4% (1 y)   Target end date:  Indefinite   Send INR reminders to:  KRIS RAMIREZ    Indications    Paroxysmal atrial fibrillation (H) [I48.0]  Long term current use of anticoagulants with INR goal of 2.0-3.0 [Z79.01]             Comments:  --             Anticoagulation Care Providers       Provider Role Specialty Phone number    Yuridia Villarreal MD Referring Family Medicine [phone]    Mil Gacría MD Referring Family Medicine [phone]

## 2025-03-22 ENCOUNTER — HEALTH MAINTENANCE LETTER (OUTPATIENT)
Age: 89
End: 2025-03-22

## 2025-04-17 ENCOUNTER — ANTICOAGULATION THERAPY VISIT (OUTPATIENT)
Dept: ANTICOAGULATION | Facility: CLINIC | Age: 89
End: 2025-04-17

## 2025-04-17 ENCOUNTER — LAB (OUTPATIENT)
Dept: LAB | Facility: CLINIC | Age: 89
End: 2025-04-17
Payer: COMMERCIAL

## 2025-04-17 DIAGNOSIS — Z79.01 LONG TERM CURRENT USE OF ANTICOAGULANTS WITH INR GOAL OF 2.0-3.0: ICD-10-CM

## 2025-04-17 DIAGNOSIS — I48.0 PAROXYSMAL ATRIAL FIBRILLATION (H): Primary | ICD-10-CM

## 2025-04-17 DIAGNOSIS — I48.0 PAROXYSMAL ATRIAL FIBRILLATION (H): ICD-10-CM

## 2025-04-17 LAB — INR BLD: 2.9 (ref 0.9–1.1)

## 2025-04-17 NOTE — PROGRESS NOTES
ANTICOAGULATION MANAGEMENT     Ирина Yanez 89 year old female is on warfarin with therapeutic INR result. (Goal INR 2.0-3.0)    Recent labs: (last 7 days)     04/17/25  1140   INR 2.9*       ASSESSMENT     Source(s): Chart Review  Previous INR was Therapeutic last 2(+) visits  Diet & health changes since last INR chart reviewed; none identified  Diltiazem dose was increased on 3/28/25 since BP was still elevated.  Per Up to Date, no interaction with warfarin.         PLAN     Recommended plan for no diet, medication or health factor changes affecting INR     Dosing Instructions: Continue your current warfarin dose with next INR in 6 weeks       Summary  As of 4/17/2025      Full warfarin instructions:  5 mg every day   Next INR check:  5/29/2025               Detailed voice message left for Ирина with dosing instructions and follow up date.     Contact [phone] to schedule and with any changes, questions or concerns.     Education provided: Please call back if any changes to your diet, medications or how you've been taking warfarin    Plan made per [facility] anticoagulation protocol    Sherri Tipton RN  4/17/2025  Anticoagulation Clinic  [employer] [place] for routing messages: kam RAMIREZ  [facility] patient phone line: [phone]        _______________________________________________________________________     Anticoagulation Episode Summary       Current INR goal:  2.0-3.0   TTR:  72.5% (1 y)   Target end date:  Indefinite   Send INR reminders to:  KRIS RAMIREZ    Indications    Paroxysmal atrial fibrillation (H) [I48.0]  Long term current use of anticoagulants with INR goal of 2.0-3.0 [Z79.01]             Comments:  --             Anticoagulation Care Providers       Provider Role Specialty Phone number    Yuridia Villarreal MD Referring Family Medicine [phone]    Mil García MD Referring Family Medicine [phone]

## 2025-04-27 DIAGNOSIS — E11.21 TYPE 2 DIABETES MELLITUS WITH DIABETIC NEPHROPATHY, WITHOUT LONG-TERM CURRENT USE OF INSULIN (H): ICD-10-CM

## 2025-04-28 ENCOUNTER — OFFICE VISIT (OUTPATIENT)
Dept: FAMILY MEDICINE | Facility: CLINIC | Age: 89
End: 2025-04-28
Payer: COMMERCIAL

## 2025-04-28 VITALS
HEART RATE: 44 BPM | HEIGHT: 64 IN | OXYGEN SATURATION: 99 % | TEMPERATURE: 97.4 F | WEIGHT: 152.2 LBS | RESPIRATION RATE: 16 BRPM | SYSTOLIC BLOOD PRESSURE: 164 MMHG | DIASTOLIC BLOOD PRESSURE: 68 MMHG | BODY MASS INDEX: 25.99 KG/M2

## 2025-04-28 DIAGNOSIS — E11.42 DIABETIC POLYNEUROPATHY ASSOCIATED WITH TYPE 2 DIABETES MELLITUS (H): ICD-10-CM

## 2025-04-28 DIAGNOSIS — E11.21 TYPE 2 DIABETES MELLITUS WITH DIABETIC NEPHROPATHY, WITHOUT LONG-TERM CURRENT USE OF INSULIN (H): ICD-10-CM

## 2025-04-28 DIAGNOSIS — I10 HYPERTENSION GOAL BP (BLOOD PRESSURE) < 140/90: Primary | ICD-10-CM

## 2025-04-28 PROCEDURE — G2211 COMPLEX E/M VISIT ADD ON: HCPCS | Performed by: NURSE PRACTITIONER

## 2025-04-28 PROCEDURE — 99214 OFFICE O/P EST MOD 30 MIN: CPT | Performed by: NURSE PRACTITIONER

## 2025-04-28 PROCEDURE — 3078F DIAST BP <80 MM HG: CPT | Performed by: NURSE PRACTITIONER

## 2025-04-28 PROCEDURE — 1125F AMNT PAIN NOTED PAIN PRSNT: CPT | Performed by: NURSE PRACTITIONER

## 2025-04-28 PROCEDURE — 99207 PR FOOT EXAM NO CHARGE: CPT | Performed by: NURSE PRACTITIONER

## 2025-04-28 PROCEDURE — 3077F SYST BP >= 140 MM HG: CPT | Performed by: NURSE PRACTITIONER

## 2025-04-28 RX ORDER — GABAPENTIN 100 MG/1
200 CAPSULE ORAL
Qty: 180 CAPSULE | Refills: 1 | Status: SHIPPED | OUTPATIENT
Start: 2025-04-28

## 2025-04-28 RX ORDER — GABAPENTIN 100 MG/1
CAPSULE ORAL
Qty: 180 CAPSULE | Refills: 1 | OUTPATIENT
Start: 2025-04-28

## 2025-04-28 ASSESSMENT — PAIN SCALES - GENERAL: PAINLEVEL_OUTOF10: MILD PAIN (2)

## 2025-04-28 ASSESSMENT — ENCOUNTER SYMPTOMS
SHORTNESS OF BREATH: 0
NUMBNESS: 1
ARTHRALGIAS: 0
COUGH: 0
GASTROINTESTINAL NEGATIVE: 1
PSYCHIATRIC NEGATIVE: 1
DIZZINESS: 0
PALPITATIONS: 0
LIGHT-HEADEDNESS: 0
FEVER: 0
FATIGUE: 0
HEADACHES: 0

## 2025-04-28 NOTE — NURSING NOTE
"Chief Complaint   Patient presents with    Diabetes    Hypertension     Initial BP (!) 171/64 (BP Location: Right arm, Patient Position: Sitting, Cuff Size: Adult Regular)   Pulse (!) 44   Temp 97.4  F (36.3  C) (Oral)   Resp 16   Ht 1.626 m (5' 4\")   Wt 69 kg (152 lb 3.2 oz)   LMP  (LMP Unknown)   SpO2 99%   BMI 26.13 kg/m   Estimated body mass index is 26.13 kg/m  as calculated from the following:    Height as of this encounter: 1.626 m (5' 4\").    Weight as of this encounter: 69 kg (152 lb 3.2 oz).  BP completed using cuff size regular right arm    Lisa Magill, CMA    "

## 2025-04-28 NOTE — PROGRESS NOTES
"  Assessment & Plan     Hypertension goal BP (blood pressure) < 140/90  BP still above goal. Did not increase diltiazem at last visit as advised as she wanted to use up her prior dose. Home logs are consistent with in-clinic reading. Advised to try increased dose of diltiazem. She is in agreement. Continue home monitoring. Keep appointment with nephrology next week as planned. Seeing PCP next month.     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 Has no sensation. Advised on importance of foot protection and regular foot checks.  Has a podiatrist. Refilled gabapentin that she finds helpful for neuropathic foot pain.     - gabapentin (NEURONTIN) 100 MG capsule; Take 2 capsules (200 mg) by mouth nightly as needed for neuropathic pain.  - FOOT EXAM      Future Appointments   Date Time Provider Department Center   5/23/2025 11:00 AM Mil García MD [facility]   5/29/2025 11:30 AM [facility]         BMI  Estimated body mass index is 26.13 kg/m  as calculated from the following:    Height as of this encounter: 1.626 m (5' 4\").    Weight as of this encounter: 69 kg (152 lb 3.2 oz).       Bárbara Gifford is a 89 year old, presenting for the following health issues:  Diabetes and Hypertension        4/28/2025     8:34 AM   Additional Questions   Roomed by Lisa Magill, CMA   Accompanied by self         4/28/2025     8:34 AM   Patient Reported Additional Medications   Patient reports taking the following new medications none     HPI    Here today for 1 month follow up on HTN. BP was above goal last visit so diltiazem was increased from 180 to 240mg. Did not make that change yet.  Also on torsemide 10mg, and doxazosin, and metoprolol 50mg daily.   Followed by nephrology and cardiology. Seeing nephrology next week.    Checks BP at home on occasion 151/66, " 154/68, 160/66, 177/71, 168/66, 162/71 in the last month. No symptoms.     Diabetes Follow-up    How often are you checking your blood sugar? Not very often, but checked it this morning and it was 133  What concerns do you have today about your diabetes? None   Do you have any of these symptoms? (Select all that apply)  Numbness in feet and Burning in feet  Needs gabapentin refill.    BP Readings from Last 2 Encounters:   04/28/25 (!) 164/68   03/28/25 (!) 166/62     Hemoglobin A1C (%)   Date Value   02/13/2025 5.9 (H)   08/22/2024 6.0 (H)   01/26/2021 7.1 (H)   04/27/2020 6.8 (H)     LDL Cholesterol Calculated (mg/dL)   Date Value   02/13/2025 104 (H)   02/22/2024 130 (H)   06/02/2021 100 (H)   04/27/2020 89     LDL Cholesterol Direct (mg/dL)   Date Value   05/10/2022 88             Hypertension Follow-up    Do you check your blood pressure regularly outside of the clinic? Yes -occasionally   Are you following a low salt diet? No  Are your blood pressures ever more than 140 on the top number (systolic) OR more   than 90 on the bottom number (diastolic), for example 140/90? Yes    BP Readings from Last 2 Encounters:   04/28/25 (!) 171/64   03/28/25 (!) 166/62     How many servings of fruits and vegetables do you eat daily?  0-1  On average, how many sweetened beverages do you drink each day (Examples: soda, juice, sweet tea, etc.  Do NOT count diet or artificially sweetened beverages)?   0  How many days per week do you exercise enough to make your heart beat faster? none  How many minutes a day do you exercise enough to make your heart beat faster? 0  How many days per week do you miss taking your medication? 1  What makes it hard for you to take your medications?  remembering to take    Review of Systems   Constitutional:  Negative for fatigue and fever.   Eyes:  Negative for visual disturbance.   Respiratory:  Negative for cough and shortness of breath.    Cardiovascular:  Negative for chest pain, palpitations  "and leg swelling.   Gastrointestinal: Negative.    Genitourinary: Negative.    Musculoskeletal:  Negative for arthralgias.   Skin: Negative.    Neurological:  Positive for numbness. Negative for dizziness, light-headedness and headaches.   Psychiatric/Behavioral: Negative.             Objective    BP (!) 171/64 (BP Location: Right arm, Patient Position: Sitting, Cuff Size: Adult Regular)   Pulse (!) 44   Temp 97.4  F (36.3  C) (Oral)   Resp 16   Ht 1.626 m (5' 4\")   Wt 69 kg (152 lb 3.2 oz)   LMP  (LMP Unknown)   SpO2 99%   BMI 26.13 kg/m    Body mass index is 26.13 kg/m .  Physical Exam  Constitutional:       Appearance: Normal appearance.   HENT:      Head: Normocephalic.      Mouth/Throat:      Mouth: Mucous membranes are moist.      Pharynx: Oropharynx is clear.   Eyes:      Conjunctiva/sclera: Conjunctivae normal.   Cardiovascular:      Rate and Rhythm: Normal rate and regular rhythm.      Pulses: Normal pulses.           Dorsalis pedis pulses are 2+ on the right side and 2+ on the left side.        Posterior tibial pulses are 2+ on the right side and 2+ on the left side.      Heart sounds: Murmur heard.   Pulmonary:      Effort: Pulmonary effort is normal.      Breath sounds: Normal breath sounds.   Musculoskeletal:      Right lower leg: No edema.      Left lower leg: No edema.      Right foot: Normal range of motion. No deformity, bunion, Charcot foot or foot drop.      Left foot: Normal range of motion. No deformity, bunion, Charcot foot or foot drop.   Feet:      Right foot:      Protective Sensation: 10 sites tested.  0 sites sensed.      Skin integrity: Skin integrity normal.      Toenail Condition: Right toenails are abnormally thick.      Left foot:      Protective Sensation: 10 sites tested.  0 sites sensed.      Skin integrity: Skin integrity normal.      Toenail Condition: Left toenails are abnormally thick.   Skin:     General: Skin is warm and dry.   Neurological:      General: No focal " deficit present.      Mental Status: She is alert. Mental status is at baseline.   Psychiatric:         Mood and Affect: Mood normal.         Behavior: Behavior normal.            Signed Electronically by: SAMY Teixeira CNP

## 2025-04-28 NOTE — PATIENT INSTRUCTIONS
Try the increased dose of diltiazem and monitor blood pressure at home.    Keep appointment with nephrology next week as planned for follow up. If that appointment falls through for any reason, please call our office so we can contact them vs cardiology for follow up plan.    Refilled gabapentin.  Monitor feet at home.

## 2025-05-09 ENCOUNTER — HOSPITAL ENCOUNTER (OUTPATIENT)
Dept: GENERAL RADIOLOGY | Facility: CLINIC | Age: 89
Discharge: HOME | End: 2025-05-09
Attending: INTERNAL MEDICINE | Admitting: INTERNAL MEDICINE
Payer: COMMERCIAL

## 2025-05-09 DIAGNOSIS — R19.7 DIARRHEA, UNSPECIFIED: ICD-10-CM

## 2025-05-09 DIAGNOSIS — R15.9 INCONTINENCE OF FECES, UNSPECIFIED FECAL INCONTINENCE TYPE: ICD-10-CM

## 2025-05-09 PROCEDURE — 74019 RADEX ABDOMEN 2 VIEWS: CPT

## 2025-05-12 ENCOUNTER — TRANSFERRED RECORDS (OUTPATIENT)
Dept: ADMINISTRATIVE | Facility: CLINIC | Age: 89
End: 2025-05-12
Payer: COMMERCIAL

## 2025-05-13 NOTE — PROGRESS NOTES
2025        Ирина Renata   [address] [facility]  [place] [address]      Ирина Yanez,  :  1935    I'm writing to let you know about the tests that were taken recently.   Thank you for allowing [facility] the opportunity to take part in your healthcare.  At [facility] we strive to provide each patient with the finest gastroenterology care available.  We hope your experience was pleasant and informative.    Your abdominal xray shows a small stool volume (not suggestive of constipation). Proceed with anorectal manometry as ordered during your recent clinic visit. Consider trial of imodium, sparingly, as needed before meals or going out where an urgent stool or accident would be problematic. Beware constipation. You should not exceed 16mg per day.      Thank you.    Electronically signed by:  Arianna Garrido MD 2025 04:19 PM  Document generated by:  Arianna Garrido MD  2025  If your provider ordered multiple tests; the results may not become available at the same time.  If multiple test results are received within 14 days of one another, you may receive a duplicate.  cc:  Mil García MD

## 2025-05-23 ENCOUNTER — OFFICE VISIT (OUTPATIENT)
Dept: FAMILY MEDICINE | Facility: CLINIC | Age: 89
End: 2025-05-23
Attending: FAMILY MEDICINE
Payer: COMMERCIAL

## 2025-05-23 VITALS
SYSTOLIC BLOOD PRESSURE: 186 MMHG | TEMPERATURE: 97.4 F | DIASTOLIC BLOOD PRESSURE: 62 MMHG | OXYGEN SATURATION: 97 % | HEART RATE: 55 BPM | WEIGHT: 152.2 LBS | RESPIRATION RATE: 18 BRPM | BODY MASS INDEX: 25.99 KG/M2 | HEIGHT: 64 IN

## 2025-05-23 DIAGNOSIS — K52.9 CHRONIC DIARRHEA: ICD-10-CM

## 2025-05-23 DIAGNOSIS — Z78.0 ASYMPTOMATIC POSTMENOPAUSAL STATUS: ICD-10-CM

## 2025-05-23 DIAGNOSIS — I10 HYPERTENSION GOAL BP (BLOOD PRESSURE) < 140/90: Primary | ICD-10-CM

## 2025-05-23 DIAGNOSIS — N18.4 CKD (CHRONIC KIDNEY DISEASE) STAGE 4, GFR 15-29 ML/MIN (H): ICD-10-CM

## 2025-05-23 DIAGNOSIS — E11.21 TYPE 2 DIABETES MELLITUS WITH DIABETIC NEPHROPATHY, WITHOUT LONG-TERM CURRENT USE OF INSULIN (H): ICD-10-CM

## 2025-05-23 LAB
EST. AVERAGE GLUCOSE BLD GHB EST-MCNC: 140 MG/DL
HBA1C MFR BLD: 6.5 % (ref 0–5.6)
HGB BLD-MCNC: 10.3 G/DL (ref 11.7–15.7)
MCV RBC AUTO: 95 FL (ref 78–100)

## 2025-05-23 PROCEDURE — 3077F SYST BP >= 140 MM HG: CPT | Performed by: FAMILY MEDICINE

## 2025-05-23 PROCEDURE — 99214 OFFICE O/P EST MOD 30 MIN: CPT | Performed by: FAMILY MEDICINE

## 2025-05-23 PROCEDURE — G2211 COMPLEX E/M VISIT ADD ON: HCPCS | Performed by: FAMILY MEDICINE

## 2025-05-23 PROCEDURE — 36415 COLL VENOUS BLD VENIPUNCTURE: CPT | Performed by: FAMILY MEDICINE

## 2025-05-23 PROCEDURE — 85018 HEMOGLOBIN: CPT | Performed by: FAMILY MEDICINE

## 2025-05-23 PROCEDURE — 3078F DIAST BP <80 MM HG: CPT | Performed by: FAMILY MEDICINE

## 2025-05-23 PROCEDURE — 3044F HG A1C LEVEL LT 7.0%: CPT | Performed by: FAMILY MEDICINE

## 2025-05-23 PROCEDURE — 80048 BASIC METABOLIC PNL TOTAL CA: CPT | Performed by: FAMILY MEDICINE

## 2025-05-23 PROCEDURE — 83036 HEMOGLOBIN GLYCOSYLATED A1C: CPT | Performed by: FAMILY MEDICINE

## 2025-05-23 RX ORDER — OLMESARTAN MEDOXOMIL 20 MG/1
20 TABLET ORAL DAILY
Qty: 30 TABLET | Refills: 1 | Status: SHIPPED | OUTPATIENT
Start: 2025-05-23

## 2025-05-23 RX ORDER — LOPERAMIDE HYDROCHLORIDE 2 MG/1
2-4 TABLET ORAL 4 TIMES DAILY PRN
Qty: 100 TABLET | Refills: 1 | Status: SHIPPED | OUTPATIENT
Start: 2025-05-23

## 2025-05-23 NOTE — PROGRESS NOTES
Assessment and Plan    (I10) Hypertension goal BP (blood pressure) < 140/90  (primary encounter diagnosis)  Comment: switching to olmesartan, may need other medications as well  Plan: olmesartan (BENICAR) 20 MG tablet            (E11.21) Type 2 diabetes mellitus with diabetic nephropathy, without long-term current use of insulin (H)  Comment: stable A1c w/I goal  Plan: HEMOGLOBIN A1C            (N18.4) CKD (chronic kidney disease) stage 4, GFR 15-29 ml/min (H)  Comment: follows closely with renal as well  Plan: BASIC METABOLIC PANEL, Hemoglobin            (Z78.0) Asymptomatic postmenopausal status  Comment:   Plan: DEXA HIP/PELVIS/SPINE - Future            (K52.9) Chronic diarrhea  Comment: pre GI recc  Plan: loperamide (IMODIUM A-D) 2 MG tablet              RTC in 1m BP check    Mli García MD     The longitudinal plan of care for the diagnosis(es)/condition(s) as documented were addressed during this visit. Due to the added complexity in care, I will continue to support patient in the subsequent management and with ongoing continuity of care.      Bárbara Gifford is a 89 year old, presenting for the following health issues:  Follow Up (HTN and BP)        5/23/2025    10:41 AM   Additional Questions   Roomed by Ellie CARTER         5/23/2025    10:41 AM   Patient Reported Additional Medications   Patient reports taking the following new medications n/a     History of Present Illness       Diabetes:   She presents for follow up of diabetes.  She is checking home blood glucose a few times a month.   She checks blood glucose before meals.  Blood glucose is never over 200 and never under 70.  When her blood glucose is low, the patient is asymptomatic for confusion, blurred vision, lethargy and reports not feeling dizzy, shaky, or weak.   She has no concerns regarding her diabetes at this time.  She is having numbness in feet and burning in feet.             Denies having any SOB or chest pain. She said she has been  "feeling dizzy this morning. Feeling a bit dizzy today, but has been OK recently.  Seen by nephrology a couple of weeks ago, next appt in September.    Is working with GI on diarrhea, their current recommendation is up to 16mg imodium PRN      Hypertension Follow-up    Do you check your blood pressure regularly outside of the clinic? Yes   Are you following a low salt diet? Yes  Are your blood pressures ever more than 140 on the top number (systolic) OR more   than 90 on the bottom number (diastolic), for example 140/90? Yes    BP Readings from Last 2 Encounters:   05/23/25 (!) 186/62   04/28/25 (!) 164/68     How many servings of fruits and vegetables do you eat daily?  2-3  On average, how many sweetened beverages do you drink each day (Examples: soda, juice, sweet tea, etc.  Do NOT count diet or artificially sweetened beverages)?   0  How many days per week do you exercise enough to make your heart beat faster? 3 or less  How many minutes a day do you exercise enough to make your heart beat faster? 9 or less  How many days per week do you miss taking your medication? 0        Objective    BP (!) 186/62   Pulse 55   Temp 97.4  F (36.3  C) (Oral)   Resp 18   Ht 1.626 m (5' 4\")   Wt 69 kg (152 lb 3.2 oz)   LMP  (LMP Unknown)   SpO2 97%   BMI 26.13 kg/m    Body mass index is 26.13 kg/m .  Physical Exam  Vitals reviewed.   HENT:      Head: Normocephalic and atraumatic.   Eyes:      Conjunctiva/sclera: Conjunctivae normal.   Cardiovascular:      Rate and Rhythm: Normal rate and regular rhythm.      Heart sounds: Normal heart sounds.   Pulmonary:      Effort: Pulmonary effort is normal.      Breath sounds: Normal breath sounds.   Skin:     General: Skin is warm and dry.   Neurological:      Mental Status: She is alert and oriented to person, place, and time.   Psychiatric:         Mood and Affect: Mood normal.         Behavior: Behavior normal.              Signed Electronically by: Mil García MD    "

## 2025-05-24 LAB
ANION GAP SERPL CALCULATED.3IONS-SCNC: 12 MMOL/L (ref 7–15)
BUN SERPL-MCNC: 47 MG/DL (ref 8–23)
CALCIUM SERPL-MCNC: 9.7 MG/DL (ref 8.8–10.4)
CHLORIDE SERPL-SCNC: 108 MMOL/L (ref 98–107)
CREAT SERPL-MCNC: 1.67 MG/DL (ref 0.51–0.95)
EGFRCR SERPLBLD CKD-EPI 2021: 29 ML/MIN/1.73M2
GLUCOSE SERPL-MCNC: 220 MG/DL (ref 70–99)
HCO3 SERPL-SCNC: 24 MMOL/L (ref 22–29)
POTASSIUM SERPL-SCNC: 4.6 MMOL/L (ref 3.4–5.3)
SODIUM SERPL-SCNC: 144 MMOL/L (ref 135–145)

## 2025-05-29 ENCOUNTER — RESULTS FOLLOW-UP (OUTPATIENT)
Dept: FAMILY MEDICINE | Facility: CLINIC | Age: 89
End: 2025-05-29

## 2025-05-29 ENCOUNTER — ANTICOAGULATION THERAPY VISIT (OUTPATIENT)
Dept: ANTICOAGULATION | Facility: CLINIC | Age: 89
End: 2025-05-29

## 2025-05-29 ENCOUNTER — LAB (OUTPATIENT)
Dept: LAB | Facility: CLINIC | Age: 89
End: 2025-05-29
Payer: COMMERCIAL

## 2025-05-29 ENCOUNTER — RESULTS FOLLOW-UP (OUTPATIENT)
Dept: ANTICOAGULATION | Facility: CLINIC | Age: 89
End: 2025-05-29

## 2025-05-29 DIAGNOSIS — I48.0 PAROXYSMAL ATRIAL FIBRILLATION (H): ICD-10-CM

## 2025-05-29 DIAGNOSIS — Z79.01 LONG TERM CURRENT USE OF ANTICOAGULANTS WITH INR GOAL OF 2.0-3.0: ICD-10-CM

## 2025-05-29 DIAGNOSIS — I48.0 PAROXYSMAL ATRIAL FIBRILLATION (H): Primary | ICD-10-CM

## 2025-05-29 LAB — INR BLD: 3.9 (ref 0.9–1.1)

## 2025-05-29 NOTE — PROGRESS NOTES
ANTICOAGULATION MANAGEMENT     Ирина Yanez 89 year old female is on warfarin with supratherapeutic INR result. (Goal INR 2.0-3.0)    Recent labs: (last 7 days)     25  1139   INR 3.9*       ASSESSMENT     Source(s): Chart Review and Patient/Caregiver Call     Warfarin doses taken: Warfarin taken as instructed  Diet: Decreased greens/vitamin K in diet; plans to resume previous intake, decreased appetite, recently had spouses   Medication/supplement changes: olmesartan started on 25 No interaction anticipated  loperamide as needed use No interaction anticipated  New illness, injury, or hospitalization: No  Signs or symptoms of bleeding or clotting: No  Previous result: Therapeutic last 2(+) visits  Additional findings: None       PLAN     Recommended plan for temporary change(s) affecting INR     Dosing Instructions: hold dose then continue your current warfarin dose with next INR in 2 weeks       Summary  As of 2025      Full warfarin instructions:  : Hold; Otherwise 5 mg every day   Next INR check:  2025               Telephone call with Ирина who agrees to plan and repeated back plan correctly    Lab visit scheduled    Education provided: Please call back if any changes to your diet, medications or how you've been taking warfarin  Contact [phone] with any changes, questions or concerns.     Plan made per [facility] anticoagulation protocol    Alondra Richter RN  2025  Anticoagulation Clinic  [employer] for routing messages: kam RAMIREZ  [facility] patient phone line: [phone]        _______________________________________________________________________     Anticoagulation Episode Summary       Current INR goal:  2.0-3.0   TTR:  67.2% (1 y)   Target end date:  Indefinite   Send INR reminders to:  KRIS RAMIREZ    Indications    Paroxysmal atrial fibrillation (H) [I48.0]  Long term current use of anticoagulants with INR goal of 2.0-3.0 [Z79.01]             Comments:   --             Anticoagulation Care Providers       Provider Role Specialty Phone number    Yuridia Villarreal MD Referring Family Medicine [phone]    Mil García MD Referring Family Medicine [phone]

## 2025-06-04 ENCOUNTER — HOSPITAL ENCOUNTER (INPATIENT)
Facility: CLINIC | Age: 89
End: 2025-06-04
Attending: EMERGENCY MEDICINE | Admitting: INTERNAL MEDICINE
Payer: COMMERCIAL

## 2025-06-04 ENCOUNTER — APPOINTMENT (OUTPATIENT)
Dept: ULTRASOUND IMAGING | Facility: CLINIC | Age: 89
DRG: 640 | End: 2025-06-04
Attending: INTERNAL MEDICINE
Payer: COMMERCIAL

## 2025-06-04 ENCOUNTER — APPOINTMENT (OUTPATIENT)
Dept: GENERAL RADIOLOGY | Facility: CLINIC | Age: 89
DRG: 640 | End: 2025-06-04
Attending: EMERGENCY MEDICINE
Payer: COMMERCIAL

## 2025-06-04 DIAGNOSIS — R00.1 SYMPTOMATIC BRADYCARDIA: ICD-10-CM

## 2025-06-04 DIAGNOSIS — Z71.89 OTHER SPECIFIED COUNSELING: Chronic | ICD-10-CM

## 2025-06-04 DIAGNOSIS — E87.5 HYPERKALEMIA: ICD-10-CM

## 2025-06-04 DIAGNOSIS — N17.9 ACUTE RENAL FAILURE SUPERIMPOSED ON CHRONIC KIDNEY DISEASE, UNSPECIFIED ACUTE RENAL FAILURE TYPE, UNSPECIFIED CKD STAGE: ICD-10-CM

## 2025-06-04 DIAGNOSIS — R22.31 AXILLARY MASS, RIGHT: Primary | ICD-10-CM

## 2025-06-04 DIAGNOSIS — L30.4 INTERTRIGO: ICD-10-CM

## 2025-06-04 DIAGNOSIS — E11.21 TYPE 2 DIABETES MELLITUS WITH DIABETIC NEPHROPATHY, WITHOUT LONG-TERM CURRENT USE OF INSULIN (H): ICD-10-CM

## 2025-06-04 DIAGNOSIS — G62.9 NEUROPATHY: ICD-10-CM

## 2025-06-04 DIAGNOSIS — R52 PAIN: ICD-10-CM

## 2025-06-04 DIAGNOSIS — Z79.01 LONG TERM (CURRENT) USE OF ANTICOAGULANTS: ICD-10-CM

## 2025-06-04 DIAGNOSIS — J96.01 ACUTE HYPOXIC RESPIRATORY FAILURE (H): ICD-10-CM

## 2025-06-04 DIAGNOSIS — E78.5 HYPERLIPIDEMIA WITH TARGET LDL LESS THAN 100: ICD-10-CM

## 2025-06-04 DIAGNOSIS — I10 HYPERTENSION GOAL BP (BLOOD PRESSURE) < 140/90: ICD-10-CM

## 2025-06-04 DIAGNOSIS — I48.0 PAROXYSMAL ATRIAL FIBRILLATION (H): ICD-10-CM

## 2025-06-04 DIAGNOSIS — I50.22 CHRONIC SYSTOLIC HEART FAILURE (H): ICD-10-CM

## 2025-06-04 DIAGNOSIS — N18.9 ACUTE RENAL FAILURE SUPERIMPOSED ON CHRONIC KIDNEY DISEASE, UNSPECIFIED ACUTE RENAL FAILURE TYPE, UNSPECIFIED CKD STAGE: ICD-10-CM

## 2025-06-04 DIAGNOSIS — R55 NEAR SYNCOPE: ICD-10-CM

## 2025-06-04 DIAGNOSIS — I35.0 AORTIC VALVE STENOSIS, ETIOLOGY OF CARDIAC VALVE DISEASE UNSPECIFIED: ICD-10-CM

## 2025-06-04 LAB
ALBUMIN SERPL BCG-MCNC: 3.4 G/DL (ref 3.5–5.2)
ALP SERPL-CCNC: 84 U/L (ref 40–150)
ALT SERPL W P-5'-P-CCNC: 19 U/L (ref 0–50)
ANION GAP SERPL CALCULATED.3IONS-SCNC: 10 MMOL/L (ref 7–15)
AST SERPL W P-5'-P-CCNC: 25 U/L (ref 0–45)
BASOPHILS # BLD AUTO: 0 10E3/UL (ref 0–0.2)
BASOPHILS NFR BLD AUTO: 0 %
BILIRUB SERPL-MCNC: <0.2 MG/DL
BUN SERPL-MCNC: 77.9 MG/DL (ref 8–23)
CALCIUM SERPL-MCNC: 8.6 MG/DL (ref 8.8–10.4)
CHLORIDE SERPL-SCNC: 109 MMOL/L (ref 98–107)
CREAT SERPL-MCNC: 3.28 MG/DL (ref 0.51–0.95)
EGFRCR SERPLBLD CKD-EPI 2021: 13 ML/MIN/1.73M2
EOSINOPHIL # BLD AUTO: 0 10E3/UL (ref 0–0.7)
EOSINOPHIL NFR BLD AUTO: 0 %
ERYTHROCYTE [DISTWIDTH] IN BLOOD BY AUTOMATED COUNT: 13.2 % (ref 10–15)
GLUCOSE BLDC GLUCOMTR-MCNC: 149 MG/DL (ref 70–99)
GLUCOSE BLDC GLUCOMTR-MCNC: 203 MG/DL (ref 70–99)
GLUCOSE BLDC GLUCOMTR-MCNC: 249 MG/DL (ref 70–99)
GLUCOSE SERPL-MCNC: 157 MG/DL (ref 70–99)
HCO3 SERPL-SCNC: 22 MMOL/L (ref 22–29)
HCT VFR BLD AUTO: 28.5 % (ref 35–47)
HGB BLD-MCNC: 9.1 G/DL (ref 11.7–15.7)
HOLD SPECIMEN: NORMAL
IMM GRANULOCYTES # BLD: 0 10E3/UL
IMM GRANULOCYTES NFR BLD: 0 %
INR PPP: 4.3 (ref 0.85–1.15)
LYMPHOCYTES # BLD AUTO: 0.8 10E3/UL (ref 0.8–5.3)
LYMPHOCYTES NFR BLD AUTO: 14 %
MCH RBC QN AUTO: 30.3 PG (ref 26.5–33)
MCHC RBC AUTO-ENTMCNC: 31.9 G/DL (ref 31.5–36.5)
MCV RBC AUTO: 95 FL (ref 78–100)
MONOCYTES # BLD AUTO: 0.4 10E3/UL (ref 0–1.3)
MONOCYTES NFR BLD AUTO: 7 %
NEUTROPHILS # BLD AUTO: 4.6 10E3/UL (ref 1.6–8.3)
NEUTROPHILS NFR BLD AUTO: 79 %
NRBC # BLD AUTO: 0 10E3/UL
NRBC BLD AUTO-RTO: 0 /100
NT-PROBNP SERPL-MCNC: 5922 PG/ML (ref 0–624)
PLATELET # BLD AUTO: 152 10E3/UL (ref 150–450)
POTASSIUM SERPL-SCNC: 5.4 MMOL/L (ref 3.4–5.3)
POTASSIUM SERPL-SCNC: 6.3 MMOL/L (ref 3.4–5.3)
POTASSIUM SERPL-SCNC: 6.4 MMOL/L (ref 3.4–5.3)
PROT SERPL-MCNC: 6.1 G/DL (ref 6.4–8.3)
PROTHROMBIN TIME: 39.2 SECONDS (ref 11.8–14.8)
RBC # BLD AUTO: 3 10E6/UL (ref 3.8–5.2)
SODIUM SERPL-SCNC: 141 MMOL/L (ref 135–145)
TROPONIN T SERPL HS-MCNC: 40 NG/L
TROPONIN T SERPL HS-MCNC: 41 NG/L
WBC # BLD AUTO: 5.8 10E3/UL (ref 4–11)

## 2025-06-04 PROCEDURE — 76770 US EXAM ABDO BACK WALL COMP: CPT

## 2025-06-04 PROCEDURE — 71046 X-RAY EXAM CHEST 2 VIEWS: CPT

## 2025-06-04 PROCEDURE — 84484 ASSAY OF TROPONIN QUANT: CPT | Performed by: EMERGENCY MEDICINE

## 2025-06-04 PROCEDURE — 36415 COLL VENOUS BLD VENIPUNCTURE: CPT | Performed by: EMERGENCY MEDICINE

## 2025-06-04 PROCEDURE — 85041 AUTOMATED RBC COUNT: CPT | Performed by: EMERGENCY MEDICINE

## 2025-06-04 PROCEDURE — 84075 ASSAY ALKALINE PHOSPHATASE: CPT | Performed by: EMERGENCY MEDICINE

## 2025-06-04 PROCEDURE — 250N000011 HC RX IP 250 OP 636: Mod: JZ | Performed by: EMERGENCY MEDICINE

## 2025-06-04 PROCEDURE — 83880 ASSAY OF NATRIURETIC PEPTIDE: CPT | Performed by: EMERGENCY MEDICINE

## 2025-06-04 PROCEDURE — 85610 PROTHROMBIN TIME: CPT | Performed by: EMERGENCY MEDICINE

## 2025-06-04 PROCEDURE — 96360 HYDRATION IV INFUSION INIT: CPT

## 2025-06-04 PROCEDURE — 250N000009 HC RX 250: Performed by: EMERGENCY MEDICINE

## 2025-06-04 PROCEDURE — 258N000003 HC RX IP 258 OP 636: Performed by: EMERGENCY MEDICINE

## 2025-06-04 PROCEDURE — 210N000001 HC R&B IMCU HEART CARE

## 2025-06-04 PROCEDURE — 258N000001 HC RX 258: Performed by: EMERGENCY MEDICINE

## 2025-06-04 PROCEDURE — 84132 ASSAY OF SERUM POTASSIUM: CPT | Performed by: INTERNAL MEDICINE

## 2025-06-04 PROCEDURE — 85004 AUTOMATED DIFF WBC COUNT: CPT | Performed by: EMERGENCY MEDICINE

## 2025-06-04 PROCEDURE — 84132 ASSAY OF SERUM POTASSIUM: CPT | Performed by: EMERGENCY MEDICINE

## 2025-06-04 PROCEDURE — 99291 CRITICAL CARE FIRST HOUR: CPT | Mod: 25

## 2025-06-04 PROCEDURE — 84155 ASSAY OF PROTEIN SERUM: CPT | Performed by: EMERGENCY MEDICINE

## 2025-06-04 PROCEDURE — 250N000012 HC RX MED GY IP 250 OP 636 PS 637: Performed by: EMERGENCY MEDICINE

## 2025-06-04 PROCEDURE — 82962 GLUCOSE BLOOD TEST: CPT

## 2025-06-04 PROCEDURE — 250N000013 HC RX MED GY IP 250 OP 250 PS 637: Performed by: INTERNAL MEDICINE

## 2025-06-04 PROCEDURE — 93005 ELECTROCARDIOGRAM TRACING: CPT

## 2025-06-04 PROCEDURE — 99223 1ST HOSP IP/OBS HIGH 75: CPT | Performed by: INTERNAL MEDICINE

## 2025-06-04 RX ORDER — WATER 10 ML/10ML
INJECTION INTRAMUSCULAR; INTRAVENOUS; SUBCUTANEOUS
Status: COMPLETED
Start: 2025-06-04 | End: 2025-06-04

## 2025-06-04 RX ORDER — NICOTINE POLACRILEX 4 MG
15-30 LOZENGE BUCCAL
Status: ACTIVE | OUTPATIENT
Start: 2025-06-04

## 2025-06-04 RX ORDER — ALBUTEROL SULFATE 5 MG/ML
10 SOLUTION RESPIRATORY (INHALATION) ONCE
Status: COMPLETED | OUTPATIENT
Start: 2025-06-04 | End: 2025-06-04

## 2025-06-04 RX ORDER — CALCIUM CARBONATE 500 MG/1
1000 TABLET, CHEWABLE ORAL 4 TIMES DAILY PRN
Status: ACTIVE | OUTPATIENT
Start: 2025-06-04

## 2025-06-04 RX ORDER — ACETAMINOPHEN 500 MG
1000 TABLET ORAL
Status: ACTIVE | OUTPATIENT
Start: 2025-06-04

## 2025-06-04 RX ORDER — LIDOCAINE 40 MG/G
CREAM TOPICAL
Status: DISCONTINUED | OUTPATIENT
Start: 2025-06-04 | End: 2025-06-05

## 2025-06-04 RX ORDER — DEXTROSE MONOHYDRATE 25 G/50ML
25 INJECTION, SOLUTION INTRAVENOUS ONCE
Status: COMPLETED | OUTPATIENT
Start: 2025-06-04 | End: 2025-06-04

## 2025-06-04 RX ORDER — CALCIUM GLUCONATE 98 MG/ML
1 INJECTION, SOLUTION INTRAVENOUS ONCE
Status: COMPLETED | OUTPATIENT
Start: 2025-06-04 | End: 2025-06-04

## 2025-06-04 RX ORDER — PRAVASTATIN SODIUM 20 MG
20 TABLET ORAL DAILY
Status: DISPENSED | OUTPATIENT
Start: 2025-06-05

## 2025-06-04 RX ORDER — FENTANYL CITRATE 50 UG/ML
50 INJECTION, SOLUTION INTRAMUSCULAR; INTRAVENOUS ONCE
Refills: 0 | Status: DISCONTINUED | OUTPATIENT
Start: 2025-06-04 | End: 2025-06-04

## 2025-06-04 RX ORDER — SODIUM CHLORIDE 9 MG/ML
INJECTION, SOLUTION INTRAVENOUS CONTINUOUS
Status: DISCONTINUED | OUTPATIENT
Start: 2025-06-04 | End: 2025-06-05

## 2025-06-04 RX ORDER — DEXTROSE MONOHYDRATE 25 G/50ML
25-50 INJECTION, SOLUTION INTRAVENOUS
Status: ACTIVE | OUTPATIENT
Start: 2025-06-04

## 2025-06-04 RX ORDER — GABAPENTIN 100 MG/1
200 CAPSULE ORAL AT BEDTIME
Status: DISPENSED | OUTPATIENT
Start: 2025-06-04

## 2025-06-04 RX ORDER — ONDANSETRON 2 MG/ML
4 INJECTION INTRAMUSCULAR; INTRAVENOUS EVERY 6 HOURS PRN
Status: ACTIVE | OUTPATIENT
Start: 2025-06-04

## 2025-06-04 RX ORDER — AMOXICILLIN 250 MG
2 CAPSULE ORAL 2 TIMES DAILY PRN
Status: ACTIVE | OUTPATIENT
Start: 2025-06-04

## 2025-06-04 RX ORDER — ONDANSETRON 4 MG/1
4 TABLET, ORALLY DISINTEGRATING ORAL EVERY 6 HOURS PRN
Status: ACTIVE | OUTPATIENT
Start: 2025-06-04

## 2025-06-04 RX ORDER — LIDOCAINE 40 MG/G
CREAM TOPICAL
Status: ACTIVE | OUTPATIENT
Start: 2025-06-04

## 2025-06-04 RX ORDER — AMOXICILLIN 250 MG
1 CAPSULE ORAL 2 TIMES DAILY PRN
Status: ACTIVE | OUTPATIENT
Start: 2025-06-04

## 2025-06-04 RX ORDER — LOPERAMIDE HYDROCHLORIDE 2 MG/1
2-4 CAPSULE ORAL 4 TIMES DAILY PRN
Status: ACTIVE | OUTPATIENT
Start: 2025-06-04

## 2025-06-04 RX ADMIN — SODIUM CHLORIDE 7.7 UNITS: 0.9 INJECTION, SOLUTION INTRAVENOUS at 20:08

## 2025-06-04 RX ADMIN — DEXTROSE MONOHYDRATE 25 G: 25 INJECTION, SOLUTION INTRAVENOUS at 20:04

## 2025-06-04 RX ADMIN — ALBUTEROL SULFATE 10 MG: 2.5 SOLUTION RESPIRATORY (INHALATION) at 20:27

## 2025-06-04 RX ADMIN — GABAPENTIN 200 MG: 100 CAPSULE ORAL at 23:32

## 2025-06-04 RX ADMIN — CALCIUM GLUCONATE 1 G: 98 INJECTION, SOLUTION INTRAVENOUS at 19:59

## 2025-06-04 RX ADMIN — WATER 10 ML: 1 INJECTION INTRAMUSCULAR; INTRAVENOUS; SUBCUTANEOUS at 20:27

## 2025-06-04 RX ADMIN — SODIUM CHLORIDE 1000 ML: 0.9 INJECTION, SOLUTION INTRAVENOUS at 17:55

## 2025-06-04 RX ADMIN — DEXTROSE 300 ML: 10 SOLUTION INTRAVENOUS at 19:51

## 2025-06-04 ASSESSMENT — ACTIVITIES OF DAILY LIVING (ADL)
ADLS_ACUITY_SCORE: 53

## 2025-06-04 NOTE — ED PROVIDER NOTES
Emergency Department Note      History of Present Illness     Chief Complaint   Hypotension      HPI   Ирина Yanez is a 89 year old female anti-coagulated on coumadin with a history of atrial fibrillation, hypertension, hyperlipidemia, acute on chronic diastolic heart failure, congestive heart failure and DM2 amongst other who presents to the ED via EMS for hypotension. The patient reports she was outside in the sun for two hours pulling weeds earlier today. Shortly after getting inside she developed sudden lightheadedness. She then lowered herself to to her knees and fell to the floor, landing on her left side. Patient was then too weak to rise from the floor and thus called 911. EMS reports that on arrival her initial blood pressure reading was 88/36, with a 32 bpm, and 195 blood sugar level. En route to the ED her blood pressure fell to 66/40, prompting EMS give a total of 1.5 mg of Atropine improving her systolic pressure to the 110's. She was also given 600mL of fluids en route.     In the ED the patient reports that she has some left shoulder pain as well as mild left knee and neck pain. The patient is unsure of any syncope prior to the fall. She denies any hip pain, vomiting or diarrhea. She adds that she had a similar episode of lightheadedness about a week ago. She reports being on metoprolol to manage her hypertension.    Independent Historian   EMS as detailed above.    Review of External Notes   None    Past Medical History     Medical History and Problem List   Abnormal gait  Acute on chronic diastolic heart failure  Anemia in chronic kidney disease  Anemia  Vitamin D deficiency  Diverticular disease of colon  Depression  Neuropathy  Proteinuria  Acute chronic kidney failure  Acute respiratory failure with hypoxia  Anxiety  Basal cell carcinoma  Bilateral pleural effusion  Congestive heart failure  Chronic diarrhea  Hyperlipidemia  Hypertension  Paroxysmal atrial fibrillation  Secondary  hyperparathyroidism  Sleep apnea  Chronic kidney disease, stage 4  Stage 2 diabetes  ACP  Aortic sclerosis  Aortic valve stenosis  Arthritis  Chronic systolic heart failure  Heart murmur  Hypoxemia  Tendon rupture, traumatic, quadriceps  Toe amputation status, left  Vitamin B12 deficiency  Vulvar dystrophy    Medications   Albuterol  Doxazosin  Ferrous sulfate  Matzim  Gabapentin  Metoprolol   Ozempic  Diprosone  Atacand  Temovate  Bactroban  Cardura  Imodium  Valisone  Cardiazem  Neurontin  Toprol  Mycostatin  Benicar  Pravachol  Zoloft  Demadex  Coumadin    Surgical History   Total knee arthroplasty  Breast surgery  Spine surgery  Toe amputation  Quadriceps tendon repair  Cholecystectomy  Colonoscopy     Physical Exam     Patient Vitals for the past 24 hrs:   BP Temp Temp src Pulse Resp SpO2 Weight   06/04/25 2045 122/53 -- -- (!) 44 18 96 % --   06/04/25 2015 123/55 -- -- (!) 42 20 96 % --   06/04/25 2014 -- -- -- (!) 39 16 93 % --   06/04/25 1959 115/50 -- -- (!) 39 25 92 % --   06/04/25 1944 114/47 -- -- (!) 38 17 95 % --   06/04/25 1932 108/44 -- -- (!) 38 20 94 % --   06/04/25 1929 108/44 -- -- (!) 38 (!) 4 94 % --   06/04/25 1900 95/43 -- -- (!) 38 20 92 % --   06/04/25 1856 -- -- -- -- -- 97 % --   06/04/25 1855 104/42 -- -- (!) 39 -- -- --   06/04/25 1845 96/43 -- -- (!) 39 -- 97 % --   06/04/25 1842 99/41 -- -- (!) 39 -- (!) 91 % --   06/04/25 1840 (!) 74/42 -- -- (!) 39 -- 99 % --   06/04/25 1830 99/77 -- -- (!) 40 16 96 % --   06/04/25 1825 (!) 100/38 -- -- (!) 39 -- 94 % --   06/04/25 1824 -- -- -- -- 17 -- --   06/04/25 1815 95/44 -- -- (!) 41 10 -- --   06/04/25 1805 91/50 -- -- (!) 39 20 94 % --   06/04/25 1758 101/46 -- -- (!) 40 18 92 % --   06/04/25 1752 -- 97.4  F (36.3  C) Temporal -- -- -- 77.1 kg (169 lb 15.6 oz)   06/04/25 1750 -- -- -- -- 11 -- --   06/04/25 1744 102/45 -- -- 50 -- 95 % --     Physical Exam  Nursing note and vitals reviewed.    Constitutional:  Appears comfortable.    HENT:                 Nose normal.  No discharge.      Oral mucosa is moist.  Eyes:    Conjunctivae are normal without injection.  Pupils are equal.  Cardiovascular:  Slow rate, regular rhythm with normal S1 and S2.      Normal heart sounds and peripheral pulses 2+ and equal.    Pulmonary:  Effort normal and breath sounds clear to auscultation bilaterally.    No rales.     GI:    Soft. No distension and no mass. No tenderness.   Musculoskeletal:  Normal range of motion. No extremity deformity.     No edema and no tenderness.    Neurological:   Alert and oriented. No focal weakness.  Skin:    Skin is warm and dry. No rash noted.   Psychiatric:   Behavior is normal. Appropriate mood and affect.     Judgment and thought content normal.       Diagnostics     Lab Results   Labs Ordered and Resulted from Time of ED Arrival to Time of ED Departure   COMPREHENSIVE METABOLIC PANEL - Abnormal       Result Value    Sodium 141      Potassium 6.4 (*)     Carbon Dioxide (CO2) 22      Anion Gap 10      Urea Nitrogen 77.9 (*)     Creatinine 3.28 (*)     GFR Estimate 13 (*)     Calcium 8.6 (*)     Chloride 109 (*)     Glucose 157 (*)     Alkaline Phosphatase 84      AST 25      ALT 19      Protein Total 6.1 (*)     Albumin 3.4 (*)     Bilirubin Total <0.2     TROPONIN T, HIGH SENSITIVITY - Abnormal    Troponin T, High Sensitivity 41 (*)    NT-PROBNP - Abnormal    NT-proBNP 5,922 (*)    INR - Abnormal    INR 4.30 (*)     PT 39.2 (*)    CBC WITH PLATELETS AND DIFFERENTIAL - Abnormal    WBC Count 5.8      RBC Count 3.00 (*)     Hemoglobin 9.1 (*)     Hematocrit 28.5 (*)     MCV 95      MCH 30.3      MCHC 31.9      RDW 13.2      Platelet Count 152      % Neutrophils 79      % Lymphocytes 14      % Monocytes 7      % Eosinophils 0      % Basophils 0      % Immature Granulocytes 0      NRBCs per 100 WBC 0      Absolute Neutrophils 4.6      Absolute Lymphocytes 0.8      Absolute Monocytes 0.4      Absolute Eosinophils 0.0      Absolute Basophils  0.0      Absolute Immature Granulocytes 0.0      Absolute NRBCs 0.0     TROPONIN T, HIGH SENSITIVITY - Abnormal    Troponin T, High Sensitivity 40 (*)    POTASSIUM - Abnormal    Potassium 6.3 (*)    GLUCOSE BY METER - Abnormal    GLUCOSE BY METER POCT 149 (*)    GLUCOSE BY METER - Abnormal    GLUCOSE BY METER POCT 249 (*)    ROUTINE UA WITH MICROSCOPIC REFLEX TO CULTURE   GLUCOSE MONITOR NURSING POCT   GLUCOSE MONITOR NURSING POCT   POTASSIUM   GLUCOSE MONITOR NURSING POCT   POTASSIUM       Imaging   Chest XR,  PA & LAT   Final Result   IMPRESSION:       Diffuse interstitial prominence, either pulmonary vasculature congestion or mild interstitial pulmonary edema.      No focal airspace disease. No pleural effusion or pneumothorax.      Stable mild cardiomegaly. Aortic calcifications.      Multilevel degenerative changes of the spine.          EKG   ECG taken at 1748, ECG read at 1751  Wide QRS rhythm  Right bundle branch block  Left posterior fascicular block  Bifascicular block  Abnormal ECG   Rate 41 bpm. NE interval * ms. QRS duration 142 ms. QT/QTc 520/429 ms. P-R-T axes * 110 54.    Independent Interpretation   CXR: Heart is big. Mild pulmonary congestion.    ED Course      Medications Administered   Medications   glucose gel 15-30 g (has no administration in time range)     Or   dextrose 50 % injection 25-50 mL (has no administration in time range)     Or   glucagon injection 1 mg (has no administration in time range)   dextrose 10% BOLUS 300 mL (300 mLs Intravenous $New Bag 6/4/25 1951)   sodium chloride 0.9% BOLUS 1,000 mL (0 mLs Intravenous Stopped 6/4/25 1859)   calcium gluconate 10 % injection 1 g (0 g Intravenous Stopped 6/4/25 2119)   dextrose 50 % injection 25 g (25 g Intravenous $Given 6/4/25 2004)   insulin regular 1 unit/mL injection 7.7 Units (7.7 Units Intravenous $Given 6/4/25 2008)   albuterol (PROVENTIL) neb solution 10 mg (10 mg Nebulization $Given 6/4/25 2027)   sterile water (preservative  free) injection (10 mLs  $Given 6/4/25 2027)     Procedures   Procedures     Discussion of Management   See ED course.     ED Course   ED Course as of 06/04/25 2130 Wed Jun 04, 2025 1738 I obtained history and examined the patient as noted above     1902 I rechecked the patient.   1930 I rechecked on the patient.   1943 I consulted with Dr. Camacho of the hospitalist service and discussed patient admission. They accepted care of the patient.     Additional Documentation  None    Medical Decision Making / Diagnosis     CMS Diagnoses: None    MIPS   None             MDM   Ирина Yanez is a 89 year old female who comes into the stabilization room with an episode of near syncope where her blood pressure and pulse were low when EMS got there.  They gave her some fluids and some atropine because her pulse rate was around 30 and her pressure came back up to normal by the time she arrived here.  The patient feels normal now.  She said she just started feeling lightheaded after she had been outside for 2 hours pulling weeds.  She said she did not drink anything when she was outside.  She has had no chest pain.  The patient does have some chronic renal failure so I got labs, she was given a liter of fluids here, and was noted to be bradycardic with a pulse rate 32-44 in the ER.  Her blood pressures remained stable around 100-110 systolic.  Her labs came back showing worsening renal failure with a creatinine now up to 3.28, her potassium was 6.4 and I repeated it was 6.3.  Troponin was high at 41 delta was unchanged at 40.  She was just bradycardic but they were narrow complexes and the QRS was not wide.  She is anemic with a hemoglobin 9.1 normal white count and is low at 8.6.  BNP was high at 5922.  I did get a chest x-ray and there is evidence of maybe some mild congestion.  She is not short of breath at this time.  I have given her calcium gluconate IV and ordered the hyperkalemia order set and she will get glucose and  insulin and will be monitored closely with her glucose levels.  I also ordered a neb treatment can help with bringing that potassium down.  This may be part of the reason she still bradycardic.  She is on a calcium channel blocker diltiazem and a beta-blocker metoprolol and these will be held.  Cardiology will be consulted as well in the morning.  Consider consulting nephrology for the acute renal failure.  I talked with Dr. Camacho hospitalist will be admitting the patient.    Care time minus procedures: 60 minutes    Disposition   The patient was admitted to the hospital.     Diagnosis     ICD-10-CM    1. Near syncope  R55     Likely Vasovagal      2. Symptomatic bradycardia  R00.1       3. Acute renal failure superimposed on chronic kidney disease, unspecified acute renal failure type, unspecified CKD stage  N17.9     N18.9       4. Hyperkalemia  E87.5       5. Long term (current) use of anticoagulants  Z79.01       6. Aortic valve stenosis, etiology of cardiac valve disease unspecified  I35.0            Discharge Medications   New Prescriptions    No medications on file       Scribe Disclosure:  I, SB BANKS, am serving as a scribe at 6:25 PM on 6/4/2025 to document services personally performed by Kari Long MD based on my observations and the provider's statements to me.        Kari Long MD  06/04/25 2118       Kari Long MD  06/04/25 2130

## 2025-06-04 NOTE — ED NOTES
Bed: ST03  Expected date:   Expected time:   Means of arrival:   Comments:  [facility] 89F Hypotension/Weakness

## 2025-06-04 NOTE — PHARMACY-ADMISSION MEDICATION HISTORY
Pharmacist Admission Medication History    Admission medication history is complete. The information provided in this note is only as accurate as the sources available at the time of the update.    Information Source(s): Patient and CareEverywhere/SureScripts via in-person    Pertinent Information: None    Changes made to PTA medication list:  Added: None  Deleted: None  Changed:   Doxazosin 2mg--> 4mg at bedtime  Gabapentin 200 mg bedtime as needed--> 200 mg at bedtime  Warfarin 5 mg daily    Allergies reviewed with patient and updates made in EHR: yes    Medication History Completed By: Macy Read RPH 6/4/2025 6:55 PM    PTA Med List   Medication Sig Last Dose/Taking    acetaminophen (TYLENOL) 500 MG tablet Take 1,000 mg by mouth every evening as needed for mild pain Taking As Needed    betamethasone valerate (VALISONE) 0.1 % external cream Apply topically daily (Patient taking differently: Apply topically daily as needed.) Taking Differently    diltiazem ER (CARDIAZEM LA) 240 MG 24 hr tablet Take 1 tablet (240 mg) by mouth daily. 6/4/2025 Morning    doxazosin (CARDURA) 4 MG tablet Take 4 mg by mouth at bedtime. 6/3/2025 Bedtime    gabapentin (NEURONTIN) 100 MG capsule Take 2 capsules (200 mg) by mouth nightly as needed for neuropathic pain. (Patient taking differently: Take 200 mg by mouth at bedtime.) 6/3/2025 Bedtime    loperamide (IMODIUM A-D) 2 MG tablet Take 1-2 tablets (2-4 mg) by mouth 4 times daily as needed for diarrhea. Taking As Needed    metoprolol succinate ER (TOPROL XL) 25 MG 24 hr tablet TAKE 1 TABLET BY MOUTH DAILY. GENERIC EQUIVALENT FOR TOPROL XL 6/4/2025 Morning    MULTI-VITAMIN OR TABS Take 2 tablets by mouth daily  6/4/2025 Morning    nystatin (MYCOSTATIN) 011593 UNIT/GM external powder Apply a small amount to affected area three times daily. (Patient taking differently: Apply topically 3 times daily as needed. Apply a small amount to affected area three times daily.) Taking Differently     Pt with new rash over torso and arms. Increased risk of wound complications. Will cancel case today and reschedule in near future. Rationale explained to family and patient. All questions answered. olmesartan (BENICAR) 20 MG tablet Take 1 tablet (20 mg) by mouth daily. 6/4/2025 Morning    pravastatin (PRAVACHOL) 20 MG tablet Take 1 tablet (20 mg) by mouth daily. 6/4/2025 Morning    sertraline (ZOLOFT) 50 MG tablet Take 1 tablet (50 mg) by mouth daily. 6/4/2025 Morning    torsemide (DEMADEX) 10 MG tablet Take 1 tablet (10 mg) by mouth daily. 6/4/2025 Morning    warfarin ANTICOAGULANT (JANTOVEN ANTICOAGULANT) 2.5 MG tablet Take 2 tablets (5 mg) by mouth daily except take 1.5 tablets (3.75 mg) on Sunday or as directed by [facility] (Patient taking differently: Take 5 mg by mouth every evening.) 6/3/2025 Evening

## 2025-06-05 ENCOUNTER — APPOINTMENT (OUTPATIENT)
Dept: CARDIOLOGY | Facility: CLINIC | Age: 89
DRG: 640 | End: 2025-06-05
Attending: INTERNAL MEDICINE
Payer: COMMERCIAL

## 2025-06-05 VITALS
WEIGHT: 169.97 LBS | OXYGEN SATURATION: 95 % | SYSTOLIC BLOOD PRESSURE: 176 MMHG | HEIGHT: 64 IN | DIASTOLIC BLOOD PRESSURE: 68 MMHG | RESPIRATION RATE: 20 BRPM | TEMPERATURE: 97.7 F | BODY MASS INDEX: 29.02 KG/M2 | HEART RATE: 75 BPM

## 2025-06-05 LAB
ALBUMIN SERPL BCG-MCNC: 3 G/DL (ref 3.5–5.2)
ALBUMIN UR-MCNC: 20 MG/DL
ALP SERPL-CCNC: 81 U/L (ref 40–150)
ALT SERPL W P-5'-P-CCNC: 23 U/L (ref 0–50)
ANION GAP SERPL CALCULATED.3IONS-SCNC: 12 MMOL/L (ref 7–15)
ANION GAP SERPL CALCULATED.3IONS-SCNC: 12 MMOL/L (ref 7–15)
ANION GAP SERPL CALCULATED.3IONS-SCNC: 14 MMOL/L (ref 7–15)
APPEARANCE UR: ABNORMAL
AST SERPL W P-5'-P-CCNC: 20 U/L (ref 0–45)
ATRIAL RATE - MUSE: 23 BPM
ATRIAL RATE - MUSE: 64 BPM
ATRIAL RATE - MUSE: NORMAL BPM
BACTERIA #/AREA URNS HPF: ABNORMAL /HPF
BILIRUB SERPL-MCNC: <0.2 MG/DL
BILIRUB UR QL STRIP: NEGATIVE
BUN SERPL-MCNC: 72.6 MG/DL (ref 8–23)
BUN SERPL-MCNC: 77.3 MG/DL (ref 8–23)
BUN SERPL-MCNC: 77.4 MG/DL (ref 8–23)
CALCIUM SERPL-MCNC: 8.3 MG/DL (ref 8.8–10.4)
CALCIUM SERPL-MCNC: 8.5 MG/DL (ref 8.8–10.4)
CALCIUM SERPL-MCNC: 8.5 MG/DL (ref 8.8–10.4)
CAOX CRY #/AREA URNS HPF: ABNORMAL /HPF
CHLORIDE SERPL-SCNC: 105 MMOL/L (ref 98–107)
CHLORIDE SERPL-SCNC: 106 MMOL/L (ref 98–107)
CHLORIDE SERPL-SCNC: 108 MMOL/L (ref 98–107)
COLOR UR AUTO: ABNORMAL
CREAT SERPL-MCNC: 3.08 MG/DL (ref 0.51–0.95)
CREAT SERPL-MCNC: 3.19 MG/DL (ref 0.51–0.95)
CREAT SERPL-MCNC: 3.3 MG/DL (ref 0.51–0.95)
CREAT UR-MCNC: 87.9 MG/DL
DIASTOLIC BLOOD PRESSURE - MUSE: NORMAL MMHG
EGFRCR SERPLBLD CKD-EPI 2021: 13 ML/MIN/1.73M2
EGFRCR SERPLBLD CKD-EPI 2021: 13 ML/MIN/1.73M2
EGFRCR SERPLBLD CKD-EPI 2021: 14 ML/MIN/1.73M2
ERYTHROCYTE [DISTWIDTH] IN BLOOD BY AUTOMATED COUNT: 13.2 % (ref 10–15)
ERYTHROCYTE [DISTWIDTH] IN BLOOD BY AUTOMATED COUNT: 13.2 % (ref 10–15)
FOLATE SERPL-MCNC: >40 NG/ML (ref 4.6–34.8)
FRACT EXCRET NA UR+SERPL-RTO: NORMAL %
GLUCOSE BLDC GLUCOMTR-MCNC: 117 MG/DL (ref 70–99)
GLUCOSE BLDC GLUCOMTR-MCNC: 146 MG/DL (ref 70–99)
GLUCOSE BLDC GLUCOMTR-MCNC: 196 MG/DL (ref 70–99)
GLUCOSE BLDC GLUCOMTR-MCNC: 218 MG/DL (ref 70–99)
GLUCOSE BLDC GLUCOMTR-MCNC: 251 MG/DL (ref 70–99)
GLUCOSE SERPL-MCNC: 180 MG/DL (ref 70–99)
GLUCOSE SERPL-MCNC: 206 MG/DL (ref 70–99)
GLUCOSE SERPL-MCNC: 227 MG/DL (ref 70–99)
GLUCOSE UR STRIP-MCNC: NEGATIVE MG/DL
HCO3 SERPL-SCNC: 16 MMOL/L (ref 22–29)
HCO3 SERPL-SCNC: 18 MMOL/L (ref 22–29)
HCO3 SERPL-SCNC: 19 MMOL/L (ref 22–29)
HCT VFR BLD AUTO: 27.6 % (ref 35–47)
HCT VFR BLD AUTO: 29.7 % (ref 35–47)
HGB BLD-MCNC: 8.7 G/DL (ref 11.7–15.7)
HGB BLD-MCNC: 9 G/DL (ref 11.7–15.7)
HGB UR QL STRIP: NEGATIVE
INR PPP: 4.12 (ref 0.85–1.15)
INR PPP: 4.14 (ref 0.85–1.15)
INTERPRETATION ECG - MUSE: NORMAL
IRON BINDING CAPACITY (ROCHE): 201 UG/DL (ref 240–430)
IRON SATN MFR SERPL: 17 % (ref 15–46)
IRON SERPL-MCNC: 34 UG/DL (ref 37–145)
KETONES UR STRIP-MCNC: NEGATIVE MG/DL
LEUKOCYTE ESTERASE UR QL STRIP: ABNORMAL
LVEF ECHO: NORMAL
MAGNESIUM SERPL-MCNC: 2.2 MG/DL (ref 1.7–2.3)
MCH RBC QN AUTO: 29.9 PG (ref 26.5–33)
MCH RBC QN AUTO: 30.4 PG (ref 26.5–33)
MCHC RBC AUTO-ENTMCNC: 30.3 G/DL (ref 31.5–36.5)
MCHC RBC AUTO-ENTMCNC: 31.5 G/DL (ref 31.5–36.5)
MCV RBC AUTO: 97 FL (ref 78–100)
MCV RBC AUTO: 99 FL (ref 78–100)
MUCOUS THREADS #/AREA URNS LPF: PRESENT /LPF
NITRATE UR QL: NEGATIVE
P AXIS - MUSE: 48 DEGREES
P AXIS - MUSE: NORMAL DEGREES
P AXIS - MUSE: NORMAL DEGREES
PH UR STRIP: 5 [PH] (ref 5–7)
PLATELET # BLD AUTO: 149 10E3/UL (ref 150–450)
PLATELET # BLD AUTO: 150 10E3/UL (ref 150–450)
POTASSIUM SERPL-SCNC: 4.8 MMOL/L (ref 3.4–5.3)
POTASSIUM SERPL-SCNC: 4.9 MMOL/L (ref 3.4–5.3)
POTASSIUM SERPL-SCNC: 6.2 MMOL/L (ref 3.4–5.3)
POTASSIUM SERPL-SCNC: 6.3 MMOL/L (ref 3.4–5.3)
POTASSIUM UR-SCNC: 32 MMOL/L
PR INTERVAL - MUSE: 222 MS
PR INTERVAL - MUSE: NORMAL MS
PR INTERVAL - MUSE: NORMAL MS
PROT SERPL-MCNC: 5.5 G/DL (ref 6.4–8.3)
PROTHROMBIN TIME: 38 SECONDS (ref 11.8–14.8)
PROTHROMBIN TIME: 38.2 SECONDS (ref 11.8–14.8)
QRS DURATION - MUSE: 142 MS
QRS DURATION - MUSE: 150 MS
QRS DURATION - MUSE: 154 MS
QT - MUSE: 442 MS
QT - MUSE: 516 MS
QT - MUSE: 520 MS
QTC - MUSE: 420 MS
QTC - MUSE: 429 MS
QTC - MUSE: 455 MS
R AXIS - MUSE: 110 DEGREES
R AXIS - MUSE: 80 DEGREES
R AXIS - MUSE: 86 DEGREES
RBC # BLD AUTO: 2.86 10E6/UL (ref 3.8–5.2)
RBC # BLD AUTO: 3.01 10E6/UL (ref 3.8–5.2)
RBC URINE: 3 /HPF
SODIUM SERPL-SCNC: 135 MMOL/L (ref 135–145)
SODIUM SERPL-SCNC: 137 MMOL/L (ref 135–145)
SODIUM SERPL-SCNC: 138 MMOL/L (ref 135–145)
SODIUM UR-SCNC: <20 MMOL/L
SP GR UR STRIP: 1.01 (ref 1–1.03)
SQUAMOUS EPITHELIAL: 5 /HPF
SYSTOLIC BLOOD PRESSURE - MUSE: NORMAL MMHG
T AXIS - MUSE: 18 DEGREES
T AXIS - MUSE: 40 DEGREES
T AXIS - MUSE: 54 DEGREES
UROBILINOGEN UR STRIP-MCNC: NORMAL MG/DL
VENTRICULAR RATE- MUSE: 40 BPM
VENTRICULAR RATE- MUSE: 41 BPM
VENTRICULAR RATE- MUSE: 64 BPM
VIT B12 SERPL-MCNC: 583 PG/ML (ref 232–1245)
WBC # BLD AUTO: 6.4 10E3/UL (ref 4–11)
WBC # BLD AUTO: 7.3 10E3/UL (ref 4–11)
WBC URINE: 20 /HPF

## 2025-06-05 PROCEDURE — 250N000012 HC RX MED GY IP 250 OP 636 PS 637: Performed by: STUDENT IN AN ORGANIZED HEALTH CARE EDUCATION/TRAINING PROGRAM

## 2025-06-05 PROCEDURE — 82435 ASSAY OF BLOOD CHLORIDE: CPT | Performed by: INTERNAL MEDICINE

## 2025-06-05 PROCEDURE — 80048 BASIC METABOLIC PNL TOTAL CA: CPT | Performed by: INTERNAL MEDICINE

## 2025-06-05 PROCEDURE — 210N000001 HC R&B IMCU HEART CARE

## 2025-06-05 PROCEDURE — 83550 IRON BINDING TEST: CPT | Performed by: INTERNAL MEDICINE

## 2025-06-05 PROCEDURE — 83735 ASSAY OF MAGNESIUM: CPT | Performed by: PHYSICIAN ASSISTANT

## 2025-06-05 PROCEDURE — 250N000013 HC RX MED GY IP 250 OP 250 PS 637: Performed by: INTERNAL MEDICINE

## 2025-06-05 PROCEDURE — 84132 ASSAY OF SERUM POTASSIUM: CPT | Performed by: STUDENT IN AN ORGANIZED HEALTH CARE EDUCATION/TRAINING PROGRAM

## 2025-06-05 PROCEDURE — 82607 VITAMIN B-12: CPT | Performed by: INTERNAL MEDICINE

## 2025-06-05 PROCEDURE — G0463 HOSPITAL OUTPT CLINIC VISIT: HCPCS

## 2025-06-05 PROCEDURE — 258N000003 HC RX IP 258 OP 636: Performed by: STUDENT IN AN ORGANIZED HEALTH CARE EDUCATION/TRAINING PROGRAM

## 2025-06-05 PROCEDURE — 85014 HEMATOCRIT: CPT | Performed by: INTERNAL MEDICINE

## 2025-06-05 PROCEDURE — 258N000003 HC RX IP 258 OP 636: Performed by: INTERNAL MEDICINE

## 2025-06-05 PROCEDURE — C8929 TTE W OR WO FOL WCON,DOPPLER: HCPCS

## 2025-06-05 PROCEDURE — 87086 URINE CULTURE/COLONY COUNT: CPT | Performed by: INTERNAL MEDICINE

## 2025-06-05 PROCEDURE — 84133 ASSAY OF URINE POTASSIUM: CPT | Performed by: INTERNAL MEDICINE

## 2025-06-05 PROCEDURE — 255N000002 HC RX 255 OP 636: Performed by: INTERNAL MEDICINE

## 2025-06-05 PROCEDURE — 80051 ELECTROLYTE PANEL: CPT | Performed by: INTERNAL MEDICINE

## 2025-06-05 PROCEDURE — 99222 1ST HOSP IP/OBS MODERATE 55: CPT | Performed by: INTERNAL MEDICINE

## 2025-06-05 PROCEDURE — 82746 ASSAY OF FOLIC ACID SERUM: CPT | Performed by: INTERNAL MEDICINE

## 2025-06-05 PROCEDURE — 93306 TTE W/DOPPLER COMPLETE: CPT | Mod: 26 | Performed by: INTERNAL MEDICINE

## 2025-06-05 PROCEDURE — 36415 COLL VENOUS BLD VENIPUNCTURE: CPT | Performed by: INTERNAL MEDICINE

## 2025-06-05 PROCEDURE — 99233 SBSQ HOSP IP/OBS HIGH 50: CPT | Performed by: INTERNAL MEDICINE

## 2025-06-05 PROCEDURE — 93005 ELECTROCARDIOGRAM TRACING: CPT

## 2025-06-05 PROCEDURE — 258N000001 HC RX 258: Performed by: STUDENT IN AN ORGANIZED HEALTH CARE EDUCATION/TRAINING PROGRAM

## 2025-06-05 PROCEDURE — 85018 HEMOGLOBIN: CPT | Performed by: INTERNAL MEDICINE

## 2025-06-05 PROCEDURE — 81001 URINALYSIS AUTO W/SCOPE: CPT | Performed by: INTERNAL MEDICINE

## 2025-06-05 PROCEDURE — 250N000011 HC RX IP 250 OP 636: Mod: JZ | Performed by: HOSPITALIST

## 2025-06-05 PROCEDURE — 82310 ASSAY OF CALCIUM: CPT | Performed by: INTERNAL MEDICINE

## 2025-06-05 PROCEDURE — 250N000013 HC RX MED GY IP 250 OP 250 PS 637: Performed by: STUDENT IN AN ORGANIZED HEALTH CARE EDUCATION/TRAINING PROGRAM

## 2025-06-05 PROCEDURE — 84155 ASSAY OF PROTEIN SERUM: CPT | Performed by: INTERNAL MEDICINE

## 2025-06-05 PROCEDURE — 36415 COLL VENOUS BLD VENIPUNCTURE: CPT | Performed by: STUDENT IN AN ORGANIZED HEALTH CARE EDUCATION/TRAINING PROGRAM

## 2025-06-05 PROCEDURE — 85610 PROTHROMBIN TIME: CPT | Performed by: INTERNAL MEDICINE

## 2025-06-05 PROCEDURE — 250N000009 HC RX 250: Performed by: STUDENT IN AN ORGANIZED HEALTH CARE EDUCATION/TRAINING PROGRAM

## 2025-06-05 PROCEDURE — 82570 ASSAY OF URINE CREATININE: CPT | Performed by: INTERNAL MEDICINE

## 2025-06-05 RX ORDER — ALBUTEROL SULFATE 5 MG/ML
10 SOLUTION RESPIRATORY (INHALATION) ONCE
Status: COMPLETED | OUTPATIENT
Start: 2025-06-05 | End: 2025-06-05

## 2025-06-05 RX ORDER — ATROPINE SULFATE 0.1 MG/ML
1 INJECTION INTRAVENOUS
Status: ACTIVE | OUTPATIENT
Start: 2025-06-05

## 2025-06-05 RX ORDER — DEXTROSE MONOHYDRATE 25 G/50ML
25-50 INJECTION, SOLUTION INTRAVENOUS
Status: DISCONTINUED | OUTPATIENT
Start: 2025-06-05 | End: 2025-06-05

## 2025-06-05 RX ORDER — DEXTROSE MONOHYDRATE 25 G/50ML
50 INJECTION, SOLUTION INTRAVENOUS ONCE
Status: COMPLETED | OUTPATIENT
Start: 2025-06-05 | End: 2025-06-05

## 2025-06-05 RX ORDER — HYDRALAZINE HYDROCHLORIDE 20 MG/ML
10 INJECTION INTRAMUSCULAR; INTRAVENOUS EVERY 4 HOURS PRN
Status: DISPENSED | OUTPATIENT
Start: 2025-06-05

## 2025-06-05 RX ORDER — NICOTINE POLACRILEX 4 MG
15-30 LOZENGE BUCCAL
Status: DISCONTINUED | OUTPATIENT
Start: 2025-06-05 | End: 2025-06-05

## 2025-06-05 RX ADMIN — SODIUM ZIRCONIUM CYCLOSILICATE 10 G: 5 POWDER, FOR SUSPENSION ORAL at 05:33

## 2025-06-05 RX ADMIN — SODIUM ZIRCONIUM CYCLOSILICATE 15 G: 5 POWDER, FOR SUSPENSION ORAL at 22:55

## 2025-06-05 RX ADMIN — DEXTROSE MONOHYDRATE 50 ML: 25 INJECTION, SOLUTION INTRAVENOUS at 05:14

## 2025-06-05 RX ADMIN — SERTRALINE HYDROCHLORIDE 50 MG: 50 TABLET ORAL at 09:14

## 2025-06-05 RX ADMIN — GABAPENTIN 200 MG: 100 CAPSULE ORAL at 21:18

## 2025-06-05 RX ADMIN — SODIUM CHLORIDE: 0.9 INJECTION, SOLUTION INTRAVENOUS at 00:08

## 2025-06-05 RX ADMIN — ALBUTEROL SULFATE 10 MG: 2.5 SOLUTION RESPIRATORY (INHALATION) at 05:55

## 2025-06-05 RX ADMIN — HYDRALAZINE HYDROCHLORIDE 10 MG: 20 INJECTION INTRAMUSCULAR; INTRAVENOUS at 23:07

## 2025-06-05 RX ADMIN — PRAVASTATIN SODIUM 20 MG: 20 TABLET ORAL at 09:14

## 2025-06-05 RX ADMIN — PERFLUTREN 10 ML: 6.52 INJECTION, SUSPENSION INTRAVENOUS at 11:46

## 2025-06-05 RX ADMIN — SODIUM CHLORIDE 7.7 UNITS: 0.9 INJECTION, SOLUTION INTRAVENOUS at 05:14

## 2025-06-05 RX ADMIN — SODIUM ZIRCONIUM CYCLOSILICATE 15 G: 5 POWDER, FOR SUSPENSION ORAL at 13:49

## 2025-06-05 ASSESSMENT — ACTIVITIES OF DAILY LIVING (ADL)
ADLS_ACUITY_SCORE: 43
ADLS_ACUITY_SCORE: 50
ADLS_ACUITY_SCORE: 43
ADLS_ACUITY_SCORE: 51
ADLS_ACUITY_SCORE: 50
ADLS_ACUITY_SCORE: 63
ADLS_ACUITY_SCORE: 43
ADLS_ACUITY_SCORE: 49
ADLS_ACUITY_SCORE: 51
ADLS_ACUITY_SCORE: 43
ADLS_ACUITY_SCORE: 51
ADLS_ACUITY_SCORE: 43
ADLS_ACUITY_SCORE: 51
ADLS_ACUITY_SCORE: 63
ADLS_ACUITY_SCORE: 51
ADLS_ACUITY_SCORE: 63
ADLS_ACUITY_SCORE: 43
ADLS_ACUITY_SCORE: 51
ADLS_ACUITY_SCORE: 43

## 2025-06-05 NOTE — CONSULTS
[facility]    Nephrology Consultation     Date of Admission:  6/4/2025    Assessment & Plan     Ирина Yanez is a 89 year old female who was admitted on 6/4/2025.     Assessment:  1) acute kidney injury, oliguric:  Stable GFR at 13 reflects some current function.  FABY secondary to recent hypotension, bradycardia.  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  Associated hyperkalemia is related to recent use of olmesartan and current FABY.    Good finding of on shift values being stable.  Most recent potassium noted at 4.8.  Would continue Lokelma until on shift values in a stable normal range.    No urgent indication for dialysis today.  She would receive this though if indicated.    2) chronic kidney disease stage IV at baseline  Note document hypertension/diabetes as etiology.  Minimally proteinuric in the past, suspect more likely age and hypertension related.  Last creatinine prior to admission at baseline on 5/23/2025, EGFR 29 creatinine of 1.67.  Follows at [facility] nephrology, Dr. Whatley.    Anemia: Hemoglobin on admission 9.1.  Last value PTA at 10.3 on 5/23/2025.  BUN higher than baseline, likely related to lower GFR although with lower hemoglobin need to watch for any occult GI bleeding.  Iron labs last month with 21% iron sats, ferritin of 132.  Reflects a functional iron deficiency.  We can consider IV iron while here.    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    3) bradycardia: More likely related to her increasing doses this year of metoprolol and diltiazem.  Was having heart rates in the 40s prior to admission with normal potassium levels.  Also potassium level overnight did not correlate well with heart rate.  Improving with withholding of antinodal agents.    4 (hypertension: Intolerant of ARB in the past, clearly recent challenge with olmesartan led to complications.  When becoming hypertensive, can  consider amlodipine as I do not see this being tried in the recent past and no allergies reported.  Likely ideal to avoid ACE ARB agents going forward.    Plan/Recs:  1) with improving hemodynamics, good p.o. intake this morning I will stop current normal saline.  Concern with oliguria and volume overload.  2) continue Lokelma until persistent normokalemia.  3) agree with holding of PTA meds as currently done10  4) no dialysis planned today, do not think she will need this when we medically manage her FABY and hyperkalemia.    Arslan Foster,   [facility] - Nephrology  [phone]  --------------------------------------------------------------------------------------------  Reason for Consult     I was asked to see the patient for acute kidney injury, hyperkalemia    History is obtained from the patient and chart review.      History of Present Illness     Ирина Yanez is a 89 year old female who presented yesterday to the ER with weakness.  Initially hide hypotensive along with severe bradycardia.  EMS reported initial blood pressure was 88/36 and a pulse of 32.  Some recent dizziness and feelings of weakness.  They are checked blood pressure recently as well that was low.  Multiple this calendar year so far related to hypertension try to get her under 140 systolic.  She has a machine at home and checks this on herself on a regular basis.      Multiple medication changes made related to uncontrolled hypertension: These include most recently:    - PCP visit 5/23/2025: Plan to switch from candesartan to olmesartan.  Notable patient never started and picked up the candesartan prescribed earlier.  Started  olmesartan 20 mg but reports she stopped this earlier this week due to feeling funny and dizzy.  -[facility] nephrology 5/9/2025 plan to reintroduce candesartan at 4 mg daily.  Patient never picked up.  - [facility] nephrology visit 5/6/2025: Noted pulse of 48-49 on that visit.  Her doxazosin was increased to  4 mg on that visit.  - PCP clinic 4/28/2025: Patient instructed to increase diltiazem from 180 to 240 mg daily from prior instructions.  - PCP clinic 2/24/25: Will trial increased from 25 to 50 mg daily    Today the patient is feeling well.  She ate breakfast and has no nausea or or uremic symptoms.  Normal urine output until yesterday noted.  Denies any dyspnea, chest pains.  When seen heart rate in the 60s.  Her antinodal agents have been held.    Past Medical History   I have reviewed this patient's medical history and updated it with pertinent information if needed.   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    Complication of anesthesia     Depression     Diabetes mellitus (H)     sees Dr. Verde- type II    Falls frequently 7/10/2019    Heart murmur     HTN     Hyperlipidemia LDL goal < 100     Dr. Verde    Lichen sclerosus et atrophicus 2/15/2013    Melanoma (H)     Oxygen dependent     1 liter continuously    Peripheral Neuropathy     hands, feet; used to see Dr. Tl ARRIAZA (postoperative nausea and vomiting)     Skin cancer     face; basal and other. Melanoma. Dolan    Sleep apnea     CPAP    Spinal stenosis        Past Surgical History   I have reviewed this patient's surgical history and updated it with pertinent information if needed.  Past Surgical History:   Procedure Laterality Date    AMPUTATE TOE(S)  8/23/2012    left second toe Procedure: AMPUTATE TOE(S);;  Surgeon: Mil Jolly DPM;  Location: [facility] OR    CHOLECYSTECTOMY  Nov. 1975    COLONOSCOPY  early 2009    repeat 4-5 years (Had one prior to this with polyps)    COLONOSCOPY  Sept 2014    incomplete; recommend colography    COLONOSCOPY  02/25/2020    Dr. Pathak [facility]    COLONOSCOPY N/A 2/25/2020    Procedure: COLONOSCOPY, WITH biopsies using forceps;  Surgeon: Adebayo Pathak MD;   Location: [facility] GI    INSERT CHEST TUBE Bilateral 2/8/2021    Procedure: Attempted INSERTION, CATHETER, INTERCOSTAL, FOR DRAINAGE (Pleurx);  Surgeon: Smitha Odonnell MD;  Location: [facility] OR    OPTICAL TRACKING SYSTEM FUSION POSTERIOR SPINE LUMBAR N/A 9/16/2016    Procedure: OPTICAL TRACKING SYSTEM FUSION SPINE POSTERIOR LUMBAR ONE LEVEL;  Surgeon: Lennox Blue MD;  Location: [facility] OR    PET, REC OF MELANOMA/MET CA Left     arm    REPAIR HAMMER TOE BILATERAL  8/23/2012    Procedure: REPAIR HAMMER TOE BILATERAL;  Flexor Tenotomy 2,3,4,5 Toes both feet, Partial 2nd toe amputation left foot;  Surgeon: Mil Jolly DPM;  Location: [facility] OR    REPAIR TENDON QUADRICEPS Right 10/27/2015    Procedure: REPAIR TENDON QUADRICEPS;  Surgeon: Antonio Yi MD;  Location: [facility] OR    SURGICAL HISTORY OF -   1997    bilateral knee replacement    SURGICAL HISTORY OF -   1997    right knee revised; patella tendon ruptured    SURGICAL HISTORY OF -       surgery for spinal stenosis    SURGICAL HISTORY OF -       breast reduction surgery    SURGICAL HISTORY OF -       D and C        Prior to Admission Medications   Prior to Admission Medications   Prescriptions Last Dose Informant Patient Reported? Taking?   MULTI-VITAMIN OR TABS 6/4/2025 Morning  Yes Yes   Sig: Take 2 tablets by mouth daily    acetaminophen (TYLENOL) 500 MG tablet   Yes Yes   Sig: Take 1,000 mg by mouth every evening as needed for mild pain   betamethasone valerate (VALISONE) 0.1 % external cream   No Yes   Sig: Apply topically daily   Patient taking differently: Apply topically daily as needed.   blood glucose (NO BRAND SPECIFIED) lancets standard   No No   Sig: Use to test blood sugar 1 times daily or as directed, Contour Next lancets   blood glucose (NO BRAND SPECIFIED) test strip   No No   Sig: Use to test blood sugar 1 times daily or as directed, Contour Next strips   blood glucose monitoring (NO BRAND SPECIFIED) meter device kit   No No   Sig:  Use to test blood sugar 1 times daily or as directed. Ultra 2   diltiazem ER (CARDIAZEM LA) 240 MG 24 hr tablet 6/4/2025 Morning  No Yes   Sig: Take 1 tablet (240 mg) by mouth daily.   doxazosin (CARDURA) 4 MG tablet 6/3/2025 Bedtime  Yes Yes   Sig: Take 4 mg by mouth at bedtime.   gabapentin (NEURONTIN) 100 MG capsule 6/3/2025 Bedtime  No Yes   Sig: Take 2 capsules (200 mg) by mouth nightly as needed for neuropathic pain.   Patient taking differently: Take 200 mg by mouth at bedtime.   loperamide (IMODIUM A-D) 2 MG tablet   No Yes   Sig: Take 1-2 tablets (2-4 mg) by mouth 4 times daily as needed for diarrhea.   metoprolol succinate ER (TOPROL XL) 25 MG 24 hr tablet 6/4/2025 Morning  No Yes   Sig: TAKE 1 TABLET BY MOUTH DAILY. GENERIC EQUIVALENT FOR TOPROL XL   nystatin (MYCOSTATIN) 187434 UNIT/GM external powder   No Yes   Sig: Apply a small amount to affected area three times daily.   Patient taking differently: Apply topically 3 times daily as needed. Apply a small amount to affected area three times daily.   olmesartan (BENICAR) 20 MG tablet 6/4/2025 Morning  No Yes   Sig: Take 1 tablet (20 mg) by mouth daily.   pravastatin (PRAVACHOL) 20 MG tablet 6/4/2025 Morning  No Yes   Sig: Take 1 tablet (20 mg) by mouth daily.   sertraline (ZOLOFT) 50 MG tablet 6/4/2025 Morning  No Yes   Sig: Take 1 tablet (50 mg) by mouth daily.   torsemide (DEMADEX) 10 MG tablet 6/4/2025 Morning  No Yes   Sig: Take 1 tablet (10 mg) by mouth daily.   warfarin ANTICOAGULANT (JANTOVEN ANTICOAGULANT) 2.5 MG tablet 6/3/2025 Evening  No Yes   Sig: Take 2 tablets (5 mg) by mouth daily except take 1.5 tablets (3.75 mg) on Sunday or as directed by INR Clinic   Patient taking differently: Take 5 mg by mouth every evening.      Facility-Administered Medications: None     Allergies   Allergies   Allergen Reactions    Amoxicillin      Other reaction(s): cannot remember    Amoxicillin-Pot Clavulanate Diarrhea     Vomiting      Clindamycin Phosphate       Hives    Metformin Diarrhea    Tegretol [Carbamazepine]      Irregular heart beat       Social History   I have reviewed this patient's social history and updated it with pertinent information if needed. Ирина Yanez  reports that she has never smoked. She has never used smokeless tobacco. She reports that she does not drink alcohol and does not use drugs.    Family History   I have reviewed this patient's family history and updated it with pertinent information if needed.   Family History   Problem Relation Age of Onset    Cerebrovascular Disease Mother     Heart Disease Father     Neurologic Disorder Sister         ALS    C.A.D. Sister     Diabetes Sister     C.A.D. Brother     Psychotic Disorder Brother         [place] war: PTSD. suicide 2019    Gastrointestinal Disease Brother         diverticulitis    Colon Cancer No family hx of        Review of Systems   The 10 point Review of Systems is negative other than noted in the HPI.     Physical Exam   Temp: 96.9  F (36.1  C) Temp src: Axillary BP: 134/65 Pulse: (!) 39   Resp: 21 SpO2: 95 % O2 Device: Nasal cannula Oxygen Delivery: 1 LPM  Vital Signs with Ranges  Temp:  [96.9  F (36.1  C)-97.4  F (36.3  C)] 96.9  F (36.1  C)  Pulse:  [34-50] 39  Resp:  [4-26] 21  BP: ()/(38-93) 134/65  SpO2:  [87 %-99 %] 95 %  169 lbs 15.59 oz    GENERAL: Elderly, in no distress  HEENT:  Normocephalic. No gross abnormalities.   CV: Regular, bradycardic, no murmurs, no clicks, gallops, or rubs, no edema  RESP: Clear bilaterally with good efforts  GI: Abdomen soft/nt/nd, BS normal. No masses, organomegaly  MUSCULOSKELETAL: extremities nl - no gross deformities noted  SKIN: no suspicious lesions or rashes, dry to touch  NEURO: Grossly nonfocal  PSYCH: mood good, affect appropriate    Data   BMP  Recent Labs   Lab 06/05/25  1026 06/05/25  0910 06/05/25  0730 06/05/25  0624 06/05/25  0541 06/05/25  0506 06/05/25  0359 06/04/25  2207 06/04/25  1949 06/04/25  1855  06/04/25  1745   NA  --   --   --   --   --   --  137  --   --   --  141   POTASSIUM  --   --   --   --   --  6.2* 6.3* 5.4*  --  6.3* 6.4*   CHLORIDE  --   --   --   --   --   --  106  --   --   --  109*   EDNA  --   --   --   --   --   --  8.5*  --   --   --  8.6*   CO2  --   --   --   --   --   --  19*  --   --   --  22   BUN  --   --   --   --   --   --  77.4*  --   --   --  77.9*   CR  --   --   --   --   --   --  3.30*  --   --   --  3.28*   * 196* 117* 146*   < >  --  206*  --    < >  --  157*    < > = values in this interval not displayed.     [email or web address](phos:4)  CBC)  Recent Labs   Lab 06/05/25  1041 06/05/25  0359 06/04/25  1745   WBC 7.3 6.4 5.8   HGB 8.7* 9.0* 9.1*   HCT 27.6* 29.7* 28.5*   MCV 97 99 95    149* 152     Recent Labs   Lab 06/04/25  1745   AST 25   ALT 19   ALKPHOS 84   BILITOTAL <0.2     Recent Labs   Lab 06/04/25  1745   INR 4.30*     25 OH Vit D2   Date Value Ref Range Status   06/02/2021 <5 ug/L Final     25 OH Vitamin D2   Date Value Ref Range Status   12/22/2021 <5 ug/L Final     25 OH Vit D3   Date Value Ref Range Status   06/02/2021 60 ug/L Final     25 OH Vitamin D3   Date Value Ref Range Status   12/22/2021 55 ug/L Final     25 OH Vit D total   Date Value Ref Range Status   06/02/2021 <65 20 - 75 ug/L Final     Comment:     Season, race, dietary intake, and treatment affect the concentration of   25-hydroxy-Vitamin D. Values may decrease during winter months and increase   during summer months. Values 20-29 ug/L may indicate Vitamin D insufficiency   and values <20 ug/L may indicate Vitamin D deficiency.  This test was developed and its performance characteristics determined by the   [facility].   It has not been cleared or approved by the FDA. The laboratory is regulated   under CLIA as qualified to perform high-complexity testing. This test is used   for clinical purposes. It should not be  "regarded as investigational or for   research.       25 OH Vit D Total   Date Value Ref Range Status   12/22/2021 <60 20 - 75 ug/L Final     Comment:     Season, race, dietary intake, and treatment affect the concentration of 25-hydroxy-Vitamin D. Values may decrease during winter months and increase during summer months. Values 20-29 ug/L may indicate Vitamin D insufficiency and values <20 ug/L may indicate Vitamin D deficiency.     Recent Labs   Lab 06/05/25  1041 06/05/25  0359   HGB 8.7* 9.0*   HCT 27.6* 29.7*   MCV 97 99   IRON  --  34*   IRONSAT  --  17 FEB  --  201*     No results for input(s): \"PTHI\" in the last 168 hours.    "

## 2025-06-05 NOTE — PLAN OF CARE
Care plan note:      Recent Vitals:  Temp: 97.4  F (36.3  C) Temp src: Temporal BP: 134/65 Pulse: (!) 39   Resp: 21 SpO2: 92 % O2 Device: Nasal cannula      Orientation/Neuro: Alert and Oriented x 4, Forgetful, bilateral hearing aids in place  Pain: The patient is not having any pain.   Tele: Junctional   IV medications: Normal Saline   Mobility: Assist of 1 and Gait belt   Skin: Redness to gluteal fold from chronic diarrhea, R armpit scab/skin growth, WOC consult placed   GI: diarrhea  : Oliguria, due to void, bladd scan at 0455 for 187 mL     Diet: Tolerating diet:   Well,   ate courtesy meal and sips of water, 6oz with Lokelma  Orders Placed This Encounter      Combination Diet Low Saturated Fat Na <2400mg Diet, No Caffeine Diet      Safety/Concerns:  Fall Risk  Aggression Color: Green    Plan: Cardiology, nephrology consults, hyperkalemia protocol and watching blood sugar q1h x 4. Pads in place since 2300.    Continue to monitor.      Eva Quintana RN

## 2025-06-05 NOTE — CONSULTS
[facility]  Cardiology Consultation  Date of Service: 06/05/2025  Primary Cardiologist: Dr. Beasley    Assessment & Plan    Ирина Yanez is a 89 year old female with past medical history significant for type 2 diabetes, stage IV CKD, hypertension, paroxysmal atrial fibrillation on anticoagulation, hyperlipidemia, HFpEF, aortic stenosis admitted on 6/4/2025 with presyncope.  We are asked to see for bradycardia.    Assessment:  1.  Bradycardia with near syncope.  Patient was on diltiazem at 240 mg daily at home.   -Initial EKG showing atypical atrial flutter versus atrial tachycardia with complete heart block and junctional escape as well as a right bundle branch block and left anterior fascicular block.  ECG earlier today even with normalization of potassium showed junctional rhythm at 40 bpm.  Finally, at time of this note, pt is in sinus rhythm.    -Arrhythmia is likely secondary to hyperkalemia and diltiazem, although given age, right bundle branch block and left anterior fascicular block is at risk for needing pacemaker.    -Will monitor closely overnight.    2.  FABY with history of CKD per IM.    3.  Hypertension, well-controlled    4..  Moderate aortic stenosis and moderate mitral regurgitation    5.  Paroxysmal atrial fibrillation with a [id number] of 5, appropriately on warfarin.    6.  Type 2 diabetes    7.  Elevated troponin at 40 and flat likely type II demand ischemia denies any anginal symptoms no wall motion on echocardiogram nor ST changes.    8.  Chronic heart failure with preserved ejection fraction the patient not recently requiring diuretics.  She has some changes on her chest x-ray possibly suggesting CHF and her N-terminal proBNP is elevated at 5000.  Suspect this may have been due to poor cardiac output and bradycardia given asymptomatic would follow.    9.  Acute on chronic anemia, will defer to IM.    Plan:   Continue to monitor overnight  Avoid rate lowering  medications  If recurrent bradycardia, or symptomatic pauses overnight we will have EP see in the morning.  Will send home with event monitor on discharge.      Thank you for this consultation.  This patient was discussed with Dr. Sidhu.  Final recommendations are pending his/her recommendations.  High complexity     Sarah Hinton PA-C  [facility]  Pager: [phone]     Reason for Consult   Reason for consult: I was asked by Dr. Murguia to evaluate this patient for near syncope.    HPI:   Ирина Yanez is a 89 year old female with past medical history significant for type 2 diabetes, stage IV CKD, hypertension, paroxysmal atrial fibrillation on anticoagulation, hyperlipidemia, HFpEF, aortic stenosis admitted on 6/4/2025 with presyncope.  We are asked to see for bradycardia.    Patient was working in her yard and, had had a small breakfast and came back in after about 2 hours weaning.  She had glass water and something to eat and when she was getting up to go to the bathroom she felt weak and knew she was going to fall down.  She lowered herself to her knees and she was not injured.  She denies chest pain with this or previously she denies palpitations.  She previously retained fluid but since she is lost weight she has not required diuretic.    Workup here showed potassium at 6.4, creatinine of 3.3 up from baseline of about 2-2.5, hemoglobin 8.7 down from a baseline of about 10, chest x-ray showing diffuse interstitial prominence suggestive of interstitial pulmonary edema.  Initial EKG showed atrial flutter with heart block and junctional escape at Heart rate of 41 with right bundle branch block and left anterior fascicular block.  Echocardiogram today shows ejection fraction at 60 to 65%, moderate TR, moderate AAS with a peak gradient of 30 mean gradient of 19 and elevated aortic valve area 1.7.    Physical Exam   Temp: 96.9  F (36.1  C) Temp src: Axillary BP: 119/53 Pulse: (!) 40   Resp: 15 SpO2: (!) 89 % O2 Device:  Nasal cannula Oxygen Delivery: 1 LPM  Vitals:    06/04/25 1752   Weight: 77.1 kg (169 lb 15.6 oz)     General - Alert and oriented to time place and person in no acute distress  Eyes - No scleral icterus  HEENT - Neck supple, moist mucous membranes  Cardiovascular -regular rate rhythm with 3 out of 6 systolic murmur heard best at the right sternal border but throughout the precordium.  Extremities - There is no edema  Respiratory -  diminished bilaterally but without wheezes rales or rhonchi.   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    Complication of anesthesia     Depression     Diabetes mellitus (H)     sees Dr. Verde- type II    Falls frequently 7/10/2019    Heart murmur     HTN     Hyperlipidemia LDL goal < 100     Dr. Verde    Lichen sclerosus et atrophicus 2/15/2013    Melanoma (H)     Oxygen dependent     1 liter continuously    Peripheral Neuropathy     hands, feet; used to see Dr. Tl ARRIAZA (postoperative nausea and vomiting)     Skin cancer     face; basal and other. Melanoma. Dolan    Sleep apnea     CPAP    Spinal stenosis          Past Surgical History   Past Surgical History:   Procedure Laterality Date    AMPUTATE TOE(S)  8/23/2012    left second toe Procedure: AMPUTATE TOE(S);;  Surgeon: Mil Jolly DPM;  Location: [facility] OR    CHOLECYSTECTOMY  Nov. 1975    COLONOSCOPY  early 2009    repeat 4-5 years (Had one prior to this with polyps)    COLONOSCOPY  Sept 2014    incomplete; recommend colography    COLONOSCOPY  02/25/2020    Dr. Pathak [facility]    COLONOSCOPY N/A 2/25/2020    Procedure: COLONOSCOPY, WITH biopsies  using forceps;  Surgeon: Adebayo Pathak MD;  Location:  GI    INSERT CHEST TUBE Bilateral 2/8/2021    Procedure: Attempted INSERTION, CATHETER, INTERCOSTAL, FOR DRAINAGE (Pleurx);  Surgeon: Smitha Odonnell MD;  Location: [facility] OR    OPTICAL TRACKING SYSTEM FUSION POSTERIOR SPINE LUMBAR N/A 9/16/2016    Procedure: OPTICAL TRACKING SYSTEM FUSION SPINE POSTERIOR LUMBAR ONE LEVEL;  Surgeon: Lennox Blue MD;  Location: [facility] OR    PET, REC OF MELANOMA/MET CA Left     arm    REPAIR HAMMER TOE BILATERAL  8/23/2012    Procedure: REPAIR HAMMER TOE BILATERAL;  Flexor Tenotomy 2,3,4,5 Toes both feet, Partial 2nd toe amputation left foot;  Surgeon: Mil Jolly DPM;  Location: [facility] OR    REPAIR TENDON QUADRICEPS Right 10/27/2015    Procedure: REPAIR TENDON QUADRICEPS;  Surgeon: Antonio Yi MD;  Location:  OR    SURGICAL HISTORY OF -   1997    bilateral knee replacement    SURGICAL HISTORY OF -   1997    right knee revised; patella tendon ruptured    SURGICAL HISTORY OF -       surgery for spinal stenosis    SURGICAL HISTORY OF -       breast reduction surgery    SURGICAL HISTORY OF -       D and C          Prior to Admission Medications   Prior to Admission Medications   Prescriptions Last Dose Informant Patient Reported? Taking?   MULTI-VITAMIN OR TABS 6/4/2025 Morning  Yes Yes   Sig: Take 2 tablets by mouth daily    acetaminophen (TYLENOL) 500 MG tablet   Yes Yes   Sig: Take 1,000 mg by mouth every evening as needed for mild pain   betamethasone valerate (VALISONE) 0.1 % external cream   No Yes   Sig: Apply topically daily   Patient taking differently: Apply topically daily as needed.   blood glucose (NO BRAND SPECIFIED) lancets standard   No No   Sig: Use to test blood sugar 1 times daily or as directed, Contour Next lancets   blood glucose (NO BRAND SPECIFIED) test strip   No No   Sig: Use to test blood sugar 1 times daily or as directed, Contour Next strips   blood glucose monitoring (NO  BRAND SPECIFIED) meter device kit   No No   Sig: Use to test blood sugar 1 times daily or as directed. Ultra 2   diltiazem ER (CARDIAZEM LA) 240 MG 24 hr tablet 6/4/2025 Morning  No Yes   Sig: Take 1 tablet (240 mg) by mouth daily.   doxazosin (CARDURA) 4 MG tablet 6/3/2025 Bedtime  Yes Yes   Sig: Take 4 mg by mouth at bedtime.   gabapentin (NEURONTIN) 100 MG capsule 6/3/2025 Bedtime  No Yes   Sig: Take 2 capsules (200 mg) by mouth nightly as needed for neuropathic pain.   Patient taking differently: Take 200 mg by mouth at bedtime.   loperamide (IMODIUM A-D) 2 MG tablet   No Yes   Sig: Take 1-2 tablets (2-4 mg) by mouth 4 times daily as needed for diarrhea.   metoprolol succinate ER (TOPROL XL) 25 MG 24 hr tablet 6/4/2025 Morning  No Yes   Sig: TAKE 1 TABLET BY MOUTH DAILY. GENERIC EQUIVALENT FOR TOPROL XL   nystatin (MYCOSTATIN) 640523 UNIT/GM external powder   No Yes   Sig: Apply a small amount to affected area three times daily.   Patient taking differently: Apply topically 3 times daily as needed. Apply a small amount to affected area three times daily.   olmesartan (BENICAR) 20 MG tablet 6/4/2025 Morning  No Yes   Sig: Take 1 tablet (20 mg) by mouth daily.   pravastatin (PRAVACHOL) 20 MG tablet 6/4/2025 Morning  No Yes   Sig: Take 1 tablet (20 mg) by mouth daily.   sertraline (ZOLOFT) 50 MG tablet 6/4/2025 Morning  No Yes   Sig: Take 1 tablet (50 mg) by mouth daily.   torsemide (DEMADEX) 10 MG tablet 6/4/2025 Morning  No Yes   Sig: Take 1 tablet (10 mg) by mouth daily.   warfarin ANTICOAGULANT (JANTOVEN ANTICOAGULANT) 2.5 MG tablet 6/3/2025 Evening  No Yes   Sig: Take 2 tablets (5 mg) by mouth daily except take 1.5 tablets (3.75 mg) on Sunday or as directed by [facility]   Patient taking differently: Take 5 mg by mouth every evening.      Facility-Administered Medications: None     Current Facility-Administered Medications   Medication Dose Route Frequency Provider Last Rate Last Admin    acetaminophen  (TYLENOL) tablet 1,000 mg  1,000 mg Oral QPM PRN Judi Camacho MD        atropine injection 1 mg  1 mg Intravenous Once PRN Stephy Pretty MD        calcium carbonate (TUMS) chewable tablet 1,000 mg  1,000 mg Oral 4x Daily PRN Judi Camacho MD        glucose gel 15-30 g  15-30 g Oral Q15 Min PRN Judi Camacho MD        Or    dextrose 50 % injection 25-50 mL  25-50 mL Intravenous Q15 Min PRN Judi Camacho MD        Or    glucagon injection 1 mg  1 mg Subcutaneous Q15 Min PRN Judi Camacho MD        gabapentin (NEURONTIN) capsule 200 mg  200 mg Oral At Bedtime Judi Camacho MD   200 mg at 06/04/25 2332    lidocaine (LMX4) cream   Topical Q1H PRN Judi Camacho MD        lidocaine 1 % 0.1-1 mL  0.1-1 mL Other Q1H PRN Judi Camacho MD        loperamide (IMODIUM) capsule 2-4 mg  2-4 mg Oral 4x Daily PRN Judi Camacho MD        ondansetron (ZOFRAN ODT) ODT tab 4 mg  4 mg Oral Q6H PRN Judi Camacho MD        Or    ondansetron (ZOFRAN) injection 4 mg  4 mg Intravenous Q6H PRN Judi Camacho MD        Patient is already receiving anticoagulation with heparin, enoxaparin (LOVENOX), warfarin (COUMADIN)  or other anticoagulant medication   Does not apply Continuous PRN Judi Camacho MD        pravastatin (PRAVACHOL) tablet 20 mg  20 mg Oral Daily Judi Camacho MD   20 mg at 06/05/25 0914    senna-docusate (SENOKOT-S/PERICOLACE) 8.6-50 MG per tablet 1 tablet  1 tablet Oral BID PRN Judi Camacho MD        Or    senna-docusate (SENOKOT-S/PERICOLACE) 8.6-50 MG per tablet 2 tablet  2 tablet Oral BID PRN Judi Camacho MD        sertraline (ZOLOFT) tablet 50 mg  50 mg Oral Daily Judi Camacho MD   50 mg at 06/05/25 0914    sodium chloride (PF) 0.9% PF flush 3 mL  3 mL Intracatheter Q8H EMMANUEL Judi Camacho MD   3 mL at 06/05/25 0008    sodium chloride (PF) 0.9% PF flush 3 mL  3 mL Intracatheter q1 min prn Judi Camacho MD        sodium zirconium cyclosilicate (LOKELMA) packet 15 g  15 g Oral Q8H Arslan Foster, DO   15  g at 06/05/25 1349    Warfarin Dose Required Daily - Pharmacist Managed  1 each Oral See Admin Instructions Shelley Murguia MD        warfarin-No DOSE today  1 each Does not apply no dose today (warfarin) Judi Camacho MD         Current Facility-Administered Medications   Medication Dose Route Frequency Provider Last Rate Last Admin    acetaminophen (TYLENOL) tablet 1,000 mg  1,000 mg Oral QPM PRN Judi Camacho MD        atropine injection 1 mg  1 mg Intravenous Once PRN Stephy Pretty MD        calcium carbonate (TUMS) chewable tablet 1,000 mg  1,000 mg Oral 4x Daily PRN Judi Camacho MD        glucose gel 15-30 g  15-30 g Oral Q15 Min PRN Judi Camacho MD        Or    dextrose 50 % injection 25-50 mL  25-50 mL Intravenous Q15 Min PRN Judi Camacho MD        Or    glucagon injection 1 mg  1 mg Subcutaneous Q15 Min PRN Judi Camacho MD        gabapentin (NEURONTIN) capsule 200 mg  200 mg Oral At Bedtime Judi Camacho MD   200 mg at 06/04/25 2332    lidocaine (LMX4) cream   Topical Q1H PRN Judi Camacho MD        lidocaine 1 % 0.1-1 mL  0.1-1 mL Other Q1H PRN Judi Camacho MD        loperamide (IMODIUM) capsule 2-4 mg  2-4 mg Oral 4x Daily PRN Judi Camacho MD        ondansetron (ZOFRAN ODT) ODT tab 4 mg  4 mg Oral Q6H PRN Judi Camacho MD        Or    ondansetron (ZOFRAN) injection 4 mg  4 mg Intravenous Q6H PRN Judi Camacho MD        Patient is already receiving anticoagulation with heparin, enoxaparin (LOVENOX), warfarin (COUMADIN)  or other anticoagulant medication   Does not apply Continuous PRN Judi Camacho MD        pravastatin (PRAVACHOL) tablet 20 mg  20 mg Oral Daily Judi Camacho MD   20 mg at 06/05/25 0914    senna-docusate (SENOKOT-S/PERICOLACE) 8.6-50 MG per tablet 1 tablet  1 tablet Oral BID PRN Judi Camacho MD        Or    senna-docusate (SENOKOT-S/PERICOLACE) 8.6-50 MG per tablet 2 tablet  2 tablet Oral BID PRN Judi Camacho MD        sertraline (ZOLOFT) tablet 50 mg  50 mg  "Oral Daily Judi Camacho MD   50 mg at 06/05/25 0914    sodium chloride (PF) 0.9% PF flush 3 mL  3 mL Intracatheter Q8H [facility] Judi Camacho MD   3 mL at 06/05/25 0008    sodium chloride (PF) 0.9% PF flush 3 mL  3 mL Intracatheter q1 min prn Judi Camacho MD        sodium zirconium cyclosilicate (LOKELMA) packet 15 g  15 g Oral Q8H Arslan Foster, DO   15 g at 06/05/25 1349    Warfarin Dose Required Daily - Pharmacist Managed  1 each Oral See Admin Instructions Shelley Murguia MD        warfarin-No DOSE today  1 each Does not apply no dose today (warfarin) Judi Camacho MD         Allergies   Allergies   Allergen Reactions    Amoxicillin      Other reaction(s): cannot remember    Amoxicillin-Pot Clavulanate Diarrhea     Vomiting      Clindamycin Phosphate      Hives    Metformin Diarrhea    Tegretol [Carbamazepine]      Irregular heart beat       Social History    reports that she has never smoked. She has never used smokeless tobacco. She reports that she does not drink alcohol and does not use drugs.      Family History   I have reviewed this patient's family history and updated it with pertinent information if needed.  Family History   Problem Relation Age of Onset    Cerebrovascular Disease Mother     Heart Disease Father     Neurologic Disorder Sister         ALS    C.A.D. Sister     Diabetes Sister     C.A.D. Brother     Psychotic Disorder Brother         [place] war: PTSD. suicide 2019    Gastrointestinal Disease Brother         diverticulitis    Colon Cancer No family hx of           Review of Systems   A comprehensive review of system was performed and is negative other than that noted in the HPI or here.     No lab results found in last 7 days.    Invalid input(s): \"TROPONINIES\"    Recent Labs   Lab 06/05/25  1041 06/05/25  1026 06/05/25  0910 06/05/25  0541 06/05/25  0506 06/05/25  0359 06/04/25  1855 06/04/25  1745   WBC 7.3  --   --   --   --  6.4  --  5.8   HGB 8.7*  --   --   --   --  9.0*  --  " "9.1*   MCV 97  --   --   --   --  99  --  95     --   --   --   --  149*  --  152   INR 4.14*  --   --   --   --   --   --  4.30*     --   --   --   --  137  --  141   POTASSIUM 4.8  --   --   --  6.2* 6.3*   < > 6.4*   CHLORIDE 108*  --   --   --   --  106  --  109*   CO2 16*  --   --   --   --  19*  --  22   BUN 77.3*  --   --   --   --  77.4*  --  77.9*   CR 3.19*  --   --   --   --  3.30*  --  3.28*   GFRESTIMATED 13*  --   --   --   --  13*  --  13*   ANIONGAP 14  --   --   --   --  12  --  10   EDNA 8.3*  --   --   --   --  8.5*  --  8.6*   * 218* 196*   < >  --  206*   < > 157*   ALBUMIN 3.0*  --   --   --   --   --   --  3.4*   PROTTOTAL 5.5*  --   --   --   --   --   --  6.1*   BILITOTAL <0.2  --   --   --   --   --   --  <0.2   ALKPHOS 81  --   --   --   --   --   --  84   ALT 23  --   --   --   --   --   --  19   AST 20  --   --   --   --   --   --  25    < > = values in this interval not displayed.     Recent Labs   Lab Test 02/13/25  1137 02/22/24  0916   CHOL 181 233*   HDL 46* 43*   * 130*   TRIG 154* 301*     Recent Labs   Lab 06/05/25  1041 06/05/25  0359 06/04/25  1745   WBC 7.3 6.4 5.8   HGB 8.7* 9.0* 9.1*   HCT 27.6* 29.7* 28.5*   MCV 97 99 95    149* 152   IRON  --  34*  --    IRONSAT  --  17  --    FEB  --  201*  --    B12  --  583  --    FOLIC  --  >40.0*  --      No results for input(s): \"PH\", \"PHV\", \"PO2\", \"PO2V\", \"SAT\", \"PCO2\", \"PCO2V\", \"HCO3\", \"HCO3V\" in the last 168 hours.  Recent Labs   Lab 06/04/25  1745   NTBNP 5,922*     No results for input(s): \"DD\" in the last 168 hours.  No results for input(s): \"SED\", \"CRP\" in the last 168 hours.  Recent Labs   Lab 06/05/25  1041 06/05/25  0359 06/04/25  1745    149* 152     No results for input(s): \"TSH\" in the last 168 hours.  Recent Labs   Lab 06/05/25  1256   COLOR Light Yellow   APPEARANCE Slightly Cloudy*   URINEGLC Negative   URINEBILI Negative   URINEKETONE Negative   SG 1.012   UBLD Negative "   URINEPH 5.0   PROTEIN 20*   NITRITE Negative   LEUKEST Moderate*   RBCU 3*   WBCU 20*       Imaging:  Recent Results (from the past 48 hours)   Chest XR,  PA & LAT    Narrative    EXAM: XR CHEST 2 VIEWS  LOCATION: [facility]  DATE: 2025    INDICATION: Near syncope and bradycardia today.  Worsening renal failure, elevated BNP.  COMPARISON: 2021      Impression    IMPRESSION:     Diffuse interstitial prominence, either pulmonary vasculature congestion or mild interstitial pulmonary edema.    No focal airspace disease. No pleural effusion or pneumothorax.    Stable mild cardiomegaly. Aortic calcifications.    Multilevel degenerative changes of the spine.   US Renal Complete Non-Vascular    Narrative    EXAM: US RENAL COMPLETE NON-VASCULAR  LOCATION: [facility]  DATE: 2025    INDICATION: FABY , R O obstruction  COMPARISON: None.  TECHNIQUE: Routine Bilateral Renal and Bladder Ultrasound.    FINDINGS:    RIGHT KIDNEY: 10.6 cm. No hydronephrosis or suspicious masses. Benign cysts requiring no dedicated follow-up. Renal cortical thinning.    LEFT KIDNEY: 9.7 cm. No hydronephrosis or suspicious masses. Benign cysts requiring no dedicated follow-up. Renal cortical thinning.    BLADDER: Unremarkable.      Impression    IMPRESSION:  1.  No hydronephrosis.   Echocardiogram Complete   Result Value    LVEF  60-65%    Narrative    [id number]  [id number]  [id number]  [id number]^JOSE^DAVID     [facility]  Echocardiography Laboratory  [address] [place] [address]     Name: BEATA TRIPLETT  MRN: [id number]  : 1935  Study Date: 2025 11:02 AM  Age: 89 yrs  Gender: Female  Patient Location: [facility]  Reason For Study: Bradycardia - Sinus  Ordering Physician: DAVID GARCIA  Referring Physician: Mil García MD  Performed By: Shaina Ray     BSA: 1.8 m2  Height: 64 in  Weight: 169 lb  HR: 60  BP: 102/44  mmHg  ______________________________________________________________________________  Procedure  Echocardiogram with two-dimensional, color and spectral Doppler. Definity (NDC  #[id number]) given intravenously.  ______________________________________________________________________________  Interpretation Summary     Left ventricular systolic function is normal.  The visual ejection fraction is 60-65%.  The right ventricle is normal in structure, function and size.  Moderate valvular aortic stenosis.  There is moderate (2+) tricuspid regurgitation.  There is no pericardial effusion.  Dilation of the inferior vena cava is present with abnormal respiratory  variation in diameter.     Compared to study dated 9/12/2024, aortic stenosis is still moderate range.  Left atrial dilation is severe.  ______________________________________________________________________________  Left Ventricle  The left ventricle is normal in structure, function and size. There is normal  left ventricular wall thickness. Left ventricular systolic function is normal.  The visual ejection fraction is 60-65%. Left ventricular diastolic function is  indeterminate. Normal left ventricular wall motion.     Right Ventricle  The right ventricle is normal in structure, function and size.     Atria  The left atrium is severely dilated. The right atrium is mildly dilated.     Mitral Valve  There is mild mitral annular calcification. There is mild (1+) mitral  regurgitation.     Tricuspid Valve  The tricuspid valve is normal in structure and function. There is moderate  (2+) tricuspid regurgitation. Right ventricular systolic pressure could not be  approximated due to inadequate tricuspid regurgitation.     Aortic Valve  The aortic valve is trileaflet with aortic valve sclerosis. There is trace  aortic regurgitation. Moderate valvular aortic stenosis. The peak AoV pressure  gradient is 30.0 mmHg. The mean AoV pressure gradient is 19.0 mmHg. The  calculated  aortic valve are is 1.7 cm^2.     Pulmonic Valve  The pulmonic valve is normal in structure and function. There is mild (1+)  pulmonic valvular regurgitation.     Vessels  The aortic root is normal size. Normal size ascending aorta. Dilation of the  inferior vena cava is present with abnormal respiratory variation in diameter.     Pericardium  There is no pericardial effusion.     ______________________________________________________________________________  MMode/2D Measurements & Calculations  IVSd: 1.1 cm  LVIDd: 4.5 cm  LVIDs: 3.1 cm  LVPWd: 1.0 cm  FS: 30.9 %  LV mass(C)d: 164.0 grams  LV mass(C)dI: 90.1 grams/m2     Ao root diam: 3.6 cm  asc Aorta Diam: 3.7 cm  LVOT diam: 2.1 cm  LVOT area: 3.4 cm2  Ao root diam index Ht(cm/m): 2.2  Ao root diam index BSA (cm/m2): 2.0  Asc Ao diam index BSA (cm/m2): 2.0  Asc Ao diam index Ht(cm/m): 2.3  LA Volume (BP): 93.3 ml  LA Volume Index (BP): 51.3 ml/m2  RV Base: 3.8 cm     RWT: 0.44  TAPSE: 2.3 cm     Doppler Measurements & Calculations  MV E max ferdinand: 136.0 cm/sec  MV A max ferdinand: 108.0 cm/sec  MV E/A: 1.3  MV dec time: 0.22 sec  Ao V2 max: 272.3 cm/sec  Ao max P.0 mmHg  Ao V2 mean: 204.9 cm/sec  Ao mean P.0 mmHg  Ao V2 VTI: 73.4 cm  BRAYDON(I,D): 1.7 cm2  BRAYDON(V,D): 1.6 cm2  LV V1 max P.7 mmHg  LV V1 max: 129.0 cm/sec  LV V1 VTI: 37.0 cm  SV(LVOT): 124.6 ml  SI(LVOT): 68.4 ml/m2  PA acc time: 0.13 sec  AV Ferdinand Ratio (DI): 0.47  BRAYDON Index (cm2/m2): 0.93  E/E' av.4  Lateral E/e': 14.0  Medial E/e': 22.7  RV S Ferdinand: 12.1 cm/sec     ______________________________________________________________________________  Report approved by: Kavita Christensen MD on 2025 03:58 PM             Echo:  No results found for this or any previous visit (from the past 4320 hours).    Clinically Significant Risk Factors Present on Admission        # Hyperkalemia: Highest K = 6.4 mmol/L in last 2 days, will monitor as appropriate   # Hyperchloremia: Highest Cl = 109 mmol/L in  "last 2 days, will monitor as appropriate          # Hypoalbuminemia: Lowest albumin = 3 g/dL at 6/5/2025 10:41 AM, will monitor as appropriate  # Drug Induced Coagulation Defect: home medication list includes an anticoagulant medication   # Acute Kidney Injury, unspecified: based on a >150% or 0.3 mg/dL increase in last creatinine compared to past 90 day average, will monitor renal function  # Hypertension: Noted on problem list      # Anemia: based on hgb <11      # DMII: A1C = 6.5 % (Ref range: 0.0 - 5.6 %) within past 6 months    # Overweight: Estimated body mass index is 29.18 kg/m  as calculated from the following:    Height as of this encounter: 1.626 m (5' 4\").    Weight as of this encounter: 77.1 kg (169 lb 15.6 oz).           Cardiac Arrhythmia: Atrial fibrillation: Paroxysmal    Acute kidney failure, unspecified                   "

## 2025-06-05 NOTE — CONSULTS
"[facility] Nurse Inpatient Assessment     Consulted for: \"R armpit\"    Summary: Large solid growth noted to right axilla, approximately 5 x 6 cm when palpated. Patient reports intermittent dull ache. No drainage or open wounds at this time. Discussed these findings with Dr. Murguia.    [facility] nurse follow-up plan: signing off    Patient History (according to provider note(s):      \"Ирина Yanez is a 89 year old female admitted on 6/4/2025.  Ирина Yanez is a 89 year old female with history of type 2 diabetes mellitus, CKD stage IV, hypertension, aortic stenosis, paroxysmal atrial fibrillation on chronic anticoagulation with Coumadin, hyperlipidemia, anxiety, chronic heart failure with preserved EF who presented to the hospital with a presyncopal episode.  Noted to have severe bradycardia, hypotension, FABY with hyperkalemia.  Patient was given IV fluid which seems to have improved her blood pressure and patient is currently denying any dizziness.\"    Assessment:      Areas visualized during today's visit: Focused:    Wound location: Right axilla      Last photo: 6/5  Wound due to: Unknown Etiology  Wound history/plan of care: Patient states that she had \"extra skin\" removed from this area many years ago. She saw a dermatologist for this growth 4 years ago, but does not remember them taking any tissue samples for biopsy at that time. The growth is now solid and measures approx. 5 x 6 cm under the skin in addition to the raised areas that are visible. Patient denies pain in this area today, but states that she has an intermittent dull ache.    Palpation of the wound bed: firm (no open wounds)  Drainage: none  Description of drainage: none  Color: pink  Temperature: normal   Odor: none  Pain: denies , aching and intermittent  Pain interventions prior to dressing change: patient tolerated well and slow and gentle cares     Treatment Plan:     Orders: no new orders    RECOMMEND PRIMARY TEAM " ORDER: Surgical consult  Education provided: plan of care  Discussed plan of care with: Patient, Nurse, and Physician  Notify WOC if wound(s) deteriorate.  Nursing to notify the Provider(s) and re-consult the WO Nurse if new skin concern.    DATA:     Current support surface: Standard  Standard gel mattress (Isoflex)  Containment of urine/stool: N/A  BMI: Body mass index is 29.18 kg/m .   Active diet order: Orders Placed This Encounter      Combination Diet Low Saturated Fat Na <2400mg Diet, No Caffeine Diet       Output:   I/O last 3 completed shifts:  In: 2080 [P.O.:180; I.V.:900; IV Piggyback:1000]  Out: 0      Labs:   Recent Labs   Lab 06/05/25  0359 06/04/25  1745   ALBUMIN  --  3.4*   HGB 9.0* 9.1*   INR  --  4.30*   WBC 6.4 5.8     Pressure injury risk assessment:   Sensory Perception: 3-->slightly limited  Moisture: 3-->occasionally moist  Activity: 3-->walks occasionally  Mobility: 3-->slightly limited  Nutrition: 3-->adequate  Friction and Shear: 2-->potential problem  David Score: 17    Bisi Bhatia RN CWOCN  Pager no longer is use, please contact through [employer] group: [facility] Nurse Omar

## 2025-06-05 NOTE — PLAN OF CARE
June 5, 2025  Shift:[id number]  Ирина Yanez  89 year old  YOB: 1935    Reason for admission:Long term (current) use of anticoagulants [Z79.01]  Hyperkalemia [E87.5]  Near syncope [R55]  Symptomatic bradycardia [R00.1]  Aortic valve stenosis, etiology of cardiac valve disease unspecified [I35.0]  Acute renal failure superimposed on chronic kidney disease, unspecified acute renal failure type, unspecified CKD stage [N17.9, N18.9]    Cognition/Mentation:A&Ox 4, Forgetful  Neuros/CMS:BLE neuropathy numbness present  VS:VSS ex low HR 40's, 1 L NC  Cardiac:SR with first degree AVHB at 65, Isma junctional heart block  GI:incontinent  :incontinent  Pulmonary: SOB with exertion   Pain:Denies pain  Drains/Lines:PIV SL  Skin:Right armpit skin growth, WOC consulted   Activity:A1 GB  Diet:Low fat Low Na no caffeine  Discharge:Pending

## 2025-06-05 NOTE — PROGRESS NOTES
"[facility]    Medicine Progress Note - Hospitalist Service    Date of Admission:  6/4/2025  Interval History:   Patient admitted with bradycardia and noted to have hyperkalemia.    She reports that a few weeks ago her meds were increase (by chart review may have added Benicar. She appears to have been on toprol and diltiazem both. Potentially losing weight after  passed in December.   Also on diuretic  In the garden last week and felt light headed \"Blood pressure was 60\" and went inside felt better. No other BP checks until last few days and felt really light headed. Her BP machine kept showing an \"E\"  Last urination was yesterday in am: started on fluids.     Assessment & Plan   Ирина Yanez is a 89 year old female with history of type 2 diabetes mellitus, CKD stage IV (followed by nephrology), hypertension, aortic stenosis, paroxysmal atrial fibrillation on chronic anticoagulation with Coumadin, hyperlipidemia, anxiety, chronic heart failure with preserved EF who presented to the hospital with a presyncopal episode on 6/4.      She was outside working in the sun for couple hours pulling weeds.  Reportedly got too weak to get up from the floor. Called paramedics.  Blood pressure 88/36 with a pulse of 32. Enroute SBP 66/40 for total of 1.5 mg of atropine. . Given 600 ml of fluids.   Admitted with severe bradycardia, hypotension, FABY with hyperkalemia.      Symptomatic bradycardia  Hypotension  Presyncope secondary to above  Elevated troponin, likely demand ischemia from hypertension  History of hypertension  History of aortic stenosis  - At presentation heart rate in the high 30s to 40s and blood pressure 90s.  At some point had dipped down to 70s but improved with IV fluid hydration and currently in the 120s and patient currently asymptomatic.  Chest x-ray with diffuse interstitial prominence either pulmonary vascular congestion or mild interstitial pulmonary edema.  " "Patient however denies any cough, chest pain, shortness of breath  -Patient without any chest pain, troponin flat in the 40s.  EKG with bradycardia with bifascicular block.  Patient reports feeling better improvement in blood pressure  -Monitor in IMC/telemetry with pacer pad in place  -Hold PTA Cardura, metoprolol, Cardizem, Benicar and torsemide  -Gentle hydration and monitor  - Hold PTA Coumadin for supratherapeutic INR  - Cardiology consult  - Echocardiogram     Acute kidney injury on chronic kidney disease stage IV  Hyperkalemia  Suspect that recent change in event may have been from change in blood pressure medications  She was recently started on Benicar for suboptimal blood pressure management.  At baseline has a history of stage IV renal disease followed by nephrology.  Outside working in the heat>> has 2 jacque agents including diltiazem and Toprol plus additional Benicar with a history of renal insufficiency and on a diuretic  Whether she got hypotensive with bradycardia versus dehydration or increasing creatinine and less clearance suspect combination caused hemodynamic changes which led to poor perfusion of her kidneys and thus further poor clearance of medications including jacque agent  She may have had baseline lower heart rate or developed worsening conduction as well  6/5 if she is sitting up in the bed able to carry on a conversation with no acute complaints.  She acknowledges that she has been really fatigued and had a syncopal episode almost a week ago with reported blood pressure \"60\" although difficult to decipher if pulse or blood pressure>> did not check it again until the last couple days when she was feeling poorly and it recorded in \"E\" (suspected error code)   6/5 nephrology consult.  Holding on additional fluids to avoid fluid overload given her poor urine output.>>  Discussion with nephrology and volume status appears to be stable  Allow her medications to wear off with hopeful " improvement of her heart rate blood pressure and renal function  Patient on ARB and diuretic  6/4 admit labs with potassium 6.4, bicarb 22 and creatinine 3.28   Continue to monitor.    Hyperkalemia  Baseline stage IV CKD  Recent addition ARB (benicar 20 mg)   Patient on ARB with likely dehydration and hypoperfusion  Given treatment in the emergency room  6/5 repeat potassium treatment this a.m. with Lokelma/dextrose and insulin  6/5 repeat Lokelma dosing at 13:49 (second dose)   6/5 last potassium at about 11 AM of 4.8 continue to monitor   Check q 8 hours.     Bradycardia  Patient on long-acting diltiazem 240 and Toprol XL.  Extended release forms likely to have a longer half-life in duration given her renal function  Suspect some underlying chronotropic insufficiency  6/5 monitor shows sinus bradycardia (appears to have P waves)    No peaked T waves noted on EKG.  Does have some wide QRS but patient has wide QRS noted on previous EKGs  6/5 cardiology consult   6/5 echo showing normal EF.  Moderate valvular aortic stenosis.  Moderate tricuspid regurgitation 2+.  Troponin flat 41>> 40    Paroxysmal atrial fibrillation on chronic anticoagulation with Coumadin  Supratherapeutic INR  Sinus bradycardia this am   Appears to currently be with sinus bradycardia  INR elevated on admission (pharmacy to dose)   INR 4.14      Hyperlipidemia  Resume PTA statin     Chronic anemia  - Baseline hemoglobin around 10-11.  Admitted with a hemoglobin of 9.1.  - Patient denies any active bleed  - Will check iron studies B12/folate     Right axillary mass in arm pit.   Informed by nursing after rounding.   Will go back and evaluate  WOC consult   Will need to have plan but has urgent electrolyte, cardiac concerns        Diet: Combination Diet Low Saturated Fat Na <2400mg Diet, No Caffeine Diet    DVT Prophylaxis: Warfarin  [place] Catheter: Not present  Lines: None     Cardiac Monitoring: ACTIVE order. Indication: Bradycardias (48  "hours)  Code Status: Full Code      Clinically Significant Risk Factors Present on Admission        # Hyperkalemia: Highest K = 6.4 mmol/L in last 2 days, will monitor as appropriate   # Hyperchloremia: Highest Cl = 109 mmol/L in last 2 days, will monitor as appropriate          # Hypoalbuminemia: Lowest albumin = 3 g/dL at 6/5/2025 10:41 AM, will monitor as appropriate  # Drug Induced Coagulation Defect: home medication list includes an anticoagulant medication   # Acute Kidney Injury, unspecified: based on a >150% or 0.3 mg/dL increase in last creatinine compared to past 90 day average, will monitor renal function  # Hypertension: Noted on problem list      # Anemia: based on hgb <11      # DMII: A1C = 6.5 % (Ref range: 0.0 - 5.6 %) within past 6 months    # Overweight: Estimated body mass index is 29.18 kg/m  as calculated from the following:    Height as of this encounter: 1.626 m (5' 4\").    Weight as of this encounter: 77.1 kg (169 lb 15.6 oz).              Social Drivers of Health    Physical Activity: Inactive (2/24/2025)    Exercise Vital Sign     Days of Exercise per Week: 0 days     Minutes of Exercise per Session: 0 min   Social Connections: Unknown (2/24/2025)    Social Connection and Isolation Panel [NHANES]     Frequency of Social Gatherings with Friends and Family: Twice a week          Disposition Plan     Medically Ready for Discharge: Anticipated in 2-4 Days       CARMENZA HENDRIX MD  Hospitalist Service  [facility]  Securely message with [employer] (more info)  Text page via [employer] Paging/Directory   ______________________________________________________________________      Physical Exam   Vital Signs: Temp: 96.9  F (36.1  C) Temp src: Axillary BP: 134/65 Pulse: (!) 39   Resp: 21 SpO2: 95 % O2 Device: Nasal cannula Oxygen Delivery: 1 LPM  Weight: 169 lbs 15.59 oz    General patient is awake and alert although somewhat difficult to articulate exact details of history prior to " admission  Respiratory: Clear to auscultation bilaterally without wheezes or rhonchi  Cardiovascular: Regular rate with no gallop or rub (Bradycardic)   GI: + BS, soft, non tender   Skin: no overt edema     Medical Decision Making       50 MINUTES SPENT BY ME on the date of service doing chart review, history, exam, documentation & further activities per the note.    Discussion with patient, nephrology, nursing review of chart and labs     Data     I have personally reviewed the following data over the past 24 hrs:    7.3  \   8.7 (L)   / 150     138 108 (H) 77.3 (H) /  227 (H)   4.8 16 (L) 3.19 (H) \     ALT: 23 AST: 20 AP: 81 TBILI: <0.2   ALB: 3.0 (L) TOT PROTEIN: 5.5 (L) LIPASE: N/A     Trop: 40 (H) BNP: 5,922 (H)     INR:  4.14 (H) PTT:  N/A   D-dimer:  N/A Fibrinogen:  N/A       Imaging results reviewed over the past 24 hrs:   Recent Results (from the past 24 hours)   Chest XR,  PA & LAT    Narrative    EXAM: XR CHEST 2 VIEWS  LOCATION: [facility]  DATE: 6/4/2025    INDICATION: Near syncope and bradycardia today.  Worsening renal failure, elevated BNP.  COMPARISON: 2/25/2021      Impression    IMPRESSION:     Diffuse interstitial prominence, either pulmonary vasculature congestion or mild interstitial pulmonary edema.    No focal airspace disease. No pleural effusion or pneumothorax.    Stable mild cardiomegaly. Aortic calcifications.    Multilevel degenerative changes of the spine.   US Renal Complete Non-Vascular    Narrative    EXAM: US RENAL COMPLETE NON-VASCULAR  LOCATION: [facility]  DATE: 6/4/2025    INDICATION: FABY , R O obstruction  COMPARISON: None.  TECHNIQUE: Routine Bilateral Renal and Bladder Ultrasound.    FINDINGS:    RIGHT KIDNEY: 10.6 cm. No hydronephrosis or suspicious masses. Benign cysts requiring no dedicated follow-up. Renal cortical thinning.    LEFT KIDNEY: 9.7 cm. No hydronephrosis or suspicious masses. Benign cysts requiring no  dedicated follow-up. Renal cortical thinning.    BLADDER: Unremarkable.      Impression    IMPRESSION:  1.  No hydronephrosis.   Echocardiogram Complete   Result Value    LVEF  60-65%    Narrative    [id number]  [id number]  [id number]^JOSE^DAVID     [facility]  Echocardiography Laboratory  [address] [place] [address]     Name: BEATA TRIPLETT  MRN: [id number]  : 1935  Study Date: 2025 11:02 AM  Age: 89 yrs  Gender: Female  Patient Location: [facility]  Reason For Study: Bradycardia - Sinus  Ordering Physician: DAVID GARCIA  Referring Physician: Mil García MD  Performed By: Shaina Ray     BSA: 1.8 m2  Height: 64 in  Weight: 169 lb  HR: 60  BP: 102/44 mmHg  ______________________________________________________________________________  Procedure  Echocardiogram with two-dimensional, color and spectral Doppler. Definity (NDC  #[id number]) given intravenously.  ______________________________________________________________________________  Interpretation Summary     Left ventricular systolic function is normal.  The visual ejection fraction is 60-65%.  The right ventricle is normal in structure, function and size.  Moderate valvular aortic stenosis.  There is moderate (2+) tricuspid regurgitation.  There is no pericardial effusion.  Dilation of the inferior vena cava is present with abnormal respiratory  variation in diameter.     Compared to study dated 2024, aortic stenosis is still moderate range.  Left atrial dilation is severe.  ______________________________________________________________________________  Left Ventricle  The left ventricle is normal in structure, function and size. There is normal  left ventricular wall thickness. Left ventricular systolic function is normal.  The visual ejection fraction is 60-65%. Left ventricular diastolic function is  indeterminate. Normal left ventricular wall motion.     Right Ventricle  The right ventricle is normal  in structure, function and size.     Atria  The left atrium is severely dilated. The right atrium is mildly dilated.     Mitral Valve  There is mild mitral annular calcification. There is mild (1+) mitral  regurgitation.     Tricuspid Valve  The tricuspid valve is normal in structure and function. There is moderate  (2+) tricuspid regurgitation. Right ventricular systolic pressure could not be  approximated due to inadequate tricuspid regurgitation.     Aortic Valve  The aortic valve is trileaflet with aortic valve sclerosis. There is trace  aortic regurgitation. Moderate valvular aortic stenosis. The peak AoV pressure  gradient is 30.0 mmHg. The mean AoV pressure gradient is 19.0 mmHg. The  calculated aortic valve are is 1.7 cm^2.     Pulmonic Valve  The pulmonic valve is normal in structure and function. There is mild (1+)  pulmonic valvular regurgitation.     Vessels  The aortic root is normal size. Normal size ascending aorta. Dilation of the  inferior vena cava is present with abnormal respiratory variation in diameter.     Pericardium  There is no pericardial effusion.     ______________________________________________________________________________  MMode/2D Measurements & Calculations  IVSd: 1.1 cm  LVIDd: 4.5 cm  LVIDs: 3.1 cm  LVPWd: 1.0 cm  FS: 30.9 %  LV mass(C)d: 164.0 grams  LV mass(C)dI: 90.1 grams/m2     Ao root diam: 3.6 cm  asc Aorta Diam: 3.7 cm  LVOT diam: 2.1 cm  LVOT area: 3.4 cm2  Ao root diam index Ht(cm/m): 2.2  Ao root diam index BSA (cm/m2): 2.0  Asc Ao diam index BSA (cm/m2): 2.0  Asc Ao diam index Ht(cm/m): 2.3  LA Volume (BP): 93.3 ml  LA Volume Index (BP): 51.3 ml/m2  RV Base: 3.8 cm     RWT: 0.44  TAPSE: 2.3 cm     Doppler Measurements & Calculations  MV E max ángel: 136.0 cm/sec  MV A max ángel: 108.0 cm/sec  MV E/A: 1.3  MV dec time: 0.22 sec  Ao V2 max: 272.3 cm/sec  Ao max P.0 mmHg  Ao V2 mean: 204.9 cm/sec  Ao mean P.0 mmHg  Ao V2 VTI: 73.4 cm  BRAYDON(I,D): 1.7 cm2  BRAYDON(V,D):  1.6 cm2  LV V1 max P.7 mmHg  LV V1 max: 129.0 cm/sec  LV V1 VTI: 37.0 cm  SV(LVOT): 124.6 ml  SI(LVOT): 68.4 ml/m2  PA acc time: 0.13 sec  AV Ferdinand Ratio (DI): 0.47  BRAYDON Index (cm2/m2): 0.93  E/E' av.4  Lateral E/e': 14.0  Medial E/e': 22.7  RV S Ferdinand: 12.1 cm/sec     ______________________________________________________________________________  Report approved by: Kavita Christensen MD on 2025 03:58 PM

## 2025-06-05 NOTE — PROVIDER NOTIFICATION
MD Notification    Notified Person: MD    Notified Person Name: Dr. Driver    Notification Date/Time: 6/5/2025 3:56 AM    Notification Interaction: Method of Communication: Secure Message with response    Purpose of Notification: Low heart rate 28 bpm    Orders Received: PRN atropine once if hypotensive or AMS available    Comments:

## 2025-06-05 NOTE — SIGNIFICANT EVENT
Significant Event Note    Time of event: 4:35 AM June 5, 2025    Description of event:  Isma to 28  K 6.3    Plan:  PRN atropine, continue to keep pads on  HyperK protocol w/ albuterol insulin, D50  Starting Lokelma  Avoid diuretetics given hypotension    Stephy Pretty MD

## 2025-06-05 NOTE — H&P
[facility] [place]    History and Physical - Hospitalist Service       Date of Admission:  6/4/2025    Assessment & Plan      Ирина Yanez is a 89 year old female admitted on 6/4/2025.  Ирина Yanez is a 89 year old female with history of type 2 diabetes mellitus, CKD stage IV, hypertension, aortic stenosis, paroxysmal atrial fibrillation on chronic anticoagulation with Coumadin, hyperlipidemia, anxiety, chronic heart failure with preserved EF who presented to the hospital with a presyncopal episode.  Noted to have severe bradycardia, hypotension, FABY with hyperkalemia.  Patient was given IV fluid which seems to have improved her blood pressure and patient is currently denying any dizziness.    Symptomatic bradycardia  Hypotension  Presyncope secondary to above  Elevated troponin, likely demand ischemia from hypertension  Paroxysmal atrial fibrillation on chronic anticoagulation with Coumadin  Supratherapeutic INR  History of hypertension  History of aortic stenosis  - At presentation heart rate in the high 30s to 40s and blood pressure 90s.  At some point had dipped down to 70s but improved with IV fluid hydration and currently in the 120s and patient currently asymptomatic.  Chest x-ray with diffuse interstitial prominence either pulmonary vascular congestion or mild interstitial pulmonary edema.  Patient however denies any cough, chest pain, shortness of breath  -Patient without any chest pain, troponin flat in the 40s.  EKG with bradycardia with bifascicular block.  Patient reports feeling better improvement in blood pressure  -Monitor in IMC/telemetry with pacer pad in place  -Hold PTA Cardura, metoprolol, Cardizem, Benicar and torsemide  -Gentle hydration and monitor  - Hold PTA Coumadin for supratherapeutic INR  - Cardiology consult  - Echocardiogram    Acute kidney injury on chronic kidney disease stage IV  Hyperkalemia  -Patient reports poor fluid intake over the last week, her urine  "output has also decreased  - Baseline creatinine around 1.6-2  - Presented with a creatinine of 3.28 and potassium of 6.4.  No peaked T waves noted on EKG.  Does have some wide QRS but patient has wide QRS noted on previous EKGs  - Patient has received IV hydration, albuterol nebulizer, calcium gluconate, dextrose, insulin.  - Monitor potassium  - Hold PTA Benicar, torsemide  - Renal ultrasound  - Nephrology consulted    Hyperlipidemia  -Resume PTA statin    Chronic anemia  - Baseline hemoglobin around 10-11.  Admitted with a hemoglobin of 9.1.  - Patient denies any active bleed  - Will check iron studies B12/folate     Diet:  Cardiac  DVT Prophylaxis: Currently supratherapeutic on INR  [place] Catheter: Not present  Lines: None     Cardiac Monitoring: None  Code Status:  Full code, discussed with patient    Clinically Significant Risk Factors Present on Admission        # Hyperkalemia: Highest K = 6.4 mmol/L in last 2 days, will monitor as appropriate   # Hyperchloremia: Highest Cl = 109 mmol/L in last 2 days, will monitor as appropriate      # Hypocalcemia: Lowest Ca = 8.6 mg/dL in last 2 days, will monitor and replace as appropriate     # Hypoalbuminemia: Lowest albumin = 3.4 g/dL at 6/4/2025  5:45 PM, will monitor as appropriate  # Drug Induced Coagulation Defect: home medication list includes an anticoagulant medication   # Acute Kidney Injury, unspecified: based on a >150% or 0.3 mg/dL increase in last creatinine compared to past 90 day average, will monitor renal function  # Hypertension: Noted on problem list      # Anemia: based on hgb <11      # DMII: A1C = 6.5 % (Ref range: 0.0 - 5.6 %) within past 6 months    # Overweight: Estimated body mass index is 29.18 kg/m  as calculated from the following:    Height as of 5/23/25: 1.626 m (5' 4\").    Weight as of this encounter: 77.1 kg (169 lb 15.6 oz).              Disposition Plan     Medically Ready for Discharge: Anticipated in 2-4 Days           Judi Camacho, " MD  Hospitalist Service  [facility]  Securely message with [employer] (more info)  Text page via [facility] Paging/Directory     ______________________________________________________________________    Chief Complaint   Dizziness, presyncope    History is obtained from the patient and electronic health record    History of Present Illness   Ирина Yanez is a 89 year old female with history of type 2 diabetes mellitus, CKD stage IV, hypertension, aortic stenosis, paroxysmal atrial fibrillation on chronic anticoagulation with Coumadin, hyperlipidemia, anxiety, chronic heart failure with preserved EF who presented to the hospital with a presyncopal episode.    Patient was outside working on her flower beds earlier today.  She went inside her house to drink water.  Then she tried walking she denies hitting her head.  When she started feeling lightheaded and got down onto the knees and fell on the left side.  Patient denies any loss of consciousness.  She denies hitting head.  She fell on her shoulder but able to move her shoulder and denies any significant tenderness.  For about a week patient has been feeling generally weak and dizzy.  She recently buried her  and daughter and feels that she might be stressed out and causing her symptoms.  Patient however does report that her fluid intake has significantly decreased.  Patient does have chronic diarrhea and has recently been started on Imodium which patient reports seems to be helping her diarrhea.  Patient does report that over the last few days her urine output has decreased.    The patient denies any fever, nausea or vomiting, headache, abdominal pain.        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    Complication of anesthesia     Depression     Diabetes mellitus  (H)     sees Dr. Verde- type II    Falls frequently 7/10/2019    Heart murmur     HTN     Hyperlipidemia LDL goal < 100     Dr. Verde    Lichen sclerosus et atrophicus 2/15/2013    Melanoma (H)     Oxygen dependent     1 liter continuously    Peripheral Neuropathy     hands, feet; used to see Dr. Tl ARRIAZA (postoperative nausea and vomiting)     Skin cancer     face; basal and other. Melanoma. Dolan    Sleep apnea     CPAP    Spinal stenosis        Past Surgical History   Past Surgical History:   Procedure Laterality Date    AMPUTATE TOE(S)  8/23/2012    left second toe Procedure: AMPUTATE TOE(S);;  Surgeon: Mil Jolly DPM;  Location: [facility] OR    CHOLECYSTECTOMY  Nov. 1975    COLONOSCOPY  early 2009    repeat 4-5 years (Had one prior to this with polyps)    COLONOSCOPY  Sept 2014    incomplete; recommend colography    COLONOSCOPY  02/25/2020    Dr. Pathak [facility]    COLONOSCOPY N/A 2/25/2020    Procedure: COLONOSCOPY, WITH biopsies using forceps;  Surgeon: Adebayo Pathak MD;  Location:  GI    INSERT CHEST TUBE Bilateral 2/8/2021    Procedure: Attempted INSERTION, CATHETER, INTERCOSTAL, FOR DRAINAGE (Pleurx);  Surgeon: Smitha Odonnell MD;  Location:  OR    OPTICAL TRACKING SYSTEM FUSION POSTERIOR SPINE LUMBAR N/A 9/16/2016    Procedure: OPTICAL TRACKING SYSTEM FUSION SPINE POSTERIOR LUMBAR ONE LEVEL;  Surgeon: Lennox Blue MD;  Location: [facility] OR    PET, REC OF MELANOMA/MET CA Left     arm    REPAIR HAMMER TOE BILATERAL  8/23/2012    Procedure: REPAIR HAMMER TOE BILATERAL;  Flexor Tenotomy 2,3,4,5 Toes both feet, Partial 2nd toe amputation left foot;  Surgeon: Mil Jolly DPM;  Location:  OR    REPAIR TENDON QUADRICEPS Right 10/27/2015    Procedure: REPAIR TENDON QUADRICEPS;  Surgeon: Antonio Yi MD;  Location: [facility] OR    SURGICAL HISTORY OF -   1997    bilateral knee replacement    SURGICAL HISTORY OF -   1997    right knee revised; patella tendon ruptured     SURGICAL HISTORY OF -       surgery for spinal stenosis    SURGICAL HISTORY OF -       breast reduction surgery    SURGICAL HISTORY OF -       D and C        Prior to Admission Medications   Prior to Admission Medications   Prescriptions Last Dose Informant Patient Reported? Taking?   MULTI-VITAMIN OR TABS 6/4/2025 Morning  Yes Yes   Sig: Take 2 tablets by mouth daily    acetaminophen (TYLENOL) 500 MG tablet   Yes Yes   Sig: Take 1,000 mg by mouth every evening as needed for mild pain   betamethasone valerate (VALISONE) 0.1 % external cream   No Yes   Sig: Apply topically daily   Patient taking differently: Apply topically daily as needed.   blood glucose (NO BRAND SPECIFIED) lancets standard   No No   Sig: Use to test blood sugar 1 times daily or as directed, Contour Next lancets   blood glucose (NO BRAND SPECIFIED) test strip   No No   Sig: Use to test blood sugar 1 times daily or as directed, Contour Next strips   blood glucose monitoring (NO BRAND SPECIFIED) meter device kit   No No   Sig: Use to test blood sugar 1 times daily or as directed. Ultra 2   diltiazem ER (CARDIAZEM LA) 240 MG 24 hr tablet 6/4/2025 Morning  No Yes   Sig: Take 1 tablet (240 mg) by mouth daily.   doxazosin (CARDURA) 4 MG tablet 6/3/2025 Bedtime  Yes Yes   Sig: Take 4 mg by mouth at bedtime.   gabapentin (NEURONTIN) 100 MG capsule 6/3/2025 Bedtime  No Yes   Sig: Take 2 capsules (200 mg) by mouth nightly as needed for neuropathic pain.   Patient taking differently: Take 200 mg by mouth at bedtime.   loperamide (IMODIUM A-D) 2 MG tablet   No Yes   Sig: Take 1-2 tablets (2-4 mg) by mouth 4 times daily as needed for diarrhea.   metoprolol succinate ER (TOPROL XL) 25 MG 24 hr tablet 6/4/2025 Morning  No Yes   Sig: TAKE 1 TABLET BY MOUTH DAILY. GENERIC EQUIVALENT FOR TOPROL XL   nystatin (MYCOSTATIN) 644674 UNIT/GM external powder   No Yes   Sig: Apply a small amount to affected area three times daily.   Patient taking differently: Apply  topically 3 times daily as needed. Apply a small amount to affected area three times daily.   olmesartan (BENICAR) 20 MG tablet 6/4/2025 Morning  No Yes   Sig: Take 1 tablet (20 mg) by mouth daily.   pravastatin (PRAVACHOL) 20 MG tablet 6/4/2025 Morning  No Yes   Sig: Take 1 tablet (20 mg) by mouth daily.   sertraline (ZOLOFT) 50 MG tablet 6/4/2025 Morning  No Yes   Sig: Take 1 tablet (50 mg) by mouth daily.   torsemide (DEMADEX) 10 MG tablet 6/4/2025 Morning  No Yes   Sig: Take 1 tablet (10 mg) by mouth daily.   warfarin ANTICOAGULANT (JANTOVEN ANTICOAGULANT) 2.5 MG tablet 6/3/2025 Evening  No Yes   Sig: Take 2 tablets (5 mg) by mouth daily except take 1.5 tablets (3.75 mg) on Sunday or as directed by INR Clinic   Patient taking differently: Take 5 mg by mouth every evening.      Facility-Administered Medications: None        Review of Systems    The 10 point Review of Systems is negative other than noted in the HPI or here.     Physical Exam   Vital Signs: Temp: 97.4  F (36.3  C) Temp src: Temporal BP: 108/44 Pulse: (!) 38   Resp: 20 SpO2: 94 % O2 Device: Nasal cannula Oxygen Delivery: 2 LPM  Weight: 169 lbs 15.59 oz    Exam:  Constitutional: Awake, alert and no distress. Appears comfortable  Head: Normocephalic. No masses, lesions, tenderness or abnormalities  ENMT: ENT exam normal, no neck nodes or sinus tenderness  Cardiovascular: Bradycardia, regular, 2+ murmurs, no rubs or JVD  Respiratory: Normal WOB,b/l equal air entry, bilateral scattered crackles   Gastrointestinal: Abdomen soft, non-tender. BS normal. No masses, organomegaly  : Deferred  Extremities : No edema , no clubbing or cyanosis      Medical Decision Making       82 MINUTES SPENT BY ME on the date of service doing chart review, history, exam, documentation & further activities per the note.      Data     I have personally reviewed the following data over the past 24 hrs:    5.8  \   9.1 (L)   / 152     141 109 (H) 77.9 (H) /  249 (H)   6.3 (HH)  22 3.28 (H) \     ALT: 19 AST: 25 AP: 84 TBILI: <0.2   ALB: 3.4 (L) TOT PROTEIN: 6.1 (L) LIPASE: N/A     Trop: 40 (H) BNP: 5,922 (H)     INR:  4.30 (H) PTT:  N/A   D-dimer:  N/A Fibrinogen:  N/A       Imaging results reviewed over the past 24 hrs:   Recent Results (from the past 24 hours)   Chest XR,  PA & LAT    Narrative    EXAM: XR CHEST 2 VIEWS  LOCATION: [facility]  DATE: 6/4/2025    INDICATION: Near syncope and bradycardia today.  Worsening renal failure, elevated BNP.  COMPARISON: 2/25/2021      Impression    IMPRESSION:     Diffuse interstitial prominence, either pulmonary vasculature congestion or mild interstitial pulmonary edema.    No focal airspace disease. No pleural effusion or pneumothorax.    Stable mild cardiomegaly. Aortic calcifications.    Multilevel degenerative changes of the spine.     Recent Labs   Lab 06/04/25 2043 06/04/25  1949 06/04/25  1855 06/04/25  1745 05/29/25  1139   WBC  --   --   --  5.8  --    HGB  --   --   --  9.1*  --    MCV  --   --   --  95  --    PLT  --   --   --  152  --    INR  --   --   --  4.30* 3.9*   NA  --   --   --  141  --    POTASSIUM  --   --  6.3* 6.4*  --    CHLORIDE  --   --   --  109*  --    CO2  --   --   --  22  --    BUN  --   --   --  77.9*  --    CR  --   --   --  3.28*  --    ANIONGAP  --   --   --  10  --    EDNA  --   --   --  8.6*  --    * 149*  --  157*  --    ALBUMIN  --   --   --  3.4*  --    PROTTOTAL  --   --   --  6.1*  --    BILITOTAL  --   --   --  <0.2  --    ALKPHOS  --   --   --  84  --    ALT  --   --   --  19  --    AST  --   --   --  25  --

## 2025-06-05 NOTE — PLAN OF CARE
Admission/Transfer from: ed  2 RN skin assessment completed. Yes, TV and AT, RNs  Significant findings include: R armpit open scab and skin growth. Redness to butt  WOC Nurse Consult Ordered? Yes

## 2025-06-06 ENCOUNTER — APPOINTMENT (OUTPATIENT)
Dept: PHYSICAL THERAPY | Facility: CLINIC | Age: 89
DRG: 640 | End: 2025-06-06
Attending: INTERNAL MEDICINE
Payer: COMMERCIAL

## 2025-06-06 LAB
ANION GAP SERPL CALCULATED.3IONS-SCNC: 11 MMOL/L (ref 7–15)
ANION GAP SERPL CALCULATED.3IONS-SCNC: 12 MMOL/L (ref 7–15)
ATRIAL RATE - MUSE: 64 BPM
BACTERIA UR CULT: NORMAL
BUN SERPL-MCNC: 68.3 MG/DL (ref 8–23)
BUN SERPL-MCNC: 72.3 MG/DL (ref 8–23)
CALCIUM SERPL-MCNC: 8.7 MG/DL (ref 8.8–10.4)
CALCIUM SERPL-MCNC: 8.9 MG/DL (ref 8.8–10.4)
CHLORIDE SERPL-SCNC: 108 MMOL/L (ref 98–107)
CHLORIDE SERPL-SCNC: 108 MMOL/L (ref 98–107)
CREAT SERPL-MCNC: 3 MG/DL (ref 0.51–0.95)
CREAT SERPL-MCNC: 3.01 MG/DL (ref 0.51–0.95)
DIASTOLIC BLOOD PRESSURE - MUSE: NORMAL MMHG
EGFRCR SERPLBLD CKD-EPI 2021: 14 ML/MIN/1.73M2
EGFRCR SERPLBLD CKD-EPI 2021: 14 ML/MIN/1.73M2
GLUCOSE BLDC GLUCOMTR-MCNC: 129 MG/DL (ref 70–99)
GLUCOSE BLDC GLUCOMTR-MCNC: 191 MG/DL (ref 70–99)
GLUCOSE SERPL-MCNC: 143 MG/DL (ref 70–99)
GLUCOSE SERPL-MCNC: 149 MG/DL (ref 70–99)
HCO3 SERPL-SCNC: 20 MMOL/L (ref 22–29)
HCO3 SERPL-SCNC: 20 MMOL/L (ref 22–29)
INR PPP: 3.49 (ref 0.85–1.15)
INTERPRETATION ECG - MUSE: NORMAL
P AXIS - MUSE: 48 DEGREES
POTASSIUM SERPL-SCNC: 3.7 MMOL/L (ref 3.4–5.3)
POTASSIUM SERPL-SCNC: 5.5 MMOL/L (ref 3.4–5.3)
PR INTERVAL - MUSE: 222 MS
PROTHROMBIN TIME: 33.5 SECONDS (ref 11.8–14.8)
QRS DURATION - MUSE: 154 MS
QT - MUSE: 442 MS
QTC - MUSE: 455 MS
R AXIS - MUSE: 86 DEGREES
SODIUM SERPL-SCNC: 139 MMOL/L (ref 135–145)
SODIUM SERPL-SCNC: 140 MMOL/L (ref 135–145)
SYSTOLIC BLOOD PRESSURE - MUSE: NORMAL MMHG
T AXIS - MUSE: 18 DEGREES
VENTRICULAR RATE- MUSE: 64 BPM

## 2025-06-06 PROCEDURE — 99222 1ST HOSP IP/OBS MODERATE 55: CPT | Mod: FS | Performed by: PHYSICIAN ASSISTANT

## 2025-06-06 PROCEDURE — 250N000013 HC RX MED GY IP 250 OP 250 PS 637: Performed by: INTERNAL MEDICINE

## 2025-06-06 PROCEDURE — 97116 GAIT TRAINING THERAPY: CPT | Mod: GP

## 2025-06-06 PROCEDURE — 80048 BASIC METABOLIC PNL TOTAL CA: CPT | Performed by: INTERNAL MEDICINE

## 2025-06-06 PROCEDURE — 99232 SBSQ HOSP IP/OBS MODERATE 35: CPT | Performed by: INTERNAL MEDICINE

## 2025-06-06 PROCEDURE — 97161 PT EVAL LOW COMPLEX 20 MIN: CPT | Mod: GP

## 2025-06-06 PROCEDURE — 85610 PROTHROMBIN TIME: CPT | Performed by: INTERNAL MEDICINE

## 2025-06-06 PROCEDURE — 36415 COLL VENOUS BLD VENIPUNCTURE: CPT | Performed by: INTERNAL MEDICINE

## 2025-06-06 PROCEDURE — 93005 ELECTROCARDIOGRAM TRACING: CPT

## 2025-06-06 PROCEDURE — 210N000002 HC R&B HEART CARE

## 2025-06-06 PROCEDURE — 97530 THERAPEUTIC ACTIVITIES: CPT | Mod: GP

## 2025-06-06 RX ORDER — DILTIAZEM HYDROCHLORIDE 30 MG/1
30 TABLET, FILM COATED ORAL EVERY 6 HOURS SCHEDULED
Status: DISCONTINUED | OUTPATIENT
Start: 2025-06-06 | End: 2025-06-09

## 2025-06-06 RX ORDER — WARFARIN SODIUM 1 MG/1
1 TABLET ORAL
Status: COMPLETED | OUTPATIENT
Start: 2025-06-06 | End: 2025-06-06

## 2025-06-06 RX ADMIN — SODIUM ZIRCONIUM CYCLOSILICATE 15 G: 5 POWDER, FOR SUSPENSION ORAL at 20:33

## 2025-06-06 RX ADMIN — PRAVASTATIN SODIUM 20 MG: 20 TABLET ORAL at 08:52

## 2025-06-06 RX ADMIN — DILTIAZEM HYDROCHLORIDE 30 MG: 30 TABLET ORAL at 22:45

## 2025-06-06 RX ADMIN — WARFARIN SODIUM 1 MG: 1 TABLET ORAL at 17:24

## 2025-06-06 RX ADMIN — ACETAMINOPHEN 1000 MG: 500 TABLET, FILM COATED ORAL at 02:04

## 2025-06-06 RX ADMIN — DILTIAZEM HYDROCHLORIDE 30 MG: 30 TABLET ORAL at 17:24

## 2025-06-06 RX ADMIN — SODIUM ZIRCONIUM CYCLOSILICATE 15 G: 5 POWDER, FOR SUSPENSION ORAL at 06:21

## 2025-06-06 RX ADMIN — ACETAMINOPHEN 1000 MG: 500 TABLET, FILM COATED ORAL at 11:55

## 2025-06-06 RX ADMIN — SERTRALINE HYDROCHLORIDE 50 MG: 50 TABLET ORAL at 08:52

## 2025-06-06 RX ADMIN — SODIUM ZIRCONIUM CYCLOSILICATE 15 G: 5 POWDER, FOR SUSPENSION ORAL at 14:04

## 2025-06-06 RX ADMIN — ACETAMINOPHEN 1000 MG: 500 TABLET, FILM COATED ORAL at 22:20

## 2025-06-06 RX ADMIN — GABAPENTIN 200 MG: 100 CAPSULE ORAL at 22:17

## 2025-06-06 ASSESSMENT — ACTIVITIES OF DAILY LIVING (ADL)
ADLS_ACUITY_SCORE: 53
DEPENDENT_IADLS:: INDEPENDENT
ADLS_ACUITY_SCORE: 52
ADLS_ACUITY_SCORE: 53
ADLS_ACUITY_SCORE: 50
ADLS_ACUITY_SCORE: 53
ADLS_ACUITY_SCORE: 53
ADLS_ACUITY_SCORE: 50
ADLS_ACUITY_SCORE: 53
ADLS_ACUITY_SCORE: 52
ADLS_ACUITY_SCORE: 53
ADLS_ACUITY_SCORE: 50
ADLS_ACUITY_SCORE: 50
ADLS_ACUITY_SCORE: 53
ADLS_ACUITY_SCORE: 50
ADLS_ACUITY_SCORE: 50
ADLS_ACUITY_SCORE: 53
ADLS_ACUITY_SCORE: 49

## 2025-06-06 NOTE — PROGRESS NOTES
Care Management Follow Up    Length of Stay (days): 2    Expected Discharge Date: 06/08/2025     Concerns to be Addressed: all concerns addressed in this encounter     Patient plan of care discussed at interdisciplinary rounds: Yes    Anticipated Discharge Disposition: Transitional Care, Home              Anticipated Discharge Services: None  Anticipated Discharge DME: Oxygen    Patient/family educated on Medicare website which has current facility and service quality ratings: yes  Education Provided on the Discharge Plan: Yes  Patient/Family in Agreement with the Plan: yes    Referrals Placed by CM/SW:    Private pay costs discussed: Not applicable    Discussed  Partnership in Safe Discharge Planning  document with patient/family: No     Handoff Completed: No, handoff not indicated or clinically appropriate    Additional Information:  [facility] states that they will not have a bed until Tuesday and asked that SW call Monday morning if patient is still needing a bed to see if they can accept Tuesday.     Next Steps: continue to follow    IVY Gotti

## 2025-06-06 NOTE — PROGRESS NOTES
Renal Medicine Progress Note            Assessment/Plan:     Ирина Yanez is a 89 year old female who was admitted on 6/4/2025.      Assessment:  1) acute kidney injury, nonoliguric:  FABY secondary to recent hypotension, bradycardia.  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  Associated hyperkalemia is related to recent use of olmesartan and current FABY.  Slightly improving serum creatinine.  Main complication is mild hyperkalemia, 5.5 today.        2) chronic kidney disease stage IV at baseline  Note document hypertension/diabetes as etiology.  Minimally proteinuric in the past, suspect more likely age and hypertension related.  Last creatinine prior to admission at baseline on 5/23/2025, EGFR 29 creatinine of 1.67.  Follows at [facility] nephrology, Dr. Whatley.     Anemia: Hemoglobin on admission 9.1.  Last value PTA at 10.3 on 5/23/2025.  BUN higher than baseline, likely related to lower GFR although with lower hemoglobin need to watch for any occult GI bleeding.  Iron labs last month with 21% iron sats, ferritin of 132.  Reflects a functional iron deficiency.  We can consider IV iron while here.     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     3) bradycardia, resolved.  Secondary to diltiazem, Toprol as well as hyperkalemia.     4 (hypertension: Intolerant of ARB in the past, clearly recent challenge with olmesartan led to complications.  Should avoid ACE ARB's in the future.  EP is recommended avoiding AV jacque blocking agents.  Can start amlodipine, additional agents as needed.    Plan/Recs:  1) continue Lokelma 15 g 3 times daily  2) change diet to renal nondialysis  3) avoid ACE ARB's going forward, suggest amlodipine to milligrams daily when becomes hypertensive  4) Daily BMPs.    Arslan Foster DO  [facility]  Office: [phone]  Cell: [phone]        Interval History:     Patient plan better, reports being stronger  "and ambulating.  Blood pressure is improved as well as normalization of heart rate.  Seen by EP and cardiology.  Not on low potassium diet but she knows to avoid these foods.  Reports eating peaches this morning.  Denies any dyspnea or cardiovascular symptoms.    1.6 L urine output today.  This morning heart rate in the 70s, last blood pressure 132/70.          Medications and Allergies:     Current Facility-Administered Medications   Medication Dose Route Frequency Provider Last Rate Last Admin    gabapentin (NEURONTIN) capsule 200 mg  200 mg Oral At Bedtime Judi Camacho MD   200 mg at 06/05/25 2118    pravastatin (PRAVACHOL) tablet 20 mg  20 mg Oral Daily Judi Camacho MD   20 mg at 06/06/25 0852    sertraline (ZOLOFT) tablet 50 mg  50 mg Oral Daily Judi Camacho MD   50 mg at 06/06/25 0852    sodium chloride (PF) 0.9% PF flush 3 mL  3 mL Intracatheter Q8H EMMANUEL Judi Camacho MD   3 mL at 06/06/25 0623    sodium zirconium cyclosilicate (LOKELMA) packet 15 g  15 g Oral Q8H Arslan Foster DO   15 g at 06/06/25 0621    warfarin ANTICOAGULANT (COUMADIN/JANTOVEN) tablet 1 mg  1 mg Oral ONCE at 18:00 Judi Camacho MD        Warfarin Dose Required Daily - Pharmacist Managed  1 each Oral See Admin Instructions Shelley Murguia MD            Allergies   Allergen Reactions    Amoxicillin      Other reaction(s): cannot remember    Amoxicillin-Pot Clavulanate Diarrhea     Vomiting      Clindamycin Phosphate      Hives    Metformin Diarrhea    Tegretol [Carbamazepine]      Irregular heart beat            Physical Exam:   Vitals were reviewed  /70   Pulse 76   Temp 97.9  F (36.6  C) (Axillary)   Resp (!) 39   Ht 1.626 m (5' 4\")   Wt 77.1 kg (170 lb)   LMP  (LMP Unknown)   SpO2 99%   BMI 29.18 kg/m      Wt Readings from Last 3 Encounters:   06/06/25 77.1 kg (170 lb)   05/23/25 69 kg (152 lb 3.2 oz)   04/28/25 69 kg (152 lb 3.2 oz)       Intake/Output Summary (Last 24 hours) at 6/6/2025 1233  Last data filed at " 6/6/2025 1200  Gross per 24 hour   Intake 720 ml   Output 2270 ml   Net -1550 ml       GENERAL: Elderly, in no distress pitting sitting bedside chair.  HEENT:  Normocephalic. No gross abnormalities.   CV: Regular, bradycardic, no murmurs, no clicks, gallops, or rubs, no edema  RESP: Clear bilaterally with good efforts  GI: Abdomen soft/nt/nd, BS normal. No masses, organomegaly  MUSCULOSKELETAL: extremities nl - no gross deformities noted  SKIN: no suspicious lesions or rashes, dry to touch  NEURO: Grossly nonfocal  PSYCH: mood good, affect appropriate           Data:     BMP  Recent Labs   Lab 06/06/25  0543 06/05/25  1913 06/05/25  1041 06/05/25  1026 06/05/25  0541 06/05/25  0506 06/05/25  0359    135 138  --   --   --  137   POTASSIUM 5.5* 4.9 4.8  --   --  6.2* 6.3*   CHLORIDE 108* 105 108*  --   --   --  106   EDNA 8.7* 8.5* 8.3*  --   --   --  8.5*   CO2 20* 18* 16*  --   --   --  19*   BUN 72.3* 72.6* 77.3*  --   --   --  77.4*   CR 3.01* 3.08* 3.19*  --   --   --  3.30*   * 180* 227* 218*   < >  --  206*    < > = values in this interval not displayed.     CBC  Recent Labs   Lab 06/05/25  1041 06/05/25  0359 06/04/25  1745   WBC 7.3 6.4 5.8   HGB 8.7* 9.0* 9.1*   HCT 27.6* 29.7* 28.5*   MCV 97 99 95    149* 152     Lab Results   Component Value Date    AST 20 06/05/2025    ALT 23 06/05/2025    ALKPHOS 81 06/05/2025    BILITOTAL <0.2 06/05/2025     Lab Results   Component Value Date    INR 3.49 (H) 06/06/2025       Attestation:  I have reviewed today's vital signs, notes, medications, labs and imaging.    Arslan Eidman, DO  [facility] - Nephrology  Office: [phone]  Cell: [phone]

## 2025-06-06 NOTE — PROGRESS NOTES
Pt has a h/o pAF on dilt sr 240 mg daily. Since sinus arrest was from hyperkalemia which as resolved, we can restart short acting diltiazem 30 mg qid for now.

## 2025-06-06 NOTE — PLAN OF CARE
Care plan note:      Recent Vitals:  Temp: 97.7  F (36.5  C) Temp src: Oral BP: (!) 161/51 Pulse: 72   Resp: 25 SpO2: 95 % O2 Device: Nasal cannula      Orientation/Neuro: Alert and Oriented x 4  Pain: Pain is controlled with current analgesics.  Medication(s) being used: acetaminophen   Tele: Sinus rhythm 1st degree AVB   IV medications: None   Mobility: Assist of 2 - noted tremor   Skin:  blanchable red coccyx   Resp: Increased to 4L - Coarse crackle L worse than R, exp wheezes  GI: WDL- pt reports chronic diarrhea- none this shift  : incontinence and urgency     Diet: Tolerating diet:   Well  Orders Placed This Encounter      Combination Diet Low Saturated Fat Na <2400mg Diet, No Caffeine Diet      Safety/Concerns:  Fall Risk  Aggression Color: Green    Plan: Hospitalist, cards and nephro following.  No heart block or junctional rhythm this shift. Following k+ Q12H- receiving lokelma.   UC still pending. Following kidney function. BP elevated and received PRN hydrazine X1.  SW and PT consult, pt strong assist 1-2 for Pivot tranfers, was living ind PTA. When stable for discharge will need event monitor.    Continue to monitor.      Frieda Marks RN

## 2025-06-06 NOTE — PROGRESS NOTES
MD Notification    Notified Person: Provider Elliot    Notification Date/Time:  6/5/2025  9:37 PM    Notification Interaction:   Vocera Messaging    Purpose of Notification:     Pt 89yr old here with Bradycardia and fall. noted to have atypical atrial flutter w/ heart block and junctional escape rhythm in 40's on admission - now SR in the 60's. holding PTA heart meds, however now 's. Please advise thanks    Orders Received:   MD entered orders for hydralazine for BP > 180

## 2025-06-06 NOTE — DISCHARGE INSTRUCTIONS
Some additional resources:      Web: [email or web address]/   Phone: [phone]   [employer] is a nonprofit serving seniors and individuals with disabilities across [place] [facility] area. Our grocery assistance, home support and transportation services provide help with in-home tasks and chores.    _________________________________________________________________________________________    Meals on Wheels    [facility] call Phone: [phone]             Who is eligible to receive Meals on Wheels?  To anyone - both on a long-term basis and temporarily if you are recovering from surgery or illness.  How much do meals cost? Is financial assistance available? We ask for a modest contribution toward your meals, but the price is based on need. Meals may be authorized as part of [place]'s home- and community-based services Medicaid waiver program, and other subsidy programs can also help cover the cost of meal delivery service. If you have questions about funding options, call us.    Are diabetic and other diet-specific meal options available?  Meals on Wheels happily offers low-sugar, low-sodium meal options, as well as vegetarian options. Simply specify your dietary needs when you sign up for meals.  Are culturally specific meals available?  Kosher, Mozambican/Halal meals and other culturally specific meal options are available in many areas. You can specify cultural meal preferences when you sign up for meals online or when you call us.    _________________________________________________________________________________________  [employer]   Web: https://seniorcommunity.org/services/  Phone: [phone]          Other resources:   call to reach someone who can connect you with more resources!   _________________________________________________________________________________________               "  _________________________________________________________________________________________    _________________________________________________________________________________________    If your family would like extra support, here is one great resource:   \"Care[employer]\" call [phone]([phone])  Customer service hours: Monday - Saturday, 9 a.m. - 7 p.m.              "

## 2025-06-06 NOTE — CONSULTS
Care Management Initial Consult    General Information  Assessment completed with: PatientИрина  Type of CM/SW Visit: Initial Assessment    Primary Care Provider verified and updated as needed: Yes   Readmission within the last 30 days: no previous admission in last 30 days      Reason for Consult: discharge planning  Advance Care Planning: Advance Care Planning Reviewed: verified with patient, present on chart          Communication Assessment  Patient's communication style: spoken language (English or Bilingual)    Hearing Difficulty or Deaf: yes   Wear Glasses or Blind: yes    Cognitive  Cognitive/Neuro/Behavioral: WDL                      Living Environment:   People in home: alone     Current living Arrangements: mobile home      Able to return to prior arrangements: yes       Family/Social Support:  Care provided by: self  Provides care for: no one  Marital Status:   Support system: Children          Description of Support System: Supportive, Involved         Current Resources:   Patient receiving home care services: No        Community Resources:    Equipment currently used at home: grab bar, toilet, grab bar, tub/shower, shower chair, walker, rolling  Supplies currently used at home: Oxygen Tubing/Supplies    Employment/Financial:  Employment Status: retired        Financial Concerns: none   Referral to Financial Worker: No       Does the patient's insurance plan have a 3 day qualifying hospital stay waiver?  Yes     Which insurance plan 3 day waiver is available? Alternative insurance waiver    Will the waiver be used for post-acute placement? Undetermined at this time    Lifestyle & Psychosocial Needs:  Social Drivers of Health     Food Insecurity: Low Risk  (6/4/2025)    Food Insecurity     Within the past 12 months, did you worry that your food would run out before you got money to buy more?: No     Within the past 12 months, did the food you bought just not last and you didn t have money to  get more?: No   Depression: Not at risk (2/24/2025)    PHQ-2     PHQ-2 Score: 0   Housing Stability: Low Risk  (6/4/2025)    Housing Stability     Do you have housing? : Yes     Are you worried about losing your housing?: No   Tobacco Use: Low Risk  (5/23/2025)    Patient History     Smoking Tobacco Use: Never     Smokeless Tobacco Use: Never     Passive Exposure: Not on file   Financial Resource Strain: Low Risk  (6/4/2025)    Financial Resource Strain     Within the past 12 months, have you or your family members you live with been unable to get utilities (heat, electricity) when it was really needed?: No   Alcohol Use: Not At Risk (12/28/2020)    AUDIT-C     Frequency of Alcohol Consumption: Never     Average Number of Drinks: Not on file     Frequency of Binge Drinking: Never   Transportation Needs: Low Risk  (6/4/2025)    Transportation Needs     Within the past 12 months, has lack of transportation kept you from medical appointments, getting your medicines, non-medical meetings or appointments, work, or from getting things that you need?: No   Physical Activity: Inactive (2/24/2025)    Exercise Vital Sign     Days of Exercise per Week: 0 days     Minutes of Exercise per Session: 0 min   Interpersonal Safety: Low Risk  (6/4/2025)    Interpersonal Safety     Do you feel physically and emotionally safe where you currently live?: Yes     Within the past 12 months, have you been hit, slapped, kicked or otherwise physically hurt by someone?: No     Within the past 12 months, have you been humiliated or emotionally abused in other ways by your partner or ex-partner?: No   Stress: No Stress Concern Present (2/24/2025)    [employer] [place] [employer] - Occupational Stress Questionnaire     Feeling of Stress : Only a little   Social Connections: Unknown (2/24/2025)    Social Connection and Isolation Panel [NHANES]     Frequency of Communication with Friends and Family: Not on file     Frequency of Social  Gatherings with Friends and Family: Twice a week     Attends Sikhism Services: Not on file     Active Member of Clubs or Organizations: Not on file     Attends Club or Organization Meetings: Not on file     Marital Status: Not on file   Health Literacy: Not on file       Functional Status:  Prior to admission patient needed assistance:   Dependent ADLs:: Ambulation-walker  Dependent IADLs:: Independent (son also drives her)       Mental Health Status:  Mental Health Status: No Current Concerns       Chemical Dependency Status:  Chemical Dependency Status: No Current Concerns             Values/Beliefs:  Spiritual, Cultural Beliefs, Sikhism Practices, Values that affect care: no               Discussed  Partnership in Safe Discharge Planning  document with patient/family: Yes: Ирина    Additional Information:  Writer met with patient to introduce self and role in discharge planning. Ирина confirmed her address, phone, PCP and insurance. She lives alone in a trailer home in [place], ambulates with a walker, and until this hospitalization has been driving, though her son Rory ([phone]) is also available. She has orders to be seen by OT/PT, physical therapy is recommending a TCU stay. She is agreeable, and offers [place]. Writer provided a list of facilities and asked her to provide 1 or 2 more. We will continue to follow along with her progress for final disposition.     Next Steps: re-con Cards, PT/OT, medical readiness, secure TCU bed    Shelby Clark RN Care Coordinator  [facility]  [phone]

## 2025-06-06 NOTE — PROGRESS NOTES
[facility]    Medicine Progress Note - Hospitalist Service    Date of Admission:  6/4/2025  Interval History:   Patient is eating in the chair. Denies shortness of breath  On rounding noted to have tachycardia and in afib. Off diltiazem and toprol.  Discussion with EP.  Started on short acting diltiazem.  Given renal clearance did not add long-acting.  Monitor heart rate and blood pressure.  Patient was on Coumadin prior to admission.  Pharmacy dosing.  Still with elevated potassium and creatinine monitored by nephrology      Assessment & Plan   Ирина Yanez is a 89 year old female with history of type 2 diabetes mellitus, CKD stage IV (followed by nephrology), hypertension, aortic stenosis, paroxysmal atrial fibrillation on chronic anticoagulation with Coumadin, hyperlipidemia, anxiety, chronic heart failure with preserved EF who presented to the hospital with a presyncopal episode on 6/4.      She was outside working in the sun for couple hours pulling weeds.  Reportedly got too weak to get up from the floor. Called paramedics.  Blood pressure 88/36 with a pulse of 32. Enroute SBP 66/40 for total of 1.5 mg of atropine. . Given 600 ml of fluids.   Admitted with severe bradycardia, hypotension, FABY with hyperkalemia.      Symptomatic bradycardia  Afib with RVR   Hypotension  Presyncope secondary to above  At presentation heart rate in the high 30s to 40s and blood pressure 90s.  At some point had dipped down to 70s but improved with IV fluid hydration and currently in the 120s and patient currently asymptomatic.    Chest x-ray with diffuse interstitial prominence either pulmonary vascular congestion or mild interstitial pulmonary edema.  Patient however denies any cough, chest pain, shortness of breath  Initial heart rate very bradycardic with bifascicular block on EKG.  ECG showed severe sinus bradycardia with junctional escape of 40 bpm.>>  After potassium improved did develop  sinus bradycardia but also holding jacque agents.  On diltiazem and toprol XL with renal insufficiency/hyperkalemia>> Multi potential components to her bradycardia and with renal insufficiency extended half-life  6/5 general cardiology consult with EP requested  6/6 EP consult>>   EKG with severe sinus bradycardia with junctional escape rhythm of 40 bpm. >>  After potassium correction showing sinus rhythm with first-degree AV block and right bundle branch block.  6/6 on rounding noted that the patient appeared to be in A-fib with RVR. (History of PAF)   Discussion with EP.  Started on diltiazem 30 mg 4 times daily.  6/6 question of ultimately more patient has a tachybradycardia syndrome. >>  Monitor and evaluate whether the patient would benefit from a pacemaker at some point.  Suspect and anticipate underlying chronotropic insufficiency however with her medications causing jacque blockade and renal insufficiency extending their half-life not clear what her underlying steady state may be with HR/Afib/sinus bradycardia     Elevated troponin   Suspected supply/demand  Moderate aortic stenosis/moderate TR  Troponin 41>> 40  6/5 echo showing normal EF of 60 to 65%.  RV normal structure, function and size.  Moderate TR.  No effusion.       Acute kidney injury on chronic kidney disease stage IV  Hyperkalemia  Followed by nephrology in clinic   Recently started on Benicar for suboptimal blood pressure management.  At baseline has a history of stage IV renal disease followed by nephrology.  Whether she got hypotensive with bradycardia/dehydration or increasing creatinine and less clearance (dilt/toprol) suspect combination caused hemodynamic changes which led to poor perfusion of her kidneys and thus further poor clearance of medications including jacque agent  Creatinine of 3.28 on admission (5/23 creatinine 1.67)   Addition of ARB as outpatient   Nephrology currently following.>>  Given some fluids but now monitoring to  avoid congestive heart failure  6/6 urine output significantly improved over the last 24 hours (1070 yesterday and 2100 today so far)     Hyperkalemia  Baseline stage IV CKD  Recent addition ARB (benicar 20 mg)   Patient on ARB with likely dehydration and hypoperfusion  Given treatment in the emergency room  6/5 repeat potassium treatment this a.m. with Lokelma/dextrose and insulin  6/5 repeat Lokelma dosing at 13:49 (second dose)   6/5 last potassium at about 11 AM of 4.8 continue to monitor   Check q 8 hours.   6/6 Lokelma 15 g every 8 hours.    Paroxysmal atrial fibrillation on chronic anticoagulation with Coumadin  Supratherapeutic INR  Sinus bradycardia this am   Appears to currently be with sinus bradycardia  INR elevated on admission (pharmacy to dose)   6/6 INR 3.49  6/6 may consider holding Coumadin after discussion with cardiology if she remains intermittently bradycardic may require a pacemaker     Hyperlipidemia  Resume PTA statin     Chronic anemia  - Baseline hemoglobin around 10-11.  Admitted with a hemoglobin of 9.1.  - Patient denies any active bleed  - Will check iron studies B12/folate     Right axillary mass in arm pit.   6/6 this was reported by WOC.  Patient sitting in the chair.  Dr. Murguia will examine on 6/7     Diet: Renal Diet (non-dialysis)    DVT Prophylaxis: Warfarin  [place] Catheter: Not present  Lines: None     Cardiac Monitoring: ACTIVE order. Indication: Bradycardias (48 hours)  Code Status: Full Code      Clinically Significant Risk Factors        # Hyperkalemia: Highest K = 6.4 mmol/L in last 2 days, will monitor as appropriate   # Hyperchloremia: Highest Cl = 109 mmol/L in last 2 days, will monitor as appropriate          # Hypoalbuminemia: Lowest albumin = 3 g/dL at 6/5/2025 10:41 AM, will monitor as appropriate    # Coagulation Defect: INR = 3.49 (Ref range: 0.85 - 1.15) and/or PTT = N/A, will monitor for bleeding    # Hypertension: Noted on problem list           # DMII: A1C =  "6.5 % (Ref range: 0.0 - 5.6 %) within past 6 months, PRESENT ON ADMISSION  # Overweight: Estimated body mass index is 29.18 kg/m  as calculated from the following:    Height as of this encounter: 1.626 m (5' 4\").    Weight as of this encounter: 77.1 kg (170 lb)., PRESENT ON ADMISSION     # Financial/Environmental Concerns: none         Social Drivers of Health    Physical Activity: Inactive (2/24/2025)    Exercise Vital Sign     Days of Exercise per Week: 0 days     Minutes of Exercise per Session: 0 min   Social Connections: Unknown (2/24/2025)    Social Connection and Isolation Panel [NHANES]     Frequency of Social Gatherings with Friends and Family: Twice a week          Disposition Plan     Medically Ready for Discharge: Anticipated in 2-4 Days       CARMENZA HENDRIX MD  Hospitalist Service  [facility]  Securely message with [employer] (more info)  Text page via [employer] [place] Paging/Directory   ______________________________________________________________________      Physical Exam   Vital Signs: Temp: 97.9  F (36.6  C) Temp src: Axillary BP: (!) 155/57 Pulse: 76   Resp: (!) 39 SpO2: 95 % O2 Device: Nasal cannula Oxygen Delivery: 3 LPM  Weight: 170 lbs 0 oz    General patient is awake and alert although somewhat difficult to articulate exact details of history prior to admission  Respiratory: Clear to auscultation bilaterally without wheezes or rhonchi  Cardiovascular: Regular rate with no gallop or rub (Bradycardic)   GI: + BS, soft, non tender   Skin: no overt edema     Medical Decision Making       45 MINUTES SPENT BY ME on the date of service doing chart review, history, exam, documentation & further activities per the note.    Discussion with cardiology, pharmacy, nursing    Data     I have personally reviewed the following data over the past 24 hrs:    N/A  \   N/A   / N/A     139 108 (H) 72.3 (H) /  149 (H)   5.5 (H) 20 (L) 3.01 (H) \     INR:  3.49 (H) PTT:  N/A   D-dimer:  N/A Fibrinogen:  " N/A       Imaging results reviewed over the past 24 hrs:   No results found for this or any previous visit (from the past 24 hours).

## 2025-06-06 NOTE — CONSULTS
[facility]    Cardiac Electrophysiology Consultation     Date of Admission:  6/4/2025  Date of Consult (When I saw the patient): 06/06/25    Assessment & Plan   Ирина Yanez is a 89 year old female who was admitted on 6/4/2025. I was asked to see the patient for bradyarrhythmia. Delightful pt with HTN and CRI who was given ARB recently presented with fatigue resulting in a fall. Did not lose consciousness. Known RBBB. K6.4, creat 3.28. ECG showed severe sinus mark with junctional escape of 40 bpm (artifacts mimicking AFL) After K correction ECG shows sinus rhythm with mild first degree AVB and RBBB.     Hyperkalemic induced severe sinus mark resolved after correction. No arrhythmias noted overnight. No indication for ppm. Avoid AVN blocker drug or hyperkalemic induced drugs.     Sathya Coon MD    Code Status    Full Code    Primary Care Physician   Mil García    History is obtained from the patient    Past Medical History   I have reviewed this patient's medical history and updated it with pertinent information if needed.   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    Complication of anesthesia     Depression     Diabetes mellitus (H)     sees Dr. Verde- type II    Falls frequently 7/10/2019    Heart murmur     HTN     Hyperlipidemia LDL goal < 100     Dr. Verde    Lichen sclerosus et atrophicus 2/15/2013    Melanoma (H)     Oxygen dependent     1 liter continuously    Peripheral Neuropathy     hands, feet; used to see Dr. Tl Das    PONV (postoperative nausea and vomiting)     Skin cancer     face; basal and other. Melanoma. Dolan    Sleep apnea     CPAP    Spinal stenosis        Past Surgical History   I have reviewed this patient's surgical history and updated it with pertinent information if needed.  Past Surgical  History:   Procedure Laterality Date    AMPUTATE TOE(S)  8/23/2012    left second toe Procedure: AMPUTATE TOE(S);;  Surgeon: Mil Jolly DPM;  Location: [facility] OR    CHOLECYSTECTOMY  Nov. 1975    COLONOSCOPY  early 2009    repeat 4-5 years (Had one prior to this with polyps)    COLONOSCOPY  Sept 2014    incomplete; recommend colography    COLONOSCOPY  02/25/2020    Dr. Pathak [facility]    COLONOSCOPY N/A 2/25/2020    Procedure: COLONOSCOPY, WITH biopsies using forceps;  Surgeon: Adebayo Pathak MD;  Location:  GI    INSERT CHEST TUBE Bilateral 2/8/2021    Procedure: Attempted INSERTION, CATHETER, INTERCOSTAL, FOR DRAINAGE (Pleurx);  Surgeon: Smitha Odonnell MD;  Location:  OR    OPTICAL TRACKING SYSTEM FUSION POSTERIOR SPINE LUMBAR N/A 9/16/2016    Procedure: OPTICAL TRACKING SYSTEM FUSION SPINE POSTERIOR LUMBAR ONE LEVEL;  Surgeon: Lennox Blue MD;  Location: [facility] OR    PET, REC OF MELANOMA/MET CA Left     arm    REPAIR HAMMER TOE BILATERAL  8/23/2012    Procedure: REPAIR HAMMER TOE BILATERAL;  Flexor Tenotomy 2,3,4,5 Toes both feet, Partial 2nd toe amputation left foot;  Surgeon: Mil Jolly DPM;  Location: [facility] OR    REPAIR TENDON QUADRICEPS Right 10/27/2015    Procedure: REPAIR TENDON QUADRICEPS;  Surgeon: Antonio Yi MD;  Location:  OR    SURGICAL HISTORY OF -   1997    bilateral knee replacement    SURGICAL HISTORY OF -   1997    right knee revised; patella tendon ruptured    SURGICAL HISTORY OF -       surgery for spinal stenosis    SURGICAL HISTORY OF -       breast reduction surgery    SURGICAL HISTORY OF -       D and C        Prior to Admission Medications   Prior to Admission Medications   Prescriptions Last Dose Informant Patient Reported? Taking?   MULTI-VITAMIN OR TABS 6/4/2025 Morning  Yes Yes   Sig: Take 2 tablets by mouth daily    acetaminophen (TYLENOL) 500 MG tablet   Yes Yes   Sig: Take 1,000 mg by mouth every evening as needed for mild pain   betamethasone  valerate (VALISONE) 0.1 % external cream   No Yes   Sig: Apply topically daily   Patient taking differently: Apply topically daily as needed.   blood glucose (NO BRAND SPECIFIED) lancets standard   No No   Sig: Use to test blood sugar 1 times daily or as directed, Contour Next lancets   blood glucose (NO BRAND SPECIFIED) test strip   No No   Sig: Use to test blood sugar 1 times daily or as directed, Contour Next strips   blood glucose monitoring (NO BRAND SPECIFIED) meter device kit   No No   Sig: Use to test blood sugar 1 times daily or as directed. Ultra 2   diltiazem ER (CARDIAZEM LA) 240 MG 24 hr tablet 6/4/2025 Morning  No Yes   Sig: Take 1 tablet (240 mg) by mouth daily.   doxazosin (CARDURA) 4 MG tablet 6/3/2025 Bedtime  Yes Yes   Sig: Take 4 mg by mouth at bedtime.   gabapentin (NEURONTIN) 100 MG capsule 6/3/2025 Bedtime  No Yes   Sig: Take 2 capsules (200 mg) by mouth nightly as needed for neuropathic pain.   Patient taking differently: Take 200 mg by mouth at bedtime.   loperamide (IMODIUM A-D) 2 MG tablet   No Yes   Sig: Take 1-2 tablets (2-4 mg) by mouth 4 times daily as needed for diarrhea.   metoprolol succinate ER (TOPROL XL) 25 MG 24 hr tablet 6/4/2025 Morning  No Yes   Sig: TAKE 1 TABLET BY MOUTH DAILY. GENERIC EQUIVALENT FOR TOPROL XL   nystatin (MYCOSTATIN) 465778 UNIT/GM external powder   No Yes   Sig: Apply a small amount to affected area three times daily.   Patient taking differently: Apply topically 3 times daily as needed. Apply a small amount to affected area three times daily.   olmesartan (BENICAR) 20 MG tablet 6/4/2025 Morning  No Yes   Sig: Take 1 tablet (20 mg) by mouth daily.   pravastatin (PRAVACHOL) 20 MG tablet 6/4/2025 Morning  No Yes   Sig: Take 1 tablet (20 mg) by mouth daily.   sertraline (ZOLOFT) 50 MG tablet 6/4/2025 Morning  No Yes   Sig: Take 1 tablet (50 mg) by mouth daily.   torsemide (DEMADEX) 10 MG tablet 6/4/2025 Morning  No Yes   Sig: Take 1 tablet (10 mg) by mouth  daily.   warfarin ANTICOAGULANT (JANTOVEN ANTICOAGULANT) 2.5 MG tablet 6/3/2025 Evening  No Yes   Sig: Take 2 tablets (5 mg) by mouth daily except take 1.5 tablets (3.75 mg) on Sunday or as directed by INR Clinic   Patient taking differently: Take 5 mg by mouth every evening.      Facility-Administered Medications: None     Allergies   Allergies   Allergen Reactions    Amoxicillin      Other reaction(s): cannot remember    Amoxicillin-Pot Clavulanate Diarrhea     Vomiting      Clindamycin Phosphate      Hives    Metformin Diarrhea    Tegretol [Carbamazepine]      Irregular heart beat       Social History   I have reviewed this patient's social history and updated it with pertinent information if needed. Ирина Yanez  reports that she has never smoked. She has never used smokeless tobacco. She reports that she does not drink alcohol and does not use drugs.    Family History   I have reviewed this patient's family history and updated it with pertinent information if needed.   Family History   Problem Relation Age of Onset    Cerebrovascular Disease Mother     Heart Disease Father     Neurologic Disorder Sister         ALS    C.A.D. Sister     Diabetes Sister     C.A.D. Brother     Psychotic Disorder Brother         [place] war: PTSD. suicide 2019    Gastrointestinal Disease Brother         diverticulitis    Colon Cancer No family hx of        Review of Systems   Comprehensive review of systems was performed with pertinent positives and negatives listed in assessment and plan section.    Physical Exam   Temp: 97.9  F (36.6  C) Temp src: Axillary BP: 132/70 Pulse: 76   Resp: (!) 39 SpO2: 99 % O2 Device: Nasal cannula Oxygen Delivery: 3 LPM  Vital Signs with Ranges  Temp:  [97.6  F (36.4  C)-98.1  F (36.7  C)] 97.9  F (36.6  C)  Pulse:  [36-86] 76  Resp:  [14-39] 39  BP: (119-193)/() 132/70  SpO2:  [87 %-99 %] 99 %  170 lbs 0 oz    Constitutional: awake, alert, cooperative, no apparent distress, and appears  stated age  Eyes: Lids and lashes normal, pupils equal, round and reactive to light, extra ocular muscles intact, sclera clear, conjunctiva normal  ENT: Normocephalic, without obvious abnormality, atraumatic, sinuses nontender on palpation, external ears without lesions, oral pharynx with moist mucous membranes, tonsils without erythema or exudates, gums normal and good dentition.  Hematologic / Lymphatic: no cervical lymphadenopathy  Respiratory: No increased work of breathing, good air exchange, clear to auscultation bilaterally, no crackles or wheezing  Cardiovascular: Normal apical impulse, regular rate and rhythm, normal S1 and S2, no S3 or S4, and no murmur noted  GI: No scars, normal bowel sounds, soft, non-distended, non-tender, no masses palpated, no hepatosplenomegally  Skin: no bruising or bleeding  Musculoskeletal: There is no redness, warmth, or swelling of the joints.  Full range of motion noted.    Neurologic: Awake, alert,   Neuropsychiatric: General: normal, calm, and normal eye contact    Data   I personally reviewed all recent ECGs and images.  Results for orders placed or performed during the hospital encounter of 06/04/25 (from the past 24 hours)   CBC with platelets   Result Value Ref Range    WBC Count 7.3 4.0 - 11.0 10e3/uL    RBC Count 2.86 (L) 3.80 - 5.20 10e6/uL    Hemoglobin 8.7 (L) 11.7 - 15.7 g/dL    Hematocrit 27.6 (L) 35.0 - 47.0 %    MCV 97 78 - 100 fL    MCH 30.4 26.5 - 33.0 pg    MCHC 31.5 31.5 - 36.5 g/dL    RDW 13.2 10.0 - 15.0 %    Platelet Count 150 150 - 450 10e3/uL   Comprehensive metabolic panel   Result Value Ref Range    Sodium 138 135 - 145 mmol/L    Potassium 4.8 3.4 - 5.3 mmol/L    Carbon Dioxide (CO2) 16 (L) 22 - 29 mmol/L    Anion Gap 14 7 - 15 mmol/L    Urea Nitrogen 77.3 (H) 8.0 - 23.0 mg/dL    Creatinine 3.19 (H) 0.51 - 0.95 mg/dL    GFR Estimate 13 (L) >60 mL/min/1.73m2    Calcium 8.3 (L) 8.8 - 10.4 mg/dL    Chloride 108 (H) 98 - 107 mmol/L    Glucose 227 (H) 70 -  99 mg/dL    Alkaline Phosphatase 81 40 - 150 U/L    AST 20 0 - 45 U/L    ALT 23 0 - 50 U/L    Protein Total 5.5 (L) 6.4 - 8.3 g/dL    Albumin 3.0 (L) 3.5 - 5.2 g/dL    Bilirubin Total <0.2 <=1.2 mg/dL   INR   Result Value Ref Range    INR 4.14 (H) 0.85 - 1.15    PT 38.2 (H) 11.8 - 14.8 Seconds   Magnesium   Result Value Ref Range    Magnesium 2.2 1.7 - 2.3 mg/dL   Echocardiogram Complete   Result Value Ref Range    LVEF  60-65%     Narrative    [id number]  [id number]  [id number]^JOSE^DAVID     [facility]  Echocardiography Laboratory  [address] [place] [address]     Name: BEATA TRIPLETT  MRN: [id number]  : 1935  Study Date: 2025 11:02 AM  Age: 89 yrs  Gender: Female  Patient Location: [facility]  Reason For Study: Bradycardia - Sinus  Ordering Physician: DAVID GARCIA  Referring Physician: Mil García MD  Performed By: Shaina Ray     BSA: 1.8 m2  Height: 64 in  Weight: 169 lb  HR: 60  BP: 102/44 mmHg  ______________________________________________________________________________  Procedure  Echocardiogram with two-dimensional, color and spectral Doppler. Definity (NDC  #[id number]) given intravenously.  ______________________________________________________________________________  Interpretation Summary     Left ventricular systolic function is normal.  The visual ejection fraction is 60-65%.  The right ventricle is normal in structure, function and size.  Moderate valvular aortic stenosis.  There is moderate (2+) tricuspid regurgitation.  There is no pericardial effusion.  Dilation of the inferior vena cava is present with abnormal respiratory  variation in diameter.     Compared to study dated 2024, aortic stenosis is still moderate range.  Left atrial dilation is severe.  ______________________________________________________________________________  Left Ventricle  The left ventricle is normal in structure, function and size. There is normal  left  ventricular wall thickness. Left ventricular systolic function is normal.  The visual ejection fraction is 60-65%. Left ventricular diastolic function is  indeterminate. Normal left ventricular wall motion.     Right Ventricle  The right ventricle is normal in structure, function and size.     Atria  The left atrium is severely dilated. The right atrium is mildly dilated.     Mitral Valve  There is mild mitral annular calcification. There is mild (1+) mitral  regurgitation.     Tricuspid Valve  The tricuspid valve is normal in structure and function. There is moderate  (2+) tricuspid regurgitation. Right ventricular systolic pressure could not be  approximated due to inadequate tricuspid regurgitation.     Aortic Valve  The aortic valve is trileaflet with aortic valve sclerosis. There is trace  aortic regurgitation. Moderate valvular aortic stenosis. The peak AoV pressure  gradient is 30.0 mmHg. The mean AoV pressure gradient is 19.0 mmHg. The  calculated aortic valve are is 1.7 cm^2.     Pulmonic Valve  The pulmonic valve is normal in structure and function. There is mild (1+)  pulmonic valvular regurgitation.     Vessels  The aortic root is normal size. Normal size ascending aorta. Dilation of the  inferior vena cava is present with abnormal respiratory variation in diameter.     Pericardium  There is no pericardial effusion.     ______________________________________________________________________________  MMode/2D Measurements & Calculations  IVSd: 1.1 cm  LVIDd: 4.5 cm  LVIDs: 3.1 cm  LVPWd: 1.0 cm  FS: 30.9 %  LV mass(C)d: 164.0 grams  LV mass(C)dI: 90.1 grams/m2     Ao root diam: 3.6 cm  asc Aorta Diam: 3.7 cm  LVOT diam: 2.1 cm  LVOT area: 3.4 cm2  Ao root diam index Ht(cm/m): 2.2  Ao root diam index BSA (cm/m2): 2.0  Asc Ao diam index BSA (cm/m2): 2.0  Asc Ao diam index Ht(cm/m): 2.3  LA Volume (BP): 93.3 ml  LA Volume Index (BP): 51.3 ml/m2  RV Base: 3.8 cm     RWT: 0.44  TAPSE: 2.3 cm     Doppler  Measurements & Calculations  MV E max ferdinand: 136.0 cm/sec  MV A max ferdinand: 108.0 cm/sec  MV E/A: 1.3  MV dec time: 0.22 sec  Ao V2 max: 272.3 cm/sec  Ao max P.0 mmHg  Ao V2 mean: 204.9 cm/sec  Ao mean P.0 mmHg  Ao V2 VTI: 73.4 cm  BRAYDON(I,D): 1.7 cm2  BRAYDON(V,D): 1.6 cm2  LV V1 max P.7 mmHg  LV V1 max: 129.0 cm/sec  LV V1 VTI: 37.0 cm  SV(LVOT): 124.6 ml  SI(LVOT): 68.4 ml/m2  PA acc time: 0.13 sec  AV Ferdinand Ratio (DI): 0.47  BRAYDON Index (cm2/m2): 0.93  E/E' av.4  Lateral E/e': 14.0  Medial E/e': 22.7  RV S Ferdinand: 12.1 cm/sec     ______________________________________________________________________________  Report approved by: Kavita Christensen MD on 2025 03:58 PM         UA with Microscopic reflex to Culture    Specimen: Urine, Clean Catch   Result Value Ref Range    Color Urine Light Yellow Colorless, Straw, Light Yellow, Yellow    Appearance Urine Slightly Cloudy (A) Clear    Glucose Urine Negative Negative mg/dL    Bilirubin Urine Negative Negative    Ketones Urine Negative Negative mg/dL    Specific Gravity Urine 1.012 1.003 - 1.035    Blood Urine Negative Negative    pH Urine 5.0 5.0 - 7.0    Protein Albumin Urine 20 (A) Negative mg/dL    Urobilinogen Urine Normal Normal mg/dL    Nitrite Urine Negative Negative    Leukocyte Esterase Urine Moderate (A) Negative    Bacteria Urine Few (A) None Seen /HPF    Mucus Urine Present (A) None Seen /LPF    Calcium Oxalate Crystals Urine Few (A) None Seen /HPF    RBC Urine 3 (H) <=2 /HPF    WBC Urine 20 (H) <=5 /HPF    Squamous Epithelials Urine 5 (H) <=1 /HPF    Narrative    Urine Culture ordered based on laboratory criteria   Potassium random urine   Result Value Ref Range    Potassium Urine 32.0 mmol/L   Fractional excretion of sodium    Narrative    The following orders were created for panel order Fractional excretion of sodium.  Procedure                               Abnormality         Status                     ---------                                -----------         ------                     Fractional Excretion of...[[id number]]                      Final result                 Please view results for these tests on the individual orders.   Fractional Excretion of Sodium   Result Value Ref Range    Creatinine Urine mg/dL 87.9 mg/dL    Sodium Urine mmol/L <20 mmol/L    %FENA     EKG 12-lead, tracing only   Result Value Ref Range    Systolic Blood Pressure  mmHg    Diastolic Blood Pressure  mmHg    Ventricular Rate 40 BPM    Atrial Rate 23 BPM    IA Interval  ms    QRS Duration 150 ms     ms    QTc 420 ms    P Axis  degrees    R AXIS 80 degrees    T Axis 40 degrees    Interpretation ECG       Idioventricular rhythm  Right bundle branch block  Abnormal ECG  When compared with ECG of 04-Jun-2025 17:48,  Idioventricular rhythm has replaced Wide QRS rhythm     INR   Result Value Ref Range    INR 4.12 (H) 0.85 - 1.15    PT 38.0 (H) 11.8 - 14.8 Seconds   EKG 12-lead, tracing only   Result Value Ref Range    Systolic Blood Pressure  mmHg    Diastolic Blood Pressure  mmHg    Ventricular Rate 64 BPM    Atrial Rate 64 BPM    IA Interval 222 ms    QRS Duration 154 ms     ms    QTc 455 ms    P Axis 48 degrees    R AXIS 86 degrees    T Axis 18 degrees    Interpretation ECG       Sinus rhythm with 1st degree A-V block  Right bundle branch block  Abnormal ECG  When compared with ECG of 05-Jun-2025 14:32, (unconfirmed)  Sinus rhythm has replaced Idioventricular rhythm  Vent. rate has increased by  24 bpm     Basic metabolic panel   Result Value Ref Range    Sodium 135 135 - 145 mmol/L    Potassium 4.9 3.4 - 5.3 mmol/L    Chloride 105 98 - 107 mmol/L    Carbon Dioxide (CO2) 18 (L) 22 - 29 mmol/L    Anion Gap 12 7 - 15 mmol/L    Urea Nitrogen 72.6 (H) 8.0 - 23.0 mg/dL    Creatinine 3.08 (H) 0.51 - 0.95 mg/dL    GFR Estimate 14 (L) >60 mL/min/1.73m2    Calcium 8.5 (L) 8.8 - 10.4 mg/dL    Glucose 180 (H) 70 - 99 mg/dL   INR   Result Value Ref Range    INR 3.49 (H)  "0.85 - 1.15    PT 33.5 (H) 11.8 - 14.8 Seconds   Basic metabolic panel   Result Value Ref Range    Sodium 139 135 - 145 mmol/L    Potassium 5.5 (H) 3.4 - 5.3 mmol/L    Chloride 108 (H) 98 - 107 mmol/L    Carbon Dioxide (CO2) 20 (L) 22 - 29 mmol/L    Anion Gap 11 7 - 15 mmol/L    Urea Nitrogen 72.3 (H) 8.0 - 23.0 mg/dL    Creatinine 3.01 (H) 0.51 - 0.95 mg/dL    GFR Estimate 14 (L) >60 mL/min/1.73m2    Calcium 8.7 (L) 8.8 - 10.4 mg/dL    Glucose 149 (H) 70 - 99 mg/dL     *Note: Due to a large number of results and/or encounters for the requested time period, some results have not been displayed. A complete set of results can be found in Results Review.       Clinically Significant Risk Factors        # Hyperkalemia: Highest K = 6.4 mmol/L in last 2 days, will monitor as appropriate   # Hyperchloremia: Highest Cl = 109 mmol/L in last 2 days, will monitor as appropriate          # Hypoalbuminemia: Lowest albumin = 3 g/dL at 6/5/2025 10:41 AM, will monitor as appropriate  # Coagulation Defect: INR = 3.49 (Ref range: 0.85 - 1.15) and/or PTT = N/A, will monitor for bleeding    # Hypertension: Noted on problem list           # DMII: A1C = 6.5 % (Ref range: 0.0 - 5.6 %) within past 6 months, PRESENT ON ADMISSION  # Overweight: Estimated body mass index is 29.18 kg/m  as calculated from the following:    Height as of this encounter: 1.626 m (5' 4\").    Weight as of this encounter: 77.1 kg (170 lb)., PRESENT ON ADMISSION               "

## 2025-06-06 NOTE — PROGRESS NOTES
Early in shift mostly SR with quick bursts of CHRIS. Later in shift pt is more in afib, staying now consistantly in afib. 12 lead in EPIC. Heart rate varies from 100-130. Hospitalist and cardiologist have spoken regarding HR and rhythm. Await new orders.

## 2025-06-06 NOTE — PROGRESS NOTES
06/06/25 0800   Appointment Info   Signing Clinician's Name / Credentials (PT) Jaymie Salinas, WILD   Living Environment   People in Home alone   Current Living Arrangements mobile home   Home Accessibility stairs to enter home   Number of Stairs, Main Entrance 3   Stair Railings, Main Entrance railings safe and in good condition;railing on right side (ascending)   Transportation Anticipated family or friend will provide   Self-Care   Usual Activity Tolerance good   Current Activity Tolerance moderate   Regular Exercise No   Equipment Currently Used at Home grab bar, toilet;grab bar, tub/shower;shower chair;walker, rolling   Fall history within last six months yes   Number of times patient has fallen within last six months 2   General Information   Onset of Illness/Injury or Date of Surgery 06/04/25   Referring Physician    Patient/Family Therapy Goals Statement (PT) To walk   Pertinent History of Current Problem (include personal factors and/or comorbidities that impact the POC) Pt is an 89 year old female with history of type 2 diabetes mellitus, CKD stage IV (followed by nephrology), hypertension, aortic stenosis, paroxysmal atrial fibrillation on chronic anticoagulation with Coumadin, hyperlipidemia, anxiety, chronic heart failure with preserved EF who presented to the hospital with a presyncopal episode on 6/4.   Existing Precautions/Restrictions fall   Cognition   Affect/Mental Status (Cognition) WFL   Orientation Status (Cognition) oriented x 3   Follows Commands (Cognition) WFL   Pain Assessment   Patient Currently in Pain No   Range of Motion (ROM)   Range of Motion ROM is WFL   Strength (Manual Muscle Testing)   Strength (Manual Muscle Testing) Deficits observed during functional mobility   Bed Mobility   Comment, (Bed Mobility) Min A   Transfers   Comment, (Transfers) Min A   Gait/Stairs (Locomotion)   Comment, (Gait/Stairs) 10' 4ww decreased teri and lateral path deviations   Balance   Balance  Comments Good in sitting, UE support in standing   Clinical Impression   Criteria for Skilled Therapeutic Intervention Yes, treatment indicated   PT Diagnosis (PT) Impaired ambulation   Influenced by the following impairments Decreased strength, decreased endurance, decreased balance   Functional limitations due to impairments Difficulty with bed mobility, transfers, ambulation   Clinical Presentation (PT Evaluation Complexity) stable   Clinical Presentation Rationale VSS, pain controlled   Clinical Decision Making (Complexity) low complexity   Planned Therapy Interventions (PT) balance training;gait training;strengthening;patient/family education;transfer training   Risk & Benefits of therapy have been explained evaluation/treatment results reviewed;care plan/treatment goals reviewed;risks/benefits reviewed;current/potential barriers reviewed;participants voiced agreement with care plan;participants included;patient   PT Total Evaluation Time   PT Eval, Low Complexity Minutes (81773) 10   PT Discharge Planning   PT Plan Progress independence, increase ambulation distance, general strength and activity tolerance   PT Discharge Recommendation (DC Rec) Transitional Care Facility   PT Rationale for DC Rec At baseline, pt lives alone in a mobile home with three steps to enter. This date, pt is well below baseline and would be a considerable fall risk if she returned to home environment. Pt currently requires assist with all mobility and has decreased activity tolernce. Pt will benefit from continued therapy at TCU to address impairments and increase mobility and functional independence prior to returning home.   PT Brief overview of current status Goals of therapy will be to address safe mobility and make recs for d/c to next level of care. Pt and RN will continue to follow all falls risk precautions as documented by RN staff while hospitalized. Assist of 1, decreased activity tolerance.   PT Total Distance Amb During  Session (feet) 80   Physical Therapy Time and Intention   Total Session Time (sum of timed and untimed services) 10

## 2025-06-06 NOTE — PLAN OF CARE
HR quite variable, several conversion between SR and CHRIS. Highest . PO fast acting diltiazem restarted with caution as pt came in with mark and heart block. K high end of normal. Low K diet. Lokelma TID. On coumadin. Up with SBA.

## 2025-06-07 ENCOUNTER — APPOINTMENT (OUTPATIENT)
Dept: CT IMAGING | Facility: CLINIC | Age: 89
DRG: 640 | End: 2025-06-07
Attending: INTERNAL MEDICINE
Payer: COMMERCIAL

## 2025-06-07 LAB
ALBUMIN SERPL BCG-MCNC: 3.1 G/DL (ref 3.5–5.2)
ALP SERPL-CCNC: 82 U/L (ref 40–150)
ALT SERPL W P-5'-P-CCNC: 22 U/L (ref 0–50)
ANION GAP SERPL CALCULATED.3IONS-SCNC: 14 MMOL/L (ref 7–15)
AST SERPL W P-5'-P-CCNC: 20 U/L (ref 0–45)
ATRIAL RATE - MUSE: 23 BPM
BILIRUB SERPL-MCNC: 0.5 MG/DL
BUN SERPL-MCNC: 65 MG/DL (ref 8–23)
CALCIUM SERPL-MCNC: 8.6 MG/DL (ref 8.8–10.4)
CHLORIDE SERPL-SCNC: 107 MMOL/L (ref 98–107)
CREAT SERPL-MCNC: 2.93 MG/DL (ref 0.51–0.95)
DIASTOLIC BLOOD PRESSURE - MUSE: NORMAL MMHG
EGFRCR SERPLBLD CKD-EPI 2021: 15 ML/MIN/1.73M2
FLUAV RNA SPEC QL NAA+PROBE: NEGATIVE
FLUBV RNA RESP QL NAA+PROBE: NEGATIVE
GLUCOSE BLDC GLUCOMTR-MCNC: 147 MG/DL (ref 70–99)
GLUCOSE BLDC GLUCOMTR-MCNC: 364 MG/DL (ref 70–99)
GLUCOSE SERPL-MCNC: 172 MG/DL (ref 70–99)
HCO3 SERPL-SCNC: 21 MMOL/L (ref 22–29)
INR PPP: 2.86 (ref 0.85–1.15)
INTERPRETATION ECG - MUSE: NORMAL
P AXIS - MUSE: NORMAL DEGREES
POTASSIUM SERPL-SCNC: 3.3 MMOL/L (ref 3.4–5.3)
PR INTERVAL - MUSE: NORMAL MS
PROT SERPL-MCNC: 6.1 G/DL (ref 6.4–8.3)
PROTHROMBIN TIME: 28.8 SECONDS (ref 11.8–14.8)
QRS DURATION - MUSE: 150 MS
QT - MUSE: 516 MS
QTC - MUSE: 420 MS
R AXIS - MUSE: 80 DEGREES
RSV RNA SPEC NAA+PROBE: NEGATIVE
SARS-COV-2 RNA RESP QL NAA+PROBE: NEGATIVE
SODIUM SERPL-SCNC: 142 MMOL/L (ref 135–145)
SYSTOLIC BLOOD PRESSURE - MUSE: NORMAL MMHG
T AXIS - MUSE: 40 DEGREES
VENTRICULAR RATE- MUSE: 40 BPM

## 2025-06-07 PROCEDURE — 82310 ASSAY OF CALCIUM: CPT | Performed by: INTERNAL MEDICINE

## 2025-06-07 PROCEDURE — 99232 SBSQ HOSP IP/OBS MODERATE 35: CPT | Performed by: INTERNAL MEDICINE

## 2025-06-07 PROCEDURE — 250N000013 HC RX MED GY IP 250 OP 250 PS 637: Performed by: INTERNAL MEDICINE

## 2025-06-07 PROCEDURE — 85610 PROTHROMBIN TIME: CPT | Performed by: INTERNAL MEDICINE

## 2025-06-07 PROCEDURE — 71250 CT THORAX DX C-: CPT

## 2025-06-07 PROCEDURE — 210N000002 HC R&B HEART CARE

## 2025-06-07 PROCEDURE — 87637 SARSCOV2&INF A&B&RSV AMP PRB: CPT | Performed by: INTERNAL MEDICINE

## 2025-06-07 PROCEDURE — 250N000012 HC RX MED GY IP 250 OP 636 PS 637: Performed by: INTERNAL MEDICINE

## 2025-06-07 PROCEDURE — 36415 COLL VENOUS BLD VENIPUNCTURE: CPT | Performed by: INTERNAL MEDICINE

## 2025-06-07 PROCEDURE — 250N000011 HC RX IP 250 OP 636: Mod: JZ | Performed by: HOSPITALIST

## 2025-06-07 RX ORDER — ACETAMINOPHEN 325 MG/1
650 TABLET ORAL EVERY 6 HOURS PRN
Status: DISCONTINUED | OUTPATIENT
Start: 2025-06-07 | End: 2025-06-11 | Stop reason: HOSPADM

## 2025-06-07 RX ORDER — INSULIN LISPRO 100 [IU]/ML
INJECTION, SOLUTION INTRAVENOUS; SUBCUTANEOUS
Status: DISCONTINUED | OUTPATIENT
Start: 2025-06-08 | End: 2025-06-07

## 2025-06-07 RX ORDER — WARFARIN SODIUM 4 MG/1
4 TABLET ORAL
Status: DISCONTINUED | OUTPATIENT
Start: 2025-06-07 | End: 2025-06-07

## 2025-06-07 RX ORDER — WARFARIN SODIUM 3 MG/1
3 TABLET ORAL
Status: COMPLETED | OUTPATIENT
Start: 2025-06-07 | End: 2025-06-07

## 2025-06-07 RX ADMIN — GABAPENTIN 200 MG: 100 CAPSULE ORAL at 22:41

## 2025-06-07 RX ADMIN — HYDRALAZINE HYDROCHLORIDE 10 MG: 20 INJECTION INTRAMUSCULAR; INTRAVENOUS at 16:34

## 2025-06-07 RX ADMIN — INSULIN ASPART 9 UNITS: 100 INJECTION, SOLUTION INTRAVENOUS; SUBCUTANEOUS at 22:43

## 2025-06-07 RX ADMIN — HYDRALAZINE HYDROCHLORIDE 10 MG: 20 INJECTION INTRAMUSCULAR; INTRAVENOUS at 12:16

## 2025-06-07 RX ADMIN — DILTIAZEM HYDROCHLORIDE 30 MG: 30 TABLET ORAL at 05:49

## 2025-06-07 RX ADMIN — WARFARIN SODIUM 3 MG: 3 TABLET ORAL at 17:23

## 2025-06-07 RX ADMIN — SERTRALINE HYDROCHLORIDE 50 MG: 50 TABLET ORAL at 08:07

## 2025-06-07 RX ADMIN — ACETAMINOPHEN 650 MG: 325 TABLET, FILM COATED ORAL at 20:28

## 2025-06-07 RX ADMIN — DILTIAZEM HYDROCHLORIDE 30 MG: 30 TABLET ORAL at 12:10

## 2025-06-07 RX ADMIN — PRAVASTATIN SODIUM 20 MG: 20 TABLET ORAL at 08:07

## 2025-06-07 RX ADMIN — DILTIAZEM HYDROCHLORIDE 30 MG: 30 TABLET ORAL at 17:23

## 2025-06-07 RX ADMIN — DILTIAZEM HYDROCHLORIDE 30 MG: 30 TABLET ORAL at 22:41

## 2025-06-07 ASSESSMENT — ACTIVITIES OF DAILY LIVING (ADL)
ADLS_ACUITY_SCORE: 53

## 2025-06-07 NOTE — PROGRESS NOTES
[facility]    Medicine Progress Note - Hospitalist Service    Date of Admission:  6/4/2025  Interval History:   Patient seen this morning sitting up in the chair.  Per report going in and out of A-fib at times: She has a firm area under her right arm area that is firm: Reports that in the past she had a biopsy or part of this sample.  This was about maybe 5 years ago.  She is not sure if there is a total excision of the area.  She has been seen by [facility] it is firm.  She has also had breast reduction done.     Assessment & Plan   Ирина Yanez is a 89 year old female with history of type 2 diabetes mellitus, CKD stage IV (followed by nephrology), hypertension, aortic stenosis, paroxysmal atrial fibrillation on chronic anticoagulation with Coumadin, hyperlipidemia, anxiety, chronic heart failure with preserved EF who presented to the hospital with a presyncopal episode on 6/4.      She was outside working in the sun for couple hours pulling weeds.  Reportedly got too weak to get up from the floor. Called paramedics.  Blood pressure 88/36 with a pulse of 32. Enroute SBP 66/40 for total of 1.5 mg of atropine. . Given 600 ml of fluids.   Admitted with severe bradycardia, hypotension, FABY with hyperkalemia.      Symptomatic bradycardia  Afib with RVR intermittent   Hypotension  Presyncope secondary to above  At presentation heart rate in the high 30s to 40s and blood pressure 90s.  At some point had dipped down to 70s but improved with IV fluid hydration and currently in the 120s and patient currently asymptomatic.    Chest x-ray with diffuse interstitial prominence either pulmonary vascular congestion or mild interstitial pulmonary edema.  Patient however denies any cough, chest pain, shortness of breath  Initial heart rate very bradycardic with bifascicular block on EKG.  ECG showed severe sinus bradycardia with junctional escape of 40 bpm.>>  After potassium improved did develop sinus  bradycardia but also holding jacque agents.  On diltiazem and toprol XL with renal insufficiency/hyperkalemia>> Multi potential components to her bradycardia and with renal insufficiency extended half-life  6/5 general cardiology consult with EP requested  6/6 EP consult>>   EKG with severe sinus bradycardia with junctional escape rhythm of 40 bpm. >>  After potassium correction showing sinus rhythm with first-degree AV block and right bundle branch block.  6/6 on rounding noted that the patient appeared to be in A-fib with RVR. (History of PAF)   Discussion with EP.  Started on diltiazem 30 mg 4 times daily.  Monitor. Still with intermittent afib   BP elevated and intermittent tachycardia (afib)   6/7 ongoing monitoring ? If future pacemaker     Elevated troponin   Suspected supply/demand  Moderate aortic stenosis/moderate TR  Troponin 41>> 40  6/5 echo showing normal EF of 60 to 65%.  RV normal structure, function and size.  Moderate TR.  No effusion.    Fluid overload:   Suspect may be getting fluid up with CT findings.   Now on oxygen with 2 liters required        Acute kidney injury on chronic kidney disease stage IV  Hyperkalemia  Followed by nephrology in clinic   Recently started on Benicar for suboptimal blood pressure management.  At baseline has a history of stage IV renal disease followed by nephrology.  Whether she got hypotensive with bradycardia/dehydration or increasing creatinine and less clearance (dilt/toprol) suspect combination caused hemodynamic changes which led to poor perfusion of her kidneys and thus further poor clearance of medications including jacque agent  Creatinine of 3.28 on admission (5/23 creatinine 1.67)   Addition of ARB as outpatient   Nephrology currently following.>>  Given some fluids but now monitoring to avoid congestive heart failure  6/6 urine output significantly improved over the last 24 hours (1070 yesterday and 2100 today so far)   6/7 creatinine still up at 2.93      Hyperkalemia  Baseline stage IV CKD  Recent addition ARB (benicar 20 mg)   Patient on ARB with likely dehydration and hypoperfusion  Given treatment in the emergency room  Required lokelma this admission   6/7 potassium 3.3 >> monitor     Paroxysmal atrial fibrillation on chronic anticoagulation with Coumadin  Supratherapeutic INR  Sinus bradycardia this am   INR elevated on admission (pharmacy to dose)   6/4 INR 4.3  Coumadin dosing per pharmacy   6/7 level of INR 2.86   6/7 will hold coumadin now. >> got dose dose today. ? Biopsy or need interventions      Hyperlipidemia  Resume PTA statin     Chronic anemia  Baseline hemoglobin around 10-11.  Admitted with a hemoglobin of 9.1.  Patient denies any active bleed  Will check iron studies B12/folate     Right axillary mass in arm pit.   6/6 this was reported by [facility].  6/7 patient does have a very firm area with approximately 2 cm margin in her right axillary area.  Small appendages of skin tag/tissue appearance  6/7 CT chest:   IMPRESSION:   1.  Partially imaged nodular skin thickening of the right axilla. This should be amenable to direct visual inspection and biopsy as clinically indicated.  2.  Asymmetric skin thickening of the left breast. Recommend correlation with physical exam.  3.  Moderate size right and small left pleural effusions. The left pleural effusion is mildly loculated and there is new thickening of the left basilar pleura. Recommend correlation with pleural fluid labs and cytology when clinically feasible.  4.  Mild interlobular septal thickening and bilateral perihilar groundglass opacities suggestive of pulmonary edema. Infection not excluded in the correct clinical context.  5.  Mildly enlarged mediastinal lymph nodes are nonspecific but favored reactive.  Will discuss with nephrology? Fluid up however some loculated areas.   Pulmonary consult on Monday   No fevers or chills currently >> clinically not infected.   Discuss with surgery ?  "Biopsy        Diet: Renal Diet (non-dialysis)    DVT Prophylaxis: Warfarin  Aguilera Catheter: Not present  Lines: None     Cardiac Monitoring: ACTIVE order. Indication: Bradycardias (48 hours)  Code Status: Full Code      Clinically Significant Risk Factors        # Hypokalemia: Lowest K = 3.3 mmol/L in last 2 days, will replace as needed  # Hyperkalemia: Highest K = 5.5 mmol/L in last 2 days, will monitor as appropriate   # Hyperchloremia: Highest Cl = 108 mmol/L in last 2 days, will monitor as appropriate          # Hypoalbuminemia: Lowest albumin = 3 g/dL at 6/5/2025 10:41 AM, will monitor as appropriate       # Hypertension: Noted on problem list           # DMII: A1C = 6.5 % (Ref range: 0.0 - 5.6 %) within past 6 months, PRESENT ON ADMISSION  # Overweight: Estimated body mass index is 28.63 kg/m  as calculated from the following:    Height as of this encounter: 1.626 m (5' 4\").    Weight as of this encounter: 75.7 kg (166 lb 12.8 oz)., PRESENT ON ADMISSION     # Financial/Environmental Concerns: none         Social Drivers of Health    Physical Activity: Inactive (2/24/2025)    Exercise Vital Sign     Days of Exercise per Week: 0 days     Minutes of Exercise per Session: 0 min   Social Connections: Unknown (2/24/2025)    Social Connection and Isolation Panel [NHANES]     Frequency of Social Gatherings with Friends and Family: Twice a week          Disposition Plan     Medically Ready for Discharge: Anticipated in 2-4 Days       CARMENZA HENDRIX MD  Hospitalist Service  [facility]  Securely message with [employer] (more info)  Text page via [employer] Paging/Directory   ______________________________________________________________________      Physical Exam   Vital Signs: Temp: 98.4  F (36.9  C) Temp src: Oral BP: (!) 158/56 Pulse: 87   Resp: 18 SpO2: (!) 90 % O2 Device: None (Room air)    Weight: 166 lbs 12.8 oz    General patient is awake and alert although somewhat difficult to articulate exact " details of history prior to admission  Respiratory: Clear to auscultation bilaterally without wheezes or rhonchi  Cardiovascular: Regular rate with no gallop or rub (Bradycardic)   GI: + BS, soft, non tender   Skin: no overt edema     Medical Decision Making       45 MINUTES SPENT BY ME on the date of service doing chart review, history, exam, documentation & further activities per the note.    Discussion with cardiology, pharmacy, nursing    Data     I have personally reviewed the following data over the past 24 hrs:    N/A  \   N/A   / N/A     142 107 65.0 (H) /  172 (H)   3.3 (L) 21 (L) 2.93 (H) \     ALT: 22 AST: 20 AP: 82 TBILI: 0.5   ALB: 3.1 (L) TOT PROTEIN: 6.1 (L) LIPASE: N/A     INR:  2.86 (H) PTT:  N/A   D-dimer:  N/A Fibrinogen:  N/A       Imaging results reviewed over the past 24 hrs:   No results found for this or any previous visit (from the past 24 hours).

## 2025-06-07 NOTE — PLAN OF CARE
"Goal Outcome Evaluation:    BP (!) 177/90 (BP Location: Right arm)   Pulse (!) 126   Temp 98.5  F (36.9  C) (Oral)   Resp 18   Ht 1.626 m (5' 4\")   Wt 77.1 kg (170 lb)   LMP  (LMP Unknown)   SpO2 93%   BMI 29.18 kg/m   Room Air. Alert and Oriented x 3. Assist with One. AFib CVR/RVR. PRN Tylenol for Intermittent Pain. Renal Diet (Non-Dialysis). Cardiology and Nephrology following. Continue ongoing treatment.          "

## 2025-06-07 NOTE — PLAN OF CARE
Goal Outcome Evaluation:    Diagnosis: Symptomatic bradycardia, FABY  Mental Status: A&O x4; [place]  Activity/dangle: Assist x1 w/ gb and walker.   Diet: Tolerating a renal diet  Aguilera/Voiding: Voiding in the bathroom.   Pain: Denies  Tele/Restraints/Iso: Tele- SR w/ 1* AVB  Skin: RUE bruise  02/LDA: On 2L NC, PIV SL.   D/C Date: TBD   Other Info: Nephrology following.

## 2025-06-07 NOTE — PROGRESS NOTES
Nephrology brief note    Patient feeling well  Continue slow improvement in renal function, creatinine 2.93 today.  Potassium 3.3, okay for low-dose potassium replacement  Trend of more hypertension noted.  Currently on 30 mg every 6 hours diltiazem for rate control.    No new renal recs, continue monitoring renal recovery with daily BMPs.    Arslan Foster, DO

## 2025-06-08 LAB
ANION GAP SERPL CALCULATED.3IONS-SCNC: 12 MMOL/L (ref 7–15)
ATRIAL RATE - MUSE: 125 BPM
BUN SERPL-MCNC: 63.8 MG/DL (ref 8–23)
CALCIUM SERPL-MCNC: 8.4 MG/DL (ref 8.8–10.4)
CALCIUM SERPL-MCNC: 8.4 MG/DL (ref 8.8–10.4)
CHLORIDE SERPL-SCNC: 105 MMOL/L (ref 98–107)
CREAT SERPL-MCNC: 2.71 MG/DL (ref 0.51–0.95)
DIASTOLIC BLOOD PRESSURE - MUSE: NORMAL MMHG
EGFRCR SERPLBLD CKD-EPI 2021: 16 ML/MIN/1.73M2
ERYTHROCYTE [DISTWIDTH] IN BLOOD BY AUTOMATED COUNT: 13.5 % (ref 10–15)
GLUCOSE BLDC GLUCOMTR-MCNC: 101 MG/DL (ref 70–99)
GLUCOSE BLDC GLUCOMTR-MCNC: 123 MG/DL (ref 70–99)
GLUCOSE BLDC GLUCOMTR-MCNC: 131 MG/DL (ref 70–99)
GLUCOSE BLDC GLUCOMTR-MCNC: 144 MG/DL (ref 70–99)
GLUCOSE BLDC GLUCOMTR-MCNC: 185 MG/DL (ref 70–99)
GLUCOSE SERPL-MCNC: 174 MG/DL (ref 70–99)
HCO3 SERPL-SCNC: 21 MMOL/L (ref 22–29)
HCT VFR BLD AUTO: 26.2 % (ref 35–47)
HGB BLD-MCNC: 8.5 G/DL (ref 11.7–15.7)
INR PPP: 1.94 (ref 0.85–1.15)
INR PPP: 1.95 (ref 0.85–1.15)
INTERPRETATION ECG - MUSE: NORMAL
MCH RBC QN AUTO: 30.4 PG (ref 26.5–33)
MCHC RBC AUTO-ENTMCNC: 32.4 G/DL (ref 31.5–36.5)
MCV RBC AUTO: 94 FL (ref 78–100)
P AXIS - MUSE: NORMAL DEGREES
PHOSPHATE SERPL-MCNC: 3.5 MG/DL (ref 2.5–4.5)
PLATELET # BLD AUTO: 171 10E3/UL (ref 150–450)
POTASSIUM SERPL-SCNC: 2.9 MMOL/L (ref 3.4–5.3)
POTASSIUM SERPL-SCNC: 3.5 MMOL/L (ref 3.4–5.3)
PR INTERVAL - MUSE: NORMAL MS
PROTHROMBIN TIME: 21.4 SECONDS (ref 11.8–14.8)
PROTHROMBIN TIME: 21.6 SECONDS (ref 11.8–14.8)
QRS DURATION - MUSE: 146 MS
QT - MUSE: 382 MS
QTC - MUSE: 521 MS
R AXIS - MUSE: 104 DEGREES
RBC # BLD AUTO: 2.8 10E6/UL (ref 3.8–5.2)
SODIUM SERPL-SCNC: 138 MMOL/L (ref 135–145)
SYSTOLIC BLOOD PRESSURE - MUSE: NORMAL MMHG
T AXIS - MUSE: 14 DEGREES
VENTRICULAR RATE- MUSE: 112 BPM
WBC # BLD AUTO: 10.7 10E3/UL (ref 4–11)

## 2025-06-08 PROCEDURE — 250N000013 HC RX MED GY IP 250 OP 250 PS 637: Performed by: INTERNAL MEDICINE

## 2025-06-08 PROCEDURE — 84132 ASSAY OF SERUM POTASSIUM: CPT | Performed by: INTERNAL MEDICINE

## 2025-06-08 PROCEDURE — 84100 ASSAY OF PHOSPHORUS: CPT | Performed by: INTERNAL MEDICINE

## 2025-06-08 PROCEDURE — 210N000002 HC R&B HEART CARE

## 2025-06-08 PROCEDURE — 99233 SBSQ HOSP IP/OBS HIGH 50: CPT | Performed by: INTERNAL MEDICINE

## 2025-06-08 PROCEDURE — 250N000011 HC RX IP 250 OP 636: Performed by: INTERNAL MEDICINE

## 2025-06-08 PROCEDURE — 250N000012 HC RX MED GY IP 250 OP 636 PS 637: Performed by: INTERNAL MEDICINE

## 2025-06-08 PROCEDURE — 80048 BASIC METABOLIC PNL TOTAL CA: CPT | Performed by: INTERNAL MEDICINE

## 2025-06-08 PROCEDURE — 85610 PROTHROMBIN TIME: CPT | Performed by: INTERNAL MEDICINE

## 2025-06-08 PROCEDURE — 36415 COLL VENOUS BLD VENIPUNCTURE: CPT | Performed by: INTERNAL MEDICINE

## 2025-06-08 PROCEDURE — 85014 HEMATOCRIT: CPT | Performed by: INTERNAL MEDICINE

## 2025-06-08 RX ORDER — POTASSIUM CHLORIDE 1.5 G/1.58G
40 POWDER, FOR SOLUTION ORAL ONCE
Status: DISCONTINUED | OUTPATIENT
Start: 2025-06-08 | End: 2025-06-08

## 2025-06-08 RX ORDER — FUROSEMIDE 10 MG/ML
20 INJECTION INTRAMUSCULAR; INTRAVENOUS EVERY 8 HOURS
Status: DISCONTINUED | OUTPATIENT
Start: 2025-06-08 | End: 2025-06-09

## 2025-06-08 RX ORDER — POTASSIUM CHLORIDE 1.5 G/1.58G
20 POWDER, FOR SOLUTION ORAL ONCE
Status: DISCONTINUED | OUTPATIENT
Start: 2025-06-08 | End: 2025-06-08

## 2025-06-08 RX ORDER — POTASSIUM CHLORIDE 1.5 G/1.58G
40 POWDER, FOR SOLUTION ORAL ONCE
Status: COMPLETED | OUTPATIENT
Start: 2025-06-08 | End: 2025-06-08

## 2025-06-08 RX ADMIN — POTASSIUM CHLORIDE 40 MEQ: 1.5 POWDER, FOR SOLUTION ORAL at 12:35

## 2025-06-08 RX ADMIN — GABAPENTIN 200 MG: 100 CAPSULE ORAL at 22:25

## 2025-06-08 RX ADMIN — SERTRALINE HYDROCHLORIDE 50 MG: 50 TABLET ORAL at 09:06

## 2025-06-08 RX ADMIN — DILTIAZEM HYDROCHLORIDE 30 MG: 30 TABLET ORAL at 06:44

## 2025-06-08 RX ADMIN — FUROSEMIDE 20 MG: 10 INJECTION, SOLUTION INTRAMUSCULAR; INTRAVENOUS at 22:25

## 2025-06-08 RX ADMIN — ACETAMINOPHEN 650 MG: 325 TABLET, FILM COATED ORAL at 22:25

## 2025-06-08 RX ADMIN — INSULIN ASPART 3 UNITS: 100 INJECTION, SOLUTION INTRAVENOUS; SUBCUTANEOUS at 18:30

## 2025-06-08 RX ADMIN — FUROSEMIDE 20 MG: 10 INJECTION, SOLUTION INTRAMUSCULAR; INTRAVENOUS at 14:46

## 2025-06-08 RX ADMIN — PRAVASTATIN SODIUM 20 MG: 20 TABLET ORAL at 09:06

## 2025-06-08 RX ADMIN — INSULIN ASPART 6 UNITS: 100 INJECTION, SOLUTION INTRAVENOUS; SUBCUTANEOUS at 09:09

## 2025-06-08 RX ADMIN — DILTIAZEM HYDROCHLORIDE 30 MG: 30 TABLET ORAL at 18:29

## 2025-06-08 RX ADMIN — DILTIAZEM HYDROCHLORIDE 30 MG: 30 TABLET ORAL at 22:25

## 2025-06-08 RX ADMIN — INSULIN ASPART 2 UNITS: 100 INJECTION, SOLUTION INTRAVENOUS; SUBCUTANEOUS at 13:00

## 2025-06-08 RX ADMIN — DILTIAZEM HYDROCHLORIDE 30 MG: 30 TABLET ORAL at 12:35

## 2025-06-08 ASSESSMENT — ACTIVITIES OF DAILY LIVING (ADL)
ADLS_ACUITY_SCORE: 53

## 2025-06-08 NOTE — PLAN OF CARE
Goal Outcome Evaluation:    VSS. Monitor shows Sinus rhythm with BBB. Pt. Is alert and oriented x4. Verbalizes back discomfort with movement. Declines pain medication. O2 at 2L NC. Potassium 2.9. Replacement per protocol. Pt. Ambulated in cardenas and tolerated activity well. Continue to monitor. Plan for Pulmonary consult tomorrow.

## 2025-06-08 NOTE — PROGRESS NOTES
[facility]    Medicine Progress Note - Hospitalist Service    Date of Admission:  6/4/2025  Interval History:   Patient noted to have oxygen needs this morning.  Currently on 2 L: Weight has not significantly changed based on scales.  CT chest does show evidence of fluid.  Noted to have moderate size right and small left pleural effusions.  Left is mildly loculated and thickening of the pleura  Plans for pulmonary consult tomorrow and will require follow up with cardiology. Surugery consult for right axillay area for a plan  Held coumadin today     Assessment & Plan   Ирина Yanez is a 89 year old female with history of type 2 diabetes mellitus, CKD stage IV (followed by nephrology), hypertension, aortic stenosis, paroxysmal atrial fibrillation on chronic anticoagulation with Coumadin, hyperlipidemia, anxiety, chronic heart failure with preserved EF who presented to the hospital with a presyncopal episode on 6/4.      She was outside working in the sun for couple hours pulling weeds.  Reportedly got too weak to get up from the floor. Called paramedics.  Blood pressure 88/36 with a pulse of 32. Enroute SBP 66/40 for total of 1.5 mg of atropine. . Given 600 ml of fluids.   Admitted with severe bradycardia, hypotension, FABY with hyperkalemia.      Symptomatic bradycardia  Afib with RVR intermittent   Hypotension  Presyncope secondary to above  At presentation heart rate in the high 30s to 40s and blood pressure 90s.  At some point had dipped down to 70s but improved with IV fluid hydration and currently in the 120s and patient currently asymptomatic.    Chest x-ray with diffuse interstitial prominence either pulmonary vascular congestion or mild interstitial pulmonary edema.  Patient however denies any cough, chest pain, shortness of breath  Initial heart rate very bradycardic with bifascicular block on EKG.  ECG showed severe sinus bradycardia with junctional escape of 40 bpm.>>   After potassium improved did develop sinus bradycardia but also holding jacque agents.  On diltiazem and toprol XL with renal insufficiency/hyperkalemia>> Multi potential components to her bradycardia and with renal insufficiency extended half-life  6/5 general cardiology consult with EP requested  6/6 EP consult>>   EKG with severe sinus bradycardia with junctional escape rhythm of 40 bpm. >>  After potassium correction showing sinus rhythm with first-degree AV block and right bundle branch block.  6/6 on rounding noted that the patient appeared to be in A-fib with RVR. (History of PAF)   Discussion with EP.  Started on diltiazem 30 mg 4 times daily.  6/8 BP remains slightly elevated but HR controlled   No reported bradycardia, pauses or block   Still with intermittent afib on monitor although rate does not go faster   6/8 will have cardiology follow up Monday     Elevated troponin   Suspected supply/demand  Moderate aortic stenosis/moderate TR  Troponin 41>> 40  6/5 echo showing normal EF of 60 to 65%.  RV normal structure, function and size.  Moderate TR.  No effusion. Moderate valvular aortic stenosis     Fluid overload:   Suspect may be getting fluid up with CT findings.   Now on oxygen with 2 liters required   6/8 discussion with nephrology and started on diuretics   Lasix 20 mg IV TID  Monitor closely Intake and output   CT suggestive of fluid overload        Acute kidney injury on chronic kidney disease stage IV  Hyperkalemia  Followed by nephrology in clinic   Recently started on Benicar for suboptimal blood pressure management.  At baseline has a history of stage IV renal disease followed by nephrology.  Whether she got hypotensive with bradycardia/dehydration or increasing creatinine and less clearance (dilt/toprol) suspect combination caused hemodynamic changes which led to poor perfusion of her kidneys and thus further poor clearance of medications including jacque agent  Creatinine of 3.28 on admission  (5/23 creatinine 1.67)   Addition of ARB as outpatient   Nephrology currently following.>>  Given some fluids but now monitoring to avoid congestive heart failure  6/6 urine output significantly improved over the last 24 hours (1070 yesterday and 2100 today so far)   6/8 Creatinien 2.71     Hyperkalemia  Baseline stage IV CKD  Recent addition ARB (benicar 20 mg)   Patient on ARB with likely dehydration and hypoperfusion  Given treatment in the emergency room  Required lokelma this admission   6/7 potassium 3.3 >> monitor   6/8 potassium levels actually low this am (resumed on potassium replacement)     Paroxysmal atrial fibrillation on chronic anticoagulation with Coumadin  Supratherapeutic INR  Sinus bradycardia this am   INR elevated on admission (pharmacy to dose)   6/4 INR 4.3  Coumadin dosing per pharmacy   6/7 level of INR 2.86   6/8 will hold coumadin now.   6/8 coumadin held 6/8>> evaluation axillary enlargement      Hyperlipidemia  Resume PTA statin     Chronic anemia  Baseline hemoglobin around 10-11.  Admitted with a hemoglobin of 9.1.  Patient denies any active bleed  Will check iron studies B12/folate   6/8 hgb 8.5     Right axillary mass in arm pit.   6/6 this was reported by [facility].  6/7 patient does have a very firm area with approximately 2 cm margin in her right axillary area.  Small appendages of skin tag/tissue appearance  6/7 CT chest:   IMPRESSION:   1.  Partially imaged nodular skin thickening of the right axilla. This should be amenable to direct visual inspection and biopsy as clinically indicated.  2.  Asymmetric skin thickening of the left breast. Recommend correlation with physical exam.  3.  Moderate size right and small left pleural effusions. The left pleural effusion is mildly loculated and there is new thickening of the left basilar pleura. Recommend correlation with pleural fluid labs and cytology when clinically feasible.  4.  Mild interlobular septal thickening and bilateral  "perihilar groundglass opacities suggestive of pulmonary edema. Infection not excluded in the correct clinical context.  5.  Mildly enlarged mediastinal lymph nodes are nonspecific but favored reactive  Pulmonary consult on Monday   No fevers or chills currently >> clinically not infected.   Discuss with surgery ? Biopsy on Monday (holding coumadin)        Diet: Renal Diet (non-dialysis)    DVT Prophylaxis: Warfarin  Aguilera Catheter: Not present  Lines: None     Cardiac Monitoring: ACTIVE order. Indication: Bradycardias (48 hours)  Code Status: Full Code      Clinically Significant Risk Factors        # Hypokalemia: Lowest K = 2.9 mmol/L in last 2 days, will replace as needed   # Hyperchloremia: Highest Cl = 108 mmol/L in last 2 days, will monitor as appropriate          # Hypoalbuminemia: Lowest albumin = 3 g/dL at 6/5/2025 10:41 AM, will monitor as appropriate       # Hypertension: Noted on problem list           # DMII: A1C = 6.5 % (Ref range: 0.0 - 5.6 %) within past 6 months, PRESENT ON ADMISSION  # Overweight: Estimated body mass index is 29.01 kg/m  as calculated from the following:    Height as of this encounter: 1.626 m (5' 4\").    Weight as of this encounter: 76.7 kg (169 lb)., PRESENT ON ADMISSION     # Financial/Environmental Concerns: none         Social Drivers of Health    Physical Activity: Inactive (2/24/2025)    Exercise Vital Sign     Days of Exercise per Week: 0 days     Minutes of Exercise per Session: 0 min   Social Connections: Unknown (2/24/2025)    Social Connection and Isolation Panel [NHANES]     Frequency of Social Gatherings with Friends and Family: Twice a week          Disposition Plan     Medically Ready for Discharge: Anticipated in 2-4 Days       CARMENZA HENDRIX MD  Hospitalist Service  [facility]  Securely message with [employer] (more info)  Text page via [employer] Paging/Directory "   ______________________________________________________________________      Physical Exam   Vital Signs: Temp: 98.3  F (36.8  C) Temp src: Oral BP: (!) 147/64 Pulse: 78   Resp: 16 SpO2: 92 % O2 Device: Nasal cannula Oxygen Delivery: 2 LPM  Weight: 169 lbs 0 oz    Patient is awake and alert   Respiratory: Clear to auscultation bilaterally without wheezes or rhonchi: decrease BS in the bases   Cardiovascular: Regular rate with no gallop or rub 2/6 mrumur  GI: + BS, soft, non tender   Skin: no overt edema     Medical Decision Making       45 MINUTES SPENT BY ME on the date of service doing chart review, history, exam, documentation & further activities per the note.    Discussion with nephrology, nursing and patient     Data     I have personally reviewed the following data over the past 24 hrs:    10.7  \   8.5 (L)   / 171     138 105 63.8 (H) /  144 (H)   2.9 (L) 21 (L) 2.71 (H) \     INR:  1.94 (H) PTT:  N/A   D-dimer:  N/A Fibrinogen:  N/A       Imaging results reviewed over the past 24 hrs:   No results found for this or any previous visit (from the past 24 hours).

## 2025-06-08 NOTE — PLAN OF CARE
Goal Outcome Evaluation:  DATE & TIME:25,[id number]  Cognitive Concerns/ Orientation:A/Ox4  BEHAVIOR & AGGRESSION TOOL COLOR:Green  ABNL VS/O2:VSS on 2L nc  MOBILITY:Ax1 gb/w  PAIN MANAGMENT:Tylenol x1  DIET:Renal diet  BOWEL/BLADDER:Incontinent @times  DRAIN/DEVICES:PIV SL  TELEMETRY RHYTHM:SR BBB  TESTS/PROCEDURES:B/101  D/C DATE: Pending

## 2025-06-08 NOTE — PROGRESS NOTES
Nephrology chart check    Intermittent hypotension present  Improvement in serum creatinine today, 2.71.    Noted potassium 2.9, getting 40 mill equivalents potassium.      Continue watching renal recovery, remains above baseline.  Follow-up tomorrow    Arslan Foster DO

## 2025-06-08 NOTE — PROGRESS NOTES
MD Notification    Notified Person: MD Thompson    Notification Date/Time:    Notification Interaction:  vocera  Purpose of Notification:  Hi Pt bg was 364 and does not have insulin ordered. Also pt is coughing a lot and was wondering if she needs to be tested for covid/flu?   Orders Received:    Comments:

## 2025-06-09 LAB
ANION GAP SERPL CALCULATED.3IONS-SCNC: 16 MMOL/L (ref 7–15)
BUN SERPL-MCNC: 60.8 MG/DL (ref 8–23)
CALCIUM SERPL-MCNC: 8.8 MG/DL (ref 8.8–10.4)
CHLORIDE SERPL-SCNC: 105 MMOL/L (ref 98–107)
CREAT SERPL-MCNC: 2.62 MG/DL (ref 0.51–0.95)
EGFRCR SERPLBLD CKD-EPI 2021: 17 ML/MIN/1.73M2
GLUCOSE BLDC GLUCOMTR-MCNC: 100 MG/DL (ref 70–99)
GLUCOSE BLDC GLUCOMTR-MCNC: 118 MG/DL (ref 70–99)
GLUCOSE BLDC GLUCOMTR-MCNC: 125 MG/DL (ref 70–99)
GLUCOSE BLDC GLUCOMTR-MCNC: 183 MG/DL (ref 70–99)
GLUCOSE SERPL-MCNC: 233 MG/DL (ref 70–99)
HCO3 SERPL-SCNC: 20 MMOL/L (ref 22–29)
INR PPP: 1.68 (ref 0.85–1.15)
INR PPP: 1.9 (ref 0.85–1.15)
MAGNESIUM SERPL-MCNC: 1.9 MG/DL (ref 1.7–2.3)
POTASSIUM SERPL-SCNC: 3.4 MMOL/L (ref 3.4–5.3)
PROTHROMBIN TIME: 19.2 SECONDS (ref 11.8–14.8)
PROTHROMBIN TIME: 21.1 SECONDS (ref 11.8–14.8)
SODIUM SERPL-SCNC: 141 MMOL/L (ref 135–145)

## 2025-06-09 PROCEDURE — 85610 PROTHROMBIN TIME: CPT | Performed by: INTERNAL MEDICINE

## 2025-06-09 PROCEDURE — 80048 BASIC METABOLIC PNL TOTAL CA: CPT | Performed by: INTERNAL MEDICINE

## 2025-06-09 PROCEDURE — 99221 1ST HOSP IP/OBS SF/LOW 40: CPT | Performed by: INTERNAL MEDICINE

## 2025-06-09 PROCEDURE — 36415 COLL VENOUS BLD VENIPUNCTURE: CPT | Performed by: INTERNAL MEDICINE

## 2025-06-09 PROCEDURE — 97116 GAIT TRAINING THERAPY: CPT | Mod: GP

## 2025-06-09 PROCEDURE — 99233 SBSQ HOSP IP/OBS HIGH 50: CPT | Performed by: INTERNAL MEDICINE

## 2025-06-09 PROCEDURE — 210N000002 HC R&B HEART CARE

## 2025-06-09 PROCEDURE — 84132 ASSAY OF SERUM POTASSIUM: CPT | Performed by: INTERNAL MEDICINE

## 2025-06-09 PROCEDURE — 250N000013 HC RX MED GY IP 250 OP 250 PS 637: Performed by: INTERNAL MEDICINE

## 2025-06-09 PROCEDURE — 99232 SBSQ HOSP IP/OBS MODERATE 35: CPT | Performed by: INTERNAL MEDICINE

## 2025-06-09 PROCEDURE — 83735 ASSAY OF MAGNESIUM: CPT | Performed by: INTERNAL MEDICINE

## 2025-06-09 PROCEDURE — 250N000011 HC RX IP 250 OP 636: Performed by: INTERNAL MEDICINE

## 2025-06-09 PROCEDURE — 97530 THERAPEUTIC ACTIVITIES: CPT | Mod: GP

## 2025-06-09 RX ORDER — POTASSIUM CHLORIDE 1500 MG/1
20 TABLET, EXTENDED RELEASE ORAL ONCE
Status: COMPLETED | OUTPATIENT
Start: 2025-06-09 | End: 2025-06-09

## 2025-06-09 RX ORDER — FUROSEMIDE 40 MG/1
40 TABLET ORAL DAILY
Status: DISCONTINUED | OUTPATIENT
Start: 2025-06-09 | End: 2025-06-11 | Stop reason: HOSPADM

## 2025-06-09 RX ORDER — DILTIAZEM HYDROCHLORIDE 120 MG/1
240 CAPSULE, COATED, EXTENDED RELEASE ORAL DAILY
Status: DISCONTINUED | OUTPATIENT
Start: 2025-06-09 | End: 2025-06-09

## 2025-06-09 RX ORDER — DILTIAZEM HYDROCHLORIDE 120 MG/1
240 CAPSULE, COATED, EXTENDED RELEASE ORAL DAILY
Status: DISCONTINUED | OUTPATIENT
Start: 2025-06-09 | End: 2025-06-11 | Stop reason: HOSPADM

## 2025-06-09 RX ORDER — POTASSIUM CHLORIDE 1.5 G/1.58G
20 POWDER, FOR SOLUTION ORAL ONCE
Status: COMPLETED | OUTPATIENT
Start: 2025-06-09 | End: 2025-06-09

## 2025-06-09 RX ORDER — WARFARIN SODIUM 5 MG/1
5 TABLET ORAL
Status: COMPLETED | OUTPATIENT
Start: 2025-06-09 | End: 2025-06-09

## 2025-06-09 RX ORDER — DILTIAZEM HYDROCHLORIDE 120 MG/1
240 CAPSULE, COATED, EXTENDED RELEASE ORAL DAILY
Status: DISCONTINUED | OUTPATIENT
Start: 2025-06-10 | End: 2025-06-09

## 2025-06-09 RX ADMIN — INSULIN ASPART 7 UNITS: 100 INJECTION, SOLUTION INTRAVENOUS; SUBCUTANEOUS at 18:49

## 2025-06-09 RX ADMIN — FUROSEMIDE 20 MG: 10 INJECTION, SOLUTION INTRAMUSCULAR; INTRAVENOUS at 06:33

## 2025-06-09 RX ADMIN — FUROSEMIDE 40 MG: 40 TABLET ORAL at 13:00

## 2025-06-09 RX ADMIN — WARFARIN SODIUM 5 MG: 5 TABLET ORAL at 18:46

## 2025-06-09 RX ADMIN — POTASSIUM CHLORIDE 20 MEQ: 1500 TABLET, EXTENDED RELEASE ORAL at 18:46

## 2025-06-09 RX ADMIN — INSULIN ASPART 7 UNITS: 100 INJECTION, SOLUTION INTRAVENOUS; SUBCUTANEOUS at 08:28

## 2025-06-09 RX ADMIN — ACETAMINOPHEN 650 MG: 325 TABLET, FILM COATED ORAL at 18:46

## 2025-06-09 RX ADMIN — POTASSIUM CHLORIDE 20 MEQ: 1.5 POWDER, FOR SOLUTION ORAL at 10:24

## 2025-06-09 RX ADMIN — DILTIAZEM HYDROCHLORIDE 30 MG: 30 TABLET ORAL at 06:50

## 2025-06-09 RX ADMIN — ACETAMINOPHEN 650 MG: 325 TABLET, FILM COATED ORAL at 04:27

## 2025-06-09 RX ADMIN — GABAPENTIN 200 MG: 100 CAPSULE ORAL at 21:47

## 2025-06-09 RX ADMIN — INSULIN ASPART 4 UNITS: 100 INJECTION, SOLUTION INTRAVENOUS; SUBCUTANEOUS at 12:53

## 2025-06-09 RX ADMIN — SERTRALINE HYDROCHLORIDE 50 MG: 50 TABLET ORAL at 08:28

## 2025-06-09 RX ADMIN — DILTIAZEM HYDROCHLORIDE 240 MG: 120 CAPSULE, COATED, EXTENDED RELEASE ORAL at 12:53

## 2025-06-09 RX ADMIN — SENNOSIDES AND DOCUSATE SODIUM 2 TABLET: 50; 8.6 TABLET ORAL at 08:32

## 2025-06-09 RX ADMIN — PRAVASTATIN SODIUM 20 MG: 20 TABLET ORAL at 08:28

## 2025-06-09 ASSESSMENT — ACTIVITIES OF DAILY LIVING (ADL)
ADLS_ACUITY_SCORE: 45
ADLS_ACUITY_SCORE: 53
ADLS_ACUITY_SCORE: 45

## 2025-06-09 NOTE — PROGRESS NOTES
[facility]    EP Progress Note    Date of Service (when I saw the patient): 06/09/2025     Assessment & Plan   Ирина Yanez is a 89 year old female with history of T2DM, CKD stage IV, HTN, aortic stenosis, paroxysmal atrial fibrillation on AC w/ warfarin, chronic  HFpEF who was admitted on 6/4/2025 with presyncope. We were consulted to see the patient for bradyarrhythmia. On admission, patient was found to be hyperkalemic (6.4), Cr 3.28. ECG showing severe sinus bradycardia w/ junctional escape rhythm at 40 BPM. After K correction, ECG showed sinus rhythm w/ first degree AV-block and noted chronic RBBB. Most recent K level 3.5. Echo performed 6/5/25 showed LVEF 60-65%, moderate aortic stenosis. Telemetry today shows alternating sinus rhythm and atrial fibrillation, rates controlled in the 80's this morning, in the 100's last night. Longest pause 1.5 seconds. She denies CP, SOB, lightheadedness/dizziness over the weekend. Remains on AC w/ Warfarin.    Plan  Discontinue diltiazem 30 mg QID  Restart PTA diltiazem  mg daily  Continue AC  Warfarin  Ok to discharge from EP perspective, we will sign off  Will have patient wear 7-day Ziopatch after discharge  Follow-up in outpatient clinic in 4-6 weeks      Ramírez Hook PA-C    Interval History   No complaints of chest pain, pressure or tightness.  No orthopnea, PND or edema. No change in exercise tolerance or breathing. Overall, she is feeling well.      Most recent K 3.5. Hgb 8.5. Cr 2.71.    Physical Exam   Temp: 97.9  F (36.6  C) Temp src: Oral BP: (!) 152/75 Pulse: 78   Resp: 18 SpO2: 93 % O2 Device: Nasal cannula Oxygen Delivery: 2 LPM  Vitals:    06/07/25 0455 06/08/25 0621 06/09/25 0646   Weight: 75.7 kg (166 lb 12.8 oz) 76.7 kg (169 lb) 76.7 kg (169 lb)     Vital Signs with Ranges  Temp:  [97.9  F (36.6  C)-99  F (37.2  C)] 97.9  F (36.6  C)  Pulse:  [] 78  Resp:  [16-18] 18  BP: (125-173)/(63-90) 152/75  SpO2:  [92 %-97 %]  93 %  I/O last 3 completed shifts:  In: 960 [P.O.:960]  Out: 400 [Urine:400]    Telemetry: Alternating paroxysmal atrial fibrillation and normal sinus rhythm w/ occasional PVC's. Rates 80-90 BPM presently. Longest pause 1.5 seconds.    Constitutional: awake, alert, cooperative, no apparent distress, and appears stated age  Respiratory: No increased work of breathing, good air exchange, clear to auscultation bilaterally, no crackles or wheezing  Cardiovascular: Normal apical impulse, regular rate and rhythm, normal S1 and S2, no S3 or S4, and 3/6 systolic murmur heard best at RSB  Neurologic: Awake, alert, oriented to name, place and time.  Cranial nerves II-XII are grossly intact.    Medications   Current Facility-Administered Medications   Medication Dose Route Frequency Provider Last Rate Last Admin    Patient is already receiving anticoagulation with heparin, enoxaparin (LOVENOX), warfarin (COUMADIN)  or other anticoagulant medication   Does not apply Continuous PRN Judi Camacho MD         Current Facility-Administered Medications   Medication Dose Route Frequency Provider Last Rate Last Admin    [START ON 6/10/2025] diltiazem ER COATED BEADS (CARDIZEM CD/CARTIA XT) 24 hr capsule 240 mg  240 mg Oral Daily Ramírez Hook PA-C        furosemide (LASIX) injection 20 mg  20 mg Intravenous Q8H Shelley Murguia MD   20 mg at 06/09/25 0633    gabapentin (NEURONTIN) capsule 200 mg  200 mg Oral At Bedtime Judi Camacho MD   200 mg at 06/08/25 2225    insulin aspart (NovoLOG) injection (RAPID ACTING)   Subcutaneous TID w/meals Mary Thompson MD   7 Units at 06/09/25 0828    insulin aspart (NovoLOG) injection (RAPID ACTING)   Subcutaneous TID w/meals Mary Thompson MD   9 Units at 06/07/25 2243    pravastatin (PRAVACHOL) tablet 20 mg  20 mg Oral Daily Judi Camacho MD   20 mg at 06/09/25 0828    sertraline (ZOLOFT) tablet 50 mg  50 mg Oral Daily Judi Camacho MD   50 mg at 06/09/25 0828    sodium chloride (PF) 0.9% PF  flush 3 mL  3 mL Intracatheter Q8H Judi Betancourt MD   3 mL at 06/09/25 0633    [Held by provider] Warfarin Dose Required Daily - Pharmacist Managed  1 each Oral See Admin Instructions Shelley Murguia MD           Data   I personally reviewed no images or EKG's today.  Results for orders placed or performed during the hospital encounter of 06/04/25 (from the past 24 hours)   Basic metabolic panel   Result Value Ref Range    Sodium 138 135 - 145 mmol/L    Potassium 2.9 (L) 3.4 - 5.3 mmol/L    Chloride 105 98 - 107 mmol/L    Carbon Dioxide (CO2) 21 (L) 22 - 29 mmol/L    Anion Gap 12 7 - 15 mmol/L    Urea Nitrogen 63.8 (H) 8.0 - 23.0 mg/dL    Creatinine 2.71 (H) 0.51 - 0.95 mg/dL    GFR Estimate 16 (L) >60 mL/min/1.73m2    Calcium 8.4 (L) 8.8 - 10.4 mg/dL    Glucose 174 (H) 70 - 99 mg/dL   CBC with platelets   Result Value Ref Range    WBC Count 10.7 4.0 - 11.0 10e3/uL    RBC Count 2.80 (L) 3.80 - 5.20 10e6/uL    Hemoglobin 8.5 (L) 11.7 - 15.7 g/dL    Hematocrit 26.2 (L) 35.0 - 47.0 %    MCV 94 78 - 100 fL    MCH 30.4 26.5 - 33.0 pg    MCHC 32.4 31.5 - 36.5 g/dL    RDW 13.5 10.0 - 15.0 %    Platelet Count 171 150 - 450 10e3/uL   INR   Result Value Ref Range    INR 1.94 (H) 0.85 - 1.15    PT 21.4 (H) 11.8 - 14.8 Seconds   Calcium   Result Value Ref Range    Calcium 8.4 (L) 8.8 - 10.4 mg/dL   Phosphorus   Result Value Ref Range    Phosphorus 3.5 2.5 - 4.5 mg/dL   Glucose by meter   Result Value Ref Range    GLUCOSE BY METER POCT 144 (H) 70 - 99 mg/dL   Glucose by meter   Result Value Ref Range    GLUCOSE BY METER POCT 131 (H) 70 - 99 mg/dL   Potassium   Result Value Ref Range    Potassium 3.5 3.4 - 5.3 mmol/L   Glucose by meter   Result Value Ref Range    GLUCOSE BY METER POCT 185 (H) 70 - 99 mg/dL   INR   Result Value Ref Range    INR 1.90 (H) 0.85 - 1.15    PT 21.1 (H) 11.8 - 14.8 Seconds   Glucose by meter   Result Value Ref Range    GLUCOSE BY METER POCT 125 (H) 70 - 99 mg/dL     *Note: Due to a large number of  results and/or encounters for the requested time period, some results have not been displayed. A complete set of results can be found in Results Review.

## 2025-06-09 NOTE — PLAN OF CARE
VSS on 2L O2 overnight. Tele afib CVR. A&O x4. Denies pain. Purewick in place at HS. Repositioned in bed independently. Tylenol requested for sleep. Plan for Cardiology, Pulmonology and Gen surgery consults.

## 2025-06-09 NOTE — PROGRESS NOTES
[facility]    Medicine Progress Note - Hospitalist Service    Date of Admission:  6/4/2025  Interval History:   Patient is comfortable.  Feels like her breathing is good.  She has been seen in consultation by nephrology, cardiology EP and pulmonary.  Will continue on low-dose daily oral Lasix.  Seen by surgery as well for the area in her axillary region.  She is planning to go to the TCU after discharge  Transitioned her short acting diltiazem to long-acting.  Resumption of Coumadin given no plan for procedures at this time    Assessment & Plan   Ирина Yanez is a 89 year old female with history of type 2 diabetes mellitus, CKD stage IV (followed by nephrology), hypertension, aortic stenosis, paroxysmal atrial fibrillation on chronic anticoagulation with Coumadin, hyperlipidemia, anxiety, chronic heart failure with preserved EF who presented to the hospital with a presyncopal episode on 6/4.      She was outside working in the sun for couple hours pulling weeds.  Reportedly got too weak to get up from the floor. Called paramedics.  Blood pressure 88/36 with a pulse of 32. Enroute SBP 66/40 for total of 1.5 mg of atropine. . Given 600 ml of fluids.   Admitted with severe bradycardia, hypotension, FABY with hyperkalemia.      Symptomatic bradycardia  Afib with RVR intermittent   Hypotension  Presyncope secondary to above  At presentation heart rate in the high 30s to 40s and blood pressure 90s.  At some point had dipped down to 70s but improved with IV fluid hydration and currently in the 120s and patient currently asymptomatic.    Chest x-ray with diffuse interstitial prominence either pulmonary vascular congestion or mild interstitial pulmonary edema.  Patient however denies any cough, chest pain, shortness of breath  Initial heart rate very bradycardic with bifascicular block on EKG.  Admit ECG showed severe sinus bradycardia with junctional escape of 40 bpm.>>  After potassium  improved did develop sinus bradycardia but also holding jacque agents.  On diltiazem and toprol XL with renal insufficiency/hyperkalemia>> Multi potential components to her bradycardia and with renal insufficiency extended half-life  6/5 general cardiology consult with EP requested  6/6 EP consult>>   EKG with severe sinus bradycardia with junctional escape rhythm of 40 bpm. >>  After potassium correction showing sinus rhythm with first-degree AV block and right bundle branch block.  6/6 on rounding noted that the patient appeared to be in A-fib with RVR. (History of PAF)   Discussion with EP.  Started on diltiazem 30 mg 4 times daily.  6/8 BP remains slightly elevated but HR controlled   No reported bradycardia, pauses or block   Still with intermittent afib on monitor although rate does not go faster   6/9 follow-up by EP >> changed to diltiazem 240 XR daily stopping short acting.      Elevated troponin   Suspected supply/demand  Moderate aortic stenosis/moderate TR  Troponin 41>> 40  6/5 echo showing normal EF of 60 to 65%.  RV normal structure, function and size.  Moderate TR.  No effusion. Moderate valvular aortic stenosis     Fluid overload:   Suspect may be getting fluid up with CT findings.   Now on oxygen with 2 liters required   6/8 discussion with nephrology and started on diuretics   Lasix 20 mg IV TID >> changed to 40 mg p.o. daily  Monitor closely Intake and output   CT suggestive of fluid overload   6/9 coordination and discussion with EP, pulmonary and nephrology regarding fluid assessment.  Start Lasix 40 mg p.o. daily.       Acute kidney injury on chronic kidney disease stage IV  Hyperkalemia  Followed by nephrology in clinic   Recently started on Benicar for suboptimal blood pressure management.  At baseline has a history of stage IV renal disease followed by nephrology.  Whether she got hypotensive with bradycardia/dehydration or increasing creatinine and less clearance (dilt/toprol) suspect  combination caused hemodynamic changes which led to poor perfusion of her kidneys and thus further poor clearance of medications including jacque agent  Creatinine of 3.28 on admission (5/23 creatinine 1.67)   Addition of ARB as outpatient   Nephrology currently following.>>  Given some fluids but now monitoring to avoid congestive heart failure  6/6 urine output significantly improved over the last 24 hours (1070 yesterday and 2100 today so far)   6/9 creatinine of 2.62 >> on diuretics.    Hyperkalemia  Baseline stage IV CKD  Recent addition ARB (benicar 20 mg)   Patient on ARB with likely dehydration and hypoperfusion  Given treatment in the emergency room  Required lokelma this admission   6/7 potassium 3.3 >> monitor   6/8 potassium levels actually low this am (resumed on potassium replacement)   6/9 currently on potassium replacement protocol with 3.4    Paroxysmal atrial fibrillation on chronic anticoagulation with Coumadin  Supratherapeutic INR  Sinus bradycardia this am   INR elevated on admission (pharmacy to dose)   6/4 INR 4.3  Coumadin dosing per pharmacy   6/7 level of INR 2.86   6/8 will hold coumadin now.   6/8 coumadin held 6/8>> evaluation axillary enlargement   6/9 resume Coumadin as no procedure this admission     Hyperlipidemia  Resume PTA statin     Chronic anemia  Baseline hemoglobin around 10-11.  Admitted with a hemoglobin of 9.1.  Patient denies any active bleed  Will check iron studies B12/folate   6/8 hgb 8.5     Right axillary mass in arm pit.   6/6 this was [facility] by [facility].  6/7 patient does have a very firm area with approximately 2 cm margin in her right axillary area.  Small appendages of skin tag/tissue appearance  6/7 CT chest:   IMPRESSION:   1.  Partially imaged nodular skin thickening of the right axilla. This should be amenable to direct visual inspection and biopsy as clinically indicated.  2.  Asymmetric skin thickening of the left breast. Recommend correlation with physical  "exam.  3.  Moderate size right and small left pleural effusions. The left pleural effusion is mildly loculated and there is new thickening of the left basilar pleura. Recommend correlation with pleural fluid labs and cytology when clinically feasible.  4.  Mild interlobular septal thickening and bilateral perihilar groundglass opacities suggestive of pulmonary edema. Infection not excluded in the correct clinical context.  5.  Mildly enlarged mediastinal lymph nodes are nonspecific but favored reactive  Pulmonary consult on Monday   No fevers or chills currently >> clinically not infected.   Discuss with surgery ? Biopsy on Monday (holding coumadin)     Bilateral pleural effusion  CT chest with bilateral pleural effusions.  Left appeared to be somewhat loculated and moderate on the right  Pulmonary consult.  No indication for tapping or thoracocentesis for diagnostic or therapeutic purposes.  Continue on Lasix.       Diet: Renal Diet (non-dialysis)    DVT Prophylaxis: Warfarin  [place] Catheter: Not present  Lines: None     Cardiac Monitoring: ACTIVE order. Indication: Bradycardias (48 hours)  Code Status: Full Code      Clinically Significant Risk Factors        # Hypokalemia: Lowest K = 2.9 mmol/L in last 2 days, will replace as needed        # Hypoalbuminemia: Lowest albumin = 3 g/dL at 6/5/2025 10:41 AM, will monitor as appropriate       # Hypertension: Noted on problem list           # DMII: A1C = 6.5 % (Ref range: 0.0 - 5.6 %) within past 6 months   # Overweight: Estimated body mass index is 29.01 kg/m  as calculated from the following:    Height as of this encounter: 1.626 m (5' 4\").    Weight as of this encounter: 76.7 kg (169 lb).      # Financial/Environmental Concerns: none         Social Drivers of Health    Physical Activity: Inactive (2/24/2025)    Exercise Vital Sign     Days of Exercise per Week: 0 days     Minutes of Exercise per Session: 0 min   Social Connections: Unknown (2/24/2025)    Social " Connection and Isolation Panel [NHANES]     Frequency of Social Gatherings with Friends and Family: Twice a week          Disposition Plan     Medically Ready for Discharge: Anticipated in 2-4 Days       CARMENZA HENDRIX MD  Hospitalist Service  [facility]  Securely message with [employer] (more info)  Text page via [employer] Paging/Directory   ______________________________________________________________________      Physical Exam   Vital Signs: Temp: 97.9  F (36.6  C) Temp src: Oral BP: (!) 162/68 Pulse: 67   Resp: 18 SpO2: 99 % O2 Device: Nasal cannula Oxygen Delivery: 2 LPM  Weight: 169 lbs 0 oz    Patient is awake and alert   Respiratory: Clear to auscultation bilaterally without wheezes or rhonchi: decrease BS in the bases   Cardiovascular: Regular rate with no gallop or rub 2/6 mrumur  GI: + BS, soft, non tender   Skin: no overt edema     Medical Decision Making       50 MINUTES SPENT BY ME on the date of service doing chart review, history, exam, documentation & further activities per the note.    Patient is clinically improved and ready for discharge.  Discussion with pulmonary, nephrology, EP and bedside nursing  Patient is ready to go to TCU    Data     I have personally reviewed the following data over the past 24 hrs:    N/A  \   N/A   / N/A     N/A N/A N/A /  125 (H)   3.4 N/A N/A \     INR:  1.90 (H) PTT:  N/A   D-dimer:  N/A Fibrinogen:  N/A       Imaging results reviewed over the past 24 hrs:   No results found for this or any previous visit (from the past 24 hours).

## 2025-06-09 NOTE — CONSULTS
"PULMONARY CONSULT  Date of service: 2025    Patient: Ирина Yanez      : 1935      MRN: [id number]    We were consulted by Shelley Murguia MD   Reason for consult: \" loculated effusion'.        Impressions/Recommendations:   89 year old female with PMHx most significant for diastolic heart failure, type 2 diabetes mellitus, CKD stage IV, hypertension, aortic stenosis, and paroxysmal atrial fibrillation on chronic anticoagulation.  The patient was admitted to the hospital for fall, this was thought to be related to orthostatic hypotension.  Pulmonary was consulted due to loculated pleural effusion.  I reviewed the chest imaging studies, she had a chronic bilateral pleural effusion with loculation in the left, involving the intrafissural space.  She is known to have these chronic bilateral pleural effusion, she had multiple thoracentesis in the past, at the beginning they were transudative and then they turned to exudative, in the past she had Pleurx catheter in  which was removed shortly after due to decreased output.  She does not have infectious symptoms, no fever, no chills, no leukocytosis.  So I think this pleural effusions are unlikely to be related to infectious etiology.  Reviewing the parenchymal window on the chest CT scan, there is no significant airspace disease except for some compression atelectasis in the right.  No evidence for pneumonia.  There is groundglass opacities, interlobular septal thickening and fissural thickening that is more likely related to fluid overload and congestive heart failure.  Most likely these effusions are related to chronic diastolic heart failure and CHF.  On my review actually I think the pleural effusion now it is a little bit better compared to 2021.  I do not believe that these effusions warrant any intervention at this point other than optimizing her medical management for CHF.  No indication for chest tube.  I will hold off thoracentesis " unless the patient develops significant respiratory symptoms that are refractory to diuretics.    2021      Now      Bilateral pleural effusion, loculated in the left (transudate in the past then transition to exudate after multiple thoracentesis).  Most likely secondary to fluid overload and CHF  Diastolic heart failure, heart failure preserved EF     - Recommend to optimize her diuretic management   - No indication for chest tube Pleurx catheter at this point   - I recommend to hold off thoracentesis, consider only if she develops significant respiratory symptoms despite optimizing her diuretic treatment and CHF management   - Pulmonary will sign off, please call if there is any question or concern      I spent 50 minutes reviewing chart, reviewing test results, talking with and examining patient, formulating plan, and documentation on the day of the encounter.    Chart documentation was completed, in part, with [employer] voice-recognition software. Even though reviewed, some grammatical, spelling, and word errors may remain.          Phani Arellano MD  Pulmonary & Critical Care           History of Present Illness:   Ирина Yanez is a 89 year old female who presented to [facility] on 6/4/2025 with complaints of fall.  Patient does not recall the precise circumstances of her fall, but she felt loss of balance while at home and then she fell down on her knees.  When she came to the hospital she was found to have hypotension with concern for dehydration.  The patient denied any respiratory symptoms at home, no significant shortness of breath or dyspnea even with exertion, no cough, no increased sputum production, she did not have any fever or chills.  She is known to have bilateral pleural effusion, she had Pleurx catheter placed in 2021 which was removed shortly after.  Uses furosemide as an outpatient to manage fluid overload and edema.        Patient has never been a smoker, she denied vaping or electronic cigarettes, no  recreational drugs.          Review of Symptoms:   10-point ROS reviewed, & found negative w/ exceptions noted in the HPI.          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    Complication of anesthesia     Depression     Diabetes mellitus (H)     sees Dr. Verde- type II    Falls frequently 7/10/2019    Heart murmur     HTN     Hyperlipidemia LDL goal < 100     Dr. Verde    Lichen sclerosus et atrophicus 2/15/2013    Melanoma (H)     Oxygen dependent     1 liter continuously    Peripheral Neuropathy     hands, feet; used to see Dr. Tl ARRIAZA (postoperative nausea and vomiting)     Skin cancer     face; basal and other. Melanoma. Dolan    Sleep apnea     CPAP    Spinal stenosis        Past Surgical History:   Procedure Laterality Date    AMPUTATE TOE(S)  8/23/2012    left second toe Procedure: AMPUTATE TOE(S);;  Surgeon: Mil Jolly DPM;  Location:  OR    CHOLECYSTECTOMY  Nov. 1975    COLONOSCOPY  early 2009    repeat 4-5 years (Had one prior to this with polyps)    COLONOSCOPY  Sept 2014    incomplete; recommend colography    COLONOSCOPY  02/25/2020    Dr. Pathak [facility]    COLONOSCOPY N/A 2/25/2020    Procedure: COLONOSCOPY, WITH biopsies using forceps;  Surgeon: Adebayo Pathak MD;  Location:  GI    INSERT CHEST TUBE Bilateral 2/8/2021    Procedure: Attempted INSERTION, CATHETER, INTERCOSTAL, FOR DRAINAGE (Pleurx);  Surgeon: Smitha Odonnell MD;  Location:  OR    OPTICAL TRACKING SYSTEM FUSION POSTERIOR SPINE LUMBAR N/A 9/16/2016    Procedure: OPTICAL TRACKING SYSTEM FUSION SPINE POSTERIOR LUMBAR ONE LEVEL;  Surgeon: Lennox Blue MD;  Location:  OR    PET, REC OF MELANOMA/MET CA Left     arm    REPAIR HAMMER TOE BILATERAL  8/23/2012    Procedure: REPAIR HAMMER TOE BILATERAL;  Flexor Tenotomy  2,3,4,5 Toes both feet, Partial 2nd toe amputation left foot;  Surgeon: Mil Jolly DPM;  Location: [facility] OR    REPAIR TENDON QUADRICEPS Right 10/27/2015    Procedure: REPAIR TENDON QUADRICEPS;  Surgeon: Antonio Yi MD;  Location: [facility] OR    SURGICAL HISTORY OF -   1997    bilateral knee replacement    SURGICAL HISTORY OF -   1997    right knee revised; patella tendon ruptured    SURGICAL HISTORY OF -       surgery for spinal stenosis    SURGICAL HISTORY OF -       breast reduction surgery    SURGICAL HISTORY OF -       D and C             Allergies:     Allergies   Allergen Reactions    Amoxicillin      Other reaction(s): cannot remember    Amoxicillin-Pot Clavulanate Diarrhea     Vomiting      Clindamycin Phosphate      Hives    Metformin Diarrhea    Tegretol [Carbamazepine]      Irregular heart beat             Outpatient Medications:     Current Facility-Administered Medications   Medication Dose Route Frequency Provider Last Rate Last Admin    acetaminophen (TYLENOL) tablet 650 mg  650 mg Oral Q6H PRN Shelley Murguia MD   650 mg at 06/09/25 0427    atropine injection 1 mg  1 mg Intravenous Once PRN Stephy Pretty MD        calcium carbonate (TUMS) chewable tablet 1,000 mg  1,000 mg Oral 4x Daily PRN Judi Camacho MD        glucose gel 15-30 g  15-30 g Oral Q15 Min PRN Judi Camacho MD        Or    dextrose 50 % injection 25-50 mL  25-50 mL Intravenous Q15 Min PRN Judi Camacho MD        Or    glucagon injection 1 mg  1 mg Subcutaneous Q15 Min PRN Judi Camacho MD        diltiazem (CARDIZEM) tablet 30 mg  30 mg Oral Q6H EMMANUEL Sathya Hargrove MD   30 mg at 06/09/25 0650    furosemide (LASIX) injection 20 mg  20 mg Intravenous Q8H Shelley Murguia MD   20 mg at 06/09/25 0633    gabapentin (NEURONTIN) capsule 200 mg  200 mg Oral At Bedtime Judi Camacho MD   200 mg at 06/08/25 2225    hydrALAZINE (APRESOLINE) injection 10 mg  10 mg Intravenous Q4H PRN Rory Zarate MD   10 mg at  06/07/25 1634    insulin aspart (NovoLOG) injection (RAPID ACTING)   Subcutaneous TID w/meals Mary Thompson MD   3 Units at 06/08/25 1830    insulin aspart (NovoLOG) injection (RAPID ACTING)   Subcutaneous TID w/meals Mary Thompson MD   9 Units at 06/07/25 2243    lidocaine (LMX4) cream   Topical Q1H PRN Judi Camacho MD        lidocaine 1 % 0.1-1 mL  0.1-1 mL Other Q1H PRN Judi Camacho MD        loperamide (IMODIUM) capsule 2-4 mg  2-4 mg Oral 4x Daily PRN Judi Camacho MD        ondansetron (ZOFRAN ODT) ODT tab 4 mg  4 mg Oral Q6H PRN Judi Camacho MD        Or    ondansetron (ZOFRAN) injection 4 mg  4 mg Intravenous Q6H PRN Judi Camacho MD        Patient is already receiving anticoagulation with heparin, enoxaparin (LOVENOX), warfarin (COUMADIN)  or other anticoagulant medication   Does not apply Continuous PRN Judi Camacho MD        pravastatin (PRAVACHOL) tablet 20 mg  20 mg Oral Daily Judi Camacho MD   20 mg at 06/08/25 0906    senna-docusate (SENOKOT-S/PERICOLACE) 8.6-50 MG per tablet 1 tablet  1 tablet Oral BID PRN Judi Camacho MD        Or    senna-docusate (SENOKOT-S/PERICOLACE) 8.6-50 MG per tablet 2 tablet  2 tablet Oral BID PRN Judi Camacho MD        sertraline (ZOLOFT) tablet 50 mg  50 mg Oral Daily Judi Camacho MD   50 mg at 06/08/25 0906    sodium chloride (PF) 0.9% PF flush 3 mL  3 mL Intracatheter Q8H EMMANUEL Judi Camacho MD   3 mL at 06/09/25 0633    sodium chloride (PF) 0.9% PF flush 3 mL  3 mL Intracatheter q1 min prn Judi Camacho MD        [Held by provider] Warfarin Dose Required Daily - Pharmacist Managed  1 each Oral See Admin Instructions Shelley Murguia MD                 Family History:     Family History   Problem Relation Age of Onset    Cerebrovascular Disease Mother     Heart Disease Father     Neurologic Disorder Sister         ALS    MINAAROZ. Sister     Diabetes Sister     LESLIE.AROZ. Brother     Psychotic Disorder Brother         [place] war: PTSD. suicide 2019     "Gastrointestinal Disease Brother         diverticulitis    Colon Cancer No family hx of                Social History:     Social History     Tobacco Use    Smoking status: Never    Smokeless tobacco: Never   Vaping Use    Vaping status: Never Used   Substance Use Topics    Alcohol use: No    Drug use: Never             Physical Exam:   BP (!) 152/75 (BP Location: Left arm)   Pulse 78   Temp 99  F (37.2  C) (Oral)   Resp 16   Ht 1.626 m (5' 4\")   Wt 76.7 kg (169 lb)   LMP  (LMP Unknown)   SpO2 97%   BMI 29.01 kg/m      General: NAD  CV: Loud systolic murmur best heard at the RUSB and LUSB.  Lungs: Bilateral crackles at the bases.  Abd: Soft, nontender.  Ext: No pitting edema.  Skin: No rashes, cyanosis, or jaundice  Neuro: AAOx3, no focal deficits          Data:   Labs (all laboratory studies reviewed by me):   CMP:   Recent Labs   Lab 06/08/25  2059 06/08/25  1847 06/08/25  1808 06/08/25  1233 06/08/25  1116 06/07/25  2105 06/07/25  0938 06/07/25  0146 06/06/25  1831 06/06/25  0543 06/05/25  1913 06/05/25  1041 06/04/25  1855 06/04/25  1745   NA  --   --   --   --  138  --  142  --  140 139   < > 138   < > 141   POTASSIUM  --  3.5  --   --  2.9*  --  3.3*  --  3.7 5.5*   < > 4.8   < > 6.4*   CHLORIDE  --   --   --   --  105  --  107  --  108* 108*   < > 108*   < > 109*   CO2  --   --   --   --  21*  --  21*  --  20* 20*   < > 16*   < > 22   ANIONGAP  --   --   --   --  12  --  14  --  12 11   < > 14   < > 10   *  --  131* 144* 174*   < > 172*   < > 143* 149*   < > 227*   < > 157*   BUN  --   --   --   --  63.8*  --  65.0*  --  68.3* 72.3*   < > 77.3*   < > 77.9*   CR  --   --   --   --  2.71*  --  2.93*  --  3.00* 3.01*   < > 3.19*   < > 3.28*   GFRESTIMATED  --   --   --   --  16*  --  15*  --  14* 14*   < > 13*   < > 13*   EDNA  --   --   --   --  8.4*  8.4*  --  8.6*  --  8.9 8.7*   < > 8.3*   < > 8.6*   MAG  --   --   --   --   --   --   --   --   --   --   --  2.2  --   --    PHOS  --   --   --  "  --  3.5  --   --   --   --   --   --   --   --   --    PROTTOTAL  --   --   --   --   --   --  6.1*  --   --   --   --  5.5*  --  6.1*   ALBUMIN  --   --   --   --   --   --  3.1*  --   --   --   --  3.0*  --  3.4*   BILITOTAL  --   --   --   --   --   --  0.5  --   --   --   --  <0.2  --  <0.2   ALKPHOS  --   --   --   --   --   --  82  --   --   --   --  81  --  84   AST  --   --   --   --   --   --  20  --   --   --   --  20  --  25   ALT  --   --   --   --   --   --  22  --   --   --   --  23  --  19    < > = values in this interval not displayed.     CBC:   Recent Labs   Lab 06/08/25  1116 06/05/25  1041 06/05/25  0359 06/04/25  1745   WBC 10.7 7.3 6.4 5.8   RBC 2.80* 2.86* 3.01* 3.00*   HGB 8.5* 8.7* 9.0* 9.1*   HCT 26.2* 27.6* 29.7* 28.5*   MCV 94 97 99 95   MCH 30.4 30.4 29.9 30.3   MCHC 32.4 31.5 30.3* 31.9   RDW 13.5 13.2 13.2 13.2    150 149* 152       INR:   Recent Labs   Lab 06/09/25  0555 06/08/25  1116 06/08/25  0645 06/07/25  0624   INR 1.90* 1.94* 1.95* 2.86*       Glucose:   Recent Labs   Lab 06/08/25  2059 06/08/25  1808 06/08/25  1233 06/08/25  1116 06/08/25  0809 06/08/25  0146   * 131* 144* 174* 123* 101*       Urine Studies    Recent Labs   Lab Test 06/05/25  1256 12/29/20  1711   URINEPH 5.0 5.0   NITRITE Negative Negative   LEUKEST Moderate* Negative   WBCU 20* 8*       Most Recent [place] Pulmonary Function Testing (FVC/FEV1 only)  No PFT in the chart.      Imaging (all imaging studies reviewed by me):    CT chest without contrast on 6/7/2025  IMPRESSION:   1.  Partially imaged nodular skin thickening of the right axilla. This should be amenable to direct visual inspection and biopsy as clinically indicated.  2.  Asymmetric skin thickening of the left breast. Recommend correlation with physical exam.  3.  Moderate size right and small left pleural effusions. The left pleural effusion is mildly loculated and there is new thickening of the left basilar pleura. Recommend  correlation with pleural fluid labs and cytology when clinically feasible.  4.  Mild interlobular septal thickening and bilateral perihilar groundglass opacities suggestive of pulmonary edema. Infection not excluded in the correct clinical context.  5.  Mildly enlarged mediastinal lymph nodes are nonspecific but favored reactive.    Procedures:   No procedure from the pulmonary standpoint

## 2025-06-09 NOTE — PROVIDER NOTIFICATION
MD Notification    Notified Person: MD    Notified Person Name:     Notification Date/Time: 6/9/2025 1135    Notification Interaction: [employer]in[employer] messaging    Purpose of Notification: /71    Orders Received: No new orders received.      Comments:

## 2025-06-09 NOTE — PLAN OF CARE
Goal Outcome Evaluation:    Monitor shows Sinus rhythm. Hypertensive. Diltiazem ER started. Pt. Is alert and oriented. Denies pain. Potassium 3.4. Replacement per protocol. O2 weaned off. Continue to monitor. Plan for TCU on discharge.

## 2025-06-09 NOTE — PROGRESS NOTES
Care Management Follow Up    Length of Stay (days): 5    Expected Discharge Date: 06/11/2025     Concerns to be Addressed: all concerns addressed in this encounter     Patient plan of care discussed at interdisciplinary rounds: Yes    Anticipated Discharge Disposition: Transitional Care, Home              Anticipated Discharge Services: None  Anticipated Discharge DME: Oxygen    Patient/family educated on Medicare website which has current facility and service quality ratings: yes  Education Provided on the Discharge Plan: Yes  Patient/Family in Agreement with the Plan: yes    Referrals Placed by CM/SW:    Private pay costs discussed: Not applicable    Discussed  Partnership in Safe Discharge Planning  document with patient/family: No     Handoff Completed: No, handoff not indicated or clinically appropriate    Additional Information:  Writer called and left a message for [facility] to see if they would still have an opening tomorrow for the patient.     ADD 1050:  Received a call back from [facility], they have a bed open for patient tomorrow. Patient needs to be there before 1pm. Writer stated [employer] auth via online portal. Writer spoke with patient about tentative plan pending medical clearance. Patient reports that her son will transport her at time of discharge. Writer let patient know she needs to be at [place] before 1pm. Writer offered to give patients son a call, she declined and said she would call him.     Auth request ID is [id number]    Next Steps: Follow up on auth, confirm medical clearance.     CLAIRE MEHTA [facility]  INPATIENT CARE COORDINATION

## 2025-06-09 NOTE — PROGRESS NOTES
Assessment and Plan:     FABY: 89 year old F adm on 6/4. FABY due to hypotension with ischemic ATN. Baseline CKD-4 due to DM and HT. Last creatinine prior to admission at baseline on 5/23/2025, EGFR 29 creatinine of 1.67.     Continues on IV lasix. She got 40 meq of KCL yest po.     UO yest 500 ml. Wt adm 77.1, wt today 76.7.     Labs yest show K 3.5, Na 138, HCO3 21, Cr: 3.00 > 2.93 > 2.71.    Stop lasix. No evidence of fluid overload on exam.   Labs in am.     She is ready for discharge tomorrow with follow up in our clinic in 1-2 weeks (Hosp F/U, [facility], [phone])                Interval History:   Anemia:      Hypertension: She is on diltiazem ER.                 Review of Systems:   No complaint. Not SOB. Taking po well.           Medications:     Current Facility-Administered Medications   Medication Dose Route Frequency Provider Last Rate Last Admin    [START ON 6/10/2025] diltiazem ER COATED BEADS (CARDIZEM CD/CARTIA XT) 24 hr capsule 240 mg  240 mg Oral Daily Monster, RAMIREZ Elmore        furosemide (LASIX) injection 20 mg  20 mg Intravenous Q8H Shelley Murguia MD   20 mg at 06/09/25 0633    gabapentin (NEURONTIN) capsule 200 mg  200 mg Oral At Bedtime Judi Camacho MD   200 mg at 06/08/25 2225    insulin aspart (NovoLOG) injection (RAPID ACTING)   Subcutaneous TID w/meals Mary Thompson MD   7 Units at 06/09/25 0828    insulin aspart (NovoLOG) injection (RAPID ACTING)   Subcutaneous TID w/meals Mary Thompson MD   9 Units at 06/07/25 2243    pravastatin (PRAVACHOL) tablet 20 mg  20 mg Oral Daily Judi Camacho MD   20 mg at 06/09/25 0828    sertraline (ZOLOFT) tablet 50 mg  50 mg Oral Daily Judi Camacho MD   50 mg at 06/09/25 0828    sodium chloride (PF) 0.9% PF flush 3 mL  3 mL Intracatheter Q8H [facility] Judi Camacho MD   3 mL at 06/09/25 0633    [Held by provider] Warfarin Dose Required Daily - Pharmacist Managed  1 each Oral See Admin Instructions Shelley Murguia MD          Current Facility-Administered Medications   Medication Dose Route Frequency Provider Last Rate Last Admin    Patient is already receiving anticoagulation with heparin, enoxaparin (LOVENOX), warfarin (COUMADIN)  or other anticoagulant medication   Does not apply Continuous JACEYN Judi Camacho MD         Current active medications and PTA medications reviewed, see medication list for details.            Physical Exam:   Vitals were reviewed  Patient Vitals for the past 24 hrs:   BP Temp Temp src Pulse Resp SpO2 Weight   25 0758 -- 97.9  F (36.6  C) Oral 78 18 93 % --   25 0646 -- -- -- -- -- -- 76.7 kg (169 lb)   25 0631 (!) 152/75 -- -- 78 16 -- --   25 0417 (!) 144/74 99  F (37.2  C) Oral 88 16 97 % --   25 0032 125/82 98.5  F (36.9  C) Oral 94 16 96 % --   25 2308 -- -- -- -- 16 -- --   25 2224 (!) 167/90 -- -- (!) 141 -- 95 % --   25 2041 (!) 173/80 -- -- 91 -- 96 % --   25 1940 -- 98.1  F (36.7  C) Oral -- -- -- --   25 1827 (!) 151/74 -- -- 84 -- -- --   25 1615 (!) 158/63 98.1  F (36.7  C) Oral 77 16 94 % --   25 1230 (!) 147/64 98.3  F (36.8  C) Oral 78 16 92 % --       Temp:  [97.9  F (36.6  C)-99  F (37.2  C)] 97.9  F (36.6  C)  Pulse:  [] 78  Resp:  [16-18] 18  BP: (125-173)/(63-90) 152/75  SpO2:  [92 %-97 %] 93 %    Temperatures:  Current - Temp: 97.9  F (36.6  C); Max - Temp  Av.3  F (36.8  C)  Min: 97.9  F (36.6  C)  Max: 99  F (37.2  C)  Respiration range: Resp  Av.3  Min: 16  Max: 18  Pulse range: Pulse  Av.9  Min: 77  Max: 141  Blood pressure range: Systolic (24hrs), Av , Min:125 , Max:173   ; Diastolic (24hrs), Av, Min:63, Max:90    Pulse oximetry range: SpO2  Av.7 %  Min: 92 %  Max: 97 %    I/O last 3 completed shifts:  In: 960 [P.O.:960]  Out: 400 [Urine:400]      Intake/Output Summary (Last 24 hours) at 2025 0959  Last data filed at 2025 0832  Gross per 24 hour   Intake 960 ml    Output 400 ml   Net 560 ml     Alert and responsive  Lungs with clear BS  Cor irreg, nl S1 S2 2/6 sys M  No JVD  LE no edema         Wt Readings from Last 4 Encounters:   06/09/25 76.7 kg (169 lb)   05/23/25 69 kg (152 lb 3.2 oz)   04/28/25 69 kg (152 lb 3.2 oz)   03/28/25 68.8 kg (151 lb 11.2 oz)          Data:          Lab Results   Component Value Date     06/08/2025     06/07/2025     06/06/2025     06/02/2021     04/21/2021     03/05/2021    Lab Results   Component Value Date    CHLORIDE 105 06/08/2025    CHLORIDE 107 06/07/2025    CHLORIDE 108 06/06/2025    CHLORIDE 109 09/13/2022    CHLORIDE  08/11/2022      Comment:      Disregard result  This is a corrected result. Previous result was 108 mmol/L on 8/11/2022 at  8:27 PM CDT    CHLORIDE 104 05/10/2022    CHLORIDE 103 06/02/2021    CHLORIDE 101 04/21/2021    CHLORIDE 97 03/05/2021    Lab Results   Component Value Date    BUN 63.8 06/08/2025    BUN 65.0 06/07/2025    BUN 68.3 06/06/2025    BUN 67 09/13/2022    BUN  08/11/2022      Comment:      Disregard result  This is a corrected result. Previous result was 61 mg/dL on 8/11/2022 at  8:27 PM CDT    BUN 61 05/10/2022    BUN 66 06/02/2021    BUN 84 04/21/2021    BUN 47 03/05/2021      Lab Results   Component Value Date    POTASSIUM 3.5 06/08/2025    POTASSIUM 2.9 06/08/2025    POTASSIUM 3.3 06/07/2025    POTASSIUM 4.8 09/13/2022    POTASSIUM  08/11/2022      Comment:      Disregard result  This is a corrected result. Previous result was 4.1 mmol/L on 8/11/2022 at  8:27 PM CDT    POTASSIUM 4.3 05/10/2022    POTASSIUM 4.2 06/02/2021    POTASSIUM 3.8 04/21/2021    POTASSIUM 4.0 03/05/2021    Lab Results   Component Value Date    CO2 21 06/08/2025    CO2 21 06/07/2025    CO2 20 06/06/2025    CO2 24 09/13/2022    CO2  08/11/2022      Comment:      Disregard result  This is a corrected result. Previous result was 28 mmol/L on 8/11/2022 at  8:27 PM CDT    CO2 31 05/10/2022    CO2  32 06/02/2021    CO2 30 04/21/2021    CO2 34 03/05/2021    Lab Results   Component Value Date    CR 2.71 06/08/2025    CR 2.93 06/07/2025    CR 3.00 06/06/2025    CR 2.43 06/02/2021    CR 2.38 04/21/2021    CR 2.23 03/05/2021        Recent Labs   Lab Test 06/08/25  1116 06/05/25  1041 06/05/25  0359   WBC 10.7 7.3 6.4   HGB 8.5* 8.7* 9.0*   HCT 26.2* 27.6* 29.7*   MCV 94 97 99    150 149*     Recent Labs   Lab Test 06/07/25  0938 06/05/25  1041 06/04/25  1745   AST 20 20 25   ALT 22 23 19   ALKPHOS 82 81 84   BILITOTAL 0.5 <0.2 <0.2       Recent Labs   Lab Test 06/05/25  1041 01/30/21  0636 01/28/21  0714   MAG 2.2 2.0 2.2     Recent Labs   Lab Test 06/08/25  1116 05/08/24  0934 04/18/24  0955   PHOS 3.5 4.3 4.0     Recent Labs   Lab Test 06/08/25  1116 06/07/25  0938 06/06/25  1831   EDNA 8.4*  8.4* 8.6* 8.9       Lab Results   Component Value Date    EDNA 8.4 (L) 06/08/2025    EDNA 8.4 (L) 06/08/2025     Lab Results   Component Value Date    WBC 10.7 06/08/2025    HGB 8.5 (L) 06/08/2025    HCT 26.2 (L) 06/08/2025    MCV 94 06/08/2025     06/08/2025     Lab Results   Component Value Date     06/08/2025    POTASSIUM 3.5 06/08/2025    CHLORIDE 105 06/08/2025    CO2 21 (L) 06/08/2025     (H) 06/09/2025     Lab Results   Component Value Date    BUN 63.8 (H) 06/08/2025    CR 2.71 (H) 06/08/2025     Lab Results   Component Value Date    MAG 2.2 06/05/2025     Lab Results   Component Value Date    PHOS 3.5 06/08/2025       Creatinine   Date Value Ref Range Status   06/08/2025 2.71 (H) 0.51 - 0.95 mg/dL Final   06/07/2025 2.93 (H) 0.51 - 0.95 mg/dL Final   06/06/2025 3.00 (H) 0.51 - 0.95 mg/dL Final   06/06/2025 3.01 (H) 0.51 - 0.95 mg/dL Final   06/05/2025 3.08 (H) 0.51 - 0.95 mg/dL Final   06/05/2025 3.19 (H) 0.51 - 0.95 mg/dL Final   06/02/2021 2.43 (H) 0.52 - 1.04 mg/dL Final   04/21/2021 2.38 (H) 0.52 - 1.04 mg/dL Final   03/05/2021 2.23 (H) 0.52 - 1.04 mg/dL Final   02/09/2021 2.97 (H) 0.52 -  1.04 mg/dL Final   02/02/2021 2.65 (H) 0.52 - 1.04 mg/dL Final   02/01/2021 2.39 (H) 0.52 - 1.04 mg/dL Final       Attestation:  I have reviewed today's vital signs, notes, medications, labs and imaging.     Julián Perdomo MD

## 2025-06-09 NOTE — PLAN OF CARE
Goal Outcome Evaluation: Pt is A&Ox4, Tylenol 650 mg given for generalized pain, VSS and on tele mostly SR but can flip to A-Fib, PO Diltiazem 30 mg given as scheduled, on 2L NC, up with 1 assist GB and RW. Plan for Cardiology, Pulmonology and Gen surgery consult.      Plan of Care Reviewed With: patient    Overall Patient Progress: no changeOverall Patient Progress: no change

## 2025-06-09 NOTE — CONSULTS
[facility] Surgery Consultation    Ирина Yanez MRN# [id number]     Age: 89 year old   YOB: 1935       Date of Admission: 6/4/2025  5:42 PM      Reason for consult: Right axillary tissue, abnormal       Requesting physician: Judi Camacho       Level of consult: Consult, follow and place orders      Assessment/Plan/Recommendations:    This 89 year old year old female has a contracted mass of right axillary tissue.  Right axillary biopsy should be considered, along with mammography with possible right axillary ultrasound.  Referral to breast surgeons - placed.  Imaging ordered for outpatient.             Chief Complaint:     89 year old female was admitted for presyncopal episode, with severe bradycardia, hypotension, FABY with hyperkalemia.  She was found to have nodular thickening at the right axilla.  She reports that this has been long-standing.  She has a history of bilateral breast reduction.  There is no documentation of axillary surgery.  Last mammography some time between 2008 and 2014. There is no family history of breast cancer.  She has not noticed any breast skin changes or nipple discharge.  She has a history of left upper extremity melanoma, but there is no documentation of any SLNB or dissection in the right axilla.          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    Complication of anesthesia     Depression     Diabetes mellitus (H)     sees Dr. Verde- type II    Falls frequently 7/10/2019    Heart murmur     HTN     Hyperlipidemia LDL goal < 100     Dr. Verde    Lichen sclerosus et atrophicus 2/15/2013    Melanoma (H)     Oxygen dependent     1 liter continuously    Peripheral Neuropathy     hands, feet; used to see Dr. Tl ARRIAZA (postoperative nausea and vomiting)     Skin cancer      face; basal and other. Melanoma. Dolan    Sleep apnea     CPAP    Spinal stenosis              Past Surgical History:     Past Surgical History:   Procedure Laterality Date    AMPUTATE TOE(S)  8/23/2012    left second toe Procedure: AMPUTATE TOE(S);;  Surgeon: Mil Jolly DPM;  Location: [facility] OR    CHOLECYSTECTOMY  Nov. 1975    COLONOSCOPY  early 2009    repeat 4-5 years (Had one prior to this with polyps)    COLONOSCOPY  Sept 2014    incomplete; recommend colography    COLONOSCOPY  02/25/2020    Dr. Pathak [facility]    COLONOSCOPY N/A 2/25/2020    Procedure: COLONOSCOPY, WITH biopsies using forceps;  Surgeon: Adebayo Pathak MD;  Location:  GI    INSERT CHEST TUBE Bilateral 2/8/2021    Procedure: Attempted INSERTION, CATHETER, INTERCOSTAL, FOR DRAINAGE (Pleurx);  Surgeon: Smitha Odonnell MD;  Location:  OR    OPTICAL TRACKING SYSTEM FUSION POSTERIOR SPINE LUMBAR N/A 9/16/2016    Procedure: OPTICAL TRACKING SYSTEM FUSION SPINE POSTERIOR LUMBAR ONE LEVEL;  Surgeon: Lennox Blue MD;  Location: [facility] OR    PET, REC OF MELANOMA/MET CA Left     arm    REPAIR HAMMER TOE BILATERAL  8/23/2012    Procedure: REPAIR HAMMER TOE BILATERAL;  Flexor Tenotomy 2,3,4,5 Toes both feet, Partial 2nd toe amputation left foot;  Surgeon: Mil Jolly DPM;  Location: [facility] OR    REPAIR TENDON QUADRICEPS Right 10/27/2015    Procedure: REPAIR TENDON QUADRICEPS;  Surgeon: Antonio Yi MD;  Location:  OR    SURGICAL HISTORY OF -   1997    bilateral knee replacement    SURGICAL HISTORY OF -   1997    right knee revised; patella tendon ruptured    SURGICAL HISTORY OF -       surgery for spinal stenosis    SURGICAL HISTORY OF -       breast reduction surgery    SURGICAL HISTORY OF -       D and C                 Family History:     Family History   Problem Relation Age of Onset    Cerebrovascular Disease Mother     Heart Disease Father     Neurologic Disorder Sister         ALS    C.A.D. Sister     Diabetes Sister      MICHELLE Brother     Psychotic Disorder Brother         [place] war: PTSD. suicide 2019    Gastrointestinal Disease Brother         diverticulitis    Colon Cancer No family hx of                 Medications:     Current Facility-Administered Medications   Medication Dose Route Frequency Provider Last Rate Last Admin    acetaminophen (TYLENOL) tablet 650 mg  650 mg Oral Q6H PRN Shelley Murguia MD   650 mg at 06/09/25 0427    atropine injection 1 mg  1 mg Intravenous Once PRN Stephy Pretty MD        calcium carbonate (TUMS) chewable tablet 1,000 mg  1,000 mg Oral 4x Daily PRN Judi Camacho MD        glucose gel 15-30 g  15-30 g Oral Q15 Min PRN Judi Camacho MD        Or    dextrose 50 % injection 25-50 mL  25-50 mL Intravenous Q15 Min PRN Judi Camacho MD        Or    glucagon injection 1 mg  1 mg Subcutaneous Q15 Min PRN Judi Camacho MD        diltiazem ER COATED BEADS (CARDIZEM CD/CARTIA XT) 24 hr capsule 240 mg  240 mg Oral Daily Shelley Murguia MD   240 mg at 06/09/25 1253    furosemide (LASIX) tablet 40 mg  40 mg Oral Daily Shelley Murguia MD   40 mg at 06/09/25 1300    gabapentin (NEURONTIN) capsule 200 mg  200 mg Oral At Bedtime Judi Camacho MD   200 mg at 06/08/25 2225    hydrALAZINE (APRESOLINE) injection 10 mg  10 mg Intravenous Q4H PRN Rory Zarate MD   10 mg at 06/07/25 1634    insulin aspart (NovoLOG) injection (RAPID ACTING)   Subcutaneous TID w/meals Mary Thompson MD   4 Units at 06/09/25 1253    insulin aspart (NovoLOG) injection (RAPID ACTING)   Subcutaneous TID w/meals Mary Thompson MD   9 Units at 06/07/25 2243    lidocaine (LMX4) cream   Topical Q1H PRN Judi Camacho MD        lidocaine 1 % 0.1-1 mL  0.1-1 mL Other Q1H PRN Judi Camacho MD        loperamide (IMODIUM) capsule 2-4 mg  2-4 mg Oral 4x Daily PRN Judi Camacho MD        ondansetron (ZOFRAN ODT) ODT tab 4 mg  4 mg Oral Q6H PRN Judi Camacho MD        Or    ondansetron (ZOFRAN) injection 4 mg  4 mg  Intravenous Q6H PRN Judi Camacho MD        Patient is already receiving anticoagulation with heparin, enoxaparin (LOVENOX), warfarin (COUMADIN)  or other anticoagulant medication   Does not apply Continuous PRN Judi Camacho MD        pravastatin (PRAVACHOL) tablet 20 mg  20 mg Oral Daily Judi Camacho MD   20 mg at 06/09/25 0828    senna-docusate (SENOKOT-S/PERICOLACE) 8.6-50 MG per tablet 1 tablet  1 tablet Oral BID PRN Judi Camacho MD        Or    senna-docusate (SENOKOT-S/PERICOLACE) 8.6-50 MG per tablet 2 tablet  2 tablet Oral BID PRN Judi Camacho MD   2 tablet at 06/09/25 0832    sertraline (ZOLOFT) tablet 50 mg  50 mg Oral Daily Judi Camacho MD   50 mg at 06/09/25 0828    sodium chloride (PF) 0.9% PF flush 3 mL  3 mL Intracatheter Q8H [facility] Judi Camacho MD   3 mL at 06/09/25 1300    sodium chloride (PF) 0.9% PF flush 3 mL  3 mL Intracatheter q1 min prn Judi Camacho MD        warfarin ANTICOAGULANT (COUMADIN/JANTOVEN) tablet 5 mg  5 mg Oral ONCE at 18:00 Judi Camacho MD        Warfarin Dose Required Daily - Pharmacist Managed  1 each Oral See Admin Instructions Shelley Murguia MD                 Review of Systems:     Review of system is negative but for HPI.    Right axilla - a 4-5 cm hypertrophied and contracted mass of non-tender tissue.  No palpable nodes. Right breast:  no skin changes, nipple discharge, scaling, no masses.  Left axilla: no palpable masses.  Left breast:  no skin changes, nipple discharge, scaling or masses.            Data:     Results for orders placed or performed during the hospital encounter of 06/04/25 (from the past 24 hours)   Glucose by meter   Result Value Ref Range    GLUCOSE BY METER POCT 131 (H) 70 - 99 mg/dL   Potassium   Result Value Ref Range    Potassium 3.5 3.4 - 5.3 mmol/L   Glucose by meter   Result Value Ref Range    GLUCOSE BY METER POCT 185 (H) 70 - 99 mg/dL   INR   Result Value Ref Range    INR 1.90 (H) 0.85 - 1.15    PT 21.1 (H) 11.8 - 14.8 Seconds    Glucose by meter   Result Value Ref Range    GLUCOSE BY METER POCT 125 (H) 70 - 99 mg/dL   Potassium   Result Value Ref Range    Potassium 3.4 3.4 - 5.3 mmol/L   Basic metabolic panel   Result Value Ref Range    Sodium 141 135 - 145 mmol/L    Potassium 3.4 3.4 - 5.3 mmol/L    Chloride 105 98 - 107 mmol/L    Carbon Dioxide (CO2) 20 (L) 22 - 29 mmol/L    Anion Gap 16 (H) 7 - 15 mmol/L    Urea Nitrogen 60.8 (H) 8.0 - 23.0 mg/dL    Creatinine 2.62 (H) 0.51 - 0.95 mg/dL    GFR Estimate 17 (L) >60 mL/min/1.73m2    Calcium 8.8 8.8 - 10.4 mg/dL    Glucose 233 (H) 70 - 99 mg/dL   Magnesium   Result Value Ref Range    Magnesium 1.9 1.7 - 2.3 mg/dL   Glucose by meter   Result Value Ref Range    GLUCOSE BY METER POCT 100 (H) 70 - 99 mg/dL   INR   Result Value Ref Range    INR 1.68 (H) 0.85 - 1.15    PT 19.2 (H) 11.8 - 14.8 Seconds     *Note: Due to a large number of results and/or encounters for the requested time period, some results have not been displayed. A complete set of results can be found in Results Review.          Anna Lucas MD

## 2025-06-10 ENCOUNTER — PATIENT OUTREACH (OUTPATIENT)
Dept: CARE COORDINATION | Facility: CLINIC | Age: 89
End: 2025-06-10
Payer: COMMERCIAL

## 2025-06-10 LAB
ANION GAP SERPL CALCULATED.3IONS-SCNC: 11 MMOL/L (ref 7–15)
BUN SERPL-MCNC: 59.7 MG/DL (ref 8–23)
CALCIUM SERPL-MCNC: 8.7 MG/DL (ref 8.8–10.4)
CHLORIDE SERPL-SCNC: 109 MMOL/L (ref 98–107)
CREAT SERPL-MCNC: 2.59 MG/DL (ref 0.51–0.95)
EGFRCR SERPLBLD CKD-EPI 2021: 17 ML/MIN/1.73M2
GLUCOSE BLDC GLUCOMTR-MCNC: 109 MG/DL (ref 70–99)
GLUCOSE BLDC GLUCOMTR-MCNC: 131 MG/DL (ref 70–99)
GLUCOSE BLDC GLUCOMTR-MCNC: 143 MG/DL (ref 70–99)
GLUCOSE BLDC GLUCOMTR-MCNC: 149 MG/DL (ref 70–99)
GLUCOSE BLDC GLUCOMTR-MCNC: 157 MG/DL (ref 70–99)
GLUCOSE SERPL-MCNC: 125 MG/DL (ref 70–99)
HCO3 SERPL-SCNC: 23 MMOL/L (ref 22–29)
HOLD SPECIMEN: NORMAL
INR PPP: 1.65 (ref 0.85–1.15)
MAGNESIUM SERPL-MCNC: 1.9 MG/DL (ref 1.7–2.3)
PHOSPHATE SERPL-MCNC: 3.9 MG/DL (ref 2.5–4.5)
POTASSIUM SERPL-SCNC: 3.6 MMOL/L (ref 3.4–5.3)
POTASSIUM SERPL-SCNC: 3.8 MMOL/L (ref 3.4–5.3)
PROTHROMBIN TIME: 19 SECONDS (ref 11.8–14.8)
SODIUM SERPL-SCNC: 143 MMOL/L (ref 135–145)

## 2025-06-10 PROCEDURE — 85610 PROTHROMBIN TIME: CPT | Performed by: INTERNAL MEDICINE

## 2025-06-10 PROCEDURE — 97116 GAIT TRAINING THERAPY: CPT | Mod: GP

## 2025-06-10 PROCEDURE — 250N000013 HC RX MED GY IP 250 OP 250 PS 637: Performed by: INTERNAL MEDICINE

## 2025-06-10 PROCEDURE — 210N000002 HC R&B HEART CARE

## 2025-06-10 PROCEDURE — 99232 SBSQ HOSP IP/OBS MODERATE 35: CPT | Performed by: INTERNAL MEDICINE

## 2025-06-10 PROCEDURE — 84100 ASSAY OF PHOSPHORUS: CPT | Performed by: INTERNAL MEDICINE

## 2025-06-10 PROCEDURE — 80048 BASIC METABOLIC PNL TOTAL CA: CPT | Performed by: INTERNAL MEDICINE

## 2025-06-10 PROCEDURE — 97530 THERAPEUTIC ACTIVITIES: CPT | Mod: GP

## 2025-06-10 PROCEDURE — 36415 COLL VENOUS BLD VENIPUNCTURE: CPT | Performed by: INTERNAL MEDICINE

## 2025-06-10 PROCEDURE — 83735 ASSAY OF MAGNESIUM: CPT | Performed by: INTERNAL MEDICINE

## 2025-06-10 PROCEDURE — 99233 SBSQ HOSP IP/OBS HIGH 50: CPT | Performed by: INTERNAL MEDICINE

## 2025-06-10 RX ORDER — HYDRALAZINE HYDROCHLORIDE 10 MG/1
20 TABLET, FILM COATED ORAL EVERY 8 HOURS SCHEDULED
Status: DISCONTINUED | OUTPATIENT
Start: 2025-06-11 | End: 2025-06-10

## 2025-06-10 RX ORDER — DOXAZOSIN 1 MG/1
1 TABLET ORAL AT BEDTIME
Status: DISCONTINUED | OUTPATIENT
Start: 2025-06-10 | End: 2025-06-11 | Stop reason: HOSPADM

## 2025-06-10 RX ORDER — HYDRALAZINE HYDROCHLORIDE 10 MG/1
40 TABLET, FILM COATED ORAL EVERY 8 HOURS SCHEDULED
Status: DISCONTINUED | OUTPATIENT
Start: 2025-06-11 | End: 2025-06-10

## 2025-06-10 RX ORDER — HYDRALAZINE HYDROCHLORIDE 10 MG/1
20 TABLET, FILM COATED ORAL ONCE
Status: DISCONTINUED | OUTPATIENT
Start: 2025-06-10 | End: 2025-06-10

## 2025-06-10 RX ORDER — HYDRALAZINE HYDROCHLORIDE 10 MG/1
20 TABLET, FILM COATED ORAL 4 TIMES DAILY
Status: DISCONTINUED | OUTPATIENT
Start: 2025-06-10 | End: 2025-06-10

## 2025-06-10 RX ORDER — DOXAZOSIN 1 MG/1
2 TABLET ORAL AT BEDTIME
Status: DISCONTINUED | OUTPATIENT
Start: 2025-06-10 | End: 2025-06-10

## 2025-06-10 RX ORDER — DEXTROSE MONOHYDRATE 25 G/50ML
25-50 INJECTION, SOLUTION INTRAVENOUS
Status: DISCONTINUED | OUTPATIENT
Start: 2025-06-10 | End: 2025-06-11 | Stop reason: HOSPADM

## 2025-06-10 RX ORDER — NYSTATIN 100000 [USP'U]/G
POWDER TOPICAL 3 TIMES DAILY PRN
Status: SHIPPED
Start: 2025-06-10

## 2025-06-10 RX ORDER — NICOTINE POLACRILEX 4 MG
15-30 LOZENGE BUCCAL
Status: DISCONTINUED | OUTPATIENT
Start: 2025-06-10 | End: 2025-06-11 | Stop reason: HOSPADM

## 2025-06-10 RX ORDER — WARFARIN SODIUM 3 MG/1
6 TABLET ORAL
Status: COMPLETED | OUTPATIENT
Start: 2025-06-10 | End: 2025-06-10

## 2025-06-10 RX ORDER — HYDRALAZINE HYDROCHLORIDE 10 MG/1
20 TABLET, FILM COATED ORAL EVERY 8 HOURS SCHEDULED
Status: DISCONTINUED | OUTPATIENT
Start: 2025-06-10 | End: 2025-06-10

## 2025-06-10 RX ORDER — ACETAMINOPHEN 325 MG/1
650 TABLET ORAL EVERY 6 HOURS PRN
Status: SHIPPED
Start: 2025-06-10

## 2025-06-10 RX ORDER — FUROSEMIDE 40 MG/1
40 TABLET ORAL DAILY
Status: SHIPPED
Start: 2025-06-11

## 2025-06-10 RX ORDER — HYDRALAZINE HYDROCHLORIDE 10 MG/1
10 TABLET, FILM COATED ORAL EVERY 8 HOURS SCHEDULED
Status: DISCONTINUED | OUTPATIENT
Start: 2025-06-10 | End: 2025-06-10

## 2025-06-10 RX ORDER — HYDRALAZINE HYDROCHLORIDE 10 MG/1
20 TABLET, FILM COATED ORAL EVERY 8 HOURS SCHEDULED
Status: DISCONTINUED | OUTPATIENT
Start: 2025-06-11 | End: 2025-06-11 | Stop reason: HOSPADM

## 2025-06-10 RX ORDER — GABAPENTIN 100 MG/1
200 CAPSULE ORAL AT BEDTIME
Status: SHIPPED
Start: 2025-06-11

## 2025-06-10 RX ADMIN — SERTRALINE HYDROCHLORIDE 50 MG: 50 TABLET ORAL at 08:20

## 2025-06-10 RX ADMIN — ACETAMINOPHEN 650 MG: 325 TABLET, FILM COATED ORAL at 21:45

## 2025-06-10 RX ADMIN — WARFARIN SODIUM 6 MG: 3 TABLET ORAL at 18:05

## 2025-06-10 RX ADMIN — INSULIN ASPART 4 UNITS: 100 INJECTION, SOLUTION INTRAVENOUS; SUBCUTANEOUS at 18:07

## 2025-06-10 RX ADMIN — GABAPENTIN 200 MG: 100 CAPSULE ORAL at 21:42

## 2025-06-10 RX ADMIN — INSULIN ASPART 2 UNITS: 100 INJECTION, SOLUTION INTRAVENOUS; SUBCUTANEOUS at 09:30

## 2025-06-10 RX ADMIN — ACETAMINOPHEN 650 MG: 325 TABLET, FILM COATED ORAL at 08:20

## 2025-06-10 RX ADMIN — HYDRALAZINE HYDROCHLORIDE 20 MG: 10 TABLET ORAL at 18:05

## 2025-06-10 RX ADMIN — INSULIN ASPART 4 UNITS: 100 INJECTION, SOLUTION INTRAVENOUS; SUBCUTANEOUS at 12:57

## 2025-06-10 RX ADMIN — PRAVASTATIN SODIUM 20 MG: 20 TABLET ORAL at 08:20

## 2025-06-10 RX ADMIN — FUROSEMIDE 40 MG: 40 TABLET ORAL at 08:20

## 2025-06-10 RX ADMIN — DOXAZOSIN 1 MG: 1 TABLET ORAL at 22:56

## 2025-06-10 RX ADMIN — DILTIAZEM HYDROCHLORIDE 240 MG: 120 CAPSULE, COATED, EXTENDED RELEASE ORAL at 08:20

## 2025-06-10 RX ADMIN — HYDRALAZINE HYDROCHLORIDE 20 MG: 10 TABLET ORAL at 10:31

## 2025-06-10 ASSESSMENT — ACTIVITIES OF DAILY LIVING (ADL)
ADLS_ACUITY_SCORE: 42
ADLS_ACUITY_SCORE: 47
ADLS_ACUITY_SCORE: 47
ADLS_ACUITY_SCORE: 41
ADLS_ACUITY_SCORE: 45
ADLS_ACUITY_SCORE: 41
ADLS_ACUITY_SCORE: 50
ADLS_ACUITY_SCORE: 41
ADLS_ACUITY_SCORE: 42
ADLS_ACUITY_SCORE: 45
ADLS_ACUITY_SCORE: 45
ADLS_ACUITY_SCORE: 42
ADLS_ACUITY_SCORE: 41
ADLS_ACUITY_SCORE: 42
ADLS_ACUITY_SCORE: 42
ADLS_ACUITY_SCORE: 47
ADLS_ACUITY_SCORE: 47
ADLS_ACUITY_SCORE: 45
ADLS_ACUITY_SCORE: 42
ADLS_ACUITY_SCORE: 47
ADLS_ACUITY_SCORE: 41
ADLS_ACUITY_SCORE: 41
ADLS_ACUITY_SCORE: 42

## 2025-06-10 NOTE — PROVIDER NOTIFICATION
BP is not controlled.   Will discuss with cardiology.   Cannot take ACE with kidney function. Already on calcium channel blocker so will not add norvasc.   Start on hydralazine scheduled 20 mg po q 8 hours   Discuss with EP if any additional thoughts.   Shantal

## 2025-06-10 NOTE — PROGRESS NOTES
[facility]    Medicine Progress Note - Hospitalist Service    Date of Admission:  6/4/2025  Interval History:   Patient was not sure if she felt like she was ready to go today. Going to a TCU with weakness.   Slightly short of breath.  Her blood pressure has been elevated.  Discussion with cardiology as well given the fact she has bradycardia this limits the diltiazem.  With her renal failure she cannot be on an ACE or ARB.  Beta-blocker also leading to bradycardia  Started on a diuretic which she has been continued.  Reportedly up walking around the floor  Seen by nephrology today and EP yesterday >> okay to discharge.  Surgery will see her in follow-up for her right axillary area  Patient ready to go to TCU tomorrow on Wednesday   She did not want me to contact her family     Assessment & Plan   Ирина Yanez is a 89 year old female with history of type 2 diabetes mellitus, CKD stage IV (followed by nephrology), hypertension, aortic stenosis, paroxysmal atrial fibrillation on chronic anticoagulation with Coumadin, hyperlipidemia, anxiety, chronic heart failure with preserved EF who presented to the hospital with a presyncopal episode on 6/4.      She was outside working in the sun for couple hours pulling weeds.  Reportedly got too weak to get up from the floor. Called paramedics.  Blood pressure 88/36 with a pulse of 32. Enroute SBP 66/40 for total of 1.5 mg of atropine. . Given 600 ml of fluids.   Admitted with severe bradycardia, hypotension, FABY with hyperkalemia.      Symptomatic bradycardia (on admit)   Severe bradycardia with junctional escape beats (on admit)   Hypotension  Presyncope secondary to above  At presentation heart rate in the high 30s to 40s and blood pressure 90s.  At some point had dipped down to 70s but improved with IV fluid hydration and currently in the 120s and patient currently asymptomatic.    Chest x-ray with diffuse interstitial prominence either  pulmonary vascular congestion or mild interstitial pulmonary edema.  Patient however denies any cough, chest pain, shortness of breath  Initial heart rate very bradycardic with bifascicular block on EKG.  Admit ECG showed severe sinus bradycardia with junctional escape of 40 bpm.>>  After potassium improved did develop sinus bradycardia but also holding jacque agents.  On diltiazem and toprol XL with renal insufficiency/hyperkalemia>> Multi potential components to her bradycardia and with renal insufficiency extended half-life  6/5 general cardiology consult with EP requested  6/6 EP consult>>   EKG with severe sinus bradycardia with junctional escape rhythm of 40 bpm. >>  After potassium correction showing sinus rhythm with first-degree AV block and right bundle branch block.  6/6 on rounding noted that the patient appeared to be in A-fib with RVR. (History of PAF)   Discussion with EP.  Started on diltiazem 30 mg 4 times daily.  6/8 BP remains slightly elevated but HR controlled  Still with intermittent afib on monitor although rate does not go faster   6/9 follow-up by EP >> changed to diltiazem 240 XR daily stopping short acting.  She may still go in and out of afib but if not sustained tachycardia or symptomatic EP rec no intervention.     Elevated troponin   Suspected supply/demand  Moderate aortic stenosis/moderate TR  Troponin 41>> 40  6/5 echo showing normal EF of 60 to 65%.  RV normal structure, function and size.  Moderate TR.  No effusion. Moderate valvular aortic stenosis       Acute hypoxic respiratory failure (Resolving)   On 2 liters oxygen   Bilateral pleural effusions (left is loculated and right moderate)   CT chest with moderate size right effusion and small left effusions. The left pleural effusion is mildly loculated and there is new thickening of the left basilar pleura. Recommend correlation with pleural fluid labs and cytology when clinically feasible.  Mild interlobular septal thickening and  bilateral perihilar groundglass opacities suggestive of pulmonary edema. Infection not excluded in the correct clinical context.  6/9 pulmonary consult: optimize diuretics. No need to tap. Noted in past and transudative to exudative after multiple thoracentesis) NO INTERVENTION: diurese.   6/8 discussion with nephrology/pulmonary and started on diuretics   Lasix 20 mg IV TID >> changed to 40 mg p.o. daily on 6/9   Continue lasix and monitor weight and BMP  Lasix 40 mg po daily   6/10 Noted to be able to be on RA later in the day.   May require prn oxygen (at night) review in am   +/- need for oxygen on discahrge        Acute kidney injury on chronic kidney disease stage IV (improved and stable)   Hyperkalemia (resolved)   Followed by nephrology in clinic PTA with CKD   Recently started on Benicar for suboptimal blood pressure management.  At baseline has a history of stage IV renal disease followed by nephrology.  Whether she got hypotensive with bradycardia/dehydration or increasing creatinine and less clearance (dilt/toprol) suspect combination caused hemodynamic changes which led to poor perfusion of her kidneys and thus further poor clearance of medications including jacque agent  Creatinine of 3.28 on admission (5/23 creatinine 1.67) Addition of ARB as outpatient   Given some fluids but mainly held meds and allowed bradycardia to recover and perfusion improve   DID NOT restart ARB/ACE.  Plans to not over treat HR for bradycardia concerns.   6/10 creatinine 2.59 and stable  Follow up nephrology in 1 week as per note       Hyperkalemia (resolved)   Baseline stage IV CKD  Recent addition ARB (benicar 20 mg) now STOPPED   Patient on ARB with likely dehydration and hypoperfusion  Given treatment in the emergency room  Required [facility] this admission   6/8 potassium levels actually low (resumed on potassium replacement)   6/10 potassium stable 3.8     Paroxysmal atrial fibrillation on chronic anticoagulation with  Coumadin  Supratherapeutic INR on admit (4.3)   INR elevated on admission (pharmacy to dose)   Coumadin resumed and continue on dishcharge. 2-3 INR     DM:   Type 2   On accuchecks with slightly elevated glucose.   On sliding scale. (On no meds PTA)   Not really great medication option with renal function now  On renal but not diabetic. >> change to diabetic.   Accuchecks on discharge.       Hyperlipidemia  Resume PTA statin     Chronic anemia  Baseline hemoglobin around 10-11.  Admitted with a hemoglobin of 9.1.  Patient denies any active bleed  Will check iron studies B12/folate   6/10 with hgb 8.5  Follow up after discharge. ? CKD nephrology      Right axillary mass in arm pit. (Newly diagnosed)   Pictures in WOC   6/7 patient does have a very firm area with approximately 2 cm margin in her right axillary area.  Small appendages of skin tag/tissue appearance  6/7 CT chest:   1.  Partially imaged nodular skin thickening of the right axilla. This should be amenable to direct visual inspection and biopsy as clinically indicated.  2  Asymmetric skin thickening of the left breast. Recommend correlation with physical exam.Mildly enlarged mediastinal lymph nodes are nonspecific but favored reactive  6/9 General surgery consult   This 89 year old year old female has a contracted mass of right axillary tissue.  Right axillary biopsy should be considered, along with mammography with possible right axillary ultrasound.  Referral to breast surgeons - placed.  Imaging ordered for outpatient.          Diet: Renal Diet (non-dialysis)    DVT Prophylaxis: Warfarin  [place] Catheter: Not present  Lines: None     Cardiac Monitoring: ACTIVE order. Indication: Bradycardias (48 hours)  Code Status: Full Code      Clinically Significant Risk Factors          # Hyperchloremia: Highest Cl = 109 mmol/L in last 2 days, will monitor as appropriate          # Hypoalbuminemia: Lowest albumin = 3 g/dL at 6/5/2025 10:41 AM, will monitor as  "appropriate       # Hypertension: Noted on problem list           # DMII: A1C = 6.5 % (Ref range: 0.0 - 5.6 %) within past 6 months   # Overweight: Estimated body mass index is 28.41 kg/m  as calculated from the following:    Height as of this encounter: 1.626 m (5' 4\").    Weight as of this encounter: 75.1 kg (165 lb 8 oz).      # Financial/Environmental Concerns: none         Social Drivers of Health    Physical Activity: Inactive (2/24/2025)    Exercise Vital Sign     Days of Exercise per Week: 0 days     Minutes of Exercise per Session: 0 min   Social Connections: Unknown (2/24/2025)    Social Connection and Isolation Panel [NHANES]     Frequency of Social Gatherings with Friends and Family: Twice a week          Disposition Plan     Medically Ready for Discharge: Anticipated in 2-4 Days       CARMENZA HENDRIX MD  Hospitalist Service  [facility]  Securely message with [employer] (more info)  Text page via [employer] Paging/Directory   ______________________________________________________________________      Physical Exam   Vital Signs: Temp: 98.4  F (36.9  C) Temp src: Oral BP: 134/63 Pulse: 68   Resp: 16 SpO2: 93 % O2 Device: Nasal cannula Oxygen Delivery: 2 LPM  Weight: 165 lbs 8 oz    Patient is awake and alert   Respiratory: Clear to auscultation bilaterally without wheezes or rhonchi: decrease BS in the bases   Cardiovascular: Regular rate with no gallop or rub 2/6 mrumur  GI: + BS, soft, non tender   Skin: no overt edema     Medical Decision Making       50 MINUTES SPENT BY ME on the date of service doing chart review, history, exam, documentation & further activities per the note.    Patient is clinically improved and ready for discharge.  Discussion with pulmonary, nephrology, EP and bedside nursing  Patient is ready to go to TCU    Data     I have personally reviewed the following data over the past 24 hrs:    N/A  \   N/A   / N/A     143 109 (H) 59.7 (H) /  143 (H)   3.8 23 2.59 (H) \ "     INR:  1.65 (H) PTT:  N/A   D-dimer:  N/A Fibrinogen:  N/A       Imaging results reviewed over the past 24 hrs:   No results found for this or any previous visit (from the past 24 hours).

## 2025-06-10 NOTE — PLAN OF CARE
Goal Outcome Evaluation:  -~Care plan-end of shift note:    -~Orientation/Mentation:A&O x4  -~VS:VSS on RA sat 93%  desat to 88-89 at times, encouraged pulmonary hygiene   -~LS/Pulm:Ls diminished   -~Tele/Cardiac:SR  -~GI:WDL  -~:WDL  -~Pain:denies  -~Mobility:up w/1A/GB/W  -~Skin:intact  -~Diet:renal  -~Lines/IVs:PIV SL   -~Safety/Concern:fall risk  -~Aggression color:green  -~Plan/Shift summary/Goals:denies SOB/CP.plans to discharge today with zio patch.

## 2025-06-10 NOTE — PROGRESS NOTES
Assessment and Plan:   FABY: Cr improving. Baseline CKD-4. Last creatinine prior to admission at baseline on 5/23/2025, EGFR 29 creatinine of 1.67.   Wt down 2 Kg since adm. /63. P 68.   UO yest 1300 ml.   Labs today Na 143, K 3.8, HCO3 23, Cr : 2.71 > 2.62 > 2.59.     She is on lasix 40 mg po daily.     Ok for discharge tomorrow with follow up in our clinic in 1-2 weeks ([facility], [facility], [phone]) . We will sign off.             Interval History:   Diabetes    Hypertension: on diltiazem, hydralazine, lasix.                  Review of Systems:   Feels well. She is planning on going to tCU post discharge.           Medications:     Current Facility-Administered Medications   Medication Dose Route Frequency Provider Last Rate Last Admin    diltiazem ER COATED BEADS (CARDIZEM CD/CARTIA XT) 24 hr capsule 240 mg  240 mg Oral Daily Shelley Murguia MD   240 mg at 06/10/25 0820    furosemide (LASIX) tablet 40 mg  40 mg Oral Daily Shelley Murguia MD   40 mg at 06/10/25 0820    gabapentin (NEURONTIN) capsule 200 mg  200 mg Oral At Bedtime Judi Camacho MD   200 mg at 06/09/25 2147    hydrALAZINE (APRESOLINE) tablet 20 mg  20 mg Oral Q8H [facility] Shelley Murguia MD   20 mg at 06/10/25 1031    insulin aspart (NovoLOG) injection (RAPID ACTING)   Subcutaneous TID w/meals Mary Thompson MD   4 Units at 06/10/25 1257    insulin aspart (NovoLOG) injection (RAPID ACTING)   Subcutaneous TID w/meals Mary Thompson MD   9 Units at 06/07/25 2243    pravastatin (PRAVACHOL) tablet 20 mg  20 mg Oral Daily Judi Camacho MD   20 mg at 06/10/25 0820    sertraline (ZOLOFT) tablet 50 mg  50 mg Oral Daily Judi Camacho MD   50 mg at 06/10/25 0820    sodium chloride (PF) 0.9% PF flush 3 mL  3 mL Intracatheter Q8H [facility] Judi Camacho MD   3 mL at 06/10/25 0632    warfarin ANTICOAGULANT (COUMADIN/JANTOVEN) tablet 6 mg  6 mg Oral ONCE at 18:00 Judi Camacho MD        Warfarin Dose Required Daily - Pharmacist  Managed  1 each Oral See Admin Instructions Shelley Murguia MD         Current Facility-Administered Medications   Medication Dose Route Frequency Provider Last Rate Last Admin    Patient is already receiving anticoagulation with heparin, enoxaparin (LOVENOX), warfarin (COUMADIN)  or other anticoagulant medication   Does not apply Continuous JACEYN Judi Camacho MD         Current active medications and PTA medications reviewed, see medication list for details.            Physical Exam:   Vitals were reviewed  Patient Vitals for the past 24 hrs:   BP Temp Temp src Pulse Resp SpO2 Weight   06/10/25 1414 -- -- -- -- -- 92 % --   06/10/25 1225 134/63 -- -- 68 -- 93 % --   06/10/25 1031 133/60 -- -- -- -- -- --   06/10/25 0821 -- -- -- -- -- 93 % --   06/10/25 0740 (!) 162/67 -- -- -- -- -- --   06/10/25 0737 -- 98.4  F (36.9  C) Oral -- 16 -- --   06/10/25 0630 (!) 143/73 -- -- 80 20 -- --   06/10/25 0600 -- -- -- -- -- -- 75.1 kg (165 lb 8 oz)   06/10/25 0025 125/49 98  F (36.7  C) Oral 66 16 92 % --   25 1944 (!) 157/53 98.1  F (36.7  C) Oral 71 18 94 % --   25 1537 (!) 168/72 97.9  F (36.6  C) Oral 86 -- 94 % --       Temp:  [97.9  F (36.6  C)-98.4  F (36.9  C)] 98.4  F (36.9  C)  Pulse:  [66-86] 68  Resp:  [16-20] 16  BP: (125-168)/(49-73) 134/63  SpO2:  [92 %-94 %] 92 %    Temperatures:  Current - Temp: 98.4  F (36.9  C); Max - Temp  Av.1  F (36.7  C)  Min: 97.9  F (36.6  C)  Max: 98.4  F (36.9  C)  Respiration range: Resp  Av.5  Min: 16  Max: 20  Pulse range: Pulse  Av.2  Min: 66  Max: 86  Blood pressure range: Systolic (24hrs), Av , Min:125 , Max:168   ; Diastolic (24hrs), Av, Min:49, Max:73    Pulse oximetry range: SpO2  Av %  Min: 92 %  Max: 94 %    I/O last 3 completed shifts:  In: 570 [P.O.:570]  Out: 1275 [Urine:1275]      Intake/Output Summary (Last 24 hours) at 6/10/2025 1442  Last data filed at 6/10/2025 1350  Gross per 24 hour   Intake 900 ml   Output 575 ml    Net 325 ml     Alert and responsive  Sitting up in chair  Not on O2  Lungs with clear BS  Cor RRR nl S1 S2 2/6 sys M, no JVD  LE no edema         Wt Readings from Last 4 Encounters:   06/10/25 75.1 kg (165 lb 8 oz)   05/23/25 69 kg (152 lb 3.2 oz)   04/28/25 69 kg (152 lb 3.2 oz)   03/28/25 68.8 kg (151 lb 11.2 oz)          Data:          Lab Results   Component Value Date     06/10/2025     06/09/2025     06/08/2025     06/02/2021     04/21/2021     03/05/2021    Lab Results   Component Value Date    CHLORIDE 109 06/10/2025    CHLORIDE 105 06/09/2025    CHLORIDE 105 06/08/2025    CHLORIDE 109 09/13/2022    CHLORIDE  08/11/2022      Comment:      Disregard result  This is a corrected result. Previous result was 108 mmol/L on 8/11/2022 at  8:27 PM CDT    CHLORIDE 104 05/10/2022    CHLORIDE 103 06/02/2021    CHLORIDE 101 04/21/2021    CHLORIDE 97 03/05/2021    Lab Results   Component Value Date    BUN 59.7 06/10/2025    BUN 60.8 06/09/2025    BUN 63.8 06/08/2025    BUN 67 09/13/2022    BUN  08/11/2022      Comment:      Disregard result  This is a corrected result. Previous result was 61 mg/dL on 8/11/2022 at  8:27 PM CDT    BUN 61 05/10/2022    BUN 66 06/02/2021    BUN 84 04/21/2021    BUN 47 03/05/2021      Lab Results   Component Value Date    POTASSIUM 3.8 06/10/2025    POTASSIUM 3.6 06/09/2025    POTASSIUM 3.4 06/09/2025    POTASSIUM 4.8 09/13/2022    POTASSIUM  08/11/2022      Comment:      Disregard result  This is a corrected result. Previous result was 4.1 mmol/L on 8/11/2022 at  8:27 PM CDT    POTASSIUM 4.3 05/10/2022    POTASSIUM 4.2 06/02/2021    POTASSIUM 3.8 04/21/2021    POTASSIUM 4.0 03/05/2021    Lab Results   Component Value Date    CO2 23 06/10/2025    CO2 20 06/09/2025    CO2 21 06/08/2025    CO2 24 09/13/2022    CO2  08/11/2022      Comment:      Disregard result  This is a corrected result. Previous result was 28 mmol/L on 8/11/2022 at  8:27 PM CDT    CO2 31  05/10/2022    CO2 32 06/02/2021    CO2 30 04/21/2021    CO2 34 03/05/2021    Lab Results   Component Value Date    CR 2.59 06/10/2025    CR 2.62 06/09/2025    CR 2.71 06/08/2025    CR 2.43 06/02/2021    CR 2.38 04/21/2021    CR 2.23 03/05/2021        Recent Labs   Lab Test 06/08/25  1116 06/05/25  1041 06/05/25  0359   WBC 10.7 7.3 6.4   HGB 8.5* 8.7* 9.0*   HCT 26.2* 27.6* 29.7*   MCV 94 97 99    150 149*     Recent Labs   Lab Test 06/07/25  0938 06/05/25  1041 06/04/25  1745   AST 20 20 25   ALT 22 23 19   ALKPHOS 82 81 84   BILITOTAL 0.5 <0.2 <0.2       Recent Labs   Lab Test 06/10/25  0547 06/09/25  0930 06/05/25  1041   MAG 1.9 1.9 2.2     Recent Labs   Lab Test 06/10/25  0547 06/08/25  1116 05/08/24  0934   PHOS 3.9 3.5 4.3     Recent Labs   Lab Test 06/10/25  0547 06/09/25  0930 06/08/25  1116   EDNA 8.7* 8.8 8.4*  8.4*       Lab Results   Component Value Date    EDNA 8.7 (L) 06/10/2025     Lab Results   Component Value Date    WBC 10.7 06/08/2025    HGB 8.5 (L) 06/08/2025    HCT 26.2 (L) 06/08/2025    MCV 94 06/08/2025     06/08/2025     Lab Results   Component Value Date     06/10/2025    POTASSIUM 3.8 06/10/2025    CHLORIDE 109 (H) 06/10/2025    CO2 23 06/10/2025     (H) 06/10/2025     Lab Results   Component Value Date    BUN 59.7 (H) 06/10/2025    CR 2.59 (H) 06/10/2025     Lab Results   Component Value Date    MAG 1.9 06/10/2025     Lab Results   Component Value Date    PHOS 3.9 06/10/2025       Creatinine   Date Value Ref Range Status   06/10/2025 2.59 (H) 0.51 - 0.95 mg/dL Final   06/09/2025 2.62 (H) 0.51 - 0.95 mg/dL Final   06/08/2025 2.71 (H) 0.51 - 0.95 mg/dL Final   06/07/2025 2.93 (H) 0.51 - 0.95 mg/dL Final   06/06/2025 3.00 (H) 0.51 - 0.95 mg/dL Final   06/06/2025 3.01 (H) 0.51 - 0.95 mg/dL Final   06/02/2021 2.43 (H) 0.52 - 1.04 mg/dL Final   04/21/2021 2.38 (H) 0.52 - 1.04 mg/dL Final   03/05/2021 2.23 (H) 0.52 - 1.04 mg/dL Final   02/09/2021 2.97 (H) 0.52 - 1.04  mg/dL Final   02/02/2021 2.65 (H) 0.52 - 1.04 mg/dL Final   02/01/2021 2.39 (H) 0.52 - 1.04 mg/dL Final       Attestation:  I have reviewed today's vital signs, notes, medications, labs and imaging.     Jluián Perdomo MD

## 2025-06-10 NOTE — PLAN OF CARE
Goal Outcome Evaluation:    VSS. Monitor shows Sinus rhythm withf 1st degree AVB, BBB. Pt. Is alert and oriented x4. Tylenol given for headache. O2 weaned off. O2 sats 92-93% on room air. Continue to monitor. Likely discharge to TCU tomorrow.

## 2025-06-10 NOTE — PROGRESS NOTES
Care Management Follow Up    Length of Stay (days): 6    Expected Discharge Date: 06/10/2025     Concerns to be Addressed: all concerns addressed in this encounter     Patient plan of care discussed at interdisciplinary rounds: Yes    Anticipated Discharge Disposition: Transitional Care, Home              Anticipated Discharge Services: None  Anticipated Discharge DME: Oxygen    Patient/family educated on Medicare website which has current facility and service quality ratings: yes  Education Provided on the Discharge Plan: Yes  Patient/Family in Agreement with the Plan: yes    Referrals Placed by CM/SW:    Private pay costs discussed: Not applicable    Discussed  Partnership in Safe Discharge Planning  document with patient/family: No     Handoff Completed: No, handoff not indicated or clinically appropriate    Additional Information:  Writer sent message to Dr. Murguia seeing if patient is medically ready to discharge. Dr. Murguia reports patient not medically ready today but likely tomorrow. Writer called [facility] to update them and ensure she would have a bed tomorrow.     [employer] auth is still pending, will continue to follow.     ADD 1447:  Writer received [employer] evicore auth [id number] with service dates of 6/10-6/16    Writer spoke with patient, she is unsure if her son has to work tomorrow so patient was in agreement with getting a tentative ride set up. Writer called [facility] transport and got a ride set up for [phone]. If patients son is able to transport ride can be canceled.     Next Steps: Send orders, complete PAS, confirm ride     JOSE FRANCISCO RAMESH  [facility]  INPATIENT CARE COORDINATION

## 2025-06-10 NOTE — PLAN OF CARE
Goal Outcome Evaluation:  Neuro- A&OX4  Most Recent Vitals- Temp: 98.1  F (36.7  C) Temp src: Oral BP: (!) 157/53 Pulse: 71   Resp: 18 SpO2: 94 % O2 Device: None (Room air)   Tele/Cardiac- SR   Resp- RA  Activity- Up with assist of 1, gait belt and walker   Pain- c/o low back pain, prn Tylenol and hot pack given with good relief   Drips- none   Drains/Tubes- P-IVX1 SL  Skin- Intact   GI/- WDL  Aggression Color- Green  COVID status- Negative  Plan- Possible discharge home tomorrow with zio patch.   Misc-     Sorin Carreon RN

## 2025-06-10 NOTE — PROGRESS NOTES
Care Management Follow Up    Length of Stay (days): 6    Expected Discharge Date: 06/11/2025     Concerns to be Addressed: all concerns addressed in this encounter     Patient plan of care discussed at interdisciplinary rounds: Yes    Anticipated Discharge Disposition: Transitional Care, Home              Anticipated Discharge Services: None  Anticipated Discharge DME: Oxygen    Patient/family educated on Medicare website which has current facility and service quality ratings: yes  Education Provided on the Discharge Plan: Yes  Patient/Family in Agreement with the Plan: yes    Referrals Placed by CM/SW:    Private pay costs discussed: Not applicable    Discussed  Partnership in Safe Discharge Planning  document with patient/family: No     Handoff Completed: No, handoff not indicated or clinically appropriate    Additional Information:    PAS-RR    D: Per DHS regulation, SW completed and submitted PAS-RR to [place] [employer] Direct Connect via the Senior LinkAge Line.  PAS-RR confirmation # is : [id number]    P: Further questions may be directed to Senior LinkAge Line at #[phone], option #4 for PAS-RR staff.      IVY Gotti

## 2025-06-11 ENCOUNTER — DOCUMENTATION ONLY (OUTPATIENT)
Dept: ANTICOAGULATION | Facility: CLINIC | Age: 89
End: 2025-06-11
Payer: COMMERCIAL

## 2025-06-11 VITALS
RESPIRATION RATE: 18 BRPM | OXYGEN SATURATION: 94 % | BODY MASS INDEX: 28.13 KG/M2 | HEIGHT: 64 IN | TEMPERATURE: 98.1 F | HEART RATE: 84 BPM | DIASTOLIC BLOOD PRESSURE: 74 MMHG | SYSTOLIC BLOOD PRESSURE: 161 MMHG | WEIGHT: 164.8 LBS

## 2025-06-11 DIAGNOSIS — Z79.01 LONG TERM CURRENT USE OF ANTICOAGULANTS WITH INR GOAL OF 2.0-3.0: ICD-10-CM

## 2025-06-11 DIAGNOSIS — I48.0 PAROXYSMAL ATRIAL FIBRILLATION (H): Primary | ICD-10-CM

## 2025-06-11 LAB
ANION GAP SERPL CALCULATED.3IONS-SCNC: 14 MMOL/L (ref 7–15)
BUN SERPL-MCNC: 64.8 MG/DL (ref 8–23)
CALCIUM SERPL-MCNC: 9 MG/DL (ref 8.8–10.4)
CHLORIDE SERPL-SCNC: 108 MMOL/L (ref 98–107)
CREAT SERPL-MCNC: 2.53 MG/DL (ref 0.51–0.95)
EGFRCR SERPLBLD CKD-EPI 2021: 18 ML/MIN/1.73M2
ERYTHROCYTE [DISTWIDTH] IN BLOOD BY AUTOMATED COUNT: 13.5 % (ref 10–15)
GLUCOSE BLDC GLUCOMTR-MCNC: 130 MG/DL (ref 70–99)
GLUCOSE BLDC GLUCOMTR-MCNC: 147 MG/DL (ref 70–99)
GLUCOSE SERPL-MCNC: 152 MG/DL (ref 70–99)
HCO3 SERPL-SCNC: 22 MMOL/L (ref 22–29)
HCT VFR BLD AUTO: 27.3 % (ref 35–47)
HGB BLD-MCNC: 9 G/DL (ref 11.7–15.7)
INR PPP: 1.79 (ref 0.85–1.15)
MCH RBC QN AUTO: 30.7 PG (ref 26.5–33)
MCHC RBC AUTO-ENTMCNC: 33 G/DL (ref 31.5–36.5)
MCV RBC AUTO: 93 FL (ref 78–100)
PLATELET # BLD AUTO: 238 10E3/UL (ref 150–450)
POTASSIUM SERPL-SCNC: 3.7 MMOL/L (ref 3.4–5.3)
PROTHROMBIN TIME: 20.2 SECONDS (ref 11.8–14.8)
RBC # BLD AUTO: 2.93 10E6/UL (ref 3.8–5.2)
SODIUM SERPL-SCNC: 144 MMOL/L (ref 135–145)
WBC # BLD AUTO: 6.3 10E3/UL (ref 4–11)

## 2025-06-11 PROCEDURE — 250N000013 HC RX MED GY IP 250 OP 250 PS 637: Performed by: INTERNAL MEDICINE

## 2025-06-11 PROCEDURE — 36415 COLL VENOUS BLD VENIPUNCTURE: CPT | Performed by: INTERNAL MEDICINE

## 2025-06-11 PROCEDURE — 85610 PROTHROMBIN TIME: CPT | Performed by: INTERNAL MEDICINE

## 2025-06-11 PROCEDURE — 85018 HEMOGLOBIN: CPT | Performed by: INTERNAL MEDICINE

## 2025-06-11 PROCEDURE — 99239 HOSP IP/OBS DSCHRG MGMT >30: CPT | Performed by: INTERNAL MEDICINE

## 2025-06-11 PROCEDURE — 80048 BASIC METABOLIC PNL TOTAL CA: CPT | Performed by: INTERNAL MEDICINE

## 2025-06-11 RX ORDER — DOXAZOSIN 1 MG/1
1 TABLET ORAL AT BEDTIME
DISCHARGE
Start: 2025-06-11

## 2025-06-11 RX ORDER — HYDRALAZINE HYDROCHLORIDE 10 MG/1
20 TABLET, FILM COATED ORAL EVERY 8 HOURS
DISCHARGE
Start: 2025-06-11

## 2025-06-11 RX ORDER — INSULIN ASPART 100 [IU]/ML
INJECTION, SOLUTION INTRAVENOUS; SUBCUTANEOUS
DISCHARGE
Start: 2025-06-11

## 2025-06-11 RX ORDER — WARFARIN SODIUM 2.5 MG/1
5 TABLET ORAL EVERY EVENING
DISCHARGE
Start: 2025-06-11

## 2025-06-11 RX ADMIN — PRAVASTATIN SODIUM 20 MG: 20 TABLET ORAL at 08:47

## 2025-06-11 RX ADMIN — FUROSEMIDE 40 MG: 40 TABLET ORAL at 08:47

## 2025-06-11 RX ADMIN — DILTIAZEM HYDROCHLORIDE 240 MG: 120 CAPSULE, COATED, EXTENDED RELEASE ORAL at 08:47

## 2025-06-11 RX ADMIN — SERTRALINE HYDROCHLORIDE 50 MG: 50 TABLET ORAL at 08:47

## 2025-06-11 RX ADMIN — HYDRALAZINE HYDROCHLORIDE 20 MG: 10 TABLET ORAL at 06:00

## 2025-06-11 ASSESSMENT — ACTIVITIES OF DAILY LIVING (ADL)
ADLS_ACUITY_SCORE: 44
ADLS_ACUITY_SCORE: 41
ADLS_ACUITY_SCORE: 41
ADLS_ACUITY_SCORE: 48
ADLS_ACUITY_SCORE: 41

## 2025-06-11 NOTE — PLAN OF CARE
Goal Outcome Evaluation:      Plan of Care Reviewed With: patient    Overall Patient Progress: improvingOverall Patient Progress: improving    Neuro: A&Ox4 [place]   Tele/Cardiac: SR  Vitals:VSS on RA  ActivityD: Ax1, walker  Pain: Denies pain  Drips/IV: PIV:S/L  GI/: WDL  Skin: Scab to chest, scattered bruising,   Diet: Renal & mod-carb   Test/Procedures: AM labs  Plan: discharge to [facility] arranged [id number]

## 2025-06-11 NOTE — PLAN OF CARE
Physical Therapy Discharge Summary     Reason for therapy discharge:    Discharged to transitional care facility.     Progress towards therapy goal(s). See goals on Care Plan in [facility] electronic health record for goal details.  Goals not met.  Barriers to achieving goals:   discharge from facility.     Therapy recommendation(s):    Continued therapy is recommended.  Rationale/Recommendations:  Patient would benefit from PT eval at TCU in order to increase strength, activity tolerance, balance and independence with mobility.

## 2025-06-11 NOTE — PROGRESS NOTES
CLINICAL NUTRITION SERVICES - BRIEF NOTE    Reviewed nutrition risk factors for LOS. Pt is tolerating Renal (non-dialysis) + Moderate consistent carbohydrate diet, ordering adequately, and good/adequate oral intake per nursing documentation. Reviewed wt hx, no unintentional wt loss PTA. No pressure injuries. No nutrition interventions at this time.     Malnutrition Risk Screen Score: 0  Have you recently lost weight without trying? No  Have you eaten poorly because of a decreased appetite? No    - Please formally consult should a nutrition concern arise.     Monitoring/Evaluation  Will continue to monitor and evaluate per protocol.

## 2025-06-11 NOTE — PROGRESS NOTES
7 day Zio patch will be mailed out to patient this week.  Address confirmed.  Pt will review at visit with Anne Gambino PA-C next month for post hospital.  Kate Noonan NP, APRN CNP     Onset: today  Location / description:  Right eye redness, painful, itchy this morning but pain and itchy gone  Precipitating Factors: pinkeye  Pain Scale (1-10), 10 highest: 0/10  Associated Symptoms: discharge  What improves / worsens symptoms: eye drops makes it feel better  Symptom specific medications: eye drop  LMP : No LMP recorded. Patient is postmenopausal.  Are you pregnant or breast feeding: n/a  Recent visits (last 3-4 weeks) for same reason or recent surgery: no    PLAN:   See Provider/Go to Urgent Care within next 4 hours    Patient/Caller agrees to follow recommendations.    Reason for Disposition  • Cloudy spot or sore seen on the cornea (clear part of the eye)    Protocols used: EYE - PUS OR [id number]

## 2025-06-11 NOTE — PLAN OF CARE
Goal Outcome Evaluation:      Plan of Care Reviewed With: patient    Overall Patient Progress: improvingOverall Patient Progress: improving    [facility]    Patient Information  Name: Ирина Yanez  Age: 89 year old    Admission Information  Date: 6/4/2025   Reason:Long term (current) use of anticoagulants [Z79.01]  Hyperkalemia [E87.5]  Near syncope [R55]  Symptomatic bradycardia [R00.1]  Aortic valve stenosis, etiology of cardiac valve disease unspecified [I35.0]  Acute renal failure superimposed on chronic kidney disease, unspecified acute renal failure type, unspecified CKD stage [N17.9, N18.9]     Assessment  Orientation/Neuro: Alert and Oriented x4   Cardiac/Tele: NSR   Resp: WDL no shortness of breath  GI/: WDL No nausea, vomiting, abdominal pain, constipation or change in bowel habits   Mobility: walks with assist   Pain: denies   Diet: Orders Placed This Encounter      Combination Diet Renal Diet (non-dialysis); Moderate Consistent Carb (60 g CHO per Meal) Diet      Diet    Vital Signs  B/P: 161/74, T: 98.1, P: 81, R: 18, O2: 95% on RA      Plan  Discharge to TCU at 11/1130    Mari Gomez RN

## 2025-06-11 NOTE — DISCHARGE SUMMARY
"[facility]  Hospitalist Discharge Summary      Date of Admission:  6/4/2025  Date of Discharge:  6/11/2025  Discharging Provider: Daniel Calvillo MD  Discharge Service: Hospitalist Service    Discharge Diagnoses   See below    Clinically Significant Risk Factors     # DMII: A1C = 6.5 % (Ref range: 0.0 - 5.6 %) within past 6 months  # Overweight: Estimated body mass index is 28.29 kg/m  as calculated from the following:    Height as of this encounter: 1.626 m (5' 4\").    Weight as of this encounter: 74.8 kg (164 lb 12.8 oz).       Follow-ups Needed After Discharge   Follow-up Appointments       Follow Up and recommended labs and tests      1. Follow up with [facility] physician.  The following labs/tests are recommended:   - INR check on 6/12/25 for further warfarin dosing. Goal INR 2-3  - BMP and CBC in a week, then per facility provider    2. Follow up with Nephrology clinic in 1-2 weeks ([facility] F/U, [facility], [phone]) . Call to make appointment.                Unresulted Labs Ordered in the Past 30 Days of this Admission       No orders found from 5/5/2025 to 6/5/2025.            Discharge Disposition   Discharged to home  Condition at discharge: Stable    Hospital Course   Ирина Yanez is a 89 year old female with history of type 2 diabetes mellitus, CKD stage IV (followed by nephrology), hypertension, aortic stenosis, paroxysmal atrial fibrillation on chronic anticoagulation with Coumadin, hyperlipidemia, anxiety, chronic heart failure with preserved EF who presented to the hospital with a presyncopal episode on 6/4.       She was outside working in the sun for couple hours pulling weeds.  Reportedly got too weak to get up from the floor. Called paramedics.  Blood pressure 88/36 with a pulse of 32. Enroute SBP 66/40 for total of 1.5 mg of atropine. . Given 600 ml of fluids.   Admitted with severe bradycardia, hypotension, FABY with hyperkalemia.    "   Symptomatic bradycardia (on admit)   Severe bradycardia with junctional escape beats (on admit)   Hypotension-resolved  Presyncope secondary to above  At presentation heart rate in the high 30s to 40s and blood pressure 90s.  At some point had dipped down to 70s but improved with IV fluid hydration and currently in the 120s and patient currently asymptomatic.    Chest x-ray with diffuse interstitial prominence either pulmonary vascular congestion or mild interstitial pulmonary edema.  Patient however denies any cough, chest pain, shortness of breath  Initial heart rate very bradycardic with bifascicular block on EKG.  Admit ECG showed severe sinus bradycardia with junctional escape of 40 bpm.>>  After potassium improved did develop sinus bradycardia but also holding jacque agents.  On diltiazem and toprol XL with renal insufficiency/hyperkalemia>> Multi potential components to her bradycardia and with renal insufficiency extended half-life  6/5 general cardiology consult with EP requested  6/6 EP consult>>   EKG with severe sinus bradycardia with junctional escape rhythm of 40 bpm. >>  After potassium correction showing sinus rhythm with first-degree AV block and right bundle branch block.  6/6 on rounding noted that the patient appeared to be in A-fib with RVR. (History of PAF)   Discussion with EP.  Started on diltiazem 30 mg 4 times daily, Still with intermittent afib on monitor although rate does not go faster   6/9 follow-up by EP >> changed to diltiazem 240 XR daily which she will continue at discharge  She may still go in and out of afib but if not sustained tachycardia or symptomatic EP rec no intervention.   Follow up with EP after discharge    Elevated troponin   Suspected supply/demand  Moderate aortic stenosis/moderate TR  Troponin 41>> 40  6/5 echo showing normal EF of 60 to 65%.  RV normal structure, function and size.  Moderate TR.  No effusion. Moderate valvular aortic stenosis         Acute hypoxic  respiratory failure (Resolving)   Bilateral pleural effusions (left is loculated and right moderate)   CT chest with moderate size right effusion and small left effusions. The left pleural effusion is mildly loculated and there is new thickening of the left basilar pleura. Recommend correlation with pleural fluid labs and cytology when clinically feasible.  Mild interlobular septal thickening and bilateral perihilar groundglass opacities suggestive of pulmonary edema. Infection not excluded in the correct clinical context.  6/9 pulmonary consult: optimize diuretics. No need to tap. Noted in past and transudative to exudative after multiple thoracentesis) NO INTERVENTION: diurese.   6/8 discussion with nephrology/pulmonary and started on diuretics   Lasix 20 mg IV TID >> changed to 40 mg p.o. daily on 6/9 which will be continued at discharge  Weaned off oxygen on 6/10/25      Acute kidney injury on chronic kidney disease stage IV (improved and stable)   Hyperkalemia (resolved)   Followed by nephrology in clinic PTA with CKD   Recently started on Benicar for suboptimal blood pressure management.  At baseline has a history of stage IV renal disease followed by nephrology.  Whether she got hypotensive with bradycardia/dehydration or increasing creatinine and less clearance (dilt/toprol) suspect combination caused hemodynamic changes which led to poor perfusion of her kidneys and thus further poor clearance of medications including jacque agent  Creatinine of 3.28 on admission (5/23 creatinine 1.67) Addition of ARB as outpatient   Given some fluids but mainly held meds and allowed bradycardia to recover and perfusion improve   DID NOT restart ARB/ACE.  Plans to not over treat HR for bradycardia concerns.   Creatine stable on day of discharge at 2.53  Follow up nephrology in 1-2 week as per nephrology  BMP in a week at TCU        Hyperkalemia (resolved)   Baseline stage IV CKD  Recent addition ARB (benicar 20 mg) now  STOPPED   Patient on ARB with likely dehydration and hypoperfusion  Given treatment in the emergency room  Required lokelma this admission   Potassium 3.7 on day of discharge     Paroxysmal atrial fibrillation on chronic anticoagulation with Coumadin  Supratherapeutic INR on admit (4.3)   INR elevated on admission (pharmacy to dose)   Continue with PTA Warfarin at discharge, INR 1.79 on day of discharge. Discussed with pharmacist, continue warfarin 5mg tonight, check INR tomorrow on 6/12 at TCU and further dosing per TCU provider. Goal INR 2-3        DM:   Type 2   On accuchecks with slightly elevated glucose.   On no meds PTA  Sliding scale insulin ac meals at discharge, QID and prn glucose checks        Hyperlipidemia  Resume PTA statin     Chronic anemia  Baseline hemoglobin around 10-11.  Admitted with a hemoglobin of 9.1.  Patient denies any active bleed   6/11 with hgb 9g/dL  Follow up after discharge. ? CKD nephrology        Right axillary mass in arm pit. (Newly diagnosed)   Pictures in WOC   6/7 patient does have a very firm area with approximately 2 cm margin in her right axillary area.  Small appendages of skin tag/tissue appearance  6/7 CT chest:   1.  Partially imaged nodular skin thickening of the right axilla. This should be amenable to direct visual inspection and biopsy as clinically indicated.  2  Asymmetric skin thickening of the left breast. Recommend correlation with physical exam.Mildly enlarged mediastinal lymph nodes are nonspecific but favored reactive  6/9 General surgery consult   This 89 year old year old female has a contracted mass of right axillary tissue.  Right axillary biopsy should be considered, along with mammography with possible right axillary ultrasound.  Referral to breast surgeons - placed.  Imaging ordered for outpatient.     Consultations This Hospital Stay   CARDIOLOGY IP CONSULT  NEPHROLOGY IP CONSULT  PHYSICAL THERAPY ADULT IP CONSULT  WOUND OSTOMY CONTINENCE NURSE  IP  CONSULT  CARE MANAGEMENT / SOCIAL WORK IP CONSULT  NEPHROLOGY IP CONSULT  VASCULAR ACCESS ADULT IP CONSULT  ELECTROPHYSIOLOGY IP CONSULT  CARDIOLOGY IP CONSULT  PHARMACY TO DOSE WARFARIN  SURGERY GENERAL IP CONSULT  PULMONARY IP CONSULT  SURGERY GENERAL IP CONSULT  CARDIOLOGY IP CONSULT  PHARMACY TO DOSE WARFARIN  PHYSICAL THERAPY ADULT IP CONSULT  OCCUPATIONAL THERAPY ADULT IP CONSULT  SPEECH LANGUAGE PATH ADULT IP CONSULT    Code Status   Full Code    Time Spent on this Encounter   I, Daniel Calvillo MD, personally saw the patient today and spent 45 minutes discharging this patient.       Daniel Calvillo MD  [facility]  [address] [place] [address] [address], [address]  [facility] [address]  Phone: [phone]  ______________________________________________________________________    Physical Exam   Vital Signs: Temp: 98.1  F (36.7  C) Temp src: Oral BP: (!) 161/74 Pulse: 81   Resp: 18 SpO2: 94 % O2 Device: None (Room air) Oxygen Delivery: 2 LPM  Weight: 164 lbs 12.8 oz  General Appearance: Alert, awake and no apparent distress  Respiratory: clear to auscultation bilaterally  Cardiovascular: regular rate and rhythm  GI: soft and non-tender  Skin: warm and dry         Primary Care Physician   Mil García    Discharge Orders      MA Diagnostic Bilateral w/ Brian     Follow-Up with Cardiology EP      Adult Gen Surg  Referral      Adult Gen Surg  Referral      Primary Care - Care Coordination Referral      General info for SNF    Length of Stay Estimate: Short Term Care: Estimated # of Days <30  Condition at Discharge: Stable  Level of care:skilled   Rehabilitation Potential: Good  Admission H&P remains valid and up-to-date: Yes  Recent Chemotherapy: N/A  Use Nursing Home Standing Orders: Yes     Mantoux instructions    Give two-step Mantoux (PPD) Per Facility Policy Yes     Reason for your hospital stay    Low heart rate, elevated creatinine and elevated potassium      Glucose monitor nursing POCT    Before meals and at bedtime     Intake and output    Every shift     Daily weights    Call Provider for weight gain of more than 2 pounds per day or 5 pounds per week.     Activity - Up with nursing assistance     Brief Discharge Instructions    Dose: 1-7 Units  Freq: 3 TIMES DAILY BEFORE MEALS Route: SC  Start: 06/11/25 0730  Admin Instructions:  Correction Scale - MEDIUM INSULIN RESISTANCE DOSING    Do Not give Correction Insulin if Pre-Meal BG less than 140.  For Pre-Meal  - 189 give 1 unit.  For Pre-Meal  - 239 give 2 units.  For Pre-Meal  - 289 give 3 units.  For Pre-Meal  - 339 give 4 units.  For Pre-Meal - 389 give 5 units.  For Pre-Meal -439 give 6 units  For Pre-Meal BG greater than or equal to 440 give 7 units.     Brief Discharge Instructions    Patient needs to have an appointment with general surgery for her axillary mass >> Referral made     Brief Discharge Instructions    Patient requires follow up appointment with Nephrology Follow up in clinic in 1-2 weeks ([facility] F/U, [facility], [phone]) Call and schedule appointment for the patient to see nephrology. Dr. Whatley primary     Brief Discharge Instructions    Patient is on coumadin with goal INR 2-3 for atrial fibrillation: Provider/pharmacy to dose and follow     Brief Discharge Instructions    INR check on June 12 and June 13 and June 16 with results to provider/pharmacy to dose     Glucose monitor nursing POCT    Before meals and at bedtime, and as needed for signs or symptoms of hypoglycemia     Follow Up and recommended labs and tests    1. Follow up with retirement physician.  The following labs/tests are recommended:   - INR check on 6/12/25 for further warfarin dosing. Goal INR 2-3  - BMP and CBC in a week, then per facility provider    2. Follow up with Nephrology clinic in 1-2 weeks ([facility], [facility], [phone]) . Call to make appointment.      Physical Therapy Adult Consult    Evaluate and treat as clinically indicated.    Reason:  eval and treat     Occupational Therapy Adult Consult    Evaluate and treat as clinically indicated.    Reason:  eval and treat     Speech Language Path Adult Consult    Evaluate and treat as clinically indicated.    Reason:  eval and treat     Oxygen (SNF/TCU) Discharge     Fall precautions     Diet    Follow this diet upon discharge: Current Diet:Orders Placed This Encounter      Combination Diet Renal Diet (non-dialysis); Moderate Consistent Carb (60 g CHO per Meal) Diet     Zio Patch Mail Out       Significant Results and Procedures   Most Recent 3 CBC's:  Recent Labs   Lab Test 06/11/25  0557 06/08/25  1116 06/05/25  1041   WBC 6.3 10.7 7.3   HGB 9.0* 8.5* 8.7*   MCV 93 94 97    171 150     Most Recent 3 BMP's:  Recent Labs   Lab Test 06/11/25  0741 06/11/25  0557 06/11/25  0202 06/10/25  0801 06/10/25  0547 06/10/25  0158 06/09/25  2328 06/09/25  1233 06/09/25  0930   NA  --  144  --   --  143  --   --   --  141   POTASSIUM  --  3.7  --   --  3.8  --  3.6   < > 3.4  3.4   CHLORIDE  --  108*  --   --  109*  --   --   --  105   CO2  --  22  --   --  23  --   --   --  20*   BUN  --  64.8*  --   --  59.7*  --   --   --  60.8*   CR  --  2.53*  --   --  2.59*  --   --   --  2.62*   ANIONGAP  --  14  --   --  11  --   --   --  16*   EDNA  --  9.0  --   --  8.7*  --   --   --  8.8   * 152* 130*   < > 125*   < >  --    < > 233*    < > = values in this interval not displayed.     Most Recent INR's and Anticoagulation Dosing History:  Anticoagulation Dose History  More data exists         Latest Ref Rng & Units 6/5/2025 6/6/2025 6/7/2025 6/8/2025 6/9/2025 6/10/2025 6/11/2025   Recent Dosing and Labs   warfarin ANTICOAGULANT (COUMADIN/JANTOVEN) 1 MG tablet - - 1 mg, $Given - - - - -   warfarin ANTICOAGULANT (COUMADIN/JANTOVEN) 3 MG tablet - - - 3 mg, $Given - - 6 mg, $Given -   warfarin ANTICOAGULANT  (COUMADIN/JANTOVEN) 5 MG tablet - - - - - 5 mg, $Given - -   INR 0.85 - 1.15 4.12  4.14  3.49  2.86  1.94  1.95  1.68  1.90  1.65  1.79       Details          Multiple values from one day are sorted in reverse-chronological order           ,   Results for orders placed or performed during the hospital encounter of 06/04/25   Chest XR,  PA & LAT    Narrative    EXAM: XR CHEST 2 VIEWS  LOCATION: [facility]  DATE: 6/4/2025    INDICATION: Near syncope and bradycardia today.  Worsening renal failure, elevated BNP.  COMPARISON: 2/25/2021      Impression    IMPRESSION:     Diffuse interstitial prominence, either pulmonary vasculature congestion or mild interstitial pulmonary edema.    No focal airspace disease. No pleural effusion or pneumothorax.    Stable mild cardiomegaly. Aortic calcifications.    Multilevel degenerative changes of the spine.   US Renal Complete Non-Vascular    Narrative    EXAM: US RENAL COMPLETE NON-VASCULAR  LOCATION: [facility]  DATE: 6/4/2025    INDICATION: FABY , R O obstruction  COMPARISON: None.  TECHNIQUE: Routine Bilateral Renal and Bladder Ultrasound.    FINDINGS:    RIGHT KIDNEY: 10.6 cm. No hydronephrosis or suspicious masses. Benign cysts requiring no dedicated follow-up. Renal cortical thinning.    LEFT KIDNEY: 9.7 cm. No hydronephrosis or suspicious masses. Benign cysts requiring no dedicated follow-up. Renal cortical thinning.    BLADDER: Unremarkable.      Impression    IMPRESSION:  1.  No hydronephrosis.   CT Chest w/o Contrast    Narrative    EXAM: CT CHEST W/O CONTRAST  LOCATION: [facility]  DATE: 6/7/2025    INDICATION: mass abnormality in right axillary area NO CONTRAST as came in with renal failure  COMPARISON: CT chest 02/05/2021.  TECHNIQUE: CT chest without IV contrast. Multiplanar reformats were obtained. Dose reduction techniques were used.  CONTRAST: None.    FINDINGS:   LUNGS AND PLEURA: Mild  interlobular septal thickening. Bilateral perihilar groundglass opacities. Moderate size right and small left pleural effusions. The left pleural effusion is mildly loculated and there is new thickening of the left basilar pleura.    MEDIASTINUM/AXILLAE: Normal heart size without pericardial effusion. No aneurysmal dilatation of the thoracic aorta mildly enlarged right lower paratracheal and subcarinal lymph nodes which are nonspecific but favored reactive. The paola are not well   assessed in the absence of intravenous contrast. Partially imaged nodular skin thickening of the right axilla (series 3, image 1). No adenopathy in the right axilla.    CORONARY ARTERY CALCIFICATION: Severe.    UPPER ABDOMEN: Normal.    MUSCULOSKELETAL: Multilevel degenerative changes of the spine. Asymmetric skin thickening of the left breast.      Impression    IMPRESSION:   1.  Partially imaged nodular skin thickening of the right axilla. This should be amenable to direct visual inspection and biopsy as clinically indicated.  2.  Asymmetric skin thickening of the left breast. Recommend correlation with physical exam.  3.  Moderate size right and small left pleural effusions. The left pleural effusion is mildly loculated and there is new thickening of the left basilar pleura. Recommend correlation with pleural fluid labs and cytology when clinically feasible.  4.  Mild interlobular septal thickening and bilateral perihilar groundglass opacities suggestive of pulmonary edema. Infection not excluded in the correct clinical context.  5.  Mildly enlarged mediastinal lymph nodes are nonspecific but favored reactive.       Echocardiogram Complete     Value    LVEF  60-65%    Narrative    [id number]  [id number]  [id number]  [id number]^[email or web address]^DAVID     [facility]  Echocardiography Laboratory  [address] [place], [place] [address]     Name: BEATA TRIPLETT  MRN: [id number]  : 1935  Study Date: 2025 11:02 AM  Age: 89  yrs  Gender: Female  Patient Location: [facility]  Reason For Study: Bradycardia - Sinus  Ordering Physician: DAVID GARCIA  Referring Physician: Mil García MD  Performed By: Shaina Ray     BSA: 1.8 m2  Height: 64 in  Weight: 169 lb  HR: 60  BP: 102/44 mmHg  ______________________________________________________________________________  Procedure  Echocardiogram with two-dimensional, color and spectral Doppler. Definity (NDC  #[id number]) given intravenously.  ______________________________________________________________________________  Interpretation Summary     Left ventricular systolic function is normal.  The visual ejection fraction is 60-65%.  The right ventricle is normal in structure, function and size.  Moderate valvular aortic stenosis.  There is moderate (2+) tricuspid regurgitation.  There is no pericardial effusion.  Dilation of the inferior vena cava is present with abnormal respiratory  variation in diameter.     Compared to study dated 9/12/2024, aortic stenosis is still moderate range.  Left atrial dilation is severe.  ______________________________________________________________________________  Left Ventricle  The left ventricle is normal in structure, function and size. There is normal  left ventricular wall thickness. Left ventricular systolic function is normal.  The visual ejection fraction is 60-65%. Left ventricular diastolic function is  indeterminate. Normal left ventricular wall motion.     Right Ventricle  The right ventricle is normal in structure, function and size.     Atria  The left atrium is severely dilated. The right atrium is mildly dilated.     Mitral Valve  There is mild mitral annular calcification. There is mild (1+) mitral  regurgitation.     Tricuspid Valve  The tricuspid valve is normal in structure and function. There is moderate  (2+) tricuspid regurgitation. Right ventricular systolic pressure could not be  approximated due to inadequate tricuspid regurgitation.      Aortic Valve  The aortic valve is trileaflet with aortic valve sclerosis. There is trace  aortic regurgitation. Moderate valvular aortic stenosis. The peak AoV pressure  gradient is 30.0 mmHg. The mean AoV pressure gradient is 19.0 mmHg. The  calculated aortic valve are is 1.7 cm^2.     Pulmonic Valve  The pulmonic valve is normal in structure and function. There is mild (1+)  pulmonic valvular regurgitation.     Vessels  The aortic root is normal size. Normal size ascending aorta. Dilation of the  inferior vena cava is present with abnormal respiratory variation in diameter.     Pericardium  There is no pericardial effusion.     ______________________________________________________________________________  MMode/2D Measurements & Calculations  IVSd: 1.1 cm  LVIDd: 4.5 cm  LVIDs: 3.1 cm  LVPWd: 1.0 cm  FS: 30.9 %  LV mass(C)d: 164.0 grams  LV mass(C)dI: 90.1 grams/m2     Ao root diam: 3.6 cm  asc Aorta Diam: 3.7 cm  LVOT diam: 2.1 cm  LVOT area: 3.4 cm2  Ao root diam index Ht(cm/m): 2.2  Ao root diam index BSA (cm/m2): 2.0  Asc Ao diam index BSA (cm/m2): 2.0  Asc Ao diam index Ht(cm/m): 2.3  LA Volume (BP): 93.3 ml  LA Volume Index (BP): 51.3 ml/m2  RV Base: 3.8 cm     RWT: 0.44  TAPSE: 2.3 cm     Doppler Measurements & Calculations  MV E max ferdinand: 136.0 cm/sec  MV A max ferdinand: 108.0 cm/sec  MV E/A: 1.3  MV dec time: 0.22 sec  Ao V2 max: 272.3 cm/sec  Ao max P.0 mmHg  Ao V2 mean: 204.9 cm/sec  Ao mean P.0 mmHg  Ao V2 VTI: 73.4 cm  BRAYDON(I,D): 1.7 cm2  BRAYDON(V,D): 1.6 cm2  LV V1 max P.7 mmHg  LV V1 max: 129.0 cm/sec  LV V1 VTI: 37.0 cm  SV(LVOT): 124.6 ml  SI(LVOT): 68.4 ml/m2  PA acc time: 0.13 sec  AV Ferdinand Ratio (DI): 0.47  BRAYDON Index (cm2/m2): 0.93  E/E' av.4  Lateral E/e': 14.0  Medial E/e': 22.7  RV S Ferdinand: 12.1 cm/sec     ______________________________________________________________________________  Report approved by: Kavita Christensen MD on 2025 03:58 PM           *Note: Due to a large  number of results and/or encounters for the requested time period, some results have not been displayed. A complete set of results can be found in Results Review.       Discharge Medications   Current Discharge Medication List        START taking these medications    Details   furosemide (LASIX) 40 MG tablet Take 1 tablet (40 mg) by mouth daily.    Associated Diagnoses: Acute renal failure superimposed on chronic kidney disease, unspecified acute renal failure type, unspecified CKD stage      hydrALAZINE (APRESOLINE) 10 MG tablet Take 2 tablets (20 mg) by mouth every 8 hours.    Associated Diagnoses: Hypertension goal BP (blood pressure) < 140/90      insulin aspart (NOVOLOG VIAL) 100 UNITS/ML vial Give before meals  For Pre-Meal Glucose:  140-189 give 1 unit   190-239 give 2 units   240-289 give 3 units   290-339 give 4 units   = or >340 give 5 units    Associated Diagnoses: Type 2 diabetes mellitus with diabetic nephropathy, without long-term current use of insulin (H)           CONTINUE these medications which have CHANGED    Details   acetaminophen (TYLENOL) 325 MG tablet Take 2 tablets (650 mg) by mouth every 6 hours as needed for mild pain.    Associated Diagnoses: Pain      doxazosin (CARDURA) 1 MG tablet Take 1 tablet (1 mg) by mouth at bedtime.    Associated Diagnoses: Hypertension goal BP (blood pressure) < 140/90      gabapentin (NEURONTIN) 100 MG capsule Take 2 capsules (200 mg) by mouth at bedtime.    Associated Diagnoses: Neuropathy      nystatin (MYCOSTATIN) 824647 UNIT/GM external powder Apply topically 3 times daily as needed. Apply a small amount to affected area three times daily.    Associated Diagnoses: Intertrigo      warfarin ANTICOAGULANT (JANTOVEN ANTICOAGULANT) 2.5 MG tablet Take 2 tablets (5 mg) by mouth every evening. On 6/11/25. INR check on 6/12/25 for further dosing of warfarin per TCU MD/provider.    Associated Diagnoses: Paroxysmal atrial fibrillation (H)           CONTINUE these  medications which have NOT CHANGED    Details   diltiazem ER (CARDIAZEM LA) 240 MG 24 hr tablet Take 1 tablet (240 mg) by mouth daily.  Qty: 60 tablet, Refills: 0    Comments: New dose  Associated Diagnoses: Hypertension goal BP (blood pressure) < 140/90      MULTI-VITAMIN OR TABS Take 2 tablets by mouth daily       pravastatin (PRAVACHOL) 20 MG tablet Take 1 tablet (20 mg) by mouth daily.  Qty: 90 tablet, Refills: 3    Associated Diagnoses: Hyperlipidemia with target LDL less than 100      sertraline (ZOLOFT) 50 MG tablet Take 1 tablet (50 mg) by mouth daily.  Qty: 90 tablet, Refills: 1    Associated Diagnoses: Anxiety      blood glucose (NO BRAND SPECIFIED) lancets standard Use to test blood sugar 1 times daily or as directed, Contour Next lancets  Qty: 100 each, Refills: 3    Associated Diagnoses: Type 2 diabetes mellitus with diabetic nephropathy, without long-term current use of insulin (H)      blood glucose (NO BRAND SPECIFIED) test strip Use to test blood sugar 1 times daily or as directed, Contour Next strips  Qty: 100 strip, Refills: 1    Associated Diagnoses: Type 2 diabetes mellitus with diabetic nephropathy, without long-term current use of insulin (H)      blood glucose monitoring (NO BRAND SPECIFIED) meter device kit Use to test blood sugar 1 times daily or as directed. Ultra 2  Qty: 1 kit, Refills: 1    Associated Diagnoses: Type 2 diabetes mellitus with diabetic neuropathy, without long-term current use of insulin (H)           STOP taking these medications       betamethasone valerate (VALISONE) 0.1 % external cream Comments:   Reason for Stopping:         loperamide (IMODIUM A-D) 2 MG tablet Comments:   Reason for Stopping:         metoprolol succinate ER (TOPROL XL) 25 MG 24 hr tablet Comments:   Reason for Stopping:         olmesartan (BENICAR) 20 MG tablet Comments:   Reason for Stopping:         torsemide (DEMADEX) 10 MG tablet Comments:   Reason for Stopping:             Allergies   Allergies    Allergen Reactions    Amoxicillin      Other reaction(s): cannot remember    Amoxicillin-Pot Clavulanate Diarrhea     Vomiting      Clindamycin Phosphate      Hives    Metformin Diarrhea    Tegretol [Carbamazepine]      Irregular heart beat

## 2025-06-11 NOTE — PROGRESS NOTES
ANTICOAGULATION  MANAGEMENT: Discharge Review    Ирина Yanez chart reviewed for anticoagulation continuity of care    Hospital Admission on 6/4-6/11/25 for bradycardia, acute renal failure and hyperkalemia.    Discharge disposition: TCU    Results:    Recent labs: (last 7 days)     06/04/25  1745 06/05/25  1041 06/05/25  1605 06/06/25  0543 06/07/25  0624 06/08/25  0645 06/08/25  1116 06/09/25  0555 06/09/25  1238 06/10/25  0547 06/11/25  0557   INR 4.30* 4.14* 4.12* 3.49* 2.86* 1.95* 1.94* 1.90* 1.68* 1.65* 1.79*     Anticoagulation inpatient management:     anticoagulation calendar updated with inpatient dosing  Warfarin held x 3 while inpatient--see calendar.    Anticoagulation discharge instructions:     Warfarin dosing: home regimen continued   Bridging: No   INR goal change: No      Medication changes affecting anticoagulation: Yes: torsemide discontinued, changed to oral Lasix 40 mg daily on 6/9/25    Additional factors affecting anticoagulation: Yes: right axillary mass, patient to follow up with a breast surgeon     PLAN     No adjustment to anticoagulation plan needed    Patient not contacted  [facility] will resume monitoring upon discharge from TCU    Anticoagulation Calendar updated    Summer Mina RN  6/11/2025  Anticoagulation Clinic  Fillm Pedro for routing messages: kam [email or web address]  [facility] patient phone line: [phone]

## 2025-06-11 NOTE — PROGRESS NOTES
Care Management Discharge Note    Discharge Date: 06/11/2025       Discharge Disposition: Transitional Care    Discharge Services: None    Discharge DME:     Discharge Transportation: family or friend will provide    Private pay costs discussed: transportation costs    Does the patient's insurance plan have a 3 day qualifying hospital stay waiver?  Yes     Which insurance plan 3 day waiver is available? Alternative insurance waiver    Will the waiver be used for post-acute placement? Yes    PAS Confirmation Code: [id number]  Patient/family educated on Medicare website which has current facility and service quality ratings: yes    Education Provided on the Discharge Plan: Yes  Persons Notified of Discharge Plans: Patient, family, TCU  Patient/Family in Agreement with the Plan: yes    Handoff Referral Completed: Yes, [facility] PCP: Internal handoff referral completed    Additional Information:  Writer called [facility] and left  for admissions confirming plan for today.  [place] [facility] has been arranged for today from [id number]. RNCC met with patient regarding transportation and patient will let RN know if we need to cancel ride if family decides to transport.    Writer paged  for discharge orders.     PAS completed and placed on chart. Updated from bedside RN that family will transport patient at 1100. Call received from Geeta with [facility] confirming discharge plans for today and is in agreement. Discharge orders received and faxed via [facility] to [facility]. Called to [facility] and spoke with Ada. Wheelchair ride cancelled since family will transport at 1100 per RN.       PAS-RR    D: Per DHS regulation, AVANI completed and submitted PAS-RR to [place] [employer] Direct Connect via the [employer] LinkAge Line.  PAS-RR confirmation # is : [id number]    I: AVANI spoke with patient and they are aware a PAS-RR has been submitted.  AVANI reviewed with patient that they may be contacted for a  follow up appointment within 10 days of hospital discharge if their SNF stay is < 30 days.  Contact information for Senior LinkAge Line was also provided.    A: patient verbalized understanding.    P: Further questions may be directed to Senior LinkAge Line at #[phone], option #4 for [facility]- staff.       ROXI Parson, [facility]  Social Work- Inpatient Care Coordination  [facility]

## 2025-06-11 NOTE — PLAN OF CARE
Goal Outcome Evaluation: Pt is A&Ox4, Tylenol 650 mg given for generalized pain at HS, VSS and on tele SR , PO Diltiazem 240 mg daily, on RA, up with SB assist GB and RW. Ambulated in cardenas. Plan for TCU discharge at noon.       Plan of Care Reviewed With: patient    Overall Patient Progress: no changeOverall Patient Progress: no change

## 2025-06-11 NOTE — PROGRESS NOTES
Added cardura 1 mg at night to BP meds (on this PTA) at higher dose.   Also started on hydralazine 20 mg po TID >> increase to 40 mg TID tomorrow if no improvement   Review BP meds in am priot to discharge.   Dr. Murguia    EP/CARDIOLOGY need to order zio patch

## 2025-06-12 ENCOUNTER — ORDERS ONLY (AUTO-RELEASED) (OUTPATIENT)
Dept: CARDIOVASCULAR ICU | Facility: CLINIC | Age: 89
End: 2025-06-12

## 2025-06-12 ENCOUNTER — PATIENT OUTREACH (OUTPATIENT)
Dept: CARE COORDINATION | Facility: CLINIC | Age: 89
End: 2025-06-12

## 2025-06-12 DIAGNOSIS — I48.0 PAROXYSMAL ATRIAL FIBRILLATION (H): ICD-10-CM

## 2025-06-12 NOTE — LETTER
[facility]   To: Please give to facility    From:   Abeba Christensen  RN  Care Coordinator   [facility]   P: [phone]  [email or web address]   Patient Name:  Ирина Yanez  YOB: 1935   Admit date: 06/11/2025      *Information Needed:  Please contact me when the patient will discharge (or if they will move to long term care)- include the discharge date, disposition, and main diagnosis   If the patient is discharged with home care services, please provide the name of the agency    Also- Please inform me if a care conference is being held.   Phone, Fax or Email with information                        Thank you

## 2025-06-12 NOTE — PROGRESS NOTES
"Clinic Care Coordination Contact  Care Coordination Transition Communication    Clinical Data: Patient was hospitalized at [facility] from 06/04/2025 to 06/11/2025 with diagnosis of: \"Symptomatic bradycardia (on admit), Severe bradycardia with junctional escape beats (on admit), Hypotension-resolved, Presyncope secondary to above, Elevated troponin Suspected supply/demand, Moderate aortic stenosis/moderate TR, Acute hypoxic respiratory failure (Resolving), Bilateral pleural effusions (left is loculated and right moderate), Acute kidney injury on chronic kidney disease stage IV (improved and stable), Hyperkalemia (resolved), Hyperkalemia (resolved), Baseline stage IV CKD, Recent addition ARB (benicar 20 mg) now STOPPED, Paroxysmal atrial fibrillation on chronic anticoagulation with Coumadin, Supratherapeutic INR on admit (4.3),  DM: Type 2, Hyperlipidemia, Chronic anemia, 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     Assessment: Patient has transitioned to TCU/ARU for short term rehabilitation:    Facility Name: [facility]  Transition Communication:  Notified facility of Primary Care- Care Coordination support via Epic fax.    Plan: Care Coordinator will await notification from facility staff informing of patient's discharge plans/needs. Care Coordinator will review chart and outreach to facility staff every 4 weeks and as needed.     Abeba Christensen RN Care Coordinator  [facility] [place], [place], [place]  Email: [email or web address]  Phone: [phone]   "

## 2025-06-13 ENCOUNTER — TELEPHONE (OUTPATIENT)
Dept: CARDIOLOGY | Facility: CLINIC | Age: 89
End: 2025-06-13
Payer: COMMERCIAL

## 2025-06-13 NOTE — TELEPHONE ENCOUNTER
Patient was admitted to [facility] on 6/4/25 with presyncope. EP consulted to see the patient for bradyarrhythmia.     PMH: T2DM, CKD stage IV, HTN, aortic stenosis, paroxysmal atrial fibrillation on AC w/ Warfarin, chronic  HFpEF.     6/5/25: Echo showed EF of 60-65%, moderate aortic stenosis.     Telemetry showed alternating sinus rhythm and atrial fibrillation, rates controlled in the 80's this morning, in the 100's last night. Longest pause 1.5 seconds.     Pt was started on Lasix, Hydralazine. PTA Metoprolol, Demadex and Olmesartan at time of discharge. 7 day Zio Patch to be mailed out.     Pt is scheduled for an OV on 7/14/25 at 1430 with MAYELA Anne Gambino at our [place] [facility].    Pt was discharged to [facility].    Writer called TCU and OV as above was verified with the [facility]. No further questions. KEMI Perez RN.

## 2025-06-23 ENCOUNTER — OFFICE VISIT (OUTPATIENT)
Dept: FAMILY MEDICINE | Facility: CLINIC | Age: 89
End: 2025-06-23
Payer: COMMERCIAL

## 2025-06-23 VITALS
SYSTOLIC BLOOD PRESSURE: 160 MMHG | RESPIRATION RATE: 14 BRPM | BODY MASS INDEX: 26.98 KG/M2 | OXYGEN SATURATION: 99 % | HEART RATE: 65 BPM | WEIGHT: 158 LBS | HEIGHT: 64 IN | TEMPERATURE: 97.7 F | DIASTOLIC BLOOD PRESSURE: 80 MMHG

## 2025-06-23 DIAGNOSIS — R29.6 FALLS FREQUENTLY: ICD-10-CM

## 2025-06-23 DIAGNOSIS — Z79.899 MEDICATION MANAGEMENT: ICD-10-CM

## 2025-06-23 DIAGNOSIS — I50.22 CHRONIC SYSTOLIC HEART FAILURE (H): ICD-10-CM

## 2025-06-23 DIAGNOSIS — R22.31 MASS OF RIGHT AXILLA: ICD-10-CM

## 2025-06-23 DIAGNOSIS — E11.21 TYPE 2 DIABETES MELLITUS WITH DIABETIC NEPHROPATHY, WITHOUT LONG-TERM CURRENT USE OF INSULIN (H): Primary | ICD-10-CM

## 2025-06-23 DIAGNOSIS — N17.9 ACUTE RENAL FAILURE SUPERIMPOSED ON CHRONIC KIDNEY DISEASE, UNSPECIFIED ACUTE RENAL FAILURE TYPE, UNSPECIFIED CKD STAGE: ICD-10-CM

## 2025-06-23 DIAGNOSIS — E11.42 DIABETIC POLYNEUROPATHY ASSOCIATED WITH TYPE 2 DIABETES MELLITUS (H): ICD-10-CM

## 2025-06-23 DIAGNOSIS — N18.4 CKD (CHRONIC KIDNEY DISEASE) STAGE 4, GFR 15-29 ML/MIN (H): ICD-10-CM

## 2025-06-23 DIAGNOSIS — N18.9 ACUTE RENAL FAILURE SUPERIMPOSED ON CHRONIC KIDNEY DISEASE, UNSPECIFIED ACUTE RENAL FAILURE TYPE, UNSPECIFIED CKD STAGE: ICD-10-CM

## 2025-06-23 DIAGNOSIS — I10 HYPERTENSION GOAL BP (BLOOD PRESSURE) < 140/90: ICD-10-CM

## 2025-06-23 PROBLEM — E78.5 HYPERLIPIDEMIA: Status: ACTIVE | Noted: 2021-01-15

## 2025-06-23 PROBLEM — I35.0 AORTIC VALVE STENOSIS, ETIOLOGY OF CARDIAC VALVE DISEASE UNSPECIFIED: Status: ACTIVE | Noted: 2017-01-02

## 2025-06-23 PROBLEM — K63.9 DISORDER OF INTESTINE: Status: ACTIVE | Noted: 2025-05-23

## 2025-06-23 PROBLEM — R91.8 LUNG MASS: Status: RESOLVED | Noted: 2020-12-10 | Resolved: 2025-06-23

## 2025-06-23 PROBLEM — R21 SKIN ERUPTION: Status: ACTIVE | Noted: 2025-06-11

## 2025-06-23 PROBLEM — R07.89 CHEST TIGHTNESS: Status: RESOLVED | Noted: 2020-12-10 | Resolved: 2025-06-23

## 2025-06-23 PROBLEM — R09.02 HYPOXEMIA: Status: RESOLVED | Noted: 2020-12-29 | Resolved: 2025-06-23

## 2025-06-23 PROBLEM — E87.5 HYPERKALEMIA: Status: RESOLVED | Noted: 2025-06-04 | Resolved: 2025-06-23

## 2025-06-23 PROBLEM — R01.1 HEART MURMUR: Status: RESOLVED | Noted: 2017-01-02 | Resolved: 2025-06-23

## 2025-06-23 PROCEDURE — 1125F AMNT PAIN NOTED PAIN PRSNT: CPT | Performed by: NURSE PRACTITIONER

## 2025-06-23 PROCEDURE — 3077F SYST BP >= 140 MM HG: CPT | Performed by: NURSE PRACTITIONER

## 2025-06-23 PROCEDURE — 3078F DIAST BP <80 MM HG: CPT | Performed by: NURSE PRACTITIONER

## 2025-06-23 PROCEDURE — 99495 TRANSJ CARE MGMT MOD F2F 14D: CPT | Performed by: NURSE PRACTITIONER

## 2025-06-23 RX ORDER — WARFARIN SODIUM 5 MG/1
TABLET ORAL
COMMUNITY
Start: 2025-06-19

## 2025-06-23 RX ORDER — BETAMETHASONE DIPROPIONATE 0.5 MG/G
LOTION TOPICAL
COMMUNITY
Start: 2025-02-04

## 2025-06-23 RX ORDER — HYDRALAZINE HYDROCHLORIDE 10 MG/1
10 TABLET, FILM COATED ORAL EVERY 8 HOURS
Qty: 90 TABLET | Refills: 0 | Status: SHIPPED | OUTPATIENT
Start: 2025-06-23 | End: 2025-06-26

## 2025-06-23 ASSESSMENT — ENCOUNTER SYMPTOMS
CONFUSION: 0
GASTROINTESTINAL NEGATIVE: 1
COUGH: 0
PALPITATIONS: 0
LIGHT-HEADEDNESS: 0
CHILLS: 0
APPETITE CHANGE: 0
FEVER: 0
SHORTNESS OF BREATH: 0
WHEEZING: 0
DIZZINESS: 0
HEADACHES: 0

## 2025-06-23 ASSESSMENT — PAIN SCALES - GENERAL: PAINLEVEL_OUTOF10: MODERATE PAIN (4)

## 2025-06-23 NOTE — ASSESSMENT & PLAN NOTE
Most recent BMP stable:    Component      Latest Ref Rng 6/11/2025  5:57 AM   Sodium      135 - 145 mmol/L 144    Potassium      3.4 - 5.3 mmol/L 3.7    Chloride      98 - 107 mmol/L 108 (H)    Carbon Dioxide (CO2)      22 - 29 mmol/L 22    Anion Gap      7 - 15 mmol/L 14    Urea Nitrogen      8.0 - 23.0 mg/dL 64.8 (H)    Creatinine      0.51 - 0.95 mg/dL 2.53 (H)    GFR Estimate      >60 mL/min/1.73m2 18 (L)    Calcium      8.8 - 10.4 mg/dL 9.0    Glucose      70 - 99 mg/dL 152 (H)       Next Nephrology appointment 9/24/2025.

## 2025-06-23 NOTE — ASSESSMENT & PLAN NOTE
Has run out of her Hydralazine over the weekend.  Was on 20 mg 3 times daily.  Has been off since Friday.  And today is Monday.  Blood pressure today is 170/65 and recheck was 160/80.  Will restart hydralazine at 10 mg 3 times daily.  Patient is monitoring blood pressures at home and can message us with the results of restarting the medication.  She also has a follow-up visit scheduled with her primary this week and can work with him for management of her ongoing BP issues

## 2025-06-23 NOTE — ASSESSMENT & PLAN NOTE
No need for insulin since she has been home from the nursing home.  Currently has follow-up visit with PCP for ongoing management on 6/26/2025

## 2025-06-23 NOTE — ASSESSMENT & PLAN NOTE
Has appointment scheduled with surgery for further evaluation on 7/3/2025. Pain or increased size of mass and no drainage.

## 2025-06-23 NOTE — ASSESSMENT & PLAN NOTE
Here using a cane.  Gait is reasonably stable at this time.  Patient admits that she is more steady using walker.  Was unable to get the walker in the car today.  Encouraged her on continued walker use to stabilize herself to avoid falls.

## 2025-06-23 NOTE — PROGRESS NOTES
Assessment & Plan   Type 2 diabetes mellitus with diabetic nephropathy, without long-term current use of insulin (H)  No longer needing insulin at this time.  Has follow-up with primary for ongoing management scheduled    Diabetic polyneuropathy associated with type 2 diabetes mellitus (H)  As above    Hypertension goal BP (blood pressure) < 140/90  Significantly elevated blood pressure, but patient ran out of hydralazine.  Restarting 10 mg 3 times daily patient will monitor at home.  Upcoming appointment with primary for further titration of medications and ongoing  - hydrALAZINE (APRESOLINE) 10 MG tablet  Dispense: 90 tablet; Refill: 0    Medication management  Refer for medication review.  - Med Therapy Management Referral      CKD (chronic kidney disease) stage 4, GFR 15-29 ml/min (H)  Most recent BMP is stable.  Managed by nephrology.  Patient is aware of her follow-up visit status with nephrology at this time    Mass of right axilla  Mass of right axilla palpated no acute changes per patient.  Has surgery.    Falls frequently  Using cane today adequately.  Recommended she use walker as much as she can.    Acute renal failure superimposed on chronic kidney disease, unspecified acute renal failure type, unspecified CKD stage  Acute episode resolved.    Chronic systolic heart failure (H)  No evidence of any exacerbation of heart failure at this time.  Follow-up appointment with cardiology for 714 is noted    No follow-ups on file.  Patient Instructions   Please start the hydralazine 10 mg three times per day again.    Please message us with your home BP's (call our nurses or message us on 5app)  Please keep your appointment with Dr. García.     SAMY Carter [facility] [place]  ============================================     Subjective   Ирина is a 89 year old female, presenting for the following health issues:  No chief complaint on file.    HPI    Here for follow-up on recent  hospitalization and nursing home stay.  Here to assure that she is aware of her follow-up plan and adequate medication reconciliation.  Hospital Follow-up Visit:  Date and Place:  Summary of hospitalization:  [facility] admit 6/4/2025 and discharge 6/11/2025-- in nursing home for one week--PT and OT.  Back home now.  Has food at home that is adequate.  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   passed in December.  Has a cat and her son helps with care.    Follow Up and recommended labs and tests       1. Follow up with skilled nursing physician.  The following labs/tests are recommended:   - INR check on 6/12/25 for further warfarin dosing. Goal INR 2-3  - BMP and CBC in a week, then per facility provider     2. Follow up with Nephrology clinic in 1-2 weeks ([facility], [phone]) . Call to make appointment.                 Unresulted Labs Ordered in the Past 30 Days of this Admission         No orders found from 5/5/2025 to 6/5/2025.        Discharge Disposition  Discharged to home  Condition at discharge: Stable     Hospital Course  Ирина Yanez is a 89 year old female with history of type 2 diabetes mellitus, CKD stage IV (followed by nephrology), hypertension, aortic stenosis, paroxysmal atrial fibrillation on chronic anticoagulation with Coumadin, hyperlipidemia, anxiety, chronic heart failure with preserved EF who presented to the hospital with a presyncopal episode on 6/4.       She was outside working in the sun for couple hours pulling weeds.  Reportedly got too weak to get up from the floor. Called paramedics.  Blood pressure 88/36 with a pulse of 32. Enroute SBP 66/40 for total of 1.5 mg of atropine. . Given 600 ml of fluids.   Admitted with severe bradycardia, hypotension, FABY with hyperkalemia.      Symptomatic bradycardia (on admit)   Severe bradycardia with junctional escape beats (on admit)   Hypotension-resolved  Presyncope secondary to  above  At presentation heart rate in the high 30s to 40s and blood pressure 90s.  At some point had dipped down to 70s but improved with IV fluid hydration and currently in the 120s and patient currently asymptomatic.    Chest x-ray with diffuse interstitial prominence either pulmonary vascular congestion or mild interstitial pulmonary edema.  Patient however denies any cough, chest pain, shortness of breath  Initial heart rate very bradycardic with bifascicular block on EKG.  Admit ECG showed severe sinus bradycardia with junctional escape of 40 bpm.>>  After potassium improved did develop sinus bradycardia but also holding jacque agents.  On diltiazem and toprol XL with renal insufficiency/hyperkalemia>> Multi potential components to her bradycardia and with renal insufficiency extended half-life  6/5 general cardiology consult with EP requested  6/6 EP consult>>   EKG with severe sinus bradycardia with junctional escape rhythm of 40 bpm. >>  After potassium correction showing sinus rhythm with first-degree AV block and right bundle branch block.  6/6 on rounding noted that the patient appeared to be in A-fib with RVR. (History of PAF)   Discussion with EP.  Started on diltiazem 30 mg 4 times daily, Still with intermittent afib on monitor although rate does not go faster   6/9 follow-up by EP >> changed to diltiazem 240 XR daily which she will continue at discharge  She may still go in and out of afib but if not sustained tachycardia or symptomatic EP rec no intervention.   Follow up with EP after discharge     Elevated troponin   Suspected supply/demand  Moderate aortic stenosis/moderate TR  Troponin 41>> 40  6/5 echo showing normal EF of 60 to 65%.  RV normal structure, function and size.  Moderate TR.  No effusion. Moderate valvular aortic stenosis         Acute hypoxic respiratory failure (Resolving)   Bilateral pleural effusions (left is loculated and right moderate)   CT chest with moderate size right  effusion and small left effusions. The left pleural effusion is mildly loculated and there is new thickening of the left basilar pleura. Recommend correlation with pleural fluid labs and cytology when clinically feasible.  Mild interlobular septal thickening and bilateral perihilar groundglass opacities suggestive of pulmonary edema. Infection not excluded in the correct clinical context.  6/9 pulmonary consult: optimize diuretics. No need to tap. Noted in past and transudative to exudative after multiple thoracentesis) NO INTERVENTION: diurese.   6/8 discussion with nephrology/pulmonary and started on diuretics   Lasix 20 mg IV TID >> changed to 40 mg p.o. daily on 6/9 which will be continued at discharge  Weaned off oxygen on 6/10/25        Acute kidney injury on chronic kidney disease stage IV (improved and stable)   Hyperkalemia (resolved)   Followed by nephrology in clinic PTA with CKD   Recently started on Benicar for suboptimal blood pressure management.  At baseline has a history of stage IV renal disease followed by nephrology.  Whether she got hypotensive with bradycardia/dehydration or increasing creatinine and less clearance (dilt/toprol) suspect combination caused hemodynamic changes which led to poor perfusion of her kidneys and thus further poor clearance of medications including jacque agent  Creatinine of 3.28 on admission (5/23 creatinine 1.67) Addition of ARB as outpatient   Given some fluids but mainly held meds and allowed bradycardia to recover and perfusion improve   DID NOT restart ARB/ACE.  Plans to not over treat HR for bradycardia concerns.   Creatine stable on day of discharge at 2.53  Follow up nephrology in 1-2 week as per nephrology  BMP in a week at TCU        Hyperkalemia (resolved)   Baseline stage IV CKD  Recent addition ARB (benicar 20 mg) now STOPPED   Patient on ARB with likely dehydration and hypoperfusion  Given treatment in the emergency room  Required [facility] this admission    Potassium 3.7 on day of discharge     Paroxysmal atrial fibrillation on chronic anticoagulation with Coumadin  Supratherapeutic INR on admit (4.3)   INR elevated on admission (pharmacy to dose)   Continue with PTA Warfarin at discharge, INR 1.79 on day of discharge. Discussed with pharmacist, continue warfarin 5mg tonight, check INR tomorrow on 6/12 at TCU and further dosing per TCU provider. Goal INR 2-3        DM:   Type 2   On accuchecks with slightly elevated glucose.   On no meds PTA  Sliding scale insulin ac meals at discharge, QID and prn glucose checks        Hyperlipidemia  Resume PTA statin     Chronic anemia  Baseline hemoglobin around 10-11.  Admitted with a hemoglobin of 9.1.  Patient denies any active bleed   6/11 with hgb 9g/dL  Follow up after discharge. ? CKD nephrology        Right axillary mass in arm pit. (Newly diagnosed)   Pictures in WOC   6/7 patient does have a very firm area with approximately 2 cm margin in her right axillary area.  Small appendages of skin tag/tissue appearance  6/7 CT chest:   1.  Partially imaged nodular skin thickening of the right axilla. This should be amenable to direct visual inspection and biopsy as clinically indicated.  2  Asymmetric skin thickening of the left breast. Recommend correlation with physical exam.Mildly enlarged mediastinal lymph nodes are nonspecific but favored reactive  6/9 General surgery consult   This 89 year old year old female has a contracted mass of right axillary tissue.  Right axillary biopsy should be considered, along with mammography with possible right axillary ultrasound.  Referral to breast surgeons - placed.  Imaging ordered for outpatient.  Was the patient in the ICU or did the patient experience delirium during hospitalization? No    Update since discharge: improved.    Post Discharge Plans:  Meds:  Medication changes since discharge: None  Problems taking medications regularly:  they did not send all medication home with her  after the TCU. She is currently using old prescriptions until she can  from TCU tomorrow.     Other Treatments and Plans:  Problems adhering to non-medication therapy:  slow, but better  Diagnostic Tests/Treatments reviewed.  Follow up needed: none  Other Provider follow up plans needed/scheduled?  Do you have any other stressors you would like to discuss with your provider? Health Concerns - neuropathy, having diarrhea everyday    Type 2 diabetes mellitus with diabetic nephropathy, without long-term current use of insulin (H)  No need for insulin since she has been home from the nursing home.  Currently has follow-up visit with PCP for ongoing management on 6/26/2025    CKD (chronic kidney disease) stage 4, GFR 15-29 ml/min (H)  Most recent BMP stable:    Component      Latest Ref Rng 6/11/2025  5:57 AM   Sodium      135 - 145 mmol/L 144    Potassium      3.4 - 5.3 mmol/L 3.7    Chloride      98 - 107 mmol/L 108 (H)    Carbon Dioxide (CO2)      22 - 29 mmol/L 22    Anion Gap      7 - 15 mmol/L 14    Urea Nitrogen      8.0 - 23.0 mg/dL 64.8 (H)    Creatinine      0.51 - 0.95 mg/dL 2.53 (H)    GFR Estimate      >60 mL/min/1.73m2 18 (L)    Calcium      8.8 - 10.4 mg/dL 9.0    Glucose      70 - 99 mg/dL 152 (H)       Next Nephrology appointment 9/24/2025.      Mass of right axilla  Has appointment scheduled with surgery for further evaluation on 7/3/2025. Pain or increased size of mass and no drainage.      Falls frequently  Here using a cane.  Gait is reasonably stable at this time.  Patient admits that she is more steady using walker.  Was unable to get the walker in the car today.  Encouraged her on continued walker use to stabilize herself to avoid falls.    Hypertension goal BP (blood pressure) < 140/90  Has run out of her Hydralazine over the weekend.  Was on 20 mg 3 times daily.  Has been off since Friday.  And today is Monday.  Blood pressure today is 170/65 and recheck was 160/80.  Will restart  "hydralazine at 10 mg 3 times daily.  Patient is monitoring blood pressures at home and can message us with the results of restarting the medication.  She also has a follow-up visit scheduled with her primary this week and can work with him for management of her ongoing BP issues    Acute renal failure superimposed on chronic kidney disease, unspecified acute renal failure type, unspecified CKD stage  Most recent BMP is stable.    Chronic systolic heart failure (H)  No increased shortness of breath or exercise intolerance at this time.  Is back home and is able to do activities of daily living without significant difficulty.  She is not currently having to cook for herself and has food provided.  Her son is in the area and is checking in with her.    Review of Systems   Constitutional:  Negative for appetite change, chills and fever.   HENT: Negative.     Respiratory:  Negative for cough, shortness of breath and wheezing.    Cardiovascular:  Negative for chest pain, palpitations and peripheral edema.   Gastrointestinal: Negative.    Musculoskeletal:         Using cane for walking today.  Is using walker in her home   Skin:         Mass right axilla without changes   Neurological:  Negative for dizziness, syncope, light-headedness and headaches.   Psychiatric/Behavioral:  Negative for confusion.      ============================================  Objective    BP (!) 160/80   Pulse 65   Temp 97.7  F (36.5  C) (Oral)   Resp 14   Ht 1.626 m (5' 4\")   Wt 71.7 kg (158 lb)   LMP  (LMP Unknown)   SpO2 99%   BMI 27.12 kg/m    Body mass index is 27.12 kg/m .  Physical Exam  Constitutional:       General: She is not in acute distress.     Appearance: She is not diaphoretic.   HENT:      Mouth/Throat:      Mouth: Mucous membranes are moist.      Pharynx: Oropharynx is clear.   Eyes:      Conjunctiva/sclera: Conjunctivae normal.   Cardiovascular:      Rate and Rhythm: Normal rate and regular rhythm.      Heart sounds: " Murmur heard.   Pulmonary:      Effort: Pulmonary effort is normal.      Breath sounds: Normal breath sounds.   Abdominal:      Palpations: Abdomen is soft.      Tenderness: There is no abdominal tenderness.   Musculoskeletal:      Comments: Trace ankle edema if that today.   Skin:     General: Skin is warm and dry.      Comments: Firm mass noted right axilla.  No drainage.   Neurological:      General: No focal deficit present.      Mental Status: She is alert.   Psychiatric:         Mood and Affect: Mood normal.          ============================================  Signed Electronically by: SAMY Carter CNP

## 2025-06-23 NOTE — PATIENT INSTRUCTIONS
Please start the hydralazine 10 mg three times per day again.    Please message us with your home BP's (call our nurses or message us on Rebel Monkey)  Please keep your appointment with Dr. García.

## 2025-06-23 NOTE — ASSESSMENT & PLAN NOTE
No increased shortness of breath or exercise intolerance at this time.  Is back home and is able to do activities of daily living without significant difficulty.  She is not currently having to cook for herself and has food provided.  Her son is in the area and is checking in with her.

## 2025-06-24 ENCOUNTER — PATIENT OUTREACH (OUTPATIENT)
Dept: CARE COORDINATION | Facility: CLINIC | Age: 89
End: 2025-06-24
Payer: COMMERCIAL

## 2025-06-24 ENCOUNTER — MYC MEDICAL ADVICE (OUTPATIENT)
Dept: ANTICOAGULATION | Facility: CLINIC | Age: 89
End: 2025-06-24
Payer: COMMERCIAL

## 2025-06-24 NOTE — PROGRESS NOTES
Clinic Care Coordination Contact  Care Coordination Clinician Chart Review    Situation: Patient chart reviewed by care coordinator.    Background: Patient was discharged from the hospital to [facility], and RNCC has been monitoring for TCU discharge to identify any outstanding Clinic Care Coordination needs/concerns. Refer to initial patient outreach encounter by this writer dated 06/12/2025 for additional details.     Assessment: Upon chart review, patient discharged from TCU on 06/20/25 and completed provider discharge follow up appointment 06/23/25, thus no Transition of Care Management outreach is indicated. Patient may benefit from Care Coordination enrollment to support health & wellbeing as noted with overdue care gaps, need for INR monitoring, elevated blood pressure as a result of running out of medication, & recommendation to follow up with nephrologist specialty provider who is within another health care system.     Plan/Recommendations: RNCC will plan to introduce self & offer Care Coordination enrollment at scheduled provider follow up appointment 06/26/25. RNCC will remain available as needed.      Abeba Christensen RN Care Coordinator  [facility] [place]  Email: [email or web address]  Phone: [phone]

## 2025-06-25 ENCOUNTER — TELEPHONE (OUTPATIENT)
Dept: FAMILY MEDICINE | Facility: CLINIC | Age: 89
End: 2025-06-25
Payer: COMMERCIAL

## 2025-06-25 NOTE — TELEPHONE ENCOUNTER
Forms/Letter Request    Type of form/letter: Therapy Plan      Do we have the form/letter: Yes:  basket    Who is the form from? [facility]     Where did/will the form come from? form was faxed in    When is form/letter needed by: ASAP    How would you like the form/letter returned: Fax : [phone]    Erika Dang Patient Rep.

## 2025-06-25 NOTE — TELEPHONE ENCOUNTER
Form placed in Provider basket for review and signature.    [facility]   Therapy Plan   Fax : [phone]     Sudha Chacon

## 2025-06-26 ENCOUNTER — ANTICOAGULATION THERAPY VISIT (OUTPATIENT)
Dept: ANTICOAGULATION | Facility: CLINIC | Age: 89
End: 2025-06-26

## 2025-06-26 ENCOUNTER — MEDICAL CORRESPONDENCE (OUTPATIENT)
Dept: HEALTH INFORMATION MANAGEMENT | Facility: CLINIC | Age: 89
End: 2025-06-26

## 2025-06-26 ENCOUNTER — RESULTS FOLLOW-UP (OUTPATIENT)
Dept: ANTICOAGULATION | Facility: CLINIC | Age: 89
End: 2025-06-26

## 2025-06-26 ENCOUNTER — PATIENT OUTREACH (OUTPATIENT)
Dept: CARE COORDINATION | Facility: CLINIC | Age: 89
End: 2025-06-26

## 2025-06-26 ENCOUNTER — OFFICE VISIT (OUTPATIENT)
Dept: FAMILY MEDICINE | Facility: CLINIC | Age: 89
End: 2025-06-26
Attending: FAMILY MEDICINE
Payer: COMMERCIAL

## 2025-06-26 VITALS
OXYGEN SATURATION: 98 % | HEIGHT: 64 IN | BODY MASS INDEX: 26.91 KG/M2 | DIASTOLIC BLOOD PRESSURE: 73 MMHG | HEART RATE: 69 BPM | RESPIRATION RATE: 16 BRPM | WEIGHT: 157.6 LBS | TEMPERATURE: 98.8 F | SYSTOLIC BLOOD PRESSURE: 188 MMHG

## 2025-06-26 DIAGNOSIS — I48.0 PAROXYSMAL ATRIAL FIBRILLATION (H): Primary | ICD-10-CM

## 2025-06-26 DIAGNOSIS — T80.90XA INJECTION SITE REACTION, INITIAL ENCOUNTER: ICD-10-CM

## 2025-06-26 DIAGNOSIS — N18.9 ACUTE RENAL FAILURE SUPERIMPOSED ON CHRONIC KIDNEY DISEASE, UNSPECIFIED ACUTE RENAL FAILURE TYPE, UNSPECIFIED CKD STAGE: ICD-10-CM

## 2025-06-26 DIAGNOSIS — N17.9 ACUTE RENAL FAILURE SUPERIMPOSED ON CHRONIC KIDNEY DISEASE, UNSPECIFIED ACUTE RENAL FAILURE TYPE, UNSPECIFIED CKD STAGE: ICD-10-CM

## 2025-06-26 DIAGNOSIS — Z79.01 LONG TERM CURRENT USE OF ANTICOAGULANTS WITH INR GOAL OF 2.0-3.0: ICD-10-CM

## 2025-06-26 DIAGNOSIS — N18.4 CKD (CHRONIC KIDNEY DISEASE) STAGE 4, GFR 15-29 ML/MIN (H): ICD-10-CM

## 2025-06-26 DIAGNOSIS — I48.0 PAROXYSMAL ATRIAL FIBRILLATION (H): ICD-10-CM

## 2025-06-26 DIAGNOSIS — Z09 HOSPITAL DISCHARGE FOLLOW-UP: Primary | ICD-10-CM

## 2025-06-26 DIAGNOSIS — I10 HYPERTENSION GOAL BP (BLOOD PRESSURE) < 140/90: ICD-10-CM

## 2025-06-26 LAB — INR BLD: 2.5 (ref 0.9–1.1)

## 2025-06-26 PROCEDURE — 3078F DIAST BP <80 MM HG: CPT | Performed by: FAMILY MEDICINE

## 2025-06-26 PROCEDURE — 1126F AMNT PAIN NOTED NONE PRSNT: CPT | Performed by: FAMILY MEDICINE

## 2025-06-26 PROCEDURE — 3077F SYST BP >= 140 MM HG: CPT | Performed by: FAMILY MEDICINE

## 2025-06-26 PROCEDURE — 85610 PROTHROMBIN TIME: CPT | Performed by: FAMILY MEDICINE

## 2025-06-26 PROCEDURE — 36415 COLL VENOUS BLD VENIPUNCTURE: CPT | Performed by: FAMILY MEDICINE

## 2025-06-26 PROCEDURE — 99214 OFFICE O/P EST MOD 30 MIN: CPT | Performed by: FAMILY MEDICINE

## 2025-06-26 RX ORDER — FUROSEMIDE 40 MG/1
40 TABLET ORAL DAILY
Qty: 30 TABLET | Refills: 1 | Status: SHIPPED | OUTPATIENT
Start: 2025-06-26

## 2025-06-26 RX ORDER — DOXAZOSIN 1 MG/1
1 TABLET ORAL AT BEDTIME
Status: CANCELLED | OUTPATIENT
Start: 2025-06-26

## 2025-06-26 RX ORDER — FUROSEMIDE 40 MG/1
40 TABLET ORAL DAILY
Status: CANCELLED | OUTPATIENT
Start: 2025-06-26

## 2025-06-26 RX ORDER — TRIAMCINOLONE ACETONIDE 1 MG/G
OINTMENT TOPICAL 2 TIMES DAILY
Qty: 15 G | Refills: 0 | Status: SHIPPED | OUTPATIENT
Start: 2025-06-26

## 2025-06-26 RX ORDER — DILTIAZEM HYDROCHLORIDE EXTENDED-RELEASE TABLETS 240 MG/1
240 TABLET, EXTENDED RELEASE ORAL DAILY
Qty: 90 TABLET | Refills: 1 | Status: SHIPPED | OUTPATIENT
Start: 2025-06-26

## 2025-06-26 RX ORDER — HYDRALAZINE HYDROCHLORIDE 10 MG/1
10 TABLET, FILM COATED ORAL EVERY 8 HOURS
Qty: 270 TABLET | Refills: 1 | Status: SHIPPED | OUTPATIENT
Start: 2025-06-26

## 2025-06-26 RX ORDER — CARVEDILOL 3.12 MG/1
3.12 TABLET ORAL 2 TIMES DAILY WITH MEALS
Qty: 60 TABLET | Refills: 1 | Status: SHIPPED | OUTPATIENT
Start: 2025-06-26

## 2025-06-26 ASSESSMENT — ANXIETY QUESTIONNAIRES
8. IF YOU CHECKED OFF ANY PROBLEMS, HOW DIFFICULT HAVE THESE MADE IT FOR YOU TO DO YOUR WORK, TAKE CARE OF THINGS AT HOME, OR GET ALONG WITH OTHER PEOPLE?: NOT DIFFICULT AT ALL
2. NOT BEING ABLE TO STOP OR CONTROL WORRYING: NOT AT ALL
3. WORRYING TOO MUCH ABOUT DIFFERENT THINGS: NOT AT ALL
IF YOU CHECKED OFF ANY PROBLEMS ON THIS QUESTIONNAIRE, HOW DIFFICULT HAVE THESE PROBLEMS MADE IT FOR YOU TO DO YOUR WORK, TAKE CARE OF THINGS AT HOME, OR GET ALONG WITH OTHER PEOPLE: NOT DIFFICULT AT ALL
7. FEELING AFRAID AS IF SOMETHING AWFUL MIGHT HAPPEN: NOT AT ALL
7. FEELING AFRAID AS IF SOMETHING AWFUL MIGHT HAPPEN: NOT AT ALL
GAD7 TOTAL SCORE: 0
5. BEING SO RESTLESS THAT IT IS HARD TO SIT STILL: NOT AT ALL
6. BECOMING EASILY ANNOYED OR IRRITABLE: NOT AT ALL
GAD7 TOTAL SCORE: 0
1. FEELING NERVOUS, ANXIOUS, OR ON EDGE: NOT AT ALL
4. TROUBLE RELAXING: NOT AT ALL
GAD7 TOTAL SCORE: 0

## 2025-06-26 ASSESSMENT — ACTIVITIES OF DAILY LIVING (ADL): DEPENDENT_IADLS:: INDEPENDENT

## 2025-06-26 ASSESSMENT — PAIN SCALES - GENERAL: PAINLEVEL_OUTOF10: NO PAIN (0)

## 2025-06-26 NOTE — LETTER
[facility]  Patient Centered Plan of Care  About Me:      Patient Name:  Ирина Yanez    YOB: 1935  Age:         89 year old   Janak MRN:    [id number] Telephone Information:  Home Phone [phone]   Mobile [phone]       Address:  [address] [place] [address] Email address:  [email or web address]      Emergency Contact(s)    Name Relationship Lgl Grd Work Phone Home Phone Mobile Phone   1. BRAYDON YANEZ Son No   [phone]   2. BETTYE YANEZ (* Son No  [phone]            Primary language:  English     needed? No   [facility]:  [phone] op. 1  Other communication barriers:Glasses; Utilization  Preferred Method of Communication:  Dinorah  Current living arrangement: I live alone; I live in a private home  Mobility Status/ Medical Equipment: Independent w/Device  Health Maintenance  Health Maintenance Reviewed: Due/Overdue   Health Maintenance Due   Topic Date Due    HF ACTION PLAN  Never done    COVID-19 VACCINE (8 - 2024-25 season) 03/26/2025    DTAP/TDAP/TD VACCINE (3 - Td or Tdap) 05/06/2025    DEXA  06/08/2025      My Access Plan  Medical Emergency 911   Primary Clinic Line [facility] [phone]   24 Hour Appointment Line [phone] or  [phone]) (toll-free)   24 Hour Nurse Line [phone] (toll-free)   Preferred Urgent Care [facility] [place], [phone]     Preferred Hospital [facility]  [phone]     Preferred Pharmacy [facility]  [place] - [address]     Behavioral Health Crisis Line The National Suicide Prevention Lifeline at 1-786.930.5308 or Text/Call 098     My Care Team Members  Patient Care Team         Relationship Specialty Notifications Start Mil Gutierrez MD PCP - General Family Medicine  11/29/21     Phone: [phone] Fax: [phone]         [address] [place]  [address]    Henrik Beasley MD Assigned Heart and Vascular Provider   11/22/20     Phone: [phone] Fax: [phone]         [address] [place] [address]    Zarina Cain [facility] Pharmacist Pharmacist  2/22/21     Phone: [phone] Fax: [phone]         [address] [facility] [address]    Mil García MD Assigned PCP   12/12/21     Phone: [phone] Fax: [phone]         [address] [facility] [address]    Jef Whatley MD MD Nephrology Yes. All abnormal results, Admissions 6/15/22     Phone: [phone] Fax: [phone]         [facility] [address] [place] [address]    Zarina Cain [facility] Assigned MTM Pharmacist   9/28/22     Phone: [phone] Fax: [phone]         [address] [facility] [address]    Goltz, Bennett Ezra, PA-C Assigned Neuroscience Provider   11/11/23     Phone: [phone] Fax: [phone]         [address] [place] [address]    Abeba Christensen, RN Lead Care Coordinator  Admissions 6/12/25     Phone: [phone]                   My Care Plans  Self Management and Treatment Plan    Care Plan  Care Plan: Utilization       Problem: Increased risk of re-admission       Long-Range Goal: I would like additional resources and support to manage my health and prevent future avoidable ED visits/hospital admissions       Start Date: 6/26/2025 Expected End Date: 12/31/2025    Note:     Barriers: diagnosis of multiple, chronic, complex medical conditions, provider availability - wait time to complete appointments, etc.   Strengths: motivated, engaged in care coordination, son helpful & supportive.   Patient expressed understanding of goal: yes  Action steps to achieve this goal:  1. I will follow up with my providers as scheduled/recommended:   07/03/25 - Lab  07/03/25 - Surgeon  07/14/25 - Cardiology  07/15/25 - Nephrology  07/24/25 - Primary Care Provider   12/19/25 - Sleep  2. I will discuss, review, schedule  & complete recommended overdue health maintenance with my Primary Care Provider.   3. I will take my medications as prescribed.   4. I will monitor & record my home blood pressure readings as recommended.   5. I will continue ongoing discussion with children regarding consideration of lifeline.   6. I will work with my care team to obtain additional information regarding diabetic/renal diet.   [facility] Kidney [facility]: Kidney Smart Class/Cookbooks, etc. #0-285-118-3460 https://www.[employer]/education/kidney-smart-classes?gclsrc=aw.ds&utm_source=saqib&utm_medium=cpc&utm_campaign=[employer]%20-%20Dialysis%20%26%20Kidney%20Information&utm_term=kidney%20smart%20class&utm_content=Kidney%20Smart%20Classes%20-%20Exa  Renal Diet HQ: https://www.renalThink Global.com/diet-restrictions-through-the-stages-of-chronic-kidney-disease/  National Kidney Foundation - 100+ renal friendly recipes: https://www.kidney.org/search?search=renal+diet&sort_bef_combine=search_api_relevance_DESC  7. I will contact my care team with questions, concerns, support needs. I will use the clinic as a resource and I understand I can contact my clinic with 24/7 after hours services available. Care Coordinator will remain available as needed.                              Action Plans on File:            Depression          Advance Care Plans/Directives:   Advanced Care Plan/Directives on file: Yes    Status of Document(s): On File and Validated    Advanced Care Plan/Directives Type: Advanced Directive - On File       My Medical and Care Information  Problem List   Patient Active Problem List   Diagnosis    Diabetic polyneuropathy (H)    Hypertension goal BP (blood pressure) < 140/90    Arthritis    Type 2 diabetes mellitus with diabetic nephropathy, without long-term current use of insulin (H)    Vulvar dystrophy    Lichen sclerosus et atrophicus    Vitamin B12 deficiency (non anemic)    Controlled substance agreement signed 2/5/15    Major depression in partial  remission    CKD (chronic kidney disease) stage 4, GFR 15-29 ml/min (H)    Basal cell carcinoma of skin    Complex sleep apnea syndrome    Right bundle branch block    ACP (advance care planning)    Toe amputation status, left    Falls frequently    Abrasion of arm, right, initial encounter    Aortic valve stenosis, etiology of cardiac valve disease unspecified    Dyspnea on exertion    Iron deficiency anemia    Paroxysmal atrial fibrillation (H)    Bilateral pleural effusion    Secondary hyperparathyroidism of renal origin    Long term current use of anticoagulants with INR goal of 2.0-3.0    Chronic systolic heart failure (H)    Chronic diarrhea    Near syncope    Symptomatic bradycardia    Disorder of intestine    Skin eruption    Hyperlipidemia    Mass of right axilla      Current Medications:  We reviewed your medications today. It will be important to review this with your provider at your next clinic visit.    Care Coordination Start Date: 6/12/2025   Frequency of Care Coordination: monthly, more frequently as needed     Form Last Updated: 07/08/2025

## 2025-06-26 NOTE — LETTER
[facility] CARE COORDINATION  [address]  Formerly [facility] [address]     July 8, 2025    Ирина JOHNS Renata  [address] [facility] [address]      Dear Ирина,    I am a clinic care coordinator who works with Mil García MD with the [facility]. I wanted to thank you for spending the time to talk with me.  Below is a description of clinic care coordination and how I can further assist you.       The clinic care coordination team is made up of a registered nurse, , financial resource worker and community health worker who understand the health care system. The goal of clinic care coordination is to help you manage your health and improve access to the health care system. Our team works alongside your provider to assist you in determining your health and social needs. We can help you obtain health care and community resources, providing you with necessary information and education. We can work with you through any barriers and develop a care plan that helps coordinate and strengthen the communication between you and your care team.  Our services are voluntary and are offered without charge to you personally.    Please feel free to contact me with any questions or concerns regarding care coordination and what we can offer.      We are focused on providing you with the highest-quality healthcare experience possible.    Sincerely,     Abeba Christensen RN Care Coordinator  [facility] - [place], [place], [place]  Email: [email or web address]  Phone: [phone]     Additional Information: I have enclosed a copy of the Patient Centered Plan of Care. This has helpful information and goals that we have talked about. Please keep this in an easy to access place to use as needed.

## 2025-06-26 NOTE — PROGRESS NOTES
ANTICOAGULATION MANAGEMENT     Ирина Yanez 89 year old female is on warfarin with therapeutic INR result. (Goal INR 2.0-3.0)    Recent labs: (last 7 days)     06/26/25  1141   INR 2.5*       ASSESSMENT     Source(s): Chart Review and Patient/Caregiver Call     Warfarin doses taken: Warfarin taken as instructed  Diet: No new diet changes identified Per patient now that she is back home, her diet has returned to her usual amount of greens so will check INR sooner  Medication/supplement changes: Torsemide was changed to furosemide during hospitalization 6/4 - 6/11/25. Torsemide can increase INR and furosemide can decrease INR so we may not see any changes   New illness, injury, or hospitalization: Yes: Hospitalized 6/4 - 6/11/25, went to TCU, Discharged home from TCU 6/20/25  Signs or symptoms of bleeding or clotting: No  Previous result: Supratherapeutic 5/29/25 prior to being hospitalized 6/4/25  Additional findings: Patient has a mammogram scheduled 7/1/25 for axillary lump, has appointment with surgery 7/3/25        PLAN     Recommended plan for temporary change(s) affecting INR     Dosing Instructions: Continue your current warfarin dose with next INR in 1 week       Summary  As of 6/26/2025      Full warfarin instructions:  5 mg every day   Next INR check:  7/3/2025               Telephone call with Ирина who verbalizes understanding and agrees to plan    Lab visit scheduled    Education provided: Please call back if any changes to your diet, medications or how you've been taking warfarin  Contact [phone] with any changes, questions or concerns.     Plan made per [facility] anticoagulation protocol    Alondra Richter RN  6/26/2025  Anticoagulation Clinic  [employer]ar[employer] for routing messages: kam RAMIREZ  [facility] patient phone line: [phone]        _______________________________________________________________________     Anticoagulation Episode Summary       Current INR goal:  2.0-3.0   TTR:  68.0% (11.4  mo)   Target end date:  Indefinite   Send INR reminders to:  KRIS RAMIREZ    Indications    Paroxysmal atrial fibrillation (H) [I48.0]  Long term current use of anticoagulants with INR goal of 2.0-3.0 [Z79.01]             Comments:  --             Anticoagulation Care Providers       Provider Role Specialty Phone number    Yuridia Villarreal MD Referring Family Medicine [phone]    Mil García MD Referring Family Medicine [phone]

## 2025-06-26 NOTE — Clinical Note
Jef - I'm a bit worried about Marroseline's BP, but her renal function is so poor that I don't feel comfortable with most of the classes left untapped.  I'm adding back in a B blocker (cardvidolol 3.125) and hopefully she can tolerate this.  I asked her to reach out to be seen ASAP.  Kennedi

## 2025-06-26 NOTE — PROGRESS NOTES
Clinic Care Coordination Contact  OUTREACH    Referral Information:  Referral Source: IP Handoff  Primary Diagnosis: Cardiovascular - other    Chief Complaint   Patient presents with    Clinic Care Coordination - Initial    Clinic Care Coordination - Face To Face    Clinic Care Coordination - Post Hospital     Universal Utilization: 78.5% Risk of Admission or ED Visit   Clinic Utilization  Difficulty keeping appointments:: No  Compliance Concerns: No  No-Show Concerns: No  No PCP office visit in Past Year: No  Utilization      No Show Count (past year)  0             ED Visits  1             Hospital Admissions  1                    Current as of: 7/8/2025  3:06 AM              Clinical Concerns:  Current Medical Concerns:    Patient Active Problem List   Diagnosis    Diabetic polyneuropathy (H)    Hypertension goal BP (blood pressure) < 140/90    Arthritis    Type 2 diabetes mellitus with diabetic nephropathy, without long-term current use of insulin (H)    Vulvar dystrophy    Lichen sclerosus et atrophicus    Vitamin B12 deficiency (non anemic)    Controlled substance agreement signed 2/5/15    Major depression in partial remission    CKD (chronic kidney disease) stage 4, GFR 15-29 ml/min (H)    Basal cell carcinoma of skin    Complex sleep apnea syndrome    Right bundle branch block    ACP (advance care planning)    Toe amputation status, left    Falls frequently    Abrasion of arm, right, initial encounter    Aortic valve stenosis, etiology of cardiac valve disease unspecified    Dyspnea on exertion    Iron deficiency anemia    Paroxysmal atrial fibrillation (H)    Bilateral pleural effusion    Secondary hyperparathyroidism of renal origin    Long term current use of anticoagulants with INR goal of 2.0-3.0    Chronic systolic heart failure (H)    Chronic diarrhea    Near syncope    Symptomatic bradycardia    Disorder of intestine    Skin eruption    Hyperlipidemia    Mass of right axilla      Needs INR checked  today. States she forgot to have this checked after her provider appointment on Monday.   Acknowledges need to schedule recommended follow up appointments with nephrologist provider & DEXA scan. States that she has the telephone number to schedule these appointments, however just hasn't had a chance to call and actually get them scheduled since being in the hospital, care center, & back home catching up on mail, medication changes, appointments, etc.   States blood sugar readings 126 this morning and 125 yesterday morning.   Has blood pressure cuff at home, however just has to remember to take it out of the drawer & check it. RNCC offered some suggestions to assist with reminders including recommendation to consider taking blood pressure monitor out of the drawer and set it in a space that is visible. Incorporate blood pressure monitoring into daily routine (after taking medications, meals, before bed, etc). Setting an alarm, post it notes on refrigerator, medication box, etc. Patient verbalized understanding.   Patient states that her son helps with indoor/outdoor chores and any transportation needs outside of the [facility]. Son also looking into lifeline options she is still considering this.   Patient states that she is receiving two weeks of [employer] Meals which are renal/diabetic diet, otherwise she prepares meals for herself at home and would be interested in additional education regarding diabetic/renal diet.        Current Behavioral Concerns: none noted.     Education Provided to patient: Care Coordination role, clinic after hours, medications, appointments, discussed/reviewed. Support provided.    Pain  Pain (GOAL):: Yes  Type: Chronic (>3mo)  Location of chronic pain:: Back pain, neuropathy  Radiating: No  Progression: Unchanged  Description of pain: Aching  Limitation of routine activities due to chronic pain:: No  Alleviating Factors: Rest  Aggravating Factors: Activity  Health Maintenance Reviewed:  Due/Overdue   Health Maintenance Due   Topic Date Due    HF ACTION PLAN  Never done    COVID-19 VACCINE (8 - 2024-25 season) 03/26/2025    DTAP/TDAP/TD VACCINE (3 - Td or Tdap) 05/06/2025    DEXA  06/08/2025      Clinical Pathway: None    Medication Management:  Medication review status: Medications reviewed and no changes reported per patient.           Functional Status:  Dependent ADLs:: Independent  Dependent IADLs:: Independent  Bed or wheelchair confined:: No  Mobility Status: Independent w/Device  Fallen 2 or more times in the past year?: (!) Yes  Any fall with injury in the past year?: No    Living Situation:  Current living arrangement:: I live alone, I live in a private home  [facility]    Lifestyle & Psychosocial Needs:  Social Drivers of Health     Food Insecurity: Low Risk  (6/4/2025)    Food Insecurity     Within the past 12 months, did you worry that your food would run out before you got money to buy more?: No     Within the past 12 months, did the food you bought just not last and you didn t have money to get more?: No   Depression: Not at risk (2/24/2025)    PHQ-2     PHQ-2 Score: 0   Housing Stability: Low Risk  (6/4/2025)    Housing Stability     Do you have housing? : Yes     Are you worried about losing your housing?: No   Tobacco Use: Low Risk  (6/26/2025)    Patient History     Smoking Tobacco Use: Never     Smokeless Tobacco Use: Never     Passive Exposure: Never   Financial Resource Strain: Low Risk  (6/4/2025)    Financial Resource Strain     Within the past 12 months, have you or your family members you live with been unable to get utilities (heat, electricity) when it was really needed?: No   Alcohol Use: Not At Risk (12/28/2020)    AUDIT-C     Frequency of Alcohol Consumption: Never     Average Number of Drinks: Not on file     Frequency of Binge Drinking: Never   Transportation Needs: Low Risk  (6/4/2025)    Transportation Needs     Within the past 12  months, has lack of transportation kept you from medical appointments, getting your medicines, non-medical meetings or appointments, work, or from getting things that you need?: No   Physical Activity: Inactive (2/24/2025)    Exercise Vital Sign     Days of Exercise per Week: 0 days     Minutes of Exercise per Session: 0 min   Interpersonal Safety: Low Risk  (6/4/2025)    Interpersonal Safety     Do you feel physically and emotionally safe where you currently live?: Yes     Within the past 12 months, have you been hit, slapped, kicked or otherwise physically hurt by someone?: No     Within the past 12 months, have you been humiliated or emotionally abused in other ways by your partner or ex-partner?: No   Stress: No Stress Concern Present (2/24/2025)    [place] [employer] - Occupational Stress Questionnaire     Feeling of Stress : Only a little   Social Connections: Unknown (2/24/2025)    Social Connection and Isolation Panel [NHANES]     Frequency of Communication with Friends and Family: Not on file     Frequency of Social Gatherings with Friends and Family: Twice a week     Attends Restorationism Services: Not on file     Active Member of Clubs or Organizations: Not on file     Attends Club or Organization Meetings: Not on file     Marital Status: Not on file   Health Literacy: Not on file     Diet:: Regular, Diabetic diet, Other (renal)  Inadequate nutrition (GOAL):: No  Tube Feeding: No  Inadequate activity/exercise (GOAL):: No  Significant changes in sleep pattern (GOAL): No  Transportation means:: Regular car  Restorationism or spiritual beliefs that impact treatment:: No  Mental health DX:: Yes  Mental health DX how managed:: Medication  Mental health management concern (GOAL):: No  Chemical Dependency Status: No Current Concerns  Chemical Dependency Management:  (NA)  Informal Support system:: Children, Friends, Neighbors, Family      Resources and Interventions:  Current Resources:   Community  Resources: DME, Meals on Wheels  Supplies Currently Used at Home: Diabetic Supplies  Equipment Currently Used at Home: glucometer, wheelchair, manual, cane, straight, walker, rolling, grab bar, tub/shower, shower chair, grab bar, toilet  Employment Status: retired  Advance Care Plan/Directive  Advanced Care Plans/Directives on file:: Yes  Status of record:: On File and Validated  Type Advanced Care Plans/Directives: Advanced Directive - On File     Care Plan:  Care Plan: Utilization       Problem: Increased risk of re-admission       Long-Range Goal: I would like additional resources and support to manage my health and prevent future avoidable ED visits/hospital admissions       Start Date: 6/26/2025 Expected End Date: 12/31/2025    Note:     Barriers: diagnosis of multiple, chronic, complex medical conditions, provider availability - wait time to complete appointments, etc.   Strengths: motivated, engaged in care coordination, son helpful & supportive.   Patient expressed understanding of goal: yes  Action steps to achieve this goal:  1. I will follow up with my providers as scheduled/recommended:   07/03/25 - Lab  07/03/25 - Surgeon  07/14/25 - Cardiology  07/15/25 - Nephrology  07/24/25 - Primary Care Provider   12/19/25 - Sleep  2. I will discuss, review, schedule & complete recommended overdue health maintenance with my Primary Care Provider.   3. I will take my medications as prescribed.   4. I will monitor & record my home blood pressure readings as recommended.   5. I will continue ongoing discussion with children regarding consideration of lifeline.   6. I will work with my care team to obtain additional information regarding diabetic/renal diet.   [facility] Kidney Care: Kidney Smart Class/Cookbooks, etc. #[phone]  https://www.[employer]/education/kidney-smart-classes?gclsrc=aw.ds&utm_source=saqib&utm_medium=cpc&utm_campaign=[employer]%20-%20Dialysis%20%26%20Kidney%20Information&utm_term=kidney%20smart%20class&utm_content=Kidney%20Smart%20Classes%20-%20Exa  Renal Diet HQ: https://www.renalAlgebraix DataethWalkHub.INXPO/diet-restrictions-through-the-stages-of-chronic-kidney-disease/  National Kidney Foundation - 100+ renal friendly recipes: https://www.kidney.org/search?search=renal+diet&sort_bef_combine=search_api_relevance_DESC  7. I will contact my care team with questions, concerns, support needs. I will use the clinic as a resource and I understand I can contact my clinic with 24/7 after hours services available. Care Coordinator will remain available as needed.                              Patient/Caregiver understanding: Patient/caregiver verbalized understanding and denies any additional questions or concerns at this time. RNCC engaged in AIDET communications during encounter.      Outreach Frequency: monthly, more frequently as needed  Future Appointments                In 6 days RM LAB [facility]    In 6 days Madelin Gambino PA-C [facility] [place], [facility] CLIN    In 2 weeks Mil García MD [facility] Perez CL    In 5 months Geno Douglas PA-C [facility] SLEEP          Plan: RNCC will send clinic care coordination introduction letter & patient centered plan of care to patient via Info. Patient/caregiver was provided with writers contact information and encouraged to call with questions, concerns, support needs. RNCC will remain available as needed. RNCC will follow up with patient/caregiver again in 1 month.      Abeba Christensen RN Care Coordinator  [facility] - [place], [place], [place]  Email: [email or web address]  Phone: [phone]

## 2025-06-26 NOTE — PROGRESS NOTES
Assessment and Plan    (Z09) Hospital discharge follow-up  (primary encounter diagnosis)  Comment:   Plan:     (I10) Hypertension goal BP (blood pressure) < 140/90  Comment: BP very brittle, message sent to renal with request to review and follow up with Harpermary ellenjem before her next scheduled appointment  Plan: diltiazem ER (CARDIAZEM LA) 240 MG 24 hr         tablet, hydrALAZINE (APRESOLINE) 10 MG tablet,         carvedilol (COREG) 3.125 MG tablet, furosemide         (LASIX) 40 MG tablet            (N18.4) CKD (chronic kidney disease) stage 4, GFR 15-29 ml/min (H)  Comment: generally stable, follows with renal  Plan:     (T80.90XA) Injection site reaction, initial encounter  Comment: topical, although it seems unlikely this is due to injection 1+ weeks afterwards  Plan: triamcinolone (KENALOG) 0.1 % external ointment            (N17.9,  N18.9) Acute renal failure superimposed on chronic kidney disease, unspecified acute renal failure type, unspecified CKD stage  Comment: refill  Plan: furosemide (LASIX) 40 MG tablet            (I48.0) Paroxysmal atrial fibrillation (H)  Comment: follows with cardiology  Plan:     (Z79.01) Long term current use of anticoagulants with INR goal of 2.0-3.0  Comment:   Plan:       RTC in 1m    Mil García MD     Bárbara Gifford is a 89 year old, presenting for the following health issues:  Follow Up        6/26/2025    10:53 AM   Additional Questions   Roomed by MR   Accompanied by Self     History of Present Illness       Back Pain:  She presents for follow up of back pain. Patient's back pain is a chronic problem.  Location of back pain:  Left lower back  Description of back pain: dull ache and fullness  Back pain spreads: nowhere    Since patient first noticed back pain, pain is: always present, but gets better and worse  Does back pain interfere with her job:  Not applicable       CKD: She uses over the counter pain medication, including Tylenol, a few times a  week.    Mental Health Follow-up:  Patient presents to follow-up on Depression & Anxiety.Patient's depression since last visit has been:  Medium  The patient is not having other symptoms associated with depression.  Patient's anxiety since last visit has been:  Medium  The patient is not having other symptoms associated with anxiety.  Any significant life events: grief or loss and health concerns  Patient is not feeling anxious or having panic attacks.  Patient has no concerns about alcohol or drug use.    Diabetes:   She presents for follow up of diabetes.  She is checking home blood glucose one time daily.   She checks blood glucose before meals.  Blood glucose is never over 200 and never under 70. She is aware of hypoglycemia symptoms including weakness.    She has no concerns regarding her diabetes at this time.  She is having numbness in feet and burning in feet.            Heart Failure:  She presents for follow up of heart failure. She is experiencing shortness of breath with activity only, which is same as usual. She is not experiencing any lower extremity edema.   She denies orthopenea and is not coughing at night. Patient is checking weight daily. She has recently had a weight decrease.  She has no side effects from medications.  She has has a medical visit for heart failure 1 time since the last visit.    Hyperlipidemia:  She presents for follow up of hyperlipidemia.   She is taking medication to lower cholesterol. She is not having myalgia or other side effects to statin medications.    Hypertension: She presents for follow up of hypertension.  She does check blood pressure  regularly outside of the clinic. Outside blood pressures have been over 140/90. She follows a low salt diet.     Vascular Disease:  She presents for follow up of vascular disease.     She never takes nitroglycerin. She is not taking daily aspirin.    She eats 2-3 servings of fruits and vegetables daily.She consumes 1 sweetened  "beverage(s) daily.She exercises with enough effort to increase her heart rate 9 or less minutes per day.  She exercises with enough effort to increase her heart rate 3 or less days per week.   She is taking medications regularly.      Last Echo:   Echo result w/o MOPS: Interpretation Summary Left ventricular systolic function is normal.The visual ejection fraction is 60-65%.The right ventricle is normal in structure, function and size.Moderate valvular aortic stenosis.There is moderate (2+) tricuspid regurgitation.There is no pericardial effusion.Dilation of the inferior vena cava is present with abnormal respiratoryvariation in diameter. Compared to study dated 9/12/2024, aortic stenosis is still moderate range.Left atrial dilation is severe.        Hospital Follow-up Visit:    Restarted hydralzine about 3 days ago.  Last visit with renal was in early May, next in lat September.  Is urinating about 4x daily.  Is using doxazosyn, furosmide, notes that they stopped her metoprolol.    Got flu shot while in TCU, about  10 days ago, about three days ago developed redness and itching over the injection site.    Status now: \"doing okay\"  She has not started the Novolog since discharge from the hospital        Hospital/Nursing Home/IP Rehab Facility: [facility]  Most Recent Admission Date: 6/4/2025   Most Recent Admission Diagnosis: Long term (current) use of anticoagulants - Z79.01  Hyperkalemia - E87.5  Near syncope - R55  Symptomatic bradycardia - R00.1  Aortic valve stenosis, etiology of cardiac valve disease unspecified - I35.0  Acute renal failure superimposed on chronic kidney disease, unspecified acute renal failure type, unspecified CKD stage - N17.9, N18.9     Most Recent Discharge Date: 6/11/2025   Most Recent Discharge Diagnosis: Near syncope - R55  Symptomatic bradycardia - R00.1  Acute renal failure superimposed on chronic kidney disease, unspecified acute renal failure type, " unspecified CKD stage - N17.9, N18.9  Hyperkalemia - E87.5  Long term (current) use of anticoagulants - Z79.01  Aortic valve stenosis, etiology of cardiac valve disease unspecified - I35.0  Paroxysmal atrial fibrillation (H) - I48.0  Axillary mass, right - R22.31  Intertrigo - L30.4  Acute hypoxic respiratory failure (H) - J96.01  Pain - R52  Neuropathy - G62.9  Chronic systolic heart failure (H) - I50.22  Type 2 diabetes mellitus with diabetic nephropathy, without long-term current use of insulin (H) - E11.21  Other specified counseling - Z71.89  Hyperlipidemia with target LDL less than 100 - E78.5  Hypertension goal BP (blood pressure) < 140/90 - I10   Do you have any other stressors you would like to discuss with your provider? No    Problems taking medications regularly:  None  Medication changes since discharge:        START taking these medications     Details   furosemide (LASIX) 40 MG tablet Take 1 tablet (40 mg) by mouth daily.     Associated Diagnoses: Acute renal failure superimposed on chronic kidney disease, unspecified acute renal failure type, unspecified CKD stage       hydrALAZINE (APRESOLINE) 10 MG tablet Take 2 tablets (20 mg) by mouth every 8 hours.     Associated Diagnoses: Hypertension goal BP (blood pressure) < 140/90       insulin aspart (NOVOLOG VIAL) 100 UNITS/ML vial Give before meals  For Pre-Meal Glucose:  140-189 give 1 unit   190-239 give 2 units   240-289 give 3 units   290-339 give 4 units   = or >340 give 5 units     Associated Diagnoses: Type 2 diabetes mellitus with diabetic nephropathy, without long-term current use of insulin (H)             Problems adhering to non-medication therapy:  None    Summary of hospitalization:  [facility] discharge summary reviewed  Diagnostic Tests/Treatments reviewed.  Follow up needed: none  Other Healthcare Providers Involved in Patient s Care:         None  Update since discharge: improved.         Plan of care communicated with  "patient                     Objective    BP (!) 188/73   Pulse 69   Temp 98.8  F (37.1  C) (Skin)   Resp 16   Ht 1.626 m (5' 4\")   Wt 71.5 kg (157 lb 9.6 oz)   LMP  (LMP Unknown)   SpO2 98%   BMI 27.05 kg/m    Body mass index is 27.05 kg/m .  Physical Exam  Vitals reviewed.   HENT:      Head: Normocephalic and atraumatic.   Eyes:      Conjunctiva/sclera: Conjunctivae normal.   Cardiovascular:      Rate and Rhythm: Normal rate and regular rhythm.      Heart sounds: Normal heart sounds.   Pulmonary:      Effort: Pulmonary effort is normal.      Breath sounds: Normal breath sounds.   Skin:     General: Skin is warm and dry.   Neurological:      Mental Status: She is alert and oriented to person, place, and time.   Psychiatric:         Mood and Affect: Mood normal.         Behavior: Behavior normal.                Signed Electronically by: Mil García MD    "

## 2025-06-30 ENCOUNTER — OFFICE VISIT (OUTPATIENT)
Dept: PHARMACY | Facility: CLINIC | Age: 89
End: 2025-06-30
Payer: COMMERCIAL

## 2025-06-30 VITALS — DIASTOLIC BLOOD PRESSURE: 57 MMHG | OXYGEN SATURATION: 98 % | HEART RATE: 51 BPM | SYSTOLIC BLOOD PRESSURE: 152 MMHG

## 2025-06-30 DIAGNOSIS — R19.7 DIARRHEA, UNSPECIFIED TYPE: ICD-10-CM

## 2025-06-30 DIAGNOSIS — I10 HYPERTENSION GOAL BP (BLOOD PRESSURE) < 140/90: ICD-10-CM

## 2025-06-30 DIAGNOSIS — Z78.9 TAKES DIETARY SUPPLEMENTS: ICD-10-CM

## 2025-06-30 DIAGNOSIS — I48.0 PAROXYSMAL ATRIAL FIBRILLATION (H): ICD-10-CM

## 2025-06-30 DIAGNOSIS — F41.9 ANXIETY: ICD-10-CM

## 2025-06-30 DIAGNOSIS — N18.4 CKD (CHRONIC KIDNEY DISEASE) STAGE 4, GFR 15-29 ML/MIN (H): ICD-10-CM

## 2025-06-30 DIAGNOSIS — E11.42 DIABETIC POLYNEUROPATHY ASSOCIATED WITH TYPE 2 DIABETES MELLITUS (H): ICD-10-CM

## 2025-06-30 DIAGNOSIS — E11.21 TYPE 2 DIABETES MELLITUS WITH DIABETIC NEPHROPATHY, WITHOUT LONG-TERM CURRENT USE OF INSULIN (H): Primary | ICD-10-CM

## 2025-06-30 DIAGNOSIS — E78.2 MIXED HYPERLIPIDEMIA: ICD-10-CM

## 2025-06-30 PROCEDURE — 3077F SYST BP >= 140 MM HG: CPT | Performed by: PHARMACIST

## 2025-06-30 PROCEDURE — 99607 MTMS BY PHARM ADDL 15 MIN: CPT | Performed by: PHARMACIST

## 2025-06-30 PROCEDURE — 3078F DIAST BP <80 MM HG: CPT | Performed by: PHARMACIST

## 2025-06-30 PROCEDURE — 1111F DSCHRG MED/CURRENT MED MERGE: CPT | Performed by: PHARMACIST

## 2025-06-30 PROCEDURE — 99605 MTMS BY PHARM NP 15 MIN: CPT | Performed by: PHARMACIST

## 2025-06-30 NOTE — LETTER
_  Medication List        Prepared on: Jun 30, 2025     Bring your Medication List when you go to the doctor, hospital, or   emergency room. And, share it with your family or caregivers.     Note any changes to how you take your medications.  Cross out medications when you no longer use them.    Medication How I take it Why I use it Prescriber   acetaminophen (TYLENOL) 325 MG tablet Take 2 tablets (650 mg) by mouth every 6 hours as needed for mild pain. Pain Shelley Murguia MD   betamethasone dipropionate (DIPROSONE) 0.05 % external lotion Apply to affected area as needed   Nivia Avitia MD    blood glucose (NO BRAND SPECIFIED) lancets standard Use to test blood sugar 1 times daily or as directed, Contour Next lancets Type 2 diabetes mellitus with diabetic nephropathy, without long-term current use of insulin (H) Mil García MD   blood glucose (NO BRAND SPECIFIED) test strip Use to test blood sugar 1 times daily or as directed, Contour Next strips Type 2 diabetes mellitus with diabetic nephropathy, without long-term current use of insulin (H) Mil García MD   blood glucose monitoring (NO BRAND SPECIFIED) meter device kit Use to test blood sugar 1 times daily or as directed. Ultra 2 Type 2 diabetes mellitus with diabetic neuropathy, without long-term current use of insulin (H) Reji Verde MD   carvedilol (COREG) 3.125 MG tablet Take 1 tablet (3.125 mg) by mouth 2 times daily (with meals). Hypertension Goal BP (Blood Pressure) < 140/90 Mil García MD   diltiazem ER (CARDIAZEM LA) 240 MG 24 hr tablet Take 1 tablet (240 mg) by mouth daily. Hypertension Goal BP (Blood Pressure) < 140/90 Mil García MD   doxazosin (CARDURA) 1 MG tablet Take 1 tablet (1 mg) by mouth at bedtime. Hypertension Goal BP (Blood Pressure) < 140/90 Daniel Calvillo MD   furosemide (LASIX) 40 MG tablet Take 1 tablet (40 mg) by mouth daily. Hypertension Goal BP (Blood Pressure) < 140/90; Acute renal failure superimposed on  chronic kidney disease, unspecified acute renal failure type, unspecified CKD stage Mil García MD   gabapentin (NEURONTIN) 100 MG capsule Take 2 capsules (200 mg) by mouth at bedtime. Neuropathy Shelley Murguia MD   hydrALAZINE (APRESOLINE) 10 MG tablet Take 1 tablet (10 mg) by mouth every 8 hours. Hypertension Goal BP (Blood Pressure) < 140/90 Mil García MD   MULTI-VITAMIN OR TABS Take 2 tablets by mouth daily   General Health  Patient Reported   nystatin (MYCOSTATIN) 223953 UNIT/GM external powder Apply topically 3 times daily as needed. Apply a small amount to affected area three times daily. Intertrigo Shelley Murguia MD   pravastatin (PRAVACHOL) 20 MG tablet Take 1 tablet (20 mg) by mouth daily. Hyperlipidemia with Target LDL Less than 100 Mil García MD   sertraline (ZOLOFT) 50 MG tablet Take 1 tablet (50 mg) by mouth daily. Anxiety Mil García MD   triamcinolone (KENALOG) 0.1 % external ointment Apply topically 2 times daily. Injection Site Reaction, initial encounter Mil García MD   warfarin ANTICOAGULANT (JANTOVEN ANTICOAGULANT) 2.5 MG tablet Take 2 tablets (5 mg) by mouth every evening. On 6/11/25. INR check on 6/12/25 for further dosing of warfarin per TCU MD/provider. Paroxysmal Atrial Fibrillation (H) Daniel Calvillo MD         Add new medications, over-the-counter drugs, herbals, vitamins, or  minerals in the blank rows below.    Medication How I take it Why I use it Prescriber                                      Allergies:      - Amoxicillin  - Amoxicillin-pot Clavulanate - Diarrhea  - Clindamycin Phosphate  - Metformin - Diarrhea  - Tegretol [carbamazepine]        Side effects I have had:      Not on File        Other Information:              My notes and questions:

## 2025-06-30 NOTE — LETTER
"Recommended To-Do List      Prepared on: Jun 30, 2025       You can get the best results from your medications by completing the items on this \"To-Do List.\"      Bring your To-Do List when you go to your doctor. And, share it with your family or caregivers.    My To-Do List:  What we talked about: What I should do:   A new medication that may be of benefit to you    Start taking alpha-lipoic acid 600 mg daily to see if that will help with neuropathy.          What we talked about: What I should do:   Your medication dosage being too low    Change when you are taking pravastatin (PRAVACHOL) to the evening          What we talked about: What I should do:                     "

## 2025-06-30 NOTE — PATIENT INSTRUCTIONS
"Recommendations from today's MTM visit:                                                    MTM (medication therapy management) is a service provided by a clinical pharmacist designed to help you get the most of out of your medicines.   Today we reviewed what your medicines are for, how to know if they are working, that your medicines are safe and how to make your medicine regimen as easy as possible.      Dr. Hernandez will send a refill for the doxazosin 1 mg tablets. If you dont get it please let us know.  Switch the pravastatin to the evening. It will work better. We make most of our cholesterol when we sleep.  You could start alpha lipoic acid 600 mg daily to help with neuropathy. It could increase risk for bleeding but very low evidence of this. Let the INR nurse know if you start this.   Keep monitoring blood pressure and pulse at home and if your pulse drops into the 40's call Dr. Whatley's office. Follow-up as planned on 7/15.  Schedule follow-up with GI provider.  CBD Oil may be helpful - this is the pain modulating component of the cannabis plant. [employer] tends to be the least expensive. I would recommend starting the CBD oil and use 2-5 drops two times per day. You can increase as needed and as tolerated up to 15 drops twice daily. I would recommend the THC Free CBD Oil  CBD Isolate.  Here is the website for that product:https://[email or web address]/products/thc-free-cbd-oil       Follow-up: Return in about 1 year (around 6/30/2026).    It was great speaking with you today.  I value your experience and would be very thankful for your time in providing feedback in our clinic survey. In the next few days, you may receive an email or text message from [employer] with a link to a survey related to your  clinical pharmacist.\"     To schedule another MTM appointment, please call the clinic directly or you may call the MTM scheduling line at [phone] or toll-free at [phone].     My Clinical " Pharmacist's contact information:                                                      Please feel free to contact me with any questions or concerns you have.        Lara Cain PharmD  Medication Therapy Management Pharmacist  [facility] - Monday and Wednesday 7:30 - 4:00

## 2025-06-30 NOTE — PROGRESS NOTES
Medication Therapy Management (MTM) Encounter    ASSESSMENT:                            Medication Adherence/Access: No issues identified.    Diabetes:   Patient is meeting A1c goal of < 8%.  Microalbumin is not at goal <30mg/g. She is not taking an ACE or ARB, blood pressure is managed by nephrology.  Patient would benefit from continuing current medications.     Hypertension/CKD   Patient is not meeting blood pressure goal of < 140/90mmHg but lower since starting carvedilol. Recommended follow-up with nephrology as planned in a couple of weeks. Spoke to Dr. Whatley's nurse Maria De Jesus, and he should be sending refill for lower dose of doxazosin.    Hyperlipidemia   Stable    Neuropathy pain:   Stable    Mental Health/Anxiety  Stable    Supplements   Stable    Diarrhea:    Recommended she follow-up with GI    Afib:  Stable, managed by cardiology and anticoagulation team.    PLAN:                            Dr. Hernandez will send a refill for the doxazosin 1 mg tablets. If you don't get it please let us know.  Switch the pravastatin to the evening. It will work better. We make most of our cholesterol when we sleep.  You could start alpha lipoic acid 600 mg daily to help with neuropathy. It could increase risk for bleeding but very low evidence of this. Let the INR nurse know if you start this.   Keep monitoring blood pressure and pulse at home and if your pulse drops into the 40's call Dr. Whatley's office. Follow-up as planned on 7/15.  Schedule follow-up with GI provider.    Follow-up: Return in about 1 year (around 6/30/2026).    SUBJECTIVE/OBJECTIVE:                          Ирина Yanez is a 89 year old female seen for a follow-up visit from 6/10/24.   She was recently admitted due to syncope    Reason for visit: annual comprehensive medication review.    Allergies/ADRs: Reviewed in chart  Past Medical History: Reviewed in chart  Tobacco: She reports that she has never smoked. She has never been exposed to tobacco smoke.  She has never used smokeless tobacco.  Alcohol: not currently using    Nephrologist: Dr. Whatley outside of  - follow up every three months (next in July)- he is managing her blood pressure  Cardiologist: Dr. Beasley - follows annually  [facility]: moved to provider Dr. Epps in [place]    Medication Adherence/Access: no issues reported but will forget on dose in the evening every 6 months, she does use a pill box which helps.     Diabetes   Diet controlled    Patient reports no side effects.     Medication history: She was on Invokana in the past but stopped due to vaginal itching. Metformin discontinued 2/2015 due to reduced renal function.  She has tried glipizide in the past which caused diarrhea. Stopped Ozempic due to too much weight loss.    Current diabetes symptoms: none and no low blood sugars  Diet/Exercise: Eating 2 meals per day with a snack around 8 pm.  She is not very active. She continues to have issues with balance. She does have walker at home. Using the cane today.  Blood sugar monitoring: occasionally; Ranges: (patient reported) Fasting- less than 130     Eye exam is up to date  Foot exam is up to date    Hypertension /CKD  diltiazem  mg once daily - increased 6/11/25 at discharge  doxazosin 1 mg daily  Furosemide 40 mg once daily  Hydralazine 10 mg three times daily - started at admission June 2025  Carvedilol 3.125 mg twice daily with meals - started 6/26/25    Medication history: Calcium acetate 667 mg three times daily switched to sevelamer due to hypercalcemia. Calcitriol 0.25 mg twice per week for CKD - stopped by Dr. Whatley in 4/23/24. Sevelamer (Renvela) 800 mg three times daily with meals was stopped by Dr. Whatley, nephrologist, on 3/19/24. Metoprolol ER 25 mg once daily and olmesartan 20 mg stopped during hospital stay June 2025. Torsemide 10 mg once daily change to furosemide during hospital stay June 2025    Patient reports that about 30 minutes after taking her morning medicines she  felt a little woozy and not walking as stable as normal. Today was the only time she noticed it. May have been due to being more active than normal. Otherwise no side effects.  Patient does self-monitor blood pressure.  Last night reports it was under 140 on top and can recall diastolic. Pulse in the 60's.  Nephrology is managing blood pressure. She has follow-up 7/15/25.        Hyperlipidemia   pravastatin 20 mg daily    Patient reports no significant myalgias or other side effects.       Pain /Neuropathy  Gabapentin 200 mg at bedtime    She feels that the gabapentin is helpful. She does have some imbalance but doesn't think it is from the gabapentin.   Pain type/location: arms and legs maybe back  Pain is described: back is stiff and sore right when she gets up. Her arms and legs feel numb and left hand is not as strong.   Patient feels that current therapy is effective but wondering what else can help        Mental Health   Anxiety  Sertraline 50 mg once daily.     Patient reports no current medication side effects.  Current symptoms include: none feeling well.       Supplements   Multivitamin once daily    No reported issues at this time.      Diarrhea:    Not taking anything    Her stool is very loose. Her diarrhea is constant.  She does have follow-up with GI.  She doesn't feel it is related to medications as it started when there were no medication changes.     Afib:  warfarin 5 mg daily for stroke prevention     Patient reports no current medication side effects.    Patient does not have a history of GI bleed.    [id number] Score    Date Calculated: 6/26/2025 11:14 AM  [id number] Score: 6           Today's Vitals: BP (!) 152/57   Pulse 51   LMP  (LMP Unknown)   SpO2 98%   ----------------  Post Discharge Medication Reconciliation Status: discharge medications reconciled, continue medications without change.    I spent 60 minutes with this patient today.      A summary of these recommendations was  given to the patient.    Lara Cain, PharmD  Medication Therapy Management Pharmacist     Medication Therapy Recommendations  Diabetic polyneuropathy (H)   1 Current Medication: gabapentin (NEURONTIN) 100 MG capsule   Current Medication Sig: Take 2 capsules (200 mg) by mouth at bedtime.   Rationale: Synergistic therapy - Needs additional medication therapy - Indication   Recommendation: Start Medication - alpha-lipoic acid 600 MG capsule   Status: Patient Agreed - Adherence/Education   Identified Date: 6/30/2025 Completed Date: 6/30/2025         Hyperlipidemia   1 Current Medication: pravastatin (PRAVACHOL) 20 MG tablet   Current Medication Sig: Take 1 tablet (20 mg) by mouth daily.   Rationale: Incorrect administration - Dosage too low - Effectiveness   Recommendation: Change Administration Time   Status: Patient Agreed - Adherence/Education   Identified Date: 6/30/2025 Completed Date: 6/30/2025

## 2025-06-30 NOTE — LETTER
June 30, 2025  Ирина JOHNS [address]  [address] [facility] [id number]    Dear Ms. Yanez, [facility]RE[facility]     Thank you for talking with me on Jun 30, 2025 about your health and medications. As a follow-up to our conversation, I have included two documents:      Your Recommended To-Do List has steps you should take to get the best results from your medications.  Your Medication List will help you keep track of your medications and how to take them.    If you want to talk about these documents, please call Zarina Cain RPH at phone: [phone], Monday-Friday 8-4:30pm.    I look forward to working with you and your doctors to make sure your medications work well for you.    Sincerely,  Zarina Cain RPH  [facility] Pharmacist, [facility]

## 2025-07-01 ENCOUNTER — HOSPITAL ENCOUNTER (OUTPATIENT)
Dept: ULTRASOUND IMAGING | Facility: CLINIC | Age: 89
Discharge: HOME | End: 2025-07-01
Attending: SURGERY
Payer: COMMERCIAL

## 2025-07-01 ENCOUNTER — HOSPITAL ENCOUNTER (OUTPATIENT)
Dept: MAMMOGRAPHY | Facility: CLINIC | Age: 89
Discharge: HOME | End: 2025-07-01
Attending: SURGERY
Payer: COMMERCIAL

## 2025-07-01 DIAGNOSIS — R22.31 AXILLARY MASS, RIGHT: ICD-10-CM

## 2025-07-01 DIAGNOSIS — N63.31 MASS OF AXILLARY TAIL OF RIGHT BREAST: ICD-10-CM

## 2025-07-01 PROCEDURE — 76882 US LMTD JT/FCL EVL NVASC XTR: CPT | Mod: RT

## 2025-07-01 PROCEDURE — 77062 BREAST TOMOSYNTHESIS BI: CPT

## 2025-07-03 ENCOUNTER — ANTICOAGULATION THERAPY VISIT (OUTPATIENT)
Dept: ANTICOAGULATION | Facility: CLINIC | Age: 89
End: 2025-07-03

## 2025-07-03 ENCOUNTER — LAB (OUTPATIENT)
Dept: LAB | Facility: CLINIC | Age: 89
End: 2025-07-03
Payer: COMMERCIAL

## 2025-07-03 ENCOUNTER — RESULTS FOLLOW-UP (OUTPATIENT)
Dept: ANTICOAGULATION | Facility: CLINIC | Age: 89
End: 2025-07-03

## 2025-07-03 DIAGNOSIS — Z79.01 LONG TERM CURRENT USE OF ANTICOAGULANTS WITH INR GOAL OF 2.0-3.0: ICD-10-CM

## 2025-07-03 DIAGNOSIS — I48.0 PAROXYSMAL ATRIAL FIBRILLATION (H): Primary | ICD-10-CM

## 2025-07-03 DIAGNOSIS — I48.0 PAROXYSMAL ATRIAL FIBRILLATION (H): ICD-10-CM

## 2025-07-03 LAB — INR BLD: 2.4 (ref 0.9–1.1)

## 2025-07-03 NOTE — PROGRESS NOTES
ANTICOAGULATION MANAGEMENT     Ирина Yanez 89 year old female is on warfarin with therapeutic INR result. (Goal INR 2.0-3.0)    Recent labs: (last 7 days)     07/03/25  1148   INR 2.4*       ASSESSMENT     Source(s): Chart Review and Patient/Caregiver Call     Warfarin doses taken: Warfarin taken as instructed  Diet: No new diet changes identified  Medication/supplement changes: None noted  New illness, injury, or hospitalization: No  Signs or symptoms of bleeding or clotting: No  Previous result: Therapeutic last visit; previously outside of goal range  Additional findings: None       PLAN     Recommended plan for no diet, medication or health factor changes affecting INR     Dosing Instructions: Continue your current warfarin dose with next INR in 2 weeks       Summary  As of 7/3/2025      Full warfarin instructions:  5 mg every day   Next INR check:  7/14/2025               Telephone call with Ирина who verbalizes understanding and agrees to plan    Lab visit scheduled for 7/14/25, patient preference since she has another appointment that day    Education provided: Please call back if any changes to your diet, medications or how you've been taking warfarin  Contact [phone] with any changes, questions or concerns.     Plan made per [facility] anticoagulation protocol    Alondra Richter RN  7/3/2025  Anticoagulation Clinic  Xamplified for routing messages: kam RAMIREZ  [facility] patient phone line: [phone]        _______________________________________________________________________     Anticoagulation Episode Summary       Current INR goal:  2.0-3.0   TTR:  68.0% (11.4 mo)   Target end date:  Indefinite   Send INR reminders to:  KRIS RAMIREZ    Indications    Paroxysmal atrial fibrillation (H) [I48.0]  Long term current use of anticoagulants with INR goal of 2.0-3.0 [Z79.01]             Comments:  --             Anticoagulation Care Providers       Provider Role Specialty Phone number    Cherelle  Yuridia NELSON MD Referring Family Medicine [phone]    Mil García MD Referring Family Medicine [phone]

## 2025-07-12 NOTE — PROGRESS NOTES
[facility]    I had the pleasure of seeing Ирина when she came for follow up of AFib.  This 89 year old sees Dr. Beasley and saw Dr. Quan in the hospital for her history of:    Paroxysmal AFib  -  Managed by Dr. Beasley, on diltiazem  mg daily and carvedilol 3.125 mg BID  HTN - managed by Nephrology. Hospitalized 6/2025 for FABY/hyperkalemia in setting of dehydration and addition of Benicar.  DM  CKD   Dyslipidemia   RBBB  Moderate Aortic stenosis - last echo 6/2025      Last Visit & Interval History:  Dr. Beasley last saw Ирина 12/2024 at which time she was doing well. She'd recently lost her  after 66 years. No changes were made with plans for annual follow-up and echo. Unfortunately, hospitalized 6/205 with presyncope. EP consulted for bradycardia. Noted to be hyperkalemia (K 6.4) and in FABY (3.28). It was felt her FABY (Cr to 3.28) and hyperkalemia were likely d/t initiation of Benicar & dehydration as had been working in the sun pulling weeds and BP was in 80s and HR 30s. Improved with fluid bolus. PTA Diltiazem 240 mg daily was held d/t bradycardia, but then went into AFib/RVR. Discharged on PTA Diltiazem 240 and warfarin. 7 day ZioPatch recommended.     ZioPatch showed no recurrent AFib and no significant bradycardi or AFiba.     Today's Visit:  Ирина is doing well today. She presents to the clinic with her son, Chaim. No complaints of chest pain, pressure or tightness.  No orthopnea, PND or edema. No change in exercise tolerance or breathing. She denies any recurrence of presyncope or syncopal episodes since she left the hospital. Ziopatch monitor that she wore after discharge is available for review today.     BPs at home have been ranging 140-180 systolic and 60-70 diastolic. Her BP regimen is managed by Nephrology, who she has an appointment with tomorrow. Her son notes that the last time her meds were adjusted 5/23/2025 she ended up presenting to the ED shortly thereafter  "due to her becoming hyperkalemic in setting of dehydration.    VITALS:  Vitals: BP (!) 179/66 (BP Location: Left arm, Patient Position: Sitting)   Pulse 60   Ht 1.626 m (5' 4\")   Wt 72.1 kg (159 lb)   LMP  (LMP Unknown)   BMI 27.29 kg/m      Diagnostic Testing:  ZioPatch 6/2025 - SR with VT and SVT. In SR, avg HR 69 (range 52-96). 1 episodes of VT, longest episode 5 beats. 47 episodes of SVT, longest episode 16 seconds. 2.3% PACs. 1.8% PVCs. No symptoms.  On Diltiazem 240 mg daily   Echocardiogram 6/2025 - 60-65%. No RWMA.  Normal RV. Sev dilated LA. Mild MAC wtih1+MR. 2+TR. Ao sclerosis with mod aortic stenosis with mean gradient  19 mmHg and BRAYDON 1.7cm. 1+AI. Normal aorta      Plan:  Continue Diltiazem  mg daily  Continue AC with Warfarin  HTN management per Nephrology, will make adjustments at their discretion  Advise close outpatient monitoring given CKD and recent hospitalization  4.  EP follow-up PRN    Assessment/Plan:    Paroxysmal AFib   As above, long h/o this treated with Diltiazem 240mg.   Came in with junctional bradycardia 40s 6/2025 in setting of hyperkalemia and FABY after medication changes and working in the garden in the heat. Diltiazem held, ultimately restarted on discharge  Follow-up ZioPatch 6/2025 showed no AFib. No prolonged pauses seen. No severe bradycardia noted with lowest HR 52 bpm @ 2303 while sleeping on 6/14  Echo 6/2205 with normal LVEF  [id number] at least 5 (HTN, DM, age, sex). Remains on warfarin.     PLAN:  Continue Diltiazem 240 mg daily  Continue warfarin.    HTN  Follows with Nephrology  Remains on Diltiazem 240, hydralazine 10 TID, carvedilol 3.125 mg BID, Lasix 40, Cardura 1 mg daily  /66 in clinic today. Noted to be 140-180 systolic at home.    PLAN:  Follow with Nephrology tomorrow for further HTN management and follow-up labs    Aortic stenosis   Echo 6/2025 continued to show moderate aortic stenosis with mean gradient ~18mmHg. Normal LVEF  Soft 1/6 " systolic murmur at LUSB  No chest pain, no syncope, no shortness of breath.    PLAN:  Continue routine follow-up with Dr. Beasley. No echo needed at follow-up appointment as had done in hospital    Ramírez Hook PA-C    I was present with the MAYELA student who participated in the service and in the documentation of the note.  I have verified the history and personally performed the physical exam and medical decision making.  I agree with the assessment and plan of care as documented in the note.       Items personally reviewed: vitals, labs, and ZioPatch and echo and agree with the interpretation documented in the note.  I agree with the interpretation documented in the note and the assessment and plan as above.        Anne Gambino PA-C, MSPAS      No orders of the defined types were placed in this encounter.    No orders of the defined types were placed in this encounter.    There are no discontinued medications.      Encounter Diagnosis   Name Primary?    Paroxysmal atrial fibrillation (H)        CURRENT MEDICATIONS:  Current Outpatient Medications   Medication Sig Dispense Refill    carvedilol (COREG) 3.125 MG tablet Take 1 tablet (3.125 mg) by mouth 2 times daily (with meals). 60 tablet 1    diltiazem ER (CARDIAZEM LA) 240 MG 24 hr tablet Take 1 tablet (240 mg) by mouth daily. 90 tablet 1    doxazosin (CARDURA) 1 MG tablet Take 1 tablet (1 mg) by mouth at bedtime.      furosemide (LASIX) 40 MG tablet Take 1 tablet (40 mg) by mouth daily. 30 tablet 1    gabapentin (NEURONTIN) 100 MG capsule Take 2 capsules (200 mg) by mouth at bedtime.      hydrALAZINE (APRESOLINE) 10 MG tablet Take 1 tablet (10 mg) by mouth every 8 hours. 270 tablet 1    MULTI-VITAMIN OR TABS Take 2 tablets by mouth daily       nystatin (MYCOSTATIN) 913990 UNIT/GM external powder Apply topically 3 times daily as needed. Apply a small amount to affected area three times daily.      pravastatin (PRAVACHOL) 20 MG tablet Take 1 tablet (20 mg) by mouth  "daily. 90 tablet 3    predniSONE (DELTASONE) 20 MG tablet Take 1 tablet (20 mg) by mouth daily for 5 days. 5 tablet 0    sertraline (ZOLOFT) 50 MG tablet Take 1 tablet (50 mg) by mouth daily. 90 tablet 1    triamcinolone (KENALOG) 0.1 % external ointment Apply topically 2 times daily. 15 g 0    warfarin ANTICOAGULANT (JANTOVEN ANTICOAGULANT) 2.5 MG tablet Take 2 tablets (5 mg) by mouth every evening. On 6/11/25. INR check on 6/12/25 for further dosing of warfarin per TCU MD/provider.      acetaminophen (TYLENOL) 325 MG tablet Take 2 tablets (650 mg) by mouth every 6 hours as needed for mild pain.      betamethasone dipropionate (DIPROSONE) 0.05 % external lotion       blood glucose (NO BRAND SPECIFIED) lancets standard Use to test blood sugar 1 times daily or as directed, Contour Next lancets 100 each 3    blood glucose (NO BRAND SPECIFIED) test strip Use to test blood sugar 1 times daily or as directed, Contour Next strips 100 strip 1    blood glucose monitoring (NO BRAND SPECIFIED) meter device kit Use to test blood sugar 1 times daily or as directed. Ultra 2 1 kit 1       ALLERGIES     Allergies   Allergen Reactions    Amoxicillin      Other reaction(s): cannot remember    Amoxicillin-Pot Clavulanate Diarrhea     Vomiting      Clindamycin Phosphate      Hives    Metformin Diarrhea    Tegretol [Carbamazepine]      Irregular heart beat         Review of Systems:  Skin:  not assessed     Eyes:  not assessed glasses  ENT:  not assessed    Respiratory:  Positive for sleep apnea  Cardiovascular:  Negative Positive for, fatigue  Gastroenterology: not assessed    Genitourinary:  not assessed    Musculoskeletal:  not assessed    Neurologic:  not assessed    Psychiatric:  not assessed    Heme/Lymph/Imm:  not assessed    Endocrine:  not assessed diabetes    Physical Exam:  Vitals: BP (!) 179/66 (BP Location: Left arm, Patient Position: Sitting)   Pulse 60   Ht 1.626 m (5' 4\")   Wt 72.1 kg (159 lb)   LMP  (LMP Unknown)  "  BMI 27.29 kg/m      Constitutional:  cooperative, alert and oriented, well developed, well nourished, in no acute distress        Skin:  warm and dry to the touch, no apparent skin lesions or masses noted        Head:  normocephalic, no masses or lesions        Eyes:  not assessed this visit        ENT:  not assessed this visit        Neck:  not assessed this visit        Chest:  normal breath sounds, clear to auscultation, normal A-P diameter, normal symmetry, normal respiratory excursion, no use of accessory muscles        Cardiac: regular rhythm       grade 1, LUSB          Abdomen:  not assessed this visit        Vascular:                                        Extremities and Back:  no edema        Neurological:  no gross motor deficits            PAST MEDICAL HISTORY:  Past Medical History:   Diagnosis Date    Abrasion of arm, right, initial encounter 07/10/2019    Anxiety     Arthritis     Chronic pain     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    Complication of anesthesia     Congestive heart failure (H) 2020    Depression     Diabetes mellitus (H)     sees Dr. Verde- type II    Falls frequently 07/10/2019    Heart murmur     HTN     Hyperlipidemia LDL goal < 100     Dr. Verde    Lichen sclerosus et atrophicus 02/15/2013    Melanoma (H)     Oxygen dependent     1 liter continuously    Peripheral Neuropathy     hands, feet; used to see Dr. Tl ARRIAZA (postoperative nausea and vomiting)     Skin cancer     face; basal and other. Melanoma. Dolan    Sleep apnea     CPAP    Spinal stenosis     Tendon rupture, traumatic. quadriceps 10/27/2015       PAST SURGICAL HISTORY:  Past Surgical History:   Procedure Laterality Date    ABDOMEN SURGERY  1975    gall bladder    AMPUTATE TOE(S)  08/23/2012    left second toe Procedure: AMPUTATE TOE(S);;  Surgeon: Mil Jolly DPM;  Location: [facility] OR    BIOPSY  01-;04-    basil cell on leg, melanoma left upper arm    BREAST  SURGERY  reduction  1996?    CHOLECYSTECTOMY  1975    COLONOSCOPY  early 2009    repeat 4-5 years (Had one prior to this with polyps)    COLONOSCOPY  09/2014    incomplete; recommend colography    COLONOSCOPY  2014 ?    Dr. Pathak [facility]    COLONOSCOPY N/A 02/25/2020    Procedure: COLONOSCOPY, WITH biopsies using forceps;  Surgeon: Adebayo Pathak MD;  Location:  GI    EYE SURGERY  caterac    1995 ?    INSERT CHEST TUBE Bilateral 02/08/2021    Procedure: Attempted INSERTION, CATHETER, INTERCOSTAL, FOR DRAINAGE (Pleurx);  Surgeon: Smitha Odonnell MD;  Location:  OR    OPTICAL TRACKING SYSTEM FUSION POSTERIOR SPINE LUMBAR N/A 09/16/2016    Procedure: OPTICAL TRACKING SYSTEM FUSION SPINE POSTERIOR LUMBAR ONE LEVEL;  Surgeon: Lennox Blue MD;  Location: [facility] OR    PET, REC OF MELANOMA/MET CA Left     arm    REPAIR HAMMER TOE BILATERAL  08/23/2012    Procedure: REPAIR HAMMER TOE BILATERAL;  Flexor Tenotomy 2,3,4,5 Toes both feet, Partial 2nd toe amputation left foot;  Surgeon: Mil Jolly DPM;  Location: [facility] OR    REPAIR TENDON QUADRICEPS Right 10/27/2015    Procedure: REPAIR TENDON QUADRICEPS;  Surgeon: Antonio iY MD;  Location: [facility] OR    SURGICAL HISTORY OF -   1997    bilateral knee replacement    SURGICAL HISTORY OF -   1997    right knee revised; patella tendon ruptured    SURGICAL HISTORY OF -       surgery for spinal stenosis    SURGICAL HISTORY OF -       breast reduction surgery    SURGICAL HISTORY OF -       D and C        FAMILY HISTORY:  Family History   Problem Relation Age of Onset    Cerebrovascular Disease Mother     Cerebrovascular Disease Mother     Diabetes Mother     Hypertension Mother     Other Cancer Mother         Skin    Heart Disease Father     Hypertension Father     Cerebrovascular Disease Father     Neurologic Disorder Sister         ALS    Obesity Sister     C.A.D. Sister     Diabetes Sister     C.A.D. Brother     Diabetes Brother         diabetic  stroke  blood clots in lungs    Psychotic Disorder Brother         [place] war: PTSD. suicide 2019    Gastrointestinal Disease Brother         diverticulitis    Diabetes Sister     Diabetes Sister         diabetic    Diabetes Brother         suicide (PTSD)    Other Cancer Brother         Stomach    Colon Cancer No family hx of        SOCIAL HISTORY:  Social History     Socioeconomic History    Marital status:     Highest education level: 12th grade   Occupational History    Occupation:  from home     Employer: RETIRED   Tobacco Use    Smoking status: Never     Passive exposure: Never    Smokeless tobacco: Never   Vaping Use    Vaping status: Never Used   Substance and Sexual Activity    Alcohol use: No    Drug use: Never    Sexual activity: Not Currently     Partners: Male   Other Topics Concern    Special Diet No     Comment: getting fruits and veggies.     Exercise No     Comment: gets little; limited with back and feet/leg pain.    Parent/sibling w/ CABG, MI or angioplasty before 65F 55M? Yes   Social History Narrative    2 adopted children     Social Drivers of Health     Financial Resource Strain: Low Risk  (6/4/2025)    Financial Resource Strain     Within the past 12 months, have you or your family members you live with been unable to get utilities (heat, electricity) when it was really needed?: No   Food Insecurity: Low Risk  (6/4/2025)    Food Insecurity     Within the past 12 months, did you worry that your food would run out before you got money to buy more?: No     Within the past 12 months, did the food you bought just not last and you didn t have money to get more?: No   Transportation Needs: Low Risk  (6/4/2025)    Transportation Needs     Within the past 12 months, has lack of transportation kept you from medical appointments, getting your medicines, non-medical meetings or appointments, work, or from getting things that you need?: No   Physical Activity: Inactive (2/24/2025)    Exercise  Vital Sign     Days of Exercise per Week: 0 days     Minutes of Exercise per Session: 0 min   Stress: No Stress Concern Present (2/24/2025)    [employer] - Occupational Stress Questionnaire     Feeling of Stress : Only a little   Social Connections: Unknown (2/24/2025)    Social Connection and Isolation Panel [NHANES]     Frequency of Social Gatherings with Friends and Family: Twice a week   Interpersonal Safety: Low Risk  (6/4/2025)    Interpersonal Safety     Do you feel physically and emotionally safe where you currently live?: Yes     Within the past 12 months, have you been hit, slapped, kicked or otherwise physically hurt by someone?: No     Within the past 12 months, have you been humiliated or emotionally abused in other ways by your partner or ex-partner?: No   Housing Stability: Low Risk  (6/4/2025)    Housing Stability     Do you have housing? : Yes     Are you worried about losing your housing?: No

## 2025-07-14 ENCOUNTER — OFFICE VISIT (OUTPATIENT)
Dept: CARDIOLOGY | Facility: CLINIC | Age: 89
End: 2025-07-14
Payer: COMMERCIAL

## 2025-07-14 ENCOUNTER — ANTICOAGULATION THERAPY VISIT (OUTPATIENT)
Dept: ANTICOAGULATION | Facility: CLINIC | Age: 89
End: 2025-07-14

## 2025-07-14 ENCOUNTER — RESULTS FOLLOW-UP (OUTPATIENT)
Dept: ANTICOAGULATION | Facility: CLINIC | Age: 89
End: 2025-07-14

## 2025-07-14 ENCOUNTER — LAB (OUTPATIENT)
Dept: LAB | Facility: CLINIC | Age: 89
End: 2025-07-14
Payer: COMMERCIAL

## 2025-07-14 ENCOUNTER — OFFICE VISIT (OUTPATIENT)
Dept: URGENT CARE | Facility: URGENT CARE | Age: 89
End: 2025-07-14
Payer: COMMERCIAL

## 2025-07-14 VITALS
WEIGHT: 159 LBS | BODY MASS INDEX: 27.14 KG/M2 | HEART RATE: 60 BPM | HEIGHT: 64 IN | SYSTOLIC BLOOD PRESSURE: 179 MMHG | DIASTOLIC BLOOD PRESSURE: 66 MMHG

## 2025-07-14 VITALS
TEMPERATURE: 98.1 F | WEIGHT: 157.8 LBS | HEART RATE: 91 BPM | SYSTOLIC BLOOD PRESSURE: 184 MMHG | DIASTOLIC BLOOD PRESSURE: 65 MMHG | BODY MASS INDEX: 27.09 KG/M2 | RESPIRATION RATE: 22 BRPM | OXYGEN SATURATION: 99 %

## 2025-07-14 DIAGNOSIS — M54.50 ACUTE BILATERAL LOW BACK PAIN WITHOUT SCIATICA: Primary | ICD-10-CM

## 2025-07-14 DIAGNOSIS — Z79.01 LONG TERM CURRENT USE OF ANTICOAGULANTS WITH INR GOAL OF 2.0-3.0: ICD-10-CM

## 2025-07-14 DIAGNOSIS — I48.0 PAROXYSMAL ATRIAL FIBRILLATION (H): ICD-10-CM

## 2025-07-14 DIAGNOSIS — I48.0 PAROXYSMAL ATRIAL FIBRILLATION (H): Primary | ICD-10-CM

## 2025-07-14 LAB — INR BLD: 3.3 (ref 0.9–1.1)

## 2025-07-14 PROCEDURE — 3078F DIAST BP <80 MM HG: CPT | Performed by: NURSE PRACTITIONER

## 2025-07-14 PROCEDURE — 99213 OFFICE O/P EST LOW 20 MIN: CPT | Performed by: PHYSICIAN ASSISTANT

## 2025-07-14 PROCEDURE — 36416 COLLJ CAPILLARY BLOOD SPEC: CPT

## 2025-07-14 PROCEDURE — 85610 PROTHROMBIN TIME: CPT

## 2025-07-14 PROCEDURE — 1125F AMNT PAIN NOTED PAIN PRSNT: CPT | Performed by: NURSE PRACTITIONER

## 2025-07-14 PROCEDURE — 3077F SYST BP >= 140 MM HG: CPT | Performed by: NURSE PRACTITIONER

## 2025-07-14 PROCEDURE — 3077F SYST BP >= 140 MM HG: CPT | Performed by: PHYSICIAN ASSISTANT

## 2025-07-14 PROCEDURE — 99213 OFFICE O/P EST LOW 20 MIN: CPT | Performed by: NURSE PRACTITIONER

## 2025-07-14 PROCEDURE — 3078F DIAST BP <80 MM HG: CPT | Performed by: PHYSICIAN ASSISTANT

## 2025-07-14 RX ORDER — PREDNISONE 20 MG/1
20 TABLET ORAL DAILY
Qty: 5 TABLET | Refills: 0 | Status: SHIPPED | OUTPATIENT
Start: 2025-07-14 | End: 2025-07-19

## 2025-07-14 ASSESSMENT — PAIN SCALES - GENERAL: PAINLEVEL_OUTOF10: MODERATE PAIN (6)

## 2025-07-14 NOTE — PROGRESS NOTES
Assessment & Plan       ICD-10-CM    1. Acute bilateral low back pain without sciatica  M54.50 predniSONE (DELTASONE) 20 MG tablet           MDM: Patient is having acute on chronic lower back pain after doing yard work at the end of last week.  She denies any red flag symptoms.  Patient does have stage 4 kidney disease with a creatine clearance of 14.9. Patient is already taking Tylenol with minimal relief of her pain. Will treat pain with low dose prednisone and close follow up with her primary care.    Patient Instructions   Perform hot epsom salt soaks, light stretching as needed.     Take prednisone as instructed - 20 mg once daily for 5 days. This can make the patient sleepy, so do not drive while on it or perform important functions.     Tylenol as needed.    Follow up in Primary care on 07/24/2025 regarding your back pain.    If you develop any red flag symptoms  (loss of bowel or bladder, new numbness/weakness, fevers, sustain any trauma to that area, etc) go directly to the Emergency Department.    Patient and her son agreed to the treatment plan and verbalized understanding.     Recent Results (from the past 24 hours)   INR point of care (finger stick)   Result Value Ref Range    INR 3.3 (H) 0.9 - 1.1    Narrative    This test is intended for monitoring Coumadin therapy. Results are not accurate in patients with prolonged INR due to factor deficiency.     Estimated Creatinine Clearance: 14.9 mL/min (A) (based on SCr of 2.53 mg/dL (H)).    Bárbara Gifford is a 89 year old female who presents to clinic today with her son, who helps give her health history, for the following health issues:  Chief Complaint   Patient presents with    Urgent Care     Mid back pain x Thurs and getting worse (getting out of bed/standing/walkiing, any movement)     HPI  Patient with known diabetic polyneuropathy, type 2 diabetes [place], chronic kidney disease stage IV, chronic pain,arthritis, and frequent falls has had  back pain for 5 days after doing yard work the day before (no falls in over the last month). No fevers, saddle anesthesia, numbness/tingling, or weakness. No recent trauma.  No dysuria or other urinary symptoms. She states she has been taking Tylenol for pain with minimal relief.    Review of Systems   All other systems reviewed and are negative.      Problem List:  2025-06: Mass of right axilla  2025-06: Skin eruption  2025-06: Hyperkalemia  2025-06: Near syncope  2025-06: Symptomatic bradycardia  2025-06: Acute renal failure superimposed on chronic kidney disease,   unspecified acute renal failure type, unspecified CKD stage  2025-05: Chronic diarrhea  2025-05: Disorder of intestine  2025-02: Chronic systolic heart failure (H)  2022-08: Chronic respiratory failure with hypoxia (H)  2022-02: Long term current use of anticoagulants with INR goal of 2.0-  3.0  2021-05: Secondary hyperparathyroidism of renal origin  2021-03: Acquired absence of other left toe(s)  2021-02: Diastolic heart failure, unspecified HF chronicity (H)  2021-01: Bilateral pleural effusion  2021-01: Acute respiratory failure with hypoxia (H)  2021-01: Paroxysmal atrial fibrillation (H)  2021-01: Hyperlipidemia  2020-12: Hypoxemia  2020-12: CHF (congestive heart failure) (H)  2020-12: Dyspnea  2020-12: Acute on chronic kidney failure  2020-12: Iron deficiency anemia  2020-12: Lung mass  2020-12: Chest tightness  2020-12: Dyspnea on exertion  2020-02: Ascending aorta dilatation  2019-07: Falls frequently  2019-07: Abrasion of arm, right, initial encounter  2018-09: Elevated BMI with comorbidity (H)  2017-07: Opioid dependence, daily use (H)  2017-07: Toe amputation status, left  2017-03: Chronic midline low back pain without sciatica  2017-03: Aftercare following surgery of the musculoskeletal system  2017-01: Heart murmur  2017-01: Aortic valve stenosis, etiology of cardiac valve disease   unspecified  2016-11: ACP (advance care  planning)  2016-09: S/P lumbar fusion  2016-09: Lumbar stenosis  2016-09: Right bundle branch block  2015-10: Tendon rupture, traumatic. quadriceps  2015-10: Complex sleep apnea syndrome  2015-08: Pain in joint, lower leg, right  2015-05: Basal cell carcinoma of skin  2015-05: CKD (chronic kidney disease) stage 4, GFR 15-29 ml/min (H)  2015-02: Controlled substance agreement signed 2/5/15  2015-02: Major depression in partial remission  2014-06: Vitamin B12 deficiency (non anemic)  2013-02: Lichen sclerosus et atrophicus  2013-02: Vulvar dystrophy  2012-02: Sleep apnea  2011-08: Malignant melanoma of skin  2011-08: Melanoma (H)  2011-07: Health Care Home  2011-07: CKD (chronic kidney disease) stage 3, GFR 30-59 ml/min (H)  2010-10: Type 2 diabetes mellitus with diabetic nephropathy, without   long-term current use of insulin (H)  2010-06: Hypertension goal BP (blood pressure) < 140/90  2010-06: Arthritis  2010-06: Recurrent major depression in complete remission  2010-06: Anxiety  2010-05: Type 2 diabetes, HbA1c goal < 7% (H)  2008-01: Sprain and strain of other specified sites of knee and leg  2007-12: Diabetic polyneuropathy (H)  HTN (hypertension)  Diabetes mellitus (H)  CHF (congestive heart failure) (H)      Past Medical History:   Diagnosis Date    Abrasion of arm, right, initial encounter 07/10/2019    Anxiety     Arthritis     Chronic pain     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    Complication of anesthesia     Congestive heart failure (H) 2020    Depression     Diabetes mellitus (H)     sees Dr. Verde- type II    Falls frequently 07/10/2019    Heart murmur     HTN     Hyperlipidemia LDL goal < 100     Dr. Verde    Lichen sclerosus et atrophicus 02/15/2013    Melanoma (H)     Oxygen dependent     1 liter continuously    Peripheral Neuropathy     hands, feet; used to see Dr. Tl ARRIAZA (postoperative nausea and vomiting)     Skin cancer     face; basal and other. Melanoma.  [place]    Sleep apnea     CPAP    Spinal stenosis     Tendon rupture, traumatic. quadriceps 10/27/2015         Social History     Tobacco Use    Smoking status: Never     Passive exposure: Never    Smokeless tobacco: Never   Substance Use Topics    Alcohol use: No           Objective    BP (!) 184/65   Pulse 91   Temp 98.1  F (36.7  C) (Tympanic)   Resp 22   Wt 71.6 kg (157 lb 12.8 oz)   LMP  (LMP Unknown)   SpO2 99%   BMI 27.09 kg/m    Physical Exam  Vitals and nursing note reviewed.   Constitutional:       Appearance: Normal appearance.   HENT:      Head: Normocephalic and atraumatic.      Right Ear: Tympanic membrane and ear canal normal.      Left Ear: Tympanic membrane and ear canal normal.      Nose: Nose normal.      Mouth/Throat:      Mouth: Mucous membranes are moist.      Pharynx: Oropharynx is clear.   Eyes:      General:         Right eye: No discharge.         Left eye: No discharge.      Extraocular Movements: Extraocular movements intact.      Conjunctiva/sclera: Conjunctivae normal.      Pupils: Pupils are equal, round, and reactive to light.   Cardiovascular:      Rate and Rhythm: Normal rate and regular rhythm.      Heart sounds: Normal heart sounds.   Pulmonary:      Effort: Pulmonary effort is normal.      Breath sounds: Normal breath sounds.   Abdominal:      General: Abdomen is flat. Bowel sounds are normal.      Palpations: Abdomen is soft.      Tenderness: There is no abdominal tenderness.   Musculoskeletal:      Cervical back: Normal range of motion and neck supple. No rigidity or tenderness.      Right lower leg: No edema.      Left lower leg: No edema.      Comments: Tenderness to palpation of the paraspinal lumbar region bilaterally.  No tenderness along the C spine, T-spine, or L-spine.   Skin:     General: Skin is warm and dry.      Findings: No rash.   Neurological:      Mental Status: She is alert.      Cranial Nerves: No cranial nerve deficit.      Comments: Patient normally uses  walker with seat to ambulate.              Kathryn Gordon, NP

## 2025-07-14 NOTE — PATIENT INSTRUCTIONS
Perform hot epsom salt soaks, light stretching as needed.     Take prednisone as instructed - 20 mg once daily for 5 days. This can make the patient sleepy, so do not drive while on it or perform important functions.     Tylenol as needed.    Follow up in Primary care on 07/24/2025 regarding your back pain.    If you develop any red flag symptoms  (loss of bowel or bladder, new numbness/weakness, fevers, sustain any trauma to that area, etc) go directly to the Emergency Department.

## 2025-07-14 NOTE — PROGRESS NOTES
ANTICOAGULATION MANAGEMENT     Ирина JOHNS Renata 89 year old female is on warfarin with supratherapeutic INR result. (Goal INR 2.0-3.0)    Recent labs: (last 7 days)     07/14/25  1020   INR 3.3*       ASSESSMENT     Source(s): Chart Review and Patient/Caregiver Call     Warfarin doses taken: Warfarin taken as instructed  Diet: Decreased greens/vitamin K in diet; plans to resume previous intake  Medication/supplement changes: prednisone 5 day course (dates: 7/14/25 - 7/19/25) which may increase INR.  New illness, injury, or hospitalization: Yes: Patient was seen in Urgent Care today for back pain since last Thursday  Signs or symptoms of bleeding or clotting: No  Previous result: Therapeutic last 2(+) visits  Additional findings: None       PLAN     Recommended plan for temporary change(s) affecting INR     Dosing Instructions: partial hold then continue your current warfarin dose with next INR in 10 days at provider appointment      Summary  As of 7/14/2025      Full warfarin instructions:  7/14: 2.5 mg; Otherwise 5 mg every day   Next INR check:  7/24/2025               Telephone call with Ирина who verbalizes understanding and agrees to plan    Check at provider office visit 7/24/25    Education provided: Please call back if any changes to your diet, medications or how you've been taking warfarin  Interaction IS anticipated between warfarin and prednisone  Contact [phone] with any changes, questions or concerns.     Plan made per [facility] anticoagulation protocol    Alondra Richter RN  7/14/2025  Anticoagulation Clinic  [facility] for routing messages: kam [email or web address]  [facility] patient phone line: [phone]        _______________________________________________________________________     Anticoagulation Episode Summary       Current INR goal:  2.0-3.0   TTR:  66.9% (11.4 mo)   Target end date:  Indefinite   Send INR reminders to:  KRIS RAMIREZ    Indications    Paroxysmal atrial fibrillation (H)  [I48.0]  Long term current use of anticoagulants with INR goal of 2.0-3.0 [Z79.01]             Comments:  --             Anticoagulation Care Providers       Provider Role Specialty Phone number    Yuridia Villarreal MD Referring Family Medicine [phone]    Mil García MD Referring Family Medicine [phone]

## 2025-07-14 NOTE — PATIENT INSTRUCTIONS
Eunice - it was nice to see you and Chaim today!    Today we reviewed:    You're doing well following hospitalization  ZioPatch showed NO AFib and no concerning arrhythmias   Echo while in hospital showed normal pumping strength - still with tighter aortic valve    MEDICATION CHANGES:    It looks like Nephrology made medication changes 5/23 for tighter BP control  In hospital 6/4 after some dehydration/working in the yard of top of the medications. This made your BP drop and kidneys didn't filter meds correctly leading to high BP and low HR and high potassium      FOLLOW UP:    Dr. Beasley in 12/2025 as planned    IMPORTANT PHONE NUMBERS FOR  [facility] ([place]):  Arrhythmia nurses Karyn, Alice, & Roseann: [phone]

## 2025-07-14 NOTE — PROGRESS NOTES
Urgent Care Clinic Visit    Chief Complaint   Patient presents with    Urgent Care     Mid back pain x Thurs and getting worse (getting out of bed/standing/walkiing, any movement)              7/14/2025    11:25 AM   Additional Questions   Roomed by Kelly   Accompanied by Son (Chaim)

## 2025-07-14 NOTE — LETTER
7/14/2025    Mil García MD  [address] [address] [facility] [address]    RE: Ирина Yanez       Dear Colleague,     I had the pleasure of seeing Ирина Yanez in the [facility].  [facility]    I had the pleasure of seeing Ирина when she came for follow up of AFib.  This 89 year old sees Dr. Beasley and saw Dr. Quan in the hospital for her history of:    Paroxysmal AFib  -  Managed by Dr. Beasley, on diltiazem  mg daily and carvedilol 3.125 mg BID  HTN - managed by Nephrology. Hospitalized 6/2025 for FABY/hyperkalemia in setting of dehydration and addition of Benicar.  DM  CKD   Dyslipidemia   RBBB  Moderate Aortic stenosis - last echo 6/2025      Last Visit & Interval History:  Dr. Beasley last saw Ирина 12/2024 at which time she was doing well. She'd recently lost her  after 66 years. No changes were made with plans for annual follow-up and echo. Unfortunately, hospitalized 6/205 with presyncope. EP consulted for bradycardia. Noted to be hyperkalemia (K 6.4) and in FABY (3.28). It was felt her FABY (Cr to 3.28) and hyperkalemia were likely d/t initiation of Benicar & dehydration as had been working in the sun pulling weeds and BP was in 80s and HR 30s. Improved with fluid bolus. PTA Diltiazem 240 mg daily was held d/t bradycardia, but then went into AFib/RVR. Discharged on PTA Diltiazem 240 and warfarin. 7 day ZioPatch recommended.     ZioPatch showed no recurrent AFib and no significant bradycardi or AFiba.     Today's Visit:  Ирина is doing well today. She presents to the clinic with her son, Chaim. No complaints of chest pain, pressure or tightness.  No orthopnea, PND or edema. No change in exercise tolerance or breathing. She denies any recurrence of presyncope or syncopal episodes since she left the hospital. Ziopatch monitor that she wore after discharge is available for review today.     BPs at home have been ranging 140-180 systolic and 60-70  "diastolic. Her BP regimen is managed by Nephrology, who she has an appointment with tomorrow. Her son notes that the last time her meds were adjusted 5/23/2025 she ended up presenting to the ED shortly thereafter due to her becoming hyperkalemic in setting of dehydration.    VITALS:  Vitals: BP (!) 179/66 (BP Location: Left arm, Patient Position: Sitting)   Pulse 60   Ht 1.626 m (5' 4\")   Wt 72.1 kg (159 lb)   LMP  (LMP Unknown)   BMI 27.29 kg/m      Diagnostic Testing:  ZioPatch 6/2025 - SR with VT and SVT. In SR, avg HR 69 (range 52-96). 1 episodes of VT, longest episode 5 beats. 47 episodes of SVT, longest episode 16 seconds. 2.3% PACs. 1.8% PVCs. No symptoms.  On Diltiazem 240 mg daily   Echocardiogram 6/2025 - 60-65%. No RWMA.  Normal RV. Sev dilated LA. Mild MAC wtih1+MR. 2+TR. Ao sclerosis with mod aortic stenosis with mean gradient  19 mmHg and BRAYDON 1.7cm. 1+AI. Normal aorta      Plan:  Continue Diltiazem  mg daily  Continue AC with Warfarin  HTN management per Nephrology, will make adjustments at their discretion  Advise close outpatient monitoring given CKD and recent hospitalization  4.  EP follow-up PRN    Assessment/Plan:    Paroxysmal AFib   As above, long h/o this treated with Diltiazem 240mg.   Came in with junctional bradycardia 40s 6/2025 in setting of hyperkalemia and FABY after medication changes and working in the garden in the heat. Diltiazem held, ultimately restarted on discharge  Follow-up ZioPatch 6/2025 showed no AFib. No prolonged pauses seen. No severe bradycardia noted with lowest HR 52 bpm @ 2303 while sleeping on 6/14  Echo 6/2205 with normal LVEF  [id number] at least 5 (HTN, DM, age, sex). Remains on warfarin.     PLAN:  Continue Diltiazem 240 mg daily  Continue warfarin.    HTN  Follows with Nephrology  Remains on Diltiazem 240, hydralazine 10 TID, carvedilol 3.125 mg BID, Lasix 40, Cardura 1 mg daily  /66 in clinic today. Noted to be 140-180 systolic at " home.    PLAN:  Follow with Nephrology tomorrow for further HTN management and follow-up labs    Aortic stenosis   Echo 6/2025 continued to show moderate aortic stenosis with mean gradient ~18mmHg. Normal LVEF  Soft 1/6 systolic murmur at LUSB  No chest pain, no syncope, no shortness of breath.    PLAN:  Continue routine follow-up with Dr. Beasley. No echo needed at follow-up appointment as had done in hospital    Ramírez Hook PA-C    I was present with the MAYELA student who participated in the service and in the documentation of the note.  I have verified the history and personally performed the physical exam and medical decision making.  I agree with the assessment and plan of care as documented in the note.       Items personally reviewed: vitals, labs, and ZioPatch and echo and agree with the interpretation documented in the note.  I agree with the interpretation documented in the note and the assessment and plan as above.        Anne Gambino PA-C, MSPAS      No orders of the defined types were placed in this encounter.    No orders of the defined types were placed in this encounter.    There are no discontinued medications.      Encounter Diagnosis   Name Primary?     Paroxysmal atrial fibrillation (H)        CURRENT MEDICATIONS:  Current Outpatient Medications   Medication Sig Dispense Refill     carvedilol (COREG) 3.125 MG tablet Take 1 tablet (3.125 mg) by mouth 2 times daily (with meals). 60 tablet 1     diltiazem ER (CARDIAZEM LA) 240 MG 24 hr tablet Take 1 tablet (240 mg) by mouth daily. 90 tablet 1     doxazosin (CARDURA) 1 MG tablet Take 1 tablet (1 mg) by mouth at bedtime.       furosemide (LASIX) 40 MG tablet Take 1 tablet (40 mg) by mouth daily. 30 tablet 1     gabapentin (NEURONTIN) 100 MG capsule Take 2 capsules (200 mg) by mouth at bedtime.       hydrALAZINE (APRESOLINE) 10 MG tablet Take 1 tablet (10 mg) by mouth every 8 hours. 270 tablet 1     MULTI-VITAMIN OR TABS Take 2 tablets by mouth daily         nystatin (MYCOSTATIN) 689138 UNIT/GM external powder Apply topically 3 times daily as needed. Apply a small amount to affected area three times daily.       pravastatin (PRAVACHOL) 20 MG tablet Take 1 tablet (20 mg) by mouth daily. 90 tablet 3     predniSONE (DELTASONE) 20 MG tablet Take 1 tablet (20 mg) by mouth daily for 5 days. 5 tablet 0     sertraline (ZOLOFT) 50 MG tablet Take 1 tablet (50 mg) by mouth daily. 90 tablet 1     triamcinolone (KENALOG) 0.1 % external ointment Apply topically 2 times daily. 15 g 0     warfarin ANTICOAGULANT (JANTOVEN ANTICOAGULANT) 2.5 MG tablet Take 2 tablets (5 mg) by mouth every evening. On 6/11/25. INR check on 6/12/25 for further dosing of warfarin per TCU MD/provider.       acetaminophen (TYLENOL) 325 MG tablet Take 2 tablets (650 mg) by mouth every 6 hours as needed for mild pain.       betamethasone dipropionate (DIPROSONE) 0.05 % external lotion        blood glucose (NO BRAND SPECIFIED) lancets standard Use to test blood sugar 1 times daily or as directed, Contour Next lancets 100 each 3     blood glucose (NO BRAND SPECIFIED) test strip Use to test blood sugar 1 times daily or as directed, Contour Next strips 100 strip 1     blood glucose monitoring (NO BRAND SPECIFIED) meter device kit Use to test blood sugar 1 times daily or as directed. Ultra 2 1 kit 1       ALLERGIES     Allergies   Allergen Reactions     Amoxicillin      Other reaction(s): cannot remember     Amoxicillin-Pot Clavulanate Diarrhea     Vomiting       Clindamycin Phosphate      Hives     Metformin Diarrhea     Tegretol [Carbamazepine]      Irregular heart beat         Review of Systems:  Skin:  not assessed     Eyes:  not assessed glasses  ENT:  not assessed    Respiratory:  Positive for sleep apnea  Cardiovascular:  Negative Positive for, fatigue  Gastroenterology: not assessed    Genitourinary:  not assessed    Musculoskeletal:  not assessed    Neurologic:  not assessed    Psychiatric:  not assessed  "   Heme/Lymph/Imm:  not assessed    Endocrine:  not assessed diabetes    Physical Exam:  Vitals: BP (!) 179/66 (BP Location: Left arm, Patient Position: Sitting)   Pulse 60   Ht 1.626 m (5' 4\")   Wt 72.1 kg (159 lb)   LMP  (LMP Unknown)   BMI 27.29 kg/m      Constitutional:  cooperative, alert and oriented, well developed, well nourished, in no acute distress        Skin:  warm and dry to the touch, no apparent skin lesions or masses noted        Head:  normocephalic, no masses or lesions        Eyes:  not assessed this visit        ENT:  not assessed this visit        Neck:  not assessed this visit        Chest:  normal breath sounds, clear to auscultation, normal A-P diameter, normal symmetry, normal respiratory excursion, no use of accessory muscles        Cardiac: regular rhythm       grade 1, LUSB          Abdomen:  not assessed this visit        Vascular:                                        Extremities and Back:  no edema        Neurological:  no gross motor deficits            PAST MEDICAL HISTORY:  Past Medical History:   Diagnosis Date     Abrasion of arm, right, initial encounter 07/10/2019     Anxiety      Arthritis      Chronic pain     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     Complication of anesthesia      Congestive heart failure (H) 2020     Depression      Diabetes mellitus (H)     sees Dr. Verde- type II     Falls frequently 07/10/2019     Heart murmur      HTN      Hyperlipidemia LDL goal < 100     Dr. Verde     Lichen sclerosus et atrophicus 02/15/2013     Melanoma (H)      Oxygen dependent     1 liter continuously     Peripheral Neuropathy     hands, feet; used to see Dr. Tl Das     PONV (postoperative nausea and vomiting)      Skin cancer     face; basal and other. Melanoma. Dolan     Sleep apnea     CPAP     Spinal stenosis      Tendon rupture, traumatic. quadriceps 10/27/2015       PAST SURGICAL HISTORY:  Past Surgical History:   Procedure Laterality Date     " ABDOMEN SURGERY  1975    gall bladder     AMPUTATE TOE(S)  08/23/2012    left second toe Procedure: AMPUTATE TOE(S);;  Surgeon: Mil Jolly DPM;  Location: [facility] OR     BIOPSY  01-;04-    basil cell on leg, melanoma left upper arm     BREAST SURGERY  reduction  1996?     CHOLECYSTECTOMY  1975     COLONOSCOPY  early 2009    repeat 4-5 years (Had one prior to this with polyps)     COLONOSCOPY  09/2014    incomplete; recommend colography     COLONOSCOPY  2014 ?    Dr. Pathak [facility]     COLONOSCOPY N/A 02/25/2020    Procedure: COLONOSCOPY, WITH biopsies using forceps;  Surgeon: Adebayo Pathak MD;  Location:  GI     EYE SURGERY  caterac    1995 ?     INSERT CHEST TUBE Bilateral 02/08/2021    Procedure: Attempted INSERTION, CATHETER, INTERCOSTAL, FOR DRAINAGE (Pleurx);  Surgeon: Smitha Odonnell MD;  Location:  OR     OPTICAL TRACKING SYSTEM FUSION POSTERIOR SPINE LUMBAR N/A 09/16/2016    Procedure: OPTICAL TRACKING SYSTEM FUSION SPINE POSTERIOR LUMBAR ONE LEVEL;  Surgeon: Lennox Blue MD;  Location: [facility] OR     PET, REC OF MELANOMA/MET CA Left     arm     REPAIR HAMMER TOE BILATERAL  08/23/2012    Procedure: REPAIR HAMMER TOE BILATERAL;  Flexor Tenotomy 2,3,4,5 Toes both feet, Partial 2nd toe amputation left foot;  Surgeon: Mil Jolly DPM;  Location:  OR     REPAIR TENDON QUADRICEPS Right 10/27/2015    Procedure: REPAIR TENDON QUADRICEPS;  Surgeon: Antonio Yi MD;  Location:  OR     SURGICAL HISTORY OF -   1997    bilateral knee replacement     SURGICAL HISTORY OF -   1997    right knee revised; patella tendon ruptured     SURGICAL HISTORY OF -       surgery for spinal stenosis     SURGICAL HISTORY OF -       breast reduction surgery     SURGICAL HISTORY OF -       D and C        FAMILY HISTORY:  Family History   Problem Relation Age of Onset     Cerebrovascular Disease Mother      Cerebrovascular Disease Mother      Diabetes Mother      Hypertension Mother       Other Cancer Mother         Skin     Heart Disease Father      Hypertension Father      Cerebrovascular Disease Father      Neurologic Disorder Sister         ALS     Obesity Sister      C.A.D. Sister      Diabetes Sister      C.A.D. Brother      Diabetes Brother         diabetic  stroke blood clots in lungs     Psychotic Disorder Brother         [place] war: PTSD. suicide 2019     Gastrointestinal Disease Brother         diverticulitis     Diabetes Sister      Diabetes Sister         diabetic     Diabetes Brother         suicide (PTSD)     Other Cancer Brother         Stomach     Colon Cancer No family hx of        SOCIAL HISTORY:  Social History     Socioeconomic History     Marital status:      Highest education level: 12th grade   Occupational History     Occupation:  from home     Employer: RETIRED   Tobacco Use     Smoking status: Never     Passive exposure: Never     Smokeless tobacco: Never   Vaping Use     Vaping status: Never Used   Substance and Sexual Activity     Alcohol use: No     Drug use: Never     Sexual activity: Not Currently     Partners: Male   Other Topics Concern     Special Diet No     Comment: getting fruits and veggies.      Exercise No     Comment: gets little; limited with back and feet/leg pain.     Parent/sibling w/ CABG, MI or angioplasty before 65F 55M? Yes   Social History Narrative    2 adopted children     Social Drivers of Health     Financial Resource Strain: Low Risk  (6/4/2025)    Financial Resource Strain      Within the past 12 months, have you or your family members you live with been unable to get utilities (heat, electricity) when it was really needed?: No   Food Insecurity: Low Risk  (6/4/2025)    Food Insecurity      Within the past 12 months, did you worry that your food would run out before you got money to buy more?: No      Within the past 12 months, did the food you bought just not last and you didn t have money to get more?: No   Transportation  Needs: Low Risk  (6/4/2025)    Transportation Needs      Within the past 12 months, has lack of transportation kept you from medical appointments, getting your medicines, non-medical meetings or appointments, work, or from getting things that you need?: No   Physical Activity: Inactive (2/24/2025)    Exercise Vital Sign      Days of Exercise per Week: 0 days      Minutes of Exercise per Session: 0 min   Stress: No Stress Concern Present (2/24/2025)    [employer] [place] [employer] - Occupational Stress Questionnaire      Feeling of Stress : Only a little   Social Connections: Unknown (2/24/2025)    Social Connection and Isolation Panel [NHANES]      Frequency of Social Gatherings with Friends and Family: Twice a week   Interpersonal Safety: Low Risk  (6/4/2025)    Interpersonal Safety      Do you feel physically and emotionally safe where you currently live?: Yes      Within the past 12 months, have you been hit, slapped, kicked or otherwise physically hurt by someone?: No      Within the past 12 months, have you been humiliated or emotionally abused in other ways by your partner or ex-partner?: No   Housing Stability: Low Risk  (6/4/2025)    Housing Stability      Do you have housing? : Yes      Are you worried about losing your housing?: No             Thank you for allowing me to participate in the care of your patient.      Sincerely,     Madelin Gambino PA-C     [facility]  cc:   Ramírez Hook PA-C  [address]  [place] [address]

## 2025-07-24 ENCOUNTER — OFFICE VISIT (OUTPATIENT)
Dept: FAMILY MEDICINE | Facility: CLINIC | Age: 89
End: 2025-07-24
Attending: FAMILY MEDICINE
Payer: COMMERCIAL

## 2025-07-24 ENCOUNTER — ANTICOAGULATION THERAPY VISIT (OUTPATIENT)
Dept: ANTICOAGULATION | Facility: CLINIC | Age: 89
End: 2025-07-24

## 2025-07-24 VITALS
DIASTOLIC BLOOD PRESSURE: 53 MMHG | WEIGHT: 157.4 LBS | TEMPERATURE: 97.7 F | BODY MASS INDEX: 26.87 KG/M2 | HEIGHT: 64 IN | OXYGEN SATURATION: 99 % | RESPIRATION RATE: 20 BRPM | SYSTOLIC BLOOD PRESSURE: 161 MMHG | HEART RATE: 66 BPM

## 2025-07-24 DIAGNOSIS — Z79.01 LONG TERM CURRENT USE OF ANTICOAGULANTS WITH INR GOAL OF 2.0-3.0: ICD-10-CM

## 2025-07-24 DIAGNOSIS — I48.0 PAROXYSMAL ATRIAL FIBRILLATION (H): ICD-10-CM

## 2025-07-24 DIAGNOSIS — I10 HYPERTENSION GOAL BP (BLOOD PRESSURE) < 140/90: Primary | ICD-10-CM

## 2025-07-24 DIAGNOSIS — M54.50 ACUTE LEFT-SIDED LOW BACK PAIN WITHOUT SCIATICA: ICD-10-CM

## 2025-07-24 DIAGNOSIS — I48.0 PAROXYSMAL ATRIAL FIBRILLATION (H): Primary | ICD-10-CM

## 2025-07-24 LAB — INR BLD: 4.1 (ref 0.9–1.1)

## 2025-07-24 PROCEDURE — 85610 PROTHROMBIN TIME: CPT | Performed by: FAMILY MEDICINE

## 2025-07-24 PROCEDURE — 3077F SYST BP >= 140 MM HG: CPT | Performed by: FAMILY MEDICINE

## 2025-07-24 PROCEDURE — 36416 COLLJ CAPILLARY BLOOD SPEC: CPT | Performed by: FAMILY MEDICINE

## 2025-07-24 PROCEDURE — 99214 OFFICE O/P EST MOD 30 MIN: CPT | Performed by: FAMILY MEDICINE

## 2025-07-24 PROCEDURE — 3078F DIAST BP <80 MM HG: CPT | Performed by: FAMILY MEDICINE

## 2025-07-24 RX ORDER — METHOCARBAMOL 500 MG/1
500 TABLET, FILM COATED ORAL 4 TIMES DAILY PRN
Qty: 30 TABLET | Refills: 0 | Status: SHIPPED | OUTPATIENT
Start: 2025-07-24

## 2025-07-24 ASSESSMENT — PATIENT HEALTH QUESTIONNAIRE - PHQ9: SUM OF ALL RESPONSES TO PHQ QUESTIONS 1-9: 2

## 2025-07-24 NOTE — PROGRESS NOTES
Assessment and Plan    (I10) Hypertension goal BP (blood pressure) < 140/90  (primary encounter diagnosis)  Comment: BP remains brittle, has appt with renal in a fw days and will leave this to them.  She will make a point to address this with them  Plan:       (M54.50) Acute left-sided low back pain without sciatica  Comment: Cautioned about sedation and to use sparingly.  Plan: methocarbamol (ROBAXIN) 500 MG tablet              RTC in     Mil García MD     Bárbara Gifford is a 89 year old, presenting for the following health issues:  Follow Up (BP)        7/24/2025     9:42 AM   Additional Questions   Roomed by Ellie CARTER         7/24/2025     9:42 AM   Patient Reported Additional Medications   Patient reports taking the following new medications n.a     History of Present Illness       Hypertension: She presents for follow up of hypertension.  She does check blood pressure  regularly outside of the clinic. Outside blood pressures have been over 140/90. She does not follow a low salt diet.     She eats 2-3 servings of fruits and vegetables daily.She consumes 0 sweetened beverage(s) daily.She exercises with enough effort to increase her heart rate 9 or less minutes per day.  She exercises with enough effort to increase her heart rate 3 or less days per week.   She is taking medications regularly.      Recurrent LBP for most of the summer from low back pain.  Has been seeing chiro.  Thinks it started with gardening - lots of bending over.  Hasn't been able to lie in bed to sleep for a week - has been sleeping in recliner.  Seeing chiro, but not definitely helpful.  Center and L, feels like she's having back spasm.  Denies pain and tingling into either leg.  Using tyelnol for pain.    Does follow with nephrology and sees them again tomorrow.  Brings him bp BP log from last month.  Most readings are high, but she typically takes her BP right after taking her medication      Objective    BP (!) 161/53   Pulse  "66   Temp 97.7  F (36.5  C) (Oral)   Resp 20   Ht 1.626 m (5' 4\")   Wt 71.4 kg (157 lb 6.4 oz)   LMP  (LMP Unknown)   SpO2 99%   BMI 27.02 kg/m    Body mass index is 27.02 kg/m .  Physical Exam  Vitals reviewed.   HENT:      Head: Normocephalic and atraumatic.   Eyes:      Conjunctiva/sclera: Conjunctivae normal.   Cardiovascular:      Rate and Rhythm: Normal rate and regular rhythm.      Heart sounds: Normal heart sounds.   Pulmonary:      Effort: Pulmonary effort is normal.      Breath sounds: Normal breath sounds.   Skin:     General: Skin is warm and dry.   Neurological:      Mental Status: She is alert and oriented to person, place, and time.   Psychiatric:         Mood and Affect: Mood normal.         Behavior: Behavior normal.              Signed Electronically by: Mil García MD    "

## 2025-07-24 NOTE — PROGRESS NOTES
ANTICOAGULATION MANAGEMENT     Ирина Yanez 89 year old female is on warfarin with supratherapeutic INR result. (Goal INR 2.0-3.0)    Recent labs: (last 7 days)     07/24/25  0945   INR 4.1*       ASSESSMENT     Source(s): Chart Review and Patient/Caregiver Call     Warfarin doses taken: Warfarin taken as instructed - confirmed partial hold on 7/14  Diet: No new diet changes identified - achieved her 2 servings of greens this week and last week  Medication/supplement changes: Yes: prednisone x 5 days (ended 7/19) - potential impact on INR. Also hydralazine dosage increased, no interaction anticipated per Uptodate.  New illness, injury, or hospitalization: No - ongoing FABY likely leading to continued supra INR. Advising dosing decrease even with slight influence of temp factor (pred).   Signs or symptoms of bleeding or clotting: No  Previous result: Supratherapeutic  Additional findings: None       PLAN     Recommended plan for temporary change(s) and ongoing change(s) affecting INR     Dosing Instructions: decrease your warfarin dose (14.3% change) with next INR in 11 days       Summary  As of 7/24/2025      Full warfarin instructions:  2.5 mg every Mon, Thu; 5 mg all other days   Next INR check:  8/4/2025               Telephone call with Ирина who verbalizes understanding and agrees to plan    Lab visit scheduled    Education provided: Please call back if any changes to your diet, medications or how you've been taking warfarin  Dietary considerations: importance of notifying [facility] to changes in diet  Importance of notifying anticoagulation clinic for: changes in medications; a sooner lab recheck maybe needed    Plan made with [facility] Pharmacist Dorothea Richter RN  7/24/2025  Anticoagulation Clinic  [employer] for routing messages: kam RAMIREZ  [facility] patient phone line: [phone]        _______________________________________________________________________     Anticoagulation  Episode Summary       Current INR goal:  2.0-3.0   TTR:  64.0% (11.4 mo)   Target end date:  Indefinite   Send INR reminders to:  KRIS RAMIREZ    Indications    Paroxysmal atrial fibrillation (H) [I48.0]  Long term current use of anticoagulants with INR goal of 2.0-3.0 [Z79.01]             Comments:  --             Anticoagulation Care Providers       Provider Role Specialty Phone number    Yuridia Villarreal MD Referring Family Medicine [phone]    Mil García MD Referring Family Medicine [phone]

## 2025-07-25 ENCOUNTER — PATIENT OUTREACH (OUTPATIENT)
Dept: CARE COORDINATION | Facility: CLINIC | Age: 89
End: 2025-07-25
Payer: COMMERCIAL

## 2025-07-25 ENCOUNTER — TRANSFERRED RECORDS (OUTPATIENT)
Dept: HEALTH INFORMATION MANAGEMENT | Facility: CLINIC | Age: 89
End: 2025-07-25
Payer: COMMERCIAL

## 2025-08-02 ENCOUNTER — APPOINTMENT (OUTPATIENT)
Dept: CT IMAGING | Facility: CLINIC | Age: 89
End: 2025-08-02
Attending: EMERGENCY MEDICINE
Payer: COMMERCIAL

## 2025-08-02 ENCOUNTER — HOSPITAL ENCOUNTER (EMERGENCY)
Facility: CLINIC | Age: 89
Discharge: HOME | End: 2025-08-03
Attending: EMERGENCY MEDICINE | Admitting: EMERGENCY MEDICINE
Payer: COMMERCIAL

## 2025-08-02 DIAGNOSIS — S01.01XA SCALP LACERATION, INITIAL ENCOUNTER: Primary | ICD-10-CM

## 2025-08-02 DIAGNOSIS — S30.0XXA LUMBAR CONTUSION, INITIAL ENCOUNTER: ICD-10-CM
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
## 2025-08-02 PROCEDURE — 12011 RPR F/E/E/N/L/M 2.5 CM/<: CPT

## 2025-08-02 PROCEDURE — 99284 EMERGENCY DEPT VISIT MOD MDM: CPT | Mod: 25 | Performed by: EMERGENCY MEDICINE

## 2025-08-02 PROCEDURE — 250N000009 HC RX 250: Performed by: EMERGENCY MEDICINE

## 2025-08-02 PROCEDURE — 72131 CT LUMBAR SPINE W/O DYE: CPT

## 2025-08-02 PROCEDURE — 70450 CT HEAD/BRAIN W/O DYE: CPT

## 2025-08-02 RX ADMIN — Medication: at 23:31

## 2025-08-02 ASSESSMENT — COLUMBIA-SUICIDE SEVERITY RATING SCALE - C-SSRS
2. HAVE YOU ACTUALLY HAD ANY THOUGHTS OF KILLING YOURSELF IN THE PAST MONTH?: NO
1. IN THE PAST MONTH, HAVE YOU WISHED YOU WERE DEAD OR WISHED YOU COULD GO TO SLEEP AND NOT WAKE UP?: NO
6. HAVE YOU EVER DONE ANYTHING, STARTED TO DO ANYTHING, OR PREPARED TO DO ANYTHING TO END YOUR LIFE?: NO

## 2025-08-02 ASSESSMENT — ACTIVITIES OF DAILY LIVING (ADL): ADLS_ACUITY_SCORE: 60

## 2025-08-03 VITALS
RESPIRATION RATE: 18 BRPM | TEMPERATURE: 98.6 F | HEART RATE: 67 BPM | OXYGEN SATURATION: 97 % | SYSTOLIC BLOOD PRESSURE: 152 MMHG | DIASTOLIC BLOOD PRESSURE: 53 MMHG

## 2025-08-03 ASSESSMENT — ACTIVITIES OF DAILY LIVING (ADL): ADLS_ACUITY_SCORE: 60

## 2025-08-04 ENCOUNTER — ANTICOAGULATION THERAPY VISIT (OUTPATIENT)
Dept: ANTICOAGULATION | Facility: CLINIC | Age: 89
End: 2025-08-04

## 2025-08-04 ENCOUNTER — DOCUMENTATION ONLY (OUTPATIENT)
Dept: ANTICOAGULATION | Facility: CLINIC | Age: 89
End: 2025-08-04

## 2025-08-04 ENCOUNTER — LAB (OUTPATIENT)
Dept: LAB | Facility: CLINIC | Age: 89
End: 2025-08-04
Payer: COMMERCIAL

## 2025-08-04 DIAGNOSIS — I48.0 PAROXYSMAL ATRIAL FIBRILLATION (H): Primary | ICD-10-CM

## 2025-08-04 DIAGNOSIS — Z79.01 LONG TERM CURRENT USE OF ANTICOAGULANTS WITH INR GOAL OF 2.0-3.0: ICD-10-CM

## 2025-08-04 DIAGNOSIS — I48.0 PAROXYSMAL ATRIAL FIBRILLATION (H): ICD-10-CM

## 2025-08-04 LAB — INR BLD: 3.6 (ref 0.9–1.1)

## 2025-08-04 PROCEDURE — 36416 COLLJ CAPILLARY BLOOD SPEC: CPT

## 2025-08-04 PROCEDURE — 85610 PROTHROMBIN TIME: CPT

## 2025-08-05 ENCOUNTER — PATIENT OUTREACH (OUTPATIENT)
Dept: CARE COORDINATION | Facility: CLINIC | Age: 89
End: 2025-08-05
Payer: COMMERCIAL

## 2025-08-11 ENCOUNTER — OFFICE VISIT (OUTPATIENT)
Dept: FAMILY MEDICINE | Facility: CLINIC | Age: 89
End: 2025-08-11
Payer: COMMERCIAL

## 2025-08-11 ENCOUNTER — ANTICOAGULATION THERAPY VISIT (OUTPATIENT)
Dept: ANTICOAGULATION | Facility: CLINIC | Age: 89
End: 2025-08-11

## 2025-08-11 ENCOUNTER — LAB (OUTPATIENT)
Dept: LAB | Facility: CLINIC | Age: 89
End: 2025-08-11
Payer: COMMERCIAL

## 2025-08-11 VITALS
RESPIRATION RATE: 20 BRPM | TEMPERATURE: 97.6 F | OXYGEN SATURATION: 98 % | HEART RATE: 65 BPM | BODY MASS INDEX: 27.23 KG/M2 | SYSTOLIC BLOOD PRESSURE: 168 MMHG | WEIGHT: 159.5 LBS | HEIGHT: 64 IN | DIASTOLIC BLOOD PRESSURE: 55 MMHG

## 2025-08-11 DIAGNOSIS — Z79.01 LONG TERM CURRENT USE OF ANTICOAGULANTS WITH INR GOAL OF 2.0-3.0: ICD-10-CM
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
## 2025-08-11 DIAGNOSIS — I48.0 PAROXYSMAL ATRIAL FIBRILLATION (H): Primary | ICD-10-CM

## 2025-08-11 DIAGNOSIS — I48.0 PAROXYSMAL ATRIAL FIBRILLATION (H): ICD-10-CM

## 2025-08-11 DIAGNOSIS — I10 HYPERTENSION GOAL BP (BLOOD PRESSURE) < 140/90: ICD-10-CM

## 2025-08-11 LAB — INR BLD: 2.3 (ref 0.9–1.1)

## 2025-08-11 PROCEDURE — 3077F SYST BP >= 140 MM HG: CPT | Performed by: FAMILY MEDICINE

## 2025-08-11 PROCEDURE — 85610 PROTHROMBIN TIME: CPT

## 2025-08-11 PROCEDURE — 36416 COLLJ CAPILLARY BLOOD SPEC: CPT

## 2025-08-11 PROCEDURE — 99024 POSTOP FOLLOW-UP VISIT: CPT | Performed by: FAMILY MEDICINE

## 2025-08-11 PROCEDURE — 1125F AMNT PAIN NOTED PAIN PRSNT: CPT | Performed by: FAMILY MEDICINE

## 2025-08-11 PROCEDURE — 3078F DIAST BP <80 MM HG: CPT | Performed by: FAMILY MEDICINE

## 2025-08-11 ASSESSMENT — PAIN SCALES - GENERAL: PAINLEVEL_OUTOF10: MODERATE PAIN (4)

## 2025-08-21 ENCOUNTER — LAB (OUTPATIENT)
Dept: LAB | Facility: CLINIC | Age: 89
End: 2025-08-21
Payer: COMMERCIAL

## 2025-08-21 ENCOUNTER — PATIENT OUTREACH (OUTPATIENT)
Dept: CARE COORDINATION | Facility: CLINIC | Age: 89
End: 2025-08-21

## 2025-08-21 ENCOUNTER — RESULTS FOLLOW-UP (OUTPATIENT)
Dept: ANTICOAGULATION | Facility: CLINIC | Age: 89
End: 2025-08-21

## 2025-08-21 ENCOUNTER — ANTICOAGULATION THERAPY VISIT (OUTPATIENT)
Dept: ANTICOAGULATION | Facility: CLINIC | Age: 89
End: 2025-08-21

## 2025-08-21 DIAGNOSIS — I48.0 PAROXYSMAL ATRIAL FIBRILLATION (H): ICD-10-CM

## 2025-08-21 DIAGNOSIS — Z79.01 LONG TERM CURRENT USE OF ANTICOAGULANTS WITH INR GOAL OF 2.0-3.0: ICD-10-CM

## 2025-08-21 DIAGNOSIS — N18.4 CKD (CHRONIC KIDNEY DISEASE) STAGE 4, GFR 15-29 ML/MIN (H): Primary | ICD-10-CM

## 2025-08-21 DIAGNOSIS — I48.0 PAROXYSMAL ATRIAL FIBRILLATION (H): Primary | ICD-10-CM

## 2025-08-21 LAB
ANION GAP SERPL CALCULATED.3IONS-SCNC: 14 MMOL/L (ref 7–15)
BUN SERPL-MCNC: 92.7 MG/DL (ref 8–23)
CALCIUM SERPL-MCNC: 9.5 MG/DL (ref 8.8–10.4)
CHLORIDE SERPL-SCNC: 111 MMOL/L (ref 98–107)
CREAT SERPL-MCNC: 2.6 MG/DL (ref 0.51–0.95)
EGFRCR SERPLBLD CKD-EPI 2021: 17 ML/MIN/1.73M2
GLUCOSE SERPL-MCNC: 214 MG/DL (ref 70–99)
HCO3 SERPL-SCNC: 16 MMOL/L (ref 22–29)
INR BLD: 2 (ref 0.9–1.1)
POTASSIUM SERPL-SCNC: 5 MMOL/L (ref 3.4–5.3)
SODIUM SERPL-SCNC: 141 MMOL/L (ref 135–145)

## 2025-08-25 DIAGNOSIS — I10 HYPERTENSION GOAL BP (BLOOD PRESSURE) < 140/90: ICD-10-CM

## 2025-08-25 RX ORDER — CARVEDILOL 3.12 MG/1
3.12 TABLET ORAL 2 TIMES DAILY WITH MEALS
Qty: 180 TABLET | Refills: 1 | Status: SHIPPED | OUTPATIENT
Start: 2025-08-25

## 2025-08-26 DIAGNOSIS — E11.22 TYPE 2 DIABETES MELLITUS WITH DIABETIC CHRONIC KIDNEY DISEASE (H): ICD-10-CM

## 2025-08-26 DIAGNOSIS — N18.4 CHRONIC KIDNEY DISEASE, STAGE IV (SEVERE) (H): Primary | ICD-10-CM

## 2025-09-04 ENCOUNTER — ANTICOAGULATION THERAPY VISIT (OUTPATIENT)
Dept: ANTICOAGULATION | Facility: CLINIC | Age: 89
End: 2025-09-04

## 2025-09-04 ENCOUNTER — LAB (OUTPATIENT)
Dept: LAB | Facility: CLINIC | Age: 89
End: 2025-09-04
Payer: COMMERCIAL

## 2025-09-04 DIAGNOSIS — Z79.01 LONG TERM CURRENT USE OF ANTICOAGULANTS WITH INR GOAL OF 2.0-3.0: ICD-10-CM

## 2025-09-04 DIAGNOSIS — I48.0 PAROXYSMAL ATRIAL FIBRILLATION (H): Primary | ICD-10-CM

## 2025-09-04 DIAGNOSIS — I48.0 PAROXYSMAL ATRIAL FIBRILLATION (H): ICD-10-CM

## 2025-09-04 LAB — INR BLD: 2.4 (ref 0.9–1.1)

## (undated) DEVICE — BAG CLEAR TRASH 1.3M 39X33" P4040C

## (undated) DEVICE — LINEN DRAPE 54X72" 5467

## (undated) DEVICE — LINEN FULL SHEET 5511

## (undated) DEVICE — GLOVE PROTEXIS W/NEU-THERA 6.5  2D73TE65

## (undated) DEVICE — LINEN HALF SHEET 5512

## (undated) DEVICE — LINEN TOWEL PACK X10 5473

## (undated) DEVICE — COVER PROBE ULTRASOUND 3D W/GEL 5X96" LF 20-P3D596

## (undated) DEVICE — TUBING SUCTION 12"X1/4" N612

## (undated) DEVICE — KIT ENDO TURNOVER/PROCEDURE W/CLEAN A SCOPE LINERS 103888

## (undated) DEVICE — DRAIN PLEURAL CATH KIT PLEURX 15.5FR 50-7000B

## (undated) DEVICE — SOL WATER IRRIG 1000ML BOTTLE 2F7114

## (undated) RX ORDER — ONDANSETRON 2 MG/ML
INJECTION INTRAMUSCULAR; INTRAVENOUS
Status: DISPENSED
Start: 2021-02-08

## (undated) RX ORDER — FENTANYL CITRATE 50 UG/ML
INJECTION, SOLUTION INTRAMUSCULAR; INTRAVENOUS
Status: DISPENSED
Start: 2021-02-08

## (undated) RX ORDER — GLYCOPYRROLATE 0.2 MG/ML
INJECTION INTRAMUSCULAR; INTRAVENOUS
Status: DISPENSED
Start: 2021-02-08

## (undated) RX ORDER — FENTANYL CITRATE 50 UG/ML
INJECTION, SOLUTION INTRAMUSCULAR; INTRAVENOUS
Status: DISPENSED
Start: 2020-02-25

## (undated) RX ORDER — PROPOFOL 10 MG/ML
INJECTION, EMULSION INTRAVENOUS
Status: DISPENSED
Start: 2021-02-08

## (undated) RX ORDER — CEFAZOLIN SODIUM 2 G/100ML
INJECTION, SOLUTION INTRAVENOUS
Status: DISPENSED
Start: 2021-02-08

## (undated) RX ORDER — NEOSTIGMINE METHYLSULFATE 1 MG/ML
VIAL (ML) INJECTION
Status: DISPENSED
Start: 2021-02-08

## (undated) RX ORDER — DEXAMETHASONE SODIUM PHOSPHATE 4 MG/ML
INJECTION, SOLUTION INTRA-ARTICULAR; INTRALESIONAL; INTRAMUSCULAR; INTRAVENOUS; SOFT TISSUE
Status: DISPENSED
Start: 2021-02-08

## (undated) RX ORDER — LIDOCAINE HYDROCHLORIDE 10 MG/ML
INJECTION, SOLUTION EPIDURAL; INFILTRATION; INTRACAUDAL; PERINEURAL
Status: DISPENSED
Start: 2021-02-08